# Patient Record
Sex: MALE | Race: BLACK OR AFRICAN AMERICAN | Employment: UNEMPLOYED | URBAN - METROPOLITAN AREA
[De-identification: names, ages, dates, MRNs, and addresses within clinical notes are randomized per-mention and may not be internally consistent; named-entity substitution may affect disease eponyms.]

---

## 2017-04-21 ENCOUNTER — HOSPITAL ENCOUNTER (EMERGENCY)
Age: 37
Discharge: HOME | End: 2017-04-21
Attending: EMERGENCY MEDICINE
Payer: COMMERCIAL

## 2017-04-21 VITALS
HEIGHT: 69 IN | OXYGEN SATURATION: 100 % | WEIGHT: 145.5 LBS | BODY MASS INDEX: 21.55 KG/M2 | DIASTOLIC BLOOD PRESSURE: 81 MMHG | TEMPERATURE: 98.4 F | HEART RATE: 64 BPM | SYSTOLIC BLOOD PRESSURE: 123 MMHG | RESPIRATION RATE: 14 BRPM

## 2017-04-21 DIAGNOSIS — M67.40 GANGLION CYST: ICD-10-CM

## 2017-04-21 DIAGNOSIS — D17.0 LIPOMA OF FACE: ICD-10-CM

## 2017-04-21 DIAGNOSIS — L02.91 ABSCESS: Primary | ICD-10-CM

## 2017-04-21 PROCEDURE — 99282 EMERGENCY DEPT VISIT SF MDM: CPT

## 2017-04-21 PROCEDURE — 75810000289 HC I&D ABSCESS SIMP/COMP/MULT

## 2017-04-21 PROCEDURE — 77030019895 HC PCKNG STRP IODO -A

## 2017-04-21 RX ORDER — OXYCODONE AND ACETAMINOPHEN 5; 325 MG/1; MG/1
1 TABLET ORAL
Qty: 10 TAB | Refills: 0 | Status: SHIPPED | OUTPATIENT
Start: 2017-04-21 | End: 2018-07-26 | Stop reason: ALTCHOICE

## 2017-04-21 RX ORDER — DOXYCYCLINE HYCLATE 100 MG
100 TABLET ORAL 2 TIMES DAILY
Qty: 14 TAB | Refills: 0 | Status: SHIPPED | OUTPATIENT
Start: 2017-04-21 | End: 2017-04-28

## 2017-04-21 NOTE — DISCHARGE INSTRUCTIONS
Skin Abscess: Care Instructions  Your Care Instructions    A skin abscess is a bacterial infection that forms a pocket of pus. A boil is a kind of skin abscess. The doctor may have cut an opening in the abscess so that the pus can drain out. You may have gauze in the cut so that the abscess will stay open and keep draining. You may need antibiotics. You will need to follow up with your doctor to make sure the infection has gone away. The doctor has checked you carefully, but problems can develop later. If you notice any problems or new symptoms, get medical treatment right away. Follow-up care is a key part of your treatment and safety. Be sure to make and go to all appointments, and call your doctor if you are having problems. It's also a good idea to know your test results and keep a list of the medicines you take. How can you care for yourself at home? · Apply warm and dry compresses, a heating pad set on low, or a hot water bottle 3 or 4 times a day for pain. Keep a cloth between the heat source and your skin. · If your doctor prescribed antibiotics, take them as directed. Do not stop taking them just because you feel better. You need to take the full course of antibiotics. · Take pain medicines exactly as directed. ¨ If the doctor gave you a prescription medicine for pain, take it as prescribed. ¨ If you are not taking a prescription pain medicine, ask your doctor if you can take an over-the-counter medicine. · Keep your bandage clean and dry. Change the bandage whenever it gets wet or dirty, or at least one time a day. · If the abscess was packed with gauze:  ¨ Keep follow-up appointments to have the gauze changed or removed. If the doctor instructed you to remove the gauze, gently pull out all of the gauze when your doctor tells you to. ¨ After the gauze is removed, soak the area in warm water for 15 to 20 minutes 2 times a day, until the wound closes. When should you call for help?   Call your doctor now or seek immediate medical care if:  · You have signs of worsening infection, such as:  ¨ Increased pain, swelling, warmth, or redness. ¨ Red streaks leading from the infected skin. ¨ Pus draining from the wound. ¨ A fever. Watch closely for changes in your health, and be sure to contact your doctor if:  · You do not get better as expected. Where can you learn more? Go to http://marcel-jl.info/. Enter B706 in the search box to learn more about \"Skin Abscess: Care Instructions. \"  Current as of: October 13, 2016  Content Version: 11.2  © 7085-0838 [employer]. Care instructions adapted under license by [employer] (which disclaims liability or warranty for this information). If you have questions about a medical condition or this instruction, always ask your healthcare professional. [place] [address] any warranty or liability for your use of this information. Ganglions: Care Instructions  Your Care Instructions    A ganglion is a small sac, or cyst, filled with a clear fluid that is like jelly. A ganglion may look like a bump on the hand or wrist. It also can appear on your feet, ankles, knees, or shoulders. It is not cancer. A ganglion can grow out of the protective area, or capsule, around a joint. It also can grow on a tendon sheath, which covers the ropelike tendons that connect muscle to bone. A ganglion may hurt or cause numbness if it presses on a nerve. Many ganglions do not need treatment, and they often go away on their own. But if a ganglion hurts, becomes larger, causes numbness, or limits your activity, your doctor may want to drain it with a needle and syringe or remove it with minor surgery. Follow-up care is a key part of your treatment and safety. Be sure to make and go to all appointments, and call your doctor if you are having problems.  It's also a good idea to know your test results and keep a list of the medicines you take. How can you care for yourself at home? · Wear a wrist or finger splint as directed by your doctor. It will keep your wrist or hand from moving and help reduce the fluid in the cyst. This may be all you need for the ganglion to shrink and go away. · Do not smash a ganglion with a book or other heavy object. You may break a bone or otherwise injure your wrist by trying this folk remedy, and the ganglion may return anyway. · Do not try to drain the fluid by poking the ganglion with a pin or any other sharp object. You could cause an infection. When should you call for help? Call your doctor now or seek immediate medical care if:  · You have signs of infection, such as:  ¨ Increased pain, swelling, warmth, or redness. ¨ Red streaks leading from the cyst.  ¨ Pus draining from the cyst.  ¨ A fever. Watch closely for changes in your health, and be sure to contact your doctor if:  · You have increasing pain. · Your ganglion is getting larger. · You still have pain or numbness from a ganglion. Where can you learn more? Go to http://marcel-jl.info/. Enter Z155 in the search box to learn more about \"Ganglions: Care Instructions. \"  Current as of: May 23, 2016  Content Version: 11.2  © 9305-9623 [employer]. Care instructions adapted under license by [employer] (which disclaims liability or warranty for this information). If you have questions about a medical condition or this instruction, always ask your healthcare professional. [place] [address] any warranty or liability for your use of this information. Lipoma: Care Instructions  Your Care Instructions  A lipoma is a growth of fat just below the skin. It may feel soft and rubbery. Lipomas can occur anywhere on the body. But they are most common on the torso, neck, upper thighs, upper arms, and armpits. A lipoma does not turn into cancer.   Lipomas usually are not treated, because most of them don't hurt or cause problems. But your doctor may remove a lipoma if it is painful, gets infected, or bothers you. Follow-up care is a key part of your treatment and safety. Be sure to make and go to all appointments, and call your doctor if you are having problems. It's also a good idea to know your test results and keep a list of the medicines you take. How can you care for yourself at home? · Lipomas usually need no care at home. · If your doctor removes a lipoma, follow directions for taking care of the cut (incision). When should you call for help? Call your doctor now or seek immediate medical care if:  · You have signs of infection, such as:  ¨ Increased pain, swelling, warmth, or redness. ¨ Red streaks leading from the lipoma. ¨ Pus draining from the lipoma. ¨ A fever. Watch closely for changes in your health, and be sure to contact your doctor if:  · You want to discuss having a lipoma removed. Where can you learn more? Go to http://marcel-jl.info/. Enter K330 in the search box to learn more about \"Lipoma: Care Instructions. \"  Current as of: October 13, 2016  Content Version: 11.2  © 6102-8833 [employer], [employer]. Care instructions adapted under license by [employer] (which disclaims liability or warranty for this information). If you have questions about a medical condition or this instruction, always ask your healthcare professional. [place] [address] any warranty or liability for your use of this information.

## 2017-04-21 NOTE — LETTER
[address] 
[facility] EMERGENCY DEPT 
[address] [place] [address] [address] [id number] [phone] Work/School Note Date: 4/21/2017 To Whom It May concern: 
 
Nola Simon was seen and treated today in the emergency room by the following provider(s): 
Physician Assistant: AGNIESZKA Torres. Nola Simon may return to work on 4/23/2017.  
 
Sincerely, 
 
 
 
 
Kera Gonzalez RN

## 2017-04-21 NOTE — ED NOTES
Patient arrives to ED with complaints of abscesses on right buttock, right side of face, and left wrist. Patient states that these popped up about 2 months ago.
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
## 2017-04-28 ENCOUNTER — HOSPITAL ENCOUNTER (EMERGENCY)
Age: 37
Discharge: HOME | End: 2017-04-28
Attending: EMERGENCY MEDICINE
Payer: COMMERCIAL

## 2017-04-28 VITALS
SYSTOLIC BLOOD PRESSURE: 125 MMHG | BODY MASS INDEX: 22.17 KG/M2 | DIASTOLIC BLOOD PRESSURE: 84 MMHG | WEIGHT: 149.69 LBS | OXYGEN SATURATION: 100 % | TEMPERATURE: 98.1 F | HEART RATE: 91 BPM | HEIGHT: 69 IN | RESPIRATION RATE: 18 BRPM

## 2017-04-28 DIAGNOSIS — F17.200 TOBACCO DEPENDENCE: ICD-10-CM

## 2017-04-28 DIAGNOSIS — Z51.89 ENCOUNTER FOR WOUND RE-CHECK: Primary | ICD-10-CM

## 2017-04-28 DIAGNOSIS — L02.31 ABSCESS, GLUTEAL, RIGHT: ICD-10-CM

## 2017-04-28 PROCEDURE — 75810000275 HC EMERGENCY DEPT VISIT NO LEVEL OF CARE

## 2017-04-28 RX ORDER — SULFAMETHOXAZOLE AND TRIMETHOPRIM 800; 160 MG/1; MG/1
1 TABLET ORAL 2 TIMES DAILY
Qty: 14 TAB | Refills: 0 | Status: SHIPPED | OUTPATIENT
Start: 2017-04-28 | End: 2017-05-05

## 2017-04-28 NOTE — LETTER
[address] 
\[facility]\"" EMERGENCY DEPT 
[address] [place] [address]. [address] [id number] [phone] Work/School Note Date: 4/28/2017 To Whom It May concern: 
 
William Mccann was seen and treated today in the emergency room. He may return to work on Monday, 5/1/17. Sincerely, Amita Morales
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
## 2017-04-28 NOTE — DISCHARGE INSTRUCTIONS
Learning About Benefits From Quitting Smoking  How does quitting smoking make you healthier? If you're thinking about quitting smoking, you may have a few reasons to be smoke-free. Your health may be one of them. · When you quit smoking, you lower your risks for cancer, lung disease, heart attack, stroke, blood vessel disease, and blindness from macular degeneration. · When you're smoke-free, you get sick less often, and you heal faster. You are less likely to get colds, flu, bronchitis, and pneumonia. · As a nonsmoker, you may find that your mood is better and you are less stressed. When and how will you feel healthier? Quitting has real health benefits that start from day 1 of being smoke-free. And the longer you stay smoke-free, the healthier you get and the better you feel. The first hours  · After just 20 minutes, your blood pressure and heart rate go down. That means there's less stress on your heart and blood vessels. · Within 12 hours, the level of carbon monoxide in your blood drops back to normal. That makes room for more oxygen. With more oxygen in your body, you may notice that you have more energy than when you smoked. After 2 weeks  · Your lungs start to work better. · Your risk of heart attack starts to drop. After 1 month  · When your lungs are clear, you cough less and breathe deeper, so it's easier to be active. · Your sense of taste and smell return. That means you can enjoy food more than you have since you started smoking. Over the years  · After 1 year, your risk of heart disease is half what it would be if you kept smoking. · After 5 years, your risk of stroke starts to shrink. Within a few years after that, it's about the same as if you'd never smoked. · After 10 years, your risk of dying from lung cancer is cut by about half. And your risk for many other types of cancer is lower too. How would quitting help others in your life?   When you quit smoking, you improve the health of everyone who now breathes in your smoke. · Their heart, lung, and cancer risks drop, much like yours. · They are sick less. For babies and small children, living smoke-free means they're less likely to have ear infections, pneumonia, and bronchitis. · If you're a woman who is or will be pregnant someday, quitting smoking means a healthier . · Children who are close to you are less likely to become adult smokers. Where can you learn more? Go to http://marcel-jl.info/. Enter 052 [id number] 72 11 in the search box to learn more about \"Learning About Benefits From Quitting Smoking. \"  Current as of: May 26, 2016  Content Version: 11.2  © 4023-6194 [employer]. Care instructions adapted under license by [employer] (which disclaims liability or warranty for this information). If you have questions about a medical condition or this instruction, always ask your healthcare professional. [place] [address] any warranty or liability for your use of this information. Skin Abscess: Care Instructions  Your Care Instructions    A skin abscess is a bacterial infection that forms a pocket of pus. A boil is a kind of skin abscess. The doctor may have cut an opening in the abscess so that the pus can drain out. You may have gauze in the cut so that the abscess will stay open and keep draining. You may need antibiotics. You will need to follow up with your doctor to make sure the infection has gone away. The doctor has checked you carefully, but problems can develop later. If you notice any problems or new symptoms, get medical treatment right away. Follow-up care is a key part of your treatment and safety. Be sure to make and go to all appointments, and call your doctor if you are having problems. It's also a good idea to know your test results and keep a list of the medicines you take. How can you care for yourself at home?   · Apply warm and dry compresses, a heating pad set on low, or a hot water bottle 3 or 4 times a day for pain. Keep a cloth between the heat source and your skin. · If your doctor prescribed antibiotics, take them as directed. Do not stop taking them just because you feel better. You need to take the full course of antibiotics. · Take pain medicines exactly as directed. ¨ If the doctor gave you a prescription medicine for pain, take it as prescribed. ¨ If you are not taking a prescription pain medicine, ask your doctor if you can take an over-the-counter medicine. · Keep your bandage clean and dry. Change the bandage whenever it gets wet or dirty, or at least one time a day. · If the abscess was packed with gauze:  ¨ Keep follow-up appointments to have the gauze changed or removed. If the doctor instructed you to remove the gauze, gently pull out all of the gauze when your doctor tells you to. ¨ After the gauze is removed, soak the area in warm water for 15 to 20 minutes 2 times a day, until the wound closes. When should you call for help? Call your doctor now or seek immediate medical care if:  · You have signs of worsening infection, such as:  ¨ Increased pain, swelling, warmth, or redness. ¨ Red streaks leading from the infected skin. ¨ Pus draining from the wound. ¨ A fever. Watch closely for changes in your health, and be sure to contact your doctor if:  · You do not get better as expected. Where can you learn more? Go to http://marcel-jl.info/. Enter B270 in the search box to learn more about \"Skin Abscess: Care Instructions. \"  Current as of: October 13, 2016  Content Version: 11.2  © 7944-3535 [employer]. Care instructions adapted under license by [employer] (which disclaims liability or warranty for this information).  If you have questions about a medical condition or this instruction, always ask your healthcare professional. Wilma Morelos any warranty or liability for your use of this information.

## 2017-04-28 NOTE — ED NOTES
Patient arrives for wound check for abscess that was drained on Friday on his R buttock. Patient reports that he is still having pain and drainage. Reports feeling nauseous. NO distress noted.  Will continue to monitor

## 2017-10-11 ENCOUNTER — ANESTHESIA EVENT (OUTPATIENT)
Dept: SURGERY | Age: 37
End: 2017-10-11
Payer: COMMERCIAL

## 2017-10-11 NOTE — PERIOP NOTES
[facility]  Ambulatory Surgery Unit  Pre-operative Instructions    Surgery/Procedure Date  10/12/17            Tentative Arrival Time 0800      1. On the day of your surgery/procedure, please report to the Ambulatory Surgery Unit Registration Desk and sign in at your designated time. The Ambulatory Surgery Unit is located in [facility] on the [facility] side of the [facility] across from the [address] [place] [address]. Please have all of your health insurance cards and a photo ID. 2. You must have someone with you to drive you home, as you should not drive a car for 24 hours following anesthesia. Please make arrangements for a responsible adult friend or family member to stay with you for at least the first 24 hours after your surgery. 3. Do not have anything to eat or drink (including water, gum, mints, coffee, juice) after midnight   10/11/17. This may not apply to medications prescribed by your physician. (Please note below the special instructions with medications to take the morning of surgery, if applicable.)    4. We recommend you do not drink any alcoholic beverages for 24 hours before and after your surgery. 5. Contact your surgeons office for instructions on the following medications: non-steroidal anti-inflammatory drugs (i.e. Advil, Aleve), vitamins, and supplements. (Some surgeons will want you to stop these medications prior to surgery and others may allow you to take them)   **If you are currently taking Plavix, Coumadin, Aspirin and/or other blood-thinning agents, contact your surgeon for instructions. ** Your surgeon will partner with the physician prescribing these medications to determine if it is safe to stop or if you need to continue taking. Please do not stop taking these medications without instructions from your surgeon.     6. In an effort to help prevent surgical site infection, we ask that you shower with an anti-bacterial soap (i.e. Dial or Safeguard) for 3 days prior to and on the morning of surgery, using a fresh towel after each shower. (Please begin this process with fresh bed linens.) Do not apply any lotions, powders, or deodorants after the shower on the day of your procedure. If applicable, please do not shave the operative site for 48 hours prior to surgery. 7. Wear comfortable clothes. Wear glasses instead of contacts. Do not bring any jewelry or money (other than copays or fees as instructed). Do not wear make-up, particularly mascara, the morning of your surgery. Do not wear nail polish, particularly if you are having foot /hand surgery. Wear your hair loose or down, no ponytails, buns, allison pins or clips. All body piercings must be removed. 8. You should understand that if you do not follow these instructions your surgery may be cancelled. If your physical condition changes (i.e. fever, cold or flu) please contact your surgeon as soon as possible. 9. It is important that you be on time. If a situation occurs where you may be late, or if you have any questions or problems, please call [phone].    10. Your surgery time may be subject to change. You will receive a phone call the day prior to surgery to confirm your arrival time. 11. Pediatric patients: please bring a change of clothes, diapers, bottle/sippy cup, pacifier, etc.      Special Instructions: Take all medications and inhalers, as prescribed, on the morning of surgery with a sip of water EXCEPT: none      I understand a pre-operative phone call will be made to verify my surgery time. In the event that I am not available, I give permission for a message to be left on my answering service and/or with another person?       Yes     (instructions given verbally during phone assessment- pt voiced understanding)     ___________________      ___________________      ________________  (Signature of Patient)          (Witness)                   (Date and Time)

## 2017-10-12 ENCOUNTER — ANESTHESIA (OUTPATIENT)
Dept: SURGERY | Age: 37
End: 2017-10-12
Payer: COMMERCIAL

## 2017-10-12 ENCOUNTER — HOSPITAL ENCOUNTER (OUTPATIENT)
Age: 37
Setting detail: OUTPATIENT SURGERY
Discharge: HOME | End: 2017-10-12
Attending: ORTHOPAEDIC SURGERY | Admitting: ORTHOPAEDIC SURGERY
Payer: COMMERCIAL

## 2017-10-12 VITALS
OXYGEN SATURATION: 100 % | TEMPERATURE: 97.5 F | BODY MASS INDEX: 20.65 KG/M2 | RESPIRATION RATE: 12 BRPM | WEIGHT: 139.4 LBS | SYSTOLIC BLOOD PRESSURE: 105 MMHG | HEIGHT: 69 IN | DIASTOLIC BLOOD PRESSURE: 76 MMHG | HEART RATE: 60 BPM

## 2017-10-12 PROCEDURE — 74011250636 HC RX REV CODE- 250/636

## 2017-10-12 PROCEDURE — 74011250636 HC RX REV CODE- 250/636: Performed by: ANESTHESIOLOGY

## 2017-10-12 PROCEDURE — 77030002916 HC SUT ETHLN J&J -A: Performed by: ORTHOPAEDIC SURGERY

## 2017-10-12 PROCEDURE — 77030003601 HC NDL NRV BLK BBMI -A: Performed by: ANESTHESIOLOGY

## 2017-10-12 PROCEDURE — 77030018836 HC SOL IRR NACL ICUM -A: Performed by: ORTHOPAEDIC SURGERY

## 2017-10-12 PROCEDURE — 74011000250 HC RX REV CODE- 250

## 2017-10-12 PROCEDURE — 77030000032 HC CUF TRNQT ZIMM -B: Performed by: ORTHOPAEDIC SURGERY

## 2017-10-12 PROCEDURE — 76030000000 HC AMB SURG OR TIME 0.5 TO 1: Performed by: ORTHOPAEDIC SURGERY

## 2017-10-12 PROCEDURE — 76210000046 HC AMBSU PH II REC FIRST 0.5 HR: Performed by: ORTHOPAEDIC SURGERY

## 2017-10-12 PROCEDURE — 88304 TISSUE EXAM BY PATHOLOGIST: CPT | Performed by: ORTHOPAEDIC SURGERY

## 2017-10-12 PROCEDURE — 76210000040 HC AMBSU PH I REC FIRST 0.5 HR: Performed by: ORTHOPAEDIC SURGERY

## 2017-10-12 PROCEDURE — 76060000061 HC AMB SURG ANES 0.5 TO 1 HR: Performed by: ORTHOPAEDIC SURGERY

## 2017-10-12 RX ORDER — FENTANYL CITRATE 50 UG/ML
25 INJECTION, SOLUTION INTRAMUSCULAR; INTRAVENOUS
Status: DISCONTINUED | OUTPATIENT
Start: 2017-10-12 | End: 2017-10-12 | Stop reason: HOSPADM

## 2017-10-12 RX ORDER — ONDANSETRON 2 MG/ML
INJECTION INTRAMUSCULAR; INTRAVENOUS AS NEEDED
Status: DISCONTINUED | OUTPATIENT
Start: 2017-10-12 | End: 2017-10-12 | Stop reason: HOSPADM

## 2017-10-12 RX ORDER — ROPIVACAINE HYDROCHLORIDE 5 MG/ML
INJECTION, SOLUTION EPIDURAL; INFILTRATION; PERINEURAL
Status: DISCONTINUED
Start: 2017-10-12 | End: 2017-10-12 | Stop reason: HOSPADM

## 2017-10-12 RX ORDER — DEXAMETHASONE SODIUM PHOSPHATE 4 MG/ML
INJECTION, SOLUTION INTRA-ARTICULAR; INTRALESIONAL; INTRAMUSCULAR; INTRAVENOUS; SOFT TISSUE AS NEEDED
Status: DISCONTINUED | OUTPATIENT
Start: 2017-10-12 | End: 2017-10-12 | Stop reason: HOSPADM

## 2017-10-12 RX ORDER — MORPHINE SULFATE 10 MG/ML
2 INJECTION, SOLUTION INTRAMUSCULAR; INTRAVENOUS
Status: DISCONTINUED | OUTPATIENT
Start: 2017-10-12 | End: 2017-10-12 | Stop reason: HOSPADM

## 2017-10-12 RX ORDER — SODIUM CHLORIDE 0.9 % (FLUSH) 0.9 %
5-10 SYRINGE (ML) INJECTION AS NEEDED
Status: DISCONTINUED | OUTPATIENT
Start: 2017-10-12 | End: 2017-10-12 | Stop reason: HOSPADM

## 2017-10-12 RX ORDER — MIDAZOLAM HYDROCHLORIDE 1 MG/ML
INJECTION, SOLUTION INTRAMUSCULAR; INTRAVENOUS AS NEEDED
Status: DISCONTINUED | OUTPATIENT
Start: 2017-10-12 | End: 2017-10-12 | Stop reason: HOSPADM

## 2017-10-12 RX ORDER — SODIUM CHLORIDE 0.9 % (FLUSH) 0.9 %
5-10 SYRINGE (ML) INJECTION EVERY 8 HOURS
Status: DISCONTINUED | OUTPATIENT
Start: 2017-10-12 | End: 2017-10-12 | Stop reason: HOSPADM

## 2017-10-12 RX ORDER — OXYCODONE AND ACETAMINOPHEN 5; 325 MG/1; MG/1
1 TABLET ORAL ONCE
Status: DISCONTINUED | OUTPATIENT
Start: 2017-10-12 | End: 2017-10-12 | Stop reason: HOSPADM

## 2017-10-12 RX ORDER — SODIUM CHLORIDE, SODIUM LACTATE, POTASSIUM CHLORIDE, CALCIUM CHLORIDE 600; 310; 30; 20 MG/100ML; MG/100ML; MG/100ML; MG/100ML
25 INJECTION, SOLUTION INTRAVENOUS CONTINUOUS
Status: DISCONTINUED | OUTPATIENT
Start: 2017-10-12 | End: 2017-10-12 | Stop reason: HOSPADM

## 2017-10-12 RX ORDER — PROPOFOL 10 MG/ML
VIAL (ML) INTRAVENOUS
Status: DISCONTINUED | OUTPATIENT
Start: 2017-10-12 | End: 2017-10-12 | Stop reason: HOSPADM

## 2017-10-12 RX ORDER — ROPIVACAINE HYDROCHLORIDE 5 MG/ML
INJECTION, SOLUTION EPIDURAL; INFILTRATION; PERINEURAL AS NEEDED
Status: DISCONTINUED | OUTPATIENT
Start: 2017-10-12 | End: 2017-10-12 | Stop reason: HOSPADM

## 2017-10-12 RX ORDER — LIDOCAINE HYDROCHLORIDE 10 MG/ML
0.1 INJECTION, SOLUTION EPIDURAL; INFILTRATION; INTRACAUDAL; PERINEURAL AS NEEDED
Status: DISCONTINUED | OUTPATIENT
Start: 2017-10-12 | End: 2017-10-12 | Stop reason: HOSPADM

## 2017-10-12 RX ORDER — GLYCOPYRROLATE 0.2 MG/ML
INJECTION INTRAMUSCULAR; INTRAVENOUS AS NEEDED
Status: DISCONTINUED | OUTPATIENT
Start: 2017-10-12 | End: 2017-10-12 | Stop reason: HOSPADM

## 2017-10-12 RX ORDER — LIDOCAINE HYDROCHLORIDE 20 MG/ML
INJECTION, SOLUTION EPIDURAL; INFILTRATION; INTRACAUDAL; PERINEURAL AS NEEDED
Status: DISCONTINUED | OUTPATIENT
Start: 2017-10-12 | End: 2017-10-12 | Stop reason: HOSPADM

## 2017-10-12 RX ORDER — MIDAZOLAM HYDROCHLORIDE 1 MG/ML
INJECTION, SOLUTION INTRAMUSCULAR; INTRAVENOUS
Status: DISCONTINUED
Start: 2017-10-12 | End: 2017-10-12 | Stop reason: HOSPADM

## 2017-10-12 RX ORDER — FENTANYL CITRATE 50 UG/ML
INJECTION, SOLUTION INTRAMUSCULAR; INTRAVENOUS AS NEEDED
Status: DISCONTINUED | OUTPATIENT
Start: 2017-10-12 | End: 2017-10-12 | Stop reason: HOSPADM

## 2017-10-12 RX ORDER — HYDROCODONE BITARTRATE AND ACETAMINOPHEN 5; 325 MG/1; MG/1
1 TABLET ORAL
Qty: 25 TAB | Refills: 0 | Status: SHIPPED | OUTPATIENT
Start: 2017-10-12 | End: 2018-07-26 | Stop reason: ALTCHOICE

## 2017-10-12 RX ORDER — DIPHENHYDRAMINE HYDROCHLORIDE 50 MG/ML
12.5 INJECTION, SOLUTION INTRAMUSCULAR; INTRAVENOUS AS NEEDED
Status: DISCONTINUED | OUTPATIENT
Start: 2017-10-12 | End: 2017-10-12 | Stop reason: HOSPADM

## 2017-10-12 RX ORDER — CEFAZOLIN SODIUM IN 0.9 % NACL 2 G/100 ML
2 PLASTIC BAG, INJECTION (ML) INTRAVENOUS ONCE
Status: DISCONTINUED | OUTPATIENT
Start: 2017-10-12 | End: 2017-10-12 | Stop reason: HOSPADM

## 2017-10-12 RX ORDER — HYDROMORPHONE HYDROCHLORIDE 1 MG/ML
.2-.5 INJECTION, SOLUTION INTRAMUSCULAR; INTRAVENOUS; SUBCUTANEOUS ONCE
Status: DISCONTINUED | OUTPATIENT
Start: 2017-10-12 | End: 2017-10-12 | Stop reason: HOSPADM

## 2017-10-12 RX ORDER — LIDOCAINE HYDROCHLORIDE AND EPINEPHRINE 20; 5 MG/ML; UG/ML
1.5 INJECTION, SOLUTION EPIDURAL; INFILTRATION; INTRACAUDAL; PERINEURAL ONCE
Status: DISCONTINUED | OUTPATIENT
Start: 2017-10-12 | End: 2017-10-12 | Stop reason: HOSPADM

## 2017-10-12 RX ADMIN — ONDANSETRON 4 MG: 2 INJECTION INTRAMUSCULAR; INTRAVENOUS at 10:09

## 2017-10-12 RX ADMIN — GLYCOPYRROLATE 0.4 MG: 0.2 INJECTION INTRAMUSCULAR; INTRAVENOUS at 09:42

## 2017-10-12 RX ADMIN — FENTANYL CITRATE 25 MCG: 50 INJECTION, SOLUTION INTRAMUSCULAR; INTRAVENOUS at 09:58

## 2017-10-12 RX ADMIN — Medication 100 MCG/KG/MIN: at 09:37

## 2017-10-12 RX ADMIN — DEXAMETHASONE SODIUM PHOSPHATE 4 MG: 4 INJECTION, SOLUTION INTRA-ARTICULAR; INTRALESIONAL; INTRAMUSCULAR; INTRAVENOUS; SOFT TISSUE at 10:09

## 2017-10-12 RX ADMIN — ROPIVACAINE HYDROCHLORIDE 30 ML: 5 INJECTION, SOLUTION EPIDURAL; INFILTRATION; PERINEURAL at 08:41

## 2017-10-12 RX ADMIN — LIDOCAINE HYDROCHLORIDE 80 MG: 20 INJECTION, SOLUTION EPIDURAL; INFILTRATION; INTRACAUDAL; PERINEURAL at 09:37

## 2017-10-12 RX ADMIN — MIDAZOLAM HYDROCHLORIDE 3 MG: 1 INJECTION, SOLUTION INTRAMUSCULAR; INTRAVENOUS at 08:36

## 2017-10-12 RX ADMIN — FENTANYL CITRATE 25 MCG: 50 INJECTION, SOLUTION INTRAMUSCULAR; INTRAVENOUS at 09:51

## 2017-10-12 RX ADMIN — SODIUM CHLORIDE, SODIUM LACTATE, POTASSIUM CHLORIDE, AND CALCIUM CHLORIDE 25 ML/HR: 600; 310; 30; 20 INJECTION, SOLUTION INTRAVENOUS at 08:31

## 2017-10-12 NOTE — OP NOTES
[address]   OP NOTE       Name:  Yoly Aden   MR#:  [id number]   :  1980   Account #:  [de-identified]    Surgery Date:  10/12/2017   Date of Adm:  10/12/2017       PREOPERATIVE DIAGNOS IS: Left wrist dorsal ganglion cyst.     POSTOPERATIVE DIAGNOS IS: Left wrist ganglion cyst.     PROCEDURES PERFORMED:  Excision of left wrist dorsal ganglion   cyst.     SURGEON: Rosalinda Witt. Miles Cid MD    ESTIMATED BLOOD LOSS: Minimal     SPECIMENS REMOVED:  Left dorsal wrist mass. ANESTHESIA:  Regional block plus sedation. COMPLICATIONS: None. IMPLANTS: None. DESCRIPTION OF PROCEDURE: The patient was brought into the   operating room, placed on the table in supine position and sedation   administered. Note that the operative site had been identified,   appropriate preoperative antibiotic prophylaxis administered and   regional block administered in preop holding. The left upper extremity   was prepped and draped in the usual manner. After a time-out, the   limb was elevated, exsanguinated with Esmarch bandage and the   tourniquet inflated to 250 mmHg. A transverse incision was made overlying the ganglion cyst on the   dorsum of the wrist. The cyst was carefully  from surrounding   tissues, removed and sent as specimen. The cyst did have an   extremely thick wall. The wound was irrigated and closed with 4-0   nylon suture. Xeroform, 4 x 4's, Mitesh and Coban were used as   dressing. The tourniquet was released. Total tourniquet time was approximately   30 minutes. The patient tolerated the procedure well, was taken back   to the PACU in stable condition.         MD Timothy Day / Valeriy.Canavan   D:  10/12/2017   10:29   T:  10/12/2017   11:10   Job #:  [id number]

## 2017-10-12 NOTE — ANESTHESIA PREPROCEDURE EVALUATION

## 2017-10-12 NOTE — ANESTHESIA PROCEDURE NOTES
Peripheral Block    Start time: 10/12/2017 8:37 AM  End time: 10/12/2017 8:45 AM  Performed by: Leonarda Guerin  Authorized by: Leonarda Guerin       Pre-procedure: Indications: at surgeon's request, post-op pain management and primary anesthetic    Preanesthetic Checklist: patient identified, risks and benefits discussed, site marked, timeout performed, anesthesia consent given and patient being monitored    Timeout Time: 08:37          Block Type:   Block Type:  Supraclavicular  Laterality:  Left  Monitoring:  Standard ASA monitoring, frequent vital sign checks, responsive to questions, oxygen, continuous pulse ox and heart rate  Injection Technique:  Single shot  Procedures: ultrasound guided    Prep: chlorhexidine    Location:  Supraclavicular  Needle Type:  Stimuplex  Needle Gauge:  22 G  Needle Localization:  Ultrasound guidance  Medication Injected:  0.5%  ropivacaine  Volume (mL):  30    Assessment:  Number of attempts:  1  Injection Assessment:  Incremental injection every 5 mL, local visualized surrounding nerve on ultrasound, negative aspiration for blood, ultrasound image on chart, no intravascular symptoms and no paresthesia  Patient tolerance:  Patient tolerated the procedure well with no immediate complications  Supraclavicular Nerve Block Note     left Supraclavicular nerve block:    Risks, benefits, alternatives explained at length and patient agrees to proceed. Time out performed and site for block/surgery identified. Standard monitors applied, 3 L NC O2, and sedation given as recorded by RN so as to achieve patient comfort and anxiolysis, but maintain meaningful verbal contact. Sterile prep followed by 1-2 mL local at insertion site. A 40 mm, 22G insulated stimiplex needle was inserted in the interscalene groove, approximately one centimeter from the mid-clavicle. The nerve bundle was identified under [address] guidance.     20 ml of 0.5% ropivacaine injected without resistance and with gentle aspiration in 3-5 ml increments. An extremity ring block was performed with 10 ml of same soln. On same side. Patient tolerated procedure well. No pain, paresthesia, or blood noted. VSS throughout. No complications noted.

## 2017-10-12 NOTE — PERIOP NOTES
Miguel Galo  1980  [id number]    Situation:  Verbal report given from: QUYEN Rodas RN and A. [place] CRNA  Procedure: Procedure(s):  EXCISION LEFT WRIST DORSAL GANGLION CYST (AXILLARY BLOCK)    Background:    Preoperative diagnosis: LEFT WRIST DORSAL GANGLION CYST    Postoperative diagnosis: LEFT WRIST DORSAL GANGLION CYST    :  Dr. Shravan Childress    Assistant(s): Circ-1: Star Camacho RN  Circ-Intern: Gayathri Hernadez RN  Scrub Tech-1: Negra Hawkins    Specimens:   ID Type Source Tests Collected by Time Destination   1 : LEFT DORSAL WRIST MASS Preservative Mass  Oswaldo Gonzalez MD 10/12/2017 1007 Pathology       Assessment:  Intra-procedure medications         Anesthesia gave intra-procedure sedation and medications, see anesthesia flow sheet     Intravenous fluids: LR@ KVO     Vital signs stable       Recommendation:    Permission to share finding with family or friend yes    1100 Discharged to home via/wc,accompanied to car per RN. Skin warm and dry, awake and alert. Respirations even, unlabored. Pt and family members questions and concerns addressed prior to discharge. All belongings with pt.  Sling applied to LUE prior to discharge

## 2017-10-12 NOTE — BRIEF OP NOTE
BRIEF OPERATIVE NOTE    Date of Procedure: 10/12/2017   Preoperative Diagnosis: LEFT WRIST DORSAL GANGLION CYST  Postoperative Diagnosis: LEFT WRIST DORSAL GANGLION CYST    Procedure(s):  EXCISION LEFT WRIST DORSAL GANGLION CYST (AXILLARY BLOCK)  Surgeon(s) and Role:     * Jose Hobbs MD - Primary         Assistant Staff:       Surgical Staff:  Circ-1: Mio Gonzalez RN  Circ-Intern: Rohini Constantino RN  Scrub Tech-1: Irene Hawkins  Event Time In   Incision Start 1837   Incision Close 1017     Anesthesia: Other   Estimated Blood Loss: min  Specimens:   ID Type Source Tests Collected by Time Destination   1 : LEFT DORSAL WRIST MASS Preservative Mass  Jose Hobbs MD 10/12/2017 1007 Pathology      Findings: cyst   Complications: none  Implants: * No implants in log *

## 2017-10-12 NOTE — IP AVS SNAPSHOT
[address] [facility] 
[phone] Patient: Susannah Harden MRN: [id number] DCX:4[birth year]2/[birth year]25 You are allergic to the following No active allergies Recent Documentation Height Weight BMI Smoking Status 1.753 m 63.2 kg 20.59 kg/m2 Current Every Day Smoker Emergency Contacts Name Discharge Info Relation Home Work Mobile Belkys Jim CAREGIVER [3] Parent [1] [phone] About your hospitalization You were admitted on:  October 12, 2017 You last received care in the:  \[facility] [place] [facility]\"" [facility] PACU You were discharged on:  October 12, 2017 Unit phone number:  [phone] Why you were hospitalized Your primary diagnosis was:  Not on File Providers Seen During Your Hospitalizations Provider Role Specialty Primary office phone Kim Dickerson MD Attending Provider Orthopedic Surgery [phone] Your Primary Care Physician (PCP) Primary Care Physician Office Phone Office Fax [facility] [phone] Follow-up Information Follow up With Details Comments Contact Info Yan Lee, [address] [place] [address] 
[phone] Current Discharge Medication List  
  
START taking these medications Dose & Instructions Dispensing Information Comments Morning Noon Evening Bedtime HYDROcodone-acetaminophen 5-325 mg per tablet Commonly known as:  Antonio Thompson Your last dose was: Your next dose is:    
   
   
 Dose:  1 Tab Take 1 Tab by mouth every four (4) hours as needed for Pain. Max Daily Amount: 6 Tabs. Quantity:  25 Tab Refills:  0 CONTINUE these medications which have NOT CHANGED Dose & Instructions Dispensing Information Comments Morning Noon Evening Bedtime  
 ferrous sulfate 325 mg (65 mg iron) tablet Your last dose was: Your next dose is:    
   
   
 Dose:  325 mg Take 1 Tab by mouth daily (with breakfast). Quantity:  30 Tab Refills:  3  
     
   
   
   
  
 oxyCODONE-acetaminophen 5-325 mg per tablet Commonly known as:  PERCOCET Your last dose was: Your next dose is:    
   
   
 Dose:  1 Tab Take 1 Tab by mouth every six (6) hours as needed for Pain. Max Daily Amount: 4 Tabs. Quantity:  10 Tab Refills:  0  
     
   
   
   
  
 polyethylene glycol 17 gram packet Commonly known as:  Tonna Sreekanth Your last dose was: Your next dose is:    
   
   
 Dose:  17 g Take 1 Packet by mouth daily. Quantity:  30 Packet Refills:  1 Where to Get Your Medications Information on where to get these meds will be given to you by the nurse or doctor. ! Ask your nurse or doctor about these medications HYDROcodone-acetaminophen 5-325 mg per tablet Discharge Instructions Discharge Instructions for:  
 Swetha Smith / [id number] : 1980 Admitted 10/12/2017 Discharged: 10/12/2017 Postoperative Hand Surgery Instructions Elevation: 
Elevation is one of the most important things to do following your surgery. Not only does it decrease swelling, but it also decreases pain. For hand or wrist surgery, keep the arm in your sling while up and about, with your hand above your elbow. When lying down, keep your arm propped up on a few pillows, so that your fingers are pointing towards the ceiling. Finger Motion: **It is very important that you begin moving your fingers immediately after surgery, as often as you can, in order to prevent stiffness. Wiggling your fingers is not the same as moving them - moving your fingers involves bending them until they touch the dressing  
(if possible). You should use your other hand to gently move the fingers on the operative hand if necessary. Dressings: 
Please leave the surgical dressing in place until you are seen in the office for your first post-operative appointment with Dr Lauren Adkins. The dressing helps to prevent infection and positions the extremity to protect the surgical procedure  that was performed. Bathing and showering are allowed as long as the dressing is kept dry. To keep your dressing dry while bathing, simply place a plastic bag (bread bag, newspaper bag, trash bag or subway sandwich bag) over the dressing and seal it with tape or a rubber band. Medications: You have a been given a prescription for pain medication. Please follow the instructions closely. It is safe to take up to 600mg of Ibuprofen (Advil or Motrin) along with the pain prescription as long you are not allergic to it, are not on blood thinners, and do not have gastric reflux or an ulcer. However, do not take any additional tylenol/acetaminophen if your prescription contains tylenol. If you have new or increasing numbness after any numbing medicine wears off (12-24 hours for regional blocks, 3 hours for local), call the office immediately. If you experience nausea, vomiting or itching with the pain medication, please call the office during regular office hours. Refills for pain medication prescriptions ARE NOT given on the weekend or after regular office hours. If antibiotics have been prescribed, please be sure to complete the entire prescription. Post-Operative Appointments: 
Dr. Lauren Adkins likes to see you back in the office about 10-14 days following your surgery to change the post-operative dressing and remove any necessary sutures or staples. If you have not received a follow up appointment card please contact our office at [phone]. *If you have any questions or concerns or experience any sudden changes in your condition, please call our office at [phone]* DO NOT TAKE TYLENOL/ACETAMINOPHEN WITH PERCOCET, LORTAB, [address] TAKE NARCOTIC PAIN MEDICATIONS WITH FOOD, and if given 2 pain narcotics do NOT take together! Narcotics tend to be constipating and we recommend taking a stool softener such as Colace or Miralax (follow package instructions). If you were given prescriptions, please review the written information on the prescribed medications. DO NOT DRIVE WHILE TAKING NARCOTIC PAIN MEDICATIONS. DISCHARGE SUMMARY from Nurse The following personal items collected during your admission are returned to you:  
Dental Appliance: Dental Appliances: None Vision: Visual Aid: None Hearing Aid:   
Jewelry: Jewelry: Earrings (in pt beloning bag ) Clothing: Clothing: Undergarments, With patient, Socks, Shirt, Pants (understretcher ) Other Valuables: Other Valuables: None Valuables sent to safe:   
 
 
PATIENT INSTRUCTIONS: 
 
After General Anesthesia or Intravenous Sedation, for 24 hours or while taking prescription Narcotics: · Someone should be with you for the next 24 hours. · For your own safety, a responsible adult must drive you home. · Limit your activities · Recommended activity: Rest today, up with assistance today. Do not climb stairs or shower unattended for the next 24 hours. · Start with a soft bland diet and advance as tolerated (no nausea) to regular diet. · If you have a sore throat some things that may help are: fluids, warm salt water gargle, or throat lozenges. If this does not improve after several days please follow up with your family physician. · Do not drive and operate hazardous machinery · Do not make important personal or business decisions · Do  not drink alcoholic beverages · If you have not urinated within 8 hours after discharge, please contact your surgeon on call. Report the following to your surgeon: 
· Excessive pain, swelling, redness or odor of or around the surgical area · Temperature over 100.5 · Nausea and vomiting lasting longer than 4 hours or if unable to take medications · Any signs of decreased circulation or nerve impairment to extremity: change in color, persistent  numbness, tingling, coldness or increase pain · If you received an upper extremity nerve block, please wear your sling until the block has worn off, then refer to your surgeons post-operative instructions. If you have had a shoulder block or a block near your collar bone, you may have              symptoms such as: 1. Mild shortness of breath 2. A hoarse voice 3. Blurry vision 4. Unequal pupils 5. Drooping of your face on the same side as the nerve block. These symptoms will disappear as the nerve block wears off. · If you received a lower extremity nerve block, please use your crutches, walker, or cane until the nerve block has worn off, then refer to your surgeons post-operative instructions. ?  
· You will receive a Post Operative Call from one of the Recovery Room Nurses on the day after your surgery to check on you. It is very important for us to know how you are recovering after your surgery. If you have an issue please call your surgeon, do not wait for the post operative call. · You may receive an e-mail or letter in the mail from [facility] regarding your experience with us in the Ambulatory Surgery Unit. Your feedback is valuable to us and we appreciate your participation in the survey. ·  
· If the above instructions are not adequate, please contact Alyssa Madrid RN, [facility] anesthesia Nurse Manager or our Anesthesiologist, at [phone]. If you are having problems after your surgery, call your physician at his office number. ·  
· We wish youre a speedy recovery ? What to do at Home: *  Please give a list of your current medications to your Primary Care Provider.  
 
*  Please update this list whenever your medications are discontinued, doses are 
 changed, or new medications (including over-the-counter products) are added. *  Please carry medication information at all times in case of emergency situations. These are general instructions for a healthy lifestyle: No smoking/ No tobacco products/ Avoid exposure to second hand smoke Surgeon General's Warning:  Quitting smoking now greatly reduces serious risk to your health. Obesity, smoking, and sedentary lifestyle greatly increases your risk for illness A healthy diet, regular physical exercise & weight monitoring are important for maintaining a healthy lifestyle You may be retaining fluid if you have a history of heart failure or if you experience any of the following symptoms:  Weight gain of 3 pounds or more overnight or 5 pounds in a week, increased swelling in our hands or feet or shortness of breath while lying flat in bed. Please call your doctor as soon as you notice any of these symptoms; do not wait until your next office visit. Recognize signs and symptoms of STROKE: 
 
B - Balance E-  Eyes F-  Face looks uneven A-  Arms unable to move or move even S-  Speech slurred or non-existent T-  Time-call 911 as soon as signs and symptoms begin-DO NOT go Back to bed or wait to see if you get better-TIME IS BRAIN. If you have not had your influenza or pneumococcal vaccines, please follow up with your primary care physician. The discharge information has been reviewed with the patient and parent. The patient and caregiver verbalized understanding. Discharge Instructions Attachments/References MEFS - HYDROCODONE/ACETAMINOPHEN (VICODIN, Birda Sutton, LORTAB) - (BY MOUTH) (ENGLISH) Discharge Orders None [facility]cing [facility] [place] [facility]! [facility] introduces [facility] patient portal. Now you can access parts of your medical record, email your doctor's office, and request medication refills online.    
 
1. In your internet browser, go to [email or web address] 2. Click on the First Time User? Click Here link in the Sign In box. You will see the New Member Sign Up page. 3. Enter your [facility] Access Code exactly as it appears below. You will not need to use this code after youve completed the sign-up process. If you do not sign up before the expiration date, you must request a new code. · [facility] Access Code: [email or web address] Expires: 1/8/2018 12:49 PM 
 
4. Enter the last four digits of your Social Security Number (xxxx) and Date of Birth (mm/dd/yyyy) as indicated and click Submit. You will be taken to the next sign-up page. 5. Create a [facility] ID. This will be your [facility] login ID and cannot be changed, so think of one that is secure and easy to remember. 6. Create a [facility] password. You can change your password at any time. 7. Enter your Password Reset Question and Answer. This can be used at a later time if you forget your password. 8. Enter your e-mail address. You will receive e-mail notification when new information is available in [address] 9. Click Sign Up. You can now view and download portions of your medical record. 10. Click the Download Summary menu link to download a portable copy of your medical information. If you have questions, please visit the Frequently Asked Questions section of the [facility] website. Remember, [facility] is NOT to be used for urgent needs. For medical emergencies, dial 911. Now available from your iPhone and Android! General Information Please provide this summary of care documentation to your next provider. Patient Signature:  ____________________________________________________________ Date:  ____________________________________________________________  
  
Sinhala Pih Provider Signature:  ____________________________________________________________ Date:  ____________________________________________________________ More Information Hydrocodone/Acetaminophen (Vicodin, Norco, Lortab) - (By mouth) Why this medicine is used:  
Treats pain. Contact a nurse or doctor right away if you have: · Blistering, peeling, red skin rash · Fast or slow heartbeat, shallow breathing, blue lips, fingernails, or skin · Anxiety, restlessness, muscle spasms, twitching, seeing or hearing things that are not there · Dark urine or pale stools, yellow skin or eyes · Extreme weakness, sweating, seizures, cold or clammy skin · Lightheadedness, dizziness, fainting, fever, sweating Common side effects: 
· Constipation, nausea, vomiting, loss of appetite, stomach pain · Tiredness or sleepiness © 2017 [address] Information is for End User's use only and may not be sold, redistributed or otherwise used for commercial purposes.

## 2017-10-12 NOTE — ANESTHESIA POSTPROCEDURE EVALUATION
Post-Anesthesia Evaluation and Assessment    Patient: Mary Villela MRN: [id number]  SSN: [id number]    YOB: 1980  Age: 40 y.o. Sex: male       Cardiovascular Function/Vital Signs  Visit Vitals    /76    Pulse 60    Temp 36.4 °C (97.5 °F)    Resp 12    Ht 5' 9\" (1.753 m)    Wt 63.2 kg (139 lb 6.4 oz)    SpO2 100%    BMI 20.59 kg/m2       Patient is status post regional anesthesia for Procedure(s):  EXCISION LEFT WRIST DORSAL GANGLION CYST (AXILLARY BLOCK). Nausea/Vomiting: None    Postoperative hydration reviewed and adequate. Pain:  Pain Scale 1: Numeric (0 - 10) (10/12/17 1045)  Pain Intensity 1: 0 (10/12/17 1045)   Managed    Neurological Status:   Neuro (WDL): Exceptions to WDL (10/12/17 1024)  Neuro  Neurologic State: Alert (10/12/17 1045)  LUE Motor Response: Numbness (blocked) (10/12/17 1045)  LLE Motor Response: Purposeful (10/12/17 1045)  RUE Motor Response: Purposeful (10/12/17 1045)  RLE Motor Response: Purposeful (10/12/17 1045)   At baseline    Mental Status and Level of Consciousness: Arousable    Pulmonary Status:   O2 Device: Room air (10/12/17 1032)   Adequate oxygenation and airway patent    Complications related to anesthesia: None    Post-anesthesia assessment completed.  No concerns    Signed By: Margot Guillen MD     October 12, 2017

## 2017-10-12 NOTE — PROGRESS NOTES
Dr. Obdulio Bhatt performed a SC left   nerve block with the U/S machine  PT was on cardiac monitoring, pulse oximetry and 3L NC O2.  Pt. Was given 2 mg of versed l IV for sedation. VSS. Pt. Was awake and alert post block.     Time out compete at 08:37

## 2017-10-12 NOTE — DISCHARGE INSTRUCTIONS
Discharge Instructions for:    [facility] / [id number] : 1980    Admitted 10/12/2017 Discharged: 10/12/2017       Postoperative Hand Surgery Instructions      Elevation:  Elevation is one of the most important things to do following your surgery. Not only does it decrease swelling, but it also decreases pain. For hand or wrist surgery, keep the arm in your sling while up and about, with your hand above your elbow. When lying down, keep your arm propped up on a few pillows, so that your fingers are pointing towards the ceiling. Finger Motion:  **It is very important that you begin moving your fingers immediately after surgery, as often as you can, in order to prevent stiffness. Wiggling your fingers is not the same as moving them - moving your fingers involves bending them until they touch the dressing   (if possible). You should use your other hand to gently move the fingers on the operative hand if necessary. Dressings:  Please leave the surgical dressing in place until you are seen in the office for your first post-operative appointment with Dr Roro Stokes. The dressing helps to prevent infection and positions the extremity to protect the surgical procedure  that was performed. Bathing and showering are allowed as long as the dressing is kept dry. To keep your dressing dry while bathing, simply place a plastic bag (bread bag, newspaper bag, trash bag or subway sandwich bag) over the dressing and seal it with tape or a rubber band. Medications: You have a been given a prescription for pain medication. Please follow the instructions closely. It is safe to take up to 600mg of Ibuprofen (Advil or Motrin) along with the pain prescription as long you are not allergic to it, are not on blood thinners, and do not have gastric reflux or an ulcer. However, do not take any additional tylenol/acetaminophen if your prescription contains tylenol.     If you have new or increasing numbness after any numbing medicine wears off (12-24 hours for regional blocks, 3 hours for local), call the office immediately. If you experience nausea, vomiting or itching with the pain medication, please call the office during regular office hours. Refills for pain medication prescriptions ARE NOT given on the weekend or after regular office hours. If antibiotics have been prescribed, please be sure to complete the entire prescription. Post-Operative Appointments:  Dr. Dave Quezada likes to see you back in the office about 10-14 days following your surgery to change the post-operative dressing and remove any necessary sutures or staples. If you have not received a follow up appointment card please contact our office at [phone]. *If you have any questions or concerns or experience any sudden changes in your condition, please call our office at [phone]*    DO NOT TAKE TYLENOL/ACETAMINOPHEN WITH PERCOCET, Stacey Hunters, [address] TAKE NARCOTIC PAIN MEDICATIONS WITH FOOD, and if given 2 pain narcotics do NOT take together! Narcotics tend to be constipating and we recommend taking a stool softener such as Colace or Miralax (follow package instructions). If you were given prescriptions, please review the written information on the prescribed medications. DO NOT DRIVE WHILE TAKING NARCOTIC PAIN MEDICATIONS. DISCHARGE SUMMARY from Nurse    The following personal items collected during your admission are returned to you:   Dental Appliance: Dental Appliances: None  Vision: Visual Aid: None  Hearing Aid:    Jewelry: Jewelry: Earrings (in pt beloning bag )  Clothing: Clothing: Undergarments, With patient, Socks, Shirt, Pants (understretcher )  Other Valuables: Other Valuables: None  Valuables sent to safe:        PATIENT INSTRUCTIONS:    After General Anesthesia or Intravenous Sedation, for 24 hours or while taking prescription Narcotics:  · Someone should be with you for the next 24 hours.   · For your own safety, a responsible adult must drive you home. · Limit your activities  · Recommended activity: Rest today, up with assistance today. Do not climb stairs or shower unattended for the next 24 hours. · Start with a soft bland diet and advance as tolerated (no nausea) to regular diet. · If you have a sore throat some things that may help are: fluids, warm salt water gargle, or throat lozenges. If this does not improve after several days please follow up with your family physician. · Do not drive and operate hazardous machinery  · Do not make important personal or business decisions  · Do  not drink alcoholic beverages  · If you have not urinated within 8 hours after discharge, please contact your surgeon on call. Report the following to your surgeon:  · Excessive pain, swelling, redness or odor of or around the surgical area  · Temperature over 100.5  · Nausea and vomiting lasting longer than 4 hours or if unable to take medications  · Any signs of decreased circulation or nerve impairment to extremity: change in color, persistent  numbness, tingling, coldness or increase pain    · If you received an upper extremity nerve block, please wear your sling until the block has worn off, then refer to your surgeons post-operative instructions. If you have had a shoulder block or a block near your collar bone, you may have              symptoms such as:          1. Mild shortness of breath        2. A hoarse voice        3. Blurry vision        4. Unequal pupils        5. Drooping of your face on the same side as the nerve block. These symptoms will disappear as the nerve block wears off. · If you received a lower extremity nerve block, please use your crutches, walker, or cane until the nerve block has worn off, then refer to your surgeons post-operative instructions.      · You will receive a Post Operative Call from one of the Recovery Room Nurses on the day after your surgery to check on you. It is very important for us to know how you are recovering after your surgery. If you have an issue please call your surgeon, do not wait for the post operative call. · You may receive an e-mail or letter in the mail from [employer] regarding your experience with us in the Ambulatory Surgery Unit. Your feedback is valuable to us and we appreciate your participation in the survey. ·   · If the above instructions are not adequate, please contact Alyssa Madrid RN, Pam[facility] anesthesia Nurse Manager or our Anesthesiologist, at [phone]. If you are having problems after your surgery, call your physician at his office number. ·   · We wish youre a speedy recovery ? What to do at Home:    *  Please give a list of your current medications to your Primary Care Provider. *  Please update this list whenever your medications are discontinued, doses are      changed, or new medications (including over-the-counter products) are added. *  Please carry medication information at all times in case of emergency situations. These are general instructions for a healthy lifestyle:    No smoking/ No tobacco products/ Avoid exposure to second hand smoke    Surgeon General's Warning:  Quitting smoking now greatly reduces serious risk to your health. Obesity, smoking, and sedentary lifestyle greatly increases your risk for illness    A healthy diet, regular physical exercise & weight monitoring are important for maintaining a healthy lifestyle    You may be retaining fluid if you have a history of heart failure or if you experience any of the following symptoms:  Weight gain of 3 pounds or more overnight or 5 pounds in a week, increased swelling in our hands or feet or shortness of breath while lying flat in bed. Please call your doctor as soon as you notice any of these symptoms; do not wait until your next office visit.     Recognize signs and symptoms of STROKE:    B - Balance  E-  Eyes    F-  Face looks uneven    A-  Arms unable to move or move even    S-  Speech slurred or non-existent    T-  Time-call 911 as soon as signs and symptoms begin-DO NOT go       Back to bed or wait to see if you get better-TIME IS BRAIN. If you have not had your influenza or pneumococcal vaccines, please follow up with your primary care physician. The discharge information has been reviewed with the patient and parent. The patient and caregiver verbalized understanding.

## 2017-10-19 NOTE — PERIOP NOTES
10/19/2017   1519  Pt called and wanted to know if he could come get his arm redressed because it was irritating him and got wet. Told pt to call Dr. Dodie Manzano' office and they would tell her what to do. Called Dr. Dodie Manzano office to let them know what pt said and left a message on their answering machine.

## 2018-07-26 ENCOUNTER — OFFICE VISIT (OUTPATIENT)
Dept: INTERNAL MEDICINE CLINIC | Age: 38
End: 2018-07-26

## 2018-07-26 VITALS
HEIGHT: 69 IN | BODY MASS INDEX: 20.75 KG/M2 | TEMPERATURE: 98.1 F | HEART RATE: 85 BPM | WEIGHT: 140.1 LBS | OXYGEN SATURATION: 98 % | DIASTOLIC BLOOD PRESSURE: 72 MMHG | SYSTOLIC BLOOD PRESSURE: 107 MMHG | RESPIRATION RATE: 18 BRPM
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
## 2018-07-26 DIAGNOSIS — M54.50 ACUTE LEFT-SIDED LOW BACK PAIN WITHOUT SCIATICA: Primary | ICD-10-CM

## 2018-07-26 DIAGNOSIS — D50.9 MICROCYTIC ANEMIA: ICD-10-CM

## 2018-07-26 RX ORDER — TRAMADOL HYDROCHLORIDE 50 MG/1
TABLET ORAL
COMMUNITY
Start: 2018-07-21 | End: 2018-07-26

## 2018-07-26 RX ORDER — CYCLOBENZAPRINE HCL 10 MG
10 TABLET ORAL
Qty: 60 TAB | Refills: 3 | Status: SHIPPED | OUTPATIENT
Start: 2018-07-26 | End: 2019-02-24

## 2018-07-26 RX ORDER — CYCLOBENZAPRINE HCL 10 MG
TABLET ORAL
COMMUNITY
Start: 2018-07-21 | End: 2018-07-26 | Stop reason: SDUPTHER

## 2018-07-26 RX ORDER — METHYLPREDNISOLONE 4 MG/1
TABLET ORAL
COMMUNITY
Start: 2018-07-21 | End: 2019-02-24

## 2018-07-26 NOTE — PROGRESS NOTES
1. Have you been to the ER, urgent care clinic since your last visit? Hospitalized since your last visit? Yes When: 7-21-18 Reason for visit: back pain    2. Have you seen or consulted any other health care providers outside of the [facility] since your last visit? Include any pap smears or colon screening.  Yes Where: retreat     Complaints of back pain
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
## 2018-07-27 LAB
ALBUMIN SERPL-MCNC: 4.8 G/DL (ref 3.5–5.5)
ALBUMIN/GLOB SERPL: 2.3 {RATIO} (ref 1.2–2.2)
ALP SERPL-CCNC: 98 IU/L (ref 39–117)
ALT SERPL-CCNC: 39 IU/L (ref 0–44)
APPEARANCE UR: CLEAR
AST SERPL-CCNC: 25 IU/L (ref 0–40)
BASOPHILS # BLD AUTO: 0 X10E3/UL (ref 0–0.2)
BASOPHILS NFR BLD AUTO: 0 %
BILIRUB SERPL-MCNC: 0.4 MG/DL (ref 0–1.2)
BILIRUB UR QL STRIP: NEGATIVE
BUN SERPL-MCNC: 11 MG/DL (ref 6–20)
BUN/CREAT SERPL: 14 (ref 9–20)
CALCIUM SERPL-MCNC: 9.6 MG/DL (ref 8.7–10.2)
CHLORIDE SERPL-SCNC: 101 MMOL/L (ref 96–106)
CHOLEST SERPL-MCNC: 49 MG/DL (ref 100–199)
CO2 SERPL-SCNC: 20 MMOL/L (ref 20–29)
COLOR UR: YELLOW
CREAT SERPL-MCNC: 0.79 MG/DL (ref 0.76–1.27)
EOSINOPHIL # BLD AUTO: 0 X10E3/UL (ref 0–0.4)
EOSINOPHIL NFR BLD AUTO: 0 %
ERYTHROCYTE [DISTWIDTH] IN BLOOD BY AUTOMATED COUNT: 13.1 % (ref 12.3–15.4)
EST. AVERAGE GLUCOSE BLD GHB EST-MCNC: <74 MG/DL
GLOBULIN SER CALC-MCNC: 2.1 G/DL (ref 1.5–4.5)
GLUCOSE SERPL-MCNC: 77 MG/DL (ref 65–99)
GLUCOSE UR QL: NEGATIVE
HBA1C MFR BLD: <4.2 % (ref 4.8–5.6)
HCT VFR BLD AUTO: 44.7 % (ref 37.5–51)
HDLC SERPL-MCNC: 15 MG/DL
HGB BLD-MCNC: 14.9 G/DL (ref 13–17.7)
HGB UR QL STRIP: NEGATIVE
IMM GRANULOCYTES # BLD: 0 X10E3/UL (ref 0–0.1)
IMM GRANULOCYTES NFR BLD: 0 %
KETONES UR QL STRIP: NEGATIVE
LDLC SERPL CALC-MCNC: 24 MG/DL (ref 0–99)
LEUKOCYTE ESTERASE UR QL STRIP: NEGATIVE
LYMPHOCYTES # BLD AUTO: 2.2 X10E3/UL (ref 0.7–3.1)
LYMPHOCYTES NFR BLD AUTO: 30 %
MCH RBC QN AUTO: 36.6 PG (ref 26.6–33)
MCHC RBC AUTO-ENTMCNC: 33.3 G/DL (ref 31.5–35.7)
MCV RBC AUTO: 110 FL (ref 79–97)
MICRO URNS: NORMAL
MONOCYTES # BLD AUTO: 0.6 X10E3/UL (ref 0.1–0.9)
MONOCYTES NFR BLD AUTO: 8 %
NEUTROPHILS # BLD AUTO: 4.5 X10E3/UL (ref 1.4–7)
NEUTROPHILS NFR BLD AUTO: 62 %
NITRITE UR QL STRIP: NEGATIVE
PH UR STRIP: 6 [PH] (ref 5–7.5)
PLATELET # BLD AUTO: 198 X10E3/UL (ref 150–379)
POTASSIUM SERPL-SCNC: 3.5 MMOL/L (ref 3.5–5.2)
PROT SERPL-MCNC: 6.9 G/DL (ref 6–8.5)
PROT UR QL STRIP: NORMAL
RBC # BLD AUTO: 4.07 X10E6/UL (ref 4.14–5.8)
SODIUM SERPL-SCNC: 141 MMOL/L (ref 134–144)
SP GR UR: 1.02 (ref 1–1.03)
TRIGL SERPL-MCNC: 49 MG/DL (ref 0–149)
UROBILINOGEN UR STRIP-MCNC: 0.2 MG/DL (ref 0.2–1)
VLDLC SERPL CALC-MCNC: 10 MG/DL (ref 5–40)
WBC # BLD AUTO: 7.4 X10E3/UL (ref 3.4–10.8)

## 2018-08-09 ENCOUNTER — OFFICE VISIT (OUTPATIENT)
Dept: INTERNAL MEDICINE CLINIC | Age: 38
End: 2018-08-09

## 2018-08-09 VITALS
DIASTOLIC BLOOD PRESSURE: 67 MMHG | TEMPERATURE: 97.9 F | SYSTOLIC BLOOD PRESSURE: 101 MMHG | HEIGHT: 69 IN | BODY MASS INDEX: 21.11 KG/M2 | WEIGHT: 142.5 LBS | RESPIRATION RATE: 18 BRPM | OXYGEN SATURATION: 98 % | HEART RATE: 78 BPM

## 2018-08-09 DIAGNOSIS — M54.50 ACUTE LEFT-SIDED LOW BACK PAIN WITHOUT SCIATICA: Primary | ICD-10-CM

## 2018-08-09 DIAGNOSIS — Z87.828 HISTORY OF GUNSHOT WOUND: ICD-10-CM

## 2018-08-09 DIAGNOSIS — D50.9 MICROCYTIC ANEMIA: ICD-10-CM

## 2018-08-09 NOTE — PROGRESS NOTES
1. Have you been to the ER, urgent care clinic since your last visit? Hospitalized since your last visit? No    2. Have you seen or consulted any other health care providers outside of the [address] [place] [address] since your last visit? Include any pap smears or colon screening.  No
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
## 2018-08-09 NOTE — MR AVS SNAPSHOT
[address] [place] [address]. [address] [id number] 
[phone] Patient: Kris Cox MRN: [id number] JZU:[birth year] Visit Information Date & Time Provider Department Dept. Phone Encounter #  
 8/9/2018 11:45 AM Josesito Ward MD [facility] Sports Medicine and Primary Care [phone] [id number] Upcoming Health Maintenance Date Due Influenza Age 5 to Adult 11/9/2018* Pneumococcal 19-64 Medium Risk (1 of 1 - PPSV23) 7/26/2019* DTaP/Tdap/Td series (1 - Tdap) 7/26/2019* *Topic was postponed. The date shown is not the original due date. Allergies as of 8/9/2018  Review Complete On: 8/9/2018 By: Josesito Ward MD  
 No Known Allergies Current Immunizations  Never Reviewed Name Date  
 TD Vaccine 5/3/2010  5:54 PM  
  
 Not reviewed this visit Vitals BP Pulse Temp Resp Height(growth percentile) Weight(growth percentile) 101/67 78 97.9 °F (36.6 °C) (Oral) 18 5' 9\" (1.753 m) 142 lb 8 oz (64.6 kg) SpO2 BMI Smoking Status 98% 21.04 kg/m2 Current Every Day Smoker Vitals History BMI and BSA Data Body Mass Index Body Surface Area 21.04 kg/m 2 1.77 m 2 Preferred Pharmacy [facility] [address]  [place] [phone] Your Updated Medication List  
  
   
This list is accurate as of 8/9/18 12:41 PM.  Always use your most recent med list.  
  
  
  
  
 cyclobenzaprine 10 mg tablet Commonly known as:  FLEXERIL Take 1 Tab by mouth three (3) times daily (with meals). ferrous sulfate 325 mg (65 mg iron) tablet Take 1 Tab by mouth daily (with breakfast). methylPREDNISolone 4 mg tablet Commonly known as:  Priyank Davis Introducing [facility] HEALTH SERVICES!    
 [facility] introduces [employer] patient portal. Now you can access parts of your medical record, email your doctor's office, and request medication refills online. 1. In your internet browser, go to [email or web address] 2. Click on the First Time User? Click Here link in the Sign In box. You will see the New Member Sign Up page. 3. Enter your CREAT Access Code exactly as it appears below. You will not need to use this code after youve completed the sign-up process. If you do not sign up before the expiration date, you must request a new code. · CREAT Access Code: [email or web address] Expires: 8/19/2018  5:24 AM 
 
4. Enter the last four digits of your Social Security Number (xxxx) and Date of Birth (mm/dd/yyyy) as indicated and click Submit. You will be taken to the next sign-up page. 5. Create a CREAT ID. This will be your CREAT login ID and cannot be changed, so think of one that is secure and easy to remember. 6. Create a CREAT password. You can change your password at any time. 7. Enter your Password Reset Question and Answer. This can be used at a later time if you forget your password. 8. Enter your e-mail address. You will receive e-mail notification when new information is available in [address] 9. Click Sign Up. You can now view and download portions of your medical record. 10. Click the Download Summary menu link to download a portable copy of your medical information. If you have questions, please visit the Frequently Asked Questions section of the CREAT website. Remember, CREAT is NOT to be used for urgent needs. For medical emergencies, dial 911. Now available from your iPhone and Android! Please provide this summary of care documentation to your next provider. Your primary care clinician is listed as Shelley Polo. If you have any questions after today's visit, please call [phone].
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
## 2018-08-19 NOTE — PROGRESS NOTES
The patient is now under the care of their company's doctor. He  has an appointment with Dr. Chary Rizzo of orthopaedics for followup. They said no lifting greater than 5 lb. They gave him another  steroid pack.

## 2018-08-19 NOTE — PROGRESS NOTES
The patient is responding to therapy. The back discomfort is  resolving. She is under the care of [facility] doctors. We  suggest that he come back to see us regarding the back  discomfort. Blood pressure control is at goal.    BMI is at ideal body weight. He is concerned that he is anemic. We advised him that he is not  anemic and he does not need to take iron tablets.

## 2019-02-20 ENCOUNTER — HOSPITAL ENCOUNTER (OUTPATIENT)
Dept: ULTRASOUND IMAGING | Age: 39
Discharge: HOME | End: 2019-02-20
Payer: COMMERCIAL

## 2019-02-20 DIAGNOSIS — R59.0 LOCALIZED ENLARGED LYMPH NODES: ICD-10-CM

## 2019-02-20 PROCEDURE — 76536 US EXAM OF HEAD AND NECK: CPT

## 2019-02-24 ENCOUNTER — HOSPITAL ENCOUNTER (EMERGENCY)
Age: 39
Discharge: HOME | End: 2019-02-24
Attending: EMERGENCY MEDICINE
Payer: COMMERCIAL

## 2019-02-24 VITALS
TEMPERATURE: 97.9 F | OXYGEN SATURATION: 98 % | RESPIRATION RATE: 17 BRPM | DIASTOLIC BLOOD PRESSURE: 63 MMHG | SYSTOLIC BLOOD PRESSURE: 101 MMHG | BODY MASS INDEX: 21.55 KG/M2 | HEART RATE: 85 BPM | WEIGHT: 145.5 LBS | HEIGHT: 69 IN

## 2019-02-24 DIAGNOSIS — L02.31 ABSCESS, GLUTEAL, LEFT: ICD-10-CM

## 2019-02-24 DIAGNOSIS — D23.30 CYST, DERMOID, FACE: Primary | ICD-10-CM

## 2019-02-24 PROCEDURE — 77030019895 HC PCKNG STRP IODO -A

## 2019-02-24 PROCEDURE — 87205 SMEAR GRAM STAIN: CPT

## 2019-02-24 PROCEDURE — 99282 EMERGENCY DEPT VISIT SF MDM: CPT

## 2019-02-24 PROCEDURE — 75810000289 HC I&D ABSCESS SIMP/COMP/MULT

## 2019-02-24 RX ORDER — CEPHALEXIN 500 MG/1
500 CAPSULE ORAL 4 TIMES DAILY
Qty: 28 CAP | Refills: 0 | Status: SHIPPED | OUTPATIENT
Start: 2019-02-24 | End: 2019-03-03

## 2019-02-24 RX ORDER — LIDOCAINE HYDROCHLORIDE AND EPINEPHRINE 20; 10 MG/ML; UG/ML
10 INJECTION, SOLUTION INFILTRATION; PERINEURAL
Status: DISCONTINUED | OUTPATIENT
Start: 2019-02-24 | End: 2019-02-25 | Stop reason: HOSPADM

## 2019-02-24 RX ORDER — SULFAMETHOXAZOLE AND TRIMETHOPRIM 800; 160 MG/1; MG/1
1 TABLET ORAL 2 TIMES DAILY
Qty: 14 TAB | Refills: 0 | Status: SHIPPED | OUTPATIENT
Start: 2019-02-24 | End: 2019-03-03

## 2019-02-25 ENCOUNTER — HOSPITAL ENCOUNTER (EMERGENCY)
Age: 39
Discharge: HOME | End: 2019-02-25
Attending: EMERGENCY MEDICINE
Payer: COMMERCIAL

## 2019-02-25 VITALS
WEIGHT: 138.89 LBS | HEIGHT: 69 IN | BODY MASS INDEX: 20.57 KG/M2 | DIASTOLIC BLOOD PRESSURE: 63 MMHG | RESPIRATION RATE: 16 BRPM | HEART RATE: 84 BPM | TEMPERATURE: 98.3 F | SYSTOLIC BLOOD PRESSURE: 101 MMHG | OXYGEN SATURATION: 100 %

## 2019-02-25 DIAGNOSIS — Z51.89 WOUND CHECK, ABSCESS: Primary | ICD-10-CM

## 2019-02-25 PROCEDURE — 99282 EMERGENCY DEPT VISIT SF MDM: CPT

## 2019-02-25 NOTE — ED NOTES
Pt CC of boil on left side of chin. Pt reports boil on chin has been present since September but has gotten larger over the past week. Pt reports boil is leaking fluid. Pt also reports a boil on his left buttock that has been present for over a week and is also draining fluid.

## 2019-02-25 NOTE — ED NOTES
Dr. Cherelle Goins reviewed discharge instructions with the patient. The patient verbalized understanding. All questions and concerns were addressed. The patient declined a wheelchair and is discharged ambulatory in the care of family members with instructions and prescriptions in hand. Pt is alert and oriented x 4. Respirations are clear and unlabored.
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
## 2019-02-25 NOTE — DISCHARGE INSTRUCTIONS
Patient Education   Patient Education        Epidermoid Cyst: Care Instructions  Your Care Instructions  An epidermoid (say \"vi-odq-FVZ-angela\") cyst is a lump just under the skin. These cysts can form when a hair follicle becomes blocked. They are common in acne and may occur on the face, neck, back, and genitals. However, they can form anywhere on the body. These cysts are not cancer and do not lead to cancer. They tend not to hurt, but they can sometimes become swollen and painful. They also may break open (rupture) and cause scarring. These cysts sometimes do not cause problems and may not need treatment. If you have a cyst that is swollen and hurts, your doctor may inject it with a medicine to help it heal. But it is more likely that a painful cyst will need to be removed. Your doctor will give you a shot of numbing medicine and cut into the cyst to drain it or remove it. This makes the symptoms go away. Follow-up care is a key part of your treatment and safety. Be sure to make and go to all appointments, and call your doctor if you are having problems. It's also a good idea to know your test results and keep a list of the medicines you take. How can you care for yourself at home? · Do not squeeze the cyst or poke it with a needle to open it. This can cause swelling, redness, and infection. · Always have a doctor look at any new lumps you get to make sure that they are not serious. When should you call for help? Watch closely for changes in your health, and be sure to contact your doctor if:    · You have a fever, redness, or swelling after you get a shot of medicine in the cyst.     · You see or feel a new lump on your skin. Where can you learn more? Go to http://marcel-jl.info/. Enter L909 in the search box to learn more about \"Epidermoid Cyst: Care Instructions. \"  Current as of: April 17, 2018  Content Version: 11.9  © 6189-8532 [employer].  Care instructions adapted under license by [employer] (which disclaims liability or warranty for this information). If you have questions about a medical condition or this instruction, always ask your healthcare professional. [facility] any warranty or liability for your use of this information. Skin Abscess: Care Instructions  Your Care Instructions    A skin abscess is a bacterial infection that forms a pocket of pus. A boil is a kind of skin abscess. The doctor may have cut an opening in the abscess so that the pus can drain out. You may have gauze in the cut so that the abscess will stay open and keep draining. You may need antibiotics. You will need to follow up with your doctor to make sure the infection has gone away. The doctor has checked you carefully, but problems can develop later. If you notice any problems or new symptoms, get medical treatment right away. Follow-up care is a key part of your treatment and safety. Be sure to make and go to all appointments, and call your doctor if you are having problems. It's also a good idea to know your test results and keep a list of the medicines you take. How can you care for yourself at home? · Apply warm and dry compresses, a heating pad set on low, or a hot water bottle 3 or 4 times a day for pain. Keep a cloth between the heat source and your skin. · If your doctor prescribed antibiotics, take them as directed. Do not stop taking them just because you feel better. You need to take the full course of antibiotics. · Take pain medicines exactly as directed. ? If the doctor gave you a prescription medicine for pain, take it as prescribed. ? If you are not taking a prescription pain medicine, ask your doctor if you can take an over-the-counter medicine. · Keep your bandage clean and dry. Change the bandage whenever it gets wet or dirty, or at least one time a day.   · If the abscess was packed with gauze:  ? Keep follow-up appointments to have the gauze changed or removed. If the doctor instructed you to remove the gauze, follow the instructions you were given for how to remove it. ? After the gauze is removed, soak the area in warm water for 15 to 20 minutes 2 times a day, until the wound closes. When should you call for help? Call your doctor now or seek immediate medical care if:    · You have signs of worsening infection, such as:  ? Increased pain, swelling, warmth, or redness. ? Red streaks leading from the infected skin. ? Pus draining from the wound. ? A fever.    Watch closely for changes in your health, and be sure to contact your doctor if:    · You do not get better as expected. Where can you learn more? Go to http://marcel-jl.info/. Enter X926 in the search box to learn more about \"Skin Abscess: Care Instructions. \"  Current as of: April 17, 2018  Content Version: 11.9  © 1190-3420 [employer]. Care instructions adapted under license by "[employer] (which disclaims liability or warranty for this information). If you have questions about a medical condition or this instruction, always ask your healthcare professional. [place] [address] any warranty or liability for your use of this information.

## 2019-02-27 LAB
BACTERIA SPEC CULT: NORMAL
GRAM STN SPEC: NORMAL
GRAM STN SPEC: NORMAL
SERVICE CMNT-IMP: NORMAL

## 2021-06-16 ENCOUNTER — HOSPITAL ENCOUNTER (EMERGENCY)
Age: 41
Discharge: HOME | End: 2021-06-16
Attending: EMERGENCY MEDICINE
Payer: MEDICAID

## 2021-06-16 ENCOUNTER — APPOINTMENT (OUTPATIENT)
Dept: GENERAL RADIOLOGY | Age: 41
End: 2021-06-16
Attending: PHYSICIAN ASSISTANT
Payer: MEDICAID

## 2021-06-16 VITALS
SYSTOLIC BLOOD PRESSURE: 116 MMHG | RESPIRATION RATE: 17 BRPM | TEMPERATURE: 97.9 F | BODY MASS INDEX: 19.79 KG/M2 | DIASTOLIC BLOOD PRESSURE: 70 MMHG | HEART RATE: 72 BPM | WEIGHT: 138.23 LBS | OXYGEN SATURATION: 100 % | HEIGHT: 70 IN

## 2021-06-16 DIAGNOSIS — S62.334A CLOSED DISPLACED FRACTURE OF NECK OF FOURTH METACARPAL BONE OF RIGHT HAND, INITIAL ENCOUNTER: Primary | ICD-10-CM

## 2021-06-16 DIAGNOSIS — D36.9 DERMOID CYST: ICD-10-CM

## 2021-06-16 PROCEDURE — 73130 X-RAY EXAM OF HAND: CPT

## 2021-06-16 PROCEDURE — 99283 EMERGENCY DEPT VISIT LOW MDM: CPT

## 2021-06-16 PROCEDURE — 74011250637 HC RX REV CODE- 250/637: Performed by: PHYSICIAN ASSISTANT

## 2021-06-16 RX ORDER — BIOTIN 1 MG
TABLET ORAL
COMMUNITY
Start: 2020-09-21 | End: 2021-11-20

## 2021-06-16 RX ORDER — PAROXETINE HYDROCHLORIDE 20 MG/1
TABLET, FILM COATED ORAL
COMMUNITY
Start: 2021-05-29 | End: 2021-11-20

## 2021-06-16 RX ORDER — ERGOCALCIFEROL 1.25 MG/1
CAPSULE ORAL
COMMUNITY
Start: 2021-05-04 | End: 2021-11-20

## 2021-06-16 RX ORDER — ACETAMINOPHEN 325 MG/1
650 TABLET ORAL
Status: COMPLETED | OUTPATIENT
Start: 2021-06-16 | End: 2021-06-16

## 2021-06-16 RX ORDER — FAMOTIDINE 20 MG/1
TABLET, FILM COATED ORAL
COMMUNITY
Start: 2021-06-11 | End: 2021-11-20

## 2021-06-16 RX ORDER — TRAMADOL HYDROCHLORIDE 50 MG/1
50 TABLET ORAL
Qty: 20 TABLET | Refills: 0 | Status: SHIPPED | OUTPATIENT
Start: 2021-06-16 | End: 2021-06-19

## 2021-06-16 RX ADMIN — ACETAMINOPHEN 650 MG: 325 TABLET ORAL at 16:16

## 2021-06-16 NOTE — ED NOTES
Orquidea BAKER reviewed discharge instructions with the patient. The patient verbalized understanding. All questions and concerns were addressed. The patient declined a wheelchair and is discharged ambulatory in the care of family members with instructions and prescriptions in hand. Pt is alert and oriented x 4. Respirations are clear and unlabored.
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
## 2021-06-25 ENCOUNTER — HOSPITAL ENCOUNTER (EMERGENCY)
Age: 41
Discharge: HOME | End: 2021-06-25
Attending: EMERGENCY MEDICINE
Payer: MEDICAID

## 2021-06-25 ENCOUNTER — APPOINTMENT (OUTPATIENT)
Dept: CT IMAGING | Age: 41
End: 2021-06-25
Attending: EMERGENCY MEDICINE
Payer: MEDICAID

## 2021-06-25 VITALS
OXYGEN SATURATION: 99 % | DIASTOLIC BLOOD PRESSURE: 96 MMHG | TEMPERATURE: 98.1 F | HEART RATE: 57 BPM | RESPIRATION RATE: 11 BRPM | HEIGHT: 70 IN | WEIGHT: 136.47 LBS | BODY MASS INDEX: 19.54 KG/M2 | SYSTOLIC BLOOD PRESSURE: 138 MMHG

## 2021-06-25 DIAGNOSIS — R42 LIGHTHEADEDNESS: ICD-10-CM

## 2021-06-25 DIAGNOSIS — R11.2 NON-INTRACTABLE VOMITING WITH NAUSEA, UNSPECIFIED VOMITING TYPE: ICD-10-CM

## 2021-06-25 DIAGNOSIS — R51.9 NONINTRACTABLE EPISODIC HEADACHE, UNSPECIFIED HEADACHE TYPE: Primary | ICD-10-CM

## 2021-06-25 LAB
ALBUMIN SERPL-MCNC: 3.9 G/DL (ref 3.5–5)
ALBUMIN/GLOB SERPL: 1.4 {RATIO} (ref 1.1–2.2)
ALP SERPL-CCNC: 128 U/L (ref 45–117)
ALT SERPL-CCNC: 110 U/L (ref 12–78)
ANION GAP SERPL CALC-SCNC: 6 MMOL/L (ref 5–15)
AST SERPL-CCNC: 49 U/L (ref 15–37)
ATRIAL RATE: 55 BPM
BASOPHILS # BLD: 0 K/UL (ref 0–0.1)
BASOPHILS NFR BLD: 1 % (ref 0–1)
BILIRUB SERPL-MCNC: 1.1 MG/DL (ref 0.2–1)
BUN SERPL-MCNC: 20 MG/DL (ref 6–20)
BUN/CREAT SERPL: 31 (ref 12–20)
CALCIUM SERPL-MCNC: 8.3 MG/DL (ref 8.5–10.1)
CALCULATED P AXIS, ECG09: 21 DEGREES
CALCULATED R AXIS, ECG10: 25 DEGREES
CALCULATED T AXIS, ECG11: 35 DEGREES
CHLORIDE SERPL-SCNC: 108 MMOL/L (ref 97–108)
CO2 SERPL-SCNC: 27 MMOL/L (ref 21–32)
CREAT SERPL-MCNC: 0.64 MG/DL (ref 0.7–1.3)
DIAGNOSIS, 93000: NORMAL
DIFFERENTIAL METHOD BLD: ABNORMAL
EOSINOPHIL # BLD: 0 K/UL (ref 0–0.4)
EOSINOPHIL NFR BLD: 1 % (ref 0–7)
ERYTHROCYTE [DISTWIDTH] IN BLOOD BY AUTOMATED COUNT: 14.7 % (ref 11.5–14.5)
GLOBULIN SER CALC-MCNC: 2.8 G/DL (ref 2–4)
GLUCOSE SERPL-MCNC: 90 MG/DL (ref 65–100)
HCT VFR BLD AUTO: 33.1 % (ref 36.6–50.3)
HGB BLD-MCNC: 11.5 G/DL (ref 12.1–17)
IMM GRANULOCYTES # BLD AUTO: 0 K/UL (ref 0–0.04)
IMM GRANULOCYTES NFR BLD AUTO: 0 % (ref 0–0.5)
LIPASE SERPL-CCNC: 23 U/L (ref 73–393)
LYMPHOCYTES # BLD: 1.5 K/UL (ref 0.8–3.5)
LYMPHOCYTES NFR BLD: 25 % (ref 12–49)
MCH RBC QN AUTO: 37 PG (ref 26–34)
MCHC RBC AUTO-ENTMCNC: 34.7 G/DL (ref 30–36.5)
MCV RBC AUTO: 106.4 FL (ref 80–99)
MONOCYTES # BLD: 0.6 K/UL (ref 0–1)
MONOCYTES NFR BLD: 10 % (ref 5–13)
NEUTS SEG # BLD: 3.8 K/UL (ref 1.8–8)
NEUTS SEG NFR BLD: 63 % (ref 32–75)
NRBC # BLD: 0 K/UL (ref 0–0.01)
NRBC BLD-RTO: 0 PER 100 WBC
P-R INTERVAL, ECG05: 152 MS
PLATELET # BLD AUTO: 265 K/UL (ref 150–400)
PMV BLD AUTO: 11.4 FL (ref 8.9–12.9)
POTASSIUM SERPL-SCNC: 3.9 MMOL/L (ref 3.5–5.1)
PROT SERPL-MCNC: 6.7 G/DL (ref 6.4–8.2)
Q-T INTERVAL, ECG07: 454 MS
QRS DURATION, ECG06: 82 MS
QTC CALCULATION (BEZET), ECG08: 434 MS
RBC # BLD AUTO: 3.11 M/UL (ref 4.1–5.7)
SODIUM SERPL-SCNC: 141 MMOL/L (ref 136–145)
VENTRICULAR RATE, ECG03: 55 BPM
WBC # BLD AUTO: 6 K/UL (ref 4.1–11.1)

## 2021-06-25 PROCEDURE — 83690 ASSAY OF LIPASE: CPT

## 2021-06-25 PROCEDURE — 93005 ELECTROCARDIOGRAM TRACING: CPT

## 2021-06-25 PROCEDURE — 96374 THER/PROPH/DIAG INJ IV PUSH: CPT

## 2021-06-25 PROCEDURE — 96361 HYDRATE IV INFUSION ADD-ON: CPT

## 2021-06-25 PROCEDURE — 80053 COMPREHEN METABOLIC PANEL: CPT

## 2021-06-25 PROCEDURE — 74011250636 HC RX REV CODE- 250/636: Performed by: EMERGENCY MEDICINE

## 2021-06-25 PROCEDURE — 70450 CT HEAD/BRAIN W/O DYE: CPT

## 2021-06-25 PROCEDURE — 85025 COMPLETE CBC W/AUTO DIFF WBC: CPT

## 2021-06-25 PROCEDURE — 99285 EMERGENCY DEPT VISIT HI MDM: CPT

## 2021-06-25 PROCEDURE — 96375 TX/PRO/DX INJ NEW DRUG ADDON: CPT

## 2021-06-25 PROCEDURE — 36415 COLL VENOUS BLD VENIPUNCTURE: CPT

## 2021-06-25 RX ORDER — METOCLOPRAMIDE HYDROCHLORIDE 5 MG/ML
10 INJECTION INTRAMUSCULAR; INTRAVENOUS
Status: COMPLETED | OUTPATIENT
Start: 2021-06-25 | End: 2021-06-25

## 2021-06-25 RX ORDER — DIPHENHYDRAMINE HYDROCHLORIDE 50 MG/ML
25 INJECTION, SOLUTION INTRAMUSCULAR; INTRAVENOUS
Status: COMPLETED | OUTPATIENT
Start: 2021-06-25 | End: 2021-06-25

## 2021-06-25 RX ADMIN — DIPHENHYDRAMINE HYDROCHLORIDE 25 MG: 50 INJECTION, SOLUTION INTRAMUSCULAR; INTRAVENOUS at 08:34

## 2021-06-25 RX ADMIN — SODIUM CHLORIDE 1000 ML: 9 INJECTION, SOLUTION INTRAVENOUS at 08:33

## 2021-06-25 RX ADMIN — METOCLOPRAMIDE 10 MG: 5 INJECTION, SOLUTION INTRAMUSCULAR; INTRAVENOUS at 08:35

## 2021-06-25 RX ADMIN — METHYLPREDNISOLONE SODIUM SUCCINATE 125 MG: 125 INJECTION, POWDER, FOR SOLUTION INTRAMUSCULAR; INTRAVENOUS at 08:37
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
## 2021-06-25 NOTE — ED NOTES
Assumed care of pt from triage, pt on the monitor x 3. Pt reporting right anterior HA, N/V, diarrhea and dizziness x \"few days. \" Pt reporting photosensitivity. Additionally pt has soft cast POA on R wrist.     0800 MD at bedside. Pt reporting some recent changes to his medications including cholesterol medications and change of dose of his \"crazy pills\"     0950 Pt reporting resolution of nausea and HA     1020 Pt discharged by Garrison Galindo. Pt provided with discharge instructions Rx and instructions on follow up care.  Pt out of ED via wheelchair

## 2021-06-25 NOTE — DISCHARGE INSTRUCTIONS
You were seen here in the emergency department for a headache, dizziness and vomiting. Your evaluation here including vital signs, exam, blood work and CT imaging of your head was reassuring. Your symptoms are much improved and he can go home and rest.  Please drink plenty of fluids and take Tylenol 1000 mg every 6 hours.

## 2021-11-11 ENCOUNTER — HOSPITAL ENCOUNTER (INPATIENT)
Age: 41
LOS: 8 days | Discharge: HOME | DRG: 254 | End: 2021-11-19
Attending: EMERGENCY MEDICINE | Admitting: INTERNAL MEDICINE
Payer: MEDICAID

## 2021-11-11 ENCOUNTER — APPOINTMENT (OUTPATIENT)
Dept: CT IMAGING | Age: 41
DRG: 254 | End: 2021-11-11
Attending: EMERGENCY MEDICINE
Payer: MEDICAID

## 2021-11-11 DIAGNOSIS — K59.00 CONSTIPATION, UNSPECIFIED CONSTIPATION TYPE: ICD-10-CM

## 2021-11-11 DIAGNOSIS — N39.0 ACUTE UTI: ICD-10-CM

## 2021-11-11 DIAGNOSIS — D64.9 ANEMIA, UNSPECIFIED TYPE: Primary | ICD-10-CM

## 2021-11-11 LAB
ALBUMIN SERPL-MCNC: 3.4 G/DL (ref 3.5–5)
ALBUMIN/GLOB SERPL: 0.9 {RATIO} (ref 1.1–2.2)
ALP SERPL-CCNC: 92 U/L (ref 45–117)
ALT SERPL-CCNC: 60 U/L (ref 12–78)
ANION GAP SERPL CALC-SCNC: 8 MMOL/L (ref 5–15)
APPEARANCE UR: ABNORMAL
AST SERPL-CCNC: 50 U/L (ref 15–37)
BACTERIA URNS QL MICRO: ABNORMAL /HPF
BASOPHILS # BLD: 0 K/UL (ref 0–0.1)
BASOPHILS NFR BLD: 0 % (ref 0–1)
BILIRUB SERPL-MCNC: 3 MG/DL (ref 0.2–1)
BILIRUB UR QL: NEGATIVE
BUN SERPL-MCNC: 14 MG/DL (ref 6–20)
BUN/CREAT SERPL: 13 (ref 12–20)
CALCIUM SERPL-MCNC: 8.9 MG/DL (ref 8.5–10.1)
CHLORIDE SERPL-SCNC: 95 MMOL/L (ref 97–108)
CO2 SERPL-SCNC: 29 MMOL/L (ref 21–32)
COLOR UR: ABNORMAL
CREAT SERPL-MCNC: 1.09 MG/DL (ref 0.7–1.3)
DIFFERENTIAL METHOD BLD: ABNORMAL
EOSINOPHIL # BLD: 0 K/UL (ref 0–0.4)
EOSINOPHIL NFR BLD: 0 % (ref 0–7)
EPITH CASTS URNS QL MICRO: ABNORMAL /LPF
ERYTHROCYTE [DISTWIDTH] IN BLOOD BY AUTOMATED COUNT: 19.9 % (ref 11.5–14.5)
GLOBULIN SER CALC-MCNC: 3.6 G/DL (ref 2–4)
GLUCOSE SERPL-MCNC: 120 MG/DL (ref 65–100)
GLUCOSE UR STRIP.AUTO-MCNC: NEGATIVE MG/DL
HCT VFR BLD AUTO: 16.7 % (ref 36.6–50.3)
HGB BLD-MCNC: 5.5 G/DL (ref 12.1–17)
HGB UR QL STRIP: ABNORMAL
HISTORY CHECKED?,CKHIST: NORMAL
IMM GRANULOCYTES # BLD AUTO: 0.1 K/UL (ref 0–0.04)
IMM GRANULOCYTES NFR BLD AUTO: 1 % (ref 0–0.5)
KETONES UR QL STRIP.AUTO: NEGATIVE MG/DL
LACTATE BLD-SCNC: 0.64 MMOL/L (ref 0.4–2)
LEUKOCYTE ESTERASE UR QL STRIP.AUTO: ABNORMAL
LIPASE SERPL-CCNC: 42 U/L (ref 73–393)
LYMPHOCYTES # BLD: 1.2 K/UL (ref 0.8–3.5)
LYMPHOCYTES NFR BLD: 12 % (ref 12–49)
MCH RBC QN AUTO: 32.9 PG (ref 26–34)
MCHC RBC AUTO-ENTMCNC: 32.9 G/DL (ref 30–36.5)
MCV RBC AUTO: 100 FL (ref 80–99)
MONOCYTES # BLD: 0.6 K/UL (ref 0–1)
MONOCYTES NFR BLD: 6 % (ref 5–13)
MUCOUS THREADS URNS QL MICRO: ABNORMAL /LPF
NEUTS SEG # BLD: 8.5 K/UL (ref 1.8–8)
NEUTS SEG NFR BLD: 81 % (ref 32–75)
NITRITE UR QL STRIP.AUTO: NEGATIVE
NRBC # BLD: 0.47 K/UL (ref 0–0.01)
NRBC BLD-RTO: 4.5 PER 100 WBC
PH UR STRIP: 6 [PH] (ref 5–8)
PLATELET # BLD AUTO: 318 K/UL (ref 150–400)
PLATELET COMMENTS,PCOM: ABNORMAL
PMV BLD AUTO: 10.4 FL (ref 8.9–12.9)
POTASSIUM SERPL-SCNC: 3.5 MMOL/L (ref 3.5–5.1)
PROT SERPL-MCNC: 7 G/DL (ref 6.4–8.2)
PROT UR STRIP-MCNC: ABNORMAL MG/DL
RBC # BLD AUTO: 1.67 M/UL (ref 4.1–5.7)
RBC #/AREA URNS HPF: ABNORMAL /HPF (ref 0–5)
RBC MORPH BLD: ABNORMAL
SODIUM SERPL-SCNC: 132 MMOL/L (ref 136–145)
SP GR UR REFRACTOMETRY: 1.01 (ref 1–1.03)
TRICHOMONAS UR QL MICRO: PRESENT
UA: UC IF INDICATED,UAUC: ABNORMAL
UROBILINOGEN UR QL STRIP.AUTO: 0.2 EU/DL (ref 0.2–1)
WBC # BLD AUTO: 10.4 K/UL (ref 4.1–11.1)
WBC MORPH BLD: ABNORMAL
WBC URNS QL MICRO: ABNORMAL /HPF (ref 0–4)

## 2021-11-11 PROCEDURE — 96365 THER/PROPH/DIAG IV INF INIT: CPT

## 2021-11-11 PROCEDURE — 85025 COMPLETE CBC W/AUTO DIFF WBC: CPT

## 2021-11-11 PROCEDURE — 80053 COMPREHEN METABOLIC PANEL: CPT

## 2021-11-11 PROCEDURE — 96375 TX/PRO/DX INJ NEW DRUG ADDON: CPT

## 2021-11-11 PROCEDURE — 87040 BLOOD CULTURE FOR BACTERIA: CPT

## 2021-11-11 PROCEDURE — C9113 INJ PANTOPRAZOLE SODIUM, VIA: HCPCS | Performed by: EMERGENCY MEDICINE

## 2021-11-11 PROCEDURE — 87086 URINE CULTURE/COLONY COUNT: CPT

## 2021-11-11 PROCEDURE — 86901 BLOOD TYPING SEROLOGIC RH(D): CPT

## 2021-11-11 PROCEDURE — 99285 EMERGENCY DEPT VISIT HI MDM: CPT

## 2021-11-11 PROCEDURE — 36415 COLL VENOUS BLD VENIPUNCTURE: CPT

## 2021-11-11 PROCEDURE — 83690 ASSAY OF LIPASE: CPT

## 2021-11-11 PROCEDURE — 65270000029 HC RM PRIVATE

## 2021-11-11 PROCEDURE — 86923 COMPATIBILITY TEST ELECTRIC: CPT

## 2021-11-11 PROCEDURE — 74011250636 HC RX REV CODE- 250/636: Performed by: EMERGENCY MEDICINE

## 2021-11-11 PROCEDURE — 74011000636 HC RX REV CODE- 636: Performed by: EMERGENCY MEDICINE

## 2021-11-11 PROCEDURE — 83605 ASSAY OF LACTIC ACID: CPT

## 2021-11-11 PROCEDURE — 87147 CULTURE TYPE IMMUNOLOGIC: CPT

## 2021-11-11 PROCEDURE — 74011000258 HC RX REV CODE- 258: Performed by: EMERGENCY MEDICINE

## 2021-11-11 PROCEDURE — 74178 CT ABD&PLV WO CNTR FLWD CNTR: CPT

## 2021-11-11 PROCEDURE — 96374 THER/PROPH/DIAG INJ IV PUSH: CPT

## 2021-11-11 PROCEDURE — 81001 URINALYSIS AUTO W/SCOPE: CPT

## 2021-11-11 RX ORDER — MORPHINE SULFATE 2 MG/ML
2 INJECTION, SOLUTION INTRAMUSCULAR; INTRAVENOUS
Status: COMPLETED | OUTPATIENT
Start: 2021-11-11 | End: 2021-11-11

## 2021-11-11 RX ORDER — SODIUM CHLORIDE 9 MG/ML
250 INJECTION, SOLUTION INTRAVENOUS AS NEEDED
Status: DISCONTINUED | OUTPATIENT
Start: 2021-11-11 | End: 2021-11-19 | Stop reason: HOSPADM

## 2021-11-11 RX ORDER — ACETAMINOPHEN 325 MG/1
650 TABLET ORAL
Status: DISCONTINUED | OUTPATIENT
Start: 2021-11-11 | End: 2021-11-19 | Stop reason: HOSPADM

## 2021-11-11 RX ORDER — PAROXETINE HYDROCHLORIDE 20 MG/1
20 TABLET, FILM COATED ORAL DAILY
Status: DISCONTINUED | OUTPATIENT
Start: 2021-11-12 | End: 2021-11-19 | Stop reason: HOSPADM

## 2021-11-11 RX ORDER — SODIUM CHLORIDE 9 MG/ML
75 INJECTION, SOLUTION INTRAVENOUS CONTINUOUS
Status: DISCONTINUED | OUTPATIENT
Start: 2021-11-11 | End: 2021-11-17

## 2021-11-11 RX ORDER — METRONIDAZOLE 500 MG/100ML
500 INJECTION, SOLUTION INTRAVENOUS EVERY 12 HOURS
Status: DISCONTINUED | OUTPATIENT
Start: 2021-11-11 | End: 2021-11-12

## 2021-11-11 RX ORDER — ONDANSETRON 2 MG/ML
4 INJECTION INTRAMUSCULAR; INTRAVENOUS
Status: COMPLETED | OUTPATIENT
Start: 2021-11-11 | End: 2021-11-11

## 2021-11-11 RX ORDER — MORPHINE SULFATE 2 MG/ML
4 INJECTION, SOLUTION INTRAMUSCULAR; INTRAVENOUS
Status: COMPLETED | OUTPATIENT
Start: 2021-11-11 | End: 2021-11-11

## 2021-11-11 RX ADMIN — ONDANSETRON 4 MG: 2 INJECTION INTRAMUSCULAR; INTRAVENOUS at 18:33

## 2021-11-11 RX ADMIN — IOPAMIDOL 100 ML: 755 INJECTION, SOLUTION INTRAVENOUS at 20:05

## 2021-11-11 RX ADMIN — CEFTRIAXONE 1 G: 1 INJECTION, POWDER, FOR SOLUTION INTRAMUSCULAR; INTRAVENOUS at 20:32

## 2021-11-11 RX ADMIN — MORPHINE SULFATE 2 MG: 2 INJECTION, SOLUTION INTRAMUSCULAR; INTRAVENOUS at 18:33

## 2021-11-11 RX ADMIN — MORPHINE SULFATE 4 MG: 2 INJECTION, SOLUTION INTRAMUSCULAR; INTRAVENOUS at 20:26

## 2021-11-11 RX ADMIN — PANTOPRAZOLE SODIUM 40 MG: 40 INJECTION, POWDER, FOR SOLUTION INTRAVENOUS at 18:40

## 2021-11-11 NOTE — ED NOTES
Pt is c/o abdominal pain for 2 weeks. He states that he has not had a BM for 7 days. Pt rates his pain as a 10. Pt denies vomiting. He states that he has not eaten in several days.  Pt HGB is 5.5
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
## 2021-11-12 LAB
ALBUMIN SERPL-MCNC: 3 G/DL (ref 3.5–5)
ALBUMIN SERPL-MCNC: 3.1 G/DL (ref 3.5–5)
ALBUMIN/GLOB SERPL: 0.9 {RATIO} (ref 1.1–2.2)
ALBUMIN/GLOB SERPL: 1 {RATIO} (ref 1.1–2.2)
ALP SERPL-CCNC: 86 U/L (ref 45–117)
ALP SERPL-CCNC: 90 U/L (ref 45–117)
ALT SERPL-CCNC: 54 U/L (ref 12–78)
ALT SERPL-CCNC: 56 U/L (ref 12–78)
ANION GAP SERPL CALC-SCNC: 8 MMOL/L (ref 5–15)
AST SERPL-CCNC: 43 U/L (ref 15–37)
AST SERPL-CCNC: 44 U/L (ref 15–37)
BACTERIA SPEC CULT: ABNORMAL
BACTERIA SPEC CULT: ABNORMAL
BASOPHILS # BLD: 0 K/UL (ref 0–0.1)
BASOPHILS NFR BLD: 0 % (ref 0–1)
BILIRUB DIRECT SERPL-MCNC: 0.4 MG/DL (ref 0–0.2)
BILIRUB SERPL-MCNC: 2.9 MG/DL (ref 0.2–1)
BILIRUB SERPL-MCNC: 3.3 MG/DL (ref 0.2–1)
BUN SERPL-MCNC: 14 MG/DL (ref 6–20)
BUN/CREAT SERPL: 13 (ref 12–20)
CALCIUM SERPL-MCNC: 8.1 MG/DL (ref 8.5–10.1)
CC UR VC: ABNORMAL
CHLORIDE SERPL-SCNC: 100 MMOL/L (ref 97–108)
CO2 SERPL-SCNC: 25 MMOL/L (ref 21–32)
CREAT SERPL-MCNC: 1.05 MG/DL (ref 0.7–1.3)
DAT POLY-SP REAG RBC QL: NORMAL
DIFFERENTIAL METHOD BLD: ABNORMAL
EOSINOPHIL # BLD: 0 K/UL (ref 0–0.4)
EOSINOPHIL NFR BLD: 0 % (ref 0–7)
ERYTHROCYTE [DISTWIDTH] IN BLOOD BY AUTOMATED COUNT: 19.8 % (ref 11.5–14.5)
FERRITIN SERPL-MCNC: 127 NG/ML (ref 26–388)
FOLATE SERPL-MCNC: 18.4 NG/ML (ref 5–21)
GLOBULIN SER CALC-MCNC: 3.2 G/DL (ref 2–4)
GLOBULIN SER CALC-MCNC: 3.3 G/DL (ref 2–4)
GLUCOSE SERPL-MCNC: 126 MG/DL (ref 65–100)
HAPTOGLOB SERPL-MCNC: 14 MG/DL (ref 30–200)
HCT VFR BLD AUTO: 22.4 % (ref 36.6–50.3)
HEMOCCULT STL QL: POSITIVE
HGB BLD-MCNC: 7.6 G/DL (ref 12.1–17)
IMM GRANULOCYTES # BLD AUTO: 0.1 K/UL (ref 0–0.04)
IMM GRANULOCYTES NFR BLD AUTO: 1 % (ref 0–0.5)
IRON SATN MFR SERPL: 21 % (ref 20–50)
IRON SERPL-MCNC: 58 UG/DL (ref 35–150)
LDH SERPL L TO P-CCNC: 632 U/L (ref 85–241)
LYMPHOCYTES # BLD: 1 K/UL (ref 0.8–3.5)
LYMPHOCYTES NFR BLD: 11 % (ref 12–49)
MAGNESIUM SERPL-MCNC: 2.2 MG/DL (ref 1.6–2.4)
MCH RBC QN AUTO: 32.5 PG (ref 26–34)
MCHC RBC AUTO-ENTMCNC: 33.9 G/DL (ref 30–36.5)
MCV RBC AUTO: 95.7 FL (ref 80–99)
MONOCYTES # BLD: 0.7 K/UL (ref 0–1)
MONOCYTES NFR BLD: 8 % (ref 5–13)
NEUTS SEG # BLD: 7.5 K/UL (ref 1.8–8)
NEUTS SEG NFR BLD: 80 % (ref 32–75)
NRBC # BLD: 1.05 K/UL (ref 0–0.01)
NRBC BLD-RTO: 11.3 PER 100 WBC
PATH REV BLD -IMP: NORMAL
PHOSPHATE SERPL-MCNC: 3.4 MG/DL (ref 2.6–4.7)
PLATELET # BLD AUTO: 265 K/UL (ref 150–400)
PMV BLD AUTO: 10.1 FL (ref 8.9–12.9)
POTASSIUM SERPL-SCNC: 3.5 MMOL/L (ref 3.5–5.1)
PROT SERPL-MCNC: 6.3 G/DL (ref 6.4–8.2)
PROT SERPL-MCNC: 6.3 G/DL (ref 6.4–8.2)
RBC # BLD AUTO: 2.34 M/UL (ref 4.1–5.7)
RBC MORPH BLD: ABNORMAL
SERVICE CMNT-IMP: ABNORMAL
SODIUM SERPL-SCNC: 133 MMOL/L (ref 136–145)
TIBC SERPL-MCNC: 270 UG/DL (ref 250–450)
VIT B12 SERPL-MCNC: 1310 PG/ML (ref 193–986)
WBC # BLD AUTO: 9.3 K/UL (ref 4.1–11.1)

## 2021-11-12 PROCEDURE — 36430 TRANSFUSION BLD/BLD COMPNT: CPT

## 2021-11-12 PROCEDURE — 74011250636 HC RX REV CODE- 250/636: Performed by: INTERNAL MEDICINE

## 2021-11-12 PROCEDURE — 83615 LACTATE (LD) (LDH) ENZYME: CPT

## 2021-11-12 PROCEDURE — 74011250636 HC RX REV CODE- 250/636: Performed by: EMERGENCY MEDICINE

## 2021-11-12 PROCEDURE — 74011250636 HC RX REV CODE- 250/636: Performed by: STUDENT IN AN ORGANIZED HEALTH CARE EDUCATION/TRAINING PROGRAM

## 2021-11-12 PROCEDURE — 30233N1 TRANSFUSION OF NONAUTOLOGOUS RED BLOOD CELLS INTO PERIPHERAL VEIN, PERCUTANEOUS APPROACH: ICD-10-PCS | Performed by: STUDENT IN AN ORGANIZED HEALTH CARE EDUCATION/TRAINING PROGRAM

## 2021-11-12 PROCEDURE — 80053 COMPREHEN METABOLIC PANEL: CPT

## 2021-11-12 PROCEDURE — 74011250637 HC RX REV CODE- 250/637: Performed by: INTERNAL MEDICINE

## 2021-11-12 PROCEDURE — 83010 ASSAY OF HAPTOGLOBIN QUANT: CPT

## 2021-11-12 PROCEDURE — 2709999900 HC NON-CHARGEABLE SUPPLY

## 2021-11-12 PROCEDURE — 74011000250 HC RX REV CODE- 250: Performed by: STUDENT IN AN ORGANIZED HEALTH CARE EDUCATION/TRAINING PROGRAM

## 2021-11-12 PROCEDURE — 86880 COOMBS TEST DIRECT: CPT

## 2021-11-12 PROCEDURE — 82272 OCCULT BLD FECES 1-3 TESTS: CPT

## 2021-11-12 PROCEDURE — 82607 VITAMIN B-12: CPT

## 2021-11-12 PROCEDURE — 74011250636 HC RX REV CODE- 250/636: Performed by: NURSE PRACTITIONER

## 2021-11-12 PROCEDURE — 84630 ASSAY OF ZINC: CPT

## 2021-11-12 PROCEDURE — 83655 ASSAY OF LEAD: CPT

## 2021-11-12 PROCEDURE — 82746 ASSAY OF FOLIC ACID SERUM: CPT

## 2021-11-12 PROCEDURE — P9016 RBC LEUKOCYTES REDUCED: HCPCS

## 2021-11-12 PROCEDURE — 82728 ASSAY OF FERRITIN: CPT

## 2021-11-12 PROCEDURE — 77030005513 HC CATH URETH FOL11 MDII -B

## 2021-11-12 PROCEDURE — 83735 ASSAY OF MAGNESIUM: CPT

## 2021-11-12 PROCEDURE — 80076 HEPATIC FUNCTION PANEL: CPT

## 2021-11-12 PROCEDURE — 65270000029 HC RM PRIVATE

## 2021-11-12 PROCEDURE — 74011250637 HC RX REV CODE- 250/637: Performed by: STUDENT IN AN ORGANIZED HEALTH CARE EDUCATION/TRAINING PROGRAM

## 2021-11-12 PROCEDURE — 36415 COLL VENOUS BLD VENIPUNCTURE: CPT

## 2021-11-12 PROCEDURE — 85025 COMPLETE CBC W/AUTO DIFF WBC: CPT

## 2021-11-12 PROCEDURE — 83020 HEMOGLOBIN ELECTROPHORESIS: CPT

## 2021-11-12 PROCEDURE — 82955 ASSAY OF G6PD ENZYME: CPT

## 2021-11-12 PROCEDURE — 82784 ASSAY IGA/IGD/IGG/IGM EACH: CPT

## 2021-11-12 PROCEDURE — C9113 INJ PANTOPRAZOLE SODIUM, VIA: HCPCS | Performed by: INTERNAL MEDICINE

## 2021-11-12 PROCEDURE — 74011000250 HC RX REV CODE- 250: Performed by: INTERNAL MEDICINE

## 2021-11-12 PROCEDURE — 83540 ASSAY OF IRON: CPT

## 2021-11-12 PROCEDURE — 83921 ORGANIC ACID SINGLE QUANT: CPT

## 2021-11-12 PROCEDURE — 84100 ASSAY OF PHOSPHORUS: CPT

## 2021-11-12 PROCEDURE — 83516 IMMUNOASSAY NONANTIBODY: CPT

## 2021-11-12 RX ORDER — SODIUM CHLORIDE 0.9 % (FLUSH) 0.9 %
5-40 SYRINGE (ML) INJECTION AS NEEDED
Status: DISCONTINUED | OUTPATIENT
Start: 2021-11-12 | End: 2021-11-19 | Stop reason: HOSPADM

## 2021-11-12 RX ORDER — ACETAMINOPHEN 650 MG/1
650 SUPPOSITORY RECTAL
Status: DISCONTINUED | OUTPATIENT
Start: 2021-11-12 | End: 2021-11-19 | Stop reason: HOSPADM

## 2021-11-12 RX ORDER — FENTANYL CITRATE 50 UG/ML
25 INJECTION, SOLUTION INTRAMUSCULAR; INTRAVENOUS ONCE
Status: COMPLETED | OUTPATIENT
Start: 2021-11-12 | End: 2021-11-12

## 2021-11-12 RX ORDER — DICYCLOMINE HYDROCHLORIDE 20 MG/1
20 TABLET ORAL
Status: DISCONTINUED | OUTPATIENT
Start: 2021-11-12 | End: 2021-11-16

## 2021-11-12 RX ORDER — ACETAMINOPHEN 325 MG/1
650 TABLET ORAL
Status: DISCONTINUED | OUTPATIENT
Start: 2021-11-12 | End: 2021-11-19 | Stop reason: HOSPADM

## 2021-11-12 RX ORDER — ONDANSETRON 4 MG/1
4 TABLET, ORALLY DISINTEGRATING ORAL
Status: DISCONTINUED | OUTPATIENT
Start: 2021-11-12 | End: 2021-11-19 | Stop reason: HOSPADM

## 2021-11-12 RX ORDER — AMOXICILLIN 250 MG
1 CAPSULE ORAL 2 TIMES DAILY
Status: DISCONTINUED | OUTPATIENT
Start: 2021-11-13 | End: 2021-11-18

## 2021-11-12 RX ORDER — ONDANSETRON 2 MG/ML
4 INJECTION INTRAMUSCULAR; INTRAVENOUS
Status: DISCONTINUED | OUTPATIENT
Start: 2021-11-12 | End: 2021-11-19 | Stop reason: HOSPADM

## 2021-11-12 RX ORDER — KETOROLAC TROMETHAMINE 30 MG/ML
15 INJECTION, SOLUTION INTRAMUSCULAR; INTRAVENOUS
Status: COMPLETED | OUTPATIENT
Start: 2021-11-12 | End: 2021-11-12

## 2021-11-12 RX ORDER — FACIAL-BODY WIPES
10 EACH TOPICAL DAILY PRN
Status: DISCONTINUED | OUTPATIENT
Start: 2021-11-12 | End: 2021-11-19 | Stop reason: HOSPADM

## 2021-11-12 RX ORDER — SODIUM CHLORIDE 0.9 % (FLUSH) 0.9 %
5-40 SYRINGE (ML) INJECTION EVERY 8 HOURS
Status: DISCONTINUED | OUTPATIENT
Start: 2021-11-12 | End: 2021-11-19 | Stop reason: HOSPADM

## 2021-11-12 RX ORDER — MORPHINE SULFATE 2 MG/ML
4 INJECTION, SOLUTION INTRAMUSCULAR; INTRAVENOUS
Status: COMPLETED | OUTPATIENT
Start: 2021-11-12 | End: 2021-11-12

## 2021-11-12 RX ORDER — METRONIDAZOLE 250 MG/1
500 TABLET ORAL EVERY 12 HOURS
Status: COMPLETED | OUTPATIENT
Start: 2021-11-12 | End: 2021-11-18

## 2021-11-12 RX ADMIN — SODIUM CHLORIDE 40 MG: 9 INJECTION INTRAMUSCULAR; INTRAVENOUS; SUBCUTANEOUS at 08:20

## 2021-11-12 RX ADMIN — LACTULOSE 45 ML: 20 SOLUTION ORAL at 21:27

## 2021-11-12 RX ADMIN — POLYETHYLENE GLYCOL-3350 AND ELECTROLYTES 4000 ML: 236; 6.74; 5.86; 2.97; 22.74 POWDER, FOR SOLUTION ORAL at 11:45

## 2021-11-12 RX ADMIN — MORPHINE SULFATE 4 MG: 2 INJECTION, SOLUTION INTRAMUSCULAR; INTRAVENOUS at 01:09

## 2021-11-12 RX ADMIN — KETOROLAC TROMETHAMINE 15 MG: 30 INJECTION, SOLUTION INTRAMUSCULAR at 16:04

## 2021-11-12 RX ADMIN — SODIUM CHLORIDE 40 MG: 9 INJECTION INTRAMUSCULAR; INTRAVENOUS; SUBCUTANEOUS at 21:27

## 2021-11-12 RX ADMIN — METRONIDAZOLE 500 MG: 250 TABLET ORAL at 11:25

## 2021-11-12 RX ADMIN — SODIUM CHLORIDE 10 ML: 9 INJECTION, SOLUTION INTRAMUSCULAR; INTRAVENOUS; SUBCUTANEOUS at 16:01

## 2021-11-12 RX ADMIN — METRONIDAZOLE 500 MG: 250 TABLET ORAL at 21:27

## 2021-11-12 RX ADMIN — LACTULOSE 45 ML: 20 SOLUTION ORAL at 16:05

## 2021-11-12 RX ADMIN — METRONIDAZOLE 500 MG: 500 INJECTION, SOLUTION INTRAVENOUS at 02:53

## 2021-11-12 RX ADMIN — SODIUM CHLORIDE 10 ML: 9 INJECTION, SOLUTION INTRAMUSCULAR; INTRAVENOUS; SUBCUTANEOUS at 21:28

## 2021-11-12 RX ADMIN — SODIUM CHLORIDE 75 ML/HR: 9 INJECTION, SOLUTION INTRAVENOUS at 11:17

## 2021-11-12 RX ADMIN — PAROXETINE HYDROCHLORIDE 20 MG: 20 TABLET, FILM COATED ORAL at 11:25

## 2021-11-12 RX ADMIN — LACTULOSE 45 ML: 20 SOLUTION ORAL at 11:27

## 2021-11-12 RX ADMIN — FENTANYL CITRATE 25 MCG: 50 INJECTION INTRAMUSCULAR; INTRAVENOUS at 19:41

## 2021-11-12 RX ADMIN — FENTANYL CITRATE 25 MCG: 50 INJECTION INTRAMUSCULAR; INTRAVENOUS at 08:18

## 2021-11-12 RX ADMIN — SODIUM CHLORIDE 40 MG: 9 INJECTION INTRAMUSCULAR; INTRAVENOUS; SUBCUTANEOUS at 02:52
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
## 2021-11-12 NOTE — PROGRESS NOTES
Have reviewed smear - as noted by hematopathologist, no schistocytes. Profound hypochromia and lots of target cells. LDH and bilirubin both high but do not see evidence of microangiopathic process. Negative aron screen on type and cross. Worry about hemolysis but may be non-autoimmune. Await haptoglobin. Will check hgb electorpheresis and G6PD.
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
## 2021-11-12 NOTE — PROGRESS NOTES
Herbal or dietary supplement products    [facility] does not stock herbal or dietary supplement products. The use of such is strongly discouraged due to the lack of regulated consistency of products. These products do not meet FDA or USP standards.     I removed Biotin from the patient's MAR    Thanks  CARA King
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
## 2021-11-12 NOTE — PROGRESS NOTES
Received notification from bedside RN about patient with regards to: 8/10 abdominal pain not relieved by Tylenol, requesting medication for relief  VS: /81, HR 81, RR 20, o2 sat 92%      Intervention given: Fentanyl 25 mcg IV x 1 dose ordered    2352: Requesting pain medication again as Fentanyl only helped for 10 minutes.  Notified RN of minimizing opioid use for this patient as it will worsen his constipation    - Bentyl prn, Pamela-colace BID with first dose now and to give tylenol for pain for now

## 2021-11-12 NOTE — PROGRESS NOTES
Hospitalist Progress Note    NAME: Chanda Ernandez   :  1980   MRN:  [id number]   Room Number:  2141/01  @ [facility]       Interim Hospital Summary: 39 y.o. male whom presented on 2021 with      Assessment / Plan:  Anticipated discharge date :   Anticipated disposition : Home  Barriers to discharge : Medical stability     Abdominal pain POA due to   Severe constipation POA  Unclear etiology of severe constipation - could be neurological, function. noted. CT abdomen shows markedly distended small and large bowel with large stool burden, mild right hydroureter and hydronephrosis. - GI consulted  - BID PPI  - Will continue lactulose TID  - CLD  - Golytely per GI  - Obtain [facility] records    Severe macrocytic anemia POA    - transfuse 1 unit PRBC  - Hematology consult  - Anemia panel        SAE  Cr 1.09, baseline around 0.6  - IVF     UTI  Urine +ve for trichomonas    - F/u urine cultures, F/u blood cultures  - Patient does not have genitourinary symptoms, will hold off abx for UTI  - Metronidazole PO x 7 days for trichomonas         Code Status: Full  Surrogate Decision Maker:     DVT Prophylaxis: SCD  GI Prophylaxis: not indicated     Baseline: Independent             Subjective:     Chief Complaint / Reason for Physician Visit  \"my belly  hurts\". Discussed with RN events overnight. Review of Systems:  No fevers, chills, appetite change, cough, sputum production, shortness of breath, dyspnea on exertion, nausea, vomitting, diarrhea, constipation, chest pain, leg edema, joint pain, rash, itching. Tolerating diet. Objective:     VITALS:   Last 24hrs VS reviewed since prior progress note.  Most recent are:  Patient Vitals for the past 24 hrs:   Temp Pulse Resp BP SpO2   21 0817  87  (!) 133/98    21 0758 99.6 °F (37.6 °C) 86 20 136/87 94 %   21 0715 99.7 °F (37.6 °C) 81 20 131/82 94 %   21 0656 98.6 °F (37 °C) 65 20 (!) 141/83 91 % 11/12/21 0450 98.4 °F (36.9 °C) 81 20 119/81 92 %   11/12/21 0444 98.2 °F (36.8 °C) 78 20 129/82 91 %   11/12/21 0302 98.9 °F (37.2 °C) 82 22 119/81 91 %   11/12/21 0200 99.1 °F (37.3 °C) 88 17 121/80 (!) 89 %   11/12/21 0145 99.3 °F (37.4 °C) 88 21 125/87 (!) 88 %   11/12/21 0119 99.5 °F (37.5 °C) 85 22 115/73 91 %   11/12/21 0104 99.2 °F (37.3 °C) 91 22 116/81 91 %   11/12/21 0055 99.3 °F (37.4 °C) 92 21 120/68 91 %   11/11/21 1848     92 %   11/11/21 1643 (!) 100.5 °F (38.1 °C) 98 18 126/87 100 %       Intake/Output Summary (Last 24 hours) at 11/12/2021 0826  Last data filed at 11/12/2021 0450  Gross per 24 hour   Intake 340 ml   Output    Net 340 ml        PHYSICAL EXAM:  General: WD, WN. Alert, cooperative, no acute distress    EENT:  EOMI. Anicteric sclerae. MMM  Resp:  CTA bilaterally, no wheezing or rales. No accessory muscle use  CV:  Regular  rhythm,  normal S1/S2, no murmurs rubs gallops, No edema  GI:  Soft, distended, TTP diffusely,  hypoactive Bowel sounds  Neurologic:  Alert and oriented X 3, normal speech,   Psych:   Good insight. Not anxious nor agitated  Skin:  No rashes. No jaundice    Reviewed most current lab test results and cultures  YES  Reviewed most current radiology test results   YES  Review and summation of old records today    NO  Reviewed patient's current orders and MAR    YES  PMH/SH reviewed - no change compared to H&P  ________________________________________________________________________  Care Plan discussed with:    Comments   Patient x    Family      RN x    Care Manager x    Consultant  x                     x Multidiciplinary team rounds were held today with , nursing, pharmacist and clinical coordinator. Patient's plan of care was discussed; medications were reviewed and discharge planning was addressed.      ________________________________________________________________________  Total NON critical care TIME:  35   Minutes    Total CRITICAL CARE TIME Spent: Minutes non procedure based      Comments   >50% of visit spent in counseling and coordination of care x    ________________________________________________________________________  Kobi Strong MD     Procedures: see electronic medical records for all procedures/Xrays and details which were not copied into this note but were reviewed prior to creation of Plan. LABS:  I reviewed today's most current labs and imaging studies.   Pertinent labs include:  Recent Labs     11/11/21  1653   WBC 10.4   HGB 5.5*   HCT 16.7*        Recent Labs     11/11/21  1653   *   K 3.5   CL 95*   CO2 29   *   BUN 14   CREA 1.09   CA 8.9   ALB 3.4*   TBILI 3.0*   ALT 60       Signed: Kobi Strong MD

## 2021-11-13 LAB
ABO + RH BLD: NORMAL
ALBUMIN SERPL-MCNC: 2.9 G/DL (ref 3.5–5)
ALBUMIN/GLOB SERPL: 0.9 {RATIO} (ref 1.1–2.2)
ALP SERPL-CCNC: 78 U/L (ref 45–117)
ALT SERPL-CCNC: 55 U/L (ref 12–78)
ANION GAP SERPL CALC-SCNC: 9 MMOL/L (ref 5–15)
AST SERPL-CCNC: 38 U/L (ref 15–37)
BASOPHILS # BLD: 0 K/UL (ref 0–0.1)
BASOPHILS NFR BLD: 0 % (ref 0–1)
BILIRUB DIRECT SERPL-MCNC: 0.3 MG/DL (ref 0–0.2)
BILIRUB SERPL-MCNC: 2.6 MG/DL (ref 0.2–1)
BLD PROD TYP BPU: NORMAL
BLD PROD TYP BPU: NORMAL
BLOOD GROUP ANTIBODIES SERPL: NORMAL
BPU ID: NORMAL
BPU ID: NORMAL
BUN SERPL-MCNC: 12 MG/DL (ref 6–20)
BUN/CREAT SERPL: 12 (ref 12–20)
CALCIUM SERPL-MCNC: 8.1 MG/DL (ref 8.5–10.1)
CHLORIDE SERPL-SCNC: 100 MMOL/L (ref 97–108)
CO2 SERPL-SCNC: 26 MMOL/L (ref 21–32)
COMMENT, HOLDF: NORMAL
COMMENT, HOLDF: NORMAL
CREAT SERPL-MCNC: 1 MG/DL (ref 0.7–1.3)
CROSSMATCH RESULT,%XM: NORMAL
CROSSMATCH RESULT,%XM: NORMAL
DIFFERENTIAL METHOD BLD: ABNORMAL
EOSINOPHIL # BLD: 0 K/UL (ref 0–0.4)
EOSINOPHIL NFR BLD: 0 % (ref 0–7)
ERYTHROCYTE [DISTWIDTH] IN BLOOD BY AUTOMATED COUNT: 20.6 % (ref 11.5–14.5)
GLOBULIN SER CALC-MCNC: 3.1 G/DL (ref 2–4)
GLUCOSE SERPL-MCNC: 128 MG/DL (ref 65–100)
HCT VFR BLD AUTO: 24 % (ref 36.6–50.3)
HGB BLD-MCNC: 8 G/DL (ref 12.1–17)
IMM GRANULOCYTES # BLD AUTO: 0.1 K/UL (ref 0–0.04)
IMM GRANULOCYTES NFR BLD AUTO: 1 % (ref 0–0.5)
LYMPHOCYTES # BLD: 1.2 K/UL (ref 0.8–3.5)
LYMPHOCYTES NFR BLD: 13 % (ref 12–49)
MAGNESIUM SERPL-MCNC: 2.2 MG/DL (ref 1.6–2.4)
MCH RBC QN AUTO: 32.4 PG (ref 26–34)
MCHC RBC AUTO-ENTMCNC: 33.3 G/DL (ref 30–36.5)
MCV RBC AUTO: 97.2 FL (ref 80–99)
MONOCYTES # BLD: 0.7 K/UL (ref 0–1)
MONOCYTES NFR BLD: 8 % (ref 5–13)
NEUTS SEG # BLD: 7.2 K/UL (ref 1.8–8)
NEUTS SEG NFR BLD: 78 % (ref 32–75)
NRBC # BLD: 0.96 K/UL (ref 0–0.01)
NRBC BLD-RTO: 10.4 PER 100 WBC
PHOSPHATE SERPL-MCNC: 3.1 MG/DL (ref 2.6–4.7)
PLATELET # BLD AUTO: 328 K/UL (ref 150–400)
PLATELET COMMENTS,PCOM: ABNORMAL
PMV BLD AUTO: 10.3 FL (ref 8.9–12.9)
POTASSIUM SERPL-SCNC: 3.2 MMOL/L (ref 3.5–5.1)
PROT SERPL-MCNC: 6 G/DL (ref 6.4–8.2)
RBC # BLD AUTO: 2.47 M/UL (ref 4.1–5.7)
RBC MORPH BLD: ABNORMAL
SAMPLES BEING HELD,HOLD: NORMAL
SAMPLES BEING HELD,HOLD: NORMAL
SODIUM SERPL-SCNC: 135 MMOL/L (ref 136–145)
SPECIMEN EXP DATE BLD: NORMAL
STATUS OF UNIT,%ST: NORMAL
STATUS OF UNIT,%ST: NORMAL
UNIT DIVISION, %UDIV: 0
UNIT DIVISION, %UDIV: 0
WBC # BLD AUTO: 9.2 K/UL (ref 4.1–11.1)

## 2021-11-13 PROCEDURE — 84100 ASSAY OF PHOSPHORUS: CPT

## 2021-11-13 PROCEDURE — 80048 BASIC METABOLIC PNL TOTAL CA: CPT

## 2021-11-13 PROCEDURE — 65270000029 HC RM PRIVATE

## 2021-11-13 PROCEDURE — 74011250636 HC RX REV CODE- 250/636: Performed by: INTERNAL MEDICINE

## 2021-11-13 PROCEDURE — 74011000250 HC RX REV CODE- 250: Performed by: INTERNAL MEDICINE

## 2021-11-13 PROCEDURE — 80076 HEPATIC FUNCTION PANEL: CPT

## 2021-11-13 PROCEDURE — 83735 ASSAY OF MAGNESIUM: CPT

## 2021-11-13 PROCEDURE — 74011250637 HC RX REV CODE- 250/637: Performed by: NURSE PRACTITIONER

## 2021-11-13 PROCEDURE — C9113 INJ PANTOPRAZOLE SODIUM, VIA: HCPCS | Performed by: INTERNAL MEDICINE

## 2021-11-13 PROCEDURE — 74011250637 HC RX REV CODE- 250/637: Performed by: STUDENT IN AN ORGANIZED HEALTH CARE EDUCATION/TRAINING PROGRAM

## 2021-11-13 PROCEDURE — 36415 COLL VENOUS BLD VENIPUNCTURE: CPT

## 2021-11-13 PROCEDURE — 74011000258 HC RX REV CODE- 258: Performed by: INTERNAL MEDICINE

## 2021-11-13 PROCEDURE — 85025 COMPLETE CBC W/AUTO DIFF WBC: CPT

## 2021-11-13 PROCEDURE — 74011250637 HC RX REV CODE- 250/637: Performed by: INTERNAL MEDICINE

## 2021-11-13 RX ADMIN — SODIUM CHLORIDE 75 ML/HR: 9 INJECTION, SOLUTION INTRAVENOUS at 12:15

## 2021-11-13 RX ADMIN — I-VITE, TAB 1000-60-2MG (60/BT) 1 TABLET: TAB at 10:55

## 2021-11-13 RX ADMIN — DICYCLOMINE HYDROCHLORIDE 20 MG: 20 TABLET ORAL at 00:32

## 2021-11-13 RX ADMIN — LACTULOSE 45 ML: 20 SOLUTION ORAL at 22:50

## 2021-11-13 RX ADMIN — DICYCLOMINE HYDROCHLORIDE 20 MG: 20 TABLET ORAL at 09:13

## 2021-11-13 RX ADMIN — ACETAMINOPHEN 650 MG: 325 TABLET ORAL at 10:55

## 2021-11-13 RX ADMIN — METRONIDAZOLE 500 MG: 250 TABLET ORAL at 09:13

## 2021-11-13 RX ADMIN — SODIUM CHLORIDE 40 MG: 9 INJECTION INTRAMUSCULAR; INTRAVENOUS; SUBCUTANEOUS at 20:36

## 2021-11-13 RX ADMIN — DOCUSATE SODIUM AND SENNOSIDES 1 TABLET: 8.6; 5 TABLET, FILM COATED ORAL at 00:33

## 2021-11-13 RX ADMIN — DICYCLOMINE HYDROCHLORIDE 20 MG: 20 TABLET ORAL at 20:37

## 2021-11-13 RX ADMIN — SODIUM CHLORIDE 10 ML: 9 INJECTION, SOLUTION INTRAMUSCULAR; INTRAVENOUS; SUBCUTANEOUS at 06:09

## 2021-11-13 RX ADMIN — LACTULOSE 45 ML: 20 SOLUTION ORAL at 09:14

## 2021-11-13 RX ADMIN — SODIUM CHLORIDE 40 MG: 9 INJECTION INTRAMUSCULAR; INTRAVENOUS; SUBCUTANEOUS at 09:13

## 2021-11-13 RX ADMIN — SODIUM CHLORIDE 10 ML: 9 INJECTION, SOLUTION INTRAMUSCULAR; INTRAVENOUS; SUBCUTANEOUS at 14:55

## 2021-11-13 RX ADMIN — DOCUSATE SODIUM AND SENNOSIDES 1 TABLET: 8.6; 5 TABLET, FILM COATED ORAL at 09:13

## 2021-11-13 RX ADMIN — ACETAMINOPHEN 650 MG: 325 TABLET ORAL at 20:37

## 2021-11-13 RX ADMIN — CEFTRIAXONE 1 G: 1 INJECTION, POWDER, FOR SOLUTION INTRAMUSCULAR; INTRAVENOUS at 10:56

## 2021-11-13 RX ADMIN — METRONIDAZOLE 500 MG: 250 TABLET ORAL at 20:37

## 2021-11-13 RX ADMIN — LACTULOSE 45 ML: 20 SOLUTION ORAL at 17:40

## 2021-11-13 RX ADMIN — PAROXETINE HYDROCHLORIDE 20 MG: 20 TABLET, FILM COATED ORAL at 09:13

## 2021-11-13 RX ADMIN — DOCUSATE SODIUM AND SENNOSIDES 1 TABLET: 8.6; 5 TABLET, FILM COATED ORAL at 17:39

## 2021-11-13 RX ADMIN — ACETAMINOPHEN 650 MG: 325 TABLET ORAL at 06:08

## 2021-11-13 NOTE — PROGRESS NOTES
End of Shift Note    Bedside shift change report given to [address] [place] [address],[address] (oncoming nurse) by Teodora Lux (offgoing nurse). Report included the following information SBAR, Kardex, Procedure Summary, Intake/Output, MAR and Recent Results    Shift worked:  7-7pm     Shift summary and any significant changes:     No significant changes, Complains of abdominal pain. Had BM(*4) during the shift, consumed 1800 L of Colyte, red flanks in stool .          [facility] to address[facility]     Zone phone for oncoming shift:            Teodora Lux

## 2021-11-13 NOTE — PROGRESS NOTES
Hospitalist Progress Note    NAME: Liz Ardon   :  1980   MRN:  [id number]   Room Number:  2141/01  @ [facility]       Interim Hospital Summary: 39 y.o. male whom presented on 2021 with      Assessment / Plan:  Anticipated discharge date :   Anticipated disposition : Home  Barriers to discharge : Medical stability     Abdominal pain POA due to   Severe constipation POA  Unclear etiology of severe constipation - could be neurological, function. noted. CT abdomen shows markedly distended small and large bowel with large stool burden, mild right hydroureter and hydronephrosis. - GI consulted, plan for egd/cscope on monday  - BID PPI  - CLD  - Golytely per GI, has been having mutliple small bm   - Obtain [facility] records    Severe macrocytic anemia POA    - s/p 1 unit PRBC transfusion  - Hematology consult, additional labs hgb electrophresis and g6pd pending. Haptoglobin low       SAE  Cr 1.09, baseline around 0.6  - IVF     UTI  Hydronephrosis, with urinary retention  Urine +ve for trichomonas  - F/u urine cultures growing strep, F/u blood cultures  - start IV rocephin, pt reports difficulty with urination prior to brock placement  - Metronidazole PO x 7 days for trichomonas   -repeat US on Monday  -urology followin. g      Code Status: Full  Surrogate Decision Maker:     DVT Prophylaxis: SCD  GI Prophylaxis: not indicated     Baseline: Independent             Subjective:     Chief Complaint / Reason for Physician Visit  C/o abd pain. Discussed with RN events overnight. Review of Systems:  No fevers, chills, appetite change, cough, sputum production, shortness of breath, dyspnea on exertion, nausea, vomitting, diarrhea, constipation, chest pain, leg edema, joint pain, rash, itching. Tolerating diet. Objective:     VITALS:   Last 24hrs VS reviewed since prior progress note.  Most recent are:  Patient Vitals for the past 24 hrs:   Temp Pulse Resp BP SpO2 11/13/21 0806 98 °F (36.7 °C) (!) 52 18 (!) 151/95 99 %   11/12/21 2130 98.9 °F (37.2 °C) (!) 59 17 (!) 147/86 95 %   11/12/21 1518 98.9 °F (37.2 °C) (!) 59 16 131/89 94 %   11/12/21 1118 99.1 °F (37.3 °C) 63 20 (!) 144/96 96 %   11/12/21 0907 97.4 °F (36.3 °C) 68 20 (!) 138/94 94 %       Intake/Output Summary (Last 24 hours) at 11/13/2021 0905  Last data filed at 11/12/2021 1829  Gross per 24 hour   Intake 1373.3 ml   Output 550 ml   Net 823.3 ml        PHYSICAL EXAM:  General: WD, WN. Alert, cooperative, no acute distress    EENT:  EOMI. Anicteric sclerae. MMM  Resp:  CTA bilaterally, no wheezing or rales. No accessory muscle use  CV:  Regular  rhythm,  normal S1/S2, no murmurs rubs gallops, No edema  GI:  Soft, distended, TTP diffusely,  hypoactive Bowel sounds  Neurologic:  Alert and oriented X 3, normal speech,   Psych:   Good insight. Not anxious nor agitated  Skin:  No rashes. No jaundice    Reviewed most current lab test results and cultures  YES  Reviewed most current radiology test results   YES  Review and summation of old records today    NO  Reviewed patient's current orders and MAR    YES  PMH/SH reviewed - no change compared to H&P  ________________________________________________________________________  Care Plan discussed with:    Comments   Patient x    Family      RN x    Care Manager     Consultant                        Multidiciplinary team rounds were held today with , nursing, pharmacist and clinical coordinator. Patient's plan of care was discussed; medications were reviewed and discharge planning was addressed.      ________________________________________________________________________  Total NON critical care TIME:  35   Minutes    Total CRITICAL CARE TIME Spent:   Minutes non procedure based      Comments   >50% of visit spent in counseling and coordination of care x    ________________________________________________________________________  Mirian Wu MD Procedures: see electronic medical records for all procedures/Xrays and details which were not copied into this note but were reviewed prior to creation of Plan. LABS:  I reviewed today's most current labs and imaging studies.   Pertinent labs include:  Recent Labs     11/12/21  1156 11/11/21  1653   WBC 9.3 10.4   HGB 7.6* 5.5*   HCT 22.4* 16.7*    318     Recent Labs     11/12/21  1752 11/12/21  1156 11/11/21  1653   NA  --  133* 132*   K  --  3.5 3.5   CL  --  100 95*   CO2  --  25 29   GLU  --  126* 120*   BUN  --  14 14   CREA  --  1.05 1.09   CA  --  8.1* 8.9   MG  --  2.2  --    PHOS  --  3.4  --    ALB 3.1* 3.0* 3.4*   TBILI 2.9* 3.3* 3.0*   ALT 56 54 60       Signed: Atilio King MD

## 2021-11-13 NOTE — PROGRESS NOTES
Received notification from bedside RN about patient with regards to: persistent abdominal pain not relieved by Toradol, requesting medication for relief  VS: /89, HR 59, RR 16, O2 sat 94%     Intervention given: Fentanyl 25 mcg IV x 1 dose ordered

## 2021-11-13 NOTE — PROGRESS NOTES
Pt c/o persistent abd pain all shift. NP made aware and new order for Bentyl was given per order. Pt explained it did not help. Pt had several very loose bowel movements with bright red blood scattered. Pt explained he has been straining to go. Educated pt on the importance of not straining to go and allowing the medication to do its job. Pt refused tylenol for pain explaining it is ineffective.

## 2021-11-14 LAB
BASOPHILS # BLD: 0 K/UL (ref 0–0.1)
BASOPHILS NFR BLD: 0 % (ref 0–1)
DIFFERENTIAL METHOD BLD: ABNORMAL
EOSINOPHIL # BLD: 0.1 K/UL (ref 0–0.4)
EOSINOPHIL NFR BLD: 1 % (ref 0–7)
ERYTHROCYTE [DISTWIDTH] IN BLOOD BY AUTOMATED COUNT: 20.3 % (ref 11.5–14.5)
HCT VFR BLD AUTO: 25.5 % (ref 36.6–50.3)
HGB BLD-MCNC: 8.5 G/DL (ref 12.1–17)
IMM GRANULOCYTES # BLD AUTO: 0.2 K/UL (ref 0–0.04)
IMM GRANULOCYTES NFR BLD AUTO: 2 % (ref 0–0.5)
LYMPHOCYTES # BLD: 1.6 K/UL (ref 0.8–3.5)
LYMPHOCYTES NFR BLD: 16 % (ref 12–49)
MCH RBC QN AUTO: 32.4 PG (ref 26–34)
MCHC RBC AUTO-ENTMCNC: 33.3 G/DL (ref 30–36.5)
MCV RBC AUTO: 97.3 FL (ref 80–99)
MONOCYTES # BLD: 0.9 K/UL (ref 0–1)
MONOCYTES NFR BLD: 9 % (ref 5–13)
NEUTS SEG # BLD: 7 K/UL (ref 1.8–8)
NEUTS SEG NFR BLD: 72 % (ref 32–75)
NRBC # BLD: 1.06 K/UL (ref 0–0.01)
NRBC BLD-RTO: 10.8 PER 100 WBC
PLATELET # BLD AUTO: 350 K/UL (ref 150–400)
PLATELET COMMENTS,PCOM: ABNORMAL
PMV BLD AUTO: 10 FL (ref 8.9–12.9)
RBC # BLD AUTO: 2.62 M/UL (ref 4.1–5.7)
RBC MORPH BLD: ABNORMAL
WBC # BLD AUTO: 9.8 K/UL (ref 4.1–11.1)

## 2021-11-14 PROCEDURE — 74011250637 HC RX REV CODE- 250/637: Performed by: INTERNAL MEDICINE

## 2021-11-14 PROCEDURE — 74011250636 HC RX REV CODE- 250/636: Performed by: INTERNAL MEDICINE

## 2021-11-14 PROCEDURE — 74011250637 HC RX REV CODE- 250/637: Performed by: STUDENT IN AN ORGANIZED HEALTH CARE EDUCATION/TRAINING PROGRAM

## 2021-11-14 PROCEDURE — 74011000258 HC RX REV CODE- 258: Performed by: INTERNAL MEDICINE

## 2021-11-14 PROCEDURE — 77010033711 HC HIGH FLOW OXYGEN

## 2021-11-14 PROCEDURE — 74011000250 HC RX REV CODE- 250: Performed by: INTERNAL MEDICINE

## 2021-11-14 PROCEDURE — C9113 INJ PANTOPRAZOLE SODIUM, VIA: HCPCS | Performed by: INTERNAL MEDICINE

## 2021-11-14 PROCEDURE — 65270000029 HC RM PRIVATE

## 2021-11-14 PROCEDURE — 85025 COMPLETE CBC W/AUTO DIFF WBC: CPT

## 2021-11-14 PROCEDURE — 36415 COLL VENOUS BLD VENIPUNCTURE: CPT

## 2021-11-14 PROCEDURE — 74011250637 HC RX REV CODE- 250/637: Performed by: NURSE PRACTITIONER

## 2021-11-14 RX ORDER — BISACODYL 5 MG
10 TABLET, DELAYED RELEASE (ENTERIC COATED) ORAL ONCE
Status: COMPLETED | OUTPATIENT
Start: 2021-11-14 | End: 2021-11-14

## 2021-11-14 RX ADMIN — ACETAMINOPHEN 650 MG: 325 TABLET ORAL at 10:25

## 2021-11-14 RX ADMIN — LACTULOSE 45 ML: 20 SOLUTION ORAL at 10:22

## 2021-11-14 RX ADMIN — LACTULOSE 45 ML: 20 SOLUTION ORAL at 16:30

## 2021-11-14 RX ADMIN — BISACODYL 10 MG: 5 TABLET, COATED ORAL at 16:30

## 2021-11-14 RX ADMIN — SODIUM CHLORIDE 10 ML: 9 INJECTION, SOLUTION INTRAMUSCULAR; INTRAVENOUS; SUBCUTANEOUS at 13:36

## 2021-11-14 RX ADMIN — DICYCLOMINE HYDROCHLORIDE 20 MG: 20 TABLET ORAL at 22:12

## 2021-11-14 RX ADMIN — LACTULOSE 45 ML: 20 SOLUTION ORAL at 22:06

## 2021-11-14 RX ADMIN — POLYETHYLENE GLYCOL-3350 AND ELECTROLYTES 2000 ML: 236; 6.74; 5.86; 2.97; 22.74 POWDER, FOR SOLUTION ORAL at 16:07

## 2021-11-14 RX ADMIN — PAROXETINE HYDROCHLORIDE 20 MG: 20 TABLET, FILM COATED ORAL at 10:22

## 2021-11-14 RX ADMIN — SODIUM CHLORIDE 40 MG: 9 INJECTION INTRAMUSCULAR; INTRAVENOUS; SUBCUTANEOUS at 22:06

## 2021-11-14 RX ADMIN — METRONIDAZOLE 500 MG: 250 TABLET ORAL at 10:22

## 2021-11-14 RX ADMIN — ACETAMINOPHEN 650 MG: 325 TABLET ORAL at 04:42

## 2021-11-14 RX ADMIN — SODIUM CHLORIDE 10 ML: 9 INJECTION, SOLUTION INTRAMUSCULAR; INTRAVENOUS; SUBCUTANEOUS at 22:08

## 2021-11-14 RX ADMIN — SODIUM CHLORIDE 75 ML/HR: 9 INJECTION, SOLUTION INTRAVENOUS at 16:30

## 2021-11-14 RX ADMIN — ACETAMINOPHEN 650 MG: 325 TABLET ORAL at 22:11

## 2021-11-14 RX ADMIN — SODIUM CHLORIDE 40 MG: 9 INJECTION INTRAMUSCULAR; INTRAVENOUS; SUBCUTANEOUS at 10:23

## 2021-11-14 RX ADMIN — I-VITE, TAB 1000-60-2MG (60/BT) 1 TABLET: TAB at 10:22

## 2021-11-14 RX ADMIN — CEFTRIAXONE 1 G: 1 INJECTION, POWDER, FOR SOLUTION INTRAMUSCULAR; INTRAVENOUS at 10:25

## 2021-11-14 RX ADMIN — DOCUSATE SODIUM AND SENNOSIDES 1 TABLET: 8.6; 5 TABLET, FILM COATED ORAL at 09:00

## 2021-11-14 RX ADMIN — METRONIDAZOLE 500 MG: 250 TABLET ORAL at 22:06

## 2021-11-14 NOTE — PROGRESS NOTES
Hospitalist Progress Note    NAME: Charlene Jessica   :  1980   MRN:  [id number]   Room Number:  2141/01  @ [facility]       Interim Hospital Summary: 39 y.o. male whom presented on 2021 with      Assessment / Plan:  Anticipated discharge date :   Anticipated disposition : Home  Barriers to discharge : Medical stability     Abdominal pain POA due to   Severe constipation POA  Unclear etiology of severe constipation - could be neurological, function. noted. CT abdomen shows markedly distended small and large bowel with large stool burden, mild right hydroureter and hydronephrosis. - GI consulted, plan for egd/cscope tomorrow. NPO after MN  - BID PPI  - GI following    Severe macrocytic anemia POA  - s/p 1 unit PRBC transfusion. Repeat labs today, transfuse for hgb<7  - Hematology consult, additional labs hgb electrophresis and g6pd pending. Haptoglobin low       SAE  Cr 1.09, baseline around 0.6  - IVF     UTI  Hydronephrosis, with urinary retention  Urine +ve for trichomonas  - F/u urine cultures growing strep, F/u blood cultures  - cont' IV rocephin, pt reports difficulty with urination prior to brock placement  - Metronidazole PO x 7 days for trichomonas   -repeat US on Monday  -urology following      Code Status: Full  Surrogate Decision Maker:   DVT Prophylaxis: SCD  GI Prophylaxis: not indicated     Baseline: Independent             Subjective:     Chief Complaint / Reason for Physician Visit  No new complaint. Discussed with RN events overnight. Review of Systems:  No fevers, chills, appetite change, cough, sputum production, shortness of breath, dyspnea on exertion, nausea, vomitting, diarrhea, constipation, chest pain, leg edema, joint pain, rash, itching. Tolerating diet. Objective:     VITALS:   Last 24hrs VS reviewed since prior progress note.  Most recent are:  Patient Vitals for the past 24 hrs:   Temp Pulse Resp BP SpO2   21 0802 98.5 °F (36.9 °C) (!) 56 18 (!) 148/97 100 %   11/14/21 0442 98.7 °F (37.1 °C) (!) 53 18 (!) 156/96 100 %   11/13/21 2328 98.1 °F (36.7 °C) (!) 51 17 (!) 141/87 98 %   11/13/21 2027 99.3 °F (37.4 °C) (!) 47 16 (!) 158/87 97 %   11/13/21 1601 99.5 °F (37.5 °C) (!) 50 17 (!) 148/84 100 %   11/13/21 1136 98.2 °F (36.8 °C) (!) 50 18 (!) 158/91 99 %       Intake/Output Summary (Last 24 hours) at 11/14/2021 0948  Last data filed at 11/14/2021 0657  Gross per 24 hour   Intake 2203.75 ml   Output 1201 ml   Net 1002.75 ml        PHYSICAL EXAM:  General: WD, WN. Alert, cooperative, no acute distress    EENT:  EOMI. Anicteric sclerae. MMM  Resp:  CTA bilaterally, no wheezing or rales. No accessory muscle use  CV:  Regular  rhythm,  normal S1/S2, no murmurs rubs gallops, No edema  GI:  Soft, distended, TTP diffusely,  hypoactive Bowel sounds  Neurologic:  Alert and oriented X 3, normal speech,   Psych:   Good insight. Not anxious nor agitated  Skin:  No rashes. No jaundice    Reviewed most current lab test results and cultures  YES  Reviewed most current radiology test results   YES  Review and summation of old records today    NO  Reviewed patient's current orders and MAR    YES  PMH/SH reviewed - no change compared to H&P  ________________________________________________________________________  Care Plan discussed with:    Comments   Patient x    Family      RN x    Care Manager     Consultant                        Multidiciplinary team rounds were held today with , nursing, pharmacist and clinical coordinator. Patient's plan of care was discussed; medications were reviewed and discharge planning was addressed.      ________________________________________________________________________  Total NON critical care TIME:  35   Minutes    Total CRITICAL CARE TIME Spent:   Minutes non procedure based      Comments   >50% of visit spent in counseling and coordination of care x ________________________________________________________________________  Altaf Hernandez MD     Procedures: see electronic medical records for all procedures/Xrays and details which were not copied into this note but were reviewed prior to creation of Plan. LABS:  I reviewed today's most current labs and imaging studies. Pertinent labs include:  Recent Labs     11/13/21 0526 11/12/21  1156 11/11/21  1653   WBC 9.2 9.3 10.4   HGB 8.0* 7.6* 5.5*   HCT 24.0* 22.4* 16.7*    265 318     Recent Labs     11/13/21 0526 11/12/21  1752 11/12/21 1156 11/11/21  1653 11/11/21  1653   *  --  133*  --  132*   K 3.2*  --  3.5  --  3.5     --  100  --  95*   CO2 26  --  25  --  29   *  --  126*  --  120*   BUN 12  --  14  --  14   CREA 1.00  --  1.05  --  1.09   CA 8.1*  --  8.1*  --  8.9   MG 2.2  --  2.2  --   --    PHOS 3.1  --  3.4  --   --    ALB 2.9* 3.1* 3.0*   < > 3.4*   TBILI 2.6* 2.9* 3.3*   < > 3.0*   ALT 55 56 54   < > 60    < > = values in this interval not displayed.        Signed: Altaf Hernandez MD

## 2021-11-14 NOTE — PROGRESS NOTES
Transition of Care Plan:    RUR: 9%  Disposition:Home with mother  Follow up appointments:PCP and specialist  DME needed: TBD  Transportation at Discharge: Parent  Keys or means to access home:    Parent    IM Medicare Letter:N/A due to pt having having [place] [employer]  Is patient a BCPI-A Bundle:      N/A     If yes, was Bundle Letter given?:   No  Caregiver Contact: Sammy ObrienPwtxld-arnqta-[phone]  Discharge Caregiver contacted prior to discharge?               Reason for Admission:  Abdominal pain                     RUR Score:     9%                Plan for utilizing home health:      TBD    PCP: First and Last name:  Pt stated that he needs a new PCP because [place] [facility] [place]      Name of Practice:    Are you a current patient: Yes/No:    Approximate date of last visit:    Can you participate in a virtual visit with your PCP:                     Current Advanced Directive/Advance Care Plan: Full Code      Healthcare Decision Maker:   Click here to complete [address] including selection of the Healthcare Decision Maker Relationship (ie \"Primary\")             Primary Decision Maker: Hermina Paget - Parent - [address] (ACP) Conversation      Date of Conversation: 11/14/21  Conducted with: Patient with [address]:     Primary Decision Maker: Hermina Paget - Parent - [phone]  Click here to [address] including selection of the Healthcare Decision Maker Relationship (ie \"Primary\")          Content/Action Overview:   DECLINED ACP conversation - will revisit periodically   Reviewed DNR/DNI and patient elects Full Code (Attempt Resuscitation)         Length of Voluntary ACP Conversation in minutes:  <16 minutes (Non-Billable)    Aline Favre                         Transition of Care Plan:                      Pt is a 38 yo male who was admitted to [facility] due to having abdominal pain. CM spoke to pt, explain role and confirm demographics. Pt lives with his mother in an one story home. Unsure of how many steps to enter the home due to pt would not give CM that answer. No DME reported. No hx of home health, inpatient rehab or SNF. Pt is independent in his ADLs and IADLs. Pt uses [facility] on [address]. Pt stated that he has a  in the community. At the time of d/c pt's mother will transport. CM will continue to follow and assist with d/c planning. Care Management Interventions  PCP Verified by CM: Yes (Pt stated he needs a new PCP because he has been going to [facility] [place])  Mode of Transport at Discharge: Other (see comment) (Pt's mother will transport)  Transition of Care Consult (CM Consult): Discharge Planning (Home with mother)  Lizzie Cano:  (No DME reported)  Discharge Durable Medical Equipment: No  Physical Therapy Consult: No  Occupational Therapy Consult: No  Speech Therapy Consult: No  Support Systems: Parent(s) (Pt's mother is supportive)  Confirm Follow Up Transport: Other (see comment) (Pt's mother or medical transportation)  Discharge Location  Discharge Placement: Home with family assistance (Pt's mother)    Rukhsana Myers.   Care Manager [facility]  [phone]

## 2021-11-14 NOTE — PROGRESS NOTES
End of Shift Note    Bedside shift change report given to Marcia Harrison (oncoming nurse) by Harpal Toussaint RN (offgoing nurse). Report included the following information SBAR, Kardex, Intake/Output and MAR    Shift worked:  7am-7pm     Shift summary and any significant changes:     Pt ambulated to the bedside commode multiple times during the shift and tolerated the activity well. Pt is tolerating the current diet. Pt complained of pain multiple times during the shift and Bentyl and Tylenol were given. This did not seem to alleviate the pain for a prolonged period of time. Concerns for physician to address:       Zone phone for oncoming shift:   [phone]       Activity:  Activity Level: Up with Assistance  Number times ambulated in hallways past shift: 0  Number of times OOB to chair past shift: 0    Cardiac:   Cardiac Monitoring: Yes      Cardiac Rhythm: Sinus Kimo    Access:   Current line(s): PIV     Genitourinary:   Urinary status: voiding    Respiratory:   O2 Device: None (Room air)  Chronic home O2 use?: NO  Incentive spirometer at bedside: N/A     GI:  Last Bowel Movement Date: 11/13/21  Current diet:  ADULT DIET Clear Liquid  Passing flatus: YES  Tolerating current diet: YES       Pain Management:   Patient states pain is manageable on current regimen: YES    Skin:  Russell Score: 19  Interventions: float heels and increase time out of bed    Patient Safety:  Fall Score:  Total Score: 1  Interventions: gripper socks and pt to call before getting OOB       Length of Stay:  Expected LOS: 2d 4h  Actual LOS: 2      Harpal Toussaint RN

## 2021-11-15 ENCOUNTER — HOSPITAL ENCOUNTER (OUTPATIENT)
Dept: ULTRASOUND IMAGING | Age: 41
Discharge: HOME | DRG: 254 | End: 2021-11-15
Attending: NURSE PRACTITIONER
Payer: MEDICAID

## 2021-11-15 ENCOUNTER — ANESTHESIA EVENT (OUTPATIENT)
Dept: ENDOSCOPY | Age: 41
DRG: 254 | End: 2021-11-15
Payer: MEDICAID

## 2021-11-15 ENCOUNTER — ANESTHESIA (OUTPATIENT)
Dept: ENDOSCOPY | Age: 41
DRG: 254 | End: 2021-11-15
Payer: MEDICAID

## 2021-11-15 ENCOUNTER — APPOINTMENT (OUTPATIENT)
Dept: GENERAL RADIOLOGY | Age: 41
DRG: 254 | End: 2021-11-15
Attending: PHYSICIAN ASSISTANT
Payer: MEDICAID

## 2021-11-15 LAB
ALBUMIN SERPL ELPH-MCNC: 3.3 G/DL (ref 2.9–4.4)
ALBUMIN/GLOB SERPL: 1.4 {RATIO} (ref 0.7–1.7)
ALPHA1 GLOB SERPL ELPH-MCNC: 0.4 G/DL (ref 0–0.4)
ALPHA2 GLOB SERPL ELPH-MCNC: 0.4 G/DL (ref 0.4–1)
ANION GAP SERPL CALC-SCNC: 7 MMOL/L (ref 5–15)
B-GLOBULIN SERPL ELPH-MCNC: 0.8 G/DL (ref 0.7–1.3)
BASOPHILS # BLD: 0 K/UL (ref 0–0.1)
BASOPHILS NFR BLD: 0 % (ref 0–1)
BUN SERPL-MCNC: 7 MG/DL (ref 6–20)
BUN/CREAT SERPL: 8 (ref 12–20)
CALCIUM SERPL-MCNC: 8.1 MG/DL (ref 8.5–10.1)
CHLORIDE SERPL-SCNC: 107 MMOL/L (ref 97–108)
CO2 SERPL-SCNC: 24 MMOL/L (ref 21–32)
COVID-19 RAPID TEST, COVR: NOT DETECTED
CREAT SERPL-MCNC: 0.83 MG/DL (ref 0.7–1.3)
DIFFERENTIAL METHOD BLD: ABNORMAL
EOSINOPHIL # BLD: 0.1 K/UL (ref 0–0.4)
EOSINOPHIL NFR BLD: 1 % (ref 0–7)
ERYTHROCYTE [DISTWIDTH] IN BLOOD BY AUTOMATED COUNT: 21 % (ref 11.5–14.5)
GAMMA GLOB SERPL ELPH-MCNC: 0.9 G/DL (ref 0.4–1.8)
GLIADIN PEPTIDE IGA SER-ACNC: 6 UNITS (ref 0–19)
GLIADIN PEPTIDE IGG SER-ACNC: 3 UNITS (ref 0–19)
GLOBULIN SER-MCNC: 2.5 G/DL (ref 2.2–3.9)
GLUCOSE SERPL-MCNC: 111 MG/DL (ref 65–100)
HCT VFR BLD AUTO: 26.9 % (ref 36.6–50.3)
HGB BLD-MCNC: 8.4 G/DL (ref 12.1–17)
HISPANIC, LDP2T: NO
IGA SERPL-MCNC: 176 MG/DL (ref 90–386)
IGA SERPL-MCNC: 180 MG/DL (ref 90–386)
IGG SERPL-MCNC: 909 MG/DL (ref 603–1613)
IGM SERPL-MCNC: 48 MG/DL (ref 20–172)
IMM GRANULOCYTES # BLD AUTO: 0.2 K/UL (ref 0–0.04)
IMM GRANULOCYTES NFR BLD AUTO: 2 % (ref 0–0.5)
INTERPRETATION SERPL IEP-IMP: ABNORMAL
KAPPA LC FREE SER-MCNC: 42.4 MG/L (ref 3.3–19.4)
KAPPA LC FREE/LAMBDA FREE SER: 1.38 {RATIO} (ref 0.26–1.65)
LAMBDA LC FREE SERPL-MCNC: 30.8 MG/L (ref 5.7–26.3)
LEAD BLD-MCNC: <1 UG/DL (ref 0–4)
LYMPHOCYTES # BLD: 1.3 K/UL (ref 0.8–3.5)
LYMPHOCYTES NFR BLD: 14 % (ref 12–49)
M PROTEIN SERPL ELPH-MCNC: ABNORMAL G/DL
MCH RBC QN AUTO: 31.6 PG (ref 26–34)
MCHC RBC AUTO-ENTMCNC: 31.2 G/DL (ref 30–36.5)
MCV RBC AUTO: 101.1 FL (ref 80–99)
MONOCYTES # BLD: 0.8 K/UL (ref 0–1)
MONOCYTES NFR BLD: 8 % (ref 5–13)
NEUTS SEG # BLD: 7.2 K/UL (ref 1.8–8)
NEUTS SEG NFR BLD: 75 % (ref 32–75)
NRBC # BLD: 0.81 K/UL (ref 0–0.01)
NRBC BLD-RTO: 8.5 PER 100 WBC
PLATELET # BLD AUTO: 370 K/UL (ref 150–400)
PMV BLD AUTO: 9.9 FL (ref 8.9–12.9)
POTASSIUM SERPL-SCNC: 3.2 MMOL/L (ref 3.5–5.1)
PROT SERPL-MCNC: 5.8 G/DL (ref 6–8.5)
RACE, 017371: NORMAL
RBC # BLD AUTO: 2.66 M/UL (ref 4.1–5.7)
RBC MORPH BLD: ABNORMAL
RBC MORPH BLD: ABNORMAL
SODIUM SERPL-SCNC: 138 MMOL/L (ref 136–145)
SOURCE, COVRS: NORMAL
SPECIMEN SOURCE: NORMAL
TEST PURPOSE, LDP4T: NORMAL
TTG IGA SER-ACNC: <2 U/ML (ref 0–3)
TTG IGG SER-ACNC: <2 U/ML (ref 0–5)
WBC # BLD AUTO: 9.6 K/UL (ref 4.1–11.1)

## 2021-11-15 PROCEDURE — 76060000031 HC ANESTHESIA FIRST 0.5 HR: Performed by: INTERNAL MEDICINE

## 2021-11-15 PROCEDURE — 74011250637 HC RX REV CODE- 250/637: Performed by: NURSE PRACTITIONER

## 2021-11-15 PROCEDURE — 0DB68ZZ EXCISION OF STOMACH, VIA NATURAL OR ARTIFICIAL OPENING ENDOSCOPIC: ICD-10-PCS | Performed by: INTERNAL MEDICINE

## 2021-11-15 PROCEDURE — 76770 US EXAM ABDO BACK WALL COMP: CPT

## 2021-11-15 PROCEDURE — 77030019988 HC FCPS ENDOSC DISP BSC -B: Performed by: INTERNAL MEDICINE

## 2021-11-15 PROCEDURE — 87635 SARS-COV-2 COVID-19 AMP PRB: CPT

## 2021-11-15 PROCEDURE — 76040000019: Performed by: INTERNAL MEDICINE

## 2021-11-15 PROCEDURE — 74011250636 HC RX REV CODE- 250/636: Performed by: INTERNAL MEDICINE

## 2021-11-15 PROCEDURE — 74018 RADEX ABDOMEN 1 VIEW: CPT

## 2021-11-15 PROCEDURE — 74011250637 HC RX REV CODE- 250/637: Performed by: STUDENT IN AN ORGANIZED HEALTH CARE EDUCATION/TRAINING PROGRAM

## 2021-11-15 PROCEDURE — 88305 TISSUE EXAM BY PATHOLOGIST: CPT

## 2021-11-15 PROCEDURE — 74011250637 HC RX REV CODE- 250/637: Performed by: INTERNAL MEDICINE

## 2021-11-15 PROCEDURE — 36415 COLL VENOUS BLD VENIPUNCTURE: CPT

## 2021-11-15 PROCEDURE — 74011000250 HC RX REV CODE- 250: Performed by: NURSE ANESTHETIST, CERTIFIED REGISTERED

## 2021-11-15 PROCEDURE — 0DB98ZZ EXCISION OF DUODENUM, VIA NATURAL OR ARTIFICIAL OPENING ENDOSCOPIC: ICD-10-PCS | Performed by: INTERNAL MEDICINE

## 2021-11-15 PROCEDURE — 82525 ASSAY OF COPPER: CPT

## 2021-11-15 PROCEDURE — 0DJD8ZZ INSPECTION OF LOWER INTESTINAL TRACT, VIA NATURAL OR ARTIFICIAL OPENING ENDOSCOPIC: ICD-10-PCS | Performed by: INTERNAL MEDICINE

## 2021-11-15 PROCEDURE — 85025 COMPLETE CBC W/AUTO DIFF WBC: CPT

## 2021-11-15 PROCEDURE — 74011250636 HC RX REV CODE- 250/636: Performed by: NURSE ANESTHETIST, CERTIFIED REGISTERED

## 2021-11-15 PROCEDURE — 2709999900 HC NON-CHARGEABLE SUPPLY: Performed by: INTERNAL MEDICINE

## 2021-11-15 PROCEDURE — 74011000258 HC RX REV CODE- 258: Performed by: INTERNAL MEDICINE

## 2021-11-15 PROCEDURE — 74011000250 HC RX REV CODE- 250: Performed by: INTERNAL MEDICINE

## 2021-11-15 PROCEDURE — 94760 N-INVAS EAR/PLS OXIMETRY 1: CPT

## 2021-11-15 PROCEDURE — 80048 BASIC METABOLIC PNL TOTAL CA: CPT

## 2021-11-15 PROCEDURE — C9113 INJ PANTOPRAZOLE SODIUM, VIA: HCPCS | Performed by: INTERNAL MEDICINE

## 2021-11-15 PROCEDURE — 82955 ASSAY OF G6PD ENZYME: CPT

## 2021-11-15 PROCEDURE — 65270000029 HC RM PRIVATE

## 2021-11-15 PROCEDURE — 77030021593 HC FCPS BIOP ENDOSC BSC -A: Performed by: INTERNAL MEDICINE

## 2021-11-15 PROCEDURE — 74011250637 HC RX REV CODE- 250/637: Performed by: PHYSICIAN ASSISTANT

## 2021-11-15 RX ORDER — METRONIDAZOLE 500 MG/100ML
500 INJECTION, SOLUTION INTRAVENOUS EVERY 12 HOURS
Status: DISCONTINUED | OUTPATIENT
Start: 2021-11-15 | End: 2021-11-15 | Stop reason: CLARIF

## 2021-11-15 RX ORDER — SODIUM CHLORIDE 9 MG/ML
25 INJECTION, SOLUTION INTRAVENOUS CONTINUOUS
Status: DISCONTINUED | OUTPATIENT
Start: 2021-11-15 | End: 2021-11-15 | Stop reason: HOSPADM

## 2021-11-15 RX ORDER — POTASSIUM CHLORIDE 7.45 MG/ML
10 INJECTION INTRAVENOUS ONCE
Status: COMPLETED | OUTPATIENT
Start: 2021-11-15 | End: 2021-11-16

## 2021-11-15 RX ORDER — HYDROCORTISONE ACETATE 25 MG/1
25 SUPPOSITORY RECTAL EVERY 12 HOURS
Status: COMPLETED | OUTPATIENT
Start: 2021-11-15 | End: 2021-11-17

## 2021-11-15 RX ORDER — PROPOFOL 10 MG/ML
INJECTION, EMULSION INTRAVENOUS AS NEEDED
Status: DISCONTINUED | OUTPATIENT
Start: 2021-11-15 | End: 2021-11-15 | Stop reason: HOSPADM

## 2021-11-15 RX ORDER — SODIUM CHLORIDE 9 MG/ML
25 INJECTION, SOLUTION INTRAVENOUS CONTINUOUS
Status: DISCONTINUED | OUTPATIENT
Start: 2021-11-15 | End: 2021-11-16

## 2021-11-15 RX ORDER — LIDOCAINE HYDROCHLORIDE 20 MG/ML
INJECTION, SOLUTION EPIDURAL; INFILTRATION; INTRACAUDAL; PERINEURAL AS NEEDED
Status: DISCONTINUED | OUTPATIENT
Start: 2021-11-15 | End: 2021-11-15 | Stop reason: HOSPADM

## 2021-11-15 RX ADMIN — LIDOCAINE HYDROCHLORIDE 100 MG: 20 INJECTION, SOLUTION EPIDURAL; INFILTRATION; INTRACAUDAL; PERINEURAL at 10:11

## 2021-11-15 RX ADMIN — PROPOFOL 100 MG: 10 INJECTION, EMULSION INTRAVENOUS at 10:11

## 2021-11-15 RX ADMIN — SODIUM CHLORIDE 10 ML: 9 INJECTION, SOLUTION INTRAMUSCULAR; INTRAVENOUS; SUBCUTANEOUS at 21:23

## 2021-11-15 RX ADMIN — LACTULOSE 45 ML: 20 SOLUTION ORAL at 18:13

## 2021-11-15 RX ADMIN — HYDROCORTISONE ACETATE 25 MG: 25 SUPPOSITORY RECTAL at 21:22

## 2021-11-15 RX ADMIN — POLYETHYLENE GLYCOL-3350 AND ELECTROLYTES 2000 ML: 236; 6.74; 5.86; 2.97; 22.74 POWDER, FOR SOLUTION ORAL at 05:18

## 2021-11-15 RX ADMIN — CEFTRIAXONE 1 G: 1 INJECTION, POWDER, FOR SOLUTION INTRAMUSCULAR; INTRAVENOUS at 11:47

## 2021-11-15 RX ADMIN — SODIUM CHLORIDE 40 MG: 9 INJECTION INTRAMUSCULAR; INTRAVENOUS; SUBCUTANEOUS at 21:21

## 2021-11-15 RX ADMIN — SODIUM CHLORIDE 40 MG: 9 INJECTION INTRAMUSCULAR; INTRAVENOUS; SUBCUTANEOUS at 11:39

## 2021-11-15 RX ADMIN — METRONIDAZOLE 500 MG: 250 TABLET ORAL at 11:45

## 2021-11-15 RX ADMIN — SODIUM CHLORIDE 75 ML/HR: 9 INJECTION, SOLUTION INTRAVENOUS at 05:25

## 2021-11-15 RX ADMIN — PROPOFOL 50 MG: 10 INJECTION, EMULSION INTRAVENOUS at 10:13

## 2021-11-15 RX ADMIN — METRONIDAZOLE 500 MG: 250 TABLET ORAL at 21:21

## 2021-11-15 RX ADMIN — POTASSIUM CHLORIDE 10 MEQ: 10 INJECTION, SOLUTION INTRAVENOUS at 12:43

## 2021-11-15 RX ADMIN — SODIUM CHLORIDE 10 ML: 9 INJECTION, SOLUTION INTRAMUSCULAR; INTRAVENOUS; SUBCUTANEOUS at 13:09

## 2021-11-15 RX ADMIN — ACETAMINOPHEN 650 MG: 325 TABLET ORAL at 21:22

## 2021-11-15 RX ADMIN — SODIUM CHLORIDE 10 ML: 9 INJECTION, SOLUTION INTRAMUSCULAR; INTRAVENOUS; SUBCUTANEOUS at 05:20

## 2021-11-15 RX ADMIN — HYDROCORTISONE ACETATE 25 MG: 25 SUPPOSITORY RECTAL at 12:43

## 2021-11-15 RX ADMIN — SODIUM PHOSPHATE, DIBASIC AND SODIUM PHOSPHATE, MONOBASIC 1 ENEMA: 7; 19 ENEMA RECTAL at 08:31

## 2021-11-15 RX ADMIN — DOCUSATE SODIUM AND SENNOSIDES 1 TABLET: 8.6; 5 TABLET, FILM COATED ORAL at 18:00

## 2021-11-15 RX ADMIN — SODIUM CHLORIDE 25 ML/HR: 9 INJECTION, SOLUTION INTRAVENOUS at 11:47

## 2021-11-15 NOTE — PROCEDURES
Colonoscopy Procedure Note    Gertrude Lewis  1980  [id number]    Indications:  Please see below. Pre-operative Diagnosis: GI bleeding with anemia    Post-operative Diagnosis: edematous ascending colon fold, large inflamed internal hemorrhoids      : Carlos Eduardo Steen MD    Referring Provider: Adele Ortiz MD    Sedation:  MAC anesthesia Propofol        Procedure Details:    After detailed informed consent was obtained with all risks and benefits of procedure explained and preoperative exam completed, the patient was taken to the endoscopy suite and placed in the left lateral decubitus position. Upon sequential sedation as per above, a digital rectal exam was performed  and was normal.  The Olympus videocolonoscope  was inserted in the rectum and carefully advanced to the cecum, which was identified by the ileocecal valve and appendiceal orifice, terminal ileum. The quality of preparation was fair. The colonoscope was slowly withdrawn with careful evaluation between folds. Retroflexion in the rectum was performed. Findings:   · The Olympus video high-definition colonoscope was advanced to the cecum, identified by its typical land marks, with ease. · Over 800 ml of yellow liquid removed from the colon. Views are fair as a result. · TI was normal to 5 cm. · Mid to distal ascending colon has edematous appearing folds NOS. Biopsies taken. · The mucosa of the colon is otherwise normal appearing to the cecum with no obvious mucosal pathology (polyp,mass lesion, colitis etc) noted on this colonoscopy (as allowed by prep). · Large irritated oozing internal hemorrhoids w/ a thrombosed hemorrhoid, likley the source of blood seen, are noted. Therapies:  biopsy of colon: ascending colon fold    Specimen/s: Specimens were collected as described above and sent to pathology. Complications: None were encountered during the procedure. EBL:  None.     Recommendations:     -Await pathology.  -Anusol HC suppositories. -Return  patient to floor please. Mubashir A. Leatha Epley, MD  11/15/2021  10:27 AM

## 2021-11-15 NOTE — ANESTHESIA POSTPROCEDURE EVALUATION
Procedure(s):  ESOPHAGOGASTRODUODENOSCOPY (EGD)  COLONOSCOPY  ESOPHAGOGASTRODUODENAL (EGD) BIOPSY  COLON BIOPSY. MAC    Anesthesia Post Evaluation        Patient location during evaluation: PACU  Note status: Adequate. Level of consciousness: responsive to verbal stimuli and sleepy but conscious  Pain management: satisfactory to patient  Airway patency: patent  Anesthetic complications: no  Cardiovascular status: acceptable  Respiratory status: acceptable  Hydration status: acceptable  Comments: +Post-Anesthesia Evaluation and Assessment    Patient: Eliseo Fernandez MRN: [id number]  SSN: [id number]   YOB: 1980  Age: 39 y.o. Sex: male      Cardiovascular Function/Vital Signs    BP (!) 169/94   Pulse 70   Temp 36.7 °C (98.1 °F)   Resp 19   Ht 5' 9\" (1.753 m)   Wt 68.9 kg (152 lb)   SpO2 98%   BMI 22.45 kg/m²     Patient is status post Procedure(s):  ESOPHAGOGASTRODUODENOSCOPY (EGD)  COLONOSCOPY  ESOPHAGOGASTRODUODENAL (EGD) BIOPSY  COLON BIOPSY. Nausea/Vomiting: Controlled. Postoperative hydration reviewed and adequate. Pain:  Pain Scale 1: Numeric (0 - 10) (11/15/21 1041)  Pain Intensity 1: 10 (tender on light palpation) (11/15/21 1041)   Managed. Neurological Status: At baseline. Mental Status and Level of Consciousness: Arousable. Pulmonary Status:   O2 Device: None (Room air) (11/15/21 1041)   Adequate oxygenation and airway patent. Complications related to anesthesia: None    Post-anesthesia assessment completed. No concerns. Signed By: Andrea West MD    11/15/2021  Post anesthesia nausea and vomiting:  controlled      INITIAL Post-op Vital signs:   Vitals Value Taken Time   /94 11/15/21 1106   Temp 36.7 °C (98.1 °F) 11/15/21 1041   Pulse 63 11/15/21 1107   Resp 23 11/15/21 1107   SpO2 98 % 11/15/21 1107   Vitals shown include unvalidated device data.

## 2021-11-15 NOTE — PROCEDURES
[place] Office: [phone]      Esophagogastroduodenoscopy Procedure Note      Marie Lam  1980  [id number]    Indication:  Anemia; GI bleed     : Radha Byers MD    Referring Provider:  Felipe Aceves MD    Sedation:  MAC anesthesia Propofol    Procedure Details:  After detailed informed consent was obtained for the procedure, with all risks and benefits of procedure explained the patient was taken to the endoscopy suite and placed in the left lateral decubitus position. Following sequential administration of sedation as per above, the endoscope was inserted into the mouth and advanced under direct vision to second portion of the duodenum. A careful inspection was made as the gastroscope was withdrawn, including a retroflexed view of the proximal stomach; findings and interventions are described below. Findings:     Esophagus: The esophageal mucosa in the proximal, mid and distal esophagus is normal.   The squamo-columnar junction is at 40 cm where the Z-line was noted. Stomach: There is moderate gastric food mixed with bile and gastricjuices. Over 200 ml was suctioned out. Some solid food is still seen in the body. The gastric mucosa has erythema in the antrum/body: biopsies were taken. :   The angularis is normal.    Duodenum:   The bulb and post bulbar mucosa is normal in appearance. Few pieces of food noted in the bulb. The duodenal folds are normal. Biopsies taken    Therapies:  biopsy of stomach body  biopsy of duodenal distal bulb, second portion    Specimen:  Specimens were collected as described and send to the laboratory. Complications:   None were encountered during the procedure. EBL:  None. Recommendations:     -Continue acid suppression. ,   -Await pathology. ,   -Follow symptoms.  -Consider GE scan. Carlos Eduardo Payne MD  11/15/2021  10:32 AM

## 2021-11-15 NOTE — PROGRESS NOTES
[facility] GI Progress Note  AGNIESZKA Nur   for Dr. Soheila Payne    Subjective:   Patient prepped for an EGD and colonoscopy today. He has been NPO and reports he completed prep. BM are liquid-brown. Some reports of BRB with BM overnight. Some nausea and requesting pain meds. He reports worse pain then arrival.     Objective:   Exam:  GEN: Pleasant BM, NAD  HEENT: NCAT  Heart: RRR  Lungs; CTA  Abdomen: mildly distended but soft, generalized abominal tenderness without guarding or rebound. Normal BS  Ext; no edema    Lab Results   Component Value Date/Time    WBC 9.6 11/15/2021 01:17 AM    HGB 8.4 (L) 11/15/2021 01:17 AM    HCT 26.9 (L) 11/15/2021 01:17 AM    PLATELET 764 36/38/8015 01:17 AM    .1 (H) 11/15/2021 01:17 AM     Lab Results   Component Value Date/Time    Sodium 138 11/15/2021 01:17 AM    Potassium 3.2 (L) 11/15/2021 01:17 AM    Chloride 107 11/15/2021 01:17 AM    CO2 24 11/15/2021 01:17 AM    Anion gap 7 11/15/2021 01:17 AM    Glucose 111 (H) 11/15/2021 01:17 AM    BUN 7 11/15/2021 01:17 AM    Creatinine 0.83 11/15/2021 01:17 AM    BUN/Creatinine ratio 8 (L) 11/15/2021 01:17 AM    GFR est AA >60 11/15/2021 01:17 AM    GFR est non-AA >60 11/15/2021 01:17 AM    Calcium 8.1 (L) 11/15/2021 01:17 AM    Bilirubin, total 2.6 (H) 11/13/2021 05:26 AM    Alk. phosphatase 78 11/13/2021 05:26 AM    Protein, total 6.0 (L) 11/13/2021 05:26 AM    Albumin 2.9 (L) 11/13/2021 05:26 AM    Globulin 3.1 11/13/2021 05:26 AM    A-G Ratio 0.9 (L) 11/13/2021 05:26 AM    ALT (SGPT) 55 11/13/2021 05:26 AM    AST (SGOT) 38 (H) 11/13/2021 05:26 AM       Plan: Will repeat KUB and proceed with fleet enema. If Xray non revealing proceed with procedures as planned. Patient in agreement, keep NPO. Rapid COVID ordered. AGNIESZKA Johansen  11/15/21  8:10 AM    I have examined the patient.   I have reviewed the chart and agree with the documentation recorded by the BRIANA, including the assessment, treatment plan, and disposition. Patient seen and examined by me. I personally performed all components of the history, physical, and medical decision making and agree with the assessment and plan with minor modifications as noted. Exam:  Alert and awake  HEENT AT  GI: soft w/ normal bowel sounds    Plan: We will proceed with EGD/Colonoscopy today. He is prepped for it. Alejo Ortega MD  [address] [facility])
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
## 2021-11-15 NOTE — INTERDISCIPLINARY ROUNDS
Interdisciplinary Rounds were completed on 11/15/21 for this patient. Rounds included nursing, clinical care leader, pharmacy, and case management. Plan of care discussed. See clinical pathway and/or care plan for interventions and desired outcomes.

## 2021-11-15 NOTE — PROGRESS NOTES
Hospitalist Progress Note    NAME: Liz Ardon   :  1980   MRN:  [id number]   Room Number:  2141/01  @ [facility]       Interim Hospital Summary: 39 y.o. male whom presented on 2021 with      Assessment / Plan:  Anticipated discharge date :   Anticipated disposition : Home  Barriers to discharge : Medical stability     Abdominal pain POA due to   Severe constipation POA  Unclear etiology of severe constipation - could be neurological, function. noted. CT abdomen shows markedly distended small and large bowel with large stool burden, mild right hydroureter and hydronephrosis. - GI consulted, status post EGD and colonoscopy today  Colonoscopy on October 15 mild to distal ascending colon has edematous appearing, the mucosa of the colon is normal appearing, large irritated oozing internal hemorrhoids likely source of the bleed  EGD with with gastric mucosa erythema in the antrum/body status post biopsy  - BID PPI  - GI following  KUB with no changes from before    Severe macrocytic anemia POA  - s/p 1 unit PRBC transfusion. Repeat labs today, transfuse for hgb<7  - Hematology consult, additional labs hgb electrophresis and g6pd pending. Iron panel within normal limits       SAE  Cr 1.09, baseline around 0.6  - IVF     UTI  Hydronephrosis, with urinary retention  Urine +ve for trichomonas  - F/u urine cultures growing strep, F/u blood cultures  - cont' IV rocephin, pt reports difficulty with urination prior to brock placement  - Metronidazole PO x 7 days for trichomonas   -urology consulted     Code Status: Full  Surrogate Decision Maker:   DVT Prophylaxis: SCD  GI Prophylaxis: not indicated     Baseline: Independent             Subjective:     Patient was seen and examined. No acute events overnight. Discussed with RN overnight events. All patient's questions were answered.       \"still not feeling good\"    Review of Systems:  Symptom Y/N Comments  Symptom Y/N Comments   Fever/Chills n   Chest Pain n    Poor Appetite    Edema     Cough n   Abdominal Pain y    Sputum    Joint Pain     SOB/GASTON n   Pruritis/Rash     Nausea/vomit n   Tolerating PT/OT     Diarrhea    Tolerating Diet y    Constipation    Other       Could NOT obtain due to:            Objective:     VITALS:   Last 24hrs VS reviewed since prior progress note. Most recent are:  Patient Vitals for the past 24 hrs:   Temp Pulse Resp BP SpO2   11/15/21 1106  70 19 (!) 169/94 98 %   11/15/21 1103  (!) 59 18 (!) 168/98 99 %   11/15/21 1100  69 18 (!) 147/87 96 %   11/15/21 1057  62 23 (!) 169/103 99 %   11/15/21 1054  67 23 (!) 174/103 95 %   11/15/21 1051  (!) 59 22 (!) 172/96 99 %   11/15/21 1048  (!) 58 20 (!) 162/88 99 %   11/15/21 1045  64 23 (!) 157/94 99 %   11/15/21 1041 98.1 °F (36.7 °C) 60 26 (!) 150/95 100 %   11/15/21 1032  66 14 (!) 145/99 99 %   11/15/21 1030  65 22 (!) 143/94 100 %   11/15/21 0946    (!) 188/98    11/15/21 0945  67 15  99 %   11/15/21 0753 98.1 °F (36.7 °C) 62 16 (!) 151/83 100 %   11/15/21 0241 98.9 °F (37.2 °C) 61 16 (!) 155/98 98 %   11/14/21 2245 98.7 °F (37.1 °C) 60 16 (!) 155/97 98 %   11/14/21 2012 98.6 °F (37 °C) 60 16 (!) 146/97 98 %   11/14/21 1626 98.7 °F (37.1 °C) (!) 57 18 (!) 160/94 100 %       Intake/Output Summary (Last 24 hours) at 11/15/2021 1344  Last data filed at 11/15/2021 1049  Gross per 24 hour   Intake 5191.25 ml   Output 850 ml   Net 4341.25 ml        PHYSICAL EXAM:  General: WD, WN. Alert, cooperative, no acute distress    EENT:  EOMI. Anicteric sclerae. MMM  Resp:  CTA bilaterally, no wheezing or rales. No accessory muscle use  CV:  Regular  rhythm,  normal S1/S2, no murmurs rubs gallops, No edema  GI:  Soft, distended, TTP diffusely,  hypoactive Bowel sounds  Neurologic:  Alert and oriented X 3, normal speech,   Psych:   Good insight. Not anxious nor agitated  Skin:  No rashes.   No jaundice    Reviewed most current lab test results and cultures  YES  Reviewed most current radiology test results   YES  Review and summation of old records today    NO  Reviewed patient's current orders and MAR    YES  PMH/SH reviewed - no change compared to H&P  ________________________________________________________________________  Care Plan discussed with:    Comments   Patient x    Family      RN x    Care Manager     Consultant                       x Multidiciplinary team rounds were held today with , nursing, pharmacist and clinical coordinator. Patient's plan of care was discussed; medications were reviewed and discharge planning was addressed. ________________________________________________________________________  Total NON critical care TIME:  35   Minutes    Total CRITICAL CARE TIME Spent:   Minutes non procedure based      Comments   >50% of visit spent in counseling and coordination of care x    ________________________________________________________________________  Lorena Byrnes MD     Procedures: see electronic medical records for all procedures/Xrays and details which were not copied into this note but were reviewed prior to creation of Plan. LABS:  I reviewed today's most current labs and imaging studies.   Pertinent labs include:  Recent Labs     11/15/21  0117 11/14/21  1124 11/13/21  0526   WBC 9.6 9.8 9.2   HGB 8.4* 8.5* 8.0*   HCT 26.9* 25.5* 24.0*    350 328     Recent Labs     11/15/21  0117 11/13/21  0526 11/12/21  1752    135*  --    K 3.2* 3.2*  --     100  --    CO2 24 26  --    * 128*  --    BUN 7 12  --    CREA 0.83 1.00  --    CA 8.1* 8.1*  --    MG  --  2.2  --    PHOS  --  3.1  --    ALB  --  2.9* 3.1*   TBILI  --  2.6* 2.9*   ALT  --  55 56       Signed: Lorena Byrnes MD
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
## 2021-11-15 NOTE — PROGRESS NOTES
GI note:    See EGD/colonoscopy please. Findings do not explain his severe anemia.     SMA Jordan MD

## 2021-11-15 NOTE — PROGRESS NOTES
0600: Pt having ABD pain but is NPO for procedure today. Message sent to Macario Bone NP requesting IV medication x1    0700: No response regarding pain medication from NP. Pt completed prep as ordered. 0700: End of Shift Note    Bedside shift change report given to Vladimir carey RN (oncoming nurse) by Peterson Schilder, RN (offgoing nurse).   Report included the following information SBAR, Kardex, Procedure Summary, Intake/Output, MAR and Recent Results    Shift worked:  [id number]     Shift summary and any significant changes:     See above     Concerns for physician to address:       Zone phone for oncoming shift:

## 2021-11-15 NOTE — PROGRESS NOTES
Anupama Guan  1980  [id number]    Situation:  Verbal report received from: Summer Calvin  Procedure: Procedure(s):  ESOPHAGOGASTRODUODENOSCOPY (EGD)  COLONOSCOPY  ESOPHAGOGASTRODUODENAL (EGD) BIOPSY  COLON BIOPSY    Background:    Preoperative diagnosis: anemia  Postoperative diagnosis: bile gastritis,   hemorrhoids    :  Dr. Leatha Epley  Assistant(s): Endoscopy Technician-1: Ashia Mobley  Endoscopy RN-1: Adriana Lee RN    Specimens:   ID Type Source Tests Collected by Time Destination   1 : duodenum bx Preservative Duodenum  Lary High MD 11/15/2021 1018 Pathology   2 : gastric bx  Preservative Gastric  Lary High MD 11/15/2021 1019 Pathology   3 : ascending colon bx Preservative Colon, Ascending  Lary High MD 11/15/2021 1025 Pathology     H. Pylori  no    Assessment:  Anesthesia gave intra-procedure sedation and medications, see anesthesia flow sheet yes    Intravenous fluids: NS@ KVO     Vital signs stable yes    Abdominal assessment: round and soft yes    Recommendation:  Return to floor yes

## 2021-11-15 NOTE — PROGRESS NOTES
End of Shift Note    Bedside shift change report given to [facility] (oncoming nurse) by Riana Walker (offgoing nurse). Report included the following information SBAR, Kardex, Procedure Summary, Intake/Output, MAR and Recent Results    Shift worked:  7-7pm     Shift summary and any significant changes:     No significant changes,  Loose, bloody BM during the shift.       Concerns for physician to address: Discussed on roundings     Zone phone for oncoming shift:            Riana Walker

## 2021-11-15 NOTE — PROGRESS NOTES
0  Endoscope was pre-cleaned at bedside immediately following procedure by Royce Balderas endo tech. 1031  Received report from anesthesia. Assumed pt care. VSS. 1046  TRANSFER - OUT REPORT:    Verbal report given to [place](name) on Kai Salmon  being transferred to Renal(unit) for routine progression of care       Report consisted of patients Situation, Background, Assessment and   Recommendations(SBAR). Information from the following report(s) SBAR, Procedure Summary and Recent Results was reviewed with the receiving nurse. Lines:   Peripheral IV 11/13/21 Anterior; Right Forearm (Active)   Site Assessment Clean, dry, & intact 11/15/21 0407   Phlebitis Assessment 0 11/15/21 0407   Infiltration Assessment 0 11/15/21 0407   Dressing Status Clean, dry, & intact 11/15/21 0407   Dressing Type Transparent 11/15/21 0407   Hub Color/Line Status Pink; Infusing; Flushed 11/15/21 0407   Action Taken Other (comment) 11/13/21 1600   Alcohol Cap Used No 11/13/21 1600        Opportunity for questions and clarification was provided.

## 2021-11-16 PROBLEM — K64.9 HEMORRHOIDS: Status: ACTIVE | Noted: 2021-11-16

## 2021-11-16 LAB
ANION GAP SERPL CALC-SCNC: 9 MMOL/L (ref 5–15)
BACTERIA SPEC CULT: NORMAL
BUN SERPL-MCNC: 9 MG/DL (ref 6–20)
BUN/CREAT SERPL: 12 (ref 12–20)
CALCIUM SERPL-MCNC: 7.7 MG/DL (ref 8.5–10.1)
CHLORIDE SERPL-SCNC: 107 MMOL/L (ref 97–108)
CO2 SERPL-SCNC: 23 MMOL/L (ref 21–32)
CREAT SERPL-MCNC: 0.77 MG/DL (ref 0.7–1.3)
ERYTHROCYTE [DISTWIDTH] IN BLOOD BY AUTOMATED COUNT: 20.9 % (ref 11.5–14.5)
G6PD BLD QN: 498 U/10E12 RBC (ref 127–427)
GLUCOSE SERPL-MCNC: 141 MG/DL (ref 65–100)
HCT VFR BLD AUTO: 27.9 % (ref 36.6–50.3)
HGB A MFR BLD: 97.2 % (ref 96.4–98.8)
HGB A2 MFR BLD COLUMN CHROM: 2.8 % (ref 1.8–3.2)
HGB BLD-MCNC: 9 G/DL (ref 12.1–17)
HGB F MFR BLD: 0 % (ref 0–2)
HGB FRACT BLD-IMP: NORMAL
HGB S MFR BLD: 0 %
Lab: NORMAL
MCH RBC QN AUTO: 32.3 PG (ref 26–34)
MCHC RBC AUTO-ENTMCNC: 32.3 G/DL (ref 30–36.5)
MCV RBC AUTO: 100 FL (ref 80–99)
METHYLMALONATE SERPL-SCNC: 203 NMOL/L (ref 0–378)
NRBC # BLD: 0.13 K/UL (ref 0–0.01)
NRBC BLD-RTO: 1.3 PER 100 WBC
PLATELET # BLD AUTO: 360 K/UL (ref 150–400)
PMV BLD AUTO: 9.6 FL (ref 8.9–12.9)
POTASSIUM SERPL-SCNC: 3.4 MMOL/L (ref 3.5–5.1)
RBC # BLD AUTO: 2.79 M/UL (ref 4.1–5.7)
RBC # BLD AUTO: 2.86 X10E6/UL (ref 4.14–5.8)
SERVICE CMNT-IMP: NORMAL
SODIUM SERPL-SCNC: 139 MMOL/L (ref 136–145)
WBC # BLD AUTO: 10 K/UL (ref 4.1–11.1)

## 2021-11-16 PROCEDURE — 36415 COLL VENOUS BLD VENIPUNCTURE: CPT

## 2021-11-16 PROCEDURE — 85027 COMPLETE CBC AUTOMATED: CPT

## 2021-11-16 PROCEDURE — 74011000258 HC RX REV CODE- 258: Performed by: INTERNAL MEDICINE

## 2021-11-16 PROCEDURE — 74011250637 HC RX REV CODE- 250/637: Performed by: INTERNAL MEDICINE

## 2021-11-16 PROCEDURE — 80048 BASIC METABOLIC PNL TOTAL CA: CPT

## 2021-11-16 PROCEDURE — 74011250636 HC RX REV CODE- 250/636: Performed by: INTERNAL MEDICINE

## 2021-11-16 PROCEDURE — 74011250637 HC RX REV CODE- 250/637: Performed by: PHYSICIAN ASSISTANT

## 2021-11-16 PROCEDURE — 74011250637 HC RX REV CODE- 250/637: Performed by: STUDENT IN AN ORGANIZED HEALTH CARE EDUCATION/TRAINING PROGRAM

## 2021-11-16 PROCEDURE — 74011000250 HC RX REV CODE- 250: Performed by: INTERNAL MEDICINE

## 2021-11-16 PROCEDURE — 94760 N-INVAS EAR/PLS OXIMETRY 1: CPT

## 2021-11-16 PROCEDURE — 74011250637 HC RX REV CODE- 250/637: Performed by: NURSE PRACTITIONER

## 2021-11-16 PROCEDURE — 65270000029 HC RM PRIVATE

## 2021-11-16 PROCEDURE — C9113 INJ PANTOPRAZOLE SODIUM, VIA: HCPCS | Performed by: INTERNAL MEDICINE

## 2021-11-16 PROCEDURE — 74011250636 HC RX REV CODE- 250/636: Performed by: STUDENT IN AN ORGANIZED HEALTH CARE EDUCATION/TRAINING PROGRAM

## 2021-11-16 RX ORDER — DICYCLOMINE HYDROCHLORIDE 10 MG/1
10 CAPSULE ORAL 4 TIMES DAILY
Status: DISCONTINUED | OUTPATIENT
Start: 2021-11-16 | End: 2021-11-19 | Stop reason: HOSPADM

## 2021-11-16 RX ADMIN — METRONIDAZOLE 500 MG: 250 TABLET ORAL at 21:29

## 2021-11-16 RX ADMIN — LACTULOSE 45 ML: 20 SOLUTION ORAL at 21:28

## 2021-11-16 RX ADMIN — DICYCLOMINE HYDROCHLORIDE 10 MG: 10 CAPSULE ORAL at 10:39

## 2021-11-16 RX ADMIN — LACTULOSE 45 ML: 20 SOLUTION ORAL at 16:34

## 2021-11-16 RX ADMIN — ONDANSETRON 4 MG: 2 INJECTION INTRAMUSCULAR; INTRAVENOUS at 07:59

## 2021-11-16 RX ADMIN — ACETAMINOPHEN 650 MG: 325 TABLET ORAL at 07:59

## 2021-11-16 RX ADMIN — DICYCLOMINE HYDROCHLORIDE 10 MG: 10 CAPSULE ORAL at 13:09

## 2021-11-16 RX ADMIN — PAROXETINE HYDROCHLORIDE 20 MG: 20 TABLET, FILM COATED ORAL at 08:00

## 2021-11-16 RX ADMIN — DOCUSATE SODIUM AND SENNOSIDES 1 TABLET: 8.6; 5 TABLET, FILM COATED ORAL at 08:02

## 2021-11-16 RX ADMIN — DICYCLOMINE HYDROCHLORIDE 10 MG: 10 CAPSULE ORAL at 21:29

## 2021-11-16 RX ADMIN — HYDROCORTISONE ACETATE 25 MG: 25 SUPPOSITORY RECTAL at 08:13

## 2021-11-16 RX ADMIN — DICYCLOMINE HYDROCHLORIDE 10 MG: 10 CAPSULE ORAL at 18:36

## 2021-11-16 RX ADMIN — HYDROCORTISONE ACETATE 25 MG: 25 SUPPOSITORY RECTAL at 22:31

## 2021-11-16 RX ADMIN — DICYCLOMINE HYDROCHLORIDE 20 MG: 20 TABLET ORAL at 02:32

## 2021-11-16 RX ADMIN — DOCUSATE SODIUM AND SENNOSIDES 1 TABLET: 8.6; 5 TABLET, FILM COATED ORAL at 18:36

## 2021-11-16 RX ADMIN — METRONIDAZOLE 500 MG: 250 TABLET ORAL at 08:00

## 2021-11-16 RX ADMIN — SODIUM CHLORIDE 10 ML: 9 INJECTION, SOLUTION INTRAMUSCULAR; INTRAVENOUS; SUBCUTANEOUS at 13:09

## 2021-11-16 RX ADMIN — CEFTRIAXONE 1 G: 1 INJECTION, POWDER, FOR SOLUTION INTRAMUSCULAR; INTRAVENOUS at 09:32

## 2021-11-16 RX ADMIN — SODIUM CHLORIDE 40 MG: 9 INJECTION INTRAMUSCULAR; INTRAVENOUS; SUBCUTANEOUS at 21:30

## 2021-11-16 RX ADMIN — I-VITE, TAB 1000-60-2MG (60/BT) 1 TABLET: TAB at 08:12

## 2021-11-16 RX ADMIN — LACTULOSE 45 ML: 20 SOLUTION ORAL at 08:13

## 2021-11-16 RX ADMIN — SODIUM CHLORIDE 40 MG: 9 INJECTION INTRAMUSCULAR; INTRAVENOUS; SUBCUTANEOUS at 08:00

## 2021-11-16 RX ADMIN — SODIUM CHLORIDE 10 ML: 9 INJECTION, SOLUTION INTRAMUSCULAR; INTRAVENOUS; SUBCUTANEOUS at 21:30

## 2021-11-16 RX ADMIN — SODIUM CHLORIDE 10 ML: 9 INJECTION, SOLUTION INTRAMUSCULAR; INTRAVENOUS; SUBCUTANEOUS at 05:35

## 2021-11-16 NOTE — PROGRESS NOTES
Interdisciplinary Rounds were completed on 11/16/21 for this patient. Rounds included nursing, clinical care leader, pharmacy, and case management. Plan of care discussed. See clinical pathway and/or care plan for interventions and desired outcomes.

## 2021-11-16 NOTE — PROGRESS NOTES
1300- brock catheter removed    1500- End of Shift Note    Bedside shift change report given to Nav lozada (oncoming nurse) by Melanie Calzada (offgoing nurse). Report included the following information SBAR, Kardex, Procedure Summary, MAR, Accordion and Recent Results    Shift worked:  7-3p     Shift summary and any significant changes:     New IV, schedule Bentyl started. NPO at midnight for gastric emptying study tomorrow. Brock discontinued.       Concerns for physician to address:  none     Zone phone for oncoming shift:   [phone]

## 2021-11-16 NOTE — PROGRESS NOTES
End of Shift Note    Bedside shift change report given to Ham Hawkins (oncoming nurse) by Simba Baum (offgoing nurse). Report included the following information SBAR, Kardex, Intake/Output, Accordion and Recent Results    Shift worked:  7-7p     Shift summary and any significant changes:     colonoscopy, Xrays and ab Ultrasounds completed. Concerns for physician to address: GI Emptying study?       Zone phone for oncoming shift:   [phone]

## 2021-11-16 NOTE — PROGRESS NOTES
End of Shift Note    Bedside shift change report given to Anthony (oncoming nurse) by Katelyn Thomas (offgoing nurse). Report included the following information SBAR, Kardex, Intake/Output, MAR and Recent Results    Shift worked:  [id number]     Shift summary and any significant changes:     No significant changes. Stool occurrence overnight x2 with small/moderate amount of blood. C/o pain overnight, Tylenol/Bentyl given with minimal relief noted.      Concerns for physician to address:  pain management     Zone phone for oncoming shift:            Katelyn Thomas
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
## 2021-11-17 ENCOUNTER — HOSPITAL ENCOUNTER (INPATIENT)
Dept: NUCLEAR MEDICINE | Age: 41
Discharge: HOME | DRG: 254 | End: 2021-11-17
Attending: PHYSICIAN ASSISTANT
Payer: MEDICAID

## 2021-11-17 LAB
ANION GAP SERPL CALC-SCNC: 7 MMOL/L (ref 5–15)
BUN SERPL-MCNC: 8 MG/DL (ref 6–20)
BUN/CREAT SERPL: 11 (ref 12–20)
CALCIUM SERPL-MCNC: 7.9 MG/DL (ref 8.5–10.1)
CHLORIDE SERPL-SCNC: 107 MMOL/L (ref 97–108)
CO2 SERPL-SCNC: 23 MMOL/L (ref 21–32)
CREAT SERPL-MCNC: 0.74 MG/DL (ref 0.7–1.3)
ERYTHROCYTE [DISTWIDTH] IN BLOOD BY AUTOMATED COUNT: 20.6 % (ref 11.5–14.5)
GLUCOSE SERPL-MCNC: 103 MG/DL (ref 65–100)
HCT VFR BLD AUTO: 29.6 % (ref 36.6–50.3)
HGB BLD-MCNC: 9.3 G/DL (ref 12.1–17)
MCH RBC QN AUTO: 31.5 PG (ref 26–34)
MCHC RBC AUTO-ENTMCNC: 31.4 G/DL (ref 30–36.5)
MCV RBC AUTO: 100.3 FL (ref 80–99)
NRBC # BLD: 0.06 K/UL (ref 0–0.01)
NRBC BLD-RTO: 0.6 PER 100 WBC
PLATELET # BLD AUTO: 384 K/UL (ref 150–400)
PMV BLD AUTO: 9.6 FL (ref 8.9–12.9)
POTASSIUM SERPL-SCNC: 3.3 MMOL/L (ref 3.5–5.1)
RBC # BLD AUTO: 2.95 M/UL (ref 4.1–5.7)
SODIUM SERPL-SCNC: 137 MMOL/L (ref 136–145)
WBC # BLD AUTO: 10 K/UL (ref 4.1–11.1)
ZINC SERPL-MCNC: 122 UG/DL (ref 44–115)

## 2021-11-17 PROCEDURE — 94760 N-INVAS EAR/PLS OXIMETRY 1: CPT

## 2021-11-17 PROCEDURE — 74011250637 HC RX REV CODE- 250/637: Performed by: INTERNAL MEDICINE

## 2021-11-17 PROCEDURE — 74011250636 HC RX REV CODE- 250/636: Performed by: INTERNAL MEDICINE

## 2021-11-17 PROCEDURE — 85027 COMPLETE CBC AUTOMATED: CPT

## 2021-11-17 PROCEDURE — 74011000250 HC RX REV CODE- 250: Performed by: STUDENT IN AN ORGANIZED HEALTH CARE EDUCATION/TRAINING PROGRAM

## 2021-11-17 PROCEDURE — 74011000250 HC RX REV CODE- 250: Performed by: INTERNAL MEDICINE

## 2021-11-17 PROCEDURE — 36415 COLL VENOUS BLD VENIPUNCTURE: CPT

## 2021-11-17 PROCEDURE — 78264 GASTRIC EMPTYING IMG STUDY: CPT

## 2021-11-17 PROCEDURE — 74011000258 HC RX REV CODE- 258: Performed by: INTERNAL MEDICINE

## 2021-11-17 PROCEDURE — 65270000029 HC RM PRIVATE

## 2021-11-17 PROCEDURE — C9113 INJ PANTOPRAZOLE SODIUM, VIA: HCPCS | Performed by: INTERNAL MEDICINE

## 2021-11-17 PROCEDURE — 74011250637 HC RX REV CODE- 250/637: Performed by: NURSE PRACTITIONER

## 2021-11-17 PROCEDURE — 74011250637 HC RX REV CODE- 250/637: Performed by: STUDENT IN AN ORGANIZED HEALTH CARE EDUCATION/TRAINING PROGRAM

## 2021-11-17 PROCEDURE — 74011250637 HC RX REV CODE- 250/637: Performed by: PHYSICIAN ASSISTANT

## 2021-11-17 PROCEDURE — 80048 BASIC METABOLIC PNL TOTAL CA: CPT

## 2021-11-17 RX ORDER — TECHNETIUM TC 99M SULFUR COLLOID 2 MG
0.5 KIT MISCELLANEOUS
Status: COMPLETED | OUTPATIENT
Start: 2021-11-17 | End: 2021-11-17

## 2021-11-17 RX ADMIN — TECHNETIUM TC 99M SULFUR COLLOID 0.5 MILLICURIE: KIT at 11:15

## 2021-11-17 RX ADMIN — METRONIDAZOLE 500 MG: 250 TABLET ORAL at 21:33

## 2021-11-17 RX ADMIN — DICYCLOMINE HYDROCHLORIDE 10 MG: 10 CAPSULE ORAL at 09:11

## 2021-11-17 RX ADMIN — DOCUSATE SODIUM AND SENNOSIDES 1 TABLET: 8.6; 5 TABLET, FILM COATED ORAL at 17:03

## 2021-11-17 RX ADMIN — SODIUM CHLORIDE 40 MG: 9 INJECTION INTRAMUSCULAR; INTRAVENOUS; SUBCUTANEOUS at 21:34

## 2021-11-17 RX ADMIN — CEFTRIAXONE 1 G: 1 INJECTION, POWDER, FOR SOLUTION INTRAMUSCULAR; INTRAVENOUS at 09:03

## 2021-11-17 RX ADMIN — DOCUSATE SODIUM AND SENNOSIDES 1 TABLET: 8.6; 5 TABLET, FILM COATED ORAL at 09:11

## 2021-11-17 RX ADMIN — HYDROCORTISONE ACETATE 25 MG: 25 SUPPOSITORY RECTAL at 09:29

## 2021-11-17 RX ADMIN — SODIUM CHLORIDE 5 ML: 9 INJECTION, SOLUTION INTRAMUSCULAR; INTRAVENOUS; SUBCUTANEOUS at 21:34

## 2021-11-17 RX ADMIN — SODIUM CHLORIDE 40 MG: 9 INJECTION INTRAMUSCULAR; INTRAVENOUS; SUBCUTANEOUS at 09:13

## 2021-11-17 RX ADMIN — PAROXETINE HYDROCHLORIDE 20 MG: 20 TABLET, FILM COATED ORAL at 09:11

## 2021-11-17 RX ADMIN — I-VITE, TAB 1000-60-2MG (60/BT) 1 TABLET: TAB at 09:28

## 2021-11-17 RX ADMIN — DICYCLOMINE HYDROCHLORIDE 10 MG: 10 CAPSULE ORAL at 17:03

## 2021-11-17 RX ADMIN — LACTULOSE 45 ML: 20 SOLUTION ORAL at 21:34

## 2021-11-17 RX ADMIN — METRONIDAZOLE 500 MG: 250 TABLET ORAL at 09:10

## 2021-11-17 RX ADMIN — LACTULOSE 45 ML: 20 SOLUTION ORAL at 17:03

## 2021-11-17 RX ADMIN — DICYCLOMINE HYDROCHLORIDE 10 MG: 10 CAPSULE ORAL at 14:13

## 2021-11-17 RX ADMIN — DICYCLOMINE HYDROCHLORIDE 10 MG: 10 CAPSULE ORAL at 21:33

## 2021-11-17 RX ADMIN — SODIUM CHLORIDE 10 ML: 9 INJECTION, SOLUTION INTRAMUSCULAR; INTRAVENOUS; SUBCUTANEOUS at 14:14
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
## 2021-11-17 NOTE — PROGRESS NOTES
Hospitalist Progress Note    NAME: Qasim Hunter   :  1980   MRN:  [id number]   Room Number:  2141/01  @ [facility]     Assessment / Plan:  Anticipated discharge date :   Anticipated disposition : Home  Barriers to discharge : Medical stability     Abdominal pain POA due to   Severe constipation POA  Unclear etiology of severe constipation - could be neurological, function. noted. CT abdomen shows markedly distended small and large bowel with large stool burden, mild right hydroureter and hydronephrosis. - GI consulted, status post EGD and colonoscopy today  Colonoscopy on October 15 mild to distal ascending colon has edematous appearing, the mucosa of the colon is normal appearing, large irritated oozing internal hemorrhoids likely source of the bleed  EGD with with gastric mucosa erythema in the antrum/body status post biopsy  - BID PPI  - GI following plans for gastric emptying study tomorrow  KUB with no changes from before    Severe macrocytic anemia POA  - s/p 1 unit PRBC transfusion. Repeat labs today, transfuse for hgb<7  - Hematology consult, additional labs hgb electrophresis and g6pd pending. Iron panel within normal limits       SAE  Resolved     UTI  Hydronephrosis, with urinary retention  Urine +ve for trichomonas  - F/u urine cultures growing strep, blood culture has been negative today  - cont' IV rocephin, pt reports difficulty with urination prior to brock placement  - Metronidazole PO x 7 days for trichomonas   -urology consulted     Code Status: Full  Surrogate Decision Maker:   DVT Prophylaxis: SCD  GI Prophylaxis: not indicated     Baseline: Independent             Subjective:     Patient was seen and examined. No acute events overnight. Discussed with RN overnight events. All patient's questions were answered.     \"Still having pain in my belly\"    Review of Systems:  Symptom Y/N Comments  Symptom Y/N Comments   Fever/Chills n   Chest Pain n Poor Appetite    Edema     Cough n   Abdominal Pain y    Sputum    Joint Pain     SOB/GASTON n   Pruritis/Rash     Nausea/vomit n   Tolerating PT/OT     Diarrhea    Tolerating Diet y    Constipation    Other       Could NOT obtain due to:            Objective:     VITALS:   Last 24hrs VS reviewed since prior progress note. Most recent are:  Patient Vitals for the past 24 hrs:   Temp Pulse Resp BP SpO2   11/17/21 0740 97.9 °F (36.6 °C) 71 16 (!) 151/93 100 %   11/17/21 0429 98.1 °F (36.7 °C) 74 16 (!) 165/107 98 %   11/16/21 2327 98.1 °F (36.7 °C) 75 16 (!) 144/109 98 %   11/16/21 1928 98.1 °F (36.7 °C) 72 16 (!) 146/96 98 %   11/16/21 1536 98.7 °F (37.1 °C) 74 16 (!) 153/109 99 %       Intake/Output Summary (Last 24 hours) at 11/17/2021 1201  Last data filed at 11/17/2021 0631  Gross per 24 hour   Intake 500 ml   Output 600 ml   Net -100 ml        PHYSICAL EXAM:  General: WD, WN. Alert, cooperative, no acute distress    EENT:  EOMI. Anicteric sclerae. MMM  Resp:  CTA bilaterally, no wheezing or rales. No accessory muscle use  CV:  Regular  rhythm,  normal S1/S2, no murmurs rubs gallops, No edema  GI:  Soft, distended, TTP diffusely,  hypoactive Bowel sounds  Neurologic:  Alert and oriented X 3, normal speech,   Psych:   Good insight. Not anxious nor agitated  Skin:  No rashes. No jaundice    Reviewed most current lab test results and cultures  YES  Reviewed most current radiology test results   YES  Review and summation of old records today    NO  Reviewed patient's current orders and MAR    YES  PMH/SH reviewed - no change compared to H&P  ________________________________________________________________________  Care Plan discussed with:    Comments   Patient x    Family      RN x    Care Manager     Consultant                       x Multidiciplinary team rounds were held today with , nursing, pharmacist and clinical coordinator.   Patient's plan of care was discussed; medications were reviewed and discharge planning was addressed. ________________________________________________________________________  Total NON critical care TIME:  35   Minutes    Total CRITICAL CARE TIME Spent:   Minutes non procedure based      Comments   >50% of visit spent in counseling and coordination of care x    ________________________________________________________________________  Brennen Dial MD     Procedures: see electronic medical records for all procedures/Xrays and details which were not copied into this note but were reviewed prior to creation of Plan. LABS:  I reviewed today's most current labs and imaging studies.   Pertinent labs include:  Recent Labs     11/17/21 0435 11/16/21  0936 11/15/21  0117   WBC 10.0 10.0 9.6   HGB 9.3* 9.0* 8.4*   HCT 29.6* 27.9* 26.9*    360 370     Recent Labs     11/17/21 0435 11/16/21  0936 11/15/21  0117    139 138   K 3.3* 3.4* 3.2*    107 107   CO2 23 23 24   * 141* 111*   BUN 8 9 7   CREA 0.74 0.77 0.83   CA 7.9* 7.7* 8.1*       Signed: Brennen Dial MD

## 2021-11-18 ENCOUNTER — APPOINTMENT (OUTPATIENT)
Dept: GENERAL RADIOLOGY | Age: 41
DRG: 254 | End: 2021-11-18
Attending: INTERNAL MEDICINE
Payer: MEDICAID

## 2021-11-18 LAB
ALBUMIN SERPL-MCNC: 2.8 G/DL (ref 3.5–5)
ALBUMIN/GLOB SERPL: 0.9 {RATIO} (ref 1.1–2.2)
ALP SERPL-CCNC: 64 U/L (ref 45–117)
ALT SERPL-CCNC: 49 U/L (ref 12–78)
ANION GAP SERPL CALC-SCNC: 9 MMOL/L (ref 5–15)
AST SERPL-CCNC: 34 U/L (ref 15–37)
BASOPHILS # BLD: 0 K/UL (ref 0–0.1)
BASOPHILS NFR BLD: 0 % (ref 0–1)
BILIRUB SERPL-MCNC: 1.5 MG/DL (ref 0.2–1)
BUN SERPL-MCNC: 10 MG/DL (ref 6–20)
BUN/CREAT SERPL: 15 (ref 12–20)
CALCIUM SERPL-MCNC: 8.6 MG/DL (ref 8.5–10.1)
CHLORIDE SERPL-SCNC: 104 MMOL/L (ref 97–108)
CO2 SERPL-SCNC: 24 MMOL/L (ref 21–32)
COPPER SERPL-MCNC: 32 UG/DL (ref 69–132)
CREAT SERPL-MCNC: 0.68 MG/DL (ref 0.7–1.3)
DIFFERENTIAL METHOD BLD: ABNORMAL
EOSINOPHIL # BLD: 0.1 K/UL (ref 0–0.4)
EOSINOPHIL NFR BLD: 0 % (ref 0–7)
ERYTHROCYTE [DISTWIDTH] IN BLOOD BY AUTOMATED COUNT: 19.5 % (ref 11.5–14.5)
GLOBULIN SER CALC-MCNC: 3.1 G/DL (ref 2–4)
GLUCOSE SERPL-MCNC: 103 MG/DL (ref 65–100)
HCT VFR BLD AUTO: 30.9 % (ref 36.6–50.3)
HGB BLD-MCNC: 9.9 G/DL (ref 12.1–17)
IMM GRANULOCYTES # BLD AUTO: 0.1 K/UL (ref 0–0.04)
IMM GRANULOCYTES NFR BLD AUTO: 1 % (ref 0–0.5)
LYMPHOCYTES # BLD: 1.3 K/UL (ref 0.8–3.5)
LYMPHOCYTES NFR BLD: 11 % (ref 12–49)
MCH RBC QN AUTO: 31.9 PG (ref 26–34)
MCHC RBC AUTO-ENTMCNC: 32 G/DL (ref 30–36.5)
MCV RBC AUTO: 99.7 FL (ref 80–99)
MONOCYTES # BLD: 0.7 K/UL (ref 0–1)
MONOCYTES NFR BLD: 6 % (ref 5–13)
NEUTS SEG # BLD: 9.4 K/UL (ref 1.8–8)
NEUTS SEG NFR BLD: 82 % (ref 32–75)
NRBC # BLD: 0.02 K/UL (ref 0–0.01)
NRBC BLD-RTO: 0.2 PER 100 WBC
PLATELET # BLD AUTO: 466 K/UL (ref 150–400)
PMV BLD AUTO: 9.5 FL (ref 8.9–12.9)
POTASSIUM SERPL-SCNC: 3.4 MMOL/L (ref 3.5–5.1)
PROT SERPL-MCNC: 5.9 G/DL (ref 6.4–8.2)
RBC # BLD AUTO: 3.1 M/UL (ref 4.1–5.7)
SODIUM SERPL-SCNC: 137 MMOL/L (ref 136–145)
WBC # BLD AUTO: 11.6 K/UL (ref 4.1–11.1)

## 2021-11-18 PROCEDURE — 74011250637 HC RX REV CODE- 250/637: Performed by: INTERNAL MEDICINE

## 2021-11-18 PROCEDURE — 74011250637 HC RX REV CODE- 250/637: Performed by: STUDENT IN AN ORGANIZED HEALTH CARE EDUCATION/TRAINING PROGRAM

## 2021-11-18 PROCEDURE — 74011000258 HC RX REV CODE- 258: Performed by: INTERNAL MEDICINE

## 2021-11-18 PROCEDURE — 74011250637 HC RX REV CODE- 250/637: Performed by: PHYSICIAN ASSISTANT

## 2021-11-18 PROCEDURE — 74011000250 HC RX REV CODE- 250: Performed by: INTERNAL MEDICINE

## 2021-11-18 PROCEDURE — 36415 COLL VENOUS BLD VENIPUNCTURE: CPT

## 2021-11-18 PROCEDURE — 94760 N-INVAS EAR/PLS OXIMETRY 1: CPT

## 2021-11-18 PROCEDURE — 74011250636 HC RX REV CODE- 250/636: Performed by: STUDENT IN AN ORGANIZED HEALTH CARE EDUCATION/TRAINING PROGRAM

## 2021-11-18 PROCEDURE — C9113 INJ PANTOPRAZOLE SODIUM, VIA: HCPCS | Performed by: INTERNAL MEDICINE

## 2021-11-18 PROCEDURE — 74011250637 HC RX REV CODE- 250/637: Performed by: SPECIALIST

## 2021-11-18 PROCEDURE — 74011250636 HC RX REV CODE- 250/636: Performed by: INTERNAL MEDICINE

## 2021-11-18 PROCEDURE — 80053 COMPREHEN METABOLIC PANEL: CPT

## 2021-11-18 PROCEDURE — 85025 COMPLETE CBC W/AUTO DIFF WBC: CPT

## 2021-11-18 PROCEDURE — 74022 RADEX COMPL AQT ABD SERIES: CPT

## 2021-11-18 PROCEDURE — 65270000029 HC RM PRIVATE

## 2021-11-18 RX ORDER — CALCIUM CARBONATE/VITAMIN D3 500-10/5ML
2 LIQUID (ML) ORAL DAILY
Qty: 120 CAPSULE | Refills: 0 | Status: SHIPPED
Start: 2021-11-18 | End: 2021-11-19 | Stop reason: SDUPTHER

## 2021-11-18 RX ORDER — DIPHENHYDRAMINE HCL 25 MG
25 CAPSULE ORAL ONCE
Status: COMPLETED | OUTPATIENT
Start: 2021-11-18 | End: 2021-11-18

## 2021-11-18 RX ORDER — ACETAMINOPHEN 500 MG
1000 TABLET ORAL ONCE
Status: COMPLETED | OUTPATIENT
Start: 2021-11-18 | End: 2021-11-18

## 2021-11-18 RX ORDER — EPINEPHRINE 1 MG/ML
0.3 INJECTION, SOLUTION, CONCENTRATE INTRAVENOUS
Status: DISCONTINUED | OUTPATIENT
Start: 2021-11-18 | End: 2021-11-19 | Stop reason: HOSPADM

## 2021-11-18 RX ORDER — PANTOPRAZOLE SODIUM 40 MG/1
40 TABLET, DELAYED RELEASE ORAL
Status: DISCONTINUED | OUTPATIENT
Start: 2021-11-19 | End: 2021-11-19 | Stop reason: HOSPADM

## 2021-11-18 RX ORDER — DIPHENHYDRAMINE HYDROCHLORIDE 50 MG/ML
50 INJECTION, SOLUTION INTRAMUSCULAR; INTRAVENOUS
Status: ACTIVE | OUTPATIENT
Start: 2021-11-18 | End: 2021-11-19

## 2021-11-18 RX ADMIN — CUPRIC CHLORIDE: 0.4 INJECTION, SOLUTION INTRAVENOUS at 14:28

## 2021-11-18 RX ADMIN — DICYCLOMINE HYDROCHLORIDE 10 MG: 10 CAPSULE ORAL at 12:33

## 2021-11-18 RX ADMIN — ACETAMINOPHEN 1000 MG: 500 TABLET, FILM COATED ORAL at 14:26

## 2021-11-18 RX ADMIN — DICYCLOMINE HYDROCHLORIDE 10 MG: 10 CAPSULE ORAL at 21:14

## 2021-11-18 RX ADMIN — DICYCLOMINE HYDROCHLORIDE 10 MG: 10 CAPSULE ORAL at 17:46

## 2021-11-18 RX ADMIN — SODIUM CHLORIDE 10 ML: 9 INJECTION, SOLUTION INTRAMUSCULAR; INTRAVENOUS; SUBCUTANEOUS at 21:14

## 2021-11-18 RX ADMIN — SODIUM CHLORIDE 5 ML: 9 INJECTION, SOLUTION INTRAMUSCULAR; INTRAVENOUS; SUBCUTANEOUS at 06:26

## 2021-11-18 RX ADMIN — SODIUM CHLORIDE 40 MG: 9 INJECTION INTRAMUSCULAR; INTRAVENOUS; SUBCUTANEOUS at 09:15

## 2021-11-18 RX ADMIN — DICYCLOMINE HYDROCHLORIDE 10 MG: 10 CAPSULE ORAL at 09:14

## 2021-11-18 RX ADMIN — METRONIDAZOLE 500 MG: 250 TABLET ORAL at 21:14

## 2021-11-18 RX ADMIN — PAROXETINE HYDROCHLORIDE 20 MG: 20 TABLET, FILM COATED ORAL at 09:14

## 2021-11-18 RX ADMIN — ONDANSETRON 4 MG: 2 INJECTION INTRAMUSCULAR; INTRAVENOUS at 12:34

## 2021-11-18 RX ADMIN — I-VITE, TAB 1000-60-2MG (60/BT) 1 TABLET: TAB at 09:15

## 2021-11-18 RX ADMIN — METRONIDAZOLE 500 MG: 250 TABLET ORAL at 09:15

## 2021-11-18 RX ADMIN — DIPHENHYDRAMINE HYDROCHLORIDE 25 MG: 25 CAPSULE ORAL at 14:27

## 2021-11-18 RX ADMIN — SODIUM CHLORIDE 10 ML: 9 INJECTION, SOLUTION INTRAMUSCULAR; INTRAVENOUS; SUBCUTANEOUS at 14:00

## 2021-11-18 RX ADMIN — LINACLOTIDE 290 MCG: 290 CAPSULE, GELATIN COATED ORAL at 09:15
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
## 2021-11-18 NOTE — PROGRESS NOTES
Bedside shift change report given to [employer] (oncoming nurse) by Meaghan Rivas RN (offgoing nurse). Report included the following information SBAR, Kardex, Intake/Output, MAR and Recent Results    Shift worked: 7am-7pm     Shift summary and any significant changes:    Patient vomited a couple times today. He received the copper fluids this afternoon. No other significant changes noted. Possible d/c tomorrow.       Concerns for physician to address: N/A     Zone phone for oncoming shift:  N/A
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
## 2021-11-18 NOTE — PROGRESS NOTES
Addendum:    Discussed case with Dr. Nicole Banda.   Will infuse 2 g IV copper in 100 ml of NS over 120 minutes  Will order acetaminophen 1g PO and diphenhydramine 25 mg PO x 1   Subsequent copper replacement to be done either in infusion center or via oral route if infusion center is not feasible. Copper Repletion Recommendations      Recommend 24 mg/day of intravenous copper for 5-6 days followed by 38 mg/day of oral copper until levels normalize. Will order copper from [facility] and infuse over 2 hours as soon as it arrives from [facility]. Thanks,  Yamil Diego, PHARMD    References:  EDWARD Barber., Alber, T.H., Stacey, K.RHYS. et al. Copper deficiency anemia: review article. Neeta Hematol 97, 68600195 (2018). [email or web address]://[email or web address]/[email or web address]    LAILA Pang Copper deficiency myeloneuropathy.  In: Beverley Preciado (Reyes Scudder), [place], [place]. Aurelia Push on November 18, 2021)
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
## 2021-11-18 NOTE — PROGRESS NOTES
CM notified of Pt discharging today. CM offered to arrange New PCP appointment. Pt politely declined at this time. CM provided Pt with [address] PCP listing. [facility] follow-up has been arranged and added to AVS.    Pt was notified of upcoming appt. No further discharge needs indicated at this time. Pt is cleared from CM standpoint. Transition of Care Plan:    RUR: 10% low  Disposition: home with mother  Follow up appointments:PCP and specialist   DME needed: not indicated at this time   Transportation at Discharge: Parent  Keys or means to access home:  Pt has access to home       IM Medicare Letter: N/a  Is patient a BCPI-A Bundle:  N/a         If yes, was Bundle Letter given?:     Caregiver Contact: Sharmin Frazier: [phone]  Discharge Caregiver contacted prior to discharge?  Pt stated that he would contact               Abe Lux, [facility], [address] [facility] [place]

## 2021-11-18 NOTE — PROGRESS NOTES
Bedside shift change report given to [employer] (oncoming nurse) by Sadie Monsivais RN (offgoing nurse). Report included the following information SBAR, Kardex, Intake/Output, MAR and Recent Results    Shift worked:  7am-7pm     Shift summary and any significant changes:    No significant changes noted today. Patient had his gastric emptying study completed today.       Concerns for physician to address: N/A      Zone phone for oncoming shift:
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
## 2021-11-18 NOTE — PROGRESS NOTES
Hospitalist Progress Note    NAME: Mortimer Saha   :  1980   MRN:  [id number]   Room Number:  2141/01  @ [facility]     Assessment / Plan:    Abdominal pain POA due to   Severe constipation POA  Delayed gastric emptying POA  Unclear etiology of severe constipation - could be neurological, function. noted. CT abdomen shows markedly distended small and large bowel with large stool burden, mild right hydroureter and hydronephrosis. - GI consulted, status post EGD and colonoscopy today  Colonoscopy on October 15 mild to distal ascending colon has edematous appearing, the mucosa of the colon is normal appearing, large irritated oozing internal hemorrhoids likely source of the bleed  EGD with with gastric mucosa erythema in the antrum/body status post biopsy  - BID PPI  - GI following plans for gastric emptying study tomorrow  KUB with no changes from before    Severe macrocytic anemia POA  - s/p 1 unit PRBC transfusion. Repeat labs today, transfuse for hgb<7  - Hematology consult, additional labs hgb electrophresis and g6pd pending. Iron panel within normal limits       SAE  Resolved     UTI  Hydronephrosis, with urinary retention  Urine +ve for trichomonas  - F/u urine cultures growing strep, blood culture has been negative today  - cont' IV rocephin, pt reports difficulty with urination prior to brock placement  - Metronidazole PO x 7 days for trichomonas   -urology consulted  - repeat US to see if hydronephrosis     Code Status: Full  Surrogate Decision Maker:   DVT Prophylaxis: SCD  GI Prophylaxis: not indicated     Baseline: Independent             Subjective:     Patient was seen and examined. No acute events overnight. Discussed with RN overnight events. All patient's questions were answered.     \"Still having pain in my belly\"    Review of Systems:  Symptom Y/N Comments  Symptom Y/N Comments   Fever/Chills n   Chest Pain n    Poor Appetite    Edema     Cough n Abdominal Pain y    Sputum    Joint Pain     SOB/GASTON n   Pruritis/Rash     Nausea/vomit n   Tolerating PT/OT     Diarrhea    Tolerating Diet y    Constipation    Other       Could NOT obtain due to:            Objective:     VITALS:   Last 24hrs VS reviewed since prior progress note. Most recent are:  Patient Vitals for the past 24 hrs:   Temp Pulse Resp BP SpO2   11/17/21 1958 98.7 °F (37.1 °C) 73 17 (!) 157/103 98 %   11/17/21 1512 98.9 °F (37.2 °C) 75 16 (!) 157/97 99 %   11/17/21 0740 97.9 °F (36.6 °C) 71 16 (!) 151/93 100 %   11/17/21 0429 98.1 °F (36.7 °C) 74 16 (!) 165/107 98 %   11/16/21 2327 98.1 °F (36.7 °C) 75 16 (!) 144/109 98 %       Intake/Output Summary (Last 24 hours) at 11/17/2021 2253  Last data filed at 11/17/2021 2141  Gross per 24 hour   Intake    Output 400 ml   Net -400 ml        PHYSICAL EXAM:  General: WD, WN. Alert, cooperative, no acute distress    EENT:  EOMI. Anicteric sclerae. MMM  Resp:  CTA bilaterally, no wheezing or rales. No accessory muscle use  CV:  Regular  rhythm,  normal S1/S2, no murmurs rubs gallops, No edema  GI:  Soft, distended, TTP diffusely,  hypoactive Bowel sounds  Neurologic:  Alert and oriented X 3, normal speech,   Psych:   Good insight. Not anxious nor agitated  Skin:  No rashes. No jaundice    Reviewed most current lab test results and cultures  YES  Reviewed most current radiology test results   YES  Review and summation of old records today    NO  Reviewed patient's current orders and MAR    YES  PMH/SH reviewed - no change compared to H&P  ________________________________________________________________________  Care Plan discussed with:    Comments   Patient x    Family      RN x    Care Manager     Consultant                       x Multidiciplinary team rounds were held today with , nursing, pharmacist and clinical coordinator. Patient's plan of care was discussed; medications were reviewed and discharge planning was addressed. ________________________________________________________________________  Total NON critical care TIME:  35   Minutes    Total CRITICAL CARE TIME Spent:   Minutes non procedure based      Comments   >50% of visit spent in counseling and coordination of care x    ________________________________________________________________________  Jelly Yeung MD     Procedures: see electronic medical records for all procedures/Xrays and details which were not copied into this note but were reviewed prior to creation of Plan. LABS:  I reviewed today's most current labs and imaging studies.   Pertinent labs include:  Recent Labs     11/17/21 0435 11/16/21 0936 11/15/21  0117   WBC 10.0 10.0 9.6   HGB 9.3* 9.0* 8.4*   HCT 29.6* 27.9* 26.9*    360 370     Recent Labs     11/17/21 0435 11/16/21  0936 11/15/21  0117    139 138   K 3.3* 3.4* 3.2*    107 107   CO2 23 23 24   * 141* 111*   BUN 8 9 7   CREA 0.74 0.77 0.83   CA 7.9* 7.7* 8.1*       Signed: Jelly Yeung MD

## 2021-11-19 VITALS
RESPIRATION RATE: 20 BRPM | WEIGHT: 152 LBS | SYSTOLIC BLOOD PRESSURE: 173 MMHG | TEMPERATURE: 99.1 F | OXYGEN SATURATION: 100 % | HEIGHT: 69 IN | BODY MASS INDEX: 22.51 KG/M2 | DIASTOLIC BLOOD PRESSURE: 117 MMHG | HEART RATE: 77 BPM

## 2021-11-19 LAB
ALBUMIN SERPL-MCNC: 3.4 G/DL (ref 3.5–5)
ALBUMIN/GLOB SERPL: 1.1 {RATIO} (ref 1.1–2.2)
ALP SERPL-CCNC: 74 U/L (ref 45–117)
ALT SERPL-CCNC: 61 U/L (ref 12–78)
ANION GAP SERPL CALC-SCNC: 11 MMOL/L (ref 5–15)
AST SERPL-CCNC: 44 U/L (ref 15–37)
BASOPHILS # BLD: 0 K/UL (ref 0–0.1)
BASOPHILS NFR BLD: 0 % (ref 0–1)
BILIRUB SERPL-MCNC: 1.8 MG/DL (ref 0.2–1)
BUN SERPL-MCNC: 14 MG/DL (ref 6–20)
BUN/CREAT SERPL: 18 (ref 12–20)
CALCIUM SERPL-MCNC: 8.9 MG/DL (ref 8.5–10.1)
CHLORIDE SERPL-SCNC: 102 MMOL/L (ref 97–108)
CO2 SERPL-SCNC: 21 MMOL/L (ref 21–32)
CREAT SERPL-MCNC: 0.8 MG/DL (ref 0.7–1.3)
DIFFERENTIAL METHOD BLD: ABNORMAL
EOSINOPHIL # BLD: 0 K/UL (ref 0–0.4)
EOSINOPHIL NFR BLD: 0 % (ref 0–7)
ERYTHROCYTE [DISTWIDTH] IN BLOOD BY AUTOMATED COUNT: 18.8 % (ref 11.5–14.5)
GLOBULIN SER CALC-MCNC: 3.2 G/DL (ref 2–4)
GLUCOSE SERPL-MCNC: 89 MG/DL (ref 65–100)
HCT VFR BLD AUTO: 34.3 % (ref 36.6–50.3)
HGB BLD-MCNC: 10.8 G/DL (ref 12.1–17)
IMM GRANULOCYTES # BLD AUTO: 0.1 K/UL (ref 0–0.04)
IMM GRANULOCYTES NFR BLD AUTO: 1 % (ref 0–0.5)
LACTATE SERPL-SCNC: 0.6 MMOL/L (ref 0.4–2)
LIPASE SERPL-CCNC: 86 U/L (ref 73–393)
LYMPHOCYTES # BLD: 1.4 K/UL (ref 0.8–3.5)
LYMPHOCYTES NFR BLD: 12 % (ref 12–49)
MCH RBC QN AUTO: 31.8 PG (ref 26–34)
MCHC RBC AUTO-ENTMCNC: 31.5 G/DL (ref 30–36.5)
MCV RBC AUTO: 100.9 FL (ref 80–99)
MONOCYTES # BLD: 0.6 K/UL (ref 0–1)
MONOCYTES NFR BLD: 6 % (ref 5–13)
NEUTS SEG # BLD: 8.8 K/UL (ref 1.8–8)
NEUTS SEG NFR BLD: 81 % (ref 32–75)
NRBC # BLD: 0 K/UL (ref 0–0.01)
NRBC BLD-RTO: 0 PER 100 WBC
PLATELET # BLD AUTO: 581 K/UL (ref 150–400)
PMV BLD AUTO: 9.7 FL (ref 8.9–12.9)
POTASSIUM SERPL-SCNC: 3.5 MMOL/L (ref 3.5–5.1)
PROT SERPL-MCNC: 6.6 G/DL (ref 6.4–8.2)
RBC # BLD AUTO: 3.4 M/UL (ref 4.1–5.7)
SODIUM SERPL-SCNC: 134 MMOL/L (ref 136–145)
WBC # BLD AUTO: 10.9 K/UL (ref 4.1–11.1)

## 2021-11-19 PROCEDURE — 80053 COMPREHEN METABOLIC PANEL: CPT

## 2021-11-19 PROCEDURE — 74011250636 HC RX REV CODE- 250/636: Performed by: STUDENT IN AN ORGANIZED HEALTH CARE EDUCATION/TRAINING PROGRAM

## 2021-11-19 PROCEDURE — 74011250637 HC RX REV CODE- 250/637: Performed by: INTERNAL MEDICINE

## 2021-11-19 PROCEDURE — 85025 COMPLETE CBC W/AUTO DIFF WBC: CPT

## 2021-11-19 PROCEDURE — 74011250637 HC RX REV CODE- 250/637: Performed by: STUDENT IN AN ORGANIZED HEALTH CARE EDUCATION/TRAINING PROGRAM

## 2021-11-19 PROCEDURE — 74011250637 HC RX REV CODE- 250/637: Performed by: PHYSICIAN ASSISTANT

## 2021-11-19 PROCEDURE — 94760 N-INVAS EAR/PLS OXIMETRY 1: CPT

## 2021-11-19 PROCEDURE — 74011000250 HC RX REV CODE- 250: Performed by: INTERNAL MEDICINE

## 2021-11-19 PROCEDURE — 74011000258 HC RX REV CODE- 258: Performed by: INTERNAL MEDICINE

## 2021-11-19 PROCEDURE — 83690 ASSAY OF LIPASE: CPT

## 2021-11-19 PROCEDURE — 36415 COLL VENOUS BLD VENIPUNCTURE: CPT

## 2021-11-19 PROCEDURE — 2709999900 HC NON-CHARGEABLE SUPPLY

## 2021-11-19 PROCEDURE — 74011250637 HC RX REV CODE- 250/637: Performed by: SPECIALIST

## 2021-11-19 PROCEDURE — 83605 ASSAY OF LACTIC ACID: CPT

## 2021-11-19 RX ORDER — AMLODIPINE BESYLATE 5 MG/1
5 TABLET ORAL ONCE
Status: COMPLETED | OUTPATIENT
Start: 2021-11-19 | End: 2021-11-19

## 2021-11-19 RX ORDER — ACETAMINOPHEN 500 MG
1000 TABLET ORAL ONCE
Status: COMPLETED | OUTPATIENT
Start: 2021-11-19 | End: 2021-11-19

## 2021-11-19 RX ORDER — CALCIUM CARBONATE/VITAMIN D3 500-10/5ML
2 LIQUID (ML) ORAL DAILY
Qty: 120 CAPSULE | Refills: 0 | Status: SHIPPED | OUTPATIENT
Start: 2021-11-19 | End: 2021-11-20

## 2021-11-19 RX ORDER — HYDRALAZINE HYDROCHLORIDE 20 MG/ML
20 INJECTION INTRAMUSCULAR; INTRAVENOUS
Status: DISCONTINUED | OUTPATIENT
Start: 2021-11-19 | End: 2021-11-19 | Stop reason: HOSPADM

## 2021-11-19 RX ORDER — ONDANSETRON 4 MG/1
4 TABLET, ORALLY DISINTEGRATING ORAL
Qty: 10 TABLET | Refills: 1 | Status: SHIPPED | OUTPATIENT
Start: 2021-11-19 | End: 2021-11-20

## 2021-11-19 RX ORDER — AMLODIPINE BESYLATE 5 MG/1
10 TABLET ORAL DAILY
Status: DISCONTINUED | OUTPATIENT
Start: 2021-11-20 | End: 2021-11-19 | Stop reason: HOSPADM

## 2021-11-19 RX ORDER — AMLODIPINE BESYLATE 5 MG/1
5 TABLET ORAL DAILY
Status: DISCONTINUED | OUTPATIENT
Start: 2021-11-19 | End: 2021-11-19

## 2021-11-19 RX ORDER — ACETAMINOPHEN 500 MG
1000 TABLET ORAL DAILY
Status: DISCONTINUED | OUTPATIENT
Start: 2021-11-20 | End: 2021-11-19 | Stop reason: HOSPADM

## 2021-11-19 RX ORDER — DICYCLOMINE HYDROCHLORIDE 10 MG/1
10 CAPSULE ORAL 4 TIMES DAILY
Qty: 56 CAPSULE | Refills: 0 | Status: SHIPPED | OUTPATIENT
Start: 2021-11-19 | End: 2021-11-20

## 2021-11-19 RX ORDER — DIPHENHYDRAMINE HCL 25 MG
25 CAPSULE ORAL ONCE
Status: COMPLETED | OUTPATIENT
Start: 2021-11-19 | End: 2021-11-19

## 2021-11-19 RX ORDER — AMLODIPINE BESYLATE 10 MG/1
10 TABLET ORAL DAILY
Qty: 30 TABLET | Refills: 3 | Status: SHIPPED | OUTPATIENT
Start: 2021-11-20 | End: 2021-11-20

## 2021-11-19 RX ORDER — DIPHENHYDRAMINE HCL 25 MG
25 CAPSULE ORAL DAILY
Status: DISCONTINUED | OUTPATIENT
Start: 2021-11-20 | End: 2021-11-19 | Stop reason: HOSPADM

## 2021-11-19 RX ADMIN — CUPRIC CHLORIDE: 0.4 INJECTION, SOLUTION INTRAVENOUS at 11:39

## 2021-11-19 RX ADMIN — ONDANSETRON 4 MG: 2 INJECTION INTRAMUSCULAR; INTRAVENOUS at 12:46

## 2021-11-19 RX ADMIN — DICYCLOMINE HYDROCHLORIDE 10 MG: 10 CAPSULE ORAL at 11:11

## 2021-11-19 RX ADMIN — SODIUM CHLORIDE 10 ML: 9 INJECTION, SOLUTION INTRAMUSCULAR; INTRAVENOUS; SUBCUTANEOUS at 16:44

## 2021-11-19 RX ADMIN — PAROXETINE HYDROCHLORIDE 20 MG: 20 TABLET, FILM COATED ORAL at 11:11

## 2021-11-19 RX ADMIN — LINACLOTIDE 290 MCG: 290 CAPSULE, GELATIN COATED ORAL at 11:11

## 2021-11-19 RX ADMIN — ACETAMINOPHEN 1000 MG: 500 TABLET, FILM COATED ORAL at 11:38

## 2021-11-19 RX ADMIN — AMLODIPINE BESYLATE 5 MG: 5 TABLET ORAL at 11:11

## 2021-11-19 RX ADMIN — SODIUM CHLORIDE 5 ML: 9 INJECTION, SOLUTION INTRAMUSCULAR; INTRAVENOUS; SUBCUTANEOUS at 05:12

## 2021-11-19 RX ADMIN — PANTOPRAZOLE SODIUM 40 MG: 40 TABLET, DELAYED RELEASE ORAL at 11:11

## 2021-11-19 RX ADMIN — I-VITE, TAB 1000-60-2MG (60/BT) 1 TABLET: TAB at 11:11

## 2021-11-19 RX ADMIN — AMLODIPINE BESYLATE 5 MG: 5 TABLET ORAL at 16:43

## 2021-11-19 RX ADMIN — DIPHENHYDRAMINE HYDROCHLORIDE 25 MG: 25 CAPSULE ORAL at 11:38

## 2021-11-19 RX ADMIN — DICYCLOMINE HYDROCHLORIDE 10 MG: 10 CAPSULE ORAL at 16:50

## 2021-11-19 NOTE — PROGRESS NOTES
Went over  discharge instructions with pt. Opportunity for questions and answers provided. Pt let via wheelchair to main entrance with all dishcarge papers, information where to p/u medications and all personal belongings.
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
## 2021-11-19 NOTE — PROGRESS NOTES
CM spoke with hem onc physician and was requested to send [facility] form for copper infusions to [facility]. CM sent completed form with facesheet.         [facility]

## 2021-11-19 NOTE — PROGRESS NOTES
Problem: Falls - Risk of  Goal: *Absence of Falls  Description: Document Doe Chaves Fall Risk and appropriate interventions in the flowsheet.   Outcome: Resolved/Not Met  Note: Fall Risk Interventions:  Mobility Interventions: Patient to call before getting OOB         Medication Interventions: Patient to call before getting OOB    Elimination Interventions: Call light in reach              Problem: Patient Education: Go to Patient Education Activity  Goal: Patient/Family Education  Outcome: Resolved/Not Met

## 2021-11-19 NOTE — PROGRESS NOTES
I reviewed imaging hydro did resolve on ultrasound  As his constipation improves can try catheter removal voiding trial while inpatient. If he is discharged over the week end he can see me in clinic in 2 weeks or so for post void residual check and we will decide if he needs any more imaging of his kidney.

## 2021-11-20 ENCOUNTER — APPOINTMENT (OUTPATIENT)
Dept: GENERAL RADIOLOGY | Age: 41
DRG: 247 | End: 2021-11-20
Attending: EMERGENCY MEDICINE
Payer: MEDICAID

## 2021-11-20 ENCOUNTER — HOSPITAL ENCOUNTER (INPATIENT)
Age: 41
LOS: 2 days | Discharge: HOME | DRG: 247 | End: 2021-11-24
Attending: EMERGENCY MEDICINE | Admitting: GENERAL ACUTE CARE HOSPITAL
Payer: MEDICAID

## 2021-11-20 ENCOUNTER — APPOINTMENT (OUTPATIENT)
Dept: CT IMAGING | Age: 41
DRG: 247 | End: 2021-11-20
Attending: EMERGENCY MEDICINE
Payer: MEDICAID

## 2021-11-20 DIAGNOSIS — K56.609 SBO (SMALL BOWEL OBSTRUCTION) (HCC): Primary | ICD-10-CM

## 2021-11-20 DIAGNOSIS — K31.84 GASTROPARESIS: ICD-10-CM

## 2021-11-20 LAB
ALBUMIN SERPL-MCNC: 3.7 G/DL (ref 3.5–5)
ALBUMIN/GLOB SERPL: 1 {RATIO} (ref 1.1–2.2)
ALP SERPL-CCNC: 79 U/L (ref 45–117)
ALT SERPL-CCNC: 66 U/L (ref 12–78)
ANION GAP SERPL CALC-SCNC: 12 MMOL/L (ref 5–15)
APPEARANCE UR: ABNORMAL
AST SERPL-CCNC: 36 U/L (ref 15–37)
BACTERIA URNS QL MICRO: NEGATIVE /HPF
BILIRUB SERPL-MCNC: 1.8 MG/DL (ref 0.2–1)
BILIRUB UR QL CFM: POSITIVE
BUN SERPL-MCNC: 16 MG/DL (ref 6–20)
BUN/CREAT SERPL: 17 (ref 12–20)
CALCIUM SERPL-MCNC: 9.2 MG/DL (ref 8.5–10.1)
CHLORIDE SERPL-SCNC: 98 MMOL/L (ref 97–108)
CO2 SERPL-SCNC: 23 MMOL/L (ref 21–32)
COLOR UR: ABNORMAL
CREAT SERPL-MCNC: 0.94 MG/DL (ref 0.7–1.3)
EPITH CASTS URNS QL MICRO: ABNORMAL /LPF
ERYTHROCYTE [DISTWIDTH] IN BLOOD BY AUTOMATED COUNT: 17.8 % (ref 11.5–14.5)
GLOBULIN SER CALC-MCNC: 3.7 G/DL (ref 2–4)
GLUCOSE SERPL-MCNC: 123 MG/DL (ref 65–100)
GLUCOSE UR STRIP.AUTO-MCNC: NEGATIVE MG/DL
HCT VFR BLD AUTO: 37.8 % (ref 36.6–50.3)
HGB BLD-MCNC: 12.5 G/DL (ref 12.1–17)
HGB UR QL STRIP: ABNORMAL
HYALINE CASTS URNS QL MICRO: ABNORMAL /LPF (ref 0–5)
KETONES UR QL STRIP.AUTO: ABNORMAL MG/DL
LEUKOCYTE ESTERASE UR QL STRIP.AUTO: NEGATIVE
LIPASE SERPL-CCNC: 78 U/L (ref 73–393)
MCH RBC QN AUTO: 31.8 PG (ref 26–34)
MCHC RBC AUTO-ENTMCNC: 33.1 G/DL (ref 30–36.5)
MCV RBC AUTO: 96.2 FL (ref 80–99)
MUCOUS THREADS URNS QL MICRO: ABNORMAL /LPF
NITRITE UR QL STRIP.AUTO: NEGATIVE
NRBC # BLD: 0 K/UL (ref 0–0.01)
NRBC BLD-RTO: 0 PER 100 WBC
PH UR STRIP: 6 [PH] (ref 5–8)
PLATELET # BLD AUTO: 642 K/UL (ref 150–400)
PMV BLD AUTO: 9.2 FL (ref 8.9–12.9)
POTASSIUM SERPL-SCNC: 3.4 MMOL/L (ref 3.5–5.1)
PROT SERPL-MCNC: 7.4 G/DL (ref 6.4–8.2)
PROT UR STRIP-MCNC: 100 MG/DL
RBC # BLD AUTO: 3.93 M/UL (ref 4.1–5.7)
RBC #/AREA URNS HPF: ABNORMAL /HPF (ref 0–5)
SODIUM SERPL-SCNC: 133 MMOL/L (ref 136–145)
SP GR UR REFRACTOMETRY: 1.02 (ref 1–1.03)
TROPONIN-HIGH SENSITIVITY: 29 NG/L (ref 0–76)
TROPONIN-HIGH SENSITIVITY: 30 NG/L (ref 0–76)
UA: UC IF INDICATED,UAUC: ABNORMAL
UROBILINOGEN UR QL STRIP.AUTO: 0.2 EU/DL (ref 0.2–1)
WBC # BLD AUTO: 12.9 K/UL (ref 4.1–11.1)
WBC URNS QL MICRO: ABNORMAL /HPF (ref 0–4)

## 2021-11-20 PROCEDURE — 74011000250 HC RX REV CODE- 250: Performed by: EMERGENCY MEDICINE

## 2021-11-20 PROCEDURE — 96375 TX/PRO/DX INJ NEW DRUG ADDON: CPT

## 2021-11-20 PROCEDURE — 36415 COLL VENOUS BLD VENIPUNCTURE: CPT

## 2021-11-20 PROCEDURE — 99285 EMERGENCY DEPT VISIT HI MDM: CPT

## 2021-11-20 PROCEDURE — 74011000636 HC RX REV CODE- 636: Performed by: EMERGENCY MEDICINE

## 2021-11-20 PROCEDURE — 96374 THER/PROPH/DIAG INJ IV PUSH: CPT

## 2021-11-20 PROCEDURE — 96372 THER/PROPH/DIAG INJ SC/IM: CPT

## 2021-11-20 PROCEDURE — 83690 ASSAY OF LIPASE: CPT

## 2021-11-20 PROCEDURE — 81001 URINALYSIS AUTO W/SCOPE: CPT

## 2021-11-20 PROCEDURE — 74011250636 HC RX REV CODE- 250/636: Performed by: EMERGENCY MEDICINE

## 2021-11-20 PROCEDURE — 96361 HYDRATE IV INFUSION ADD-ON: CPT

## 2021-11-20 PROCEDURE — 85027 COMPLETE CBC AUTOMATED: CPT

## 2021-11-20 PROCEDURE — 80053 COMPREHEN METABOLIC PANEL: CPT

## 2021-11-20 PROCEDURE — 84484 ASSAY OF TROPONIN QUANT: CPT

## 2021-11-20 PROCEDURE — G0378 HOSPITAL OBSERVATION PER HR: HCPCS

## 2021-11-20 PROCEDURE — 74011250636 HC RX REV CODE- 250/636: Performed by: INTERNAL MEDICINE

## 2021-11-20 PROCEDURE — 74022 RADEX COMPL AQT ABD SERIES: CPT

## 2021-11-20 PROCEDURE — 74177 CT ABD & PELVIS W/CONTRAST: CPT

## 2021-11-20 PROCEDURE — 99222 1ST HOSP IP/OBS MODERATE 55: CPT | Performed by: SURGERY

## 2021-11-20 RX ORDER — ENOXAPARIN SODIUM 100 MG/ML
40 INJECTION SUBCUTANEOUS DAILY
Status: DISCONTINUED | OUTPATIENT
Start: 2021-11-21 | End: 2021-11-20

## 2021-11-20 RX ORDER — POTASSIUM CHLORIDE AND SODIUM CHLORIDE 900; 300 MG/100ML; MG/100ML
INJECTION, SOLUTION INTRAVENOUS CONTINUOUS
Status: DISCONTINUED | OUTPATIENT
Start: 2021-11-20 | End: 2021-11-24 | Stop reason: HOSPADM

## 2021-11-20 RX ORDER — SODIUM CHLORIDE 0.9 % (FLUSH) 0.9 %
5-40 SYRINGE (ML) INJECTION AS NEEDED
Status: DISCONTINUED | OUTPATIENT
Start: 2021-11-20 | End: 2021-11-24 | Stop reason: HOSPADM

## 2021-11-20 RX ORDER — LORAZEPAM 2 MG/ML
1 INJECTION INTRAMUSCULAR
Status: COMPLETED | OUTPATIENT
Start: 2021-11-20 | End: 2021-11-20

## 2021-11-20 RX ORDER — PROMETHAZINE HYDROCHLORIDE 25 MG/1
12.5 TABLET ORAL
Status: DISCONTINUED | OUTPATIENT
Start: 2021-11-20 | End: 2021-11-24 | Stop reason: HOSPADM

## 2021-11-20 RX ORDER — HEPARIN SODIUM 5000 [USP'U]/ML
5000 INJECTION, SOLUTION INTRAVENOUS; SUBCUTANEOUS EVERY 12 HOURS
Status: DISCONTINUED | OUTPATIENT
Start: 2021-11-20 | End: 2021-11-24 | Stop reason: HOSPADM

## 2021-11-20 RX ORDER — METOCLOPRAMIDE HYDROCHLORIDE 5 MG/ML
5 INJECTION INTRAMUSCULAR; INTRAVENOUS EVERY 6 HOURS
Status: DISCONTINUED | OUTPATIENT
Start: 2021-11-21 | End: 2021-11-20

## 2021-11-20 RX ORDER — ACETAMINOPHEN 325 MG/1
650 TABLET ORAL
Status: DISCONTINUED | OUTPATIENT
Start: 2021-11-20 | End: 2021-11-24 | Stop reason: HOSPADM

## 2021-11-20 RX ORDER — SODIUM CHLORIDE 0.9 % (FLUSH) 0.9 %
5-40 SYRINGE (ML) INJECTION EVERY 8 HOURS
Status: DISCONTINUED | OUTPATIENT
Start: 2021-11-20 | End: 2021-11-24 | Stop reason: HOSPADM

## 2021-11-20 RX ORDER — ACETAMINOPHEN 650 MG/1
650 SUPPOSITORY RECTAL
Status: DISCONTINUED | OUTPATIENT
Start: 2021-11-20 | End: 2021-11-24 | Stop reason: HOSPADM

## 2021-11-20 RX ORDER — BISACODYL 5 MG
5 TABLET, DELAYED RELEASE (ENTERIC COATED) ORAL DAILY PRN
Status: DISCONTINUED | OUTPATIENT
Start: 2021-11-20 | End: 2021-11-24 | Stop reason: HOSPADM

## 2021-11-20 RX ORDER — HYDRALAZINE HYDROCHLORIDE 20 MG/ML
20 INJECTION INTRAMUSCULAR; INTRAVENOUS
Status: DISCONTINUED | OUTPATIENT
Start: 2021-11-20 | End: 2021-11-24 | Stop reason: HOSPADM

## 2021-11-20 RX ORDER — ONDANSETRON 2 MG/ML
4 INJECTION INTRAMUSCULAR; INTRAVENOUS
Status: DISCONTINUED | OUTPATIENT
Start: 2021-11-20 | End: 2021-11-24 | Stop reason: HOSPADM

## 2021-11-20 RX ORDER — POLYETHYLENE GLYCOL 3350 17 G/17G
17 POWDER, FOR SOLUTION ORAL DAILY
Status: DISCONTINUED | OUTPATIENT
Start: 2021-11-21 | End: 2021-11-21

## 2021-11-20 RX ORDER — METOPROLOL TARTRATE 5 MG/5ML
2.5 INJECTION INTRAVENOUS EVERY 6 HOURS
Status: DISCONTINUED | OUTPATIENT
Start: 2021-11-21 | End: 2021-11-21

## 2021-11-20 RX ORDER — ONDANSETRON 2 MG/ML
4 INJECTION INTRAMUSCULAR; INTRAVENOUS
Status: COMPLETED | OUTPATIENT
Start: 2021-11-20 | End: 2021-11-20

## 2021-11-20 RX ORDER — FENTANYL CITRATE 50 UG/ML
25 INJECTION, SOLUTION INTRAMUSCULAR; INTRAVENOUS
Status: COMPLETED | OUTPATIENT
Start: 2021-11-20 | End: 2021-11-20

## 2021-11-20 RX ADMIN — ONDANSETRON 4 MG: 2 INJECTION INTRAMUSCULAR; INTRAVENOUS at 16:20

## 2021-11-20 RX ADMIN — FENTANYL CITRATE 25 MCG: 0.05 INJECTION, SOLUTION INTRAMUSCULAR; INTRAVENOUS at 14:05

## 2021-11-20 RX ADMIN — LORAZEPAM 1 MG: 2 INJECTION INTRAMUSCULAR; INTRAVENOUS at 17:16

## 2021-11-20 RX ADMIN — IOPAMIDOL 100 ML: 755 INJECTION, SOLUTION INTRAVENOUS at 18:52

## 2021-11-20 RX ADMIN — SODIUM CHLORIDE 10 MG: 9 INJECTION INTRAMUSCULAR; INTRAVENOUS; SUBCUTANEOUS at 14:04

## 2021-11-20 RX ADMIN — SODIUM CHLORIDE 1000 ML: 9 INJECTION, SOLUTION INTRAVENOUS at 14:04

## 2021-11-20 RX ADMIN — HEPARIN SODIUM 5000 UNITS: 5000 INJECTION INTRAVENOUS; SUBCUTANEOUS at 21:32

## 2021-11-20 RX ADMIN — POTASSIUM CHLORIDE AND SODIUM CHLORIDE: 900; 300 INJECTION, SOLUTION INTRAVENOUS at 21:32
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
## 2021-11-20 NOTE — ED NOTES
Pt on monitor x 2. MD at bedside. Pt reports he was just discharged from IP here at [facility] for anemia, UTI and constipation. Reports being discharged yesterday and then his vomiting, constipation and abd pain returned.  Reports last BM yesterday before discharge yesterday and it was hard to pass he states

## 2021-11-21 ENCOUNTER — APPOINTMENT (OUTPATIENT)
Dept: GENERAL RADIOLOGY | Age: 41
DRG: 247 | End: 2021-11-21
Attending: INTERNAL MEDICINE
Payer: MEDICAID

## 2021-11-21 LAB
ALBUMIN SERPL-MCNC: 3.2 G/DL (ref 3.5–5)
ALBUMIN/GLOB SERPL: 0.9 {RATIO} (ref 1.1–2.2)
ALP SERPL-CCNC: 68 U/L (ref 45–117)
ALT SERPL-CCNC: 56 U/L (ref 12–78)
ANION GAP SERPL CALC-SCNC: 10 MMOL/L (ref 5–15)
AST SERPL-CCNC: 26 U/L (ref 15–37)
BASOPHILS # BLD: 0.1 K/UL (ref 0–0.1)
BASOPHILS NFR BLD: 0 % (ref 0–1)
BILIRUB SERPL-MCNC: 1.5 MG/DL (ref 0.2–1)
BUN SERPL-MCNC: 18 MG/DL (ref 6–20)
BUN/CREAT SERPL: 21 (ref 12–20)
CALCIUM SERPL-MCNC: 8.9 MG/DL (ref 8.5–10.1)
CHLORIDE SERPL-SCNC: 103 MMOL/L (ref 97–108)
CO2 SERPL-SCNC: 23 MMOL/L (ref 21–32)
CREAT SERPL-MCNC: 0.86 MG/DL (ref 0.7–1.3)
DIFFERENTIAL METHOD BLD: ABNORMAL
EOSINOPHIL # BLD: 0 K/UL (ref 0–0.4)
EOSINOPHIL NFR BLD: 0 % (ref 0–7)
ERYTHROCYTE [DISTWIDTH] IN BLOOD BY AUTOMATED COUNT: 17.9 % (ref 11.5–14.5)
GLOBULIN SER CALC-MCNC: 3.5 G/DL (ref 2–4)
GLUCOSE SERPL-MCNC: 116 MG/DL (ref 65–100)
HCT VFR BLD AUTO: 34 % (ref 36.6–50.3)
HGB BLD-MCNC: 11.2 G/DL (ref 12.1–17)
IMM GRANULOCYTES # BLD AUTO: 0.1 K/UL (ref 0–0.04)
IMM GRANULOCYTES NFR BLD AUTO: 1 % (ref 0–0.5)
LIPASE SERPL-CCNC: 64 U/L (ref 73–393)
LYMPHOCYTES # BLD: 1.2 K/UL (ref 0.8–3.5)
LYMPHOCYTES NFR BLD: 10 % (ref 12–49)
MCH RBC QN AUTO: 32 PG (ref 26–34)
MCHC RBC AUTO-ENTMCNC: 32.9 G/DL (ref 30–36.5)
MCV RBC AUTO: 97.1 FL (ref 80–99)
MONOCYTES # BLD: 0.6 K/UL (ref 0–1)
MONOCYTES NFR BLD: 5 % (ref 5–13)
NEUTS SEG # BLD: 10.5 K/UL (ref 1.8–8)
NEUTS SEG NFR BLD: 85 % (ref 32–75)
NRBC # BLD: 0 K/UL (ref 0–0.01)
NRBC BLD-RTO: 0 PER 100 WBC
PLATELET # BLD AUTO: 626 K/UL (ref 150–400)
PMV BLD AUTO: 9.1 FL (ref 8.9–12.9)
POTASSIUM SERPL-SCNC: 4 MMOL/L (ref 3.5–5.1)
PROT SERPL-MCNC: 6.7 G/DL (ref 6.4–8.2)
RBC # BLD AUTO: 3.5 M/UL (ref 4.1–5.7)
SODIUM SERPL-SCNC: 136 MMOL/L (ref 136–145)
WBC # BLD AUTO: 12.5 K/UL (ref 4.1–11.1)

## 2021-11-21 PROCEDURE — 96372 THER/PROPH/DIAG INJ SC/IM: CPT

## 2021-11-21 PROCEDURE — G0378 HOSPITAL OBSERVATION PER HR: HCPCS

## 2021-11-21 PROCEDURE — 74011000250 HC RX REV CODE- 250: Performed by: INTERNAL MEDICINE

## 2021-11-21 PROCEDURE — 80053 COMPREHEN METABOLIC PANEL: CPT

## 2021-11-21 PROCEDURE — 36415 COLL VENOUS BLD VENIPUNCTURE: CPT

## 2021-11-21 PROCEDURE — 96376 TX/PRO/DX INJ SAME DRUG ADON: CPT

## 2021-11-21 PROCEDURE — 83690 ASSAY OF LIPASE: CPT

## 2021-11-21 PROCEDURE — 94760 N-INVAS EAR/PLS OXIMETRY 1: CPT

## 2021-11-21 PROCEDURE — 96375 TX/PRO/DX INJ NEW DRUG ADDON: CPT

## 2021-11-21 PROCEDURE — 85025 COMPLETE CBC W/AUTO DIFF WBC: CPT

## 2021-11-21 PROCEDURE — 74011250636 HC RX REV CODE- 250/636: Performed by: INTERNAL MEDICINE

## 2021-11-21 PROCEDURE — 99231 SBSQ HOSP IP/OBS SF/LOW 25: CPT | Performed by: SURGERY

## 2021-11-21 PROCEDURE — 74018 RADEX ABDOMEN 1 VIEW: CPT

## 2021-11-21 RX ORDER — METOPROLOL TARTRATE 5 MG/5ML
5 INJECTION INTRAVENOUS EVERY 6 HOURS
Status: DISCONTINUED | OUTPATIENT
Start: 2021-11-21 | End: 2021-11-24

## 2021-11-21 RX ORDER — MORPHINE SULFATE 2 MG/ML
2 INJECTION, SOLUTION INTRAMUSCULAR; INTRAVENOUS
Status: DISCONTINUED | OUTPATIENT
Start: 2021-11-21 | End: 2021-11-22

## 2021-11-21 RX ORDER — KETOROLAC TROMETHAMINE 30 MG/ML
30 INJECTION, SOLUTION INTRAMUSCULAR; INTRAVENOUS
Status: DISCONTINUED | OUTPATIENT
Start: 2021-11-21 | End: 2021-11-24 | Stop reason: HOSPADM

## 2021-11-21 RX ADMIN — Medication 10 ML: at 18:00

## 2021-11-21 RX ADMIN — METOPROLOL TARTRATE 5 MG: 1 INJECTION, SOLUTION INTRAVENOUS at 06:32

## 2021-11-21 RX ADMIN — METOPROLOL TARTRATE 5 MG: 1 INJECTION, SOLUTION INTRAVENOUS at 18:00

## 2021-11-21 RX ADMIN — Medication 10 ML: at 06:35

## 2021-11-21 RX ADMIN — HEPARIN SODIUM 5000 UNITS: 5000 INJECTION INTRAVENOUS; SUBCUTANEOUS at 08:48

## 2021-11-21 RX ADMIN — POTASSIUM CHLORIDE AND SODIUM CHLORIDE: 900; 300 INJECTION, SOLUTION INTRAVENOUS at 08:47

## 2021-11-21 RX ADMIN — METOPROLOL TARTRATE 5 MG: 1 INJECTION, SOLUTION INTRAVENOUS at 11:54

## 2021-11-21 RX ADMIN — Medication 10 ML: at 20:35

## 2021-11-21 RX ADMIN — POTASSIUM CHLORIDE AND SODIUM CHLORIDE: 900; 300 INJECTION, SOLUTION INTRAVENOUS at 20:33

## 2021-11-21 RX ADMIN — HEPARIN SODIUM 5000 UNITS: 5000 INJECTION INTRAVENOUS; SUBCUTANEOUS at 20:32

## 2021-11-21 RX ADMIN — METOPROLOL TARTRATE 2.5 MG: 5 INJECTION INTRAVENOUS at 00:56

## 2021-11-21 NOTE — PROGRESS NOTES
SURGERY PROGRESS NOTE      Admit Date: 2021        Subjective:     Patient resting comfortable. No complaints this morning. Objective:     Visit Vitals  BP (!) 157/97 (BP 1 Location: Right upper arm, BP Patient Position: At rest)   Pulse 91   Temp 97.9 °F (36.6 °C)   Resp 18   Ht 5' 9\" (1.753 m)   Wt 59.4 kg (130 lb 15.3 oz)   SpO2 100%   BMI 19.34 kg/m²        Temp (24hrs), Av.4 °F (36.9 °C), Min:97.7 °F (36.5 °C), Max:99 °F (37.2 °C)      No intake/output data recorded.  190 -  0700  In: 1000 [I.V.:1000]  Out: 600 [Urine:600]    Physical Exam:    General:  alert, cooperative, no distress, appears stated age   Abdomen: soft, bowel sounds hypoactive, non-tender           Lab Results   Component Value Date/Time    WBC 12.5 (H) 2021 04:04 AM    HGB 11.2 (L) 2021 04:04 AM    HCT 34.0 (L) 2021 04:04 AM    PLATELET 091 (H)  04:04 AM    MCV 97.1 2021 04:04 AM     Lab Results   Component Value Date/Time    GFR est non-AA >60 2021 04:04 AM    GFR est AA >60 2021 04:04 AM    Creatinine 0.86 2021 04:04 AM    BUN 18 2021 04:04 AM    Sodium 136 2021 04:04 AM    Potassium 4.0 2021 04:04 AM    Chloride 103 2021 04:04 AM    CO2 23 2021 04:04 AM    Magnesium 2.2 2021 05:26 AM    Phosphorus 3.1 2021 05:26 AM       KUB and lateral - no free air    Assessment/plan: Active Problems:    Gastroparesis (2021)    Patient has done well overnight. If he had a bowel perforation he would have most likely worsened. Decubitus films show no free air. I suspect CT findings are due to his chronic massive bowel dilatation. I recommend another 24 hours on NG decompression. Will order small bowel series for the morning. Unless there is a clear obstructive point seen I doubt surgery will be of much help. He has a chronic functional bowel problem and not a mechanical problem.

## 2021-11-21 NOTE — ED NOTES
Patient ambulated to the bathroom independentley and reports that he had a small formed BM. He denies any N/V/D at this time.

## 2021-11-21 NOTE — PROGRESS NOTES
Bedside, Verbal and Written shift change report given to Yoan (oncoming nurse) by Maria Eugenia (offgoing nurse). Report included the following information SBAR, Kardex, ED Summary, Procedure Summary, Accordion and Recent Results.

## 2021-11-21 NOTE — PROGRESS NOTES
Dr. Irene Calvillo on the floor this morning. Per Dr. Loyda Barnett patient can go on Low /intermittent \" for suction settings.
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
## 2021-11-21 NOTE — PROGRESS NOTES
Transition of Care Plan:    RUR: N/A Observation status  Disposition: Home with Mother  Follow up appointments: Patient needs a new PCP appointment set up. DME needed: None  Transportation at Discharge: Mother to transport  Keys or means to access home: Yes        IM Medicare Letter: N/A Medicaid   Is patient a BCPI-A Bundle:  N/A        If yes, was Bundle Letter given?:     Caregiver Contact: Mother Lorenza Polo [phone]  Discharge Caregiver contacted prior to discharge? Unit CM to follow up before discharge    Oral and Written notification given to patient and/or caregiver informing them that they are currently an Outpatient receiving care in our facility. Outpatient services include Observation Services. Reason for Readmission:     Small Bowel Obstruction         RUR Score/Risk Level:     N/A Observation     PCP: First and Last name:  Patient does not have a PCP; will need new PCP appointment before discharge   Name of Practice:    Are you a current patient: Yes/No:    Approximate date of last visit:    Can you participate in a virtual visit with your PCP:     Is a Care Conference indicated: IDRS    Did you attend your follow up appointment (s): If not, why not:  Patient does not have a PCP declined for CM to set up new patient appointment at discharge       Top Challenges facing patient (as identified by patient/family and CM): Finances/Medication cost?   No issues reported ; Pt has Medicaid insurance   Transportation      Pt uses Medicaid transport  Support system or lack thereof? Pt's parents; patient reports he also has a   Living arrangements? Lives in one story home w/ his mother  Self-care/ADLs/Cognition?   Pt is alert and oriented; independent of his ADLS/IDLS           Current Advanced Directive/Advance Care Plan:           Shan Alexander (ACP) Conversation    Date of Conversation: 11/20/2021  Conducted with: Patient with Decision Making Capacity    Healthcare Decision Maker:   No healthcare decision makers have been documented. Click here to complete [employer] including selection of the Healthcare Decision Maker Relationship (ie \"Primary\")    Today we documented Decision Maker(s) consistent with Legal Next of Kin hierarchy. Content/Action Overview:   DECLINED ACP conversation - will revisit periodically   Reviewed DNR/DNI and patient elects Full Code (Attempt Resuscitation)    Length of Voluntary ACP Conversation in minutes:  <16 minutes (Non-Billable)    Andrés Carlin RN           Plan for utilizing home health:   None             Transition of Care Plan:    Based on readmission, the patient's previous Plan of Care   has been evaluated and/or modified. The current Transition of Care Plan is:   Home        CM met with patient at the bedside to discuss discharge planning. Patient name, , and demographics all verified in chart. Patient was recently discharged on , but returned to ED due to increased vomiting and abdominal pain. Patient lives in a one story home with his mother. Patient does not use any DME at baseline. Patient is independent of his ADLS/IDLS. Patient preferred pharmacy is [facility] on [address]. Patient has no SNF, HH, or IPR history. Patient reports that he has a . Patient utilizes Medicaid transport for transport to medical appointments. Care Management Interventions  PCP Verified by CM:  (Patient does not have a PCP)  Mode of Transport at Discharge:  Other (see comment) (Patient mother to transport )  Transition of Care Consult (CM Consult): Discharge Planning  Discharge Durable Medical Equipment: No  Physical Therapy Consult: No  Occupational Therapy Consult: No  Speech Therapy Consult: No  Support Systems: Parent(s)  Confirm Follow Up Transport: [facility] (Medicaid transport )  Discharge Location  Discharge Placement: Home    Readmission Assessment  Number of days since last admission?: 1-7 days  Previous disposition: Home with Family  Who is being interviewed?: Patient  What was the patient's/caregiver's perception as to why they think they needed to return back to the hospital?: Other (Comment) (Patient was discharged Friday 11/19 but returned the next day due to continued abdominal pain and vomitting after discharge)  Did you visit your Primary Care Physician after you left the hospital, before you returned this time?: No  Why weren't you able to visit your PCP?: Did not have an appointment (Patient does not have a PCP declined for CM to set up new patient appointment)  Did you see a specialist, such as Cardiac, Pulmonary, Orthopedic Physician, etc. after you left the hospital?: No  Who advised the patient to return to the hospital?: Self-referral  Does the patient report anything that got in the way of taking their medications?: No  In our efforts to provide the best possible care to you and others like you, can you think of anything that we could have done to help you after you left the hospital the first time, so that you might not have needed to return so soon?: Other (Comment) (Patient was discharged Friday 11/19 but returned the next day due to continued abdominal pain and vomitting after discharge)    Unit  to continue to follow for discharge needs and planning.     Farzad Peñaloza, BSN, RN, BSW   RN Care Manager
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
## 2021-11-21 NOTE — PROGRESS NOTES
Received message from patient's nurse stating:    Patient doesn't have orders in for suction settings for NGT. Discussion / orders:    Low continuous suction         Please note that this note was dictated using Dragon computer voice recognition software. Quite often unanticipated grammatical, syntax, homophones, and other interpretive errors are inadvertently transcribed by the computer software. Please disregard these errors. Please excuse any errors that have escaped final proofreading.

## 2021-11-21 NOTE — PROGRESS NOTES
End of Shift Note    Bedside shift change report given to [facility] (oncoming nurse) by Gail Maurer RN (offgoing nurse). Report included the following information SBAR    Shift worked:  [phone]     Shift summary and any significant changes: Followed POC as ordered. Patient ID'd with two identifiers. Prioritized safety at all times. Bed alarm plugged in and activated. Pt. oriented to call bell. Practiced infection prevention and hand hygiene. Managed pain as ordered. Clustered care while rounding hourly. Assessed pt., monitored vital signs, and administered medications. Encouraged patient to turn and/or shift weight. Assisted with ADL's, mobility, toileting, and hygiene. Encouraged CHG infection prevention treatment. Provided pt. education and employed therapeutic communication. Collaborated as part of the health care team. Advocated for pt. Monitored I/O's and tracked calorie consumption. Monitored lab values. Preserved IV access. Standard fall precautions. Pt. on tele. Monitored NG tube. No acute events, vital signs stable. Pt. denied cardiac discomfort, nausea, and SOB. Pt denied pain. A&OX4. Pt reports full sensation. No sensory deficits. Positive pulses. Hypoactive bowel sounds. Lungs clear bilaterally. Voids wiithout difficulty. Concerns for physician to address:       Zone phone for oncoming shift:   [phone]       Activity:  Activity Level:  Up with Assistance  Number times ambulated in hallways past shift: 0  Number of times OOB to chair past shift: 1    Cardiac:   Cardiac Monitoring: Yes      Cardiac Rhythm: Sinus Rhythm    Access:   Current line(s): PIV     Genitourinary:   Urinary status: voiding    Respiratory:   O2 Device: None  Chronic home O2 use?: NO  Incentive spirometer at bedside: YES     GI:  Last Bowel Movement Date: 11/20/21  Current diet:  DIET NPO  Passing flatus: YES  Tolerating current diet: YES       Pain Management:   Patient states pain is manageable on current regimen: YES    Skin:  Russell Score: 21  Interventions: PT/OT consult and limit briefs    Patient Safety:  Fall Score:  Total Score: 1  Interventions: gripper socks       Length of Stay:  Expected LOS: - - -  Actual LOS: 0      Kaylah Fink RN

## 2021-11-22 ENCOUNTER — APPOINTMENT (OUTPATIENT)
Dept: GENERAL RADIOLOGY | Age: 41
DRG: 247 | End: 2021-11-22
Attending: SURGERY
Payer: MEDICAID

## 2021-11-22 PROBLEM — K56.609 SBO (SMALL BOWEL OBSTRUCTION) (HCC): Status: ACTIVE | Noted: 2021-11-22

## 2021-11-22 PROCEDURE — 74011000636 HC RX REV CODE- 636: Performed by: GENERAL ACUTE CARE HOSPITAL

## 2021-11-22 PROCEDURE — 74250 X-RAY XM SM INT 1CNTRST STD: CPT

## 2021-11-22 PROCEDURE — 74011250636 HC RX REV CODE- 250/636: Performed by: INTERNAL MEDICINE

## 2021-11-22 PROCEDURE — 96375 TX/PRO/DX INJ NEW DRUG ADDON: CPT

## 2021-11-22 PROCEDURE — 74011000250 HC RX REV CODE- 250: Performed by: INTERNAL MEDICINE

## 2021-11-22 PROCEDURE — 96372 THER/PROPH/DIAG INJ SC/IM: CPT

## 2021-11-22 PROCEDURE — 96376 TX/PRO/DX INJ SAME DRUG ADON: CPT

## 2021-11-22 PROCEDURE — 94760 N-INVAS EAR/PLS OXIMETRY 1: CPT

## 2021-11-22 PROCEDURE — 74011250637 HC RX REV CODE- 250/637: Performed by: NURSE PRACTITIONER

## 2021-11-22 PROCEDURE — G0378 HOSPITAL OBSERVATION PER HR: HCPCS

## 2021-11-22 PROCEDURE — 74011250636 HC RX REV CODE- 250/636: Performed by: NURSE PRACTITIONER

## 2021-11-22 PROCEDURE — 65660000000 HC RM CCU STEPDOWN

## 2021-11-22 RX ORDER — DOXYLAMINE SUCCINATE 25 MG
TABLET ORAL 2 TIMES DAILY
Status: DISCONTINUED | OUTPATIENT
Start: 2021-11-22 | End: 2021-11-24 | Stop reason: HOSPADM

## 2021-11-22 RX ORDER — DIPHENHYDRAMINE HYDROCHLORIDE 50 MG/ML
50 INJECTION, SOLUTION INTRAMUSCULAR; INTRAVENOUS
Status: DISCONTINUED | OUTPATIENT
Start: 2021-11-22 | End: 2021-11-24 | Stop reason: HOSPADM

## 2021-11-22 RX ORDER — NYSTATIN 100000 U/G
OINTMENT TOPICAL 2 TIMES DAILY
Status: DISCONTINUED | OUTPATIENT
Start: 2021-11-22 | End: 2021-11-24 | Stop reason: HOSPADM

## 2021-11-22 RX ADMIN — DIATRIZOATE MEGLUMINE AND DIATRIZOATE SODIUM 240 ML: 600; 100 SOLUTION ORAL; RECTAL at 13:00

## 2021-11-22 RX ADMIN — HEPARIN SODIUM 5000 UNITS: 5000 INJECTION INTRAVENOUS; SUBCUTANEOUS at 08:14

## 2021-11-22 RX ADMIN — POTASSIUM CHLORIDE AND SODIUM CHLORIDE: 900; 300 INJECTION, SOLUTION INTRAVENOUS at 06:54

## 2021-11-22 RX ADMIN — HEPARIN SODIUM 5000 UNITS: 5000 INJECTION INTRAVENOUS; SUBCUTANEOUS at 21:30

## 2021-11-22 RX ADMIN — MICONAZOLE NITRATE: 20 CREAM TOPICAL at 19:03

## 2021-11-22 RX ADMIN — METOPROLOL TARTRATE 5 MG: 1 INJECTION, SOLUTION INTRAVENOUS at 18:58

## 2021-11-22 RX ADMIN — Medication 10 ML: at 06:52

## 2021-11-22 RX ADMIN — METOPROLOL TARTRATE 5 MG: 1 INJECTION, SOLUTION INTRAVENOUS at 12:36

## 2021-11-22 RX ADMIN — POTASSIUM CHLORIDE AND SODIUM CHLORIDE: 900; 300 INJECTION, SOLUTION INTRAVENOUS at 21:28

## 2021-11-22 RX ADMIN — NYSTATIN OINTMENT: 100000 OINTMENT TOPICAL at 19:04

## 2021-11-22 RX ADMIN — METOPROLOL TARTRATE 5 MG: 1 INJECTION, SOLUTION INTRAVENOUS at 00:32

## 2021-11-22 RX ADMIN — METOPROLOL TARTRATE 5 MG: 1 INJECTION, SOLUTION INTRAVENOUS at 06:52

## 2021-11-22 RX ADMIN — Medication 10 ML: at 14:10

## 2021-11-22 RX ADMIN — METOPROLOL TARTRATE 5 MG: 1 INJECTION, SOLUTION INTRAVENOUS at 23:58

## 2021-11-22 RX ADMIN — DIPHENHYDRAMINE HYDROCHLORIDE 50 MG: 50 INJECTION, SOLUTION INTRAMUSCULAR; INTRAVENOUS at 09:34
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
## 2021-11-23 PROCEDURE — 99231 SBSQ HOSP IP/OBS SF/LOW 25: CPT | Performed by: SURGERY

## 2021-11-23 PROCEDURE — 94760 N-INVAS EAR/PLS OXIMETRY 1: CPT

## 2021-11-23 PROCEDURE — 65660000000 HC RM CCU STEPDOWN

## 2021-11-23 PROCEDURE — 74011000250 HC RX REV CODE- 250: Performed by: NURSE PRACTITIONER

## 2021-11-23 PROCEDURE — 74011250636 HC RX REV CODE- 250/636: Performed by: INTERNAL MEDICINE

## 2021-11-23 PROCEDURE — 74011250636 HC RX REV CODE- 250/636: Performed by: NURSE PRACTITIONER

## 2021-11-23 PROCEDURE — 74011000250 HC RX REV CODE- 250: Performed by: INTERNAL MEDICINE

## 2021-11-23 RX ADMIN — HEPARIN SODIUM 5000 UNITS: 5000 INJECTION INTRAVENOUS; SUBCUTANEOUS at 08:45

## 2021-11-23 RX ADMIN — DIPHENHYDRAMINE HYDROCHLORIDE 50 MG: 50 INJECTION, SOLUTION INTRAMUSCULAR; INTRAVENOUS at 17:57

## 2021-11-23 RX ADMIN — SODIUM CHLORIDE 10 MG: 9 INJECTION INTRAMUSCULAR; INTRAVENOUS; SUBCUTANEOUS at 23:10

## 2021-11-23 RX ADMIN — ONDANSETRON 4 MG: 2 INJECTION INTRAMUSCULAR; INTRAVENOUS at 20:11

## 2021-11-23 RX ADMIN — Medication 10 ML: at 14:34

## 2021-11-23 RX ADMIN — HEPARIN SODIUM 5000 UNITS: 5000 INJECTION INTRAVENOUS; SUBCUTANEOUS at 20:11

## 2021-11-23 RX ADMIN — Medication 10 ML: at 23:16

## 2021-11-23 RX ADMIN — Medication 10 ML: at 20:11

## 2021-11-23 RX ADMIN — POTASSIUM CHLORIDE AND SODIUM CHLORIDE: 900; 300 INJECTION, SOLUTION INTRAVENOUS at 21:14

## 2021-11-23 RX ADMIN — METOPROLOL TARTRATE 5 MG: 1 INJECTION, SOLUTION INTRAVENOUS at 11:36

## 2021-11-23 RX ADMIN — NYSTATIN OINTMENT: 100000 OINTMENT TOPICAL at 08:45

## 2021-11-23 RX ADMIN — NYSTATIN OINTMENT: 100000 OINTMENT TOPICAL at 17:53

## 2021-11-23 RX ADMIN — DIPHENHYDRAMINE HYDROCHLORIDE 50 MG: 50 INJECTION, SOLUTION INTRAMUSCULAR; INTRAVENOUS at 04:10

## 2021-11-23 RX ADMIN — KETOROLAC TROMETHAMINE 30 MG: 30 INJECTION, SOLUTION INTRAMUSCULAR at 04:10

## 2021-11-23 RX ADMIN — KETOROLAC TROMETHAMINE 30 MG: 30 INJECTION, SOLUTION INTRAMUSCULAR at 15:46

## 2021-11-23 RX ADMIN — MICONAZOLE NITRATE: 20 CREAM TOPICAL at 17:54

## 2021-11-23 RX ADMIN — METOPROLOL TARTRATE 5 MG: 1 INJECTION, SOLUTION INTRAVENOUS at 06:03

## 2021-11-23 RX ADMIN — METOPROLOL TARTRATE 5 MG: 1 INJECTION, SOLUTION INTRAVENOUS at 17:53

## 2021-11-23 RX ADMIN — MICONAZOLE NITRATE: 20 CREAM TOPICAL at 08:45

## 2021-11-23 RX ADMIN — Medication 10 ML: at 00:05

## 2021-11-23 NOTE — PROGRESS NOTES
End of Shift Note    Bedside shift change report given to Edward (oncoming nurse) by Lizzy Bedolla (offgoing nurse). Report included the following information SBAR, Kardex, Intake/Output, MAR and Recent Results    Shift worked:  7p-7a     Shift summary and any significant changes:     No change in pt condition. Education provided regarding pt wanting Ice Chips and Water, pt still insistent on having ice chips. NGT with large OP through night ~3300mL of green tinged thick liquid. Xray completed for abdominal series. No labs ordered this am.       Concerns for physician to address:       Zone phone for oncoming shift:   [phone]       Activity:  Activity Level: Up with Assistance  Number times ambulated in hallways past shift: 0  Number of times OOB to chair past shift: 0    Cardiac:   Cardiac Monitoring: Yes      Cardiac Rhythm: Sinus Rhythm    Access:   Current line(s): PIV     Genitourinary:   Urinary status: voiding    Respiratory:   O2 Device: None (Room air)  Chronic home O2 use?: NO  Incentive spirometer at bedside: NO     GI:  Last Bowel Movement Date: 11/22/21  Current diet:  DIET NPO  Passing flatus: YES  Tolerating current diet: YES       Pain Management:   Patient states pain is manageable on current regimen: YES    Skin:  Russell Score: 20  Interventions: increase time out of bed    Patient Safety:  Fall Score:  Total Score: 1  Interventions: assistive device (walker, cane, etc), gripper socks and pt to call before getting OOB       Length of Stay:  Expected LOS: - - -  Actual LOS: [address] [facility]

## 2021-11-23 NOTE — PROGRESS NOTES
End of Shift Note    Bedside shift change report given to Alysia Connor (oncoming nurse) by Bernard Mendoza RN (offgoing nurse). Report included the following information SBAR, Kardex and ED Summary    Shift worked:  7a-7p     Shift summary and any significant changes:     Pt had small bowel study through end of shift. Pt still has significant output with NG tube of 2200. Concerns for physician to address:  none     Zone phone for oncoming shift:   [phone]       Activity:  Activity Level: Up with Assistance  Number times ambulated in hallways past shift: 0  Number of times OOB to chair past shift: 4    Cardiac:   Cardiac Monitoring: Yes      Cardiac Rhythm: Sinus Rhythm    Access:   Current line(s): PIV     Genitourinary:   Urinary status: voiding    Respiratory:   O2 Device: None (Room air)  Chronic home O2 use?: NO  Incentive spirometer at bedside: NO     GI:  Last Bowel Movement Date: 11/22/21  Current diet:  DIET NPO  Passing flatus: YES  Tolerating current diet: NO       Pain Management:   Patient states pain is manageable on current regimen: YES    Skin:  Russell Score: 21  Interventions: increase time out of bed    Patient Safety:  Fall Score:  Total Score: 1  Interventions: gripper socks       Length of Stay:  Expected LOS: - - -  Actual LOS: 0      Bernard Mendoza RN

## 2021-11-23 NOTE — PROGRESS NOTES
SURGERY PROGRESS NOTE      Admit Date: 2021      Subjective:     Patient denies nausea,  Having frequent loose BMs. Wants NG out and wants to eat. Objective:     Visit Vitals  BP (!) 143/96 (BP 1 Location: Left upper arm, BP Patient Position: At rest)   Pulse 62   Temp 97.8 °F (36.6 °C)   Resp 18   Ht 5' 9\" (1.753 m)   Wt 59.4 kg (130 lb 15.3 oz)   SpO2 100%   BMI 19.34 kg/m²        Temp (24hrs), Av.2 °F (36.8 °C), Min:97.8 °F (36.6 °C), Max:98.4 °F (36.9 °C)      No intake/output data recorded.  190 -  0700  In: 4733.3 [I.V.:4733.3]  Out: 5500     Physical Exam:    General:  alert, cooperative, no distress, appears stated age   Abdomen: soft, bowel sounds active, non-tender           Lab Results   Component Value Date/Time    WBC 12.5 (H) 2021 04:04 AM    HGB 11.2 (L) 2021 04:04 AM    HCT 34.0 (L) 2021 04:04 AM    PLATELET 140 (H)  04:04 AM    MCV 97.1 2021 04:04 AM     Lab Results   Component Value Date/Time    GFR est non-AA >60 2021 04:04 AM    GFR est AA >60 2021 04:04 AM    Creatinine 0.86 2021 04:04 AM    BUN 18 2021 04:04 AM    Sodium 136 2021 04:04 AM    Potassium 4.0 2021 04:04 AM    Chloride 103 2021 04:04 AM    CO2 23 2021 04:04 AM    Magnesium 2.2 2021 05:26 AM    Phosphorus 3.1 2021 05:26 AM     Small bowel series -  Massive dilated loops with contrast to colon at 11 hours. Essentially unchanged from previous. Assessment:     Active Problems:    Gastroparesis (2021)      SBO (small bowel obstruction) ([facility] (2021)    39year old with chronic motility disorder Not much I can offer surgically. Plan:     1. D/C NG  2. Clear liquid diet  3. Advance as tolerated  4.   Will see PRN

## 2021-11-23 NOTE — PROGRESS NOTES
Transition of Care Plan:    RUR: 14%   Disposition: Home with family support  Follow up appointments: Follow up with PCP and/or specialist   DME needed:N/A  Transportation at Discharge: Family to assist   [place] or means to access home:     Family to provide    IM Medicare Letter: N/A  Is patient a BCPI-A Bundle:     CM will provide if required       If yes, was Bundle Letter given?:     Caregiver Contact: Lamar Lkae (mother) [phone]  Discharge Caregiver contacted prior to discharge? Family to be contacted      CM: Maria Isabel Arroyo is currently working with the pt in the Neuro Unit. CM informed during IDRs that pt's NG was removed and diet has been advanced. CM informed that pt will be ready for d/c on 11/24/21 if diet is tolerated. Pt will return home with family support, with family to transport. CM will arrange for follow up appointment for pt-place on AVS.    CM will continue to follow.     OLY Galindo, [address] [place] [address]
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
## 2021-11-23 NOTE — PROGRESS NOTES
Problem: Falls - Risk of  Goal: *Absence of Falls  Description: Document Mark Willis Fall Risk and appropriate interventions in the flowsheet.   Outcome: Progressing Towards Goal  Note: Fall Risk Interventions:            Medication Interventions: Patient to call before getting OOB, Teach patient to arise slowly                   Problem: Patient Education: Go to Patient Education Activity  Goal: Patient/Family Education  Outcome: Progressing Towards Goal     Problem: Patient Education: Go to Patient Education Activity  Goal: Patient/Family Education  Outcome: Progressing Towards Goal     Problem: Small Bowel Obstruction: Day 1  Goal: Off Pathway (Use only if patient is Off Pathway)  Outcome: Progressing Towards Goal  Goal: Activity/Safety  Outcome: Progressing Towards Goal  Goal: Consults, if ordered  Outcome: Progressing Towards Goal  Goal: Diagnostic Test/Procedures  Outcome: Progressing Towards Goal  Goal: Nutrition/Diet  Outcome: Progressing Towards Goal  Goal: Medications  Outcome: Progressing Towards Goal  Goal: Respiratory  Outcome: Progressing Towards Goal  Goal: Treatments/Interventions/Procedures  Outcome: Progressing Towards Goal  Goal: Psychosocial  Outcome: Progressing Towards Goal  Goal: *Optimal pain control at patient's stated goal  Outcome: Progressing Towards Goal  Goal: *Adequate urinary output (equal to or greater than 30 milliliters/hour)  Outcome: Progressing Towards Goal  Goal: *Hemodynamically stable  Outcome: Progressing Towards Goal  Goal: *Demonstrates progressive activity  Outcome: Progressing Towards Goal  Goal: *Absence of nausea/vomiting  Outcome: Progressing Towards Goal

## 2021-11-24 VITALS
TEMPERATURE: 98.6 F | WEIGHT: 130.95 LBS | HEART RATE: 95 BPM | OXYGEN SATURATION: 100 % | HEIGHT: 69 IN | DIASTOLIC BLOOD PRESSURE: 101 MMHG | SYSTOLIC BLOOD PRESSURE: 131 MMHG | BODY MASS INDEX: 19.4 KG/M2 | RESPIRATION RATE: 18 BRPM

## 2021-11-24 PROCEDURE — 74011250637 HC RX REV CODE- 250/637: Performed by: NURSE PRACTITIONER

## 2021-11-24 PROCEDURE — 74011250636 HC RX REV CODE- 250/636: Performed by: INTERNAL MEDICINE

## 2021-11-24 PROCEDURE — 74011000250 HC RX REV CODE- 250: Performed by: INTERNAL MEDICINE

## 2021-11-24 RX ORDER — ONDANSETRON 4 MG/1
4 TABLET, ORALLY DISINTEGRATING ORAL
Qty: 30 TABLET | Refills: 0 | Status: SHIPPED | OUTPATIENT
Start: 2021-11-24 | End: 2021-12-24

## 2021-11-24 RX ORDER — AMLODIPINE BESYLATE 5 MG/1
5 TABLET ORAL
Status: COMPLETED | OUTPATIENT
Start: 2021-11-24 | End: 2021-11-24

## 2021-11-24 RX ORDER — AMLODIPINE BESYLATE 5 MG/1
5 TABLET ORAL DAILY
Qty: 30 TABLET | Refills: 0 | Status: SHIPPED | OUTPATIENT
Start: 2021-11-24 | End: 2022-01-11

## 2021-11-24 RX ADMIN — AMLODIPINE BESYLATE 5 MG: 5 TABLET ORAL at 16:01

## 2021-11-24 RX ADMIN — ONDANSETRON 4 MG: 2 INJECTION INTRAMUSCULAR; INTRAVENOUS at 11:46

## 2021-11-24 RX ADMIN — MICONAZOLE NITRATE: 20 CREAM TOPICAL at 11:39

## 2021-11-24 RX ADMIN — ONDANSETRON 4 MG: 2 INJECTION INTRAMUSCULAR; INTRAVENOUS at 02:06

## 2021-11-24 RX ADMIN — Medication 10 ML: at 06:09

## 2021-11-24 RX ADMIN — POTASSIUM CHLORIDE AND SODIUM CHLORIDE: 900; 300 INJECTION, SOLUTION INTRAVENOUS at 09:18

## 2021-11-24 RX ADMIN — HEPARIN SODIUM 5000 UNITS: 5000 INJECTION INTRAVENOUS; SUBCUTANEOUS at 09:18

## 2021-11-24 RX ADMIN — METOPROLOL TARTRATE 5 MG: 1 INJECTION, SOLUTION INTRAVENOUS at 00:24

## 2021-11-24 RX ADMIN — METOPROLOL TARTRATE 5 MG: 1 INJECTION, SOLUTION INTRAVENOUS at 06:09

## 2021-11-24 RX ADMIN — NYSTATIN OINTMENT: 100000 OINTMENT TOPICAL at 11:39

## 2021-11-24 NOTE — PROGRESS NOTES
Received notification from bedside RN about patient with regards to: minimal relief of nausea with IV Zofran, requesting alternative IV nausea medication to help  VS: /100, HR 66, RR 18, O2 sat 100% on RA     Intervention given: Compazine IV prn ordered

## 2021-11-24 NOTE — PROGRESS NOTES
.I have reviewed discharge instructions with the patient. The patient verbalized understanding. Patient IV removed. Patient personal belongings with him. Mother to pick him up.

## 2021-11-24 NOTE — PROGRESS NOTES
End of Shift Note    Bedside shift change report given to Allen Chaudhari (oncoming nurse) by Sendy Mtz RN (offgoing nurse). Report included the following information SBAR, Kardex and Cardiac Rhythm NSR    Shift worked:  7a-7p     Shift summary and any significant changes:     Gi removed NGT. Diet addressed and advanced to clear liquids with minimal nausea and no requirements of intervention. Concerns for physician to address:  Advance diet tomorrow? Zone phone for oncoming shift:   N/A       Activity:  Activity Level: Up with Assistance  Number times ambulated in hallways past shift: 0  Number of times OOB to chair past shift: 1    Cardiac:   Cardiac Monitoring: Yes      Cardiac Rhythm: Sinus Rhythm    Access:   Current line(s): PIV     Genitourinary:   Urinary status: voiding    Respiratory:   O2 Device: None (Room air)  Chronic home O2 use?: NO  Incentive spirometer at bedside: NO     GI:  Last Bowel Movement Date: 11/22/21  Current diet:  ADULT DIET Clear Liquid  Passing flatus: YES  Tolerating current diet: YES       Pain Management:   Patient states pain is manageable on current regimen: YES    Skin:  Russell Score: 20  Interventions: increase time out of bed    Patient Safety:  Fall Score:  Total Score: 1  Interventions: bed/chair alarm       Length of Stay:  Expected LOS: 3d 4h  Actual LOS: 1      Sendy Mtz, RN
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
## 2021-11-24 NOTE — PROGRESS NOTES
Problem: Falls - Risk of  Goal: *Absence of Falls  Description: Document Shelbie Reyes Fall Risk and appropriate interventions in the flowsheet.   Outcome: Resolved/Met

## 2021-11-24 NOTE — PROGRESS NOTES
End of Shift Note    Bedside shift change report given to Linda (oncoming nurse) by Danica Davison (offgoing nurse). Report included the following information SBAR, ED Summary, Intake/Output and MAR    Shift worked:  7p-7a     Shift summary and any significant changes:     Pt with complaints of Nausea, did prodice ~500mL of emesis through night. PRN Zofran x2, PRN Compazine ordered and given once. Nausea did subside by end of shift and pt able to tolerate Ice and ginger ale. No labs ordered this am.  IVF infusing. Concerns for physician to address:  Nausea and vomiting during night. Zone phone for oncoming shift:   [phone]       Activity:  Activity Level: Up with Assistance  Number times ambulated in hallways past shift: 1  Number of times OOB to chair past shift: 0    Cardiac:   Cardiac Monitoring: Yes      Cardiac Rhythm: Sinus Rhythm    Access:   Current line(s): PIV     Genitourinary:   Urinary status: voiding    Respiratory:   O2 Device: None (Room air)  Chronic home O2 use?: NO  Incentive spirometer at bedside: NO     GI:  Last Bowel Movement Date: 11/23/21  Current diet:  ADULT DIET Clear Liquid  Passing flatus: YES  Tolerating current diet: YES       Pain Management:   Patient states pain is manageable on current regimen: YES    Skin:  Russell Score: 20  Interventions: increase time out of bed    Patient Safety:  Fall Score:  Total Score: 1  Interventions: assistive device (walker, cane, etc)       Length of Stay:  Expected LOS: 3d 4h  Actual LOS: 2      Danica Davison

## 2021-11-24 NOTE — PROGRESS NOTES
Problem: Falls - Risk of  Goal: *Absence of Falls  Description: Document Pollo Alanis Fall Risk and appropriate interventions in the flowsheet.   Outcome: Progressing Towards Goal  Note: Fall Risk Interventions:            Medication Interventions: Patient to call before getting OOB, Teach patient to arise slowly                   Problem: Patient Education: Go to Patient Education Activity  Goal: Patient/Family Education  Outcome: Progressing Towards Goal     Problem: Small Bowel Obstruction: Day 2  Goal: Off Pathway (Use only if patient is Off Pathway)  Outcome: Progressing Towards Goal  Goal: Activity/Safety  Outcome: Progressing Towards Goal  Goal: Consults, if ordered  Outcome: Progressing Towards Goal  Goal: Diagnostic Test/Procedures  Outcome: Progressing Towards Goal  Goal: Nutrition/Diet  Outcome: Progressing Towards Goal  Goal: Discharge Planning  Outcome: Progressing Towards Goal  Goal: Medications  Outcome: Progressing Towards Goal  Goal: Respiratory  Outcome: Progressing Towards Goal  Goal: Treatments/Interventions/Procedures  Outcome: Progressing Towards Goal  Goal: Psychosocial  Outcome: Progressing Towards Goal  Goal: *Optimal pain control at patient's stated goal  Outcome: Progressing Towards Goal  Goal: *Adequate urinary output (equal to or greater than 30 milliliters/hour)  Outcome: Progressing Towards Goal  Goal: *Hemodynamically stable  Outcome: Progressing Towards Goal  Goal: *Demonstrates progressive activity  Outcome: Progressing Towards Goal  Goal: *Absence of nausea/vomiting  Outcome: Progressing Towards Goal  Goal: *Return of normal bowel function  Outcome: Progressing Towards Goal

## 2021-11-25 ENCOUNTER — APPOINTMENT (OUTPATIENT)
Dept: CT IMAGING | Age: 41
DRG: 710 | End: 2021-11-25
Attending: STUDENT IN AN ORGANIZED HEALTH CARE EDUCATION/TRAINING PROGRAM
Payer: MEDICAID

## 2021-11-25 ENCOUNTER — APPOINTMENT (OUTPATIENT)
Dept: GENERAL RADIOLOGY | Age: 41
DRG: 710 | End: 2021-11-25
Attending: STUDENT IN AN ORGANIZED HEALTH CARE EDUCATION/TRAINING PROGRAM
Payer: MEDICAID

## 2021-11-25 ENCOUNTER — HOSPITAL ENCOUNTER (INPATIENT)
Age: 41
LOS: 45 days | Discharge: HOME HEALTH | DRG: 710 | End: 2022-01-11
Attending: STUDENT IN AN ORGANIZED HEALTH CARE EDUCATION/TRAINING PROGRAM | Admitting: INTERNAL MEDICINE
Payer: MEDICAID

## 2021-11-25 DIAGNOSIS — E87.1 HYPONATREMIA: ICD-10-CM

## 2021-11-25 DIAGNOSIS — N17.9 AKI (ACUTE KIDNEY INJURY) (HCC): ICD-10-CM

## 2021-11-25 DIAGNOSIS — E87.20 LACTIC ACIDOSIS: ICD-10-CM

## 2021-11-25 DIAGNOSIS — R10.9 ABDOMINAL PAIN, ACUTE: ICD-10-CM

## 2021-11-25 DIAGNOSIS — R11.2 INTRACTABLE NAUSEA AND VOMITING: ICD-10-CM

## 2021-11-25 DIAGNOSIS — R11.15 INTRACTABLE CYCLICAL VOMITING WITH NAUSEA: ICD-10-CM

## 2021-11-25 DIAGNOSIS — J69.0 ASPIRATION PNEUMONIA OF RIGHT MIDDLE LOBE DUE TO VOMIT (HCC): ICD-10-CM

## 2021-11-25 DIAGNOSIS — K56.609 SBO (SMALL BOWEL OBSTRUCTION) (HCC): Primary | ICD-10-CM

## 2021-11-25 DIAGNOSIS — R65.20 SEVERE SEPSIS (HCC): ICD-10-CM

## 2021-11-25 DIAGNOSIS — A41.9 SEVERE SEPSIS (HCC): ICD-10-CM

## 2021-11-25 DIAGNOSIS — K56.609 SMALL BOWEL OBSTRUCTION (HCC): ICD-10-CM

## 2021-11-25 LAB
ALBUMIN SERPL-MCNC: 4.1 G/DL (ref 3.5–5)
ALBUMIN/GLOB SERPL: 1 {RATIO} (ref 1.1–2.2)
ALP SERPL-CCNC: 74 U/L (ref 45–117)
ALT SERPL-CCNC: 75 U/L (ref 12–78)
ANION GAP SERPL CALC-SCNC: 12 MMOL/L (ref 5–15)
AST SERPL-CCNC: 40 U/L (ref 15–37)
BASOPHILS # BLD: 0 K/UL (ref 0–0.1)
BASOPHILS NFR BLD: 0 % (ref 0–1)
BILIRUB SERPL-MCNC: 1.9 MG/DL (ref 0.2–1)
BUN SERPL-MCNC: 25 MG/DL (ref 6–20)
BUN/CREAT SERPL: 13 (ref 12–20)
CALCIUM SERPL-MCNC: 9.8 MG/DL (ref 8.5–10.1)
CHLORIDE SERPL-SCNC: 92 MMOL/L (ref 97–108)
CO2 SERPL-SCNC: 25 MMOL/L (ref 21–32)
COMMENT, HOLDF: NORMAL
COVID-19 RAPID TEST, COVR: NOT DETECTED
CREAT SERPL-MCNC: 1.87 MG/DL (ref 0.7–1.3)
DIFFERENTIAL METHOD BLD: ABNORMAL
EOSINOPHIL # BLD: 0 K/UL (ref 0–0.4)
EOSINOPHIL NFR BLD: 0 % (ref 0–7)
ERYTHROCYTE [DISTWIDTH] IN BLOOD BY AUTOMATED COUNT: 17 % (ref 11.5–14.5)
GLOBULIN SER CALC-MCNC: 4.1 G/DL (ref 2–4)
GLUCOSE SERPL-MCNC: 135 MG/DL (ref 65–100)
HCT VFR BLD AUTO: 44.4 % (ref 36.6–50.3)
HGB BLD-MCNC: 14.7 G/DL (ref 12.1–17)
IMM GRANULOCYTES # BLD AUTO: 0.1 K/UL (ref 0–0.04)
IMM GRANULOCYTES NFR BLD AUTO: 1 % (ref 0–0.5)
LACTATE BLD-SCNC: 1.36 MMOL/L (ref 0.4–2)
LACTATE BLD-SCNC: 3.21 MMOL/L (ref 0.4–2)
LYMPHOCYTES # BLD: 1.1 K/UL (ref 0.8–3.5)
LYMPHOCYTES NFR BLD: 9 % (ref 12–49)
MCH RBC QN AUTO: 31.7 PG (ref 26–34)
MCHC RBC AUTO-ENTMCNC: 33.1 G/DL (ref 30–36.5)
MCV RBC AUTO: 95.9 FL (ref 80–99)
MONOCYTES # BLD: 0.7 K/UL (ref 0–1)
MONOCYTES NFR BLD: 5 % (ref 5–13)
NEUTS SEG # BLD: 10.9 K/UL (ref 1.8–8)
NEUTS SEG NFR BLD: 86 % (ref 32–75)
NRBC # BLD: 0 K/UL (ref 0–0.01)
NRBC BLD-RTO: 0 PER 100 WBC
PLATELET # BLD AUTO: 622 K/UL (ref 150–400)
PMV BLD AUTO: 9.5 FL (ref 8.9–12.9)
POTASSIUM SERPL-SCNC: 4.7 MMOL/L (ref 3.5–5.1)
PROT SERPL-MCNC: 8.2 G/DL (ref 6.4–8.2)
RBC # BLD AUTO: 4.63 M/UL (ref 4.1–5.7)
SAMPLES BEING HELD,HOLD: NORMAL
SODIUM SERPL-SCNC: 129 MMOL/L (ref 136–145)
SOURCE, COVRS: NORMAL
WBC # BLD AUTO: 12.8 K/UL (ref 4.1–11.1)

## 2021-11-25 PROCEDURE — 99285 EMERGENCY DEPT VISIT HI MDM: CPT

## 2021-11-25 PROCEDURE — 83605 ASSAY OF LACTIC ACID: CPT

## 2021-11-25 PROCEDURE — 96376 TX/PRO/DX INJ SAME DRUG ADON: CPT

## 2021-11-25 PROCEDURE — 96374 THER/PROPH/DIAG INJ IV PUSH: CPT

## 2021-11-25 PROCEDURE — 96361 HYDRATE IV INFUSION ADD-ON: CPT

## 2021-11-25 PROCEDURE — 74177 CT ABD & PELVIS W/CONTRAST: CPT

## 2021-11-25 PROCEDURE — 36415 COLL VENOUS BLD VENIPUNCTURE: CPT

## 2021-11-25 PROCEDURE — 81001 URINALYSIS AUTO W/SCOPE: CPT

## 2021-11-25 PROCEDURE — 74011250636 HC RX REV CODE- 250/636: Performed by: STUDENT IN AN ORGANIZED HEALTH CARE EDUCATION/TRAINING PROGRAM

## 2021-11-25 PROCEDURE — 87635 SARS-COV-2 COVID-19 AMP PRB: CPT

## 2021-11-25 PROCEDURE — 96365 THER/PROPH/DIAG IV INF INIT: CPT

## 2021-11-25 PROCEDURE — 85025 COMPLETE CBC W/AUTO DIFF WBC: CPT

## 2021-11-25 PROCEDURE — 74011000258 HC RX REV CODE- 258: Performed by: STUDENT IN AN ORGANIZED HEALTH CARE EDUCATION/TRAINING PROGRAM

## 2021-11-25 PROCEDURE — 74011000636 HC RX REV CODE- 636: Performed by: STUDENT IN AN ORGANIZED HEALTH CARE EDUCATION/TRAINING PROGRAM

## 2021-11-25 PROCEDURE — 87040 BLOOD CULTURE FOR BACTERIA: CPT

## 2021-11-25 PROCEDURE — 80053 COMPREHEN METABOLIC PANEL: CPT

## 2021-11-25 PROCEDURE — 93005 ELECTROCARDIOGRAM TRACING: CPT

## 2021-11-25 PROCEDURE — G0378 HOSPITAL OBSERVATION PER HR: HCPCS

## 2021-11-25 PROCEDURE — 51798 US URINE CAPACITY MEASURE: CPT

## 2021-11-25 PROCEDURE — 96375 TX/PRO/DX INJ NEW DRUG ADDON: CPT

## 2021-11-25 PROCEDURE — 74018 RADEX ABDOMEN 1 VIEW: CPT

## 2021-11-25 PROCEDURE — 71045 X-RAY EXAM CHEST 1 VIEW: CPT

## 2021-11-25 RX ORDER — ACETAMINOPHEN 325 MG/1
650 TABLET ORAL
Status: DISCONTINUED | OUTPATIENT
Start: 2021-11-25 | End: 2022-01-11 | Stop reason: HOSPADM

## 2021-11-25 RX ORDER — ONDANSETRON 2 MG/ML
4 INJECTION INTRAMUSCULAR; INTRAVENOUS
Status: DISCONTINUED | OUTPATIENT
Start: 2021-11-25 | End: 2021-11-25 | Stop reason: SDUPTHER

## 2021-11-25 RX ORDER — SODIUM CHLORIDE 0.9 % (FLUSH) 0.9 %
5-40 SYRINGE (ML) INJECTION EVERY 8 HOURS
Status: DISCONTINUED | OUTPATIENT
Start: 2021-11-25 | End: 2022-01-11 | Stop reason: HOSPADM

## 2021-11-25 RX ORDER — POLYETHYLENE GLYCOL 3350 17 G/17G
17 POWDER, FOR SOLUTION ORAL DAILY PRN
Status: DISCONTINUED | OUTPATIENT
Start: 2021-11-25 | End: 2022-01-11 | Stop reason: HOSPADM

## 2021-11-25 RX ORDER — SODIUM CHLORIDE 0.9 % (FLUSH) 0.9 %
5-40 SYRINGE (ML) INJECTION AS NEEDED
Status: DISCONTINUED | OUTPATIENT
Start: 2021-11-25 | End: 2022-01-11 | Stop reason: HOSPADM

## 2021-11-25 RX ORDER — ACETAMINOPHEN 650 MG/1
650 SUPPOSITORY RECTAL
Status: DISCONTINUED | OUTPATIENT
Start: 2021-11-25 | End: 2022-01-11 | Stop reason: HOSPADM

## 2021-11-25 RX ORDER — MORPHINE SULFATE 2 MG/ML
4 INJECTION, SOLUTION INTRAMUSCULAR; INTRAVENOUS
Status: COMPLETED | OUTPATIENT
Start: 2021-11-25 | End: 2021-11-26

## 2021-11-25 RX ORDER — DEXTROSE, SODIUM CHLORIDE, AND POTASSIUM CHLORIDE 5; .45; .15 G/100ML; G/100ML; G/100ML
100 INJECTION INTRAVENOUS CONTINUOUS
Status: DISCONTINUED | OUTPATIENT
Start: 2021-11-25 | End: 2021-11-28

## 2021-11-25 RX ORDER — ONDANSETRON 4 MG/1
4 TABLET, ORALLY DISINTEGRATING ORAL
Status: DISCONTINUED | OUTPATIENT
Start: 2021-11-25 | End: 2022-01-11 | Stop reason: HOSPADM

## 2021-11-25 RX ORDER — ONDANSETRON 2 MG/ML
4 INJECTION INTRAMUSCULAR; INTRAVENOUS
Status: COMPLETED | OUTPATIENT
Start: 2021-11-25 | End: 2021-11-25

## 2021-11-25 RX ORDER — ONDANSETRON 2 MG/ML
4 INJECTION INTRAMUSCULAR; INTRAVENOUS
Status: DISCONTINUED | OUTPATIENT
Start: 2021-11-25 | End: 2022-01-11 | Stop reason: HOSPADM

## 2021-11-25 RX ADMIN — SODIUM CHLORIDE 1000 ML: 9 INJECTION, SOLUTION INTRAVENOUS at 15:58

## 2021-11-25 RX ADMIN — ONDANSETRON 4 MG: 2 INJECTION INTRAMUSCULAR; INTRAVENOUS at 15:25

## 2021-11-25 RX ADMIN — IOPAMIDOL 100 ML: 755 INJECTION, SOLUTION INTRAVENOUS at 14:47

## 2021-11-25 RX ADMIN — SODIUM CHLORIDE 1000 ML: 9 INJECTION, SOLUTION INTRAVENOUS at 15:25

## 2021-11-25 RX ADMIN — POTASSIUM CHLORIDE, DEXTROSE MONOHYDRATE AND SODIUM CHLORIDE 100 ML/HR: 150; 5; 450 INJECTION, SOLUTION INTRAVENOUS at 19:07

## 2021-11-25 RX ADMIN — MORPHINE SULFATE 4 MG: 2 INJECTION, SOLUTION INTRAMUSCULAR; INTRAVENOUS at 20:01

## 2021-11-25 RX ADMIN — SODIUM CHLORIDE 3.38 G: 900 INJECTION INTRAVENOUS at 15:58

## 2021-11-25 RX ADMIN — ONDANSETRON HYDROCHLORIDE 4 MG: 2 INJECTION, SOLUTION INTRAMUSCULAR; INTRAVENOUS at 20:01

## 2021-11-25 RX ADMIN — SODIUM CHLORIDE, PRESERVATIVE FREE 10 ML: 5 INJECTION INTRAVENOUS at 23:21

## 2021-11-25 RX ADMIN — PIPERACILLIN AND TAZOBACTAM 3.38 G: 3; .375 INJECTION, POWDER, LYOPHILIZED, FOR SOLUTION INTRAVENOUS at 22:42

## 2021-11-25 NOTE — ED NOTES
Patient is being transferred to [facility] General Surgery, Room # 8780. Report given to SAMUEL meredith on Yaa Urias for routine progression of care. Report consisted of the following information SBAR, ED Summary, MAR and Recent Results. Patient transferred to receiving unit by: transport team (RN or tech name). Outstanding consults needed: Yes    Next labs due: Yes    The following personal items will be sent with the patient during transfer to the floor: All valuables:    Cardiac monitoring ordered: Yes    The following CURRENT information was reported to the receiving RN:    Code status: Full Code at time of transfer    Last set of vital signs:  Vital Signs  Level of Consciousness: Alert (0) (11/25/21 1322)  Temp: 98.4 °F (36.9 °C) (11/25/21 1526)  Temp Source: Oral (11/25/21 1322)  Pulse (Heart Rate): (!) 105 (11/25/21 1730)  Cardiac Rhythm: Sinus Tachy (11/25/21 1529)  Resp Rate: 16 (11/25/21 1730)  BP: (!) 128/113 (11/25/21 1730)  MAP (Monitor): 120 (11/25/21 1730)  MAP (Calculated): 118 (11/25/21 1730)         Oxygen Therapy  O2 Sat (%): 96 % (11/25/21 1730)  Pulse via Oximetry: 106 beats per minute (11/25/21 1730)  O2 Device: None (Room air) (11/25/21 1322)      Last pain assessment:  Pain 1  Pain Scale 1: Numeric (0 - 10)  Pain Intensity 1: 9  Pain Location 1: Abdomen  Pain Orientation 1: Lower  Pain Description 1: Radiating      Wounds: No     Urinary catheter: voiding  Is there a brock order: No     LDAs:       Peripheral IV 11/25/21 Left; Posterior Hand (Active)     Nasogastric Tube 11/25/21 (Active)        Opportunity for questions and clarification was provided.     Sydna Lombard
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
## 2021-11-25 NOTE — PROGRESS NOTES
Renal Dosing/Monitoring  Medication: Zosyn  Current regimen:  3.375g every 6 hr  Recent Labs     11/25/21  1414   CREA 1.87*   BUN 25*     Estimated CrCl:  ~40-45 ml/min  Plan: Change to 3.375g every 8 hr per P&T Committee Protocol with respect to extended infusion protocol. Pharmacy will continue to monitor patient daily and will make dosage adjustments based upon changing renal function.
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
## 2021-11-25 NOTE — Clinical Note
Status[de-identified] INPATIENT [101]   Type of Bed: Telemetry [19]   Cardiac Monitoring Required?: Yes   Inpatient Hospitalization Certified Necessary for the Following Reasons: 3.  Patient receiving treatment that can only be provided in an inpatient setting (further clarification in H&P documentation)   Admitting Diagnosis: [facility]epsis ([facility] [[id number]]   Admitting Diagnosis: Aspiration pneumonia ([facility] [[id number]]   Admitting Diagnosis: Small bowel obstruction [facility] [[id number]]   Admitting Physician: Briseyda Cox [[id number]]   Attending Physician: Briseyda Cox [[id number]]   Estimated Length of Stay: 2 Midnights   Discharge Plan[de-identified] Home with Office Follow-up

## 2021-11-26 LAB
ANION GAP SERPL CALC-SCNC: 8 MMOL/L (ref 5–15)
APPEARANCE UR: CLEAR
ATRIAL RATE: 109 BPM
BACTERIA URNS QL MICRO: NEGATIVE /HPF
BILIRUB UR QL: NEGATIVE
BUN SERPL-MCNC: 24 MG/DL (ref 6–20)
BUN/CREAT SERPL: 20 (ref 12–20)
CALCIUM SERPL-MCNC: 8.4 MG/DL (ref 8.5–10.1)
CALCULATED P AXIS, ECG09: 73 DEGREES
CALCULATED R AXIS, ECG10: 64 DEGREES
CALCULATED T AXIS, ECG11: 63 DEGREES
CHLORIDE SERPL-SCNC: 100 MMOL/L (ref 97–108)
CO2 SERPL-SCNC: 26 MMOL/L (ref 21–32)
COLOR UR: ABNORMAL
CREAT SERPL-MCNC: 1.23 MG/DL (ref 0.7–1.3)
DIAGNOSIS, 93000: NORMAL
EPITH CASTS URNS QL MICRO: ABNORMAL /LPF
ERYTHROCYTE [DISTWIDTH] IN BLOOD BY AUTOMATED COUNT: 16.8 % (ref 11.5–14.5)
GLUCOSE SERPL-MCNC: 95 MG/DL (ref 65–100)
GLUCOSE UR STRIP.AUTO-MCNC: NEGATIVE MG/DL
HCT VFR BLD AUTO: 36 % (ref 36.6–50.3)
HGB BLD-MCNC: 11.5 G/DL (ref 12.1–17)
HGB UR QL STRIP: ABNORMAL
KETONES UR QL STRIP.AUTO: ABNORMAL MG/DL
LEUKOCYTE ESTERASE UR QL STRIP.AUTO: NEGATIVE
MCH RBC QN AUTO: 31.4 PG (ref 26–34)
MCHC RBC AUTO-ENTMCNC: 31.9 G/DL (ref 30–36.5)
MCV RBC AUTO: 98.4 FL (ref 80–99)
NITRITE UR QL STRIP.AUTO: NEGATIVE
NRBC # BLD: 0 K/UL (ref 0–0.01)
NRBC BLD-RTO: 0 PER 100 WBC
P-R INTERVAL, ECG05: 126 MS
PH UR STRIP: 5.5 [PH] (ref 5–8)
PLATELET # BLD AUTO: 465 K/UL (ref 150–400)
PMV BLD AUTO: 9 FL (ref 8.9–12.9)
POTASSIUM SERPL-SCNC: 4.2 MMOL/L (ref 3.5–5.1)
PREALB SERPL-MCNC: 9.7 MG/DL (ref 20–40)
PROT UR STRIP-MCNC: 100 MG/DL
Q-T INTERVAL, ECG07: 356 MS
QRS DURATION, ECG06: 64 MS
QTC CALCULATION (BEZET), ECG08: 479 MS
RBC # BLD AUTO: 3.66 M/UL (ref 4.1–5.7)
RBC #/AREA URNS HPF: ABNORMAL /HPF (ref 0–5)
SODIUM SERPL-SCNC: 134 MMOL/L (ref 136–145)
SP GR UR REFRACTOMETRY: 1.01 (ref 1–1.03)
UA: UC IF INDICATED,UAUC: ABNORMAL
UROBILINOGEN UR QL STRIP.AUTO: 0.2 EU/DL (ref 0.2–1)
VENTRICULAR RATE, ECG03: 109 BPM
WBC # BLD AUTO: 7.3 K/UL (ref 4.1–11.1)
WBC CASTS URNS QL MICRO: ABNORMAL /LPF
WBC URNS QL MICRO: ABNORMAL /HPF (ref 0–4)

## 2021-11-26 PROCEDURE — 99222 1ST HOSP IP/OBS MODERATE 55: CPT | Performed by: SURGERY

## 2021-11-26 PROCEDURE — 74011000258 HC RX REV CODE- 258: Performed by: STUDENT IN AN ORGANIZED HEALTH CARE EDUCATION/TRAINING PROGRAM

## 2021-11-26 PROCEDURE — G0378 HOSPITAL OBSERVATION PER HR: HCPCS

## 2021-11-26 PROCEDURE — 85027 COMPLETE CBC AUTOMATED: CPT

## 2021-11-26 PROCEDURE — 36415 COLL VENOUS BLD VENIPUNCTURE: CPT

## 2021-11-26 PROCEDURE — 74011250636 HC RX REV CODE- 250/636: Performed by: STUDENT IN AN ORGANIZED HEALTH CARE EDUCATION/TRAINING PROGRAM

## 2021-11-26 PROCEDURE — 80048 BASIC METABOLIC PNL TOTAL CA: CPT

## 2021-11-26 PROCEDURE — 84134 ASSAY OF PREALBUMIN: CPT

## 2021-11-26 PROCEDURE — 96376 TX/PRO/DX INJ SAME DRUG ADON: CPT

## 2021-11-26 RX ORDER — HYDRALAZINE HYDROCHLORIDE 20 MG/ML
20 INJECTION INTRAMUSCULAR; INTRAVENOUS
Status: DISCONTINUED | OUTPATIENT
Start: 2021-11-26 | End: 2021-12-06

## 2021-11-26 RX ADMIN — POTASSIUM CHLORIDE, DEXTROSE MONOHYDRATE AND SODIUM CHLORIDE 100 ML/HR: 150; 5; 450 INJECTION, SOLUTION INTRAVENOUS at 13:15

## 2021-11-26 RX ADMIN — PIPERACILLIN AND TAZOBACTAM 3.38 G: 3; .375 INJECTION, POWDER, LYOPHILIZED, FOR SOLUTION INTRAVENOUS at 22:10

## 2021-11-26 RX ADMIN — MORPHINE SULFATE 4 MG: 2 INJECTION, SOLUTION INTRAMUSCULAR; INTRAVENOUS at 22:17

## 2021-11-26 RX ADMIN — SODIUM CHLORIDE, PRESERVATIVE FREE 10 ML: 5 INJECTION INTRAVENOUS at 22:11

## 2021-11-26 RX ADMIN — PIPERACILLIN AND TAZOBACTAM 3.38 G: 3; .375 INJECTION, POWDER, LYOPHILIZED, FOR SOLUTION INTRAVENOUS at 14:16

## 2021-11-26 RX ADMIN — SODIUM CHLORIDE, PRESERVATIVE FREE 10 ML: 5 INJECTION INTRAVENOUS at 14:16

## 2021-11-26 RX ADMIN — PIPERACILLIN AND TAZOBACTAM 3.38 G: 3; .375 INJECTION, POWDER, LYOPHILIZED, FOR SOLUTION INTRAVENOUS at 06:33
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
## 2021-11-26 NOTE — PROGRESS NOTES
Pt arrived to unit 620 pm got pt comfy, suction hooked up to NG tube after confirming placement. Pt had vomited 800 mL pea green liquid. Once NG tube hooked up pt filled whole canister (100mL) in 25 minutes. Report given to [employer] for questions and answers provided.

## 2021-11-26 NOTE — PROGRESS NOTES
End of Shift Note    Bedside shift change report given to Darryl Bond RN (oncoming nurse) by Andreia Noble LPN (offgoing nurse). Report included the following information SBAR, Kardex, Intake/Output, MAR, Recent Results and Cardiac Rhythm NSR    Shift worked:  7p-730a     Shift summary and any significant changes:     Pt had total of 1700cc of pea-green output from NG tube. Treated for pain and nausea x1. No further complaints. Concerns for physician to address:       Zone phone for oncoming shift:          Activity:  Activity Level: Up with Assistance  Number times ambulated in hallways past shift: 0  Number of times OOB to chair past shift: 0    Cardiac:   Cardiac Monitoring: Yes      Cardiac Rhythm: Sinus Rhythm    Access:   Current line(s): PIV     Genitourinary:   Urinary status: voiding    Respiratory:   O2 Device: None (Room air)  Chronic home O2 use?: NO  Incentive spirometer at bedside: NO     GI:  Last Bowel Movement Date: 11/26/21  Current diet:  DIET NPO  Passing flatus: YES  Tolerating current diet: YES-NPO       Pain Management:   Patient states pain is manageable on current regimen: YES    Skin:  Russell Score: 19  Interventions: increase time out of bed    Patient Safety:  Fall Score:  Total Score: 3  Interventions: gripper socks and pt to call before getting OOB  High Fall Risk: Yes    Length of Stay:  Expected LOS: - - -  Actual LOS: 1      Andreia Noble LPN
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
## 2021-11-26 NOTE — PROGRESS NOTES
Transition of Care Plan:    RUR: 20% \"high risk\"  Disposition: Home w/ family & follow-up appts  Follow up appointments: PCP, Surgery  DME needed: None  Transportation at Discharge: Family  [place] or means to access home:  Yes  IM Medicare Letter: N/A  Is patient a BCPI-A Bundle:   No        If yes, was Bundle Letter given?:   N/A  Caregiver Contact: Mother- Devora Thrasher, [phone]  Discharge Caregiver contacted prior to discharge? CM acknowledged observation status. Oral and Written notification given to patient and/or caregiver informing them that they are currently an Outpatient receiving care in our facility. Outpatient services include Observation Services. CM reviewed pt's chart. Currently pt's 3rd admission this month; will be readmission if upgraded to inpatient. General surgery following for possible exploratory laparoscopy or exploratory laparotomy during this admission. CM met with pt at bedside to introduce role & complete initial assessment. Pt confirmed demographic information is up to date. Reports living with his mother & 2 brothers in single level/ 5 [address] house. Reports being independent with ADL/ IADL; does not drive. Though pt lives with family he reports very limited support; says he hasn not bathed in days & unable to  his medications. Pt reports he has a  with [employer] that occasionally checks in & provides transportation to medical appts. Pt [place] [facility] [place]; has received list of [facility] providers prior admissions. Pt reports his cousin is a nurse & requested for CM to call her if he needs HH at discharge ([address], [phone]); CM to follow-up on Monday if pt remains admitted through the weekend. Family to transport at discharge. Will continue to follow.     Reason for Admission:   Sepsis, Aspiration pneumonia, SBO               RUR Score:     20%    PCP: First and Last name: None     Name of Practice:   Are you a current patient: Yes/No:   Approximate date of last visit:    Can you do a virtual visit with your PCP:              Plan for utilizing home health: No PT/OT needs identified; could benefit from an aide/ SW/ SN    Resources/supports as identified by patient/family:   Pt reports very limited support from family. Lives with his mother (works 3rd shift) & his 2 brother                Top Challenges facing patient (as identified by patient/family and CM): Finances/Medication cost?    Tamera Jaron? Does not drives; receives support from family &             Support system or lack thereof? Reports having limited support                   Living arrangements? Lives with mother & 2 brothers               Self-care/ADLs/Cognition? Unknown at this time- reports not being able to bathe & care for himself appropriately? ?          Payor Source Payor: Ben Nolan / Plan: Roosevelt Barrera / Product Type: Managed Care Medicaid /     Current Advanced Directive/Advance Care Plan:  Full Code  Advance Care Planning     General Advance Care Planning (ACP) Conversation  Date of Conversation: 11/26/2021  Conducted with: Patient with Decision Making [address]:   No healthcare decision makers have been documented. Click here to complete [address] including selection of the Healthcare Decision Maker Relationship (ie \"Primary\")    Content/Action Overview:   DECLINED ACP conversation - will revisit periodically   Reviewed DNR/DNI and patient elects Full Code (Attempt Resuscitation)    Length of Voluntary ACP Conversation in minutes:  <16 minutes (Non-Billable)    [facility] Management Interventions  PCP Verified by CM: No  Palliative Care Criteria Met (RRAT>21 & CHF Dx)?: No  Mode of Transport at Discharge:  Other (see comment) (Family)  Transition of Care Consult (CM Consult): Discharge Planning  Discharge Durable Medical Equipment: No  Physical Therapy Consult: No  Occupational Therapy Consult: No  Speech Therapy Consult: No  Support Systems: Other Family Member(s), Parent(s), /  Confirm Follow Up Transport: Family  Discharge Location  Discharge Placement: Yoandy Sprague, MSW  Care Management

## 2021-11-26 NOTE — PROGRESS NOTES
End of Shift Note    Bedside shift change report given to Manjit (oncoming nurse) by Ra Joy RN (offgoing nurse). Report included the following information SBAR, Kardex, ED Summary, Intake/Output, MAR, Accordion and Recent Results    Shift worked:  7a-7p     Shift summary and any significant changes:     Pts NGT at 65cm, 850cc green out. Pt educated twice regarding NPO and NGT status. Concerns for physician to address:  none     Zone phone for oncoming shift:   [phone]       Activity:  Activity Level: Up with Assistance  Number times ambulated in hallways past shift: 0  Number of times OOB to chair past shift: 0    Cardiac:   Cardiac Monitoring: Yes      Cardiac Rhythm: Sinus Rhythm    Access:   Current line(s): PIV     Genitourinary:   Urinary status: voiding    Respiratory:   O2 Device: None (Room air)  Chronic home O2 use?: NO  Incentive spirometer at bedside: YES     GI:  Last Bowel Movement Date: 11/25/21  Current diet:  DIET NPO  Passing flatus: YES   Tolerating current diet: YES       Pain Management:   Patient states pain is manageable on current regimen: N/A    Skin:  Russell Score: 18  Interventions: increase time out of bed    Patient Safety:  Fall Score:  Total Score: 2  Interventions: gripper socks  High Fall Risk: Yes    Length of Stay:  Expected LOS: - - -  Actual LOS: 1      Ra Joy RN

## 2021-11-27 PROBLEM — R10.9 ABDOMINAL PAIN, ACUTE: Status: ACTIVE | Noted: 2021-11-27

## 2021-11-27 PROBLEM — R11.15 INTRACTABLE CYCLICAL VOMITING WITH NAUSEA: Status: ACTIVE | Noted: 2021-11-27

## 2021-11-27 LAB
ANION GAP SERPL CALC-SCNC: 9 MMOL/L (ref 5–15)
BUN SERPL-MCNC: 18 MG/DL (ref 6–20)
BUN/CREAT SERPL: 18 (ref 12–20)
CALCIUM SERPL-MCNC: 8.7 MG/DL (ref 8.5–10.1)
CHLORIDE SERPL-SCNC: 97 MMOL/L (ref 97–108)
CO2 SERPL-SCNC: 29 MMOL/L (ref 21–32)
CREAT SERPL-MCNC: 0.99 MG/DL (ref 0.7–1.3)
ERYTHROCYTE [DISTWIDTH] IN BLOOD BY AUTOMATED COUNT: 16.3 % (ref 11.5–14.5)
GLUCOSE SERPL-MCNC: 116 MG/DL (ref 65–100)
HCT VFR BLD AUTO: 33.6 % (ref 36.6–50.3)
HEMOCCULT STL QL: POSITIVE
HGB BLD-MCNC: 10.7 G/DL (ref 12.1–17)
MCH RBC QN AUTO: 31.7 PG (ref 26–34)
MCHC RBC AUTO-ENTMCNC: 31.8 G/DL (ref 30–36.5)
MCV RBC AUTO: 99.4 FL (ref 80–99)
NRBC # BLD: 0 K/UL (ref 0–0.01)
NRBC BLD-RTO: 0 PER 100 WBC
PLATELET # BLD AUTO: 333 K/UL (ref 150–400)
PMV BLD AUTO: 9.2 FL (ref 8.9–12.9)
POTASSIUM SERPL-SCNC: 3.7 MMOL/L (ref 3.5–5.1)
PROCALCITONIN SERPL-MCNC: 0.55 NG/ML
RBC # BLD AUTO: 3.38 M/UL (ref 4.1–5.7)
SODIUM SERPL-SCNC: 135 MMOL/L (ref 136–145)
WBC # BLD AUTO: 5.1 K/UL (ref 4.1–11.1)

## 2021-11-27 PROCEDURE — 74011250636 HC RX REV CODE- 250/636: Performed by: STUDENT IN AN ORGANIZED HEALTH CARE EDUCATION/TRAINING PROGRAM

## 2021-11-27 PROCEDURE — 85027 COMPLETE CBC AUTOMATED: CPT

## 2021-11-27 PROCEDURE — 36415 COLL VENOUS BLD VENIPUNCTURE: CPT

## 2021-11-27 PROCEDURE — 84145 PROCALCITONIN (PCT): CPT

## 2021-11-27 PROCEDURE — 99232 SBSQ HOSP IP/OBS MODERATE 35: CPT | Performed by: SURGERY

## 2021-11-27 PROCEDURE — 74011250636 HC RX REV CODE- 250/636: Performed by: NURSE PRACTITIONER

## 2021-11-27 PROCEDURE — 74011000258 HC RX REV CODE- 258: Performed by: STUDENT IN AN ORGANIZED HEALTH CARE EDUCATION/TRAINING PROGRAM

## 2021-11-27 PROCEDURE — 76937 US GUIDE VASCULAR ACCESS: CPT

## 2021-11-27 PROCEDURE — 80048 BASIC METABOLIC PNL TOTAL CA: CPT

## 2021-11-27 PROCEDURE — 74011000258 HC RX REV CODE- 258: Performed by: NURSE PRACTITIONER

## 2021-11-27 PROCEDURE — 74011000250 HC RX REV CODE- 250: Performed by: NURSE PRACTITIONER

## 2021-11-27 PROCEDURE — C1751 CATH, INF, PER/CENT/MIDLINE: HCPCS

## 2021-11-27 PROCEDURE — 82272 OCCULT BLD FECES 1-3 TESTS: CPT

## 2021-11-27 PROCEDURE — 96376 TX/PRO/DX INJ SAME DRUG ADON: CPT

## 2021-11-27 PROCEDURE — 65660000000 HC RM CCU STEPDOWN

## 2021-11-27 PROCEDURE — 77030018786 HC NDL GD F/USND BARD -B

## 2021-11-27 PROCEDURE — G0378 HOSPITAL OBSERVATION PER HR: HCPCS

## 2021-11-27 PROCEDURE — 36573 INSJ PICC RS&I 5 YR+: CPT | Performed by: NURSE PRACTITIONER

## 2021-11-27 RX ORDER — KETOROLAC TROMETHAMINE 30 MG/ML
30 INJECTION, SOLUTION INTRAMUSCULAR; INTRAVENOUS
Status: COMPLETED | OUTPATIENT
Start: 2021-11-27 | End: 2021-11-27

## 2021-11-27 RX ORDER — HEPARIN 100 UNIT/ML
300 SYRINGE INTRAVENOUS AS NEEDED
Status: CANCELLED | OUTPATIENT
Start: 2021-11-27

## 2021-11-27 RX ORDER — BACITRACIN 500 UNIT/G
1 PACKET (EA) TOPICAL AS NEEDED
Status: DISCONTINUED | OUTPATIENT
Start: 2021-11-27 | End: 2022-01-11 | Stop reason: HOSPADM

## 2021-11-27 RX ORDER — AMLODIPINE BESYLATE 5 MG/1
5 TABLET ORAL DAILY
Status: DISCONTINUED | OUTPATIENT
Start: 2021-11-27 | End: 2021-11-28

## 2021-11-27 RX ORDER — DICYCLOMINE HYDROCHLORIDE 20 MG/1
20 TABLET ORAL
Status: DISCONTINUED | OUTPATIENT
Start: 2021-11-27 | End: 2022-01-11 | Stop reason: HOSPADM

## 2021-11-27 RX ADMIN — PIPERACILLIN AND TAZOBACTAM 3.38 G: 3; .375 INJECTION, POWDER, LYOPHILIZED, FOR SOLUTION INTRAVENOUS at 05:29

## 2021-11-27 RX ADMIN — KETOROLAC TROMETHAMINE 30 MG: 30 INJECTION, SOLUTION INTRAMUSCULAR; INTRAVENOUS at 09:06

## 2021-11-27 RX ADMIN — MAGNESIUM SULFATE HEPTAHYDRATE: 500 INJECTION, SOLUTION INTRAMUSCULAR; INTRAVENOUS at 18:27

## 2021-11-27 RX ADMIN — SODIUM CHLORIDE, PRESERVATIVE FREE 10 ML: 5 INJECTION INTRAVENOUS at 22:58

## 2021-11-27 RX ADMIN — SODIUM CHLORIDE, PRESERVATIVE FREE 5 ML: 5 INJECTION INTRAVENOUS at 15:00

## 2021-11-27 RX ADMIN — KETOROLAC TROMETHAMINE 30 MG: 30 INJECTION, SOLUTION INTRAMUSCULAR; INTRAVENOUS at 20:29

## 2021-11-27 RX ADMIN — POTASSIUM CHLORIDE, DEXTROSE MONOHYDRATE AND SODIUM CHLORIDE 100 ML/HR: 150; 5; 450 INJECTION, SOLUTION INTRAVENOUS at 09:18

## 2021-11-27 RX ADMIN — PIPERACILLIN AND TAZOBACTAM 3.38 G: 3; .375 INJECTION, POWDER, LYOPHILIZED, FOR SOLUTION INTRAVENOUS at 15:00

## 2021-11-27 RX ADMIN — PIPERACILLIN AND TAZOBACTAM 3.38 G: 3; .375 INJECTION, POWDER, LYOPHILIZED, FOR SOLUTION INTRAVENOUS at 22:55
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
## 2021-11-27 NOTE — PROGRESS NOTES
Notified Dr. Araya Pounds of NG tube output per post clamped per order was 50ml green output. Pt denies any nausea. Received orders to remove NG tube & place pt on clear liquid diet.
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
## 2021-11-27 NOTE — PROGRESS NOTES
PICC Education: Explained reason and rationale for PICC placement along with providing education in order to make an informed consent including nature, risks, benefits, potential complications, care and maintenance of PICC line. The opportunity for questions or concerns was given. A 'Patient PICC Handbook was also provided. Patient  gave written consent for PICC procedure to be done at the bedside. Patient  verbalizes understanding at this time. Procedure:  Time out completed. Pre procedure assessment done. Maximum sterile barrier precautions observed throughout procedure. Lidocaine 1% 3.0ml sc given prior to cannulation  Cannulated Brachial vein using ultrasound guidance and modified seldinger technique. Inserted DOUBLE lumen 5 fr PICC in  RIGHT arm using, [employer] Clothes Tip Location System and 3CG   Patient has sinus rhythm. Tip Positioning System indicating tall P wave and no negative deflection before P wave which would indicate the PICC tip is properly placed in the distal SVC or the CAJ. PICC tip was confirmed by 2 PICC nurses and 3CG printout was placed on patients chart. Blood return verified and flushed with 20ml NS in each port. Sterile dressing applied with Biopatch, Stat loc, occlusive dressing per protocol. Curios caps applied to each port. Reason for access (TPN). Complications related to insertion (None). Patient tolerated procedure well with minimal blood loss. PICC procedure performed by: Antonio Scott RN, BSN, [facility] / Vascular Access Nurse  Assisted by: Casey Alamo RN, [facility] / Vascular Access Nurse    RIGHT  arm circumference: 23 cm. Catheter internal length:  40 cm  Catheter external length: 2 cm  TOTAL Length:42 cm  PICC catheter occupies 27% of vein    Brand of catheter:  Jeaninee Goldie  Lot #[id number]   REF# [id number]    EXP 08/31/2022. Primary nurse Sherry BAKER, notified PICC line may be used and to hang new infusion tubing prior to use.       Antonio Scott RN, BSN, [facility] Vascular Access Team

## 2021-11-27 NOTE — PROGRESS NOTES
Bedside and Verbal shift change report given to Diana De La Cruz RN (oncoming nurse) by Matheus Up LPN (offgoing nurse). Report included the following information SBAR, Kardex, ED Summary, Intake/Output, MAR, Recent Results, Med Rec Status and Cardiac Rhythm NSR.

## 2021-11-28 LAB
ANION GAP SERPL CALC-SCNC: 6 MMOL/L (ref 5–15)
BUN SERPL-MCNC: 17 MG/DL (ref 6–20)
BUN/CREAT SERPL: 20 (ref 12–20)
CALCIUM SERPL-MCNC: 8.6 MG/DL (ref 8.5–10.1)
CHLORIDE SERPL-SCNC: 99 MMOL/L (ref 97–108)
CO2 SERPL-SCNC: 31 MMOL/L (ref 21–32)
CREAT SERPL-MCNC: 0.86 MG/DL (ref 0.7–1.3)
ERYTHROCYTE [DISTWIDTH] IN BLOOD BY AUTOMATED COUNT: 15.9 % (ref 11.5–14.5)
GLUCOSE SERPL-MCNC: 94 MG/DL (ref 65–100)
HCT VFR BLD AUTO: 36.4 % (ref 36.6–50.3)
HGB BLD-MCNC: 11.2 G/DL (ref 12.1–17)
MCH RBC QN AUTO: 31.1 PG (ref 26–34)
MCHC RBC AUTO-ENTMCNC: 30.8 G/DL (ref 30–36.5)
MCV RBC AUTO: 101.1 FL (ref 80–99)
NRBC # BLD: 0 K/UL (ref 0–0.01)
NRBC BLD-RTO: 0 PER 100 WBC
PLATELET # BLD AUTO: 367 K/UL (ref 150–400)
PMV BLD AUTO: 9.6 FL (ref 8.9–12.9)
POTASSIUM SERPL-SCNC: 3.4 MMOL/L (ref 3.5–5.1)
PROCALCITONIN SERPL-MCNC: 0.35 NG/ML
RBC # BLD AUTO: 3.6 M/UL (ref 4.1–5.7)
SODIUM SERPL-SCNC: 136 MMOL/L (ref 136–145)
WBC # BLD AUTO: 5.9 K/UL (ref 4.1–11.1)

## 2021-11-28 PROCEDURE — 85027 COMPLETE CBC AUTOMATED: CPT

## 2021-11-28 PROCEDURE — 74011000258 HC RX REV CODE- 258: Performed by: STUDENT IN AN ORGANIZED HEALTH CARE EDUCATION/TRAINING PROGRAM

## 2021-11-28 PROCEDURE — 65660000000 HC RM CCU STEPDOWN

## 2021-11-28 PROCEDURE — 74011250636 HC RX REV CODE- 250/636: Performed by: STUDENT IN AN ORGANIZED HEALTH CARE EDUCATION/TRAINING PROGRAM

## 2021-11-28 PROCEDURE — 80048 BASIC METABOLIC PNL TOTAL CA: CPT

## 2021-11-28 PROCEDURE — 36415 COLL VENOUS BLD VENIPUNCTURE: CPT

## 2021-11-28 PROCEDURE — 84145 PROCALCITONIN (PCT): CPT

## 2021-11-28 PROCEDURE — 74011250637 HC RX REV CODE- 250/637: Performed by: STUDENT IN AN ORGANIZED HEALTH CARE EDUCATION/TRAINING PROGRAM

## 2021-11-28 PROCEDURE — 74011000250 HC RX REV CODE- 250: Performed by: NURSE PRACTITIONER

## 2021-11-28 PROCEDURE — 99232 SBSQ HOSP IP/OBS MODERATE 35: CPT | Performed by: SURGERY

## 2021-11-28 PROCEDURE — 74011250636 HC RX REV CODE- 250/636: Performed by: NURSE PRACTITIONER

## 2021-11-28 PROCEDURE — 74011000258 HC RX REV CODE- 258: Performed by: NURSE PRACTITIONER

## 2021-11-28 PROCEDURE — 74011250637 HC RX REV CODE- 250/637: Performed by: NURSE PRACTITIONER

## 2021-11-28 RX ORDER — HYDROMORPHONE HYDROCHLORIDE 1 MG/ML
0.5 INJECTION, SOLUTION INTRAMUSCULAR; INTRAVENOUS; SUBCUTANEOUS
Status: DISCONTINUED | OUTPATIENT
Start: 2021-11-28 | End: 2021-11-29

## 2021-11-28 RX ORDER — AMLODIPINE BESYLATE 5 MG/1
10 TABLET ORAL DAILY
Status: DISCONTINUED | OUTPATIENT
Start: 2021-11-29 | End: 2022-01-11 | Stop reason: HOSPADM

## 2021-11-28 RX ADMIN — HYDROMORPHONE HYDROCHLORIDE 0.5 MG: 1 INJECTION, SOLUTION INTRAMUSCULAR; INTRAVENOUS; SUBCUTANEOUS at 08:49

## 2021-11-28 RX ADMIN — MAGNESIUM SULFATE HEPTAHYDRATE: 500 INJECTION, SOLUTION INTRAMUSCULAR; INTRAVENOUS at 18:01

## 2021-11-28 RX ADMIN — DICYCLOMINE HYDROCHLORIDE 20 MG: 20 TABLET ORAL at 06:19

## 2021-11-28 RX ADMIN — HYDROMORPHONE HYDROCHLORIDE 0.5 MG: 1 INJECTION, SOLUTION INTRAMUSCULAR; INTRAVENOUS; SUBCUTANEOUS at 13:47

## 2021-11-28 RX ADMIN — PIPERACILLIN AND TAZOBACTAM 3.38 G: 3; .375 INJECTION, POWDER, LYOPHILIZED, FOR SOLUTION INTRAVENOUS at 06:19

## 2021-11-28 RX ADMIN — LINACLOTIDE 290 MCG: 290 CAPSULE, GELATIN COATED ORAL at 07:02

## 2021-11-28 RX ADMIN — AMLODIPINE BESYLATE 5 MG: 5 TABLET ORAL at 08:50

## 2021-11-28 RX ADMIN — SODIUM CHLORIDE, PRESERVATIVE FREE 10 ML: 5 INJECTION INTRAVENOUS at 22:10

## 2021-11-28 RX ADMIN — ONDANSETRON HYDROCHLORIDE 4 MG: 2 INJECTION, SOLUTION INTRAMUSCULAR; INTRAVENOUS at 18:12

## 2021-11-28 RX ADMIN — HYDROMORPHONE HYDROCHLORIDE 0.5 MG: 1 INJECTION, SOLUTION INTRAMUSCULAR; INTRAVENOUS; SUBCUTANEOUS at 18:01

## 2021-11-28 RX ADMIN — SODIUM CHLORIDE, PRESERVATIVE FREE 10 ML: 5 INJECTION INTRAVENOUS at 07:03

## 2021-11-28 RX ADMIN — SODIUM CHLORIDE, PRESERVATIVE FREE 10 ML: 5 INJECTION INTRAVENOUS at 13:47
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
## 2021-11-28 NOTE — PROGRESS NOTES
End of Shift Note    Bedside shift change report given to Maty Cuellar RN (oncoming nurse) by Vikki Godwin RN (offgoing nurse). Report included the following information SBAR, Kardex, Intake/Output, MAR and Recent Results    Shift worked:  7p-7a   Shift summary and any significant changes:    Pt complained of pain, no prn orders. Perfect Serve sent to NP, Toradol ordered x1 dose. Pt c/o pain, NP messaged, Bentyl given, did not seem effective. Had 1 loose BM last night.    Concerns for physician to address:  Pain control     Zone phone for oncoming shift:              Vikki Godwin RN

## 2021-11-28 NOTE — PROGRESS NOTES
Received notification from bedside RN about patient with regards to: requesting medication for 7/10 abdominal pain, no PRN meds ordered  VS: /92, HR 68, RR 16, O2 sat 99% on RA    Intervention given: Bentyl PO prn, Toradol IV x 1 dose ordered
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
## 2021-11-29 ENCOUNTER — APPOINTMENT (OUTPATIENT)
Dept: GENERAL RADIOLOGY | Age: 41
DRG: 710 | End: 2021-11-29
Attending: NURSE PRACTITIONER
Payer: MEDICAID

## 2021-11-29 LAB
ANION GAP SERPL CALC-SCNC: 8 MMOL/L (ref 5–15)
BUN SERPL-MCNC: 10 MG/DL (ref 6–20)
BUN/CREAT SERPL: 12 (ref 12–20)
CALCIUM SERPL-MCNC: 9.5 MG/DL (ref 8.5–10.1)
CHLORIDE SERPL-SCNC: 95 MMOL/L (ref 97–108)
CO2 SERPL-SCNC: 30 MMOL/L (ref 21–32)
CREAT SERPL-MCNC: 0.83 MG/DL (ref 0.7–1.3)
ERYTHROCYTE [DISTWIDTH] IN BLOOD BY AUTOMATED COUNT: 15.9 % (ref 11.5–14.5)
GLUCOSE SERPL-MCNC: 85 MG/DL (ref 65–100)
HCT VFR BLD AUTO: 41.6 % (ref 36.6–50.3)
HGB BLD-MCNC: 13.3 G/DL (ref 12.1–17)
MCH RBC QN AUTO: 31.6 PG (ref 26–34)
MCHC RBC AUTO-ENTMCNC: 32 G/DL (ref 30–36.5)
MCV RBC AUTO: 98.8 FL (ref 80–99)
NRBC # BLD: 0 K/UL (ref 0–0.01)
NRBC BLD-RTO: 0 PER 100 WBC
PLATELET # BLD AUTO: 392 K/UL (ref 150–400)
PMV BLD AUTO: 9.9 FL (ref 8.9–12.9)
POTASSIUM SERPL-SCNC: 3.4 MMOL/L (ref 3.5–5.1)
PROCALCITONIN SERPL-MCNC: 0.21 NG/ML
RBC # BLD AUTO: 4.21 M/UL (ref 4.1–5.7)
SODIUM SERPL-SCNC: 133 MMOL/L (ref 136–145)
WBC # BLD AUTO: 7.7 K/UL (ref 4.1–11.1)

## 2021-11-29 PROCEDURE — 80048 BASIC METABOLIC PNL TOTAL CA: CPT

## 2021-11-29 PROCEDURE — 84145 PROCALCITONIN (PCT): CPT

## 2021-11-29 PROCEDURE — 74018 RADEX ABDOMEN 1 VIEW: CPT

## 2021-11-29 PROCEDURE — 99232 SBSQ HOSP IP/OBS MODERATE 35: CPT | Performed by: SURGERY

## 2021-11-29 PROCEDURE — 74011000250 HC RX REV CODE- 250: Performed by: NURSE PRACTITIONER

## 2021-11-29 PROCEDURE — 74011250637 HC RX REV CODE- 250/637: Performed by: STUDENT IN AN ORGANIZED HEALTH CARE EDUCATION/TRAINING PROGRAM

## 2021-11-29 PROCEDURE — 36415 COLL VENOUS BLD VENIPUNCTURE: CPT

## 2021-11-29 PROCEDURE — 74011000258 HC RX REV CODE- 258: Performed by: NURSE PRACTITIONER

## 2021-11-29 PROCEDURE — 85027 COMPLETE CBC AUTOMATED: CPT

## 2021-11-29 PROCEDURE — 74011250636 HC RX REV CODE- 250/636: Performed by: STUDENT IN AN ORGANIZED HEALTH CARE EDUCATION/TRAINING PROGRAM

## 2021-11-29 PROCEDURE — 74011250636 HC RX REV CODE- 250/636: Performed by: NURSE PRACTITIONER

## 2021-11-29 PROCEDURE — 74011250637 HC RX REV CODE- 250/637: Performed by: NURSE PRACTITIONER

## 2021-11-29 PROCEDURE — 65660000000 HC RM CCU STEPDOWN

## 2021-11-29 RX ORDER — KETOROLAC TROMETHAMINE 30 MG/ML
15 INJECTION, SOLUTION INTRAMUSCULAR; INTRAVENOUS
Status: DISPENSED | OUTPATIENT
Start: 2021-11-29 | End: 2021-12-04

## 2021-11-29 RX ADMIN — SODIUM CHLORIDE, PRESERVATIVE FREE 10 ML: 5 INJECTION INTRAVENOUS at 06:48

## 2021-11-29 RX ADMIN — SODIUM CHLORIDE 5 MG: 9 INJECTION INTRAMUSCULAR; INTRAVENOUS; SUBCUTANEOUS at 04:59

## 2021-11-29 RX ADMIN — SODIUM CHLORIDE, PRESERVATIVE FREE 10 ML: 5 INJECTION INTRAVENOUS at 23:28

## 2021-11-29 RX ADMIN — ACETAMINOPHEN 650 MG: 325 TABLET ORAL at 09:22

## 2021-11-29 RX ADMIN — AMLODIPINE BESYLATE 10 MG: 5 TABLET ORAL at 09:22

## 2021-11-29 RX ADMIN — SODIUM CHLORIDE, PRESERVATIVE FREE 10 ML: 5 INJECTION INTRAVENOUS at 13:22

## 2021-11-29 RX ADMIN — LINACLOTIDE 290 MCG: 290 CAPSULE, GELATIN COATED ORAL at 06:47

## 2021-11-29 RX ADMIN — MAGNESIUM SULFATE HEPTAHYDRATE: 500 INJECTION, SOLUTION INTRAMUSCULAR; INTRAVENOUS at 20:15

## 2021-11-29 RX ADMIN — KETOROLAC TROMETHAMINE 15 MG: 30 INJECTION, SOLUTION INTRAMUSCULAR; INTRAVENOUS at 13:17

## 2021-11-29 RX ADMIN — SODIUM CHLORIDE 5 MG: 9 INJECTION INTRAMUSCULAR; INTRAVENOUS; SUBCUTANEOUS at 13:17

## 2021-11-29 RX ADMIN — HYDROMORPHONE HYDROCHLORIDE 0.5 MG: 1 INJECTION, SOLUTION INTRAMUSCULAR; INTRAVENOUS; SUBCUTANEOUS at 00:13

## 2021-11-29 RX ADMIN — ONDANSETRON HYDROCHLORIDE 4 MG: 2 INJECTION, SOLUTION INTRAMUSCULAR; INTRAVENOUS at 00:13

## 2021-11-29 RX ADMIN — HYDROMORPHONE HYDROCHLORIDE 0.5 MG: 1 INJECTION, SOLUTION INTRAMUSCULAR; INTRAVENOUS; SUBCUTANEOUS at 04:38

## 2021-11-29 NOTE — PROGRESS NOTES
Transition of Care Plan:     RUR:   17% \"mod risk\"  Disposition: Home w/ family & follow-up appts  Follow up appointments: PCP, Surgery  DME needed: None  Transportation at Discharge: Family  Henry Akins or means to access home:  Yes  IM Medicare Letter: N/A  Is patient a BCPI-A Bundle:   No                   If yes, was Bundle Letter given?:   N/A  Caregiver Contact: Mother- Ginny Hinton, [phone]  Discharge Caregiver contacted prior to discharge? CM reviewed pt's chart. Pt currently on TPN; assessing need for NG tube. General surgery following; pt clear from surgical standpoint at this time. Current barrier to discharge is TPN; unknown if pt will discharge home on TPN? Will continue to follow.     Owen Castano MSW  Care Management

## 2021-11-29 NOTE — PROGRESS NOTES
Received notification from bedside RN about patient with regards to: persistent nausea and recent vomiting.  Not yet due for PRN Zofran  VS: /102, HR 94, RR 17, O2 sat 99% on RA    Intervention given: Compazine IV prn ordered
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
## 2021-11-29 NOTE — PROGRESS NOTES
End of Shift Note    Bedside shift change report given to Quique Vazquez RN (oncoming nurse) by Kelly Toscano RN (offgoing nurse). Report included the following information SBAR, Kardex, Intake/Output, MAR and Recent Results    Shift worked:  7p-7a     Shift summary and any significant changes:     Pt c/o pain, pain meds given, effective. Pt ambulating to bathroom. After administered Dilaudid, pt became nauseous and vomited 22 mL of green. Not due for Zofran. Perfect Serve sent to NP, Compazine ordered, effective.  Pt having BM, passing gas, abd distended this am.     Concerns for physician to address:       Zone phone for oncoming shift:              Kelly Toscano RN

## 2021-11-29 NOTE — PROGRESS NOTES
Comprehensive Nutrition Assessment    Type and Reason for Visit: Reassess    Nutrition Recommendations/Plan:    · Continue TPN D20/5%AA but would increase this evening to 63 ml/hr 11/29 - 11/30. · If lytes and BG stable then increase D20/5% AA to 75 ml/hr tomorrow. · Continue to further increase TPN D20/5%AA to goal rate 83 ml/hr. · If TG <300 mg/dl then add 250 ml 20% lipids 3X/wk. · TPN D20/5%AA @ goal rate 83 ml/hr + 250 ml 20% lipids will provide daily approx. 1967 kcals/100 g protein/398 g CHO. · Liquids as tolerated. · RD ordered Ensure Clear po BID. Nutrition Assessment:     11/29: Chart reviewed; med noted for SBO. NGT removed on Sat 11/27 and diet has progressed to full liquids with a lactose restriction. Pt will be mostly left with clear liquid items as a full liquid diet is mostly comprised of cream-based items. RD visited with the pt at the bedside, pt did take a few sips of chicken broth during my visit but reports not feeling well with nausea and feeling like he has to vomit along with abdominal pain. Pt expressed interest in receiving Ensure Clear as he has tolerated previously and likes them; RD placed a couple cartons of ensure clear at the bedside. Pt reports he has lost a significant amount of weight within the last 6 months estimated to be ~30 lbs. Noted severe muscle wasting and fat loss of upper/lower extremities.     Patient Vitals for the past 168 hrs:   % Diet Eaten   11/29/21 0724 26 - 50%   11/28/21 0945 1 - 25%   11/28/21 0830 76 - 100%     Last Weight Metric  Weight Loss Metrics 11/29/2021 11/20/2021 11/11/2021 6/25/2021 6/16/2021 2/25/2019 2/24/2019   Today's Wt 129 lb 130 lb 15.3 oz 152 lb 136 lb 7.4 oz 138 lb 3.7 oz 138 lb 14.2 oz 145 lb 8.1 oz   BMI 19.05 kg/m2 19.34 kg/m2 22.45 kg/m2 19.58 kg/m2 19.83 kg/m2 20.51 kg/m2 21.49 kg/m2     Malnutrition Assessment:  Malnutrition Status:  Severe malnutrition    Context:  Chronic illness     Findings of the 6 clinical characteristics of malnutrition:   Energy Intake:  7 - 75% or less est energy requirements for 1 month or longer  Weight Loss:  7.0 - Greater than 7.5% over 3 months     Body Fat Loss:  7 - Severe body fat loss, Triceps, Orbital, Buccal region   Muscle Mass Loss:  1 - Mild muscle mass loss, Clavicles (pectoralis &deltoids), Temples (temporalis), Thigh (quadriceps), Scapula (trapezius)  Fluid Accumulation:  Unable to assess,     Strength:  Not performed     Estimated Daily Nutrient Needs:  Energy (kcal): 2188 (BMR 1489 x 1. 3AF) + 250 kcals; Weight Used for Energy Requirements: Current  Protein (g): 60-88 (1.0-1.5 g/kg bw); Weight Used for Protein Requirements: Current  Fluid (ml/day): 2364-7030 ml/day; Method Used for Fluid Requirements: 1 ml/kcal    Nutrition Related Findings:  BM: 11/28; Labs: Na+ 133; Meds: TPN D20/5% @ 42 ml/hr      Wounds:    None       Current Nutrition Therapies:  ADULT DIET Full Liquid; Lactose-Controlled  TPN ADULT - CENTRAL    Anthropometric Measures:  · Height:  5' 9\" (175.3 cm)  · Current Body Wt:  59.4 kg (130 lb 15.3 oz)   · Ideal Body Wt:  160 lbs:  81.8 %   · BMI Category:  Normal weight (BMI 18.5-24. 9)       Nutrition Diagnosis:   · Inadequate protein-energy intake related to altered GI function as evidenced by NPO or clear liquid status due to medical condition, weight loss (SBO)    Nutrition Interventions:   Food and/or Nutrient Delivery: Modify current diet, Start oral nutrition supplement, Modify parenteral nutrition  Nutrition Education and Counseling: No recommendations at this time  Coordination of Nutrition Care: Continue to monitor while inpatient    Goals:  Pt will tolerate TPN with stable lytes and BG next 2-4 days       Nutrition Monitoring and Evaluation:   Behavioral-Environmental Outcomes: None identified  Food/Nutrient Intake Outcomes: Diet advancement/tolerance  Physical Signs/Symptoms Outcomes: Biochemical data, GI status, Weight, Skin    Discharge Planning: Too soon to determine     Electronically signed by Neeta Lacey RD on 11/29/2021 at 12:49 PM
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
## 2021-11-30 ENCOUNTER — APPOINTMENT (OUTPATIENT)
Dept: GENERAL RADIOLOGY | Age: 41
DRG: 710 | End: 2021-11-30
Attending: NURSE PRACTITIONER
Payer: MEDICAID

## 2021-11-30 LAB
ALBUMIN SERPL-MCNC: 3.2 G/DL (ref 3.5–5)
ALBUMIN/GLOB SERPL: 0.8 {RATIO} (ref 1.1–2.2)
ALP SERPL-CCNC: 61 U/L (ref 45–117)
ALT SERPL-CCNC: 84 U/L (ref 12–78)
ANION GAP SERPL CALC-SCNC: 8 MMOL/L (ref 5–15)
AST SERPL-CCNC: 45 U/L (ref 15–37)
BACTERIA SPEC CULT: NORMAL
BILIRUB DIRECT SERPL-MCNC: 0.5 MG/DL (ref 0–0.2)
BILIRUB SERPL-MCNC: 1.2 MG/DL (ref 0.2–1)
BUN SERPL-MCNC: 18 MG/DL (ref 6–20)
BUN/CREAT SERPL: 18 (ref 12–20)
CALCIUM SERPL-MCNC: 9.4 MG/DL (ref 8.5–10.1)
CHLORIDE SERPL-SCNC: 93 MMOL/L (ref 97–108)
CO2 SERPL-SCNC: 31 MMOL/L (ref 21–32)
CREAT SERPL-MCNC: 1.02 MG/DL (ref 0.7–1.3)
GLOBULIN SER CALC-MCNC: 4.1 G/DL (ref 2–4)
GLUCOSE SERPL-MCNC: 96 MG/DL (ref 65–100)
MAGNESIUM SERPL-MCNC: 2 MG/DL (ref 1.6–2.4)
PHOSPHATE SERPL-MCNC: 4.2 MG/DL (ref 2.6–4.7)
POTASSIUM SERPL-SCNC: 3.7 MMOL/L (ref 3.5–5.1)
PROCALCITONIN SERPL-MCNC: 0.31 NG/ML
PROT SERPL-MCNC: 7.3 G/DL (ref 6.4–8.2)
SERVICE CMNT-IMP: NORMAL
SODIUM SERPL-SCNC: 132 MMOL/L (ref 136–145)
T4 FREE SERPL-MCNC: 1.3 NG/DL (ref 0.8–1.5)
TRIGL SERPL-MCNC: 55 MG/DL (ref ?–150)
TSH SERPL DL<=0.05 MIU/L-ACNC: 1.72 UIU/ML (ref 0.36–3.74)

## 2021-11-30 PROCEDURE — 74019 RADEX ABDOMEN 2 VIEWS: CPT

## 2021-11-30 PROCEDURE — 74011250636 HC RX REV CODE- 250/636: Performed by: STUDENT IN AN ORGANIZED HEALTH CARE EDUCATION/TRAINING PROGRAM

## 2021-11-30 PROCEDURE — 74011250636 HC RX REV CODE- 250/636: Performed by: NURSE PRACTITIONER

## 2021-11-30 PROCEDURE — 74011000258 HC RX REV CODE- 258: Performed by: INTERNAL MEDICINE

## 2021-11-30 PROCEDURE — 84439 ASSAY OF FREE THYROXINE: CPT

## 2021-11-30 PROCEDURE — 36415 COLL VENOUS BLD VENIPUNCTURE: CPT

## 2021-11-30 PROCEDURE — 80048 BASIC METABOLIC PNL TOTAL CA: CPT

## 2021-11-30 PROCEDURE — 74011250636 HC RX REV CODE- 250/636: Performed by: PHYSICIAN ASSISTANT

## 2021-11-30 PROCEDURE — 84478 ASSAY OF TRIGLYCERIDES: CPT

## 2021-11-30 PROCEDURE — 84145 PROCALCITONIN (PCT): CPT

## 2021-11-30 PROCEDURE — 74011250637 HC RX REV CODE- 250/637: Performed by: STUDENT IN AN ORGANIZED HEALTH CARE EDUCATION/TRAINING PROGRAM

## 2021-11-30 PROCEDURE — 83735 ASSAY OF MAGNESIUM: CPT

## 2021-11-30 PROCEDURE — 84443 ASSAY THYROID STIM HORMONE: CPT

## 2021-11-30 PROCEDURE — 65660000000 HC RM CCU STEPDOWN

## 2021-11-30 PROCEDURE — 74011250636 HC RX REV CODE- 250/636: Performed by: INTERNAL MEDICINE

## 2021-11-30 PROCEDURE — 99232 SBSQ HOSP IP/OBS MODERATE 35: CPT | Performed by: SURGERY

## 2021-11-30 PROCEDURE — 80076 HEPATIC FUNCTION PANEL: CPT

## 2021-11-30 PROCEDURE — 74011250637 HC RX REV CODE- 250/637: Performed by: NURSE PRACTITIONER

## 2021-11-30 PROCEDURE — 84100 ASSAY OF PHOSPHORUS: CPT

## 2021-11-30 PROCEDURE — 74011000250 HC RX REV CODE- 250: Performed by: INTERNAL MEDICINE

## 2021-11-30 RX ORDER — METOCLOPRAMIDE HYDROCHLORIDE 5 MG/ML
5 INJECTION INTRAMUSCULAR; INTRAVENOUS EVERY 6 HOURS
Status: DISCONTINUED | OUTPATIENT
Start: 2021-11-30 | End: 2021-12-01

## 2021-11-30 RX ADMIN — SODIUM CHLORIDE, PRESERVATIVE FREE 10 ML: 5 INJECTION INTRAVENOUS at 13:35

## 2021-11-30 RX ADMIN — METOCLOPRAMIDE 5 MG: 5 INJECTION, SOLUTION INTRAMUSCULAR; INTRAVENOUS at 18:01

## 2021-11-30 RX ADMIN — MAGNESIUM SULFATE HEPTAHYDRATE: 500 INJECTION, SOLUTION INTRAMUSCULAR; INTRAVENOUS at 18:01

## 2021-11-30 RX ADMIN — KETOROLAC TROMETHAMINE 15 MG: 30 INJECTION, SOLUTION INTRAMUSCULAR; INTRAVENOUS at 13:34

## 2021-11-30 RX ADMIN — KETOROLAC TROMETHAMINE 15 MG: 30 INJECTION, SOLUTION INTRAMUSCULAR; INTRAVENOUS at 03:42

## 2021-11-30 RX ADMIN — METOCLOPRAMIDE 5 MG: 5 INJECTION, SOLUTION INTRAMUSCULAR; INTRAVENOUS at 23:54

## 2021-11-30 RX ADMIN — AMLODIPINE BESYLATE 10 MG: 5 TABLET ORAL at 08:24

## 2021-11-30 RX ADMIN — SODIUM CHLORIDE, PRESERVATIVE FREE 10 ML: 5 INJECTION INTRAVENOUS at 06:12

## 2021-11-30 RX ADMIN — ACETAMINOPHEN 650 MG: 325 TABLET ORAL at 08:24

## 2021-11-30 RX ADMIN — SODIUM CHLORIDE, PRESERVATIVE FREE 10 ML: 5 INJECTION INTRAVENOUS at 22:00

## 2021-11-30 RX ADMIN — ONDANSETRON HYDROCHLORIDE 4 MG: 2 INJECTION, SOLUTION INTRAMUSCULAR; INTRAVENOUS at 08:24

## 2021-11-30 RX ADMIN — KETOROLAC TROMETHAMINE 15 MG: 30 INJECTION, SOLUTION INTRAMUSCULAR; INTRAVENOUS at 20:01

## 2021-11-30 RX ADMIN — METOCLOPRAMIDE 5 MG: 5 INJECTION, SOLUTION INTRAMUSCULAR; INTRAVENOUS at 13:34

## 2021-11-30 RX ADMIN — DICYCLOMINE HYDROCHLORIDE 20 MG: 20 TABLET ORAL at 01:04

## 2021-11-30 RX ADMIN — LINACLOTIDE 290 MCG: 290 CAPSULE, GELATIN COATED ORAL at 06:30
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
## 2021-11-30 NOTE — PROGRESS NOTES
Bedside shift change report given to ROSEANNE Lewis (oncoming nurse) by [address], RN (offgoing nurse). Report included the following information SBAR, Kardex, Procedure Summary, Intake/Output, MAR, Recent Results, Med Rec Status and Cardiac Rhythm NSR. Pt did not tolerate food today threw up after I had given him meds for nausea. So NP notified made Pt NPO and put in a NG tube. KUB for placement verification. Couple complaints of pain.
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
## 2021-12-01 ENCOUNTER — APPOINTMENT (OUTPATIENT)
Dept: GENERAL RADIOLOGY | Age: 41
DRG: 710 | End: 2021-12-01
Attending: PHYSICIAN ASSISTANT
Payer: MEDICAID

## 2021-12-01 LAB
ANION GAP SERPL CALC-SCNC: 6 MMOL/L (ref 5–15)
BUN SERPL-MCNC: 34 MG/DL (ref 6–20)
BUN/CREAT SERPL: 32 (ref 12–20)
CALCIUM SERPL-MCNC: 9 MG/DL (ref 8.5–10.1)
CHLORIDE SERPL-SCNC: 93 MMOL/L (ref 97–108)
CO2 SERPL-SCNC: 31 MMOL/L (ref 21–32)
CREAT SERPL-MCNC: 1.05 MG/DL (ref 0.7–1.3)
CRP SERPL-MCNC: 3.25 MG/DL (ref 0–0.6)
ERYTHROCYTE [DISTWIDTH] IN BLOOD BY AUTOMATED COUNT: 15.7 % (ref 11.5–14.5)
ERYTHROCYTE [SEDIMENTATION RATE] IN BLOOD: 15 MM/HR (ref 0–15)
GLUCOSE BLD STRIP.AUTO-MCNC: 134 MG/DL (ref 65–117)
GLUCOSE BLD STRIP.AUTO-MCNC: 158 MG/DL (ref 65–117)
GLUCOSE SERPL-MCNC: 107 MG/DL (ref 65–100)
HCT VFR BLD AUTO: 41.6 % (ref 36.6–50.3)
HGB BLD-MCNC: 13.4 G/DL (ref 12.1–17)
MAGNESIUM SERPL-MCNC: 2.1 MG/DL (ref 1.6–2.4)
MCH RBC QN AUTO: 31.1 PG (ref 26–34)
MCHC RBC AUTO-ENTMCNC: 32.2 G/DL (ref 30–36.5)
MCV RBC AUTO: 96.5 FL (ref 80–99)
NRBC # BLD: 0 K/UL (ref 0–0.01)
NRBC BLD-RTO: 0 PER 100 WBC
PHOSPHATE SERPL-MCNC: 4.9 MG/DL (ref 2.6–4.7)
PLATELET # BLD AUTO: 284 K/UL (ref 150–400)
PMV BLD AUTO: 9.9 FL (ref 8.9–12.9)
POTASSIUM SERPL-SCNC: 3.9 MMOL/L (ref 3.5–5.1)
PROCALCITONIN SERPL-MCNC: 0.38 NG/ML
RBC # BLD AUTO: 4.31 M/UL (ref 4.1–5.7)
SERVICE CMNT-IMP: ABNORMAL
SERVICE CMNT-IMP: ABNORMAL
SODIUM SERPL-SCNC: 130 MMOL/L (ref 136–145)
WBC # BLD AUTO: 7.9 K/UL (ref 4.1–11.1)

## 2021-12-01 PROCEDURE — 74011250636 HC RX REV CODE- 250/636: Performed by: INTERNAL MEDICINE

## 2021-12-01 PROCEDURE — 74011250636 HC RX REV CODE- 250/636: Performed by: NURSE PRACTITIONER

## 2021-12-01 PROCEDURE — 84145 PROCALCITONIN (PCT): CPT

## 2021-12-01 PROCEDURE — 74011250636 HC RX REV CODE- 250/636: Performed by: PHYSICIAN ASSISTANT

## 2021-12-01 PROCEDURE — 84100 ASSAY OF PHOSPHORUS: CPT

## 2021-12-01 PROCEDURE — 74011000258 HC RX REV CODE- 258: Performed by: INTERNAL MEDICINE

## 2021-12-01 PROCEDURE — 86140 C-REACTIVE PROTEIN: CPT

## 2021-12-01 PROCEDURE — 74018 RADEX ABDOMEN 1 VIEW: CPT

## 2021-12-01 PROCEDURE — 36415 COLL VENOUS BLD VENIPUNCTURE: CPT

## 2021-12-01 PROCEDURE — 82962 GLUCOSE BLOOD TEST: CPT

## 2021-12-01 PROCEDURE — 74011000250 HC RX REV CODE- 250: Performed by: INTERNAL MEDICINE

## 2021-12-01 PROCEDURE — 85027 COMPLETE CBC AUTOMATED: CPT

## 2021-12-01 PROCEDURE — 85652 RBC SED RATE AUTOMATED: CPT

## 2021-12-01 PROCEDURE — 65660000000 HC RM CCU STEPDOWN

## 2021-12-01 PROCEDURE — 99232 SBSQ HOSP IP/OBS MODERATE 35: CPT | Performed by: SURGERY

## 2021-12-01 PROCEDURE — 74011250637 HC RX REV CODE- 250/637: Performed by: NURSE PRACTITIONER

## 2021-12-01 PROCEDURE — 80048 BASIC METABOLIC PNL TOTAL CA: CPT

## 2021-12-01 PROCEDURE — 82164 ANGIOTENSIN I ENZYME TEST: CPT

## 2021-12-01 PROCEDURE — 74011250637 HC RX REV CODE- 250/637: Performed by: STUDENT IN AN ORGANIZED HEALTH CARE EDUCATION/TRAINING PROGRAM

## 2021-12-01 PROCEDURE — 83735 ASSAY OF MAGNESIUM: CPT

## 2021-12-01 RX ORDER — DEXTROSE 50 % IN WATER (D50W) INTRAVENOUS SYRINGE
12.5-25 AS NEEDED
Status: DISCONTINUED | OUTPATIENT
Start: 2021-12-01 | End: 2022-01-11 | Stop reason: HOSPADM

## 2021-12-01 RX ORDER — METOCLOPRAMIDE HYDROCHLORIDE 5 MG/ML
10 INJECTION INTRAMUSCULAR; INTRAVENOUS EVERY 6 HOURS
Status: DISCONTINUED | OUTPATIENT
Start: 2021-12-01 | End: 2022-01-11 | Stop reason: HOSPADM

## 2021-12-01 RX ORDER — MAGNESIUM SULFATE 100 %
4 CRYSTALS MISCELLANEOUS AS NEEDED
Status: DISCONTINUED | OUTPATIENT
Start: 2021-12-01 | End: 2022-01-11 | Stop reason: HOSPADM

## 2021-12-01 RX ORDER — INSULIN LISPRO 100 [IU]/ML
INJECTION, SOLUTION INTRAVENOUS; SUBCUTANEOUS EVERY 6 HOURS
Status: DISCONTINUED | OUTPATIENT
Start: 2021-12-01 | End: 2022-01-11 | Stop reason: HOSPADM

## 2021-12-01 RX ADMIN — SODIUM CHLORIDE, PRESERVATIVE FREE 10 ML: 5 INJECTION INTRAVENOUS at 05:30

## 2021-12-01 RX ADMIN — METOCLOPRAMIDE 10 MG: 5 INJECTION, SOLUTION INTRAMUSCULAR; INTRAVENOUS at 18:39

## 2021-12-01 RX ADMIN — ACETAMINOPHEN 650 MG: 650 SUPPOSITORY RECTAL at 23:26

## 2021-12-01 RX ADMIN — KETOROLAC TROMETHAMINE 15 MG: 30 INJECTION, SOLUTION INTRAMUSCULAR; INTRAVENOUS at 09:02

## 2021-12-01 RX ADMIN — KETOROLAC TROMETHAMINE 15 MG: 30 INJECTION, SOLUTION INTRAMUSCULAR; INTRAVENOUS at 15:25

## 2021-12-01 RX ADMIN — METOCLOPRAMIDE 10 MG: 5 INJECTION, SOLUTION INTRAMUSCULAR; INTRAVENOUS at 12:07

## 2021-12-01 RX ADMIN — METOCLOPRAMIDE 10 MG: 5 INJECTION, SOLUTION INTRAMUSCULAR; INTRAVENOUS at 23:25

## 2021-12-01 RX ADMIN — SODIUM CHLORIDE, PRESERVATIVE FREE 10 ML: 5 INJECTION INTRAVENOUS at 21:12

## 2021-12-01 RX ADMIN — SODIUM CHLORIDE, PRESERVATIVE FREE 10 ML: 5 INJECTION INTRAVENOUS at 12:08

## 2021-12-01 RX ADMIN — LINACLOTIDE 290 MCG: 290 CAPSULE, GELATIN COATED ORAL at 05:30

## 2021-12-01 RX ADMIN — KETOROLAC TROMETHAMINE 15 MG: 30 INJECTION, SOLUTION INTRAMUSCULAR; INTRAVENOUS at 03:35

## 2021-12-01 RX ADMIN — AMLODIPINE BESYLATE 10 MG: 5 TABLET ORAL at 09:02

## 2021-12-01 RX ADMIN — METOCLOPRAMIDE 5 MG: 5 INJECTION, SOLUTION INTRAMUSCULAR; INTRAVENOUS at 05:30

## 2021-12-01 RX ADMIN — CALCIUM GLUCONATE: 98 INJECTION, SOLUTION INTRAVENOUS at 19:03

## 2021-12-01 RX ADMIN — KETOROLAC TROMETHAMINE 15 MG: 30 INJECTION, SOLUTION INTRAMUSCULAR; INTRAVENOUS at 20:41

## 2021-12-01 RX ADMIN — I.V. FAT EMULSION 250 ML: 20 EMULSION INTRAVENOUS at 19:09

## 2021-12-01 NOTE — PROGRESS NOTES
Bedside shift change report given to [facility] RN (oncoming nurse) by Tam Garza RN (offgoing nurse). Report included the following information SBAR, Kardex, Intake/Output, MAR and Recent Results.

## 2021-12-01 NOTE — PROGRESS NOTES
End of Shift Note    Bedside shift change report given to Roxy Alvarado (oncoming nurse) by Yared Mahmood RN (offgoing nurse).   Report included the following information SBAR, Kardex, Intake/Output, MAR and Recent Results

## 2021-12-01 NOTE — PROGRESS NOTES
Transition of Care Plan:     RUR:   18% \"mod risk\"  Disposition: Home w/ family & follow-up appts  Follow up appointments: PCP, Surgery, GI  DME needed: None  Transportation at [place] [address] or means to access home:  Yes  IM Medicare Letter: N/A  Is patient a BCPI-A Bundle:   No                   If yes, was Bundle Letter given?:   N/A  Caregiver Contact: Mother- Joni Garcia, [phone]  Discharge Caregiver contacted prior to discharge?      CM reviewed pt's chart. Diet remains a barrier as pt continues to be on TPN. Per GI, plans for pt to discharge on Motegrity (po) & will provide with samples prior to discharge. Will continue to follow & assist with addtl needs for discharge.     OLY Ag  Care Management
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
## 2021-12-01 NOTE — PROGRESS NOTES
End of Shift Note    Bedside shift change report given to SAMUEL Griffith (oncoming nurse) by Simon Castellanos RN (offgoing nurse). Report included the following information SBAR, Kardex, Intake/Output, MAR, Recent Results and Cardiac Rhythm NSR/sinus Tach    Shift worked:  7a-7p     Shift summary and any significant changes:     Pt received all care well today. Lots of output through NG tube today. Complaints of pain. One BM. TPN @75. One complaint of nausea. Concerns for physician to address:  none     Zone phone for oncoming shift:   [phone]       Activity:  Activity Level: Up ad lavern  Number times ambulated in hallways past shift: 0  Number of times OOB to chair past shift: 1    Cardiac:   Cardiac Monitoring: Yes      Cardiac Rhythm: Sinus Rhythm    Access:   Current line(s): PIV and PICC     Genitourinary:   Urinary status: voiding    Respiratory:   O2 Device: None (Room air)  Chronic home O2 use?: NO  Incentive spirometer at bedside: YES     GI:  Last Bowel Movement Date: 11/30/21  Current diet:  DIET NPO  TPN ADULT - CENTRAL  Passing flatus: YES  Tolerating current diet: YES       Pain Management:   Patient states pain is manageable on current regimen: YES    Skin:  Russell Score: 20  Interventions: increase time out of bed and limit briefs    Patient Safety:  Fall Score:  Total Score: 2  Interventions: gripper socks, pt to call before getting OOB and stay with me (per policy)  High Fall Risk: Yes    Length of Stay:  Expected LOS: 4d 19h  Actual LOS: 78 Rox Melara RN

## 2021-12-01 NOTE — PROGRESS NOTES
Spiritual Care Assessment/Progress Note  [facility]      NAME: Sangita Carrizales      MRN: [id number]  AGE: 39 y.o.  SEX: male  Sabianist Affiliation: No Hinduism   Language: English     12/1/2021     Total Time (in minutes): 13     Spiritual Assessment begun in [facility] 3 SURG TELE through conversation with:         [x]Patient        [] Family    [] Friend(s)        Reason for Consult: Initial/Spiritual assessment, patient floor     Spiritual beliefs: (Please include comment if needed)     [] Identifies with a alessandro tradition:         [] Supported by a alessandro community:            [] Claims no spiritual orientation:           [] Seeking spiritual identity:                [x] Adheres to an individual form of spirituality:           [] Not able to assess:                           Identified resources for coping:      [x] Prayer                               [x] Music                  [] Guided Imagery     [] Family/friends                 [] Pet visits     [] Devotional reading                         [] Unknown     [] Other:                                              Interventions offered during this visit: (See comments for more details)    Patient Interventions: Affirmation of emotions/emotional suffering, Affirmation of alessandro, Catharsis/review of pertinent events in supportive environment, Coping skills reviewed/reinforced, Iconic (affirming the presence of God/Higher Power), Initial/Spiritual assessment, patient floor, Normalization of emotional/spiritual concerns, Prayer (assurance of), Sabianist beliefs/image of God discussed           Plan of Care:     [x] Support spiritual and/or cultural needs    [] Support AMD and/or advance care planning process      [] Support grieving process   [] Coordinate Rites and/or Rituals    [] Coordination with community clergy   [] No spiritual needs identified at this time   [] Detailed Plan of Care below (See Comments)  [] Make referral to Music Therapy  [] Make referral to Pet Therapy     [] Make referral to Addiction services  [] Make referral to [facility]  [] Make referral to Spiritual Care Partner  [] No future visits requested        [x] Contact Spiritual Care for further referrals     Comments:  Reviewed chart prior to visit on Surg Tele for spiritual assessment. No family/friends present. Patient was lying down in bed appearing uncomfortable. He shared that he is currently in pain and not having a good day. He spoke briefly about his lengthy illness. When asked about support in place he responded his support system is not as good as he thought it would be. He seems discouraged by this. He did indicate he has spiritual resources for coping and expressed he is a believer. Music is also a means of coping as he listens to and enjoys many genres. Music, at times, distracts him from his pain. He expressed no spiritual needs at this time, but was receptive to assurance of prayer. Provided bedside presence, supportive listening and advised him of ongoing availability of pastoral support.      Claudine Bridges, DARRELL, [address], Staff [address] [place] [address] Paging Service  [phone]

## 2021-12-01 NOTE — PROGRESS NOTES
Gastroenterology Progress Note  AGNIESZKA Banegas   for Dr. Evangelina Caldwell    12/1/2021    Admit Date: 11/25/2021    Subjective: Follow up for:  1) Dilated small and large bowel    2) Gastroparesis     3) Constipation    Patient is on NPO. Pain: Patient complains of abdominal pain yes. Improving today from yesterday. NG is clamped. Output appears to be decreasing. No N/V./     Bowel Movements: None today but +flatus    There is no bleeding    RN does not think he is getting Linzess. Does not appear on MAR but ordered. Current Facility-Administered Medications   Medication Dose Route Frequency    metoclopramide HCl (REGLAN) injection 5 mg  5 mg IntraVENous Q6H    TPN ADULT - CENTRAL   IntraVENous CONTINUOUS    prochlorperazine (COMPAZINE) with saline injection 5 mg  5 mg IntraVENous Q4H PRN    ketorolac (TORADOL) injection 15 mg  15 mg IntraVENous Q6H PRN    amLODIPine (NORVASC) tablet 10 mg  10 mg Oral DAILY    linaCLOtide (LINZESS) capsule 290 mcg  290 mcg Oral ACB    bacitracin 500 unit/gram packet 1 Packet  1 Packet Topical PRN    dicyclomine (BENTYL) tablet 20 mg  20 mg Oral Q6H PRN    hydrALAZINE (APRESOLINE) 20 mg/mL injection 20 mg  20 mg IntraVENous Q6H PRN    sodium chloride (NS) flush 5-40 mL  5-40 mL IntraVENous Q8H    sodium chloride (NS) flush 5-40 mL  5-40 mL IntraVENous PRN    acetaminophen (TYLENOL) tablet 650 mg  650 mg Oral Q6H PRN    Or    acetaminophen (TYLENOL) suppository 650 mg  650 mg Rectal Q6H PRN    polyethylene glycol (MIRALAX) packet 17 g  17 g Oral DAILY PRN    ondansetron (ZOFRAN ODT) tablet 4 mg  4 mg Oral Q8H PRN    Or    ondansetron (ZOFRAN) injection 4 mg  4 mg IntraVENous Q6H PRN        Objective:     Blood pressure (!) 124/101, pulse (!) 101, temperature 98 °F (36.7 °C), resp. rate 18, height 5' 9\" (1.753 m), weight 58.5 kg (129 lb), SpO2 100 %. No intake/output data recorded.     11/29 1901 - 12/01 0700  In: 813.8 [I.V.:813.8]  Out: 7825 [Urine:900]    EXAM:    GEN: Pleasant, thin BM, NAD  HEENT: NCAT  Heart: RRR  Lungs: CTA  Abdomen: mildly distended, soft, less tender, without guarding or rebound, hypoactive BS. Data Review    Recent Results (from the past 24 hour(s))   TSH 3RD GENERATION    Collection Time: 11/30/21 11:19 AM   Result Value Ref Range    TSH 1.72 0.36 - 3.74 uIU/mL   T4, FREE    Collection Time: 11/30/21 11:19 AM   Result Value Ref Range    T4, Free 1.3 0.8 - 1.5 NG/DL   HEPATIC FUNCTION PANEL    Collection Time: 11/30/21 11:19 AM   Result Value Ref Range    Protein, total 7.3 6.4 - 8.2 g/dL    Albumin 3.2 (L) 3.5 - 5.0 g/dL    Globulin 4.1 (H) 2.0 - 4.0 g/dL    A-G Ratio 0.8 (L) 1.1 - 2.2      Bilirubin, total 1.2 (H) 0.2 - 1.0 MG/DL    Bilirubin, direct 0.5 (H) 0.0 - 0.2 MG/DL    Alk.  phosphatase 61 45 - 117 U/L    AST (SGOT) 45 (H) 15 - 37 U/L    ALT (SGPT) 84 (H) 12 - 78 U/L   PROCALCITONIN    Collection Time: 12/01/21  3:01 AM   Result Value Ref Range    Procalcitonin 0.38 ng/mL   CBC W/O DIFF    Collection Time: 12/01/21  3:01 AM   Result Value Ref Range    WBC 7.9 4.1 - 11.1 K/uL    RBC 4.31 4.10 - 5.70 M/uL    HGB 13.4 12.1 - 17.0 g/dL    HCT 41.6 36.6 - 50.3 %    MCV 96.5 80.0 - 99.0 FL    MCH 31.1 26.0 - 34.0 PG    MCHC 32.2 30.0 - 36.5 g/dL    RDW 15.7 (H) 11.5 - 14.5 %    PLATELET 912 610 - 637 K/uL    MPV 9.9 8.9 - 12.9 FL    NRBC 0.0 0  WBC    ABSOLUTE NRBC 0.00 0.00 - 0.01 K/uL   MAGNESIUM    Collection Time: 12/01/21  3:01 AM   Result Value Ref Range    Magnesium 2.1 1.6 - 2.4 mg/dL   PHOSPHORUS    Collection Time: 12/01/21  3:01 AM   Result Value Ref Range    Phosphorus 4.9 (H) 2.6 - 4.7 MG/DL   METABOLIC PANEL, BASIC    Collection Time: 12/01/21  3:01 AM   Result Value Ref Range    Sodium 130 (L) 136 - 145 mmol/L    Potassium 3.9 3.5 - 5.1 mmol/L    Chloride 93 (L) 97 - 108 mmol/L    CO2 31 21 - 32 mmol/L    Anion gap 6 5 - 15 mmol/L    Glucose 107 (H) 65 - 100 mg/dL    BUN 34 (H) 6 - 20 MG/DL Creatinine 1.05 0.70 - 1.30 MG/DL    BUN/Creatinine ratio 32 (H) 12 - 20      GFR est AA >60 >60 ml/min/1.73m2    GFR est non-AA >60 >60 ml/min/1.73m2    Calcium 9.0 8.5 - 10.1 MG/DL     Recent Labs     12/01/21  0301 11/29/21  0452   WBC 7.9 7.7   HGB 13.4 13.3   HCT 41.6 41.6    392     Recent Labs     12/01/21  0301 11/30/21  0348 11/29/21  0452   * 132* 133*   K 3.9 3.7 3.4*   CL 93* 93* 95*   CO2 31 31 30   BUN 34* 18 10   CREA 1.05 1.02 0.83   * 96 85   CA 9.0 9.4 9.5   MG 2.1 2.0  --    PHOS 4.9* 4.2  --      Recent Labs     11/30/21  1119   ALT 84*   AP 61   TBILI 1.2*   TP 7.3   ALB 3.2*   GLOB 4.1*     No results for input(s): INR, PTP, APTT, INREXT in the last 72 hours. No results for input(s): FE, TIBC, PSAT, FERR in the last 72 hours. Lab Results   Component Value Date/Time    Folate 18.4 11/12/2021 05:52 PM      No results for input(s): PH, PCO2, PO2 in the last 72 hours. No results for input(s): CPK, CKNDX, TROIQ in the last 72 hours.     No lab exists for component: CPKMB  Lab Results   Component Value Date/Time    Cholesterol, total 49 (L) 07/26/2018 01:47 PM    HDL Cholesterol 15 (L) 07/26/2018 01:47 PM    LDL, calculated 24 07/26/2018 01:47 PM    Triglyceride 55 11/30/2021 03:48 AM     Lab Results   Component Value Date/Time    Glucose (POC) 90 01/26/2016 01:06 PM    Glucose (POC) 78 01/26/2016 12:47 PM    Glucose (POC) 75 01/26/2016 12:31 PM    Glucose (POC) 78 01/26/2016 12:29 PM     Lab Results   Component Value Date/Time    Color DARK YELLOW 11/25/2021 11:37 PM    Appearance CLEAR 11/25/2021 11:37 PM    Specific gravity 1.010 11/25/2021 11:37 PM    Specific gravity 1.019 11/20/2021 01:09 PM    pH (UA) 5.5 11/25/2021 11:37 PM    Protein 100 (A) 11/25/2021 11:37 PM    Glucose Negative 11/25/2021 11:37 PM    Ketone TRACE (A) 11/25/2021 11:37 PM    Bilirubin Negative 11/25/2021 11:37 PM    Urobilinogen 0.2 11/25/2021 11:37 PM    Nitrites Negative 11/25/2021 11:37 PM Leukocyte Esterase Negative 11/25/2021 11:37 PM    Epithelial cells FEW 11/25/2021 11:37 PM    Bacteria Negative 11/25/2021 11:37 PM    WBC 0-4 11/25/2021 11:37 PM    RBC 0-5 11/25/2021 11:37 PM      XR Results (most recent):  Results from [place] encounter on 11/25/21    XR ABD FLAT/ ERECT    Narrative  Clinical indication: Abdominal distention. Erect and supine views of the abdomen obtained, comparison November 29. NG tube  is in place as before. There is no free air. Multiple air-fluid level are seen  mainly in small bowel with some contrast seen in the distal small bowel which is  distended. Contrast is also demonstrated in a mildly distended colon. Next    Impression  Bowel distention without evidence to suggest complete obstruction. Assessment:     Active Problems:    SBO (small bowel obstruction) ([address] (11/22/2021)      Aspiration pneumonia ([address] (11/25/2021)      Small bowel obstruction ([address] (11/25/2021)      Sepsis ([address] (11/25/2021)      Abdominal pain, acute (11/27/2021)      Intractable cyclical vomiting with nausea (11/27/2021)        Plan:   Patient with some improvement after starting reglan. He denies any SE. Will increased to 10 mg QID. Renewed order for Linzess. Will work on getting O/P Motegrity. Can provide with samples on D/c to start. Thyroid labs are normal.  Albumin low at 3.5, continue TPN. Diet advancement per general surgery, can consider retrying CLD if no N/V with clamping of NG. Ambulation encouraged. Checking ACE, CRP and ESR. Consider further eval into Sarcoidosis pending results however no evidence of such on path from EGD/Colon.     AGNIESZKA Cook    12/01/21  8:54 AM  [id number] [facility], [address] [place] [address]  [address] [id number]  [address] [place] [address] [place]: [phone]
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
## 2021-12-02 LAB
ACE SERPL-CCNC: 23 U/L (ref 14–82)
ANION GAP SERPL CALC-SCNC: 8 MMOL/L (ref 5–15)
BUN SERPL-MCNC: 48 MG/DL (ref 6–20)
BUN/CREAT SERPL: 40 (ref 12–20)
CALCIUM SERPL-MCNC: 9.3 MG/DL (ref 8.5–10.1)
CHLORIDE SERPL-SCNC: 90 MMOL/L (ref 97–108)
CO2 SERPL-SCNC: 30 MMOL/L (ref 21–32)
CREAT SERPL-MCNC: 1.2 MG/DL (ref 0.7–1.3)
GLUCOSE BLD STRIP.AUTO-MCNC: 129 MG/DL (ref 65–117)
GLUCOSE BLD STRIP.AUTO-MCNC: 141 MG/DL (ref 65–117)
GLUCOSE BLD STRIP.AUTO-MCNC: 141 MG/DL (ref 65–117)
GLUCOSE BLD STRIP.AUTO-MCNC: 150 MG/DL (ref 65–117)
GLUCOSE SERPL-MCNC: 102 MG/DL (ref 65–100)
MAGNESIUM SERPL-MCNC: 2.3 MG/DL (ref 1.6–2.4)
PHOSPHATE SERPL-MCNC: 5.6 MG/DL (ref 2.6–4.7)
POTASSIUM SERPL-SCNC: 4 MMOL/L (ref 3.5–5.1)
SERVICE CMNT-IMP: ABNORMAL
SODIUM SERPL-SCNC: 128 MMOL/L (ref 136–145)

## 2021-12-02 PROCEDURE — 82787 IGG 1 2 3 OR 4 EACH: CPT

## 2021-12-02 PROCEDURE — 74011250636 HC RX REV CODE- 250/636: Performed by: NURSE PRACTITIONER

## 2021-12-02 PROCEDURE — 74011250637 HC RX REV CODE- 250/637: Performed by: NURSE PRACTITIONER

## 2021-12-02 PROCEDURE — 74011250637 HC RX REV CODE- 250/637: Performed by: PHYSICIAN ASSISTANT

## 2021-12-02 PROCEDURE — 80048 BASIC METABOLIC PNL TOTAL CA: CPT

## 2021-12-02 PROCEDURE — 82962 GLUCOSE BLOOD TEST: CPT

## 2021-12-02 PROCEDURE — 74011000258 HC RX REV CODE- 258: Performed by: INTERNAL MEDICINE

## 2021-12-02 PROCEDURE — 36415 COLL VENOUS BLD VENIPUNCTURE: CPT

## 2021-12-02 PROCEDURE — 74011000250 HC RX REV CODE- 250: Performed by: INTERNAL MEDICINE

## 2021-12-02 PROCEDURE — 65660000000 HC RM CCU STEPDOWN

## 2021-12-02 PROCEDURE — 74011250636 HC RX REV CODE- 250/636: Performed by: INTERNAL MEDICINE

## 2021-12-02 PROCEDURE — 84100 ASSAY OF PHOSPHORUS: CPT

## 2021-12-02 PROCEDURE — 74011250636 HC RX REV CODE- 250/636: Performed by: PHYSICIAN ASSISTANT

## 2021-12-02 PROCEDURE — 84155 ASSAY OF PROTEIN SERUM: CPT

## 2021-12-02 PROCEDURE — 74011250637 HC RX REV CODE- 250/637: Performed by: STUDENT IN AN ORGANIZED HEALTH CARE EDUCATION/TRAINING PROGRAM

## 2021-12-02 PROCEDURE — 83735 ASSAY OF MAGNESIUM: CPT

## 2021-12-02 RX ADMIN — KETOROLAC TROMETHAMINE 15 MG: 30 INJECTION, SOLUTION INTRAMUSCULAR; INTRAVENOUS at 04:23

## 2021-12-02 RX ADMIN — SODIUM CHLORIDE, PRESERVATIVE FREE 10 ML: 5 INJECTION INTRAVENOUS at 21:46

## 2021-12-02 RX ADMIN — SODIUM CHLORIDE, PRESERVATIVE FREE 10 ML: 5 INJECTION INTRAVENOUS at 14:00

## 2021-12-02 RX ADMIN — ACETAMINOPHEN 650 MG: 650 SUPPOSITORY RECTAL at 15:32

## 2021-12-02 RX ADMIN — CALCIUM GLUCONATE: 94 INJECTION, SOLUTION INTRAVENOUS at 18:22

## 2021-12-02 RX ADMIN — METOCLOPRAMIDE 10 MG: 5 INJECTION, SOLUTION INTRAMUSCULAR; INTRAVENOUS at 05:58

## 2021-12-02 RX ADMIN — LINACLOTIDE 290 MCG: 290 CAPSULE, GELATIN COATED ORAL at 07:30

## 2021-12-02 RX ADMIN — AMLODIPINE BESYLATE 10 MG: 5 TABLET ORAL at 08:03

## 2021-12-02 RX ADMIN — METOCLOPRAMIDE 10 MG: 5 INJECTION, SOLUTION INTRAMUSCULAR; INTRAVENOUS at 17:08

## 2021-12-02 RX ADMIN — METOCLOPRAMIDE 10 MG: 5 INJECTION, SOLUTION INTRAMUSCULAR; INTRAVENOUS at 12:25

## 2021-12-02 RX ADMIN — KETOROLAC TROMETHAMINE 15 MG: 30 INJECTION, SOLUTION INTRAMUSCULAR; INTRAVENOUS at 10:50

## 2021-12-02 RX ADMIN — SODIUM CHLORIDE, PRESERVATIVE FREE 10 ML: 5 INJECTION INTRAVENOUS at 05:59

## 2021-12-02 RX ADMIN — KETOROLAC TROMETHAMINE 15 MG: 30 INJECTION, SOLUTION INTRAMUSCULAR; INTRAVENOUS at 17:08

## 2021-12-02 NOTE — PROGRESS NOTES
Comprehensive Nutrition Assessment    Type and Reason for Visit: Reassess    Nutrition Recommendations/Plan:   Continue TPN as ordered for now  Advance diet as able per MD    Nutrition Assessment:   Chart reviewed. Pt remains TPN dependent. NGT clamping trials starting today, once removed diet can advance as tolerated per surgeon. TPN provides 30kcals/kg and 1.5gPro/kg which is appropriate. BG well controlled (129-150). K and Mag WNL, phos slightly high at 5.6 (will monitor). BM noted today. Malnutrition Assessment:  Malnutrition Status:  Severe malnutrition    Context:  Chronic illness     Findings of the 6 clinical characteristics of malnutrition:   Energy Intake:  7 - 75% or less est energy requirements for 1 month or longer  Weight Loss:  7.0 - Greater than 7.5% over 3 months     Body Fat Loss:  7 - Severe body fat loss, Triceps, Orbital, Buccal region   Muscle Mass Loss:  1 - Mild muscle mass loss, Clavicles (pectoralis &deltoids), Temples (temporalis), Thigh (quadriceps), Scapula (trapezius)  Fluid Accumulation:  Unable to assess,     Strength:  Not performed         Estimated Daily Nutrient Needs:  Energy (kcal): 2188 (BMR 1489 x 1. 3AF) + 250 kcals; Weight Used for Energy Requirements: Current  Protein (g): 60-88 (1.0-1.5 g/kg bw); Weight Used for Protein Requirements: Current  Fluid (ml/day): 7413-2570 ml/day; Method Used for Fluid Requirements: 1 ml/kcal      Nutrition Related Findings:  Meds: humalog, linzess, reglan.   BM 12/2      Wounds:    None       Current Nutrition Therapies:  Current Parenteral Nutrition Orders:  · Type and Formula: D20, 5% AA   · Lipids: 250ml, Three times weekly  · Duration: Continuous  · Rate/Volume: 75mL/h  · Current PN Order Provides: 1798kcals/90gPro/360gDextrose  · Goal PN Orders Provides: 1798kcals/90gPro/360gDextrose      Anthropometric Measures:  · Height:  5' 9\" (175.3 cm)  · Current Body Wt:  58.5 kg (128 lb 15.5 oz)   · Ideal Body Wt:  160 lbs:  81.8 % · BMI Category:  Normal weight (BMI 18.5-24. 9)       Nutrition Diagnosis:   · Inadequate protein-energy intake related to altered GI function as evidenced by NPO or clear liquid status due to medical condition, weight loss (SBO)  Previous dx resolved, TPN meets est needs. Nutrition Interventions:   Food and/or Nutrient Delivery: Continue parenteral nutrition, Start oral diet  Nutrition Education and Counseling: No recommendations at this time  Coordination of Nutrition Care: Continue to monitor while inpatient    Goals:  Pt will tolerate TPN with BG <200mg/dL and lytes WNL or advance to PO diet in 2-4 days. Nutrition Monitoring and Evaluation:   Behavioral-Environmental Outcomes: None identified  Food/Nutrient Intake Outcomes: Parenteral nutrition intake/tolerance, Diet advancement/tolerance  Physical Signs/Symptoms Outcomes: Biochemical data, GI status, Weight, Skin    Discharge Planning:     Too soon to determine     Electronically signed by Natalie Cummings RD, [address]  on 12/2/2021 at 3:53 PM    Contact: JOCELYN
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
## 2021-12-02 NOTE — PROGRESS NOTES
Surgery NP Note    Patient with no surgically corrected issues at this time. Agree with plan as set forth by gastroenterology and patient seems to be improving on this. NGT management per ordering provider/team Lurdes Patterson). Agree with ongoing TPN until taking PO. May advance diet as tolerated once NGT removed. Surgery to sign off at this time.      Kaiden Barbosa, NP

## 2021-12-02 NOTE — PROGRESS NOTES
Gastroenterology Progress Note  Alissa Candelaria MD       12/2/2021    Admit Date: 11/25/2021    Subjective: Follow up for:  1) Dilated small and large bowel    2) Gastroparesis     3) Constipation    Patient is on NPO and on NGT Output appears to be decreasing. No N/V. Bowel Movements: + x 2 and w/ flatus  There is no bleeding. Feels better. Does not appear on MAR but ordered.       Current Facility-Administered Medications   Medication Dose Route Frequency    metoclopramide HCl (REGLAN) injection 10 mg  10 mg IntraVENous Q6H    linaCLOtide (LINZESS) capsule 290 mcg  290 mcg Oral ACB    fat emulsion 20% (LIPOSYN, INTRAlipid) infusion 250 mL  250 mL IntraVENous Q MON, WED & FRI    TPN ADULT - CENTRAL   IntraVENous CONTINUOUS    insulin lispro (HUMALOG) injection   SubCUTAneous Q6H    glucose chewable tablet 16 g  4 Tablet Oral PRN    dextrose (D50W) injection syrg 12.5-25 g  12.5-25 g IntraVENous PRN    glucagon (GLUCAGEN) injection 1 mg  1 mg IntraMUSCular PRN    prochlorperazine (COMPAZINE) with saline injection 5 mg  5 mg IntraVENous Q4H PRN    ketorolac (TORADOL) injection 15 mg  15 mg IntraVENous Q6H PRN    amLODIPine (NORVASC) tablet 10 mg  10 mg Oral DAILY    bacitracin 500 unit/gram packet 1 Packet  1 Packet Topical PRN    dicyclomine (BENTYL) tablet 20 mg  20 mg Oral Q6H PRN    hydrALAZINE (APRESOLINE) 20 mg/mL injection 20 mg  20 mg IntraVENous Q6H PRN    sodium chloride (NS) flush 5-40 mL  5-40 mL IntraVENous Q8H    sodium chloride (NS) flush 5-40 mL  5-40 mL IntraVENous PRN    acetaminophen (TYLENOL) tablet 650 mg  650 mg Oral Q6H PRN    Or    acetaminophen (TYLENOL) suppository 650 mg  650 mg Rectal Q6H PRN    polyethylene glycol (MIRALAX) packet 17 g  17 g Oral DAILY PRN    ondansetron (ZOFRAN ODT) tablet 4 mg  4 mg Oral Q8H PRN    Or    ondansetron (ZOFRAN) injection 4 mg  4 mg IntraVENous Q6H PRN        Objective:     Blood pressure (!) 122/98, pulse 100, temperature 97.9 °F (36.6 °C), resp. rate 18, height 5' 9\" (1.753 m), weight 58.5 kg (129 lb), SpO2 98 %. No intake/output data recorded. 11/30 1901 - 12/02 0700  In: 813.8 [I.V.:813.8]  Out: 3100 [Urine:850]    EXAM:    GEN: Pleasant, thin BM, NAD  HEENT: NCAT  Heart: RRR  Lungs: CTA  Abdomen: mildly distended, soft, less tender, without guarding or rebound, hypoactive BS.     Data Review    Recent Results (from the past 24 hour(s))   SED RATE (ESR)    Collection Time: 12/01/21 10:26 AM   Result Value Ref Range    Sed rate, automated 15 0 - 15 mm/hr   C REACTIVE PROTEIN, QT    Collection Time: 12/01/21 10:26 AM   Result Value Ref Range    C-Reactive protein 3.25 (H) 0.00 - 0.60 mg/dL   GLUCOSE, POC    Collection Time: 12/01/21  6:35 PM   Result Value Ref Range    Glucose (POC) 158 (H) 65 - 117 mg/dL    Performed by Tyra Traylor    GLUCOSE, POC    Collection Time: 12/01/21 11:14 PM   Result Value Ref Range    Glucose (POC) 134 (H) 65 - 117 mg/dL    Performed by Mary Dong LPN    METABOLIC PANEL, BASIC    Collection Time: 12/02/21  2:29 AM   Result Value Ref Range    Sodium 128 (L) 136 - 145 mmol/L    Potassium 4.0 3.5 - 5.1 mmol/L    Chloride 90 (L) 97 - 108 mmol/L    CO2 30 21 - 32 mmol/L    Anion gap 8 5 - 15 mmol/L    Glucose 102 (H) 65 - 100 mg/dL    BUN 48 (H) 6 - 20 MG/DL    Creatinine 1.20 0.70 - 1.30 MG/DL    BUN/Creatinine ratio 40 (H) 12 - 20      GFR est AA >60 >60 ml/min/1.73m2    GFR est non-AA >60 >60 ml/min/1.73m2    Calcium 9.3 8.5 - 10.1 MG/DL   MAGNESIUM    Collection Time: 12/02/21  2:29 AM   Result Value Ref Range    Magnesium 2.3 1.6 - 2.4 mg/dL   PHOSPHORUS    Collection Time: 12/02/21  2:29 AM   Result Value Ref Range    Phosphorus 5.6 (H) 2.6 - 4.7 MG/DL   GLUCOSE, POC    Collection Time: 12/02/21  6:13 AM   Result Value Ref Range    Glucose (POC) 150 (H) 65 - 117 mg/dL    Performed by Bunnie Primrose RN      Recent Labs     12/01/21  0301   WBC 7.9   HGB 13.4   HCT 41.6      Recent Labs     12/02/21  0229 12/01/21  0301 11/30/21  0348   * 130* 132*   K 4.0 3.9 3.7   CL 90* 93* 93*   CO2 30 31 31   BUN 48* 34* 18   CREA 1.20 1.05 1.02   * 107* 96   CA 9.3 9.0 9.4   MG 2.3 2.1 2.0   PHOS 5.6* 4.9* 4.2     Recent Labs     11/30/21  1119   ALT 84*   AP 61   TBILI 1.2*   TP 7.3   ALB 3.2*   GLOB 4.1*     No results for input(s): INR, PTP, APTT, INREXT, INREXT in the last 72 hours. No results for input(s): FE, TIBC, PSAT, FERR in the last 72 hours. Lab Results   Component Value Date/Time    Folate 18.4 11/12/2021 05:52 PM      No results for input(s): PH, PCO2, PO2 in the last 72 hours. No results for input(s): CPK, CKNDX, TROIQ in the last 72 hours.     No lab exists for component: CPKMB  Lab Results   Component Value Date/Time    Cholesterol, total 49 (L) 07/26/2018 01:47 PM    HDL Cholesterol 15 (L) 07/26/2018 01:47 PM    LDL, calculated 24 07/26/2018 01:47 PM    Triglyceride 55 11/30/2021 03:48 AM     Lab Results   Component Value Date/Time    Glucose (POC) 150 (H) 12/02/2021 06:13 AM    Glucose (POC) 134 (H) 12/01/2021 11:14 PM    Glucose (POC) 158 (H) 12/01/2021 06:35 PM    Glucose (POC) 90 01/26/2016 01:06 PM    Glucose (POC) 78 01/26/2016 12:47 PM     Lab Results   Component Value Date/Time    Color DARK YELLOW 11/25/2021 11:37 PM    Appearance CLEAR 11/25/2021 11:37 PM    Specific gravity 1.010 11/25/2021 11:37 PM    Specific gravity 1.019 11/20/2021 01:09 PM    pH (UA) 5.5 11/25/2021 11:37 PM    Protein 100 (A) 11/25/2021 11:37 PM    Glucose Negative 11/25/2021 11:37 PM    Ketone TRACE (A) 11/25/2021 11:37 PM    Bilirubin Negative 11/25/2021 11:37 PM    Urobilinogen 0.2 11/25/2021 11:37 PM    Nitrites Negative 11/25/2021 11:37 PM    Leukocyte Esterase Negative 11/25/2021 11:37 PM    Epithelial cells FEW 11/25/2021 11:37 PM    Bacteria Negative 11/25/2021 11:37 PM    WBC 0-4 11/25/2021 11:37 PM    RBC 0-5 11/25/2021 11:37 PM      XR Results (most recent):  Results from [place] encounter on 11/25/21    XR ABD (KUB)    Narrative  Clinical indication: Abdominal distention. Supine AP view of the abdomen obtained. Comparison November 30. NG tube remains  in the stomach. Continuous small bowel distention with contrast. Colon appears  of normal caliber. Impression  impression: Persistent small bowel distention. Assessment:     Active Problems:    SBO (small bowel obstruction) ([address] (11/22/2021)      Aspiration pneumonia ([address] (11/25/2021)      Small bowel obstruction ([address] (11/25/2021)      Sepsis ([address] (11/25/2021)      Abdominal pain, acute (11/27/2021)      Intractable cyclical vomiting with nausea (11/27/2021)        Plan:   Patient with improvement w/IV reglan.    + BMs are a good sign. We will get a KUB to see how his SB diameter looks. Will work on getting O/P Motegrity. Continue TPN. Diet advancement per general surgery, can consider retrying CLD if no N/V with clamping of NG. Clamp NGT. Ambulation encouraged.         Koko Marie MD    12/02/21 [address] [facility], [address] [place], [place] [id number]  [facility] [place] [address] [place]: [phone]
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
## 2021-12-02 NOTE — PROGRESS NOTES
End of Shift Note    Bedside shift change report given to Yossi Watts RN (oncoming nurse) by Sukhi Vallejo (offgoing nurse). Report included the following information SBAR, Kardex, Intake/Output, MAR and Recent Results    Shift worked: [id number]     Shift summary and any significant changes:     Patient  up ad lavern and ambulated to the bathroom had large BM this shift. Medicated for pain with toradol and also given tylenol suppository which seemed to help the pain. NG is putting out small amount of green fluid. TPN and lipids are infusing. Concerns for physician to address:       Zone phone for oncoming shift:   [phone]       Activity:  Activity Level: Up ad lavern  Number times ambulated in hallways past shift: 0  Number of times OOB to chair past shift: 0    Cardiac:   Cardiac Monitoring: Yes      Cardiac Rhythm: Sinus Tachy    Access:   Current line(s): PICC     Genitourinary:   Urinary status: voiding    Respiratory:   O2 Device: None (Room air)  Chronic home O2 use?: N/A  Incentive spirometer at bedside: YES     GI:  Last Bowel Movement Date: 12/30/21  Current diet:  DIET NPO  TPN ADULT - CENTRAL  Passing flatus: YES  Tolerating current diet: YES       Pain Management:   Patient states pain is manageable on current regimen: NO    Skin:  Russell Score: 19  Interventions: increase time out of bed and nutritional support     Patient Safety:  Fall Score:  Total Score: 2  Interventions: assistive device (walker, cane, etc), gripper socks and pt to call before getting OOB  High Fall Risk: Yes    Length of Stay:  Expected LOS: 4d 19h  Actual LOS: [address] LPN

## 2021-12-03 ENCOUNTER — APPOINTMENT (OUTPATIENT)
Dept: GENERAL RADIOLOGY | Age: 41
DRG: 710 | End: 2021-12-03
Attending: INTERNAL MEDICINE
Payer: MEDICAID

## 2021-12-03 ENCOUNTER — ANESTHESIA EVENT (OUTPATIENT)
Dept: SURGERY | Age: 41
DRG: 710 | End: 2021-12-03
Payer: MEDICAID

## 2021-12-03 ENCOUNTER — ANESTHESIA (OUTPATIENT)
Dept: SURGERY | Age: 41
DRG: 710 | End: 2021-12-03
Payer: MEDICAID

## 2021-12-03 ENCOUNTER — APPOINTMENT (OUTPATIENT)
Dept: CT IMAGING | Age: 41
DRG: 710 | End: 2021-12-03
Attending: PHYSICIAN ASSISTANT
Payer: MEDICAID

## 2021-12-03 LAB
ALBUMIN SERPL ELPH-MCNC: 3.5 G/DL (ref 2.9–4.4)
ALBUMIN/GLOB SERPL: 1.1 {RATIO} (ref 0.7–1.7)
ALPHA1 GLOB SERPL ELPH-MCNC: 0.5 G/DL (ref 0–0.4)
ALPHA2 GLOB SERPL ELPH-MCNC: 0.8 G/DL (ref 0.4–1)
ANION GAP SERPL CALC-SCNC: 7 MMOL/L (ref 5–15)
B-GLOBULIN SERPL ELPH-MCNC: 0.8 G/DL (ref 0.7–1.3)
BASOPHILS # BLD: 0 K/UL (ref 0–0.1)
BASOPHILS NFR BLD: 1 % (ref 0–1)
BUN SERPL-MCNC: 53 MG/DL (ref 6–20)
BUN/CREAT SERPL: 50 (ref 12–20)
CALCIUM SERPL-MCNC: 8.9 MG/DL (ref 8.5–10.1)
CHLORIDE SERPL-SCNC: 95 MMOL/L (ref 97–108)
CO2 SERPL-SCNC: 28 MMOL/L (ref 21–32)
CREAT SERPL-MCNC: 1.07 MG/DL (ref 0.7–1.3)
DIFFERENTIAL METHOD BLD: ABNORMAL
EOSINOPHIL # BLD: 0.1 K/UL (ref 0–0.4)
EOSINOPHIL NFR BLD: 2 % (ref 0–7)
ERYTHROCYTE [DISTWIDTH] IN BLOOD BY AUTOMATED COUNT: 15.3 % (ref 11.5–14.5)
GAMMA GLOB SERPL ELPH-MCNC: 1 G/DL (ref 0.4–1.8)
GLOBULIN SER CALC-MCNC: 3.2 G/DL (ref 2.2–3.9)
GLUCOSE BLD STRIP.AUTO-MCNC: 126 MG/DL (ref 65–117)
GLUCOSE BLD STRIP.AUTO-MCNC: 131 MG/DL (ref 65–117)
GLUCOSE BLD STRIP.AUTO-MCNC: 134 MG/DL (ref 65–117)
GLUCOSE BLD STRIP.AUTO-MCNC: 161 MG/DL (ref 65–117)
GLUCOSE BLD STRIP.AUTO-MCNC: 170 MG/DL (ref 65–117)
GLUCOSE SERPL-MCNC: 96 MG/DL (ref 65–100)
HCT VFR BLD AUTO: 34.3 % (ref 36.6–50.3)
HGB BLD-MCNC: 11.3 G/DL (ref 12.1–17)
IGG SERPL-MCNC: 1104 MG/DL (ref 603–1613)
IGG1 SER-MCNC: 658 MG/DL (ref 248–810)
IGG2 SER-MCNC: 289 MG/DL (ref 130–555)
IGG3 SER-MCNC: 34 MG/DL (ref 15–102)
IGG4 SER-MCNC: 11 MG/DL (ref 2–96)
IMM GRANULOCYTES # BLD AUTO: 0 K/UL (ref 0–0.04)
IMM GRANULOCYTES NFR BLD AUTO: 0 % (ref 0–0.5)
LACTATE SERPL-SCNC: 0.9 MMOL/L (ref 0.4–2)
LYMPHOCYTES # BLD: 0.8 K/UL (ref 0.8–3.5)
LYMPHOCYTES NFR BLD: 15 % (ref 12–49)
M PROTEIN SERPL ELPH-MCNC: ABNORMAL G/DL
MCH RBC QN AUTO: 31.4 PG (ref 26–34)
MCHC RBC AUTO-ENTMCNC: 32.9 G/DL (ref 30–36.5)
MCV RBC AUTO: 95.3 FL (ref 80–99)
MONOCYTES # BLD: 1 K/UL (ref 0–1)
MONOCYTES NFR BLD: 18 % (ref 5–13)
NEUTS SEG # BLD: 3.5 K/UL (ref 1.8–8)
NEUTS SEG NFR BLD: 64 % (ref 32–75)
NRBC # BLD: 0 K/UL (ref 0–0.01)
NRBC BLD-RTO: 0 PER 100 WBC
PHOSPHATE SERPL-MCNC: 4.7 MG/DL (ref 2.6–4.7)
PLATELET # BLD AUTO: 257 K/UL (ref 150–400)
PMV BLD AUTO: 10 FL (ref 8.9–12.9)
POTASSIUM SERPL-SCNC: 4.2 MMOL/L (ref 3.5–5.1)
PROT SERPL-MCNC: 6.7 G/DL (ref 6–8.5)
RBC # BLD AUTO: 3.6 M/UL (ref 4.1–5.7)
SERVICE CMNT-IMP: ABNORMAL
SODIUM SERPL-SCNC: 130 MMOL/L (ref 136–145)
WBC # BLD AUTO: 5.5 K/UL (ref 4.1–11.1)

## 2021-12-03 PROCEDURE — 74011000258 HC RX REV CODE- 258: Performed by: INTERNAL MEDICINE

## 2021-12-03 PROCEDURE — 76010000132 HC OR TIME 2.5 TO 3 HR: Performed by: SURGERY

## 2021-12-03 PROCEDURE — 74011250636 HC RX REV CODE- 250/636: Performed by: NURSE ANESTHETIST, CERTIFIED REGISTERED

## 2021-12-03 PROCEDURE — 77030002916 HC SUT ETHLN J&J -A: Performed by: SURGERY

## 2021-12-03 PROCEDURE — 74011000636 HC RX REV CODE- 636: Performed by: INTERNAL MEDICINE

## 2021-12-03 PROCEDURE — 77030031622: Performed by: SURGERY

## 2021-12-03 PROCEDURE — 76060000036 HC ANESTHESIA 2.5 TO 3 HR: Performed by: SURGERY

## 2021-12-03 PROCEDURE — 74011250636 HC RX REV CODE- 250/636: Performed by: SURGERY

## 2021-12-03 PROCEDURE — 99232 SBSQ HOSP IP/OBS MODERATE 35: CPT | Performed by: SURGERY

## 2021-12-03 PROCEDURE — 83605 ASSAY OF LACTIC ACID: CPT

## 2021-12-03 PROCEDURE — 2709999900 HC NON-CHARGEABLE SUPPLY: Performed by: SURGERY

## 2021-12-03 PROCEDURE — 74011250637 HC RX REV CODE- 250/637: Performed by: PHYSICIAN ASSISTANT

## 2021-12-03 PROCEDURE — 74011000250 HC RX REV CODE- 250: Performed by: INTERNAL MEDICINE

## 2021-12-03 PROCEDURE — 74011250636 HC RX REV CODE- 250/636: Performed by: NURSE PRACTITIONER

## 2021-12-03 PROCEDURE — 77030026438 HC STYL ET INTUB CARD -A: Performed by: ANESTHESIOLOGY

## 2021-12-03 PROCEDURE — 77030011278 HC ELECTRD LIG IMPT COVD -F: Performed by: SURGERY

## 2021-12-03 PROCEDURE — 77030008771 HC TU NG SALEM SUMP -A: Performed by: ANESTHESIOLOGY

## 2021-12-03 PROCEDURE — 77030008462 HC STPLR SKN PROX J&J -A: Performed by: SURGERY

## 2021-12-03 PROCEDURE — 77030019908 HC STETH ESOPH SIMS -A: Performed by: ANESTHESIOLOGY

## 2021-12-03 PROCEDURE — 77030011267 HC ELECTRD BLD COVD -A: Performed by: SURGERY

## 2021-12-03 PROCEDURE — 74018 RADEX ABDOMEN 1 VIEW: CPT

## 2021-12-03 PROCEDURE — 77030040831 HC BAG URINE DRNG MDII -A: Performed by: SURGERY

## 2021-12-03 PROCEDURE — 77030019702 HC WRP THER MENM -C: Performed by: SURGERY

## 2021-12-03 PROCEDURE — 77030005268 HC CATH JEJU HALY -C: Performed by: SURGERY

## 2021-12-03 PROCEDURE — 77030008684 HC TU ET CUF COVD -B: Performed by: ANESTHESIOLOGY

## 2021-12-03 PROCEDURE — 74011000250 HC RX REV CODE- 250: Performed by: NURSE ANESTHETIST, CERTIFIED REGISTERED

## 2021-12-03 PROCEDURE — 0DH60UZ INSERTION OF FEEDING DEVICE INTO STOMACH, OPEN APPROACH: ICD-10-PCS | Performed by: SURGERY

## 2021-12-03 PROCEDURE — 77030003029 HC SUT VCRL J&J -B: Performed by: SURGERY

## 2021-12-03 PROCEDURE — 74011250636 HC RX REV CODE- 250/636: Performed by: PHYSICIAN ASSISTANT

## 2021-12-03 PROCEDURE — 65660000000 HC RM CCU STEPDOWN

## 2021-12-03 PROCEDURE — 84100 ASSAY OF PHOSPHORUS: CPT

## 2021-12-03 PROCEDURE — 77030011266 HC ELECTRD BLD INSL COVD -A: Performed by: SURGERY

## 2021-12-03 PROCEDURE — 76210000016 HC OR PH I REC 1 TO 1.5 HR: Performed by: SURGERY

## 2021-12-03 PROCEDURE — 77030013079 HC BLNKT BAIR HGGR 3M -A: Performed by: ANESTHESIOLOGY

## 2021-12-03 PROCEDURE — 82962 GLUCOSE BLOOD TEST: CPT

## 2021-12-03 PROCEDURE — C1765 ADHESION BARRIER: HCPCS | Performed by: SURGERY

## 2021-12-03 PROCEDURE — 74011250636 HC RX REV CODE- 250/636: Performed by: STUDENT IN AN ORGANIZED HEALTH CARE EDUCATION/TRAINING PROGRAM

## 2021-12-03 PROCEDURE — 44005 FREEING OF BOWEL ADHESION: CPT | Performed by: SURGERY

## 2021-12-03 PROCEDURE — 80048 BASIC METABOLIC PNL TOTAL CA: CPT

## 2021-12-03 PROCEDURE — 74011250636 HC RX REV CODE- 250/636: Performed by: ANESTHESIOLOGY

## 2021-12-03 PROCEDURE — 36415 COLL VENOUS BLD VENIPUNCTURE: CPT

## 2021-12-03 PROCEDURE — 74011250637 HC RX REV CODE- 250/637: Performed by: SURGERY

## 2021-12-03 PROCEDURE — 85025 COMPLETE CBC W/AUTO DIFF WBC: CPT

## 2021-12-03 PROCEDURE — 77030002996 HC SUT SLK J&J -A: Performed by: SURGERY

## 2021-12-03 PROCEDURE — 74011250637 HC RX REV CODE- 250/637: Performed by: NURSE PRACTITIONER

## 2021-12-03 PROCEDURE — 77030040361 HC SLV COMPR DVT MDII -B: Performed by: SURGERY

## 2021-12-03 PROCEDURE — 74011250636 HC RX REV CODE- 250/636: Performed by: INTERNAL MEDICINE

## 2021-12-03 PROCEDURE — 77030005513 HC CATH URETH FOL11 MDII -B: Performed by: SURGERY

## 2021-12-03 PROCEDURE — 74177 CT ABD & PELVIS W/CONTRAST: CPT

## 2021-12-03 PROCEDURE — 77030012407 HC DRN WND BARD -B: Performed by: SURGERY

## 2021-12-03 PROCEDURE — 77030002966 HC SUT PDS J&J -A: Performed by: SURGERY

## 2021-12-03 PROCEDURE — 0DNE0ZZ RELEASE LARGE INTESTINE, OPEN APPROACH: ICD-10-PCS | Performed by: SURGERY

## 2021-12-03 RX ORDER — SODIUM CHLORIDE 0.9 % (FLUSH) 0.9 %
5-40 SYRINGE (ML) INJECTION EVERY 8 HOURS
Status: DISCONTINUED | OUTPATIENT
Start: 2021-12-03 | End: 2021-12-03 | Stop reason: HOSPADM

## 2021-12-03 RX ORDER — FENTANYL CITRATE 50 UG/ML
INJECTION, SOLUTION INTRAMUSCULAR; INTRAVENOUS AS NEEDED
Status: DISCONTINUED | OUTPATIENT
Start: 2021-12-03 | End: 2021-12-03 | Stop reason: HOSPADM

## 2021-12-03 RX ORDER — SODIUM CHLORIDE 0.9 % (FLUSH) 0.9 %
5-40 SYRINGE (ML) INJECTION AS NEEDED
Status: DISCONTINUED | OUTPATIENT
Start: 2021-12-03 | End: 2021-12-03 | Stop reason: HOSPADM

## 2021-12-03 RX ORDER — SODIUM CHLORIDE, SODIUM LACTATE, POTASSIUM CHLORIDE, CALCIUM CHLORIDE 600; 310; 30; 20 MG/100ML; MG/100ML; MG/100ML; MG/100ML
25 INJECTION, SOLUTION INTRAVENOUS CONTINUOUS
Status: DISCONTINUED | OUTPATIENT
Start: 2021-12-03 | End: 2021-12-03 | Stop reason: HOSPADM

## 2021-12-03 RX ORDER — HYDROMORPHONE HYDROCHLORIDE 2 MG/ML
INJECTION, SOLUTION INTRAMUSCULAR; INTRAVENOUS; SUBCUTANEOUS
Status: COMPLETED
Start: 2021-12-03 | End: 2021-12-03

## 2021-12-03 RX ORDER — SUCCINYLCHOLINE CHLORIDE 20 MG/ML
INJECTION INTRAMUSCULAR; INTRAVENOUS AS NEEDED
Status: DISCONTINUED | OUTPATIENT
Start: 2021-12-03 | End: 2021-12-03 | Stop reason: HOSPADM

## 2021-12-03 RX ORDER — DIPHENHYDRAMINE HYDROCHLORIDE 50 MG/ML
12.5 INJECTION, SOLUTION INTRAMUSCULAR; INTRAVENOUS AS NEEDED
Status: DISCONTINUED | OUTPATIENT
Start: 2021-12-03 | End: 2021-12-03 | Stop reason: HOSPADM

## 2021-12-03 RX ORDER — MIDAZOLAM HYDROCHLORIDE 1 MG/ML
INJECTION, SOLUTION INTRAMUSCULAR; INTRAVENOUS AS NEEDED
Status: DISCONTINUED | OUTPATIENT
Start: 2021-12-03 | End: 2021-12-03 | Stop reason: HOSPADM

## 2021-12-03 RX ORDER — ONDANSETRON 2 MG/ML
INJECTION INTRAMUSCULAR; INTRAVENOUS AS NEEDED
Status: DISCONTINUED | OUTPATIENT
Start: 2021-12-03 | End: 2021-12-03 | Stop reason: HOSPADM

## 2021-12-03 RX ORDER — DEXAMETHASONE SODIUM PHOSPHATE 4 MG/ML
INJECTION, SOLUTION INTRA-ARTICULAR; INTRALESIONAL; INTRAMUSCULAR; INTRAVENOUS; SOFT TISSUE AS NEEDED
Status: DISCONTINUED | OUTPATIENT
Start: 2021-12-03 | End: 2021-12-03 | Stop reason: HOSPADM

## 2021-12-03 RX ORDER — HYDROMORPHONE HYDROCHLORIDE 2 MG/ML
INJECTION, SOLUTION INTRAMUSCULAR; INTRAVENOUS; SUBCUTANEOUS AS NEEDED
Status: DISCONTINUED | OUTPATIENT
Start: 2021-12-03 | End: 2021-12-03 | Stop reason: HOSPADM

## 2021-12-03 RX ORDER — FENTANYL CITRATE 50 UG/ML
25 INJECTION, SOLUTION INTRAMUSCULAR; INTRAVENOUS
Status: DISCONTINUED | OUTPATIENT
Start: 2021-12-03 | End: 2021-12-03 | Stop reason: HOSPADM

## 2021-12-03 RX ORDER — CEFOXITIN 1 G/1
INJECTION, POWDER, FOR SOLUTION INTRAVENOUS AS NEEDED
Status: DISCONTINUED | OUTPATIENT
Start: 2021-12-03 | End: 2021-12-03 | Stop reason: HOSPADM

## 2021-12-03 RX ORDER — LIDOCAINE HYDROCHLORIDE 20 MG/ML
INJECTION, SOLUTION EPIDURAL; INFILTRATION; INTRACAUDAL; PERINEURAL AS NEEDED
Status: DISCONTINUED | OUTPATIENT
Start: 2021-12-03 | End: 2021-12-03 | Stop reason: HOSPADM

## 2021-12-03 RX ORDER — HYDROMORPHONE HYDROCHLORIDE 1 MG/ML
0.2 INJECTION, SOLUTION INTRAMUSCULAR; INTRAVENOUS; SUBCUTANEOUS
Status: DISCONTINUED | OUTPATIENT
Start: 2021-12-03 | End: 2021-12-03 | Stop reason: HOSPADM

## 2021-12-03 RX ORDER — PROPOFOL 10 MG/ML
INJECTION, EMULSION INTRAVENOUS AS NEEDED
Status: DISCONTINUED | OUTPATIENT
Start: 2021-12-03 | End: 2021-12-03 | Stop reason: HOSPADM

## 2021-12-03 RX ORDER — LIDOCAINE HYDROCHLORIDE 10 MG/ML
0.1 INJECTION, SOLUTION EPIDURAL; INFILTRATION; INTRACAUDAL; PERINEURAL AS NEEDED
Status: DISCONTINUED | OUTPATIENT
Start: 2021-12-03 | End: 2021-12-03 | Stop reason: HOSPADM

## 2021-12-03 RX ORDER — ROCURONIUM BROMIDE 10 MG/ML
INJECTION, SOLUTION INTRAVENOUS AS NEEDED
Status: DISCONTINUED | OUTPATIENT
Start: 2021-12-03 | End: 2021-12-03 | Stop reason: HOSPADM

## 2021-12-03 RX ADMIN — LINACLOTIDE 290 MCG: 290 CAPSULE, GELATIN COATED ORAL at 09:05

## 2021-12-03 RX ADMIN — METOCLOPRAMIDE 10 MG: 5 INJECTION, SOLUTION INTRAMUSCULAR; INTRAVENOUS at 06:14

## 2021-12-03 RX ADMIN — SODIUM CHLORIDE, POTASSIUM CHLORIDE, SODIUM LACTATE AND CALCIUM CHLORIDE 25 ML/HR: 600; 310; 30; 20 INJECTION, SOLUTION INTRAVENOUS at 18:30

## 2021-12-03 RX ADMIN — CEFOXITIN 2 G: 1 INJECTION, POWDER, FOR SOLUTION INTRAVENOUS at 18:54

## 2021-12-03 RX ADMIN — METOCLOPRAMIDE 10 MG: 5 INJECTION, SOLUTION INTRAMUSCULAR; INTRAVENOUS at 00:01

## 2021-12-03 RX ADMIN — LIDOCAINE HYDROCHLORIDE 60 MG: 20 INJECTION, SOLUTION EPIDURAL; INFILTRATION; INTRACAUDAL; PERINEURAL at 18:48

## 2021-12-03 RX ADMIN — FENTANYL CITRATE 25 MCG: 50 INJECTION INTRAMUSCULAR; INTRAVENOUS at 22:27

## 2021-12-03 RX ADMIN — Medication 10 ML: at 22:12

## 2021-12-03 RX ADMIN — FENTANYL CITRATE 150 MCG: 50 INJECTION INTRAMUSCULAR; INTRAVENOUS at 19:25

## 2021-12-03 RX ADMIN — FENTANYL CITRATE 100 MCG: 50 INJECTION INTRAMUSCULAR; INTRAVENOUS at 18:48

## 2021-12-03 RX ADMIN — SODIUM CHLORIDE, POTASSIUM CHLORIDE, SODIUM LACTATE AND CALCIUM CHLORIDE: 600; 310; 30; 20 INJECTION, SOLUTION INTRAVENOUS at 20:08

## 2021-12-03 RX ADMIN — HYDROMORPHONE HYDROCHLORIDE 2 MG: 2 INJECTION, SOLUTION INTRAMUSCULAR; INTRAVENOUS; SUBCUTANEOUS at 21:32

## 2021-12-03 RX ADMIN — KETOROLAC TROMETHAMINE 15 MG: 30 INJECTION, SOLUTION INTRAMUSCULAR; INTRAVENOUS at 16:57

## 2021-12-03 RX ADMIN — PROPOFOL 150 MG: 10 INJECTION, EMULSION INTRAVENOUS at 18:48

## 2021-12-03 RX ADMIN — ONDANSETRON HYDROCHLORIDE 4 MG: 2 INJECTION, SOLUTION INTRAMUSCULAR; INTRAVENOUS at 19:26

## 2021-12-03 RX ADMIN — Medication 3 AMPULE: at 18:29

## 2021-12-03 RX ADMIN — METOCLOPRAMIDE 10 MG: 5 INJECTION, SOLUTION INTRAMUSCULAR; INTRAVENOUS at 11:37

## 2021-12-03 RX ADMIN — FENTANYL CITRATE 25 MCG: 50 INJECTION INTRAMUSCULAR; INTRAVENOUS at 22:23

## 2021-12-03 RX ADMIN — DEXAMETHASONE SODIUM PHOSPHATE 8 MG: 4 INJECTION, SOLUTION INTRAMUSCULAR; INTRAVENOUS at 19:26

## 2021-12-03 RX ADMIN — SUCCINYLCHOLINE CHLORIDE 100 MG: 20 INJECTION, SOLUTION INTRAMUSCULAR; INTRAVENOUS at 18:48

## 2021-12-03 RX ADMIN — AMLODIPINE BESYLATE 10 MG: 5 TABLET ORAL at 09:05

## 2021-12-03 RX ADMIN — ROCURONIUM BROMIDE 10 MG: 10 INJECTION INTRAVENOUS at 19:35

## 2021-12-03 RX ADMIN — MAGNESIUM SULFATE HEPTAHYDRATE: 500 INJECTION, SOLUTION INTRAMUSCULAR; INTRAVENOUS at 17:07

## 2021-12-03 RX ADMIN — CEFOXITIN 2 G: 1 INJECTION, POWDER, FOR SOLUTION INTRAVENOUS at 21:10

## 2021-12-03 RX ADMIN — ROCURONIUM BROMIDE 30 MG: 10 INJECTION INTRAVENOUS at 18:55

## 2021-12-03 RX ADMIN — MIDAZOLAM HYDROCHLORIDE 2 MG: 1 INJECTION, SOLUTION INTRAMUSCULAR; INTRAVENOUS at 18:38

## 2021-12-03 RX ADMIN — ROCURONIUM BROMIDE 15 MG: 10 INJECTION INTRAVENOUS at 20:40

## 2021-12-03 RX ADMIN — SUGAMMADEX 150 MG: 100 INJECTION, SOLUTION INTRAVENOUS at 21:13

## 2021-12-03 RX ADMIN — IOHEXOL 50 ML: 240 INJECTION, SOLUTION INTRATHECAL; INTRAVASCULAR; INTRAVENOUS; ORAL at 14:00

## 2021-12-03 RX ADMIN — SODIUM CHLORIDE, PRESERVATIVE FREE 10 ML: 5 INJECTION INTRAVENOUS at 22:13

## 2021-12-03 RX ADMIN — FENTANYL CITRATE 25 MCG: 50 INJECTION INTRAMUSCULAR; INTRAVENOUS at 22:05

## 2021-12-03 RX ADMIN — FENTANYL CITRATE 25 MCG: 50 INJECTION INTRAMUSCULAR; INTRAVENOUS at 22:30

## 2021-12-03 RX ADMIN — ROCURONIUM BROMIDE 10 MG: 10 INJECTION INTRAVENOUS at 19:56

## 2021-12-03 RX ADMIN — SODIUM CHLORIDE, PRESERVATIVE FREE 10 ML: 5 INJECTION INTRAVENOUS at 14:00

## 2021-12-03 RX ADMIN — ONDANSETRON HYDROCHLORIDE 4 MG: 2 INJECTION, SOLUTION INTRAMUSCULAR; INTRAVENOUS at 14:08

## 2021-12-03 RX ADMIN — SODIUM CHLORIDE, PRESERVATIVE FREE 10 ML: 5 INJECTION INTRAVENOUS at 06:16

## 2021-12-03 NOTE — PROGRESS NOTES
Patient unhooked NGT, refuses to allow suction to be connected. Stated that it \"needs\" to be taken out. Will notify provider.
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
## 2021-12-03 NOTE — PROGRESS NOTES
End of Shift Note    Bedside shift change report given to Debbie (oncoming nurse) by Yared Mahmood RN (offgoing nurse).   Report included the following information SBAR, Kardex, Intake/Output, MAR and Recent Results

## 2021-12-03 NOTE — PROGRESS NOTES
Physician Progress Note      PATIENT:               Nina Patton  CSN #:                  [id number]  :                       1980  ADMIT DATE:       2021 1:24 PM  [address] [place] DATE:  RESPONDING  PROVIDER #:        Александр Coy MD        QUERY TEXT:    Type of Anemia: Please provide further specificity, if known. Clinical indicators include: chronic anemia, hemolytic, anemia, bleeding, hemoglobin, hgb, hct  Options provided:  -- Anemia due to acute blood loss  -- Anemia due to chronic blood loss  -- Anemia due to iron deficiency  -- Anemia due to postoperative blood loss  -- Anemia due to chronic disease  -- Other - I will add my own diagnosis  -- Disagree - Not applicable / Not valid  -- Disagree - Clinically Unable to determine / Unknown        PROVIDER RESPONSE TEXT:    The patient has anemia due to chronic disease. QUERY TEXT:    Patient admitted with sepsis. If possible, please document in progress notes and discharge summary if you are evaluating and /or treating any of the following: The medical record reflects the following:  Risk Factors: SBO  Clinical Indicators:  RD-Malnutrition Status:  Severe malnutrition  Context:  Chronic illness  Findings of the 6 clinical characteristics of malnutrition:  Energy Intake:  7 - 75% or less est energy requirements for 1 month or longer  Weight Loss:  7.0 - Greater than 7.5% over 3 months  Body Fat Loss:  7 - Severe body fat loss, Triceps, Orbital, Buccal region  Muscle Mass Loss:  1 - Mild muscle mass loss, Clavicles (pectoralis &deltoids), Temples (temporalis), Thigh (quadriceps), Scapula (trapezius)  Treatment: RD cx, TPN, Ensure Clear po BID  Thanks  Lyndsay Suh RN/CDI     ASPEN Criteria:  https://aspenjournals. onlinelibrary. crawley. com/doi/full/10.1177/6285647733541526  Options provided:  -- Protein calorie malnutrition severe  -- Underweight with BMI 19  -- Other - I will add my own diagnosis  -- Disagree - Not applicable / Not valid  -- Disagree - Clinically unable to determine / Unknown  -- Refer to Clinical Documentation Reviewer    PROVIDER RESPONSE TEXT:    This patient has severe protein calorie malnutrition.     Query created by: Dixie Bansal on 11/30/2021 2:35 PM      Electronically signed by:  Ilana Coley MD 12/3/2021 8:41 AM
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
## 2021-12-03 NOTE — PROGRESS NOTES
Transition of Care Plan:     RUR:   20% \"high risk\"  Disposition: Home w/ family & follow-up appts  Follow up appointments: PCP, Surgery, GI  DME needed: None  Transportation at [place] [address] or means to access home:  Yes  IM Medicare Letter: N/A  Is patient a BCPI-A Bundle:   No                   If yes, was Bundle Letter given?:   N/A  Caregiver Contact: Mother- Devora Thrasher, [phone]  Discharge Caregiver contacted prior to discharge? CM reviewed pt's chart. Discharge plan & barriers discussed during IDR re: diet, TPN, NGT. Anticipate pt being here through the weekend. Will continue to follow.     Ava Sánchez, MSW  Care Management
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
## 2021-12-03 NOTE — PROGRESS NOTES
Gastroenterology Progress Note  Helen Holden MD       12/3/2021    Admit Date: 11/25/2021    Subjective: Follow up for:  1) Dilated small and large bowel    2) Gastroparesis     3) Constipation      He has had bowel movementsw/ flatus but c.o throat discomfort from NGT  There is no bleeding. Current Facility-Administered Medications   Medication Dose Route Frequency    TPN ADULT - CENTRAL   IntraVENous CONTINUOUS    metoclopramide HCl (REGLAN) injection 10 mg  10 mg IntraVENous Q6H    linaCLOtide (LINZESS) capsule 290 mcg  290 mcg Oral ACB    fat emulsion 20% (LIPOSYN, INTRAlipid) infusion 250 mL  250 mL IntraVENous Q MON, WED & FRI    insulin lispro (HUMALOG) injection   SubCUTAneous Q6H    glucose chewable tablet 16 g  4 Tablet Oral PRN    dextrose (D50W) injection syrg 12.5-25 g  12.5-25 g IntraVENous PRN    glucagon (GLUCAGEN) injection 1 mg  1 mg IntraMUSCular PRN    prochlorperazine (COMPAZINE) with saline injection 5 mg  5 mg IntraVENous Q4H PRN    ketorolac (TORADOL) injection 15 mg  15 mg IntraVENous Q6H PRN    amLODIPine (NORVASC) tablet 10 mg  10 mg Oral DAILY    bacitracin 500 unit/gram packet 1 Packet  1 Packet Topical PRN    dicyclomine (BENTYL) tablet 20 mg  20 mg Oral Q6H PRN    hydrALAZINE (APRESOLINE) 20 mg/mL injection 20 mg  20 mg IntraVENous Q6H PRN    sodium chloride (NS) flush 5-40 mL  5-40 mL IntraVENous Q8H    sodium chloride (NS) flush 5-40 mL  5-40 mL IntraVENous PRN    acetaminophen (TYLENOL) tablet 650 mg  650 mg Oral Q6H PRN    Or    acetaminophen (TYLENOL) suppository 650 mg  650 mg Rectal Q6H PRN    polyethylene glycol (MIRALAX) packet 17 g  17 g Oral DAILY PRN    ondansetron (ZOFRAN ODT) tablet 4 mg  4 mg Oral Q8H PRN    Or    ondansetron (ZOFRAN) injection 4 mg  4 mg IntraVENous Q6H PRN        Objective:     Blood pressure (!) 124/94, pulse 91, temperature 97.4 °F (36.3 °C), resp.  rate 16, height 5' 9\" (1.753 m), weight 58.5 kg (129 lb), SpO2 100 %. No intake/output data recorded. 12/01 1901 - 12/03 0700  In: -   Out: 700 [Urine:550]    EXAM:    GEN: Pleasant, thin BM, NAD. NGT in  HEENT: NCAT  Heart: RRR  Lungs: CTA  Abdomen: mildly distended, soft, less tender, without guarding or rebound, hypoactive BS. Data Review    Recent Results (from the past 24 hour(s))   GLUCOSE, POC    Collection Time: 12/02/21 12:07 PM   Result Value Ref Range    Glucose (POC) 129 (H) 65 - 117 mg/dL    Performed by [facility]    GLUCOSE, POC    Collection Time: 12/02/21  4:37 PM   Result Value Ref Range    Glucose (POC) 141 (H) 65 - 117 mg/dL    Performed by [facility]    GLUCOSE, POC    Collection Time: 12/02/21 11:52 PM   Result Value Ref Range    Glucose (POC) 141 (H) 65 - 117 mg/dL    Performed by Barrington Aguayo LPN    PHOSPHORUS    Collection Time: 12/03/21  2:12 AM   Result Value Ref Range    Phosphorus 4.7 2.6 - 4.7 MG/DL   CBC WITH AUTOMATED DIFF    Collection Time: 12/03/21  2:12 AM   Result Value Ref Range    WBC 5.5 4.1 - 11.1 K/uL    RBC 3.60 (L) 4.10 - 5.70 M/uL    HGB 11.3 (L) 12.1 - 17.0 g/dL    HCT 34.3 (L) 36.6 - 50.3 %    MCV 95.3 80.0 - 99.0 FL    MCH 31.4 26.0 - 34.0 PG    MCHC 32.9 30.0 - 36.5 g/dL    RDW 15.3 (H) 11.5 - 14.5 %    PLATELET 562 456 - 680 K/uL    MPV 10.0 8.9 - 12.9 FL    NRBC 0.0 0  WBC    ABSOLUTE NRBC 0.00 0.00 - 0.01 K/uL    NEUTROPHILS 64 32 - 75 %    LYMPHOCYTES 15 12 - 49 %    MONOCYTES 18 (H) 5 - 13 %    EOSINOPHILS 2 0 - 7 %    BASOPHILS 1 0 - 1 %    IMMATURE GRANULOCYTES 0 0.0 - 0.5 %    ABS. NEUTROPHILS 3.5 1.8 - 8.0 K/UL    ABS. LYMPHOCYTES 0.8 0.8 - 3.5 K/UL    ABS. MONOCYTES 1.0 0.0 - 1.0 K/UL    ABS. EOSINOPHILS 0.1 0.0 - 0.4 K/UL    ABS. BASOPHILS 0.0 0.0 - 0.1 K/UL    ABS. IMM.  GRANS. 0.0 0.00 - 0.04 K/UL    DF AUTOMATED     METABOLIC PANEL, BASIC    Collection Time: 12/03/21  2:12 AM   Result Value Ref Range    Sodium 130 (L) 136 - 145 mmol/L    Potassium 4.2 3.5 - 5.1 mmol/L Chloride 95 (L) 97 - 108 mmol/L    CO2 28 21 - 32 mmol/L    Anion gap 7 5 - 15 mmol/L    Glucose 96 65 - 100 mg/dL    BUN 53 (H) 6 - 20 MG/DL    Creatinine 1.07 0.70 - 1.30 MG/DL    BUN/Creatinine ratio 50 (H) 12 - 20      GFR est AA >60 >60 ml/min/1.73m2    GFR est non-AA >60 >60 ml/min/1.73m2    Calcium 8.9 8.5 - 10.1 MG/DL   GLUCOSE, POC    Collection Time: 12/03/21  6:32 AM   Result Value Ref Range    Glucose (POC) 131 (H) 65 - 117 mg/dL    Performed by Cayden Dewitt LPN      Recent Labs     12/03/21 0212 12/01/21  0301   WBC 5.5 7.9   HGB 11.3* 13.4   HCT 34.3* 41.6    284     Recent Labs     12/03/21 0212 12/02/21  0229 12/01/21  0301   * 128* 130*   K 4.2 4.0 3.9   CL 95* 90* 93*   CO2 28 30 31   BUN 53* 48* 34*   CREA 1.07 1.20 1.05   GLU 96 102* 107*   CA 8.9 9.3 9.0   MG  --  2.3 2.1   PHOS 4.7 5.6* 4.9*     Recent Labs     11/30/21  1119   ALT 84*   AP 61   TBILI 1.2*   TP 7.3   ALB 3.2*   GLOB 4.1*     No results for input(s): INR, PTP, APTT, INREXT, INREXT in the last 72 hours. No results for input(s): FE, TIBC, PSAT, FERR in the last 72 hours. Lab Results   Component Value Date/Time    Folate 18.4 11/12/2021 05:52 PM      No results for input(s): PH, PCO2, PO2 in the last 72 hours. No results for input(s): CPK, CKNDX, TROIQ in the last 72 hours.     No lab exists for component: CPKMB  Lab Results   Component Value Date/Time    Cholesterol, total 49 (L) 07/26/2018 01:47 PM    HDL Cholesterol 15 (L) 07/26/2018 01:47 PM    LDL, calculated 24 07/26/2018 01:47 PM    Triglyceride 55 11/30/2021 03:48 AM     Lab Results   Component Value Date/Time    Glucose (POC) 131 (H) 12/03/2021 06:32 AM    Glucose (POC) 141 (H) 12/02/2021 11:52 PM    Glucose (POC) 141 (H) 12/02/2021 04:37 PM    Glucose (POC) 129 (H) 12/02/2021 12:07 PM    Glucose (POC) 150 (H) 12/02/2021 06:13 AM     Lab Results   Component Value Date/Time    Color DARK YELLOW 11/25/2021 11:37 PM    Appearance CLEAR 11/25/2021 11:37 PM    Specific gravity 1.010 11/25/2021 11:37 PM    Specific gravity 1.019 11/20/2021 01:09 PM    pH (UA) 5.5 11/25/2021 11:37 PM    Protein 100 (A) 11/25/2021 11:37 PM    Glucose Negative 11/25/2021 11:37 PM    Ketone TRACE (A) 11/25/2021 11:37 PM    Bilirubin Negative 11/25/2021 11:37 PM    Urobilinogen 0.2 11/25/2021 11:37 PM    Nitrites Negative 11/25/2021 11:37 PM    Leukocyte Esterase Negative 11/25/2021 11:37 PM    Epithelial cells FEW 11/25/2021 11:37 PM    Bacteria Negative 11/25/2021 11:37 PM    WBC 0-4 11/25/2021 11:37 PM    RBC 0-5 11/25/2021 11:37 PM      XR Results (most recent):  Results from Hospital Encounter encounter on 11/25/21    XR ABD (KUB)    Narrative  Clinical indication: Abdominal distention. Supine AP view of the abdomen obtained. Comparison November 30. NG tube remains  in the stomach. Continuous small bowel distention with contrast. Colon appears  of normal caliber. Impression  impression: Persistent small bowel distention. Assessment:     Active Problems:    SBO (small bowel obstruction) ([address] (11/22/2021)      Aspiration pneumonia ([address] (11/25/2021)      Small bowel obstruction ([address] (11/25/2021)      Sepsis ([address] (11/25/2021)      Abdominal pain, acute (11/27/2021)      Intractable cyclical vomiting with nausea (11/27/2021)        Plan:   He is on IV Reglan and has had BMs . We will repeat  KUB today  We will work on getting OP Motegrity. Continue TPN. Diet advancement per general surgery,CLD trial after KUB is done and looks better,  Ambulation encouraged. Addendum:  Portable KUB shows no significant change of small bowel dilation. Enteric contrast remains in the stomach   and small bowel with none apparent in the colon/rectum. Will not remove NGT and repeat CT.     Bernice Roberts MD    12/03/21 [address] [facility], [address] [place] [address]  [address] [id number] [place] [address] [place]: [phone]

## 2021-12-03 NOTE — PROGRESS NOTES
Problem: Falls - Risk of  Goal: *Absence of Falls  Description: Document Tony Blight Fall Risk and appropriate interventions in the flowsheet.   Outcome: Progressing Towards Goal  Note: Fall Risk Interventions:  Mobility Interventions: Communicate number of staff needed for ambulation/transfer         Medication Interventions: Teach patient to arise slowly    Elimination Interventions: Bed/chair exit alarm, Call light in reach    History of Falls Interventions: Room close to nurse's station

## 2021-12-03 NOTE — PERIOP NOTES
TRANSFER - IN REPORT:    Verbal report received from Oracio Ponce, [address] [facility] on [address]  being received from [phone] for ordered procedure      Report consisted of patients Situation, Background, Assessment and   Recommendations(SBAR). Information from the following report(s) SBAR, Kardex, Intake/Output, MAR, Recent Results and Cardiac Rhythm SR was reviewed with the receiving nurse. Opportunity for questions and clarification was provided. Assessment completed upon patients arrival to unit and care assumed.

## 2021-12-04 LAB
ANION GAP SERPL CALC-SCNC: 3 MMOL/L (ref 5–15)
BASOPHILS # BLD: 0 K/UL (ref 0–0.1)
BASOPHILS NFR BLD: 0 % (ref 0–1)
BUN SERPL-MCNC: 39 MG/DL (ref 6–20)
BUN/CREAT SERPL: 35 (ref 12–20)
CALCIUM SERPL-MCNC: 9 MG/DL (ref 8.5–10.1)
CHLORIDE SERPL-SCNC: 98 MMOL/L (ref 97–108)
CO2 SERPL-SCNC: 29 MMOL/L (ref 21–32)
CREAT SERPL-MCNC: 1.12 MG/DL (ref 0.7–1.3)
DIFFERENTIAL METHOD BLD: ABNORMAL
EOSINOPHIL # BLD: 0 K/UL (ref 0–0.4)
EOSINOPHIL NFR BLD: 0 % (ref 0–7)
ERYTHROCYTE [DISTWIDTH] IN BLOOD BY AUTOMATED COUNT: 15.2 % (ref 11.5–14.5)
GLUCOSE BLD STRIP.AUTO-MCNC: 136 MG/DL (ref 65–117)
GLUCOSE BLD STRIP.AUTO-MCNC: 147 MG/DL (ref 65–117)
GLUCOSE BLD STRIP.AUTO-MCNC: 147 MG/DL (ref 65–117)
GLUCOSE BLD STRIP.AUTO-MCNC: 164 MG/DL (ref 65–117)
GLUCOSE BLD STRIP.AUTO-MCNC: 91 MG/DL (ref 65–117)
GLUCOSE SERPL-MCNC: 160 MG/DL (ref 65–100)
HCT VFR BLD AUTO: 32.1 % (ref 36.6–50.3)
HGB BLD-MCNC: 10.2 G/DL (ref 12.1–17)
IMM GRANULOCYTES # BLD AUTO: 0 K/UL (ref 0–0.04)
IMM GRANULOCYTES NFR BLD AUTO: 0 % (ref 0–0.5)
LYMPHOCYTES # BLD: 0.2 K/UL (ref 0.8–3.5)
LYMPHOCYTES NFR BLD: 2 % (ref 12–49)
MAGNESIUM SERPL-MCNC: 2 MG/DL (ref 1.6–2.4)
MCH RBC QN AUTO: 30.7 PG (ref 26–34)
MCHC RBC AUTO-ENTMCNC: 31.8 G/DL (ref 30–36.5)
MCV RBC AUTO: 96.7 FL (ref 80–99)
MONOCYTES # BLD: 0.9 K/UL (ref 0–1)
MONOCYTES NFR BLD: 9 % (ref 5–13)
NEUTS BAND NFR BLD MANUAL: 2 %
NEUTS SEG # BLD: 8.5 K/UL (ref 1.8–8)
NEUTS SEG NFR BLD: 87 % (ref 32–75)
NRBC # BLD: 0 K/UL (ref 0–0.01)
NRBC BLD-RTO: 0 PER 100 WBC
PHOSPHATE SERPL-MCNC: 3.4 MG/DL (ref 2.6–4.7)
PLATELET # BLD AUTO: 226 K/UL (ref 150–400)
PMV BLD AUTO: 10 FL (ref 8.9–12.9)
POTASSIUM SERPL-SCNC: 4.9 MMOL/L (ref 3.5–5.1)
PROCALCITONIN SERPL-MCNC: 0.64 NG/ML
RBC # BLD AUTO: 3.32 M/UL (ref 4.1–5.7)
RBC MORPH BLD: ABNORMAL
SERVICE CMNT-IMP: ABNORMAL
SERVICE CMNT-IMP: NORMAL
SODIUM SERPL-SCNC: 130 MMOL/L (ref 136–145)
WBC # BLD AUTO: 9.6 K/UL (ref 4.1–11.1)

## 2021-12-04 PROCEDURE — 82962 GLUCOSE BLOOD TEST: CPT

## 2021-12-04 PROCEDURE — 74011000258 HC RX REV CODE- 258: Performed by: SURGERY

## 2021-12-04 PROCEDURE — 87040 BLOOD CULTURE FOR BACTERIA: CPT

## 2021-12-04 PROCEDURE — 74011250636 HC RX REV CODE- 250/636: Performed by: SURGERY

## 2021-12-04 PROCEDURE — 83735 ASSAY OF MAGNESIUM: CPT

## 2021-12-04 PROCEDURE — 74011250636 HC RX REV CODE- 250/636: Performed by: NURSE PRACTITIONER

## 2021-12-04 PROCEDURE — 74011250637 HC RX REV CODE- 250/637: Performed by: NURSE PRACTITIONER

## 2021-12-04 PROCEDURE — 84145 PROCALCITONIN (PCT): CPT

## 2021-12-04 PROCEDURE — 74011250637 HC RX REV CODE- 250/637: Performed by: PHYSICIAN ASSISTANT

## 2021-12-04 PROCEDURE — 74011250636 HC RX REV CODE- 250/636: Performed by: PHYSICIAN ASSISTANT

## 2021-12-04 PROCEDURE — 74011000250 HC RX REV CODE- 250: Performed by: SURGERY

## 2021-12-04 PROCEDURE — 74011250636 HC RX REV CODE- 250/636: Performed by: INTERNAL MEDICINE

## 2021-12-04 PROCEDURE — 84100 ASSAY OF PHOSPHORUS: CPT

## 2021-12-04 PROCEDURE — 65660000000 HC RM CCU STEPDOWN

## 2021-12-04 PROCEDURE — 85025 COMPLETE CBC W/AUTO DIFF WBC: CPT

## 2021-12-04 PROCEDURE — 74011000258 HC RX REV CODE- 258: Performed by: INTERNAL MEDICINE

## 2021-12-04 PROCEDURE — 36415 COLL VENOUS BLD VENIPUNCTURE: CPT

## 2021-12-04 PROCEDURE — 80048 BASIC METABOLIC PNL TOTAL CA: CPT

## 2021-12-04 RX ORDER — MORPHINE SULFATE 2 MG/ML
2 INJECTION, SOLUTION INTRAMUSCULAR; INTRAVENOUS ONCE
Status: COMPLETED | OUTPATIENT
Start: 2021-12-04 | End: 2021-12-04

## 2021-12-04 RX ADMIN — SODIUM CHLORIDE, PRESERVATIVE FREE 10 ML: 5 INJECTION INTRAVENOUS at 14:37

## 2021-12-04 RX ADMIN — PIPERACILLIN AND TAZOBACTAM 3.38 G: 3; .375 INJECTION, POWDER, LYOPHILIZED, FOR SOLUTION INTRAVENOUS at 18:57

## 2021-12-04 RX ADMIN — I.V. FAT EMULSION 250 ML: 20 EMULSION INTRAVENOUS at 00:07

## 2021-12-04 RX ADMIN — METOCLOPRAMIDE 10 MG: 5 INJECTION, SOLUTION INTRAMUSCULAR; INTRAVENOUS at 00:39

## 2021-12-04 RX ADMIN — LINACLOTIDE 290 MCG: 290 CAPSULE, GELATIN COATED ORAL at 10:40

## 2021-12-04 RX ADMIN — METOCLOPRAMIDE 10 MG: 5 INJECTION, SOLUTION INTRAMUSCULAR; INTRAVENOUS at 06:05

## 2021-12-04 RX ADMIN — METOCLOPRAMIDE 10 MG: 5 INJECTION, SOLUTION INTRAMUSCULAR; INTRAVENOUS at 18:56

## 2021-12-04 RX ADMIN — SODIUM CHLORIDE, PRESERVATIVE FREE 10 ML: 5 INJECTION INTRAVENOUS at 06:00

## 2021-12-04 RX ADMIN — MAGNESIUM SULFATE HEPTAHYDRATE: 500 INJECTION, SOLUTION INTRAMUSCULAR; INTRAVENOUS at 18:58

## 2021-12-04 RX ADMIN — KETOROLAC TROMETHAMINE 15 MG: 30 INJECTION, SOLUTION INTRAMUSCULAR; INTRAVENOUS at 00:11

## 2021-12-04 RX ADMIN — DICYCLOMINE HYDROCHLORIDE 20 MG: 20 TABLET ORAL at 00:11

## 2021-12-04 RX ADMIN — MORPHINE SULFATE 2 MG: 2 INJECTION, SOLUTION INTRAMUSCULAR; INTRAVENOUS at 21:22

## 2021-12-04 RX ADMIN — SODIUM CHLORIDE, PRESERVATIVE FREE 10 ML: 5 INJECTION INTRAVENOUS at 21:22

## 2021-12-04 RX ADMIN — KETOROLAC TROMETHAMINE 15 MG: 30 INJECTION, SOLUTION INTRAMUSCULAR; INTRAVENOUS at 06:04

## 2021-12-04 RX ADMIN — METOCLOPRAMIDE 10 MG: 5 INJECTION, SOLUTION INTRAMUSCULAR; INTRAVENOUS at 10:43

## 2021-12-04 RX ADMIN — PIPERACILLIN AND TAZOBACTAM 3.38 G: 3; .375 INJECTION, POWDER, LYOPHILIZED, FOR SOLUTION INTRAVENOUS at 10:40

## 2021-12-04 RX ADMIN — AMLODIPINE BESYLATE 10 MG: 5 TABLET ORAL at 10:32

## 2021-12-04 NOTE — PROGRESS NOTES
SURGERY PROGRESS NOTE      Admit Date: 2021    POD 1 Day Post-Op    Procedure: Procedure(s):  EXPLORATORY LAPAROTOMY, ENTROLYSIS, ENTEROSTOMY TUBE PLACEMENT      Subjective:     Patient states pain is controlled. Wants NG out. Wants to eat. Objective:     Visit Vitals  /88 (BP 1 Location: Left upper arm, BP Patient Position: At rest)   Pulse 97   Temp 97.8 °F (36.6 °C)   Resp 18   Ht 5' 9\" (1.753 m)   Wt 58.5 kg (129 lb)   SpO2 99%   BMI 19.05 kg/m²        Temp (24hrs), Av.1 °F (36.7 °C), Min:97 °F (36.1 °C), Max:98.7 °F (37.1 °C)      No intake/output data recorded.  190 -  0700  In: 3840.2 [I.V.:3840.2]  Out: 9869 [Urine:1150; Drains:125]    Physical Exam:    General:  alert, cooperative, no distress, appears stated age   Abdomen: soft, bowel soundshypoactive, appropriately tender   Incision:   dressing C/D/I            Lab Results   Component Value Date/Time    WBC 9.6 2021 12:49 AM    HGB 10.2 (L) 2021 12:49 AM    HCT 32.1 (L) 2021 12:49 AM    PLATELET 619 7186 12:49 AM    MCV 96.7 2021 12:49 AM     Lab Results   Component Value Date/Time    GFR est non-AA >60 2021 12:49 AM    GFR est AA >60 2021 12:49 AM    Creatinine 1.12 2021 12:49 AM    BUN 39 (H) 2021 12:49 AM    Sodium 130 (L) 2021 12:49 AM    Potassium 4.9 2021 12:49 AM    Chloride 98 2021 12:49 AM    CO2 29 2021 12:49 AM    Magnesium 2.0 2021 12:49 AM    Phosphorus 3.4 2021 12:49 AM       Assessment:     Active Problems:    SBO (small bowel obstruction) ([facility] (2021)      Aspiration pneumonia ([address] (2021)      Small bowel obstruction ([address] (2021)      Sepsis ([address] (2021)      Abdominal pain, acute (2021)      Intractable cyclical vomiting with nausea (2021)    Patient POD#1 exploration for possible ischemia and SBO. Surgery aborted due to frozen abdomen. No visible ischemic bowel seen. Decompressive tube placed in the the biggest visible loop of small bowel. Not much more can be done for the patient. Plan is to repeat small bowel series through this tube once output comes down to assess location and degree of distal obstruction to determine if his GI track can be used. Since manjeet feels better today and is stable it is unlikely his has ischemic bowel.           Plan:       Continue present treatment

## 2021-12-04 NOTE — PROGRESS NOTES
End of Shift Note    Bedside shift change report given to Val Herrera RN (oncoming nurse) by Praveen Ann LPN (offgoing nurse). Report included the following information SBAR, Kardex, OR Summary, Procedure Summary, Intake/Output, MAR, Recent Results, Med Rec Status and Cardiac Rhythm NSR    Shift worked:  [id number]-620a     Shift summary and any significant changes:     Pt had surgery, tolerated well. Treated for pain, see MAR. Otherwise, uneventful night. Concerns for physician to address:       Zone phone for oncoming shift:          Activity:  Activity Level: Up with Assistance  Number times ambulated in hallways past shift: 0  Number of times OOB to chair past shift: 0    Cardiac:   Cardiac Monitoring: Yes      Cardiac Rhythm: Sinus Rhythm    Access:   Current line(s): PICC     Genitourinary:   Urinary status: voiding and brock    Respiratory:   O2 Device: None (Room air)  Chronic home O2 use?: NO  Incentive spirometer at bedside: YES     GI:  Last Bowel Movement Date: 12/03/21  Current diet:  DIET NPO  TPN ADULT - CENTRAL  TPN ADULT - CENTRAL  Passing flatus: YES  Tolerating current diet: YES       Pain Management:   Patient states pain is manageable on current regimen: YES    Skin:  Russell Score: 19  Interventions: increase time out of bed    Patient Safety:  Fall Score:  Total Score: 2  Interventions: assistive device (walker, cane, etc), gripper socks, pt to call before getting OOB and stay with me (per policy)  High Fall Risk: Yes    Length of Stay:  Expected LOS: 4d 19h  Actual LOS: 1 Samir Wray LPN

## 2021-12-04 NOTE — ANESTHESIA POSTPROCEDURE EVALUATION
Procedure(s):  EXPLORATORY LAPAROTOMY, ENTROLYSIS, ENTEROSTOMY TUBE PLACEMENT. general    Anesthesia Post Evaluation        Patient location during evaluation: PACU  Note status: Adequate. Level of consciousness: responsive to verbal stimuli and sleepy but conscious  Pain management: satisfactory to patient  Airway patency: patent  Anesthetic complications: no  Cardiovascular status: acceptable  Respiratory status: acceptable  Hydration status: acceptable  Comments: +Post-Anesthesia Evaluation and Assessment    Patient: Xavi Ramos MRN: [id number]  SSN: [id number]   YOB: 1980  Age: 39 y.o. Sex: male      Cardiovascular Function/Vital Signs    BP (!) 156/98 (BP 1 Location: Left upper arm, BP Patient Position: At rest;Semi fowlers)   Pulse 87   Temp 36.8 °C (98.2 °F)   Resp 10   Ht 5' 9\" (1.753 m)   Wt 58.5 kg (129 lb)   SpO2 97%   BMI 19.05 kg/m²     Patient is status post Procedure(s):  EXPLORATORY LAPAROTOMY, ENTROLYSIS, ENTEROSTOMY TUBE PLACEMENT. Nausea/Vomiting: Controlled. Postoperative hydration reviewed and adequate. Pain:  Pain Scale 1: Numeric (0 - 10) (12/03/21 2231)  Pain Intensity 1: 4 (12/03/21 2231)   Managed. Neurological Status:   Neuro (WDL): Within Defined Limits (12/03/21 2231)   At baseline. Mental Status and Level of Consciousness: Arousable. Pulmonary Status:   O2 Device: None (Room air) (12/03/21 2230)   Adequate oxygenation and airway patent. Complications related to anesthesia: None    Post-anesthesia assessment completed. No concerns. Signed By: Michelle Perrin MD    12/3/2021  Post anesthesia nausea and vomiting:  controlled      INITIAL Post-op Vital signs:   Vitals Value Taken Time   /98 12/03/21 2230   Temp 36.8 °C (98.2 °F) 12/03/21 2230   Pulse 87 12/03/21 2239   Resp 11 12/03/21 2239   SpO2 96 % 12/03/21 2240   Vitals shown include unvalidated device data.

## 2021-12-04 NOTE — PERIOP NOTES
TRANSFER - OUT REPORT:    Verbal report given to Anil Noel RN(name) on Georgina Dominique  being transferred to [facility](unit) for routine post - op       Report consisted of patients Situation, Background, Assessment and   Recommendations(SBAR). Information from the following report(s) SBAR, Kardex, OR Summary, Intake/Output, MAR, Recent Results and Cardiac Rhythm NSR was reviewed with the receiving nurse. Opportunity for questions and clarification was provided.       Patient transported with:   Monitor  Registered Nurse

## 2021-12-04 NOTE — PROGRESS NOTES
Brief GI follow up Note    Results of CT and subsequent surgery noted. Dr. Jerson Steward assistance appreciated. Post-op care per General Surgery/Dr. Cheryl Redman.  Dr. Mohsen Tang will follow back on Monday    Signed By: Elsi Mora MD     December 4, 2021

## 2021-12-04 NOTE — BRIEF OP NOTE
Brief Postoperative Note    Patient: Sangita Carrizales  YOB: 1980  MRN: [id number]    Date of Procedure: 12/3/2021     Pre-Op Diagnosis: SBO WITH PNEUMATOSIS    Post-Op Diagnosis: Frozen abdomen with encasement of bowel, small bowel obstruction      Procedure(s):  EXPLORATORY LAPAROTOMY, ENTROLYSIS, ENTEROSTOMY TUBE PLACEMENT    Surgeon(s):  Florencio Quezada MD  Circ-1: Cheryl Aceves RN  Circ-Relief: Linnette Kellogg RN  Scrub Tech-1: Ruthy Redd  Scrub Tech-Relief: Amandeep Franky  Surg Asst-1: Elisa Crouch  Surg Asst-Relief: Donnamaria Mor    Anesthesia: General     Estimated Blood Loss (mL): Minimal    Complications: None    Specimens: * No specimens in log *     Implants: * No implants in log *    Drains:   Terence-Zhao Drain 12/03/21 Left Abdomen (Active)       Feeding Tube 12/03/21 (Active)       [REMOVED] Nasogastric Tube 11/25/21 (Removed)   Site Assessment Clean, dry, & intact 11/27/21 1533   Securement Device Adhesive-based roberts 12/02/21 0720   G Port Status Continuous Suction 11/27/21 1533   External Insertion Julián (cms) 64 cms 11/27/21 0912   Action Taken Retaped 11/27/21 0425   Drainage Description Green 11/27/21 1533   Water Flush Volume (mL) 30 mL 11/26/21 0758   Drainage Chamber Level (ml) 100 ml 11/27/21 1533   Output (ml) 50 ml 11/27/21 1533       [REMOVED] Nasogastric Tube 11/29/21 (Removed)   Site Assessment Clean, dry, & intact 12/02/21 2000   Securement Device Adhesive-based roberts 12/02/21 2000   G Port Status Continuous Suction 12/02/21 2000   Action Taken Placement verified (comment);  Retaped 12/02/21 0720   Drainage Description Green 12/02/21 2000   Drainage Chamber Level (ml) 450 ml 12/02/21 0720   Output (ml) 150 ml 12/01/21 2019       Findings: Frozen abdomen with encasement of bowel, markedly dilated loop in right abdomen with pneumatosis but no evidence of ischemia        Electronically Signed by Jodi Rinaldi MD on 12/3/2021 at 9:21 PM
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
## 2021-12-04 NOTE — PROGRESS NOTES
End of Shift Note    Bedside shift change report given to RN (oncoming nurse) by Vasquez Monzon RN (offgoing nurse). Report included the following information SBAR, Kardex, Intake/Output, MAR and Recent Results    Shift worked: [id number]     Shift summary and any significant changes:    Patient up ad lavern and walking the hallways today. NGT taken out per order without incident. Toradol x 1. Pt stated he had a BM but flushed it. Pt currently at OR for exploratory lap. Family waiting in his room. Concerns for physician to address:       Zone phone for oncoming shift:   [phone]       Activity:  Activity Level: Up ad lavern  Number times ambulated in hallways past shift: 2  Number of times OOB to chair past shift: 1    Cardiac:   Cardiac Monitoring: Yes      Cardiac Rhythm: Sinus Rhythm    Access:   Current line(s): PICC     Genitourinary:   Urinary status: voiding    Respiratory:   O2 Device: None (Room air)  Chronic home O2 use?: N/A  Incentive spirometer at bedside: YES     GI:  Last Bowel Movement Date: 12/03/21  Current diet:  DIET NPO  TPN ADULT - CENTRAL  Passing flatus: YES  Tolerating current diet: YES       Pain Management:   Patient states pain is manageable on current regimen: NO    Skin:  Russell Score: 19  Interventions: increase time out of bed and nutritional support     Patient Safety:  Fall Score:  Total Score: 2  Interventions: assistive device (walker, cane, etc), gripper socks and pt to call before getting OOB  High Fall Risk: Yes    Length of Stay:  Expected LOS: 4d 19h  Actual LOS: 7      Vasquez Monzon RN

## 2021-12-04 NOTE — PERIOP NOTES
Handoff Report from Operating Room to PACU    Report received from [address] [place] [address] and [facility] Amalia Burgos. regarding Brian Solomon. Surgeon(s):  Mandie Byrd MD  And Procedure(s) (LRB):  EXPLORATORY LAPAROTOMY, ENTROLYSIS, ENTEROSTOMY TUBE PLACEMENT (N/A)  confirmed   with allergies, drains and dressings discussed. Anesthesia type, drugs, patient history, complications, estimated blood loss, vital signs, intake and output, and last pain medication and reversal medications were reviewed.

## 2021-12-05 ENCOUNTER — APPOINTMENT (OUTPATIENT)
Dept: GENERAL RADIOLOGY | Age: 41
DRG: 710 | End: 2021-12-05
Attending: SURGERY
Payer: MEDICAID

## 2021-12-05 LAB
ANION GAP SERPL CALC-SCNC: 4 MMOL/L (ref 5–15)
BASOPHILS # BLD: 0 K/UL (ref 0–0.1)
BASOPHILS NFR BLD: 0 % (ref 0–1)
BUN SERPL-MCNC: 24 MG/DL (ref 6–20)
BUN/CREAT SERPL: 31 (ref 12–20)
CALCIUM SERPL-MCNC: 9.6 MG/DL (ref 8.5–10.1)
CHLORIDE SERPL-SCNC: 94 MMOL/L (ref 97–108)
CO2 SERPL-SCNC: 31 MMOL/L (ref 21–32)
CREAT SERPL-MCNC: 0.78 MG/DL (ref 0.7–1.3)
DIFFERENTIAL METHOD BLD: ABNORMAL
EOSINOPHIL # BLD: 0 K/UL (ref 0–0.4)
EOSINOPHIL NFR BLD: 0 % (ref 0–7)
ERYTHROCYTE [DISTWIDTH] IN BLOOD BY AUTOMATED COUNT: 15.2 % (ref 11.5–14.5)
GLUCOSE BLD STRIP.AUTO-MCNC: 143 MG/DL (ref 65–117)
GLUCOSE BLD STRIP.AUTO-MCNC: 145 MG/DL (ref 65–117)
GLUCOSE BLD STRIP.AUTO-MCNC: 148 MG/DL (ref 65–117)
GLUCOSE SERPL-MCNC: 127 MG/DL (ref 65–100)
HCT VFR BLD AUTO: 35.7 % (ref 36.6–50.3)
HGB BLD-MCNC: 11.3 G/DL (ref 12.1–17)
IMM GRANULOCYTES # BLD AUTO: 0 K/UL (ref 0–0.04)
IMM GRANULOCYTES NFR BLD AUTO: 0 % (ref 0–0.5)
LYMPHOCYTES # BLD: 0.8 K/UL (ref 0.8–3.5)
LYMPHOCYTES NFR BLD: 9 % (ref 12–49)
MAGNESIUM SERPL-MCNC: 1.7 MG/DL (ref 1.6–2.4)
MCH RBC QN AUTO: 31 PG (ref 26–34)
MCHC RBC AUTO-ENTMCNC: 31.7 G/DL (ref 30–36.5)
MCV RBC AUTO: 97.8 FL (ref 80–99)
MONOCYTES # BLD: 1.1 K/UL (ref 0–1)
MONOCYTES NFR BLD: 12 % (ref 5–13)
NEUTS SEG # BLD: 7.3 K/UL (ref 1.8–8)
NEUTS SEG NFR BLD: 79 % (ref 32–75)
NRBC # BLD: 0 K/UL (ref 0–0.01)
NRBC BLD-RTO: 0 PER 100 WBC
PHOSPHATE SERPL-MCNC: 3.4 MG/DL (ref 2.6–4.7)
PLATELET # BLD AUTO: 226 K/UL (ref 150–400)
PMV BLD AUTO: 9.5 FL (ref 8.9–12.9)
POTASSIUM SERPL-SCNC: 3.9 MMOL/L (ref 3.5–5.1)
PREALB SERPL-MCNC: 12.1 MG/DL (ref 20–40)
RBC # BLD AUTO: 3.65 M/UL (ref 4.1–5.7)
SERVICE CMNT-IMP: ABNORMAL
SODIUM SERPL-SCNC: 129 MMOL/L (ref 136–145)
WBC # BLD AUTO: 9.3 K/UL (ref 4.1–11.1)

## 2021-12-05 PROCEDURE — 84100 ASSAY OF PHOSPHORUS: CPT

## 2021-12-05 PROCEDURE — 65660000000 HC RM CCU STEPDOWN

## 2021-12-05 PROCEDURE — 74011000258 HC RX REV CODE- 258: Performed by: INTERNAL MEDICINE

## 2021-12-05 PROCEDURE — 80048 BASIC METABOLIC PNL TOTAL CA: CPT

## 2021-12-05 PROCEDURE — 74011250636 HC RX REV CODE- 250/636: Performed by: SURGERY

## 2021-12-05 PROCEDURE — 74011250637 HC RX REV CODE- 250/637: Performed by: NURSE PRACTITIONER

## 2021-12-05 PROCEDURE — 74011250636 HC RX REV CODE- 250/636: Performed by: INTERNAL MEDICINE

## 2021-12-05 PROCEDURE — 74011000258 HC RX REV CODE- 258: Performed by: SURGERY

## 2021-12-05 PROCEDURE — 74011000250 HC RX REV CODE- 250: Performed by: SURGERY

## 2021-12-05 PROCEDURE — 83735 ASSAY OF MAGNESIUM: CPT

## 2021-12-05 PROCEDURE — 82962 GLUCOSE BLOOD TEST: CPT

## 2021-12-05 PROCEDURE — 74018 RADEX ABDOMEN 1 VIEW: CPT

## 2021-12-05 PROCEDURE — 84134 ASSAY OF PREALBUMIN: CPT

## 2021-12-05 PROCEDURE — 74011250636 HC RX REV CODE- 250/636: Performed by: PHYSICIAN ASSISTANT

## 2021-12-05 PROCEDURE — 36415 COLL VENOUS BLD VENIPUNCTURE: CPT

## 2021-12-05 PROCEDURE — 85025 COMPLETE CBC W/AUTO DIFF WBC: CPT

## 2021-12-05 RX ORDER — MORPHINE SULFATE 2 MG/ML
2 INJECTION, SOLUTION INTRAMUSCULAR; INTRAVENOUS ONCE
Status: ACTIVE | OUTPATIENT
Start: 2021-12-05 | End: 2021-12-05

## 2021-12-05 RX ADMIN — SODIUM CHLORIDE, PRESERVATIVE FREE 10 ML: 5 INJECTION INTRAVENOUS at 14:25

## 2021-12-05 RX ADMIN — METOCLOPRAMIDE 10 MG: 5 INJECTION, SOLUTION INTRAMUSCULAR; INTRAVENOUS at 12:47

## 2021-12-05 RX ADMIN — SODIUM CHLORIDE, PRESERVATIVE FREE 10 ML: 5 INJECTION INTRAVENOUS at 06:00

## 2021-12-05 RX ADMIN — SODIUM CHLORIDE, PRESERVATIVE FREE 20 ML: 5 INJECTION INTRAVENOUS at 18:33

## 2021-12-05 RX ADMIN — PIPERACILLIN AND TAZOBACTAM 3.38 G: 3; .375 INJECTION, POWDER, LYOPHILIZED, FOR SOLUTION INTRAVENOUS at 12:49

## 2021-12-05 RX ADMIN — METOCLOPRAMIDE 10 MG: 5 INJECTION, SOLUTION INTRAMUSCULAR; INTRAVENOUS at 06:47

## 2021-12-05 RX ADMIN — PIPERACILLIN AND TAZOBACTAM 3.38 G: 3; .375 INJECTION, POWDER, LYOPHILIZED, FOR SOLUTION INTRAVENOUS at 18:41

## 2021-12-05 RX ADMIN — PIPERACILLIN AND TAZOBACTAM 3.38 G: 3; .375 INJECTION, POWDER, LYOPHILIZED, FOR SOLUTION INTRAVENOUS at 03:29

## 2021-12-05 RX ADMIN — METOCLOPRAMIDE 10 MG: 5 INJECTION, SOLUTION INTRAMUSCULAR; INTRAVENOUS at 00:23

## 2021-12-05 RX ADMIN — AMLODIPINE BESYLATE 10 MG: 5 TABLET ORAL at 09:42

## 2021-12-05 RX ADMIN — METOCLOPRAMIDE 10 MG: 5 INJECTION, SOLUTION INTRAMUSCULAR; INTRAVENOUS at 18:40

## 2021-12-05 RX ADMIN — SODIUM CHLORIDE 5 MG: 9 INJECTION INTRAMUSCULAR; INTRAVENOUS; SUBCUTANEOUS at 12:47

## 2021-12-05 RX ADMIN — DICYCLOMINE HYDROCHLORIDE 20 MG: 20 TABLET ORAL at 04:40

## 2021-12-05 RX ADMIN — DICYCLOMINE HYDROCHLORIDE 20 MG: 20 TABLET ORAL at 12:48

## 2021-12-05 RX ADMIN — MAGNESIUM SULFATE HEPTAHYDRATE: 500 INJECTION, SOLUTION INTRAMUSCULAR; INTRAVENOUS at 18:33
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
## 2021-12-05 NOTE — PROGRESS NOTES
Bedside and Verbal shift change report given to [address] [place] [address] (oncoming nurse) by Ladarius Cutler  (offgoing nurse).  Report included the following information SBAR, Kardex, MAR, Recent Results and Cardiac Rhythm NSR-ST.

## 2021-12-05 NOTE — PROGRESS NOTES
Received message from patient's nurse stating:    Pt c/o pain in abd 8/10 with no relief from PRN bentyl. Requesting alternative pain med         Discussion / orders:    Morphine 2 mg IV x1           Please note that this note was dictated using Dragon computer voice recognition software. Quite often unanticipated grammatical, syntax, homophones, and other interpretive errors are inadvertently transcribed by the computer software. Please disregard these errors. Please excuse any errors that have escaped final proofreading.      Signed by:  Bryce Javier DNP, ACNP-BC

## 2021-12-05 NOTE — PROGRESS NOTES
0540 Perfect serve to NP requesting alternative paiun med as bentyl not adequate and pt still rates pain 8/10.       Jason Rodríguez RN

## 2021-12-05 NOTE — PROGRESS NOTES
Received message from patient's nurse stating:    Can pt have prn IV pain medication? On 12/3 EXPLORATORY LAPAROTOMY, ENTROLYSIS, ENTEROSTOMY TUBE PLACEMENT. Pt is NPO with NGT and getting TPN for nutrition. Only pain medication ordered is either po or suppository Tylenol or po bentyl. Pt rating pain 8/10 at abd incision site. HR has been ST in the 1-teens since around 1500. B/p stable with MAP of 102. Discussion / orders:    Morphine 2 mg iv x 1         Please note that this note was dictated using Dragon computer voice recognition software. Quite often unanticipated grammatical, syntax, homophones, and other interpretive errors are inadvertently transcribed by the computer software. Please disregard these errors. Please excuse any errors that have escaped final proofreading.      Signed by:  Augustus Lopez DNP, ACNP-BC

## 2021-12-05 NOTE — PROGRESS NOTES
Bedside shift change report given to Vinnie Shukla RN (oncoming nurse) by Dee Almanzar RN (offgoing nurse). Report included the following information SBAR, Kardex, Intake/Output, MAR and Recent Results.

## 2021-12-05 NOTE — PROGRESS NOTES
SURGERY PROGRESS NOTE      Admit Date: 2021    POD 2 Days Post-Op    Procedure: Procedure(s):  EXPLORATORY LAPAROTOMY, ENTROLYSIS, ENTEROSTOMY TUBE PLACEMENT      Subjective:     Patient complains of sore throat from the NG. Pain is controlled. Passing flatus. Objective:     Visit Vitals  /74 (BP 1 Location: Left upper arm, BP Patient Position: At rest)   Pulse (!) 109   Temp 98.1 °F (36.7 °C)   Resp 18   Ht 5' 9\" (1.753 m)   Wt 58.5 kg (129 lb)   SpO2 99%   BMI 19.05 kg/m²        Temp (24hrs), Av.2 °F (37.3 °C), Min:97.9 °F (36.6 °C), Max:101.2 °F (38.4 °C)      No intake/output data recorded.  1901 -  0700  In: 5032.5 [I.V.:4972.5]  Out: 8525 [Urine:2875; Drains:290]    Physical Exam:    General:  alert, cooperative, no distress, appears stated age   Abdomen: soft, bowel sounds hypoactive, appropriately tender    NG - 1600 /24 hours bilious     J-tube - 1220/24 hours    Incision:   dressing C/D/I           Lab Results   Component Value Date/Time    WBC 9.3 2021 03:39 AM    HGB 11.3 (L) 2021 03:39 AM    HCT 35.7 (L) 2021 03:39 AM    PLATELET 236  03:39 AM    MCV 97.8 2021 03:39 AM     Lab Results   Component Value Date/Time    GFR est non-AA >60 2021 03:39 AM    GFR est AA >60 2021 03:39 AM    Creatinine 0.78 2021 03:39 AM    BUN 24 (H) 2021 03:39 AM    Sodium 129 (L) 2021 03:39 AM    Potassium 3.9 2021 03:39 AM    Chloride 94 (L) 2021 03:39 AM    CO2 31 2021 03:39 AM    Magnesium 1.7 2021 03:39 AM    Phosphorus 3.4 2021 03:39 AM       KUB-  Majority of contrast washed out.   Persistent significant bowel dilation, persistent pneumatosis     Assessment:     Active Problems:    SBO (small bowel obstruction) ([address] (2021)      Aspiration pneumonia ([address] (2021)      Small bowel obstruction ([address] (2021)      Sepsis ([address] (2021)      Abdominal pain, acute (2021) Intractable cyclical vomiting with nausea (11/27/2021)    Difficult case. 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 He wants the NG out. Will check a 4 hour clamp but suspect the residual will be very high. Tomorrow will get a small bowel series through the J-tube to assess location in the bowel and if there is a complete down stream obstruction. Ultimately, I suspect he will be TPN dependent moving forward. What needs to be assessed is when the NG can come out and wether he can eat for pleasure only.        Plan:       Continue present treatment   Small bowel series in the am

## 2021-12-05 NOTE — PROGRESS NOTES
Problem: Falls - Risk of  Goal: *Absence of Falls  Description: Document Sharon Litter Fall Risk and appropriate interventions in the flowsheet. Outcome: Progressing Towards Goal  Note: Fall Risk Interventions:  Mobility Interventions: Bed/chair exit alarm, Communicate number of staff needed for ambulation/transfer, Patient to call before getting OOB         Medication Interventions: Evaluate medications/consider consulting pharmacy, Bed/chair exit alarm    Elimination Interventions: Call light in reach, Bed/chair exit alarm    History of Falls Interventions: Room close to nurse's station         Problem: Patient Education: Go to Patient Education Activity  Goal: Patient/Family Education  Outcome: Progressing Towards Goal     Problem: Pressure Injury - Risk of  Goal: *Prevention of pressure injury  Description: Document Russell Scale and appropriate interventions in the flowsheet. Outcome: Progressing Towards Goal  Note: Pressure Injury Interventions:             Activity Interventions: Increase time out of bed, Pressure redistribution bed/mattress(bed type)    Mobility Interventions: HOB 30 degrees or less, Pressure redistribution bed/mattress (bed type)    Nutrition Interventions: Document food/fluid/supplement intake    Friction and Shear Interventions: HOB 30 degrees or less, Lift sheet, Lift team/patient mobility team                Problem: Patient Education: Go to Patient Education Activity  Goal: Patient/Family Education  Outcome: Progressing Towards Goal     Problem: Pain  Goal: *Control of Pain  Outcome: Progressing Towards Goal     Problem: Patient Education: Go to Patient Education Activity  Goal: Patient/Family Education  Outcome: Progressing Towards Goal     Problem: Infection - Risk of, Central Venous Catheter-Associated Bloodstream Infection  Goal: *Absence of infection signs and symptoms  Outcome: Progressing Towards Goal     Problem: Patient Education: Go to Patient Education Activity  Goal: Patient/Family Education  Outcome: Progressing Towards Goal     Problem: General Infection Care Plan (Adult and Pediatric)  Goal: Improvement in signs and symptoms of infection  Outcome: Progressing Towards Goal  Goal: *Optimize nutritional status  Outcome: Progressing Towards Goal

## 2021-12-05 NOTE — PROGRESS NOTES
12/04/21 2004   Vitals   Temp 98.6 °F (37 °C)   Temp Source Oral   Pulse (Heart Rate) (!) 115   Heart Rate Source Monitor   Resp Rate 18   O2 Sat (%) 99 %   Level of Consciousness Responds to Voice (1)   /88   MAP (Calculated) 102   MEWS Score 3   21:05  Following message sent to NP Purveyor via [employer]:    Can pt have prn IV pain medication? On 12/3 EXPLORATORY LAPAROTOMY, ENTROLYSIS, ENTEROSTOMY TUBE PLACEMENT. Pt is NPO with NGT and getting TPN for nutrition. Only pain medication ordered is either po or suppository Tylenol or po bentyl. Pt rating pain 8/10 at abd incision site. HR has been ST in the 1-teens since around 1500. B/p stable with MAP of 102.

## 2021-12-06 ENCOUNTER — APPOINTMENT (OUTPATIENT)
Dept: GENERAL RADIOLOGY | Age: 41
DRG: 710 | End: 2021-12-06
Attending: SURGERY
Payer: MEDICAID

## 2021-12-06 LAB
ANION GAP SERPL CALC-SCNC: 5 MMOL/L (ref 5–15)
BUN SERPL-MCNC: 26 MG/DL (ref 6–20)
BUN/CREAT SERPL: 31 (ref 12–20)
CALCIUM SERPL-MCNC: 9.5 MG/DL (ref 8.5–10.1)
CHLORIDE SERPL-SCNC: 91 MMOL/L (ref 97–108)
CO2 SERPL-SCNC: 34 MMOL/L (ref 21–32)
CREAT SERPL-MCNC: 0.85 MG/DL (ref 0.7–1.3)
GLUCOSE BLD STRIP.AUTO-MCNC: 113 MG/DL (ref 65–117)
GLUCOSE BLD STRIP.AUTO-MCNC: 137 MG/DL (ref 65–117)
GLUCOSE BLD STRIP.AUTO-MCNC: 158 MG/DL (ref 65–117)
GLUCOSE BLD STRIP.AUTO-MCNC: 179 MG/DL (ref 65–117)
GLUCOSE SERPL-MCNC: 78 MG/DL (ref 65–100)
MAGNESIUM SERPL-MCNC: 1.9 MG/DL (ref 1.6–2.4)
PHOSPHATE SERPL-MCNC: 4 MG/DL (ref 2.6–4.7)
POTASSIUM SERPL-SCNC: 3.8 MMOL/L (ref 3.5–5.1)
SERVICE CMNT-IMP: ABNORMAL
SERVICE CMNT-IMP: NORMAL
SODIUM SERPL-SCNC: 130 MMOL/L (ref 136–145)

## 2021-12-06 PROCEDURE — 74011000250 HC RX REV CODE- 250: Performed by: NURSE PRACTITIONER

## 2021-12-06 PROCEDURE — 84100 ASSAY OF PHOSPHORUS: CPT

## 2021-12-06 PROCEDURE — 74011000258 HC RX REV CODE- 258: Performed by: NURSE PRACTITIONER

## 2021-12-06 PROCEDURE — 74011000250 HC RX REV CODE- 250: Performed by: SURGERY

## 2021-12-06 PROCEDURE — 74011000636 HC RX REV CODE- 636: Performed by: INTERNAL MEDICINE

## 2021-12-06 PROCEDURE — 74011250636 HC RX REV CODE- 250/636: Performed by: INTERNAL MEDICINE

## 2021-12-06 PROCEDURE — 74011250637 HC RX REV CODE- 250/637: Performed by: NURSE PRACTITIONER

## 2021-12-06 PROCEDURE — 80048 BASIC METABOLIC PNL TOTAL CA: CPT

## 2021-12-06 PROCEDURE — 83735 ASSAY OF MAGNESIUM: CPT

## 2021-12-06 PROCEDURE — 82962 GLUCOSE BLOOD TEST: CPT

## 2021-12-06 PROCEDURE — 74011250636 HC RX REV CODE- 250/636: Performed by: NURSE PRACTITIONER

## 2021-12-06 PROCEDURE — 74011250636 HC RX REV CODE- 250/636: Performed by: PHYSICIAN ASSISTANT

## 2021-12-06 PROCEDURE — 36415 COLL VENOUS BLD VENIPUNCTURE: CPT

## 2021-12-06 PROCEDURE — 74250 X-RAY XM SM INT 1CNTRST STD: CPT

## 2021-12-06 PROCEDURE — 74011250636 HC RX REV CODE- 250/636: Performed by: SPECIALIST

## 2021-12-06 PROCEDURE — 74011000250 HC RX REV CODE- 250: Performed by: INTERNAL MEDICINE

## 2021-12-06 PROCEDURE — 74011000258 HC RX REV CODE- 258: Performed by: INTERNAL MEDICINE

## 2021-12-06 PROCEDURE — 65660000000 HC RM CCU STEPDOWN

## 2021-12-06 PROCEDURE — 74011000250 HC RX REV CODE- 250: Performed by: SPECIALIST

## 2021-12-06 PROCEDURE — 74011250636 HC RX REV CODE- 250/636: Performed by: SURGERY

## 2021-12-06 PROCEDURE — 74011250637 HC RX REV CODE- 250/637: Performed by: PHYSICIAN ASSISTANT

## 2021-12-06 RX ORDER — MORPHINE SULFATE 2 MG/ML
1 INJECTION, SOLUTION INTRAMUSCULAR; INTRAVENOUS
Status: DISCONTINUED | OUTPATIENT
Start: 2021-12-06 | End: 2022-01-11 | Stop reason: HOSPADM

## 2021-12-06 RX ORDER — METOPROLOL TARTRATE 5 MG/5ML
1.25 INJECTION INTRAVENOUS
Status: DISCONTINUED | OUTPATIENT
Start: 2021-12-06 | End: 2021-12-20

## 2021-12-06 RX ADMIN — SODIUM CHLORIDE, PRESERVATIVE FREE 10 ML: 5 INJECTION INTRAVENOUS at 06:06

## 2021-12-06 RX ADMIN — METOCLOPRAMIDE 10 MG: 5 INJECTION, SOLUTION INTRAMUSCULAR; INTRAVENOUS at 01:31

## 2021-12-06 RX ADMIN — SODIUM CHLORIDE, PRESERVATIVE FREE 10 ML: 5 INJECTION INTRAVENOUS at 01:32

## 2021-12-06 RX ADMIN — DIATRIZOATE MEGLUMINE AND DIATRIZOATE SODIUM 240 ML: 600; 100 SOLUTION ORAL; RECTAL at 17:00

## 2021-12-06 RX ADMIN — METOPROLOL TARTRATE 1.25 MG: 1 INJECTION, SOLUTION INTRAVENOUS at 16:56

## 2021-12-06 RX ADMIN — SODIUM CHLORIDE, PRESERVATIVE FREE 10 ML: 5 INJECTION INTRAVENOUS at 15:39

## 2021-12-06 RX ADMIN — PIPERACILLIN AND TAZOBACTAM 3.38 G: 3; .375 INJECTION, POWDER, LYOPHILIZED, FOR SOLUTION INTRAVENOUS at 10:47

## 2021-12-06 RX ADMIN — METOCLOPRAMIDE 10 MG: 5 INJECTION, SOLUTION INTRAMUSCULAR; INTRAVENOUS at 17:56

## 2021-12-06 RX ADMIN — DICYCLOMINE HYDROCHLORIDE 20 MG: 20 TABLET ORAL at 16:46

## 2021-12-06 RX ADMIN — SODIUM CHLORIDE, PRESERVATIVE FREE 20 MG: 5 INJECTION INTRAVENOUS at 23:33

## 2021-12-06 RX ADMIN — PIPERACILLIN AND TAZOBACTAM 3.38 G: 3; .375 INJECTION, POWDER, LYOPHILIZED, FOR SOLUTION INTRAVENOUS at 03:30

## 2021-12-06 RX ADMIN — LINACLOTIDE 290 MCG: 290 CAPSULE, GELATIN COATED ORAL at 10:48

## 2021-12-06 RX ADMIN — METOCLOPRAMIDE 10 MG: 5 INJECTION, SOLUTION INTRAMUSCULAR; INTRAVENOUS at 06:06

## 2021-12-06 RX ADMIN — SODIUM CHLORIDE, PRESERVATIVE FREE 10 ML: 5 INJECTION INTRAVENOUS at 23:34

## 2021-12-06 RX ADMIN — AMLODIPINE BESYLATE 10 MG: 5 TABLET ORAL at 10:48

## 2021-12-06 RX ADMIN — DICYCLOMINE HYDROCHLORIDE 20 MG: 20 TABLET ORAL at 01:15

## 2021-12-06 RX ADMIN — I.V. FAT EMULSION 250 ML: 20 EMULSION INTRAVENOUS at 23:25

## 2021-12-06 RX ADMIN — ONDANSETRON HYDROCHLORIDE 4 MG: 2 INJECTION, SOLUTION INTRAMUSCULAR; INTRAVENOUS at 01:39

## 2021-12-06 RX ADMIN — PIPERACILLIN AND TAZOBACTAM 3.38 G: 3; .375 INJECTION, POWDER, LYOPHILIZED, FOR SOLUTION INTRAVENOUS at 18:44

## 2021-12-06 RX ADMIN — MAGNESIUM SULFATE HEPTAHYDRATE: 500 INJECTION, SOLUTION INTRAMUSCULAR; INTRAVENOUS at 18:00

## 2021-12-06 NOTE — PROGRESS NOTES
End of Shift Note    Bedside shift change report given to Max Salmon RN (oncoming nurse) by Laisha Mcgrath RN (offgoing nurse).   Report included the following information SBAR, Kardex, Intake/Output, MAR and Recent Results

## 2021-12-06 NOTE — PROGRESS NOTES
Transition of Care Plan:     RUR:   20% \"high risk\"  Disposition: TBD- pt could benefit from palliative consult to discuss goals of care  Follow up appointments: PCP, Surgery, GI  DME needed: None  Transportation at [place] [address] or means to access home:  Yes  IM Medicare Letter: N/A  Is patient a BCPI-A Bundle:   No                   If yes, was Bundle Letter given?:   N/A  Caregiver Contact: Mother- Cristóbal Johnson, [phone]  Discharge Caregiver contacted prior to discharge? CM reviewed pt's chart. Pt still has NG tube & remains on TPN. Attempted to meet with pt at bedside but not very involved in conversation; requested for CM to visit tomorrow. Per surgical MD, pt may be appropriate for hospice/ comfort care or alternatives being long-term TPN or return to OR for possible laparotomy. Discharge planned to be further discussed during IDR. Will continue to follow.     OLY Odonnell  Care Management

## 2021-12-06 NOTE — PROGRESS NOTES
MEWS score 3 due to elevated heart rate and slightly elevated BP, Will continue to monitor vital signs every 4 hours.
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
## 2021-12-06 NOTE — PROGRESS NOTES
SURGERY PROGRESS NOTE      Admit Date: 2021    POD 3 Days Post-Op    Procedure: Procedure(s):  EXPLORATORY LAPAROTOMY, ENTROLYSIS, ENTEROSTOMY TUBE PLACEMENT      Subjective:     Patient complains of pain in nose and throat from NG tube. No flatus. J-tube output dropped off going from 1200cc 36 hours ago to just 20cc in the last 24 hours. NG 3500cc/24 hours     Objective:     Visit Vitals  /87   Pulse (!) 111   Temp 98.1 °F (36.7 °C)   Resp 18   Ht 5' 9\" (1.753 m)   Wt 58.5 kg (129 lb)   SpO2 99%   BMI 19.05 kg/m²        Temp (24hrs), Av °F (36.7 °C), Min:97.6 °F (36.4 °C), Max:98.7 °F (37.1 °C)      701 -  1900  In: 946.6 [I.V.:946.6]  Out: -   1901 -  0700  In: 3294.2 [I.V.:3274.2]  Out: 5132 [Urine:2325; Drains:150]    Physical Exam:    General:  alert, cooperative, no distress, appears stated age   Abdomen: soft, bowel sounds hypoactive, appropriately tender for recent surgery    Incision:   Dressing C/D/I            Lab Results   Component Value Date/Time    WBC 9.3 2021 03:39 AM    HGB 11.3 (L) 2021 03:39 AM    HCT 35.7 (L) 2021 03:39 AM    PLATELET 993 10/21/6743 03:39 AM    MCV 97.8 2021 03:39 AM     Lab Results   Component Value Date/Time    GFR est non-AA >60 2021 03:28 AM    GFR est AA >60 2021 03:28 AM    Creatinine 0.85 2021 03:28 AM    BUN 26 (H) 2021 03:28 AM    Sodium 130 (L) 2021 03:28 AM    Potassium 3.8 2021 03:28 AM    Chloride 91 (L) 2021 03:28 AM    CO2 34 (H) 2021 03:28 AM    Magnesium 1.9 2021 03:28 AM    Phosphorus 4.0 2021 03:28 AM     Pre-albumin 12    Assessment/plan:      Active Problems:    SBO (small bowel obstruction) ([address] (2021)      Aspiration pneumonia ([address] (2021)      Small bowel obstruction ([address] (2021)      Sepsis ([address] (2021)      Abdominal pain, acute (2021)      Intractable cyclical vomiting with nausea (2021)    41 year old male with signigicanlly dilated loops of bowel and delayed transit for over 15 years. Has worsened over the last month with nausea and vomiting. He is 3 days out from a laparotomy were he was found to have a frozen abdomen and all that could be done is placed a J-tube in the severely dilated loop on the surface of a the mass of small bowel and scar. That tube had had high output but is now scant output. Patient has large volume NG output and a great deal of discomfort from the NG. The options are:    1) Hospice care at this point. No TPN, no fluids. NG for comfort. 2) percutaneous G-tube in IR and remove NG. Patient would likely be TPN dependent for life. 3) attempt second laparotomy with plans for G-tube placement and attempted lysis of adhesions. High risk of bowel injury to marked dilated, dysfunctional small bowel with risks of fistulas, short gut syndrome, sepsis and death. Also of bowel ischemia found then he would likely have definite short gut issues. Trade-off with this option is nutritional status vs worsening adhesions and persistent obstruction if delayed. I will get bowel series through the J-tube placed on Friday to assess location and down stream obstructive findings. I will then discuss options with patient and his mother.

## 2021-12-06 NOTE — PROGRESS NOTES
26 - Messaged MD. \"Just wanted to let you know that I just connected the patient back onto the NG tube because the 4 hour cony has passed and 400 cc of green drainage came out almost immediately. The patient complained of nausea once while the NG tube was clamped. Do you want them to remain connected to suction or would you like for the NG tube to remain clamped? Thank you. \"    End of Shift Note    Bedside shift change report given to Everardo Sandra RN (oncoming nurse) by Brittni Berg RN (offgoing nurse). Report included the following information SBAR, Kardex, Intake/Output and Accordion    Shift worked:  7am-7pm     Shift summary and any significant changes:     Pt did not leave the bed during the shift. Pt complained of pain during the shift and Bentyl was given. This might have decreased the pts pain. RN asked if pt wanted a different medication, but they denied request. Pt is tolerating having the NG Tube in. Pt had NG Tube clamped and residual checked after 4 hours. NG tube had to remain in pt due to output. Pt was given compazine once during the shift due to nausea and this eased the nausea. Pt had an uneventful shift.       Concerns for physician to address:       Zone phone for oncoming shift:   [phone]       Activity:  Activity Level: Bed Rest  Number times ambulated in hallways past shift: 0  Number of times OOB to chair past shift: 0    Cardiac:   Cardiac Monitoring: Yes      Cardiac Rhythm: Sinus Tachy    Access:   Current line(s): PICC     Genitourinary:   Urinary status: brock    Respiratory:   O2 Device: None (Room air)  Chronic home O2 use?: NO  Incentive spirometer at bedside: YES  Actual Volume (ml): 750 ml  GI:  Last Bowel Movement Date: 12/03/21  Current diet:  DIET NPO  TPN ADULT - CENTRAL  Passing flatus: YES  Tolerating current diet: YES       Pain Management:   Patient states pain is manageable on current regimen: NO    Skin:  Russell Score: 17  Interventions: float heels and increase time out of bed    Patient Safety:  Fall Score:  Total Score: 2  Interventions: gripper socks and pt to call before getting OOB  High Fall Risk: Yes    Length of Stay:  Expected LOS: 4d 19h  Actual LOS: [address] [facility] RN
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
## 2021-12-06 NOTE — PROGRESS NOTES
Comprehensive Nutrition Assessment    Type and Reason for Visit: Reassess    Nutrition Recommendations/Plan:   Continue TPN at goal     Nutrition Assessment:   Chart reviewed. Plan for SB series today. Pt with frozen abdomen, surgical options presented. Discussing venting G tube as an option as well, likely to be TPN independent. TPN increased, now provides 33kcals/kg and 1.7gPro/kg, GIR is 4.7mg/kg/min. Prealbumin slightly improved (9.7 to 12.1). NGT with 3650mL. Malnutrition Assessment:  Malnutrition Status:  Severe malnutrition    Context:  Chronic illness     Findings of the 6 clinical characteristics of malnutrition:   Energy Intake:  7 - 75% or less est energy requirements for 1 month or longer  Weight Loss:  7.0 - Greater than 7.5% over 3 months     Body Fat Loss:  7 - Severe body fat loss, Triceps, Orbital, Buccal region   Muscle Mass Loss:  1 - Mild muscle mass loss, Clavicles (pectoralis &deltoids), Temples (temporalis), Thigh (quadriceps), Scapula (trapezius)  Fluid Accumulation:  Unable to assess,     Strength:  Not performed         Estimated Daily Nutrient Needs:  Energy (kcal): 2188 (BMR 1489 x 1. 3AF) + 250 kcals; Weight Used for Energy Requirements: Current  Protein (g): 60-88 (1.0-1.5 g/kg bw); Weight Used for Protein Requirements: Current  Fluid (ml/day): 5050-1498 ml/day; Method Used for Fluid Requirements: 1 ml/kcal      Nutrition Related Findings:  Meds: humalog, reglan, zosyn.   BM 12/3      Wounds:    None       Current Nutrition Therapies:  Current Parenteral Nutrition Orders:  · Type and Formula: D20, 5% AA   · Lipids: 250ml, Three times weekly  · Duration: Continuous  · Rate/Volume: 83mL/h  · Current PN Order Provides: 1967kcals/100gPro/398gDextrose  · Goal PN Orders Provides: 1967kcals/100gPro/398gDextrose      Anthropometric Measures:  · Height:  5' 9\" (175.3 cm)  · Current Body Wt:  58.5 kg (128 lb 15.5 oz)   · Ideal Body Wt:  160 lbs:  81.8 %  · BMI Category:  Normal weight (BMI 18.5-24. 9)       Nutrition Diagnosis:   · Inadequate protein-energy intake related to altered GI function as evidenced by NPO or clear liquid status due to medical condition, weight loss (SBO)  Previous dx resolved, TPN meets 100% est needs. Nutrition Interventions:   Food and/or Nutrient Delivery: Continue parenteral nutrition  Nutrition Education and Counseling: No recommendations at this time  Coordination of Nutrition Care: Continue to monitor while inpatient    Goals:  Pt will tolerate TPN at goal rate with BG <200mg/dL and lytes WNL in 2-4 days. Nutrition Monitoring and Evaluation:   Behavioral-Environmental Outcomes: None identified  Food/Nutrient Intake Outcomes: Parenteral nutrition intake/tolerance, Diet advancement/tolerance  Physical Signs/Symptoms Outcomes: Biochemical data, GI status, Weight, Skin    Discharge Planning:     Too soon to determine     Electronically signed by Miguel Sarmiento RD, [address]  on 12/6/2021 at 2:53 PM    Contact: [phone]

## 2021-12-07 LAB
ALBUMIN SERPL-MCNC: 3 G/DL (ref 3.5–5)
ALBUMIN/GLOB SERPL: 0.6 {RATIO} (ref 1.1–2.2)
ALP SERPL-CCNC: 320 U/L (ref 45–117)
ALT SERPL-CCNC: 996 U/L (ref 12–78)
ANION GAP SERPL CALC-SCNC: 8 MMOL/L (ref 5–15)
ANION GAP SERPL CALC-SCNC: 8 MMOL/L (ref 5–15)
AST SERPL-CCNC: 748 U/L (ref 15–37)
BILIRUB SERPL-MCNC: 2 MG/DL (ref 0.2–1)
BUN SERPL-MCNC: 47 MG/DL (ref 6–20)
BUN SERPL-MCNC: 47 MG/DL (ref 6–20)
BUN/CREAT SERPL: 42 (ref 12–20)
BUN/CREAT SERPL: 42 (ref 12–20)
CALCIUM SERPL-MCNC: 10 MG/DL (ref 8.5–10.1)
CALCIUM SERPL-MCNC: 9.8 MG/DL (ref 8.5–10.1)
CHLORIDE SERPL-SCNC: 86 MMOL/L (ref 97–108)
CHLORIDE SERPL-SCNC: 86 MMOL/L (ref 97–108)
CO2 SERPL-SCNC: 33 MMOL/L (ref 21–32)
CO2 SERPL-SCNC: 33 MMOL/L (ref 21–32)
CREAT SERPL-MCNC: 1.11 MG/DL (ref 0.7–1.3)
CREAT SERPL-MCNC: 1.12 MG/DL (ref 0.7–1.3)
GLOBULIN SER CALC-MCNC: 5.3 G/DL (ref 2–4)
GLUCOSE BLD STRIP.AUTO-MCNC: 139 MG/DL (ref 65–117)
GLUCOSE BLD STRIP.AUTO-MCNC: 152 MG/DL (ref 65–117)
GLUCOSE BLD STRIP.AUTO-MCNC: 158 MG/DL (ref 65–117)
GLUCOSE BLD STRIP.AUTO-MCNC: 161 MG/DL (ref 65–117)
GLUCOSE SERPL-MCNC: 90 MG/DL (ref 65–100)
GLUCOSE SERPL-MCNC: 91 MG/DL (ref 65–100)
MAGNESIUM SERPL-MCNC: 2.5 MG/DL (ref 1.6–2.4)
POTASSIUM SERPL-SCNC: 4.4 MMOL/L (ref 3.5–5.1)
POTASSIUM SERPL-SCNC: 4.5 MMOL/L (ref 3.5–5.1)
PROT SERPL-MCNC: 8.3 G/DL (ref 6.4–8.2)
SERVICE CMNT-IMP: ABNORMAL
SODIUM SERPL-SCNC: 127 MMOL/L (ref 136–145)
SODIUM SERPL-SCNC: 127 MMOL/L (ref 136–145)

## 2021-12-07 PROCEDURE — 77030040718 HC DRSG HYDRGEL SKINTEGRITY MDII -A

## 2021-12-07 PROCEDURE — 77030041076 HC DRSG AG OPTICELL MDII -A

## 2021-12-07 PROCEDURE — 74011000250 HC RX REV CODE- 250: Performed by: INTERNAL MEDICINE

## 2021-12-07 PROCEDURE — 74011250636 HC RX REV CODE- 250/636: Performed by: PHYSICIAN ASSISTANT

## 2021-12-07 PROCEDURE — 65660000000 HC RM CCU STEPDOWN

## 2021-12-07 PROCEDURE — 74011250637 HC RX REV CODE- 250/637: Performed by: NURSE PRACTITIONER

## 2021-12-07 PROCEDURE — 74011250636 HC RX REV CODE- 250/636: Performed by: INTERNAL MEDICINE

## 2021-12-07 PROCEDURE — 74011250636 HC RX REV CODE- 250/636: Performed by: SPECIALIST

## 2021-12-07 PROCEDURE — 80053 COMPREHEN METABOLIC PANEL: CPT

## 2021-12-07 PROCEDURE — 2709999900 HC NON-CHARGEABLE SUPPLY

## 2021-12-07 PROCEDURE — 74011000258 HC RX REV CODE- 258: Performed by: INTERNAL MEDICINE

## 2021-12-07 PROCEDURE — 83735 ASSAY OF MAGNESIUM: CPT

## 2021-12-07 PROCEDURE — 74011000250 HC RX REV CODE- 250: Performed by: SURGERY

## 2021-12-07 PROCEDURE — 74011250637 HC RX REV CODE- 250/637: Performed by: INTERNAL MEDICINE

## 2021-12-07 PROCEDURE — 36415 COLL VENOUS BLD VENIPUNCTURE: CPT

## 2021-12-07 PROCEDURE — 74011000250 HC RX REV CODE- 250: Performed by: SPECIALIST

## 2021-12-07 PROCEDURE — 74011250636 HC RX REV CODE- 250/636: Performed by: SURGERY

## 2021-12-07 PROCEDURE — 82962 GLUCOSE BLOOD TEST: CPT

## 2021-12-07 RX ORDER — SODIUM CHLORIDE 9 MG/ML
75 INJECTION, SOLUTION INTRAVENOUS CONTINUOUS
Status: DISCONTINUED | OUTPATIENT
Start: 2021-12-07 | End: 2021-12-09

## 2021-12-07 RX ADMIN — PIPERACILLIN AND TAZOBACTAM 3.38 G: 3; .375 INJECTION, POWDER, LYOPHILIZED, FOR SOLUTION INTRAVENOUS at 11:01

## 2021-12-07 RX ADMIN — METOCLOPRAMIDE 10 MG: 5 INJECTION, SOLUTION INTRAMUSCULAR; INTRAVENOUS at 00:15

## 2021-12-07 RX ADMIN — METOCLOPRAMIDE 10 MG: 5 INJECTION, SOLUTION INTRAMUSCULAR; INTRAVENOUS at 06:52

## 2021-12-07 RX ADMIN — MAGNESIUM SULFATE HEPTAHYDRATE: 500 INJECTION, SOLUTION INTRAMUSCULAR; INTRAVENOUS at 18:43

## 2021-12-07 RX ADMIN — DICYCLOMINE HYDROCHLORIDE 20 MG: 20 TABLET ORAL at 11:01

## 2021-12-07 RX ADMIN — SODIUM CHLORIDE, PRESERVATIVE FREE 20 MG: 5 INJECTION INTRAVENOUS at 11:01

## 2021-12-07 RX ADMIN — SODIUM CHLORIDE 5 MG: 9 INJECTION INTRAMUSCULAR; INTRAVENOUS; SUBCUTANEOUS at 18:45

## 2021-12-07 RX ADMIN — SODIUM CHLORIDE 75 ML/HR: 9 INJECTION, SOLUTION INTRAVENOUS at 11:04

## 2021-12-07 RX ADMIN — SODIUM CHLORIDE, PRESERVATIVE FREE 10 ML: 5 INJECTION INTRAVENOUS at 21:38

## 2021-12-07 RX ADMIN — METOCLOPRAMIDE 10 MG: 5 INJECTION, SOLUTION INTRAMUSCULAR; INTRAVENOUS at 13:28

## 2021-12-07 RX ADMIN — SODIUM CHLORIDE, PRESERVATIVE FREE 10 ML: 5 INJECTION INTRAVENOUS at 06:58

## 2021-12-07 RX ADMIN — PHENOL 1 SPRAY: 1.5 LIQUID ORAL at 17:15

## 2021-12-07 RX ADMIN — SODIUM CHLORIDE, PRESERVATIVE FREE 10 ML: 5 INJECTION INTRAVENOUS at 13:28

## 2021-12-07 RX ADMIN — PIPERACILLIN AND TAZOBACTAM 3.38 G: 3; .375 INJECTION, POWDER, LYOPHILIZED, FOR SOLUTION INTRAVENOUS at 03:24

## 2021-12-07 RX ADMIN — METOPROLOL TARTRATE 1.25 MG: 1 INJECTION, SOLUTION INTRAVENOUS at 23:31

## 2021-12-07 RX ADMIN — SODIUM CHLORIDE, PRESERVATIVE FREE 20 MG: 5 INJECTION INTRAVENOUS at 21:37

## 2021-12-07 RX ADMIN — PIPERACILLIN AND TAZOBACTAM 3.38 G: 3; .375 INJECTION, POWDER, LYOPHILIZED, FOR SOLUTION INTRAVENOUS at 18:43

## 2021-12-07 RX ADMIN — METOCLOPRAMIDE 10 MG: 5 INJECTION, SOLUTION INTRAMUSCULAR; INTRAVENOUS at 18:43

## 2021-12-07 RX ADMIN — METOCLOPRAMIDE 10 MG: 5 INJECTION, SOLUTION INTRAMUSCULAR; INTRAVENOUS at 23:31

## 2021-12-07 RX ADMIN — AMLODIPINE BESYLATE 10 MG: 5 TABLET ORAL at 11:01

## 2021-12-07 NOTE — OP NOTES
[address]  OPERATIVE REPORT    Name:  Carole Park  MR#:  [id number]  :  1980  ACCOUNT #:  [de-identified]  DATE OF SERVICE:  2021    PREOPERATIVE DIAGNOSIS:  High-grade small bowel obstruction with pneumatosis intestinalis. POSTOPERATIVE DIAGNOSES:  Frozen abdomen with encasement of bowel and small bowel obstruction. PROCEDURE PERFORMED:  Exploratory laparotomy, extensive enterolysis, and enterostomy tube placement for decompression. SURGEON:  David Blackburn MD    SURGICAL FIRST ASSISTANT:  Melissa Valentin. ASSISTANT:  There is no assistant surgeon. ANESTHESIA:  General endotracheal anesthesia by Dr. Pia Castillo. COMPLICATIONS:  No operative complications. SPECIMENS REMOVED:  None. IMPLANTS:  None. ESTIMATED BLOOD LOSS:  Minimal.    DRAINS:  A gastrostomy/jejunostomy tube placed in an unidentified loop of distended bowel and 19-Kittitian fluted drain. FINDINGS:  A completely frozen abdomen with diffuse encasement of the small bowel with a dense fibrous rind with marked dilated loops of bowel along the entire right abdomen. One loop more distended than all the others with pneumatosis localized to this loop of bowel, but no evidence of ischemia or infarction. DESCRIPTION OF OPERATION IN DETAIL:  After appropriate consent was obtained, the patient was brought to the operating room, made comfortable in the supine position, administered general endotracheal anesthesia. The patient was prepped and draped in the standard fashion and a midline incision was made. This was extended down through the fascia and cautious entry into the abdominal cavity was obtained. There were some adhesions to the anterior abdominal wall, but these were a few and these were taken down with blunt and sharp dissection and the abdominal cavity was entered. Upon entry into the abdominal cavity, there was a finding of diffuse encasement of the bowel with a thick fibrous rind. There was clearly markedly dilated loops of bowel behind all this inflammatory rind and a slow meticulous dissection was performed in an attempt to free the bowel. After a very prolonged period of time, it became clear that this would not be successful. However, we were successful in exposing the most dilated loop of bowel, which ran from the liver to the pelvis on the right, and which clearly a loop of small bowel although with the size of an enlarged right colon. Once it was freed from the remaining bowel, it was inspected and there was a peristalsis throughout and no evidence of ischemia or boyd necrosis although this loop of bowel was the loop of bowel, which demonstrated extensive pneumatosis on the CT scan. The proximal and distal ends of the bowel could not be identified and freed up due to the extensive chronic scarring and adhesions and it was clear that a resection would not be possible. This was felt to be a catastrophic finding on exploration. At this point, the options were to bring up a loop ostomy from this loop of bowel or to decompress it with a drainage catheter. I was reluctant to bring up a loop ostomy because I was not able to clearly ascertain how proximal this loop of bowel was and I was concerned with significant fluid losses if this were very proximal loop. Therefore, a decompressive enterostomy tube was placed. This was done by placing a 3-0 silk pursestring stitch in the anterior surface of the bowel making an enterotomy within pursestring. We decompressed the bowel, and once it was decompressed, it continued to peristalsis and appeared viable. A stab wound was made in the right abdomen overlying the enterotomy and a GJ tube was passed with a tonsil clamp through the abdominal wall. It was then directed into the loop of bowel and directed inferiorly. It was unclear whether this was proximal or distal.  The pursestring was tied down and the balloon was inflated.   At this time, the bowel was tacked to the abdominal wall with four interrupted 3-0 silk sutures. At this time, the entire abdomen was irrigated with copious amounts of saline solution. No enterotomies had been encountered and there was no evidence of inadvertent leak. The bowel was returned to the native location as best as possible. The omentum was draped over the bowel, and at this time, a 19-Spanish drain was placed. This was brought out through a separate stab wound incision and sewn in place with a 2-0 nylon. Over top of the fluted drain, sheaths of Seprafilm were placed, and when this was complete, the fascia was closed over top with #1 PDS running stitch x2. The wound was cleaned and the skin incision was closed with skin staples. The wound was then cleaned and dried, and dressed with sterile dressing. The drain was connected to bulb suction and the enterostomy tube was connected to gravity drainage bag.         MD TITI [email or web address]  D:  12/06/2021 16:58  T:  12/06/2021 21:49  JOB #:  [id number]

## 2021-12-07 NOTE — PROGRESS NOTES
Brief Nutrition Note    12/7: Pharmacy consulted with RD regarding TPN regimen. Would like to try a more concentrated, lower volume TPN given recent Na+ levels, today 127. Agree with the following adjustments: D22/6%AA @ 75 ml/hr + 250 ml 20% lipids which will provide daily approx. 1992 kcals/108 g protein/396 g CHO. RD will continue to follow per protocol.     Electronically signed by Diane King RD on 12/7/2021 at 4:13 PM    Contact: ext [phone]

## 2021-12-07 NOTE — PROGRESS NOTES
200 - Messaged MD. \"Just wanted to let you know that the patient is currently sustaining a HR in the 130s. I just asked if they were in pain and they said yes. I am about to give them the Bentyl and see how they are. Are there any other interventions you would like for me to do? Thank you. \"    3789 - Messaged MD. Anuradha Racer last one was 110/89 and I am taking another right now. \"    4403 - Messaged MD. Ling Racer one I just took was 120/102. \"    End of Shift Note    Bedside shift change report given to Meli Joseph RN (oncoming nurse) by Coy Tamayo RN (offgoing nurse). Report included the following information SBAR, Kardex, Intake/Output and MAR    Shift worked:  7am-7pm     Shift summary and any significant changes:     Pt did not leave the bed during the shift. Pt complained of pain during the shift and Bentyl was given. This decreased the pts pain. Pt is tolerating having the NG Tube in and being NPO. Pt had HR that sustained in the 130s during the shift and MD was contacted. PRN order for metoprolol was ordered and given. Pt went to x-ray and tolerated the activity. Pt still has high output from NG tube. Pt still has minimal output from J Tube. Pt had an uneventful shift.       Concerns for physician to address:       Zone phone for oncoming shift:   [phone]       Activity:  Activity Level: Bed Rest  Number times ambulated in hallways past shift: 0  Number of times OOB to chair past shift: 0    Cardiac:   Cardiac Monitoring: Yes      Cardiac Rhythm: Sinus Tachy    Access:   Current line(s): PICC     Genitourinary:   Urinary status: brock    Respiratory:   O2 Device: None (Room air)  Chronic home O2 use?: NO  Incentive spirometer at bedside: YES  Actual Volume (ml): 750 ml  GI:  Last Bowel Movement Date: 12/03/21  Current diet:  DIET NPO  TPN ADULT - CENTRAL  Passing flatus: YES  Tolerating current diet: YES       Pain Management:   Patient states pain is manageable on current regimen: YES    Skin:  Russell Score: 17  Interventions: speciality bed and float heels    Patient Safety:  Fall Score:  Total Score: 2  Interventions: gripper socks and pt to call before getting OOB  High Fall Risk: Yes    Length of Stay:  Expected LOS: 4d 19h  Actual LOS: Anmlo Luevano, RN

## 2021-12-07 NOTE — PROGRESS NOTES
SURGERY PROGRESS NOTE      Admit Date: 2021    POD 4 Days Post-Op    Procedure: Procedure(s):  EXPLORATORY LAPAROTOMY, ENTROLYSIS, ENTEROSTOMY TUBE PLACEMENT      Subjective:     Patient complains of sore throat. Abdominal pain minimal.  No flatus or BM. NG 5500/24 hours     Objective:     Visit Vitals  BP (!) 126/103 (BP 1 Location: Left upper arm, BP Patient Position: At rest)   Pulse (!) 105   Temp 98.6 °F (37 °C)   Resp 18   Ht 5' 9\" (1.753 m)   Wt 58.5 kg (129 lb)   SpO2 99%   BMI 19.05 kg/m²        Temp (24hrs), Av.1 °F (36.7 °C), Min:97.5 °F (36.4 °C), Max:98.7 °F (37.1 °C)      No intake/output data recorded.    190 -  0700  In: 4440.1 [I.V.:4420.1]  Out: 9000 [Urine:1300; Drains:80]    Physical Exam:    General:  alert, cooperative, no distress, appears stated age   Abdomen: soft, bowel sounds hypoactive, mildly tender   Incision:   healing well, no drainage, no erythema, no hernia, no seroma, no swelling, no dehiscence, incision well approximated           Lab Results   Component Value Date/Time    WBC 9.3 2021 03:39 AM    HGB 11.3 (L) 2021 03:39 AM    HCT 35.7 (L) 2021 03:39 AM    PLATELET 662  03:39 AM    MCV 97.8 2021 03:39 AM     Lab Results   Component Value Date/Time    GFR est non-AA >60 2021 03:35 AM    GFR est non-AA >60 2021 03:35 AM    GFR est AA >60 2021 03:35 AM    GFR est AA >60 2021 03:35 AM    Creatinine 1.11 2021 03:35 AM    Creatinine 1.12 2021 03:35 AM    BUN 47 (H) 2021 03:35 AM    BUN 47 (H) 2021 03:35 AM    Sodium 127 (L) 2021 03:35 AM    Sodium 127 (L) 2021 03:35 AM    Potassium 4.5 2021 03:35 AM    Potassium 4.4 2021 03:35 AM    Chloride 86 (L) 2021 03:35 AM    Chloride 86 (L) 2021 03:35 AM    CO2 33 (H) 2021 03:35 AM    CO2 33 (H) 2021 03:35 AM    Magnesium 2.5 (H) 2021 03:35 AM    Phosphorus 4.0 2021 03:28 AM SB series - unfortunately contrast give via NG and no J-tube so, can't tell were J-tube is and why it has stopped putting out. Assessment:     Active Problems:    SBO (small bowel obstruction) ([address] (11/22/2021)      Aspiration pneumonia ([address] (11/25/2021)      Small bowel obstruction ([address] (11/25/2021)      Sepsis ([address] (11/25/2021)      Abdominal pain, acute (11/27/2021)      Intractable cyclical vomiting with nausea (11/27/2021)    39year old with chronic inertia and bowel dilatation who has progressed to at least  functional obstruction and like mechanical obstruction based on the operative findings of a densely frozen abdomen. Patient discussed with my partners and with GI. We all agree that repeat abdominal surgery would be much more risk than benefit at this time. That being said patient can not continue with NG tube. I recommend percutaneous g-tube for decompression. The short term plan is to get his symptoms controlled and get him out of the hospital on home TPN. Once his nutrition is improved he can follow-up at an academic center that specializes in intestinal failure for further management. I discussed the patient with IR. They would like the contrast out of his stomach before attempting a G-tube.       Plan:     1) NG to suction   2) coags in the am  3) am KUB to assess for residual contrast  4) palliative percutaneous G-tube in the next day or two  5) discharge planning on home TPN with G and J tubes to drainage
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
## 2021-12-07 NOTE — PROGRESS NOTES
End of Shift Note    Bedside shift change report given to Eunice Vargas RN (oncoming nurse) by Vikki Godwin RN (offgoing nurse).   Report included the following information SBAR, Kardex, Intake/Output, MAR and Recent Results

## 2021-12-08 ENCOUNTER — APPOINTMENT (OUTPATIENT)
Dept: GENERAL RADIOLOGY | Age: 41
DRG: 710 | End: 2021-12-08
Attending: SURGERY
Payer: MEDICAID

## 2021-12-08 LAB
ALBUMIN SERPL-MCNC: 2.5 G/DL (ref 3.5–5)
ALBUMIN/GLOB SERPL: 0.5 {RATIO} (ref 1.1–2.2)
ALP SERPL-CCNC: 323 U/L (ref 45–117)
ALT SERPL-CCNC: 709 U/L (ref 12–78)
ANION GAP SERPL CALC-SCNC: 7 MMOL/L (ref 5–15)
APTT PPP: 26.2 SEC (ref 22.1–31)
AST SERPL-CCNC: 261 U/L (ref 15–37)
BASOPHILS # BLD: 0 K/UL (ref 0–0.1)
BASOPHILS NFR BLD: 0 % (ref 0–1)
BILIRUB DIRECT SERPL-MCNC: 1.5 MG/DL (ref 0–0.2)
BILIRUB SERPL-MCNC: 3.2 MG/DL (ref 0.2–1)
BUN SERPL-MCNC: 30 MG/DL (ref 6–20)
BUN/CREAT SERPL: 36 (ref 12–20)
CALCIUM SERPL-MCNC: 9.1 MG/DL (ref 8.5–10.1)
CHLORIDE SERPL-SCNC: 93 MMOL/L (ref 97–108)
CO2 SERPL-SCNC: 29 MMOL/L (ref 21–32)
CREAT SERPL-MCNC: 0.84 MG/DL (ref 0.7–1.3)
DIFFERENTIAL METHOD BLD: ABNORMAL
EOSINOPHIL # BLD: 0 K/UL (ref 0–0.4)
EOSINOPHIL NFR BLD: 0 % (ref 0–7)
ERYTHROCYTE [DISTWIDTH] IN BLOOD BY AUTOMATED COUNT: 14.7 % (ref 11.5–14.5)
ERYTHROCYTE [DISTWIDTH] IN BLOOD BY AUTOMATED COUNT: 14.8 % (ref 11.5–14.5)
GLOBULIN SER CALC-MCNC: 4.8 G/DL (ref 2–4)
GLUCOSE BLD STRIP.AUTO-MCNC: 118 MG/DL (ref 65–117)
GLUCOSE BLD STRIP.AUTO-MCNC: 120 MG/DL (ref 65–117)
GLUCOSE BLD STRIP.AUTO-MCNC: 150 MG/DL (ref 65–117)
GLUCOSE SERPL-MCNC: 80 MG/DL (ref 65–100)
HCT VFR BLD AUTO: 34.7 % (ref 36.6–50.3)
HCT VFR BLD AUTO: 35.7 % (ref 36.6–50.3)
HGB BLD-MCNC: 11.6 G/DL (ref 12.1–17)
HGB BLD-MCNC: 11.7 G/DL (ref 12.1–17)
IMM GRANULOCYTES # BLD AUTO: 0.1 K/UL (ref 0–0.04)
IMM GRANULOCYTES NFR BLD AUTO: 1 % (ref 0–0.5)
INR PPP: 1.2 (ref 0.9–1.1)
LYMPHOCYTES # BLD: 1 K/UL (ref 0.8–3.5)
LYMPHOCYTES NFR BLD: 7 % (ref 12–49)
MAGNESIUM SERPL-MCNC: 1.8 MG/DL (ref 1.6–2.4)
MCH RBC QN AUTO: 31.3 PG (ref 26–34)
MCH RBC QN AUTO: 31.5 PG (ref 26–34)
MCHC RBC AUTO-ENTMCNC: 32.8 G/DL (ref 30–36.5)
MCHC RBC AUTO-ENTMCNC: 33.4 G/DL (ref 30–36.5)
MCV RBC AUTO: 93.5 FL (ref 80–99)
MCV RBC AUTO: 96 FL (ref 80–99)
MONOCYTES # BLD: 0.9 K/UL (ref 0–1)
MONOCYTES NFR BLD: 6 % (ref 5–13)
NEUTS SEG # BLD: 12.8 K/UL (ref 1.8–8)
NEUTS SEG NFR BLD: 86 % (ref 32–75)
NRBC # BLD: 0 K/UL (ref 0–0.01)
NRBC # BLD: 0 K/UL (ref 0–0.01)
NRBC BLD-RTO: 0 PER 100 WBC
NRBC BLD-RTO: 0 PER 100 WBC
OSMOLALITY UR: 787 MOSM/KG H2O
PHOSPHATE SERPL-MCNC: 3.6 MG/DL (ref 2.6–4.7)
PLATELET # BLD AUTO: 281 K/UL (ref 150–400)
PLATELET # BLD AUTO: 283 K/UL (ref 150–400)
PMV BLD AUTO: 9.4 FL (ref 8.9–12.9)
PMV BLD AUTO: 9.6 FL (ref 8.9–12.9)
POTASSIUM SERPL-SCNC: 4.4 MMOL/L (ref 3.5–5.1)
PROT SERPL-MCNC: 7.3 G/DL (ref 6.4–8.2)
PROTHROMBIN TIME: 12.1 SEC (ref 9–11.1)
RBC # BLD AUTO: 3.71 M/UL (ref 4.1–5.7)
RBC # BLD AUTO: 3.72 M/UL (ref 4.1–5.7)
SERVICE CMNT-IMP: ABNORMAL
SODIUM SERPL-SCNC: 129 MMOL/L (ref 136–145)
THERAPEUTIC RANGE,PTTT: NORMAL SECS (ref 58–77)
TRIGL SERPL-MCNC: 51 MG/DL (ref ?–150)
WBC # BLD AUTO: 14.6 K/UL (ref 4.1–11.1)
WBC # BLD AUTO: 14.8 K/UL (ref 4.1–11.1)

## 2021-12-08 PROCEDURE — 84100 ASSAY OF PHOSPHORUS: CPT

## 2021-12-08 PROCEDURE — 36415 COLL VENOUS BLD VENIPUNCTURE: CPT

## 2021-12-08 PROCEDURE — 83735 ASSAY OF MAGNESIUM: CPT

## 2021-12-08 PROCEDURE — 74011250636 HC RX REV CODE- 250/636: Performed by: INTERNAL MEDICINE

## 2021-12-08 PROCEDURE — 80048 BASIC METABOLIC PNL TOTAL CA: CPT

## 2021-12-08 PROCEDURE — 74011000250 HC RX REV CODE- 250: Performed by: SPECIALIST

## 2021-12-08 PROCEDURE — 85610 PROTHROMBIN TIME: CPT

## 2021-12-08 PROCEDURE — 65660000000 HC RM CCU STEPDOWN

## 2021-12-08 PROCEDURE — 74011000250 HC RX REV CODE- 250: Performed by: SURGERY

## 2021-12-08 PROCEDURE — 85025 COMPLETE CBC W/AUTO DIFF WBC: CPT

## 2021-12-08 PROCEDURE — 74011250636 HC RX REV CODE- 250/636: Performed by: PHYSICIAN ASSISTANT

## 2021-12-08 PROCEDURE — 74011000258 HC RX REV CODE- 258: Performed by: INTERNAL MEDICINE

## 2021-12-08 PROCEDURE — 80076 HEPATIC FUNCTION PANEL: CPT

## 2021-12-08 PROCEDURE — 74011250637 HC RX REV CODE- 250/637: Performed by: NURSE PRACTITIONER

## 2021-12-08 PROCEDURE — 74011250636 HC RX REV CODE- 250/636: Performed by: SPECIALIST

## 2021-12-08 PROCEDURE — 85730 THROMBOPLASTIN TIME PARTIAL: CPT

## 2021-12-08 PROCEDURE — 83935 ASSAY OF URINE OSMOLALITY: CPT

## 2021-12-08 PROCEDURE — 74011250636 HC RX REV CODE- 250/636: Performed by: NURSE PRACTITIONER

## 2021-12-08 PROCEDURE — 85027 COMPLETE CBC AUTOMATED: CPT

## 2021-12-08 PROCEDURE — 82962 GLUCOSE BLOOD TEST: CPT

## 2021-12-08 PROCEDURE — 74011000250 HC RX REV CODE- 250: Performed by: NURSE PRACTITIONER

## 2021-12-08 PROCEDURE — 74018 RADEX ABDOMEN 1 VIEW: CPT

## 2021-12-08 PROCEDURE — 84478 ASSAY OF TRIGLYCERIDES: CPT

## 2021-12-08 PROCEDURE — 74011000258 HC RX REV CODE- 258: Performed by: NURSE PRACTITIONER

## 2021-12-08 RX ADMIN — MORPHINE SULFATE 1 MG: 2 INJECTION, SOLUTION INTRAMUSCULAR; INTRAVENOUS at 20:19

## 2021-12-08 RX ADMIN — PIPERACILLIN AND TAZOBACTAM 3.38 G: 3; .375 INJECTION, POWDER, LYOPHILIZED, FOR SOLUTION INTRAVENOUS at 12:22

## 2021-12-08 RX ADMIN — CALCIUM GLUCONATE: 98 INJECTION, SOLUTION INTRAVENOUS at 18:11

## 2021-12-08 RX ADMIN — SODIUM CHLORIDE, PRESERVATIVE FREE 10 ML: 5 INJECTION INTRAVENOUS at 18:10

## 2021-12-08 RX ADMIN — METOCLOPRAMIDE 10 MG: 5 INJECTION, SOLUTION INTRAMUSCULAR; INTRAVENOUS at 06:10

## 2021-12-08 RX ADMIN — METOCLOPRAMIDE 10 MG: 5 INJECTION, SOLUTION INTRAMUSCULAR; INTRAVENOUS at 12:22

## 2021-12-08 RX ADMIN — SODIUM CHLORIDE, PRESERVATIVE FREE 20 MG: 5 INJECTION INTRAVENOUS at 20:30

## 2021-12-08 RX ADMIN — METOCLOPRAMIDE 10 MG: 5 INJECTION, SOLUTION INTRAMUSCULAR; INTRAVENOUS at 23:18

## 2021-12-08 RX ADMIN — I.V. FAT EMULSION 250 ML: 20 EMULSION INTRAVENOUS at 21:25

## 2021-12-08 RX ADMIN — AMLODIPINE BESYLATE 10 MG: 5 TABLET ORAL at 08:26

## 2021-12-08 RX ADMIN — SODIUM CHLORIDE, PRESERVATIVE FREE 20 MG: 5 INJECTION INTRAVENOUS at 08:27

## 2021-12-08 RX ADMIN — SODIUM CHLORIDE, PRESERVATIVE FREE 10 ML: 5 INJECTION INTRAVENOUS at 06:10

## 2021-12-08 RX ADMIN — PIPERACILLIN AND TAZOBACTAM 3.38 G: 3; .375 INJECTION, POWDER, LYOPHILIZED, FOR SOLUTION INTRAVENOUS at 18:13

## 2021-12-08 RX ADMIN — PIPERACILLIN AND TAZOBACTAM 3.38 G: 3; .375 INJECTION, POWDER, LYOPHILIZED, FOR SOLUTION INTRAVENOUS at 03:07

## 2021-12-08 RX ADMIN — METOCLOPRAMIDE 10 MG: 5 INJECTION, SOLUTION INTRAMUSCULAR; INTRAVENOUS at 18:10
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
## 2021-12-08 NOTE — PROGRESS NOTES
End of Shift Note    Bedside shift change report given to Alicia Muñoz (oncoming nurse) by Lila Lino RN (offgoing nurse). Report included the following information SBAR, Kardex, Procedure Summary, Intake/Output and MAR    Shift worked:  7a-7p     Shift summary and any significant changes:     Pt received all care well. Said his pain was uncomfortable but didn't want pain meds. Urine sent at shift change. Concerns for physician to address:  none     Zone phone for oncoming shift:   [phone]       Activity:  Activity Level: Up with Assistance  Number times ambulated in hallways past shift: 0  Number of times OOB to chair past shift: 0    Cardiac:   Cardiac Monitoring: Yes      Cardiac Rhythm: Sinus Tachy    Access:   Current line(s): PICC     Genitourinary:   Urinary status: brock    Respiratory:   O2 Device: None (Room air)  Chronic home O2 use?: NO  Incentive spirometer at bedside: YES  Actual Volume (ml): 750 ml  GI:  Last Bowel Movement Date: 12/03/21  Current diet:  DIET NPO  TPN ADULT - CENTRAL  TPN ADULT - CENTRAL  Passing flatus: NO  Tolerating current diet: YES       Pain Management:   Patient states pain is manageable on current regimen: YES    Skin:  Russell Score: 19  Interventions: increase time out of bed, limit briefs and internal/external urinary devices    Patient Safety:  Fall Score:  Total Score: 3  Interventions: gripper socks, pt to call before getting OOB and stay with me (per policy)  High Fall Risk: Yes    Length of Stay:  Expected LOS: 4d 19h  Actual LOS: 12      Lila Lino RN

## 2021-12-08 NOTE — PROGRESS NOTES
End of Shift Note    Bedside shift change report given to Lady Opal RN (oncoming nurse) by Marlene Torres RN (offgoing nurse). Report included the following information SBAR, Kardex, Intake/Output and MAR    Shift worked:  7am-7pm     Shift summary and any significant changes:     Pt ambulated to the chair during the shift. Pt complained of pain during the shift and Bentyl was given. This decreased the pts pain. Pt is tolerating having the NG Tube in and being NPO. Pt NG Tube output decreased and only 120 obtained during shift. Pt still has minimal output from J Tube. Pt had minimal output from ANDREE drain. Pt had an uneventful shift.      Concerns for physician to address:       Zone phone for oncoming shift:   [phone]       Activity:  Activity Level: Bed Rest  Number times ambulated in hallways past shift: 0  Number of times OOB to chair past shift: 0    Cardiac:   Cardiac Monitoring: No      Cardiac Rhythm: Sinus Tachy    Access:   Current line(s): PIV     Genitourinary:   Urinary status: brock    Respiratory:   O2 Device: None (Room air)  Chronic home O2 use?: NO  Incentive spirometer at bedside: YES  Actual Volume (ml): 750 ml  GI:  Last Bowel Movement Date: 12/03/21  Current diet:  DIET NPO  TPN ADULT - CENTRAL  Passing flatus: YES  Tolerating current diet: YES       Pain Management:   Patient states pain is manageable on current regimen: YES    Skin:  Russell Score: 18  Interventions: float heels and increase time out of bed    Patient Safety:  Fall Score:  Total Score: 2  Interventions: gripper socks and pt to call before getting OOB  High Fall Risk: Yes    Length of Stay:  Expected LOS: 4d 19h  Actual LOS: [address] RN

## 2021-12-08 NOTE — PROGRESS NOTES
End of Shift Note    Bedside shift change report given to Sailaja Ramon RN (oncoming nurse) by Hannah Talbot RN (offgoing nurse). Report included the following information SBAR, Kardex, Intake/Output, MAR and Recent Results    Shift worked:  7p-7a     Shift summary and any significant changes:     Pt c/o pain, but refusing pain meds. Been sinus tachy on tele. Gave metoprolol once this shift for . Minimal output from J tube and ANDREE drain. NGT has output of 1800 this shift. Concerns for physician to address:       Zone phone for oncoming shift:          Activity:  Activity Level: Bed Rest  Number times ambulated in hallways past shift: 0  Number of times OOB to chair past shift: 0    Cardiac:   Cardiac Monitoring: Yes      Cardiac Rhythm: Sinus Tachy    Access:   Current line(s): PICC     Genitourinary:   Urinary status: brock    Respiratory:   O2 Device: None (Room air)  Chronic home O2 use?: NO  Incentive spirometer at bedside: YES  Actual Volume (ml): 750 ml  GI:  Last Bowel Movement Date: 12/03/21  Current diet:  DIET NPO  TPN ADULT - CENTRAL  Passing flatus: YES  Tolerating current diet: YES       Pain Management:   Patient states pain is manageable on current regimen: YES    Skin:  Russell Score: 18  Interventions: float heels and increase time out of bed    Patient Safety:  Fall Score:  Total Score: 2  Interventions: gripper socks and pt to call before getting OOB  High Fall Risk: Yes    Length of Stay:  Expected LOS: 4d 19h  Actual LOS: [address], RN
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
## 2021-12-08 NOTE — PROGRESS NOTES
Nutrition Note      Chart reviewed. Per discussion with Pharmacy, only Clinimix available currently.   Recommend changing back to D20, 5% AA @ 83mL/h + 250mL 20% lipids 3 x week (provides 1967kcals/100gPro/398gDextrose)     Electronically signed by Ophelia Chacon [address], [address]  on 12/8/2021 at 11:11 AM    Contact: [phone]

## 2021-12-09 ENCOUNTER — APPOINTMENT (OUTPATIENT)
Dept: INTERVENTIONAL RADIOLOGY/VASCULAR | Age: 41
DRG: 710 | End: 2021-12-09
Attending: SURGERY
Payer: MEDICAID

## 2021-12-09 ENCOUNTER — APPOINTMENT (OUTPATIENT)
Dept: GENERAL RADIOLOGY | Age: 41
DRG: 710 | End: 2021-12-09
Attending: SURGERY
Payer: MEDICAID

## 2021-12-09 ENCOUNTER — APPOINTMENT (OUTPATIENT)
Dept: CT IMAGING | Age: 41
DRG: 710 | End: 2021-12-09
Attending: NURSE PRACTITIONER
Payer: MEDICAID

## 2021-12-09 LAB
ANION GAP SERPL CALC-SCNC: 6 MMOL/L (ref 5–15)
BACTERIA SPEC CULT: NORMAL
BUN SERPL-MCNC: 21 MG/DL (ref 6–20)
BUN/CREAT SERPL: 37 (ref 12–20)
CALCIUM SERPL-MCNC: 9 MG/DL (ref 8.5–10.1)
CHLORIDE SERPL-SCNC: 98 MMOL/L (ref 97–108)
CHOLEST SERPL-MCNC: 64 MG/DL
CO2 SERPL-SCNC: 27 MMOL/L (ref 21–32)
CORTIS AM PEAK SERPL-MCNC: 19.6 UG/DL (ref 4.3–22.45)
CREAT SERPL-MCNC: 0.57 MG/DL (ref 0.7–1.3)
GLUCOSE BLD STRIP.AUTO-MCNC: 131 MG/DL (ref 65–117)
GLUCOSE BLD STRIP.AUTO-MCNC: 134 MG/DL (ref 65–117)
GLUCOSE BLD STRIP.AUTO-MCNC: 138 MG/DL (ref 65–117)
GLUCOSE SERPL-MCNC: 108 MG/DL (ref 65–100)
HDLC SERPL-MCNC: 13 MG/DL
HDLC SERPL: 4.9 {RATIO} (ref 0–5)
INR PPP: 1.2 (ref 0.9–1.1)
LDLC SERPL CALC-MCNC: 39.8 MG/DL (ref 0–100)
MAGNESIUM SERPL-MCNC: 1.7 MG/DL (ref 1.6–2.4)
OSMOLALITY SERPL: 277 MOSM/KG H2O
PHOSPHATE SERPL-MCNC: 2.7 MG/DL (ref 2.6–4.7)
POTASSIUM SERPL-SCNC: 3.3 MMOL/L (ref 3.5–5.1)
PROTHROMBIN TIME: 12.4 SEC (ref 9–11.1)
SERVICE CMNT-IMP: ABNORMAL
SERVICE CMNT-IMP: NORMAL
SODIUM SERPL-SCNC: 131 MMOL/L (ref 136–145)
TRIGL SERPL-MCNC: 56 MG/DL (ref ?–150)
VLDLC SERPL CALC-MCNC: 11.2 MG/DL

## 2021-12-09 PROCEDURE — 65660000000 HC RM CCU STEPDOWN

## 2021-12-09 PROCEDURE — 74011000250 HC RX REV CODE- 250: Performed by: INTERNAL MEDICINE

## 2021-12-09 PROCEDURE — 74011000250 HC RX REV CODE- 250: Performed by: SPECIALIST

## 2021-12-09 PROCEDURE — 74011250637 HC RX REV CODE- 250/637: Performed by: NURSE PRACTITIONER

## 2021-12-09 PROCEDURE — 82533 TOTAL CORTISOL: CPT

## 2021-12-09 PROCEDURE — 84100 ASSAY OF PHOSPHORUS: CPT

## 2021-12-09 PROCEDURE — 74011250636 HC RX REV CODE- 250/636: Performed by: INTERNAL MEDICINE

## 2021-12-09 PROCEDURE — 49440 PLACE GASTROSTOMY TUBE PERC: CPT

## 2021-12-09 PROCEDURE — 77030010548 HC BG WND DRN ARMD -B

## 2021-12-09 PROCEDURE — 2709999900 HC NON-CHARGEABLE SUPPLY

## 2021-12-09 PROCEDURE — 36415 COLL VENOUS BLD VENIPUNCTURE: CPT

## 2021-12-09 PROCEDURE — 85610 PROTHROMBIN TIME: CPT

## 2021-12-09 PROCEDURE — 74011000250 HC RX REV CODE- 250: Performed by: STUDENT IN AN ORGANIZED HEALTH CARE EDUCATION/TRAINING PROGRAM

## 2021-12-09 PROCEDURE — 80048 BASIC METABOLIC PNL TOTAL CA: CPT

## 2021-12-09 PROCEDURE — 80061 LIPID PANEL: CPT

## 2021-12-09 PROCEDURE — 74011250637 HC RX REV CODE- 250/637: Performed by: STUDENT IN AN ORGANIZED HEALTH CARE EDUCATION/TRAINING PROGRAM

## 2021-12-09 PROCEDURE — C1769 GUIDE WIRE: HCPCS

## 2021-12-09 PROCEDURE — 74018 RADEX ABDOMEN 1 VIEW: CPT

## 2021-12-09 PROCEDURE — 74011250636 HC RX REV CODE- 250/636: Performed by: PHYSICIAN ASSISTANT

## 2021-12-09 PROCEDURE — 74011000258 HC RX REV CODE- 258: Performed by: INTERNAL MEDICINE

## 2021-12-09 PROCEDURE — C1894 INTRO/SHEATH, NON-LASER: HCPCS

## 2021-12-09 PROCEDURE — 74011000636 HC RX REV CODE- 636: Performed by: STUDENT IN AN ORGANIZED HEALTH CARE EDUCATION/TRAINING PROGRAM

## 2021-12-09 PROCEDURE — 82962 GLUCOSE BLOOD TEST: CPT

## 2021-12-09 PROCEDURE — 74011250636 HC RX REV CODE- 250/636: Performed by: SPECIALIST

## 2021-12-09 PROCEDURE — 74011250636 HC RX REV CODE- 250/636: Performed by: STUDENT IN AN ORGANIZED HEALTH CARE EDUCATION/TRAINING PROGRAM

## 2021-12-09 PROCEDURE — 83735 ASSAY OF MAGNESIUM: CPT

## 2021-12-09 PROCEDURE — 83930 ASSAY OF BLOOD OSMOLALITY: CPT

## 2021-12-09 PROCEDURE — 74176 CT ABD & PELVIS W/O CONTRAST: CPT

## 2021-12-09 PROCEDURE — 77030018617 HC TU FEED GASTMY1 COOK -B

## 2021-12-09 RX ORDER — MIDAZOLAM HYDROCHLORIDE 1 MG/ML
0-10 INJECTION, SOLUTION INTRAMUSCULAR; INTRAVENOUS
Status: DISCONTINUED | OUTPATIENT
Start: 2021-12-09 | End: 2021-12-09

## 2021-12-09 RX ORDER — POTASSIUM CHLORIDE 7.45 MG/ML
10 INJECTION INTRAVENOUS
Status: COMPLETED | OUTPATIENT
Start: 2021-12-09 | End: 2021-12-10

## 2021-12-09 RX ORDER — SODIUM CHLORIDE 9 MG/ML
25 INJECTION, SOLUTION INTRAVENOUS CONTINUOUS
Status: DISCONTINUED | OUTPATIENT
Start: 2021-12-09 | End: 2021-12-09

## 2021-12-09 RX ORDER — LIDOCAINE HYDROCHLORIDE 20 MG/ML
JELLY TOPICAL ONCE
Status: DISPENSED | OUTPATIENT
Start: 2021-12-09 | End: 2021-12-09

## 2021-12-09 RX ORDER — LIDOCAINE HYDROCHLORIDE 20 MG/ML
20 INJECTION, SOLUTION INFILTRATION; PERINEURAL ONCE
Status: COMPLETED | OUTPATIENT
Start: 2021-12-09 | End: 2021-12-09

## 2021-12-09 RX ORDER — FENTANYL CITRATE 50 UG/ML
100 INJECTION, SOLUTION INTRAMUSCULAR; INTRAVENOUS
Status: DISCONTINUED | OUTPATIENT
Start: 2021-12-09 | End: 2021-12-09

## 2021-12-09 RX ORDER — HEPARIN SODIUM 200 [USP'U]/100ML
400 INJECTION, SOLUTION INTRAVENOUS ONCE
Status: COMPLETED | OUTPATIENT
Start: 2021-12-09 | End: 2021-12-10

## 2021-12-09 RX ORDER — SODIUM CHLORIDE AND POTASSIUM CHLORIDE .9; .15 G/100ML; G/100ML
SOLUTION INTRAVENOUS CONTINUOUS
Status: DISCONTINUED | OUTPATIENT
Start: 2021-12-09 | End: 2021-12-14

## 2021-12-09 RX ADMIN — FENTANYL CITRATE 50 MCG: 0.05 INJECTION, SOLUTION INTRAMUSCULAR; INTRAVENOUS at 11:51

## 2021-12-09 RX ADMIN — MIDAZOLAM HYDROCHLORIDE 1 MG: 1 INJECTION, SOLUTION INTRAMUSCULAR; INTRAVENOUS at 11:59

## 2021-12-09 RX ADMIN — PIPERACILLIN AND TAZOBACTAM 3.38 G: 3; .375 INJECTION, POWDER, LYOPHILIZED, FOR SOLUTION INTRAVENOUS at 04:57

## 2021-12-09 RX ADMIN — POTASSIUM CHLORIDE 10 MEQ: 10 INJECTION, SOLUTION INTRAVENOUS at 12:00

## 2021-12-09 RX ADMIN — SODIUM CHLORIDE, PRESERVATIVE FREE 20 MG: 5 INJECTION INTRAVENOUS at 12:40

## 2021-12-09 RX ADMIN — MIDAZOLAM HYDROCHLORIDE 2 MG: 1 INJECTION, SOLUTION INTRAMUSCULAR; INTRAVENOUS at 11:44

## 2021-12-09 RX ADMIN — METOCLOPRAMIDE 10 MG: 5 INJECTION, SOLUTION INTRAMUSCULAR; INTRAVENOUS at 06:12

## 2021-12-09 RX ADMIN — FENTANYL CITRATE 50 MCG: 0.05 INJECTION, SOLUTION INTRAMUSCULAR; INTRAVENOUS at 11:57

## 2021-12-09 RX ADMIN — SODIUM CHLORIDE, PRESERVATIVE FREE 10 ML: 5 INJECTION INTRAVENOUS at 23:27

## 2021-12-09 RX ADMIN — MORPHINE SULFATE 1 MG: 2 INJECTION, SOLUTION INTRAMUSCULAR; INTRAVENOUS at 23:27

## 2021-12-09 RX ADMIN — LIDOCAINE HYDROCHLORIDE 200 MG: 20 INJECTION, SOLUTION INFILTRATION; PERINEURAL at 11:30

## 2021-12-09 RX ADMIN — POTASSIUM CHLORIDE 10 MEQ: 10 INJECTION, SOLUTION INTRAVENOUS at 13:00

## 2021-12-09 RX ADMIN — POTASSIUM CHLORIDE AND SODIUM CHLORIDE: 900; 150 INJECTION, SOLUTION INTRAVENOUS at 12:39

## 2021-12-09 RX ADMIN — MIDAZOLAM HYDROCHLORIDE 2 MG: 1 INJECTION, SOLUTION INTRAMUSCULAR; INTRAVENOUS at 11:56

## 2021-12-09 RX ADMIN — METOCLOPRAMIDE 10 MG: 5 INJECTION, SOLUTION INTRAMUSCULAR; INTRAVENOUS at 17:00

## 2021-12-09 RX ADMIN — IOPAMIDOL 40 ML: 755 INJECTION, SOLUTION INTRAVENOUS at 11:30

## 2021-12-09 RX ADMIN — SODIUM CHLORIDE 75 ML/HR: 9 INJECTION, SOLUTION INTRAVENOUS at 00:56

## 2021-12-09 RX ADMIN — MAGNESIUM SULFATE HEPTAHYDRATE: 500 INJECTION, SOLUTION INTRAMUSCULAR; INTRAVENOUS at 17:43

## 2021-12-09 RX ADMIN — DICYCLOMINE HYDROCHLORIDE 20 MG: 20 TABLET ORAL at 06:41

## 2021-12-09 RX ADMIN — HEPARIN SODIUM IN SODIUM CHLORIDE 800 UNITS: 200 INJECTION INTRAVENOUS at 12:14

## 2021-12-09 RX ADMIN — ACETAMINOPHEN 650 MG: 650 SUPPOSITORY RECTAL at 01:04

## 2021-12-09 RX ADMIN — METOCLOPRAMIDE 10 MG: 5 INJECTION, SOLUTION INTRAMUSCULAR; INTRAVENOUS at 12:40

## 2021-12-09 RX ADMIN — AMLODIPINE BESYLATE 10 MG: 5 TABLET ORAL at 15:31

## 2021-12-09 NOTE — PROGRESS NOTES
Brief IR Note:    Attempted to place fluoroscopic guided gastrostomy tube in IR, however unsuccessful in setting of multiple dilated loops including colon within vicinity of stomach. Despite multiple attempts at gastric air-inflation and evaluating for a safe window, no feasible path could be found. Procedure was aborted.      - patient may be candidate for CT guided gastrostomy depending on anatomy   - recommend obtaining interval CT abdomen and pelvis for further evaulation    Sinan Isaacs MD  Vascular and Interventional Radiology

## 2021-12-09 NOTE — PROGRESS NOTES
IR attempted G-tube placement . woout success. Imaging today show there is bowel between the stomach and the abdominal wall. I don't see a trajectory for percutaneous G-tube placement. I waiting to Hear from Dr Abe Batista from IR to confirm percutaneous access is not an option. If not, I will discus with patient attempted surgical placement vs hospice care. Dr Armida Merritt could not access the stomach lat week do to the degree of adhesions and high risk of bowel injury in attempting to find it.
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
## 2021-12-09 NOTE — PROGRESS NOTES
Transition of Care Plan:     RUR:   19% \"mod risk\"  Disposition: TBD- pt could benefit from palliative consult to discuss goals of care  Follow up appointments: PCP, Surgery, GI  DME needed: None  Transportation at [place] [address] or means to access home:  Yes  IM Medicare Letter: N/A  Is patient a BCPI-A Bundle:   No                   If yes, was Bundle Letter given?:   N/A  Caregiver Contact: Mother- Jm Fuller, [phone]  Discharge Caregiver contacted prior to discharge? CM reviewed pt's chart. CM met with pt & his mother at bedside to discuss plan for discharge. Pt reports wanting to return home with [facility], understanding he will need long-term TPN upon discharge home. Pt's mother present & supportive of his decision. Pt provided CM with his Good Neighbor  (Kayla Lackey, [phone]); CM added info to face sheet per pt's request. Plans for IR procedure tomorrow (12/9) for G-tube placement. CM to work on securing [facility] for discharge; will need final TPN order. Will continue to follow.     Hawa Salcedo, MSW  Care Management

## 2021-12-09 NOTE — PROGRESS NOTES
End of Shift Note    Bedside shift change report given to Rhonda Valverde RN (oncoming nurse) by Halina Gamboa RN (offgoing nurse). Report included the following information SBAR, Kardex, Procedure Summary, Intake/Output, MAR, Accordion and Recent Results    Shift worked:  7a-7p     Shift summary and any significant changes:     Pt to IR for G tube today, unsuccessful. NGT at 56cm with 2150cc green out. ANDREE drain with minimal serous, not emptied. Concerns for physician to address:  none     Zone phone for oncoming shift:   [phone]       Activity:  Activity Level: Up with Assistance  Number times ambulated in hallways past shift: 0  Number of times OOB to chair past shift: 0    Cardiac:   Cardiac Monitoring: Yes      Cardiac Rhythm: Sinus Tachy    Access:   Current line(s): PIV     Genitourinary:   Urinary status: brock    Respiratory:   O2 Device: None (Room air)  Chronic home O2 use?: NO  Incentive spirometer at bedside: YES  Actual Volume (ml): 750 ml  GI:  Last Bowel Movement Date: 12/03/21  Current diet:  DIET NPO  TPN ADULT - CENTRAL  TPN ADULT - CENTRAL  Passing flatus: Tolerating current diet: YES       Pain Management:   Patient states pain is manageable on current regimen: YES    Skin:  Russell Score: 17  Interventions: increase time out of bed    Patient Safety:  Fall Score:  Total Score: 3  Interventions: gripper socks  High Fall Risk: Yes    Length of Stay:  Expected LOS: 4d 19h  Actual LOS: [address], RN

## 2021-12-09 NOTE — PROGRESS NOTES
Surgery NP Note    Subjective:  Patient with abdominal discomfort. He is discouraged that the NGT was not removed. Objective:  Patient resting in bed in obvious discomfort, curled in fetal position. Dressing CDI. Adb soft and tender. NGT with light green colored drainage in copious amount at this time after being clamped earlier for procedure. Visit Vitals  BP (!) 140/94 (BP 1 Location: Left upper arm, BP Patient Position: Supine)   Pulse (!) 102   Temp 97.3 °F (36.3 °C)   Resp 20   Ht 5' 9\" (1.753 m)   Wt 58.5 kg (129 lb)   SpO2 99%   BMI 19.05 kg/m²       Assessment:  Patient with tough situation, needing gastric decompression in the setting of a frozen abd and long term TPN. Plan:  Dr. Alicia Seo discussed with radiology and there is no safe option for them to place a gastric tube in that setting. Dr. Alicia Seo to discuss with patient options tomorrow when no longer sedated. NGT to suction tonight. Continue TPN.      Leoncio Jnae NP

## 2021-12-09 NOTE — PROGRESS NOTES
End of Shift Note    Bedside shift change report given to Britany Jaffe RN (oncoming nurse) by Jenelle Tavarez LPN (offgoing nurse). Report included the following information SBAR, Kardex, Procedure Summary, Intake/Output, MAR, Recent Results, Med Rec Status and Cardiac Rhythm NSR-Sinus Tachy    Shift worked:  7p-560a     Shift summary and any significant changes:     Pt had c/o abd discomfort-treated, see MAR. Pt NPO for upcoming G-tube insertion. Concerns for physician to address:       Zone phone for oncoming shift:          Activity:  Activity Level: Up with Assistance  Number times ambulated in hallways past shift: 0  Number of times OOB to chair past shift: 0    Cardiac:   Cardiac Monitoring: Yes      Cardiac Rhythm: Sinus Tachy    Access:   Current line(s): PICC     Genitourinary:   Urinary status: voiding and brock    Respiratory:   O2 Device: None (Room air)  Chronic home O2 use?: NO  Incentive spirometer at bedside: YES  Actual Volume (ml): 750 ml  GI:  Last Bowel Movement Date: 12/03/21  Current diet:  DIET NPO  TPN ADULT - CENTRAL  TPN ADULT - CENTRAL  Passing flatus: NO  Tolerating current diet: NO       Pain Management:   Patient states pain is manageable on current regimen: YES    Skin:  Russell Score: 19  Interventions: float heels and increase time out of bed    Patient Safety:  Fall Score:  Total Score: 3  Interventions: assistive device (walker, cane, etc), gripper socks and pt to call before getting OOB  High Fall Risk: Yes    Length of Stay:  Expected LOS: 4d 19h  Actual LOS: [address] [facility], LPN

## 2021-12-10 ENCOUNTER — ANESTHESIA EVENT (OUTPATIENT)
Dept: SURGERY | Age: 41
DRG: 710 | End: 2021-12-10
Payer: MEDICAID

## 2021-12-10 ENCOUNTER — ANESTHESIA (OUTPATIENT)
Dept: SURGERY | Age: 41
DRG: 710 | End: 2021-12-10
Payer: MEDICAID

## 2021-12-10 LAB
ALBUMIN SERPL-MCNC: 2.4 G/DL (ref 3.5–5)
ALBUMIN/GLOB SERPL: 0.5 {RATIO} (ref 1.1–2.2)
ALP SERPL-CCNC: 274 U/L (ref 45–117)
ALT SERPL-CCNC: 332 U/L (ref 12–78)
ANION GAP SERPL CALC-SCNC: 5 MMOL/L (ref 5–15)
AST SERPL-CCNC: 93 U/L (ref 15–37)
BILIRUB SERPL-MCNC: 1.3 MG/DL (ref 0.2–1)
BUN SERPL-MCNC: 19 MG/DL (ref 6–20)
BUN/CREAT SERPL: 33 (ref 12–20)
CALCIUM SERPL-MCNC: 9.1 MG/DL (ref 8.5–10.1)
CHLORIDE SERPL-SCNC: 99 MMOL/L (ref 97–108)
CO2 SERPL-SCNC: 29 MMOL/L (ref 21–32)
CREAT SERPL-MCNC: 0.57 MG/DL (ref 0.7–1.3)
GLOBULIN SER CALC-MCNC: 4.4 G/DL (ref 2–4)
GLUCOSE BLD STRIP.AUTO-MCNC: 118 MG/DL (ref 65–117)
GLUCOSE BLD STRIP.AUTO-MCNC: 129 MG/DL (ref 65–117)
GLUCOSE BLD STRIP.AUTO-MCNC: 138 MG/DL (ref 65–117)
GLUCOSE BLD STRIP.AUTO-MCNC: 248 MG/DL (ref 65–117)
GLUCOSE SERPL-MCNC: 115 MG/DL (ref 65–100)
MAGNESIUM SERPL-MCNC: 1.6 MG/DL (ref 1.6–2.4)
PHOSPHATE SERPL-MCNC: 2.8 MG/DL (ref 2.6–4.7)
POTASSIUM SERPL-SCNC: 3.6 MMOL/L (ref 3.5–5.1)
PROT SERPL-MCNC: 6.8 G/DL (ref 6.4–8.2)
SERVICE CMNT-IMP: ABNORMAL
SODIUM SERPL-SCNC: 133 MMOL/L (ref 136–145)

## 2021-12-10 PROCEDURE — 77030008462 HC STPLR SKN PROX J&J -A: Performed by: SURGERY

## 2021-12-10 PROCEDURE — 74011000250 HC RX REV CODE- 250: Performed by: SPECIALIST

## 2021-12-10 PROCEDURE — 74011250636 HC RX REV CODE- 250/636: Performed by: SURGERY

## 2021-12-10 PROCEDURE — 74011250636 HC RX REV CODE- 250/636: Performed by: INTERNAL MEDICINE

## 2021-12-10 PROCEDURE — 2709999900 HC NON-CHARGEABLE SUPPLY: Performed by: SURGERY

## 2021-12-10 PROCEDURE — 74011000250 HC RX REV CODE- 250: Performed by: NURSE ANESTHETIST, CERTIFIED REGISTERED

## 2021-12-10 PROCEDURE — C1729 CATH, DRAINAGE: HCPCS | Performed by: SURGERY

## 2021-12-10 PROCEDURE — 77030011264 HC ELECTRD BLD EXT COVD -A: Performed by: SURGERY

## 2021-12-10 PROCEDURE — 74011250636 HC RX REV CODE- 250/636: Performed by: SPECIALIST

## 2021-12-10 PROCEDURE — 0DNE0ZZ RELEASE LARGE INTESTINE, OPEN APPROACH: ICD-10-PCS | Performed by: SURGERY

## 2021-12-10 PROCEDURE — 74011000250 HC RX REV CODE- 250: Performed by: INTERNAL MEDICINE

## 2021-12-10 PROCEDURE — 43830 GSTRST OPEN WO CONSTJ TUBE: CPT | Performed by: SURGERY

## 2021-12-10 PROCEDURE — 76210000017 HC OR PH I REC 1.5 TO 2 HR: Performed by: SURGERY

## 2021-12-10 PROCEDURE — 3E0H76Z INTRODUCTION OF NUTRITIONAL SUBSTANCE INTO LOWER GI, VIA NATURAL OR ARTIFICIAL OPENING: ICD-10-PCS | Performed by: SURGERY

## 2021-12-10 PROCEDURE — 65660000000 HC RM CCU STEPDOWN

## 2021-12-10 PROCEDURE — 77030005268 HC CATH JEJU HALY -C: Performed by: SURGERY

## 2021-12-10 PROCEDURE — 74011636637 HC RX REV CODE- 636/637: Performed by: SURGERY

## 2021-12-10 PROCEDURE — 84100 ASSAY OF PHOSPHORUS: CPT

## 2021-12-10 PROCEDURE — 74011250636 HC RX REV CODE- 250/636: Performed by: NURSE ANESTHETIST, CERTIFIED REGISTERED

## 2021-12-10 PROCEDURE — 0DHA7UZ INSERTION OF FEEDING DEVICE INTO JEJUNUM, VIA NATURAL OR ARTIFICIAL OPENING: ICD-10-PCS | Performed by: SURGERY

## 2021-12-10 PROCEDURE — 77030018673: Performed by: SURGERY

## 2021-12-10 PROCEDURE — P9045 ALBUMIN (HUMAN), 5%, 250 ML: HCPCS | Performed by: NURSE ANESTHETIST, CERTIFIED REGISTERED

## 2021-12-10 PROCEDURE — 77030038692 HC WND DEB SYS IRMX -B: Performed by: SURGERY

## 2021-12-10 PROCEDURE — C1765 ADHESION BARRIER: HCPCS | Performed by: SURGERY

## 2021-12-10 PROCEDURE — 77030040361 HC SLV COMPR DVT MDII -B: Performed by: SURGERY

## 2021-12-10 PROCEDURE — 3E0G76Z INTRODUCTION OF NUTRITIONAL SUBSTANCE INTO UPPER GI, VIA NATURAL OR ARTIFICIAL OPENING: ICD-10-PCS | Performed by: SURGERY

## 2021-12-10 PROCEDURE — 74011000250 HC RX REV CODE- 250: Performed by: SURGERY

## 2021-12-10 PROCEDURE — 80053 COMPREHEN METABOLIC PANEL: CPT

## 2021-12-10 PROCEDURE — 77030011278 HC ELECTRD LIG IMPT COVD -F: Performed by: SURGERY

## 2021-12-10 PROCEDURE — 77030026438 HC STYL ET INTUB CARD -A: Performed by: ANESTHESIOLOGY

## 2021-12-10 PROCEDURE — 77030018390 HC SPNG HEMSTAT2 J&J -B: Performed by: SURGERY

## 2021-12-10 PROCEDURE — 76060000037 HC ANESTHESIA 3 TO 3.5 HR: Performed by: SURGERY

## 2021-12-10 PROCEDURE — 82962 GLUCOSE BLOOD TEST: CPT

## 2021-12-10 PROCEDURE — 77030002916 HC SUT ETHLN J&J -A: Performed by: SURGERY

## 2021-12-10 PROCEDURE — 83735 ASSAY OF MAGNESIUM: CPT

## 2021-12-10 PROCEDURE — 74011250636 HC RX REV CODE- 250/636: Performed by: PHYSICIAN ASSISTANT

## 2021-12-10 PROCEDURE — 77030019908 HC STETH ESOPH SIMS -A: Performed by: ANESTHESIOLOGY

## 2021-12-10 PROCEDURE — 74011250637 HC RX REV CODE- 250/637: Performed by: NURSE PRACTITIONER

## 2021-12-10 PROCEDURE — 77030008684 HC TU ET CUF COVD -B: Performed by: ANESTHESIOLOGY

## 2021-12-10 PROCEDURE — 74011000258 HC RX REV CODE- 258: Performed by: SURGERY

## 2021-12-10 PROCEDURE — 76010000133 HC OR TIME 3 TO 3.5 HR: Performed by: SURGERY

## 2021-12-10 PROCEDURE — 77030035236 HC SUT PDS STRATFX BARB J&J -B: Performed by: SURGERY

## 2021-12-10 PROCEDURE — 36415 COLL VENOUS BLD VENIPUNCTURE: CPT

## 2021-12-10 PROCEDURE — 77030002996 HC SUT SLK J&J -A: Performed by: SURGERY

## 2021-12-10 PROCEDURE — 77030013079 HC BLNKT BAIR HGGR 3M -A: Performed by: ANESTHESIOLOGY

## 2021-12-10 DEVICE — MIC* JEJUNAL FEEDING TUBE
Type: IMPLANTABLE DEVICE | Site: JEJUNUM | Status: FUNCTIONAL
Brand: AVANOS

## 2021-12-10 RX ORDER — SODIUM CHLORIDE, SODIUM LACTATE, POTASSIUM CHLORIDE, CALCIUM CHLORIDE 600; 310; 30; 20 MG/100ML; MG/100ML; MG/100ML; MG/100ML
50 INJECTION, SOLUTION INTRAVENOUS CONTINUOUS
Status: DISCONTINUED | OUTPATIENT
Start: 2021-12-10 | End: 2021-12-10 | Stop reason: HOSPADM

## 2021-12-10 RX ORDER — ONDANSETRON 2 MG/ML
4 INJECTION INTRAMUSCULAR; INTRAVENOUS AS NEEDED
Status: DISCONTINUED | OUTPATIENT
Start: 2021-12-10 | End: 2021-12-10 | Stop reason: HOSPADM

## 2021-12-10 RX ORDER — SODIUM CHLORIDE 0.9 % (FLUSH) 0.9 %
5-40 SYRINGE (ML) INJECTION EVERY 8 HOURS
Status: DISCONTINUED | OUTPATIENT
Start: 2021-12-10 | End: 2021-12-10 | Stop reason: HOSPADM

## 2021-12-10 RX ORDER — ROCURONIUM BROMIDE 10 MG/ML
INJECTION, SOLUTION INTRAVENOUS AS NEEDED
Status: DISCONTINUED | OUTPATIENT
Start: 2021-12-10 | End: 2021-12-10 | Stop reason: HOSPADM

## 2021-12-10 RX ORDER — SODIUM CHLORIDE 0.9 % (FLUSH) 0.9 %
5-40 SYRINGE (ML) INJECTION AS NEEDED
Status: DISCONTINUED | OUTPATIENT
Start: 2021-12-10 | End: 2021-12-10 | Stop reason: HOSPADM

## 2021-12-10 RX ORDER — CEFAZOLIN SODIUM/WATER 2 G/20 ML
SYRINGE (ML) INTRAVENOUS AS NEEDED
Status: DISCONTINUED | OUTPATIENT
Start: 2021-12-10 | End: 2021-12-10 | Stop reason: HOSPADM

## 2021-12-10 RX ORDER — ALBUMIN HUMAN 50 G/1000ML
SOLUTION INTRAVENOUS AS NEEDED
Status: DISCONTINUED | OUTPATIENT
Start: 2021-12-10 | End: 2021-12-10 | Stop reason: HOSPADM

## 2021-12-10 RX ORDER — PROPOFOL 10 MG/ML
INJECTION, EMULSION INTRAVENOUS AS NEEDED
Status: DISCONTINUED | OUTPATIENT
Start: 2021-12-10 | End: 2021-12-10 | Stop reason: HOSPADM

## 2021-12-10 RX ORDER — SODIUM CHLORIDE, SODIUM LACTATE, POTASSIUM CHLORIDE, CALCIUM CHLORIDE 600; 310; 30; 20 MG/100ML; MG/100ML; MG/100ML; MG/100ML
INJECTION, SOLUTION INTRAVENOUS
Status: DISCONTINUED | OUTPATIENT
Start: 2021-12-10 | End: 2021-12-10 | Stop reason: HOSPADM

## 2021-12-10 RX ORDER — FENTANYL CITRATE 50 UG/ML
INJECTION, SOLUTION INTRAMUSCULAR; INTRAVENOUS AS NEEDED
Status: DISCONTINUED | OUTPATIENT
Start: 2021-12-10 | End: 2021-12-10 | Stop reason: HOSPADM

## 2021-12-10 RX ORDER — LIDOCAINE HYDROCHLORIDE 20 MG/ML
INJECTION, SOLUTION EPIDURAL; INFILTRATION; INTRACAUDAL; PERINEURAL AS NEEDED
Status: DISCONTINUED | OUTPATIENT
Start: 2021-12-10 | End: 2021-12-10 | Stop reason: HOSPADM

## 2021-12-10 RX ORDER — HYDROMORPHONE HYDROCHLORIDE 2 MG/ML
INJECTION, SOLUTION INTRAMUSCULAR; INTRAVENOUS; SUBCUTANEOUS AS NEEDED
Status: DISCONTINUED | OUTPATIENT
Start: 2021-12-10 | End: 2021-12-10 | Stop reason: HOSPADM

## 2021-12-10 RX ORDER — HYDROMORPHONE HCL/0.9% NACL/PF 0.5 MG/ML
PLASTIC BAG, INJECTION (ML) INTRAVENOUS CONTINUOUS
Status: DISCONTINUED | OUTPATIENT
Start: 2021-12-10 | End: 2022-01-04

## 2021-12-10 RX ORDER — DEXAMETHASONE SODIUM PHOSPHATE 4 MG/ML
INJECTION, SOLUTION INTRA-ARTICULAR; INTRALESIONAL; INTRAMUSCULAR; INTRAVENOUS; SOFT TISSUE AS NEEDED
Status: DISCONTINUED | OUTPATIENT
Start: 2021-12-10 | End: 2021-12-10 | Stop reason: HOSPADM

## 2021-12-10 RX ORDER — FENTANYL CITRATE 50 UG/ML
25 INJECTION, SOLUTION INTRAMUSCULAR; INTRAVENOUS
Status: DISCONTINUED | OUTPATIENT
Start: 2021-12-10 | End: 2021-12-10 | Stop reason: HOSPADM

## 2021-12-10 RX ORDER — LIDOCAINE HYDROCHLORIDE 10 MG/ML
0.1 INJECTION, SOLUTION EPIDURAL; INFILTRATION; INTRACAUDAL; PERINEURAL AS NEEDED
Status: DISCONTINUED | OUTPATIENT
Start: 2021-12-10 | End: 2021-12-10 | Stop reason: HOSPADM

## 2021-12-10 RX ORDER — MIDAZOLAM HYDROCHLORIDE 1 MG/ML
INJECTION, SOLUTION INTRAMUSCULAR; INTRAVENOUS AS NEEDED
Status: DISCONTINUED | OUTPATIENT
Start: 2021-12-10 | End: 2021-12-10 | Stop reason: HOSPADM

## 2021-12-10 RX ORDER — METOPROLOL TARTRATE 5 MG/5ML
INJECTION INTRAVENOUS AS NEEDED
Status: DISCONTINUED | OUTPATIENT
Start: 2021-12-10 | End: 2021-12-10 | Stop reason: HOSPADM

## 2021-12-10 RX ORDER — ONDANSETRON 2 MG/ML
INJECTION INTRAMUSCULAR; INTRAVENOUS AS NEEDED
Status: DISCONTINUED | OUTPATIENT
Start: 2021-12-10 | End: 2021-12-10 | Stop reason: HOSPADM

## 2021-12-10 RX ORDER — MIDAZOLAM HYDROCHLORIDE 1 MG/ML
1 INJECTION, SOLUTION INTRAMUSCULAR; INTRAVENOUS AS NEEDED
Status: DISCONTINUED | OUTPATIENT
Start: 2021-12-10 | End: 2021-12-10 | Stop reason: HOSPADM

## 2021-12-10 RX ORDER — ACETAMINOPHEN 325 MG/1
650 TABLET ORAL ONCE
Status: DISCONTINUED | OUTPATIENT
Start: 2021-12-10 | End: 2021-12-10 | Stop reason: HOSPADM

## 2021-12-10 RX ORDER — HYDROMORPHONE HYDROCHLORIDE 1 MG/ML
0.2 INJECTION, SOLUTION INTRAMUSCULAR; INTRAVENOUS; SUBCUTANEOUS
Status: DISCONTINUED | OUTPATIENT
Start: 2021-12-10 | End: 2021-12-10 | Stop reason: HOSPADM

## 2021-12-10 RX ORDER — DEXMEDETOMIDINE HYDROCHLORIDE 100 UG/ML
INJECTION, SOLUTION INTRAVENOUS AS NEEDED
Status: DISCONTINUED | OUTPATIENT
Start: 2021-12-10 | End: 2021-12-10 | Stop reason: HOSPADM

## 2021-12-10 RX ORDER — FENTANYL CITRATE 50 UG/ML
50 INJECTION, SOLUTION INTRAMUSCULAR; INTRAVENOUS AS NEEDED
Status: DISCONTINUED | OUTPATIENT
Start: 2021-12-10 | End: 2021-12-10 | Stop reason: HOSPADM

## 2021-12-10 RX ORDER — SUCCINYLCHOLINE CHLORIDE 20 MG/ML
INJECTION INTRAMUSCULAR; INTRAVENOUS AS NEEDED
Status: DISCONTINUED | OUTPATIENT
Start: 2021-12-10 | End: 2021-12-10 | Stop reason: HOSPADM

## 2021-12-10 RX ADMIN — FENTANYL CITRATE 100 MCG: 50 INJECTION INTRAMUSCULAR; INTRAVENOUS at 13:16

## 2021-12-10 RX ADMIN — METOPROLOL TARTRATE 2 MG: 5 INJECTION, SOLUTION INTRAVENOUS at 13:50

## 2021-12-10 RX ADMIN — ROCURONIUM BROMIDE 10 MG: 10 INJECTION INTRAVENOUS at 14:03

## 2021-12-10 RX ADMIN — AMLODIPINE BESYLATE 10 MG: 5 TABLET ORAL at 09:01

## 2021-12-10 RX ADMIN — SODIUM CHLORIDE, POTASSIUM CHLORIDE, SODIUM LACTATE AND CALCIUM CHLORIDE: 600; 310; 30; 20 INJECTION, SOLUTION INTRAVENOUS at 12:00

## 2021-12-10 RX ADMIN — Medication 10 ML: at 18:03

## 2021-12-10 RX ADMIN — FENTANYL CITRATE 50 MCG: 50 INJECTION INTRAMUSCULAR; INTRAVENOUS at 13:40

## 2021-12-10 RX ADMIN — SODIUM CHLORIDE, PRESERVATIVE FREE 20 MG: 5 INJECTION INTRAVENOUS at 03:03

## 2021-12-10 RX ADMIN — MORPHINE SULFATE 1 MG: 2 INJECTION, SOLUTION INTRAMUSCULAR; INTRAVENOUS at 05:50

## 2021-12-10 RX ADMIN — CALCIUM GLUCONATE: 98 INJECTION, SOLUTION INTRAVENOUS at 19:23

## 2021-12-10 RX ADMIN — SUCCINYLCHOLINE CHLORIDE 140 MG: 20 INJECTION, SOLUTION INTRAMUSCULAR; INTRAVENOUS at 12:54

## 2021-12-10 RX ADMIN — POTASSIUM CHLORIDE AND SODIUM CHLORIDE: 900; 150 INJECTION, SOLUTION INTRAVENOUS at 05:54

## 2021-12-10 RX ADMIN — ROCURONIUM BROMIDE 10 MG: 10 INJECTION INTRAVENOUS at 13:40

## 2021-12-10 RX ADMIN — ALBUMIN (HUMAN) 250 ML: 2.5 SOLUTION INTRAVENOUS at 15:32

## 2021-12-10 RX ADMIN — LIDOCAINE HYDROCHLORIDE 60 MG: 20 INJECTION, SOLUTION EPIDURAL; INFILTRATION; INTRACAUDAL; PERINEURAL at 12:54

## 2021-12-10 RX ADMIN — POTASSIUM CHLORIDE AND SODIUM CHLORIDE: 900; 150 INJECTION, SOLUTION INTRAVENOUS at 19:23

## 2021-12-10 RX ADMIN — INSULIN LISPRO 2 UNITS: 100 INJECTION, SOLUTION INTRAVENOUS; SUBCUTANEOUS at 17:44

## 2021-12-10 RX ADMIN — METOCLOPRAMIDE 10 MG: 5 INJECTION, SOLUTION INTRAMUSCULAR; INTRAVENOUS at 21:44

## 2021-12-10 RX ADMIN — Medication: at 16:46

## 2021-12-10 RX ADMIN — ROCURONIUM BROMIDE 20 MG: 10 INJECTION INTRAVENOUS at 15:02

## 2021-12-10 RX ADMIN — ALBUMIN (HUMAN) 250 ML: 2.5 SOLUTION INTRAVENOUS at 14:25

## 2021-12-10 RX ADMIN — SODIUM CHLORIDE, PRESERVATIVE FREE 20 MG: 5 INJECTION INTRAVENOUS at 19:23

## 2021-12-10 RX ADMIN — SODIUM CHLORIDE, PRESERVATIVE FREE 10 ML: 5 INJECTION INTRAVENOUS at 19:26

## 2021-12-10 RX ADMIN — METOPROLOL TARTRATE 3 MG: 5 INJECTION, SOLUTION INTRAVENOUS at 14:25

## 2021-12-10 RX ADMIN — PROPOFOL 100 MG: 10 INJECTION, EMULSION INTRAVENOUS at 12:54

## 2021-12-10 RX ADMIN — FENTANYL CITRATE 100 MCG: 50 INJECTION INTRAMUSCULAR; INTRAVENOUS at 12:52

## 2021-12-10 RX ADMIN — SODIUM CHLORIDE, PRESERVATIVE FREE 10 ML: 5 INJECTION INTRAVENOUS at 22:00

## 2021-12-10 RX ADMIN — ONDANSETRON HYDROCHLORIDE 4 MG: 2 INJECTION, SOLUTION INTRAMUSCULAR; INTRAVENOUS at 13:04

## 2021-12-10 RX ADMIN — CEFAZOLIN 2 G: 1 INJECTION, POWDER, FOR SOLUTION INTRAVENOUS at 13:04

## 2021-12-10 RX ADMIN — HYDROMORPHONE HYDROCHLORIDE 1 MG: 2 INJECTION, SOLUTION INTRAMUSCULAR; INTRAVENOUS; SUBCUTANEOUS at 15:28

## 2021-12-10 RX ADMIN — SODIUM CHLORIDE, PRESERVATIVE FREE 10 ML: 5 INJECTION INTRAVENOUS at 09:05

## 2021-12-10 RX ADMIN — SUGAMMADEX 200 MG: 100 INJECTION, SOLUTION INTRAVENOUS at 15:52

## 2021-12-10 RX ADMIN — I.V. FAT EMULSION 250 ML: 20 EMULSION INTRAVENOUS at 21:45

## 2021-12-10 RX ADMIN — HYDROMORPHONE HYDROCHLORIDE 1 MG: 2 INJECTION, SOLUTION INTRAMUSCULAR; INTRAVENOUS; SUBCUTANEOUS at 13:40

## 2021-12-10 RX ADMIN — DEXMEDETOMIDINE HYDROCHLORIDE 10 MCG: 100 INJECTION, SOLUTION, CONCENTRATE INTRAVENOUS at 12:44

## 2021-12-10 RX ADMIN — Medication 10 ML: at 18:02

## 2021-12-10 RX ADMIN — METOCLOPRAMIDE 10 MG: 5 INJECTION, SOLUTION INTRAMUSCULAR; INTRAVENOUS at 03:03

## 2021-12-10 RX ADMIN — METOPROLOL TARTRATE 1.25 MG: 1 INJECTION, SOLUTION INTRAVENOUS at 17:36

## 2021-12-10 RX ADMIN — SODIUM CHLORIDE, POTASSIUM CHLORIDE, SODIUM LACTATE AND CALCIUM CHLORIDE: 600; 310; 30; 20 INJECTION, SOLUTION INTRAVENOUS at 15:06

## 2021-12-10 RX ADMIN — MIDAZOLAM HYDROCHLORIDE 2 MG: 1 INJECTION, SOLUTION INTRAMUSCULAR; INTRAVENOUS at 12:44

## 2021-12-10 RX ADMIN — METOCLOPRAMIDE 10 MG: 5 INJECTION, SOLUTION INTRAMUSCULAR; INTRAVENOUS at 09:02

## 2021-12-10 RX ADMIN — ROCURONIUM BROMIDE 30 MG: 10 INJECTION INTRAVENOUS at 13:04

## 2021-12-10 RX ADMIN — DEXAMETHASONE SODIUM PHOSPHATE 8 MG: 4 INJECTION, SOLUTION INTRAMUSCULAR; INTRAVENOUS at 13:04
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
## 2021-12-10 NOTE — PROGRESS NOTES
SURGERY PROGRESS NOTE      Admit Date: 2021    POD 7 Days Post-Op    Procedure: Procedure(s):  EXPLORATORY LAPAROTOMY, ENTROLYSIS, ENTEROSTOMY TUBE PLACEMENT      Subjective:     Patient complains of severe throat pain and hoarseness. He states he is miserable. No flatus. J-tube put out 1200 cc of bilious fluid yesterday. NG was 4 liter.     Objective:     Visit Vitals  BP (!) 128/96   Pulse (!) 108   Temp 98.4 °F (36.9 °C)   Resp 18   Ht 5' 9\" (1.753 m)   Wt 58.5 kg (129 lb)   SpO2 100%   BMI 19.05 kg/m²        Temp (24hrs), Av.1 °F (36.7 °C), Min:97.3 °F (36.3 °C), Max:98.7 °F (37.1 °C)      12/10 0701 - 12/10 1900  In: 70   Out: 225 [Urine:150; Drains:20]  1901 - 12/10 0700  In: 2405 [I.V.:3738]  Out: 8953 [Urine:2300; Drains:50]    Physical Exam:    General:  alert, cooperative, no distress, appears stated age   Lungs CTA   Heart RRR   Abdomen: soft, bowel sounds hypoactive, tender, distended    Incision:   healing well, no drainage, no erythema, no hernia, no seroma, no swelling, no dehiscence, incision well approximated           Lab Results   Component Value Date/Time    WBC 14.8 (H) 2021 03:16 AM    WBC 14.6 (H) 2021 03:16 AM    HGB 11.6 (L) 2021 03:16 AM    HGB 11.7 (L) 2021 03:16 AM    HCT 34.7 (L) 2021 03:16 AM    HCT 35.7 (L) 2021 03:16 AM    PLATELET 915  03:16 AM    PLATELET 404  03:16 AM    MCV 93.5 2021 03:16 AM    MCV 96.0 2021 03:16 AM     Lab Results   Component Value Date/Time    GFR est non-AA >60 12/10/2021 05:58 AM    GFR est AA >60 12/10/2021 05:58 AM    Creatinine 0.57 (L) 12/10/2021 05:58 AM    BUN 19 12/10/2021 05:58 AM    Sodium 133 (L) 12/10/2021 05:58 AM    Potassium 3.6 12/10/2021 05:58 AM    Chloride 99 12/10/2021 05:58 AM    CO2 29 12/10/2021 05:58 AM    Magnesium 1.6 12/10/2021 05:58 AM    Phosphorus 2.8 12/10/2021 05:58 AM       CT (2021) stomached behind loops of distended small bowel, no safe trajectory for percutaneous G-tube. Discussed with Dr Rosie Evans from IR    Assessment:     Active Problems:    SBO (small bowel obstruction) ([address] (11/22/2021)      Aspiration pneumonia ([address] (11/25/2021)      Small bowel obstruction ([address] (11/25/2021)      Sepsis ([address] (11/25/2021)      Abdominal pain, acute (11/27/2021)      Intractable cyclical vomiting with nausea (11/27/2021)    39year old with chronic GI dysmotility that has progressed to functional/mechanical SBO. He has over 4L of NG output a day. He can no longer tolerate the NG tube in his nose. 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 Have been discussing options with patient and his mother all week. I explained that  further surgery is high risk. I explained that attempted surgical placement of a G-tube is a 'Nataly Marlton' attempt but, it is his only option other than hospice. I explained the biggest risk os injury to the chronically dilated loops of bowel. If it were to happen he would most certainly developed a fistula. I described a fistula and explained that it would likely be more a negative to his QOL then the NG tube is currently. I quoted them a 30% change of bowel injury. I also discussed risk of bleeding, infection, organ system failure, need for prolonged ventilation and or dialysis. They understand and state they can not continue as is and don't want hospice. They are willing to accept the risks. Plan:        To OR for exploration

## 2021-12-10 NOTE — ANESTHESIA POSTPROCEDURE EVALUATION
Procedure(s):  OPEN LYSIS OF ADHESIONS, GASTROSTOMY  AND JEJUNAL TUBE PLACEMENT. general    Anesthesia Post Evaluation      Multimodal analgesia: multimodal analgesia used between 6 hours prior to anesthesia start to PACU discharge  Patient location during evaluation: PACU  Patient participation: complete - patient participated  Level of consciousness: sleepy but conscious and responsive to verbal stimuli  Pain score: 1  Pain management: adequate  Airway patency: patent  Anesthetic complications: no  Cardiovascular status: acceptable  Respiratory status: acceptable  Hydration status: acceptable  Comments: +Post-Anesthesia Evaluation and Assessment    Patient: Judge Shelley MRN: [id number]  SSN: [id number]   YOB: 1980  Age: 39 y.o. Sex: male      Cardiovascular Function/Vital Signs    BP (!) 136/100   Pulse (!) 109   Temp 36.4 °C (97.6 °F)   Resp 14   Ht 5' 9\" (1.753 m)   Wt 49.6 kg (109 lb 5.6 oz)   SpO2 98%   BMI 16.15 kg/m²     Patient is status post Procedure(s):  OPEN LYSIS OF ADHESIONS, GASTROSTOMY  AND JEJUNAL TUBE PLACEMENT. Nausea/Vomiting: Controlled. Postoperative hydration reviewed and adequate. Pain:  Pain Scale 1: Numeric (0 - 10) (12/10/21 1048)  Pain Intensity 1: 0 (12/10/21 1048)   Managed. Neurological Status:   Neuro (WDL): Within Defined Limits (12/10/21 1128)   At baseline. Mental Status and Level of Consciousness: Arousable. Pulmonary Status:   O2 Device: Nasal cannula (12/10/21 1617)   Adequate oxygenation and airway patent. Complications related to anesthesia: None    Post-anesthesia assessment completed. No concerns.     Signed By: Miguel Davenport MD    12/10/2021  Post anesthesia nausea and vomiting:  controlled  Final Post Anesthesia Temperature Assessment:  Normothermia (36.0-37.5 degrees C)      INITIAL Post-op Vital signs:   Vitals Value Taken Time   /97 12/10/21 1700   Temp 36.4 °C (97.6 °F) 12/10/21 1617   Pulse 114 12/10/21 1705 Resp 16 12/10/21 1705   SpO2 98 % 12/10/21 1646   Vitals shown include unvalidated device data.

## 2021-12-10 NOTE — PROGRESS NOTES
Nutrition Note    TPN adjustments for the weekend d/t supply and compounding issues. Will re-evaluate next week. Give AA5% D15% at 83mL/hr + lipids (250mL 20%) three times per week. Will provide avg 1628 kcals (33kcal/kg), 100g protein (2g/kg), 299g dextrose.      Electronically signed by Linda Ricardo, [address] [place] [address] on 12/10/2021 at 3:26 PM    Contact: [facility]-[phone]

## 2021-12-10 NOTE — PERIOP NOTES
Handoff Report from Operating Room to PACU    Report received from Sara Jaffe  and Win Tang CRNA regarding Janette Oneal. Surgeon(s):  Lola Almeida MD  And Procedure(s) (LRB):  OPEN LYSIS OF ADHESIONS, GASTROSTOMY  AND JEJUNAL TUBE PLACEMENT (N/A)  confirmed   with allergies, drains and dressings discussed. Anesthesia type, drugs, patient history, complications, estimated blood loss, vital signs, intake and output, and last pain medication and reversal medications were reviewed.
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
## 2021-12-10 NOTE — PERIOP NOTES
1045:  Patient to OR Holding. NG tube was clamped on admit to OR Holding. NG tube placed back to low wall intermittent suction. 400 ml of lime green fluid returned immediately. Patient was assisted OOB to standing scale. Current weight 49.6kg. Mepilex sacral pad applied as a preventative measure. Blanchable redness to sacral area/bony prominences. 1050:  Dr. Jaswinder Tellotes in to see the patient & discuss anesthesia plan of care.

## 2021-12-10 NOTE — PROGRESS NOTES
Surgery took over the patient  As attending, patient has extensive surgery history  He is going to the OR today   medicine will sign off  Please reconsult if needs arise

## 2021-12-10 NOTE — BRIEF OP NOTE
Brief Postoperative Note    Patient: Jay Love  YOB: 1980  MRN: [id number]    Date of Procedure: 12/10/2021     Pre-Op Diagnosis: SMALL BOWEL OBSTRUCTION    Post-Op Diagnosis: Same as preoperative diagnosis. Procedure(s):  OPEN LYSIS OF ADHESIONS, GASTROSTOMY  AND JEJUNAL TUBE PLACEMENT    Surgeon(s):  Jose Dang MD    Surgical Assistant: Surg Asst-1: Cally Zelaya    Anesthesia: General     Estimated Blood Loss (mL): less than 50     Complications: None    Specimens: * No specimens in log *     Implants: * No implants in log *    Drains:   Terence-Zhao Drain 12/03/21 Left Abdomen (Active)   Site Assessment Clean, dry, & intact 12/10/21 0917   Dressing Status Clean, dry, & intact 12/10/21 0917   Status Patent; Charged; Draining 12/10/21 0917   Drainage Color Serous 12/10/21 0917   Output (ml) 20 ml 12/10/21 0917       PEG/Gastrostomy Tube 12/10/21 (Active)       G/J Tube (Active)       G/J Tube (Active)       [REMOVED] Nasogastric Tube 11/25/21 (Removed)   Site Assessment Clean, dry, & intact 11/27/21 1533   Securement Device Adhesive-based roberts 12/02/21 0720   G Port Status Continuous Suction 11/27/21 1533   External Insertion Julián (cms) 64 cms 11/27/21 0912   Action Taken Retaped 11/27/21 0425   Drainage Description Green 11/27/21 1533   Water Flush Volume (mL) 30 mL 11/26/21 0758   Drainage Chamber Level (ml) 100 ml 11/27/21 1533   Output (ml) 50 ml 11/27/21 1533       [REMOVED] Nasogastric Tube 11/29/21 (Removed)   Site Assessment Clean, dry, & intact 12/02/21 2000   Securement Device Adhesive-based roberts 12/02/21 2000   G Port Status Continuous Suction 12/02/21 2000   Action Taken Placement verified (comment);  Retaped 12/02/21 0720   Drainage Description Green 12/02/21 2000   Drainage Chamber Level (ml) 450 ml 12/02/21 0720   Output (ml) 150 ml 12/01/21 2019       [REMOVED] Nasogastric Tube 12/03/21 (Removed)   Site Assessment Clean, dry, & intact 12/10/21 7761   Securement Device Adhesive-based roberts 12/10/21 0914   G Port Status Continuous Suction 12/10/21 0914   External Insertion Julián (cms) 56 cms 12/10/21 0914   Action Taken Tubing changed 12/10/21 0914   Drainage Description Green 12/10/21 0914   Gastric Residual (mL) 200 ml 12/04/21 0719   Intake (ml) 20 ml 12/10/21 0914   Medication Volume 50 ml 12/10/21 0914   Drainage Chamber Level (ml) 1200 ml 12/10/21 1054   Output (ml) 800 ml 12/10/21 1131       [REMOVED] Feeding Tube 12/03/21 (Removed)   Site Assessment Clean, dry, & intact 12/10/21 0914   Securement Device Adhesive-based roberts 12/10/21 0914   Dressing Type Tape 12/10/21 0914   Tube Status Patent 12/10/21 0914   External Catheter Length (cm) 56 centimeters 12/09/21 0819   Action Taken Repositioned 12/10/21 0914   Drainage Description Green 12/10/21 4011   Output (ml) 5 ml 12/10/21 0914       Findings: 1) extensive adhesions required2 hours of lysis  2) transition in the R mid abdomen 3) replaced the RLQ jejunostomy tube, it is located at the site of a previous anastomosis and is about 30 cm from the ligament of treitz 4) relative short gut, approximally 150cc total  5) non-fixed colon.   6)  30F Malencot G-tube    Electronically Signed by Natalee Grajeda MD on 12/10/2021 at 4:08 PM

## 2021-12-10 NOTE — PERIOP NOTES
TRANSFER - OUT REPORT:    Verbal report given to Izzy Ward RN (name) on [facility]  being transferred to [facility](unit) for routine post - op       Report consisted of patients Situation, Background, Assessment and   Recommendations(SBAR). Information from the following report(s) SBAR, Kardex, OR Summary, Intake/Output, MAR, Recent Results and Cardiac Rhythm ST was reviewed with the receiving nurse. Opportunity for questions and clarification was provided.       Patient transported with:   Monitor  O2 @ 2 liters  Registered Nurse

## 2021-12-10 NOTE — PROGRESS NOTES
End of Shift Note    Bedside shift change report given to Orquidea GRAF RN (oncoming nurse) by Sindy Ordonez RN (offgoing nurse). Report included the following information SBAR, Kardex, Intake/Output and MAR    Shift worked:  7p-7a     Shift summary and any significant changes:     Patient received pain medication twice-- see MAR. Patient ran NSR throughout majority of this shift; then around 0500 started running sinus tach. Concerns for physician to address:  Pain management/ breakthrough pain medication?      Zone phone for oncoming shift:   Hedy Chambers RN

## 2021-12-10 NOTE — PROGRESS NOTES
Comprehensive Nutrition Assessment    Type and Reason for Visit: Reassess    Nutrition Recommendations/Plan:   · Should need to transition to [employer] recommended D20/5%AA @ 83 ml/hr + 250 ml 20% lipids 3 X/wk which will provide daily approx 1967 kcals/100 g protein/398 g dextrose. Nutrition Assessment:     12/10: Chart reviewed; LAPAROTOMY, ENTROLYSIS, ENTEROSTOMY TUBE PLACEMENT. Pt continues to receive TPN at previous regimen D22/6% AA @ 75 ml/hr + 250 ml 20% lipids 3X/wk which provides daily approx 1992 kcals/108 g protein/396 g CHO. Last Weight Metric  Weight Loss Metrics 11/29/2021 11/20/2021 11/11/2021 6/25/2021 6/16/2021 2/25/2019 2/24/2019   Today's Wt 129 lb 130 lb 15.3 oz 152 lb 136 lb 7.4 oz 138 lb 3.7 oz 138 lb 14.2 oz 145 lb 8.1 oz   BMI 19.05 kg/m2 19.34 kg/m2 22.45 kg/m2 19.58 kg/m2 19.83 kg/m2 20.51 kg/m2 21.49 kg/m2       Malnutrition Assessment:  Malnutrition Status:  Severe malnutrition    Context:  Chronic illness     Findings of the 6 clinical characteristics of malnutrition:   Energy Intake:  7 - 75% or less est energy requirements for 1 month or longer  Weight Loss:  7.0 - Greater than 7.5% over 3 months     Body Fat Loss:  7 - Severe body fat loss, Triceps, Orbital, Buccal region   Muscle Mass Loss:  1 - Mild muscle mass loss, Clavicles (pectoralis &deltoids), Temples (temporalis), Thigh (quadriceps), Scapula (trapezius)  Fluid Accumulation:  Unable to assess,     Strength:  Not performed     Estimated Daily Nutrient Needs:  Energy (kcal): 2188 (BMR 1489 x 1. 3AF) + 250 kcals; Weight Used for Energy Requirements: Current  Protein (g): 60-88 (1.0-1.5 g/kg bw); Weight Used for Protein Requirements: Current  Fluid (ml/day): 7015-8859 ml/day; Method Used for Fluid Requirements: 1 ml/kcal    Nutrition Related Findings:  BM: none documented; Meds: reviewed; Labs: reviewed      Wounds:    None       Current Nutrition Therapies:  DIET NPO  TPN ADULT - CENTRAL  TPN ADULT - CENTRAL    Anthropometric Measures:  · Height:  5' 9\" (175.3 cm)  · Current Body Wt:  58.5 kg (128 lb 15.5 oz)   · Ideal Body Wt:  160 lbs:  81.8 %   · BMI Category:  Normal weight (BMI 18.5-24. 9)       Nutrition Diagnosis:   · Inadequate protein-energy intake related to altered GI function as evidenced by NPO or clear liquid status due to medical condition, weight loss (SBO)    Nutrition Interventions:   Food and/or Nutrient Delivery: Continue parenteral nutrition  Nutrition Education and Counseling: No recommendations at this time  Coordination of Nutrition Care: Continue to monitor while inpatient    Goals:  Pt will tolerate TPN at goal rate with BG <200mg/dL and lytes WNL in 2-4 days. Nutrition Monitoring and Evaluation:   Behavioral-Environmental Outcomes: None identified  Food/Nutrient Intake Outcomes: Parenteral nutrition intake/tolerance, Diet advancement/tolerance  Physical Signs/Symptoms Outcomes: Biochemical data, GI status, Weight, Skin    Discharge Planning:     Too soon to determine     Electronically signed by Neeta Lacey RD on 12/10/2021 at 9:57 AM

## 2021-12-11 LAB
ALBUMIN SERPL-MCNC: 2.1 G/DL (ref 3.5–5)
ALBUMIN SERPL-MCNC: 2.1 G/DL (ref 3.5–5)
ALBUMIN/GLOB SERPL: 0.6 {RATIO} (ref 1.1–2.2)
ALBUMIN/GLOB SERPL: 0.6 {RATIO} (ref 1.1–2.2)
ALP SERPL-CCNC: 144 U/L (ref 45–117)
ALP SERPL-CCNC: 147 U/L (ref 45–117)
ALT SERPL-CCNC: 267 U/L (ref 12–78)
ALT SERPL-CCNC: 267 U/L (ref 12–78)
ANION GAP SERPL CALC-SCNC: 6 MMOL/L (ref 5–15)
AST SERPL-CCNC: 108 U/L (ref 15–37)
AST SERPL-CCNC: 109 U/L (ref 15–37)
BASOPHILS # BLD: 0 K/UL (ref 0–0.1)
BASOPHILS NFR BLD: 0 % (ref 0–1)
BILIRUB DIRECT SERPL-MCNC: 1.1 MG/DL (ref 0–0.2)
BILIRUB SERPL-MCNC: 1.8 MG/DL (ref 0.2–1)
BILIRUB SERPL-MCNC: 2 MG/DL (ref 0.2–1)
BUN SERPL-MCNC: 25 MG/DL (ref 6–20)
BUN/CREAT SERPL: 41 (ref 12–20)
CALCIUM SERPL-MCNC: 8.3 MG/DL (ref 8.5–10.1)
CHLORIDE SERPL-SCNC: 107 MMOL/L (ref 97–108)
CO2 SERPL-SCNC: 25 MMOL/L (ref 21–32)
CREAT SERPL-MCNC: 0.61 MG/DL (ref 0.7–1.3)
DIFFERENTIAL METHOD BLD: ABNORMAL
EOSINOPHIL # BLD: 0 K/UL (ref 0–0.4)
EOSINOPHIL NFR BLD: 0 % (ref 0–7)
ERYTHROCYTE [DISTWIDTH] IN BLOOD BY AUTOMATED COUNT: 15.4 % (ref 11.5–14.5)
GLOBULIN SER CALC-MCNC: 3.3 G/DL (ref 2–4)
GLOBULIN SER CALC-MCNC: 3.4 G/DL (ref 2–4)
GLUCOSE BLD STRIP.AUTO-MCNC: 112 MG/DL (ref 65–117)
GLUCOSE BLD STRIP.AUTO-MCNC: 141 MG/DL (ref 65–117)
GLUCOSE BLD STRIP.AUTO-MCNC: 149 MG/DL (ref 65–117)
GLUCOSE BLD STRIP.AUTO-MCNC: 160 MG/DL (ref 65–117)
GLUCOSE SERPL-MCNC: 128 MG/DL (ref 65–100)
HCT VFR BLD AUTO: 24.3 % (ref 36.6–50.3)
HCT VFR BLD AUTO: 26.8 % (ref 36.6–50.3)
HGB BLD-MCNC: 7.8 G/DL (ref 12.1–17)
HGB BLD-MCNC: 8.6 G/DL (ref 12.1–17)
IMM GRANULOCYTES # BLD AUTO: 0.1 K/UL (ref 0–0.04)
IMM GRANULOCYTES NFR BLD AUTO: 1 % (ref 0–0.5)
INR PPP: 1.4 (ref 0.9–1.1)
LYMPHOCYTES # BLD: 1 K/UL (ref 0.8–3.5)
LYMPHOCYTES NFR BLD: 6 % (ref 12–49)
MAGNESIUM SERPL-MCNC: 1.8 MG/DL (ref 1.6–2.4)
MCH RBC QN AUTO: 31.3 PG (ref 26–34)
MCHC RBC AUTO-ENTMCNC: 32.1 G/DL (ref 30–36.5)
MCV RBC AUTO: 97.5 FL (ref 80–99)
MONOCYTES # BLD: 0.7 K/UL (ref 0–1)
MONOCYTES NFR BLD: 4 % (ref 5–13)
NEUTS SEG # BLD: 15.5 K/UL (ref 1.8–8)
NEUTS SEG NFR BLD: 89 % (ref 32–75)
NRBC # BLD: 0 K/UL (ref 0–0.01)
NRBC BLD-RTO: 0 PER 100 WBC
PHOSPHATE SERPL-MCNC: 2.7 MG/DL (ref 2.6–4.7)
PLATELET # BLD AUTO: 283 K/UL (ref 150–400)
PMV BLD AUTO: 9.4 FL (ref 8.9–12.9)
POTASSIUM SERPL-SCNC: 4.2 MMOL/L (ref 3.5–5.1)
PROT SERPL-MCNC: 5.4 G/DL (ref 6.4–8.2)
PROT SERPL-MCNC: 5.5 G/DL (ref 6.4–8.2)
PROTHROMBIN TIME: 14.9 SEC (ref 9–11.1)
RBC # BLD AUTO: 2.75 M/UL (ref 4.1–5.7)
SERVICE CMNT-IMP: ABNORMAL
SERVICE CMNT-IMP: NORMAL
SODIUM SERPL-SCNC: 138 MMOL/L (ref 136–145)
WBC # BLD AUTO: 17.4 K/UL (ref 4.1–11.1)

## 2021-12-11 PROCEDURE — 74011000250 HC RX REV CODE- 250: Performed by: INTERNAL MEDICINE

## 2021-12-11 PROCEDURE — 74011250636 HC RX REV CODE- 250/636: Performed by: SURGERY

## 2021-12-11 PROCEDURE — 77030038269 HC DRN EXT URIN PURWCK BARD -A

## 2021-12-11 PROCEDURE — 80076 HEPATIC FUNCTION PANEL: CPT

## 2021-12-11 PROCEDURE — 36415 COLL VENOUS BLD VENIPUNCTURE: CPT

## 2021-12-11 PROCEDURE — 74011250637 HC RX REV CODE- 250/637: Performed by: SURGERY

## 2021-12-11 PROCEDURE — 74011000250 HC RX REV CODE- 250: Performed by: SURGERY

## 2021-12-11 PROCEDURE — P9047 ALBUMIN (HUMAN), 25%, 50ML: HCPCS | Performed by: INTERNAL MEDICINE

## 2021-12-11 PROCEDURE — 77030020847 HC STATLOK BARD -A

## 2021-12-11 PROCEDURE — 85018 HEMOGLOBIN: CPT

## 2021-12-11 PROCEDURE — 83735 ASSAY OF MAGNESIUM: CPT

## 2021-12-11 PROCEDURE — 65660000000 HC RM CCU STEPDOWN

## 2021-12-11 PROCEDURE — 80053 COMPREHEN METABOLIC PANEL: CPT

## 2021-12-11 PROCEDURE — 84100 ASSAY OF PHOSPHORUS: CPT

## 2021-12-11 PROCEDURE — 85025 COMPLETE CBC W/AUTO DIFF WBC: CPT

## 2021-12-11 PROCEDURE — 85610 PROTHROMBIN TIME: CPT

## 2021-12-11 PROCEDURE — 82962 GLUCOSE BLOOD TEST: CPT

## 2021-12-11 PROCEDURE — 2709999900 HC NON-CHARGEABLE SUPPLY

## 2021-12-11 PROCEDURE — 74011250636 HC RX REV CODE- 250/636: Performed by: INTERNAL MEDICINE

## 2021-12-11 RX ORDER — ALBUMIN HUMAN 250 G/1000ML
12.5 SOLUTION INTRAVENOUS 3 TIMES DAILY
Status: COMPLETED | OUTPATIENT
Start: 2021-12-11 | End: 2021-12-12

## 2021-12-11 RX ADMIN — METOCLOPRAMIDE 10 MG: 5 INJECTION, SOLUTION INTRAMUSCULAR; INTRAVENOUS at 11:01

## 2021-12-11 RX ADMIN — METOCLOPRAMIDE 10 MG: 5 INJECTION, SOLUTION INTRAMUSCULAR; INTRAVENOUS at 22:19

## 2021-12-11 RX ADMIN — METOPROLOL TARTRATE 1.25 MG: 1 INJECTION, SOLUTION INTRAVENOUS at 02:59

## 2021-12-11 RX ADMIN — CALCIUM GLUCONATE: 98 INJECTION, SOLUTION INTRAVENOUS at 17:41

## 2021-12-11 RX ADMIN — POTASSIUM CHLORIDE AND SODIUM CHLORIDE: 900; 150 INJECTION, SOLUTION INTRAVENOUS at 09:51

## 2021-12-11 RX ADMIN — METOCLOPRAMIDE 10 MG: 5 INJECTION, SOLUTION INTRAMUSCULAR; INTRAVENOUS at 02:59

## 2021-12-11 RX ADMIN — SODIUM CHLORIDE, PRESERVATIVE FREE 10 ML: 5 INJECTION INTRAVENOUS at 22:19

## 2021-12-11 RX ADMIN — SODIUM CHLORIDE, PRESERVATIVE FREE 20 MG: 5 INJECTION INTRAVENOUS at 18:00

## 2021-12-11 RX ADMIN — AMLODIPINE BESYLATE 10 MG: 5 TABLET ORAL at 11:01

## 2021-12-11 RX ADMIN — ALBUMIN (HUMAN) 12.5 G: 0.25 INJECTION, SOLUTION INTRAVENOUS at 10:59

## 2021-12-11 RX ADMIN — SODIUM CHLORIDE, PRESERVATIVE FREE 10 ML: 5 INJECTION INTRAVENOUS at 14:00

## 2021-12-11 RX ADMIN — METOPROLOL TARTRATE 1.25 MG: 1 INJECTION, SOLUTION INTRAVENOUS at 22:11

## 2021-12-11 RX ADMIN — METOCLOPRAMIDE 10 MG: 5 INJECTION, SOLUTION INTRAMUSCULAR; INTRAVENOUS at 16:33

## 2021-12-11 RX ADMIN — ALBUMIN (HUMAN) 12.5 G: 0.25 INJECTION, SOLUTION INTRAVENOUS at 16:33

## 2021-12-11 RX ADMIN — ALBUMIN (HUMAN) 12.5 G: 0.25 INJECTION, SOLUTION INTRAVENOUS at 22:21
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
## 2021-12-11 NOTE — PROGRESS NOTES
End of Shift Note    Bedside shift change report given to Lara Danielle RN (oncoming nurse) by Tatyana Quinonez LPN (offgoing nurse). Report included the following information SBAR, Kardex, ED Summary, Procedure Summary, Intake/Output, MAR and Recent Results    Shift worked:  7AM-7PM     Shift summary and any significant changes:     CONT. PLAN OF CARE, MONITOR LABS, I&O, NPO with ice chips, TPN/antibiotics therapy, Monitor urinary output and g-tube outputs, [place] drain output,     Concerns for physician to address:  cont plan of care     Zone phone for oncoming shift:   [phone]       Activity:  Activity Level: Up with Assistance  Number times ambulated in hallways past shift: 0  Number of times OOB to chair past shift: 0    Cardiac:   Cardiac Monitoring: No      Cardiac Rhythm: Sinus Tachy    Access:   Current line(s): PIV     Genitourinary:   Urinary status: Yancey catheter  Respiratory:   O2 Device: None (Room air)  Chronic home O2 use?: NO  Incentive spirometer at bedside:Actual Volume (ml): 1000 ml  GI:  Last Bowel Movement Date: 12/03/21  Current diet:  DIET NPO  TPN ADULT-CENTRAL AA 5% D15%  Passing flatus: NO  Tolerating current diet: YES       Pain Management:   Patient states pain is manageable on current regimen:yes, uses PCA    Skin:  Russell Score: 19  Interventions: float heels, increase time out of bed, PT/OT consult and nutritional support     Patient Safety:  Fall Score:  Total Score: 3  Interventions: gripper socks, pt to call before getting OOB and stay with me (per policy)  High Fall Risk: Yes    Length of Stay:  Expected LOS: 4d 19h  Actual LOS: [address] Love, LPN

## 2021-12-11 NOTE — PROGRESS NOTES
Admit Date: 2021    POD 1 Day Post-Op    Procedure:  Procedure(s):  OPEN LYSIS OF ADHESIONS, GASTROSTOMY  AND JEJUNAL TUBE PLACEMENT    Subjective:     Patient without complaint. Making urine. Asking for ice chips. Objective:     Blood pressure (!) 136/93, pulse (!) 126, temperature 99.7 °F (37.6 °C), resp. rate 18, height 5' 9\" (1.753 m), weight 109 lb 5.6 oz (49.6 kg), SpO2 100 %.     Temp (24hrs), Av.1 °F (37.3 °C), Min:97.6 °F (36.4 °C), Max:99.8 °F (37.7 °C)      Physical Exam:  GENERAL: alert, cooperative, no distress, appears stated age, LUNG: clear to auscultation bilaterally, HEART: tachycardic, ABDOMEN: flat, soft, dressed and dry, G-tube, J-tube sites clear, EXTREMITIES:  extremities normal, atraumatic, no cyanosis or edema    Labs:   Recent Results (from the past 24 hour(s))   GLUCOSE, POC    Collection Time: 12/10/21 12:21 PM   Result Value Ref Range    Glucose (POC) 118 (H) 65 - 117 mg/dL    Performed by Christa Colin    GLUCOSE, POC    Collection Time: 12/10/21  5:41 PM   Result Value Ref Range    Glucose (POC) 248 (H) 65 - 117 mg/dL    Performed by Candi Kamara*    GLUCOSE, POC    Collection Time: 21  1:12 AM   Result Value Ref Range    Glucose (POC) 149 (H) 65 - 117 mg/dL    Performed by Raguel Nissen (PCT)    MAGNESIUM    Collection Time: 21  6:23 AM   Result Value Ref Range    Magnesium 1.8 1.6 - 2.4 mg/dL   PHOSPHORUS    Collection Time: 21  6:23 AM   Result Value Ref Range    Phosphorus 2.7 2.6 - 4.7 MG/DL   METABOLIC PANEL, COMPREHENSIVE    Collection Time: 21  6:23 AM   Result Value Ref Range    Sodium 138 136 - 145 mmol/L    Potassium 4.2 3.5 - 5.1 mmol/L    Chloride 107 97 - 108 mmol/L    CO2 25 21 - 32 mmol/L    Anion gap 6 5 - 15 mmol/L    Glucose 128 (H) 65 - 100 mg/dL    BUN 25 (H) 6 - 20 MG/DL    Creatinine 0.61 (L) 0.70 - 1.30 MG/DL    BUN/Creatinine ratio 41 (H) 12 - 20      GFR est AA >60 >60 ml/min/1.73m2    GFR est non-AA >60 >60 ml/min/1.73m2    Calcium 8.3 (L) 8.5 - 10.1 MG/DL    Bilirubin, total 1.8 (H) 0.2 - 1.0 MG/DL    ALT (SGPT) 267 (H) 12 - 78 U/L    AST (SGOT) 108 (H) 15 - 37 U/L    Alk. phosphatase 147 (H) 45 - 117 U/L    Protein, total 5.5 (L) 6.4 - 8.2 g/dL    Albumin 2.1 (L) 3.5 - 5.0 g/dL    Globulin 3.4 2.0 - 4.0 g/dL    A-G Ratio 0.6 (L) 1.1 - 2.2     CBC WITH AUTOMATED DIFF    Collection Time: 12/11/21  6:23 AM   Result Value Ref Range    WBC 17.4 (H) 4.1 - 11.1 K/uL    RBC 2.75 (L) 4.10 - 5.70 M/uL    HGB 8.6 (L) 12.1 - 17.0 g/dL    HCT 26.8 (L) 36.6 - 50.3 %    MCV 97.5 80.0 - 99.0 FL    MCH 31.3 26.0 - 34.0 PG    MCHC 32.1 30.0 - 36.5 g/dL    RDW 15.4 (H) 11.5 - 14.5 %    PLATELET 365 694 - 683 K/uL    MPV 9.4 8.9 - 12.9 FL    NRBC 0.0 0  WBC    ABSOLUTE NRBC 0.00 0.00 - 0.01 K/uL    NEUTROPHILS 89 (H) 32 - 75 %    LYMPHOCYTES 6 (L) 12 - 49 %    MONOCYTES 4 (L) 5 - 13 %    EOSINOPHILS 0 0 - 7 %    BASOPHILS 0 0 - 1 %    IMMATURE GRANULOCYTES 1 (H) 0.0 - 0.5 %    ABS. NEUTROPHILS 15.5 (H) 1.8 - 8.0 K/UL    ABS. LYMPHOCYTES 1.0 0.8 - 3.5 K/UL    ABS. MONOCYTES 0.7 0.0 - 1.0 K/UL    ABS. EOSINOPHILS 0.0 0.0 - 0.4 K/UL    ABS. BASOPHILS 0.0 0.0 - 0.1 K/UL    ABS. IMM. GRANS. 0.1 (H) 0.00 - 0.04 K/UL    DF AUTOMATED     HEPATIC FUNCTION PANEL    Collection Time: 12/11/21  6:23 AM   Result Value Ref Range    Protein, total 5.4 (L) 6.4 - 8.2 g/dL    Albumin 2.1 (L) 3.5 - 5.0 g/dL    Globulin 3.3 2.0 - 4.0 g/dL    A-G Ratio 0.6 (L) 1.1 - 2.2      Bilirubin, total 2.0 (H) 0.2 - 1.0 MG/DL    Bilirubin, direct 1.1 (H) 0.0 - 0.2 MG/DL    Alk.  phosphatase 144 (H) 45 - 117 U/L    AST (SGOT) 109 (H) 15 - 37 U/L    ALT (SGPT) 267 (H) 12 - 78 U/L   GLUCOSE, POC    Collection Time: 12/11/21  6:29 AM   Result Value Ref Range    Glucose (POC) 141 (H) 65 - 117 mg/dL    Performed by Althea Slade (PCT)        Data Review images and reports reviewed    Assessment:     Active Problems:    SBO (small bowel obstruction) ([facility] [address] (11/22/2021)      Aspiration pneumonia ([facility] [address] (11/25/2021)      Small bowel obstruction ([facility] [address] (11/25/2021)      Sepsis ([facility] [address] (11/25/2021)      Abdominal pain, acute (11/27/2021)      Intractable cyclical vomiting with nausea (11/27/2021)        Plan/Recommendations/Medical Decision Making:     Continue present treatment   Hb down 3g, HR up 30 from preop. Making urine. ANDREE o/p serosanguinous  Renal following, will defer resuscitation to them. Pt appears stable. Continue tpn  May have ice chips. Continue g-tube to gravity drainage.     Ronna Eaton MD  [id number] [address] [facility]

## 2021-12-11 NOTE — PROGRESS NOTES
End of Shift Note    Bedside shift change report given to Alicia Mcclendon (oncoming nurse) by Jonathan Sahu RN (offgoing nurse). Report included the following information SBAR, Kardex, Intake/Output and MAR    Shift worked:  7p-7a     Shift summary and any significant changes:     Pt's HR was elevated this shift; metoprolol administered-- see MAR    Pt consistently rated pain at 7 on the Numeric scale; PCA use encouraged. Routine postop vitals completed. [id number]- Lab called to inquire if pt had surgery because Hgb had dropped; Hgb  8.6     Concerns for physician to address:       Zone phone for oncoming shift:          Activity:  Activity Level: Up with Assistance  Number times ambulated in hallways past shift: 0  Number of times OOB to chair past shift: 0    Cardiac:   Cardiac Monitoring: Yes      Cardiac Rhythm: Sinus Tachy    Access:   Current line(s): PICC     Genitourinary:   Urinary status: brock    Respiratory:   O2 Device: None (Room air)  Chronic home O2 use?: NO  Incentive spirometer at bedside:   Actual Volume (ml): 750 ml  GI:  Last Bowel Movement Date: 12/03/21  Current diet:  DIET NPO  TPN ADULT-CENTRAL AA 5% D15%  Passing flatus: Tolerating current diet: YES     Skin:  Russell Score: 20  Interventions: increase time out of bed    Patient Safety:  Fall Score:  Total Score: 3  Interventions: pt to call before getting OOB  High Fall Risk: Yes    Length of Stay:  Expected LOS: 4d 19h  Actual LOS: [facility] , RN

## 2021-12-11 NOTE — PROGRESS NOTES
End of Shift Note    Bedside shift change report given to Tan Neves RN (oncoming nurse) by Jaiden Rodríguez RN (offgoing nurse). Report included the following information SBAR, Kardex, OR Summary, Procedure Summary, Intake/Output, MAR and Recent Results    Shift worked:  &a-7p     Shift summary and any significant changes:     Received report on pt from Perry Del Rosario RN around  while pt was still in OR. Pt got back to the floor at 1845. In some pain but has PCA. TPN is running. G-tube to gravity. Concerns for physician to address:  none     Zone phone for oncoming shift:   [phone]       Cardiac:   Cardiac Monitoring: Yes      Cardiac Rhythm: Sinus Tachy    Access:   Current line(s): PICC     Genitourinary:   Urinary status: brock    Respiratory:   O2 Device: Nasal cannula  Chronic home O2 use?: NO  Incentive spirometer at bedside: YES  Actual Volume (ml): 750 ml  GI:  Last Bowel Movement Date: 12/03/21  Current diet:  DIET NPO  TPN ADULT-CENTRAL AA 5% D15%  Passing flatus: NO  Tolerating current diet: YES       Pain Management:   Patient states pain is manageable on current regimen: YES    Skin:  Russell Score: 20  Interventions: increase time out of bed, limit briefs and internal/external urinary devices    Patient Safety:  Fall Score:  Total Score: 3  Interventions: gripper socks, pt to call before getting OOB and stay with me (per policy)  High Fall Risk: Yes    Length of Stay:  Expected LOS: 4d 19h  Actual LOS: SAMUEL Chiu

## 2021-12-12 LAB
ALBUMIN SERPL-MCNC: 2.1 G/DL (ref 3.5–5)
ALBUMIN/GLOB SERPL: 0.7 {RATIO} (ref 1.1–2.2)
ALP SERPL-CCNC: 94 U/L (ref 45–117)
ALT SERPL-CCNC: 180 U/L (ref 12–78)
ANION GAP SERPL CALC-SCNC: 5 MMOL/L (ref 5–15)
AST SERPL-CCNC: 89 U/L (ref 15–37)
BASOPHILS # BLD: 0 K/UL (ref 0–0.1)
BASOPHILS NFR BLD: 0 % (ref 0–1)
BILIRUB SERPL-MCNC: 1.7 MG/DL (ref 0.2–1)
BUN SERPL-MCNC: 15 MG/DL (ref 6–20)
BUN/CREAT SERPL: 37 (ref 12–20)
CALCIUM SERPL-MCNC: 8.4 MG/DL (ref 8.5–10.1)
CHLORIDE SERPL-SCNC: 105 MMOL/L (ref 97–108)
CO2 SERPL-SCNC: 28 MMOL/L (ref 21–32)
CREAT SERPL-MCNC: 0.41 MG/DL (ref 0.7–1.3)
DIFFERENTIAL METHOD BLD: ABNORMAL
EOSINOPHIL # BLD: 0 K/UL (ref 0–0.4)
EOSINOPHIL NFR BLD: 0 % (ref 0–7)
ERYTHROCYTE [DISTWIDTH] IN BLOOD BY AUTOMATED COUNT: 15.3 % (ref 11.5–14.5)
GLOBULIN SER CALC-MCNC: 3 G/DL (ref 2–4)
GLUCOSE BLD STRIP.AUTO-MCNC: 101 MG/DL (ref 65–117)
GLUCOSE BLD STRIP.AUTO-MCNC: 116 MG/DL (ref 65–117)
GLUCOSE BLD STRIP.AUTO-MCNC: 121 MG/DL (ref 65–117)
GLUCOSE BLD STRIP.AUTO-MCNC: 124 MG/DL (ref 65–117)
GLUCOSE SERPL-MCNC: 102 MG/DL (ref 65–100)
HCT VFR BLD AUTO: 19.3 % (ref 36.6–50.3)
HCT VFR BLD AUTO: 24.7 % (ref 36.6–50.3)
HGB BLD-MCNC: 6.3 G/DL (ref 12.1–17)
HGB BLD-MCNC: 8.3 G/DL (ref 12.1–17)
HISTORY CHECKED?,CKHIST: NORMAL
IMM GRANULOCYTES # BLD AUTO: 0.1 K/UL (ref 0–0.04)
IMM GRANULOCYTES NFR BLD AUTO: 1 % (ref 0–0.5)
LYMPHOCYTES # BLD: 0.8 K/UL (ref 0.8–3.5)
LYMPHOCYTES NFR BLD: 6 % (ref 12–49)
MAGNESIUM SERPL-MCNC: 1.8 MG/DL (ref 1.6–2.4)
MCH RBC QN AUTO: 31 PG (ref 26–34)
MCHC RBC AUTO-ENTMCNC: 32.6 G/DL (ref 30–36.5)
MCV RBC AUTO: 95.1 FL (ref 80–99)
MONOCYTES # BLD: 0.7 K/UL (ref 0–1)
MONOCYTES NFR BLD: 5 % (ref 5–13)
NEUTS SEG # BLD: 11.8 K/UL (ref 1.8–8)
NEUTS SEG NFR BLD: 88 % (ref 32–75)
NRBC # BLD: 0 K/UL (ref 0–0.01)
NRBC BLD-RTO: 0 PER 100 WBC
PHOSPHATE SERPL-MCNC: 2.1 MG/DL (ref 2.6–4.7)
PLATELET # BLD AUTO: 251 K/UL (ref 150–400)
PMV BLD AUTO: 9.4 FL (ref 8.9–12.9)
POTASSIUM SERPL-SCNC: 3.4 MMOL/L (ref 3.5–5.1)
PROT SERPL-MCNC: 5.1 G/DL (ref 6.4–8.2)
RBC # BLD AUTO: 2.03 M/UL (ref 4.1–5.7)
SERVICE CMNT-IMP: ABNORMAL
SERVICE CMNT-IMP: ABNORMAL
SERVICE CMNT-IMP: NORMAL
SERVICE CMNT-IMP: NORMAL
SODIUM SERPL-SCNC: 138 MMOL/L (ref 136–145)
WBC # BLD AUTO: 13.5 K/UL (ref 4.1–11.1)

## 2021-12-12 PROCEDURE — 74011250636 HC RX REV CODE- 250/636: Performed by: SURGERY

## 2021-12-12 PROCEDURE — 74011250637 HC RX REV CODE- 250/637: Performed by: SURGERY

## 2021-12-12 PROCEDURE — P9047 ALBUMIN (HUMAN), 25%, 50ML: HCPCS | Performed by: INTERNAL MEDICINE

## 2021-12-12 PROCEDURE — 74011000258 HC RX REV CODE- 258: Performed by: SURGERY

## 2021-12-12 PROCEDURE — 80053 COMPREHEN METABOLIC PANEL: CPT

## 2021-12-12 PROCEDURE — 82962 GLUCOSE BLOOD TEST: CPT

## 2021-12-12 PROCEDURE — 85025 COMPLETE CBC W/AUTO DIFF WBC: CPT

## 2021-12-12 PROCEDURE — 74011000250 HC RX REV CODE- 250: Performed by: SURGERY

## 2021-12-12 PROCEDURE — 85018 HEMOGLOBIN: CPT

## 2021-12-12 PROCEDURE — 74011000250 HC RX REV CODE- 250: Performed by: INTERNAL MEDICINE

## 2021-12-12 PROCEDURE — 86900 BLOOD TYPING SEROLOGIC ABO: CPT

## 2021-12-12 PROCEDURE — 36430 TRANSFUSION BLD/BLD COMPNT: CPT

## 2021-12-12 PROCEDURE — 86923 COMPATIBILITY TEST ELECTRIC: CPT

## 2021-12-12 PROCEDURE — 84100 ASSAY OF PHOSPHORUS: CPT

## 2021-12-12 PROCEDURE — 74011250636 HC RX REV CODE- 250/636: Performed by: INTERNAL MEDICINE

## 2021-12-12 PROCEDURE — 83735 ASSAY OF MAGNESIUM: CPT

## 2021-12-12 PROCEDURE — P9016 RBC LEUKOCYTES REDUCED: HCPCS

## 2021-12-12 PROCEDURE — 65660000000 HC RM CCU STEPDOWN

## 2021-12-12 PROCEDURE — 36415 COLL VENOUS BLD VENIPUNCTURE: CPT

## 2021-12-12 RX ORDER — SODIUM CHLORIDE 9 MG/ML
250 INJECTION, SOLUTION INTRAVENOUS AS NEEDED
Status: DISCONTINUED | OUTPATIENT
Start: 2021-12-12 | End: 2021-12-27 | Stop reason: ALTCHOICE

## 2021-12-12 RX ADMIN — METOCLOPRAMIDE 10 MG: 5 INJECTION, SOLUTION INTRAMUSCULAR; INTRAVENOUS at 09:33

## 2021-12-12 RX ADMIN — SODIUM CHLORIDE, PRESERVATIVE FREE 10 ML: 5 INJECTION INTRAVENOUS at 02:45

## 2021-12-12 RX ADMIN — METOCLOPRAMIDE 10 MG: 5 INJECTION, SOLUTION INTRAMUSCULAR; INTRAVENOUS at 02:45

## 2021-12-12 RX ADMIN — SODIUM CHLORIDE, PRESERVATIVE FREE 20 MG: 5 INJECTION INTRAVENOUS at 06:14

## 2021-12-12 RX ADMIN — ACETAMINOPHEN 650 MG: 325 TABLET ORAL at 21:18

## 2021-12-12 RX ADMIN — SODIUM CHLORIDE, PRESERVATIVE FREE 10 ML: 5 INJECTION INTRAVENOUS at 21:19

## 2021-12-12 RX ADMIN — POTASSIUM PHOSPHATE, MONOBASIC POTASSIUM PHOSPHATE, DIBASIC: 224; 236 INJECTION, SOLUTION, CONCENTRATE INTRAVENOUS at 17:53

## 2021-12-12 RX ADMIN — Medication: at 07:35

## 2021-12-12 RX ADMIN — METOCLOPRAMIDE 10 MG: 5 INJECTION, SOLUTION INTRAMUSCULAR; INTRAVENOUS at 21:19

## 2021-12-12 RX ADMIN — SODIUM CHLORIDE, PRESERVATIVE FREE 20 MG: 5 INJECTION INTRAVENOUS at 17:37

## 2021-12-12 RX ADMIN — ALBUMIN (HUMAN) 12.5 G: 0.25 INJECTION, SOLUTION INTRAVENOUS at 09:33

## 2021-12-12 RX ADMIN — SODIUM CHLORIDE, PRESERVATIVE FREE 10 ML: 5 INJECTION INTRAVENOUS at 18:51

## 2021-12-12 RX ADMIN — METOCLOPRAMIDE 10 MG: 5 INJECTION, SOLUTION INTRAMUSCULAR; INTRAVENOUS at 17:37

## 2021-12-12 RX ADMIN — SODIUM CHLORIDE, PRESERVATIVE FREE 10 ML: 5 INJECTION INTRAVENOUS at 06:14

## 2021-12-12 RX ADMIN — AMLODIPINE BESYLATE 10 MG: 5 TABLET ORAL at 09:33

## 2021-12-12 RX ADMIN — POTASSIUM CHLORIDE AND SODIUM CHLORIDE: 900; 150 INJECTION, SOLUTION INTRAVENOUS at 18:50

## 2021-12-12 RX ADMIN — POTASSIUM CHLORIDE AND SODIUM CHLORIDE: 900; 150 INJECTION, SOLUTION INTRAVENOUS at 00:19

## 2021-12-12 NOTE — PROGRESS NOTES
End of Shift Note    Bedside shift change report given to [address] Lilian RN (oncoming nurse) by Audrey Jones LPN (offgoing nurse). Report included the following information SBAR, Kardex, OR Summary, Procedure Summary, Intake/Output, MAR and Recent Results    Shift worked:  7am-7pm     Shift summary and any significant changes:     Infusion unit PRBC's, monitor I&O, GTube outputs, ANDREE output. Continue on PCA     Concerns for physician to address:  Continue plan of care, H&H post blood infusion     Zone phone for oncoming shift:   [phone]       Activity:  Activity Level: Up with Assistance  Number times ambulated in hallways past shift: 0  Number of times OOB to chair past shift: 0    Cardiac:   Cardiac Monitoring: Yes      Cardiac Rhythm: Sinus Tachy    Access:   Current line(s): PIV     Genitourinary:   Urinary status: brock    Respiratory:   O2 Device: None (Room air)  Chronic home O2 use?: NO  Incentive spirometer at bedside: YES  Actual Volume (ml): 1000 ml  GI:  Last Bowel Movement Date: 12/03/21  Current diet:  DIET NPO  TPN ADULT-CENTRAL AA 5% D15%  TPN ADULT-CENTRAL AA 5% D15%  Passing flatus: NO  Tolerating current diet: NPO       Pain Management:   Patient states pain is manageable on current regimen: YES    Skin:  Russlel Score: 19  Interventions: increase time out of bed, internal/external urinary devices and nutritional support     Patient Safety:  Fall Score:  Total Score: 3  Interventions: gripper socks, pt to call before getting OOB and stay with me (per policy)  High Fall Risk: Yes    Length of Stay:  Expected LOS: 4d 19h  Actual LOS: [place], LPN
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
## 2021-12-12 NOTE — PROGRESS NOTES
Admit Date: 2021    POD 2 Day Post-Op    Procedure:  Procedure(s):  OPEN LYSIS OF ADHESIONS, GASTROSTOMY  AND JEJUNAL TUBE PLACEMENT    Subjective:     Patient without complaint. Making urine. Feels much better today. Sitting up and alert and happy. Objective:     Blood pressure 138/86, pulse (!) 119, temperature 98.9 °F (37.2 °C), resp. rate 18, height 5' 9\" (1.753 m), weight 118 lb 1.6 oz (53.6 kg), SpO2 100 %.     Temp (24hrs), Av.5 °F (37.5 °C), Min:98.9 °F (37.2 °C), Max:100.4 °F (38 °C)      Physical Exam:  GENERAL: alert, cooperative, no distress, appears stated age, LUNG: clear to auscultation bilaterally, HEART: tachycardic, ABDOMEN: flat, soft, dressed and dry, G-tube, J-tube sites clear, EXTREMITIES:  extremities normal, atraumatic, no cyanosis or edema    Labs:   Recent Results (from the past 24 hour(s))   GLUCOSE, POC    Collection Time: 21 11:57 AM   Result Value Ref Range    Glucose (POC) 160 (H) 65 - 117 mg/dL    Performed by Natan Vallecillo ([facility]HOU[facility] PCT)    PROTHROMBIN TIME + INR    Collection Time: 21  1:36 PM   Result Value Ref Range    INR 1.4 (H) 0.9 - 1.1      Prothrombin time 14.9 (H) 9.0 - 11.1 sec   HGB & HCT    Collection Time: 21  1:37 PM   Result Value Ref Range    HGB 7.8 (L) 12.1 - 17.0 g/dL    HCT 24.3 (L) 36.6 - 50.3 %   GLUCOSE, POC    Collection Time: 21  5:58 PM   Result Value Ref Range    Glucose (POC) 112 65 - 117 mg/dL    Performed by Joshua Yang    MAGNESIUM    Collection Time: 21  3:03 AM   Result Value Ref Range    Magnesium 1.8 1.6 - 2.4 mg/dL   PHOSPHORUS    Collection Time: 21  3:03 AM   Result Value Ref Range    Phosphorus 2.1 (L) 2.6 - 4.7 MG/DL   METABOLIC PANEL, COMPREHENSIVE    Collection Time: 21  3:03 AM   Result Value Ref Range    Sodium 138 136 - 145 mmol/L    Potassium 3.4 (L) 3.5 - 5.1 mmol/L    Chloride 105 97 - 108 mmol/L    CO2 28 21 - 32 mmol/L    Anion gap 5 5 - 15 mmol/L    Glucose 102 (H) 65 - 100 mg/dL BUN 15 6 - 20 MG/DL    Creatinine 0.41 (L) 0.70 - 1.30 MG/DL    BUN/Creatinine ratio 37 (H) 12 - 20      GFR est AA >60 >60 ml/min/1.73m2    GFR est non-AA >60 >60 ml/min/1.73m2    Calcium 8.4 (L) 8.5 - 10.1 MG/DL    Bilirubin, total 1.7 (H) 0.2 - 1.0 MG/DL    ALT (SGPT) 180 (H) 12 - 78 U/L    AST (SGOT) 89 (H) 15 - 37 U/L    Alk. phosphatase 94 45 - 117 U/L    Protein, total 5.1 (L) 6.4 - 8.2 g/dL    Albumin 2.1 (L) 3.5 - 5.0 g/dL    Globulin 3.0 2.0 - 4.0 g/dL    A-G Ratio 0.7 (L) 1.1 - 2.2     CBC WITH AUTOMATED DIFF    Collection Time: 12/12/21  3:03 AM   Result Value Ref Range    WBC 13.5 (H) 4.1 - 11.1 K/uL    RBC 2.03 (L) 4.10 - 5.70 M/uL    HGB 6.3 (L) 12.1 - 17.0 g/dL    HCT 19.3 (L) 36.6 - 50.3 %    MCV 95.1 80.0 - 99.0 FL    MCH 31.0 26.0 - 34.0 PG    MCHC 32.6 30.0 - 36.5 g/dL    RDW 15.3 (H) 11.5 - 14.5 %    PLATELET 188 612 - 021 K/uL    MPV 9.4 8.9 - 12.9 FL    NRBC 0.0 0  WBC    ABSOLUTE NRBC 0.00 0.00 - 0.01 K/uL    NEUTROPHILS 88 (H) 32 - 75 %    LYMPHOCYTES 6 (L) 12 - 49 %    MONOCYTES 5 5 - 13 %    EOSINOPHILS 0 0 - 7 %    BASOPHILS 0 0 - 1 %    IMMATURE GRANULOCYTES 1 (H) 0.0 - 0.5 %    ABS. NEUTROPHILS 11.8 (H) 1.8 - 8.0 K/UL    ABS. LYMPHOCYTES 0.8 0.8 - 3.5 K/UL    ABS. MONOCYTES 0.7 0.0 - 1.0 K/UL    ABS. EOSINOPHILS 0.0 0.0 - 0.4 K/UL    ABS. BASOPHILS 0.0 0.0 - 0.1 K/UL    ABS. IMM.  GRANS. 0.1 (H) 0.00 - 0.04 K/UL    DF AUTOMATED     GLUCOSE, POC    Collection Time: 12/12/21  6:17 AM   Result Value Ref Range    Glucose (POC) 121 (H) 65 - 117 mg/dL    Performed by Third Chicken (PCT)        Data Review images and reports reviewed    Assessment:     Active Problems:    SBO (small bowel obstruction) ([address] (11/22/2021)      Aspiration pneumonia ([address] (11/25/2021)      Small bowel obstruction ([address] (11/25/2021)      Sepsis ([address] (11/25/2021)      Abdominal pain, acute (11/27/2021)      Intractable cyclical vomiting with nausea (11/27/2021)        Plan/Recommendations/Medical Decision Making:     Continue present treatment   Hb continues to drift, 6.3 today. ANDREE o/p and G-tube, J-tube output without any signs of blood. HR still elevated but improved from yesterday. Making urine. Renal following, will defer resuscitation to them. Pt appears stable. Continue tpn - increase to goal rate  May have ice chips. Continue g-tube to gravity drainage. Transfuse 1 unit PRBC today  I have discussed with the patient the rationale for blood component transfusion; its benefits in treating or preventing fatigue, organ damage, or death; and its risk which includes mild transfusion reactions, rare risk of blood borne infection, or more serious but rare reactions. I have discussed the alternatives to transfusion, including the risk and consequences of not receiving transfusion. The patient had an opportunity to ask questions and had agreed to proceed with transfusion of blood components.       Stefan De Leon MD  [facility]

## 2021-12-13 LAB
ABO + RH BLD: NORMAL
BASOPHILS # BLD: 0 K/UL (ref 0–0.1)
BASOPHILS NFR BLD: 0 % (ref 0–1)
BLD PROD TYP BPU: NORMAL
BLOOD GROUP ANTIBODIES SERPL: NORMAL
BPU ID: NORMAL
CROSSMATCH RESULT,%XM: NORMAL
DIFFERENTIAL METHOD BLD: ABNORMAL
EOSINOPHIL # BLD: 0.2 K/UL (ref 0–0.4)
EOSINOPHIL NFR BLD: 1 % (ref 0–7)
ERYTHROCYTE [DISTWIDTH] IN BLOOD BY AUTOMATED COUNT: 15.3 % (ref 11.5–14.5)
GLUCOSE BLD STRIP.AUTO-MCNC: 111 MG/DL (ref 65–117)
GLUCOSE BLD STRIP.AUTO-MCNC: 120 MG/DL (ref 65–117)
GLUCOSE BLD STRIP.AUTO-MCNC: 120 MG/DL (ref 65–117)
GLUCOSE BLD STRIP.AUTO-MCNC: 127 MG/DL (ref 65–117)
HCT VFR BLD AUTO: 23.8 % (ref 36.6–50.3)
HGB BLD-MCNC: 8.1 G/DL (ref 12.1–17)
IMM GRANULOCYTES # BLD AUTO: 0.1 K/UL (ref 0–0.04)
IMM GRANULOCYTES NFR BLD AUTO: 1 % (ref 0–0.5)
LYMPHOCYTES # BLD: 0.9 K/UL (ref 0.8–3.5)
LYMPHOCYTES NFR BLD: 7 % (ref 12–49)
MAGNESIUM SERPL-MCNC: 1.7 MG/DL (ref 1.6–2.4)
MCH RBC QN AUTO: 31.5 PG (ref 26–34)
MCHC RBC AUTO-ENTMCNC: 34 G/DL (ref 30–36.5)
MCV RBC AUTO: 92.6 FL (ref 80–99)
MONOCYTES # BLD: 0.5 K/UL (ref 0–1)
MONOCYTES NFR BLD: 4 % (ref 5–13)
NEUTS SEG # BLD: 11.5 K/UL (ref 1.8–8)
NEUTS SEG NFR BLD: 87 % (ref 32–75)
NRBC # BLD: 0 K/UL (ref 0–0.01)
NRBC BLD-RTO: 0 PER 100 WBC
PHOSPHATE SERPL-MCNC: 2.8 MG/DL (ref 2.6–4.7)
PLATELET # BLD AUTO: 276 K/UL (ref 150–400)
PMV BLD AUTO: 9.1 FL (ref 8.9–12.9)
RBC # BLD AUTO: 2.57 M/UL (ref 4.1–5.7)
SERVICE CMNT-IMP: ABNORMAL
SERVICE CMNT-IMP: NORMAL
SPECIMEN EXP DATE BLD: NORMAL
STATUS OF UNIT,%ST: NORMAL
UNIT DIVISION, %UDIV: 0
WBC # BLD AUTO: 13.1 K/UL (ref 4.1–11.1)

## 2021-12-13 PROCEDURE — 74011250636 HC RX REV CODE- 250/636: Performed by: INTERNAL MEDICINE

## 2021-12-13 PROCEDURE — 84100 ASSAY OF PHOSPHORUS: CPT

## 2021-12-13 PROCEDURE — 74011250636 HC RX REV CODE- 250/636: Performed by: SURGERY

## 2021-12-13 PROCEDURE — 74011000250 HC RX REV CODE- 250: Performed by: SURGERY

## 2021-12-13 PROCEDURE — 36415 COLL VENOUS BLD VENIPUNCTURE: CPT

## 2021-12-13 PROCEDURE — 74011000250 HC RX REV CODE- 250: Performed by: INTERNAL MEDICINE

## 2021-12-13 PROCEDURE — 83735 ASSAY OF MAGNESIUM: CPT

## 2021-12-13 PROCEDURE — 74011250637 HC RX REV CODE- 250/637: Performed by: SURGERY

## 2021-12-13 PROCEDURE — 82962 GLUCOSE BLOOD TEST: CPT

## 2021-12-13 PROCEDURE — 85025 COMPLETE CBC W/AUTO DIFF WBC: CPT

## 2021-12-13 PROCEDURE — 65660000000 HC RM CCU STEPDOWN

## 2021-12-13 RX ADMIN — METOCLOPRAMIDE 10 MG: 5 INJECTION, SOLUTION INTRAMUSCULAR; INTRAVENOUS at 15:17

## 2021-12-13 RX ADMIN — Medication: at 19:35

## 2021-12-13 RX ADMIN — POTASSIUM CHLORIDE AND SODIUM CHLORIDE: 900; 150 INJECTION, SOLUTION INTRAVENOUS at 07:37

## 2021-12-13 RX ADMIN — METOCLOPRAMIDE 10 MG: 5 INJECTION, SOLUTION INTRAMUSCULAR; INTRAVENOUS at 21:59

## 2021-12-13 RX ADMIN — METOCLOPRAMIDE 10 MG: 5 INJECTION, SOLUTION INTRAMUSCULAR; INTRAVENOUS at 08:40

## 2021-12-13 RX ADMIN — SODIUM CHLORIDE, PRESERVATIVE FREE 20 MG: 5 INJECTION INTRAVENOUS at 17:15

## 2021-12-13 RX ADMIN — SODIUM CHLORIDE, PRESERVATIVE FREE 20 MG: 5 INJECTION INTRAVENOUS at 05:06

## 2021-12-13 RX ADMIN — I.V. FAT EMULSION 250 ML: 20 EMULSION INTRAVENOUS at 21:58

## 2021-12-13 RX ADMIN — SODIUM CHLORIDE, PRESERVATIVE FREE 10 ML: 5 INJECTION INTRAVENOUS at 05:02

## 2021-12-13 RX ADMIN — SODIUM CHLORIDE, PRESERVATIVE FREE 10 ML: 5 INJECTION INTRAVENOUS at 04:01

## 2021-12-13 RX ADMIN — SODIUM CHLORIDE, PRESERVATIVE FREE 10 ML: 5 INJECTION INTRAVENOUS at 21:59

## 2021-12-13 RX ADMIN — METOCLOPRAMIDE 10 MG: 5 INJECTION, SOLUTION INTRAMUSCULAR; INTRAVENOUS at 04:01

## 2021-12-13 RX ADMIN — AMLODIPINE BESYLATE 10 MG: 5 TABLET ORAL at 08:40

## 2021-12-13 RX ADMIN — POTASSIUM PHOSPHATE, MONOBASIC POTASSIUM PHOSPHATE, DIBASIC: 224; 236 INJECTION, SOLUTION, CONCENTRATE INTRAVENOUS at 18:19

## 2021-12-13 RX ADMIN — SODIUM CHLORIDE, PRESERVATIVE FREE 10 ML: 5 INJECTION INTRAVENOUS at 15:18

## 2021-12-13 NOTE — PROGRESS NOTES
End of Shift Note    Bedside shift change report given to [address] (oncoming nurse) by Mason Cabrera RN (offgoing nurse). Report included the following information SBAR, Intake/Output, MAR, Recent Results and Cardiac Rhythm Sinus Tach    Shift worked:  [id number]     Shift summary and any significant changes:    Patient rested quietly this shift; HGB @ 2100 8.3, @ 0330 8.1 after 1 unit PRBC 12/12/21; J-Tube & G-Tube patent draining to gravity, ANDREE drainage decreasing, changing to serous from serosanguinous; Dilaudid PCA controlling pain effectively; patient passing occasional flatus. Concerns for physician to address: PT/OT consult for strengthening   Zone phone for oncoming shift:  [phone]     Activity:  Activity Level: Up with Assistance  Number times ambulated in hallways past shift: 0  Number of times OOB to chair past shift: 0    Cardiac:   Cardiac Monitoring: Yes      Cardiac Rhythm: Sinus Tachy    Access:   Current line(s): PIV and PICC     Genitourinary:   Urinary status: brock    Respiratory:   O2 Device: None (Room air)  Chronic home O2 use?: NO  Incentive spirometer at bedside: YES  Actual Volume (ml): 1200 ml  GI:  Last Bowel Movement Date: 12/03/21  Current diet:  DIET NPO  TPN ADULT-CENTRAL AA 5% D15%  Passing flatus: YES  Tolerating current diet: YES       Pain Management:   Patient states pain is manageable on current regimen: YES    Skin:  Russell Score: 15  Interventions: speciality bed, float heels, increase time out of bed, PT/OT consult, internal/external urinary devices and nutritional support     Patient Safety:  Fall Score:  Total Score: 3  Interventions: assistive device (walker, cane, etc), gripper socks and pt to call before getting OOB  High Fall Risk: Yes    Length of Stay:  Expected LOS: 4d 19h  Actual LOS: 17      Mason Cabrera RN

## 2021-12-13 NOTE — PROGRESS NOTES
Problem: Falls - Risk of  Goal: *Absence of Falls  Description: Document [place] Fall Risk and appropriate interventions in the flowsheet. Outcome: Progressing Towards Goal  Note: Fall Risk Interventions:  Mobility Interventions: Communicate number of staff needed for ambulation/transfer, OT consult for ADLs, Patient to call before getting OOB, Strengthening exercises (ROM-active/passive), Utilize walker, cane, or other assistive device         Medication Interventions: Patient to call before getting OOB, Teach patient to arise slowly    Elimination Interventions: Call light in reach    History of Falls Interventions: Consult care management for discharge planning         Problem: Patient Education: Go to Patient Education Activity  Goal: Patient/Family Education  Outcome: Progressing Towards Goal     Problem: Pressure Injury - Risk of  Goal: *Prevention of pressure injury  Description: Document Russell Scale and appropriate interventions in the flowsheet.   Outcome: Progressing Towards Goal  Note: Pressure Injury Interventions:  Sensory Interventions: Assess need for specialty bed, Check visual cues for pain, Float heels, Keep linens dry and wrinkle-free, Maintain/enhance activity level    Moisture Interventions: Absorbent underpads, Apply protective barrier, creams and emollients, Contain wound drainage, Maintain skin hydration (lotion/cream), Internal/External urinary devices    Activity Interventions: Increase time out of bed, PT/OT evaluation    Mobility Interventions: Assess need for specialty bed, Float heels, HOB 30 degrees or less    Nutrition Interventions: Document food/fluid/supplement intake    Friction and Shear Interventions: Apply protective barrier, creams and emollients, HOB 30 degrees or less                Problem: Patient Education: Go to Patient Education Activity  Goal: Patient/Family Education  Outcome: Progressing Towards Goal     Problem: Pain  Goal: *Control of Pain  Outcome: Progressing Towards Goal     Problem: Infection - Risk of, Central Venous Catheter-Associated Bloodstream Infection  Goal: *Absence of infection signs and symptoms  Outcome: Progressing Towards Goal     Problem: Patient Education: Go to Patient Education Activity  Goal: Patient/Family Education  Outcome: Progressing Towards Goal     Problem: General Infection Care Plan (Adult and Pediatric)  Goal: Improvement in signs and symptoms of infection  Outcome: Progressing Towards Goal  Goal: *Optimize nutritional status  Outcome: Progressing Towards Goal

## 2021-12-13 NOTE — PROGRESS NOTES
Admit Date: 2021    POD 2 Day Post-Op    Procedure:  Procedure(s):  OPEN LYSIS OF ADHESIONS, GASTROSTOMY  AND JEJUNAL TUBE PLACEMENT    Subjective:     Patient without complaint. Making urine. Feels well today. C/o some muscle weakness. + flatus, no BM    Objective:     Blood pressure (!) 122/91, pulse (!) 109, temperature 98 °F (36.7 °C), resp. rate 19, height 5' 9\" (1.753 m), weight 115 lb 11.2 oz (52.5 kg), SpO2 98 %. Temp (24hrs), Av.6 °F (37.6 °C), Min:98 °F (36.7 °C), Max:100.6 °F (38.1 °C)      Physical Exam:  GENERAL: alert, cooperative, no distress, appears stated age, LUNG: clear to auscultation bilaterally, HEART: tachycardic, ABDOMEN: flat, soft, dressed and dry, G-tube, J-tube sites clear, EXTREMITIES:  extremities normal, atraumatic, no cyanosis or edema    Labs:   Recent Results (from the past 24 hour(s))   RBC, ALLOCATE    Collection Time: 21  9:00 AM   Result Value Ref Range    HISTORY CHECKED?  Historical check performed    TYPE & SCREEN    Collection Time: 21 11:28 AM   Result Value Ref Range    Crossmatch Expiration 12/15/2021,2359     ABO/Rh(D) A POSITIVE     Antibody screen NEG     Unit number [id number]     Blood component type  LR     Unit division 00     Status of unit TRANSFUSED     Crossmatch result Compatible    GLUCOSE, POC    Collection Time: 21 11:56 AM   Result Value Ref Range    Glucose (POC) 116 65 - 117 mg/dL    Performed by [employer] (TRV PCT)    GLUCOSE, POC    Collection Time: 21  5:53 PM   Result Value Ref Range    Glucose (POC) 101 65 - 117 mg/dL    Performed by [employer] (TRV PCT)    HGB & HCT    Collection Time: 21  8:59 PM   Result Value Ref Range    HGB 8.3 (L) 12.1 - 17.0 g/dL    HCT 24.7 (L) 36.6 - 50.3 %   GLUCOSE, POC    Collection Time: 21 10:46 PM   Result Value Ref Range    Glucose (POC) 124 (H) 65 - 117 mg/dL    Performed by Tammy Plummer (RUBENS RN)    MAGNESIUM    Collection Time: 21  3:32 AM Result Value Ref Range    Magnesium 1.7 1.6 - 2.4 mg/dL   PHOSPHORUS    Collection Time: 12/13/21  3:32 AM   Result Value Ref Range    Phosphorus 2.8 2.6 - 4.7 MG/DL   CBC WITH AUTOMATED DIFF    Collection Time: 12/13/21  3:32 AM   Result Value Ref Range    WBC 13.1 (H) 4.1 - 11.1 K/uL    RBC 2.57 (L) 4.10 - 5.70 M/uL    HGB 8.1 (L) 12.1 - 17.0 g/dL    HCT 23.8 (L) 36.6 - 50.3 %    MCV 92.6 80.0 - 99.0 FL    MCH 31.5 26.0 - 34.0 PG    MCHC 34.0 30.0 - 36.5 g/dL    RDW 15.3 (H) 11.5 - 14.5 %    PLATELET 314 547 - 684 K/uL    MPV 9.1 8.9 - 12.9 FL    NRBC 0.0 0  WBC    ABSOLUTE NRBC 0.00 0.00 - 0.01 K/uL    NEUTROPHILS 87 (H) 32 - 75 %    LYMPHOCYTES 7 (L) 12 - 49 %    MONOCYTES 4 (L) 5 - 13 %    EOSINOPHILS 1 0 - 7 %    BASOPHILS 0 0 - 1 %    IMMATURE GRANULOCYTES 1 (H) 0.0 - 0.5 %    ABS. NEUTROPHILS 11.5 (H) 1.8 - 8.0 K/UL    ABS. LYMPHOCYTES 0.9 0.8 - 3.5 K/UL    ABS. MONOCYTES 0.5 0.0 - 1.0 K/UL    ABS. EOSINOPHILS 0.2 0.0 - 0.4 K/UL    ABS. BASOPHILS 0.0 0.0 - 0.1 K/UL    ABS. IMM. GRANS. 0.1 (H) 0.00 - 0.04 K/UL    DF AUTOMATED     GLUCOSE, POC    Collection Time: 12/13/21  5:45 AM   Result Value Ref Range    Glucose (POC) 120 (H) 65 - 117 mg/dL    Performed by Shirley Sarmiento RN        Data Review images and reports reviewed    Assessment:     Active Problems:    SBO (small bowel obstruction) ([facility] (11/22/2021)      Aspiration pneumonia ([facility] [address] (11/25/2021)      Small bowel obstruction ([address] (11/25/2021)      Sepsis ([facility] [address] (11/25/2021)      Abdominal pain, acute (11/27/2021)      Intractable cyclical vomiting with nausea (11/27/2021)        Plan/Recommendations/Medical Decision Making:     Continue present treatment   S/p 1 unit prbc yesterday, had good response with Hb rise from 6.3 to 8.1  Making urine. Renal following  Pt appears stable. Continue tpn - at goal rate  May have ice chips. Continue g-tube to gravity drainage.   Work with Carol Jiang MD  [facility]

## 2021-12-13 NOTE — PROGRESS NOTES
End of Shift Note    Bedside shift change report given to [employer] (oncoming nurse) by Henry Barber RN (offgoing nurse). Report included the following information SBAR, Kardex, ED Summary, Procedure Summary, Intake/Output, MAR and Recent Results    Shift worked:  7a-7p     Shift summary and any significant changes:    Patient rested in bed throughout the shift. Yancey was taken out and pt is voiding. Dressing changed on abd. Patient has been pressing his PCA button as needed. Concerns for physician to address:  none     Zone phone for oncoming shift:   [phone]       Activity:  Activity Level: Up with Assistance  Number times ambulated in hallways past shift: 0  Number of times OOB to chair past shift: 0    Cardiac:   Cardiac Monitoring: Yes      Cardiac Rhythm: Sinus Tachy    Access:   Current line(s): PIV     Genitourinary:   Urinary status: voiding    Respiratory:   O2 Device: None (Room air)  Chronic home O2 use?: NO  Incentive spirometer at bedside: YES  Actual Volume (ml): 1200 ml  GI:  Last Bowel Movement Date: 12/03/21  Current diet:  DIET NPO  TPN ADULT-CENTRAL AA 5% D15%  TPN ADULT-CENTRAL AA 5% D15%  Passing flatus: YES  Tolerating current diet: YES       Pain Management:   Patient states pain is manageable on current regimen: YES    Skin:  Russell Score: 17  Interventions: increase time out of bed    Patient Safety:  Fall Score:  Total Score: 2  Interventions: bed/chair alarm, assistive device (walker, cane, etc), gripper socks and pt to call before getting OOB  High Fall Risk: Yes    Length of Stay:  Expected LOS: 4d 19h  Actual LOS: [address] [facility] Kevin, SAMUEL

## 2021-12-13 NOTE — PROGRESS NOTES
SPEECH THERAPY SCREENING:  SERVICES ARE NOT INDICATED AT THIS TIME    [facility] triggered for speech therapy based on results obtained during the nursing admission assessment. The patients chart was reviewed and the patient is not appropriate for a skilled therapy evaluation at this time. Please consult speech therapy if any therapy needs arise. Thank you. Patient NPO per surgery and has TPN ordered.     Nury Jimenes, SLP
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
## 2021-12-14 LAB
ALBUMIN SERPL-MCNC: 2 G/DL (ref 3.5–5)
ALBUMIN/GLOB SERPL: 0.6 {RATIO} (ref 1.1–2.2)
ALP SERPL-CCNC: 161 U/L (ref 45–117)
ALT SERPL-CCNC: 522 U/L (ref 12–78)
ANION GAP SERPL CALC-SCNC: 4 MMOL/L (ref 5–15)
AST SERPL-CCNC: 343 U/L (ref 15–37)
BILIRUB SERPL-MCNC: 2.4 MG/DL (ref 0.2–1)
BUN SERPL-MCNC: 11 MG/DL (ref 6–20)
BUN/CREAT SERPL: 30 (ref 12–20)
CALCIUM SERPL-MCNC: 8.3 MG/DL (ref 8.5–10.1)
CHLORIDE SERPL-SCNC: 103 MMOL/L (ref 97–108)
CO2 SERPL-SCNC: 30 MMOL/L (ref 21–32)
CREAT SERPL-MCNC: 0.37 MG/DL (ref 0.7–1.3)
GLOBULIN SER CALC-MCNC: 3.4 G/DL (ref 2–4)
GLUCOSE BLD STRIP.AUTO-MCNC: 102 MG/DL (ref 65–117)
GLUCOSE BLD STRIP.AUTO-MCNC: 109 MG/DL (ref 65–117)
GLUCOSE BLD STRIP.AUTO-MCNC: 118 MG/DL (ref 65–117)
GLUCOSE BLD STRIP.AUTO-MCNC: 131 MG/DL (ref 65–117)
GLUCOSE SERPL-MCNC: 105 MG/DL (ref 65–100)
MAGNESIUM SERPL-MCNC: 1.8 MG/DL (ref 1.6–2.4)
PHOSPHATE SERPL-MCNC: 3.2 MG/DL (ref 2.6–4.7)
POTASSIUM SERPL-SCNC: 3.3 MMOL/L (ref 3.5–5.1)
PROT SERPL-MCNC: 5.4 G/DL (ref 6.4–8.2)
SERVICE CMNT-IMP: ABNORMAL
SERVICE CMNT-IMP: ABNORMAL
SERVICE CMNT-IMP: NORMAL
SERVICE CMNT-IMP: NORMAL
SODIUM SERPL-SCNC: 137 MMOL/L (ref 136–145)

## 2021-12-14 PROCEDURE — 74011250636 HC RX REV CODE- 250/636: Performed by: INTERNAL MEDICINE

## 2021-12-14 PROCEDURE — 84100 ASSAY OF PHOSPHORUS: CPT

## 2021-12-14 PROCEDURE — 74011250637 HC RX REV CODE- 250/637: Performed by: SURGERY

## 2021-12-14 PROCEDURE — 74011250636 HC RX REV CODE- 250/636: Performed by: SURGERY

## 2021-12-14 PROCEDURE — 82962 GLUCOSE BLOOD TEST: CPT

## 2021-12-14 PROCEDURE — 80053 COMPREHEN METABOLIC PANEL: CPT

## 2021-12-14 PROCEDURE — 83735 ASSAY OF MAGNESIUM: CPT

## 2021-12-14 PROCEDURE — 65660000000 HC RM CCU STEPDOWN

## 2021-12-14 PROCEDURE — 74011000250 HC RX REV CODE- 250: Performed by: INTERNAL MEDICINE

## 2021-12-14 PROCEDURE — 74011000258 HC RX REV CODE- 258: Performed by: SURGERY

## 2021-12-14 PROCEDURE — 74011000250 HC RX REV CODE- 250: Performed by: SURGERY

## 2021-12-14 RX ORDER — POTASSIUM CHLORIDE 7.45 MG/ML
10 INJECTION INTRAVENOUS
Status: COMPLETED | OUTPATIENT
Start: 2021-12-14 | End: 2021-12-14

## 2021-12-14 RX ORDER — POTASSIUM CHLORIDE AND SODIUM CHLORIDE 450; 150 MG/100ML; MG/100ML
INJECTION, SOLUTION INTRAVENOUS CONTINUOUS
Status: DISCONTINUED | OUTPATIENT
Start: 2021-12-14 | End: 2021-12-15

## 2021-12-14 RX ADMIN — Medication: at 00:35

## 2021-12-14 RX ADMIN — POTASSIUM CHLORIDE 10 MEQ: 7.46 INJECTION, SOLUTION INTRAVENOUS at 13:25

## 2021-12-14 RX ADMIN — METOCLOPRAMIDE 10 MG: 5 INJECTION, SOLUTION INTRAMUSCULAR; INTRAVENOUS at 09:50

## 2021-12-14 RX ADMIN — POTASSIUM CHLORIDE AND SODIUM CHLORIDE: 900; 150 INJECTION, SOLUTION INTRAVENOUS at 05:14

## 2021-12-14 RX ADMIN — Medication: at 19:59

## 2021-12-14 RX ADMIN — METOCLOPRAMIDE 10 MG: 5 INJECTION, SOLUTION INTRAMUSCULAR; INTRAVENOUS at 03:01

## 2021-12-14 RX ADMIN — POTASSIUM PHOSPHATE, MONOBASIC POTASSIUM PHOSPHATE, DIBASIC: 224; 236 INJECTION, SOLUTION, CONCENTRATE INTRAVENOUS at 17:55

## 2021-12-14 RX ADMIN — SODIUM CHLORIDE, PRESERVATIVE FREE 10 ML: 5 INJECTION INTRAVENOUS at 22:11

## 2021-12-14 RX ADMIN — SODIUM CHLORIDE, PRESERVATIVE FREE 20 MG: 5 INJECTION INTRAVENOUS at 17:55

## 2021-12-14 RX ADMIN — POTASSIUM CHLORIDE AND SODIUM CHLORIDE: 450; 150 INJECTION, SOLUTION INTRAVENOUS at 12:22

## 2021-12-14 RX ADMIN — METOCLOPRAMIDE 10 MG: 5 INJECTION, SOLUTION INTRAMUSCULAR; INTRAVENOUS at 22:11

## 2021-12-14 RX ADMIN — METOCLOPRAMIDE 10 MG: 5 INJECTION, SOLUTION INTRAMUSCULAR; INTRAVENOUS at 17:55

## 2021-12-14 RX ADMIN — SODIUM CHLORIDE, PRESERVATIVE FREE 20 MG: 5 INJECTION INTRAVENOUS at 05:15

## 2021-12-14 RX ADMIN — SODIUM CHLORIDE, PRESERVATIVE FREE 10 ML: 5 INJECTION INTRAVENOUS at 05:15

## 2021-12-14 RX ADMIN — AMLODIPINE BESYLATE 10 MG: 5 TABLET ORAL at 09:50

## 2021-12-14 RX ADMIN — SODIUM CHLORIDE, PRESERVATIVE FREE 10 ML: 5 INJECTION INTRAVENOUS at 14:00

## 2021-12-14 RX ADMIN — POTASSIUM CHLORIDE 10 MEQ: 7.46 INJECTION, SOLUTION INTRAVENOUS at 12:21

## 2021-12-14 RX ADMIN — POTASSIUM CHLORIDE 10 MEQ: 7.46 INJECTION, SOLUTION INTRAVENOUS at 11:20

## 2021-12-14 RX ADMIN — ACETAMINOPHEN 650 MG: 325 TABLET ORAL at 03:23
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
## 2021-12-14 NOTE — PROGRESS NOTES
Transition of Care Plan:     RUR:   20% \"mod risk\"  Disposition:Home w/HH & [place] [address] for TPN  Follow up appointments: PCP, Surgery, GI  DME needed: None  Transportation at [place] [address] or means to access home:  Yes  IM Medicare Letter: N/A  Is patient a BCPI-A Bundle:   No                   If yes, was Bundle Letter given?:   N/A  Caregiver Contact: Mother- Deanna Vallecillo, [phone]  Discharge Caregiver contacted prior to discharge? CM sent referral to [place] [address] for TPN. CM will continue to follow.     Flynn Ngo  Ext [phone]

## 2021-12-14 NOTE — PROGRESS NOTES
End of Shift Note    Bedside shift change report given to Phoebe Abad RN (oncoming nurse) by Ghada Wilson LPN (offgoing nurse). Report included the following information SBAR, Kardex, Procedure Summary, Intake/Output, MAR, Recent Results, Med Rec Status and Cardiac Rhythm Sinus Tachy    Shift worked:  7p-580     Shift summary and any significant changes:     Pt rested in bed throughout the night. Used urinal for voiding. Pt used PCA pump prn. Pt had low grade fever-gave Tylenol-. Pt's dressing on L and R abd changed. Pt has leakage from L wound-will inform oncoming nurse to relay to Dr. Adina Argueta. Concerns for physician to address:  Wound on L abd is leaking. Pt moved last night and believed that the drain moved out some. Zone phone for oncoming shift:          Activity:  Activity Level: Up with Assistance  Number times ambulated in hallways past shift: 0  Number of times OOB to chair past shift: 0    Cardiac:   Cardiac Monitoring: Yes      Cardiac Rhythm: Sinus Tachy    Access:   Current line(s): PIV     Genitourinary:   Urinary status: voiding    Respiratory:   O2 Device: None (Room air)  Chronic home O2 use?: NO  Incentive spirometer at bedside: YES  Actual Volume (ml): 1200 ml  GI:  Last Bowel Movement Date: 12/03/21  Current diet:  DIET NPO  TPN ADULT-CENTRAL AA 5% D15%  Passing flatus: YES  Tolerating current diet: YES       Pain Management:   Patient states pain is manageable on current regimen: YES    Skin:  Russell Score: 17  Interventions: increase time out of bed and foam dressing    Patient Safety:  Fall Score:  Total Score: 2  Interventions: gripper socks and pt to call before getting OOB  High Fall Risk: Yes    Length of Stay:  Expected LOS: 4d 19h  Actual LOS: [place]io [place] LPN

## 2021-12-14 NOTE — PROGRESS NOTES
Admit Date: 2021    POD 3 Day Post-Op    Procedure:  Procedure(s):  OPEN LYSIS OF ADHESIONS, GASTROSTOMY  AND JEJUNAL TUBE PLACEMENT    Subjective:     Patient without complaint. Making some urine. Feels well today.  g-tube o/p steady at about 400 cc daily. Objective:     Blood pressure 122/89, pulse (!) 110, temperature 99.2 °F (37.3 °C), resp. rate 18, height 5' 9\" (1.753 m), weight 115 lb 11.2 oz (52.5 kg), SpO2 100 %.     Temp (24hrs), Av.7 °F (37.6 °C), Min:99 °F (37.2 °C), Max:100.8 °F (38.2 °C)      Physical Exam:  GENERAL: alert, cooperative, no distress, appears stated age, LUNG: clear to auscultation bilaterally, HEART: tachycardic, ABDOMEN: flat, soft, dressed and dry, G-tube, J-tube sites clear, EXTREMITIES:  extremities normal, atraumatic, no cyanosis or edema    Labs:   Recent Results (from the past 24 hour(s))   GLUCOSE, POC    Collection Time: 21 11:42 AM   Result Value Ref Range    Glucose (POC) 120 (H) 65 - 117 mg/dL    Performed by [employer] PCT    GLUCOSE, POC    Collection Time: 21  5:41 PM   Result Value Ref Range    Glucose (POC) 111 65 - 117 mg/dL    Performed by [employer] PCT    GLUCOSE, POC    Collection Time: 21 10:43 PM   Result Value Ref Range    Glucose (POC) 127 (H) 65 - 117 mg/dL    Performed by [email or web address] PCT    MAGNESIUM    Collection Time: 21  3:02 AM   Result Value Ref Range    Magnesium 1.8 1.6 - 2.4 mg/dL   PHOSPHORUS    Collection Time: 21  3:02 AM   Result Value Ref Range    Phosphorus 3.2 2.6 - 4.7 MG/DL   METABOLIC PANEL, COMPREHENSIVE    Collection Time: 21  3:02 AM   Result Value Ref Range    Sodium 137 136 - 145 mmol/L    Potassium 3.3 (L) 3.5 - 5.1 mmol/L    Chloride 103 97 - 108 mmol/L    CO2 30 21 - 32 mmol/L    Anion gap 4 (L) 5 - 15 mmol/L    Glucose 105 (H) 65 - 100 mg/dL    BUN 11 6 - 20 MG/DL    Creatinine 0.37 (L) 0.70 - 1.30 MG/DL    BUN/Creatinine ratio 30 (H) 12 - 20      GFR est AA >60 >60 ml/min/1.73m2    GFR est non-AA >60 >60 ml/min/1.73m2    Calcium 8.3 (L) 8.5 - 10.1 MG/DL    Bilirubin, total 2.4 (H) 0.2 - 1.0 MG/DL    ALT (SGPT) 522 (H) 12 - 78 U/L    AST (SGOT) 343 (H) 15 - 37 U/L    Alk. phosphatase 161 (H) 45 - 117 U/L    Protein, total 5.4 (L) 6.4 - 8.2 g/dL    Albumin 2.0 (L) 3.5 - 5.0 g/dL    Globulin 3.4 2.0 - 4.0 g/dL    A-G Ratio 0.6 (L) 1.1 - 2.2     GLUCOSE, POC    Collection Time: 12/14/21  5:48 AM   Result Value Ref Range    Glucose (POC) 118 (H) 65 - 117 mg/dL    Performed by [employer] PCT        Data Review images and reports reviewed    Assessment:     Active Problems:    SBO (small bowel obstruction) ([address] (11/22/2021)      Aspiration pneumonia ([address] (11/25/2021)      Small bowel obstruction ([address] (11/25/2021)      Sepsis ([address] (11/25/2021)      Abdominal pain, acute (11/27/2021)      Intractable cyclical vomiting with nausea (11/27/2021)        Plan/Recommendations/Medical Decision Making:     Continue present treatment   Making urine. Renal following  Pt appears stable.   Continue tpn - at goal rate  Trial of Gtube clamping and clear liquids  Work with Clare Bradford MD  [id number] [facility]

## 2021-12-14 NOTE — PROGRESS NOTES
Problem: Falls - Risk of  Goal: *Absence of Falls  Description: Document Leonard Tristan Fall Risk and appropriate interventions in the flowsheet. Outcome: Progressing Towards Goal  Note: Fall Risk Interventions:  Mobility Interventions: Communicate number of staff needed for ambulation/transfer, Patient to call before getting OOB, PT Consult for mobility concerns         Medication Interventions: Patient to call before getting OOB, Teach patient to arise slowly    Elimination Interventions: Call light in reach, Patient to call for help with toileting needs, Stay With Me (per policy), Urinal in reach    History of Falls Interventions: Consult care management for discharge planning, Room close to nurse's station         Problem: Patient Education: Go to Patient Education Activity  Goal: Patient/Family Education  Outcome: Progressing Towards Goal     Problem: Pressure Injury - Risk of  Goal: *Prevention of pressure injury  Description: Document Russell Scale and appropriate interventions in the flowsheet.   Outcome: Progressing Towards Goal  Note: Pressure Injury Interventions:  Sensory Interventions: Assess changes in LOC, Keep linens dry and wrinkle-free, Maintain/enhance activity level, Minimize linen layers    Moisture Interventions: Absorbent underpads, Contain wound drainage    Activity Interventions: Increase time out of bed    Mobility Interventions: HOB 30 degrees or less    Nutrition Interventions: Document food/fluid/supplement intake    Friction and Shear Interventions: Apply protective barrier, creams and emollients, HOB 30 degrees or less, Foam dressings/transparent film/skin sealants                Problem: Patient Education: Go to Patient Education Activity  Goal: Patient/Family Education  Outcome: Progressing Towards Goal     Problem: Pain  Goal: *Control of Pain  Outcome: Progressing Towards Goal     Problem: Patient Education: Go to Patient Education Activity  Goal: Patient/Family Education  Outcome: Progressing Towards Goal     Problem: Infection - Risk of, Central Venous Catheter-Associated Bloodstream Infection  Goal: *Absence of infection signs and symptoms  Outcome: Progressing Towards Goal     Problem: Patient Education: Go to Patient Education Activity  Goal: Patient/Family Education  Outcome: Progressing Towards Goal     Problem: General Infection Care Plan (Adult and Pediatric)  Goal: Improvement in signs and symptoms of infection  Outcome: Progressing Towards Goal  Goal: *Optimize nutritional status  Outcome: Progressing Towards Goal

## 2021-12-15 LAB
ALBUMIN SERPL-MCNC: 2.1 G/DL (ref 3.5–5)
ALBUMIN/GLOB SERPL: 0.6 {RATIO} (ref 1.1–2.2)
ALP SERPL-CCNC: 162 U/L (ref 45–117)
ALT SERPL-CCNC: 378 U/L (ref 12–78)
ANION GAP SERPL CALC-SCNC: 5 MMOL/L (ref 5–15)
AST SERPL-CCNC: 99 U/L (ref 15–37)
BILIRUB SERPL-MCNC: 2 MG/DL (ref 0.2–1)
BUN SERPL-MCNC: 10 MG/DL (ref 6–20)
BUN/CREAT SERPL: 26 (ref 12–20)
CALCIUM SERPL-MCNC: 8.4 MG/DL (ref 8.5–10.1)
CHLORIDE SERPL-SCNC: 100 MMOL/L (ref 97–108)
CO2 SERPL-SCNC: 29 MMOL/L (ref 21–32)
CREAT SERPL-MCNC: 0.38 MG/DL (ref 0.7–1.3)
GLOBULIN SER CALC-MCNC: 3.7 G/DL (ref 2–4)
GLUCOSE BLD STRIP.AUTO-MCNC: 116 MG/DL (ref 65–117)
GLUCOSE BLD STRIP.AUTO-MCNC: 119 MG/DL (ref 65–117)
GLUCOSE BLD STRIP.AUTO-MCNC: 121 MG/DL (ref 65–117)
GLUCOSE BLD STRIP.AUTO-MCNC: 131 MG/DL (ref 65–117)
GLUCOSE SERPL-MCNC: 107 MG/DL (ref 65–100)
MAGNESIUM SERPL-MCNC: 1.9 MG/DL (ref 1.6–2.4)
PHOSPHATE SERPL-MCNC: 3.3 MG/DL (ref 2.6–4.7)
POTASSIUM SERPL-SCNC: 3.9 MMOL/L (ref 3.5–5.1)
PROT SERPL-MCNC: 5.8 G/DL (ref 6.4–8.2)
SERVICE CMNT-IMP: ABNORMAL
SERVICE CMNT-IMP: NORMAL
SODIUM SERPL-SCNC: 134 MMOL/L (ref 136–145)

## 2021-12-15 PROCEDURE — 97530 THERAPEUTIC ACTIVITIES: CPT | Performed by: OCCUPATIONAL THERAPIST

## 2021-12-15 PROCEDURE — 84100 ASSAY OF PHOSPHORUS: CPT

## 2021-12-15 PROCEDURE — 97535 SELF CARE MNGMENT TRAINING: CPT | Performed by: OCCUPATIONAL THERAPIST

## 2021-12-15 PROCEDURE — 74011000250 HC RX REV CODE- 250: Performed by: INTERNAL MEDICINE

## 2021-12-15 PROCEDURE — 36415 COLL VENOUS BLD VENIPUNCTURE: CPT

## 2021-12-15 PROCEDURE — 82962 GLUCOSE BLOOD TEST: CPT

## 2021-12-15 PROCEDURE — 65660000000 HC RM CCU STEPDOWN

## 2021-12-15 PROCEDURE — 97530 THERAPEUTIC ACTIVITIES: CPT

## 2021-12-15 PROCEDURE — 74011250636 HC RX REV CODE- 250/636: Performed by: SURGERY

## 2021-12-15 PROCEDURE — 74011250637 HC RX REV CODE- 250/637: Performed by: SURGERY

## 2021-12-15 PROCEDURE — 74011000250 HC RX REV CODE- 250: Performed by: SURGERY

## 2021-12-15 PROCEDURE — 74011250636 HC RX REV CODE- 250/636: Performed by: INTERNAL MEDICINE

## 2021-12-15 PROCEDURE — 97161 PT EVAL LOW COMPLEX 20 MIN: CPT

## 2021-12-15 PROCEDURE — 97166 OT EVAL MOD COMPLEX 45 MIN: CPT | Performed by: OCCUPATIONAL THERAPIST

## 2021-12-15 PROCEDURE — 83735 ASSAY OF MAGNESIUM: CPT

## 2021-12-15 PROCEDURE — 97116 GAIT TRAINING THERAPY: CPT

## 2021-12-15 PROCEDURE — 80053 COMPREHEN METABOLIC PANEL: CPT

## 2021-12-15 RX ADMIN — METOCLOPRAMIDE 10 MG: 5 INJECTION, SOLUTION INTRAMUSCULAR; INTRAVENOUS at 23:26

## 2021-12-15 RX ADMIN — POTASSIUM CHLORIDE AND SODIUM CHLORIDE: 450; 150 INJECTION, SOLUTION INTRAVENOUS at 04:40

## 2021-12-15 RX ADMIN — SODIUM CHLORIDE, PRESERVATIVE FREE 10 ML: 5 INJECTION INTRAVENOUS at 16:24

## 2021-12-15 RX ADMIN — SODIUM CHLORIDE, PRESERVATIVE FREE 10 ML: 5 INJECTION INTRAVENOUS at 23:27

## 2021-12-15 RX ADMIN — METOCLOPRAMIDE 10 MG: 5 INJECTION, SOLUTION INTRAMUSCULAR; INTRAVENOUS at 11:00

## 2021-12-15 RX ADMIN — POTASSIUM PHOSPHATE, MONOBASIC POTASSIUM PHOSPHATE, DIBASIC: 224; 236 INJECTION, SOLUTION, CONCENTRATE INTRAVENOUS at 18:13

## 2021-12-15 RX ADMIN — METOCLOPRAMIDE 10 MG: 5 INJECTION, SOLUTION INTRAMUSCULAR; INTRAVENOUS at 16:20

## 2021-12-15 RX ADMIN — METOCLOPRAMIDE 10 MG: 5 INJECTION, SOLUTION INTRAMUSCULAR; INTRAVENOUS at 04:57

## 2021-12-15 RX ADMIN — SODIUM CHLORIDE, PRESERVATIVE FREE 10 ML: 5 INJECTION INTRAVENOUS at 07:45

## 2021-12-15 RX ADMIN — SODIUM CHLORIDE, PRESERVATIVE FREE 20 MG: 5 INJECTION INTRAVENOUS at 18:06

## 2021-12-15 RX ADMIN — Medication: at 07:39

## 2021-12-15 RX ADMIN — I.V. FAT EMULSION 250 ML: 20 EMULSION INTRAVENOUS at 20:25

## 2021-12-15 RX ADMIN — SODIUM CHLORIDE, PRESERVATIVE FREE 20 MG: 5 INJECTION INTRAVENOUS at 07:45

## 2021-12-15 RX ADMIN — Medication: at 20:19

## 2021-12-15 RX ADMIN — AMLODIPINE BESYLATE 10 MG: 5 TABLET ORAL at 11:00
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
## 2021-12-15 NOTE — PROGRESS NOTES
End of Shift Note    Bedside shift change report given to Alicia Bell (oncoming nurse) by Nathaniel Osborne RN (offgoing nurse). Report included the following information SBAR, Kardex, Intake/Output, MAR and Recent Results    Shift worked:  7p-7a       Shift summary and any significant changes:     Shift mainly uneventful. Pt noted some drainage around G tube; dressing changed. Pt noted pain; this RN encouraged pt to utilize PCA. Pt desiring to be transferred to [facility] when discharged from [facility]. Pt seeking eligibility for bowel transplant; pt would like physician to initiate eligibility/ transfer process. Concerns for physician to address:  Transfer to [facility] for transplant? ?     Zone phone for oncoming shift:          Activity:  Activity Level: Up with Assistance  Number times ambulated in hallways past shift: 0  Number of times OOB to chair past shift: 0    Cardiac:   Cardiac Monitoring: Yes      Cardiac Rhythm: Sinus Tachy    Access:   Current line(s): PICC     Genitourinary:   Urinary status: voiding    Respiratory:   O2 Device: None (Room air)  Chronic home O2 use?: NO  Incentive spirometer at bedside: YES  Actual Volume (ml): 1200 ml  GI:  Last Bowel Movement Date: 12/03/21  Current diet:  ADULT DIET Clear Liquid  TPN ADULT-CENTRAL AA 5% D15%  TPN ADULT-CENTRAL AA 5% D15%  Passing flatus: Tolerating current diet: YES       Skin:  Urssell Score: 19  Interventions: increase time out of bed    Patient Safety:  Fall Score:  Total Score: 2  Interventions: assistive device (walker, cane, etc) and pt to call before getting OOB  High Fall Risk: Yes    Length of Stay:  Expected LOS: 4d 19h  Actual LOS: 19      Nathaniel Osborne RN

## 2021-12-15 NOTE — PROGRESS NOTES
Admit Date: 2021    POD 5 Day Post-Op    Procedure:  Procedure(s):  OPEN LYSIS OF ADHESIONS, GASTROSTOMY  AND JEJUNAL TUBE PLACEMENT    Subjective:     Patient without complaint. Making copious urine. Feels well today. Sari clear liquids without complaint with Gtube clamped. No bowel function yet    Objective:     Blood pressure 121/86, pulse (!) 113, temperature 98.7 °F (37.1 °C), resp. rate 18, height 5' 9\" (1.753 m), weight 127 lb 6.8 oz (57.8 kg), SpO2 100 %.     Temp (24hrs), Av.1 °F (37.3 °C), Min:98.2 °F (36.8 °C), Max:99.8 °F (37.7 °C)      Physical Exam:  GENERAL: alert, cooperative, no distress, appears stated age, LUNG: clear to auscultation bilaterally, HEART: tachycardic, ABDOMEN: flat, soft, dressed and dry, G-tube, J-tube sites clear, EXTREMITIES:  extremities normal, atraumatic, no cyanosis or edema    Labs:   Recent Results (from the past 24 hour(s))   GLUCOSE, POC    Collection Time: 21 11:47 AM   Result Value Ref Range    Glucose (POC) 102 65 - 117 mg/dL    Performed by Lisa Martin (TR PCT)    GLUCOSE, POC    Collection Time: 21  5:48 PM   Result Value Ref Range    Glucose (POC) 109 65 - 117 mg/dL    Performed by Lisa Martin (TR PCT)    GLUCOSE, POC    Collection Time: 21 11:47 PM   Result Value Ref Range    Glucose (POC) 131 (H) 65 - 117 mg/dL    Performed by Kadeem Benitez PCT    MAGNESIUM    Collection Time: 12/15/21  4:52 AM   Result Value Ref Range    Magnesium 1.9 1.6 - 2.4 mg/dL   PHOSPHORUS    Collection Time: 12/15/21  4:52 AM   Result Value Ref Range    Phosphorus 3.3 2.6 - 4.7 MG/DL   METABOLIC PANEL, COMPREHENSIVE    Collection Time: 12/15/21  4:52 AM   Result Value Ref Range    Sodium 134 (L) 136 - 145 mmol/L    Potassium 3.9 3.5 - 5.1 mmol/L    Chloride 100 97 - 108 mmol/L    CO2 29 21 - 32 mmol/L    Anion gap 5 5 - 15 mmol/L    Glucose 107 (H) 65 - 100 mg/dL    BUN 10 6 - 20 MG/DL    Creatinine 0.38 (L) 0.70 - 1.30 MG/DL    BUN/Creatinine ratio 26 (H) 12 - 20      GFR est AA >60 >60 ml/min/1.73m2    GFR est non-AA >60 >60 ml/min/1.73m2    Calcium 8.4 (L) 8.5 - 10.1 MG/DL    Bilirubin, total 2.0 (H) 0.2 - 1.0 MG/DL    ALT (SGPT) 378 (H) 12 - 78 U/L    AST (SGOT) 99 (H) 15 - 37 U/L    Alk. phosphatase 162 (H) 45 - 117 U/L    Protein, total 5.8 (L) 6.4 - 8.2 g/dL    Albumin 2.1 (L) 3.5 - 5.0 g/dL    Globulin 3.7 2.0 - 4.0 g/dL    A-G Ratio 0.6 (L) 1.1 - 2.2     GLUCOSE, POC    Collection Time: 12/15/21  6:04 AM   Result Value Ref Range    Glucose (POC) 119 (H) 65 - 117 mg/dL    Performed by Gala Greene PCT        Data Review images and reports reviewed    Assessment:     Active Problems:    SBO (small bowel obstruction) ([address] (11/22/2021)      Aspiration pneumonia ([address] (11/25/2021)      Small bowel obstruction ([address] (11/25/2021)      Sepsis ([address] (11/25/2021)      Abdominal pain, acute (11/27/2021)      Intractable cyclical vomiting with nausea (11/27/2021)        Plan/Recommendations/Medical Decision Making:     Continue present treatment   Making urine. Renal following  Pt appears stable.   Continue tpn - at goal rate  Clears as tolerated, do not advance, awaiting bowel function  Work with Anh Bueno MD  [facility]
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
## 2021-12-15 NOTE — PROGRESS NOTES
End of Shift Note    Bedside shift change report given to Reji Connors (oncoming nurse) by Henry Barber RN (offgoing nurse). Report included the following information SBAR, Kardex, ED Summary, Procedure Summary, Intake/Output, MAR and Recent Results    Shift worked:  7a-7p     Shift summary and any significant changes:     clamped patients G tube today per order. No nausea or vomiting. Patient used his PCA pump as needed. Concerns for physician to address:  none     Zone phone for oncoming shift:   [phone]       Activity:  Activity Level: Up with Assistance  Number times ambulated in hallways past shift: 0  Number of times OOB to chair past shift: 0    Cardiac:   Cardiac Monitoring: Yes      Cardiac Rhythm: SV Tachy    Access:   Current line(s): PICC     Genitourinary:   Urinary status: voiding    Respiratory:   O2 Device: None (Room air)  Chronic home O2 use?: NO  Incentive spirometer at bedside: YES  Actual Volume (ml): 1200 ml  GI:  Last Bowel Movement Date: 12/03/21  Current diet:  ADULT DIET Clear Liquid  TPN ADULT-CENTRAL AA 5% D15%  Passing flatus: YES  Tolerating current diet: YES       Pain Management:   Patient states pain is manageable on current regimen: YES    Skin:  Russell Score: 17  Interventions: increase time out of bed and nutritional support     Patient Safety:  Fall Score:  Total Score: 2  Interventions: assistive device (walker, cane, etc), gripper socks, pt to call before getting OOB and stay with me (per policy)  High Fall Risk: Yes    Length of Stay:  Expected LOS: 4d 19h  Actual LOS: 1501 Ashanti Gomez RN

## 2021-12-15 NOTE — PROGRESS NOTES
Comprehensive Nutrition Assessment    Type and Reason for Visit: Reassess    Nutrition Recommendations/Plan:   Continue Clear liquids per surgeon  RD to add Ensure Clear BID  Continue TPN as ordered (provides 28kcals/kg and 1.7gPro/kg)     Nutrition Assessment:   Chart reviewed, pt lying in bed awake and alert. Pt is POD# 5 open MADAN, gastrostomy + J tube placement. He reports he is tolerating clear liquids. G tube clamped. He is asking for ensure clear and wondering why they won't send it up. I explained it is a medical supplement and has to be ordered, which I am happy to do for him. He is also asking for popsicles, will order these as well. TPN meets est needs. Labs reviewed, WNL. Pt requesting to transfer to [facility] to be considered for bowel transplant, case management exploring options. Patient Vitals for the past 168 hrs:   % Diet Eaten   12/11/21 1741 0%   12/11/21 1209 0%   12/09/21 0819 0%       Malnutrition Assessment:  Malnutrition Status:  Severe malnutrition    Context:  Chronic illness     Findings of the 6 clinical characteristics of malnutrition:   Energy Intake:  7 - 75% or less est energy requirements for 1 month or longer  Weight Loss:  7.0 - Greater than 7.5% over 3 months     Body Fat Loss:  7 - Severe body fat loss, Triceps,Orbital,Buccal region   Muscle Mass Loss:  1 - Mild muscle mass loss, Clavicles (pectoralis &deltoids),Temples (temporalis),Thigh (quadriceps),Scapula (trapezius)  Fluid Accumulation:  Unable to assess,     Strength:  Not performed         Estimated Daily Nutrient Needs:  Energy (kcal): 2188 (BMR 1489 x 1. 3AF) + 250 kcals; Weight Used for Energy Requirements: Current  Protein (g): 60-88 (1.0-1.5 g/kg bw); Weight Used for Protein Requirements: Current  Fluid (ml/day): 2987-4180 ml/day; Method Used for Fluid Requirements: 1 ml/kcal      Nutrition Related Findings:  Meds: humalog, pepcid, reglan.   BM 12/3      Wounds:    Surgical incision       Current Nutrition Therapies:  ADULT DIET Clear Liquid  TPN ADULT-CENTRAL AA 5% D15%  TPN ADULT-CENTRAL AA 5% D15%  ADULT ORAL NUTRITION SUPPLEMENT Breakfast, Lunch; Clear Liquid  DIET ONE TIME MESSAGE  Current Parenteral Nutrition Orders:  · Type and Formula: D15, 5% AA   · Lipids: 250ml,Three times weekly  · Duration: Continuous  · Rate/Volume: 83mL/h  · Current PN Order Provides: 1628kcals/100gPro/299gDextrose  · Goal PN Orders Provides: 1628kcals/100gPro/299gDextrose      Anthropometric Measures:  · Height:  5' 9\" (175.3 cm)  · Current Body Wt:  57.8 kg (127 lb 6.8 oz)   · Ideal Body Wt:  160 lbs:  81.8 %    · BMI Category:  Underweight (BMI less than 18.5)       Nutrition Diagnosis:   · Inadequate protein-energy intake related to altered GI function as evidenced by NPO or clear liquid status due to medical condition,weight loss (SBO)  Previous dx resolved, TPN + diet meets est needs. Nutrition Interventions:   Food and/or Nutrient Delivery: Continue parenteral nutrition,Continue current diet,Start oral nutrition supplement  Nutrition Education and Counseling: No recommendations at this time  Coordination of Nutrition Care: Continue to monitor while inpatient    Goals:  Pt will tolerate TPN at goal rate with BG <200mg/dL and lytes WNL in 2-4 days. Nutrition Monitoring and Evaluation:   Behavioral-Environmental Outcomes: None identified  Food/Nutrient Intake Outcomes: Food and nutrient intake,Supplement intake,Parenteral nutrition intake/tolerance  Physical Signs/Symptoms Outcomes: Biochemical data,GI status,Weight,Skin    Discharge Planning:     Too soon to determine     Electronically signed by Jose Sutton RD, [address]  on 12/15/2021 at 3:14 PM    Contact: [phone]
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
## 2021-12-16 LAB
ALBUMIN SERPL-MCNC: 1.9 G/DL (ref 3.5–5)
ALBUMIN/GLOB SERPL: 0.5 {RATIO} (ref 1.1–2.2)
ALP SERPL-CCNC: 158 U/L (ref 45–117)
ALT SERPL-CCNC: 251 U/L (ref 12–78)
ANION GAP SERPL CALC-SCNC: 6 MMOL/L (ref 5–15)
AST SERPL-CCNC: 61 U/L (ref 15–37)
BILIRUB SERPL-MCNC: 1 MG/DL (ref 0.2–1)
BUN SERPL-MCNC: 9 MG/DL (ref 6–20)
BUN/CREAT SERPL: 23 (ref 12–20)
CALCIUM SERPL-MCNC: 8.2 MG/DL (ref 8.5–10.1)
CHLORIDE SERPL-SCNC: 101 MMOL/L (ref 97–108)
CO2 SERPL-SCNC: 28 MMOL/L (ref 21–32)
CREAT SERPL-MCNC: 0.39 MG/DL (ref 0.7–1.3)
GLOBULIN SER CALC-MCNC: 3.7 G/DL (ref 2–4)
GLUCOSE BLD STRIP.AUTO-MCNC: 110 MG/DL (ref 65–117)
GLUCOSE BLD STRIP.AUTO-MCNC: 118 MG/DL (ref 65–117)
GLUCOSE BLD STRIP.AUTO-MCNC: 124 MG/DL (ref 65–117)
GLUCOSE SERPL-MCNC: 95 MG/DL (ref 65–100)
MAGNESIUM SERPL-MCNC: 3 MG/DL (ref 1.6–2.4)
PHOSPHATE SERPL-MCNC: 13.4 MG/DL (ref 2.6–4.7)
PHOSPHATE SERPL-MCNC: >13.5 MG/DL (ref 2.6–4.7)
POTASSIUM SERPL-SCNC: 4 MMOL/L (ref 3.5–5.1)
PROT SERPL-MCNC: 5.6 G/DL (ref 6.4–8.2)
SERVICE CMNT-IMP: ABNORMAL
SERVICE CMNT-IMP: ABNORMAL
SERVICE CMNT-IMP: NORMAL
SODIUM SERPL-SCNC: 135 MMOL/L (ref 136–145)

## 2021-12-16 PROCEDURE — 74011250636 HC RX REV CODE- 250/636: Performed by: INTERNAL MEDICINE

## 2021-12-16 PROCEDURE — 74011000250 HC RX REV CODE- 250: Performed by: SURGERY

## 2021-12-16 PROCEDURE — 36415 COLL VENOUS BLD VENIPUNCTURE: CPT

## 2021-12-16 PROCEDURE — 74011000250 HC RX REV CODE- 250: Performed by: INTERNAL MEDICINE

## 2021-12-16 PROCEDURE — 99024 POSTOP FOLLOW-UP VISIT: CPT | Performed by: SURGERY

## 2021-12-16 PROCEDURE — 80053 COMPREHEN METABOLIC PANEL: CPT

## 2021-12-16 PROCEDURE — 97116 GAIT TRAINING THERAPY: CPT

## 2021-12-16 PROCEDURE — 65660000000 HC RM CCU STEPDOWN

## 2021-12-16 PROCEDURE — 97530 THERAPEUTIC ACTIVITIES: CPT

## 2021-12-16 PROCEDURE — 74011250637 HC RX REV CODE- 250/637: Performed by: SURGERY

## 2021-12-16 PROCEDURE — 74011250636 HC RX REV CODE- 250/636: Performed by: SURGERY

## 2021-12-16 PROCEDURE — 82962 GLUCOSE BLOOD TEST: CPT

## 2021-12-16 PROCEDURE — 83735 ASSAY OF MAGNESIUM: CPT

## 2021-12-16 PROCEDURE — 84100 ASSAY OF PHOSPHORUS: CPT

## 2021-12-16 RX ORDER — OXYCODONE HYDROCHLORIDE 5 MG/1
5-10 TABLET ORAL
Status: DISCONTINUED | OUTPATIENT
Start: 2021-12-16 | End: 2022-01-11 | Stop reason: HOSPADM

## 2021-12-16 RX ADMIN — Medication: at 04:28

## 2021-12-16 RX ADMIN — Medication: at 07:55

## 2021-12-16 RX ADMIN — AMLODIPINE BESYLATE 10 MG: 5 TABLET ORAL at 11:41

## 2021-12-16 RX ADMIN — Medication: at 20:15

## 2021-12-16 RX ADMIN — SODIUM CHLORIDE, PRESERVATIVE FREE 10 ML: 5 INJECTION INTRAVENOUS at 23:01

## 2021-12-16 RX ADMIN — METOCLOPRAMIDE 10 MG: 5 INJECTION, SOLUTION INTRAMUSCULAR; INTRAVENOUS at 11:41

## 2021-12-16 RX ADMIN — SODIUM CHLORIDE, PRESERVATIVE FREE 20 MG: 5 INJECTION INTRAVENOUS at 18:00

## 2021-12-16 RX ADMIN — MAGNESIUM SULFATE HEPTAHYDRATE: 500 INJECTION, SOLUTION INTRAMUSCULAR; INTRAVENOUS at 18:49

## 2021-12-16 RX ADMIN — METOCLOPRAMIDE 10 MG: 5 INJECTION, SOLUTION INTRAMUSCULAR; INTRAVENOUS at 15:57

## 2021-12-16 RX ADMIN — SODIUM CHLORIDE, PRESERVATIVE FREE 10 ML: 5 INJECTION INTRAVENOUS at 16:00

## 2021-12-16 RX ADMIN — METOCLOPRAMIDE 10 MG: 5 INJECTION, SOLUTION INTRAMUSCULAR; INTRAVENOUS at 23:01

## 2021-12-16 NOTE — PROGRESS NOTES
Admit Date: 2021      POD 6 Days Post-Op  12/10/2021      Procedure:  Procedure(s):  OPEN LYSIS OF ADHESIONS, GASTROSTOMY  AND JEJUNAL TUBE PLACEMENT        HOSPITAL DAY:     ANTIBIOTICS:      HPI:  Patient's main complaint is gastric and bilious drainage around his G-tube site. He is tolerating p.o. liquids but really no lower GI function. Jejunostomy and is to gravity drain    Review of Systems  See above, resolving abdominal pain    Temp:  [97.6 °F (36.4 °C)-100.8 °F (38.2 °C)]   Pulse (Heart Rate):  [110-121]   BP: (109-125)/(60-89)   Resp Rate:  [16-20]   O2 Sat (%):  [96 %-100 %]   Weight:  [57.8 kg (127 lb 6.8 oz)]      Blood pressure 115/73, pulse (!) 112, temperature 99.3 °F (37.4 °C), resp. rate 18, height 5' 9\" (1.753 m), weight 57.8 kg (127 lb 6.8 oz), SpO2 99 %.     Temp (24hrs), Av.4 °F (37.4 °C), Min:97.6 °F (36.4 °C), Max:100 °F (37.8 °C)      Recent Results (from the past 48 hour(s))   GLUCOSE, POC    Collection Time: 21 11:47 AM   Result Value Ref Range    Glucose (POC) 102 65 - 117 mg/dL    Performed by Nicole Din (TRV PCT)    GLUCOSE, POC    Collection Time: 21  5:48 PM   Result Value Ref Range    Glucose (POC) 109 65 - 117 mg/dL    Performed by Nicole Din (TRV PCT)    GLUCOSE, POC    Collection Time: 21 11:47 PM   Result Value Ref Range    Glucose (POC) 131 (H) 65 - 117 mg/dL    Performed by Ulises Hernandez PCT    MAGNESIUM    Collection Time: 12/15/21  4:52 AM   Result Value Ref Range    Magnesium 1.9 1.6 - 2.4 mg/dL   PHOSPHORUS    Collection Time: 12/15/21  4:52 AM   Result Value Ref Range    Phosphorus 3.3 2.6 - 4.7 MG/DL   METABOLIC PANEL, COMPREHENSIVE    Collection Time: 12/15/21  4:52 AM   Result Value Ref Range    Sodium 134 (L) 136 - 145 mmol/L    Potassium 3.9 3.5 - 5.1 mmol/L    Chloride 100 97 - 108 mmol/L    CO2 29 21 - 32 mmol/L    Anion gap 5 5 - 15 mmol/L    Glucose 107 (H) 65 - 100 mg/dL    BUN 10 6 - 20 MG/DL    Creatinine 0.38 (L) 0.70 - 1.30 MG/DL    BUN/Creatinine ratio 26 (H) 12 - 20      GFR est AA >60 >60 ml/min/1.73m2    GFR est non-AA >60 >60 ml/min/1.73m2    Calcium 8.4 (L) 8.5 - 10.1 MG/DL    Bilirubin, total 2.0 (H) 0.2 - 1.0 MG/DL    ALT (SGPT) 378 (H) 12 - 78 U/L    AST (SGOT) 99 (H) 15 - 37 U/L    Alk. phosphatase 162 (H) 45 - 117 U/L    Protein, total 5.8 (L) 6.4 - 8.2 g/dL    Albumin 2.1 (L) 3.5 - 5.0 g/dL    Globulin 3.7 2.0 - 4.0 g/dL    A-G Ratio 0.6 (L) 1.1 - 2.2     GLUCOSE, POC    Collection Time: 12/15/21  6:04 AM   Result Value Ref Range    Glucose (POC) 119 (H) 65 - 117 mg/dL    Performed by Matthew Parkinson [facility]    GLUCOSE, POC    Collection Time: 12/15/21 11:52 AM   Result Value Ref Range    Glucose (POC) 116 65 - 117 mg/dL    Performed by Spenser Julinaa PCT    GLUCOSE, POC    Collection Time: 12/15/21  5:39 PM   Result Value Ref Range    Glucose (POC) 121 (H) 65 - 117 mg/dL    Performed by Spenser Juliana PCT    GLUCOSE, POC    Collection Time: 12/15/21 11:55 PM   Result Value Ref Range    Glucose (POC) 131 (H) 65 - 117 mg/dL    Performed by Yuridia Dias RN    MAGNESIUM    Collection Time: 12/16/21  4:23 AM   Result Value Ref Range    Magnesium 3.0 (H) 1.6 - 2.4 mg/dL   PHOSPHORUS    Collection Time: 12/16/21  4:23 AM   Result Value Ref Range    Phosphorus 13.4 (H) 2.6 - 4.7 MG/DL   METABOLIC PANEL, COMPREHENSIVE    Collection Time: 12/16/21  6:54 AM   Result Value Ref Range    Sodium 135 (L) 136 - 145 mmol/L    Potassium 4.0 3.5 - 5.1 mmol/L    Chloride 101 97 - 108 mmol/L    CO2 28 21 - 32 mmol/L    Anion gap 6 5 - 15 mmol/L    Glucose 95 65 - 100 mg/dL    BUN 9 6 - 20 MG/DL    Creatinine 0.39 (L) 0.70 - 1.30 MG/DL    BUN/Creatinine ratio 23 (H) 12 - 20      GFR est AA >60 >60 ml/min/1.73m2    GFR est non-AA >60 >60 ml/min/1.73m2    Calcium 8.2 (L) 8.5 - 10.1 MG/DL    Bilirubin, total 1.0 0.2 - 1.0 MG/DL    ALT (SGPT) 251 (H) 12 - 78 U/L    AST (SGOT) 61 (H) 15 - 37 U/L    Alk.  phosphatase 158 (H) 45 - 117 U/L    Protein, total 5.6 (L) 6.4 - 8.2 g/dL    Albumin 1.9 (L) 3.5 - 5.0 g/dL    Globulin 3.7 2.0 - 4.0 g/dL    A-G Ratio 0.5 (L) 1.1 - 2.2           XR Results (maximum last 3): Results from [place] encounter on 11/25/21    XR ABD (KUB)    Impression  Persistent small bowel distention, compatible with ileus or partial  obstruction      CT Results (maximum last 3): Results from [place] encounter on 11/25/21    CT ABD PELV WO CONT    Impression  Diffuse bowel distention is diminished since the prior study. Diminished  pneumatosis. Trace pneumoperitoneum is an expected postoperative finding. MRI Results (maximum last 3): No results found for this or any previous visit. Nuclear Medicine Results (maximum last 3): Results from [place] encounter on 11/11/21    NM GASTRIC EMPTY STDY    Impression  Abnormal Nuclear Gastric Emptying Study. US Results (maximum last 3): Results from [place] encounter on 11/11/21    US RETROPERITONEUM COMP    Impression  Resolved right hydronephrosis.  Essentially normal renal sonogram.      Physical Exam  Patient alert awake oriented no acute distress midline abdomen incision clean retention sutures in place no complications jejunostomy tube in place, a large bore gastrostomy tube in place with some succus bilious material Raza draining around the G-tube, abdomen is otherwise nontender other than mild tenderness around the incision, heart has regular rate and rhythm    Active Problems:    SBO (small bowel obstruction) ([address] (11/22/2021)      Aspiration pneumonia ([address] (11/25/2021)      Small bowel obstruction ([address] (11/25/2021)      Sepsis ([address] (11/25/2021)      Abdominal pain, acute (11/27/2021)      Intractable cyclical vomiting with nausea (11/27/2021)          ASSESSMENT/PLAN  Patient likely with chronic small bowel obstruction and disc motile bowel, very difficult situation since his abdomen is really nonoperative for release of mechanical obstruction and he already has close to short gut syndrome, currently jejunostomy tube drained and gastrostomy tube will be put back to gravity drain since leaking around the G-tube likely from chronic obstruction. Continue TPN, no good surgical option at this point, patient may need tertiary care referral to see if a candidate for anything such as small bowel transplant or other but currently plans are for either discharge home or to skilled facility or [facility] type facility on chronic TPN and GI drainage. Patient will need either primary care or rehab facility to manage his TPN this is not something the acute care surgery team can do.     Decrease PCA and see if we can manage on oral medication for pain medicine or possibly a fentanyl patch if needed      FACE TO FACE time including review of any indicated imaging, discussion with patient, and other providers, exam and discussion with patient: 23          minutes    END:
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
## 2021-12-16 NOTE — PROGRESS NOTES
End of Shift Note    Bedside shift change report given to Dallin Mehta RN (oncoming nurse) by Rudolph Grimes LPN (offgoing nurse). Report included the following information SBAR, Kardex, OR Summary, Procedure Summary, Intake/Output, MAR and Recent Results    Shift worked:  7am-7pm     Shift summary and any significant changes:     GI status, monitor I&O, gtube output, Monitor for s/s of nausea . Monitor labs . Pain control with PCA. Concerns for physician to address:  Cont. Plan of care     Zone phone for oncoming shift:          Activity:  Activity Level: Chair  Number times ambulated in hallways past shift: 0  Number of times OOB to chair past shift: 1    Cardiac:   Cardiac Monitoring: Yes      Cardiac Rhythm: Sinus Tachy    Access:   Current line(s): PICC     Genitourinary:   Urinary status: voiding    Respiratory:   O2 Device: None (Room air)  Chronic home O2 use?: NO  Incentive spirometer at bedside: YES  Actual Volume (ml): 1200 ml  GI:  Last Bowel Movement Date: 12/03/21  Current diet:  ADULT DIET Clear Liquid  TPN ADULT-CENTRAL AA 5% D15%  ADULT ORAL NUTRITION SUPPLEMENT Breakfast, Lunch; Clear Liquid  DIET ONE TIME MESSAGE  Passing flatus: YES  Tolerating current diet: YES       Pain Management:   Patient states pain is manageable on current regimen: YES    Skin:  Russell Score: 19  Interventions: float heels, increase time out of bed, PT/OT consult and nutritional support     Patient Safety:  Fall Score:  Total Score: 2  Interventions: assistive device (walker, cane, etc), gripper socks, pt to call before getting OOB and stay with me (per policy)  High Fall Risk: Yes    Length of Stay:  Expected LOS: 4d 19h  Actual LOS: [address], LPN

## 2021-12-16 NOTE — PROGRESS NOTES
Transition of Care Plan:     RUR:   20% \"mod risk\"  Disposition:Home w/[address] for TPN  Follow up appointments: on AVS  DME needed: None  Transportation at [place] [address] or means to access home:  Yes  IM Medicare Letter: N/A  Is patient a BCPI-A Bundle:   No                   If yes, was Bundle Letter given?:   N/A  Caregiver Contact: Mother- Cherri Mansfield, [phone]  Discharge Caregiver contacted prior to discharge? [id number] has accepted pt. CM updated Marzena Russell [phone] on expected d/c date of 12/22. CM will continue to follow.     Zhou Liner  Ext [phone]

## 2021-12-16 NOTE — PROGRESS NOTES
Problem: Mobility Impaired (Adult and Pediatric)  Goal: *Acute Goals and Plan of Care (Insert Text)  Description: FUNCTIONAL STATUS PRIOR TO ADMISSION: Pt reports due to generalized weakness he wasn't walking much, but when he walked he didn't use an ad. HOME SUPPORT PRIOR TO ADMISSION: The patient lived with his mother and siblings, but didn't receive assistance, as everyone works. Physical Therapy Goals  Initiated 12/15/2021  1. Patient will move from supine to sit and sit to supine , scoot up and down, and roll side to side in bed with independence within 7 day(s). 2.  Patient will transfer from bed to chair and chair to bed with modified independence using the least restrictive device within 7 day(s). 3.  Patient will perform sit to stand with modified independence within 7 day(s). 4.  Patient will ambulate with modified independence [id number] feet with the least restrictive device within 7 day(s). 5.  Patient will ascend/descend 5 stairs with 1 handrail(s) with modified independence within 7 day(s). Outcome: Progressing Towards Goal    PHYSICAL THERAPY TREATMENT  Patient: Ernesto Bone (51 y.o. male)  Date: 12/16/2021  Diagnosis: Sepsis ([address] [A41.9]  Aspiration pneumonia ([address] [J69.0]  Small bowel obstruction ([address] [K56.609]  Intractable cyclical vomiting with nausea [R11.15]  Abdominal pain, acute [R10.9]  SBO (small bowel obstruction) ([address] [K56.609] <principal problem not specified>  Procedure(s) (LRB):  OPEN LYSIS OF ADHESIONS, GASTROSTOMY  AND JEJUNAL TUBE PLACEMENT (N/A) 6 Days Post-Op  Precautions: Fall  Chart, physical therapy assessment, plan of care and goals were reviewed. ASSESSMENT  Patient continues with skilled PT services and is progressing towards goals. Pt received supine in bed and willing to work with therapy. Pt is limited by pain and decreased strength, endurance and activity tolerance.  Pt able to tolerate systematic preporation self guided prior to getting out of bed. Pt able to don pants and socks prior to bed mobility, he continued with bed mobility to R side EOB with no assistance needed. Pt continued with STS to support of IV pole. Pt continued with gait training in hallway up to 120' with UE support with IV pole. Upon return to room pt returned back to bed with no assistance with bed mobility. Pt completed session with Rn in room, call bell within reach and all needs met at the time. Noted that pt did press the PCA pump x7 during session. Rn notified of session. Current Level of Function Impacting Discharge (mobility/balance): SBA/CGA for all mobility, amb up to 120' with IV support    Other factors to consider for discharge: fall risk         PLAN :  Patient continues to benefit from skilled intervention to address the above impairments. Continue treatment per established plan of care. to address goals. Recommendation for discharge: (in order for the patient to meet his/her long term goals)  Physical therapy at least 2 days/week in the home AND ensure assist and/or supervision for safety with mobility    This discharge recommendation:  Has not yet been discussed the attending provider and/or case management    IF patient discharges home will need the following DME: to be determined (TBD)       SUBJECTIVE:   Patient stated my name is Q.    OBJECTIVE DATA SUMMARY:   Critical Behavior:  Neurologic State: Alert  Orientation Level: Oriented X4  Cognition: Follows commands  Safety/Judgement: Awareness of environment,Fall prevention  Functional Mobility Training:  Bed Mobility:  Rolling: Supervision; Additional time  Supine to Sit: Supervision; Additional time  Sit to Supine: Supervision; Additional time  Scooting: Supervision; Additional time     Transfers:  Sit to Stand: Stand-by assistance  Stand to Sit: Stand-by assistance     Balance:  Sitting: Intact  Standing: Impaired  Standing - Static: Constant support;Good  Standing - Dynamic : Constant support;Good;Fair    Ambulation/Gait Training:  Distance (ft): 120 Feet (ft)  Assistive Device: Gait belt (IV pole support)  Ambulation - Level of Assistance: Stand-by assistance;Contact guard assistance  Gait Abnormalities: Decreased step clearance  Base of Support: Narrowed  Speed/Kay: Pace decreased (<100 feet/min); Slow  Step Length: Left shortened;Right shortened    Pain Rating:  Pain indicated by slow movements, and facial expressions but no # given, reported burning in abdomen    Activity Tolerance:   Fair    After treatment patient left in no apparent distress:   Supine in bed and Call bell within reach    COMMUNICATION/COLLABORATION:   The patients plan of care was discussed with: Registered nurse.      Leroy Perez PTA   Time Calculation: 39 mins

## 2021-12-17 ENCOUNTER — APPOINTMENT (OUTPATIENT)
Dept: GENERAL RADIOLOGY | Age: 41
DRG: 710 | End: 2021-12-17
Attending: SURGERY
Payer: MEDICAID

## 2021-12-17 ENCOUNTER — APPOINTMENT (OUTPATIENT)
Dept: CT IMAGING | Age: 41
DRG: 710 | End: 2021-12-17
Attending: SURGERY
Payer: MEDICAID

## 2021-12-17 LAB
ALBUMIN SERPL-MCNC: 2 G/DL (ref 3.5–5)
ALBUMIN/GLOB SERPL: 0.5 {RATIO} (ref 1.1–2.2)
ALP SERPL-CCNC: 151 U/L (ref 45–117)
ALT SERPL-CCNC: 211 U/L (ref 12–78)
ANION GAP SERPL CALC-SCNC: 6 MMOL/L (ref 5–15)
AST SERPL-CCNC: 59 U/L (ref 15–37)
BASOPHILS # BLD: 0 K/UL (ref 0–0.1)
BASOPHILS NFR BLD: 0 % (ref 0–1)
BILIRUB SERPL-MCNC: 1.3 MG/DL (ref 0.2–1)
BUN SERPL-MCNC: 11 MG/DL (ref 6–20)
BUN/CREAT SERPL: 28 (ref 12–20)
CALCIUM SERPL-MCNC: 8.2 MG/DL (ref 8.5–10.1)
CHLORIDE SERPL-SCNC: 99 MMOL/L (ref 97–108)
CO2 SERPL-SCNC: 29 MMOL/L (ref 21–32)
CREAT SERPL-MCNC: 0.4 MG/DL (ref 0.7–1.3)
DIFFERENTIAL METHOD BLD: ABNORMAL
EOSINOPHIL # BLD: 0.1 K/UL (ref 0–0.4)
EOSINOPHIL NFR BLD: 0 % (ref 0–7)
ERYTHROCYTE [DISTWIDTH] IN BLOOD BY AUTOMATED COUNT: 15 % (ref 11.5–14.5)
GLOBULIN SER CALC-MCNC: 3.9 G/DL (ref 2–4)
GLUCOSE BLD STRIP.AUTO-MCNC: 102 MG/DL (ref 65–117)
GLUCOSE BLD STRIP.AUTO-MCNC: 103 MG/DL (ref 65–117)
GLUCOSE BLD STRIP.AUTO-MCNC: 127 MG/DL (ref 65–117)
GLUCOSE BLD STRIP.AUTO-MCNC: 91 MG/DL (ref 65–117)
GLUCOSE SERPL-MCNC: 105 MG/DL (ref 65–100)
HCT VFR BLD AUTO: 24.2 % (ref 36.6–50.3)
HGB BLD-MCNC: 8.1 G/DL (ref 12.1–17)
IMM GRANULOCYTES # BLD AUTO: 0.1 K/UL (ref 0–0.04)
IMM GRANULOCYTES NFR BLD AUTO: 1 % (ref 0–0.5)
LYMPHOCYTES # BLD: 0.9 K/UL (ref 0.8–3.5)
LYMPHOCYTES NFR BLD: 6 % (ref 12–49)
MAGNESIUM SERPL-MCNC: 2 MG/DL (ref 1.6–2.4)
MCH RBC QN AUTO: 30.7 PG (ref 26–34)
MCHC RBC AUTO-ENTMCNC: 33.5 G/DL (ref 30–36.5)
MCV RBC AUTO: 91.7 FL (ref 80–99)
MONOCYTES # BLD: 0.6 K/UL (ref 0–1)
MONOCYTES NFR BLD: 4 % (ref 5–13)
NEUTS SEG # BLD: 13.3 K/UL (ref 1.8–8)
NEUTS SEG NFR BLD: 89 % (ref 32–75)
NRBC # BLD: 0 K/UL (ref 0–0.01)
NRBC BLD-RTO: 0 PER 100 WBC
PHOSPHATE SERPL-MCNC: 3 MG/DL (ref 2.6–4.7)
PLATELET # BLD AUTO: 499 K/UL (ref 150–400)
PMV BLD AUTO: 9.1 FL (ref 8.9–12.9)
POTASSIUM SERPL-SCNC: 3.6 MMOL/L (ref 3.5–5.1)
PROT SERPL-MCNC: 5.9 G/DL (ref 6.4–8.2)
RBC # BLD AUTO: 2.64 M/UL (ref 4.1–5.7)
SERVICE CMNT-IMP: ABNORMAL
SERVICE CMNT-IMP: NORMAL
SODIUM SERPL-SCNC: 134 MMOL/L (ref 136–145)
WBC # BLD AUTO: 15 K/UL (ref 4.1–11.1)

## 2021-12-17 PROCEDURE — 74011250637 HC RX REV CODE- 250/637: Performed by: SURGERY

## 2021-12-17 PROCEDURE — 74011000250 HC RX REV CODE- 250: Performed by: SURGERY

## 2021-12-17 PROCEDURE — 74011000258 HC RX REV CODE- 258: Performed by: SURGERY

## 2021-12-17 PROCEDURE — 82962 GLUCOSE BLOOD TEST: CPT

## 2021-12-17 PROCEDURE — 74011250636 HC RX REV CODE- 250/636: Performed by: SURGERY

## 2021-12-17 PROCEDURE — 87040 BLOOD CULTURE FOR BACTERIA: CPT

## 2021-12-17 PROCEDURE — 74177 CT ABD & PELVIS W/CONTRAST: CPT

## 2021-12-17 PROCEDURE — 84100 ASSAY OF PHOSPHORUS: CPT

## 2021-12-17 PROCEDURE — 83735 ASSAY OF MAGNESIUM: CPT

## 2021-12-17 PROCEDURE — 80053 COMPREHEN METABOLIC PANEL: CPT

## 2021-12-17 PROCEDURE — 36415 COLL VENOUS BLD VENIPUNCTURE: CPT

## 2021-12-17 PROCEDURE — 85025 COMPLETE CBC W/AUTO DIFF WBC: CPT

## 2021-12-17 PROCEDURE — 99024 POSTOP FOLLOW-UP VISIT: CPT | Performed by: SURGERY

## 2021-12-17 PROCEDURE — 71046 X-RAY EXAM CHEST 2 VIEWS: CPT

## 2021-12-17 PROCEDURE — 65660000000 HC RM CCU STEPDOWN

## 2021-12-17 PROCEDURE — 2709999900 HC NON-CHARGEABLE SUPPLY

## 2021-12-17 PROCEDURE — 74011000636 HC RX REV CODE- 636: Performed by: SURGERY

## 2021-12-17 PROCEDURE — 51798 US URINE CAPACITY MEASURE: CPT

## 2021-12-17 RX ORDER — DEXTROSE, SODIUM CHLORIDE, AND POTASSIUM CHLORIDE 5; .45; .15 G/100ML; G/100ML; G/100ML
40 INJECTION INTRAVENOUS CONTINUOUS
Status: DISCONTINUED | OUTPATIENT
Start: 2021-12-17 | End: 2022-01-03

## 2021-12-17 RX ADMIN — SODIUM CHLORIDE, PRESERVATIVE FREE 10 ML: 5 INJECTION INTRAVENOUS at 22:48

## 2021-12-17 RX ADMIN — PIPERACILLIN AND TAZOBACTAM 3.38 G: 3; .375 INJECTION, POWDER, LYOPHILIZED, FOR SOLUTION INTRAVENOUS at 14:49

## 2021-12-17 RX ADMIN — METOCLOPRAMIDE 10 MG: 5 INJECTION, SOLUTION INTRAMUSCULAR; INTRAVENOUS at 21:30

## 2021-12-17 RX ADMIN — IOPAMIDOL 100 ML: 755 INJECTION, SOLUTION INTRAVENOUS at 16:38

## 2021-12-17 RX ADMIN — Medication: at 17:42

## 2021-12-17 RX ADMIN — AMLODIPINE BESYLATE 10 MG: 5 TABLET ORAL at 08:47

## 2021-12-17 RX ADMIN — METOCLOPRAMIDE 10 MG: 5 INJECTION, SOLUTION INTRAMUSCULAR; INTRAVENOUS at 14:31

## 2021-12-17 RX ADMIN — ACETAMINOPHEN 650 MG: 650 SUPPOSITORY RECTAL at 13:22

## 2021-12-17 RX ADMIN — SODIUM CHLORIDE, PRESERVATIVE FREE 10 ML: 5 INJECTION INTRAVENOUS at 06:13

## 2021-12-17 RX ADMIN — METOCLOPRAMIDE 10 MG: 5 INJECTION, SOLUTION INTRAMUSCULAR; INTRAVENOUS at 04:02

## 2021-12-17 RX ADMIN — SODIUM CHLORIDE, PRESERVATIVE FREE 5 ML: 5 INJECTION INTRAVENOUS at 17:52

## 2021-12-17 RX ADMIN — SODIUM CHLORIDE, PRESERVATIVE FREE 20 MG: 5 INJECTION INTRAVENOUS at 17:52

## 2021-12-17 RX ADMIN — DEXTROSE MONOHYDRATE, SODIUM CHLORIDE, AND POTASSIUM CHLORIDE 125 ML/HR: 50; 4.5; 1.49 INJECTION, SOLUTION INTRAVENOUS at 14:31

## 2021-12-17 RX ADMIN — SODIUM CHLORIDE, PRESERVATIVE FREE 10 ML: 5 INJECTION INTRAVENOUS at 22:44

## 2021-12-17 RX ADMIN — SODIUM CHLORIDE, PRESERVATIVE FREE 10 ML: 5 INJECTION INTRAVENOUS at 06:12

## 2021-12-17 RX ADMIN — METOCLOPRAMIDE 10 MG: 5 INJECTION, SOLUTION INTRAMUSCULAR; INTRAVENOUS at 08:47

## 2021-12-17 RX ADMIN — MORPHINE SULFATE 1 MG: 2 INJECTION, SOLUTION INTRAMUSCULAR; INTRAVENOUS at 14:46

## 2021-12-17 RX ADMIN — MORPHINE SULFATE 1 MG: 2 INJECTION, SOLUTION INTRAMUSCULAR; INTRAVENOUS at 22:47

## 2021-12-17 RX ADMIN — SODIUM CHLORIDE, PRESERVATIVE FREE 20 MG: 5 INJECTION INTRAVENOUS at 06:11

## 2021-12-17 RX ADMIN — PIPERACILLIN AND TAZOBACTAM 3.38 G: 3; .375 INJECTION, POWDER, LYOPHILIZED, FOR SOLUTION INTRAVENOUS at 22:44

## 2021-12-17 NOTE — PROGRESS NOTES
Transition of Care Plan:     RUR:   20% \"mod risk\"  Disposition:Home w/[address] for TPN  Follow up appointments: on AVS  DME needed: None  Transportation at [place] [address] or means to access home:  Yes  IM Medicare Letter: N/A  Is patient a BCPI-A Bundle:   No                   If yes, was Bundle Letter given?:   N/A  Caregiver Contact: Mother- Ca Meeks, [phone]  Discharge Caregiver contacted prior to discharge? Patient informed BEKA, he wants to be transferred to [facility] for a transplant.   CM informed MD Sy Memory  Ext [phone]

## 2021-12-17 NOTE — PROGRESS NOTES
Chart reviewed. Patient currently in bed and reports increased drainage and abdominal pain. Discussed with  - [facility] no longer appropriate for discharge - he will need LTC or transfer to another hospital facility. PT will follow up Monday.     Beatrice Spangler, PT

## 2021-12-17 NOTE — PROGRESS NOTES
12/17/21 1447   Vitals   Temp (!) 102.5 °F (39.2 °C)   Temp Source Oral   Pulse (Heart Rate) (!) 119   Heart Rate Source Monitor   Resp Rate 18   O2 Sat (%) 97 %   Level of Consciousness Alert (0)   /65   MAP (Calculated) 78   MEWS Score 5     MD aware. PRN rectal Tylenol already given.

## 2021-12-17 NOTE — PROGRESS NOTES
12/17/21 1106   Vitals   Temp (!) 101.1 °F (38.4 °C)   Temp Source Oral   Pulse (Heart Rate) (!) 122   Heart Rate Source Monitor   Resp Rate 18   O2 Sat (%) 94 %   Level of Consciousness Alert (0)   /69   MAP (Calculated) 84   MEWS Score 5     Called to german Mason MD; notified his RN Filipe Alba.
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
## 2021-12-17 NOTE — PROGRESS NOTES
Admit Date: 2021      POD 7 Days Post-Op  12/10/2021      Procedure:  Procedure(s):  OPEN LYSIS OF ADHESIONS, GASTROSTOMY  AND JEJUNAL TUBE PLACEMENT        HOSPITAL DAY:     ANTIBIOTICS:      HPI:  Patient with only minimal abdominal pain but still complaining of left sided gastrostomy tube gastric succus drainage around the tube. I reviewed Dr. 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 Review of Systems  See above    Temp:  [97.6 °F (36.4 °C)-100.1 °F (37.8 °C)]   Pulse (Heart Rate):  [110-121]   BP: (109-121)/(71-86)   Resp Rate:  [16-20]   O2 Sat (%):  [99 %-100 %]   Weight:  [54.4 kg (120 lb)-57.8 kg (127 lb 6.8 oz)]      Blood pressure 119/78, pulse (!) 110, temperature 99.4 °F (37.4 °C), resp. rate 19, height 5' 9\" (1.753 m), weight 54.4 kg (120 lb), SpO2 99 %.     Temp (24hrs), Av.7 °F (37.6 °C), Min:99.4 °F (37.4 °C), Max:100.1 °F (37.8 °C)      Recent Results (from the past 48 hour(s))   GLUCOSE, POC    Collection Time: 12/15/21 11:52 AM   Result Value Ref Range    Glucose (POC) 116 65 - 117 mg/dL    Performed by Marleni Tuttle PCT    GLUCOSE, POC    Collection Time: 12/15/21  5:39 PM   Result Value Ref Range    Glucose (POC) 121 (H) 65 - 117 mg/dL    Performed by Marleni Tuttle PCT    GLUCOSE, POC    Collection Time: 12/15/21 11:55 PM   Result Value Ref Range    Glucose (POC) 131 (H) 65 - 117 mg/dL    Performed by Lele Crockett RN    MAGNESIUM    Collection Time: 21  4:23 AM   Result Value Ref Range    Magnesium 3.0 (H) 1.6 - 2.4 mg/dL   PHOSPHORUS    Collection Time: 21  4:23 AM   Result Value Ref Range    Phosphorus 13.4 (H) 2.6 - 4.7 MG/DL   METABOLIC PANEL, COMPREHENSIVE    Collection Time: 21  6:54 AM   Result Value Ref Range    Sodium 135 (L) 136 - 145 mmol/L    Potassium 4.0 3.5 - 5.1 mmol/L    Chloride 101 97 - 108 mmol/L    CO2 28 21 - 32 mmol/L    Anion gap 6 5 - 15 mmol/L    Glucose 95 65 - 100 mg/dL    BUN 9 6 - 20 MG/DL Creatinine 0.39 (L) 0.70 - 1.30 MG/DL    BUN/Creatinine ratio 23 (H) 12 - 20      GFR est AA >60 >60 ml/min/1.73m2    GFR est non-AA >60 >60 ml/min/1.73m2    Calcium 8.2 (L) 8.5 - 10.1 MG/DL    Bilirubin, total 1.0 0.2 - 1.0 MG/DL    ALT (SGPT) 251 (H) 12 - 78 U/L    AST (SGOT) 61 (H) 15 - 37 U/L    Alk. phosphatase 158 (H) 45 - 117 U/L    Protein, total 5.6 (L) 6.4 - 8.2 g/dL    Albumin 1.9 (L) 3.5 - 5.0 g/dL    Globulin 3.7 2.0 - 4.0 g/dL    A-G Ratio 0.5 (L) 1.1 - 2.2     GLUCOSE, POC    Collection Time: 12/16/21 11:55 AM   Result Value Ref Range    Glucose (POC) 124 (H) 65 - 117 mg/dL    Performed by Caitlin Ros PCT    PHOSPHORUS    Collection Time: 12/16/21  5:40 PM   Result Value Ref Range    Phosphorus >13.5 (H) 2.6 - 4.7 MG/DL   GLUCOSE, POC    Collection Time: 12/16/21  6:01 PM   Result Value Ref Range    Glucose (POC) 110 65 - 117 mg/dL    Performed by Caitlin Ros PCT    GLUCOSE, POC    Collection Time: 12/16/21 10:52 PM   Result Value Ref Range    Glucose (POC) 118 (H) 65 - 117 mg/dL    Performed by Kerry Rico RN    MAGNESIUM    Collection Time: 12/17/21  3:12 AM   Result Value Ref Range    Magnesium 2.0 1.6 - 2.4 mg/dL   PHOSPHORUS    Collection Time: 12/17/21  3:12 AM   Result Value Ref Range    Phosphorus 3.0 2.6 - 4.7 MG/DL   METABOLIC PANEL, COMPREHENSIVE    Collection Time: 12/17/21  3:12 AM   Result Value Ref Range    Sodium 134 (L) 136 - 145 mmol/L    Potassium 3.6 3.5 - 5.1 mmol/L    Chloride 99 97 - 108 mmol/L    CO2 29 21 - 32 mmol/L    Anion gap 6 5 - 15 mmol/L    Glucose 105 (H) 65 - 100 mg/dL    BUN 11 6 - 20 MG/DL    Creatinine 0.40 (L) 0.70 - 1.30 MG/DL    BUN/Creatinine ratio 28 (H) 12 - 20      GFR est AA >60 >60 ml/min/1.73m2    GFR est non-AA >60 >60 ml/min/1.73m2    Calcium 8.2 (L) 8.5 - 10.1 MG/DL    Bilirubin, total 1.3 (H) 0.2 - 1.0 MG/DL    ALT (SGPT) 211 (H) 12 - 78 U/L    AST (SGOT) 59 (H) 15 - 37 U/L    Alk.  phosphatase 151 (H) 45 - 117 U/L    Protein, total 5.9 (L) 6.4 - 8.2 g/dL    Albumin 2.0 (L) 3.5 - 5.0 g/dL    Globulin 3.9 2.0 - 4.0 g/dL    A-G Ratio 0.5 (L) 1.1 - 2.2     GLUCOSE, POC    Collection Time: 12/17/21  5:40 AM   Result Value Ref Range    Glucose (POC) 127 (H) 65 - 117 mg/dL    Performed by Yuridia Dias RN          XR Results (maximum last 3): Results from [place] encounter on 11/25/21    XR ABD (KUB)    Impression  Persistent small bowel distention, compatible with ileus or partial  obstruction      CT Results (maximum last 3): Results from [place] encounter on 11/25/21    CT ABD PELV WO CONT    Impression  Diffuse bowel distention is diminished since the prior study. Diminished  pneumatosis. Trace pneumoperitoneum is an expected postoperative finding. MRI Results (maximum last 3): No results found for this or any previous visit. Nuclear Medicine Results (maximum last 3): Results from [place] encounter on 11/11/21    NM GASTRIC EMPTY STDY    Impression  Abnormal Nuclear Gastric Emptying Study. US Results (maximum last 3): Results from [place] encounter on 11/11/21    US RETROPERITONEUM COMP    Impression  Resolved right hydronephrosis. Essentially normal renal sonogram.      Physical Exam  Patient alert awake, on TPN, abdomen soft retention sutures in place, jejunostomy tube on right with small amount of output, gastrostomy tube large left abdomen with some bilious drainage around it but large volume in the gastric bag. Abdomen minimally tender.     Active Problems:    SBO (small bowel obstruction) ([address] (11/22/2021)      Aspiration pneumonia ([address] (11/25/2021)      Small bowel obstruction ([address] (11/25/2021)      Sepsis ([address] (11/25/2021)      Abdominal pain, acute (11/27/2021)      Intractable cyclical vomiting with nausea (11/27/2021)          ASSESSMENT/PLAN  Given the complete enterolysis a week ago it is possible his small bowel obstructive symptoms will improve, apparently from patient's recollection Dr. Cecil Mariscal mention possible small bowel transplant but perhaps patient's enterolysis will help him at least get some time but in the short-term may need transfer to skilled rehab or long-term care facility for TPN and nourishment awaiting return of bowel function. Will order Gastrografin jejunostomy tube dye study for Monday to see if this helps from a diagnostic standpoint to see if we can feed patient but clamping the G-tube was leading to an excess of gastric contents around the G-tube itself.     We will see if there is a larger [address] MARIE  gastrostomy tube that we can use with a balloon leakage  to help bolster up against the abdominal wall and prevent but as long as there is a large volume of output suggest some distal obstruction mechanical or functional    I reviewed the above with the patient and his mother at great length      FACE TO FACE time including review of any indicated imaging, discussion with patient, and other providers, exam and discussion with patient: 30       minutes    END:

## 2021-12-17 NOTE — PROGRESS NOTES
Nurse called with Temp 103 and P 120 ,  Diff dx,  abd abscess post op,   picc line blood infection, pneumonia, Less likely UTI,     Plan Peripheral IV, and DC current Picc, and blood cx,  DC TPN for now,  Delay new PICC couple days, to restart TPN,  CBC, empiric zosyn,, leave current picc until other IV access obtained, trying to hold off on new picc if can obtain peripheral INT.

## 2021-12-17 NOTE — PROGRESS NOTES
Occupational Therapy note:    Chart reviewed and discussed with PT. Patient currently in bed with increased drainage and abdominal pain. Will defer and continue to follow.      Jaspreet Gonzalez, OTR/L

## 2021-12-17 NOTE — PROGRESS NOTES
Comprehensive Nutrition Assessment    Type and Reason for Visit: Reassess    Nutrition Recommendations/Plan:   Resume TPN once new line placed  Continue diet and supplements as tolerated     Nutrition Assessment:   Chart reviewed. Pt spiked a fever today, presumed line infection. Plans to discontinue PICC, TPN on hold. G tube with 850mL OP yesterday. D5IVF's started. PO intake is fair. Hope to get a new line in a couple of days and resume TPN. Labs reviewed, ever SMITH. Patient Vitals for the past 168 hrs:   % Diet Eaten   12/17/21 0730 1 - 25%   12/16/21 1849 51 - 75%   12/16/21 1200 51 - 75%   12/11/21 1741 0%   12/11/21 1209 0%       Malnutrition Assessment:  Malnutrition Status:  Severe malnutrition    Context:  Chronic illness     Findings of the 6 clinical characteristics of malnutrition:   Energy Intake:  7 - 75% or less est energy requirements for 1 month or longer  Weight Loss:  7.0 - Greater than 7.5% over 3 months     Body Fat Loss:  7 - Severe body fat loss, Triceps,Orbital,Buccal region   Muscle Mass Loss:  1 - Mild muscle mass loss, Clavicles (pectoralis &deltoids),Temples (temporalis),Thigh (quadriceps),Scapula (trapezius)  Fluid Accumulation:  Unable to assess,     Strength:  Not performed         Estimated Daily Nutrient Needs:  Energy (kcal): 2188 (BMR 1489 x 1. 3AF) + 250 kcals; Weight Used for Energy Requirements: Current  Protein (g): 60-88 (1.0-1.5 g/kg bw); Weight Used for Protein Requirements: Current  Fluid (ml/day): 4651-8098 ml/day; Method Used for Fluid Requirements: 1 ml/kcal      Nutrition Related Findings:  Meds: pepcid, humalog, reglan, zosyn, KCl, D5, [email or web address].   BM 12/3      Wounds:    Surgical incision       Current Nutrition Therapies:  ADULT DIET Clear Liquid  ADULT ORAL NUTRITION SUPPLEMENT Breakfast, Lunch; Clear Liquid  DIET ONE TIME MESSAGE  TPN ADULT-CENTRAL AA 5% D15%    Anthropometric Measures:  · Height:  5' 9\" (175.3 cm)  · Current Body Wt:  54.4 kg (119 lb 14.9 oz)   · Ideal Body Wt:  160 lbs:  81.8 %   · BMI Category:  Underweight (BMI less than 18.5)       Nutrition Diagnosis:   · Inadequate protein-energy intake related to altered GI function as evidenced by NPO or clear liquid status due to medical condition,weight loss (SBO)  Previous dx resumed, TPN on hold     Nutrition Interventions:   Food and/or Nutrient Delivery: Start parenteral nutrition,Continue current diet,Continue oral nutrition supplement  Nutrition Education and Counseling: No recommendations at this time  Coordination of Nutrition Care: Continue to monitor while inpatient    Goals:  Pt will resume TPN in 2-4 days. Nutrition Monitoring and Evaluation:   Behavioral-Environmental Outcomes: None identified  Food/Nutrient Intake Outcomes: Food and nutrient intake,Supplement intake,Parenteral nutrition intake/tolerance  Physical Signs/Symptoms Outcomes: Biochemical data,GI status,Weight,Skin    Discharge Planning:     Too soon to determine     Electronically signed by Tequila Harris RD, [address]  on 12/17/2021 at 3:48 PM    Contact: [id number]

## 2021-12-17 NOTE — PROGRESS NOTES
CT with RLQ abd abscess and pneumoperitoneum, abd benign, pneumo ? From leaking G tube of air. But if some other source,  No benefit from laparotomy,   And Have ordered perc drain abscess.

## 2021-12-17 NOTE — PROGRESS NOTES
0700: Bedside shift change report given to Lucila Lemus (oncoming nurse) by Toma Crews RN (offgoing nurse). Report included the following information SBAR and Kardex. 1249: Pt temp 103. Called to german Mason MD; notified his RN Dru Eugene. 1304: Pt condition d/w Marlon STEPHENS. Verbal order received for CBC; D5 1/2NS 20KCl /L @ 125 ml/h; 1 set paired Blood cultures; CT Ab/Pelvis with IV Contrast (NO ORAL) to Access for Ab Abcess; PA/Lateral CXR; Zosyn 3.375 IV Q8H; Insert PIV and if unsuccessful to contact PICC team for PICC insertion and remove current PICC ONLY AFTER new access has been obtained; contact Pharmacy to hold off making TPN for this evening if it has not already been made. Orders read-back with clarification. 1420: CBC and Blood cultures sent. PIV obtained #20 L posterior wrist.     1500: PICC removed. Notified that we do not have a [address] MARIE. End of Shift Note    Bedside shift change report given to [address] [place] [address] (oncoming nurse) by Suzette Potter (offgoing nurse). Report included the following information SBAR and Kardex    Shift worked:  day     Shift summary and any significant changes:    Temp 103 today; tachycardia 120s    Blood cultures sent     CT shows RLQ Ab abcess    CXR shows small right pleural eff    Pain treated with PCA and x1 morphine    PICC removed; ?infection;  Holding TPN for now    PIV inserted    Zosyn started    D5 1/2NS 20KCl /L @ 125 started     Concerns for physician to address:       [facility]          Activity:  Activity Level:  Up with Assistance  Number times ambulated in hallways past shift: 0  Number of times OOB to chair past shift: 0    Cardiac:   Cardiac Monitoring: Yes      Cardiac Rhythm: Sinus Tachy    Access:   Current line(s): PIV     Genitourinary:   Urinary status: voiding    Respiratory:   O2 Device: None (Room air)  Chronic home O2 use?: NO  Incentive spirometer at bedside: YES  Actual Volume (ml): 1800 ml  GI:  Last Bowel Movement Date: 12/03/21  Current diet:  ADULT ORAL NUTRITION SUPPLEMENT Breakfast, Lunch; Clear Liquid  DIET ONE TIME MESSAGE  DIET NPO Sips of Water with Meds  Passing flatus: YES  Tolerating current diet: YES       Pain Management:   Patient states pain is manageable on current regimen: YES    Skin:  Russell Score: 17  Interventions: speciality bed, increase time out of bed, PT/OT consult, internal/external urinary devices and nutritional support     Patient Safety:  Fall Score:  Total Score: 3  Interventions: bed/chair alarm, assistive device (walker, cane, etc), gripper socks, pt to call before getting OOB and stay with me (per policy)  High Fall Risk: Yes    Length of Stay:  Expected LOS: 9d 14h  Actual LOS: [address]

## 2021-12-17 NOTE — PROGRESS NOTES
End of Shift Note    Bedside shift change report given to Arelis Arellano King Lilian RN (oncoming nurse) by Jory Mulligan LPN (offgoing nurse). Report included the following information SBAR, Kardex, OR Summary, Procedure Summary, Intake/Output, MAR and Recent Results    Shift worked:  7am-7pm     Shift summary and any significant changes:     Cont. Plan of care, monitor I&O, labs, increase activity     Concerns for physician to address:  Discharge planning and current plan of care     Zone phone for oncoming shift:   [phone]       Activity:  Activity Level: Up with Assistance  Number times ambulated in hallways past shift: 1  Number of times OOB to chair past shift: 1    Cardiac:   Cardiac Monitoring: Yes      Cardiac Rhythm: Sinus Rhythm,Sinus Tachy    Access:   Current line(s): PICC     Genitourinary:   Urinary status: voiding    Respiratory:   O2 Device: None (Room air)  Chronic home O2 use?: NO  Incentive spirometer at bedside: YES  Actual Volume (ml): 1650 ml  GI:  Last Bowel Movement Date: 12/03/21  Current diet:  ADULT DIET Clear Liquid  ADULT ORAL NUTRITION SUPPLEMENT Breakfast, Lunch; Clear Liquid  DIET ONE TIME MESSAGE  TPN ADULT-CENTRAL AA 5% D15%  Passing flatus: YES  Tolerating current diet: YES       Pain Management:   Patient states pain is manageable on current regimen: YES    Skin:  Russell Score: 19  Interventions: increase time out of bed, PT/OT consult and nutritional support     Patient Safety:  Fall Score:  Total Score: 1  Interventions: gripper socks, pt to call before getting OOB and stay with me (per policy)  High Fall Risk: Yes    Length of Stay:  Expected LOS: 9d 14h  Actual LOS: Shaq Sweet LPN

## 2021-12-17 NOTE — PROGRESS NOTES
12/17/21 1106   Vitals   Temp (!) 101.1 °F (38.4 °C)   Temp Source Oral   Pulse (Heart Rate) (!) 122   Heart Rate Source Monitor   Resp Rate 18   O2 Sat (%) 94 %   Level of Consciousness Alert (0)   /69   MAP (Calculated) 84   MEWS Score 5

## 2021-12-18 ENCOUNTER — APPOINTMENT (OUTPATIENT)
Dept: ULTRASOUND IMAGING | Age: 41
DRG: 710 | End: 2021-12-18
Attending: SURGERY
Payer: MEDICAID

## 2021-12-18 LAB
ALBUMIN SERPL-MCNC: 2.1 G/DL (ref 3.5–5)
ALBUMIN/GLOB SERPL: 0.5 {RATIO} (ref 1.1–2.2)
ALP SERPL-CCNC: 152 U/L (ref 45–117)
ALT SERPL-CCNC: 220 U/L (ref 12–78)
ANION GAP SERPL CALC-SCNC: 5 MMOL/L (ref 5–15)
AST SERPL-CCNC: 97 U/L (ref 15–37)
BILIRUB SERPL-MCNC: 1.4 MG/DL (ref 0.2–1)
BUN SERPL-MCNC: 11 MG/DL (ref 6–20)
BUN/CREAT SERPL: 22 (ref 12–20)
CALCIUM SERPL-MCNC: 8.7 MG/DL (ref 8.5–10.1)
CHLORIDE SERPL-SCNC: 99 MMOL/L (ref 97–108)
CO2 SERPL-SCNC: 29 MMOL/L (ref 21–32)
CREAT SERPL-MCNC: 0.49 MG/DL (ref 0.7–1.3)
GLOBULIN SER CALC-MCNC: 4.3 G/DL (ref 2–4)
GLUCOSE BLD STRIP.AUTO-MCNC: 108 MG/DL (ref 65–117)
GLUCOSE BLD STRIP.AUTO-MCNC: 91 MG/DL (ref 65–117)
GLUCOSE BLD STRIP.AUTO-MCNC: 96 MG/DL (ref 65–117)
GLUCOSE BLD STRIP.AUTO-MCNC: 99 MG/DL (ref 65–117)
GLUCOSE SERPL-MCNC: 99 MG/DL (ref 65–100)
MAGNESIUM SERPL-MCNC: 2.1 MG/DL (ref 1.6–2.4)
PHOSPHATE SERPL-MCNC: 3.2 MG/DL (ref 2.6–4.7)
POTASSIUM SERPL-SCNC: 3.8 MMOL/L (ref 3.5–5.1)
PROT SERPL-MCNC: 6.4 G/DL (ref 6.4–8.2)
SERVICE CMNT-IMP: NORMAL
SODIUM SERPL-SCNC: 133 MMOL/L (ref 136–145)

## 2021-12-18 PROCEDURE — 74011250636 HC RX REV CODE- 250/636: Performed by: SURGERY

## 2021-12-18 PROCEDURE — 80053 COMPREHEN METABOLIC PANEL: CPT

## 2021-12-18 PROCEDURE — 51798 US URINE CAPACITY MEASURE: CPT

## 2021-12-18 PROCEDURE — 36415 COLL VENOUS BLD VENIPUNCTURE: CPT

## 2021-12-18 PROCEDURE — 82962 GLUCOSE BLOOD TEST: CPT

## 2021-12-18 PROCEDURE — 76942 ECHO GUIDE FOR BIOPSY: CPT

## 2021-12-18 PROCEDURE — 74011250637 HC RX REV CODE- 250/637: Performed by: SURGERY

## 2021-12-18 PROCEDURE — 74011000250 HC RX REV CODE- 250: Performed by: SURGERY

## 2021-12-18 PROCEDURE — 65660000000 HC RM CCU STEPDOWN

## 2021-12-18 PROCEDURE — 83735 ASSAY OF MAGNESIUM: CPT

## 2021-12-18 PROCEDURE — 74011000258 HC RX REV CODE- 258: Performed by: SURGERY

## 2021-12-18 PROCEDURE — 84100 ASSAY OF PHOSPHORUS: CPT

## 2021-12-18 RX ORDER — GLYCERIN ADULT
1 SUPPOSITORY, RECTAL RECTAL
Status: COMPLETED | OUTPATIENT
Start: 2021-12-18 | End: 2021-12-18

## 2021-12-18 RX ADMIN — PIPERACILLIN AND TAZOBACTAM 3.38 G: 3; .375 INJECTION, POWDER, LYOPHILIZED, FOR SOLUTION INTRAVENOUS at 22:26

## 2021-12-18 RX ADMIN — MORPHINE SULFATE 1 MG: 2 INJECTION, SOLUTION INTRAMUSCULAR; INTRAVENOUS at 09:28

## 2021-12-18 RX ADMIN — PIPERACILLIN AND TAZOBACTAM 3.38 G: 3; .375 INJECTION, POWDER, LYOPHILIZED, FOR SOLUTION INTRAVENOUS at 06:16

## 2021-12-18 RX ADMIN — NALOXEGOL OXALATE 12.5 MG: 12.5 TABLET, FILM COATED ORAL at 14:26

## 2021-12-18 RX ADMIN — SODIUM CHLORIDE, PRESERVATIVE FREE 20 MG: 5 INJECTION INTRAVENOUS at 19:18

## 2021-12-18 RX ADMIN — SODIUM CHLORIDE, PRESERVATIVE FREE 10 ML: 5 INJECTION INTRAVENOUS at 22:26

## 2021-12-18 RX ADMIN — SODIUM CHLORIDE, PRESERVATIVE FREE 10 ML: 5 INJECTION INTRAVENOUS at 06:16

## 2021-12-18 RX ADMIN — PIPERACILLIN AND TAZOBACTAM 3.38 G: 3; .375 INJECTION, POWDER, LYOPHILIZED, FOR SOLUTION INTRAVENOUS at 14:26

## 2021-12-18 RX ADMIN — SODIUM CHLORIDE, PRESERVATIVE FREE 20 MG: 5 INJECTION INTRAVENOUS at 06:16

## 2021-12-18 RX ADMIN — METOCLOPRAMIDE 10 MG: 5 INJECTION, SOLUTION INTRAMUSCULAR; INTRAVENOUS at 09:28

## 2021-12-18 RX ADMIN — METOCLOPRAMIDE 10 MG: 5 INJECTION, SOLUTION INTRAMUSCULAR; INTRAVENOUS at 03:00

## 2021-12-18 RX ADMIN — Medication: at 08:15

## 2021-12-18 RX ADMIN — METOCLOPRAMIDE 10 MG: 5 INJECTION, SOLUTION INTRAMUSCULAR; INTRAVENOUS at 14:26

## 2021-12-18 RX ADMIN — GLYCERIN 1 SUPPOSITORY: 2 SUPPOSITORY RECTAL at 14:26

## 2021-12-18 RX ADMIN — SODIUM CHLORIDE, PRESERVATIVE FREE 10 ML: 5 INJECTION INTRAVENOUS at 14:28

## 2021-12-18 RX ADMIN — METOCLOPRAMIDE 10 MG: 5 INJECTION, SOLUTION INTRAMUSCULAR; INTRAVENOUS at 22:26

## 2021-12-18 NOTE — PROGRESS NOTES
SURGERY PROGRESS NOTE      Admit Date: 2021    POD 8 Days Post-Op    Procedure: Procedure(s):  OPEN LYSIS OF ADHESIONS, GASTROSTOMY  AND JEJUNAL TUBE PLACEMENT      Subjective:     Patient complains of pain after drain placement. Denies nausea. No flatus or BM. Had 12 beat run of asymptomatic vtach after drain placement. No fevers since yesterday afternoon. Objective:     Visit Vitals  /71 (BP 1 Location: Left upper arm, BP Patient Position: At rest)   Pulse (!) 108   Temp 99.6 °F (37.6 °C)   Resp 18   Ht 5' 9\" (1.753 m)   Wt 54.4 kg (120 lb)   SpO2 98%   BMI 17.72 kg/m²        Temp (24hrs), Av °F (37.8 °C), Min:98.7 °F (37.1 °C), Max:102.5 °F (39.2 °C)      701 - 1900  In: -   Out: 255 [Urine:300; Drains:40]  1901 -  07  In: 2298.3 [P.O.:240;  I.V.:2058.3]  Out: 9382 [Urine:950; Drains:140]    Physical Exam:    General:  alert, cooperative, no distress, appears stated age   Abdomen: Soft, mildly tender,  G-tube 2000cc bilious fluid   J-tube 1450 bilious     ANDREE - 140 cc serosanguinous   percutaneous drain -  50cc serosanguinous fluid    Incision:   healing well, no drainage, no erythema, no hernia, no seroma, no swelling, no dehiscence, incision well approximated           Lab Results   Component Value Date/Time    WBC 15.0 (H) 2021 01:34 PM    HGB 8.1 (L) 2021 01:34 PM    HCT 24.2 (L) 2021 01:34 PM    PLATELET 985 (H)  01:34 PM    MCV 91.7 2021 01:34 PM     Lab Results   Component Value Date/Time    GFR est non-AA >60 2021 03:17 AM    GFR est AA >60 2021 03:17 AM    Creatinine 0.49 (L) 2021 03:17 AM    BUN 11 2021 03:17 AM    Sodium 133 (L) 2021 03:17 AM    Potassium 3.8 2021 03:17 AM    Chloride 99 2021 03:17 AM    CO2 29 2021 03:17 AM    Magnesium 2.1 2021 03:17 AM    Phosphorus 3.2 2021 03:17 AM     CT -  In addition to the RLQ fluid collection, there is contrast throughout the colon and rectum. Tubes are in adequate position and there is no clear bowel transition    Assessment/plan: Active Problems:    SBO (small bowel obstruction) ([address] (11/22/2021)      Aspiration pneumonia ([address] (11/25/2021)      Small bowel obstruction ([address] (11/25/2021)      Sepsis ([address] (11/25/2021)      Abdominal pain, acute (11/27/2021)      Intractable cyclical vomiting with nausea (11/27/2021)    RLQ fluid collection - appears to be loculated ascites. I don't think it is the fever source but, it may be contributing to failure of the return of bowel function. Hopefully with drain in place he will have some bowel recovery    SBO/ileus -  Appears to be more of an ileus/dysmotility issue on yesterdays CT. Will start with a suppository in attempt to empty rectum. Will add Movantik today. Once he starts to pass the rectal contrast I will start trophic feeds in the J-tube. Will allow clears today with both enteric tube to gravity. Fever -  On zosyn, PICC line out. Blood cultures negative to date. Does not appear to be intrabdominal source of fever. Give vtach, if blood cultures are positive will need echo. For now, continue zosyn. Malnutrition -  TPN on hold due to fever. Will check interval pre-albumin to see 2 week change. Will readdress TPN vs PO after a 2 day line holiday.

## 2021-12-18 NOTE — PROGRESS NOTES
End of Shift Note    Bedside shift change report given to [address] (oncoming nurse) by Geeta Martinez RN (offgoing nurse). Report included the following information SBAR, Intake/Output, MAR, Recent Results and Cardiac Rhythm Sinus Tach    Shift worked:  [id number]     Shift summary and any significant changes:    Patient continues to have copious drainage from LUQ abd tube; pain not well controlled with current PCA settings- PRN Morphine IV effective temporarily   Concerns for physician to address: Pain control   Zone phone for oncoming shift:  [phone]     Activity:  Activity Level: Up with Assistance  Number times ambulated in hallways past shift: 0  Number of times OOB to chair past shift: 0    Cardiac:   Cardiac Monitoring: Yes      Cardiac Rhythm: Sinus Tachy    Access:   Current line(s): PIV     Genitourinary:   Urinary status: voiding    Respiratory:   O2 Device: None (Room air)  Chronic home O2 use?: NO  Incentive spirometer at bedside: YES  Actual Volume (ml): 1800 ml  GI:  Last Bowel Movement Date: 12/03/21  Current diet:  ADULT ORAL NUTRITION SUPPLEMENT Breakfast, Lunch; Clear Liquid  DIET ONE TIME MESSAGE  DIET NPO Sips of Water with Meds  Passing flatus: NO  Tolerating current diet: NO       Pain Management:   Patient states pain is manageable on current regimen: NO    Skin:  Russell Score: 16  Interventions: turn team, speciality bed, float heels, increase time out of bed and PT/OT consult    Patient Safety:  Fall Score:  Total Score: 3  Interventions: gripper socks, pt to call before getting OOB and stay with me (per policy)  High Fall Risk: Yes    Length of Stay:  Expected LOS: 9d 14h  Actual LOS: 22      Geeta Martinez RN

## 2021-12-19 LAB
ERYTHROCYTE [DISTWIDTH] IN BLOOD BY AUTOMATED COUNT: 15.6 % (ref 11.5–14.5)
GLUCOSE BLD STRIP.AUTO-MCNC: 106 MG/DL (ref 65–117)
GLUCOSE BLD STRIP.AUTO-MCNC: 115 MG/DL (ref 65–117)
GLUCOSE BLD STRIP.AUTO-MCNC: 123 MG/DL (ref 65–117)
GLUCOSE BLD STRIP.AUTO-MCNC: 98 MG/DL (ref 65–117)
HCT VFR BLD AUTO: 25 % (ref 36.6–50.3)
HGB BLD-MCNC: 8.1 G/DL (ref 12.1–17)
MCH RBC QN AUTO: 30.7 PG (ref 26–34)
MCHC RBC AUTO-ENTMCNC: 32.4 G/DL (ref 30–36.5)
MCV RBC AUTO: 94.7 FL (ref 80–99)
NRBC # BLD: 0 K/UL (ref 0–0.01)
NRBC BLD-RTO: 0 PER 100 WBC
PLATELET # BLD AUTO: 623 K/UL (ref 150–400)
PMV BLD AUTO: 9.3 FL (ref 8.9–12.9)
RBC # BLD AUTO: 2.64 M/UL (ref 4.1–5.7)
SERVICE CMNT-IMP: ABNORMAL
SERVICE CMNT-IMP: NORMAL
WBC # BLD AUTO: 7.8 K/UL (ref 4.1–11.1)

## 2021-12-19 PROCEDURE — 36415 COLL VENOUS BLD VENIPUNCTURE: CPT

## 2021-12-19 PROCEDURE — 74011250636 HC RX REV CODE- 250/636: Performed by: SURGERY

## 2021-12-19 PROCEDURE — 74011000258 HC RX REV CODE- 258: Performed by: SURGERY

## 2021-12-19 PROCEDURE — 2709999900 HC NON-CHARGEABLE SUPPLY

## 2021-12-19 PROCEDURE — 74011000250 HC RX REV CODE- 250: Performed by: SURGERY

## 2021-12-19 PROCEDURE — 82962 GLUCOSE BLOOD TEST: CPT

## 2021-12-19 PROCEDURE — 65660000000 HC RM CCU STEPDOWN

## 2021-12-19 PROCEDURE — 74011250637 HC RX REV CODE- 250/637: Performed by: SURGERY

## 2021-12-19 PROCEDURE — 85027 COMPLETE CBC AUTOMATED: CPT

## 2021-12-19 RX ADMIN — METOCLOPRAMIDE 10 MG: 5 INJECTION, SOLUTION INTRAMUSCULAR; INTRAVENOUS at 22:33

## 2021-12-19 RX ADMIN — PIPERACILLIN AND TAZOBACTAM 3.38 G: 3; .375 INJECTION, POWDER, LYOPHILIZED, FOR SOLUTION INTRAVENOUS at 07:43

## 2021-12-19 RX ADMIN — DEXTROSE MONOHYDRATE, SODIUM CHLORIDE, AND POTASSIUM CHLORIDE 125 ML/HR: 50; 4.5; 1.49 INJECTION, SOLUTION INTRAVENOUS at 08:33

## 2021-12-19 RX ADMIN — PIPERACILLIN AND TAZOBACTAM 3.38 G: 3; .375 INJECTION, POWDER, LYOPHILIZED, FOR SOLUTION INTRAVENOUS at 22:32

## 2021-12-19 RX ADMIN — SODIUM CHLORIDE, PRESERVATIVE FREE 10 ML: 5 INJECTION INTRAVENOUS at 22:33

## 2021-12-19 RX ADMIN — METOCLOPRAMIDE 10 MG: 5 INJECTION, SOLUTION INTRAMUSCULAR; INTRAVENOUS at 09:17

## 2021-12-19 RX ADMIN — DEXTROSE MONOHYDRATE, SODIUM CHLORIDE, AND POTASSIUM CHLORIDE 125 ML/HR: 50; 4.5; 1.49 INJECTION, SOLUTION INTRAVENOUS at 15:41

## 2021-12-19 RX ADMIN — ACETAMINOPHEN 650 MG: 325 TABLET ORAL at 15:34

## 2021-12-19 RX ADMIN — PIPERACILLIN AND TAZOBACTAM 3.38 G: 3; .375 INJECTION, POWDER, LYOPHILIZED, FOR SOLUTION INTRAVENOUS at 14:08

## 2021-12-19 RX ADMIN — METOCLOPRAMIDE 10 MG: 5 INJECTION, SOLUTION INTRAMUSCULAR; INTRAVENOUS at 14:08

## 2021-12-19 RX ADMIN — NALOXEGOL OXALATE 12.5 MG: 12.5 TABLET, FILM COATED ORAL at 09:17

## 2021-12-19 RX ADMIN — SODIUM CHLORIDE, PRESERVATIVE FREE 20 MG: 5 INJECTION INTRAVENOUS at 16:50

## 2021-12-19 RX ADMIN — SODIUM CHLORIDE, PRESERVATIVE FREE 10 ML: 5 INJECTION INTRAVENOUS at 07:44

## 2021-12-19 RX ADMIN — ONDANSETRON HYDROCHLORIDE 4 MG: 2 INJECTION, SOLUTION INTRAMUSCULAR; INTRAVENOUS at 15:40

## 2021-12-19 RX ADMIN — METOCLOPRAMIDE 10 MG: 5 INJECTION, SOLUTION INTRAMUSCULAR; INTRAVENOUS at 04:00

## 2021-12-19 RX ADMIN — Medication: at 09:10

## 2021-12-19 RX ADMIN — SODIUM CHLORIDE, PRESERVATIVE FREE 10 ML: 5 INJECTION INTRAVENOUS at 14:08

## 2021-12-19 RX ADMIN — SODIUM CHLORIDE, PRESERVATIVE FREE 20 MG: 5 INJECTION INTRAVENOUS at 07:43

## 2021-12-19 RX ADMIN — DEXTROSE MONOHYDRATE, SODIUM CHLORIDE, AND POTASSIUM CHLORIDE 125 ML/HR: 50; 4.5; 1.49 INJECTION, SOLUTION INTRAVENOUS at 00:04

## 2021-12-19 NOTE — PROGRESS NOTES
End of Shift Note    Bedside shift change report given to Osmar Marin (oncoming nurse) by Min Ramsey RN (offgoing nurse). Report included the following information SBAR, Kardex, Intake/Output and MAR    Shift worked:  7am-7pm     Shift summary and any significant changes:     Pt did not leave the bed during the shift. Pt complained of pain during the shift. RN adjusted the PCA pump per MD orders. Pt did not eat much of the liquid diet. Pt had drain placed and RN emptied out 350 out of new drain placed. Concerns for physician to address:       Zone phone for oncoming shift:   [phone]       Activity:  Activity Level: Up with Assistance  Number times ambulated in hallways past shift: 0  Number of times OOB to chair past shift: 0    Cardiac:   Cardiac Monitoring: Yes      Cardiac Rhythm: Sinus Rhythm    Access:   Current line(s): PIV     Genitourinary:   Urinary status: voiding    Respiratory:   O2 Device: None (Room air)  Chronic home O2 use?: NO  Incentive spirometer at bedside: YES  Actual Volume (ml): 1800 ml  GI:  Last Bowel Movement Date: 12/03/21  Current diet:  ADULT ORAL NUTRITION SUPPLEMENT Breakfast, Lunch; Clear Liquid  DIET ONE TIME MESSAGE  ADULT DIET Clear Liquid  Passing flatus: YES  Tolerating current diet: YES       Pain Management:   Patient states pain is manageable on current regimen: YES    Skin:  Russell Score: 17  Interventions: float heels and increase time out of bed    Patient Safety:  Fall Score:  Total Score: 3  Interventions: gripper socks and pt to call before getting OOB  High Fall Risk: Yes    Length of Stay:  Expected LOS: 9d 14h  Actual LOS: [address] [place] [address], RN

## 2021-12-19 NOTE — PROGRESS NOTES
End of Shift Note    Bedside shift change report given to Jaiden RN (oncoming nurse) by Chase Navarro RN (offgoing nurse). Report included the following information SBAR, ED Summary, Intake/Output and MAR    Shift worked:  7p-7a     Shift summary and any significant changes:    Pt's G tube has redness around the exit and there is green liquid draining around the tubing onto the patient's skin. The pt's ANDREE exit site seems to have looser stitches; the drain was also filling up with air some this shift and the charge was not holding. The ANDREE tubing seems to have more give; sutures are not against the pt's skin. Pt did not require insulin coverage this shift. Pt states if he is not moving there is no pain, but when he moves his abdomen hurts. Concerns for physician to address:  Check on the incision sites for the G tube and ANDREE drain? Zone phone for oncoming shift:          Activity:  Activity Level: Up with Assistance  Number times ambulated in hallways past shift: 0  Number of times OOB to chair past shift: 0    Cardiac:   Cardiac Monitoring: Yes      Cardiac Rhythm: Sinus Rhythm    Access:   Current line(s): PIV     Genitourinary:   Urinary status: voiding    Respiratory:   O2 Device: None (Room air)  Chronic home O2 use?: NO  Incentive spirometer at bedside: YES  Actual Volume (ml): 1800 ml  GI:  Last Bowel Movement Date: 12/03/21  Current diet:  ADULT ORAL NUTRITION SUPPLEMENT Breakfast, Lunch; Clear Liquid  DIET ONE TIME MESSAGE  ADULT DIET Clear Liquid  Passing flatus: Tolerating current diet: YES       Pain Management:   Patient states pain is manageable on current regimen:     Skin:  Russell Score: 17  Interventions: increase time out of bed    Patient Safety:  Fall Score:  Total Score: 3  Interventions: assistive device (walker, cane, etc) and pt to call before getting OOB  High Fall Risk: Yes    Length of Stay:  Expected LOS: 9d 14h  Actual LOS: Cruz Wellington RN

## 2021-12-19 NOTE — PROGRESS NOTES
SURGERY PROGRESS NOTE      Admit Date: 2021    POD 9 Days Post-Op    Procedure: Procedure(s):  OPEN LYSIS OF ADHESIONS, GASTROSTOMY  AND JEJUNAL TUBE PLACEMENT      Subjective:     Patient has no complaints today. Tolerated liquids with minimal drainage around G-tube. Started passing flatus but no BM with suppository. Objective:     Visit Vitals  /71   Pulse 98   Temp 98.9 °F (37.2 °C)   Resp 18   Ht 5' 9\" (1.753 m)   Wt 52 kg (114 lb 10.2 oz)   SpO2 100%   BMI 16.93 kg/m²        Temp (24hrs), Av °F (37.2 °C), Min:98.1 °F (36.7 °C), Max:99.8 °F (37.7 °C)      701 - 1900  In: -   Out: 407 [FSAMP:778]  1901 - 700  In: 1000 [I.V.:1000]  Out: 9569 [Urine:1125; Drains:100]    Physical Exam:    General:  alert, cooperative, no distress, appears stated age   Abdomen: soft, bowel sounds active, non-tender  G-tube -  No output documented. About 400cc bilious fluid in the bag. J-tube -  nio documented output. Approximately 100cc bilious fluid in the bag  ANDREE 40cc/24hr - serous  Percutaneous drain output not documented. -   100cc serosanguinous fluid      Incision:   healing well, no drainage, no erythema, no hernia, no seroma, no swelling, no dehiscence, incision well approximated           Lab Results   Component Value Date/Time    WBC 7.8 2021 05:44 AM    HGB 8.1 (L) 2021 05:44 AM    HCT 25.0 (L) 2021 05:44 AM    PLATELET 016 (H)  05:44 AM    MCV 94.7 2021 05:44 AM     Lab Results   Component Value Date/Time    GFR est non-AA >60 2021 03:17 AM    GFR est AA >60 2021 03:17 AM    Creatinine 0.49 (L) 2021 03:17 AM    BUN 11 2021 03:17 AM    Sodium 133 (L) 2021 03:17 AM    Potassium 3.8 2021 03:17 AM    Chloride 99 2021 03:17 AM    CO2 29 2021 03:17 AM    Magnesium 2.1 2021 03:17 AM    Phosphorus 3.2 2021 03:17 AM       Assessment/plan:      Active Problems:    SBO (small bowel obstruction) ([address] (11/22/2021)      Aspiration pneumonia ([address] (11/25/2021)      Small bowel obstruction ([address] (11/25/2021)      Sepsis ([address] (11/25/2021)      Abdominal pain, acute (11/27/2021)      Intractable cyclical vomiting with nausea (11/27/2021)    Over all improving. Today is the first day he HR is less that 100 since I've known him. He is passing flatus and tolerating liquids. SBO/ileus -  Passing flatus. Appears output from drains is down. Will give soap suds enema to try and get the barium out of his rectum. On 12/09 it was more a ground glass mix of stool, air and barium. On 12/17 it appears to be inspissated barium. Malnutrition -  Awaiting prealbumin results. Tolerating liquids. Will try tube feeds once he has some result with the enema. Fever -  Afebrile for almost 48 hours. Blood cultures negative.   Continue antibiotics

## 2021-12-20 LAB
ANION GAP SERPL CALC-SCNC: 8 MMOL/L (ref 5–15)
BUN SERPL-MCNC: 3 MG/DL (ref 6–20)
BUN/CREAT SERPL: 6 (ref 12–20)
CALCIUM SERPL-MCNC: 8.6 MG/DL (ref 8.5–10.1)
CHLORIDE SERPL-SCNC: 102 MMOL/L (ref 97–108)
CO2 SERPL-SCNC: 25 MMOL/L (ref 21–32)
CREAT SERPL-MCNC: 0.5 MG/DL (ref 0.7–1.3)
ERYTHROCYTE [DISTWIDTH] IN BLOOD BY AUTOMATED COUNT: 15.8 % (ref 11.5–14.5)
GLUCOSE BLD STRIP.AUTO-MCNC: 116 MG/DL (ref 65–117)
GLUCOSE BLD STRIP.AUTO-MCNC: 118 MG/DL (ref 65–117)
GLUCOSE BLD STRIP.AUTO-MCNC: 92 MG/DL (ref 65–117)
GLUCOSE BLD STRIP.AUTO-MCNC: 97 MG/DL (ref 65–117)
GLUCOSE SERPL-MCNC: 102 MG/DL (ref 65–100)
HCT VFR BLD AUTO: 28.7 % (ref 36.6–50.3)
HGB BLD-MCNC: 9.1 G/DL (ref 12.1–17)
MAGNESIUM SERPL-MCNC: 2 MG/DL (ref 1.6–2.4)
MCH RBC QN AUTO: 30.4 PG (ref 26–34)
MCHC RBC AUTO-ENTMCNC: 31.7 G/DL (ref 30–36.5)
MCV RBC AUTO: 96 FL (ref 80–99)
NRBC # BLD: 0 K/UL (ref 0–0.01)
NRBC BLD-RTO: 0 PER 100 WBC
PHOSPHATE SERPL-MCNC: 3.1 MG/DL (ref 2.6–4.7)
PLATELET # BLD AUTO: 637 K/UL (ref 150–400)
PMV BLD AUTO: 9.1 FL (ref 8.9–12.9)
POTASSIUM SERPL-SCNC: 4 MMOL/L (ref 3.5–5.1)
PREALB SERPL-MCNC: 3.4 MG/DL (ref 20–40)
RBC # BLD AUTO: 2.99 M/UL (ref 4.1–5.7)
SERVICE CMNT-IMP: ABNORMAL
SERVICE CMNT-IMP: NORMAL
SODIUM SERPL-SCNC: 135 MMOL/L (ref 136–145)
WBC # BLD AUTO: 8.8 K/UL (ref 4.1–11.1)

## 2021-12-20 PROCEDURE — 74011000250 HC RX REV CODE- 250: Performed by: SURGERY

## 2021-12-20 PROCEDURE — 74011250637 HC RX REV CODE- 250/637: Performed by: SURGERY

## 2021-12-20 PROCEDURE — 74011250636 HC RX REV CODE- 250/636: Performed by: SURGERY

## 2021-12-20 PROCEDURE — 74011000250 HC RX REV CODE- 250: Performed by: NURSE PRACTITIONER

## 2021-12-20 PROCEDURE — 65660000000 HC RM CCU STEPDOWN

## 2021-12-20 PROCEDURE — 74011000258 HC RX REV CODE- 258: Performed by: SURGERY

## 2021-12-20 PROCEDURE — 83735 ASSAY OF MAGNESIUM: CPT

## 2021-12-20 PROCEDURE — 84100 ASSAY OF PHOSPHORUS: CPT

## 2021-12-20 PROCEDURE — 84134 ASSAY OF PREALBUMIN: CPT

## 2021-12-20 PROCEDURE — 82962 GLUCOSE BLOOD TEST: CPT

## 2021-12-20 PROCEDURE — 36415 COLL VENOUS BLD VENIPUNCTURE: CPT

## 2021-12-20 PROCEDURE — 85027 COMPLETE CBC AUTOMATED: CPT

## 2021-12-20 PROCEDURE — 80048 BASIC METABOLIC PNL TOTAL CA: CPT

## 2021-12-20 RX ORDER — METOPROLOL TARTRATE 5 MG/5ML
1.25 INJECTION INTRAVENOUS EVERY 6 HOURS
Status: DISCONTINUED | OUTPATIENT
Start: 2021-12-20 | End: 2022-01-11 | Stop reason: HOSPADM

## 2021-12-20 RX ADMIN — DEXTROSE MONOHYDRATE, SODIUM CHLORIDE, AND POTASSIUM CHLORIDE 125 ML/HR: 50; 4.5; 1.49 INJECTION, SOLUTION INTRAVENOUS at 09:46

## 2021-12-20 RX ADMIN — NALOXEGOL OXALATE 12.5 MG: 12.5 TABLET, FILM COATED ORAL at 09:36

## 2021-12-20 RX ADMIN — SODIUM CHLORIDE, PRESERVATIVE FREE 10 ML: 5 INJECTION INTRAVENOUS at 22:08

## 2021-12-20 RX ADMIN — PIPERACILLIN AND TAZOBACTAM 3.38 G: 3; .375 INJECTION, POWDER, LYOPHILIZED, FOR SOLUTION INTRAVENOUS at 14:17

## 2021-12-20 RX ADMIN — METOCLOPRAMIDE 10 MG: 5 INJECTION, SOLUTION INTRAMUSCULAR; INTRAVENOUS at 14:19

## 2021-12-20 RX ADMIN — DEXTROSE MONOHYDRATE, SODIUM CHLORIDE, AND POTASSIUM CHLORIDE 125 ML/HR: 50; 4.5; 1.49 INJECTION, SOLUTION INTRAVENOUS at 00:58

## 2021-12-20 RX ADMIN — SODIUM CHLORIDE, PRESERVATIVE FREE 10 ML: 5 INJECTION INTRAVENOUS at 14:19

## 2021-12-20 RX ADMIN — DEXTROSE MONOHYDRATE, SODIUM CHLORIDE, AND POTASSIUM CHLORIDE 125 ML/HR: 50; 4.5; 1.49 INJECTION, SOLUTION INTRAVENOUS at 22:07

## 2021-12-20 RX ADMIN — METOCLOPRAMIDE 10 MG: 5 INJECTION, SOLUTION INTRAMUSCULAR; INTRAVENOUS at 04:12

## 2021-12-20 RX ADMIN — PIPERACILLIN AND TAZOBACTAM 3.38 G: 3; .375 INJECTION, POWDER, LYOPHILIZED, FOR SOLUTION INTRAVENOUS at 06:24

## 2021-12-20 RX ADMIN — SODIUM CHLORIDE, PRESERVATIVE FREE 20 MG: 5 INJECTION INTRAVENOUS at 18:56

## 2021-12-20 RX ADMIN — METOPROLOL TARTRATE 1.25 MG: 1 INJECTION, SOLUTION INTRAVENOUS at 12:33

## 2021-12-20 RX ADMIN — ACETAMINOPHEN 650 MG: 325 TABLET ORAL at 12:32

## 2021-12-20 RX ADMIN — PIPERACILLIN AND TAZOBACTAM 3.38 G: 3; .375 INJECTION, POWDER, LYOPHILIZED, FOR SOLUTION INTRAVENOUS at 22:08

## 2021-12-20 RX ADMIN — METOCLOPRAMIDE 10 MG: 5 INJECTION, SOLUTION INTRAMUSCULAR; INTRAVENOUS at 09:37

## 2021-12-20 RX ADMIN — AMLODIPINE BESYLATE 10 MG: 5 TABLET ORAL at 09:35

## 2021-12-20 RX ADMIN — SODIUM CHLORIDE, PRESERVATIVE FREE 20 MG: 5 INJECTION INTRAVENOUS at 06:24

## 2021-12-20 RX ADMIN — Medication: at 21:39

## 2021-12-20 RX ADMIN — METOCLOPRAMIDE 10 MG: 5 INJECTION, SOLUTION INTRAMUSCULAR; INTRAVENOUS at 22:08

## 2021-12-20 NOTE — PROGRESS NOTES
End of Shift Note    Bedside shift change report given to Orquidea GRAF RN (oncoming nurse) by Sindy Ordonez RN (offgoing nurse). Report included the following information SBAR, Kardex, Intake/Output, MAR and Accordion    Shift worked:  7p-7a     Shift summary and any significant changes:     Patient had several bowel movements this shift. Patient noted abdominal pain fluctuated from a 6 to a 9 on the numeric scale depending on which side of his abdomen was flaring up; this RN reminded/ encouraged use of PCA. Telemetry called multiple times throughout this shift noting patient had episodes of tachycardia in 160's. During several of these episodes the pt was resting in bed on the phone with friends and family. Concerns for physician to address:  Tachycardia     Zone phone for oncoming shift:   [phone]       Activity:  Activity Level: Up with Assistance  Number times ambulated in hallways past shift: 0  Number of times OOB to chair past shift: 0    Cardiac:   Cardiac Monitoring: Yes      Cardiac Rhythm: Sinus Tachy    Access:   Current line(s): PIV     Genitourinary:   Urinary status: voiding    Respiratory:   O2 Device: None (Room air)  Chronic home O2 use?: NO  Incentive spirometer at bedside: YES  Actual Volume (ml): 1500 ml  GI:  Last Bowel Movement Date: 12/03/21  Current diet:  ADULT ORAL NUTRITION SUPPLEMENT Breakfast, Lunch; Clear Liquid  DIET ONE TIME MESSAGE  ADULT DIET Clear Liquid  Passing flatus: YES  Tolerating current diet: YES       Pain Management:   Patient states pain is manageable on current regimen: YES    Skin:  Russell Score: 17  Interventions: increase time out of bed    Patient Safety:  Fall Score:  Total Score: 3  Interventions: assistive device (walker, cane, etc) and pt to call before getting OOB  High Fall Risk: Yes    Length of Stay:  Expected LOS: 9d 14h  Actual LOS: 24      Sindy Ordonez RN

## 2021-12-20 NOTE — PROGRESS NOTES
End of Shift Note    Bedside shift change report given to Amado Ayers RN (oncoming nurse) by Aminata Lubin RN (offgoing nurse). Report included the following information SBAR, Kardex, Intake/Output, MAR and Recent Results    Shift worked:  7A-3P     Shift summary and any significant changes:    Patient lost IV acces x2, placed new IVS in both arms. PCA continuing to infuse. Patient to start tube feeding through Jtube this evening. Awaiting tube feeding to come up to floor. Concerns for physician to address: None      Zone phone for oncoming shift:  [phone]       Activity:  Activity Level: Up with Assistance  Number times ambulated in hallways past shift: 0  Number of times OOB to chair past shift: 1    Cardiac:   Cardiac Monitoring: Yes      Cardiac Rhythm: Sinus Tachy    Access:   Current line(s): PIV     Genitourinary:   Urinary status: voiding    Respiratory:   O2 Device: None (Room air)  Chronic home O2 use?: NO  Incentive spirometer at bedside: YES  Actual Volume (ml): 1000 ml  GI:  Last Bowel Movement Date: 12/19/21  Current diet:  ADULT ORAL NUTRITION SUPPLEMENT Breakfast, Lunch; Clear Liquid  DIET ONE TIME MESSAGE  ADULT DIET Clear Liquid  ADULT TUBE FEEDING Jejunostomy; Peptide Based; Delivery Method: Continuous; Continuous Initial Rate (mL/hr): 10; Continuous Advance Tube Feeding: No; Water Flush Volume (mL): 60; Water Flush Frequency: Q 4 hours  Passing flatus: YES  Tolerating current diet: YES       Pain Management:   Patient states pain is manageable on current regimen: YES    Skin:  Russell Score: 17  Interventions: increase time out of bed    Patient Safety:  Fall Score:  Total Score: 3  Interventions: pt to call before getting OOB  High Fall Risk: Yes    Length of Stay:  Expected LOS: 9d 14h  Actual LOS: 24      Aminata Lubin RN

## 2021-12-20 NOTE — PROGRESS NOTES
Comprehensive Nutrition Assessment    Type and Reason for Visit: Reassess    Nutrition Recommendations/Plan:   · After visiting with pt and confirming pt does not have a true milk allergy but a suspected lactose intolerance. Will start a peptide-based, elemental formula in efforts to maximize absorption and tolerance. · RD adjusted tube feeding orders to start Vital 1.5 katherine, peptide-based, elemental formula @ 10 ml/hr x 24 hr via J-tube. No plans to increase rate at this time. · If pt tolerates Vital 1.5 katherine J-tube feedings, consider increase by 10 ml/hr q12h to max goal rate 60 ml/hr x 24 hr.  · RD went conservative on fluid flushes of 60 ml q4h at this time; provider to adjust accordingly. Currently receiving IVF. · Vital 1.5 katherine @ 60 ml/hr x 24 hr + 60 ml free water q4h will provide daily approx. 2160 kcals/97 g protein/269 g CHO/8.6 g dietary fiber/1460 ml free water. Nutrition Assessment:     12/20: Chart reviewed; med noted for recurrent SBO, s/p open lysis of adhesions, gastrostomy and jejunal tube placement. Pt previously placed on TPN but due to presumed line infection, access was removed. Pt has been tolerating small amounts of clear liquids and sips of ensure clear. Dr. Sana Garcia would like to begin trickle feedings via J-tube. RD visited with pt and confirmed pt likely to have a lactose intolerance but does not have a true milk allergy. Explained to pt that all of our tube feeding products are suitable for lactose intolerance. Will start pt on a peptide-based, elemental formula to maximize absorption and tolerance.      Last Weight Metric  Weight Loss Metrics 12/19/2021 11/20/2021 11/11/2021 6/25/2021 6/16/2021 2/25/2019 2/24/2019   Today's Wt 116 lb 2.9 oz 130 lb 15.3 oz 152 lb 136 lb 7.4 oz 138 lb 3.7 oz 138 lb 14.2 oz 145 lb 8.1 oz   BMI 17.16 kg/m2 19.34 kg/m2 22.45 kg/m2 19.58 kg/m2 19.83 kg/m2 20.51 kg/m2 21.49 kg/m2     Malnutrition Assessment:  Malnutrition Status:  Severe malnutrition Context:  Chronic illness     Findings of the 6 clinical characteristics of malnutrition:   Energy Intake:  7 - 75% or less est energy requirements for 1 month or longer  Weight Loss:  7.0 - Greater than 7.5% over 3 months     Body Fat Loss:  7 - Severe body fat loss, Triceps,Orbital,Buccal region   Muscle Mass Loss:  1 - Mild muscle mass loss, Clavicles (pectoralis &deltoids),Temples (temporalis),Thigh (quadriceps),Scapula (trapezius)  Fluid Accumulation:  Unable to assess,     Strength:  Not performed     Estimated Daily Nutrient Needs:  Energy (kcal): 2188 (BMR 1489 x 1. 3AF) + 250 kcals; Weight Used for Energy Requirements: Current  Protein (g): 60-88 (1.0-1.5 g/kg bw); Weight Used for Protein Requirements: Current  Fluid (ml/day): 1282-6763 ml/day; Method Used for Fluid Requirements: 1 ml/kcal    Nutrition Related Findings:  Labs: Na+ 135; Meds: D5 w/NaCl @ 125 ml/hr, Reglan, Zosyn, Pepcid      Wounds:    Surgical incision       Current Nutrition Therapies:  ADULT ORAL NUTRITION SUPPLEMENT Breakfast, Lunch; Clear Liquid  DIET ONE TIME MESSAGE  ADULT DIET Clear Liquid  ADULT TUBE FEEDING Jejunostomy; Peptide Based; Delivery Method: Continuous; Continuous Initial Rate (mL/hr): 10; Continuous Advance Tube Feeding: No; Water Flush Volume (mL): 60; Water Flush Frequency: Q 4 hours    Anthropometric Measures:  · Height:  5' 9\" (175.3 cm)  · Current Body Wt:  54.4 kg (119 lb 14.9 oz)   · Ideal Body Wt:  160 lbs:  81.8 %   · BMI Category:  Underweight (BMI less than 18.5)       Nutrition Diagnosis:   · Inadequate protein-energy intake related to altered GI function as evidenced by NPO or clear liquid status due to medical condition,weight loss (SBO)    Nutrition Interventions:   Food and/or Nutrient Delivery: Start parenteral nutrition,Continue current diet,Continue oral nutrition supplement  Nutrition Education and Counseling: No recommendations at this time  Coordination of Nutrition Care: Continue to monitor while inpatient    Goals:  Pt will be able to tolerate initiation of trickle TFs via Jtube with stable lytes next 1-3 days       Nutrition Monitoring and Evaluation:   Behavioral-Environmental Outcomes: None identified  Food/Nutrient Intake Outcomes: Food and nutrient intake,Supplement intake,Parenteral nutrition intake/tolerance  Physical Signs/Symptoms Outcomes: Biochemical data,GI status,Weight,Skin    Discharge Planning:     Too soon to determine     Electronically signed by Pravin Morales RD on 12/20/2021 at 2:59 PM

## 2021-12-20 NOTE — PROGRESS NOTES
SURGERY PROGRESS NOTE      Admit Date: 2021    POD 10 Days Post-Op    Procedure: Procedure(s):  OPEN LYSIS OF ADHESIONS, GASTROSTOMY  AND JEJUNAL TUBE PLACEMENT      Subjective:     Patient feels well today. Passing flatus. Tolerating clears. Had a BM. Output for G and J tube down significantly. Objective:     Visit Vitals  /74   Pulse (!) 104   Temp 98.9 °F (37.2 °C)   Resp 18   Ht 5' 9\" (1.753 m)   Wt 52.7 kg (116 lb 2.9 oz)   SpO2 98%   BMI 17.16 kg/m²        Temp (24hrs), Av.4 °F (37.4 °C), Min:98.8 °F (37.1 °C), Max:100.4 °F (38 °C)      701 - 1900  In: 440 [P.O.:440]  Out: 998 [Urine:400; Drains:45]  1901 -  0700  In: 4885 [P.O.:400; I.V.:2250]  Out: 0007 [Urine:3825; Drains:202]    Physical Exam:    General:  alert, cooperative, no distress, appears stated age   Abdomen: soft, bowel sounds active, non-tender   Incision:   healing well, no drainage, no erythema, no hernia, no seroma, no swelling, no dehiscence, incision well approximated           Lab Results   Component Value Date/Time    WBC 8.8 2021 02:19 AM    HGB 9.1 (L) 2021 02:19 AM    HCT 28.7 (L) 2021 02:19 AM    PLATELET 695 (H)  02:19 AM    MCV 96.0 2021 02:19 AM     Lab Results   Component Value Date/Time    GFR est non-AA >60 2021 02:19 AM    GFR est AA >60 2021 02:19 AM    Creatinine 0.50 (L) 2021 02:19 AM    BUN 3 (L) 2021 02:19 AM    Sodium 135 (L) 2021 02:19 AM    Potassium 4.0 2021 02:19 AM    Chloride 102 2021 02:19 AM    CO2 25 2021 02:19 AM    Magnesium 2.0 2021 02:19 AM    Phosphorus 3.1 2021 02:19 AM       Assessment/plan:      Active Problems:    SBO (small bowel obstruction) ([facility] (2021)      Aspiration pneumonia ([address] (2021)      Small bowel obstruction ([address] (2021)      Sepsis ([address] (2021)      Abdominal pain, acute (2021)      Intractable cyclical vomiting with nausea (11/27/2021)    ileus/SBO - resolving. Will start tube feeds at a trophic rate to start preparing his intestine. Continue clear liquids ad lavern    Malnutrition -  Prealbumin dropped from 12 to 3 despite 2 weeks of TPN. Will she how he does with tube feeds for 48 hours. If it goes to slow will need to restart TPN    Fever -  Blood cx negative. Afebrile for 3 days now.   Will continue zosyn for a total of 10 days
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
## 2021-12-20 NOTE — PROGRESS NOTES
Physical Therapy Note    Patient is laying supine in bed and declines mobility at this time.     Per rounds discussion patient will likely need rehab placement post acute care    Bill Bowen DPT

## 2021-12-20 NOTE — PROGRESS NOTES
Transition of Care Plan:     RUR:   21%   Disposition:Home w/[place] HH-PT,OT&SN & [facility] for TPN  Follow up appointments: on AVS  DME needed: None  Transportation at [place] [address] or means to access home:  Yes  IM Medicare Letter: N/A  Is patient a BCPI-A Bundle:   No                   If yes, was Bundle Letter given?:   N/A  Caregiver Contact: Mother- Cristóbal Johnson, [phone]  Discharge Caregiver contacted prior to discharge? Per pt's request, CM phoned pt's mental health worker, Rogelio Dickey, to discuss d/c plan. Neeta Byrd confirmed pt lives w/his mother & brother, mother works nights and pt does not have a good relationship w/his brother. Assistance at home is very limited.       Chryl [place]  Ext [phone]

## 2021-12-20 NOTE — PROGRESS NOTES
Occupational Therapy note:    Chart reviewed and discussed with nursing. Per RN, they are currently attempting to place IV and clean patient up. Requesting therapy check on patient afterwards. Will defer and continue to follow.     Stacey Francis OTR/L

## 2021-12-21 ENCOUNTER — APPOINTMENT (OUTPATIENT)
Dept: GENERAL RADIOLOGY | Age: 41
DRG: 710 | End: 2021-12-21
Attending: SURGERY
Payer: MEDICAID

## 2021-12-21 LAB
APPEARANCE UR: CLEAR
BACTERIA URNS QL MICRO: NEGATIVE /HPF
BILIRUB UR QL: NEGATIVE
COLOR UR: NORMAL
EPITH CASTS URNS QL MICRO: NORMAL /LPF
GLUCOSE BLD STRIP.AUTO-MCNC: 106 MG/DL (ref 65–117)
GLUCOSE BLD STRIP.AUTO-MCNC: 110 MG/DL (ref 65–117)
GLUCOSE BLD STRIP.AUTO-MCNC: 112 MG/DL (ref 65–117)
GLUCOSE BLD STRIP.AUTO-MCNC: 92 MG/DL (ref 65–117)
GLUCOSE BLD STRIP.AUTO-MCNC: 97 MG/DL (ref 65–117)
GLUCOSE UR STRIP.AUTO-MCNC: NEGATIVE MG/DL
HGB UR QL STRIP: NEGATIVE
KETONES UR QL STRIP.AUTO: NEGATIVE MG/DL
LEUKOCYTE ESTERASE UR QL STRIP.AUTO: NEGATIVE
MAGNESIUM SERPL-MCNC: 1.8 MG/DL (ref 1.6–2.4)
NITRITE UR QL STRIP.AUTO: NEGATIVE
PH UR STRIP: 6 [PH] (ref 5–8)
PHOSPHATE SERPL-MCNC: 3.2 MG/DL (ref 2.6–4.7)
PROT UR STRIP-MCNC: NEGATIVE MG/DL
RBC #/AREA URNS HPF: NORMAL /HPF (ref 0–5)
SERVICE CMNT-IMP: NORMAL
SP GR UR REFRACTOMETRY: 1.01 (ref 1–1.03)
UA: UC IF INDICATED,UAUC: NORMAL
UROBILINOGEN UR QL STRIP.AUTO: 0.2 EU/DL (ref 0.2–1)
WBC URNS QL MICRO: NORMAL /HPF (ref 0–4)

## 2021-12-21 PROCEDURE — 36573 INSJ PICC RS&I 5 YR+: CPT | Performed by: NURSE PRACTITIONER

## 2021-12-21 PROCEDURE — 76937 US GUIDE VASCULAR ACCESS: CPT

## 2021-12-21 PROCEDURE — 71045 X-RAY EXAM CHEST 1 VIEW: CPT

## 2021-12-21 PROCEDURE — 77030018786 HC NDL GD F/USND BARD -B

## 2021-12-21 PROCEDURE — 74011000258 HC RX REV CODE- 258: Performed by: SURGERY

## 2021-12-21 PROCEDURE — 97116 GAIT TRAINING THERAPY: CPT

## 2021-12-21 PROCEDURE — 65660000000 HC RM CCU STEPDOWN

## 2021-12-21 PROCEDURE — 83735 ASSAY OF MAGNESIUM: CPT

## 2021-12-21 PROCEDURE — 74011250636 HC RX REV CODE- 250/636: Performed by: SURGERY

## 2021-12-21 PROCEDURE — 2709999900 HC NON-CHARGEABLE SUPPLY

## 2021-12-21 PROCEDURE — 36415 COLL VENOUS BLD VENIPUNCTURE: CPT

## 2021-12-21 PROCEDURE — 84100 ASSAY OF PHOSPHORUS: CPT

## 2021-12-21 PROCEDURE — 81001 URINALYSIS AUTO W/SCOPE: CPT

## 2021-12-21 PROCEDURE — 82962 GLUCOSE BLOOD TEST: CPT

## 2021-12-21 PROCEDURE — 87040 BLOOD CULTURE FOR BACTERIA: CPT

## 2021-12-21 PROCEDURE — 74011250637 HC RX REV CODE- 250/637: Performed by: SURGERY

## 2021-12-21 PROCEDURE — 97530 THERAPEUTIC ACTIVITIES: CPT

## 2021-12-21 PROCEDURE — C1751 CATH, INF, PER/CENT/MIDLINE: HCPCS

## 2021-12-21 PROCEDURE — 74011000250 HC RX REV CODE- 250: Performed by: SURGERY

## 2021-12-21 PROCEDURE — 0202U NFCT DS 22 TRGT SARS-COV-2: CPT

## 2021-12-21 PROCEDURE — 74011000250 HC RX REV CODE- 250: Performed by: NURSE PRACTITIONER

## 2021-12-21 RX ADMIN — SODIUM CHLORIDE, PRESERVATIVE FREE 10 ML: 5 INJECTION INTRAVENOUS at 22:32

## 2021-12-21 RX ADMIN — METOCLOPRAMIDE 10 MG: 5 INJECTION, SOLUTION INTRAMUSCULAR; INTRAVENOUS at 08:55

## 2021-12-21 RX ADMIN — SODIUM CHLORIDE, PRESERVATIVE FREE 20 MG: 5 INJECTION INTRAVENOUS at 05:13

## 2021-12-21 RX ADMIN — AMLODIPINE BESYLATE 10 MG: 5 TABLET ORAL at 08:55

## 2021-12-21 RX ADMIN — METOCLOPRAMIDE 10 MG: 5 INJECTION, SOLUTION INTRAMUSCULAR; INTRAVENOUS at 15:00

## 2021-12-21 RX ADMIN — ASCORBIC ACID, VITAMIN A PALMITATE, CHOLECALCIFEROL, THIAMINE HYDROCHLORIDE, RIBOFLAVIN-5 PHOSPHATE SODIUM, PYRIDOXINE HYDROCHLORIDE, NIACINAMIDE, DEXPANTHENOL, ALPHA-TOCOPHEROL ACETATE, VITAMIN K1, FOLIC ACID, BIOTIN, CYANOCOBALAMIN: 200; 3300; 200; 6; 3.6; 6; 40; 15; 10; 150; 600; 60; 5 INJECTION, SOLUTION INTRAVENOUS at 18:17

## 2021-12-21 RX ADMIN — METOPROLOL TARTRATE 1.25 MG: 1 INJECTION, SOLUTION INTRAVENOUS at 05:16

## 2021-12-21 RX ADMIN — SODIUM CHLORIDE, PRESERVATIVE FREE 10 ML: 5 INJECTION INTRAVENOUS at 13:25

## 2021-12-21 RX ADMIN — METOCLOPRAMIDE 10 MG: 5 INJECTION, SOLUTION INTRAMUSCULAR; INTRAVENOUS at 22:31

## 2021-12-21 RX ADMIN — NALOXEGOL OXALATE 12.5 MG: 12.5 TABLET, FILM COATED ORAL at 08:54

## 2021-12-21 RX ADMIN — METOPROLOL TARTRATE 1.25 MG: 1 INJECTION, SOLUTION INTRAVENOUS at 18:12

## 2021-12-21 RX ADMIN — SODIUM CHLORIDE, PRESERVATIVE FREE 20 MG: 5 INJECTION INTRAVENOUS at 18:11

## 2021-12-21 RX ADMIN — MORPHINE SULFATE 1 MG: 2 INJECTION, SOLUTION INTRAMUSCULAR; INTRAVENOUS at 13:19

## 2021-12-21 RX ADMIN — PIPERACILLIN AND TAZOBACTAM 3.38 G: 3; .375 INJECTION, POWDER, LYOPHILIZED, FOR SOLUTION INTRAVENOUS at 05:13

## 2021-12-21 RX ADMIN — METOPROLOL TARTRATE 1.25 MG: 1 INJECTION, SOLUTION INTRAVENOUS at 13:19

## 2021-12-21 RX ADMIN — PIPERACILLIN AND TAZOBACTAM 3.38 G: 3; .375 INJECTION, POWDER, LYOPHILIZED, FOR SOLUTION INTRAVENOUS at 13:23

## 2021-12-21 RX ADMIN — ACETAMINOPHEN 650 MG: 325 TABLET ORAL at 20:46

## 2021-12-21 RX ADMIN — PIPERACILLIN AND TAZOBACTAM 3.38 G: 3; .375 INJECTION, POWDER, LYOPHILIZED, FOR SOLUTION INTRAVENOUS at 22:31

## 2021-12-21 RX ADMIN — METOCLOPRAMIDE 10 MG: 5 INJECTION, SOLUTION INTRAMUSCULAR; INTRAVENOUS at 02:55

## 2021-12-21 RX ADMIN — SODIUM CHLORIDE, PRESERVATIVE FREE 10 ML: 5 INJECTION INTRAVENOUS at 05:13

## 2021-12-21 NOTE — PROGRESS NOTES
Patient called concerned about g tube site draining around tube. Area is red as well. Killian Day NP notified and came to assess patient. Patient did not want nurse to put a dressing on the area, kept a towel on it instead.

## 2021-12-21 NOTE — PROGRESS NOTES
End of Shift Note    Bedside shift change report given to [address] (oncoming nurse) by Roxie Freedman (offgoing nurse). Report included the following information SBAR, Kardex, Intake/Output, MAR and Recent Results    Shift worked: 2327-4216     Shift summary and any significant changes:     Patient still having significant discomfort. Tube feeding was started this shift but is causing bloating patient requested nurse turn it off. PCA pump is relieving pain to an extent. Patient stayed in bed and rested. Concerns for physician to address:  Pain     Zone phone for oncoming shift:   [phone]       Activity:  Activity Level: Up with Assistance  Number times ambulated in hallways past shift: 0  Number of times OOB to chair past shift: 0    Cardiac:   Cardiac Monitoring: Yes      Cardiac Rhythm: Sinus Tachy    Access:   Current line(s): PIV     Genitourinary:   Urinary status: voiding    Respiratory:   O2 Device: None (Room air)  Chronic home O2 use?: N/A  Incentive spirometer at bedside: YES  Actual Volume (ml): 1000 ml  GI:  Last Bowel Movement Date: 12/19/21  Current diet:  ADULT ORAL NUTRITION SUPPLEMENT Breakfast, Lunch; Clear Liquid  DIET ONE TIME MESSAGE  ADULT DIET Clear Liquid  ADULT TUBE FEEDING Jejunostomy; Peptide Based; Delivery Method: Continuous; Continuous Initial Rate (mL/hr): 10; Continuous Advance Tube Feeding: No; Water Flush Volume (mL): 60; Water Flush Frequency: Q 4 hours  Passing flatus: YES  Tolerating current diet: YES       Pain Management:   Patient states pain is manageable on current regimen: NO    Skin:  Russell Score: 18  Interventions: increase time out of bed and nutritional support     Patient Safety:  Fall Score:  Total Score: 3  Interventions: bed/chair alarm, gripper socks and pt to call before getting OOB  High Fall Risk: Yes    Length of Stay:  Expected LOS: 9d 14h  Actual LOS: [facility]

## 2021-12-21 NOTE — PROGRESS NOTES
End of Shift Note    Bedside shift change report given to ROSEANNE Lewis (oncoming nurse) by Blanca Kern RN (offgoing nurse). Report included the following information SBAR, Kardex, Intake/Output and MAR    Shift worked:  3pm-7pm     Shift summary and any significant changes:     Pt did not leave the bed during the shift. PCA continuing to infuse. Pt complained of pain during the shift; however, PCA was encouraged and this seemed to alleviate the pain. Pt is tolerating the current diet of clear liquids. RN started Tube Feedings through J-Tube during the shift. So far the pt seemed to be tolerating the feeding      Concerns for physician to address:       Zone phone for oncoming shift:          Activity:  Activity Level: Up with Assistance  Number times ambulated in hallways past shift: 0  Number of times OOB to chair past shift: 0    Cardiac:   Cardiac Monitoring: Yes      Cardiac Rhythm: Sinus Tachy    Access:   Current line(s): PIV     Genitourinary:   Urinary status: voiding    Respiratory:   O2 Device: None (Room air)  Chronic home O2 use?: NO  Incentive spirometer at bedside: YES  Actual Volume (ml): 1000 ml  GI:  Last Bowel Movement Date: 12/19/21  Current diet:  ADULT ORAL NUTRITION SUPPLEMENT Breakfast, Lunch; Clear Liquid  DIET ONE TIME MESSAGE  ADULT DIET Clear Liquid  ADULT TUBE FEEDING Jejunostomy; Peptide Based; Delivery Method: Continuous; Continuous Initial Rate (mL/hr): 10; Continuous Advance Tube Feeding: No; Water Flush Volume (mL): 60; Water Flush Frequency: Q 4 hours  Passing flatus: NO  Tolerating current diet: YES       Pain Management:   Patient states pain is manageable on current regimen: YES    Skin:  Russell Score: 18  Interventions: float heels and increase time out of bed    Patient Safety:  Fall Score:  Total Score: 3  Interventions: gripper socks and pt to call before getting OOB  High Fall Risk: Yes    Length of Stay:  Expected LOS: 9d 14h  Actual LOS: Luis RN

## 2021-12-21 NOTE — PROGRESS NOTES
PICC Education: Explained reason and rationale for PICC placement along with providing education in order to make an informed consent including nature, risks, benefits, potential complications, care and maintenance of PICC line. The opportunity for questions or concerns was given. Patient  gave written consent for PICC procedure to be done at the bedside. Patient verbalizes understanding at this time. Procedure:  Time out completed. Pre procedure assessment done. Maximum sterile barrier precautions observed throughout procedure. Lidocaine 1% 3.0ml sc given prior to cannulation  Cannulated Brachial vein using ultrasound guidance and modified seldinger technique. Inserted DOUBLE lumen 5 fr PICC in  RIGHT arm using, TheCityGameTip Location System and 3CG   Patient has sinus rhythm. Tip Positioning System indicating tall P wave and no negative deflection before P wave which would indicate the PICC tip is properly placed in the distal SVC or the CAJ. PICC tip was confirmed by 2 PICC nurses and 3CG printout was placed on patients chart. Blood return verified and flushed with 20ml NS in each port. Sterile dressing applied with Biopatch, Stat loc, occlusive dressing per protocol. Curios caps applied to each port. Reason for access (TPN). Complications related to insertion (None). Patient tolerated procedure well with minimal blood loss. PICC procedure performed by: Thomas Patterson RN, BSN, [facility] / Vascular Access Nurse  Assisted by: Remberto Flores RN, [facility] / Vascular Access Nurse    RIGHT  arm circumference: 24 cm. Catheter internal length:  36 cm  Catheter external length: 2 cm  TOTAL Length:38 cm  PICC catheter occupies 20% of vein    Brand of catheter:  Arrow  Lot #[id number]   REF# [id number]    EXP 10/31/2022. Primary nurse Sury Andrade, notified PICC line may be used and to hang new infusion tubing prior to use.       Thomas Patterson RN, BSN, [facility]   Vascular Access Team
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
## 2021-12-21 NOTE — PROGRESS NOTES
Occupational Therapy    Attempted to see patient for OT treatment, but he presently has IV access team at bed side for line placement. Will defer OT and follow back tomorrow.      Juan Pack, OTR/L

## 2021-12-22 LAB
B PERT DNA SPEC QL NAA+PROBE: NOT DETECTED
BORDETELLA PARAPERTUSSIS PCR, BORPAR: NOT DETECTED
C PNEUM DNA SPEC QL NAA+PROBE: NOT DETECTED
FLUAV H1 2009 PAND RNA SPEC QL NAA+PROBE: NOT DETECTED
FLUAV H1 RNA SPEC QL NAA+PROBE: NOT DETECTED
FLUAV H3 RNA SPEC QL NAA+PROBE: NOT DETECTED
FLUAV SUBTYP SPEC NAA+PROBE: NOT DETECTED
FLUBV RNA SPEC QL NAA+PROBE: NOT DETECTED
GLUCOSE BLD STRIP.AUTO-MCNC: 113 MG/DL (ref 65–117)
GLUCOSE BLD STRIP.AUTO-MCNC: 114 MG/DL (ref 65–117)
GLUCOSE BLD STRIP.AUTO-MCNC: 127 MG/DL (ref 65–117)
HADV DNA SPEC QL NAA+PROBE: NOT DETECTED
HCOV 229E RNA SPEC QL NAA+PROBE: NOT DETECTED
HCOV HKU1 RNA SPEC QL NAA+PROBE: NOT DETECTED
HCOV NL63 RNA SPEC QL NAA+PROBE: NOT DETECTED
HCOV OC43 RNA SPEC QL NAA+PROBE: NOT DETECTED
HMPV RNA SPEC QL NAA+PROBE: NOT DETECTED
HPIV1 RNA SPEC QL NAA+PROBE: NOT DETECTED
HPIV2 RNA SPEC QL NAA+PROBE: NOT DETECTED
HPIV3 RNA SPEC QL NAA+PROBE: NOT DETECTED
HPIV4 RNA SPEC QL NAA+PROBE: NOT DETECTED
M PNEUMO DNA SPEC QL NAA+PROBE: NOT DETECTED
MAGNESIUM SERPL-MCNC: 1.8 MG/DL (ref 1.6–2.4)
PHOSPHATE SERPL-MCNC: 3.1 MG/DL (ref 2.6–4.7)
RSV RNA SPEC QL NAA+PROBE: NOT DETECTED
RV+EV RNA SPEC QL NAA+PROBE: NOT DETECTED
SARS-COV-2 PCR, COVPCR: NOT DETECTED
SERVICE CMNT-IMP: ABNORMAL
SERVICE CMNT-IMP: NORMAL
SERVICE CMNT-IMP: NORMAL

## 2021-12-22 PROCEDURE — 74011250636 HC RX REV CODE- 250/636: Performed by: SURGERY

## 2021-12-22 PROCEDURE — 74011000258 HC RX REV CODE- 258: Performed by: NURSE PRACTITIONER

## 2021-12-22 PROCEDURE — 74011000258 HC RX REV CODE- 258: Performed by: SURGERY

## 2021-12-22 PROCEDURE — 65660000000 HC RM CCU STEPDOWN

## 2021-12-22 PROCEDURE — 74011000250 HC RX REV CODE- 250: Performed by: SURGERY

## 2021-12-22 PROCEDURE — 74011250637 HC RX REV CODE- 250/637: Performed by: SURGERY

## 2021-12-22 PROCEDURE — 82962 GLUCOSE BLOOD TEST: CPT

## 2021-12-22 PROCEDURE — 83735 ASSAY OF MAGNESIUM: CPT

## 2021-12-22 PROCEDURE — 74011000250 HC RX REV CODE- 250: Performed by: NURSE PRACTITIONER

## 2021-12-22 PROCEDURE — 84100 ASSAY OF PHOSPHORUS: CPT

## 2021-12-22 PROCEDURE — 36415 COLL VENOUS BLD VENIPUNCTURE: CPT

## 2021-12-22 RX ADMIN — SODIUM CHLORIDE, PRESERVATIVE FREE 10 ML: 5 INJECTION INTRAVENOUS at 14:00

## 2021-12-22 RX ADMIN — ASCORBIC ACID, VITAMIN A PALMITATE, CHOLECALCIFEROL, THIAMINE HYDROCHLORIDE, RIBOFLAVIN-5 PHOSPHATE SODIUM, PYRIDOXINE HYDROCHLORIDE, NIACINAMIDE, DEXPANTHENOL, ALPHA-TOCOPHEROL ACETATE, VITAMIN K1, FOLIC ACID, BIOTIN, CYANOCOBALAMIN: 200; 3300; 200; 6; 3.6; 6; 40; 15; 10; 150; 600; 60; 5 INJECTION, SOLUTION INTRAVENOUS at 17:23

## 2021-12-22 RX ADMIN — METOCLOPRAMIDE 10 MG: 5 INJECTION, SOLUTION INTRAMUSCULAR; INTRAVENOUS at 20:15

## 2021-12-22 RX ADMIN — AMLODIPINE BESYLATE 10 MG: 5 TABLET ORAL at 09:00

## 2021-12-22 RX ADMIN — METOCLOPRAMIDE 10 MG: 5 INJECTION, SOLUTION INTRAMUSCULAR; INTRAVENOUS at 09:00

## 2021-12-22 RX ADMIN — PIPERACILLIN AND TAZOBACTAM 3.38 G: 3; .375 INJECTION, POWDER, LYOPHILIZED, FOR SOLUTION INTRAVENOUS at 05:06

## 2021-12-22 RX ADMIN — SODIUM CHLORIDE, PRESERVATIVE FREE 20 MG: 5 INJECTION INTRAVENOUS at 05:07

## 2021-12-22 RX ADMIN — METOPROLOL TARTRATE 1.25 MG: 1 INJECTION, SOLUTION INTRAVENOUS at 17:32

## 2021-12-22 RX ADMIN — SODIUM CHLORIDE, PRESERVATIVE FREE 10 ML: 5 INJECTION INTRAVENOUS at 05:07

## 2021-12-22 RX ADMIN — METOCLOPRAMIDE 10 MG: 5 INJECTION, SOLUTION INTRAMUSCULAR; INTRAVENOUS at 03:00

## 2021-12-22 RX ADMIN — DEXTROSE MONOHYDRATE, SODIUM CHLORIDE, AND POTASSIUM CHLORIDE 125 ML/HR: 50; 4.5; 1.49 INJECTION, SOLUTION INTRAVENOUS at 05:15

## 2021-12-22 RX ADMIN — NALOXEGOL OXALATE 12.5 MG: 12.5 TABLET, FILM COATED ORAL at 09:00

## 2021-12-22 RX ADMIN — SODIUM CHLORIDE, PRESERVATIVE FREE 20 MG: 5 INJECTION INTRAVENOUS at 17:32

## 2021-12-22 RX ADMIN — METOPROLOL TARTRATE 1.25 MG: 1 INJECTION, SOLUTION INTRAVENOUS at 05:06

## 2021-12-22 RX ADMIN — METOCLOPRAMIDE 10 MG: 5 INJECTION, SOLUTION INTRAMUSCULAR; INTRAVENOUS at 14:01

## 2021-12-22 RX ADMIN — Medication: at 05:48

## 2021-12-22 RX ADMIN — METOPROLOL TARTRATE 1.25 MG: 1 INJECTION, SOLUTION INTRAVENOUS at 12:00

## 2021-12-22 RX ADMIN — PIPERACILLIN AND TAZOBACTAM 3.38 G: 3; .375 INJECTION, POWDER, LYOPHILIZED, FOR SOLUTION INTRAVENOUS at 22:46

## 2021-12-22 RX ADMIN — PIPERACILLIN AND TAZOBACTAM 3.38 G: 3; .375 INJECTION, POWDER, LYOPHILIZED, FOR SOLUTION INTRAVENOUS at 14:00

## 2021-12-22 RX ADMIN — SODIUM CHLORIDE, PRESERVATIVE FREE 10 ML: 5 INJECTION INTRAVENOUS at 22:48

## 2021-12-22 NOTE — PROGRESS NOTES
Comprehensive Nutrition Assessment    Type and Reason for Visit: Reassess    Nutrition Recommendations/Plan:   TPN recommendations:    IF pt remains on only trophic TF rate, recommend advancing TPN but changing the formula as below based on [employer]ix availability:   Option A) Change to D20, 5% AA @ 83mL/h (provides 1753kcals/100gPro/398gDextrose, 5.2mg/kg/min Dextrose Infusion Rate)    Option B) Change to D15, 5% AA @ 83mL/h + 500mL 20% lipids 3 x week (provides 1842kcals/100gPro/298gDextrose, 3.9mg/kg/min Dextrose Infusion Rate)     Continue TF per surgeon, if they desire to advance rate, recommend end Goal Rate of Vital 1.5 @ 60mL/h (provides 2160kcals/97gPro/269gCHO)     Nutrition Assessment:   Chart reviewed, case discussed with Surgical NP Cornelia Palencia. Surgeon inquired about glutamine supplementation. Unfortunately glutamine is not available from the pharmacy, recommended dose would be 30g per day for SBS. We do have oral Bashir on formulary (provide 7g Glutamine per packet, recommended dose for this product is BID). Pt is taking clears but with G tube to gravity so oral intake is not an option. He also has had significant leakage with anything TF above a trophic rate, so plan is to hold off on Bashir. Noted prealbumin 3.4, in pt's inflammatory state though this may not be reliable as a nutrition indicator. Phos WNL the past several days. Pt however was on TPN at 'goal rate' from 12/2 through 12/15. We did see hypophosphatemia on 12/12. I believe pt is likely out of the 'refeeding window' given several days on TPN at goal.  Current TPN + TF provides 22kcals/kg and 1.8gPro/kg. Current TPN formula okay to low rate, however to adequately meet kcal needs the rate required would massively overfeed protein. Provided alternative TPN recommendations above, due to ever changing TPN shortages provided a couple of different options.     Patient Vitals for the past 168 hrs:   % Diet Eaten   12/17/21 0730 1 - 25% 12/16/21 1849 51 - 75%   12/16/21 1200 51 - 75%       Malnutrition Assessment:  Malnutrition Status:  Severe malnutrition    Context:  Chronic illness     Findings of the 6 clinical characteristics of malnutrition:   Energy Intake:  7 - 75% or less est energy requirements for 1 month or longer  Weight Loss:  7.0 - Greater than 7.5% over 3 months     Body Fat Loss:  7 - Severe body fat loss, Triceps,Orbital,Buccal region   Muscle Mass Loss:  1 - Mild muscle mass loss, Clavicles (pectoralis &deltoids),Temples (temporalis),Thigh (quadriceps),Scapula (trapezius)  Fluid Accumulation:  Unable to assess,     Strength:  Not performed         Estimated Daily Nutrient Needs:  Energy (kcal): 2188 (BMR 1489 x 1. 3AF) + 250 kcals; Weight Used for Energy Requirements: Current  Protein (g): 79-105g (1.5-2gPro/kg); Weight Used for Protein Requirements: Current  Fluid (ml/day): 9921-2419 ml/day; Method Used for Fluid Requirements: 1 ml/kcal      Nutrition Related Findings:  Meds: pepcid, humalog, reglan, movantik, zosyn, KCl, D5, [email or web address], dilaudid PCA.   BM 12/22      Wounds:    Surgical incision       Current Nutrition Therapies:  ADULT ORAL NUTRITION SUPPLEMENT Breakfast, Lunch; Clear Liquid  ADULT DIET Clear Liquid  ADULT TUBE FEEDING Jejunostomy; Peptide Based; Delivery Method: Continuous; Continuous Initial Rate (mL/hr): 10; Continuous Advance Tube Feeding: No; Water Flush Volume (mL): 60; Water Flush Frequency: Q 4 hours  TPN ADULT-CENTRAL AA 8% D14% with ELECTROLYTES @ 42mL/h (provides 802kcals/81gPro/141gDextrose)     Anthropometric Measures:  · Height:  5' 9\" (175.3 cm)  · Current Body Wt:  52.7 kg (116 lb 2.9 oz)   · Admission Body Wt:       · Usual Body Wt:        · Ideal Body Wt:  160 lbs:  81.8 %   · Adjusted Body Weight:   ; Weight Adjustment for:     · Adjusted BMI:       · BMI Category:  Underweight (BMI less than 18.5)       Nutrition Diagnosis:   · Inadequate protein-energy intake related to altered GI function as evidenced by NPO or clear liquid status due to medical condition,weight loss (SBO)  Previous dx resolving. Nutrition Interventions:   Food and/or Nutrient Delivery: Continue tube feeding,Modify parenteral nutrition  Nutrition Education and Counseling: No recommendations at this time  Coordination of Nutrition Care: Continue to monitor while inpatient    Goals:  Pt will tolerate nutrition support with no signs/sx of intolerance in 2-4 days. Nutrition Monitoring and Evaluation:   Behavioral-Environmental Outcomes: None identified  Food/Nutrient Intake Outcomes: Food and nutrient intake,Enteral nutrition intake/tolerance,Parenteral nutrition intake/tolerance  Physical Signs/Symptoms Outcomes: Biochemical data,GI status,Weight,Skin    Discharge Planning:     Too soon to determine     Electronically signed by Jaylene Mar RD, [address]  on 12/22/2021 at 1:15 PM    Contact: [phone]

## 2021-12-22 NOTE — PROGRESS NOTES
SURGERY PROGRESS NOTE      Admit Date: 2021    POD 11 Days Post-Op    Procedure: Procedure(s):  OPEN LYSIS OF ADHESIONS, GASTROSTOMY  AND JEJUNAL TUBE PLACEMENT      Subjective:     Patient seen earlier today but note entered later. Patient  has no complaints. Passing flatus. Some issues with tube feeds overnight but, tolerating them well today      Objective:     Visit Vitals  /67   Pulse (!) 122   Temp (!) 102.3 °F (39.1 °C)   Resp 18   Ht 5' 9\" (1.753 m)   Wt 52.7 kg (116 lb 2.9 oz)   SpO2 97%   BMI 17.16 kg/m²        Temp (24hrs), Av.2 °F (37.9 °C), Min:99.2 °F (37.3 °C), Max:102.3 °F (39.1 °C)      No intake/output data recorded.    0701 -  1900  In: Aimee Reina [P.O.:590; I.V.:1300]  Out: 7397 [Urine:1150; Drains:105]    Physical Exam:    General:  cooperative, no distress, appears stated age   Abdomen: soft, bowel sounds active, non-tender   Incision:   healing well, no drainage, no erythema, no hernia, no seroma, no swelling, no dehiscence, incision well approximated           Lab Results   Component Value Date/Time    WBC 8.8 2021 02:19 AM    HGB 9.1 (L) 2021 02:19 AM    HCT 28.7 (L) 2021 02:19 AM    PLATELET 369 (H)  02:19 AM    MCV 96.0 2021 02:19 AM     Lab Results   Component Value Date/Time    GFR est non-AA >60 2021 02:19 AM    GFR est AA >60 2021 02:19 AM    Creatinine 0.50 (L) 2021 02:19 AM    BUN 3 (L) 2021 02:19 AM    Sodium 135 (L) 2021 02:19 AM    Potassium 4.0 2021 02:19 AM    Chloride 102 2021 02:19 AM    CO2 25 2021 02:19 AM    Magnesium 1.8 2021 03:07 AM    Phosphorus 3.2 2021 03:07 AM       Assessment:     Active Problems:    SBO (small bowel obstruction) ([address] (2021)      Aspiration pneumonia ([address] (2021)      Small bowel obstruction ([address] (2021)      Sepsis ([address] (2021)      Abdominal pain, acute (2021)      Intractable cyclical vomiting with nausea (11/27/2021)    Recovering    Has spiked a fever this evening.   Source unclear     Plan:       1) D/C ANDREE  2) blood and urine cultures   3) TPN  4) keep tube feeds at 10cc/hr for another day or 2

## 2021-12-22 NOTE — PROGRESS NOTES
End of Shift Note    Bedside shift change report given to ROSEANNE Lewis (oncoming nurse) by Aquiles Jolley RN (offgoing nurse). Report included the following information SBAR, Kardex, Intake/Output and MAR    Shift worked:  7a-7p     Shift summary and any significant changes:    Pt con't on PCA pump. ANDREE drain d/c'd by NP. NP assessed patient's g tube site b/c pt concerned about it. Minimal drainage from accordion drain. IV in right FA infiltrated, removed. PICC line placed in right brachial for TPN. Up in hallway with PT. Concerns for physician to address:       Zone phone for oncoming shift:          Activity:  Activity Level: Up with Assistance  Number times ambulated in hallways past shift: 1  Number of times OOB to chair past shift: 0    Cardiac:   Cardiac Monitoring: Yes      Cardiac Rhythm: Sinus Tachy    Access:   Current line(s): PIV     Genitourinary:   Urinary status: voiding    Respiratory:   O2 Device: None (Room air)  Chronic home O2 use?: NO  Incentive spirometer at bedside: YES  Actual Volume (ml): 1000 ml  GI:  Last Bowel Movement Date: 12/19/21  Current diet:  ADULT ORAL NUTRITION SUPPLEMENT Breakfast, Lunch; Clear Liquid  DIET ONE TIME MESSAGE  ADULT DIET Clear Liquid  ADULT TUBE FEEDING Jejunostomy; Peptide Based; Delivery Method: Continuous; Continuous Initial Rate (mL/hr): 10; Continuous Advance Tube Feeding: No; Water Flush Volume (mL): 60; Water Flush Frequency: Q 4 hours  TPN ADULT-CENTRAL AA 8% D14% with ELECTROLYTES  Passing flatus: NO  Tolerating current diet: YES       Pain Management:   Patient states pain is manageable on current regimen: YES    Skin:  Russell Score: 17  Interventions: float heels and increase time out of bed    Patient Safety:  Fall Score:  Total Score: 3  Interventions: gripper socks and pt to call before getting OOB  High Fall Risk: Yes    Length of Stay:  Expected LOS: 9d 14h  Actual LOS: 25      Aquiles Jolley, SAMUEL

## 2021-12-22 NOTE — PROGRESS NOTES
End of Shift Note    Bedside shift change report given to, LPN (oncoming nurse) by Yolanda Camacho RN (offgoing nurse). Report included the following information SBAR, Kardex, Intake/Output and MAR    Shift worked:  7a-7p     Shift summary and any significant changes:    Pt con't on PCA pump. Feeding via j tube continues. G tube draining to gravity. Minimal, scant drainage from accordion drain. IV on left arm infiltrated, removed. MD and NP in to assess patient. Providers aware of patient's temp and lower BP, bp increased today to normal limits, temp lowered to 99.5. Concerns for physician to address:       Zone phone for oncoming shift:          Activity:  Activity Level: Up with Assistance  Number times ambulated in hallways past shift:   Number of times OOB to chair past shift: 0    Cardiac:   Cardiac Monitoring: Yes      Cardiac Rhythm: Sinus Tachy    Access:   Current line(s): PIV     Genitourinary:   Urinary status: voiding    Respiratory:   O2 Device: None (Room air)  Chronic home O2 use?: NO  Incentive spirometer at bedside: YES  Actual Volume (ml): 1000 ml  GI:  Last Bowel Movement Date: 12/19/21  Current diet:  ADULT ORAL NUTRITION SUPPLEMENT Breakfast, Lunch; Clear Liquid  ADULT DIET Clear Liquid  ADULT TUBE FEEDING Jejunostomy; Peptide Based; Delivery Method: Continuous; Continuous Initial Rate (mL/hr): 10; Continuous Advance Tube Feeding: No; Water Flush Volume (mL): 60; Water Flush Frequency: Q 4 hours  TPN ADULT-CENTRAL AA 8% D14% with ELECTROLYTES  TPN ADULT-CENTRAL AA 8% D14% with ELECTROLYTES  Passing flatus: NO  Tolerating current diet: YES       Pain Management:   Patient states pain is manageable on current regimen: YES    Skin:  Russell Score: 17  Interventions: float heels and increase time out of bed    Patient Safety:  Fall Score:  Total Score: 3  Interventions: gripper socks and pt to call before getting OOB  High Fall Risk: Yes    Length of Stay:  Expected LOS: 9d 14h  Actual LOS: [id number] [place] [address],

## 2021-12-22 NOTE — PROGRESS NOTES
End of Shift Note    Bedside shift change report given to Ez Majano RN(oncoming nurse) by Tanya Gilliland (offgoing nurse). Report included the following information SBAR, Kardex, Intake/Output, MAR and Recent Results    Shift worked:  8392-2535     Shift summary and any significant changes:    Patient had fever of 102.3 Blood Cultures\Urine culture and Resp\Covid PCR sent. X-Ray of chest was completed. Patient's temp is now  99.8 and HR is 98. Concerns for physician to address:       Zone phone for oncoming shift:   [phone]       Activity:  Activity Level: Up with Assistance  Number times ambulated in hallways past shift: 0  Number of times OOB to chair past shift: 0    Cardiac:   Cardiac Monitoring: Yes      Cardiac Rhythm: Sinus Tachy    Access:   Current line(s): PICC     Genitourinary:   Urinary status: voiding    Respiratory:   O2 Device: None (Room air)  Chronic home O2 use?: N/A  Incentive spirometer at bedside: YES  Actual Volume (ml): 1000 ml  GI:  Last Bowel Movement Date: 12/19/21  Current diet:  ADULT ORAL NUTRITION SUPPLEMENT Breakfast, Lunch; Clear Liquid  DIET ONE TIME MESSAGE  ADULT DIET Clear Liquid  ADULT TUBE FEEDING Jejunostomy; Peptide Based; Delivery Method: Continuous; Continuous Initial Rate (mL/hr): 10; Continuous Advance Tube Feeding: No; Water Flush Volume (mL): 60; Water Flush Frequency: Q 4 hours  TPN ADULT-CENTRAL AA 8% D14% with ELECTROLYTES  Passing flatus: YES  Tolerating current diet: YES       Pain Management:   Patient states pain is manageable on current regimen: YES    Skin:  Russell Score: 17  Interventions: increase time out of bed and nutritional support     Patient Safety:  Fall Score:  Total Score: 3  Interventions: gripper socks and pt to call before getting OOB  High Fall Risk: Yes    Length of Stay:  Expected LOS: 9d 14h  Actual LOS: [address] [facility] 69 LPN

## 2021-12-22 NOTE — PROGRESS NOTES
SURGERY PROGRESS NOTE      Admit Date: 2021    POD 12 Days Post-Op    Procedure: Procedure(s):  OPEN LYSIS OF ADHESIONS, GASTROSTOMY  AND JEJUNAL TUBE PLACEMENT      Subjective:     Patient denies pain. Passing flatus. He states with no control. Denies nausea.   Tolerating clear liquids     G-tube output minimal       Objective:     Visit Vitals  BP 96/60   Pulse (!) 105   Temp 100.4 °F (38 °C)   Resp 17   Ht 5' 9\" (1.753 m)   Wt 52.7 kg (116 lb 2.9 oz)   SpO2 99%   BMI 17.16 kg/m²        Temp (24hrs), Av.8 °F (38.2 °C), Min:99.5 °F (37.5 °C), Max:102.3 °F (39.1 °C)      701 - 1900  In: -   Out: 450 [Urine:450]  1901 - 700  In: 450 [P.O.:450]  Out: 9080 [Urine:1550; Drains:80]    Physical Exam:    General:  alert, cooperative, no distress, appears stated age   Abdomen: soft, bowel sounds active, non-tender   Incision:   healing well, no drainage, no erythema, no hernia, no seroma, no swelling, no dehiscence, incision well approximated           Lab Results   Component Value Date/Time    WBC 8.8 2021 02:19 AM    HGB 9.1 (L) 2021 02:19 AM    HCT 28.7 (L) 2021 02:19 AM    PLATELET 376 (H)  02:19 AM    MCV 96.0 2021 02:19 AM     Lab Results   Component Value Date/Time    GFR est non-AA >60 2021 02:19 AM    GFR est AA >60 2021 02:19 AM    Creatinine 0.50 (L) 2021 02:19 AM    BUN 3 (L) 2021 02:19 AM    Sodium 135 (L) 2021 02:19 AM    Potassium 4.0 2021 02:19 AM    Chloride 102 2021 02:19 AM    CO2 25 2021 02:19 AM    Magnesium 1.8 2021 04:01 AM    Phosphorus 3.1 2021 04:01 AM       Assessment:     Active Problems:    SBO (small bowel obstruction) ([address] (2021)      Aspiration pneumonia ([address] (2021)      Small bowel obstruction ([address] (2021)      Sepsis ([address] (2021)      Abdominal pain, acute (2021)      Intractable cyclical vomiting with nausea (11/27/2021)    stable. No further fevers. Plan:       1.  follow up cultures   2. Continue trophic feeds  3. Continue clear liquids  4. TPN   5.   Continue antibiotics

## 2021-12-22 NOTE — PROGRESS NOTES
Problem: Falls - Risk of  Goal: *Absence of Falls  Description: Document Rizwan Torress Fall Risk and appropriate interventions in the flowsheet.   Outcome: Progressing Towards Goal  Note: Fall Risk Interventions:  Mobility Interventions: Communicate number of staff needed for ambulation/transfer         Medication Interventions: Patient to call before getting OOB    Elimination Interventions: Call light in reach    History of Falls Interventions: Consult care management for discharge planning,Utilize gait belt for transfer/ambulation

## 2021-12-23 LAB
ALBUMIN SERPL-MCNC: 1.6 G/DL (ref 3.5–5)
ALBUMIN/GLOB SERPL: 0.4 {RATIO} (ref 1.1–2.2)
ALP SERPL-CCNC: 97 U/L (ref 45–117)
ALT SERPL-CCNC: 82 U/L (ref 12–78)
ANION GAP SERPL CALC-SCNC: 9 MMOL/L (ref 5–15)
AST SERPL-CCNC: 41 U/L (ref 15–37)
BACTERIA SPEC CULT: NORMAL
BILIRUB SERPL-MCNC: 0.6 MG/DL (ref 0.2–1)
BUN SERPL-MCNC: 12 MG/DL (ref 6–20)
BUN/CREAT SERPL: 15 (ref 12–20)
CALCIUM SERPL-MCNC: 7.9 MG/DL (ref 8.5–10.1)
CHLORIDE SERPL-SCNC: 96 MMOL/L (ref 97–108)
CK MB CFR SERPL CALC: ABNORMAL % (ref 0–2.5)
CK MB SERPL-MCNC: <1 NG/ML (ref 5–25)
CK SERPL-CCNC: 23 U/L (ref 39–308)
CO2 SERPL-SCNC: 25 MMOL/L (ref 21–32)
CREAT SERPL-MCNC: 0.8 MG/DL (ref 0.7–1.3)
ERYTHROCYTE [DISTWIDTH] IN BLOOD BY AUTOMATED COUNT: 17.2 % (ref 11.5–14.5)
GLOBULIN SER CALC-MCNC: 4.3 G/DL (ref 2–4)
GLUCOSE BLD STRIP.AUTO-MCNC: 115 MG/DL (ref 65–117)
GLUCOSE BLD STRIP.AUTO-MCNC: 142 MG/DL (ref 65–117)
GLUCOSE BLD STRIP.AUTO-MCNC: 172 MG/DL (ref 65–117)
GLUCOSE BLD STRIP.AUTO-MCNC: 173 MG/DL (ref 65–117)
GLUCOSE BLD STRIP.AUTO-MCNC: 94 MG/DL (ref 65–117)
GLUCOSE SERPL-MCNC: 435 MG/DL (ref 65–100)
HCT VFR BLD AUTO: 26.2 % (ref 36.6–50.3)
HGB BLD-MCNC: 8.2 G/DL (ref 12.1–17)
MAGNESIUM SERPL-MCNC: 1.8 MG/DL (ref 1.6–2.4)
MCH RBC QN AUTO: 29.3 PG (ref 26–34)
MCHC RBC AUTO-ENTMCNC: 31.3 G/DL (ref 30–36.5)
MCV RBC AUTO: 93.6 FL (ref 80–99)
NRBC # BLD: 0 K/UL (ref 0–0.01)
NRBC BLD-RTO: 0 PER 100 WBC
PHOSPHATE SERPL-MCNC: 2.9 MG/DL (ref 2.6–4.7)
PLATELET # BLD AUTO: 717 K/UL (ref 150–400)
PMV BLD AUTO: 9.2 FL (ref 8.9–12.9)
POTASSIUM SERPL-SCNC: 4.3 MMOL/L (ref 3.5–5.1)
PROT SERPL-MCNC: 5.9 G/DL (ref 6.4–8.2)
RBC # BLD AUTO: 2.8 M/UL (ref 4.1–5.7)
SERVICE CMNT-IMP: ABNORMAL
SERVICE CMNT-IMP: NORMAL
SODIUM SERPL-SCNC: 130 MMOL/L (ref 136–145)
TROPONIN-HIGH SENSITIVITY: 13 NG/L (ref 0–76)
WBC # BLD AUTO: 4.9 K/UL (ref 4.1–11.1)

## 2021-12-23 PROCEDURE — 74011000250 HC RX REV CODE- 250: Performed by: NURSE PRACTITIONER

## 2021-12-23 PROCEDURE — 74011250636 HC RX REV CODE- 250/636: Performed by: SURGERY

## 2021-12-23 PROCEDURE — 74011000250 HC RX REV CODE- 250: Performed by: SURGERY

## 2021-12-23 PROCEDURE — 74011250637 HC RX REV CODE- 250/637: Performed by: SURGERY

## 2021-12-23 PROCEDURE — 65660000000 HC RM CCU STEPDOWN

## 2021-12-23 PROCEDURE — 74011000258 HC RX REV CODE- 258: Performed by: SURGERY

## 2021-12-23 PROCEDURE — 85027 COMPLETE CBC AUTOMATED: CPT

## 2021-12-23 PROCEDURE — 82962 GLUCOSE BLOOD TEST: CPT

## 2021-12-23 PROCEDURE — 80053 COMPREHEN METABOLIC PANEL: CPT

## 2021-12-23 PROCEDURE — 84484 ASSAY OF TROPONIN QUANT: CPT

## 2021-12-23 PROCEDURE — 99024 POSTOP FOLLOW-UP VISIT: CPT | Performed by: SURGERY

## 2021-12-23 PROCEDURE — 36415 COLL VENOUS BLD VENIPUNCTURE: CPT

## 2021-12-23 PROCEDURE — 84100 ASSAY OF PHOSPHORUS: CPT

## 2021-12-23 PROCEDURE — 83735 ASSAY OF MAGNESIUM: CPT

## 2021-12-23 PROCEDURE — 82550 ASSAY OF CK (CPK): CPT

## 2021-12-23 RX ORDER — HYDROCODONE BITARTRATE AND ACETAMINOPHEN 7.5; 325 MG/15ML; MG/15ML
7.5 SOLUTION ORAL
Status: DISCONTINUED | OUTPATIENT
Start: 2021-12-23 | End: 2022-01-11 | Stop reason: HOSPADM

## 2021-12-23 RX ADMIN — PIPERACILLIN AND TAZOBACTAM 3.38 G: 3; .375 INJECTION, POWDER, LYOPHILIZED, FOR SOLUTION INTRAVENOUS at 23:23

## 2021-12-23 RX ADMIN — SODIUM CHLORIDE, PRESERVATIVE FREE 20 MG: 5 INJECTION INTRAVENOUS at 06:11

## 2021-12-23 RX ADMIN — PIPERACILLIN AND TAZOBACTAM 3.38 G: 3; .375 INJECTION, POWDER, LYOPHILIZED, FOR SOLUTION INTRAVENOUS at 14:16

## 2021-12-23 RX ADMIN — SODIUM CHLORIDE 200 MG: 9 INJECTION, SOLUTION INTRAVENOUS at 10:04

## 2021-12-23 RX ADMIN — NALOXEGOL OXALATE 12.5 MG: 12.5 TABLET, FILM COATED ORAL at 08:15

## 2021-12-23 RX ADMIN — METOPROLOL TARTRATE 1.25 MG: 1 INJECTION, SOLUTION INTRAVENOUS at 18:21

## 2021-12-23 RX ADMIN — ASCORBIC ACID, VITAMIN A PALMITATE, CHOLECALCIFEROL, THIAMINE HYDROCHLORIDE, RIBOFLAVIN-5 PHOSPHATE SODIUM, PYRIDOXINE HYDROCHLORIDE, NIACINAMIDE, DEXPANTHENOL, ALPHA-TOCOPHEROL ACETATE, VITAMIN K1, FOLIC ACID, BIOTIN, CYANOCOBALAMIN: 200; 3300; 200; 6; 3.6; 6; 40; 15; 10; 150; 600; 60; 5 INJECTION, SOLUTION INTRAVENOUS at 18:18

## 2021-12-23 RX ADMIN — SODIUM CHLORIDE, PRESERVATIVE FREE 20 MG: 5 INJECTION INTRAVENOUS at 18:21

## 2021-12-23 RX ADMIN — ACETAMINOPHEN 650 MG: 325 TABLET ORAL at 05:11

## 2021-12-23 RX ADMIN — PIPERACILLIN AND TAZOBACTAM 3.38 G: 3; .375 INJECTION, POWDER, LYOPHILIZED, FOR SOLUTION INTRAVENOUS at 06:15

## 2021-12-23 RX ADMIN — DEXTROSE MONOHYDRATE, SODIUM CHLORIDE, AND POTASSIUM CHLORIDE 125 ML/HR: 50; 4.5; 1.49 INJECTION, SOLUTION INTRAVENOUS at 14:16

## 2021-12-23 RX ADMIN — METOCLOPRAMIDE 10 MG: 5 INJECTION, SOLUTION INTRAMUSCULAR; INTRAVENOUS at 16:43

## 2021-12-23 RX ADMIN — METOCLOPRAMIDE 10 MG: 5 INJECTION, SOLUTION INTRAMUSCULAR; INTRAVENOUS at 03:08

## 2021-12-23 RX ADMIN — SODIUM CHLORIDE, PRESERVATIVE FREE 10 ML: 5 INJECTION INTRAVENOUS at 06:12

## 2021-12-23 RX ADMIN — METOCLOPRAMIDE 10 MG: 5 INJECTION, SOLUTION INTRAMUSCULAR; INTRAVENOUS at 23:23

## 2021-12-23 RX ADMIN — ACETAMINOPHEN 650 MG: 325 TABLET ORAL at 18:21

## 2021-12-23 RX ADMIN — SODIUM CHLORIDE, PRESERVATIVE FREE 10 ML: 5 INJECTION INTRAVENOUS at 14:00

## 2021-12-23 RX ADMIN — SODIUM CHLORIDE, PRESERVATIVE FREE 10 ML: 5 INJECTION INTRAVENOUS at 00:20

## 2021-12-23 RX ADMIN — METOCLOPRAMIDE 10 MG: 5 INJECTION, SOLUTION INTRAMUSCULAR; INTRAVENOUS at 08:15

## 2021-12-23 RX ADMIN — METOPROLOL TARTRATE 1.25 MG: 1 INJECTION, SOLUTION INTRAVENOUS at 00:19

## 2021-12-23 RX ADMIN — MORPHINE SULFATE 1 MG: 2 INJECTION, SOLUTION INTRAMUSCULAR; INTRAVENOUS at 08:05

## 2021-12-23 RX ADMIN — AMLODIPINE BESYLATE 10 MG: 5 TABLET ORAL at 08:15

## 2021-12-23 RX ADMIN — METOPROLOL TARTRATE 1.25 MG: 1 INJECTION, SOLUTION INTRAVENOUS at 14:16

## 2021-12-23 RX ADMIN — METOPROLOL TARTRATE 1.25 MG: 1 INJECTION, SOLUTION INTRAVENOUS at 06:11

## 2021-12-23 RX ADMIN — SODIUM CHLORIDE, PRESERVATIVE FREE 10 ML: 5 INJECTION INTRAVENOUS at 23:24

## 2021-12-23 NOTE — PROGRESS NOTES
Transition of Care Plan:     RUR:   17% \"mod risk\"  Disposition:[facility] vs Home w/[facility] HH-PT,OT&SN & Freddy Vital for TPN  Follow up appointments: on AVS  DME needed: None  Transportation at [place] [address] or means to access home:  Yes  IM Medicare Letter: N/A  Is patient a BCPI-A Bundle:   No                   If yes, was Bundle Letter given?:   N/A  Caregiver Contact: Mother- Ajay Rojas, [phone]  Discharge Caregiver contacted prior to discharge? Referral was sent to [facility] and accepted. Patient is expected to be ready for d/c next week. [facility] intends to start insurance Auth after Christmas. CM will continue to follow.     Napoleon Viera  Ext [phone]

## 2021-12-23 NOTE — PROGRESS NOTES
Rapid response was called around 0840. Patients MEWs score was a 7 due to HR being 122, temp 103.2 oral, and RR 22. Patient also said he had some chest pain. EKG was done along with labs (troponin, CBC, CMP). Dr. Rupert Garrett came by afterwards and assessed the patient as well.

## 2021-12-23 NOTE — PROGRESS NOTES
PT visit attempted pt with increased HR and a fever at this time. Will defer and continue to follow.

## 2021-12-23 NOTE — PROGRESS NOTES
End of Shift Note    Bedside shift change report given to [facility] (oncoming nurse) by Jelly Prabhakar RN (offgoing nurse). Report included the following information SBAR, Kardex, ED Summary, Procedure Summary, Intake/Output, MAR and Recent Results    Shift worked:  7a-7p     Shift summary and any significant changes:     Rapid was called on the patient this morning due to a MEWs score of a 7 and chest pain. Troponin was normal. Chest pain went away after rapid. Patient has been tachy on the monitor all day (110s-120s). Patient has had a fever all shift but it has gone down since this morning. Patient appears lethargic and has not had energy to anything. Tube feeding was increased to 20 mL per hour. Concerns for physician to address:  temp and HR     Zone phone for oncoming shift:   [phone]       Activity:  Activity Level:  Up with Assistance  Number times ambulated in hallways past shift: 0  Number of times OOB to chair past shift: 0    Cardiac:   Cardiac Monitoring: Yes      Cardiac Rhythm: Sinus Tachy    Access:   Current line(s): PICC     Genitourinary:   Urinary status: voiding    Respiratory:   O2 Device: None (Room air)  Chronic home O2 use?: NO  Incentive spirometer at bedside: YES  Actual Volume (ml): 1000 ml  GI:  Last Bowel Movement Date: 12/22/21  Current diet:  ADULT ORAL NUTRITION SUPPLEMENT Breakfast, Lunch; Clear Liquid  ADULT DIET Clear Liquid  TPN ADULT-CENTRAL AA 8% D14% with ELECTROLYTES  ADULT TUBE FEEDING Jejunostomy; Peptide Based; Delivery Method: Continuous; Continuous Initial Rate (mL/hr): 20; Continuous Advance Tube Feeding: No; Water Flush Volume (mL): 60; Water Flush Frequency: Q 4 hours  TPN ADULT - CENTRAL AA 5% D15% W/ CA + ELECTROLYTES  Passing flatus: YES  Tolerating current diet: YES       Pain Management:   Patient states pain is manageable on current regimen: YES    Skin:  Russell Score: 17  Interventions: increase time out of bed and PT/OT consult    Patient Safety:  Fall Score: Total Score: 3  Interventions: gripper socks, pt to call before getting OOB and stay with me (per policy)  High Fall Risk: Yes    Length of Stay:  Expected LOS: 9d 14h  Actual LOS: [address], RN

## 2021-12-23 NOTE — PROGRESS NOTES
Rapid response chest pain called, arrived after EKG was completed. EKG noted, no MD has arrived at present. Lab orders entered per protocol. Patient is febrile, blood cultures in 2 days still without growth. WBC not high on 12/20, CBC, Troponin and CPK orders entered per protocol and drawn by RN. Review of EKG still needed, encouraged RN on unit to perfect serve due to my coverage is for CCU.

## 2021-12-23 NOTE — PROGRESS NOTES
Admit Date: 2021      POD 13 Days Post-Op  12/10/2021      Procedure:  Procedure(s):  OPEN LYSIS OF ADHESIONS, GASTROSTOMY  AND JEJUNAL TUBE PLACEMENT        HOSPITAL DAY:     ANTIBIOTICS: Zosyn, and Eraxis    HPI:  Patient feeling somewhat weak but tolerating jejunostomy tube feedings smaller amount out per gastrostomy tube, white blood cell count normal, but still running fevers despite negative work-up previously  Chest x-ray unremarkable CT scan drain placed right lower abdomen but showed only serous fluid and cultures blood have been negative. PICC line has been removed and replaced. Patient on TPN  Review of Systems  Fatigue but minimal abdominal pain and no nausea or vomiting    Temp:  [98.9 °F (37.2 °C)-103.2 °F (39.6 °C)]   Pulse (Heart Rate):  []   BP: ()/(60-76)   Resp Rate:  [17-26]   O2 Sat (%):  [96 %-100 %]   Weight:  [50.8 kg (112 lb)]      Blood pressure 99/63, pulse (!) 126, temperature (!) 101.2 °F (38.4 °C), resp. rate 22, height 5' 9\" (1.753 m), weight 50.8 kg (112 lb), SpO2 99 %.     Temp (24hrs), Av.4 °F (38.6 °C), Min:99.4 °F (37.4 °C), Max:103.2 °F (39.6 °C)      Recent Results (from the past 48 hour(s))   GLUCOSE, POC    Collection Time: 21  1:00 PM   Result Value Ref Range    Glucose (POC) 92 65 - 117 mg/dL    Performed by Blanco Chow PCT    URINALYSIS W/ REFLEX CULTURE    Collection Time: 21  9:55 PM    Specimen: Urine   Result Value Ref Range    Color YELLOW/STRAW      Appearance CLEAR CLEAR      Specific gravity 1.012 1.003 - 1.030      pH (UA) 6.0 5.0 - 8.0      Protein Negative NEG mg/dL    Glucose Negative NEG mg/dL    Ketone Negative NEG mg/dL    Bilirubin Negative NEG      Blood Negative NEG      Urobilinogen 0.2 0.2 - 1.0 EU/dL    Nitrites Negative NEG      Leukocyte Esterase Negative NEG      WBC 0-4 0 - 4 /hpf    RBC 0-5 0 - 5 /hpf    Epithelial cells FEW FEW /lpf    Bacteria Negative NEG /hpf    UA:UC IF INDICATED CULTURE NOT INDICATED BY UA RESULT CNI     CULTURE, BLOOD    Collection Time: 12/21/21 10:55 PM    Specimen: Blood   Result Value Ref Range    Special Requests: NO SPECIAL REQUESTS      Culture result: NO GROWTH 2 DAYS     RESPIRATORY VIRUS PANEL W/COVID-19, PCR    Collection Time: 12/21/21 10:55 PM    Specimen: Nasopharyngeal   Result Value Ref Range    Adenovirus Not detected NOTD      Coronavirus 229E Not detected NOTD      Coronavirus HKU1 Not detected NOTD      Coronavirus CVNL63 Not detected NOTD      Coronavirus OC43 Not detected NOTD      SARS-CoV-2, PCR Not detected NOTD      Metapneumovirus Not detected NOTD      Rhinovirus and Enterovirus Not detected NOTD      Influenza A Not detected NOTD      Influenza A, subtype H1 Not detected NOTD      Influenza A, subtype H3 Not detected NOTD      INFLUENZA A H1N1 PCR Not detected NOTD      Influenza B Not detected NOTD      Parainfluenza 1 Not detected NOTD      Parainfluenza 2 Not detected NOTD      Parainfluenza 3 Not detected NOTD      Parainfluenza virus 4 Not detected NOTD      RSV by PCR Not detected NOTD      B. parapertussis, PCR Not detected NOTD      Bordetella pertussis - PCR Not detected NOTD      Chlamydophila pneumoniae DNA, QL, PCR Not detected NOTD      Mycoplasma pneumoniae DNA, QL, PCR Not detected NOTD     GLUCOSE, POC    Collection Time: 12/21/21 11:26 PM   Result Value Ref Range    Glucose (POC) 110 65 - 117 mg/dL    Performed by Mariella Merlin (PCT)    MAGNESIUM    Collection Time: 12/22/21  4:01 AM   Result Value Ref Range    Magnesium 1.8 1.6 - 2.4 mg/dL   PHOSPHORUS    Collection Time: 12/22/21  4:01 AM   Result Value Ref Range    Phosphorus 3.1 2.6 - 4.7 MG/DL   GLUCOSE, POC    Collection Time: 12/22/21  6:18 AM   Result Value Ref Range    Glucose (POC) 127 (H) 65 - 117 mg/dL    Performed by Mariella Merlin (PCT)    GLUCOSE, POC    Collection Time: 12/22/21 11:46 AM   Result Value Ref Range    Glucose (POC) 114 65 - 117 mg/dL    Performed by Thresea Runner Sawyer PCT    GLUCOSE, POC    Collection Time: 12/22/21  5:41 PM   Result Value Ref Range    Glucose (POC) 113 65 - 117 mg/dL    Performed by Rebecca Quinonez PCT    GLUCOSE, POC    Collection Time: 12/23/21 12:15 AM   Result Value Ref Range    Glucose (POC) 94 65 - 117 mg/dL    Performed by Jazmyn Nguyen PCT    MAGNESIUM    Collection Time: 12/23/21  3:05 AM   Result Value Ref Range    Magnesium 1.8 1.6 - 2.4 mg/dL   PHOSPHORUS    Collection Time: 12/23/21  3:05 AM   Result Value Ref Range    Phosphorus 2.9 2.6 - 4.7 MG/DL   GLUCOSE, POC    Collection Time: 12/23/21  6:04 AM   Result Value Ref Range    Glucose (POC) 115 65 - 117 mg/dL    Performed by Jazmyn Nguyen [facility]    TROPONIN-HIGH SENSITIVITY    Collection Time: 12/23/21  8:45 AM   Result Value Ref Range    Troponin-High Sensitivity 13 0 - 76 ng/L   CK W/ CKMB & INDEX    Collection Time: 12/23/21  8:45 AM   Result Value Ref Range    CK - MB <1.0 <3.6 NG/ML    CK-MB Index Cannot be calculated 0.0 - 2.5      CK 23 (L) 39 - 308 U/L   CBC W/O DIFF    Collection Time: 12/23/21  8:47 AM   Result Value Ref Range    WBC 4.9 4.1 - 11.1 K/uL    RBC 2.80 (L) 4.10 - 5.70 M/uL    HGB 8.2 (L) 12.1 - 17.0 g/dL    HCT 26.2 (L) 36.6 - 50.3 %    MCV 93.6 80.0 - 99.0 FL    MCH 29.3 26.0 - 34.0 PG    MCHC 31.3 30.0 - 36.5 g/dL    RDW 17.2 (H) 11.5 - 14.5 %    PLATELET 061 (H) 017 - 400 K/uL    MPV 9.2 8.9 - 12.9 FL    NRBC 0.0 0  WBC    ABSOLUTE NRBC 0.00 0.00 - 8.39 K/uL   METABOLIC PANEL, COMPREHENSIVE    Collection Time: 12/23/21 10:05 AM   Result Value Ref Range    Sodium 130 (L) 136 - 145 mmol/L    Potassium 4.3 3.5 - 5.1 mmol/L    Chloride 96 (L) 97 - 108 mmol/L    CO2 25 21 - 32 mmol/L    Anion gap 9 5 - 15 mmol/L    Glucose 435 (H) 65 - 100 mg/dL    BUN 12 6 - 20 MG/DL    Creatinine 0.80 0.70 - 1.30 MG/DL    BUN/Creatinine ratio 15 12 - 20      GFR est AA >60 >60 ml/min/1.73m2    GFR est non-AA >60 >60 ml/min/1.73m2    Calcium 7.9 (L) 8.5 - 10.1 MG/DL Bilirubin, total 0.6 0.2 - 1.0 MG/DL    ALT (SGPT) 82 (H) 12 - 78 U/L    AST (SGOT) 41 (H) 15 - 37 U/L    Alk. phosphatase 97 45 - 117 U/L    Protein, total 5.9 (L) 6.4 - 8.2 g/dL    Albumin 1.6 (L) 3.5 - 5.0 g/dL    Globulin 4.3 (H) 2.0 - 4.0 g/dL    A-G Ratio 0.4 (L) 1.1 - 2.2     GLUCOSE, POC    Collection Time: 12/23/21 12:33 PM   Result Value Ref Range    Glucose (POC) 142 (H) 65 - 117 mg/dL    Performed by Telma Nunez PCT          XR Results (maximum last 3): Results from [place] encounter on 11/25/21    XR CHEST PORT    Impression  No significant interval change. CT Results (maximum last 3): Results from [place] encounter on 11/25/21    CT ABD PELV W CONT    Impression  Right lower quadrant abscess. Minimal increase in pneumoperitoneum. This result was called by me at 1706 hours to [place], [facility]      MRI Results (maximum last 3): No results found for this or any previous visit. Nuclear Medicine Results (maximum last 3): Results from [place] encounter on 11/11/21    NM GASTRIC EMPTY STDY    Impression  Abnormal Nuclear Gastric Emptying Study. US Results (maximum last 3): Results from [place] encounter on 11/25/21    US GUIDE NDL ASP  DRAIN    Impression  Ultrasound-guided 10 Romanian right lower quadrant abscess drain placement      Physical Exam  Patient alert awake appears tired, abdomen soft retention sutures in place midline wound okay, jejunostomy tube in place right lower abdominal drain serous brown nonbilious fluid minimal small amount of gastrostomy tube drainage around the tube and minimal gastrostomy drainage in the bag.     Active Problems:    SBO (small bowel obstruction) ([address] (11/22/2021)      Aspiration pneumonia ([address] (11/25/2021)      Small bowel obstruction ([address] (11/25/2021)      Sepsis ([address] (11/25/2021)      Abdominal pain, acute (11/27/2021)      Intractable cyclical vomiting with nausea (11/27/2021)          ASSESSMENT/PLAN  Ileus with chronic intestinal dysmotility. Will wean basal off of the PCA pump, renew TPN, advance tube feedings slowly, jejunostomy tube hydrocodone as needed, have added Eraxis for antifungal for the fever, if not improving will consider infectious disease consult.       FACE TO FACE time including review of any indicated imaging, discussion with patient, and other providers, exam and discussion with patient:   25    minutes    END:

## 2021-12-23 NOTE — PROGRESS NOTES
Occupational Therapy Treatment Attempt    Chart reviewed. Attempted Occupational Therapy Treatment, however, patient unable to be seen due to:  []  Nausea/vomiting  []  Eating  []  Pain  []  Patient too lethargic  []  Off Unit for testing/procedure  []  Dialysis treatment in progress  [x]  Telemetry Results: noted pt w/ CP w/ EKG this a.m., elevated fever and awaiting MD to read EKG. []  Other:     Will f/u later as patient's schedule allows & pt medically stable.    Thank you for this referral.  [place], OTR/L, CSRS, CDCS, CFPS, CGPS

## 2021-12-23 NOTE — PROGRESS NOTES
Problem: Falls - Risk of  Goal: *Absence of Falls  Description: Document Elaine Ground Fall Risk and appropriate interventions in the flowsheet. Outcome: Progressing Towards Goal  Note: Fall Risk Interventions:  Mobility Interventions: Communicate number of staff needed for ambulation/transfer,Patient to call before getting OOB         Medication Interventions: Patient to call before getting OOB,Teach patient to arise slowly    Elimination Interventions: Call light in reach,Patient to call for help with toileting needs,Stay With Me (per policy),Urinal in reach    History of Falls Interventions: Consult care management for discharge planning,Room close to nurse's station         Problem: Patient Education: Go to Patient Education Activity  Goal: Patient/Family Education  Outcome: Progressing Towards Goal     Problem: Pressure Injury - Risk of  Goal: *Prevention of pressure injury  Description: Document Russell Scale and appropriate interventions in the flowsheet.   Outcome: Progressing Towards Goal  Note: Pressure Injury Interventions:  Sensory Interventions: Assess changes in LOC,Keep linens dry and wrinkle-free,Maintain/enhance activity level,Minimize linen layers    Moisture Interventions: Absorbent underpads,Maintain skin hydration (lotion/cream),Minimize layers    Activity Interventions: Increase time out of bed    Mobility Interventions: HOB 30 degrees or less    Nutrition Interventions: Document food/fluid/supplement intake    Friction and Shear Interventions: HOB 30 degrees or less,Minimize layers                Problem: Patient Education: Go to Patient Education Activity  Goal: Patient/Family Education  Outcome: Progressing Towards Goal     Problem: Pain  Goal: *Control of Pain  Outcome: Progressing Towards Goal     Problem: Patient Education: Go to Patient Education Activity  Goal: Patient/Family Education  Outcome: Progressing Towards Goal     Problem: Infection - Risk of, Central Venous Catheter-Associated Bloodstream Infection  Goal: *Absence of infection signs and symptoms  Outcome: Progressing Towards Goal     Problem: Patient Education: Go to Patient Education Activity  Goal: Patient/Family Education  Outcome: Progressing Towards Goal     Problem: General Infection Care Plan (Adult and Pediatric)  Goal: Improvement in signs and symptoms of infection  Outcome: Progressing Towards Goal  Goal: *Optimize nutritional status  Outcome: Progressing Towards Goal     Problem: Patient Education: Go to Patient Education Activity  Goal: Patient/Family Education  Outcome: Progressing Towards Goal     Problem: Patient Education: Go to Patient Education Activity  Goal: Patient/Family Education  Outcome: Progressing Towards Goal

## 2021-12-23 NOTE — PROGRESS NOTES
6713: called on-call service for Dr. Agnes Olivo regarding pt and pt's fever. Pt's fever went to 102.7 roughly 1 hour after taking Tylenol for a fever of 100.3. 0631: callback received from Dr. Agnes Olivo, no orders given. End of Shift Note    Bedside shift change report given to Jeff Moser RN (oncoming nurse) by Kya Goyal LPN (offgoing nurse). Report included the following information SBAR, Kardex, Intake/Output, MAR, Recent Results, Med Rec Status and Cardiac Rhythm Sinus Tachycardia    Shift worked:  7t-200a     Shift summary and any significant changes:     Pt had uneventful night. Pt has PCA pump prn pain. G-tube draining. Accordion draining fine. Pt ran temps in the 99's. This morning, temp reached 100.3, 650mg tylenol given, rechecked temp 1hr later and temp went to 102.7-Dr. Agnes Olivo called and informed. Concerns for physician to address:      Zone phone for oncoming shift:   [phone]       Activity:  Activity Level:  Up with Assistance  Number times ambulated in hallways past shift: 0  Number of times OOB to chair past shift: 0    Cardiac:   Cardiac Monitoring: Yes      Cardiac Rhythm: Sinus Tachy    Access:   Current line(s): PICC     Genitourinary:   Urinary status: voiding    Respiratory:   O2 Device: None (Room air)  Chronic home O2 use?: NO  Incentive spirometer at bedside: YES  Actual Volume (ml): 1000 ml  GI:  Last Bowel Movement Date: 12/22/21  Current diet:  ADULT ORAL NUTRITION SUPPLEMENT Breakfast, Lunch; Clear Liquid  ADULT DIET Clear Liquid  ADULT TUBE FEEDING Jejunostomy; Peptide Based; Delivery Method: Continuous; Continuous Initial Rate (mL/hr): 10; Continuous Advance Tube Feeding: No; Water Flush Volume (mL): 60; Water Flush Frequency: Q 4 hours  TPN ADULT-CENTRAL AA 8% D14% with ELECTROLYTES  Passing flatus: YES  Tolerating current diet: YES       Pain Management:   Patient states pain is manageable on current regimen: YES    Skin:  Russell Score: 17  Interventions: increase time out of bed    Patient Safety:  Fall Score:  Total Score: 3  Interventions: gripper socks, pt to call before getting OOB and stay with me (per policy)  High Fall Risk: Yes    Length of Stay:  Expected LOS: 9d 14h  Actual LOS: ROSEANNE Anderson

## 2021-12-24 ENCOUNTER — APPOINTMENT (OUTPATIENT)
Dept: CT IMAGING | Age: 41
DRG: 710 | End: 2021-12-24
Attending: SURGERY
Payer: MEDICAID

## 2021-12-24 LAB
ANION GAP SERPL CALC-SCNC: 8 MMOL/L (ref 5–15)
BUN SERPL-MCNC: 15 MG/DL (ref 6–20)
BUN/CREAT SERPL: 20 (ref 12–20)
CALCIUM SERPL-MCNC: 8.4 MG/DL (ref 8.5–10.1)
CHLORIDE SERPL-SCNC: 96 MMOL/L (ref 97–108)
CO2 SERPL-SCNC: 27 MMOL/L (ref 21–32)
CREAT SERPL-MCNC: 0.75 MG/DL (ref 0.7–1.3)
ERYTHROCYTE [DISTWIDTH] IN BLOOD BY AUTOMATED COUNT: 17 % (ref 11.5–14.5)
GLUCOSE BLD STRIP.AUTO-MCNC: 174 MG/DL (ref 65–117)
GLUCOSE BLD STRIP.AUTO-MCNC: 176 MG/DL (ref 65–117)
GLUCOSE BLD STRIP.AUTO-MCNC: 179 MG/DL (ref 65–117)
GLUCOSE BLD STRIP.AUTO-MCNC: 192 MG/DL (ref 65–117)
GLUCOSE SERPL-MCNC: 150 MG/DL (ref 65–100)
HCT VFR BLD AUTO: 29.4 % (ref 36.6–50.3)
HGB BLD-MCNC: 9.8 G/DL (ref 12.1–17)
MAGNESIUM SERPL-MCNC: 2 MG/DL (ref 1.6–2.4)
MCH RBC QN AUTO: 29.2 PG (ref 26–34)
MCHC RBC AUTO-ENTMCNC: 33.3 G/DL (ref 30–36.5)
MCV RBC AUTO: 87.5 FL (ref 80–99)
NRBC # BLD: 0 K/UL (ref 0–0.01)
NRBC BLD-RTO: 0 PER 100 WBC
PHOSPHATE SERPL-MCNC: 2.6 MG/DL (ref 2.6–4.7)
PLATELET # BLD AUTO: 759 K/UL (ref 150–400)
PMV BLD AUTO: 8.8 FL (ref 8.9–12.9)
POTASSIUM SERPL-SCNC: 3.4 MMOL/L (ref 3.5–5.1)
RBC # BLD AUTO: 3.36 M/UL (ref 4.1–5.7)
SERVICE CMNT-IMP: ABNORMAL
SODIUM SERPL-SCNC: 131 MMOL/L (ref 136–145)
WBC # BLD AUTO: 16.2 K/UL (ref 4.1–11.1)

## 2021-12-24 PROCEDURE — 74011250637 HC RX REV CODE- 250/637: Performed by: SURGERY

## 2021-12-24 PROCEDURE — 74011000250 HC RX REV CODE- 250: Performed by: SURGERY

## 2021-12-24 PROCEDURE — 99024 POSTOP FOLLOW-UP VISIT: CPT | Performed by: SURGERY

## 2021-12-24 PROCEDURE — 74011250636 HC RX REV CODE- 250/636: Performed by: SURGERY

## 2021-12-24 PROCEDURE — 82962 GLUCOSE BLOOD TEST: CPT

## 2021-12-24 PROCEDURE — 99223 1ST HOSP IP/OBS HIGH 75: CPT | Performed by: STUDENT IN AN ORGANIZED HEALTH CARE EDUCATION/TRAINING PROGRAM

## 2021-12-24 PROCEDURE — 83735 ASSAY OF MAGNESIUM: CPT

## 2021-12-24 PROCEDURE — 99358 PROLONG SERVICE W/O CONTACT: CPT | Performed by: STUDENT IN AN ORGANIZED HEALTH CARE EDUCATION/TRAINING PROGRAM

## 2021-12-24 PROCEDURE — 74011000258 HC RX REV CODE- 258: Performed by: SURGERY

## 2021-12-24 PROCEDURE — 74177 CT ABD & PELVIS W/CONTRAST: CPT

## 2021-12-24 PROCEDURE — 65660000000 HC RM CCU STEPDOWN

## 2021-12-24 PROCEDURE — 36415 COLL VENOUS BLD VENIPUNCTURE: CPT

## 2021-12-24 PROCEDURE — 74011000636 HC RX REV CODE- 636: Performed by: SURGERY

## 2021-12-24 PROCEDURE — 84100 ASSAY OF PHOSPHORUS: CPT

## 2021-12-24 PROCEDURE — 80048 BASIC METABOLIC PNL TOTAL CA: CPT

## 2021-12-24 PROCEDURE — 85027 COMPLETE CBC AUTOMATED: CPT

## 2021-12-24 PROCEDURE — 74011250636 HC RX REV CODE- 250/636: Performed by: STUDENT IN AN ORGANIZED HEALTH CARE EDUCATION/TRAINING PROGRAM

## 2021-12-24 PROCEDURE — 74011000250 HC RX REV CODE- 250: Performed by: NURSE PRACTITIONER

## 2021-12-24 PROCEDURE — 74011000258 HC RX REV CODE- 258: Performed by: STUDENT IN AN ORGANIZED HEALTH CARE EDUCATION/TRAINING PROGRAM

## 2021-12-24 RX ORDER — BARIUM SULFATE 20 MG/ML
900 SUSPENSION ORAL
Status: COMPLETED | OUTPATIENT
Start: 2021-12-24 | End: 2021-12-24

## 2021-12-24 RX ADMIN — METOPROLOL TARTRATE 1.25 MG: 1 INJECTION, SOLUTION INTRAVENOUS at 09:47

## 2021-12-24 RX ADMIN — ACETAMINOPHEN 650 MG: 325 TABLET ORAL at 02:57

## 2021-12-24 RX ADMIN — SODIUM CHLORIDE, PRESERVATIVE FREE 10 ML: 5 INJECTION INTRAVENOUS at 06:00

## 2021-12-24 RX ADMIN — ASCORBIC ACID, VITAMIN A PALMITATE, CHOLECALCIFEROL, THIAMINE HYDROCHLORIDE, RIBOFLAVIN-5 PHOSPHATE SODIUM, PYRIDOXINE HYDROCHLORIDE, NIACINAMIDE, DEXPANTHENOL, ALPHA-TOCOPHEROL ACETATE, VITAMIN K1, FOLIC ACID, BIOTIN, CYANOCOBALAMIN: 200; 3300; 200; 6; 3.6; 6; 40; 15; 10; 150; 600; 60; 5 INJECTION, SOLUTION INTRAVENOUS at 17:46

## 2021-12-24 RX ADMIN — PIPERACILLIN AND TAZOBACTAM 3.38 G: 3; .375 INJECTION, POWDER, LYOPHILIZED, FOR SOLUTION INTRAVENOUS at 21:48

## 2021-12-24 RX ADMIN — PIPERACILLIN AND TAZOBACTAM 3.38 G: 3; .375 INJECTION, POWDER, LYOPHILIZED, FOR SOLUTION INTRAVENOUS at 14:34

## 2021-12-24 RX ADMIN — METOCLOPRAMIDE 10 MG: 5 INJECTION, SOLUTION INTRAMUSCULAR; INTRAVENOUS at 15:43

## 2021-12-24 RX ADMIN — METOPROLOL TARTRATE 1.25 MG: 1 INJECTION, SOLUTION INTRAVENOUS at 15:43

## 2021-12-24 RX ADMIN — SODIUM CHLORIDE 100 MG: 9 INJECTION, SOLUTION INTRAVENOUS at 09:47

## 2021-12-24 RX ADMIN — BARIUM SULFATE 900 ML: 20 SUSPENSION ORAL at 09:46

## 2021-12-24 RX ADMIN — NALOXEGOL OXALATE 12.5 MG: 12.5 TABLET, FILM COATED ORAL at 09:47

## 2021-12-24 RX ADMIN — SODIUM CHLORIDE, PRESERVATIVE FREE 5 ML: 5 INJECTION INTRAVENOUS at 15:43

## 2021-12-24 RX ADMIN — VANCOMYCIN HYDROCHLORIDE 1000 MG: 1 INJECTION, POWDER, LYOPHILIZED, FOR SOLUTION INTRAVENOUS at 12:03

## 2021-12-24 RX ADMIN — METOPROLOL TARTRATE 1.25 MG: 1 INJECTION, SOLUTION INTRAVENOUS at 20:32

## 2021-12-24 RX ADMIN — VANCOMYCIN HYDROCHLORIDE 500 MG: 500 INJECTION, POWDER, LYOPHILIZED, FOR SOLUTION INTRAVENOUS at 20:34

## 2021-12-24 RX ADMIN — METOCLOPRAMIDE 10 MG: 5 INJECTION, SOLUTION INTRAMUSCULAR; INTRAVENOUS at 09:47

## 2021-12-24 RX ADMIN — IOPAMIDOL 100 ML: 755 INJECTION, SOLUTION INTRAVENOUS at 11:56

## 2021-12-24 RX ADMIN — METOCLOPRAMIDE 10 MG: 5 INJECTION, SOLUTION INTRAMUSCULAR; INTRAVENOUS at 20:33

## 2021-12-24 RX ADMIN — METOPROLOL TARTRATE 1.25 MG: 1 INJECTION, SOLUTION INTRAVENOUS at 02:59

## 2021-12-24 RX ADMIN — SODIUM CHLORIDE, PRESERVATIVE FREE 20 MG: 5 INJECTION INTRAVENOUS at 17:46

## 2021-12-24 RX ADMIN — Medication: at 14:29

## 2021-12-24 RX ADMIN — SODIUM CHLORIDE, PRESERVATIVE FREE 20 MG: 5 INJECTION INTRAVENOUS at 06:47

## 2021-12-24 RX ADMIN — AMLODIPINE BESYLATE 10 MG: 5 TABLET ORAL at 09:47

## 2021-12-24 RX ADMIN — METOCLOPRAMIDE 10 MG: 5 INJECTION, SOLUTION INTRAMUSCULAR; INTRAVENOUS at 04:12

## 2021-12-24 RX ADMIN — SODIUM CHLORIDE, PRESERVATIVE FREE 10 ML: 5 INJECTION INTRAVENOUS at 21:48

## 2021-12-24 RX ADMIN — DEXTROSE MONOHYDRATE, SODIUM CHLORIDE, AND POTASSIUM CHLORIDE 125 ML/HR: 50; 4.5; 1.49 INJECTION, SOLUTION INTRAVENOUS at 20:42

## 2021-12-24 RX ADMIN — PIPERACILLIN AND TAZOBACTAM 3.38 G: 3; .375 INJECTION, POWDER, LYOPHILIZED, FOR SOLUTION INTRAVENOUS at 06:47

## 2021-12-24 NOTE — PROGRESS NOTES
Attempting to find MARIE tube; no one in OR to respond to request. [address] and made her aware of situation. Supervisor to call to find tube. 36 - Dr. Olmos Staff made aware of new ID consult. 80 - Dr. Vero Ridley made aware of supervisor finding a 28Fr MARIE tube; MD came to bedside to place. Tube is wrong length. Spoke to nursing supervisor and she stated that this is all she could find. Dr. Vero Ridley spoke to Nursing supervisor about tube needed and stated that this may have to wait till tomorrow to give time to find the right tube. 36 - Dr. Olmos Staff with ID at bedside assessing pt. Noted that pt has slight drainage from Jtube; tube feeding remains on hold. ID physician made Dr. Vero Ridley aware of draining from under Jtube. MD stated that Dr. Vero Ridley reflected that this may be from the overflow of drainage from Gtube and anything that goes in will come out.
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
## 2021-12-24 NOTE — PROGRESS NOTES
Comprehensive Nutrition Assessment    Type and Reason for Visit: Reassess    Nutrition Recommendations/Plan:   · Continue TPN D15/5%AA for now at 63 ml/hr which provides daily approx. 1065 kcals/75 g protein/225 g CHO. · Additionally, receiving Vital 1.5 katherine increased to 30 ml/hr x 24 hr today which provides daily approx 1080 kcals/49 g protein/134 g CHO/910 ml free water which includes flushes. · Current TPN D15/5%AA @ 63 ml/hr + Vital 1.5 katherine @ 30 ml/hr provides daily approx. 1785 kcals/107 g protein/315 g CHO. · Continue to work towards a goal TF of Vital 1.5 katherine @ 60 ml/hr x 24 hr and wean TPN as appropriate. Will provide daily approx 2160kcals/97gPro/269gCHO. Provider to adjust free water flushes as TPN weaned and IVF discontinued. Nutrition Assessment:      12/24: Chart reviewed; med noted for sepsis, hx of SBO, POD#14 OPEN LYSIS OF ADHESIONS, GASTROSTOMY  AND JEJUNAL TUBE PLACEMENT. Pt has been receiving a combination of TPN and J-tube feedings. Surgeon decreased TPN D15/5%AA from 83 ml/hr to 63 ml/hr and increased Vital 1.5 by another 10 ml/hr to 30 ml/hr. RD visited with pt at bedside, overall pt not feeling well but seems to be tolerating J-tube feedings. Pt pulled out G-tube portion which I suspect will be replaced for venting. Lytes stable.     Last Weight Metric  Weight Loss Metrics 12/24/2021 11/20/2021 11/11/2021 6/25/2021 6/16/2021 2/25/2019 2/24/2019   Today's Wt 100 lb 12 oz 130 lb 15.3 oz 152 lb 136 lb 7.4 oz 138 lb 3.7 oz 138 lb 14.2 oz 145 lb 8.1 oz   BMI 14.88 kg/m2 19.34 kg/m2 22.45 kg/m2 19.58 kg/m2 19.83 kg/m2 20.51 kg/m2 21.49 kg/m2     Malnutrition Assessment:  Malnutrition Status:  Severe malnutrition    Context:  Chronic illness     Findings of the 6 clinical characteristics of malnutrition:   Energy Intake:  7 - 75% or less est energy requirements for 1 month or longer  Weight Loss:  7.0 - Greater than 7.5% over 3 months     Body Fat Loss:  7 - Severe body fat loss, Triceps,Orbital,Buccal region   Muscle Mass Loss:  1 - Mild muscle mass loss, Clavicles (pectoralis &deltoids),Temples (temporalis),Thigh (quadriceps),Scapula (trapezius)  Fluid Accumulation:  Unable to assess,     Strength:  Not performed     Estimated Daily Nutrient Needs:  Energy (kcal): 2188 (BMR 1489 x 1. 3AF) + 250 kcals; Weight Used for Energy Requirements: Current  Protein (g): 79-105g (1.5-2gPro/kg); Weight Used for Protein Requirements: Current  Fluid (ml/day): 3307-1727 ml/day; Method Used for Fluid Requirements: 1 ml/kcal    Nutrition Related Findings:  BM: 12/22; Labs: k+ 3.4; Meds: Humalog, Reglan, Lopressor, Zosyn      Wounds:    Surgical incision       Current Nutrition Therapies:  ADULT ORAL NUTRITION SUPPLEMENT Breakfast, Lunch; Clear Liquid  ADULT DIET Clear Liquid  TPN ADULT - CENTRAL AA 5% D15% W/ CA + ELECTROLYTES  ADULT TUBE FEEDING Jejunostomy; Peptide Based; Delivery Method: Continuous; Continuous Initial Rate (mL/hr): 30; Continuous Advance Tube Feeding: No; Water Flush Volume (mL): 60; Water Flush Frequency: Q 4 hours  TPN ADULT - CENTRAL AA 5% D15% W/ CA + ELECTROLYTES    Anthropometric Measures:  · Height:  5' 9\" (175.3 cm)  · Current Body Wt:  45.7 kg (100 lb 12 oz)    · Ideal Body Wt:  160 lbs:  63 %   · BMI Category:  Underweight (BMI less than 18.5)       Nutrition Diagnosis:   · Inadequate protein-energy intake related to altered GI function as evidenced by NPO or clear liquid status due to medical condition,weight loss (SBO)    Nutrition Interventions:   Food and/or Nutrient Delivery: Continue tube feeding,Modify parenteral nutrition  Nutrition Education and Counseling: No recommendations at this time  Coordination of Nutrition Care: Continue to monitor while inpatient    Goals:  Pt will tolerate nutrition support with no signs/sx of intolerance in 2-4 days.        Nutrition Monitoring and Evaluation:   Behavioral-Environmental Outcomes: None identified  Food/Nutrient Intake Outcomes: Food and nutrient intake,Enteral nutrition intake/tolerance,Parenteral nutrition intake/tolerance  Physical Signs/Symptoms Outcomes: Biochemical data,GI status,Weight,Skin    Discharge Planning:     Too soon to determine     Electronically signed by Ruddy Alvarado RD on 12/24/2021 at 10:09 AM

## 2021-12-24 NOTE — PROGRESS NOTES
End of Shift Note    Bedside shift change report given to Bronson Cervantes RN (oncoming nurse) by Dinah Velasquez RN (offgoing nurse). Report included the following information SBAR, Kardex, Intake/Output, MAR and Recent Results    Shift worked:  7p-7a     Shift summary and any significant changes:    12/23/2021 2010- pt's G tube came out; drainage was noted; interventions to minimize drainage was implemented until provider could be reached,  2012-Dr. Myriam orozco served and provided phone number called. 2018-- Dr. Nataliya Berman office was called; on call provider paged  2030- Dr. Simone Bennett returned phone call; instructed to place another G tube in; if no G-Tubes or 24 KEN tubes available, utilize a brock catheter tube to insert into the opening to keep G tube site patent. [place] Inserted new tubing-- see notes. 12/24/2021  0400-- Pt's G tube had 3400 ml of green, clear output with some green sediment since placement. 5-- Dr. Nataliya Berman office was paged to inform of output amount. Diogenes Curran; told physician being paged. 1-- Dr. Nataliya Berman office paged; told on call physician (Dr. Simone Bennett) would be paged. Fallon Bennett returned phone call; stated that while output was a lot, it is ok and to be expected. Dr. Simone Bennett stated he would assess placement when rounding. Pt received Tylenol for fever this shift; see MAR. Pt stated pain was manageable. Concerns for physician to address:  Elevated temperature; elevated WBC     Zone phone for oncoming shift:   [phone]       Activity:  Activity Level:  Up with Assistance  Number times ambulated in hallways past shift: 0  Number of times OOB to chair past shift: 0    Cardiac:   Cardiac Monitoring: Yes      Cardiac Rhythm: Sinus Tachy    Access:   Current line(s): PICC     Genitourinary:   Urinary status: voiding    Respiratory:   O2 Device: None (Room air)  Chronic home O2 use?: NO  Incentive spirometer at bedside: YES  Actual Volume (ml): 1000 ml  GI:  Last Bowel Movement Date: 12/22/21  Current diet:  ADULT ORAL NUTRITION SUPPLEMENT Breakfast, Lunch; Clear Liquid  ADULT DIET Clear Liquid  TPN ADULT - CENTRAL AA 5% D15% W/ CA + ELECTROLYTES  ADULT TUBE FEEDING Jejunostomy; Peptide Based; Delivery Method: Continuous; Continuous Initial Rate (mL/hr): 30; Continuous Advance Tube Feeding: No; Water Flush Volume (mL): 60; Water Flush Frequency: Q 4 hours  TPN ADULT - CENTRAL AA 5% D15% W/ CA + ELECTROLYTES  Passing flatus: Tolerating current diet: YES       Pain Management:   Patient states pain is manageable on current regimen: YES    Skin:  Russell Score: 18  Interventions: increase time out of bed    Patient Safety:  Fall Score:  Total Score: 3  Interventions: assistive device (walker, cane, etc) and pt to call before getting OOB  High Fall Risk: Yes    Length of Stay:  Expected LOS: 9d 14h  Actual LOS: 28      Debra Montero RN
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
## 2021-12-24 NOTE — PROGRESS NOTES
Pharmacy Antimicrobial Kinetic Dosing    Indication for Antimicrobials: Sepsis     Current Regimen of Each Antimicrobial:  Vancomycin 1000 mg IV LD, then 500 mg IV q8h (Start Date ; Day 1)  Zosyn 3.375 g IV q8h (Start Date ; Day 8)    Previous Antimicrobial Therapy:  Zosyn (-, -)    Goal Level: AUC: 400-600 mg/hr/Liter/day    Date Dose & Interval Measured (mcg/mL) Predicted AUC/MARIE                       Date & time of next level:     Dosing calculator used: [employer] calculator    Significant Positive Cultures:    blood: NG    Conditions for Dosing Consideration: None    Labs:  Recent Labs     21  0610 21  1005   CREA 0.75 0.80   BUN 15 12     Recent Labs     21  0610 21  0847   WBC 16.2* 4.9     Temp (24hrs), Av.9 °F (38.3 °C), Min:98.6 °F (37 °C), Max:102.9 °F (39.4 °C)        Creatinine Clearance (mL/min):   CrCl (Ideal Body Weight): 129.6   If actual weight < IBW: CrCl (Actual Body Weight) 83.8    Impression/Plan:   Give vancomycin 1000 mg loading dose, followed by 500 mg q8h for projected AUC of 435 and trough of 12.6  Continue Zosyn at current dose  Vancomycin trough  @ 1100  Antimicrobial stop date TBD     Pharmacy will follow daily and adjust medications as appropriate for renal function and/or serum levels.     Thank you,  Blanch Buerger, PHARMD    Vancomycin Dosing Document    Documents located on pharmacy Teams site: Clinical Practice -> Antimicrobial Stewardship -> Antibiotics_Vancomycin     Aminoglycoside Dosing Document    Documents located on pharmacy Teams site: Clinical Practice -> Antimicrobial Stewardship -> Antibiotics_Aminoglycosides

## 2021-12-24 NOTE — PROGRESS NOTES
Patient's G tube came out. Copious amounts of drainage can out. Hiwot Pena RN nightshift called Dr. Bettie Lara and he told us we could stick something back in it to plug the hole if we felt comfortable. After verbal instructions,I took a brock catheter set and cut off the catheter portion and stuck the tubing into the hole a few inches. Drainage has stopped and is now draining into the tubing. Dr. Bettie Lara will reassess the patient in the morning.

## 2021-12-24 NOTE — PROGRESS NOTES
Transition of Care Plan:     RUR:   19% \"mod risk\"  Disposition:[facility] vs Home w/[facility] HH-PT,OT&SN & [facility] for TPN  Follow up appointments: on AVS  DME needed: None  Transportation at [place] [address] or means to access home:  Yes  IM Medicare Letter: N/A  Is patient a BCPI-A Bundle:   No                   If yes, was Bundle Letter given?:   N/A  Caregiver Contact: Mother- Cheryl Woods, [phone]  Discharge Caregiver contacted prior to discharge? Patient not medically ready for d/c. CM will continue to follow.     [facility]  Ext [phone]

## 2021-12-24 NOTE — PROGRESS NOTES
Admit Date: 2021      POD 14 Days Post-Op  12/10/2021      Procedure:  Procedure(s):  OPEN LYSIS OF ADHESIONS, GASTROSTOMY  AND JEJUNAL TUBE PLACEMENT        HOSPITAL DAY:     ANTIBIOTICS: Zosyn and Eraxis    HPI:  Patient's gastrostomy tube fell out last night, I have replaced it this morning with a 24 [place] Yancey catheter since we could not find adequate size MARIE gastrostomy tube. ,  Patient is having gastric secretions drainage around the feeding tube since the current gastrostomy tube is smaller than the [facility] previous feeding tube placed surgically by Dr. Bartlett Matter was a 27 [place] mushroom malenkott. Patient's white blood cell count 16,000 this morning, up, patient denies any significant use of the PCA pump, hopefully wean this off soon. Patient is tolerating the increase of tube feedings to 20 mL an hour. Recent chest x-ray and urinalysis were unremarkable but patient did have a known fluid collection in the abdomen a week ago that was percutaneously drained. Review of Systems  See above no significant abdominal pain    Temp:  [97.8 °F (36.6 °C)-103.2 °F (39.6 °C)]   Pulse (Heart Rate):  []   BP: ()/(57-73)   Resp Rate:  [17-26]   O2 Sat (%):  [96 %-100 %]   Weight:  [45.7 kg (100 lb 12 oz)-50.8 kg (112 lb)]      Blood pressure 97/63, pulse (!) 131, temperature 97.8 °F (36.6 °C), resp. rate 23, height 5' 9\" (1.753 m), weight 45.7 kg (100 lb 12 oz), SpO2 98 %.     Temp (24hrs), Av.4 °F (38 °C), Min:97.8 °F (36.6 °C), Max:102.9 °F (39.4 °C)      Recent Results (from the past 48 hour(s))   GLUCOSE, POC    Collection Time: 21 11:46 AM   Result Value Ref Range    Glucose (POC) 114 65 - 117 mg/dL    Performed by Ailyn CASTANEDA    GLUCOSE, POC    Collection Time: 21  5:41 PM   Result Value Ref Range    Glucose (POC) 113 65 - 117 mg/dL    Performed by Ailyn Santos PCT    GLUCOSE, POC    Collection Time: 21 12:15 AM   Result Value Ref Range    Glucose (POC) 94 65 - 117 mg/dL    Performed by Elner Cynthia PCT    MAGNESIUM    Collection Time: 12/23/21  3:05 AM   Result Value Ref Range    Magnesium 1.8 1.6 - 2.4 mg/dL   PHOSPHORUS    Collection Time: 12/23/21  3:05 AM   Result Value Ref Range    Phosphorus 2.9 2.6 - 4.7 MG/DL   GLUCOSE, POC    Collection Time: 12/23/21  6:04 AM   Result Value Ref Range    Glucose (POC) 115 65 - 117 mg/dL    Performed by [facility]    TROPONIN-HIGH SENSITIVITY    Collection Time: 12/23/21  8:45 AM   Result Value Ref Range    Troponin-High Sensitivity 13 0 - 76 ng/L   CK W/ CKMB & INDEX    Collection Time: 12/23/21  8:45 AM   Result Value Ref Range    CK - MB <1.0 <3.6 NG/ML    CK-MB Index Cannot be calculated 0.0 - 2.5      CK 23 (L) 39 - 308 U/L   CBC W/O DIFF    Collection Time: 12/23/21  8:47 AM   Result Value Ref Range    WBC 4.9 4.1 - 11.1 K/uL    RBC 2.80 (L) 4.10 - 5.70 M/uL    HGB 8.2 (L) 12.1 - 17.0 g/dL    HCT 26.2 (L) 36.6 - 50.3 %    MCV 93.6 80.0 - 99.0 FL    MCH 29.3 26.0 - 34.0 PG    MCHC 31.3 30.0 - 36.5 g/dL    RDW 17.2 (H) 11.5 - 14.5 %    PLATELET 853 (H) 949 - 400 K/uL    MPV 9.2 8.9 - 12.9 FL    NRBC 0.0 0  WBC    ABSOLUTE NRBC 0.00 0.00 - 7.88 K/uL   METABOLIC PANEL, COMPREHENSIVE    Collection Time: 12/23/21 10:05 AM   Result Value Ref Range    Sodium 130 (L) 136 - 145 mmol/L    Potassium 4.3 3.5 - 5.1 mmol/L    Chloride 96 (L) 97 - 108 mmol/L    CO2 25 21 - 32 mmol/L    Anion gap 9 5 - 15 mmol/L    Glucose 435 (H) 65 - 100 mg/dL    BUN 12 6 - 20 MG/DL    Creatinine 0.80 0.70 - 1.30 MG/DL    BUN/Creatinine ratio 15 12 - 20      GFR est AA >60 >60 ml/min/1.73m2    GFR est non-AA >60 >60 ml/min/1.73m2    Calcium 7.9 (L) 8.5 - 10.1 MG/DL    Bilirubin, total 0.6 0.2 - 1.0 MG/DL    ALT (SGPT) 82 (H) 12 - 78 U/L    AST (SGOT) 41 (H) 15 - 37 U/L    Alk.  phosphatase 97 45 - 117 U/L    Protein, total 5.9 (L) 6.4 - 8.2 g/dL    Albumin 1.6 (L) 3.5 - 5.0 g/dL    Globulin 4.3 (H) 2.0 - 4.0 g/dL    A-G Ratio 0.4 (L) 1.1 - 2.2     GLUCOSE, POC    Collection Time: 12/23/21 12:33 PM   Result Value Ref Range    Glucose (POC) 142 (H) 65 - 117 mg/dL    Performed by Santino Gelineau PCT    GLUCOSE, POC    Collection Time: 12/23/21  6:44 PM   Result Value Ref Range    Glucose (POC) 172 (H) 65 - 117 mg/dL    Performed by Santino Gelineau PCT    GLUCOSE, POC    Collection Time: 12/23/21 11:23 PM   Result Value Ref Range    Glucose (POC) 173 (H) 65 - 117 mg/dL    Performed by Vivi Richardson PCT    GLUCOSE, POC    Collection Time: 12/24/21  5:36 AM   Result Value Ref Range    Glucose (POC) 192 (H) 65 - 117 mg/dL    Performed by Vivi Richardson PCT    MAGNESIUM    Collection Time: 12/24/21  6:10 AM   Result Value Ref Range    Magnesium 2.0 1.6 - 2.4 mg/dL   PHOSPHORUS    Collection Time: 12/24/21  6:10 AM   Result Value Ref Range    Phosphorus 2.6 2.6 - 4.7 MG/DL   METABOLIC PANEL, BASIC    Collection Time: 12/24/21  6:10 AM   Result Value Ref Range    Sodium 131 (L) 136 - 145 mmol/L    Potassium 3.4 (L) 3.5 - 5.1 mmol/L    Chloride 96 (L) 97 - 108 mmol/L    CO2 27 21 - 32 mmol/L    Anion gap 8 5 - 15 mmol/L    Glucose 150 (H) 65 - 100 mg/dL    BUN 15 6 - 20 MG/DL    Creatinine 0.75 0.70 - 1.30 MG/DL    BUN/Creatinine ratio 20 12 - 20      GFR est AA >60 >60 ml/min/1.73m2    GFR est non-AA >60 >60 ml/min/1.73m2    Calcium 8.4 (L) 8.5 - 10.1 MG/DL   CBC W/O DIFF    Collection Time: 12/24/21  6:10 AM   Result Value Ref Range    WBC 16.2 (H) 4.1 - 11.1 K/uL    RBC 3.36 (L) 4.10 - 5.70 M/uL    HGB 9.8 (L) 12.1 - 17.0 g/dL    HCT 29.4 (L) 36.6 - 50.3 %    MCV 87.5 80.0 - 99.0 FL    MCH 29.2 26.0 - 34.0 PG    MCHC 33.3 30.0 - 36.5 g/dL    RDW 17.0 (H) 11.5 - 14.5 %    PLATELET 629 (H) 178 - 400 K/uL    MPV 8.8 (L) 8.9 - 12.9 FL    NRBC 0.0 0  WBC    ABSOLUTE NRBC 0.00 0.00 - 0.01 K/uL         XR Results (maximum last 3):   Results from [place] encounter on 11/25/21    XR CHEST PORT    Impression  No significant interval change. CT Results (maximum last 3): Results from [place] encounter on 11/25/21    CT ABD PELV W CONT    Impression  Right lower quadrant abscess. Minimal increase in pneumoperitoneum. This result was called by me at 1706 hours to Chikis Khanna[address]      MRI Results (maximum last 3): No results found for this or any previous visit. Nuclear Medicine Results (maximum last 3): Results from [place] encounter on 11/11/21    NM GASTRIC EMPTY STDY    Impression  Abnormal Nuclear Gastric Emptying Study. US Results (maximum last 3): Results from [place] encounter on 11/25/21    US GUIDE NDL ASP  DRAIN    Impression  Ultrasound-guided 10 [place] right lower quadrant abscess drain placement      Physical Exam  Patient alert awake cooperative but frustrated. Heart has a regular sinus tachycardia rate of 110 lungs breath sounds bilaterally abdomen retention bolsters in place, drain right lower abdomen minimal serous brown fluid, jejunostomy tube feedings at 20 mL an hour tolerating well, gastrostomy tube in place draining gastric contents somewhat around the tube and somewhat in the tube large volume overnight.     Active Problems:    SBO (small bowel obstruction) ([address] (11/22/2021)      Aspiration pneumonia ([address] (11/25/2021)      Small bowel obstruction ([address] (11/25/2021)      Sepsis ([address] (11/25/2021)      Abdominal pain, acute (11/27/2021)      Intractable cyclical vomiting with nausea (11/27/2021)          ASSESSMENT/PLAN  Complicated abdominal wall issues and intra-abdominal issues, will increase tube feedings to 30 mL an hour, continue TPN but cut back some, work on weaning off PCA since he now has jejunostomy hydrocodone.,  Checking CT scan abdomen and pelvis to assess for any resolution or progression of abscess as source of fever, continue antibiotics,    Infectious disease consultation given complicated abdominal wall and intra-abdominal issues and leukocytosis. Reviewed with patient and mother, replace gastrostomy tube with MARIE tube when satisfactory 1 can be found    Flush gastrostomy tube periodically with tap water.     Yancey catheter was placed easily into the gastric opening and balloon inflated to 10 mL and retracted appropriately with no complications    FACE TO FACE time including review of any indicated imaging, discussion with patient, and other providers, exam and discussion with patient:   35      minutes    END:

## 2021-12-25 LAB
ANION GAP SERPL CALC-SCNC: 7 MMOL/L (ref 5–15)
BUN SERPL-MCNC: 19 MG/DL (ref 6–20)
BUN/CREAT SERPL: 31 (ref 12–20)
CALCIUM SERPL-MCNC: 8.3 MG/DL (ref 8.5–10.1)
CHLORIDE SERPL-SCNC: 96 MMOL/L (ref 97–108)
CO2 SERPL-SCNC: 27 MMOL/L (ref 21–32)
CREAT SERPL-MCNC: 0.61 MG/DL (ref 0.7–1.3)
DATE LAST DOSE: ABNORMAL
ERYTHROCYTE [DISTWIDTH] IN BLOOD BY AUTOMATED COUNT: 17.5 % (ref 11.5–14.5)
GLUCOSE BLD STRIP.AUTO-MCNC: 136 MG/DL (ref 65–117)
GLUCOSE BLD STRIP.AUTO-MCNC: 142 MG/DL (ref 65–117)
GLUCOSE BLD STRIP.AUTO-MCNC: 144 MG/DL (ref 65–117)
GLUCOSE BLD STRIP.AUTO-MCNC: 153 MG/DL (ref 65–117)
GLUCOSE SERPL-MCNC: 152 MG/DL (ref 65–100)
HCT VFR BLD AUTO: 30.9 % (ref 36.6–50.3)
HGB BLD-MCNC: 10 G/DL (ref 12.1–17)
MAGNESIUM SERPL-MCNC: 2.2 MG/DL (ref 1.6–2.4)
MCH RBC QN AUTO: 28.8 PG (ref 26–34)
MCHC RBC AUTO-ENTMCNC: 32.4 G/DL (ref 30–36.5)
MCV RBC AUTO: 89 FL (ref 80–99)
NRBC # BLD: 0.04 K/UL (ref 0–0.01)
NRBC BLD-RTO: 0.3 PER 100 WBC
PHOSPHATE SERPL-MCNC: 2.3 MG/DL (ref 2.6–4.7)
PLATELET # BLD AUTO: 646 K/UL (ref 150–400)
PMV BLD AUTO: 9.9 FL (ref 8.9–12.9)
POTASSIUM SERPL-SCNC: 3.8 MMOL/L (ref 3.5–5.1)
RBC # BLD AUTO: 3.47 M/UL (ref 4.1–5.7)
REPORTED DOSE,DOSE: ABNORMAL UNITS
REPORTED DOSE/TIME,TMG: ABNORMAL
SERVICE CMNT-IMP: ABNORMAL
SODIUM SERPL-SCNC: 130 MMOL/L (ref 136–145)
VANCOMYCIN TROUGH SERPL-MCNC: 3.6 UG/ML (ref 5–10)
WBC # BLD AUTO: 12.6 K/UL (ref 4.1–11.1)

## 2021-12-25 PROCEDURE — 83735 ASSAY OF MAGNESIUM: CPT

## 2021-12-25 PROCEDURE — 85027 COMPLETE CBC AUTOMATED: CPT

## 2021-12-25 PROCEDURE — 74011250636 HC RX REV CODE- 250/636: Performed by: SURGERY

## 2021-12-25 PROCEDURE — 36415 COLL VENOUS BLD VENIPUNCTURE: CPT

## 2021-12-25 PROCEDURE — 74011000250 HC RX REV CODE- 250: Performed by: NURSE PRACTITIONER

## 2021-12-25 PROCEDURE — 74011000258 HC RX REV CODE- 258: Performed by: SURGERY

## 2021-12-25 PROCEDURE — 87103 BLOOD FUNGUS CULTURE: CPT

## 2021-12-25 PROCEDURE — 87186 SC STD MICRODIL/AGAR DIL: CPT

## 2021-12-25 PROCEDURE — 99232 SBSQ HOSP IP/OBS MODERATE 35: CPT | Performed by: STUDENT IN AN ORGANIZED HEALTH CARE EDUCATION/TRAINING PROGRAM

## 2021-12-25 PROCEDURE — 87077 CULTURE AEROBIC IDENTIFY: CPT

## 2021-12-25 PROCEDURE — 80202 ASSAY OF VANCOMYCIN: CPT

## 2021-12-25 PROCEDURE — 74011000250 HC RX REV CODE- 250: Performed by: SURGERY

## 2021-12-25 PROCEDURE — 82962 GLUCOSE BLOOD TEST: CPT

## 2021-12-25 PROCEDURE — 80048 BASIC METABOLIC PNL TOTAL CA: CPT

## 2021-12-25 PROCEDURE — 65660000000 HC RM CCU STEPDOWN

## 2021-12-25 PROCEDURE — 87075 CULTR BACTERIA EXCEPT BLOOD: CPT

## 2021-12-25 PROCEDURE — 84100 ASSAY OF PHOSPHORUS: CPT

## 2021-12-25 PROCEDURE — 74011250636 HC RX REV CODE- 250/636: Performed by: STUDENT IN AN ORGANIZED HEALTH CARE EDUCATION/TRAINING PROGRAM

## 2021-12-25 PROCEDURE — 74011250637 HC RX REV CODE- 250/637: Performed by: SURGERY

## 2021-12-25 PROCEDURE — 74011000258 HC RX REV CODE- 258: Performed by: STUDENT IN AN ORGANIZED HEALTH CARE EDUCATION/TRAINING PROGRAM

## 2021-12-25 RX ADMIN — DEXTROSE MONOHYDRATE, SODIUM CHLORIDE, AND POTASSIUM CHLORIDE 125 ML/HR: 50; 4.5; 1.49 INJECTION, SOLUTION INTRAVENOUS at 06:52

## 2021-12-25 RX ADMIN — SODIUM CHLORIDE, PRESERVATIVE FREE 20 MG: 5 INJECTION INTRAVENOUS at 05:47

## 2021-12-25 RX ADMIN — PIPERACILLIN AND TAZOBACTAM 3.38 G: 3; .375 INJECTION, POWDER, LYOPHILIZED, FOR SOLUTION INTRAVENOUS at 14:25

## 2021-12-25 RX ADMIN — METOCLOPRAMIDE 10 MG: 5 INJECTION, SOLUTION INTRAMUSCULAR; INTRAVENOUS at 22:15

## 2021-12-25 RX ADMIN — METOCLOPRAMIDE 10 MG: 5 INJECTION, SOLUTION INTRAMUSCULAR; INTRAVENOUS at 14:25

## 2021-12-25 RX ADMIN — PIPERACILLIN AND TAZOBACTAM 3.38 G: 3; .375 INJECTION, POWDER, LYOPHILIZED, FOR SOLUTION INTRAVENOUS at 05:46

## 2021-12-25 RX ADMIN — SODIUM CHLORIDE, PRESERVATIVE FREE 10 ML: 5 INJECTION INTRAVENOUS at 13:01

## 2021-12-25 RX ADMIN — METOPROLOL TARTRATE 1.25 MG: 1 INJECTION, SOLUTION INTRAVENOUS at 03:28

## 2021-12-25 RX ADMIN — METOPROLOL TARTRATE 1.25 MG: 1 INJECTION, SOLUTION INTRAVENOUS at 09:52

## 2021-12-25 RX ADMIN — METOPROLOL TARTRATE 1.25 MG: 1 INJECTION, SOLUTION INTRAVENOUS at 22:15

## 2021-12-25 RX ADMIN — METOCLOPRAMIDE 10 MG: 5 INJECTION, SOLUTION INTRAMUSCULAR; INTRAVENOUS at 03:26

## 2021-12-25 RX ADMIN — ASCORBIC ACID, VITAMIN A PALMITATE, CHOLECALCIFEROL, THIAMINE HYDROCHLORIDE, RIBOFLAVIN-5 PHOSPHATE SODIUM, PYRIDOXINE HYDROCHLORIDE, NIACINAMIDE, DEXPANTHENOL, ALPHA-TOCOPHEROL ACETATE, VITAMIN K1, FOLIC ACID, BIOTIN, CYANOCOBALAMIN: 200; 3300; 200; 6; 3.6; 6; 40; 15; 10; 150; 600; 60; 5 INJECTION, SOLUTION INTRAVENOUS at 17:39

## 2021-12-25 RX ADMIN — SODIUM CHLORIDE, PRESERVATIVE FREE 10 ML: 5 INJECTION INTRAVENOUS at 05:47

## 2021-12-25 RX ADMIN — VANCOMYCIN HYDROCHLORIDE 500 MG: 500 INJECTION, POWDER, LYOPHILIZED, FOR SOLUTION INTRAVENOUS at 13:00

## 2021-12-25 RX ADMIN — PIPERACILLIN AND TAZOBACTAM 3.38 G: 3; .375 INJECTION, POWDER, LYOPHILIZED, FOR SOLUTION INTRAVENOUS at 22:16

## 2021-12-25 RX ADMIN — SODIUM CHLORIDE 100 MG: 9 INJECTION, SOLUTION INTRAVENOUS at 09:53

## 2021-12-25 RX ADMIN — METOCLOPRAMIDE 10 MG: 5 INJECTION, SOLUTION INTRAMUSCULAR; INTRAVENOUS at 09:52

## 2021-12-25 RX ADMIN — VANCOMYCIN HYDROCHLORIDE 1000 MG: 1 INJECTION, POWDER, LYOPHILIZED, FOR SOLUTION INTRAVENOUS at 16:56

## 2021-12-25 RX ADMIN — SODIUM CHLORIDE, PRESERVATIVE FREE 10 ML: 5 INJECTION INTRAVENOUS at 22:16

## 2021-12-25 RX ADMIN — METOPROLOL TARTRATE 1.25 MG: 1 INJECTION, SOLUTION INTRAVENOUS at 14:25

## 2021-12-25 RX ADMIN — SODIUM CHLORIDE, PRESERVATIVE FREE 20 MG: 5 INJECTION INTRAVENOUS at 16:56

## 2021-12-25 RX ADMIN — VANCOMYCIN HYDROCHLORIDE 500 MG: 500 INJECTION, POWDER, LYOPHILIZED, FOR SOLUTION INTRAVENOUS at 04:11

## 2021-12-25 RX ADMIN — ACETAMINOPHEN 650 MG: 325 TABLET ORAL at 16:56

## 2021-12-25 NOTE — PROGRESS NOTES
Infectious Disease Progress Note         Interval:  NAEO    Subjective:   Patient seen this afternoon. Was reporting to feel much better today. Still having abdominal pain. Made fully NPO due to G-tube leakage. Tube feeds ongoing at a slow rate. Objective:    Vitals:   Reviewed in chart.     Physical Exam:  Gen: No apparent distress  HEENT:  Normocephalic, atraumatic, no scleral icterus, no thrush  CV: HDS   Lungs: Clear to auscultation bilaterally, no wheezing  Abdomen: soft, tender, G-tube, J-tube  Genitourinary:  no brock catheter   Skin: no rash   Psych: good affect, good eye contact, non tearful  Neuro: alert, oriented to time,  place, and situation, moves all extremities to commands, verbal  Musculoskeletal:  No joint edema, erythema or tenderness noted   Lines: PIVs        Labs:  Recent Results (from the past 24 hour(s))   GLUCOSE, POC    Collection Time: 12/24/21 11:43 PM   Result Value Ref Range    Glucose (POC) 176 (H) 65 - 117 mg/dL    Performed by Gi Schumacher (PCT)    CULTURE, BLOOD FOR FUNGUS    Collection Time: 12/25/21  4:00 AM    Specimen: Blood   Result Value Ref Range    Special Requests: HOLD 21 DAYS FOR FUNGUS      Culture result: NO GROWTH AFTER 4 HOURS     MAGNESIUM    Collection Time: 12/25/21  4:00 AM   Result Value Ref Range    Magnesium 2.2 1.6 - 2.4 mg/dL   PHOSPHORUS    Collection Time: 12/25/21  4:00 AM   Result Value Ref Range    Phosphorus 2.3 (L) 2.6 - 4.7 MG/DL   METABOLIC PANEL, BASIC    Collection Time: 12/25/21  4:00 AM   Result Value Ref Range    Sodium 130 (L) 136 - 145 mmol/L    Potassium 3.8 3.5 - 5.1 mmol/L    Chloride 96 (L) 97 - 108 mmol/L    CO2 27 21 - 32 mmol/L    Anion gap 7 5 - 15 mmol/L    Glucose 152 (H) 65 - 100 mg/dL    BUN 19 6 - 20 MG/DL    Creatinine 0.61 (L) 0.70 - 1.30 MG/DL    BUN/Creatinine ratio 31 (H) 12 - 20      GFR est AA >60 >60 ml/min/1.73m2    GFR est non-AA >60 >60 ml/min/1.73m2    Calcium 8.3 (L) 8.5 - 10.1 MG/DL CBC W/O DIFF    Collection Time: 12/25/21  4:00 AM   Result Value Ref Range    WBC 12.6 (H) 4.1 - 11.1 K/uL    RBC 3.47 (L) 4.10 - 5.70 M/uL    HGB 10.0 (L) 12.1 - 17.0 g/dL    HCT 30.9 (L) 36.6 - 50.3 %    MCV 89.0 80.0 - 99.0 FL    MCH 28.8 26.0 - 34.0 PG    MCHC 32.4 30.0 - 36.5 g/dL    RDW 17.5 (H) 11.5 - 14.5 %    PLATELET 460 (H) 575 - 400 K/uL    MPV 9.9 8.9 - 12.9 FL    NRBC 0.3 (H) 0  WBC    ABSOLUTE NRBC 0.04 (H) 0.00 - 0.01 K/uL   GLUCOSE, POC    Collection Time: 12/25/21  6:00 AM   Result Value Ref Range    Glucose (POC) 144 (H) 65 - 117 mg/dL    Performed by Adama Villarreal (PCT)    GLUCOSE, POC    Collection Time: 12/25/21 11:54 AM   Result Value Ref Range    Glucose (POC) 136 (H) 65 - 117 mg/dL    Performed by Susan Lombardo (TRV PCT)    [facility]    Collection Time: 12/25/21 12:45 PM   Result Value Ref Range    Vancomycin,trough 3.6 (L) 5.0 - 10.0 ug/mL    Reported dose date NOT PROVIDED      Reported dose time: NOT PROVIDED      Reported dose: NOT PROVIDED UNITS                 Assessment:  Clinically appearing slightly better today      Recommendations:  - Continue zosyn, vancomycin, anidulafungin  - If high fevers are ongoing and patient starts becoming clinically more sick, low threshold to advance Zosyn to meropenem. -CT 12/24/2021 mentioning a right paracolic gutter collection. We will recommend that this be aspirated/drained for cultures and source control reasons. General surgery to please review the CT abdomen pelvis from 12/24/2021 and review with IR if any drainage of the right paracolic gutter collection is needed, as this is an abscess until proven otherwise and likely one of the sources of his ongoing fevers. - Follow up gastric drainage cultures from 12/24/21. Will follow. Thank you for the opportunity to participate in the care of this patient. Please contact with questions or concerns.       Carly Patel MD  Infectious Diseases

## 2021-12-25 NOTE — PROGRESS NOTES
Admit Date: 2021    POD 15 Days Post-Op    Procedure:  Procedure(s):  OPEN LYSIS OF ADHESIONS, GASTROSTOMY  AND JEJUNAL TUBE PLACEMENT    Subjective:     Patient has no new complaints. + BMs    Objective:     Blood pressure 98/65, pulse (!) 113, temperature 100 °F (37.8 °C), resp. rate 20, height 5' 9\" (1.753 m), weight 100 lb 12 oz (45.7 kg), SpO2 100 %.     Temp (24hrs), Av.8 °F (37.1 °C), Min:97.8 °F (36.6 °C), Max:100 °F (37.8 °C)      Physical Exam:  GENERAL: alert, cooperative, severe muscle wasting, LUNG: clear to auscultation bilaterally, HEART: regular rate and rhythm, ABDOMEN: soft, wound intact with retention sutures, jejunostomy tube site intact, g-tube site with some scant drainage around brock catheter, EXTREMITIES:  extremities normal, atraumatic, no cyanosis or edema    Labs:   Recent Results (from the past 24 hour(s))   GLUCOSE, POC    Collection Time: 21 12:04 PM   Result Value Ref Range    Glucose (POC) 179 (H) 65 - 117 mg/dL    Performed by [employer] PCT    GLUCOSE, POC    Collection Time: 21  5:35 PM   Result Value Ref Range    Glucose (POC) 174 (H) 65 - 117 mg/dL    Performed by [employer] PCT    GLUCOSE, POC    Collection Time: 21 11:43 PM   Result Value Ref Range    Glucose (POC) 176 (H) 65 - 117 mg/dL    Performed by Rodriguez Dickerson (PCT)    CULTURE, BLOOD FOR FUNGUS    Collection Time: 21  4:00 AM    Specimen: Blood   Result Value Ref Range    Special Requests: HOLD 21 DAYS FOR FUNGUS      Culture result: NO GROWTH AFTER 4 HOURS     MAGNESIUM    Collection Time: 21  4:00 AM   Result Value Ref Range    Magnesium 2.2 1.6 - 2.4 mg/dL   PHOSPHORUS    Collection Time: 21  4:00 AM   Result Value Ref Range    Phosphorus 2.3 (L) 2.6 - 4.7 MG/DL   METABOLIC PANEL, BASIC    Collection Time: 21  4:00 AM   Result Value Ref Range    Sodium 130 (L) 136 - 145 mmol/L    Potassium 3.8 3.5 - 5.1 mmol/L    Chloride 96 (L) 97 - 108 mmol/L    CO2 27 21 - 32 mmol/L    Anion gap 7 5 - 15 mmol/L    Glucose 152 (H) 65 - 100 mg/dL    BUN 19 6 - 20 MG/DL    Creatinine 0.61 (L) 0.70 - 1.30 MG/DL    BUN/Creatinine ratio 31 (H) 12 - 20      GFR est AA >60 >60 ml/min/1.73m2    GFR est non-AA >60 >60 ml/min/1.73m2    Calcium 8.3 (L) 8.5 - 10.1 MG/DL   CBC W/O DIFF    Collection Time: 12/25/21  4:00 AM   Result Value Ref Range    WBC 12.6 (H) 4.1 - 11.1 K/uL    RBC 3.47 (L) 4.10 - 5.70 M/uL    HGB 10.0 (L) 12.1 - 17.0 g/dL    HCT 30.9 (L) 36.6 - 50.3 %    MCV 89.0 80.0 - 99.0 FL    MCH 28.8 26.0 - 34.0 PG    MCHC 32.4 30.0 - 36.5 g/dL    RDW 17.5 (H) 11.5 - 14.5 %    PLATELET 285 (H) 115 - 400 K/uL    MPV 9.9 8.9 - 12.9 FL    NRBC 0.3 (H) 0  WBC    ABSOLUTE NRBC 0.04 (H) 0.00 - 0.01 K/uL   GLUCOSE, POC    Collection Time: 12/25/21  6:00 AM   Result Value Ref Range    Glucose (POC) 144 (H) 65 - 117 mg/dL    Performed by William Lopez (PCT)        Data Review images and reports reviewed    Assessment:     Active Problems:    SBO (small bowel obstruction) ([facility] (11/22/2021)      Aspiration pneumonia ([facility] (11/25/2021)      Small bowel obstruction ([address] (11/25/2021)      Sepsis ([address] (11/25/2021)      Abdominal pain, acute (11/27/2021)      Intractable cyclical vomiting with nausea (11/27/2021)        Plan/Recommendations/Medical Decision Making:     Continue present treatment   New g-tube draining well with gravity.  + BMs  Restart tube feeds at 20, do not advance  Continue tpn  Npo until g-tube site closes down around brock catheter    Benjamin May MD  ED [facility]

## 2021-12-25 NOTE — PROGRESS NOTES
End of Shift Note    Bedside shift change report given to SAMUEL Valladares (oncoming nurse) by Ayo Silva RN (offgoing nurse). Report included the following information SBAR, Kardex and MAR    Shift worked:  7PM-7AM     Shift summary and any significant changes:     patient afebrile during the night. g tube continues to drain green output. Output 1600ml this shift. Continues on PCA.       Concerns for physician to address:       Zone phone for oncoming shift:

## 2021-12-25 NOTE — PROGRESS NOTES
Pharmacy Antimicrobial Kinetic Dosing    Indication for Antimicrobials: Sepsis     Current Regimen of Each Antimicrobial:  Vancomycin 1000 mg IV LD, then 500 mg IV q8h (Start Date ; Day 2  Zosyn 3.375 g IV q8h (Start Date ; Day 9    Previous Antimicrobial Therapy:  Zosyn (-, -)    Goal Level: AUC: 400-600 mg/hr/Liter/day    Date Dose & Interval Measured (mcg/mL) Predicted AUC/MARIE    @1245 500 mg q 8 H 3.6                  Date & time of next level:  0800    Dosing calculator used: [employer]    Significant Positive Cultures:    blood: NG - Final  : Blood cx: pending  : Anaerobic cx: pending  : Blood cx for fungus: pending     Conditions for Dosing Consideration: None    Labs:  Recent Labs     21  0400 21  0610 21  1005   CREA 0.61* 0.75 0.80   BUN 19 15 12     Recent Labs     21  0400 21  0610 21  0847   WBC 12.6* 16.2* 4.9     Temp (24hrs), Av.2 °F (37.3 °C), Min:98.3 °F (36.8 °C), Max:100.2 °F (37.9 °C)        Creatinine Clearance (mL/min):   CrCl (Ideal Body Weight): 138.9   If actual weight < IBW: CrCl (Actual Body Weight) 89.8    Impression/Plan:   Subtherapeutic vancomycin AUC. Will adjust the vancomycin to 1g q 8 hours starting today at 1600 for an estimated AUC of 563  Will recheck trough on Monday at 0800  Continue Zosyn at current dose  WBC trending down  Febrile  Scr stable   Antimicrobial stop date TBD     Pharmacy will follow daily and adjust medications as appropriate for renal function and/or serum levels.     Thank you,  Lorena Shaffer, PHARMD    Vancomycin Dosing Document    Documents located on pharmacy Teams site: Clinical Practice -> Antimicrobial Stewardship -> Antibiotics_Vancomycin     Aminoglycoside Dosing Document    Documents located on pharmacy Teams site: Clinical Practice -> Antimicrobial Stewardship -> Antibiotics_Aminoglycosides

## 2021-12-26 LAB
ANION GAP SERPL CALC-SCNC: 8 MMOL/L (ref 5–15)
BUN SERPL-MCNC: 16 MG/DL (ref 6–20)
BUN/CREAT SERPL: 27 (ref 12–20)
CALCIUM SERPL-MCNC: 8.4 MG/DL (ref 8.5–10.1)
CHLORIDE SERPL-SCNC: 95 MMOL/L (ref 97–108)
CO2 SERPL-SCNC: 29 MMOL/L (ref 21–32)
CREAT SERPL-MCNC: 0.6 MG/DL (ref 0.7–1.3)
GLUCOSE BLD STRIP.AUTO-MCNC: 133 MG/DL (ref 65–117)
GLUCOSE SERPL-MCNC: 132 MG/DL (ref 65–100)
MAGNESIUM SERPL-MCNC: 2.3 MG/DL (ref 1.6–2.4)
PHOSPHATE SERPL-MCNC: 2.8 MG/DL (ref 2.6–4.7)
POTASSIUM SERPL-SCNC: 3.4 MMOL/L (ref 3.5–5.1)
SERVICE CMNT-IMP: ABNORMAL
SODIUM SERPL-SCNC: 132 MMOL/L (ref 136–145)

## 2021-12-26 PROCEDURE — 82962 GLUCOSE BLOOD TEST: CPT

## 2021-12-26 PROCEDURE — 74011250636 HC RX REV CODE- 250/636: Performed by: SURGERY

## 2021-12-26 PROCEDURE — 74011000258 HC RX REV CODE- 258: Performed by: SURGERY

## 2021-12-26 PROCEDURE — 74011000250 HC RX REV CODE- 250: Performed by: NURSE PRACTITIONER

## 2021-12-26 PROCEDURE — 65660000000 HC RM CCU STEPDOWN

## 2021-12-26 PROCEDURE — 74011000250 HC RX REV CODE- 250: Performed by: SURGERY

## 2021-12-26 PROCEDURE — 36415 COLL VENOUS BLD VENIPUNCTURE: CPT

## 2021-12-26 PROCEDURE — 84100 ASSAY OF PHOSPHORUS: CPT

## 2021-12-26 PROCEDURE — 80048 BASIC METABOLIC PNL TOTAL CA: CPT

## 2021-12-26 PROCEDURE — 83735 ASSAY OF MAGNESIUM: CPT

## 2021-12-26 RX ADMIN — PIPERACILLIN AND TAZOBACTAM 3.38 G: 3; .375 INJECTION, POWDER, LYOPHILIZED, FOR SOLUTION INTRAVENOUS at 15:26

## 2021-12-26 RX ADMIN — VANCOMYCIN HYDROCHLORIDE 1000 MG: 1 INJECTION, POWDER, LYOPHILIZED, FOR SOLUTION INTRAVENOUS at 20:26

## 2021-12-26 RX ADMIN — ASCORBIC ACID, VITAMIN A PALMITATE, CHOLECALCIFEROL, THIAMINE HYDROCHLORIDE, RIBOFLAVIN-5 PHOSPHATE SODIUM, PYRIDOXINE HYDROCHLORIDE, NIACINAMIDE, DEXPANTHENOL, ALPHA-TOCOPHEROL ACETATE, VITAMIN K1, FOLIC ACID, BIOTIN, CYANOCOBALAMIN: 200; 3300; 200; 6; 3.6; 6; 40; 15; 10; 150; 600; 60; 5 INJECTION, SOLUTION INTRAVENOUS at 17:31

## 2021-12-26 RX ADMIN — SODIUM CHLORIDE, PRESERVATIVE FREE 20 MG: 5 INJECTION INTRAVENOUS at 06:06

## 2021-12-26 RX ADMIN — DEXTROSE MONOHYDRATE, SODIUM CHLORIDE, AND POTASSIUM CHLORIDE 125 ML/HR: 50; 4.5; 1.49 INJECTION, SOLUTION INTRAVENOUS at 04:03

## 2021-12-26 RX ADMIN — METOCLOPRAMIDE 10 MG: 5 INJECTION, SOLUTION INTRAMUSCULAR; INTRAVENOUS at 15:26

## 2021-12-26 RX ADMIN — VANCOMYCIN HYDROCHLORIDE 1000 MG: 1 INJECTION, POWDER, LYOPHILIZED, FOR SOLUTION INTRAVENOUS at 12:09

## 2021-12-26 RX ADMIN — SODIUM CHLORIDE 100 MG: 9 INJECTION, SOLUTION INTRAVENOUS at 08:56

## 2021-12-26 RX ADMIN — SODIUM CHLORIDE, PRESERVATIVE FREE 5 ML: 5 INJECTION INTRAVENOUS at 15:26

## 2021-12-26 RX ADMIN — METOPROLOL TARTRATE 1.25 MG: 1 INJECTION, SOLUTION INTRAVENOUS at 08:55

## 2021-12-26 RX ADMIN — VANCOMYCIN HYDROCHLORIDE 1000 MG: 1 INJECTION, POWDER, LYOPHILIZED, FOR SOLUTION INTRAVENOUS at 04:05

## 2021-12-26 RX ADMIN — METOCLOPRAMIDE 10 MG: 5 INJECTION, SOLUTION INTRAMUSCULAR; INTRAVENOUS at 04:10

## 2021-12-26 RX ADMIN — SODIUM CHLORIDE, PRESERVATIVE FREE 20 MG: 5 INJECTION INTRAVENOUS at 17:31

## 2021-12-26 RX ADMIN — SODIUM CHLORIDE, PRESERVATIVE FREE 10 ML: 5 INJECTION INTRAVENOUS at 06:32

## 2021-12-26 RX ADMIN — METOCLOPRAMIDE 10 MG: 5 INJECTION, SOLUTION INTRAMUSCULAR; INTRAVENOUS at 20:27

## 2021-12-26 RX ADMIN — PIPERACILLIN AND TAZOBACTAM 3.38 G: 3; .375 INJECTION, POWDER, LYOPHILIZED, FOR SOLUTION INTRAVENOUS at 06:07

## 2021-12-26 RX ADMIN — METOPROLOL TARTRATE 1.25 MG: 1 INJECTION, SOLUTION INTRAVENOUS at 20:27

## 2021-12-26 RX ADMIN — PIPERACILLIN AND TAZOBACTAM 3.38 G: 3; .375 INJECTION, POWDER, LYOPHILIZED, FOR SOLUTION INTRAVENOUS at 22:23

## 2021-12-26 RX ADMIN — METOCLOPRAMIDE 10 MG: 5 INJECTION, SOLUTION INTRAMUSCULAR; INTRAVENOUS at 08:55

## 2021-12-26 RX ADMIN — METOPROLOL TARTRATE 1.25 MG: 1 INJECTION, SOLUTION INTRAVENOUS at 04:09

## 2021-12-26 RX ADMIN — SODIUM CHLORIDE, PRESERVATIVE FREE 10 ML: 5 INJECTION INTRAVENOUS at 22:50

## 2021-12-26 RX ADMIN — SODIUM CHLORIDE, PRESERVATIVE FREE 10 ML: 5 INJECTION INTRAVENOUS at 20:27

## 2021-12-26 RX ADMIN — METOPROLOL TARTRATE 1.25 MG: 1 INJECTION, SOLUTION INTRAVENOUS at 15:26

## 2021-12-26 NOTE — PROGRESS NOTES
Upon assessment, noted a small hole to midline incision with stool bubbling out. Stool is slightly darker than tube feed. Stopped tube feeding and notified Dr. Shara Monsivais. 1000 - Pt very upbeat this morning despite his challenges, verbalizing his excitement to see his son today. Pt was very sad yesterday and wife emotional about how their 6yr old son was in counseling to deal with his father's illness. Pt pleading to see his son as he is unable to leave his room d/t medical equipment that must remained attached. Inquired about possible short one time visitation with Nursing supervisor for pt's emotional status. Pt and wife verbalized this was a one time visitation for the 6yr old to see his father. [address] - Wife visited with 6yr son for 20 min then left. Pt's son seemed very grateful to see his father. 1613 - Noted G tube draining the same green colored drainage but now has tan stringy sedimentation. Tube feeds has been off since this [facility] Dr. Shara Monsivais aware.

## 2021-12-26 NOTE — PROGRESS NOTES
Admit Date: 2021    POD 16 Days Post-Op    Procedure:  Procedure(s):  OPEN LYSIS OF ADHESIONS, GASTROSTOMY  AND JEJUNAL TUBE PLACEMENT    Subjective:     Patient found to have purulent drainage from midline wound this morning. C/o some pain. Objective:     Blood pressure 100/68, pulse (!) 106, temperature 98.8 °F (37.1 °C), resp. rate 18, height 5' 9\" (1.753 m), weight 99 lb 6.4 oz (45.1 kg), SpO2 100 %. Temp (24hrs), Av.6 °F (37.6 °C), Min:98.2 °F (36.8 °C), Max:101.5 °F (38.6 °C)      Physical Exam:  GENERAL: alert, cooperative, severe muscle wasting, LUNG: clear to auscultation bilaterally, HEART: regular rate and rhythm, ABDOMEN: soft, wound with retention sutures intact, mid aspect of wound with purulent drainage. 2 staples removed and small pocket entered. Fascia intact.   No obvious enterocutaneous fistula, suspect simply wound infection, jejunostomy tube site intact, g-tube site with some scant drainage around brock catheter, EXTREMITIES:  extremities normal, atraumatic, no cyanosis or edema    Labs:   Recent Results (from the past 24 hour(s))   GLUCOSE, POC    Collection Time: 21 11:54 AM   Result Value Ref Range    Glucose (POC) 136 (H) 65 - 117 mg/dL    Performed by Chloe Riddle (TRV PCT)    [facility]    Collection Time: 21 12:45 PM   Result Value Ref Range    Vancomycin,trough 3.6 (L) 5.0 - 10.0 ug/mL    Reported dose date NOT PROVIDED      Reported dose time: NOT PROVIDED      Reported dose: NOT PROVIDED UNITS   GLUCOSE, POC    Collection Time: 21  5:51 PM   Result Value Ref Range    Glucose (POC) 153 (H) 65 - 117 mg/dL    Performed by Chloe Riddle (TRV PCT)    GLUCOSE, POC    Collection Time: 21 10:54 PM   Result Value Ref Range    Glucose (POC) 142 (H) 65 - 117 mg/dL    Performed by Jayleen Jurado    Collection Time: 21  4:00 AM   Result Value Ref Range    Magnesium 2.3 1.6 - 2.4 mg/dL   PHOSPHORUS    Collection Time: 21 4:00 AM   Result Value Ref Range    Phosphorus 2.8 2.6 - 4.7 MG/DL   METABOLIC PANEL, BASIC    Collection Time: 12/26/21  4:00 AM   Result Value Ref Range    Sodium 132 (L) 136 - 145 mmol/L    Potassium 3.4 (L) 3.5 - 5.1 mmol/L    Chloride 95 (L) 97 - 108 mmol/L    CO2 29 21 - 32 mmol/L    Anion gap 8 5 - 15 mmol/L    Glucose 132 (H) 65 - 100 mg/dL    BUN 16 6 - 20 MG/DL    Creatinine 0.60 (L) 0.70 - 1.30 MG/DL    BUN/Creatinine ratio 27 (H) 12 - 20      GFR est AA >60 >60 ml/min/1.73m2    GFR est non-AA >60 >60 ml/min/1.73m2    Calcium 8.4 (L) 8.5 - 10.1 MG/DL   GLUCOSE, POC    Collection Time: 12/26/21  5:55 AM   Result Value Ref Range    Glucose (POC) 133 (H) 65 - 117 mg/dL    Performed by Rhiannon Olivo        Data Review images and reports reviewed    Assessment:     Active Problems:    SBO (small bowel obstruction) ([address] (11/22/2021)      Aspiration pneumonia ([address] (11/25/2021)      Small bowel obstruction ([address] (11/25/2021)      Sepsis ([address] (11/25/2021)      Abdominal pain, acute (11/27/2021)      Intractable cyclical vomiting with nausea (11/27/2021)        Plan/Recommendations/Medical Decision Making:     Continue present treatment   New g-tube draining well with gravity.  + BMs  Hold tube feeds until question of enterocutaneous fistula resolved.   Ileostomy appliance placed over midline wound  Wound care consult in am  Continue tpn  Npo until g-tube site closes down around brock catheter    Rafi Quiñonez MD  [facility]

## 2021-12-27 LAB
ANION GAP SERPL CALC-SCNC: 5 MMOL/L (ref 5–15)
BACTERIA SPEC CULT: ABNORMAL
BACTERIA SPEC CULT: NORMAL
BUN SERPL-MCNC: 14 MG/DL (ref 6–20)
BUN/CREAT SERPL: 27 (ref 12–20)
CALCIUM SERPL-MCNC: 8.6 MG/DL (ref 8.5–10.1)
CHLORIDE SERPL-SCNC: 95 MMOL/L (ref 97–108)
CO2 SERPL-SCNC: 32 MMOL/L (ref 21–32)
CREAT SERPL-MCNC: 0.51 MG/DL (ref 0.7–1.3)
DATE LAST DOSE: NORMAL
GLUCOSE BLD STRIP.AUTO-MCNC: 118 MG/DL (ref 65–117)
GLUCOSE BLD STRIP.AUTO-MCNC: 120 MG/DL (ref 65–117)
GLUCOSE BLD STRIP.AUTO-MCNC: 121 MG/DL (ref 65–117)
GLUCOSE BLD STRIP.AUTO-MCNC: 129 MG/DL (ref 65–117)
GLUCOSE SERPL-MCNC: 99 MG/DL (ref 65–100)
MAGNESIUM SERPL-MCNC: 2.2 MG/DL (ref 1.6–2.4)
PHOSPHATE SERPL-MCNC: 3.1 MG/DL (ref 2.6–4.7)
POTASSIUM SERPL-SCNC: 3.5 MMOL/L (ref 3.5–5.1)
REPORTED DOSE,DOSE: NORMAL UNITS
REPORTED DOSE/TIME,TMG: 430
SERVICE CMNT-IMP: ABNORMAL
SERVICE CMNT-IMP: NORMAL
SODIUM SERPL-SCNC: 132 MMOL/L (ref 136–145)
VANCOMYCIN TROUGH SERPL-MCNC: 9 UG/ML (ref 5–10)

## 2021-12-27 PROCEDURE — 74011000258 HC RX REV CODE- 258: Performed by: STUDENT IN AN ORGANIZED HEALTH CARE EDUCATION/TRAINING PROGRAM

## 2021-12-27 PROCEDURE — 74011000258 HC RX REV CODE- 258: Performed by: SURGERY

## 2021-12-27 PROCEDURE — 74011250636 HC RX REV CODE- 250/636: Performed by: SURGERY

## 2021-12-27 PROCEDURE — 80202 ASSAY OF VANCOMYCIN: CPT

## 2021-12-27 PROCEDURE — 83735 ASSAY OF MAGNESIUM: CPT

## 2021-12-27 PROCEDURE — 74011000250 HC RX REV CODE- 250: Performed by: SURGERY

## 2021-12-27 PROCEDURE — 80048 BASIC METABOLIC PNL TOTAL CA: CPT

## 2021-12-27 PROCEDURE — 74011250636 HC RX REV CODE- 250/636: Performed by: STUDENT IN AN ORGANIZED HEALTH CARE EDUCATION/TRAINING PROGRAM

## 2021-12-27 PROCEDURE — 36415 COLL VENOUS BLD VENIPUNCTURE: CPT

## 2021-12-27 PROCEDURE — 82962 GLUCOSE BLOOD TEST: CPT

## 2021-12-27 PROCEDURE — 74011250637 HC RX REV CODE- 250/637: Performed by: SURGERY

## 2021-12-27 PROCEDURE — 2709999900 HC NON-CHARGEABLE SUPPLY

## 2021-12-27 PROCEDURE — 74011000250 HC RX REV CODE- 250: Performed by: NURSE PRACTITIONER

## 2021-12-27 PROCEDURE — 65660000000 HC RM CCU STEPDOWN

## 2021-12-27 PROCEDURE — 99232 SBSQ HOSP IP/OBS MODERATE 35: CPT | Performed by: STUDENT IN AN ORGANIZED HEALTH CARE EDUCATION/TRAINING PROGRAM

## 2021-12-27 PROCEDURE — 84100 ASSAY OF PHOSPHORUS: CPT

## 2021-12-27 RX ADMIN — VANCOMYCIN HYDROCHLORIDE 1000 MG: 1 INJECTION, POWDER, LYOPHILIZED, FOR SOLUTION INTRAVENOUS at 20:39

## 2021-12-27 RX ADMIN — NALOXEGOL OXALATE 12.5 MG: 12.5 TABLET, FILM COATED ORAL at 09:38

## 2021-12-27 RX ADMIN — MAGNESIUM SULFATE HEPTAHYDRATE: 500 INJECTION, SOLUTION INTRAMUSCULAR; INTRAVENOUS at 20:19

## 2021-12-27 RX ADMIN — VANCOMYCIN HYDROCHLORIDE 1000 MG: 1 INJECTION, POWDER, LYOPHILIZED, FOR SOLUTION INTRAVENOUS at 13:37

## 2021-12-27 RX ADMIN — SODIUM CHLORIDE 100 MG: 9 INJECTION, SOLUTION INTRAVENOUS at 13:02

## 2021-12-27 RX ADMIN — SODIUM CHLORIDE, PRESERVATIVE FREE 10 ML: 5 INJECTION INTRAVENOUS at 05:37

## 2021-12-27 RX ADMIN — METOPROLOL TARTRATE 1.25 MG: 1 INJECTION, SOLUTION INTRAVENOUS at 02:41

## 2021-12-27 RX ADMIN — Medication: at 07:58

## 2021-12-27 RX ADMIN — MEROPENEM 500 MG: 500 INJECTION INTRAVENOUS at 21:58

## 2021-12-27 RX ADMIN — SODIUM CHLORIDE, PRESERVATIVE FREE 10 ML: 5 INJECTION INTRAVENOUS at 06:15

## 2021-12-27 RX ADMIN — METOCLOPRAMIDE 10 MG: 5 INJECTION, SOLUTION INTRAMUSCULAR; INTRAVENOUS at 20:47

## 2021-12-27 RX ADMIN — MEROPENEM 500 MG: 500 INJECTION INTRAVENOUS at 15:31

## 2021-12-27 RX ADMIN — SODIUM CHLORIDE, PRESERVATIVE FREE 10 ML: 5 INJECTION INTRAVENOUS at 22:02

## 2021-12-27 RX ADMIN — VANCOMYCIN HYDROCHLORIDE 1000 MG: 1 INJECTION, POWDER, LYOPHILIZED, FOR SOLUTION INTRAVENOUS at 04:28

## 2021-12-27 RX ADMIN — SODIUM CHLORIDE, PRESERVATIVE FREE 40 ML: 5 INJECTION INTRAVENOUS at 02:42

## 2021-12-27 RX ADMIN — SODIUM CHLORIDE, PRESERVATIVE FREE 10 ML: 5 INJECTION INTRAVENOUS at 15:26

## 2021-12-27 RX ADMIN — SODIUM CHLORIDE, PRESERVATIVE FREE 20 MG: 5 INJECTION INTRAVENOUS at 06:15

## 2021-12-27 RX ADMIN — METOCLOPRAMIDE 10 MG: 5 INJECTION, SOLUTION INTRAMUSCULAR; INTRAVENOUS at 15:31

## 2021-12-27 RX ADMIN — Medication: at 20:25

## 2021-12-27 RX ADMIN — SODIUM CHLORIDE, PRESERVATIVE FREE 20 MG: 5 INJECTION INTRAVENOUS at 18:09

## 2021-12-27 RX ADMIN — METOCLOPRAMIDE 10 MG: 5 INJECTION, SOLUTION INTRAMUSCULAR; INTRAVENOUS at 02:41

## 2021-12-27 RX ADMIN — METOCLOPRAMIDE 10 MG: 5 INJECTION, SOLUTION INTRAMUSCULAR; INTRAVENOUS at 09:38

## 2021-12-27 RX ADMIN — PIPERACILLIN AND TAZOBACTAM 3.38 G: 3; .375 INJECTION, POWDER, LYOPHILIZED, FOR SOLUTION INTRAVENOUS at 06:00

## 2021-12-27 NOTE — PROGRESS NOTES
PT visit attempted pt refused stating he can't move right now. Will defer at this time and continue to follow.

## 2021-12-27 NOTE — PROGRESS NOTES
Occupational Therapy note:    Chart reviewed and cleared for therapy by RN. Patient semisupine in bed when attempting to initiate OT tx session. Patient politely but adamantly refused OT this date despite encouragement. Patient educated on importance of OOB activities and purpose of OT while in the hospital. Patient continue to refuse stating, \"I can't move right now, I just can't\". Patient requested OT attempt tx session tomorrow.     BENTON Phillip/L

## 2021-12-27 NOTE — PROGRESS NOTES
End of Shift Note    Bedside shift change report given to [employer] (oncoming nurse) by Belkys Guzman RN (offgoing nurse). Report included the following information SBAR, Intake/Output, MAR, Recent Results and Cardiac Rhythm Sinus Tach    Shift worked:  4414-2098     Shift summary and any significant changes:     Patient resting quietlly in bed; G-tube drain to gravity with copious green mucoid drainage, erosion noted to exit area on abd; accordian drain to RLQ abd with minimal serosang/tan output     Concerns for physician to address: Continued drainage from G-tube site, darkening urine`   Zone phone for oncoming shift:  [phone]     Activity:  Activity Level: Up with Assistance  Number times ambulated in hallways past shift: 0  Number of times OOB to chair past shift: 0    Cardiac:   Cardiac Monitoring: Yes      Cardiac Rhythm: Sinus Tachy    Access:   Current line(s): PICC     Genitourinary:   Urinary status: voiding    Respiratory:   O2 Device: None (Room air)  Chronic home O2 use?: NO  Incentive spirometer at bedside: YES  Actual Volume (ml): 1000 ml  GI:  Last Bowel Movement Date: 12/26/21  Current diet:  ADULT ORAL NUTRITION SUPPLEMENT Breakfast, Lunch; Clear Liquid  ADULT TUBE FEEDING Jejunostomy; Peptide Based; Delivery Method: Continuous; Continuous Initial Rate (mL/hr): 20; Continuous Advance Tube Feeding: No; Water Flush Volume (mL): 30; Water Flush Frequency: Q 4 hours  DIET NPO  TPN ADULT - CENTRAL AA 5% D15% W/ CA + ELECTROLYTES  Passing flatus: NO  Tolerating current diet: NO       Pain Management:   Patient states pain is manageable on current regimen: YES    Skin:  Russell Score: 18  Interventions: turn team, float heels and increase time out of bed    Patient Safety:  Fall Score:  Total Score: 3  Interventions: gripper socks and pt to call before getting OOB  High Fall Risk: Yes    Length of Stay:  Expected LOS: 9d 14h  Actual LOS: 31      Belkys Guzman RN
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
## 2021-12-27 NOTE — PROGRESS NOTES
Infectious Disease Progress Note         Interval:  NAEO    Subjective:   Patient seen this afternoon. Reports feeling much better. Wound care was at bedside. Patient is n.p.o. and tube feeds have been held for now. Objective:    Vitals:   Reviewed in chart.     Physical Exam:  Gen: No apparent distress  HEENT:  Normocephalic, atraumatic, no scleral icterus, no thrush  CV: HDS   Lungs: Clear to auscultation bilaterally, no wheezing  Abdomen: soft, tender, G-tube, J-tube  Genitourinary:  no brock catheter   Skin: no rash   Psych: good affect, good eye contact, non tearful  Neuro: alert, oriented to time,  place, and situation, moves all extremities to commands, verbal  Musculoskeletal:  No joint edema, erythema or tenderness noted   Lines: PIVs        Labs:  Recent Results (from the past 24 hour(s))   GLUCOSE, POC    Collection Time: 12/27/21 12:15 AM   Result Value Ref Range    Glucose (POC) 129 (H) 65 - 117 mg/dL    Performed by Jean-Claude Hocroberta (PCT)    MAGNESIUM    Collection Time: 12/27/21  1:18 AM   Result Value Ref Range    Magnesium 2.2 1.6 - 2.4 mg/dL   PHOSPHORUS    Collection Time: 12/27/21  1:18 AM   Result Value Ref Range    Phosphorus 3.1 2.6 - 4.7 MG/DL   METABOLIC PANEL, BASIC    Collection Time: 12/27/21  1:18 AM   Result Value Ref Range    Sodium 132 (L) 136 - 145 mmol/L    Potassium 3.5 3.5 - 5.1 mmol/L    Chloride 95 (L) 97 - 108 mmol/L    CO2 32 21 - 32 mmol/L    Anion gap 5 5 - 15 mmol/L    Glucose 99 65 - 100 mg/dL    BUN 14 6 - 20 MG/DL    Creatinine 0.51 (L) 0.70 - 1.30 MG/DL    BUN/Creatinine ratio 27 (H) 12 - 20      GFR est AA >60 >60 ml/min/1.73m2    GFR est non-AA >60 >60 ml/min/1.73m2    Calcium 8.6 8.5 - 10.1 MG/DL   GLUCOSE, POC    Collection Time: 12/27/21  6:18 AM   Result Value Ref Range    Glucose (POC) 121 (H) 65 - 117 mg/dL    Performed by Jean-Claude Hocroberta (PCT)    GLUCOSE, POC    Collection Time: 12/27/21 11:48 AM   Result Value Ref Range    Glucose (POC) 118 (H) 65 - 117 mg/dL    Performed by [employer] PCT    VANCOMYCIN, TROUGH    Collection Time: 12/27/21  1:01 PM   Result Value Ref Range    Vancomycin,trough 9.0 5.0 - 10.0 ug/mL    Reported dose date 20211227      Reported dose time: 0430      Reported dose: 1000MG/250 ML UNITS                 Assessment:  Clinically looking nontoxic and stable. Enterobacter cloacae from the gastric tube drainage, along with Candida albicans. Patient is n.p.o. 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 Recommendations:  -Stop Zosyn and start on meropenem. Discussed with pharmacy. - Continue vancomycin, anidulafungin  -CT 12/24/2021 mentioning a right paracolic gutter collection. We will recommend that this be aspirated/drained for cultures and source control reasons. General surgery to please review the CT abdomen pelvis from 12/24/2021 and review with IR if any drainage of the right paracolic gutter collection is needed, as this is an abscess until proven otherwise and likely one of the sources of his ongoing fevers. -Duration of antibiotics pending clinical course. Will follow. Thank you for the opportunity to participate in the care of this patient. Please contact with questions or concerns.       Sandra Vega MD  Infectious Diseases

## 2021-12-27 NOTE — PROGRESS NOTES
Comprehensive Nutrition Assessment    Type and Reason for Visit: Reassess    Nutrition Recommendations/Plan:   Recommend increasing TPN to Goal Rate of 83mL/h + 500mL 20% lipids 3 x week (provides 1842kcals/100gPro/298gDextrose)     Nutrition Assessment:   Chart reviewed. Pt now with ECF and is NPO. TF discontinued over the weekend. G tube with 1650mL OP yesterday. Pt only on TPN at 63mL/h (unsure why). BG well controlled (118-121). K 3.5, Phos and Mag WNL. Current regimen is inadequate to meet est needs and pt continues to lose wt. TPN recommendations as above to increase to Goal Rate. Malnutrition Assessment:  Malnutrition Status:  Severe malnutrition    Context:  Chronic illness     Findings of the 6 clinical characteristics of malnutrition:   Energy Intake:  7 - 75% or less est energy requirements for 1 month or longer  Weight Loss:  7.0 - Greater than 7.5% over 3 months     Body Fat Loss:  7 - Severe body fat loss, Triceps,Orbital,Buccal region   Muscle Mass Loss:  1 - Mild muscle mass loss, Clavicles (pectoralis &deltoids),Temples (temporalis),Thigh (quadriceps),Scapula (trapezius)  Fluid Accumulation:  Unable to assess,     Strength:  Not performed         Estimated Daily Nutrient Needs:  Energy (kcal): 2188 (BMR 1489 x 1. 3AF) + 250 kcals; Weight Used for Energy Requirements: Current  Protein (g): 79-105g (1.5-2gPro/kg); Weight Used for Protein Requirements: Current  Fluid (ml/day): 0053-5275 ml/day; Method Used for Fluid Requirements: 1 ml/kcal      Nutrition Related Findings:  Meds: eraxis, pepcid, humalog, merrem, reglan, KCl, movantik, vancomycin, D5, [email or web address].   BM 12/26      Wounds:    Surgical incision       Current Nutrition Therapies:  Current Parenteral Nutrition Orders:  · Type and Formula: D15, 5% AA   · Lipids: None  · Duration: Continuous  · Rate/Volume: 63mL/h  · Current PN Order Provides: 1074kcals/76gPro/227gDextrose  · Goal PN Orders Provides: 1842kcals/100gPro/298gDextrose      Anthropometric Measures:  · Height:  5' 9\" (175.3 cm)  · Current Body Wt:  43.4 kg (95 lb 10.9 oz)   · Ideal Body Wt:  160 lbs:  63 %   · BMI Category:  Underweight (BMI less than 18.5)       Nutrition Diagnosis:   · Inadequate protein-energy intake related to altered GI function as evidenced by NPO or clear liquid status due to medical condition,weight loss (SBO)  Previous dx resumed. Nutrition Interventions:   Food and/or Nutrient Delivery: Modify parenteral nutrition  Nutrition Education and Counseling: No recommendations at this time  Coordination of Nutrition Care: Continue to monitor while inpatient    Goals:  Pt will tolerate TPN at goal rate with BG <200mg/dL and lytes WNL in 2-4 days. Nutrition Monitoring and Evaluation:   Behavioral-Environmental Outcomes: None identified  Food/Nutrient Intake Outcomes: Parenteral nutrition intake/tolerance  Physical Signs/Symptoms Outcomes: Biochemical data,GI status,Weight,Skin    Discharge Planning:     Too soon to determine     Electronically signed by Helene Dodge RD, [address]  on 12/27/2021 at 3:58 PM    Contact: [phone]

## 2021-12-27 NOTE — PROGRESS NOTES
Transition of Care Plan:    RUR:  20% high risk  Disposition:  [facility] vs Home with [facility]) and TPN (Marzena Russell [phone])  Follow up appointments: To be scheduled if patient returning home with PCP and surgeon  DME needed:  TPN  Transportation at Discharge: [facility] vs family if safe to go by car  [place] or means to access home: mother has access to the home      IM Medicare Letter:  Pt is a Medicaid patient, no IMM letter required. Is patient a BCPI-A Bundle:  Not identified as bundle patient         If yes, was Bundle Letter given?:    Is patient a  and connected with the [place]? Not identified as a   If yes, was [place] transfer form completed and [facility] notified? Caregiver Contact: Mother, Joni Garcia, ([phone])  Discharge Caregiver contacted prior to discharge? Mother to be informed of discharge plans as they develop    Chart reviewed. CM had discussion with [facility] liaison about admission there. Some concerns about whether [facility] can manage patient with a fistula. CM will need to discuss possible LTC placement at SNF to manage medical issues, however, due to patient's LTC need of TPN, it will be limited placement options at this time. Manuel Wan, [address] - ED [facility]  [employer] ACP Facilitator  Zone Phone: [phone]

## 2021-12-27 NOTE — PROGRESS NOTES
Admit Date: 2021    POD 17 Days Post-Op    Procedure:  Procedure(s):  OPEN LYSIS OF ADHESIONS, GASTROSTOMY  AND JEJUNAL TUBE PLACEMENT    Subjective:     Patient without complaint    Objective:     Blood pressure 92/60, pulse 93, temperature 99.1 °F (37.3 °C), resp. rate 18, height 5' 9\" (1.753 m), weight 95 lb 11.2 oz (43.4 kg), SpO2 98 %.     Temp (24hrs), Av.4 °F (37.4 °C), Min:98.8 °F (37.1 °C), Max:99.8 °F (37.7 °C)      Physical Exam:  GENERAL: alert, cooperative, severe muscle wasting, LUNG: clear to auscultation bilaterally, HEART: regular rate and rhythm, ABDOMEN: soft, wound with retention sutures intact, mid aspect of wound dressed with ostomy appliance without significant drainage, jejunostomy tube site intact, g-tube site with some scant drainage around brock catheter, EXTREMITIES:  extremities normal, atraumatic, no cyanosis or edema    Labs:   Recent Results (from the past 24 hour(s))   GLUCOSE, POC    Collection Time: 21 12:15 AM   Result Value Ref Range    Glucose (POC) 129 (H) 65 - 117 mg/dL    Performed by William Lopez (PCT)    MAGNESIUM    Collection Time: 21  1:18 AM   Result Value Ref Range    Magnesium 2.2 1.6 - 2.4 mg/dL   PHOSPHORUS    Collection Time: 21  1:18 AM   Result Value Ref Range    Phosphorus 3.1 2.6 - 4.7 MG/DL   METABOLIC PANEL, BASIC    Collection Time: 21  1:18 AM   Result Value Ref Range    Sodium 132 (L) 136 - 145 mmol/L    Potassium 3.5 3.5 - 5.1 mmol/L    Chloride 95 (L) 97 - 108 mmol/L    CO2 32 21 - 32 mmol/L    Anion gap 5 5 - 15 mmol/L    Glucose 99 65 - 100 mg/dL    BUN 14 6 - 20 MG/DL    Creatinine 0.51 (L) 0.70 - 1.30 MG/DL    BUN/Creatinine ratio 27 (H) 12 - 20      GFR est AA >60 >60 ml/min/1.73m2    GFR est non-AA >60 >60 ml/min/1.73m2    Calcium 8.6 8.5 - 10.1 MG/DL   GLUCOSE, POC    Collection Time: 21  6:18 AM   Result Value Ref Range    Glucose (POC) 121 (H) 65 - 117 mg/dL    Performed by William Lopez (PCT)        Data Review images and reports reviewed    Assessment:     Active Problems:    SBO (small bowel obstruction) ([address] (11/22/2021)      Aspiration pneumonia ([address] (11/25/2021)      Small bowel obstruction ([address] (11/25/2021)      Sepsis ([address] (11/25/2021)      Abdominal pain, acute (11/27/2021)      Intractable cyclical vomiting with nausea (11/27/2021)        Plan/Recommendations/Medical Decision Making:     Continue present treatment   New g-tube draining well with gravity.  + BMs  Hold tube feeds until question of enterocutaneous fistula resolved.   Ileostomy appliance placed over midline wound  Wound care consult pending  Continue tpn  Npo until g-tube site closes down around brock catheter    Al Figueroa MD  [facility]

## 2021-12-27 NOTE — PROGRESS NOTES
Pharmacy Antimicrobial Kinetic Dosing    Indication for Antimicrobials: Sepsis     Current Regimen of Each Antimicrobial:  Vancomycin 1000 mg IV LD, then 500 mg IV q8h (Start Date ; Day 4  Anidulafungin 200 mg X1, then 100 mg (; day 5  Zosyn 3.375 g IV q8h (Start Date ; Day 11    Previous Antimicrobial Therapy:  Zosyn (-, -)    Goal Level: AUC: 400-600 mg/hr/Liter/day    Date Dose & Interval Measured (mcg/mL) Predicted AUC/MARIE    @1245 500 mg q 8 H 3.6     1301 1g q8h 9 502           Date & time of next level:     Dosing calculator used: Excel-based calculator    Significant Positive Cultures:    blood: NG - Final  : Blood cx: NG - final  : Anaerobic cx: enterobacter cloacae, light candida albicans (final)  : Blood cx for fungus: pending     Conditions for Dosing Consideration: None    Labs:  Recent Labs     21  0118 21  0400 21  0400   CREA 0.51* 0.60* 0.61*   BUN 14 16 19     Recent Labs     21  0400   WBC 12.6*     Temp (24hrs), Av.4 °F (37.4 °C), Min:98.9 °F (37.2 °C), Max:99.8 °F (37.7 °C)      Creatinine Clearance (mL/min):   CrCl (Ideal Body Weight): 138.9   If actual weight < IBW: CrCl (Actual Body Weight) 85.3    Impression/Plan:   Predicted AUC with Excel with within goal.  Continue vancomycin 1g IV q8h. Discussed cultures with ID. Will d/c Zosyn and order meropenem 500mg IV q6h. BMP daily  Continue anidulafungin as ordered  Antimicrobial stop date TBD     Pharmacy will follow daily and adjust medications as appropriate for renal function and/or serum levels.     Thank you,  Arturo Silva, PHARMD    Vancomycin Dosing Document    Documents located on pharmacy Teams site: Clinical Practice -> Antimicrobial Stewardship -> Antibiotics_Vancomycin     Aminoglycoside Dosing Document    Documents located on pharmacy Teams site: Clinical Practice -> Antimicrobial Stewardship -> Antibiotics_Aminoglycosides

## 2021-12-28 LAB
ANION GAP SERPL CALC-SCNC: 7 MMOL/L (ref 5–15)
BUN SERPL-MCNC: 13 MG/DL (ref 6–20)
BUN/CREAT SERPL: 30 (ref 12–20)
CALCIUM SERPL-MCNC: 8.4 MG/DL (ref 8.5–10.1)
CHLORIDE SERPL-SCNC: 95 MMOL/L (ref 97–108)
CO2 SERPL-SCNC: 31 MMOL/L (ref 21–32)
CREAT SERPL-MCNC: 0.43 MG/DL (ref 0.7–1.3)
ERYTHROCYTE [DISTWIDTH] IN BLOOD BY AUTOMATED COUNT: 18.1 % (ref 11.5–14.5)
GLUCOSE BLD STRIP.AUTO-MCNC: 113 MG/DL (ref 65–117)
GLUCOSE BLD STRIP.AUTO-MCNC: 116 MG/DL (ref 65–117)
GLUCOSE BLD STRIP.AUTO-MCNC: 120 MG/DL (ref 65–117)
GLUCOSE BLD STRIP.AUTO-MCNC: 125 MG/DL (ref 65–117)
GLUCOSE BLD STRIP.AUTO-MCNC: 144 MG/DL (ref 65–117)
GLUCOSE SERPL-MCNC: 80 MG/DL (ref 65–100)
HCT VFR BLD AUTO: 25 % (ref 36.6–50.3)
HGB BLD-MCNC: 8.1 G/DL (ref 12.1–17)
MAGNESIUM SERPL-MCNC: 2.1 MG/DL (ref 1.6–2.4)
MCH RBC QN AUTO: 28 PG (ref 26–34)
MCHC RBC AUTO-ENTMCNC: 32.4 G/DL (ref 30–36.5)
MCV RBC AUTO: 86.5 FL (ref 80–99)
NRBC # BLD: 0 K/UL (ref 0–0.01)
NRBC BLD-RTO: 0 PER 100 WBC
PHOSPHATE SERPL-MCNC: 3.3 MG/DL (ref 2.6–4.7)
PLATELET # BLD AUTO: 577 K/UL (ref 150–400)
PMV BLD AUTO: 9 FL (ref 8.9–12.9)
POTASSIUM SERPL-SCNC: 3.9 MMOL/L (ref 3.5–5.1)
RBC # BLD AUTO: 2.89 M/UL (ref 4.1–5.7)
SERVICE CMNT-IMP: ABNORMAL
SERVICE CMNT-IMP: NORMAL
SERVICE CMNT-IMP: NORMAL
SODIUM SERPL-SCNC: 133 MMOL/L (ref 136–145)
WBC # BLD AUTO: 8.9 K/UL (ref 4.1–11.1)

## 2021-12-28 PROCEDURE — 74011250637 HC RX REV CODE- 250/637: Performed by: SURGERY

## 2021-12-28 PROCEDURE — 74011250636 HC RX REV CODE- 250/636: Performed by: SURGERY

## 2021-12-28 PROCEDURE — 74011000250 HC RX REV CODE- 250: Performed by: SURGERY

## 2021-12-28 PROCEDURE — 36415 COLL VENOUS BLD VENIPUNCTURE: CPT

## 2021-12-28 PROCEDURE — 85027 COMPLETE CBC AUTOMATED: CPT

## 2021-12-28 PROCEDURE — 74011000250 HC RX REV CODE- 250: Performed by: NURSE PRACTITIONER

## 2021-12-28 PROCEDURE — 84100 ASSAY OF PHOSPHORUS: CPT

## 2021-12-28 PROCEDURE — 82962 GLUCOSE BLOOD TEST: CPT

## 2021-12-28 PROCEDURE — 99232 SBSQ HOSP IP/OBS MODERATE 35: CPT | Performed by: STUDENT IN AN ORGANIZED HEALTH CARE EDUCATION/TRAINING PROGRAM

## 2021-12-28 PROCEDURE — 74011000258 HC RX REV CODE- 258: Performed by: SURGERY

## 2021-12-28 PROCEDURE — 65660000000 HC RM CCU STEPDOWN

## 2021-12-28 PROCEDURE — 74011250636 HC RX REV CODE- 250/636: Performed by: STUDENT IN AN ORGANIZED HEALTH CARE EDUCATION/TRAINING PROGRAM

## 2021-12-28 PROCEDURE — 83735 ASSAY OF MAGNESIUM: CPT

## 2021-12-28 PROCEDURE — 80048 BASIC METABOLIC PNL TOTAL CA: CPT

## 2021-12-28 PROCEDURE — 74011000258 HC RX REV CODE- 258: Performed by: STUDENT IN AN ORGANIZED HEALTH CARE EDUCATION/TRAINING PROGRAM

## 2021-12-28 RX ADMIN — SODIUM CHLORIDE, PRESERVATIVE FREE 10 ML: 5 INJECTION INTRAVENOUS at 05:56

## 2021-12-28 RX ADMIN — MEROPENEM 500 MG: 500 INJECTION INTRAVENOUS at 04:24

## 2021-12-28 RX ADMIN — SODIUM CHLORIDE, PRESERVATIVE FREE 10 ML: 5 INJECTION INTRAVENOUS at 22:00

## 2021-12-28 RX ADMIN — CALCIUM GLUCONATE: 98 INJECTION, SOLUTION INTRAVENOUS at 18:20

## 2021-12-28 RX ADMIN — METOCLOPRAMIDE 10 MG: 5 INJECTION, SOLUTION INTRAMUSCULAR; INTRAVENOUS at 09:44

## 2021-12-28 RX ADMIN — MEROPENEM 500 MG: 500 INJECTION INTRAVENOUS at 09:44

## 2021-12-28 RX ADMIN — METOCLOPRAMIDE 10 MG: 5 INJECTION, SOLUTION INTRAMUSCULAR; INTRAVENOUS at 22:11

## 2021-12-28 RX ADMIN — METOCLOPRAMIDE 10 MG: 5 INJECTION, SOLUTION INTRAMUSCULAR; INTRAVENOUS at 15:41

## 2021-12-28 RX ADMIN — DEXTROSE MONOHYDRATE, SODIUM CHLORIDE, AND POTASSIUM CHLORIDE 60 ML/HR: 50; 4.5; 1.49 INJECTION, SOLUTION INTRAVENOUS at 09:57

## 2021-12-28 RX ADMIN — MEROPENEM 500 MG: 500 INJECTION INTRAVENOUS at 22:12

## 2021-12-28 RX ADMIN — SODIUM CHLORIDE 100 MG: 9 INJECTION, SOLUTION INTRAVENOUS at 13:49

## 2021-12-28 RX ADMIN — VANCOMYCIN HYDROCHLORIDE 1000 MG: 1 INJECTION, POWDER, LYOPHILIZED, FOR SOLUTION INTRAVENOUS at 04:23

## 2021-12-28 RX ADMIN — SODIUM CHLORIDE, PRESERVATIVE FREE 20 MG: 5 INJECTION INTRAVENOUS at 05:56

## 2021-12-28 RX ADMIN — SODIUM CHLORIDE, PRESERVATIVE FREE 10 ML: 5 INJECTION INTRAVENOUS at 13:49

## 2021-12-28 RX ADMIN — METOCLOPRAMIDE 10 MG: 5 INJECTION, SOLUTION INTRAMUSCULAR; INTRAVENOUS at 03:37

## 2021-12-28 RX ADMIN — METOPROLOL TARTRATE 1.25 MG: 1 INJECTION, SOLUTION INTRAVENOUS at 09:44

## 2021-12-28 RX ADMIN — VANCOMYCIN HYDROCHLORIDE 1000 MG: 1 INJECTION, POWDER, LYOPHILIZED, FOR SOLUTION INTRAVENOUS at 12:15

## 2021-12-28 RX ADMIN — MEROPENEM 500 MG: 500 INJECTION INTRAVENOUS at 15:41

## 2021-12-28 RX ADMIN — SODIUM CHLORIDE, PRESERVATIVE FREE 20 MG: 5 INJECTION INTRAVENOUS at 18:00

## 2021-12-28 RX ADMIN — VANCOMYCIN HYDROCHLORIDE 1000 MG: 1 INJECTION, POWDER, LYOPHILIZED, FOR SOLUTION INTRAVENOUS at 20:00

## 2021-12-28 NOTE — PROGRESS NOTES
Infectious Disease Progress Note         Interval:  NAEO    Subjective:   Patient seen this afternoon. Reports having a bad headache. Denied abdominal pain. Objective:    Vitals:   Reviewed in chart.     Physical Exam:  Gen: No apparent distress  HEENT:  Normocephalic, atraumatic, no scleral icterus, no thrush  CV: HDS   Lungs: Clear to auscultation bilaterally, no wheezing  Abdomen: soft, tender, G-tube, J-tube  Genitourinary:  no brock catheter   Skin: no rash   Psych: good affect, good eye contact, non tearful  Neuro: alert, oriented to time,  place, and situation, moves all extremities to commands, verbal  Musculoskeletal:  No joint edema, erythema or tenderness noted   Lines: PIVs        Labs:  Recent Results (from the past 24 hour(s))   GLUCOSE, POC    Collection Time: 12/27/21  5:44 PM   Result Value Ref Range    Glucose (POC) 120 (H) 65 - 117 mg/dL    Performed by Justyna Barth PCT    GLUCOSE, POC    Collection Time: 12/28/21 12:10 AM   Result Value Ref Range    Glucose (POC) 125 (H) 65 - 117 mg/dL    Performed by Andry Escamilla (PCT)    MAGNESIUM    Collection Time: 12/28/21  4:22 AM   Result Value Ref Range    Magnesium 2.1 1.6 - 2.4 mg/dL   PHOSPHORUS    Collection Time: 12/28/21  4:22 AM   Result Value Ref Range    Phosphorus 3.3 2.6 - 4.7 MG/DL   METABOLIC PANEL, BASIC    Collection Time: 12/28/21  4:22 AM   Result Value Ref Range    Sodium 133 (L) 136 - 145 mmol/L    Potassium 3.9 3.5 - 5.1 mmol/L    Chloride 95 (L) 97 - 108 mmol/L    CO2 31 21 - 32 mmol/L    Anion gap 7 5 - 15 mmol/L    Glucose 80 65 - 100 mg/dL    BUN 13 6 - 20 MG/DL    Creatinine 0.43 (L) 0.70 - 1.30 MG/DL    BUN/Creatinine ratio 30 (H) 12 - 20      GFR est AA >60 >60 ml/min/1.73m2    GFR est non-AA >60 >60 ml/min/1.73m2    Calcium 8.4 (L) 8.5 - 10.1 MG/DL   CBC W/O DIFF    Collection Time: 12/28/21  5:18 AM   Result Value Ref Range    WBC 8.9 4.1 - 11.1 K/uL    RBC 2.89 (L) 4.10 - 5.70 M/uL    HGB 8.1 (L) 12.1 - 17.0 g/dL    HCT 25.0 (L) 36.6 - 50.3 %    MCV 86.5 80.0 - 99.0 FL    MCH 28.0 26.0 - 34.0 PG    MCHC 32.4 30.0 - 36.5 g/dL    RDW 18.1 (H) 11.5 - 14.5 %    PLATELET 676 (H) 038 - 400 K/uL    MPV 9.0 8.9 - 12.9 FL    NRBC 0.0 0  WBC    ABSOLUTE NRBC 0.00 0.00 - 0.01 K/uL   GLUCOSE, POC    Collection Time: 12/28/21  6:26 AM   Result Value Ref Range    Glucose (POC) 116 65 - 117 mg/dL    Performed by Ирина Trinidad (PCT)    GLUCOSE, POC    Collection Time: 12/28/21 11:44 AM   Result Value Ref Range    Glucose (POC) 113 65 - 117 mg/dL    Performed by Jairo Velasquez PCT                  Assessment:  Clinically looking nontoxic and stable. Enterobacter cloacae from the gastric tube drainage, along with Candida albicans. Patient is n.p.o. 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 Recommendations:  -Continue meropenem  - Continue vancomycin, anidulafungin  -Duration of antibiotics pending clinical course. Will follow. Thank you for the opportunity to participate in the care of this patient. Please contact with questions or concerns.       Katiuska Evans MD  Infectious Diseases

## 2021-12-28 NOTE — PROGRESS NOTES
Admit Date: 2021    POD 18 Days Post-Op    Procedure:  Procedure(s):  OPEN LYSIS OF ADHESIONS, GASTROSTOMY  AND JEJUNAL TUBE PLACEMENT    Subjective:     Patient without complaint    Objective:     Blood pressure 92/64, pulse 96, temperature 97.3 °F (36.3 °C), resp. rate 17, height 5' 9\" (1.753 m), weight 95 lb 11.2 oz (43.4 kg), SpO2 93 %.     Temp (24hrs), Av.7 °F (37.1 °C), Min:97.3 °F (36.3 °C), Max:99.6 °F (37.6 °C)      Physical Exam:  GENERAL: alert, cooperative, severe muscle wasting, LUNG: clear to auscultation bilaterally, HEART: regular rate and rhythm, ABDOMEN: soft, wound with retention sutures intact, mid aspect of wound dressed with ostomy appliance without significant drainage, jejunostomy tube site intact, g-tube site with some scant drainage around brock catheter, EXTREMITIES:  extremities normal, atraumatic, no cyanosis or edema    Labs:   Recent Results (from the past 24 hour(s))   GLUCOSE, POC    Collection Time: 21 11:48 AM   Result Value Ref Range    Glucose (POC) 118 (H) 65 - 117 mg/dL    Performed by Cyndee Osborne PCT    [place], TROUGH    Collection Time: 21  1:01 PM   Result Value Ref Range    Vancomycin,trough 9.0 5.0 - 10.0 ug/mL    Reported dose date 2021      Reported dose time: 430      Reported dose: 1000MG/250 ML UNITS   GLUCOSE, POC    Collection Time: 21  5:44 PM   Result Value Ref Range    Glucose (POC) 120 (H) 65 - 117 mg/dL    Performed by Sandip Kidd [facility]    GLUCOSE, POC    Collection Time: 21 12:10 AM   Result Value Ref Range    Glucose (POC) 125 (H) 65 - 117 mg/dL    Performed by Nick Blackwood (PCT)    MAGNESIUM    Collection Time: 21  4:22 AM   Result Value Ref Range    Magnesium 2.1 1.6 - 2.4 mg/dL   PHOSPHORUS    Collection Time: 21  4:22 AM   Result Value Ref Range    Phosphorus 3.3 2.6 - 4.7 MG/DL   METABOLIC PANEL, BASIC    Collection Time: 21  4:22 AM   Result Value Ref Range    Sodium 133 (L) 136 - 145 mmol/L    Potassium 3.9 3.5 - 5.1 mmol/L    Chloride 95 (L) 97 - 108 mmol/L    CO2 31 21 - 32 mmol/L    Anion gap 7 5 - 15 mmol/L    Glucose 80 65 - 100 mg/dL    BUN 13 6 - 20 MG/DL    Creatinine 0.43 (L) 0.70 - 1.30 MG/DL    BUN/Creatinine ratio 30 (H) 12 - 20      GFR est AA >60 >60 ml/min/1.73m2    GFR est non-AA >60 >60 ml/min/1.73m2    Calcium 8.4 (L) 8.5 - 10.1 MG/DL   CBC W/O DIFF    Collection Time: 12/28/21  5:18 AM   Result Value Ref Range    WBC 8.9 4.1 - 11.1 K/uL    RBC 2.89 (L) 4.10 - 5.70 M/uL    HGB 8.1 (L) 12.1 - 17.0 g/dL    HCT 25.0 (L) 36.6 - 50.3 %    MCV 86.5 80.0 - 99.0 FL    MCH 28.0 26.0 - 34.0 PG    MCHC 32.4 30.0 - 36.5 g/dL    RDW 18.1 (H) 11.5 - 14.5 %    PLATELET 270 (H) 925 - 400 K/uL    MPV 9.0 8.9 - 12.9 FL    NRBC 0.0 0  WBC    ABSOLUTE NRBC 0.00 0.00 - 0.01 K/uL   GLUCOSE, POC    Collection Time: 12/28/21  6:26 AM   Result Value Ref Range    Glucose (POC) 116 65 - 117 mg/dL    Performed by Shankar Boyer (PCT)        Data Review images and reports reviewed    Assessment:     Active Problems:    SBO (small bowel obstruction) ([address] (11/22/2021)      Aspiration pneumonia ([address] (11/25/2021)      Small bowel obstruction ([address] (11/25/2021)      Sepsis ([address] (11/25/2021)      Abdominal pain, acute (11/27/2021)      Intractable cyclical vomiting with nausea (11/27/2021)        Plan/Recommendations/Medical Decision Making:     Continue present treatment   New g-tube draining well with gravity.  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

## 2021-12-28 NOTE — PROGRESS NOTES
End of Shift Note    Bedside shift change report given to Antony Cardona RN (oncoming nurse) by Angeli Rodriguez RN (offgoing nurse).   Report included the following information SBAR, Kardex, Intake/Output, MAR and Recent Results      Angeli Rodriguez RN

## 2021-12-29 LAB
ANION GAP SERPL CALC-SCNC: 6 MMOL/L (ref 5–15)
BUN SERPL-MCNC: 12 MG/DL (ref 6–20)
BUN/CREAT SERPL: 27 (ref 12–20)
CALCIUM SERPL-MCNC: 8.4 MG/DL (ref 8.5–10.1)
CHLORIDE SERPL-SCNC: 98 MMOL/L (ref 97–108)
CO2 SERPL-SCNC: 27 MMOL/L (ref 21–32)
CREAT SERPL-MCNC: 0.45 MG/DL (ref 0.7–1.3)
GLUCOSE BLD STRIP.AUTO-MCNC: 115 MG/DL (ref 65–117)
GLUCOSE BLD STRIP.AUTO-MCNC: 132 MG/DL (ref 65–117)
GLUCOSE BLD STRIP.AUTO-MCNC: 138 MG/DL (ref 65–117)
GLUCOSE BLD STRIP.AUTO-MCNC: 160 MG/DL (ref 65–117)
GLUCOSE SERPL-MCNC: 95 MG/DL (ref 65–100)
MAGNESIUM SERPL-MCNC: 2.2 MG/DL (ref 1.6–2.4)
PHOSPHATE SERPL-MCNC: 3.1 MG/DL (ref 2.6–4.7)
POTASSIUM SERPL-SCNC: 3.9 MMOL/L (ref 3.5–5.1)
SERVICE CMNT-IMP: ABNORMAL
SERVICE CMNT-IMP: NORMAL
SODIUM SERPL-SCNC: 131 MMOL/L (ref 136–145)
VANCOMYCIN SERPL-MCNC: 10.5 UG/ML

## 2021-12-29 PROCEDURE — 74011250636 HC RX REV CODE- 250/636: Performed by: SURGERY

## 2021-12-29 PROCEDURE — 74011000258 HC RX REV CODE- 258: Performed by: SURGERY

## 2021-12-29 PROCEDURE — 84100 ASSAY OF PHOSPHORUS: CPT

## 2021-12-29 PROCEDURE — 80202 ASSAY OF VANCOMYCIN: CPT

## 2021-12-29 PROCEDURE — 82962 GLUCOSE BLOOD TEST: CPT

## 2021-12-29 PROCEDURE — 74011000258 HC RX REV CODE- 258: Performed by: STUDENT IN AN ORGANIZED HEALTH CARE EDUCATION/TRAINING PROGRAM

## 2021-12-29 PROCEDURE — 74011000250 HC RX REV CODE- 250: Performed by: SURGERY

## 2021-12-29 PROCEDURE — 74011000250 HC RX REV CODE- 250: Performed by: NURSE PRACTITIONER

## 2021-12-29 PROCEDURE — 74011250636 HC RX REV CODE- 250/636: Performed by: STUDENT IN AN ORGANIZED HEALTH CARE EDUCATION/TRAINING PROGRAM

## 2021-12-29 PROCEDURE — 80048 BASIC METABOLIC PNL TOTAL CA: CPT

## 2021-12-29 PROCEDURE — 36415 COLL VENOUS BLD VENIPUNCTURE: CPT

## 2021-12-29 PROCEDURE — 83735 ASSAY OF MAGNESIUM: CPT

## 2021-12-29 PROCEDURE — 65660000000 HC RM CCU STEPDOWN

## 2021-12-29 RX ADMIN — I.V. FAT EMULSION 500 ML: 20 EMULSION INTRAVENOUS at 22:39

## 2021-12-29 RX ADMIN — Medication: at 14:31

## 2021-12-29 RX ADMIN — VANCOMYCIN HYDROCHLORIDE 1000 MG: 1 INJECTION, POWDER, LYOPHILIZED, FOR SOLUTION INTRAVENOUS at 04:30

## 2021-12-29 RX ADMIN — MEROPENEM 500 MG: 500 INJECTION INTRAVENOUS at 04:30

## 2021-12-29 RX ADMIN — METOCLOPRAMIDE 10 MG: 5 INJECTION, SOLUTION INTRAMUSCULAR; INTRAVENOUS at 14:30

## 2021-12-29 RX ADMIN — SODIUM CHLORIDE, PRESERVATIVE FREE 20 MG: 5 INJECTION INTRAVENOUS at 05:21

## 2021-12-29 RX ADMIN — MEROPENEM 500 MG: 500 INJECTION INTRAVENOUS at 22:50

## 2021-12-29 RX ADMIN — SODIUM CHLORIDE, PRESERVATIVE FREE 10 ML: 5 INJECTION INTRAVENOUS at 13:20

## 2021-12-29 RX ADMIN — METOCLOPRAMIDE 10 MG: 5 INJECTION, SOLUTION INTRAMUSCULAR; INTRAVENOUS at 10:11

## 2021-12-29 RX ADMIN — SODIUM CHLORIDE 100 MG: 9 INJECTION, SOLUTION INTRAVENOUS at 14:30

## 2021-12-29 RX ADMIN — METOCLOPRAMIDE 10 MG: 5 INJECTION, SOLUTION INTRAMUSCULAR; INTRAVENOUS at 03:30

## 2021-12-29 RX ADMIN — SODIUM CHLORIDE, PRESERVATIVE FREE 20 MG: 5 INJECTION INTRAVENOUS at 18:02

## 2021-12-29 RX ADMIN — MEROPENEM 500 MG: 500 INJECTION INTRAVENOUS at 18:02

## 2021-12-29 RX ADMIN — MEROPENEM 500 MG: 500 INJECTION INTRAVENOUS at 10:11

## 2021-12-29 RX ADMIN — METOPROLOL TARTRATE 1.25 MG: 1 INJECTION, SOLUTION INTRAVENOUS at 10:11

## 2021-12-29 RX ADMIN — SODIUM CHLORIDE, PRESERVATIVE FREE 10 ML: 5 INJECTION INTRAVENOUS at 05:29

## 2021-12-29 RX ADMIN — VANCOMYCIN HYDROCHLORIDE 1000 MG: 1 INJECTION, POWDER, LYOPHILIZED, FOR SOLUTION INTRAVENOUS at 13:19

## 2021-12-29 RX ADMIN — DEXTROSE MONOHYDRATE, SODIUM CHLORIDE, AND POTASSIUM CHLORIDE 60 ML/HR: 50; 4.5; 1.49 INJECTION, SOLUTION INTRAVENOUS at 13:21

## 2021-12-29 RX ADMIN — SODIUM CHLORIDE, PRESERVATIVE FREE 10 ML: 5 INJECTION INTRAVENOUS at 10:13

## 2021-12-29 RX ADMIN — MAGNESIUM SULFATE HEPTAHYDRATE: 500 INJECTION, SOLUTION INTRAMUSCULAR; INTRAVENOUS at 18:02

## 2021-12-29 RX ADMIN — VANCOMYCIN HYDROCHLORIDE 1000 MG: 1 INJECTION, POWDER, LYOPHILIZED, FOR SOLUTION INTRAVENOUS at 19:54

## 2021-12-29 RX ADMIN — METOCLOPRAMIDE 10 MG: 5 INJECTION, SOLUTION INTRAMUSCULAR; INTRAVENOUS at 22:57

## 2021-12-29 NOTE — PROGRESS NOTES
Shift report given to Nereida Hartley. Patient received antibiotics as ordered. J-tube is draining tan cloudy fluid was emptied at least 6 times. Patient has PCA which seems to be managing his pain. BP were soft  metroprolol was held.

## 2021-12-29 NOTE — PROGRESS NOTES
Admit Date: 2021    POD 19 Days Post-Op    Procedure:  Procedure(s):  OPEN LYSIS OF ADHESIONS, GASTROSTOMY  AND JEJUNAL TUBE PLACEMENT    Subjective:     Patient c/o abdominal pain. Appears comfortable. Objective:     Blood pressure 100/61, pulse 99, temperature 99.9 °F (37.7 °C), resp. rate 18, height 5' 9\" (1.753 m), weight 95 lb 11.2 oz (43.4 kg), SpO2 96 %.     Temp (24hrs), Av.4 °F (37.4 °C), Min:98 °F (36.7 °C), Max:99.9 °F (37.7 °C)      Physical Exam:  GENERAL: alert, cooperative, severe muscle wasting, LUNG: clear to auscultation bilaterally, HEART: regular rate and rhythm, ABDOMEN: soft, wound with retention sutures intact, mid aspect of wound dressed with ostomy appliance without significant drainage, jejunostomy tube site intact, g-tube site with some scant drainage around brock catheter, EXTREMITIES:  extremities normal, atraumatic, no cyanosis or edema    Labs:   Recent Results (from the past 24 hour(s))   GLUCOSE, POC    Collection Time: 21 11:44 AM   Result Value Ref Range    Glucose (POC) 113 65 - 117 mg/dL    Performed by Biju Chavez PCT    GLUCOSE, POC    Collection Time: 21  6:12 PM   Result Value Ref Range    Glucose (POC) 144 (H) 65 - 117 mg/dL    Performed by Keenan Kerns (RN)    GLUCOSE, POC    Collection Time: 21 11:58 PM   Result Value Ref Range    Glucose (POC) 120 (H) 65 - 117 mg/dL    Performed by Gabriella Cordova PCT    MAGNESIUM    Collection Time: 21  3:25 AM   Result Value Ref Range    Magnesium 2.2 1.6 - 2.4 mg/dL   PHOSPHORUS    Collection Time: 21  3:25 AM   Result Value Ref Range    Phosphorus 3.1 2.6 - 4.7 MG/DL   METABOLIC PANEL, BASIC    Collection Time: 21  3:25 AM   Result Value Ref Range    Sodium 131 (L) 136 - 145 mmol/L    Potassium 3.9 3.5 - 5.1 mmol/L    Chloride 98 97 - 108 mmol/L    CO2 27 21 - 32 mmol/L    Anion gap 6 5 - 15 mmol/L    Glucose 95 65 - 100 mg/dL    BUN 12 6 - 20 MG/DL    Creatinine 0.45 (L) 0.70 - 1.30 MG/DL    BUN/Creatinine ratio 27 (H) 12 - 20      GFR est AA >60 >60 ml/min/1.73m2    GFR est non-AA >60 >60 ml/min/1.73m2    Calcium 8.4 (L) 8.5 - 10.1 MG/DL   VANCOMYCIN, RANDOM    Collection Time: 12/29/21  3:25 AM   Result Value Ref Range    Vancomycin, random 10.5 UG/ML   GLUCOSE, POC    Collection Time: 12/29/21  6:08 AM   Result Value Ref Range    Glucose (POC) 115 65 - 117 mg/dL    Performed by Gala Greene PCT        Data Review images and reports reviewed    Assessment:     Active Problems:    SBO (small bowel obstruction) ([address] (11/22/2021)      Aspiration pneumonia ([address] (11/25/2021)      Small bowel obstruction ([address] (11/25/2021)      Sepsis ([address] (11/25/2021)      Abdominal pain, acute (11/27/2021)      Intractable cyclical vomiting with nausea (11/27/2021)        Plan/Recommendations/Medical Decision Making:     Continue present treatment   New g-tube draining well with gravity.   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

## 2021-12-29 NOTE — PROGRESS NOTES
Occupational Therapy note:    Chart reviewed and cleared for therapy by RN. Patient received semisupine in bed and adamantly refused participating in therapy this session. Patient educated on importance of OOB activities and mobility but continued to refuse. Will attempt to see patient tomorrow but may be discharged from therapy if he continues to decline participating.      Davide Nuñez, OTR/L

## 2021-12-29 NOTE — PROGRESS NOTES
Pharmacy Antimicrobial Kinetic Dosing    Indication for Antimicrobials: Sepsis     Current Regimen of Each Antimicrobial:  Vancomycin 1000 mg IV LD, then 500 mg IV q8h (Start Date ; Day 6  Anidulafungin 200 mg X1, then 100 mg (; day 7  Meropenem 500mg IV q6h (start ; day 3)    Previous Antimicrobial Therapy:  Zosyn (-, -)  Zosyn 3.375 g IV q8h (Start Date ; Day 11    Goal Level: AUC: 400-600 mg/hr/Liter/day    Date Dose & Interval Measured (mcg/mL) Predicted AUC/MARIE    @1245 500 mg q 8 H 3.6     1301 1g q8h 9 502    0325 1g q8h 10.5 474     Date & time of next level:      Dosing calculator used: Excel-based calculator    Significant Positive Cultures:    blood: NG - Final  : Blood cx: NG - final  : Anaerobic cx: enterobacter cloacae, light candida albicans (final)  : Blood cx for fungus: pending     Conditions for Dosing Consideration: Malnutrition    Labs:  Recent Labs     21  0325 21  0422 21  0118   CREA 0.45* 0.43* 0.51*   BUN 12 13 14     Recent Labs     21  0518   WBC 8.9     Temp (24hrs), Av.4 °F (37.4 °C), Min:98 °F (36.7 °C), Max:99.9 °F (37.7 °C)      Creatinine Clearance (mL/min):   CrCl (Ideal Body Weight): 138.9   If actual weight < IBW: CrCl (Actual Body Weight) 85.3    Impression/Plan:   ID on board  Predicted AUC with Excel within goal.  Continue vancomycin 1g IV q8h. (could adjust to vancomycin 750mg IV q6h to get a higher trough but AUC would still be ~500). Continue meropenem as ordered  Continue anidulafungin as ordered  BMP daily  Antimicrobial stop date TBD     Pharmacy will follow daily and adjust medications as appropriate for renal function and/or serum levels.     Thank you,  Alvaro Mccormack, NÉSTORD    Vancomycin Dosing Document    Documents located on pharmacy Teams site: Clinical Practice -> Antimicrobial Stewardship -> Antibiotics_Vancomycin     Aminoglycoside Dosing Document    Documents located on pharmacy Teams site: Clinical Practice -> Antimicrobial Stewardship -> Antibiotics_Aminoglycosides

## 2021-12-29 NOTE — PROGRESS NOTES
End of Shift Note    Bedside shift change report given to Debbie (oncoming nurse) by Semaj Funez (offgoing nurse). Report included the following information SBAR, Kardex, Procedure Summary, Intake/Output, MAR and Recent Results    Shift worked:  7a-7p     Shift summary and any significant changes:     Pain controlled with PCA. Voiding using urinal. Drain output recorded. TPN infusing. Concerns for physician to address:       Zone phone for oncoming shift:   [phone]       Activity:  Activity Level: Bed Rest  Number times ambulated in hallways past shift: 0  Number of times OOB to chair past shift: 0    Cardiac:   Cardiac Monitoring: Yes      Cardiac Rhythm: Sinus Rhythm    Access:   Current line(s): PICC     Genitourinary:   Urinary status: voiding    Respiratory:   O2 Device: None (Room air)  Chronic home O2 use?: NO  Incentive spirometer at bedside: YES  Actual Volume (ml): 1000 ml  GI:  Last Bowel Movement Date: 12/26/21  Current diet:  ADULT ORAL NUTRITION SUPPLEMENT Breakfast, Lunch; Clear Liquid  DIET NPO  TPN ADULT - CENTRAL  Passing flatus: YES  Tolerating current diet: YES       Pain Management:   Patient states pain is manageable on current regimen: YES    Skin:  Russell Score: 16  Interventions: increase time out of bed and PT/OT consult    Patient Safety:  Fall Score:  Total Score: 3  Interventions: gripper socks and pt to call before getting OOB  High Fall Risk: Yes    Length of Stay:  Expected LOS: 9d 14h  Actual LOS: [address] [facility]

## 2021-12-30 LAB
ANION GAP SERPL CALC-SCNC: 5 MMOL/L (ref 5–15)
BUN SERPL-MCNC: 13 MG/DL (ref 6–20)
BUN/CREAT SERPL: 35 (ref 12–20)
CALCIUM SERPL-MCNC: 8 MG/DL (ref 8.5–10.1)
CHLORIDE SERPL-SCNC: 102 MMOL/L (ref 97–108)
CO2 SERPL-SCNC: 27 MMOL/L (ref 21–32)
CREAT SERPL-MCNC: 0.37 MG/DL (ref 0.7–1.3)
GLUCOSE BLD STRIP.AUTO-MCNC: 112 MG/DL (ref 65–117)
GLUCOSE BLD STRIP.AUTO-MCNC: 115 MG/DL (ref 65–117)
GLUCOSE BLD STRIP.AUTO-MCNC: 117 MG/DL (ref 65–117)
GLUCOSE BLD STRIP.AUTO-MCNC: 119 MG/DL (ref 65–117)
GLUCOSE SERPL-MCNC: 110 MG/DL (ref 65–100)
MAGNESIUM SERPL-MCNC: 1.9 MG/DL (ref 1.6–2.4)
PHOSPHATE SERPL-MCNC: 2.9 MG/DL (ref 2.6–4.7)
POTASSIUM SERPL-SCNC: 3.4 MMOL/L (ref 3.5–5.1)
SERVICE CMNT-IMP: ABNORMAL
SERVICE CMNT-IMP: NORMAL
SODIUM SERPL-SCNC: 134 MMOL/L (ref 136–145)

## 2021-12-30 PROCEDURE — 82962 GLUCOSE BLOOD TEST: CPT

## 2021-12-30 PROCEDURE — 84100 ASSAY OF PHOSPHORUS: CPT

## 2021-12-30 PROCEDURE — 74011250636 HC RX REV CODE- 250/636: Performed by: SURGERY

## 2021-12-30 PROCEDURE — 74011000258 HC RX REV CODE- 258: Performed by: SURGERY

## 2021-12-30 PROCEDURE — 80048 BASIC METABOLIC PNL TOTAL CA: CPT

## 2021-12-30 PROCEDURE — 74011000250 HC RX REV CODE- 250: Performed by: SURGERY

## 2021-12-30 PROCEDURE — 74011250636 HC RX REV CODE- 250/636: Performed by: STUDENT IN AN ORGANIZED HEALTH CARE EDUCATION/TRAINING PROGRAM

## 2021-12-30 PROCEDURE — 97116 GAIT TRAINING THERAPY: CPT

## 2021-12-30 PROCEDURE — 65660000000 HC RM CCU STEPDOWN

## 2021-12-30 PROCEDURE — 97530 THERAPEUTIC ACTIVITIES: CPT

## 2021-12-30 PROCEDURE — 74011000258 HC RX REV CODE- 258: Performed by: STUDENT IN AN ORGANIZED HEALTH CARE EDUCATION/TRAINING PROGRAM

## 2021-12-30 PROCEDURE — 83735 ASSAY OF MAGNESIUM: CPT

## 2021-12-30 PROCEDURE — 36415 COLL VENOUS BLD VENIPUNCTURE: CPT

## 2021-12-30 PROCEDURE — 99232 SBSQ HOSP IP/OBS MODERATE 35: CPT | Performed by: STUDENT IN AN ORGANIZED HEALTH CARE EDUCATION/TRAINING PROGRAM

## 2021-12-30 RX ORDER — POTASSIUM CHLORIDE 7.45 MG/ML
10 INJECTION INTRAVENOUS
Status: COMPLETED | OUTPATIENT
Start: 2021-12-30 | End: 2021-12-30

## 2021-12-30 RX ADMIN — POTASSIUM CHLORIDE 10 MEQ: 7.46 INJECTION, SOLUTION INTRAVENOUS at 10:34

## 2021-12-30 RX ADMIN — SODIUM CHLORIDE, PRESERVATIVE FREE 10 ML: 5 INJECTION INTRAVENOUS at 05:55

## 2021-12-30 RX ADMIN — POTASSIUM CHLORIDE 10 MEQ: 7.46 INJECTION, SOLUTION INTRAVENOUS at 12:51

## 2021-12-30 RX ADMIN — MEROPENEM 500 MG: 500 INJECTION INTRAVENOUS at 22:07

## 2021-12-30 RX ADMIN — METOCLOPRAMIDE 10 MG: 5 INJECTION, SOLUTION INTRAMUSCULAR; INTRAVENOUS at 03:01

## 2021-12-30 RX ADMIN — SODIUM CHLORIDE, PRESERVATIVE FREE 20 MG: 5 INJECTION INTRAVENOUS at 17:02

## 2021-12-30 RX ADMIN — SODIUM CHLORIDE, PRESERVATIVE FREE 10 ML: 5 INJECTION INTRAVENOUS at 22:08

## 2021-12-30 RX ADMIN — METOCLOPRAMIDE 10 MG: 5 INJECTION, SOLUTION INTRAMUSCULAR; INTRAVENOUS at 08:25

## 2021-12-30 RX ADMIN — SODIUM CHLORIDE, PRESERVATIVE FREE 20 MG: 5 INJECTION INTRAVENOUS at 05:55

## 2021-12-30 RX ADMIN — METOCLOPRAMIDE 10 MG: 5 INJECTION, SOLUTION INTRAMUSCULAR; INTRAVENOUS at 21:00

## 2021-12-30 RX ADMIN — MEROPENEM 500 MG: 500 INJECTION INTRAVENOUS at 16:14

## 2021-12-30 RX ADMIN — MAGNESIUM SULFATE HEPTAHYDRATE: 500 INJECTION, SOLUTION INTRAMUSCULAR; INTRAVENOUS at 17:01

## 2021-12-30 RX ADMIN — POTASSIUM CHLORIDE 10 MEQ: 7.46 INJECTION, SOLUTION INTRAVENOUS at 14:21

## 2021-12-30 RX ADMIN — SODIUM CHLORIDE 100 MG: 9 INJECTION, SOLUTION INTRAVENOUS at 14:20

## 2021-12-30 RX ADMIN — SODIUM CHLORIDE, PRESERVATIVE FREE 10 ML: 5 INJECTION INTRAVENOUS at 08:25

## 2021-12-30 RX ADMIN — METOCLOPRAMIDE 10 MG: 5 INJECTION, SOLUTION INTRAMUSCULAR; INTRAVENOUS at 16:14

## 2021-12-30 RX ADMIN — MEROPENEM 500 MG: 500 INJECTION INTRAVENOUS at 05:12

## 2021-12-30 RX ADMIN — MEROPENEM 500 MG: 500 INJECTION INTRAVENOUS at 10:34

## 2021-12-30 RX ADMIN — VANCOMYCIN HYDROCHLORIDE 1000 MG: 1 INJECTION, POWDER, LYOPHILIZED, FOR SOLUTION INTRAVENOUS at 04:09

## 2021-12-30 NOTE — PROGRESS NOTES
Admit Date: 2021    POD 20 Days Post-Op    Procedure:  Procedure(s):  OPEN LYSIS OF ADHESIONS, GASTROSTOMY  AND JEJUNAL TUBE PLACEMENT    Subjective:     Patient feeling better. Some scant incontinent stool. Refusing to get OOB to use the bathroom    Objective:     Blood pressure 95/68, pulse 89, temperature 98 °F (36.7 °C), resp. rate 17, height 5' 9\" (1.753 m), weight 95 lb 11.2 oz (43.4 kg), SpO2 97 %.     Temp (24hrs), Av.3 °F (36.8 °C), Min:97.7 °F (36.5 °C), Max:99.2 °F (37.3 °C)      Physical Exam:  GENERAL: alert, cooperative, severe muscle wasting, LUNG: clear to auscultation bilaterally, HEART: regular rate and rhythm, ABDOMEN: soft, wound with retention sutures intact, mid aspect of wound dressed with ostomy appliance without significant drainage, jejunostomy tube site intact, g-tube site with some scant drainage around brock catheter, EXTREMITIES:  extremities normal, atraumatic, no cyanosis or edema    Labs:   Recent Results (from the past 24 hour(s))   GLUCOSE, POC    Collection Time: 21 11:45 AM   Result Value Ref Range    Glucose (POC) 160 (H) 65 - 117 mg/dL    Performed by Oxana Dillard PCT    GLUCOSE, POC    Collection Time: 21  5:43 PM   Result Value Ref Range    Glucose (POC) 132 (H) 65 - 117 mg/dL    Performed by Oxana Dillard PCT    GLUCOSE, POC    Collection Time: 21 11:34 PM   Result Value Ref Range    Glucose (POC) 138 (H) 65 - 117 mg/dL    Performed by Solo Jc (PCT)    MAGNESIUM    Collection Time: 21  2:50 AM   Result Value Ref Range    Magnesium 1.9 1.6 - 2.4 mg/dL   PHOSPHORUS    Collection Time: 21  2:50 AM   Result Value Ref Range    Phosphorus 2.9 2.6 - 4.7 MG/DL   METABOLIC PANEL, BASIC    Collection Time: 21  2:50 AM   Result Value Ref Range    Sodium 134 (L) 136 - 145 mmol/L    Potassium 3.4 (L) 3.5 - 5.1 mmol/L    Chloride 102 97 - 108 mmol/L    CO2 27 21 - 32 mmol/L    Anion gap 5 5 - 15 mmol/L    Glucose 110 (H) 65 - 100 mg/dL    BUN 13 6 - 20 MG/DL    Creatinine 0.37 (L) 0.70 - 1.30 MG/DL    BUN/Creatinine ratio 35 (H) 12 - 20      GFR est AA >60 >60 ml/min/1.73m2    GFR est non-AA >60 >60 ml/min/1.73m2    Calcium 8.0 (L) 8.5 - 10.1 MG/DL   GLUCOSE, POC    Collection Time: 12/30/21  6:44 AM   Result Value Ref Range    Glucose (POC) 117 65 - 117 mg/dL    Performed by Lois Bustillo (PCT)        Data Review images and reports reviewed    Assessment:     Active Problems:    SBO (small bowel obstruction) ([address] (11/22/2021)      Aspiration pneumonia ([address] (11/25/2021)      Small bowel obstruction ([address] (11/25/2021)      Sepsis ([address] (11/25/2021)      Abdominal pain, acute (11/27/2021)      Intractable cyclical vomiting with nausea (11/27/2021)        Plan/Recommendations/Medical Decision Making:     Continue present treatment   New g-tube draining well with gravity.   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

## 2021-12-30 NOTE — PROGRESS NOTES
Occupational Therapy:    Chart reviewed, cleared by RN and attempted visit. On arrival Pt supine and declining participation in OT even simple self-care tasks at EOB stating, \"I have an appointment coming up. . I need some rest.\" RN present during visit stating Pt referring to wound care. Despite encouragement and education, Pt continued to decline and became slightly agitated closing his eyes. Will continue to follow.      [facility], OTR/L

## 2021-12-30 NOTE — PROGRESS NOTES
End of Shift Note    Bedside shift change report given to Debbie (oncoming nurse) by Juanita Nugent (offgoing nurse). Report included the following information SBAR, Kardex, Procedure Summary, Intake/Output, MAR and Recent Results    Shift worked:  7a-7p     Shift summary and any significant changes:     Pain controlled with PCA. Voiding using urinal. Refused to work with PT. Drain output recorded. TPN infusing. Concerns for physician to address:       Zone phone for oncoming shift:   [phone]       Activity:  Activity Level: Bed Rest  Number times ambulated in hallways past shift: 0  Number of times OOB to chair past shift: 0    Cardiac:   Cardiac Monitoring: Yes      Cardiac Rhythm: Sinus Rhythm    Access:   Current line(s): PICC     Genitourinary:   Urinary status: voiding    Respiratory:   O2 Device: None (Room air)  Chronic home O2 use?: NO  Incentive spirometer at bedside: YES  Actual Volume (ml): 1000 ml  GI:  Last Bowel Movement Date: 12/26/21  Current diet:  ADULT ORAL NUTRITION SUPPLEMENT Breakfast, Lunch; Clear Liquid  DIET NPO  TPN ADULT - CENTRAL  Passing flatus: YES  Tolerating current diet: YES       Pain Management:   Patient states pain is manageable on current regimen: YES    Skin:  Russell Score: 16  Interventions: increase time out of bed and PT/OT consult    Patient Safety:  Fall Score: Total Score: 3  Interventions: gripper socks and pt to call before getting OOB  High Fall Risk: Yes    Length of Stay:  Expected LOS: 9d 14h  Actual LOS: [facility]      Primary care provided by Merritt BAKER and supervised by me. Agree with all documentation and assessments.

## 2021-12-30 NOTE — PROGRESS NOTES
End of Shift Note    Bedside shift change report given to Marcelle Muse RN (oncoming nurse) by Jorge Luis Boles LPN (offgoing nurse). Report included the following information SBAR, Kardex, Procedure Summary, Intake/Output, MAR, Recent Results and Cardiac Rhythm NSR    Shift worked:  7p-730a     Shift summary and any significant changes:     Pt had soft BP readings last night; too low to give Metoprolol as ordered-made oncoming nurse aware. Will leave not for physician. Concerns for physician to address:  Pt's BP 90's/60's; held metoprolol. Zone phone for oncoming shift:          Activity:  Activity Level: Bed Rest  Number times ambulated in hallways past shift: 0  Number of times OOB to chair past shift: 0    Cardiac:   Cardiac Monitoring: Yes      Cardiac Rhythm: Sinus Rhythm    Access:   Current line(s): PICC     Genitourinary:   Urinary status: voiding    Respiratory:   O2 Device: None (Room air)  Chronic home O2 use?: NO  Incentive spirometer at bedside: YES  Actual Volume (ml): 1000 ml  GI:  Last Bowel Movement Date: 12/26/21  Current diet:  ADULT ORAL NUTRITION SUPPLEMENT Breakfast, Lunch; Clear Liquid  DIET NPO  TPN ADULT - CENTRAL  Passing flatus: YES  Tolerating current diet: NO       Pain Management:   Patient states pain is manageable on current regimen: YES    Skin:  Russell Score: 16  Interventions: float heels, increase time out of bed and PT/OT consult    Patient Safety:  Fall Score:  Total Score: 3  Interventions: assistive device (walker, cane, etc), gripper socks, pt to call before getting OOB and stay with me (per policy)  High Fall Risk: Yes    Length of Stay:  Expected LOS: 9d 14h  Actual LOS: [facility] , LPN

## 2021-12-30 NOTE — PROGRESS NOTES
Comprehensive Nutrition Assessment    Type and Reason for Visit: Reassess    Nutrition Recommendations/Plan:   Continue TPN as ordered    Nutrition Assessment:   Chart reviewed remotely. Pt on TPN at goal rate, provides 42kcals/kg and 2.3gPro/kg which is adequate to meet est needs and stimulate wt gain. G tube draining with significant OP (3.2L yesterday) pt is on IVF's to keep up with losses. BG well controlled (117-160). K 3.4, being repleted. Phos and Mag WNL. ECF with minimal OP. Will monitor plan of care per surgeon. Patient Vitals for the past 168 hrs:   % Diet Eaten   12/23/21 2044 0%        Malnutrition Assessment:  Malnutrition Status:  Severe malnutrition    Context:  Chronic illness     Findings of the 6 clinical characteristics of malnutrition:   Energy Intake:  7 - 75% or less est energy requirements for 1 month or longer  Weight Loss:  7.0 - Greater than 7.5% over 3 months     Body Fat Loss:  7 - Severe body fat loss, Triceps,Orbital,Buccal region   Muscle Mass Loss:  1 - Mild muscle mass loss, Clavicles (pectoralis &deltoids),Temples (temporalis),Thigh (quadriceps),Scapula (trapezius)  Fluid Accumulation:  Unable to assess,     Strength:  Not performed         Estimated Daily Nutrient Needs:  Energy (kcal): 2188 (BMR 1489 x 1. 3AF) + 250 kcals; Weight Used for Energy Requirements: Current  Protein (g): 79-105g (1.5-2gPro/kg); Weight Used for Protein Requirements: Current  Fluid (ml/day): 8245-4057 ml/day; Method Used for Fluid Requirements: 1 ml/kcal      Nutrition Related Findings:  Meds: eraxis, humalog, merrem, reglan, movantik, vancomycin, KCl, D5, [email or web address], Dilaudid PCA.    12-23      Wounds:    Surgical incision       Current Nutrition Therapies:  ADULT ORAL NUTRITION SUPPLEMENT Breakfast, Lunch; Clear Liquid  DIET NPO  TPN ADULT - CENTRAL  TPN ADULT - CENTRAL  Current Parenteral Nutrition Orders:  · Type and Formula: D15, 5% AA   · Lipids: 500ml,Three times weekly  · Duration: Continuous  · Rate/Volume: 83mL/h  · Current PN Order Provides: 1842kcals/100gPro/298gDextrose  · Goal PN Orders Provides: 1842kcals/100gPro/298gDextrose      Anthropometric Measures:  · Height:  5' 9\" (175.3 cm)  · Current Body Wt:  43.4 kg (95 lb 10.9 oz)   · Ideal Body Wt:  160 lbs:  63 %   · BMI Category:  Underweight (BMI less than 18.5)       Nutrition Diagnosis:   · Inadequate protein-energy intake related to altered GI function as evidenced by NPO or clear liquid status due to medical condition,weight loss (SBO)  Previous dx resolved. Nutrition Interventions:   Food and/or Nutrient Delivery: Continue parenteral nutrition  Nutrition Education and Counseling: No recommendations at this time  Coordination of Nutrition Care: Continue to monitor while inpatient    Goals:  Pt will tolerate TPN at goal rate with BG <200mg/dL and lytes WNL in 2-4 days. Nutrition Monitoring and Evaluation:   Behavioral-Environmental Outcomes: None identified  Food/Nutrient Intake Outcomes: Parenteral nutrition intake/tolerance  Physical Signs/Symptoms Outcomes: Biochemical data,GI status,Weight,Skin    Discharge Planning:     Too soon to determine     Electronically signed by Toney Worthington RD, [address]  on 12/30/2021 at 10:15 AM    Contact: [phone]

## 2021-12-30 NOTE — WOUND CARE
Wound Care follow up today for Wound Pouch change to the Abdomen:  Chart reviewed and patient assessed. Patient is very pleasant and tolerating the wound manager well. Asks appropriate questions - wanting to know how long this takes to heal. Pt. Is NPO except for TPN and patient is worried about losing too much more weight. Assessment: the wound pouch was removed and the wound cleansed with NS and then wound cleanser. There is gas and drainage at the entrance of the wound but none of it has reached the suction device. The drainage seems to be leaking more from the retention sutures that anywhere else. The drainage is malodorous. The staples are intact but wet at the fistula site. Treatment:  The entire abdomen was cleansed with wound cleanser and the fistula site with several irrigations of NS. The skin was dried and protected with [employer] skin protectant. The new wound pouch was applied and the suction tubing placed at the fistula site just outside the pouch cut out. Plan: will look at the wound with Dr. Agueda Hannah tomorrow and discuss removing a few staples tomorrow or Monday to open this up more and possibly pack the wound.

## 2021-12-30 NOTE — PROGRESS NOTES
Infectious Disease Progress Note         Interval:  NAEO    Subjective:   Patient seen. Reports feeling quite well. No acute issues. Objective:    Vitals:   Reviewed in chart. Physical Exam:  Gen: No apparent distress  HEENT:  Normocephalic, atraumatic, no scleral icterus, no thrush  CV: HDS   Lungs: Clear to auscultation bilaterally, no wheezing  Abdomen: soft, tender, G-tube, J-tube, ostomy bag on enterocutaneous fistula.    Genitourinary:  no brock catheter   Skin: no rash   Psych: good affect, good eye contact, non tearful  Neuro: alert, oriented to time,  place, and situation, moves all extremities to commands, verbal  Musculoskeletal:  No joint edema, erythema or tenderness noted   Lines: PIVs        Labs:  Recent Results (from the past 24 hour(s))   GLUCOSE, POC    Collection Time: 12/29/21 11:45 AM   Result Value Ref Range    Glucose (POC) 160 (H) 65 - 117 mg/dL    Performed by Tayler Calle PCT    GLUCOSE, POC    Collection Time: 12/29/21  5:43 PM   Result Value Ref Range    Glucose (POC) 132 (H) 65 - 117 mg/dL    Performed by Tayler Calle PCT    GLUCOSE, POC    Collection Time: 12/29/21 11:34 PM   Result Value Ref Range    Glucose (POC) 138 (H) 65 - 117 mg/dL    Performed by Lois Bustillo (PCT)    MAGNESIUM    Collection Time: 12/30/21  2:50 AM   Result Value Ref Range    Magnesium 1.9 1.6 - 2.4 mg/dL   PHOSPHORUS    Collection Time: 12/30/21  2:50 AM   Result Value Ref Range    Phosphorus 2.9 2.6 - 4.7 MG/DL   METABOLIC PANEL, BASIC    Collection Time: 12/30/21  2:50 AM   Result Value Ref Range    Sodium 134 (L) 136 - 145 mmol/L    Potassium 3.4 (L) 3.5 - 5.1 mmol/L    Chloride 102 97 - 108 mmol/L    CO2 27 21 - 32 mmol/L    Anion gap 5 5 - 15 mmol/L    Glucose 110 (H) 65 - 100 mg/dL    BUN 13 6 - 20 MG/DL    Creatinine 0.37 (L) 0.70 - 1.30 MG/DL    BUN/Creatinine ratio 35 (H) 12 - 20      GFR est AA >60 >60 ml/min/1.73m2    GFR est non-AA >60 >60 ml/min/1.73m2    Calcium 8.0 (L) 8.5 - 10.1 MG/DL   GLUCOSE, POC    Collection Time: 12/30/21  6:44 AM   Result Value Ref Range    Glucose (POC) 117 65 - 117 mg/dL    Performed by Aj Hamilton (PCT)                  Assessment:  Clinically looking nontoxic and stable. Enterobacter cloacae from the gastric tube drainage, along with Candida albicans. Patient is n.p.o. 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 Clinically improving and leukocytosis has resolved. Recommendations:  -Continue meropenem - plan for 7 days, 12/27/21 to 1/2/22.  - Stop vancomycin today. - Stop anidulafungin after the dose today (end 12/30/21). We will sign off now, please recall as needed. Thank you for the opportunity to participate in the care of this patient. Please contact with questions or concerns.       Art Jean Baptiste MD  Infectious Diseases

## 2021-12-30 NOTE — PROGRESS NOTES
End of Shift Note    Bedside shift change report given to [id number] Robbie Negrete (oncoming nurse) by Ninoska Jerez (offgoing nurse). Report included the following information SBAR, Kardex, Procedure Summary, Intake/Output, MAR and Recent Results    Shift worked:  7a-3p     Shift summary and any significant changes:     Pain controlled with PCA. Voiding using urinal. Up to chair with PT. Drain output recorded. TPN infusing. Wound manager changed by wound care nurse. Concerns for physician to address:       Zone phone for oncoming shift:   [phone]       Activity:  Activity Level: Up with Assistance  Number times ambulated in hallways past shift: 0  Number of times OOB to chair past shift: 0    Cardiac:   Cardiac Monitoring: Yes      Cardiac Rhythm: Sinus Rhythm    Access:   Current line(s): PICC     Genitourinary:   Urinary status: voiding    Respiratory:   O2 Device: None (Room air)  Chronic home O2 use?: NO  Incentive spirometer at bedside: YES  Actual Volume (ml): 1000 ml  GI:  Last Bowel Movement Date: 12/29/21  Current diet:  ADULT ORAL NUTRITION SUPPLEMENT Breakfast, Lunch; Clear Liquid  DIET NPO  TPN ADULT - CENTRAL  TPN ADULT - CENTRAL  Passing flatus: YES  Tolerating current diet: YES       Pain Management:   Patient states pain is manageable on current regimen: YES    Skin:  Russell Score: 17  Interventions: increase time out of bed and PT/OT consult    Patient Safety:  Fall Score: Total Score: 3  Interventions: gripper socks and pt to call before getting OOB  High Fall Risk: Yes    Length of Stay:  Expected LOS: 9d 14h  Actual LOS: [facility]      Primary care provided by Merritt BAKER and supervised by me. Agree with all documentation and assessments.
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
## 2021-12-31 LAB
ERYTHROCYTE [DISTWIDTH] IN BLOOD BY AUTOMATED COUNT: 18.6 % (ref 11.5–14.5)
GLUCOSE BLD STRIP.AUTO-MCNC: 100 MG/DL (ref 65–117)
GLUCOSE BLD STRIP.AUTO-MCNC: 118 MG/DL (ref 65–117)
GLUCOSE BLD STRIP.AUTO-MCNC: 129 MG/DL (ref 65–117)
GLUCOSE BLD STRIP.AUTO-MCNC: 143 MG/DL (ref 65–117)
HCT VFR BLD AUTO: 27.7 % (ref 36.6–50.3)
HGB BLD-MCNC: 8.8 G/DL (ref 12.1–17)
MCH RBC QN AUTO: 28.3 PG (ref 26–34)
MCHC RBC AUTO-ENTMCNC: 31.8 G/DL (ref 30–36.5)
MCV RBC AUTO: 89.1 FL (ref 80–99)
NRBC # BLD: 0 K/UL (ref 0–0.01)
NRBC BLD-RTO: 0 PER 100 WBC
PLATELET # BLD AUTO: 631 K/UL (ref 150–400)
PMV BLD AUTO: 9.1 FL (ref 8.9–12.9)
RBC # BLD AUTO: 3.11 M/UL (ref 4.1–5.7)
SERVICE CMNT-IMP: ABNORMAL
SERVICE CMNT-IMP: NORMAL
WBC # BLD AUTO: 7.3 K/UL (ref 4.1–11.1)

## 2021-12-31 PROCEDURE — 74011000258 HC RX REV CODE- 258: Performed by: SURGERY

## 2021-12-31 PROCEDURE — 74011000250 HC RX REV CODE- 250: Performed by: SURGERY

## 2021-12-31 PROCEDURE — 74011250636 HC RX REV CODE- 250/636: Performed by: SURGERY

## 2021-12-31 PROCEDURE — 74011250636 HC RX REV CODE- 250/636: Performed by: STUDENT IN AN ORGANIZED HEALTH CARE EDUCATION/TRAINING PROGRAM

## 2021-12-31 PROCEDURE — 65660000000 HC RM CCU STEPDOWN

## 2021-12-31 PROCEDURE — 82962 GLUCOSE BLOOD TEST: CPT

## 2021-12-31 PROCEDURE — 74011000258 HC RX REV CODE- 258: Performed by: STUDENT IN AN ORGANIZED HEALTH CARE EDUCATION/TRAINING PROGRAM

## 2021-12-31 PROCEDURE — 85027 COMPLETE CBC AUTOMATED: CPT

## 2021-12-31 RX ADMIN — SODIUM CHLORIDE, PRESERVATIVE FREE 10 ML: 5 INJECTION INTRAVENOUS at 06:24

## 2021-12-31 RX ADMIN — MEROPENEM 500 MG: 500 INJECTION INTRAVENOUS at 03:44

## 2021-12-31 RX ADMIN — MEROPENEM 500 MG: 500 INJECTION INTRAVENOUS at 09:43

## 2021-12-31 RX ADMIN — SODIUM CHLORIDE, PRESERVATIVE FREE 20 MG: 5 INJECTION INTRAVENOUS at 17:27

## 2021-12-31 RX ADMIN — MAGNESIUM SULFATE HEPTAHYDRATE: 500 INJECTION, SOLUTION INTRAMUSCULAR; INTRAVENOUS at 18:31

## 2021-12-31 RX ADMIN — METOCLOPRAMIDE 10 MG: 5 INJECTION, SOLUTION INTRAMUSCULAR; INTRAVENOUS at 14:48

## 2021-12-31 RX ADMIN — DEXTROSE MONOHYDRATE, SODIUM CHLORIDE, AND POTASSIUM CHLORIDE 40 ML/HR: 50; 4.5; 1.49 INJECTION, SOLUTION INTRAVENOUS at 00:41

## 2021-12-31 RX ADMIN — SODIUM CHLORIDE, PRESERVATIVE FREE 20 MG: 5 INJECTION INTRAVENOUS at 06:24

## 2021-12-31 RX ADMIN — METOCLOPRAMIDE 10 MG: 5 INJECTION, SOLUTION INTRAMUSCULAR; INTRAVENOUS at 23:02

## 2021-12-31 RX ADMIN — METOCLOPRAMIDE 10 MG: 5 INJECTION, SOLUTION INTRAMUSCULAR; INTRAVENOUS at 09:43

## 2021-12-31 RX ADMIN — I.V. FAT EMULSION 500 ML: 20 EMULSION INTRAVENOUS at 22:00

## 2021-12-31 RX ADMIN — METOCLOPRAMIDE 10 MG: 5 INJECTION, SOLUTION INTRAMUSCULAR; INTRAVENOUS at 03:44

## 2021-12-31 RX ADMIN — MEROPENEM 500 MG: 500 INJECTION INTRAVENOUS at 16:30

## 2021-12-31 RX ADMIN — SODIUM CHLORIDE, PRESERVATIVE FREE 10 ML: 5 INJECTION INTRAVENOUS at 22:00

## 2021-12-31 RX ADMIN — MEROPENEM 500 MG: 500 INJECTION INTRAVENOUS at 22:00

## 2021-12-31 RX ADMIN — SODIUM CHLORIDE, PRESERVATIVE FREE 5 ML: 5 INJECTION INTRAVENOUS at 13:44

## 2021-12-31 RX ADMIN — SODIUM CHLORIDE 100 MG: 9 INJECTION, SOLUTION INTRAVENOUS at 13:44

## 2021-12-31 NOTE — PROGRESS NOTES
Transition of Care Plan:    RUR:  20%   High Risk for Readmission  LOS:   35 Days  Disposition:   Home with Home Health anticipated [facility]) and TPN (Marzena Russell 1237) anticipated  Follow up appointments:  PCP and Specialty  DME needed:  TPN anticipated  Transportation at Discharge:  Family car  [address] or means to access home:    Mother has access to home.  Medicare Letter:   N/A  Is patient a BCPI-A Bundle:    N/A         If yes, was Bundle Letter given?:    Is patient a  and connected with the [place]? N/A  If yes, was [employer] transfer form completed and [facility] notified? Caregiver Contact: Mother, Nicole Valenzuela ([phone])  Discharge Caregiver contacted prior to discharge? Mother to be notified of discharge plans. Updates obtained from clinical team for this patient with complex care needs. Wound continues to be treated - currently with packing and no suctioning. Some question as to whether patient has a fistula or not which is difficult to determine. If continues with current progression with wound healing, patient will likely return home if he has assistance with wound care. CM will continue to follow with anticipated final determination of needs early next week.     [address] Bonnie Hernandez, [address], [place], [facility]-  Complex CM/  [phone]

## 2021-12-31 NOTE — PROGRESS NOTES
Pt's chart reviewed. Attempted to see patient for OT weekly re-evaluation. He declined stating he had wound care this morning and did not want to participate. Will follow up Monday 1/3/22 following the weekend.     Mahin Moser MS OTR/L

## 2021-12-31 NOTE — PROGRESS NOTES
Admit Date: 2021    POD 21 Days Post-Op    Procedure:  Procedure(s):  OPEN LYSIS OF ADHESIONS, GASTROSTOMY  AND JEJUNAL TUBE PLACEMENT    Subjective:     Patient feeling better. Some scant incontinent stool. Refusing to get OOB to use the bathroom    Objective:     Blood pressure 95/66, pulse 95, temperature 98.7 °F (37.1 °C), resp. rate 17, height 5' 9\" (1.753 m), weight 95 lb 11.2 oz (43.4 kg), SpO2 100 %.     Temp (24hrs), Av.2 °F (36.8 °C), Min:97.5 °F (36.4 °C), Max:99.1 °F (37.3 °C)      Physical Exam:  GENERAL: alert, cooperative, severe muscle wasting, LUNG: clear to auscultation bilaterally, HEART: regular rate and rhythm, ABDOMEN: soft, wound with retention sutures intact, mid aspect of wound opened further, still no convincing evidence for fistula, jejunostomy tube site intact, g-tube site with some scant drainage around brock catheter, EXTREMITIES:  extremities normal, atraumatic, no cyanosis or edema    Labs:   Recent Results (from the past 24 hour(s))   GLUCOSE, POC    Collection Time: 21 11:45 AM   Result Value Ref Range    Glucose (POC) 112 65 - 117 mg/dL    Performed by Rita Martin PCT    GLUCOSE, POC    Collection Time: 21  5:47 PM   Result Value Ref Range    Glucose (POC) 119 (H) 65 - 117 mg/dL    Performed by Reino Rubinstein (TRV PCT)    GLUCOSE, POC    Collection Time: 21 11:56 PM   Result Value Ref Range    Glucose (POC) 115 65 - 117 mg/dL    Performed by Timo Marie (PCT)    CBC W/O DIFF    Collection Time: 21  3:51 AM   Result Value Ref Range    WBC 7.3 4.1 - 11.1 K/uL    RBC 3.11 (L) 4.10 - 5.70 M/uL    HGB 8.8 (L) 12.1 - 17.0 g/dL    HCT 27.7 (L) 36.6 - 50.3 %    MCV 89.1 80.0 - 99.0 FL    MCH 28.3 26.0 - 34.0 PG    MCHC 31.8 30.0 - 36.5 g/dL    RDW 18.6 (H) 11.5 - 14.5 %    PLATELET 350 (H) 221 - 400 K/uL    MPV 9.1 8.9 - 12.9 FL    NRBC 0.0 0  WBC    ABSOLUTE NRBC 0.00 0.00 - 0.01 K/uL   GLUCOSE, POC    Collection Time: 21  5:55 AM Result Value Ref Range    Glucose (POC) 100 65 - 117 mg/dL    Performed by Lyndon Fontana (PCT)        Data Review images and reports reviewed    Assessment:     Active Problems:    SBO (small bowel obstruction) ([address] (11/22/2021)      Aspiration pneumonia ([address] (11/25/2021)      Small bowel obstruction ([address] (11/25/2021)      Sepsis ([address] (11/25/2021)      Abdominal pain, acute (11/27/2021)      Intractable cyclical vomiting with nausea (11/27/2021)        Plan/Recommendations/Medical Decision Making:     Continue present treatment   New g-tube draining well with gravity.   No recent BMs  Hold tube feeds   Looks like this is not an enterocutaneous fistula but simple wound infection, scant output past 72 hours  Daily wound packing midline wound  Appreciate wound care consult   Continue tpn  Npo until g-tube site closes down around brock catheter    Tori De Leon MD  [facility]

## 2021-12-31 NOTE — WOUND CARE
Wound care follow up for the mid abdominal surgical wound with possible wound infection vs. Fistula. For the past few days the patient has had a fistula pouch on the wound to isolate the drainage and collect it but there has been no drainage that collects in the pouch at all, even with suction. Assessment: See highlighted area below:  Overall and close up of the abdominal wound: Dr. Bambi Harris removed 5 stapels today and widened the wound for better drainage and for packing the wound. Since the wound has had very little drainage out of the central area, we are going to pack it daily and cleanse the skin around it and protect it from skin breakdown. During the next few days we will see what type of drainage is noted and hopefully be able to tell whether this is a fistula or just an open surgical wound that can heal with secondary intention. Wound Abdomen Right (Active)   Wound Etiology Surgical 12/31/21 0806   Dressing Status Clean;Dry; Intact 12/31/21 0806   Cleansed Not cleansed 12/23/21 1454   Dressing/Treatment ABD pad 12/30/21 0822   Wound Assessment Pink/red;Dry 12/30/21 0822   Drainage Amount Scant 12/30/21 0822   Drainage Description Serous 12/30/21 0822   Wound Odor None 12/30/21 2015   Edges Attached edges 12/29/21 1529   Wound Abdomen Left (Active)   Wound Etiology Surgical 12/31/21 0806   Dressing Status Clean;Dry; Intact 12/31/21 0806   Cleansed Cleansed with saline 12/27/21 0723   Dressing/Treatment Gauze dressing/dressing sponge 12/29/21 1950   Wound Assessment Dry 12/30/21 0822   Drainage Amount None 12/30/21 0822   Drainage Description Kev Number 12/23/21 0800   Wound Odor None 12/30/21 2015   Edges Attached edges 12/30/21 0822   Wound Abdomen Mid Mid abdominal wound with staples (Active)   Wound Image    12/31/21 1056   Wound Etiology Non-Healing Surgical 12/31/21 1056   Dressing Status New dressing applied 12/31/21 1056   Cleansed Cleansed with saline 12/31/21 1056   Dressing/Treatment Packing;ABD pad 12/31/21 1056   Wound Assessment Bleeding;Pink/red 12/31/21 1056   Drainage Amount Small 12/31/21 1056   Drainage Description Thin;Purulent 12/31/21 1056   Wound Odor Mild 12/31/21 1056   Pamela-Wound/Incision Assessment Denuded; Other (Comment) 12/31/21 1056   Edges Other (Comment) 12/31/21 1056   Wound Thickness Description Full thickness 12/31/21 1056     Treatment: the wound was irrigated with NS and wiped with gauze. Packed with NS wet kerlix gauze and covered with an ABD. Discussed the wound care with the RNLucy today. Plan: Continue the ordered wound care over the weekend and will re-evaluate on Monday or Tuesday.    Saida Mar RN, BSN, [employer]

## 2021-12-31 NOTE — ROUTINE PROCESS
Bedside shift change report given to ROSEANNE Lewis (oncoming nurse) by Alana MACIAS RN (offgoing nurse). Report included the following information SBAR, Kardex and MAR.

## 2022-01-01 LAB
ANION GAP SERPL CALC-SCNC: 5 MMOL/L (ref 5–15)
BUN SERPL-MCNC: 18 MG/DL (ref 6–20)
BUN/CREAT SERPL: 34 (ref 12–20)
CALCIUM SERPL-MCNC: 9 MG/DL (ref 8.5–10.1)
CHLORIDE SERPL-SCNC: 100 MMOL/L (ref 97–108)
CO2 SERPL-SCNC: 29 MMOL/L (ref 21–32)
CREAT SERPL-MCNC: 0.53 MG/DL (ref 0.7–1.3)
GLUCOSE BLD STRIP.AUTO-MCNC: 129 MG/DL (ref 65–117)
GLUCOSE BLD STRIP.AUTO-MCNC: 131 MG/DL (ref 65–117)
GLUCOSE BLD STRIP.AUTO-MCNC: 131 MG/DL (ref 65–117)
GLUCOSE SERPL-MCNC: 88 MG/DL (ref 65–100)
MAGNESIUM SERPL-MCNC: 2.4 MG/DL (ref 1.6–2.4)
PHOSPHATE SERPL-MCNC: 3.8 MG/DL (ref 2.6–4.7)
POTASSIUM SERPL-SCNC: 4.2 MMOL/L (ref 3.5–5.1)
SERVICE CMNT-IMP: ABNORMAL
SODIUM SERPL-SCNC: 134 MMOL/L (ref 136–145)

## 2022-01-01 PROCEDURE — 74011000258 HC RX REV CODE- 258: Performed by: STUDENT IN AN ORGANIZED HEALTH CARE EDUCATION/TRAINING PROGRAM

## 2022-01-01 PROCEDURE — 74011000250 HC RX REV CODE- 250: Performed by: SURGERY

## 2022-01-01 PROCEDURE — 36415 COLL VENOUS BLD VENIPUNCTURE: CPT

## 2022-01-01 PROCEDURE — 74011250637 HC RX REV CODE- 250/637: Performed by: SURGERY

## 2022-01-01 PROCEDURE — 84100 ASSAY OF PHOSPHORUS: CPT

## 2022-01-01 PROCEDURE — 74011250636 HC RX REV CODE- 250/636: Performed by: SURGERY

## 2022-01-01 PROCEDURE — 80048 BASIC METABOLIC PNL TOTAL CA: CPT

## 2022-01-01 PROCEDURE — 74011000250 HC RX REV CODE- 250: Performed by: NURSE PRACTITIONER

## 2022-01-01 PROCEDURE — 83735 ASSAY OF MAGNESIUM: CPT

## 2022-01-01 PROCEDURE — 74011250636 HC RX REV CODE- 250/636: Performed by: STUDENT IN AN ORGANIZED HEALTH CARE EDUCATION/TRAINING PROGRAM

## 2022-01-01 PROCEDURE — 82962 GLUCOSE BLOOD TEST: CPT

## 2022-01-01 PROCEDURE — 65660000000 HC RM CCU STEPDOWN

## 2022-01-01 RX ADMIN — METOCLOPRAMIDE 10 MG: 5 INJECTION, SOLUTION INTRAMUSCULAR; INTRAVENOUS at 21:44

## 2022-01-01 RX ADMIN — SODIUM CHLORIDE, PRESERVATIVE FREE 10 ML: 5 INJECTION INTRAVENOUS at 05:45

## 2022-01-01 RX ADMIN — MEROPENEM 500 MG: 500 INJECTION INTRAVENOUS at 04:53

## 2022-01-01 RX ADMIN — MEROPENEM 500 MG: 500 INJECTION INTRAVENOUS at 09:35

## 2022-01-01 RX ADMIN — SODIUM CHLORIDE, PRESERVATIVE FREE 20 MG: 5 INJECTION INTRAVENOUS at 17:11

## 2022-01-01 RX ADMIN — METOCLOPRAMIDE 10 MG: 5 INJECTION, SOLUTION INTRAMUSCULAR; INTRAVENOUS at 17:11

## 2022-01-01 RX ADMIN — MEROPENEM 500 MG: 500 INJECTION INTRAVENOUS at 22:09

## 2022-01-01 RX ADMIN — NALOXEGOL OXALATE 12.5 MG: 12.5 TABLET, FILM COATED ORAL at 09:34

## 2022-01-01 RX ADMIN — SODIUM CHLORIDE, PRESERVATIVE FREE 20 MG: 5 INJECTION INTRAVENOUS at 06:36

## 2022-01-01 RX ADMIN — SODIUM CHLORIDE, PRESERVATIVE FREE 5 ML: 5 INJECTION INTRAVENOUS at 13:27

## 2022-01-01 RX ADMIN — METOPROLOL TARTRATE 1.25 MG: 1 INJECTION, SOLUTION INTRAVENOUS at 09:35

## 2022-01-01 RX ADMIN — METOCLOPRAMIDE 10 MG: 5 INJECTION, SOLUTION INTRAMUSCULAR; INTRAVENOUS at 09:35

## 2022-01-01 RX ADMIN — MEROPENEM 500 MG: 500 INJECTION INTRAVENOUS at 17:11

## 2022-01-01 RX ADMIN — METOPROLOL TARTRATE 1.25 MG: 1 INJECTION, SOLUTION INTRAVENOUS at 17:11

## 2022-01-01 RX ADMIN — DEXTROSE MONOHYDRATE, SODIUM CHLORIDE, AND POTASSIUM CHLORIDE 40 ML/HR: 50; 4.5; 1.49 INJECTION, SOLUTION INTRAVENOUS at 17:20

## 2022-01-01 RX ADMIN — AMLODIPINE BESYLATE 10 MG: 5 TABLET ORAL at 09:35

## 2022-01-01 RX ADMIN — ASCORBIC ACID, VITAMIN A PALMITATE, CHOLECALCIFEROL, THIAMINE HYDROCHLORIDE, RIBOFLAVIN-5 PHOSPHATE SODIUM, PYRIDOXINE HYDROCHLORIDE, NIACINAMIDE, DEXPANTHENOL, ALPHA-TOCOPHEROL ACETATE, VITAMIN K1, FOLIC ACID, BIOTIN, CYANOCOBALAMIN: 200; 3300; 200; 6; 3.6; 6; 40; 15; 10; 150; 600; 60; 5 INJECTION, SOLUTION INTRAVENOUS at 17:11

## 2022-01-01 RX ADMIN — METOCLOPRAMIDE 10 MG: 5 INJECTION, SOLUTION INTRAMUSCULAR; INTRAVENOUS at 04:54

## 2022-01-01 NOTE — PROGRESS NOTES
End of Shift Note    Bedside shift change report given to [id number] Jason (oncoming nurse) by Micheline Vickers (offgoing nurse). Report included the following information SBAR, Kardex, Intake/Output, MAR and Recent Results    Shift worked:  [id number]     Shift summary and any significant changes:    Patien was awake the majority of the shift appears anxious and emotional/ j-tube is draining large amounts of green fluid. TPN  Lipids are running. PCA managing pain. patient NPO no ice chips. Concerns for physician to address:      Zone phone for oncoming shift:   [phone]       Activity:  Activity Level: Up with Assistance  Number times ambulated in hallways past shift: 0  Number of times OOB to chair past shift: 0    Cardiac:   Cardiac Monitoring: Yes      Cardiac Rhythm: Sinus Rhythm    Access:   Current line(s): PICC     Genitourinary:   Urinary status: voiding    Respiratory:   O2 Device: None (Room air)  Chronic home O2 use?: N/A  Incentive spirometer at bedside: YES  Actual Volume (ml): 1000 ml  GI:  Last Bowel Movement Date: 01/31/22  Current diet:  ADULT ORAL NUTRITION SUPPLEMENT Breakfast, Lunch; Clear Liquid  DIET NPO  TPN ADULT - CENTRAL  Passing flatus: YES  Tolerating current diet: YES       Pain Management:   Patient states pain is manageable on current regimen: YES    Skin:  Russell Score: 16  Interventions: float heels, increase time out of bed and nutritional support     Patient Safety:  Fall Score:  Total Score: 2  Interventions: assistive device (walker, cane, etc), gripper socks and pt to call before getting OOB  High Fall Risk: Yes    Length of Stay:  Expected LOS: 9d 14h  Actual LOS: [facility]

## 2022-01-01 NOTE — PROGRESS NOTES
SURGERY PROGRESS NOTE      Admit Date: 2021    POD 22 Days Post-Op    Procedure: Procedure(s):  OPEN LYSIS OF ADHESIONS, GASTROSTOMY  AND JEJUNAL TUBE PLACEMENT      Subjective:     Patient has not complaints. Feels well. Passing flatus and having loose BMs      Objective:     Visit Vitals  BP 90/67   Pulse 95   Temp 98.6 °F (37 °C)   Resp 18   Ht 5' 9\" (1.753 m)   Wt 43.4 kg (95 lb 11.2 oz)   SpO2 100%   BMI 14.13 kg/m²        Temp (24hrs), Av.7 °F (37.1 °C), Min:98.2 °F (36.8 °C), Max:99.6 °F (37.6 °C)      No intake/output data recorded.  190 -  0700  In: -   Out: 3032 [Urine:250; Drains:5]    Physical Exam:    General:  alert, cooperative, no distress, appears stated age   Abdomen: soft, bowel sounds active, non-tender   Incision:   No enteric drainage. No evidence of fistula            Lab Results   Component Value Date/Time    WBC 7.3 2021 03:51 AM    HGB 8.8 (L) 2021 03:51 AM    HCT 27.7 (L) 2021 03:51 AM    PLATELET 317 (H)  03:51 AM    MCV 89.1 2021 03:51 AM     Lab Results   Component Value Date/Time    GFR est non-AA >60 2022 03:07 AM    GFR est AA >60 2022 03:07 AM    Creatinine 0.53 (L) 2022 03:07 AM    BUN 18 2022 03:07 AM    Sodium 134 (L) 2022 03:07 AM    Potassium 4.2 2022 03:07 AM    Chloride 100 2022 03:07 AM    CO2 29 2022 03:07 AM    Magnesium 2.4 2022 03:07 AM    Phosphorus 3.8 2022 03:07 AM       Assessment/plan: Active Problems:    SBO (small bowel obstruction) ([facility] (2021)      Aspiration pneumonia ([address] (2021)      Small bowel obstruction ([address] (2021)      Sepsis ([address] (2021)      Abdominal pain, acute (2021)      Intractable cyclical vomiting with nausea (2021)    Wound infection -  Initially felt to be a EC fistula but, since wound opened there has been on output to support a fistula.     Malnutrition -  Continue TPN, restart tube feeds, clear liquids     Gastroparesis - continue G-tube to gravity    Fevers -  On multiple antibiotics. No fever for days. Was the wound infection the source. ID following.

## 2022-01-02 LAB
ANION GAP SERPL CALC-SCNC: 7 MMOL/L (ref 5–15)
BUN SERPL-MCNC: 17 MG/DL (ref 6–20)
BUN/CREAT SERPL: 37 (ref 12–20)
CALCIUM SERPL-MCNC: 9.1 MG/DL (ref 8.5–10.1)
CHLORIDE SERPL-SCNC: 96 MMOL/L (ref 97–108)
CO2 SERPL-SCNC: 28 MMOL/L (ref 21–32)
CREAT SERPL-MCNC: 0.46 MG/DL (ref 0.7–1.3)
GLUCOSE BLD STRIP.AUTO-MCNC: 114 MG/DL (ref 65–117)
GLUCOSE BLD STRIP.AUTO-MCNC: 123 MG/DL (ref 65–117)
GLUCOSE BLD STRIP.AUTO-MCNC: 130 MG/DL (ref 65–117)
GLUCOSE BLD STRIP.AUTO-MCNC: 134 MG/DL (ref 65–117)
GLUCOSE BLD STRIP.AUTO-MCNC: 140 MG/DL (ref 65–117)
GLUCOSE SERPL-MCNC: 178 MG/DL (ref 65–100)
MAGNESIUM SERPL-MCNC: 2.4 MG/DL (ref 1.6–2.4)
PHOSPHATE SERPL-MCNC: 4.1 MG/DL (ref 2.6–4.7)
POTASSIUM SERPL-SCNC: 4.7 MMOL/L (ref 3.5–5.1)
PREALB SERPL-MCNC: 10.1 MG/DL (ref 20–40)
SERVICE CMNT-IMP: ABNORMAL
SERVICE CMNT-IMP: NORMAL
SODIUM SERPL-SCNC: 131 MMOL/L (ref 136–145)

## 2022-01-02 PROCEDURE — 83735 ASSAY OF MAGNESIUM: CPT

## 2022-01-02 PROCEDURE — 84100 ASSAY OF PHOSPHORUS: CPT

## 2022-01-02 PROCEDURE — 74011250636 HC RX REV CODE- 250/636: Performed by: STUDENT IN AN ORGANIZED HEALTH CARE EDUCATION/TRAINING PROGRAM

## 2022-01-02 PROCEDURE — 74011000250 HC RX REV CODE- 250: Performed by: SURGERY

## 2022-01-02 PROCEDURE — 74011000250 HC RX REV CODE- 250: Performed by: NURSE PRACTITIONER

## 2022-01-02 PROCEDURE — 36415 COLL VENOUS BLD VENIPUNCTURE: CPT

## 2022-01-02 PROCEDURE — 82962 GLUCOSE BLOOD TEST: CPT

## 2022-01-02 PROCEDURE — 84134 ASSAY OF PREALBUMIN: CPT

## 2022-01-02 PROCEDURE — 74011000258 HC RX REV CODE- 258: Performed by: STUDENT IN AN ORGANIZED HEALTH CARE EDUCATION/TRAINING PROGRAM

## 2022-01-02 PROCEDURE — 80048 BASIC METABOLIC PNL TOTAL CA: CPT

## 2022-01-02 PROCEDURE — 65660000000 HC RM CCU STEPDOWN

## 2022-01-02 PROCEDURE — 74011250636 HC RX REV CODE- 250/636: Performed by: SURGERY

## 2022-01-02 PROCEDURE — 2709999900 HC NON-CHARGEABLE SUPPLY

## 2022-01-02 PROCEDURE — 74011250637 HC RX REV CODE- 250/637: Performed by: SURGERY

## 2022-01-02 RX ORDER — ERYTHROMYCIN 250 MG/1
250 TABLET, DELAYED RELEASE ORAL 4 TIMES DAILY
Status: DISCONTINUED | OUTPATIENT
Start: 2022-01-02 | End: 2022-01-11 | Stop reason: HOSPADM

## 2022-01-02 RX ADMIN — MEROPENEM 500 MG: 500 INJECTION INTRAVENOUS at 17:00

## 2022-01-02 RX ADMIN — METOCLOPRAMIDE 10 MG: 5 INJECTION, SOLUTION INTRAMUSCULAR; INTRAVENOUS at 08:49

## 2022-01-02 RX ADMIN — ERYTHROMYCIN 250 MG: 250 TABLET, DELAYED RELEASE ORAL at 11:32

## 2022-01-02 RX ADMIN — SODIUM CHLORIDE, PRESERVATIVE FREE 5 ML: 5 INJECTION INTRAVENOUS at 17:01

## 2022-01-02 RX ADMIN — ERYTHROMYCIN 250 MG: 250 TABLET, DELAYED RELEASE ORAL at 22:00

## 2022-01-02 RX ADMIN — SODIUM CHLORIDE, PRESERVATIVE FREE 20 MG: 5 INJECTION INTRAVENOUS at 06:28

## 2022-01-02 RX ADMIN — METOCLOPRAMIDE 10 MG: 5 INJECTION, SOLUTION INTRAMUSCULAR; INTRAVENOUS at 16:59

## 2022-01-02 RX ADMIN — ASCORBIC ACID, VITAMIN A PALMITATE, CHOLECALCIFEROL, THIAMINE HYDROCHLORIDE, RIBOFLAVIN-5 PHOSPHATE SODIUM, PYRIDOXINE HYDROCHLORIDE, NIACINAMIDE, DEXPANTHENOL, ALPHA-TOCOPHEROL ACETATE, VITAMIN K1, FOLIC ACID, BIOTIN, CYANOCOBALAMIN: 200; 3300; 200; 6; 3.6; 6; 40; 15; 10; 150; 600; 60; 5 INJECTION, SOLUTION INTRAVENOUS at 17:45

## 2022-01-02 RX ADMIN — METOCLOPRAMIDE 10 MG: 5 INJECTION, SOLUTION INTRAMUSCULAR; INTRAVENOUS at 20:28

## 2022-01-02 RX ADMIN — METOPROLOL TARTRATE 1.25 MG: 1 INJECTION, SOLUTION INTRAVENOUS at 16:59

## 2022-01-02 RX ADMIN — METOCLOPRAMIDE 10 MG: 5 INJECTION, SOLUTION INTRAMUSCULAR; INTRAVENOUS at 04:03

## 2022-01-02 RX ADMIN — ERYTHROMYCIN 250 MG: 250 TABLET, DELAYED RELEASE ORAL at 20:28

## 2022-01-02 RX ADMIN — MEROPENEM 500 MG: 500 INJECTION INTRAVENOUS at 04:03

## 2022-01-02 RX ADMIN — MEROPENEM 500 MG: 500 INJECTION INTRAVENOUS at 08:55

## 2022-01-02 RX ADMIN — METOPROLOL TARTRATE 1.25 MG: 1 INJECTION, SOLUTION INTRAVENOUS at 21:31

## 2022-01-02 RX ADMIN — METOPROLOL TARTRATE 1.25 MG: 1 INJECTION, SOLUTION INTRAVENOUS at 04:30

## 2022-01-02 RX ADMIN — SODIUM CHLORIDE, PRESERVATIVE FREE 20 MG: 5 INJECTION INTRAVENOUS at 16:59

## 2022-01-02 RX ADMIN — METOPROLOL TARTRATE 1.25 MG: 1 INJECTION, SOLUTION INTRAVENOUS at 08:49

## 2022-01-02 NOTE — PROGRESS NOTES
SURGERY PROGRESS NOTE      Admit Date: 2021    POD 23 Days Post-Op    Procedure: Procedure(s):  OPEN LYSIS OF ADHESIONS, GASTROSTOMY  AND JEJUNAL TUBE PLACEMENT      Subjective:     Patient states he feels 'ok' today. Tolerating liquids. No complaints about tubes and drains. Patient refused tube feeding yesterday. Passing flatus, having BMs      Objective:     Visit Vitals  /78   Pulse 95   Temp 98 °F (36.7 °C)   Resp 18   Ht 5' 9\" (1.753 m)   Wt 43.4 kg (95 lb 11.2 oz)   SpO2 98%   BMI 14.13 kg/m²        Temp (24hrs), Av.2 °F (36.8 °C), Min:97.5 °F (36.4 °C), Max:98.7 °F (37.1 °C)      701 - 1900  In: -   Out: 0383   1901 -  07  In: 6494.7 [I.V.:6494.7]  Out: 4134 [Urine:650; Drains:5]    Physical Exam:    General:  alert, cooperative, no distress, appears stated age   Abdomen: soft, bowel sounds active, non-tender   Incision:   healing well, no drainage, no erythema, no hernia, no seroma, no swelling, no dehiscence, incision well approximated.   NO signs fistula            Lab Results   Component Value Date/Time    WBC 7.3 2021 03:51 AM    HGB 8.8 (L) 2021 03:51 AM    HCT 27.7 (L) 2021 03:51 AM    PLATELET 176 (H)  03:51 AM    MCV 89.1 2021 03:51 AM     Lab Results   Component Value Date/Time    GFR est non-AA >60 2022 09:10 AM    GFR est AA >60 2022 09:10 AM    Creatinine 0.46 (L) 2022 09:10 AM    BUN 17 2022 09:10 AM    Sodium 131 (L) 2022 09:10 AM    Potassium 4.7 2022 09:10 AM    Chloride 96 (L) 2022 09:10 AM    CO2 28 2022 09:10 AM    Magnesium 2.4 2022 09:10 AM    Phosphorus 4.1 2022 09:10 AM         Assessment:     Active Problems:    SBO (small bowel obstruction) ([address] (2021)      Aspiration pneumonia ([address] (2021)      Small bowel obstruction ([address] (2021)      Sepsis ([address] (2021)      Abdominal pain, acute (2021)      Intractable cyclical vomiting with nausea (11/27/2021)    stable    Plan:       1. Start tube feeding. Explained that trophic tube feeds will help the bowel reestablish villa and begin to absorb nutrients. I also explained at 10cc/hr he should have little to no symptoms     2. Continue TPN     3.   Continue g-tube to gravity

## 2022-01-02 NOTE — PROGRESS NOTES
0425: Attempted to get labs from pt's PICC, no blood return on either lumen. Pt declines for LPN RR to draw via venipuncture. End of Shift Note    Bedside shift change report given to SAMUEL Shell (oncoming nurse) by Aimee Espino LPN (offgoing nurse). Report included the following information SBAR, Kardex, Intake/Output, MAR, Recent Results, Med Rec Status and Cardiac Rhythm NSR    Shift worked:  7-580a     Shift summary and any significant changes:     Pt had uneventful evening. Dressing at midline changed. Pt's J-tube draining w/o issue. PICC line did not return blood and pt declined having labs drawn via venipuncture. Concerns for physician to address:       Zone phone for oncoming shift:          Activity:  Activity Level: Up with Assistance  Number times ambulated in hallways past shift: 0  Number of times OOB to chair past shift: 0    Cardiac:   Cardiac Monitoring: Yes      Cardiac Rhythm: Sinus Rhythm    Access:   Current line(s): PICC     Genitourinary:   Urinary status: voiding    Respiratory:   O2 Device: None (Room air)  Chronic home O2 use?: NO  Incentive spirometer at bedside: YES  Actual Volume (ml): 1000 ml  GI:  Last Bowel Movement Date: 12/31/21  Current diet:  ADULT ORAL NUTRITION SUPPLEMENT Breakfast, Lunch; Clear Liquid  ADULT DIET Clear Liquid  ADULT TUBE FEEDING Jejunostomy; Other Tube Feeding (Specify); vital 1.5; Delivery Method: Continuous; Continuous Initial Rate (mL/hr): 10; Continuous Advance Tube Feeding: No; Water Flush Volume (mL): 30; Water Flush Frequency: Q 6 hours  TPN ADULT - CENTRAL AA 5% D15% W/ CA + ELECTROLYTES  Passing flatus: YES  Tolerating current diet: YES       Pain Management:   Patient states pain is manageable on current regimen: YES    Skin:  Russell Score: 17  Interventions: float heels, increase time out of bed and PT/OT consult    Patient Safety:  Fall Score:  Total Score: 2  Interventions: assistive device (walker, cane, etc), gripper socks, pt to call before getting OOB and stay with me (per policy)  High Fall Risk: Yes    Length of Stay:  Expected LOS: 9d 14h  Actual LOS: [address], LPN

## 2022-01-03 LAB
ANION GAP SERPL CALC-SCNC: 5 MMOL/L (ref 5–15)
BUN SERPL-MCNC: 22 MG/DL (ref 6–20)
BUN/CREAT SERPL: 34 (ref 12–20)
CALCIUM SERPL-MCNC: 9.5 MG/DL (ref 8.5–10.1)
CHLORIDE SERPL-SCNC: 91 MMOL/L (ref 97–108)
CO2 SERPL-SCNC: 32 MMOL/L (ref 21–32)
CREAT SERPL-MCNC: 0.64 MG/DL (ref 0.7–1.3)
GLUCOSE BLD STRIP.AUTO-MCNC: 106 MG/DL (ref 65–117)
GLUCOSE BLD STRIP.AUTO-MCNC: 148 MG/DL (ref 65–117)
GLUCOSE BLD STRIP.AUTO-MCNC: 89 MG/DL (ref 65–117)
GLUCOSE SERPL-MCNC: 110 MG/DL (ref 65–100)
MAGNESIUM SERPL-MCNC: 2.4 MG/DL (ref 1.6–2.4)
PHOSPHATE SERPL-MCNC: 5.5 MG/DL (ref 2.6–4.7)
POTASSIUM SERPL-SCNC: 4.9 MMOL/L (ref 3.5–5.1)
SERVICE CMNT-IMP: ABNORMAL
SERVICE CMNT-IMP: NORMAL
SERVICE CMNT-IMP: NORMAL
SODIUM SERPL-SCNC: 128 MMOL/L (ref 136–145)

## 2022-01-03 PROCEDURE — 83735 ASSAY OF MAGNESIUM: CPT

## 2022-01-03 PROCEDURE — 82962 GLUCOSE BLOOD TEST: CPT

## 2022-01-03 PROCEDURE — 65660000000 HC RM CCU STEPDOWN

## 2022-01-03 PROCEDURE — 74011250636 HC RX REV CODE- 250/636: Performed by: SURGERY

## 2022-01-03 PROCEDURE — 36415 COLL VENOUS BLD VENIPUNCTURE: CPT

## 2022-01-03 PROCEDURE — 74011000250 HC RX REV CODE- 250: Performed by: SURGERY

## 2022-01-03 PROCEDURE — 84100 ASSAY OF PHOSPHORUS: CPT

## 2022-01-03 PROCEDURE — 74011250637 HC RX REV CODE- 250/637: Performed by: SURGERY

## 2022-01-03 PROCEDURE — 74011000258 HC RX REV CODE- 258: Performed by: STUDENT IN AN ORGANIZED HEALTH CARE EDUCATION/TRAINING PROGRAM

## 2022-01-03 PROCEDURE — 74011000250 HC RX REV CODE- 250: Performed by: NURSE PRACTITIONER

## 2022-01-03 PROCEDURE — 80048 BASIC METABOLIC PNL TOTAL CA: CPT

## 2022-01-03 PROCEDURE — 74011250636 HC RX REV CODE- 250/636: Performed by: NURSE PRACTITIONER

## 2022-01-03 PROCEDURE — 74011250636 HC RX REV CODE- 250/636: Performed by: STUDENT IN AN ORGANIZED HEALTH CARE EDUCATION/TRAINING PROGRAM

## 2022-01-03 RX ORDER — DEXTROSE MONOHYDRATE AND SODIUM CHLORIDE 5; .45 G/100ML; G/100ML
40 INJECTION, SOLUTION INTRAVENOUS CONTINUOUS
Status: DISCONTINUED | OUTPATIENT
Start: 2022-01-03 | End: 2022-01-04

## 2022-01-03 RX ORDER — ENOXAPARIN SODIUM 100 MG/ML
30 INJECTION SUBCUTANEOUS EVERY 24 HOURS
Status: DISCONTINUED | OUTPATIENT
Start: 2022-01-03 | End: 2022-01-11 | Stop reason: HOSPADM

## 2022-01-03 RX ADMIN — METOPROLOL TARTRATE 1.25 MG: 1 INJECTION, SOLUTION INTRAVENOUS at 15:08

## 2022-01-03 RX ADMIN — ASCORBIC ACID, VITAMIN A PALMITATE, CHOLECALCIFEROL, THIAMINE HYDROCHLORIDE, RIBOFLAVIN-5 PHOSPHATE SODIUM, PYRIDOXINE HYDROCHLORIDE, NIACINAMIDE, DEXPANTHENOL, ALPHA-TOCOPHEROL ACETATE, VITAMIN K1, FOLIC ACID, BIOTIN, CYANOCOBALAMIN: 200; 3300; 200; 6; 3.6; 6; 40; 15; 10; 150; 600; 60; 5 INJECTION, SOLUTION INTRAVENOUS at 17:40

## 2022-01-03 RX ADMIN — ENOXAPARIN SODIUM 30 MG: 100 INJECTION SUBCUTANEOUS at 15:08

## 2022-01-03 RX ADMIN — DEXTROSE MONOHYDRATE AND SODIUM CHLORIDE 40 ML/HR: 5; .45 INJECTION, SOLUTION INTRAVENOUS at 15:51

## 2022-01-03 RX ADMIN — SODIUM CHLORIDE, PRESERVATIVE FREE 20 MG: 5 INJECTION INTRAVENOUS at 06:00

## 2022-01-03 RX ADMIN — METOCLOPRAMIDE 10 MG: 5 INJECTION, SOLUTION INTRAMUSCULAR; INTRAVENOUS at 23:08

## 2022-01-03 RX ADMIN — METOPROLOL TARTRATE 1.25 MG: 1 INJECTION, SOLUTION INTRAVENOUS at 23:08

## 2022-01-03 RX ADMIN — METOCLOPRAMIDE 10 MG: 5 INJECTION, SOLUTION INTRAMUSCULAR; INTRAVENOUS at 09:29

## 2022-01-03 RX ADMIN — MEROPENEM 500 MG: 500 INJECTION INTRAVENOUS at 00:05

## 2022-01-03 RX ADMIN — SODIUM CHLORIDE, PRESERVATIVE FREE 20 MG: 5 INJECTION INTRAVENOUS at 17:40

## 2022-01-03 RX ADMIN — METOPROLOL TARTRATE 1.25 MG: 1 INJECTION, SOLUTION INTRAVENOUS at 09:29

## 2022-01-03 RX ADMIN — METOCLOPRAMIDE 10 MG: 5 INJECTION, SOLUTION INTRAMUSCULAR; INTRAVENOUS at 15:08

## 2022-01-03 RX ADMIN — WATER 2 MG: 1 INJECTION INTRAMUSCULAR; INTRAVENOUS; SUBCUTANEOUS at 09:37

## 2022-01-03 RX ADMIN — DEXTROSE MONOHYDRATE AND SODIUM CHLORIDE 40 ML/HR: 5; .45 INJECTION, SOLUTION INTRAVENOUS at 14:00

## 2022-01-03 RX ADMIN — SODIUM CHLORIDE, PRESERVATIVE FREE 10 ML: 5 INJECTION INTRAVENOUS at 06:00

## 2022-01-03 RX ADMIN — WATER 2 MG: 1 INJECTION INTRAMUSCULAR; INTRAVENOUS; SUBCUTANEOUS at 12:41

## 2022-01-03 RX ADMIN — METOPROLOL TARTRATE 1.25 MG: 1 INJECTION, SOLUTION INTRAVENOUS at 04:40

## 2022-01-03 RX ADMIN — DEXTROSE MONOHYDRATE, SODIUM CHLORIDE, AND POTASSIUM CHLORIDE 40 ML/HR: 50; 4.5; 1.49 INJECTION, SOLUTION INTRAVENOUS at 00:07

## 2022-01-03 RX ADMIN — METOCLOPRAMIDE 10 MG: 5 INJECTION, SOLUTION INTRAMUSCULAR; INTRAVENOUS at 03:10

## 2022-01-03 NOTE — PROGRESS NOTES
1140 - PICC line does not give any blood return in either lumen post cathflo. Notified Mercedes Kwok NP. Will administer a 2nd dose. Notified Zahira Pharmacist of need for 2nd dose.

## 2022-01-03 NOTE — PROGRESS NOTES
Comprehensive Nutrition Assessment    Type and Reason for Visit: Reassess    Nutrition Recommendations/Plan:   Continue TPN as ordered  Continue TF per surgeon    Eventual TF goal rate recommendation:   Vital 1.5 @ 60mL/h (provides 2160kcals/97gPro/269gCHO)     Nutrition Assessment:   Chart reviewed remotely. Pt started on trophic TF rate today, refusing advancement. He was supposed to start yesterday but refused initiation. TPN remains at goal rate which meets 100% kcal and protein needs. BG and lytes WNL. Significant OP from G tube but he is allowed to have clear liquids PO.  ECF was disproved. Patient Vitals for the past 168 hrs:   % Diet Eaten   12/30/21 0834 0%         Malnutrition Assessment:  Malnutrition Status:  Severe malnutrition    Context:  Chronic illness     Findings of the 6 clinical characteristics of malnutrition:   Energy Intake:  7 - 75% or less est energy requirements for 1 month or longer  Weight Loss:  7.0 - Greater than 7.5% over 3 months     Body Fat Loss:  7 - Severe body fat loss, Triceps,Orbital,Buccal region   Muscle Mass Loss:  1 - Mild muscle mass loss, Clavicles (pectoralis &deltoids),Temples (temporalis),Thigh (quadriceps),Scapula (trapezius)  Fluid Accumulation:  Unable to assess,     Strength:  Not performed         Estimated Daily Nutrient Needs:  Energy (kcal): 2188 (BMR 1489 x 1. 3AF) + 250 kcals; Weight Used for Energy Requirements: Current  Protein (g): 79-105g (1.5-2gPro/kg); Weight Used for Protein Requirements: Current  Fluid (ml/day): 9720-5540 ml/day; Method Used for Fluid Requirements: 1 ml/kcal      Nutrition Related Findings:  Meds: reglan, movantik, humalog, merrem, [email or web address] 1/3      Wounds:    Surgical incision       Current Nutrition Therapies:  ADULT ORAL NUTRITION SUPPLEMENT Breakfast, Lunch; Clear Liquid  ADULT DIET Clear Liquid  ADULT TUBE FEEDING Jejunostomy;  Other Tube Feeding (Specify); vital 1.5; Delivery Method: Continuous; Continuous Initial Rate (mL/hr): 10; Continuous Advance Tube Feeding: No; Water Flush Volume (mL): 30; Water Flush Frequency: Q 6 hours  TPN ADULT - CENTRAL AA 5% D15% W/ CA + ELECTROLYTES  TPN ADULT - CENTRAL AA 5% D15% W/ CA + ELECTROLYTES  Current Tube Feeding (TF) Orders:   · Feeding Route: Jejunostomy  · Formula: Peptide based high protein  · Schedule:Continuous    · Regimen: 10mL/h  · Additives/Modulars:    · Water Flushes: 30mL q 6h  · Current TF & Flush Orders Provides:    · Goal TF & Flush Orders Provides: 2160kcals/97gPro/269gCHO    Current Parenteral Nutrition Orders:  · Type and Formula: D15, 5% AA   · Lipids: 500ml,Three times weekly  · Duration: Continuous  · Rate/Volume: 83mL/h  · Current PN Order Provides: 1842kcals/100gPro/298gDextrose  · Goal PN Orders Provides: 1842kcals/100gPro/298gDextrose      Anthropometric Measures:  · Height:  5' 9\" (175.3 cm)  · Current Body Wt:  43.4 kg (95 lb 10.9 oz)   · Admission Body Wt:       · Usual Body Wt:        · Ideal Body Wt:  160 lbs:  63 %   · Adjusted Body Weight:   ; Weight Adjustment for:     · Adjusted BMI:       · BMI Category:  Underweight (BMI less than 18.5)       Nutrition Diagnosis:   · Inadequate protein-energy intake related to altered GI function as evidenced by NPO or clear liquid status due to medical condition,weight loss (SBO)  Previous dx resolved    Nutrition Interventions:   Food and/or Nutrient Delivery: Continue tube feeding,Continue parenteral nutrition  Nutrition Education and Counseling: No recommendations at this time  Coordination of Nutrition Care: Continue to monitor while inpatient    Goals:  Pt will tolerate TPN and TF with no signs/sx of intolerance in 2-4 days.        Nutrition Monitoring and Evaluation:   Behavioral-Environmental Outcomes: None identified  Food/Nutrient Intake Outcomes: Parenteral nutrition intake/tolerance,Enteral nutrition intake/tolerance  Physical Signs/Symptoms Outcomes: Biochemical data,GI status,Weight,Skin    Discharge Planning:     Too soon to determine     Electronically signed by Jia Kern RD, [address]  on 1/3/2022 at 3:02 PM    Contact: ext [phone]

## 2022-01-03 NOTE — PROGRESS NOTES
SURGERY PROGRESS NOTE      Admit Date: 2021    POD 24 Days Post-Op    Procedure: Procedure(s):  OPEN LYSIS OF ADHESIONS, GASTROSTOMY  AND JEJUNAL TUBE PLACEMENT      Subjective:     Patient complains of abdominal pain. He denies nausea. He states it hurts when he does not drink. He is refusing all meds PO. He does not want his tube feeds advanced today. Objective:     Visit Vitals  /82   Pulse (!) 110   Temp 98.1 °F (36.7 °C)   Resp 18   Ht 5' 9\" (1.753 m)   Wt 43.4 kg (95 lb 11.2 oz)   SpO2 99%   BMI 14.13 kg/m²        Temp (24hrs), Av.4 °F (36.9 °C), Min:98.1 °F (36.7 °C), Max:98.8 °F (37.1 °C)      701 -  1900  In: 90   Out: 2400 [Urine:450]  1901 -  0700  In: 70152.2 [P.O.:2118;  I.V.:8782.2]  Out: 7350 [Urine:400]    Physical Exam:    General:  alert, cooperative, no distress, appears stated age   Abdomen: soft, bowel sounds active, non-tender   Incision:   healing well, no drainage, no erythema, no hernia, no seroma, no swelling, no dehiscence, incision well approximated           Lab Results   Component Value Date/Time    WBC 7.3 2021 03:51 AM    HGB 8.8 (L) 2021 03:51 AM    HCT 27.7 (L) 2021 03:51 AM    PLATELET 061 (H)  03:51 AM    MCV 89.1 2021 03:51 AM     Lab Results   Component Value Date/Time    GFR est non-AA >60 2022 09:10 AM    GFR est AA >60 2022 09:10 AM    Creatinine 0.46 (L) 2022 09:10 AM    BUN 17 2022 09:10 AM    Sodium 131 (L) 2022 09:10 AM    Potassium 4.7 2022 09:10 AM    Chloride 96 (L) 2022 09:10 AM    CO2 28 2022 09:10 AM    Magnesium 2.4 2022 09:10 AM    Phosphorus 4.1 2022 09:10 AM       Assessment:     Active Problems:    SBO (small bowel obstruction) ([address] (2021)      Aspiration pneumonia ([address] (2021)      Small bowel obstruction ([address] (2021)      Sepsis ([address] (2021)      Abdominal pain, acute (2021) Intractable cyclical vomiting with nausea (11/27/2021)    Stable. Unclear why he is having pain today. Will hold off on advancing tube feeds. Would be able to get him to SNF if he was at goal on tube feeds.       Plan:       Continue present treatment

## 2022-01-03 NOTE — WOUND CARE
Wound care follow up for healing progress. On Friday the wound pouch was removed and Dr. Jeri Bonds took a few staples out from around the wound opening. Assessment: The wound was irrigated / rinsed and then packed with kerlix gauze down into the wound. Today the wound was rechecked and found to be pink without any undermining at all. The wound drainage is serosanguinous. No odor to the wound at all and the retention sutures are drying up nicely. The wound and surrounding skin was cleansed and dried and then the wound was packed with 1 inch packing strip (about 1 inch of it). Covered with a foam dressing. Treatment today: Plan:  Continue the same wound care and skin cleansing daily. ]  Wound care nurse to follow up on Thursday for wound re-check again. Patient is now drinking fluids without problems.    Ade Elam RN, BSN, [employer]

## 2022-01-03 NOTE — PROGRESS NOTES
End of Shift Note     Bedside shift change report given to Lucy Ross RN (oncoming nurse) by Kate Day LPN (offgoing nurse). Report included the following information SBAR, Kardex, Intake/Output, MAR, Recent Results, Med Rec Status and Cardiac Rhythm NSR     Shift worked:  7q-320a      Shift summary and any significant changes:      Pt had uneventful evening. Dressing at midline changed. Pt's J-tube draining w/o issue. PICC line did not return blood and pt declined having labs drawn via venipuncture.      Concerns for physician to address:        Zone phone for oncoming shift:            Activity:  Activity Level: Up with Assistance  Number times ambulated in hallways past shift: 0  Number of times OOB to chair past shift: 0     Cardiac:   Cardiac Monitoring: Yes      Cardiac Rhythm: Sinus Tachy     Access:   Current line(s): PICC      Genitourinary:   Urinary status: voiding     Respiratory:   O2 Device: None (Room air)  Chronic home O2 use?: NO  Incentive spirometer at bedside: YES  Actual Volume (ml): 1000 ml  GI:  Last Bowel Movement Date: 12/31/21  Current diet:  ADULT ORAL NUTRITION SUPPLEMENT Breakfast, Lunch; Clear Liquid  ADULT DIET Clear Liquid  ADULT TUBE FEEDING Jejunostomy; Other Tube Feeding (Specify); vital 1.5; Delivery Method: Continuous; Continuous Initial Rate (mL/hr): 10; Continuous Advance Tube Feeding: No; Water Flush Volume (mL): 30; Water Flush Frequency: Q 6 hours  TPN ADULT - CENTRAL AA 5% D15% W/ CA + ELECTROLYTES  Passing flatus: YES  Tolerating current diet: YES     Pain Management:   Patient states pain is manageable on current regimen: YES     Skin:  Russell Score: 17  Interventions: float heels, increase time out of bed and PT/OT consult    Patient Safety:  Fall Score: Total Score: 2  Interventions: assistive device (walker, cane, etc), gripper socks, pt to call before getting OOB and stay with me (per policy)  High Fall Risk:  Yes     Length of Stay:  Expected LOS: 9d 14h  Actual LOS: 40        Andreia Noble LPN

## 2022-01-04 LAB
GLUCOSE BLD STRIP.AUTO-MCNC: 119 MG/DL (ref 65–117)
GLUCOSE BLD STRIP.AUTO-MCNC: 122 MG/DL (ref 65–117)
GLUCOSE BLD STRIP.AUTO-MCNC: 127 MG/DL (ref 65–117)
GLUCOSE BLD STRIP.AUTO-MCNC: 129 MG/DL (ref 65–117)
SERVICE CMNT-IMP: ABNORMAL

## 2022-01-04 PROCEDURE — 74011250636 HC RX REV CODE- 250/636: Performed by: SURGERY

## 2022-01-04 PROCEDURE — 97535 SELF CARE MNGMENT TRAINING: CPT

## 2022-01-04 PROCEDURE — 74011000258 HC RX REV CODE- 258: Performed by: NURSE PRACTITIONER

## 2022-01-04 PROCEDURE — 74011000250 HC RX REV CODE- 250: Performed by: SURGERY

## 2022-01-04 PROCEDURE — 74011250636 HC RX REV CODE- 250/636: Performed by: NURSE PRACTITIONER

## 2022-01-04 PROCEDURE — 74011250637 HC RX REV CODE- 250/637: Performed by: SURGERY

## 2022-01-04 PROCEDURE — 82962 GLUCOSE BLOOD TEST: CPT

## 2022-01-04 PROCEDURE — 74011000250 HC RX REV CODE- 250: Performed by: NURSE PRACTITIONER

## 2022-01-04 PROCEDURE — 65660000000 HC RM CCU STEPDOWN

## 2022-01-04 RX ORDER — HYDROMORPHONE HYDROCHLORIDE 1 MG/ML
1 INJECTION, SOLUTION INTRAMUSCULAR; INTRAVENOUS; SUBCUTANEOUS
Status: DISCONTINUED | OUTPATIENT
Start: 2022-01-04 | End: 2022-01-11 | Stop reason: HOSPADM

## 2022-01-04 RX ORDER — SODIUM CHLORIDE 9 MG/ML
50 INJECTION, SOLUTION INTRAVENOUS CONTINUOUS
Status: DISCONTINUED | OUTPATIENT
Start: 2022-01-04 | End: 2022-01-06

## 2022-01-04 RX ADMIN — METOCLOPRAMIDE 10 MG: 5 INJECTION, SOLUTION INTRAMUSCULAR; INTRAVENOUS at 21:55

## 2022-01-04 RX ADMIN — SODIUM CHLORIDE, PRESERVATIVE FREE 10 ML: 5 INJECTION INTRAVENOUS at 13:47

## 2022-01-04 RX ADMIN — I.V. FAT EMULSION 500 ML: 20 EMULSION INTRAVENOUS at 00:06

## 2022-01-04 RX ADMIN — METOPROLOL TARTRATE 1.25 MG: 1 INJECTION, SOLUTION INTRAVENOUS at 10:03

## 2022-01-04 RX ADMIN — SODIUM CHLORIDE, PRESERVATIVE FREE 20 MG: 5 INJECTION INTRAVENOUS at 06:56

## 2022-01-04 RX ADMIN — ERYTHROMYCIN 250 MG: 250 TABLET, DELAYED RELEASE ORAL at 22:00

## 2022-01-04 RX ADMIN — METOPROLOL TARTRATE 1.25 MG: 1 INJECTION, SOLUTION INTRAVENOUS at 21:55

## 2022-01-04 RX ADMIN — SODIUM CHLORIDE 50 ML/HR: 9 INJECTION, SOLUTION INTRAVENOUS at 13:48

## 2022-01-04 RX ADMIN — METOCLOPRAMIDE 10 MG: 5 INJECTION, SOLUTION INTRAMUSCULAR; INTRAVENOUS at 10:03

## 2022-01-04 RX ADMIN — METOPROLOL TARTRATE 1.25 MG: 1 INJECTION, SOLUTION INTRAVENOUS at 17:19

## 2022-01-04 RX ADMIN — METOPROLOL TARTRATE 1.25 MG: 1 INJECTION, SOLUTION INTRAVENOUS at 04:26

## 2022-01-04 RX ADMIN — METOCLOPRAMIDE 10 MG: 5 INJECTION, SOLUTION INTRAMUSCULAR; INTRAVENOUS at 04:25

## 2022-01-04 RX ADMIN — SODIUM CHLORIDE, PRESERVATIVE FREE 10 ML: 5 INJECTION INTRAVENOUS at 06:57

## 2022-01-04 RX ADMIN — METOCLOPRAMIDE 10 MG: 5 INJECTION, SOLUTION INTRAMUSCULAR; INTRAVENOUS at 17:19

## 2022-01-04 RX ADMIN — SODIUM CHLORIDE, PRESERVATIVE FREE 10 ML: 5 INJECTION INTRAVENOUS at 21:55

## 2022-01-04 RX ADMIN — SODIUM CHLORIDE, PRESERVATIVE FREE 20 MG: 5 INJECTION INTRAVENOUS at 17:19

## 2022-01-04 RX ADMIN — MAGNESIUM SULFATE HEPTAHYDRATE: 500 INJECTION, SOLUTION INTRAMUSCULAR; INTRAVENOUS at 20:05

## 2022-01-04 NOTE — PROGRESS NOTES
End of Shift Note    Bedside shift change report given to Eunice Vargas RN (oncoming nurse) by Danielle Velez RN (offgoing nurse). Report included the following information SBAR, Kardex, MAR, Recent Results and Cardiac Rhythm NSR/ST    Shift worked:  7a-11a     Shift summary and any significant changes:     Ate roughly half of breakfast tray. Refused PO meds citing Gwendolyn Parikh goes down, comes out\" and \"I can't have anything by my mouth\" even though discussed PO diet with him. Pain manageable. Concerns for physician to address:  Above     Zone phone for oncoming shift:          Activity:  Activity Level: Up with Assistance  Number times ambulated in hallways past shift: 0  Number of times OOB to chair past shift: 0    Cardiac:   Cardiac Monitoring: Yes      Cardiac Rhythm: Sinus Tachy    Access:   Current line(s): PICC     Genitourinary:   Urinary status: voiding    Respiratory:   O2 Device: None (Room air)  Chronic home O2 use?: NO  Incentive spirometer at bedside: YES  Actual Volume (ml): 1000 ml  GI:  Last Bowel Movement Date: 01/03/22  Current diet:  ADULT ORAL NUTRITION SUPPLEMENT Breakfast, Lunch; Clear Liquid  TPN ADULT - CENTRAL AA 5% D15% W/ CA + ELECTROLYTES  ADULT TUBE FEEDING Jejunostomy; Other Tube Feeding (Specify); vital 1.5; Delivery Method: Continuous; Continuous Initial Rate (mL/hr): 20; Continuous Advance Tube Feeding: No; Water Flush Volume (mL): 30; Water Flush Frequency: Q 6 hours  ADULT DIET Full Liquid  TPN ADULT - CENTRAL  Passing flatus: YES  Tolerating current diet: YES       Pain Management:   Patient states pain is manageable on current regimen: YES    Skin:  Russell Score: 16  Interventions: float heels and PT/OT consult    Patient Safety:  Fall Score:  Total Score: 3  Interventions: assistive device (walker, cane, etc), gripper socks and pt to call before getting OOB  High Fall Risk: Yes    Length of Stay:  Expected LOS: 9d 14h  Actual LOS: [facility], RN

## 2022-01-04 NOTE — PROGRESS NOTES
Physical Therapy    Chart reviewed. Attempted to see pt for PT session, however he refused. Will defer and continue to follow.

## 2022-01-04 NOTE — PROGRESS NOTES
SURGERY PROGRESS NOTE      Admit Date: 2021    POD 25 Days Post-Op    Procedure: Procedure(s):  OPEN LYSIS OF ADHESIONS, GASTROSTOMY  AND JEJUNAL TUBE PLACEMENT      Subjective:     Patient complains of pelvic and perianal pain with BMs. Denies nausea. Passing flatus. Still refusing PO meds. Will to try advancing tube feeds today. Objective:     Visit Vitals  /83   Pulse (!) 111   Temp 98.2 °F (36.8 °C)   Resp 19   Ht 5' 9\" (1.753 m)   Wt 43.4 kg (95 lb 11.2 oz)   SpO2 100%   BMI 14.13 kg/m²        Temp (24hrs), Av.1 °F (36.7 °C), Min:97.5 °F (36.4 °C), Max:98.4 °F (36.9 °C)      701 - 1900  In: -   Out: 700   1901 -  0700  In: 4525.5 [P.O.:2118;  I.V.:2287.5]  Out: 4600 [Urine:450]    Physical Exam:    General:  alert, cooperative, no distress, appears stated age   Abdomen: soft, bowel sounds active, non-tender   Incision:   healing well, no drainage, no erythema, no hernia, no seroma, no swelling, no dehiscence, incision well approximated           Lab Results   Component Value Date/Time    WBC 7.3 2021 03:51 AM    HGB 8.8 (L) 2021 03:51 AM    HCT 27.7 (L) 2021 03:51 AM    PLATELET 927 (H)  03:51 AM    MCV 89.1 2021 03:51 AM     Lab Results   Component Value Date/Time    GFR est non-AA >60 2022 03:17 PM    GFR est AA >60 2022 03:17 PM    Creatinine 0.64 (L) 2022 03:17 PM    BUN 22 (H) 2022 03:17 PM    Sodium 128 (L) 2022 03:17 PM    Potassium 4.9 2022 03:17 PM    Chloride 91 (L) 2022 03:17 PM    CO2 32 2022 03:17 PM    Magnesium 2.4 2022 03:17 PM    Phosphorus 5.5 (H) 2022 03:17 PM       Assessment:     Active Problems:    SBO (small bowel obstruction) ([address] (2021)      Aspiration pneumonia ([address] (2021)      Small bowel obstruction ([address] (2021)      Sepsis ([address] (2021)      Abdominal pain, acute (2021)      Intractable cyclical vomiting with nausea (11/27/2021)    stable    Plan:     1. Increase TF to 20cc/hr   2. Continue G-tube to gravity for now. If I can get him to goal tube feeds then will trial clamping NG tube.     3.  Dispo - to lower level of care once at goal tube feeds

## 2022-01-04 NOTE — PROGRESS NOTES
Problem: Self Care Deficits Care Plan (Adult)  Goal: *Acute Goals and Plan of Care (Insert Text)  Description: FUNCTIONAL STATUS PRIOR TO ADMISSION: Pt reports being I with ADLs, IADls, amb without AD, multiple hospitalizations recently     HOME SUPPORT: Pt lives with mom and siblings, reports minimal support from them at home. Occupational Therapy Goals  Initiated 12/15/2021 has not participated with OT since eval on 12-15; skills have declined per his report: declines OT activity today; declines Out of bed activity, sitting etc. Will D/C OT at this time; review of OT goals and needs/benefits of participation. Continues to decline therapy. 1.  Patient will perform sponge bathing with modified independence within 7 day(s). Not met  2. Patient will perform upper body dressing with modified independence within 7 day(s). Not met  3. Patient will perform lower body dressing with modified independence within 7 day(s). Not met  4. Patient will perform bathroom transfers with modified independence within 7 day(s). Not met  5. Patient will perform standing ADLs for 8 minutes without LOB with mod I  within 7 day(s). not met  6. Patient will participate in upper extremity therapeutic exercise/activities with modified independence for 10 minutes within 7 day(s). Not met  7. Patient will utilize energy conservation techniques during functional activities with verbal cues within 7 day(s).  Not met    Outcome: Not Progressing Towards Goal   OCCUPATIONAL THERAPY TREATMENT/WEEKLY RE-EVALUATION and DISCHARGE  Patient: Magalis Villafana (63 y.o. male)  Date: 1/4/2022  Diagnosis: Sepsis ([facility] [A41.9]  Aspiration pneumonia ([facility] [J69.0]  Small bowel obstruction ([facility] [K56.609]  Intractable cyclical vomiting with nausea [R11.15]  Abdominal pain, acute [R10.9]  SBO (small bowel obstruction) ([facility] [W91.673] <principal problem not specified>  Procedure(s) (LRB):  OPEN LYSIS OF ADHESIONS, GASTROSTOMY  AND JEJUNAL TUBE PLACEMENT (N/A) 25 Days Post-Op  Precautions: Fall,Skin  Chart, occupational therapy assessment, plan of care, and goals were reviewed. ASSESSMENT  Based on the objective data described below, Initiated 12/15/2021 has not participated with OT since eval on 12-15; skills have declined per his report: declines OT activity today; declines Out of bed activity, sitting etc. Will D/C OT at this time; review of OT goals and needs/benefits of participation. Continues to decline therapy. Current Level of Function Impacting Discharge (ADLs): D overall self care and functional mobility- not tolerant of therapy, limited by pain    Other factors to consider for discharge: patient indicates he is aware his functional status has declined and is expected to continue to decline if he stays in the bed; he reports he feels he cannot participate due to pain         PLAN :  Goals have been updated based on progression since last assessment. Patient does not continue to benefit from skilled intervention to address the above impairments. Discontinue OT at this time for these reasons; please re-consult if patient improves and is able to demonstrate participation. Recommend with staff: encourage up to chair as tolerated, possibly medicate well before effort to move into chair, trial bed in chair position; have surgeon give him the \"need to increase activity or surgery cannot get better,\" speech. \"    Recommendation for discharge: TBA by medical team; would need DME and 24/7 assist in the home if discharged to home, w/c, hospital bed    This discharge recommendation:  A follow-up discussion with the attending provider and/or case management is planned    Equipment recommendations for successful discharge (if) home: TBA       SUBJECTIVE:   Patient stated I know, I cant remember the last time I got in the chair; A long time.     OBJECTIVE DATA SUMMARY:   Cognitive/Behavioral Status:  Neurologic State: Alert  Orientation Level: Oriented X4  Cognition: Follows commands; Appropriate decision making; Appropriate for age attention/concentration; Appropriate safety awareness (able to verbalize understanding of discussion)  Perception: Appears intact  Perseveration: No perseveration noted  Safety/Judgement: Decreased insight into deficits; Decreased awareness of need for safety; Awareness of environment    Functional Mobility and Transfers for ADLs:  Bed Mobility:  Rolling: Supervision; Total assistance (reports he is aware his strtength has declined)  Supine to Sit:  (was min A on eval; no further out of bed participation 2* P!)    Transfers:     Functional Transfers  Toilet Transfer :  (patient reports not up to [facility] due to pain in abdomin)  Bed to Chair:  (he cannot recall last time he sat in chair; \"many days. over)    Balance:  Sitting: Impaired (reports more than a week since he got out of bed)  Sitting - Static:  (last therapy participation was December 15. 2021)  Standing:  (he cannot recall last time he sttod up; limited by pain)    ADL Intervention:  Feeding  Feeding Assistance: Supervision (poor PO tolerance due to pain)    Grooming  Grooming Assistance: Minimum assistance;Maximum assistance (limited by abdominal pain with movement)    Upper Body Bathing  Bathing Assistance: Total assistance(dependent)    Lower Body Bathing  Bathing Assistance: Total assistance(dependent)    Upper Body Dressing Assistance  Dressing Assistance: Total assistance(dependent) (intolerant of movement/ADL participation; declines)    Lower Body Dressing Assistance  Dressing Assistance: Total assistance(dependent) (declines out of bed activity)    Toileting  Toileting Assistance: Total assistance(dependent)    Cognitive Retraining  Attention to Task: Single task  Maintains Attention For (Time): Greater than 10 minutes  Following Commands: Follows one step commands/directions; Awareness of environment (not choosing to follow commands for ADLs due to pain)  Safety/Judgement: Decreased insight into deficits; Decreased awareness of need for safety; Awareness of environment    Pain:  Primary limiting factor for therapy participation.  Will d/c OT at this time due to inability to participate    Activity Tolerance:   Poor, requires frequent rest breaks, and reviewed orthostatic BP as expect this will be a complication     After treatment patient left in no apparent distress:   Supine in bed, Heels elevated for pressure relief, Call bell within reach, and Side rails x 3    COMMUNICATION/COLLABORATION:   The patients plan of care was discussed with: Physical Therapist and Registered Nurse    [place] Cindy OTR/L  Time Calculation: 14 mins

## 2022-01-05 LAB
ANION GAP SERPL CALC-SCNC: 6 MMOL/L (ref 5–15)
BUN SERPL-MCNC: 22 MG/DL (ref 6–20)
BUN/CREAT SERPL: 47 (ref 12–20)
CALCIUM SERPL-MCNC: 8.5 MG/DL (ref 8.5–10.1)
CHLORIDE SERPL-SCNC: 94 MMOL/L (ref 97–108)
CO2 SERPL-SCNC: 30 MMOL/L (ref 21–32)
CREAT SERPL-MCNC: 0.47 MG/DL (ref 0.7–1.3)
ERYTHROCYTE [DISTWIDTH] IN BLOOD BY AUTOMATED COUNT: 18.8 % (ref 11.5–14.5)
GLUCOSE BLD STRIP.AUTO-MCNC: 122 MG/DL (ref 65–117)
GLUCOSE BLD STRIP.AUTO-MCNC: 133 MG/DL (ref 65–117)
GLUCOSE BLD STRIP.AUTO-MCNC: 142 MG/DL (ref 65–117)
GLUCOSE BLD STRIP.AUTO-MCNC: 151 MG/DL (ref 65–117)
GLUCOSE BLD STRIP.AUTO-MCNC: 167 MG/DL (ref 65–117)
GLUCOSE SERPL-MCNC: 103 MG/DL (ref 65–100)
HCT VFR BLD AUTO: 30.3 % (ref 36.6–50.3)
HGB BLD-MCNC: 10.1 G/DL (ref 12.1–17)
MAGNESIUM SERPL-MCNC: 2.1 MG/DL (ref 1.6–2.4)
MCH RBC QN AUTO: 28.5 PG (ref 26–34)
MCHC RBC AUTO-ENTMCNC: 33.3 G/DL (ref 30–36.5)
MCV RBC AUTO: 85.6 FL (ref 80–99)
NRBC # BLD: 0 K/UL (ref 0–0.01)
NRBC BLD-RTO: 0 PER 100 WBC
PHOSPHATE SERPL-MCNC: 4.3 MG/DL (ref 2.6–4.7)
PLATELET # BLD AUTO: 515 K/UL (ref 150–400)
PMV BLD AUTO: 9 FL (ref 8.9–12.9)
POTASSIUM SERPL-SCNC: 3.7 MMOL/L (ref 3.5–5.1)
RBC # BLD AUTO: 3.54 M/UL (ref 4.1–5.7)
SERVICE CMNT-IMP: ABNORMAL
SODIUM SERPL-SCNC: 130 MMOL/L (ref 136–145)
TRIGL SERPL-MCNC: 30 MG/DL (ref ?–150)
WBC # BLD AUTO: 8.5 K/UL (ref 4.1–11.1)

## 2022-01-05 PROCEDURE — 74011000250 HC RX REV CODE- 250: Performed by: SURGERY

## 2022-01-05 PROCEDURE — 83735 ASSAY OF MAGNESIUM: CPT

## 2022-01-05 PROCEDURE — 36415 COLL VENOUS BLD VENIPUNCTURE: CPT

## 2022-01-05 PROCEDURE — 65660000000 HC RM CCU STEPDOWN

## 2022-01-05 PROCEDURE — 74011000258 HC RX REV CODE- 258: Performed by: NURSE PRACTITIONER

## 2022-01-05 PROCEDURE — 74011000250 HC RX REV CODE- 250: Performed by: NURSE PRACTITIONER

## 2022-01-05 PROCEDURE — 80048 BASIC METABOLIC PNL TOTAL CA: CPT

## 2022-01-05 PROCEDURE — 74011250636 HC RX REV CODE- 250/636: Performed by: SURGERY

## 2022-01-05 PROCEDURE — 74011250636 HC RX REV CODE- 250/636: Performed by: NURSE PRACTITIONER

## 2022-01-05 PROCEDURE — 74011250637 HC RX REV CODE- 250/637: Performed by: SURGERY

## 2022-01-05 PROCEDURE — 85027 COMPLETE CBC AUTOMATED: CPT

## 2022-01-05 PROCEDURE — 82962 GLUCOSE BLOOD TEST: CPT

## 2022-01-05 PROCEDURE — 97530 THERAPEUTIC ACTIVITIES: CPT

## 2022-01-05 PROCEDURE — 84100 ASSAY OF PHOSPHORUS: CPT

## 2022-01-05 PROCEDURE — 84478 ASSAY OF TRIGLYCERIDES: CPT

## 2022-01-05 RX ADMIN — SODIUM CHLORIDE, PRESERVATIVE FREE 10 ML: 5 INJECTION INTRAVENOUS at 03:27

## 2022-01-05 RX ADMIN — SODIUM CHLORIDE 50 ML/HR: 9 INJECTION, SOLUTION INTRAVENOUS at 13:33

## 2022-01-05 RX ADMIN — SODIUM CHLORIDE 50 ML/HR: 9 INJECTION, SOLUTION INTRAVENOUS at 10:52

## 2022-01-05 RX ADMIN — METOPROLOL TARTRATE 1.25 MG: 1 INJECTION, SOLUTION INTRAVENOUS at 13:30

## 2022-01-05 RX ADMIN — METOCLOPRAMIDE 10 MG: 5 INJECTION, SOLUTION INTRAMUSCULAR; INTRAVENOUS at 08:49

## 2022-01-05 RX ADMIN — METOPROLOL TARTRATE 1.25 MG: 1 INJECTION, SOLUTION INTRAVENOUS at 02:30

## 2022-01-05 RX ADMIN — SODIUM CHLORIDE, PRESERVATIVE FREE 10 ML: 5 INJECTION INTRAVENOUS at 18:56

## 2022-01-05 RX ADMIN — METOCLOPRAMIDE 10 MG: 5 INJECTION, SOLUTION INTRAMUSCULAR; INTRAVENOUS at 13:30

## 2022-01-05 RX ADMIN — ENOXAPARIN SODIUM 30 MG: 100 INJECTION SUBCUTANEOUS at 14:00

## 2022-01-05 RX ADMIN — ERYTHROMYCIN 250 MG: 250 TABLET, DELAYED RELEASE ORAL at 13:27

## 2022-01-05 RX ADMIN — METOCLOPRAMIDE 10 MG: 5 INJECTION, SOLUTION INTRAMUSCULAR; INTRAVENOUS at 02:30

## 2022-01-05 RX ADMIN — I.V. FAT EMULSION 500 ML: 20 EMULSION INTRAVENOUS at 21:08

## 2022-01-05 RX ADMIN — SODIUM CHLORIDE, PRESERVATIVE FREE 20 MG: 5 INJECTION INTRAVENOUS at 05:04

## 2022-01-05 RX ADMIN — SODIUM CHLORIDE, PRESERVATIVE FREE 10 ML: 5 INJECTION INTRAVENOUS at 05:05

## 2022-01-05 RX ADMIN — SODIUM CHLORIDE, PRESERVATIVE FREE 10 ML: 5 INJECTION INTRAVENOUS at 05:35

## 2022-01-05 RX ADMIN — SODIUM CHLORIDE, PRESERVATIVE FREE 20 MG: 5 INJECTION INTRAVENOUS at 18:53

## 2022-01-05 RX ADMIN — SODIUM CHLORIDE, PRESERVATIVE FREE 10 ML: 5 INJECTION INTRAVENOUS at 14:19

## 2022-01-05 RX ADMIN — METOPROLOL TARTRATE 1.25 MG: 1 INJECTION, SOLUTION INTRAVENOUS at 21:05

## 2022-01-05 RX ADMIN — NALOXEGOL OXALATE 12.5 MG: 12.5 TABLET, FILM COATED ORAL at 09:44

## 2022-01-05 RX ADMIN — CALCIUM GLUCONATE: 98 INJECTION, SOLUTION INTRAVENOUS at 19:06

## 2022-01-05 RX ADMIN — ERYTHROMYCIN 250 MG: 250 TABLET, DELAYED RELEASE ORAL at 21:28

## 2022-01-05 RX ADMIN — ERYTHROMYCIN 250 MG: 250 TABLET, DELAYED RELEASE ORAL at 18:53

## 2022-01-05 RX ADMIN — SODIUM CHLORIDE, PRESERVATIVE FREE 10 ML: 5 INJECTION INTRAVENOUS at 22:00

## 2022-01-05 RX ADMIN — ERYTHROMYCIN 250 MG: 250 TABLET, DELAYED RELEASE ORAL at 09:50

## 2022-01-05 RX ADMIN — METOCLOPRAMIDE 10 MG: 5 INJECTION, SOLUTION INTRAMUSCULAR; INTRAVENOUS at 21:06

## 2022-01-05 NOTE — PROGRESS NOTES
Problem: Mobility Impaired (Adult and Pediatric)  Goal: *Acute Goals and Plan of Care (Insert Text)  Description: FUNCTIONAL STATUS PRIOR TO ADMISSION: Pt reports due to generalized weakness he wasn't walking much, but when he walked he didn't use an ad. HOME SUPPORT PRIOR TO ADMISSION: The patient lived with his mother and siblings, but didn't receive assistance, as everyone works. Physical Therapy Goals  Continued 12/30/2021   1. Patient will move from supine to sit and sit to supine , scoot up and down, and roll side to side in bed with supervision/set-up within 7 day(s). 2.  Patient will transfer from bed to chair and chair to bed with standby assistance/set-up using the least restrictive device within 7 day(s). 3.  Patient will perform sit to stand with modified independence within 7 day(s). 4.  Patient will ambulate with standby assistance/set-up for 250 feet with the least restrictive device within 7 day(s). 5.  Patient will ascend/descend 5 stairs with 1 handrail(s) with minimal assistance/contact guard assist within 7 day(s). Physical Therapy Goals  Revised 12/21/2021   1. Patient will move from supine to sit and sit to supine , scoot up and down, and roll side to side in bed with supervision/set-up within 7 day(s). 2.  Patient will transfer from bed to chair and chair to bed with standby assistance/set-up using the least restrictive device within 7 day(s). 3.  Patient will perform sit to stand with modified independence within 7 day(s). 4.  Patient will ambulate with standby assistance/set-up for 250 feet with the least restrictive device within 7 day(s). 5.  Patient will ascend/descend 5 stairs with 1 handrail(s) with minimal assistance/contact guard assist within 7 day(s). Physical Therapy Goals  Initiated 12/15/2021  1. Patient will move from supine to sit and sit to supine , scoot up and down, and roll side to side in bed with independence within 7 day(s).     2.  Patient will transfer from bed to chair and chair to bed with modified independence using the least restrictive device within 7 day(s). 3.  Patient will perform sit to stand with modified independence within 7 day(s). 4.  Patient will ambulate with modified independence [id number] feet with the least restrictive device within 7 day(s). 5.  Patient will ascend/descend 5 stairs with 1 handrail(s) with modified independence within 7 day(s). Outcome: Progressing Towards Goal     PHYSICAL THERAPY TREATMENT  Patient: Balwinder Espinoza (17 y.o. male)  Date: 1/5/2022  Diagnosis: Sepsis ([facility] [A41.9]  Aspiration pneumonia ([address] [J69.0]  Small bowel obstruction ([address] [K56.609]  Intractable cyclical vomiting with nausea [R11.15]  Abdominal pain, acute [R10.9]  SBO (small bowel obstruction) ([address] [J05.155] <principal problem not specified>  Procedure(s) (LRB):  OPEN LYSIS OF ADHESIONS, GASTROSTOMY  AND JEJUNAL TUBE PLACEMENT (N/A) 26 Days Post-Op  Precautions: Fall,Skin  Chart, physical therapy assessment, plan of care and goals were reviewed. ASSESSMENT  Nurse clears pt for mobility. Pt is agreeable to out of bed efforts and sitting up in bedside chair this date. Patient continues with skilled PT services and is progressing towards goals. He is overall with significant whole body weakness and generalized debilitated and malnourished state. He is very easily fatigued with minimal exertional efforts at this time and activity tolerance has profoundly been impacted since last week. Will continue to follow. Current Level of Function Impacting Discharge (mobility/balance): bed mob. SBA ; transfers min. Assist ; gait bed to bedside chair 5ft with RW use min. assist    Other factors to consider for discharge: significant decline in mobility         PLAN :  Patient continues to benefit from skilled intervention to address the above impairments. Continue treatment per established plan of care. to address goals.     Recommendation for discharge: (in order for the patient to meet his/her long term goals)  Therapy up to 5 days/week in SNF setting    This discharge recommendation:  Has been made in collaboration with the attending provider and/or case management    IF patient discharges home will need the following DME: to be determined (TBD)       SUBJECTIVE:   Patient stated No pain, just so weak.     OBJECTIVE DATA SUMMARY:   Critical Behavior:  Neurologic State: Alert,Appropriate for age  Orientation Level: Appropriate for age,Oriented X4  Cognition: Appropriate decision making,Appropriate for age attention/concentration,Appropriate safety awareness,Follows commands  Safety/Judgement: Decreased insight into deficits,Decreased awareness of need for safety,Awareness of environment  Functional Mobility Training:  Bed Mobility:     Supine to Sit: Stand-by assistance     Scooting: Stand-by assistance        Transfers:  Sit to Stand: Minimum assistance  Stand to Sit: Contact guard assistance        Bed to Chair: Minimum assistance                    Balance:  Sitting: Intact  Standing: Impaired  Standing - Static: Constant support;Good (RW)  Standing - Dynamic : Constant support; Fair (RW)    Ambulation/Gait Training:  Distance (ft): 5 Feet (ft) (bed to bedside chair)     Ambulation - Level of Assistance: Minimal assistance        Gait Abnormalities: Decreased step clearance        Base of Support: Narrowed     Speed/Kay: Pace decreased (<100 feet/min); Slow  Step Length: Left shortened;Right shortened        Interventions: Safety awareness training; Tactile cues; Verbal cues       Pain Rating:  No c/o pain    Activity Tolerance:   Poor    After treatment patient left in no apparent distress:   Sitting in chair, Call bell within reach, and nurse notified     COMMUNICATION/COLLABORATION:   The patients plan of care was discussed with: Registered nurse and Rehabilitation technician.      Robbin Bansal PT, DPT   Time Calculation: 20 mins

## 2022-01-05 NOTE — PROGRESS NOTES
SURGERY PROGRESS NOTE      Admit Date: 2021    POD 26 Days Post-Op    Procedure: Procedure(s):  OPEN LYSIS OF ADHESIONS, GASTROSTOMY  AND JEJUNAL TUBE PLACEMENT      Subjective:     Patient states he feels better. Denies pain. No nause but did not like the full liquid diet because 'it all came out the tube'    He states he is have diarrhea and lots of gas       Objective:     Visit Vitals  /75   Pulse (!) 111   Temp 97.7 °F (36.5 °C)   Resp 19   Ht 5' 9\" (1.753 m)   Wt 46.5 kg (102 lb 8 oz)   SpO2 100%   BMI 15.14 kg/m²        Temp (24hrs), Av.6 °F (36.4 °C), Min:97.1 °F (36.2 °C), Max:98.3 °F (36.8 °C)      701 - 1900  In: 9248 [P.O.:300;  I.V.:895]  Out: 300 [Urine:300]  1901 -  0700  In: 1086 [P.O.:360; I.V.:696]  Out: 3100 [Urine:200]    Physical Exam:    General:  alert, cooperative, no distress, appears stated age   Abdomen: soft, bowel sounds active, non-tender   Incision:   healing well, no drainage, no erythema, no hernia, no seroma, no swelling, no dehiscence, incision well approximated           Lab Results   Component Value Date/Time    WBC 8.5 2022 01:47 AM    HGB 10.1 (L) 2022 01:47 AM    HCT 30.3 (L) 2022 01:47 AM    PLATELET 466 (H)  01:47 AM    MCV 85.6 2022 01:47 AM     Lab Results   Component Value Date/Time    GFR est non-AA >60 2022 01:47 AM    GFR est AA >60 2022 01:47 AM    Creatinine 0.47 (L) 2022 01:47 AM    BUN 22 (H) 2022 01:47 AM    Sodium 130 (L) 2022 01:47 AM    Potassium 3.7 2022 01:47 AM    Chloride 94 (L) 2022 01:47 AM    CO2 30 2022 01:47 AM    Magnesium 2.1 2022 01:47 AM    Phosphorus 4.3 2022 01:47 AM       Assessment:     Active Problems:    SBO (small bowel obstruction) ([address] (2021)      Aspiration pneumonia ([address] (2021)      Small bowel obstruction ([address] (2021)      Sepsis ([address] (2021)      Abdominal pain, acute (11/27/2021)      Intractable cyclical vomiting with nausea (11/27/2021)    Slowly improving     Plan:       1) renew TPN  2) advance tube feeds to 30cc/hr   3) keep G-tube to gravirty until tolerating goal tube feeds   4) PRN PO

## 2022-01-05 NOTE — PROGRESS NOTES
End of Shift Note    Bedside shift change report given to [employer] (oncoming nurse) by Loki Clements RN (offgoing nurse). Report included the following information SBAR, Intake/Output, MAR, Accordion, Recent Results, Med Rec Status and Cardiac Rhythm Sinus Tach    Shift worked:  [id number]     Shift summary and any significant changes:    Patient rested quietly this shift; no c/o pain; TPN infusiing without difficulty; TF infusing @ 20 cc.hr without difficulty; patient refusing to get OOB or assist with turning, but is insistent RN find out when he is going home; abd incision granulating, dressing intact. Concerns for physician to address: Patient concerned when he gets to go home   Zone phone for oncoming shift:  [phone]     Activity:  Activity Level: Up with Assistance  Number times ambulated in hallways past shift: 0  Number of times OOB to chair past shift: 0    Cardiac:   Cardiac Monitoring: Yes      Cardiac Rhythm: Sinus Tachy    Access:   Current line(s): PICC     Genitourinary:   Urinary status: voiding    Respiratory:   O2 Device: None (Room air)  Chronic home O2 use?: NO  Incentive spirometer at bedside: YES  Actual Volume (ml): 1500 ml  GI:  Last Bowel Movement Date: 01/04/22  Current diet:  ADULT ORAL NUTRITION SUPPLEMENT Breakfast, Lunch; Clear Liquid  ADULT TUBE FEEDING Jejunostomy; Other Tube Feeding (Specify); vital 1.5; Delivery Method: Continuous; Continuous Initial Rate (mL/hr): 20; Continuous Advance Tube Feeding: No; Water Flush Volume (mL): 30; Water Flush Frequency: Q 6 hours  ADULT DIET Full Liquid  TPN ADULT - CENTRAL  Passing flatus: YES  Tolerating current diet: YES       Pain Management:   Patient states pain is manageable on current regimen: YES    Skin:  Russell Score: 16  Interventions: turn team, speciality bed, float heels, increase time out of bed, PT/OT consult and nutritional support     Patient Safety:  Fall Score:  Total Score: 3  Interventions: gripper socks and pt to call before getting OOB  High Fall Risk: Yes    Length of Stay:  Expected LOS: 9d 14h  Actual LOS: 40      Rebecca Marquez RN

## 2022-01-05 NOTE — PROGRESS NOTES
End of Shift Note    Bedside shift change report given to [address] Lilian RN (oncoming nurse) by Lizzeth Chou RN (offgoing nurse). Report included the following information SBAR, Kardex, Intake/Output and MAR    Shift worked:  7am-7pm     Shift summary and any significant changes:     Pt did not leave the bed during the shift. Pt had 1 BM during the shift that was loose, brown and watery. Pt is tolerating the current diet of full liquids. Pt complained of pain during the shift and used PCA before order was discontinued. RN educated pt that PCA was discontinued and order changed. Pt did not complain of nausea. Pt had an uneventful shift. Concerns for physician to address:       Zone phone for oncoming shift:   [phone]       Activity:  Activity Level: Up with Assistance  Number times ambulated in hallways past shift: 0  Number of times OOB to chair past shift: 0    Cardiac:   Cardiac Monitoring: No      Cardiac Rhythm: Sinus Tachy    Access:   Current line(s): PICC     Genitourinary:   Urinary status: voiding    Respiratory:   O2 Device: None (Room air)  Chronic home O2 use?: NO  Incentive spirometer at bedside: YES  Actual Volume (ml): 1000 ml  GI:  Last Bowel Movement Date: 01/04/22  Current diet:  ADULT ORAL NUTRITION SUPPLEMENT Breakfast, Lunch; Clear Liquid  ADULT TUBE FEEDING Jejunostomy; Other Tube Feeding (Specify); vital 1.5; Delivery Method: Continuous; Continuous Initial Rate (mL/hr): 20; Continuous Advance Tube Feeding: No; Water Flush Volume (mL): 30; Water Flush Frequency: Q 6 hours  ADULT DIET Full Liquid  TPN ADULT - CENTRAL  Passing flatus: YES  Tolerating current diet: YES       Pain Management:   Patient states pain is manageable on current regimen: YES    Skin:  Russell Score: 16  Interventions: float heels and increase time out of bed    Patient Safety:  Fall Score:  Total Score: 3  Interventions: gripper socks and pt to call before getting OOB  High Fall Risk: Yes    Length of Stay:  Expected LOS: 9d 14h  Actual LOS: [address], RN

## 2022-01-06 LAB
ANION GAP SERPL CALC-SCNC: 9 MMOL/L (ref 5–15)
BUN SERPL-MCNC: 23 MG/DL (ref 6–20)
BUN/CREAT SERPL: 40 (ref 12–20)
CALCIUM SERPL-MCNC: 9 MG/DL (ref 8.5–10.1)
CHLORIDE SERPL-SCNC: 90 MMOL/L (ref 97–108)
CO2 SERPL-SCNC: 28 MMOL/L (ref 21–32)
CREAT SERPL-MCNC: 0.58 MG/DL (ref 0.7–1.3)
GLUCOSE BLD STRIP.AUTO-MCNC: 120 MG/DL (ref 65–117)
GLUCOSE BLD STRIP.AUTO-MCNC: 132 MG/DL (ref 65–117)
GLUCOSE BLD STRIP.AUTO-MCNC: 146 MG/DL (ref 65–117)
GLUCOSE SERPL-MCNC: 109 MG/DL (ref 65–100)
MAGNESIUM SERPL-MCNC: 2.1 MG/DL (ref 1.6–2.4)
PHOSPHATE SERPL-MCNC: 4.8 MG/DL (ref 2.6–4.7)
POTASSIUM SERPL-SCNC: 3.6 MMOL/L (ref 3.5–5.1)
SERVICE CMNT-IMP: ABNORMAL
SODIUM SERPL-SCNC: 127 MMOL/L (ref 136–145)

## 2022-01-06 PROCEDURE — 74011000250 HC RX REV CODE- 250: Performed by: SURGERY

## 2022-01-06 PROCEDURE — 80048 BASIC METABOLIC PNL TOTAL CA: CPT

## 2022-01-06 PROCEDURE — 36415 COLL VENOUS BLD VENIPUNCTURE: CPT

## 2022-01-06 PROCEDURE — 82962 GLUCOSE BLOOD TEST: CPT

## 2022-01-06 PROCEDURE — 77030040361 HC SLV COMPR DVT MDII -B

## 2022-01-06 PROCEDURE — 65660000000 HC RM CCU STEPDOWN

## 2022-01-06 PROCEDURE — 74011250636 HC RX REV CODE- 250/636: Performed by: SURGERY

## 2022-01-06 PROCEDURE — 74011000258 HC RX REV CODE- 258: Performed by: NURSE PRACTITIONER

## 2022-01-06 PROCEDURE — 74011250637 HC RX REV CODE- 250/637: Performed by: SURGERY

## 2022-01-06 PROCEDURE — 74011250636 HC RX REV CODE- 250/636: Performed by: NURSE PRACTITIONER

## 2022-01-06 PROCEDURE — 83735 ASSAY OF MAGNESIUM: CPT

## 2022-01-06 PROCEDURE — 74011000250 HC RX REV CODE- 250: Performed by: NURSE PRACTITIONER

## 2022-01-06 PROCEDURE — 84100 ASSAY OF PHOSPHORUS: CPT

## 2022-01-06 RX ORDER — SODIUM CHLORIDE AND POTASSIUM CHLORIDE .9; .15 G/100ML; G/100ML
SOLUTION INTRAVENOUS CONTINUOUS
Status: DISCONTINUED | OUTPATIENT
Start: 2022-01-06 | End: 2022-01-11 | Stop reason: HOSPADM

## 2022-01-06 RX ORDER — LIDOCAINE 40 MG/G
CREAM TOPICAL AS NEEDED
Status: DISCONTINUED | OUTPATIENT
Start: 2022-01-06 | End: 2022-01-11 | Stop reason: HOSPADM

## 2022-01-06 RX ADMIN — ENOXAPARIN SODIUM 30 MG: 100 INJECTION SUBCUTANEOUS at 13:33

## 2022-01-06 RX ADMIN — ERYTHROMYCIN 250 MG: 250 TABLET, DELAYED RELEASE ORAL at 10:36

## 2022-01-06 RX ADMIN — SODIUM CHLORIDE, PRESERVATIVE FREE 10 ML: 5 INJECTION INTRAVENOUS at 22:00

## 2022-01-06 RX ADMIN — POTASSIUM CHLORIDE AND SODIUM CHLORIDE: 900; 150 INJECTION, SOLUTION INTRAVENOUS at 12:03

## 2022-01-06 RX ADMIN — ERYTHROMYCIN 250 MG: 250 TABLET, DELAYED RELEASE ORAL at 19:02

## 2022-01-06 RX ADMIN — SODIUM CHLORIDE, PRESERVATIVE FREE 20 MG: 5 INJECTION INTRAVENOUS at 05:17

## 2022-01-06 RX ADMIN — SODIUM CHLORIDE, PRESERVATIVE FREE 10 ML: 5 INJECTION INTRAVENOUS at 13:33

## 2022-01-06 RX ADMIN — SODIUM CHLORIDE, PRESERVATIVE FREE 10 ML: 5 INJECTION INTRAVENOUS at 06:00

## 2022-01-06 RX ADMIN — SODIUM CHLORIDE, PRESERVATIVE FREE 20 MG: 5 INJECTION INTRAVENOUS at 19:05

## 2022-01-06 RX ADMIN — METOPROLOL TARTRATE 1.25 MG: 1 INJECTION, SOLUTION INTRAVENOUS at 23:24

## 2022-01-06 RX ADMIN — METOCLOPRAMIDE 10 MG: 5 INJECTION, SOLUTION INTRAMUSCULAR; INTRAVENOUS at 23:26

## 2022-01-06 RX ADMIN — METOCLOPRAMIDE 10 MG: 5 INJECTION, SOLUTION INTRAMUSCULAR; INTRAVENOUS at 10:18

## 2022-01-06 RX ADMIN — SODIUM CHLORIDE 50 ML/HR: 9 INJECTION, SOLUTION INTRAVENOUS at 10:38

## 2022-01-06 RX ADMIN — ERYTHROMYCIN 250 MG: 250 TABLET, DELAYED RELEASE ORAL at 13:32

## 2022-01-06 RX ADMIN — METOPROLOL TARTRATE 1.25 MG: 1 INJECTION, SOLUTION INTRAVENOUS at 10:18

## 2022-01-06 RX ADMIN — CALCIUM GLUCONATE: 98 INJECTION, SOLUTION INTRAVENOUS at 18:55

## 2022-01-06 RX ADMIN — METOCLOPRAMIDE 10 MG: 5 INJECTION, SOLUTION INTRAMUSCULAR; INTRAVENOUS at 15:32

## 2022-01-06 RX ADMIN — METOPROLOL TARTRATE 1.25 MG: 1 INJECTION, SOLUTION INTRAVENOUS at 05:15

## 2022-01-06 RX ADMIN — ERYTHROMYCIN 250 MG: 250 TABLET, DELAYED RELEASE ORAL at 23:26

## 2022-01-06 RX ADMIN — AMLODIPINE BESYLATE 10 MG: 5 TABLET ORAL at 10:18

## 2022-01-06 RX ADMIN — METOCLOPRAMIDE 10 MG: 5 INJECTION, SOLUTION INTRAMUSCULAR; INTRAVENOUS at 05:17

## 2022-01-06 NOTE — PROGRESS NOTES
Transition of Care Plan:    RUR:  21%   High Risk for Readmission  LOS:  41 Days  Disposition:  Home with Tube Feedings ([address] [facility])  Follow up appointments:  PCP and Specialists  DME needed:  Tube Feedings through [address] [place] [address] Street at Discharge:   Family vs. Medicaid Transport  Henry Baton or means to access home:   Mother has access to home     IM Medicare Letter:   N/A  Is patient a BCPI-A Bundle:   N/A        If yes, was Bundle Letter given?:    Is patient a [place] and connected with the [place]? N/A  If yes, was [place] transfer form completed and VA notified? Caregiver Contact: Mother, Ginny Hinton, [phone]  Discharge Caregiver contacted prior to discharge? Mother to be informed     Met with patient today for discussion around disposition plans. Patient will return home with Mother at discharge. Verified Address:  [address]. Patient phone is [phone]. Plans are for patient to be discharged on Monday 01/10 to home. Joanna Michelle has been updated and provided with new Tube Feed orders (Not TPN). [address] ([phone]) has been provided with referral via phone call and through [employer]. Encouraged patient to increase activity/OOB time to help with increasing strength and endurance. Patient requested I contact his mother about DC plans as well. Called and left  for Ms. Obrien at [phone]. Care Management Interventions  PCP Verified by CM: No  Palliative Care Criteria Met (RRAT>21 & CHF Dx)?: No  Mode of Transport at Discharge:  Other (see comment) (Family or Medicaid transportation home)  Transition of Care Consult (CM Consult): Home Health ([address]; Joanna Michelle for Tube Feedings)  [facility] - INPATIENT: No  Reason Outside [place]: Managed care specific requirement  Discharge Durable Medical Equipment: No  Physical Therapy Consult: Yes  Occupational Therapy Consult: Yes  Speech Therapy Consult: No  Support Systems: Parent(s)  Confirm Follow Up Transport: Family  The Plan for Transition of Care is Related to the Following Treatment Goals : Home with Family Support; Home Health, Tube Feedings.   The Patient and/or Patient Representative was Provided with a Choice of Provider and Agrees with the Discharge Plan?: Yes  Name of the Patient Representative Who was Provided with a Choice of Provider and Agrees with the Discharge Plan: Patient  Freedom of Choice List was Provided with Basic Dialogue that Supports the Patient's Individualized Plan of Care/Goals, Treatment Preferences and Shares the Quality Data Associated with the Providers?: Yes  Discharge Location  Discharge Placement: Home with home health     [address] Angelica Cardenas-Lens, [place], EMMA-  Complex CM/  [phone]

## 2022-01-06 NOTE — PROGRESS NOTES
SURGERY PROGRESS NOTE      Admit Date: 2021    POD 27 Days Post-Op    Procedure: Procedure(s):  OPEN LYSIS OF ADHESIONS, GASTROSTOMY  AND JEJUNAL TUBE PLACEMENT      Subjective:     Patient feels 'ok'   Has had several BMs overnight. He complains of rectal pain with BM and fear of having a BM due to the pain. He is passing flatus. toerating PO. G- tube output 700/24 hours     Tolerating tube feeds at goal.  Refused increase yesterday because he feels tube feeds are causing diarrhea. Objective:     Visit Vitals  /82   Pulse (!) 103   Temp 98.5 °F (36.9 °C)   Resp 18   Ht 5' 9\" (1.753 m)   Wt 46.5 kg (102 lb 8 oz)   SpO2 100%   BMI 15.14 kg/m²        Temp (24hrs), Av.8 °F (36.6 °C), Min:97.7 °F (36.5 °C), Max:98.5 °F (36.9 °C)      701 -  1900  In: -   Out: 609   1901 -  07  In: 9732 [P.O.:300; I.V.:895]  Out: 3075 [Urine:650]    Physical Exam:    General:  alert, cooperative, no distress, appears stated age   Abdomen: soft, bowel sounds active, non-tender   Incision:   healing well, no drainage, no erythema, no hernia, no seroma, no swelling, no dehiscence, incision well approximated           Lab Results   Component Value Date/Time    WBC 8.5 2022 01:47 AM    HGB 10.1 (L) 2022 01:47 AM    HCT 30.3 (L) 2022 01:47 AM    PLATELET 319 (H)  01:47 AM    MCV 85.6 2022 01:47 AM     Lab Results   Component Value Date/Time    GFR est non-AA >60 2022 05:33 AM    GFR est AA >60 2022 05:33 AM    Creatinine 0.58 (L) 2022 05:33 AM    BUN 23 (H) 2022 05:33 AM    Sodium 127 (L) 2022 05:33 AM    Potassium 3.6 2022 05:33 AM    Chloride 90 (L) 2022 05:33 AM    CO2 28 2022 05:33 AM    Magnesium 2.1 2022 05:33 AM    Phosphorus 4.8 (H) 2022 05:33 AM       Assessment/plan:      Active Problems:    SBO (small bowel obstruction) ([facility] (2021)      Aspiration pneumonia ([facility] (2021) Small bowel obstruction (HCC) (11/25/2021)      Sepsis ([address] (11/25/2021)      Abdominal pain, acute (11/27/2021)      Intractable cyclical vomiting with nausea (11/27/2021)    stable. Explained to patient the tube feeds will cause diarrhea until villa is regrown. He is likley on absorbing a small amount of the tube feed and the rest is dumping. I assured carter it will get better with time and encouraged him to advance tube feeds. Pamela-anal pain - give his overall dysmotility will as colorectal surgery to see him to see if there is anything that can be done to to improve his BM pain and fears      Malnutrition -  Renew TPN. Advance TF as tolerated. PO ad lavern.   G-tube to gravity

## 2022-01-06 NOTE — PROGRESS NOTES
made a follow-up visit for Mr. Obrien in 3213. Pt was alert, his mother was present during the visit. According to him he is feeling better, the mother attends [employer]. Alana is important for them in coping with their current difficulty, asked  to pray for his healing.  prayed as they requested, they expressed gratitude for 's ministry. Advised of ongoing  availability.     [id number] [facility] MEE Slaughter.Div,Th.M, Harleen 791 Provider   Paging Service [phone])

## 2022-01-07 LAB
ANION GAP SERPL CALC-SCNC: 6 MMOL/L (ref 5–15)
BUN SERPL-MCNC: 19 MG/DL (ref 6–20)
BUN/CREAT SERPL: 40 (ref 12–20)
CALCIUM SERPL-MCNC: 8.8 MG/DL (ref 8.5–10.1)
CHLORIDE SERPL-SCNC: 98 MMOL/L (ref 97–108)
CO2 SERPL-SCNC: 26 MMOL/L (ref 21–32)
CREAT SERPL-MCNC: 0.47 MG/DL (ref 0.7–1.3)
GLUCOSE BLD STRIP.AUTO-MCNC: 107 MG/DL (ref 65–117)
GLUCOSE BLD STRIP.AUTO-MCNC: 109 MG/DL (ref 65–117)
GLUCOSE BLD STRIP.AUTO-MCNC: 112 MG/DL (ref 65–117)
GLUCOSE BLD STRIP.AUTO-MCNC: 116 MG/DL (ref 65–117)
GLUCOSE SERPL-MCNC: 95 MG/DL (ref 65–100)
MAGNESIUM SERPL-MCNC: 2.1 MG/DL (ref 1.6–2.4)
PHOSPHATE SERPL-MCNC: 3.8 MG/DL (ref 2.6–4.7)
POTASSIUM SERPL-SCNC: 3.7 MMOL/L (ref 3.5–5.1)
SERVICE CMNT-IMP: NORMAL
SODIUM SERPL-SCNC: 130 MMOL/L (ref 136–145)

## 2022-01-07 PROCEDURE — 74011000250 HC RX REV CODE- 250: Performed by: SURGERY

## 2022-01-07 PROCEDURE — 74011250636 HC RX REV CODE- 250/636: Performed by: NURSE PRACTITIONER

## 2022-01-07 PROCEDURE — 83735 ASSAY OF MAGNESIUM: CPT

## 2022-01-07 PROCEDURE — 80048 BASIC METABOLIC PNL TOTAL CA: CPT

## 2022-01-07 PROCEDURE — 74011250636 HC RX REV CODE- 250/636: Performed by: SURGERY

## 2022-01-07 PROCEDURE — 65660000000 HC RM CCU STEPDOWN

## 2022-01-07 PROCEDURE — 74011250637 HC RX REV CODE- 250/637: Performed by: NURSE PRACTITIONER

## 2022-01-07 PROCEDURE — 97530 THERAPEUTIC ACTIVITIES: CPT

## 2022-01-07 PROCEDURE — 36415 COLL VENOUS BLD VENIPUNCTURE: CPT

## 2022-01-07 PROCEDURE — 74011250637 HC RX REV CODE- 250/637: Performed by: SURGERY

## 2022-01-07 PROCEDURE — 82962 GLUCOSE BLOOD TEST: CPT

## 2022-01-07 PROCEDURE — 74011000258 HC RX REV CODE- 258: Performed by: SURGERY

## 2022-01-07 PROCEDURE — 84100 ASSAY OF PHOSPHORUS: CPT

## 2022-01-07 PROCEDURE — 74011000250 HC RX REV CODE- 250: Performed by: NURSE PRACTITIONER

## 2022-01-07 RX ADMIN — METOCLOPRAMIDE 10 MG: 5 INJECTION, SOLUTION INTRAMUSCULAR; INTRAVENOUS at 04:52

## 2022-01-07 RX ADMIN — ERYTHROMYCIN 250 MG: 250 TABLET, DELAYED RELEASE ORAL at 14:17

## 2022-01-07 RX ADMIN — I.V. FAT EMULSION 500 ML: 20 EMULSION INTRAVENOUS at 20:37

## 2022-01-07 RX ADMIN — POTASSIUM CHLORIDE AND SODIUM CHLORIDE: 900; 150 INJECTION, SOLUTION INTRAVENOUS at 06:32

## 2022-01-07 RX ADMIN — METOCLOPRAMIDE 10 MG: 5 INJECTION, SOLUTION INTRAMUSCULAR; INTRAVENOUS at 08:36

## 2022-01-07 RX ADMIN — SODIUM CHLORIDE, PRESERVATIVE FREE 20 ML: 5 INJECTION INTRAVENOUS at 14:18

## 2022-01-07 RX ADMIN — SODIUM CHLORIDE, PRESERVATIVE FREE 10 ML: 5 INJECTION INTRAVENOUS at 20:38

## 2022-01-07 RX ADMIN — ENOXAPARIN SODIUM 30 MG: 100 INJECTION SUBCUTANEOUS at 14:17

## 2022-01-07 RX ADMIN — ERYTHROMYCIN 250 MG: 250 TABLET, DELAYED RELEASE ORAL at 20:37

## 2022-01-07 RX ADMIN — METOCLOPRAMIDE 10 MG: 5 INJECTION, SOLUTION INTRAMUSCULAR; INTRAVENOUS at 20:37

## 2022-01-07 RX ADMIN — METOPROLOL TARTRATE 1.25 MG: 1 INJECTION, SOLUTION INTRAVENOUS at 04:52

## 2022-01-07 RX ADMIN — Medication 1 PACKET: at 14:17

## 2022-01-07 RX ADMIN — METOCLOPRAMIDE 10 MG: 5 INJECTION, SOLUTION INTRAMUSCULAR; INTRAVENOUS at 14:17

## 2022-01-07 RX ADMIN — ERYTHROMYCIN 250 MG: 250 TABLET, DELAYED RELEASE ORAL at 08:37

## 2022-01-07 RX ADMIN — METOPROLOL TARTRATE 1.25 MG: 1 INJECTION, SOLUTION INTRAVENOUS at 20:37

## 2022-01-07 RX ADMIN — METOPROLOL TARTRATE 1.25 MG: 1 INJECTION, SOLUTION INTRAVENOUS at 14:17

## 2022-01-07 RX ADMIN — CALCIUM GLUCONATE: 98 INJECTION, SOLUTION INTRAVENOUS at 17:11

## 2022-01-07 RX ADMIN — SODIUM CHLORIDE, PRESERVATIVE FREE 20 MG: 5 INJECTION INTRAVENOUS at 17:10

## 2022-01-07 RX ADMIN — METOPROLOL TARTRATE 1.25 MG: 1 INJECTION, SOLUTION INTRAVENOUS at 08:36

## 2022-01-07 RX ADMIN — SODIUM CHLORIDE, PRESERVATIVE FREE 20 MG: 5 INJECTION INTRAVENOUS at 05:11

## 2022-01-07 RX ADMIN — SODIUM CHLORIDE, PRESERVATIVE FREE 20 ML: 5 INJECTION INTRAVENOUS at 08:37
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
## 2022-01-07 NOTE — PROGRESS NOTES
Transition of Care Plan:     RUR:  20%   High Risk for Readmission  LOS:  41 Days  Disposition:  Home with Tube Feedings ([address] [facility])  Follow up appointments:  PCP and Specialists  DME needed:  Tube Feedings through [facility] at Discharge:   Family vs. Medicaid Transport  [address] or means to access home:   Mother has access to home      Medicare Letter:   N/A  Is patient a BCPI-A Bundle:   N/A                   If yes, was Bundle Letter given?:    Is patient a [place] and connected with the [facility]? N/A  If yes, was [facility] transfer form completed and VA notified? Caregiver Contact: Mother, Cheryl Woods, [phone]  Discharge Caregiver contacted prior to discharge? Mother to be informed     [facility] did not have the ordered tube feeding for patient and requested to have CM contact registered dietician about alternative tube feedings that they can provide. New orders submitted to [facility] and they report that they can provide Burks & Cinthia Peptide 1.5 with $0 copay for patient. They will have the tube feeds in stock on Monday, January 11, 2022 prior to patient discharging home. [facility] informed that [facility] will provide nursing care. Care Management Interventions  PCP Verified by CM: No  Palliative Care Criteria Met (RRAT>21 & CHF Dx)?: No  Mode of Transport at Discharge:  Other (see comment) (Family or Medicaid transportation home)  Transition of Care Consult (CM Consult): Home Health ([facility] [address]; [facility] for Tube Feedings)  [facility] - INPATIENT: No  Reason Outside [place]: Managed care specific requirement  Discharge Durable Medical Equipment: No  Physical Therapy Consult: Yes  Occupational Therapy Consult: Yes  Speech Therapy Consult: No  Support Systems: Parent(s)  Confirm Follow Up Transport: Family  The Plan for Transition of Care is Related to the Following Treatment Goals : Home with Family Support; [address], Tube Feedings. The Patient and/or Patient Representative was Provided with a Choice of Provider and Agrees with the Discharge Plan?: Yes  Name of the Patient Representative Who was Provided with a Choice of Provider and Agrees with the Discharge Plan: Patient  Freedom of Choice List was Provided with Basic Dialogue that Supports the Patient's Individualized Plan of Care/Goals, Treatment Preferences and Shares the Quality Data Associated with the Providers?: Yes  Discharge Location  Discharge Placement: Home with home health     Marvel Vasquez.  Lavon Choudhary, [address] [place] [address] - ED [facility]  [employer] ACP Facilitator  Zone Phone: [phone]

## 2022-01-07 NOTE — PROGRESS NOTES
Brief Nutrition Note    1/7: Chart reviewed; RD received a call from CM stating that [facility] unable to accept pt on Vital 1.5 but willing to accept on either Peptamen 1.5 or Burks & Cinthia. Peptamen 1.5 is the Nestle equivalent of Vital 1.5. Marsh & Cinthia Peptide 1.5 is the plant-based equivalent of Vital 1.5 so would also be suitable but must be the Peptide 1.5 version. Pt prefers a plant based option but seems to have a suspected lactose intolerance vs dairy allergy. All formulas are suitable for lactose intolerance. Pt currently consuming other liquids orally that contain dairy. Option 1:  Peptamen 1.5 is the Nestle brand equivalent of Vital 1.5 so would be suitable as an alternate formula. Peptamen 1.5 katherine at 60 ml/hr x 24 hr would provide daily approx. 2160 kcals/98 g protein/270 g CHO/1104 ml free water. Recommend flush with at least 60 ml free water q4h to keep tube patent. Surgical team to make final recs regarding free water flushes. Option 2:  If Marsh & Cinthia plant-based preferred, recommend Burks & Cinthia Peptide 1.5 @ goal rate 60 ml/hr x 24 hr would provide daily approx. 2215 kcals/106 g protein/199 g CHO.      Electronically signed by David Li RD on 1/7/2022 at 2:00 PM    Contact: Ext [phone]

## 2022-01-07 NOTE — PROGRESS NOTES
Comprehensive Nutrition Assessment    Type and Reason for Visit: Reassess    Nutrition Recommendations/Plan:   · Continue Vital 1.5 katherine tube feedings at 40 ml/hr x 24 hr via J-tube. Continue efforts to increase to goal rate of 60 ml/hr x 24 hr to provide daily approx. 2160kcals/97gPro/269gCHO. Free water flushes per surgical team.  · Wean TPN as appropriate (currently TPN D15/5%AA @ 83 ml/hr x 24 hr + 500 ml 20% lipids 3X/wk which provides daily approx. 1842kcals/100gPro/298gDextrose. Could consider going ahead and dc lipids since on TFs. · Gained approval to add 1-pkt Banatrol daily via J-tube to trial (must be mixed with 120 ml water to prevent clogging). 1-pkt Banatrol only has 2 g dietary fiber but the added benefit of prebiotics. Pharmacy to add to the [facility] for optimal compliance and mixing instructions. Nutrition Assessment:      1/7: Chart reviewed; med noted for POD#28 OPEN LYSIS OF ADHESIONS, GASTROSTOMY  AND JEJUNAL TUBE PLACEMENT. RD attempted to visit pt at bedside but just had a bowel movement and in need of assistance; notified RN. Pt's tube feedings of Vital 1.5 katherine were increased this morning to 40 ml/hr x 24 hr (plan to continue to increase to goal rate 60 ml/hr) in efforts to wean TPN and discharge on Monday 1/10. Spoke with Sharon Nichols NP to gain approval to trial Banatrol 1pkt daily and reinforced with RN the importance of adequate mixing/dilution instructions to prevent clogging tube; agreeable to try 1pkt daily. Pt reports 2-3 BMs daily which surgical team discussed this is to be expected given short gut.      Last Weight Metric  Weight Loss Metrics 1/5/2022 11/20/2021 11/11/2021 6/25/2021 6/16/2021 2/25/2019 2/24/2019   Today's Wt 102 lb 8 oz 130 lb 15.3 oz 152 lb 136 lb 7.4 oz 138 lb 3.7 oz 138 lb 14.2 oz 145 lb 8.1 oz   BMI 15.14 kg/m2 19.34 kg/m2 22.45 kg/m2 19.58 kg/m2 19.83 kg/m2 20.51 kg/m2 21.49 kg/m2     Malnutrition Assessment:  Malnutrition Status:  Severe malnutrition    Context: Chronic illness     Findings of the 6 clinical characteristics of malnutrition:   Energy Intake:  7 - 75% or less est energy requirements for 1 month or longer  Weight Loss:  7.0 - Greater than 7.5% over 3 months     Body Fat Loss:  7 - Severe body fat loss, Triceps,Orbital,Buccal region   Muscle Mass Loss:  1 - Mild muscle mass loss, Clavicles (pectoralis &deltoids),Temples (temporalis),Thigh (quadriceps),Scapula (trapezius)  Fluid Accumulation:  Unable to assess,     Strength:  Not performed     Estimated Daily Nutrient Needs:  Energy (kcal): 2188 (BMR 1489 x 1. 3AF) + 250 kcals; Weight Used for Energy Requirements: Current  Protein (g): 79-105g (1.5-2gPro/kg); Weight Used for Protein Requirements: Current  Fluid (ml/day): 8810-9349 ml/day; Method Used for Fluid Requirements: 1 ml/kcal    Nutrition Related Findings:  BM: 1/6; Meds: TPN D15/5%AA @ 83 ml/hr x 24 hr; Labs: reviewed      Wounds:    Surgical incision       Current Nutrition Therapies:  ADULT ORAL NUTRITION SUPPLEMENT Breakfast, Lunch; Clear Liquid  ADULT DIET Full Liquid  TPN ADULT - CENTRAL  TPN ADULT - CENTRAL  ADULT TUBE FEEDING Jejunostomy; Other Tube Feeding (Specify); vital 1.5; Delivery Method: Continuous; Continuous Initial Rate (mL/hr): 40; Continuous Advance Tube Feeding: No; Water Flush Volume (mL): 30;  Water Flush Frequency: Q 6 hours    Anthropometric Measures:  · Height:  5' 9\" (175.3 cm)  · Current Body Wt:  43.4 kg (95 lb 10.9 oz)   · Ideal Body Wt:  160 lbs:  63 %   · BMI Category:  Underweight (BMI less than 18.5)       Nutrition Diagnosis:   · Inadequate protein-energy intake related to altered GI function as evidenced by NPO or clear liquid status due to medical condition,weight loss (SBO)    Nutrition Interventions:   Food and/or Nutrient Delivery: Continue tube feeding,Continue parenteral nutrition  Nutrition Education and Counseling: No recommendations at this time  Coordination of Nutrition Care: Continue to monitor while inpatient    Goals:  Pt will tolerate TPN and TF with no signs/sx of intolerance in 2-4 days. Nutrition Monitoring and Evaluation:   Behavioral-Environmental Outcomes: None identified  Food/Nutrient Intake Outcomes: Parenteral nutrition intake/tolerance,Enteral nutrition intake/tolerance  Physical Signs/Symptoms Outcomes: Biochemical data,GI status,Weight,Skin    Discharge Planning:     Too soon to determine     Electronically signed by Jono Harper RD on 1/7/2022 at 11:15 AM

## 2022-01-07 NOTE — PROGRESS NOTES
SURGERY PROGRESS NOTE      Admit Date: 2021    POD 28 Days Post-Op    Procedure: Procedure(s):  OPEN LYSIS OF ADHESIONS, GASTROSTOMY  AND JEJUNAL TUBE PLACEMENT      Subjective:     Doing well. G-Tube was not emptying well this morning due to kinking. Mild nausea until issue resolved, then >2L effluent out. Denies abd pain. States having loose stools 2-3 times daily. Objective:     Visit Vitals  /72   Pulse 79   Temp 98.1 °F (36.7 °C)   Resp 18   Ht 5' 9\" (1.753 m)   Wt 46.5 kg (102 lb 8 oz)   SpO2 96%   BMI 15.14 kg/m²        Temp (24hrs), Av.9 °F (36.6 °C), Min:97.5 °F (36.4 °C), Max:98.1 °F (36.7 °C)      701 -  1900  In: 30   Out: 2500    190 -  0700  In: 30   Out: 5100 [Urine:825]    Physical Exam:    General:  alert, cooperative, no distress, appears stated age   Abdomen: soft, bowel sounds active, non-tender   Incision:   healing well, no drainage, no erythema, no hernia, no seroma, no swelling, small open area- mid incision healing by secondary intention, incision otherwise well approximated           Lab Results   Component Value Date/Time    WBC 8.5 2022 01:47 AM    HGB 10.1 (L) 2022 01:47 AM    HCT 30.3 (L) 2022 01:47 AM    PLATELET 251 (H)  01:47 AM    MCV 85.6 2022 01:47 AM     Lab Results   Component Value Date/Time    GFR est non-AA >60 2022 04:59 AM    GFR est AA >60 2022 04:59 AM    Creatinine 0.47 (L) 2022 04:59 AM    BUN 19 2022 04:59 AM    Sodium 130 (L) 2022 04:59 AM    Potassium 3.7 2022 04:59 AM    Chloride 98 2022 04:59 AM    CO2 26 2022 04:59 AM    Magnesium 2.1 2022 04:59 AM    Phosphorus 3.8 2022 04:59 AM       Assessment/plan:      Active Problems:    SBO (small bowel obstruction) ([facility] (2021)      Aspiration pneumonia ([address] (2021)      Small bowel obstruction ([facility] (2021)      Sepsis ([address] (2021)      Abdominal pain, acute (11/27/2021)      Intractable cyclical vomiting with nausea (11/27/2021)    Progressing and tolerating TF. Colorectal saw patient and no correctable causes of rectal pain which patient denied having in the first place during rectal exam.      - Continue TPN- wean once he reaches goal TF rate. - Advanced TF to 40/hr today. Add one fiber packet daily. Goal rate 60/hr  - D/C planning for next week with home tube feeding.   - Encourage mobility and independence with care.      Nahed Valdez NP
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
## 2022-01-08 LAB
ANION GAP SERPL CALC-SCNC: 7 MMOL/L (ref 5–15)
BUN SERPL-MCNC: 18 MG/DL (ref 6–20)
BUN/CREAT SERPL: 39 (ref 12–20)
CALCIUM SERPL-MCNC: 9 MG/DL (ref 8.5–10.1)
CHLORIDE SERPL-SCNC: 98 MMOL/L (ref 97–108)
CO2 SERPL-SCNC: 25 MMOL/L (ref 21–32)
CREAT SERPL-MCNC: 0.46 MG/DL (ref 0.7–1.3)
ERYTHROCYTE [DISTWIDTH] IN BLOOD BY AUTOMATED COUNT: 19.5 % (ref 11.5–14.5)
GLUCOSE BLD STRIP.AUTO-MCNC: 114 MG/DL (ref 65–117)
GLUCOSE BLD STRIP.AUTO-MCNC: 127 MG/DL (ref 65–117)
GLUCOSE BLD STRIP.AUTO-MCNC: 128 MG/DL (ref 65–117)
GLUCOSE BLD STRIP.AUTO-MCNC: 98 MG/DL (ref 65–117)
GLUCOSE SERPL-MCNC: 82 MG/DL (ref 65–100)
HCT VFR BLD AUTO: 30 % (ref 36.6–50.3)
HGB BLD-MCNC: 9.5 G/DL (ref 12.1–17)
MAGNESIUM SERPL-MCNC: 2.1 MG/DL (ref 1.6–2.4)
MCH RBC QN AUTO: 28.6 PG (ref 26–34)
MCHC RBC AUTO-ENTMCNC: 31.7 G/DL (ref 30–36.5)
MCV RBC AUTO: 90.4 FL (ref 80–99)
NRBC # BLD: 0 K/UL (ref 0–0.01)
NRBC BLD-RTO: 0 PER 100 WBC
PHOSPHATE SERPL-MCNC: 4 MG/DL (ref 2.6–4.7)
PLATELET # BLD AUTO: 418 K/UL (ref 150–400)
PMV BLD AUTO: 8.9 FL (ref 8.9–12.9)
POTASSIUM SERPL-SCNC: 3.9 MMOL/L (ref 3.5–5.1)
RBC # BLD AUTO: 3.32 M/UL (ref 4.1–5.7)
SERVICE CMNT-IMP: ABNORMAL
SERVICE CMNT-IMP: ABNORMAL
SERVICE CMNT-IMP: NORMAL
SERVICE CMNT-IMP: NORMAL
SODIUM SERPL-SCNC: 130 MMOL/L (ref 136–145)
WBC # BLD AUTO: 10.6 K/UL (ref 4.1–11.1)

## 2022-01-08 PROCEDURE — 74011000250 HC RX REV CODE- 250: Performed by: SURGERY

## 2022-01-08 PROCEDURE — 74011250636 HC RX REV CODE- 250/636: Performed by: SURGERY

## 2022-01-08 PROCEDURE — 74011250637 HC RX REV CODE- 250/637: Performed by: NURSE PRACTITIONER

## 2022-01-08 PROCEDURE — 65660000000 HC RM CCU STEPDOWN

## 2022-01-08 PROCEDURE — 74011250636 HC RX REV CODE- 250/636: Performed by: NURSE PRACTITIONER

## 2022-01-08 PROCEDURE — 83735 ASSAY OF MAGNESIUM: CPT

## 2022-01-08 PROCEDURE — 84100 ASSAY OF PHOSPHORUS: CPT

## 2022-01-08 PROCEDURE — 80048 BASIC METABOLIC PNL TOTAL CA: CPT

## 2022-01-08 PROCEDURE — 85027 COMPLETE CBC AUTOMATED: CPT

## 2022-01-08 PROCEDURE — 74011000258 HC RX REV CODE- 258: Performed by: SURGERY

## 2022-01-08 PROCEDURE — 74011250637 HC RX REV CODE- 250/637: Performed by: SURGERY

## 2022-01-08 PROCEDURE — 82962 GLUCOSE BLOOD TEST: CPT

## 2022-01-08 PROCEDURE — 36415 COLL VENOUS BLD VENIPUNCTURE: CPT

## 2022-01-08 PROCEDURE — 74011000250 HC RX REV CODE- 250: Performed by: NURSE PRACTITIONER

## 2022-01-08 RX ADMIN — Medication 1 PACKET: at 08:50

## 2022-01-08 RX ADMIN — SODIUM CHLORIDE, PRESERVATIVE FREE 20 MG: 5 INJECTION INTRAVENOUS at 05:52

## 2022-01-08 RX ADMIN — SODIUM CHLORIDE, PRESERVATIVE FREE 10 ML: 5 INJECTION INTRAVENOUS at 13:17

## 2022-01-08 RX ADMIN — ERYTHROMYCIN 250 MG: 250 TABLET, DELAYED RELEASE ORAL at 21:18

## 2022-01-08 RX ADMIN — METOPROLOL TARTRATE 1.25 MG: 1 INJECTION, SOLUTION INTRAVENOUS at 16:21

## 2022-01-08 RX ADMIN — SODIUM CHLORIDE, PRESERVATIVE FREE 20 MG: 5 INJECTION INTRAVENOUS at 21:17

## 2022-01-08 RX ADMIN — ERYTHROMYCIN 250 MG: 250 TABLET, DELAYED RELEASE ORAL at 16:21

## 2022-01-08 RX ADMIN — METOCLOPRAMIDE 10 MG: 5 INJECTION, SOLUTION INTRAMUSCULAR; INTRAVENOUS at 21:17

## 2022-01-08 RX ADMIN — METOPROLOL TARTRATE 1.25 MG: 1 INJECTION, SOLUTION INTRAVENOUS at 21:17

## 2022-01-08 RX ADMIN — SODIUM CHLORIDE, PRESERVATIVE FREE 10 ML: 5 INJECTION INTRAVENOUS at 05:52

## 2022-01-08 RX ADMIN — METOPROLOL TARTRATE 1.25 MG: 1 INJECTION, SOLUTION INTRAVENOUS at 03:22

## 2022-01-08 RX ADMIN — METOPROLOL TARTRATE 1.25 MG: 1 INJECTION, SOLUTION INTRAVENOUS at 08:42

## 2022-01-08 RX ADMIN — METOCLOPRAMIDE 10 MG: 5 INJECTION, SOLUTION INTRAMUSCULAR; INTRAVENOUS at 03:22

## 2022-01-08 RX ADMIN — POTASSIUM CHLORIDE AND SODIUM CHLORIDE: 900; 150 INJECTION, SOLUTION INTRAVENOUS at 05:53

## 2022-01-08 RX ADMIN — SODIUM CHLORIDE, PRESERVATIVE FREE 10 ML: 5 INJECTION INTRAVENOUS at 21:18

## 2022-01-08 RX ADMIN — METOCLOPRAMIDE 10 MG: 5 INJECTION, SOLUTION INTRAMUSCULAR; INTRAVENOUS at 16:21

## 2022-01-08 RX ADMIN — ERYTHROMYCIN 250 MG: 250 TABLET, DELAYED RELEASE ORAL at 08:42

## 2022-01-08 RX ADMIN — NALOXEGOL OXALATE 12.5 MG: 12.5 TABLET, FILM COATED ORAL at 08:42

## 2022-01-08 RX ADMIN — CALCIUM GLUCONATE: 94 INJECTION, SOLUTION INTRAVENOUS at 18:15

## 2022-01-08 RX ADMIN — METOCLOPRAMIDE 10 MG: 5 INJECTION, SOLUTION INTRAMUSCULAR; INTRAVENOUS at 08:42

## 2022-01-08 NOTE — PROGRESS NOTES
Problem: Falls - Risk of  Goal: *Absence of Falls  Description: Document Bethany Wynn Fall Risk and appropriate interventions in the flowsheet. Outcome: Progressing Towards Goal  Note: Fall Risk Interventions:  Mobility Interventions: Patient to call before getting OOB         Medication Interventions: Patient to call before getting OOB,Teach patient to arise slowly    Elimination Interventions: Call light in reach    History of Falls Interventions: Bed/chair exit alarm,Door open when patient unattended         Problem: Patient Education: Go to Patient Education Activity  Goal: Patient/Family Education  Outcome: Progressing Towards Goal     Problem: Pressure Injury - Risk of  Goal: *Prevention of pressure injury  Description: Document Russell Scale and appropriate interventions in the flowsheet.   Outcome: Progressing Towards Goal  Note: Pressure Injury Interventions:  Sensory Interventions: Assess changes in LOC    Moisture Interventions: Absorbent underpads    Activity Interventions: Increase time out of bed    Mobility Interventions: HOB 30 degrees or less    Nutrition Interventions: Document food/fluid/supplement intake    Friction and Shear Interventions: HOB 30 degrees or less                Problem: Patient Education: Go to Patient Education Activity  Goal: Patient/Family Education  Outcome: Progressing Towards Goal     Problem: Pain  Goal: *Control of Pain  Outcome: Progressing Towards Goal     Problem: Patient Education: Go to Patient Education Activity  Goal: Patient/Family Education  Outcome: Progressing Towards Goal     Problem: Infection - Risk of, Central Venous Catheter-Associated Bloodstream Infection  Goal: *Absence of infection signs and symptoms  Outcome: Progressing Towards Goal     Problem: Patient Education: Go to Patient Education Activity  Goal: Patient/Family Education  Outcome: Progressing Towards Goal     Problem: General Infection Care Plan (Adult and Pediatric)  Goal: Improvement in signs and symptoms of infection  Outcome: Progressing Towards Goal  Goal: *Optimize nutritional status  Outcome: Progressing Towards Goal     Problem: Patient Education: Go to Patient Education Activity  Goal: Patient/Family Education  Outcome: Progressing Towards Goal     Problem: Patient Education: Go to Patient Education Activity  Goal: Patient/Family Education  Outcome: Progressing Towards Goal

## 2022-01-08 NOTE — PROGRESS NOTES
End of Shift Note    Bedside shift change report given to SAMUEL Shell (oncoming nurse) by Anh Cisneros RN (offgoing nurse). Report included the following information SBAR, Kardex, Intake/Output, MAR and Recent Results    Shift worked:  [id number]     Shift summary and any significant changes:     Patient had no complaints of pain throughout the night. Patient had 3 BMs throughout the shift. G tube to gravity, output documented in flowsheets. G tube with large output, documented in flowsheets     Concerns for physician to address:       Zone phone for oncoming shift:   [phone]       Activity:  Activity Level: Up with Assistance  Number times ambulated in hallways past shift: 0  Number of times OOB to chair past shift: 0    Cardiac:   Cardiac Monitoring: Yes      Cardiac Rhythm: Sinus Rhythm    Access:   Current line(s): PICC     Genitourinary:   Urinary status: voiding    Respiratory:   O2 Device: None (Room air)  Chronic home O2 use?: NO  Incentive spirometer at bedside: YES  Actual Volume (ml): 1500 ml  GI:  Last Bowel Movement Date: 01/07/22  Current diet:  ADULT ORAL NUTRITION SUPPLEMENT Breakfast, Lunch; Clear Liquid  ADULT DIET Full Liquid  TPN ADULT - CENTRAL  ADULT TUBE FEEDING Jejunostomy; Other Tube Feeding (Specify); vital 1.5; Delivery Method: Continuous; Continuous Initial Rate (mL/hr): 40; Continuous Advance Tube Feeding: No; Water Flush Volume (mL): 30; Water Flush Frequency: Q 6 hours  Passing flatus: YES  Tolerating current diet: YES       Pain Management:   Patient states pain is manageable on current regimen: YES    Skin:  Russell Score: 16  Interventions: float heels, increase time out of bed and foam dressing    Patient Safety:  Fall Score:  Total Score: 3  Interventions: assistive device (walker, cane, etc), gripper socks, pt to call before getting OOB and stay with me (per policy)  High Fall Risk: Yes    Length of Stay:  Expected LOS: 9d 14h  Actual LOS: [address], RN

## 2022-01-08 NOTE — PROGRESS NOTES
Admit Date: 2021    POD 29 Days Post-Op    Procedure:  Procedure(s):  OPEN LYSIS OF ADHESIONS, GASTROSTOMY  AND JEJUNAL TUBE PLACEMENT    Subjective:     Patient has no new complaints. Sari tube feeds. G-tube draining to gravity. Loose stools. Objective:     Blood pressure 104/74, pulse 93, temperature 97.5 °F (36.4 °C), resp. rate 18, height 5' 9\" (1.753 m), weight 102 lb 1.6 oz (46.3 kg), SpO2 96 %.     Temp (24hrs), Av.8 °F (36.6 °C), Min:97.2 °F (36.2 °C), Max:98.9 °F (37.2 °C)      Physical Exam:  GENERAL: alert, cooperative, no distress, appears stated age, LUNG: clear to auscultation bilaterally, HEART: regular rate and rhythm, ABDOMEN: soft, flat, wound c/d/i with packing in mid wound, EXTREMITIES:  extremities normal, atraumatic, no cyanosis or edema    Labs:   Recent Results (from the past 24 hour(s))   GLUCOSE, POC    Collection Time: 22 11:53 AM   Result Value Ref Range    Glucose (POC) 107 65 - 117 mg/dL    Performed by Kennedy Chapman PCT    GLUCOSE, POC    Collection Time: 22  5:42 PM   Result Value Ref Range    Glucose (POC) 112 65 - 117 mg/dL    Performed by Kennedy Chapman PCT    GLUCOSE, POC    Collection Time: 22 12:13 AM   Result Value Ref Range    Glucose (POC) 114 65 - 117 mg/dL    Performed by Flower Davies (PCT)    MAGNESIUM    Collection Time: 22  4:10 AM   Result Value Ref Range    Magnesium 2.1 1.6 - 2.4 mg/dL   PHOSPHORUS    Collection Time: 22  4:10 AM   Result Value Ref Range    Phosphorus 4.0 2.6 - 4.7 MG/DL   METABOLIC PANEL, BASIC    Collection Time: 22  4:10 AM   Result Value Ref Range    Sodium 130 (L) 136 - 145 mmol/L    Potassium 3.9 3.5 - 5.1 mmol/L    Chloride 98 97 - 108 mmol/L    CO2 25 21 - 32 mmol/L    Anion gap 7 5 - 15 mmol/L    Glucose 82 65 - 100 mg/dL    BUN 18 6 - 20 MG/DL    Creatinine 0.46 (L) 0.70 - 1.30 MG/DL    BUN/Creatinine ratio 39 (H) 12 - 20      GFR est AA >60 >60 ml/min/1.73m2    GFR est non-AA >60 >60 ml/min/1.73m2    Calcium 9.0 8.5 - 10.1 MG/DL   CBC W/O DIFF    Collection Time: 01/08/22  4:10 AM   Result Value Ref Range    WBC 10.6 4.1 - 11.1 K/uL    RBC 3.32 (L) 4.10 - 5.70 M/uL    HGB 9.5 (L) 12.1 - 17.0 g/dL    HCT 30.0 (L) 36.6 - 50.3 %    MCV 90.4 80.0 - 99.0 FL    MCH 28.6 26.0 - 34.0 PG    MCHC 31.7 30.0 - 36.5 g/dL    RDW 19.5 (H) 11.5 - 14.5 %    PLATELET 228 (H) 241 - 400 K/uL    MPV 8.9 8.9 - 12.9 FL    NRBC 0.0 0  WBC    ABSOLUTE NRBC 0.00 0.00 - 0.01 K/uL   GLUCOSE, POC    Collection Time: 01/08/22  6:20 AM   Result Value Ref Range    Glucose (POC) 98 65 - 117 mg/dL    Performed by Kirk Catherine (PCT)        Data Review images and reports reviewed    Assessment:     Active Problems:    SBO (small bowel obstruction) ([address] (11/22/2021)      Aspiration pneumonia ([address] (11/25/2021)      Small bowel obstruction ([address] (11/25/2021)      Sepsis ([address] (11/25/2021)      Abdominal pain, acute (11/27/2021)      Intractable cyclical vomiting with nausea (11/27/2021)        Plan/Recommendations/Medical Decision Making:     - Continue TPN- wean once he reaches goal TF rate. - Advanced TF to 50/hr today. Add one fiber packet daily. Goal rate 60/hr  - D/C planning for next week with home tube feeding.   - Encourage mobility and independence with care.      Kalie Tubbs MD  [facility]

## 2022-01-09 LAB
ANION GAP SERPL CALC-SCNC: 6 MMOL/L (ref 5–15)
BUN SERPL-MCNC: 21 MG/DL (ref 6–20)
BUN/CREAT SERPL: 46 (ref 12–20)
CALCIUM SERPL-MCNC: 9.3 MG/DL (ref 8.5–10.1)
CHLORIDE SERPL-SCNC: 98 MMOL/L (ref 97–108)
CO2 SERPL-SCNC: 26 MMOL/L (ref 21–32)
CREAT SERPL-MCNC: 0.46 MG/DL (ref 0.7–1.3)
GLUCOSE BLD STRIP.AUTO-MCNC: 122 MG/DL (ref 65–117)
GLUCOSE BLD STRIP.AUTO-MCNC: 124 MG/DL (ref 65–117)
GLUCOSE BLD STRIP.AUTO-MCNC: 137 MG/DL (ref 65–117)
GLUCOSE BLD STRIP.AUTO-MCNC: 94 MG/DL (ref 65–117)
GLUCOSE SERPL-MCNC: 91 MG/DL (ref 65–100)
MAGNESIUM SERPL-MCNC: 1.9 MG/DL (ref 1.6–2.4)
PHOSPHATE SERPL-MCNC: 4.2 MG/DL (ref 2.6–4.7)
POTASSIUM SERPL-SCNC: 3.8 MMOL/L (ref 3.5–5.1)
SERVICE CMNT-IMP: ABNORMAL
SERVICE CMNT-IMP: NORMAL
SODIUM SERPL-SCNC: 130 MMOL/L (ref 136–145)

## 2022-01-09 PROCEDURE — 84100 ASSAY OF PHOSPHORUS: CPT

## 2022-01-09 PROCEDURE — 74011250637 HC RX REV CODE- 250/637: Performed by: SURGERY

## 2022-01-09 PROCEDURE — 74011000250 HC RX REV CODE- 250: Performed by: SURGERY

## 2022-01-09 PROCEDURE — 74011250636 HC RX REV CODE- 250/636: Performed by: SURGERY

## 2022-01-09 PROCEDURE — 65660000000 HC RM CCU STEPDOWN

## 2022-01-09 PROCEDURE — 82962 GLUCOSE BLOOD TEST: CPT

## 2022-01-09 PROCEDURE — 74011000258 HC RX REV CODE- 258: Performed by: SURGERY

## 2022-01-09 PROCEDURE — 74011000250 HC RX REV CODE- 250: Performed by: NURSE PRACTITIONER

## 2022-01-09 PROCEDURE — 80048 BASIC METABOLIC PNL TOTAL CA: CPT

## 2022-01-09 PROCEDURE — 36415 COLL VENOUS BLD VENIPUNCTURE: CPT

## 2022-01-09 PROCEDURE — 74011250637 HC RX REV CODE- 250/637: Performed by: NURSE PRACTITIONER

## 2022-01-09 PROCEDURE — 74011250636 HC RX REV CODE- 250/636: Performed by: NURSE PRACTITIONER

## 2022-01-09 PROCEDURE — 83735 ASSAY OF MAGNESIUM: CPT

## 2022-01-09 RX ADMIN — METOPROLOL TARTRATE 1.25 MG: 1 INJECTION, SOLUTION INTRAVENOUS at 21:34

## 2022-01-09 RX ADMIN — NALOXEGOL OXALATE 12.5 MG: 12.5 TABLET, FILM COATED ORAL at 09:22

## 2022-01-09 RX ADMIN — SODIUM CHLORIDE, PRESERVATIVE FREE 20 MG: 5 INJECTION INTRAVENOUS at 17:50

## 2022-01-09 RX ADMIN — SODIUM CHLORIDE, PRESERVATIVE FREE 10 ML: 5 INJECTION INTRAVENOUS at 21:35

## 2022-01-09 RX ADMIN — METOCLOPRAMIDE 10 MG: 5 INJECTION, SOLUTION INTRAMUSCULAR; INTRAVENOUS at 21:34

## 2022-01-09 RX ADMIN — ERYTHROMYCIN 250 MG: 250 TABLET, DELAYED RELEASE ORAL at 17:50

## 2022-01-09 RX ADMIN — METOCLOPRAMIDE 10 MG: 5 INJECTION, SOLUTION INTRAMUSCULAR; INTRAVENOUS at 03:03

## 2022-01-09 RX ADMIN — METOPROLOL TARTRATE 1.25 MG: 1 INJECTION, SOLUTION INTRAVENOUS at 09:22

## 2022-01-09 RX ADMIN — METOPROLOL TARTRATE 1.25 MG: 1 INJECTION, SOLUTION INTRAVENOUS at 14:35

## 2022-01-09 RX ADMIN — MAGNESIUM SULFATE HEPTAHYDRATE: 500 INJECTION, SOLUTION INTRAMUSCULAR; INTRAVENOUS at 18:18

## 2022-01-09 RX ADMIN — METOPROLOL TARTRATE 1.25 MG: 1 INJECTION, SOLUTION INTRAVENOUS at 03:03

## 2022-01-09 RX ADMIN — HYDROMORPHONE HYDROCHLORIDE 1 MG: 1 INJECTION, SOLUTION INTRAMUSCULAR; INTRAVENOUS; SUBCUTANEOUS at 23:41

## 2022-01-09 RX ADMIN — SODIUM CHLORIDE, PRESERVATIVE FREE 20 MG: 5 INJECTION INTRAVENOUS at 06:12

## 2022-01-09 RX ADMIN — ERYTHROMYCIN 250 MG: 250 TABLET, DELAYED RELEASE ORAL at 09:22

## 2022-01-09 RX ADMIN — HYDROMORPHONE HYDROCHLORIDE 1 MG: 1 INJECTION, SOLUTION INTRAMUSCULAR; INTRAVENOUS; SUBCUTANEOUS at 21:00

## 2022-01-09 RX ADMIN — POTASSIUM CHLORIDE AND SODIUM CHLORIDE: 900; 150 INJECTION, SOLUTION INTRAVENOUS at 03:02

## 2022-01-09 RX ADMIN — SODIUM CHLORIDE, PRESERVATIVE FREE 10 ML: 5 INJECTION INTRAVENOUS at 06:12

## 2022-01-09 RX ADMIN — Medication 1 PACKET: at 09:22

## 2022-01-09 RX ADMIN — METOCLOPRAMIDE 10 MG: 5 INJECTION, SOLUTION INTRAMUSCULAR; INTRAVENOUS at 14:35

## 2022-01-09 RX ADMIN — ERYTHROMYCIN 250 MG: 250 TABLET, DELAYED RELEASE ORAL at 14:35

## 2022-01-09 RX ADMIN — SODIUM CHLORIDE, PRESERVATIVE FREE 10 ML: 5 INJECTION INTRAVENOUS at 14:35

## 2022-01-09 RX ADMIN — ENOXAPARIN SODIUM 30 MG: 100 INJECTION SUBCUTANEOUS at 14:35

## 2022-01-09 RX ADMIN — ERYTHROMYCIN 250 MG: 250 TABLET, DELAYED RELEASE ORAL at 21:00

## 2022-01-09 RX ADMIN — METOCLOPRAMIDE 10 MG: 5 INJECTION, SOLUTION INTRAMUSCULAR; INTRAVENOUS at 09:22

## 2022-01-09 NOTE — PROGRESS NOTES
Problem: Falls - Risk of  Goal: *Absence of Falls  Description: Document [place] Fail Fall Risk and appropriate interventions in the flowsheet. Outcome: Progressing Towards Goal  Note: Fall Risk Interventions:  Mobility Interventions: Bed/chair exit alarm,Patient to call before getting OOB         Medication Interventions: Bed/chair exit alarm,Patient to call before getting OOB,Teach patient to arise slowly    Elimination Interventions: Bed/chair exit alarm,Call light in reach,Patient to call for help with toileting needs    History of Falls Interventions: Bed/chair exit alarm         Problem: Patient Education: Go to Patient Education Activity  Goal: Patient/Family Education  Outcome: Progressing Towards Goal     Problem: Pressure Injury - Risk of  Goal: *Prevention of pressure injury  Description: Document Russell Scale and appropriate interventions in the flowsheet.   Outcome: Progressing Towards Goal  Note: Pressure Injury Interventions:  Sensory Interventions: Assess changes in LOC    Moisture Interventions: Absorbent underpads,Limit adult briefs    Activity Interventions: Increase time out of bed    Mobility Interventions: Float heels,HOB 30 degrees or less    Nutrition Interventions: Document food/fluid/supplement intake    Friction and Shear Interventions: HOB 30 degrees or less                Problem: Patient Education: Go to Patient Education Activity  Goal: Patient/Family Education  Outcome: Progressing Towards Goal     Problem: Pain  Goal: *Control of Pain  Outcome: Progressing Towards Goal     Problem: Patient Education: Go to Patient Education Activity  Goal: Patient/Family Education  Outcome: Progressing Towards Goal     Problem: Infection - Risk of, Central Venous Catheter-Associated Bloodstream Infection  Goal: *Absence of infection signs and symptoms  Outcome: Progressing Towards Goal     Problem: Patient Education: Go to Patient Education Activity  Goal: Patient/Family Education  Outcome: Progressing Towards Goal     Problem: General Infection Care Plan (Adult and Pediatric)  Goal: Improvement in signs and symptoms of infection  Outcome: Progressing Towards Goal  Goal: *Optimize nutritional status  Outcome: Progressing Towards Goal     Problem: Patient Education: Go to Patient Education Activity  Goal: Patient/Family Education  Outcome: Progressing Towards Goal     Problem: Patient Education: Go to Patient Education Activity  Goal: Patient/Family Education  Outcome: Progressing Towards Goal

## 2022-01-09 NOTE — PROGRESS NOTES
Admit Date: 2021    POD 30 Days Post-Op    Procedure:  Procedure(s):  OPEN LYSIS OF ADHESIONS, GASTROSTOMY  AND JEJUNAL TUBE PLACEMENT    Subjective:     Patient has no new complaints. Sari tube feeds. G-tube draining to gravity. Loose stools. Objective:     Blood pressure 102/83, pulse 96, temperature 98.2 °F (36.8 °C), resp. rate 15, height 5' 9\" (1.753 m), weight 101 lb 1.6 oz (45.9 kg), SpO2 100 %.     Temp (24hrs), Av.8 °F (36.6 °C), Min:97.2 °F (36.2 °C), Max:98.2 °F (36.8 °C)      Physical Exam:  GENERAL: alert, cooperative, no distress, appears stated age, LUNG: clear to auscultation bilaterally, HEART: regular rate and rhythm, ABDOMEN: soft, flat, wound c/d/i with packing in mid wound, EXTREMITIES:  extremities normal, atraumatic, no cyanosis or edema    Labs:   Recent Results (from the past 24 hour(s))   GLUCOSE, POC    Collection Time: 22  5:49 PM   Result Value Ref Range    Glucose (POC) 127 (H) 65 - 117 mg/dL    Performed by Linnette Castellanos  PCT    GLUCOSE, POC    Collection Time: 22 10:49 PM   Result Value Ref Range    Glucose (POC) 128 (H) 65 - 117 mg/dL    Performed by Gordo Castellanos    MAGNESIUM    Collection Time: 22  2:55 AM   Result Value Ref Range    Magnesium 1.9 1.6 - 2.4 mg/dL   PHOSPHORUS    Collection Time: 22  2:55 AM   Result Value Ref Range    Phosphorus 4.2 2.6 - 4.7 MG/DL   METABOLIC PANEL, BASIC    Collection Time: 22  2:55 AM   Result Value Ref Range    Sodium 130 (L) 136 - 145 mmol/L    Potassium 3.8 3.5 - 5.1 mmol/L    Chloride 98 97 - 108 mmol/L    CO2 26 21 - 32 mmol/L    Anion gap 6 5 - 15 mmol/L    Glucose 91 65 - 100 mg/dL    BUN 21 (H) 6 - 20 MG/DL    Creatinine 0.46 (L) 0.70 - 1.30 MG/DL    BUN/Creatinine ratio 46 (H) 12 - 20      GFR est AA >60 >60 ml/min/1.73m2    GFR est non-AA >60 >60 ml/min/1.73m2    Calcium 9.3 8.5 - 10.1 MG/DL   GLUCOSE, POC    Collection Time: 22  6:27 AM   Result Value Ref Range    Glucose (POC) 94 65 - 117 mg/dL    Performed by Jim Ponce        Data Review images and reports reviewed    Assessment:     Active Problems:    SBO (small bowel obstruction) ([address] (11/22/2021)      Aspiration pneumonia ([address] (11/25/2021)      Small bowel obstruction ([address] (11/25/2021)      Sepsis ([address] (11/25/2021)      Abdominal pain, acute (11/27/2021)      Intractable cyclical vomiting with nausea (11/27/2021)        Plan/Recommendations/Medical Decision Making:     - Continue TPN- wean once he tolerates goal TF rate. - Advanced TF to 60/hr today. Add one fiber packet daily. Goal rate 60/hr  - D/C planning for this week with home tube feeding.   - Encourage mobility and independence with [place]  [id number] [facility]

## 2022-01-09 NOTE — PROGRESS NOTES
End of Shift Note    Bedside shift change report given to Chelsi Mcmahon RN (oncoming nurse) by Yanira Cuenca RN (offgoing nurse). Report included the following information SBAR, Kardex, Intake/Output, MAR and Recent Results    Shift worked:  [id number]     Shift summary and any significant changes:     Patient had no complaints throughout the night, tolerated all care. Rested well. Tube feed going at 50mL/hr, tolerating well. Gtube to gravity, output documented in flowsheets. Concerns for physician to address:  Patient is inquiring about booster shot     Zone phone for oncoming shift:   [phone]       Activity:  Activity Level: Up with Assistance  Number times ambulated in hallways past shift: 0  Number of times OOB to chair past shift: 0    Cardiac:   Cardiac Monitoring: Yes      Cardiac Rhythm: Sinus Rhythm    Access:   Current line(s): PICC     Genitourinary:   Urinary status: voiding    Respiratory:   O2 Device: None (Room air)  Chronic home O2 use?: NO  Incentive spirometer at bedside: YES  Actual Volume (ml): 1500 ml  GI:  Last Bowel Movement Date: 01/08/22  Current diet:  ADULT ORAL NUTRITION SUPPLEMENT Breakfast, Lunch; Clear Liquid  ADULT DIET Full Liquid  ADULT TUBE FEEDING Jejunostomy; Other Tube Feeding (Specify); vital 1.5; Delivery Method: Continuous; Continuous Initial Rate (mL/hr): 40; Continuous Advance Tube Feeding: No; Water Flush Volume (mL): 30; Water Flush Frequency: Q 6 hours  TPN ADULT - CENTRAL  Passing flatus: YES  Tolerating current diet: YES       Pain Management:   Patient states pain is manageable on current regimen: YES    Skin:  Russell Score: 17  Interventions: float heels and increase time out of bed    Patient Safety:  Fall Score:  Total Score: 3  Interventions: assistive device (walker, cane, etc), gripper socks, pt to call before getting OOB and stay with me (per policy)  High Fall Risk: Yes    Length of Stay:  Expected LOS: 9d 14h  Actual LOS: [address], RN

## 2022-01-10 LAB
ANION GAP SERPL CALC-SCNC: 5 MMOL/L (ref 5–15)
BUN SERPL-MCNC: 16 MG/DL (ref 6–20)
BUN/CREAT SERPL: 42 (ref 12–20)
CALCIUM SERPL-MCNC: 8.2 MG/DL (ref 8.5–10.1)
CHLORIDE SERPL-SCNC: 104 MMOL/L (ref 97–108)
CO2 SERPL-SCNC: 25 MMOL/L (ref 21–32)
CREAT SERPL-MCNC: 0.38 MG/DL (ref 0.7–1.3)
GLUCOSE BLD STRIP.AUTO-MCNC: 103 MG/DL (ref 65–117)
GLUCOSE BLD STRIP.AUTO-MCNC: 106 MG/DL (ref 65–117)
GLUCOSE BLD STRIP.AUTO-MCNC: 107 MG/DL (ref 65–117)
GLUCOSE SERPL-MCNC: 105 MG/DL (ref 65–100)
MAGNESIUM SERPL-MCNC: 1.8 MG/DL (ref 1.6–2.4)
PHOSPHATE SERPL-MCNC: 3.4 MG/DL (ref 2.6–4.7)
POTASSIUM SERPL-SCNC: 3.3 MMOL/L (ref 3.5–5.1)
SERVICE CMNT-IMP: NORMAL
SODIUM SERPL-SCNC: 134 MMOL/L (ref 136–145)

## 2022-01-10 PROCEDURE — 97530 THERAPEUTIC ACTIVITIES: CPT

## 2022-01-10 PROCEDURE — 74011250636 HC RX REV CODE- 250/636: Performed by: SURGERY

## 2022-01-10 PROCEDURE — 97116 GAIT TRAINING THERAPY: CPT

## 2022-01-10 PROCEDURE — 74011000250 HC RX REV CODE- 250: Performed by: SURGERY

## 2022-01-10 PROCEDURE — 74011250637 HC RX REV CODE- 250/637: Performed by: NURSE PRACTITIONER

## 2022-01-10 PROCEDURE — 82962 GLUCOSE BLOOD TEST: CPT

## 2022-01-10 PROCEDURE — 36415 COLL VENOUS BLD VENIPUNCTURE: CPT

## 2022-01-10 PROCEDURE — 74011250636 HC RX REV CODE- 250/636: Performed by: NURSE PRACTITIONER

## 2022-01-10 PROCEDURE — 84100 ASSAY OF PHOSPHORUS: CPT

## 2022-01-10 PROCEDURE — 74011250637 HC RX REV CODE- 250/637: Performed by: SURGERY

## 2022-01-10 PROCEDURE — 65660000000 HC RM CCU STEPDOWN

## 2022-01-10 PROCEDURE — 83735 ASSAY OF MAGNESIUM: CPT

## 2022-01-10 PROCEDURE — 80048 BASIC METABOLIC PNL TOTAL CA: CPT

## 2022-01-10 PROCEDURE — 74011000250 HC RX REV CODE- 250: Performed by: NURSE PRACTITIONER

## 2022-01-10 RX ADMIN — METOCLOPRAMIDE 10 MG: 5 INJECTION, SOLUTION INTRAMUSCULAR; INTRAVENOUS at 14:20

## 2022-01-10 RX ADMIN — METOCLOPRAMIDE 10 MG: 5 INJECTION, SOLUTION INTRAMUSCULAR; INTRAVENOUS at 21:34

## 2022-01-10 RX ADMIN — Medication 1 PACKET: at 11:13

## 2022-01-10 RX ADMIN — ERYTHROMYCIN 250 MG: 250 TABLET, DELAYED RELEASE ORAL at 21:35

## 2022-01-10 RX ADMIN — SODIUM CHLORIDE, PRESERVATIVE FREE 20 MG: 5 INJECTION INTRAVENOUS at 17:34

## 2022-01-10 RX ADMIN — METOCLOPRAMIDE 10 MG: 5 INJECTION, SOLUTION INTRAMUSCULAR; INTRAVENOUS at 03:30

## 2022-01-10 RX ADMIN — METOCLOPRAMIDE 10 MG: 5 INJECTION, SOLUTION INTRAMUSCULAR; INTRAVENOUS at 09:46

## 2022-01-10 RX ADMIN — ERYTHROMYCIN 250 MG: 250 TABLET, DELAYED RELEASE ORAL at 09:45

## 2022-01-10 RX ADMIN — SODIUM CHLORIDE, PRESERVATIVE FREE 10 ML: 5 INJECTION INTRAVENOUS at 14:21

## 2022-01-10 RX ADMIN — NALOXEGOL OXALATE 12.5 MG: 12.5 TABLET, FILM COATED ORAL at 09:45

## 2022-01-10 RX ADMIN — METOPROLOL TARTRATE 1.25 MG: 1 INJECTION, SOLUTION INTRAVENOUS at 03:30

## 2022-01-10 RX ADMIN — SODIUM CHLORIDE, PRESERVATIVE FREE 10 ML: 5 INJECTION INTRAVENOUS at 21:35

## 2022-01-10 RX ADMIN — SODIUM CHLORIDE, PRESERVATIVE FREE 20 MG: 5 INJECTION INTRAVENOUS at 06:12

## 2022-01-10 RX ADMIN — ERYTHROMYCIN 250 MG: 250 TABLET, DELAYED RELEASE ORAL at 17:34

## 2022-01-10 RX ADMIN — HYDROMORPHONE HYDROCHLORIDE 1 MG: 1 INJECTION, SOLUTION INTRAMUSCULAR; INTRAVENOUS; SUBCUTANEOUS at 03:31

## 2022-01-10 RX ADMIN — ENOXAPARIN SODIUM 30 MG: 100 INJECTION SUBCUTANEOUS at 14:20

## 2022-01-10 RX ADMIN — SODIUM CHLORIDE, PRESERVATIVE FREE 10 ML: 5 INJECTION INTRAVENOUS at 06:03

## 2022-01-10 RX ADMIN — POTASSIUM CHLORIDE AND SODIUM CHLORIDE: 900; 150 INJECTION, SOLUTION INTRAVENOUS at 01:00

## 2022-01-10 RX ADMIN — ERYTHROMYCIN 250 MG: 250 TABLET, DELAYED RELEASE ORAL at 12:43

## 2022-01-10 NOTE — PROGRESS NOTES
End of Shift Note    Bedside shift change report given to Wili Hair RN (oncoming nurse) by Aidan Sheehan RN (offgoing nurse). Report included the following information SBAR, Kardex, Intake/Output, MAR, Recent Results and Cardiac Rhythm NSR    Shift worked:  [id number]     Shift summary and any significant changes:     Pt refusing to get out of bed today. Pt states that he walked to the chair today with assistance. This nurse nor any other staff member assisted pt to chair. Pt had 3 BMs today in the bed; called nurse for assistance. Pt refusing to get out of bed to bathroom or BSC. Pt placed in an incontinence brief as pt would not call for nurse until after he had BM. Pt stated that he knew when he had to go, but went anyway. Family visited today; they encouraged pt to get out of bed. Pt agrees and then refuses. Concerns for physician to address:  motivation to get out of bed     Zone phone for oncoming shift:          Activity:  Activity Level: Up with Assistance  Number times ambulated in hallways past shift: 0  Number of times OOB to chair past shift: 0    Cardiac:   Cardiac Monitoring: Yes      Cardiac Rhythm: Sinus Rhythm    Access:   Current line(s): PICC     Genitourinary:   Urinary status: voiding    Respiratory:   O2 Device: None (Room air)  Chronic home O2 use?: NO  Incentive spirometer at bedside: YES  Actual Volume (ml): 1500 ml  GI:  Last Bowel Movement Date: 01/08/22  Current diet:  ADULT ORAL NUTRITION SUPPLEMENT Breakfast, Lunch; Clear Liquid  ADULT DIET Full Liquid  TPN ADULT - CENTRAL  ADULT TUBE FEEDING Jejunostomy; Other Tube Feeding (Specify); vital 1.5; Delivery Method: Continuous; Continuous Initial Rate (mL/hr): 60; Continuous Advance Tube Feeding: No; Water Flush Volume (mL): 30;  Water Flush Frequency: Q 6 hours  Passing flatus: YES  Tolerating current diet: YES       Pain Management:   Patient states pain is manageable on current regimen: YES    Skin:  Russell Score: 17  Interventions: float heels, increase time out of bed, PT/OT consult, internal/external urinary devices and nutritional support     Patient Safety:  Fall Score:  Total Score: 3  Interventions: bed/chair alarm, assistive device (walker, cane, etc), gripper socks, pt to call before getting OOB and stay with me (per policy)  High Fall Risk: Yes    Length of Stay:  Expected LOS: 9d 14h  Actual LOS: SAMUEL Cao

## 2022-01-10 NOTE — PROGRESS NOTES
End of Shift Note    Bedside shift change report given to Brendan Rubi (oncoming nurse) by Jessica Hernandez (offgoing nurse). Report included the following information SBAR, Kardex, Procedure Summary, Intake/Output, MAR and Recent Results    Shift worked:  7a-7p     Shift summary and any significant changes:     No complaints of pain. Pt tolerating tube feed at goal rate. Pt up to chair and walked halls with PT. Educated home care for G tube. TPN weaned and discontinued. Pt had 2 small bowel movements. Claudene Bolster for discharge. Tolerating tube feeds at goal rate. Concerns for physician to address:    Zone phone for oncoming shift:          Activity:  Activity Level: Up with Assistance  Number times ambulated in hallways past shift: 1  Number of times OOB to chair past shift: 1    Cardiac:   Cardiac Monitoring: Yes      Cardiac Rhythm: Sinus Rhythm    Access:   Current line(s): PICC     Genitourinary:   Urinary status: voiding    Respiratory:   O2 Device: None (Room air)  Chronic home O2 use?: NO  Incentive spirometer at bedside: YES  Actual Volume (ml): 1500 ml  GI:  Last Bowel Movement Date: 01/10/22  Current diet:  ADULT ORAL NUTRITION SUPPLEMENT Breakfast, Lunch; Clear Liquid  ADULT DIET Full Liquid  TPN ADULT - CENTRAL  ADULT TUBE FEEDING Jejunostomy; Other Tube Feeding (Specify); vital 1.5; Delivery Method: Continuous; Continuous Initial Rate (mL/hr): 60; Continuous Advance Tube Feeding: No; Water Flush Volume (mL): 30; Water Flush Frequency: Q 6 hours  Passing flatus: YES  Tolerating current diet: YES       Pain Management:   Patient states pain is manageable on current regimen: YES    Skin:  Russell Score: 16  Interventions: increase time out of bed    Patient Safety:  Fall Score:  Total Score: 3  Interventions: gripper socks and pt to call before getting OOB  High Fall Risk: Yes    Length of Stay:  Expected LOS: 9d 14h  Actual LOS: [address]

## 2022-01-10 NOTE — PROGRESS NOTES
Surgery NP Progress Note    Due to gastric emptying disfunction, short gut syndrome, and failure to tolerate PO intake, patient will require tube feeding long-term and likely for life. This will certainly exceed three months time. The tube feeding is providing 100% of the patients nutrition. He has no other nutritional source. He may have liquids by mouth for comfort however these will not be absorbed as they are drained by g ravity via G-tube.     Aman Jenkins  NP for Surgery

## 2022-01-10 NOTE — PROGRESS NOTES
Transition of Care Plan:     RUR:  20%   High Risk for Readmission  Disposition:  Home with Tube Feedings ([address] ([address])  Follow up appointments:  PCP and Specialists  DME needed:  Tube Feedings through [address], r/w-[place]  Transportation at [address] [facility] vs. Medicaid Transport  Palomo Ulrich or means to access home:   Mother has access to home     IM Medicare Letter:   N/A  Is patient a BCPI-A Bundle:   N/A                   If yes, was Bundle Letter given?:    Is patient a [facility] and connected with the [facility]?  N/A  If yes, was [employer] transfer form completed and VA notified? Caregiver Contact:  Mother, Joni Garcia, [phone]  Discharge Caregiver contacted prior to discharge?   Mother to be informed     CM delivered r/w to pt. CM will continue to follow.     Jovana Chavez  Ext [phone]

## 2022-01-10 NOTE — WOUND CARE
Wound care for the Abdomen will continue until completely closed. We are almost there and the retention sutures are drying up well. The stomach tube is still in place and patient is at goal with the Tube feeding lower than the stomach. Patient to go home tomorrow with Tube feeding. Wound care to sign off now.    Ashu Watson RN, BSN, [facility]

## 2022-01-10 NOTE — PROGRESS NOTES
Admit Date: 2021    POD 31 Days Post-Op    Procedure:  Procedure(s):  OPEN LYSIS OF ADHESIONS, GASTROSTOMY  AND JEJUNAL TUBE PLACEMENT    Subjective:     Patient tolerating tube feeds, no nausea/vomiting with G-tube venting. Has not been getting out of bed. Continues with loose stools. Objective:     Blood pressure 109/77, pulse 86, temperature 97.7 °F (36.5 °C), resp. rate 18, height 5' 9\" (1.753 m), weight 45.9 kg (101 lb 1.6 oz), SpO2 98 %.     Temp (24hrs), Av.4 °F (36.9 °C), Min:97.7 °F (36.5 °C), Max:98.8 °F (37.1 °C)      Physical Exam:  GENERAL: alert, cooperative, no distress, appears stated age, LUNG: clear to auscultation bilaterally, HEART: regular rate and rhythm, ABDOMEN: soft, flat, wound c/d/i with packing in mid wound, EXTREMITIES:  extremities normal, atraumatic, no cyanosis or edema    Labs:   Recent Results (from the past 24 hour(s))   GLUCOSE, POC    Collection Time: 22 11:46 AM   Result Value Ref Range    Glucose (POC) 122 (H) 65 - 117 mg/dL    Performed by Glenn Conine PCT    GLUCOSE, POC    Collection Time: 22  6:18 PM   Result Value Ref Range    Glucose (POC) 124 (H) 65 - 117 mg/dL    Performed by [place] Conine PCT    GLUCOSE, POC    Collection Time: 22 11:26 PM   Result Value Ref Range    Glucose (POC) 137 (H) 65 - 117 mg/dL    Performed by Marry Eugene (PCT)    MAGNESIUM    Collection Time: 01/10/22  3:37 AM   Result Value Ref Range    Magnesium 1.8 1.6 - 2.4 mg/dL   PHOSPHORUS    Collection Time: 01/10/22  3:37 AM   Result Value Ref Range    Phosphorus 3.4 2.6 - 4.7 MG/DL   METABOLIC PANEL, BASIC    Collection Time: 01/10/22  3:37 AM   Result Value Ref Range    Sodium 134 (L) 136 - 145 mmol/L    Potassium 3.3 (L) 3.5 - 5.1 mmol/L    Chloride 104 97 - 108 mmol/L    CO2 25 21 - 32 mmol/L    Anion gap 5 5 - 15 mmol/L    Glucose 105 (H) 65 - 100 mg/dL    BUN 16 6 - 20 MG/DL    Creatinine 0.38 (L) 0.70 - 1.30 MG/DL    BUN/Creatinine ratio 42 (H) 12 - 20 GFR est AA >60 >60 ml/min/1.73m2    GFR est non-AA >60 >60 ml/min/1.73m2    Calcium 8.2 (L) 8.5 - 10.1 MG/DL   GLUCOSE, POC    Collection Time: 01/10/22  6:04 AM   Result Value Ref Range    Glucose (POC) 107 65 - 117 mg/dL    Performed by Celsa Rowland (PCT)        Data Review images and reports reviewed    Assessment:     Active Problems:    SBO (small bowel obstruction) ([address] (11/22/2021)      Aspiration pneumonia ([address] (11/25/2021)      Small bowel obstruction ([address] (11/25/2021)      Sepsis ([address] (11/25/2021)      Abdominal pain, acute (11/27/2021)      Intractable cyclical vomiting with nausea (11/27/2021)    Clinically improving. At goal tube feeding. Plan/Recommendations/Medical Decision Making:     - Wean TPN  -  Continue TF at 60/hr  - D/C planning for tomorrow with home tube feeding.   - Encourage mobility and independence with care.      Maria R Parker, GERDA  [facility]

## 2022-01-10 NOTE — PROGRESS NOTES
Problem: Mobility Impaired (Adult and Pediatric)  Goal: *Acute Goals and Plan of Care (Insert Text)  Description: FUNCTIONAL STATUS PRIOR TO ADMISSION: Pt reports due to generalized weakness he wasn't walking much, but when he walked he didn't use an ad. HOME SUPPORT PRIOR TO ADMISSION: The patient lived with his mother and siblings, but didn't receive assistance, as everyone works. Goals continued 1/7/22    Physical Therapy Goals  Continued 12/30/2021   1. Patient will move from supine to sit and sit to supine , scoot up and down, and roll side to side in bed with supervision/set-up within 7 day(s). 2.  Patient will transfer from bed to chair and chair to bed with standby assistance/set-up using the least restrictive device within 7 day(s). 3.  Patient will perform sit to stand with modified independence within 7 day(s). 4.  Patient will ambulate with standby assistance/set-up for 250 feet with the least restrictive device within 7 day(s). 5.  Patient will ascend/descend 5 stairs with 1 handrail(s) with minimal assistance/contact guard assist within 7 day(s). Physical Therapy Goals  Revised 12/21/2021   1. Patient will move from supine to sit and sit to supine , scoot up and down, and roll side to side in bed with supervision/set-up within 7 day(s). 2.  Patient will transfer from bed to chair and chair to bed with standby assistance/set-up using the least restrictive device within 7 day(s). 3.  Patient will perform sit to stand with modified independence within 7 day(s). 4.  Patient will ambulate with standby assistance/set-up for 250 feet with the least restrictive device within 7 day(s). 5.  Patient will ascend/descend 5 stairs with 1 handrail(s) with minimal assistance/contact guard assist within 7 day(s). Physical Therapy Goals  Initiated 12/15/2021  1.   Patient will move from supine to sit and sit to supine , scoot up and down, and roll side to side in bed with independence within 7 day(s). 2.  Patient will transfer from bed to chair and chair to bed with modified independence using the least restrictive device within 7 day(s). 3.  Patient will perform sit to stand with modified independence within 7 day(s). 4.  Patient will ambulate with modified independence [id number] feet with the least restrictive device within 7 day(s). 5.  Patient will ascend/descend 5 stairs with 1 handrail(s) with modified independence within 7 day(s). Outcome: Progressing Towards Goal   PHYSICAL THERAPY TREATMENT  Patient: Manuela Kauffman (39 y.o. male)  Date: 1/10/2022  Diagnosis: Sepsis ([facility] [A41.9]  Aspiration pneumonia ([facility] [J69.0]  Small bowel obstruction ([facility] [K56.609]  Intractable cyclical vomiting with nausea [R11.15]  Abdominal pain, acute [R10.9]  SBO (small bowel obstruction) ([facility] [K56.609] <principal problem not specified>  Procedure(s) (LRB):  OPEN LYSIS OF ADHESIONS, GASTROSTOMY  AND JEJUNAL TUBE PLACEMENT (N/A) 31 Days Post-Op  Precautions: Fall,Skin  Chart, physical therapy assessment, plan of care and goals were reviewed. ASSESSMENT  Patient continues with skilled PT services and is progressing towards goals. He demonstrates the ability to ambulate increased distance with the use of a rolling walker. Patient demonstrates increased CHAO and intermittent buckling during gait. He denies symptoms of orthostatic hypotension and BP is stable with HR elevated. Patient requires VC to reach back before sitting down but despite not using his hands he demonstrates good eccentric control and does not plop in his seat. He would benefit from RW use at D/C. Spoke with patient about home set up for potential discharge and he states he believes he cant get in out of a flat bed and denies the need for stair training.      Current Level of Function Impacting Discharge (mobility/balance): Min A with RW     Other factors to consider for discharge: medical stability, decreased strength/endurance         PLAN :  Patient continues to benefit from skilled intervention to address the above impairments. Continue treatment per established plan of care. to address goals. Recommendation for discharge: (in order for the patient to meet his/her long term goals)  Physical therapy at least 2 days/week in the home AND ensure assist and/or supervision for safety with all functional mobility     This discharge recommendation:  Has been made in collaboration with the attending provider and/or case management    IF patient discharges home will need the following DME: rolling walker       SUBJECTIVE:   Patient stated i've been doing exercises this whole time.     OBJECTIVE DATA SUMMARY:   Critical Behavior:  Neurologic State: Alert  Orientation Level: Oriented X4  Cognition: Follows commands  Safety/Judgement: Decreased insight into deficits,Decreased awareness of need for safety,Awareness of environment  Functional Mobility Training:  Bed Mobility:     Supine to Sit: Stand-by assistance;Bed Modified     Scooting: Stand-by assistance        Transfers:  Sit to Stand: Contact guard assistance  Stand to Sit: Contact guard assistance        Bed to Chair: Contact guard assistance                    Balance:  Sitting: Intact; Without support  Standing: Impaired; With support  Standing - Static: Constant support; Fair  Standing - Dynamic : Constant support; Fair  Ambulation/Gait Training:  Distance (ft): 100 Feet (ft)  Assistive Device: Gait belt;Walker, rolling  Ambulation - Level of Assistance: Minimal assistance        Gait Abnormalities: Decreased step clearance        Base of Support: Widened     Speed/Kay: Slow;Shuffled  Step Length: Right shortened;Left shortened                    Stairs:               Therapeutic Exercises:     Pain Rating:      Activity Tolerance:   Good    After treatment patient left in no apparent distress:   Supine in bed, Call bell within reach, and Side rails x 3    COMMUNICATION/COLLABORATION:   The patients plan of care was discussed with: Registered nurse and Rehabilitation technician.      Reid Stovall, PT   Time Calculation: 33 mins

## 2022-01-11 VITALS
SYSTOLIC BLOOD PRESSURE: 111 MMHG | WEIGHT: 101.1 LBS | HEIGHT: 69 IN | BODY MASS INDEX: 14.97 KG/M2 | OXYGEN SATURATION: 98 % | TEMPERATURE: 98 F | HEART RATE: 95 BPM | DIASTOLIC BLOOD PRESSURE: 84 MMHG | RESPIRATION RATE: 18 BRPM

## 2022-01-11 LAB
ANION GAP SERPL CALC-SCNC: 5 MMOL/L (ref 5–15)
BUN SERPL-MCNC: 14 MG/DL (ref 6–20)
BUN/CREAT SERPL: 37 (ref 12–20)
CALCIUM SERPL-MCNC: 9 MG/DL (ref 8.5–10.1)
CHLORIDE SERPL-SCNC: 100 MMOL/L (ref 97–108)
CO2 SERPL-SCNC: 28 MMOL/L (ref 21–32)
CREAT SERPL-MCNC: 0.38 MG/DL (ref 0.7–1.3)
GLUCOSE BLD STRIP.AUTO-MCNC: 83 MG/DL (ref 65–117)
GLUCOSE BLD STRIP.AUTO-MCNC: 88 MG/DL (ref 65–117)
GLUCOSE BLD STRIP.AUTO-MCNC: 98 MG/DL (ref 65–117)
GLUCOSE SERPL-MCNC: 101 MG/DL (ref 65–100)
MAGNESIUM SERPL-MCNC: 1.9 MG/DL (ref 1.6–2.4)
PHOSPHATE SERPL-MCNC: 4.1 MG/DL (ref 2.6–4.7)
POTASSIUM SERPL-SCNC: 3.8 MMOL/L (ref 3.5–5.1)
SERVICE CMNT-IMP: NORMAL
SODIUM SERPL-SCNC: 133 MMOL/L (ref 136–145)

## 2022-01-11 PROCEDURE — 74011250637 HC RX REV CODE- 250/637: Performed by: NURSE PRACTITIONER

## 2022-01-11 PROCEDURE — 74011250636 HC RX REV CODE- 250/636: Performed by: NURSE PRACTITIONER

## 2022-01-11 PROCEDURE — 83735 ASSAY OF MAGNESIUM: CPT

## 2022-01-11 PROCEDURE — 74011000250 HC RX REV CODE- 250: Performed by: NURSE PRACTITIONER

## 2022-01-11 PROCEDURE — 74011000250 HC RX REV CODE- 250: Performed by: SURGERY

## 2022-01-11 PROCEDURE — 36415 COLL VENOUS BLD VENIPUNCTURE: CPT

## 2022-01-11 PROCEDURE — 74011250636 HC RX REV CODE- 250/636: Performed by: SURGERY

## 2022-01-11 PROCEDURE — 74011250637 HC RX REV CODE- 250/637: Performed by: SURGERY

## 2022-01-11 PROCEDURE — 80048 BASIC METABOLIC PNL TOTAL CA: CPT

## 2022-01-11 PROCEDURE — 82962 GLUCOSE BLOOD TEST: CPT

## 2022-01-11 PROCEDURE — 84100 ASSAY OF PHOSPHORUS: CPT

## 2022-01-11 RX ORDER — ERYTHROMYCIN 250 MG/1
250 TABLET, DELAYED RELEASE ORAL 4 TIMES DAILY
Qty: 120 TABLET | Refills: 0 | Status: SHIPPED | OUTPATIENT
Start: 2022-01-11 | End: 2022-02-03

## 2022-01-11 RX ORDER — HYDROCODONE BITARTRATE AND ACETAMINOPHEN 7.5; 325 MG/15ML; MG/15ML
15 SOLUTION ORAL
Qty: 400 ML | Refills: 0 | Status: SHIPPED | OUTPATIENT
Start: 2022-01-11 | End: 2022-02-03

## 2022-01-11 RX ADMIN — Medication 1 PACKET: at 10:03

## 2022-01-11 RX ADMIN — METOCLOPRAMIDE 10 MG: 5 INJECTION, SOLUTION INTRAMUSCULAR; INTRAVENOUS at 10:03

## 2022-01-11 RX ADMIN — METOCLOPRAMIDE 10 MG: 5 INJECTION, SOLUTION INTRAMUSCULAR; INTRAVENOUS at 03:19

## 2022-01-11 RX ADMIN — SODIUM CHLORIDE, PRESERVATIVE FREE 20 MG: 5 INJECTION INTRAVENOUS at 06:05

## 2022-01-11 RX ADMIN — ERYTHROMYCIN 250 MG: 250 TABLET, DELAYED RELEASE ORAL at 10:03

## 2022-01-11 RX ADMIN — NALOXEGOL OXALATE 12.5 MG: 12.5 TABLET, FILM COATED ORAL at 10:02

## 2022-01-11 RX ADMIN — ERYTHROMYCIN 250 MG: 250 TABLET, DELAYED RELEASE ORAL at 12:48

## 2022-01-11 RX ADMIN — METOPROLOL TARTRATE 1.25 MG: 1 INJECTION, SOLUTION INTRAVENOUS at 05:23

## 2022-01-11 RX ADMIN — SODIUM CHLORIDE, PRESERVATIVE FREE 10 ML: 5 INJECTION INTRAVENOUS at 06:04

## 2022-01-11 RX ADMIN — POTASSIUM CHLORIDE AND SODIUM CHLORIDE: 900; 150 INJECTION, SOLUTION INTRAVENOUS at 00:30

## 2022-01-11 NOTE — PROGRESS NOTES
PICC line removed per policy prior to discharge. Pt discharged per MD order. Pt has all personal belongings, prescriptions have been sent to pharmacy, and discharge instructions. Discharge instructions gone over with pt. Pt does not have any further questions regarding discharge. Pt and family received tube feeding education at bedside with [employer] prior to discharge. They verbalized understanding with teaching and will hook pt home to home set as soon as they get home. Pt and family educated on how to take care of drainage bag for G tube and dressing changes. All questions were answered and they were comfortable with at home care. Aware of follow up appointments that needed to be scheduled. Pt sent with all supplies in room including personal walker.

## 2022-01-11 NOTE — PROGRESS NOTES
Problem: Falls - Risk of  Goal: *Absence of Falls  Description: Document Cherylle Cockayne Fall Risk and appropriate interventions in the flowsheet. Outcome: Progressing Towards Goal  Note: Fall Risk Interventions:  Mobility Interventions: Communicate number of staff needed for ambulation/transfer         Medication Interventions: Teach patient to arise slowly,Patient to call before getting OOB    Elimination Interventions: Bed/chair exit alarm,Call light in reach,Patient to call for help with toileting needs,Toilet paper/wipes in reach    History of Falls Interventions: Bed/chair exit alarm,Door open when patient unattended         Problem: Patient Education: Go to Patient Education Activity  Goal: Patient/Family Education  Outcome: Progressing Towards Goal     Problem: Pressure Injury - Risk of  Goal: *Prevention of pressure injury  Description: Document Russell Scale and appropriate interventions in the flowsheet.   Outcome: Progressing Towards Goal  Note: Pressure Injury Interventions:  Sensory Interventions: Minimize linen layers    Moisture Interventions: Absorbent underpads    Activity Interventions: Increase time out of bed    Mobility Interventions: HOB 30 degrees or less    Nutrition Interventions: Document food/fluid/supplement intake    Friction and Shear Interventions: Minimize layers                Problem: Patient Education: Go to Patient Education Activity  Goal: Patient/Family Education  Outcome: Progressing Towards Goal     Problem: Pain  Goal: *Control of Pain  Outcome: Progressing Towards Goal     Problem: Patient Education: Go to Patient Education Activity  Goal: Patient/Family Education  Outcome: Progressing Towards Goal     Problem: Infection - Risk of, Central Venous Catheter-Associated Bloodstream Infection  Goal: *Absence of infection signs and symptoms  Outcome: Progressing Towards Goal     Problem: Patient Education: Go to Patient Education Activity  Goal: Patient/Family Education  Outcome: Progressing Towards Goal     Problem: General Infection Care Plan (Adult and Pediatric)  Goal: Improvement in signs and symptoms of infection  Outcome: Progressing Towards Goal  Goal: *Optimize nutritional status  Outcome: Progressing Towards Goal     Problem: Patient Education: Go to Patient Education Activity  Goal: Patient/Family Education  Outcome: Progressing Towards Goal     Problem: Patient Education: Go to Patient Education Activity  Goal: Patient/Family Education  Outcome: Progressing Towards Goal

## 2022-01-11 NOTE — PROGRESS NOTES
[address] [place] [address] follow-up PCP transitional care appointment has been scheduled with GERDA Amaro on 1/13/2022 at 4:00pm. Was asked to find a new patient appointment for patient. Pending patient discharge.   Osmar Carey, Care Management Assistant

## 2022-01-11 NOTE — PROGRESS NOTES
Pt ready for discharge from a CM standpoint once IV infusion company meets with patient and mother about Tube Feed teaching (this is currently scheduled to occur around 11:00am). Transition of Care Plan:     RUR:  29%   High Risk for Readmission  Disposition:  Home with Tube Feedings ([address] ([address] [facility])  Follow up appointments:  PCP and Specialists  DME needed:  Tube Feedings through [facility] [facility], r/w-[employer]  Transportation at [facility] or means to access home:   Mother has access to home     IM Medicare Letter:   N/A - Pt has Medicaid insurance  Is patient a BCPI-A Bundle:   N/A                   If yes, was Bundle Letter given?:    Is patient a  and connected with the [facility]?  N/A  If yes, was [employer] transfer form completed and VA notified? Caregiver Contact:  Mother, Malik Brasher, [phone]  Discharge Caregiver contacted prior to discharge?   Mother coming to hospital around 11am to complete teaching with IV infusion [employer]. Mother will transport patient home after teaching. CM submitted requested documentation to [facility] regarding patient's chronic need for tube feedings and they have arranged to come to the hospital to do teaching with patient and mother. Pt's mother to transport patient home at discharge. IT  notified of patient's discharge home today. Pt has rolling walker in his room that was delivered by [employer] on 1/10/22. Ensure that patient takes rolling walker with him at discharge. No other needs identified for to patient's discharge today from a CM standpoint. Care Management Interventions  PCP Verified by CM: No  Palliative Care Criteria Met (RRAT>21 & CHF Dx)?: No  Mode of Transport at Discharge:  Other (see comment) (Family or Medicaid transportation home)  Transition of Care Consult (CM Consult): Home Health ([address]; [address] [place] [id number] for [address])  [address]: No  Reason Outside [facility] Agency Chosen: Managed care specific requirement  Discharge Durable Medical Equipment: No  Physical Therapy Consult: Yes  Occupational Therapy Consult: Yes  Speech Therapy Consult: No  Support Systems: Parent(s)  Confirm Follow Up Transport: Family  The Plan for Transition of Care is Related to the Following Treatment Goals : Home with Family Support; [address], Tube Feedings. The Patient and/or Patient Representative was Provided with a Choice of Provider and Agrees with the Discharge Plan?: Yes  Name of the Patient Representative Who was Provided with a Choice of Provider and Agrees with the Discharge Plan: Patient  Freedom of Choice List was Provided with Basic Dialogue that Supports the Patient's Individualized Plan of Care/Goals, Treatment Preferences and Shares the Quality Data Associated with the Providers?: Yes  Discharge Location  Discharge Placement: Home with home health    Braulio Teixeira.  Marybeth Brandt, [address] - ED [facility]  [employer] ACP Facilitator  Zone Phone: [phone]
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
## 2022-01-11 NOTE — DISCHARGE INSTRUCTIONS
Discharge Instructions:     Dr. Pita London    Call for appointment for follow up in 2 weeks [phone]    Activity:    Walk regularly. No lifting more than 5 to 10 pounds for 6 weeks. Light aerobic activity is okay when you feel up to it. You may resume driving in five days unless still requiring narcotics for pain. Wound/Tube Care:  Midline abdominal wound, pack daily with plain gauze packing and dry secondary dressing. If sites are draining, may place a split gauze around the tube. Change daily. Otherwise may leave open to air. G- Tube to gravity.

## 2022-01-15 LAB
BACTERIA SPEC CULT: NORMAL
SERVICE CMNT-IMP: NORMAL

## 2022-01-18 ENCOUNTER — ANESTHESIA (OUTPATIENT)
Dept: SURGERY | Age: 42
DRG: 710 | End: 2022-01-18
Payer: MEDICAID

## 2022-01-18 ENCOUNTER — APPOINTMENT (OUTPATIENT)
Dept: GENERAL RADIOLOGY | Age: 42
DRG: 710 | End: 2022-01-18
Attending: EMERGENCY MEDICINE
Payer: MEDICAID

## 2022-01-18 ENCOUNTER — APPOINTMENT (OUTPATIENT)
Dept: CT IMAGING | Age: 42
DRG: 710 | End: 2022-01-18
Attending: EMERGENCY MEDICINE
Payer: MEDICAID

## 2022-01-18 ENCOUNTER — APPOINTMENT (OUTPATIENT)
Dept: GENERAL RADIOLOGY | Age: 42
DRG: 710 | End: 2022-01-18
Attending: ANESTHESIOLOGY
Payer: MEDICAID

## 2022-01-18 ENCOUNTER — HOSPITAL ENCOUNTER (INPATIENT)
Age: 42
LOS: 16 days | Discharge: HOME HEALTH | DRG: 710 | End: 2022-02-03
Attending: EMERGENCY MEDICINE | Admitting: SURGERY
Payer: MEDICAID

## 2022-01-18 ENCOUNTER — ANESTHESIA EVENT (OUTPATIENT)
Dept: SURGERY | Age: 42
DRG: 710 | End: 2022-01-18
Payer: MEDICAID

## 2022-01-18 DIAGNOSIS — K63.89 PNEUMATOSIS INTESTINALIS: ICD-10-CM

## 2022-01-18 DIAGNOSIS — A41.9 SEVERE SEPSIS (HCC): ICD-10-CM

## 2022-01-18 DIAGNOSIS — R65.21 SEPTIC SHOCK (HCC): Primary | ICD-10-CM

## 2022-01-18 DIAGNOSIS — N17.9 AKI (ACUTE KIDNEY INJURY) (HCC): ICD-10-CM

## 2022-01-18 DIAGNOSIS — E86.1 HYPOVOLEMIA: ICD-10-CM

## 2022-01-18 DIAGNOSIS — A41.9 SEPTIC SHOCK (HCC): Primary | ICD-10-CM

## 2022-01-18 DIAGNOSIS — R65.20 SEVERE SEPSIS (HCC): ICD-10-CM

## 2022-01-18 LAB
ABO + RH BLD: NORMAL
ALBUMIN SERPL-MCNC: 3.5 G/DL (ref 3.5–5)
ALBUMIN/GLOB SERPL: 0.6 {RATIO} (ref 1.1–2.2)
ALP SERPL-CCNC: 252 U/L (ref 45–117)
ALT SERPL-CCNC: 73 U/L (ref 12–78)
AMMONIA PLAS-SCNC: <10 UMOL/L
ANION GAP SERPL CALC-SCNC: 11 MMOL/L (ref 5–15)
ANION GAP SERPL CALC-SCNC: 20 MMOL/L (ref 5–15)
ARTERIAL PATENCY WRIST A: ABNORMAL
AST SERPL-CCNC: 45 U/L (ref 15–37)
ATRIAL RATE: 121 BPM
BASE EXCESS BLD CALC-SCNC: 11.2 MMOL/L
BASE EXCESS BLD CALC-SCNC: 4.4 MMOL/L
BASOPHILS # BLD: 0 K/UL (ref 0–0.1)
BASOPHILS NFR BLD: 0 % (ref 0–1)
BDY SITE: ABNORMAL
BILIRUB SERPL-MCNC: 0.8 MG/DL (ref 0.2–1)
BLOOD GROUP ANTIBODIES SERPL: NORMAL
BUN SERPL-MCNC: 86 MG/DL (ref 6–20)
BUN SERPL-MCNC: 94 MG/DL (ref 6–20)
BUN/CREAT SERPL: 34 (ref 12–20)
BUN/CREAT SERPL: 45 (ref 12–20)
CA-I BLD-MCNC: 0.88 MMOL/L (ref 1.12–1.32)
CALCIUM SERPL-MCNC: 10.5 MG/DL (ref 8.5–10.1)
CALCIUM SERPL-MCNC: 7.8 MG/DL (ref 8.5–10.1)
CALCULATED P AXIS, ECG09: 78 DEGREES
CALCULATED R AXIS, ECG10: 66 DEGREES
CALCULATED T AXIS, ECG11: 86 DEGREES
CHLORIDE BLD-SCNC: <65 MMOL/L (ref 100–108)
CHLORIDE SERPL-SCNC: 52 MMOL/L (ref 97–108)
CHLORIDE SERPL-SCNC: 79 MMOL/L (ref 97–108)
CO2 BLD-SCNC: 41 MMOL/L (ref 19–24)
CO2 SERPL-SCNC: 33 MMOL/L (ref 21–32)
CO2 SERPL-SCNC: 41 MMOL/L (ref 21–32)
COVID-19 RAPID TEST, COVR: NOT DETECTED
CREAT SERPL-MCNC: 1.9 MG/DL (ref 0.7–1.3)
CREAT SERPL-MCNC: 2.73 MG/DL (ref 0.7–1.3)
CREAT UR-MCNC: 3.3 MG/DL (ref 0.6–1.3)
DIAGNOSIS, 93000: NORMAL
DIFFERENTIAL METHOD BLD: ABNORMAL
EOSINOPHIL # BLD: 0 K/UL (ref 0–0.4)
EOSINOPHIL NFR BLD: 0 % (ref 0–7)
ERYTHROCYTE [DISTWIDTH] IN BLOOD BY AUTOMATED COUNT: 19.5 % (ref 11.5–14.5)
ETHANOL SERPL-MCNC: <10 MG/DL
GAS FLOW.O2 O2 DELIVERY SYS: ABNORMAL L/MIN
GAS FLOW.O2 SETTING OXYMISER: 12 BPM
GLOBULIN SER CALC-MCNC: 6.1 G/DL (ref 2–4)
GLUCOSE BLD STRIP.AUTO-MCNC: 286 MG/DL (ref 74–106)
GLUCOSE SERPL-MCNC: 109 MG/DL (ref 65–100)
GLUCOSE SERPL-MCNC: 270 MG/DL (ref 65–100)
HCO3 BLD-SCNC: 28.4 MMOL/L (ref 22–26)
HCO3 BLDA-SCNC: 41 MMOL/L
HCT VFR BLD AUTO: 40.5 % (ref 36.6–50.3)
HGB BLD-MCNC: 13.2 G/DL (ref 12.1–17)
IMM GRANULOCYTES # BLD AUTO: 0.2 K/UL (ref 0–0.04)
IMM GRANULOCYTES NFR BLD AUTO: 1 % (ref 0–0.5)
LACTATE BLD-SCNC: >18 MMOL/L (ref 0.4–2)
LACTATE SERPL-SCNC: 19 MMOL/L (ref 0.4–2)
LACTATE SERPL-SCNC: 7.1 MMOL/L (ref 0.4–2)
LYMPHOCYTES # BLD: 0.9 K/UL (ref 0.8–3.5)
LYMPHOCYTES NFR BLD: 6 % (ref 12–49)
MAGNESIUM SERPL-MCNC: 4 MG/DL (ref 1.6–2.4)
MCH RBC QN AUTO: 28.9 PG (ref 26–34)
MCHC RBC AUTO-ENTMCNC: 32.6 G/DL (ref 30–36.5)
MCV RBC AUTO: 88.8 FL (ref 80–99)
MONOCYTES # BLD: 0.6 K/UL (ref 0–1)
MONOCYTES NFR BLD: 4 % (ref 5–13)
NEUTS SEG # BLD: 15.1 K/UL (ref 1.8–8)
NEUTS SEG NFR BLD: 89 % (ref 32–75)
NRBC # BLD: 0 K/UL (ref 0–0.01)
NRBC BLD-RTO: 0 PER 100 WBC
O2/TOTAL GAS SETTING VFR VENT: 60 %
P-R INTERVAL, ECG05: 114 MS
PCO2 BLD: 39.2 MMHG (ref 35–45)
PCO2 BLDV: 76.6 MMHG (ref 41–51)
PEEP RESPIRATORY: 5 CMH2O
PH BLD: 7.47 [PH] (ref 7.35–7.45)
PH BLDV: 7.34 [PH] (ref 7.32–7.42)
PLATELET # BLD AUTO: 503 K/UL (ref 150–400)
PMV BLD AUTO: 9.4 FL (ref 8.9–12.9)
PO2 BLD: 289 MMHG (ref 80–100)
PO2 BLDV: 13 MMHG (ref 25–40)
POTASSIUM BLD-SCNC: 4.7 MMOL/L (ref 3.5–5.5)
POTASSIUM SERPL-SCNC: 4.6 MMOL/L (ref 3.5–5.1)
POTASSIUM SERPL-SCNC: 4.7 MMOL/L (ref 3.5–5.1)
PRESSURE SUPPORT SETTING VENT: 15 CMH2O
PROCALCITONIN SERPL-MCNC: 3.22 NG/ML
PROT SERPL-MCNC: 9.6 G/DL (ref 6.4–8.2)
Q-T INTERVAL, ECG07: 320 MS
QRS DURATION, ECG06: 68 MS
QTC CALCULATION (BEZET), ECG08: 454 MS
RBC # BLD AUTO: 4.56 M/UL (ref 4.1–5.7)
SAO2 % BLD: 99.9 % (ref 92–97)
SERVICE CMNT-IMP: ABNORMAL
SODIUM BLD-SCNC: 113 MMOL/L (ref 136–145)
SODIUM SERPL-SCNC: 113 MMOL/L (ref 136–145)
SODIUM SERPL-SCNC: 123 MMOL/L (ref 136–145)
SOURCE, COVRS: NORMAL
SPECIMEN EXP DATE BLD: NORMAL
SPECIMEN SITE: ABNORMAL
SPECIMEN TYPE: ABNORMAL
TROPONIN-HIGH SENSITIVITY: 68 NG/L (ref 0–76)
VENTILATION MODE VENT: ABNORMAL
VENTRICULAR RATE, ECG03: 121 BPM
VT SETTING VENT: 400 ML
WBC # BLD AUTO: 16.9 K/UL (ref 4.1–11.1)

## 2022-01-18 PROCEDURE — 77030040361 HC SLV COMPR DVT MDII -B: Performed by: SURGERY

## 2022-01-18 PROCEDURE — 77030020061 HC IV BLD WRMR ADMIN SET 3M -B: Performed by: ANESTHESIOLOGY

## 2022-01-18 PROCEDURE — 5A1935Z RESPIRATORY VENTILATION, LESS THAN 24 CONSECUTIVE HOURS: ICD-10-PCS | Performed by: SURGERY

## 2022-01-18 PROCEDURE — 83036 HEMOGLOBIN GLYCOSYLATED A1C: CPT

## 2022-01-18 PROCEDURE — 2709999900 HC NON-CHARGEABLE SUPPLY: Performed by: SURGERY

## 2022-01-18 PROCEDURE — 71045 X-RAY EXAM CHEST 1 VIEW: CPT

## 2022-01-18 PROCEDURE — 74176 CT ABD & PELVIS W/O CONTRAST: CPT

## 2022-01-18 PROCEDURE — 77030011264 HC ELECTRD BLD EXT COVD -A: Performed by: SURGERY

## 2022-01-18 PROCEDURE — 76010000153 HC OR TIME 1.5 TO 2 HR: Performed by: SURGERY

## 2022-01-18 PROCEDURE — 77030005401 HC CATH RAD ARRO -A: Performed by: ANESTHESIOLOGY

## 2022-01-18 PROCEDURE — 87040 BLOOD CULTURE FOR BACTERIA: CPT

## 2022-01-18 PROCEDURE — 65620000000 HC RM CCU GENERAL

## 2022-01-18 PROCEDURE — 0DJW0ZZ INSPECTION OF PERITONEUM, OPEN APPROACH: ICD-10-PCS | Performed by: INTERNAL MEDICINE

## 2022-01-18 PROCEDURE — 93005 ELECTROCARDIOGRAM TRACING: CPT

## 2022-01-18 PROCEDURE — 82140 ASSAY OF AMMONIA: CPT

## 2022-01-18 PROCEDURE — 74011000250 HC RX REV CODE- 250: Performed by: NURSE ANESTHETIST, CERTIFIED REGISTERED

## 2022-01-18 PROCEDURE — 74011250636 HC RX REV CODE- 250/636: Performed by: SURGERY

## 2022-01-18 PROCEDURE — 77030013079 HC BLNKT BAIR HGGR 3M -A: Performed by: ANESTHESIOLOGY

## 2022-01-18 PROCEDURE — 77030013797 HC KT TRNSDUC PRSSR EDWD -A: Performed by: ANESTHESIOLOGY

## 2022-01-18 PROCEDURE — 84132 ASSAY OF SERUM POTASSIUM: CPT

## 2022-01-18 PROCEDURE — 77030005513 HC CATH URETH FOL11 MDII -B: Performed by: SURGERY

## 2022-01-18 PROCEDURE — 03HB33Z INSERTION OF INFUSION DEVICE INTO RIGHT RADIAL ARTERY, PERCUTANEOUS APPROACH: ICD-10-PCS | Performed by: ANESTHESIOLOGY

## 2022-01-18 PROCEDURE — 77030026438 HC STYL ET INTUB CARD -A: Performed by: ANESTHESIOLOGY

## 2022-01-18 PROCEDURE — 87635 SARS-COV-2 COVID-19 AMP PRB: CPT

## 2022-01-18 PROCEDURE — 84145 PROCALCITONIN (PCT): CPT

## 2022-01-18 PROCEDURE — 0BH17EZ INSERTION OF ENDOTRACHEAL AIRWAY INTO TRACHEA, VIA NATURAL OR ARTIFICIAL OPENING: ICD-10-PCS | Performed by: SURGERY

## 2022-01-18 PROCEDURE — 82803 BLOOD GASES ANY COMBINATION: CPT

## 2022-01-18 PROCEDURE — 77030008684 HC TU ET CUF COVD -B: Performed by: ANESTHESIOLOGY

## 2022-01-18 PROCEDURE — 74011250636 HC RX REV CODE- 250/636: Performed by: NURSE ANESTHETIST, CERTIFIED REGISTERED

## 2022-01-18 PROCEDURE — 74011000258 HC RX REV CODE- 258: Performed by: EMERGENCY MEDICINE

## 2022-01-18 PROCEDURE — 83735 ASSAY OF MAGNESIUM: CPT

## 2022-01-18 PROCEDURE — 74011000250 HC RX REV CODE- 250: Performed by: EMERGENCY MEDICINE

## 2022-01-18 PROCEDURE — 77030002996 HC SUT SLK J&J -A: Performed by: SURGERY

## 2022-01-18 PROCEDURE — 96375 TX/PRO/DX INJ NEW DRUG ADDON: CPT

## 2022-01-18 PROCEDURE — 99285 EMERGENCY DEPT VISIT HI MDM: CPT

## 2022-01-18 PROCEDURE — 84484 ASSAY OF TROPONIN QUANT: CPT

## 2022-01-18 PROCEDURE — 74011250636 HC RX REV CODE- 250/636: Performed by: EMERGENCY MEDICINE

## 2022-01-18 PROCEDURE — 77030008462 HC STPLR SKN PROX J&J -A: Performed by: SURGERY

## 2022-01-18 PROCEDURE — 94002 VENT MGMT INPAT INIT DAY: CPT

## 2022-01-18 PROCEDURE — 2709999900 HC NON-CHARGEABLE SUPPLY

## 2022-01-18 PROCEDURE — 77030019908 HC STETH ESOPH SIMS -A: Performed by: ANESTHESIOLOGY

## 2022-01-18 PROCEDURE — 82947 ASSAY GLUCOSE BLOOD QUANT: CPT

## 2022-01-18 PROCEDURE — 99222 1ST HOSP IP/OBS MODERATE 55: CPT | Performed by: SURGERY

## 2022-01-18 PROCEDURE — 80053 COMPREHEN METABOLIC PANEL: CPT

## 2022-01-18 PROCEDURE — 74011250636 HC RX REV CODE- 250/636: Performed by: ANESTHESIOLOGY

## 2022-01-18 PROCEDURE — 76210000001 HC OR PH I REC 2.5 TO 3 HR: Performed by: SURGERY

## 2022-01-18 PROCEDURE — 77030011278 HC ELECTRD LIG IMPT COVD -F: Performed by: SURGERY

## 2022-01-18 PROCEDURE — 77030040831 HC BAG URINE DRNG MDII -A: Performed by: SURGERY

## 2022-01-18 PROCEDURE — C9113 INJ PANTOPRAZOLE SODIUM, VIA: HCPCS | Performed by: EMERGENCY MEDICINE

## 2022-01-18 PROCEDURE — 96374 THER/PROPH/DIAG INJ IV PUSH: CPT

## 2022-01-18 PROCEDURE — 76060000034 HC ANESTHESIA 1.5 TO 2 HR: Performed by: SURGERY

## 2022-01-18 PROCEDURE — 0BP1XDZ REMOVAL OF INTRALUMINAL DEVICE FROM TRACHEA, EXTERNAL APPROACH: ICD-10-PCS | Performed by: SURGERY

## 2022-01-18 PROCEDURE — 77030035236 HC SUT PDS STRATFX BARB J&J -B: Performed by: SURGERY

## 2022-01-18 PROCEDURE — 74011000258 HC RX REV CODE- 258: Performed by: SURGERY

## 2022-01-18 PROCEDURE — 36415 COLL VENOUS BLD VENIPUNCTURE: CPT

## 2022-01-18 PROCEDURE — 85025 COMPLETE CBC W/AUTO DIFF WBC: CPT

## 2022-01-18 PROCEDURE — 83605 ASSAY OF LACTIC ACID: CPT

## 2022-01-18 PROCEDURE — 86900 BLOOD TYPING SEROLOGIC ABO: CPT

## 2022-01-18 PROCEDURE — 82077 ASSAY SPEC XCP UR&BREATH IA: CPT

## 2022-01-18 RX ORDER — ONDANSETRON 2 MG/ML
4 INJECTION INTRAMUSCULAR; INTRAVENOUS AS NEEDED
Status: DISCONTINUED | OUTPATIENT
Start: 2022-01-18 | End: 2022-01-18 | Stop reason: HOSPADM

## 2022-01-18 RX ORDER — FENTANYL CITRATE 50 UG/ML
INJECTION, SOLUTION INTRAMUSCULAR; INTRAVENOUS AS NEEDED
Status: DISCONTINUED | OUTPATIENT
Start: 2022-01-18 | End: 2022-01-18 | Stop reason: HOSPADM

## 2022-01-18 RX ORDER — MORPHINE SULFATE 2 MG/ML
2 INJECTION, SOLUTION INTRAMUSCULAR; INTRAVENOUS
Status: DISCONTINUED | OUTPATIENT
Start: 2022-01-18 | End: 2022-01-18 | Stop reason: HOSPADM

## 2022-01-18 RX ORDER — ONDANSETRON 2 MG/ML
INJECTION INTRAMUSCULAR; INTRAVENOUS AS NEEDED
Status: DISCONTINUED | OUTPATIENT
Start: 2022-01-18 | End: 2022-01-18 | Stop reason: HOSPADM

## 2022-01-18 RX ORDER — DIPHENHYDRAMINE HYDROCHLORIDE 50 MG/ML
12.5 INJECTION, SOLUTION INTRAMUSCULAR; INTRAVENOUS AS NEEDED
Status: DISCONTINUED | OUTPATIENT
Start: 2022-01-18 | End: 2022-01-18 | Stop reason: HOSPADM

## 2022-01-18 RX ORDER — SODIUM CHLORIDE 0.9 % (FLUSH) 0.9 %
5-10 SYRINGE (ML) INJECTION AS NEEDED
Status: DISCONTINUED | OUTPATIENT
Start: 2022-01-18 | End: 2022-02-02

## 2022-01-18 RX ORDER — SODIUM CHLORIDE 9 MG/ML
125 INJECTION, SOLUTION INTRAVENOUS CONTINUOUS
Status: DISCONTINUED | OUTPATIENT
Start: 2022-01-18 | End: 2022-01-19

## 2022-01-18 RX ORDER — SODIUM CHLORIDE, SODIUM LACTATE, POTASSIUM CHLORIDE, CALCIUM CHLORIDE 600; 310; 30; 20 MG/100ML; MG/100ML; MG/100ML; MG/100ML
25 INJECTION, SOLUTION INTRAVENOUS CONTINUOUS
Status: DISCONTINUED | OUTPATIENT
Start: 2022-01-18 | End: 2022-01-18 | Stop reason: HOSPADM

## 2022-01-18 RX ORDER — SODIUM CHLORIDE 0.9 % (FLUSH) 0.9 %
5-40 SYRINGE (ML) INJECTION AS NEEDED
Status: DISCONTINUED | OUTPATIENT
Start: 2022-01-18 | End: 2022-01-18 | Stop reason: HOSPADM

## 2022-01-18 RX ORDER — MAGNESIUM SULFATE 100 %
4 CRYSTALS MISCELLANEOUS AS NEEDED
Status: DISCONTINUED | OUTPATIENT
Start: 2022-01-18 | End: 2022-01-19

## 2022-01-18 RX ORDER — DEXTROSE 50 % IN WATER (D50W) INTRAVENOUS SYRINGE
25-50 AS NEEDED
Status: DISCONTINUED | OUTPATIENT
Start: 2022-01-18 | End: 2022-01-23 | Stop reason: SDUPTHER

## 2022-01-18 RX ORDER — PROPOFOL 10 MG/ML
0-50 VIAL (ML) INTRAVENOUS
Status: DISCONTINUED | OUTPATIENT
Start: 2022-01-18 | End: 2022-01-20

## 2022-01-18 RX ORDER — SODIUM CHLORIDE, SODIUM LACTATE, POTASSIUM CHLORIDE, CALCIUM CHLORIDE 600; 310; 30; 20 MG/100ML; MG/100ML; MG/100ML; MG/100ML
1000 INJECTION, SOLUTION INTRAVENOUS ONCE
Status: DISPENSED | OUTPATIENT
Start: 2022-01-18 | End: 2022-01-19

## 2022-01-18 RX ORDER — HYDROMORPHONE HYDROCHLORIDE 1 MG/ML
.2-.5 INJECTION, SOLUTION INTRAMUSCULAR; INTRAVENOUS; SUBCUTANEOUS
Status: DISCONTINUED | OUTPATIENT
Start: 2022-01-18 | End: 2022-01-18 | Stop reason: HOSPADM

## 2022-01-18 RX ORDER — LIDOCAINE HYDROCHLORIDE 10 MG/ML
0.1 INJECTION, SOLUTION EPIDURAL; INFILTRATION; INTRACAUDAL; PERINEURAL AS NEEDED
Status: DISCONTINUED | OUTPATIENT
Start: 2022-01-18 | End: 2022-01-18 | Stop reason: HOSPADM

## 2022-01-18 RX ORDER — PROPOFOL 10 MG/ML
INJECTION, EMULSION INTRAVENOUS
Status: DISCONTINUED | OUTPATIENT
Start: 2022-01-18 | End: 2022-01-18 | Stop reason: HOSPADM

## 2022-01-18 RX ORDER — PHENYLEPHRINE HCL IN 0.9% NACL 0.4MG/10ML
SYRINGE (ML) INTRAVENOUS
Status: DISCONTINUED | OUTPATIENT
Start: 2022-01-18 | End: 2022-01-18 | Stop reason: HOSPADM

## 2022-01-18 RX ORDER — SODIUM CHLORIDE 0.9 % (FLUSH) 0.9 %
5-40 SYRINGE (ML) INJECTION EVERY 8 HOURS
Status: DISCONTINUED | OUTPATIENT
Start: 2022-01-18 | End: 2022-01-18 | Stop reason: HOSPADM

## 2022-01-18 RX ORDER — ROCURONIUM BROMIDE 10 MG/ML
INJECTION, SOLUTION INTRAVENOUS AS NEEDED
Status: DISCONTINUED | OUTPATIENT
Start: 2022-01-18 | End: 2022-01-18 | Stop reason: HOSPADM

## 2022-01-18 RX ORDER — PROPOFOL 10 MG/ML
0-50 VIAL (ML) INTRAVENOUS
Status: DISCONTINUED | OUTPATIENT
Start: 2022-01-18 | End: 2022-01-18

## 2022-01-18 RX ORDER — INSULIN LISPRO 100 [IU]/ML
INJECTION, SOLUTION INTRAVENOUS; SUBCUTANEOUS EVERY 6 HOURS
Status: DISCONTINUED | OUTPATIENT
Start: 2022-01-19 | End: 2022-02-03 | Stop reason: HOSPADM

## 2022-01-18 RX ORDER — MIDAZOLAM HYDROCHLORIDE 1 MG/ML
INJECTION, SOLUTION INTRAMUSCULAR; INTRAVENOUS AS NEEDED
Status: DISCONTINUED | OUTPATIENT
Start: 2022-01-18 | End: 2022-01-18 | Stop reason: HOSPADM

## 2022-01-18 RX ORDER — SODIUM CHLORIDE 9 MG/ML
INJECTION, SOLUTION INTRAVENOUS
Status: DISCONTINUED | OUTPATIENT
Start: 2022-01-18 | End: 2022-01-18 | Stop reason: HOSPADM

## 2022-01-18 RX ORDER — PHENYLEPHRINE HYDROCHLORIDE 10 MG/ML
INJECTION INTRAVENOUS
Status: DISPENSED
Start: 2022-01-18 | End: 2022-01-19

## 2022-01-18 RX ORDER — NOREPINEPHRINE BITARTRATE/D5W 8 MG/250ML
0-30 PLASTIC BAG, INJECTION (ML) INTRAVENOUS
Status: DISCONTINUED | OUTPATIENT
Start: 2022-01-18 | End: 2022-01-20

## 2022-01-18 RX ORDER — SODIUM CHLORIDE, SODIUM LACTATE, POTASSIUM CHLORIDE, CALCIUM CHLORIDE 600; 310; 30; 20 MG/100ML; MG/100ML; MG/100ML; MG/100ML
25 INJECTION, SOLUTION INTRAVENOUS CONTINUOUS
Status: DISCONTINUED | OUTPATIENT
Start: 2022-01-18 | End: 2022-01-18

## 2022-01-18 RX ORDER — FENTANYL CITRATE 50 UG/ML
25 INJECTION, SOLUTION INTRAMUSCULAR; INTRAVENOUS
Status: DISCONTINUED | OUTPATIENT
Start: 2022-01-18 | End: 2022-01-18 | Stop reason: HOSPADM

## 2022-01-18 RX ORDER — ETOMIDATE 2 MG/ML
INJECTION INTRAVENOUS AS NEEDED
Status: DISCONTINUED | OUTPATIENT
Start: 2022-01-18 | End: 2022-01-18 | Stop reason: HOSPADM

## 2022-01-18 RX ORDER — SUCCINYLCHOLINE CHLORIDE 20 MG/ML
INJECTION INTRAMUSCULAR; INTRAVENOUS AS NEEDED
Status: DISCONTINUED | OUTPATIENT
Start: 2022-01-18 | End: 2022-01-18 | Stop reason: HOSPADM

## 2022-01-18 RX ADMIN — HYDROMORPHONE HYDROCHLORIDE 0.5 MG: 1 INJECTION, SOLUTION INTRAMUSCULAR; INTRAVENOUS; SUBCUTANEOUS at 21:00

## 2022-01-18 RX ADMIN — NOREPINEPHRINE BITARTRATE 4 MCG/MIN: 1 INJECTION, SOLUTION, CONCENTRATE INTRAVENOUS at 16:15

## 2022-01-18 RX ADMIN — ONDANSETRON HYDROCHLORIDE 4 MG: 2 INJECTION, SOLUTION INTRAMUSCULAR; INTRAVENOUS at 18:35

## 2022-01-18 RX ADMIN — SODIUM CHLORIDE, POTASSIUM CHLORIDE, SODIUM LACTATE AND CALCIUM CHLORIDE 1000 ML: 600; 310; 30; 20 INJECTION, SOLUTION INTRAVENOUS at 15:07

## 2022-01-18 RX ADMIN — ETOMIDATE 20 MG: 2 INJECTION, SOLUTION INTRAVENOUS at 17:28

## 2022-01-18 RX ADMIN — SODIUM CHLORIDE, POTASSIUM CHLORIDE, SODIUM LACTATE AND CALCIUM CHLORIDE 1000 ML: 600; 310; 30; 20 INJECTION, SOLUTION INTRAVENOUS at 15:09

## 2022-01-18 RX ADMIN — PIPERACILLIN AND TAZOBACTAM 3.38 G: 3; .375 INJECTION, POWDER, LYOPHILIZED, FOR SOLUTION INTRAVENOUS at 16:25

## 2022-01-18 RX ADMIN — PROPOFOL 30 MCG/KG/MIN: 10 INJECTION, EMULSION INTRAVENOUS at 18:36

## 2022-01-18 RX ADMIN — FENTANYL CITRATE 50 MCG: 50 INJECTION, SOLUTION INTRAMUSCULAR; INTRAVENOUS at 18:20

## 2022-01-18 RX ADMIN — SODIUM CHLORIDE 40 MG: 9 INJECTION, SOLUTION INTRAMUSCULAR; INTRAVENOUS; SUBCUTANEOUS at 16:25

## 2022-01-18 RX ADMIN — SODIUM CHLORIDE: 0.9 INJECTION, SOLUTION INTRAVENOUS at 17:13

## 2022-01-18 RX ADMIN — NOREPINEPHRINE BITARTRATE 8 MCG/MIN: 1 INJECTION, SOLUTION, CONCENTRATE INTRAVENOUS at 16:25

## 2022-01-18 RX ADMIN — SODIUM CHLORIDE 1000 ML: 9 INJECTION, SOLUTION INTRAVENOUS at 16:32

## 2022-01-18 RX ADMIN — Medication 30 MCG/KG/MIN: at 19:00

## 2022-01-18 RX ADMIN — PROPOFOL 30 MCG/KG/MIN: 10 INJECTION, EMULSION INTRAVENOUS at 20:52

## 2022-01-18 RX ADMIN — SODIUM CHLORIDE 125 ML/HR: 9 INJECTION, SOLUTION INTRAVENOUS at 20:00

## 2022-01-18 RX ADMIN — MIDAZOLAM HYDROCHLORIDE 2 MG: 1 INJECTION, SOLUTION INTRAMUSCULAR; INTRAVENOUS at 17:32

## 2022-01-18 RX ADMIN — SUCCINYLCHOLINE CHLORIDE 120 MG: 20 INJECTION, SOLUTION INTRAMUSCULAR; INTRAVENOUS at 17:28

## 2022-01-18 RX ADMIN — ROCURONIUM BROMIDE 50 MG: 10 INJECTION INTRAVENOUS at 17:32

## 2022-01-18 RX ADMIN — HYDROMORPHONE HYDROCHLORIDE 0.5 MG: 1 INJECTION, SOLUTION INTRAMUSCULAR; INTRAVENOUS; SUBCUTANEOUS at 20:15

## 2022-01-18 RX ADMIN — Medication 40 MCG/MIN: at 17:28

## 2022-01-18 RX ADMIN — HYDROMORPHONE HYDROCHLORIDE 0.5 MG: 1 INJECTION, SOLUTION INTRAMUSCULAR; INTRAVENOUS; SUBCUTANEOUS at 20:00

## 2022-01-18 RX ADMIN — FENTANYL CITRATE 50 MCG: 50 INJECTION, SOLUTION INTRAMUSCULAR; INTRAVENOUS at 18:28

## 2022-01-18 RX ADMIN — FENTANYL CITRATE 50 MCG: 50 INJECTION, SOLUTION INTRAMUSCULAR; INTRAVENOUS at 17:44

## 2022-01-18 RX ADMIN — FENTANYL CITRATE 50 MCG: 50 INJECTION, SOLUTION INTRAMUSCULAR; INTRAVENOUS at 17:38

## 2022-01-18 RX ADMIN — HYDROMORPHONE HYDROCHLORIDE 0.5 MG: 1 INJECTION, SOLUTION INTRAMUSCULAR; INTRAVENOUS; SUBCUTANEOUS at 21:15

## 2022-01-18 RX ADMIN — PIPERACILLIN AND TAZOBACTAM 3.38 G: 3; .375 INJECTION, POWDER, LYOPHILIZED, FOR SOLUTION INTRAVENOUS at 21:18
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
## 2022-01-18 NOTE — ANESTHESIA PROCEDURE NOTES
Arterial Line Placement    Performed by: Isis Neil MD  Authorized by: Isis Neil MD     Pre-Procedure  Indications:  Arterial pressure monitoring  Preanesthetic Checklist: patient identified, risks and benefits discussed, anesthesia consent, site marked, patient being monitored, timeout performed and patient being monitored      Procedure:   Prep:  Betadine  Location:  Radial artery  Catheter size:  20 G  Number of attempts:  2  Cont Cardiac Output Sensor: No      Assessment:   Post-procedure:  Line secured and sterile dressing applied  Patient Tolerance:  Patient tolerated the procedure well with no immediate complications
negative - No discharge, No redness

## 2022-01-19 LAB
AMPHET UR QL SCN: NEGATIVE
ANION GAP SERPL CALC-SCNC: 9 MMOL/L (ref 5–15)
ANION GAP SERPL CALC-SCNC: 9 MMOL/L (ref 5–15)
APPEARANCE UR: ABNORMAL
BACTERIA URNS QL MICRO: ABNORMAL /HPF
BARBITURATES UR QL SCN: NEGATIVE
BASE EXCESS BLD CALC-SCNC: 15.4 MMOL/L
BASOPHILS # BLD: 0 K/UL (ref 0–0.1)
BASOPHILS NFR BLD: 0 % (ref 0–1)
BENZODIAZ UR QL: POSITIVE
BILIRUB UR QL: NEGATIVE
BUN SERPL-MCNC: 61 MG/DL (ref 6–20)
BUN SERPL-MCNC: 77 MG/DL (ref 6–20)
BUN/CREAT SERPL: 36 (ref 12–20)
BUN/CREAT SERPL: 42 (ref 12–20)
CA-I BLD-MCNC: 0.87 MMOL/L (ref 1.12–1.32)
CALCIUM SERPL-MCNC: 8.2 MG/DL (ref 8.5–10.1)
CALCIUM SERPL-MCNC: 8.4 MG/DL (ref 8.5–10.1)
CANNABINOIDS UR QL SCN: NEGATIVE
CAOX CRY URNS QL MICRO: ABNORMAL
CHLORIDE BLD-SCNC: <65 MMOL/L (ref 100–108)
CHLORIDE SERPL-SCNC: 80 MMOL/L (ref 97–108)
CHLORIDE SERPL-SCNC: 85 MMOL/L (ref 97–108)
CO2 BLD-SCNC: 46 MMOL/L (ref 19–24)
CO2 SERPL-SCNC: 34 MMOL/L (ref 21–32)
CO2 SERPL-SCNC: 36 MMOL/L (ref 21–32)
COCAINE UR QL SCN: NEGATIVE
COLOR UR: ABNORMAL
CREAT SERPL-MCNC: 1.46 MG/DL (ref 0.7–1.3)
CREAT SERPL-MCNC: 2.12 MG/DL (ref 0.7–1.3)
CREAT UR-MCNC: 3.9 MG/DL (ref 0.6–1.3)
DIFFERENTIAL METHOD BLD: ABNORMAL
DRUG SCRN COMMENT,DRGCM: ABNORMAL
EOSINOPHIL # BLD: 0 K/UL (ref 0–0.4)
EOSINOPHIL NFR BLD: 0 % (ref 0–7)
EPITH CASTS URNS QL MICRO: ABNORMAL /LPF
ERYTHROCYTE [DISTWIDTH] IN BLOOD BY AUTOMATED COUNT: 19.3 % (ref 11.5–14.5)
EST. AVERAGE GLUCOSE BLD GHB EST-MCNC: 97 MG/DL
GLUCOSE BLD STRIP.AUTO-MCNC: 117 MG/DL (ref 65–117)
GLUCOSE BLD STRIP.AUTO-MCNC: 124 MG/DL (ref 65–117)
GLUCOSE BLD STRIP.AUTO-MCNC: 129 MG/DL (ref 65–117)
GLUCOSE BLD STRIP.AUTO-MCNC: 139 MG/DL (ref 65–117)
GLUCOSE BLD STRIP.AUTO-MCNC: 161 MG/DL (ref 65–117)
GLUCOSE BLD STRIP.AUTO-MCNC: 284 MG/DL (ref 74–106)
GLUCOSE SERPL-MCNC: 125 MG/DL (ref 65–100)
GLUCOSE SERPL-MCNC: 167 MG/DL (ref 65–100)
GLUCOSE UR STRIP.AUTO-MCNC: NEGATIVE MG/DL
HBA1C MFR BLD: 5 % (ref 4–5.6)
HCO3 BLDA-SCNC: 47 MMOL/L
HCT VFR BLD AUTO: 32.1 % (ref 36.6–50.3)
HGB BLD-MCNC: 10.7 G/DL (ref 12.1–17)
HGB UR QL STRIP: ABNORMAL
IMM GRANULOCYTES # BLD AUTO: 0.1 K/UL (ref 0–0.04)
IMM GRANULOCYTES NFR BLD AUTO: 1 % (ref 0–0.5)
KETONES UR QL STRIP.AUTO: NEGATIVE MG/DL
LACTATE SERPL-SCNC: 2.4 MMOL/L (ref 0.4–2)
LACTATE SERPL-SCNC: 4.6 MMOL/L (ref 0.4–2)
LEUKOCYTE ESTERASE UR QL STRIP.AUTO: NEGATIVE
LYMPHOCYTES # BLD: 0.8 K/UL (ref 0.8–3.5)
LYMPHOCYTES NFR BLD: 6 % (ref 12–49)
MAGNESIUM SERPL-MCNC: 2.8 MG/DL (ref 1.6–2.4)
MCH RBC QN AUTO: 29.3 PG (ref 26–34)
MCHC RBC AUTO-ENTMCNC: 33.3 G/DL (ref 30–36.5)
MCV RBC AUTO: 87.9 FL (ref 80–99)
METHADONE UR QL: NEGATIVE
MONOCYTES # BLD: 0.6 K/UL (ref 0–1)
MONOCYTES NFR BLD: 4 % (ref 5–13)
NEUTS SEG # BLD: 12.4 K/UL (ref 1.8–8)
NEUTS SEG NFR BLD: 89 % (ref 32–75)
NITRITE UR QL STRIP.AUTO: NEGATIVE
NRBC # BLD: 0 K/UL (ref 0–0.01)
NRBC BLD-RTO: 0 PER 100 WBC
OPIATES UR QL: POSITIVE
PCO2 BLDV: 79.8 MMHG (ref 41–51)
PCP UR QL: NEGATIVE
PH BLDV: 7.38 [PH] (ref 7.32–7.42)
PH UR STRIP: 6 [PH] (ref 5–8)
PHOSPHATE SERPL-MCNC: 7.1 MG/DL (ref 2.6–4.7)
PLATELET # BLD AUTO: 374 K/UL (ref 150–400)
PMV BLD AUTO: 9.2 FL (ref 8.9–12.9)
PO2 BLDV: <13 MMHG (ref 25–40)
POTASSIUM BLD-SCNC: 4.8 MMOL/L (ref 3.5–5.5)
POTASSIUM SERPL-SCNC: 4.2 MMOL/L (ref 3.5–5.1)
POTASSIUM SERPL-SCNC: 5.7 MMOL/L (ref 3.5–5.1)
PROT UR STRIP-MCNC: 30 MG/DL
RBC # BLD AUTO: 3.65 M/UL (ref 4.1–5.7)
RBC #/AREA URNS HPF: ABNORMAL /HPF (ref 0–5)
SERVICE CMNT-IMP: ABNORMAL
SERVICE CMNT-IMP: NORMAL
SODIUM BLD-SCNC: 112 MMOL/L (ref 136–145)
SODIUM SERPL-SCNC: 125 MMOL/L (ref 136–145)
SODIUM SERPL-SCNC: 128 MMOL/L (ref 136–145)
SP GR UR REFRACTOMETRY: 1.02 (ref 1–1.03)
SPECIMEN SITE: ABNORMAL
UA: UC IF INDICATED,UAUC: ABNORMAL
UROBILINOGEN UR QL STRIP.AUTO: 0.2 EU/DL (ref 0.2–1)
WBC # BLD AUTO: 13.9 K/UL (ref 4.1–11.1)
WBC URNS QL MICRO: ABNORMAL /HPF (ref 0–4)

## 2022-01-19 PROCEDURE — 83605 ASSAY OF LACTIC ACID: CPT

## 2022-01-19 PROCEDURE — 74011000250 HC RX REV CODE- 250: Performed by: INTERNAL MEDICINE

## 2022-01-19 PROCEDURE — 83735 ASSAY OF MAGNESIUM: CPT

## 2022-01-19 PROCEDURE — 74011000258 HC RX REV CODE- 258: Performed by: SURGERY

## 2022-01-19 PROCEDURE — 94003 VENT MGMT INPAT SUBQ DAY: CPT

## 2022-01-19 PROCEDURE — 84100 ASSAY OF PHOSPHORUS: CPT

## 2022-01-19 PROCEDURE — 82962 GLUCOSE BLOOD TEST: CPT

## 2022-01-19 PROCEDURE — 74011250636 HC RX REV CODE- 250/636: Performed by: INTERNAL MEDICINE

## 2022-01-19 PROCEDURE — 74011250636 HC RX REV CODE- 250/636: Performed by: SURGERY

## 2022-01-19 PROCEDURE — 36415 COLL VENOUS BLD VENIPUNCTURE: CPT

## 2022-01-19 PROCEDURE — 74011250636 HC RX REV CODE- 250/636: Performed by: NURSE PRACTITIONER

## 2022-01-19 PROCEDURE — 80048 BASIC METABOLIC PNL TOTAL CA: CPT

## 2022-01-19 PROCEDURE — 49000 EXPLORATION OF ABDOMEN: CPT | Performed by: SURGERY

## 2022-01-19 PROCEDURE — 74011000250 HC RX REV CODE- 250: Performed by: NURSE PRACTITIONER

## 2022-01-19 PROCEDURE — 65620000000 HC RM CCU GENERAL

## 2022-01-19 PROCEDURE — 80307 DRUG TEST PRSMV CHEM ANLYZR: CPT

## 2022-01-19 PROCEDURE — 74011636637 HC RX REV CODE- 636/637: Performed by: NURSE PRACTITIONER

## 2022-01-19 PROCEDURE — 74011000250 HC RX REV CODE- 250: Performed by: EMERGENCY MEDICINE

## 2022-01-19 PROCEDURE — 81001 URINALYSIS AUTO W/SCOPE: CPT

## 2022-01-19 PROCEDURE — 74011000258 HC RX REV CODE- 258: Performed by: INTERNAL MEDICINE

## 2022-01-19 PROCEDURE — 74011000258 HC RX REV CODE- 258: Performed by: EMERGENCY MEDICINE

## 2022-01-19 PROCEDURE — 74011250637 HC RX REV CODE- 250/637: Performed by: NURSE PRACTITIONER

## 2022-01-19 PROCEDURE — 74011250636 HC RX REV CODE- 250/636: Performed by: ANESTHESIOLOGY

## 2022-01-19 PROCEDURE — 85025 COMPLETE CBC W/AUTO DIFF WBC: CPT

## 2022-01-19 PROCEDURE — P9047 ALBUMIN (HUMAN), 25%, 50ML: HCPCS | Performed by: INTERNAL MEDICINE

## 2022-01-19 PROCEDURE — 2709999900 HC NON-CHARGEABLE SUPPLY

## 2022-01-19 RX ORDER — BALSAM PERU/CASTOR OIL
OINTMENT (GRAM) TOPICAL 2 TIMES DAILY
Status: DISCONTINUED | OUTPATIENT
Start: 2022-01-19 | End: 2022-02-03 | Stop reason: HOSPADM

## 2022-01-19 RX ORDER — CALCIUM GLUCONATE 20 MG/ML
1 INJECTION, SOLUTION INTRAVENOUS ONCE
Status: COMPLETED | OUTPATIENT
Start: 2022-01-19 | End: 2022-01-19

## 2022-01-19 RX ORDER — ERGOCALCIFEROL 1.25 MG/1
CAPSULE ORAL
Status: ON HOLD | COMMUNITY
Start: 2022-01-05 | End: 2022-04-25

## 2022-01-19 RX ORDER — DEXTROSE MONOHYDRATE 50 MG/ML
100 INJECTION, SOLUTION INTRAVENOUS CONTINUOUS
Status: DISCONTINUED | OUTPATIENT
Start: 2022-01-19 | End: 2022-01-19

## 2022-01-19 RX ORDER — ACETAMINOPHEN 650 MG/1
650 SUPPOSITORY RECTAL
Status: DISCONTINUED | OUTPATIENT
Start: 2022-01-19 | End: 2022-02-03

## 2022-01-19 RX ORDER — HYDROMORPHONE HYDROCHLORIDE 1 MG/ML
0.5 INJECTION, SOLUTION INTRAMUSCULAR; INTRAVENOUS; SUBCUTANEOUS
Status: DISCONTINUED | OUTPATIENT
Start: 2022-01-19 | End: 2022-02-03 | Stop reason: HOSPADM

## 2022-01-19 RX ORDER — ALBUMIN HUMAN 250 G/1000ML
25 SOLUTION INTRAVENOUS EVERY 6 HOURS
Status: COMPLETED | OUTPATIENT
Start: 2022-01-19 | End: 2022-01-21

## 2022-01-19 RX ORDER — DEXTROSE 50 % IN WATER (D50W) INTRAVENOUS SYRINGE
25 ONCE
Status: COMPLETED | OUTPATIENT
Start: 2022-01-19 | End: 2022-01-19

## 2022-01-19 RX ORDER — CHLORHEXIDINE GLUCONATE 0.12 MG/ML
15 RINSE ORAL EVERY 12 HOURS
Status: DISCONTINUED | OUTPATIENT
Start: 2022-01-19 | End: 2022-01-19

## 2022-01-19 RX ORDER — DEXTROSE 50 % IN WATER (D50W) INTRAVENOUS SYRINGE
12.5-25 AS NEEDED
Status: DISCONTINUED | OUTPATIENT
Start: 2022-01-19 | End: 2022-02-02 | Stop reason: SDUPTHER

## 2022-01-19 RX ADMIN — Medication: at 20:07

## 2022-01-19 RX ADMIN — CHLORHEXIDINE GLUCONATE 15 ML: 1.2 RINSE ORAL at 08:27

## 2022-01-19 RX ADMIN — Medication 1 AMPULE: at 08:26

## 2022-01-19 RX ADMIN — CALCIUM GLUCONATE: 98 INJECTION, SOLUTION INTRAVENOUS at 17:38

## 2022-01-19 RX ADMIN — Medication 2 UNITS: at 18:10

## 2022-01-19 RX ADMIN — SODIUM CHLORIDE, POTASSIUM CHLORIDE, SODIUM LACTATE AND CALCIUM CHLORIDE 500 ML: 600; 310; 30; 20 INJECTION, SOLUTION INTRAVENOUS at 03:10

## 2022-01-19 RX ADMIN — CALCIUM GLUCONATE 1000 MG: 20 INJECTION, SOLUTION INTRAVENOUS at 08:25

## 2022-01-19 RX ADMIN — PROPOFOL 50 MCG/KG/MIN: 10 INJECTION, EMULSION INTRAVENOUS at 01:23

## 2022-01-19 RX ADMIN — ALBUMIN (HUMAN) 25 G: 0.25 INJECTION, SOLUTION INTRAVENOUS at 23:33

## 2022-01-19 RX ADMIN — ALBUMIN (HUMAN) 25 G: 0.25 INJECTION, SOLUTION INTRAVENOUS at 12:06

## 2022-01-19 RX ADMIN — MEROPENEM 500 MG: 500 INJECTION INTRAVENOUS at 17:33

## 2022-01-19 RX ADMIN — Medication: at 08:39

## 2022-01-19 RX ADMIN — Medication 10 UNITS: at 08:25

## 2022-01-19 RX ADMIN — NOREPINEPHRINE BITARTRATE 15 MCG/MIN: 1 INJECTION, SOLUTION, CONCENTRATE INTRAVENOUS at 08:40

## 2022-01-19 RX ADMIN — ACETAMINOPHEN 650 MG: 650 SUPPOSITORY RECTAL at 05:31

## 2022-01-19 RX ADMIN — DEXTROSE MONOHYDRATE 100 ML/HR: 50 INJECTION, SOLUTION INTRAVENOUS at 12:05

## 2022-01-19 RX ADMIN — MEROPENEM 500 MG: 500 INJECTION, POWDER, FOR SOLUTION INTRAVENOUS at 10:10

## 2022-01-19 RX ADMIN — DEXTROSE MONOHYDRATE 25 G: 25 INJECTION, SOLUTION INTRAVENOUS at 08:25

## 2022-01-19 RX ADMIN — SODIUM CHLORIDE 125 ML/HR: 9 INJECTION, SOLUTION INTRAVENOUS at 05:01

## 2022-01-19 RX ADMIN — NOREPINEPHRINE BITARTRATE 13 MCG/MIN: 1 INJECTION, SOLUTION, CONCENTRATE INTRAVENOUS at 03:33

## 2022-01-19 RX ADMIN — NOREPINEPHRINE BITARTRATE 5 MCG/MIN: 1 INJECTION, SOLUTION, CONCENTRATE INTRAVENOUS at 18:12

## 2022-01-19 RX ADMIN — Medication 1 AMPULE: at 20:06

## 2022-01-19 RX ADMIN — ALBUMIN (HUMAN) 25 G: 0.25 INJECTION, SOLUTION INTRAVENOUS at 17:32

## 2022-01-19 RX ADMIN — PROPOFOL 20 MCG/KG/MIN: 10 INJECTION, EMULSION INTRAVENOUS at 09:18

## 2022-01-19 RX ADMIN — SODIUM CHLORIDE, POTASSIUM CHLORIDE, SODIUM LACTATE AND CALCIUM CHLORIDE 500 ML: 600; 310; 30; 20 INJECTION, SOLUTION INTRAVENOUS at 09:11

## 2022-01-19 RX ADMIN — FAMOTIDINE 20 MG: 10 INJECTION, SOLUTION INTRAVENOUS at 01:32

## 2022-01-19 RX ADMIN — CHLORHEXIDINE GLUCONATE 15 ML: 1.2 RINSE ORAL at 20:14

## 2022-01-19 RX ADMIN — PIPERACILLIN AND TAZOBACTAM 3.38 G: 3; .375 INJECTION, POWDER, LYOPHILIZED, FOR SOLUTION INTRAVENOUS at 05:31

## 2022-01-19 RX ADMIN — HYDROMORPHONE HYDROCHLORIDE 0.5 MG: 1 INJECTION, SOLUTION INTRAMUSCULAR; INTRAVENOUS; SUBCUTANEOUS at 20:03

## 2022-01-19 RX ADMIN — FAMOTIDINE 20 MG: 10 INJECTION, SOLUTION INTRAVENOUS at 08:26

## 2022-01-19 NOTE — PROGRESS NOTES
2330 Bedside shift change report received from Mason Malloy [facility] (offgoing nurse). Report included the following information SBAR, Kardex, OR Summary, Procedure Summary, MAR and Recent Results. Personal belongings noted to include Infinity by M[employer] feeding pump. 1275 Estelle Trujillo, RN with me at bedside to do dual skin assessment. Blanchable red areas noted on sacrum and right heel. Mepelex applied to sacrum. 0000 Shift assessment completed, see flowsheets. Moni Askew NP at bedside to discuss plan of care. Orders received. Will continue to monitor. 4650 Yolo [place], NP about unequal pupils. Per Nathan Guzman NP as long as they are reactive and aren't getting worse, this is a known problem and do not need to be concerned. Will continue to monitor for bilateral movement and reactivity of pupils. 12 Spoke to Nathan Guzman NP about patient's low urine output. Orders received, will continue to monitor. 8074 Spoke to Nathan Guzman NP about patient's rising temperature. Orders received, will continue to monitor. 0400 Reassessment completed, see flowsheets. 0630 Emptied G tube bag (950 output). Counted everything in the bag, but unsure if previously charted outputs (550) were removed or if they were still included in this count. 0700 Bedside shift change report given to Zaire Kwok (oncoming nurse) by Kameron Echevarria RN (offgoing nurse). Report included the following information SBAR, Kardex, ED Summary, Procedure Summary, Intake/Output, MAR and Recent Results.

## 2022-01-19 NOTE — PROGRESS NOTES
Spoke with Elisa [facility] ICU and conformed that patient was not up for discharge today. PICC will be placed tomorrow 1/20. jacquelyn martinez

## 2022-01-19 NOTE — PROGRESS NOTES
NAME: Ernesto Bone        :  1980        MRN:  [id number]                     Assessment   :                                               Plan:  Severe hyponatremia  SAE  Hyperkalemia  Dehydration  Contraction alkalosis  Lactic acidosis  Mild hypercalcemia  Hypotension     Extensive bowel distention with air-fluid levels, pneumatosis  intestinalis, and portal venous air. Creatinine better, 2.7 to 2.1; oliguric    Na 113 to 125 (too rapid a correction; change to D5W for a time)    Bicarb 36, K 5.7     S/p 2 L LR bolus, 1 L NS bolus; on levo     Emergent ex lap                 Subjective:     Chief Complaint:  Alert in icu. Not talkative. Chart reviewed. 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 Review of Systems:    Symptom Y/N Comments  Symptom Y/N Comments   Fever/Chills    Chest Pain     Poor Appetite    Edema     Cough    Abdominal Pain     Sputum    Joint Pain     SOB/GASTON    Pruritis/Rash     Nausea/vomit    Tolerating PT/OT     Diarrhea    Tolerating Diet     Constipation    Other       Could not obtain due to:      Objective:     VITALS:   Last 24hrs VS reviewed since prior progress note.  Most recent are:  Visit Vitals  BP (!) 86/67   Pulse (!) 121   Temp (!) 101.5 °F (38.6 °C)   Resp 15   Wt 45.9 kg (101 lb 3.1 oz)   SpO2 100%   BMI 14.94 kg/m²       Intake/Output Summary (Last 24 hours) at 2022 1052  Last data filed at 2022 1000  Gross per 24 hour   Intake 4294.21 ml   Output 2875 ml   Net 1419.21 ml      Telemetry Reviewed:     PHYSICAL EXAM:  General: NAD      Lab Data Reviewed: (see below)    Medications Reviewed: (see below)    PMH/SH reviewed - no change compared to H&P  ________________________________________________________________________  Care Plan discussed with:  Patient     Family      RN     Care Manager                    Consultant:          Comments   >50% of visit spent in counseling and coordination of care       ________________________________________________________________________  Guy Lopez MD     Procedures: see electronic medical records for all procedures/Xrays and details which  were not copied into this note but were reviewed prior to creation of Plan. LABS:  Recent Labs     01/19/22 0403 01/18/22  1505   WBC 13.9* 16.9*   HGB 10.7* 13.2   HCT 32.1* 40.5    503*     Recent Labs     01/19/22 0403 01/18/22 2043 01/18/22  1505   * 123* 113*   K 5.7* 4.7 4.6   CL 80* 79* 52*   CO2 36* 33* 41*   BUN 77* 86* 94*   CREA 2.12* 1.90* 2.73*   * 109* 270*   CA 8.2* 7.8* 10.5*   MG 2.8*  --  4.0*   PHOS 7.1*  --   --      Recent Labs     01/18/22  1505   *   TP 9.6*   ALB 3.5   GLOB 6.1*     No results for input(s): INR, PTP, APTT, INREXT in the last 72 hours. No results for input(s): FE, TIBC, PSAT, FERR in the last 72 hours. Lab Results   Component Value Date/Time    Folate 18.4 11/12/2021 05:52 PM      No results for input(s): PH, PCO2, PO2 in the last 72 hours. No results for input(s): CPK, CKMB in the last 72 hours.     No lab exists for component: TROPONINI  No components found for: [place]  Lab Results   Component Value Date/Time    Color YELLOW/STRAW 01/19/2022 03:04 AM    Appearance CLOUDY (A) 01/19/2022 03:04 AM    Specific gravity 1.021 01/19/2022 03:04 AM    Specific gravity 1.010 11/25/2021 11:37 PM    pH (UA) 6.0 01/19/2022 03:04 AM    Protein 30 (A) 01/19/2022 03:04 AM    Glucose Negative 01/19/2022 03:04 AM    Ketone Negative 01/19/2022 03:04 AM    Bilirubin Negative 01/19/2022 03:04 AM    Urobilinogen 0.2 01/19/2022 03:04 AM    Nitrites Negative 01/19/2022 03:04 AM    Leukocyte Esterase Negative 01/19/2022 03:04 AM    Epithelial cells FEW 01/19/2022 03:04 AM    Bacteria 1+ (A) 01/19/2022 03:04 AM    WBC 0-4 01/19/2022 03:04 AM    RBC 5-10 01/19/2022 03:04 AM       MEDICATIONS:  Current Facility-Administered Medications   Medication Dose Route Frequency    HYDROmorphone (DILAUDID) injection 0.5 mg  0.5 mg IntraVENous Q4H PRN    acetaminophen (TYLENOL) suppository 650 mg  650 mg Rectal Q6H PRN    dextrose (D50W) injection syrg 12.5-25 g  12.5-25 g IntraVENous PRN    balsam peru-castor oiL (VENELEX) ointment   Topical BID    alcohol 62% (NOZIN) nasal  1 Ampule  1 Ampule Topical Q12H    chlorhexidine (ORAL CARE KIT) 0.12 % mouthwash 15 mL  15 mL Oral Q12H    albumin human 25% (BUMINATE) solution 25 g  25 g IntraVENous Q6H    [START ON 1/20/2022] famotidine (PF) (PEPCID) 20 mg in 0.9% sodium chloride 10 mL injection  20 mg IntraVENous DAILY    meropenem (MERREM) 500 mg in 0.9% sodium chloride (MBP/ADV) 50 mL MBP  0.5 g IntraVENous Q6H    sodium chloride (NS) flush 5-10 mL  5-10 mL IntraVENous PRN    NOREPINephrine (LEVOPHED) 8 mg in 5% dextrose 250mL (32 mcg/mL) infusion  0-30 mcg/min IntraVENous TITRATE    propofol (DIPRIVAN) 10 mg/mL infusion  0-50 mcg/kg/min IntraVENous TITRATE    0.9% sodium chloride infusion  125 mL/hr IntraVENous CONTINUOUS    dextrose (D50W) injection syrg 12.5-25 g  25-50 mL IntraVENous PRN    insulin lispro (HUMALOG) injection   SubCUTAneous Q6H

## 2022-01-19 NOTE — PROGRESS NOTES
2148  Patient arrived from PACU with nurse X 2 and RT.    2230  Assessment as per flow. VSS. Art line is not correlating with cuff and has an unusual wave form and narrow pulse pressure. NP notified of temp. Patient pan cultured earlier.     75[address]affe  Report to Bridget Donis
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
## 2022-01-19 NOTE — PERIOP NOTES
TRANSFER - OUT REPORT:    Verbal report given to Anahi 26  being transferred to [address] [place] [address] 17 25 for routine post - op       Report consisted of patients Situation, Background, Assessment and   Recommendations(SBAR). Information from the following report(s) SBAR was reviewed with the receiving nurse. Opportunity for questions and clarification was provided.       Patient transported with:   Monitor  O2 @ 60 liters  Registered Nurse

## 2022-01-19 NOTE — PROGRESS NOTES
Assumed care of patient 0700. Resting comfortably in bed. Sedation weaned per physician order. Patient on SBT to trial weaning vent support. Patient in bilateral soft wrist restraints. 11:00 Patient pulled out ETT, sitting in bed talking asking for drink. Patient in no respiratory distress, ICU MD at bedside, RRT at bedside. Patient placed on 2L NC and assessed. Patients mother at bedside and updated on conditions. Patient reamsins stable throughout shift. Restraints were discontinued. Labs improving, patient condition appears to be improving, TPN started, Patient will have PICC placed tomorrow. G tube draining 400 cc for shift. Patient tolerating ice chips resting comfortably in bed. Arterial line removed per physician order, patient tolerated well, pressure held for 30 minutes and no bleeding at site. Patient remains on 2 LNC and o2 saturations stay above 95%. Patient tolerating weaning levophed. Receiving scheduled albumin in order to help fluid resuscitate patient. See MAR and flowsheet for more data and information patient remaining stable at this time. Will continue to monitor.

## 2022-01-19 NOTE — PERIOP NOTES
TRANSFER - IN REPORT:    Verbal report received from Franco MENA RN on Magalis Villafana  being received from OR for routine progression of care      Report consisted of patients Situation, Background, Assessment and   Recommendations(SBAR). Information from the following report(s) OR Summary, Procedure Summary, Intake/Output and MAR was reviewed with the receiving nurse. Opportunity for questions and clarification was provided. Assessment completed upon patients arrival to unit and care assumed.

## 2022-01-19 NOTE — PROGRESS NOTES
SURGERY PROGRESS NOTE      Admit Date: 2022    POD 1 Day Post-Op    Procedure: Procedure(s):  LAPAROTOMY EXPLORATORY      Subjective:     No events over night       Objective:     Visit Vitals  BP (!) 86/67   Pulse (!) 121   Temp (!) 101.5 °F (38.6 °C)   Resp 15   Wt 45.9 kg (101 lb 3.1 oz)   SpO2 100%   BMI 14.94 kg/m²        Temp (24hrs), Av.2 °F (37.3 °C), Min:96.8 °F (36 °C), Max:101.5 °F (38.6 °C)      701 - 1900  In: 8092 [I.V.:1313]  Out: 475 [Urine:475]  1901 -  0700  In: 2981.2 [I.V.:2981.2]  Out: 2400 [Urine:900]    Physical Exam:    General:  Open eyes to verbal, moves all extremities spontaneously,  Does not follow commands   Abdomen: Distended, tympanic, tender   Incision:   dressing C/D/I           Lab Results   Component Value Date/Time    WBC 13.9 (H) 2022 04:03 AM    HGB 10.7 (L) 2022 04:03 AM    HCT 32.1 (L) 2022 04:03 AM    PLATELET 296  04:03 AM    MCV 87.9 2022 04:03 AM     Lab Results   Component Value Date/Time    GFR est non-AA 35 (L) 2022 04:03 AM    GFR est AA 42 (L) 2022 04:03 AM    Creatinine 2.12 (H) 2022 04:03 AM    Creatinine, POC 3.3 (H) 2022 03:01 PM    BUN 77 (H) 2022 04:03 AM    Sodium 125 (L) 2022 04:03 AM    Sodium,  (LL) 2022 03:01 PM    Potassium 5.7 (H) 2022 04:03 AM    Potassium, POC 4.7 2022 03:01 PM    Chloride 80 (L) 2022 04:03 AM    Chloride, POC <65 (L) 2022 03:01 PM    CO2 36 (H) 2022 04:03 AM    Magnesium 2.8 (H) 2022 04:03 AM    Phosphorus 7.1 (H) 2022 04:03 AM     Lactate - 18.9 -> 4.6    Assessment:     Active Problems:    Severe sepsis ([facility] [address] (2022)    Critically ill from chronic bowel dysmotility/pseudoobstruction that leads to sever bowel dilation with episodic air/bacteria translocation see as pneumatosis on imaging. No gross ischemic bowel seen at surgery.   He is improve\d from admission but, I frankly don't know how to prevent a recurrence and each episode is worse. I suspect he will not survive this process much longer.       Plan:       Continue present treatment

## 2022-01-19 NOTE — ANESTHESIA POSTPROCEDURE EVALUATION
Procedure(s):  LAPAROTOMY EXPLORATORY. general    Anesthesia Post Evaluation        Patient location during evaluation: PACU  Airway patency: patent  Anesthetic complications: no  Cardiovascular status: acceptable  Respiratory status: intubated  Comments: Pt left intubated due to pressor use and fragility of patient. INITIAL Post-op Vital signs:   Vitals Value Taken Time   /75 01/18/22 2115   Temp 37 °C (98.6 °F) 01/18/22 1945   Pulse 122 01/18/22 2116   Resp 12 01/18/22 2116   SpO2 100 % 01/18/22 2116   Vitals shown include unvalidated device data.
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
## 2022-01-19 NOTE — PERIOP NOTES
ADMITTED TO PACU BREATHING TUBE CENTERED AT 23 AT TEETH #8 TUBE PLACED ON MECHANICAL VENTILATION SETTINGD 60% 5 PEEP 12  400 TV PS 15 BY RT AT BEDSIDE ON PROPOFOL 30 MCG/KG/MIN LEVOPHED 10MCG/MIN VIA RIGHT GROIN PROPOFOL AND LEVOPHED INFUSING VIA MEDIAL PORT NS VIA DISTAL PERT
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
## 2022-01-19 NOTE — BRIEF OP NOTE
Brief Postoperative Note    Patient: Leonidas Motley  YOB: 1980  MRN: [id number]    Date of Procedure: 1/18/2022     Pre-Op Diagnosis:sepsis, pneumatosis, portal venus air    Post-Op Diagnosis: sepsis, pneumatosis, portal venus air    Procedure(s):  LAPAROTOMY EXPLORATORY    Surgeon(s):  Janett Gipson MD    Surgical Assistant: Surg Asst-1: Bola Romo    Anesthesia: General     Estimated Blood Loss (mL): less than 50     Complications: tow enterotomies attempting to explore the frozen abdomen     Specimens: * No specimens in log *     Implants: * No implants in log *    Drains: * No LDAs found *    Findings: 1) completely frozen abdomen 2) could get space between the abdominal wall and the viscera but could not separate any loops of bowel  Loops on the surface are pink in color. There  Is clear sub serosal air bubbles as well as air in the mesentery  3) two enterotomies attempting to free loops to examine underlying bowel. Mucosa pink at the enterotomy sites. 4) further exploration aborted due to severe adhesions.       Electronically Signed by Paulino Anderson MD on 1/18/2022 at 7:01 PM

## 2022-01-19 NOTE — PROGRESS NOTES
Transition of Care Plan:    RUR: 24% - High (READMIT)  Disposition: Home with [facility] ([address] ([address])  Follow up appointments: PCP and specialist as indicated   DME needed: TBD   Transportation at Discharge: Family to transport if medically appropriate  Keys or means to access home: Mother has       IM Medicare Letter: N/A - Medicaid   Is patient a BCPI-A Bundle: No   If yes, was Bundle Letter given?:    Is patient a  and connected with the [place]? No  If yes, was [employer] transfer form completed and [facility] notified? Caregiver Contact: Mother - Budd Cowden - [phone]  Discharge Caregiver contacted prior to discharge? To be contacted prior to discharge           Reason for Readmission:   Severe sepsis            RUR Score/Risk Level: 24% - High (READMIT)    PCP: First and Last name:  Pt has no PCP - per mother, pt sees a  number of MD's at [facility] and followed up with GERDA Rivera      Name of Practice:    Are you a current patient: Yes/No:    Approximate date of last visit:    Can you participate in a virtual visit with your PCP:     Is a Care Conference indicated: No      Did you attend your follow up appointment (s): If not, why not: Yes         Resources/supports as identified by patient/family: Pt's mother is primary support        Top Challenges facing patient (as identified by patient/family and CM): Finances/Medication cost? No concerns reported; pt has Medicaid Rx coverage      Transportation   No concerns reported, pt's mother is able to transport; pt also has access to Medicaid transport  Support system or lack thereof? Pt's mother is primary support. Living arrangements? Pt lives with mother. Self-care/ADLs/Cognition? Pt is not currently A&O. Pt's mother indicates that prior to previous hospital stay, pt was mostly independent, but states that since discharge pt has been unable to complete ADLs independently.  Pt's mother states she believes he should have been discharged to a SNF. CM explained the confines of pt's insurance as it pertains to SNF placement. CM encouraged pt's mother to reach out to pt's Medicaid  regarding obtaining a personal care aide to assist in the home. Unit CM, please follow. Current Advanced Directive/Advance Care Plan:  Prior (no ACP docs)           Plan for utilizing home health:  Current plan is [facility]             Transition of Care Plan:    Based on readmission, the patient's previous Plan of Care   has been evaluated and/or modified. The current Transition of Care Plan is: Home with [facility] ([address] [facility])       Care Management Interventions  PCP Verified by CM: No  Palliative Care Criteria Met (RRAT>21 & CHF Dx)?: No  Mode of Transport at Discharge:  Other (see comment) (Family to transport if medically appropriate)  Transition of Care Consult (CM Consult): Discharge Planning  Discharge Durable Medical Equipment: No  Physical Therapy Consult: No  Occupational Therapy Consult: No  Speech Therapy Consult: No  Support Systems: Parent(s)  Confirm Follow Up Transport: Family  The Patient and/or Patient Representative was Provided with a Choice of Provider and Agrees with the Discharge Plan?: Yes  Freedom of Choice List was Provided with Basic Dialogue that Supports the Patient's Individualized Plan of Care/Goals, Treatment Preferences and Shares the Quality Data Associated with the Providers?: No  [place] Resource Information Provided?: No  Discharge Location  Patient Expects to be Discharged to[de-identified] Home with home health    Readmission Assessment  Number of days since last admission?: 1-7 days  Previous disposition: Home with Home Health  Who is being interviewed?: Caregiver  What was the patient's/caregiver's perception as to why they think they needed to return back to the hospital?: Other (Comment) (Pt directed during f/u appointment to return due to vomitting blood and blood in J tube)  Did you visit your Primary Care Physician after you left the hospital, before you returned this time?: Yes (Pt does not have a PCP, but has a follow up appoinment with Josey Bush RN)  Did you see a specialist, such as Cardiac, Pulmonary, Orthopedic Physician, etc. after you left the hospital?: No  Who advised the patient to return to the hospital?: Physician  Does the patient report anything that got in the way of taking their medications?: No  In our efforts to provide the best possible care to you and others like you, can you think of anything that we could have done to help you after you left the hospital the first time, so that you might not have needed to return so soon?: Other (Comment) (Pt's mother believes he was discharged prematurely and feels as if he should have been discharged to a care facility.  CM explained the confines of pt's insurance and suggessted she speak with his Medicaid  regarding a personal care aide.)    Jarod Rondon, [address], [address]

## 2022-01-19 NOTE — PROGRESS NOTES
Comprehensive Nutrition Assessment    Type and Reason for Visit: Initial (new TPN)    Nutrition Recommendations/Plan:   TPN recommendations:    Day 1) Would start D20, 5% AA @ 42mL/h (K and phos free for now)     Day 2) Continue TPN at 42mL/h     Day 3) If BG <200mg/dL and lytes WNL, advance to Goal Rate of 63mL/h + 500mL 20% lipids 3 x week as long as not resumed on propofol (provides 1759kcals/76gPro/302gDextrose)     Nutrition Assessment:   Pt admitted with severe sepsis. PMH: frozen abdomen, multiple GI surgeries, PEG/J, gastroparesis, prolonged admission-recently discharged. Chart reviewed, case discussed during CCU rounds. Pt is POD# 1 ex lap. 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 He was on TF at time of discharge via J tube. Levo at 15. Per IDR discussion plan is to start TPN, recommend the above which will meet 100% kcal and provide 1.6gPro/kg. BUN 77, creat 2.12, will await renal function to improve before providing more protein.  K 5.7 and phos 7.1, Nephrology is following. High OP from G tube. Likely dehydrated per IDR discussion. Malnutrition Assessment:  Malnutrition Status:  Severe malnutrition    Context:  Chronic illness     Findings of the 6 clinical characteristics of malnutrition:   Energy Intake:  Unable to assess  Weight Loss:  Unable to assess     Body Fat Loss:  7 - Severe body fat loss, Orbital,Triceps,Fat overlying ribs   Muscle Mass Loss:  7 - Severe muscle mass loss, Scapula (trapezius),Thigh (quadriceps),Clavicles (pectoralis &deltoids)  Fluid Accumulation:  Unable to assess,     Strength:  Not performed         Estimated Daily Nutrient Needs:  Energy (kcal): PSU 1750 (MSJ 1355); Weight Used for Energy Requirements: Current  Protein (g): 65-73g (1.4-1.6gPro/kg);  Weight Used for Protein Requirements: Current  Fluid (ml/day): per MD; Method Used for Fluid Requirements: 1 ml/kcal      Nutrition Related Findings:  Meds: albumin, pepcid, humalog, merrem, Clotilde@[employer]; Drips: levo. BM PTA      Wounds:    Surgical incision       Current Nutrition Therapies:  No diet orders on file    Anthropometric Measures:  · Height:  5' 9\" (175.3 cm)  · Current Body Wt:  45.9 kg (101 lb 3.1 oz)   · Ideal Body Wt:  160 lbs:  63.2 %   · BMI Category:  Underweight (BMI less than 18.5)       Nutrition Diagnosis:   · Altered GI function related to altered GI function,altered GI structure as evidenced by NPO or clear liquid status due to medical condition    · Severe malnutrition related to altered GI function,altered GI structure as evidenced by BMI,severe loss of subcutaneous fat,severe muscle loss      Nutrition Interventions:   Food and/or Nutrient Delivery: Start parenteral nutrition  Nutrition Education and Counseling: No recommendations at this time  Coordination of Nutrition Care: Continue to monitor while inpatient,Interdisciplinary rounds    Goals:  Pt will tolerate TPN initiation with BG <200mg/dL and lytes WNL in 2-4 days. Nutrition Monitoring and Evaluation:   Behavioral-Environmental Outcomes: None identified  Food/Nutrient Intake Outcomes: Parenteral nutrition intake/tolerance,IVF intake  Physical Signs/Symptoms Outcomes: Biochemical data,Nutrition focused physical findings,Skin,Weight,GI status,Hemodynamic status,Fluid status or edema    Discharge Planning:     Too soon to determine     Electronically signed by Francisco Javier Ruff RD, [address]  on 1/19/2022 at 1:08 PM    Contact: [address]

## 2022-01-19 NOTE — PERIOP NOTES
intensivist rosangela at bedside to see pt aware pupils umequal but still reactive md examined pt and ordered labs and abx ssi

## 2022-01-20 ENCOUNTER — HOSPITAL ENCOUNTER (OUTPATIENT)
Dept: INTERVENTIONAL RADIOLOGY/VASCULAR | Age: 42
Discharge: HOME | DRG: 710 | End: 2022-01-20
Attending: INTERNAL MEDICINE
Payer: MEDICAID

## 2022-01-20 LAB
ANION GAP SERPL CALC-SCNC: 8 MMOL/L (ref 5–15)
BASOPHILS # BLD: 0 K/UL (ref 0–0.1)
BASOPHILS NFR BLD: 0 % (ref 0–1)
BNP SERPL-MCNC: 617 PG/ML
BUN SERPL-MCNC: 36 MG/DL (ref 6–20)
BUN/CREAT SERPL: 49 (ref 12–20)
CALCIUM SERPL-MCNC: 8.5 MG/DL (ref 8.5–10.1)
CHLORIDE SERPL-SCNC: 90 MMOL/L (ref 97–108)
CO2 SERPL-SCNC: 34 MMOL/L (ref 21–32)
COMMENT, HOLDF: NORMAL
CREAT SERPL-MCNC: 0.74 MG/DL (ref 0.7–1.3)
DIFFERENTIAL METHOD BLD: ABNORMAL
EOSINOPHIL # BLD: 0 K/UL (ref 0–0.4)
EOSINOPHIL NFR BLD: 0 % (ref 0–7)
ERYTHROCYTE [DISTWIDTH] IN BLOOD BY AUTOMATED COUNT: 18.7 % (ref 11.5–14.5)
GLUCOSE BLD STRIP.AUTO-MCNC: 107 MG/DL (ref 65–117)
GLUCOSE BLD STRIP.AUTO-MCNC: 134 MG/DL (ref 65–117)
GLUCOSE SERPL-MCNC: 112 MG/DL (ref 65–100)
HCT VFR BLD AUTO: 22.1 % (ref 36.6–50.3)
HGB BLD-MCNC: 7.4 G/DL (ref 12.1–17)
IMM GRANULOCYTES # BLD AUTO: 0 K/UL (ref 0–0.04)
IMM GRANULOCYTES NFR BLD AUTO: 0 % (ref 0–0.5)
LYMPHOCYTES # BLD: 0.5 K/UL (ref 0.8–3.5)
LYMPHOCYTES NFR BLD: 6 % (ref 12–49)
MAGNESIUM SERPL-MCNC: 2.1 MG/DL (ref 1.6–2.4)
MCH RBC QN AUTO: 29.7 PG (ref 26–34)
MCHC RBC AUTO-ENTMCNC: 33.5 G/DL (ref 30–36.5)
MCV RBC AUTO: 88.8 FL (ref 80–99)
MONOCYTES # BLD: 0.2 K/UL (ref 0–1)
MONOCYTES NFR BLD: 3 % (ref 5–13)
NEUTS BAND NFR BLD MANUAL: 2 %
NEUTS SEG # BLD: 7.2 K/UL (ref 1.8–8)
NEUTS SEG NFR BLD: 89 % (ref 32–75)
NRBC # BLD: 0 K/UL (ref 0–0.01)
NRBC BLD-RTO: 0 PER 100 WBC
PHOSPHATE SERPL-MCNC: 1.8 MG/DL (ref 2.6–4.7)
PHOSPHATE SERPL-MCNC: 2.2 MG/DL (ref 2.6–4.7)
PLATELET # BLD AUTO: 209 K/UL (ref 150–400)
PMV BLD AUTO: 9.6 FL (ref 8.9–12.9)
POTASSIUM SERPL-SCNC: 2.8 MMOL/L (ref 3.5–5.1)
POTASSIUM SERPL-SCNC: 3.3 MMOL/L (ref 3.5–5.1)
RBC # BLD AUTO: 2.49 M/UL (ref 4.1–5.7)
RBC MORPH BLD: ABNORMAL
SAMPLES BEING HELD,HOLD: NORMAL
SERVICE CMNT-IMP: ABNORMAL
SERVICE CMNT-IMP: NORMAL
SODIUM SERPL-SCNC: 132 MMOL/L (ref 136–145)
WBC # BLD AUTO: 7.9 K/UL (ref 4.1–11.1)

## 2022-01-20 PROCEDURE — 77030031139 HC SUT VCRL2 J&J -A

## 2022-01-20 PROCEDURE — 74011000258 HC RX REV CODE- 258: Performed by: INTERNAL MEDICINE

## 2022-01-20 PROCEDURE — 36558 INSERT TUNNELED CV CATH: CPT

## 2022-01-20 PROCEDURE — 83735 ASSAY OF MAGNESIUM: CPT

## 2022-01-20 PROCEDURE — 65270000029 HC RM PRIVATE

## 2022-01-20 PROCEDURE — 83880 ASSAY OF NATRIURETIC PEPTIDE: CPT

## 2022-01-20 PROCEDURE — 80048 BASIC METABOLIC PNL TOTAL CA: CPT

## 2022-01-20 PROCEDURE — 82962 GLUCOSE BLOOD TEST: CPT

## 2022-01-20 PROCEDURE — 74011250636 HC RX REV CODE- 250/636: Performed by: RADIOLOGY

## 2022-01-20 PROCEDURE — C1751 CATH, INF, PER/CENT/MIDLINE: HCPCS

## 2022-01-20 PROCEDURE — 74011000636 HC RX REV CODE- 636: Performed by: RADIOLOGY

## 2022-01-20 PROCEDURE — 74011250636 HC RX REV CODE- 250/636: Performed by: INTERNAL MEDICINE

## 2022-01-20 PROCEDURE — 2709999900 HC NON-CHARGEABLE SUPPLY

## 2022-01-20 PROCEDURE — 99024 POSTOP FOLLOW-UP VISIT: CPT | Performed by: SURGERY

## 2022-01-20 PROCEDURE — 77010033678 HC OXYGEN DAILY

## 2022-01-20 PROCEDURE — P9047 ALBUMIN (HUMAN), 25%, 50ML: HCPCS | Performed by: INTERNAL MEDICINE

## 2022-01-20 PROCEDURE — 74011250637 HC RX REV CODE- 250/637: Performed by: NURSE PRACTITIONER

## 2022-01-20 PROCEDURE — 84132 ASSAY OF SERUM POTASSIUM: CPT

## 2022-01-20 PROCEDURE — 02HV33Z INSERTION OF INFUSION DEVICE INTO SUPERIOR VENA CAVA, PERCUTANEOUS APPROACH: ICD-10-PCS | Performed by: RADIOLOGY

## 2022-01-20 PROCEDURE — 85025 COMPLETE CBC W/AUTO DIFF WBC: CPT

## 2022-01-20 PROCEDURE — 74011250636 HC RX REV CODE- 250/636: Performed by: NURSE PRACTITIONER

## 2022-01-20 PROCEDURE — 84100 ASSAY OF PHOSPHORUS: CPT

## 2022-01-20 PROCEDURE — 36415 COLL VENOUS BLD VENIPUNCTURE: CPT

## 2022-01-20 PROCEDURE — 74011000250 HC RX REV CODE- 250: Performed by: INTERNAL MEDICINE

## 2022-01-20 PROCEDURE — 74011000250 HC RX REV CODE- 250: Performed by: RADIOLOGY

## 2022-01-20 RX ORDER — LIDOCAINE HYDROCHLORIDE 20 MG/ML
20 INJECTION, SOLUTION INFILTRATION; PERINEURAL ONCE
Status: COMPLETED | OUTPATIENT
Start: 2022-01-20 | End: 2022-01-20

## 2022-01-20 RX ORDER — POTASSIUM CHLORIDE 29.8 MG/ML
20 INJECTION INTRAVENOUS
Status: COMPLETED | OUTPATIENT
Start: 2022-01-20 | End: 2022-01-21

## 2022-01-20 RX ORDER — HEPARIN 100 UNIT/ML
500 SYRINGE INTRAVENOUS ONCE
Status: COMPLETED | OUTPATIENT
Start: 2022-01-20 | End: 2022-01-20

## 2022-01-20 RX ORDER — HEPARIN 100 UNIT/ML
300 SYRINGE INTRAVENOUS AS NEEDED
Status: DISCONTINUED | OUTPATIENT
Start: 2022-01-20 | End: 2022-02-03 | Stop reason: HOSPADM

## 2022-01-20 RX ORDER — MIDAZOLAM HYDROCHLORIDE 1 MG/ML
0-10 INJECTION, SOLUTION INTRAMUSCULAR; INTRAVENOUS
Status: DISCONTINUED | OUTPATIENT
Start: 2022-01-20 | End: 2022-01-21 | Stop reason: ALTCHOICE

## 2022-01-20 RX ORDER — FENTANYL CITRATE 50 UG/ML
25-100 INJECTION, SOLUTION INTRAMUSCULAR; INTRAVENOUS
Status: DISCONTINUED | OUTPATIENT
Start: 2022-01-20 | End: 2022-01-21 | Stop reason: ALTCHOICE

## 2022-01-20 RX ORDER — HEPARIN SODIUM 200 [USP'U]/100ML
400 INJECTION, SOLUTION INTRAVENOUS ONCE
Status: COMPLETED | OUTPATIENT
Start: 2022-01-20 | End: 2022-01-20

## 2022-01-20 RX ADMIN — MEROPENEM 500 MG: 500 INJECTION INTRAVENOUS at 02:28

## 2022-01-20 RX ADMIN — POTASSIUM CHLORIDE 20 MEQ: 29.8 INJECTION, SOLUTION INTRAVENOUS at 11:30

## 2022-01-20 RX ADMIN — Medication: at 20:36

## 2022-01-20 RX ADMIN — POTASSIUM CHLORIDE 20 MEQ: 29.8 INJECTION, SOLUTION INTRAVENOUS at 18:34

## 2022-01-20 RX ADMIN — IOPAMIDOL 10 ML: 755 INJECTION, SOLUTION INTRAVENOUS at 15:55

## 2022-01-20 RX ADMIN — LIDOCAINE HYDROCHLORIDE 200 MG: 20 INJECTION, SOLUTION INFILTRATION; PERINEURAL at 15:50

## 2022-01-20 RX ADMIN — Medication: at 08:47

## 2022-01-20 RX ADMIN — CALCIUM GLUCONATE: 98 INJECTION, SOLUTION INTRAVENOUS at 18:39

## 2022-01-20 RX ADMIN — MEROPENEM 500 MG: 500 INJECTION INTRAVENOUS at 10:55

## 2022-01-20 RX ADMIN — POTASSIUM CHLORIDE 20 MEQ: 29.8 INJECTION, SOLUTION INTRAVENOUS at 21:47

## 2022-01-20 RX ADMIN — SODIUM CHLORIDE, PRESERVATIVE FREE 500 UNITS: 5 INJECTION INTRAVENOUS at 15:55

## 2022-01-20 RX ADMIN — Medication 300 UNITS: at 05:05

## 2022-01-20 RX ADMIN — Medication 300 UNITS: at 05:24

## 2022-01-20 RX ADMIN — Medication 1 AMPULE: at 20:37

## 2022-01-20 RX ADMIN — POTASSIUM CHLORIDE 20 MEQ: 29.8 INJECTION, SOLUTION INTRAVENOUS at 12:47

## 2022-01-20 RX ADMIN — ALBUMIN (HUMAN) 25 G: 0.25 INJECTION, SOLUTION INTRAVENOUS at 07:56

## 2022-01-20 RX ADMIN — HEPARIN SODIUM IN SODIUM CHLORIDE 800 UNITS: 200 INJECTION INTRAVENOUS at 16:00

## 2022-01-20 RX ADMIN — POTASSIUM CHLORIDE 20 MEQ: 29.8 INJECTION, SOLUTION INTRAVENOUS at 10:55

## 2022-01-20 RX ADMIN — Medication 1 AMPULE: at 09:00

## 2022-01-20 RX ADMIN — MEROPENEM 500 MG: 500 INJECTION INTRAVENOUS at 17:11

## 2022-01-20 RX ADMIN — MIDAZOLAM HYDROCHLORIDE 1 MG: 1 INJECTION, SOLUTION INTRAMUSCULAR; INTRAVENOUS at 15:45

## 2022-01-20 RX ADMIN — FENTANYL CITRATE 25 MCG: 50 INJECTION, SOLUTION INTRAMUSCULAR; INTRAVENOUS at 15:45

## 2022-01-20 RX ADMIN — FAMOTIDINE 20 MG: 10 INJECTION, SOLUTION INTRAVENOUS at 08:48

## 2022-01-20 NOTE — PROGRESS NOTES
Admit Date: 2022      POD 2 Days Post-Op  2022      Procedure:  Procedure(s):  LAPAROTOMY EXPLORATORY        HOSPITAL DAY:     ANTIBIOTICS:      HPI:  Patient self extubated,, gastrostomy tube to gravity drain, jejunostomy tube clamped. Metabolically patient seems to be improving with medical support    Review of Systems  Patient very tired appearing    Temp:  [96.5 °F (35.8 °C)-101.5 °F (38.6 °C)]   Pulse (Heart Rate):  [102-136]   BP: ()/(44-93)   Resp Rate:  [11-32]   O2 Sat (%):  [73 %-100 %]   Weight:  [44.2 kg (97 lb 7.1 oz)-45.9 kg (101 lb 3.1 oz)]      Blood pressure (!) 96/58, pulse (!) 102, temperature 98.1 °F (36.7 °C), resp. rate 17, height 5' 9\" (1.753 m), weight 44.2 kg (97 lb 7.1 oz), SpO2 100 %.     Temp (24hrs), Av.9 °F (36.6 °C), Min:96.5 °F (35.8 °C), Max:99.1 °F (37.3 °C)      Recent Results (from the past 48 hour(s))   EKG, 12 LEAD, INITIAL    Collection Time: 22  2:30 PM   Result Value Ref Range    Ventricular Rate 121 BPM    Atrial Rate 121 BPM    P-R Interval 114 ms    QRS Duration 68 ms    Q-T Interval 320 ms    QTC Calculation (Bezet) 454 ms    Calculated P Axis 78 degrees    Calculated R Axis 66 degrees    Calculated T Axis 86 degrees    Diagnosis       Sinus tachycardia  Confirmed by Zahraa Nguyen ([id number]) on 2022 9:52:35 PM     CULTURE, BLOOD    Collection Time: 22  2:45 PM    Specimen: Blood   Result Value Ref Range    Special Requests: NO SPECIAL REQUESTS      Culture result: NO GROWTH 2 DAYS     BLOOD GAS,CHEM8,LACTIC ACID POC    Collection Time: 22  2:49 PM   Result Value Ref Range    Calcium, ionized (POC) 0.87 (L) 1.12 - 1.32 mmol/L    BICARBONATE 47 mmol/L    Base excess (POC) 15.4 mmol/L    Sample source VENOUS BLOOD      CO2, POC 46 (HH) 19 - 24 MMOL/L    Sodium,  (LL) 136 - 145 MMOL/L    Potassium, POC 4.8 3.5 - 5.5 MMOL/L    Chloride, POC <65 (L) 100 - 108 MMOL/L    Glucose,  (H) 74 - 106 MG/DL    Creatinine, POC 3.9 (H) 0.6 - 1.3 MG/DL    Critical value read back YESIKA     pH, venous (POC) 7.38 7.32 - 7.42      pCO2, venous (POC) 79.8 (H) 41 - 51 MMHG    pO2, venous (POC) <13 (L) 25 - 40 mmHg   BLOOD GAS,CHEM8,LACTIC ACID POC    Collection Time: 01/18/22  3:01 PM   Result Value Ref Range    Calcium, ionized (POC) 0.88 (L) 1.12 - 1.32 mmol/L    BICARBONATE 41 mmol/L    Base excess (POC) 11.2 mmol/L    Sample source VENOUS BLOOD      CO2, POC 41 (HH) 19 - 24 MMOL/L    Sodium,  (LL) 136 - 145 MMOL/L    Potassium, POC 4.7 3.5 - 5.5 MMOL/L    Chloride, POC <65 (L) 100 - 108 MMOL/L    Glucose,  (H) 74 - 106 MG/DL    Creatinine, POC 3.3 (H) 0.6 - 1.3 MG/DL    Lactic Acid (POC) >18.00 (HH) 0.40 - 2.00 mmol/L    Critical value read back YESIKA     pH, venous (POC) 7.34 7.32 - 7.42      pCO2, venous (POC) 76.6 (H) 41 - 51 MMHG    pO2, venous (POC) 13 (L) 25 - 40 mmHg   CULTURE, BLOOD    Collection Time: 01/18/22  3:05 PM    Specimen: Blood   Result Value Ref Range    Special Requests: NO SPECIAL REQUESTS      Culture result: NO GROWTH 2 DAYS     METABOLIC PANEL, COMPREHENSIVE    Collection Time: 01/18/22  3:05 PM   Result Value Ref Range    Sodium 113 (LL) 136 - 145 mmol/L    Potassium 4.6 3.5 - 5.1 mmol/L    Chloride 52 (L) 97 - 108 mmol/L    CO2 41 (HH) 21 - 32 mmol/L    Anion gap 20 (H) 5 - 15 mmol/L    Glucose 270 (H) 65 - 100 mg/dL    BUN 94 (H) 6 - 20 MG/DL    Creatinine 2.73 (H) 0.70 - 1.30 MG/DL    BUN/Creatinine ratio 34 (H) 12 - 20      GFR est AA 31 (L) >60 ml/min/1.73m2    GFR est non-AA 26 (L) >60 ml/min/1.73m2    Calcium 10.5 (H) 8.5 - 10.1 MG/DL    Bilirubin, total 0.8 0.2 - 1.0 MG/DL    ALT (SGPT) 73 12 - 78 U/L    AST (SGOT) 45 (H) 15 - 37 U/L    Alk.  phosphatase 252 (H) 45 - 117 U/L    Protein, total 9.6 (H) 6.4 - 8.2 g/dL    Albumin 3.5 3.5 - 5.0 g/dL    Globulin 6.1 (H) 2.0 - 4.0 g/dL    A-G Ratio 0.6 (L) 1.1 - 2.2     CBC WITH AUTOMATED DIFF    Collection Time: 01/18/22  3:05 PM   Result Value Ref Range    WBC 16.9 (H) 4.1 - 11.1 K/uL    RBC 4.56 4.10 - 5.70 M/uL    HGB 13.2 12.1 - 17.0 g/dL    HCT 40.5 36.6 - 50.3 %    MCV 88.8 80.0 - 99.0 FL    MCH 28.9 26.0 - 34.0 PG    MCHC 32.6 30.0 - 36.5 g/dL    RDW 19.5 (H) 11.5 - 14.5 %    PLATELET 281 (H) 064 - 400 K/uL    MPV 9.4 8.9 - 12.9 FL    NRBC 0.0 0  WBC    ABSOLUTE NRBC 0.00 0.00 - 0.01 K/uL    NEUTROPHILS 89 (H) 32 - 75 %    LYMPHOCYTES 6 (L) 12 - 49 %    MONOCYTES 4 (L) 5 - 13 %    EOSINOPHILS 0 0 - 7 %    BASOPHILS 0 0 - 1 %    IMMATURE GRANULOCYTES 1 (H) 0.0 - 0.5 %    ABS. NEUTROPHILS 15.1 (H) 1.8 - 8.0 K/UL    ABS. LYMPHOCYTES 0.9 0.8 - 3.5 K/UL    ABS. MONOCYTES 0.6 0.0 - 1.0 K/UL    ABS. EOSINOPHILS 0.0 0.0 - 0.4 K/UL    ABS. BASOPHILS 0.0 0.0 - 0.1 K/UL    ABS. IMM.  GRANS. 0.2 (H) 0.00 - 0.04 K/UL    DF AUTOMATED     TROPONIN-HIGH SENSITIVITY    Collection Time: 01/18/22  3:05 PM   Result Value Ref Range    Troponin-High Sensitivity 68 0 - 76 ng/L   MAGNESIUM    Collection Time: 01/18/22  3:05 PM   Result Value Ref Range    Magnesium 4.0 (H) 1.6 - 2.4 mg/dL   PROCALCITONIN    Collection Time: 01/18/22  3:05 PM   Result Value Ref Range    Procalcitonin 3.22 ng/mL   TYPE & SCREEN    Collection Time: 01/18/22  3:05 PM   Result Value Ref Range    Crossmatch Expiration 01/21/2022,2356     ABO/Rh(D) A POSITIVE     Antibody screen NEG    AMMONIA    Collection Time: 01/18/22  3:05 PM   Result Value Ref Range    Ammonia <10 <32 UMOL/L   ETHYL ALCOHOL    Collection Time: 01/18/22  3:05 PM   Result Value Ref Range    ALCOHOL(ETHYL),SERUM <10 <10 MG/DL   COVID-19 RAPID TEST    Collection Time: 01/18/22  4:19 PM   Result Value Ref Range    Specimen source Nasopharyngeal      COVID-19 rapid test Not detected NOTD     LACTIC ACID    Collection Time: 01/18/22  4:19 PM   Result Value Ref Range    Lactic acid 19.0 (HH) 0.4 - 2.0 MMOL/L   POC G3 - PUL    Collection Time: 01/18/22  7:48 PM   Result Value Ref Range    FIO2 (POC) 60 %    pH (POC) 7.47 (H) 7.35 - 7.45      pCO2 (POC) 39.2 35.0 - 45.0 MMHG    pO2 (POC) 289 (H) 80 - 100 MMHG    HCO3 (POC) 28.4 (H) 22 - 26 MMOL/L    sO2 (POC) 99.9 (H) 92 - 97 %    Base excess (POC) 4.4 mmol/L    Site DRAWN FROM ARTERIAL LINE      Device: ADULT VENT      Mode SIMV in volume control/Pressure support      Tidal volume 400 ml    Set Rate 12 bpm    PEEP/CPAP (POC) 5 cmH2O    Pressure support 15 cmH2O    Allens test (POC) NOT APPLICABLE      Specimen type (POC) ARTERIAL     LACTIC ACID    Collection Time: 01/18/22  8:43 PM   Result Value Ref Range    Lactic acid 7.1 (HH) 0.4 - 2.0 MMOL/L   METABOLIC PANEL, BASIC    Collection Time: 01/18/22  8:43 PM   Result Value Ref Range    Sodium 123 (L) 136 - 145 mmol/L    Potassium 4.7 3.5 - 5.1 mmol/L    Chloride 79 (L) 97 - 108 mmol/L    CO2 33 (H) 21 - 32 mmol/L    Anion gap 11 5 - 15 mmol/L    Glucose 109 (H) 65 - 100 mg/dL    BUN 86 (H) 6 - 20 MG/DL    Creatinine 1.90 (H) 0.70 - 1.30 MG/DL    BUN/Creatinine ratio 45 (H) 12 - 20      GFR est AA 48 (L) >60 ml/min/1.73m2    GFR est non-AA 39 (L) >60 ml/min/1.73m2    Calcium 7.8 (L) 8.5 - 10.1 MG/DL   HEMOGLOBIN A1C WITH EAG    Collection Time: 01/18/22  8:43 PM   Result Value Ref Range    Hemoglobin A1c 5.0 4.0 - 5.6 %    Est. average glucose 97 mg/dL   GLUCOSE, POC    Collection Time: 01/19/22  1:36 AM   Result Value Ref Range    Glucose (POC) 117 65 - 117 mg/dL    Performed by Pawel Cadena RN    DRUG SCREEN, URINE    Collection Time: 01/19/22  3:04 AM   Result Value Ref Range    AMPHETAMINES Negative NEG      BARBITURATES Negative NEG      BENZODIAZEPINES Positive (A) NEG      COCAINE Negative NEG      METHADONE Negative NEG      OPIATES Positive (A) NEG      PCP(PHENCYCLIDINE) Negative NEG      THC (TH-CANNABINOL) Negative NEG      Drug screen comment (NOTE)    URINALYSIS W/ REFLEX CULTURE    Collection Time: 01/19/22  3:04 AM    Specimen: Urine   Result Value Ref Range    Color YELLOW/STRAW      Appearance CLOUDY (A) CLEAR Specific gravity 1.021 1.003 - 1.030      pH (UA) 6.0 5.0 - 8.0      Protein 30 (A) NEG mg/dL    Glucose Negative NEG mg/dL    Ketone Negative NEG mg/dL    Bilirubin Negative NEG      Blood MODERATE (A) NEG      Urobilinogen 0.2 0.2 - 1.0 EU/dL    Nitrites Negative NEG      Leukocyte Esterase Negative NEG      WBC 0-4 0 - 4 /hpf    RBC 5-10 0 - 5 /hpf    Epithelial cells FEW FEW /lpf    Bacteria 1+ (A) NEG /hpf    UA:UC IF INDICATED CULTURE NOT INDICATED BY UA RESULT CNI      CA Oxalate crystals FEW (A) NEG     METABOLIC PANEL, BASIC    Collection Time: 01/19/22  4:03 AM   Result Value Ref Range    Sodium 125 (L) 136 - 145 mmol/L    Potassium 5.7 (H) 3.5 - 5.1 mmol/L    Chloride 80 (L) 97 - 108 mmol/L    CO2 36 (H) 21 - 32 mmol/L    Anion gap 9 5 - 15 mmol/L    Glucose 125 (H) 65 - 100 mg/dL    BUN 77 (H) 6 - 20 MG/DL    Creatinine 2.12 (H) 0.70 - 1.30 MG/DL    BUN/Creatinine ratio 36 (H) 12 - 20      GFR est AA 42 (L) >60 ml/min/1.73m2    GFR est non-AA 35 (L) >60 ml/min/1.73m2    Calcium 8.2 (L) 8.5 - 10.1 MG/DL   MAGNESIUM    Collection Time: 01/19/22  4:03 AM   Result Value Ref Range    Magnesium 2.8 (H) 1.6 - 2.4 mg/dL   PHOSPHORUS    Collection Time: 01/19/22  4:03 AM   Result Value Ref Range    Phosphorus 7.1 (H) 2.6 - 4.7 MG/DL   CBC WITH AUTOMATED DIFF    Collection Time: 01/19/22  4:03 AM   Result Value Ref Range    WBC 13.9 (H) 4.1 - 11.1 K/uL    RBC 3.65 (L) 4.10 - 5.70 M/uL    HGB 10.7 (L) 12.1 - 17.0 g/dL    HCT 32.1 (L) 36.6 - 50.3 %    MCV 87.9 80.0 - 99.0 FL    MCH 29.3 26.0 - 34.0 PG    MCHC 33.3 30.0 - 36.5 g/dL    RDW 19.3 (H) 11.5 - 14.5 %    PLATELET 457 257 - 593 K/uL    MPV 9.2 8.9 - 12.9 FL    NRBC 0.0 0  WBC    ABSOLUTE NRBC 0.00 0.00 - 0.01 K/uL    NEUTROPHILS 89 (H) 32 - 75 %    LYMPHOCYTES 6 (L) 12 - 49 %    MONOCYTES 4 (L) 5 - 13 %    EOSINOPHILS 0 0 - 7 %    BASOPHILS 0 0 - 1 %    IMMATURE GRANULOCYTES 1 (H) 0.0 - 0.5 %    ABS. NEUTROPHILS 12.4 (H) 1.8 - 8.0 K/UL    ABS. LYMPHOCYTES 0.8 0.8 - 3.5 K/UL    ABS. MONOCYTES 0.6 0.0 - 1.0 K/UL    ABS. EOSINOPHILS 0.0 0.0 - 0.4 K/UL    ABS. BASOPHILS 0.0 0.0 - 0.1 K/UL    ABS. IMM.  GRANS. 0.1 (H) 0.00 - 0.04 K/UL    DF AUTOMATED     LACTIC ACID    Collection Time: 01/19/22  4:03 AM   Result Value Ref Range    Lactic acid 4.6 (HH) 0.4 - 2.0 MMOL/L   GLUCOSE, POC    Collection Time: 01/19/22  6:41 AM   Result Value Ref Range    Glucose (POC) 129 (H) 65 - 117 mg/dL    Performed by Will Nina RN    GLUCOSE, POC    Collection Time: 01/19/22 10:17 AM   Result Value Ref Range    Glucose (POC) 139 (H) 65 - 117 mg/dL    Performed by Lester Hayden (RUBENS BAKER)    LACTIC ACID    Collection Time: 01/19/22  2:21 PM   Result Value Ref Range    Lactic acid 2.4 (HH) 0.4 - 2.0 MMOL/L   METABOLIC PANEL, BASIC    Collection Time: 01/19/22  2:21 PM   Result Value Ref Range    Sodium 128 (L) 136 - 145 mmol/L    Potassium 4.2 3.5 - 5.1 mmol/L    Chloride 85 (L) 97 - 108 mmol/L    CO2 34 (H) 21 - 32 mmol/L    Anion gap 9 5 - 15 mmol/L    Glucose 167 (H) 65 - 100 mg/dL    BUN 61 (H) 6 - 20 MG/DL    Creatinine 1.46 (H) 0.70 - 1.30 MG/DL    BUN/Creatinine ratio 42 (H) 12 - 20      GFR est AA >60 >60 ml/min/1.73m2    GFR est non-AA 53 (L) >60 ml/min/1.73m2    Calcium 8.4 (L) 8.5 - 10.1 MG/DL   GLUCOSE, POC    Collection Time: 01/19/22  5:55 PM   Result Value Ref Range    Glucose (POC) 161 (H) 65 - 117 mg/dL    Performed by Lester Hayden (RUBENS BAKER)    GLUCOSE, POC    Collection Time: 01/19/22 11:17 PM   Result Value Ref Range    Glucose (POC) 124 (H) 65 - 117 mg/dL    Performed by Will Nina RN    GLUCOSE, POC    Collection Time: 01/20/22  6:34 AM   Result Value Ref Range    Glucose (POC) 134 (H) 65 - 117 mg/dL    Performed by Rik MARIE RN    CBC WITH AUTOMATED DIFF    Collection Time: 01/20/22  7:41 AM   Result Value Ref Range    WBC 7.9 4.1 - 11.1 K/uL    RBC 2.49 (L) 4.10 - 5.70 M/uL    HGB 7.4 (L) 12.1 - 17.0 g/dL    HCT 22.1 (L) 36.6 - 50.3 %    MCV 88.8 80.0 - 99.0 FL    MCH 29.7 26.0 - 34.0 PG    MCHC 33.5 30.0 - 36.5 g/dL    RDW 18.7 (H) 11.5 - 14.5 %    PLATELET 348 065 - 934 K/uL    MPV 9.6 8.9 - 12.9 FL    NRBC 0.0 0  WBC    ABSOLUTE NRBC 0.00 0.00 - 0.01 K/uL    NEUTROPHILS 89 (H) 32 - 75 %    BAND NEUTROPHILS 2 %    LYMPHOCYTES 6 (L) 12 - 49 %    MONOCYTES 3 (L) 5 - 13 %    EOSINOPHILS 0 0 - 7 %    BASOPHILS 0 0 - 1 %    IMMATURE GRANULOCYTES 0 0.0 - 0.5 %    ABS. NEUTROPHILS 7.2 1.8 - 8.0 K/UL    ABS. LYMPHOCYTES 0.5 (L) 0.8 - 3.5 K/UL    ABS. MONOCYTES 0.2 0.0 - 1.0 K/UL    ABS. EOSINOPHILS 0.0 0.0 - 0.4 K/UL    ABS. BASOPHILS 0.0 0.0 - 0.1 K/UL    ABS. IMM. GRANS. 0.0 0.00 - 0.04 K/UL    DF MANUAL      RBC COMMENTS ANISOCYTOSIS  1+       METABOLIC PANEL, BASIC    Collection Time: 01/20/22  7:41 AM   Result Value Ref Range    Sodium 132 (L) 136 - 145 mmol/L    Potassium 2.8 (L) 3.5 - 5.1 mmol/L    Chloride 90 (L) 97 - 108 mmol/L    CO2 34 (H) 21 - 32 mmol/L    Anion gap 8 5 - 15 mmol/L    Glucose 112 (H) 65 - 100 mg/dL    BUN 36 (H) 6 - 20 MG/DL    Creatinine 0.74 0.70 - 1.30 MG/DL    BUN/Creatinine ratio 49 (H) 12 - 20      GFR est AA >60 >60 ml/min/1.73m2    GFR est non-AA >60 >60 ml/min/1.73m2    Calcium 8.5 8.5 - 10.1 MG/DL   NT-PRO BNP    Collection Time: 01/20/22  7:41 AM   Result Value Ref Range    NT pro- (H) <125 PG/ML   SAMPLES BEING HELD    Collection Time: 01/20/22  7:41 AM   Result Value Ref Range    SAMPLES BEING HELD  PST     COMMENT        Add-on orders for these samples will be processed based on acceptable specimen integrity and analyte stability, which may vary by analyte. PHOSPHORUS    Collection Time: 01/20/22  7:41 AM   Result Value Ref Range    Phosphorus 2.2 (L) 2.6 - 4.7 MG/DL   MAGNESIUM    Collection Time: 01/20/22  7:41 AM   Result Value Ref Range    Magnesium 2.1 1.6 - 2.4 mg/dL         XR Results (maximum last 3):   [facility]    XR CHEST PORT    Impression  No acute findings. CT Results (maximum last 3): Results from [facility] encounter on 01/18/22    CT ABD PELV WO CONT    Impression  Extensive bowel distention with air-fluid levels, pneumatosis  intestinalis, and portal venous air. The findings were called to Dr. Ghulam Almeida on 1/18/2022 at 3:50 PM by myself. [id number]      MRI Results (maximum last 3): No results found for this or any previous visit. Nuclear Medicine Results (maximum last 3): Results from [facility] encounter on 11/11/21    NM GASTRIC EMPTY STDY    Impression  Abnormal Nuclear Gastric Emptying Study. US Results (maximum last 3): Results from [facility] encounter on 11/25/21    US GUIDE NDL ASP  DRAIN    Impression  Ultrasound-guided 10 German right lower quadrant abscess drain placement      Physical Exam  Patient thin, cachectic, tired appearing, very frail appearing, abdomen soft, midline incision clean, jejunostomy tube adjusted appropriately with bolster, appears to be intact with balloon inflated, mild tenderness, gastrostomy tube intact with Yancey catheter, minimal fluid around the G-tube. Patient alert  Active Problems:    Severe sepsis ([address] (1/18/2022)          ASSESSMENT/PLAN    Seems to be improving with medical therapy, and stopping J-tube feedings. Continue antibiotics, TPN, G-tube to gravity drain. Unfortunately, there is really nothing further we can do surgically, this will be a long-term problem, for nutrition, not sure we can use his JG tube much longer for jejunostomy feedings if this is how his bowels responded does not sound like they will tolerate this or at least not for full feedings.     I did discuss with patient's mother and with patient, I do not know if he is a candidate for small bowel transplant or not or even where to send him for this certainly this is not an option acutely, I honestly recommended that we need to start thinking about palliative care and comfort care for him long-term TPN is not a great option either but certainly can be considered, I am not sure that the patient nor his mother appreciate the gravity of the situation    FACE TO FACE time including review of any indicated imaging, discussion with patient, and other providers, exam and discussion with patient:   25          minutes    END:

## 2022-01-20 NOTE — PROGRESS NOTES
Pharmacy Automatic Renal Dosing Adjustment     Labs:  Recent Labs     22  0741 22  1421 22  0403 22  2043 22  1505   CREA 0.74 1.46* 2.12*   < > 2.73*   BUN 36* 61* 77*   < > 94*   WBC 7.9  --  13.9*  --  16.9*    < > = values in this interval not displayed. Temp (24hrs), Av.9 °F (36.6 °C), Min:96.5 °F (35.8 °C), Max:99.1 °F (37.3 °C)      Creatinine Clearance (mL/min) or Dialysis: 131.4 mL/min (IBW)    Impression/Plan:   Scr improved  meropenem has been adjusted from q8h to q6h based on the renal dosing protocol. Pharmacy will follow daily and adjust medications as appropriate for renal function.     Thank you,  CARA Mccauley

## 2022-01-20 NOTE — PROGRESS NOTES
1900 Shift change report received from [place] (offgoing nurse). Report included the following information SBAR, Kardex, ED Summary, OR Summary, Intake/Output, MAR and Recent Results. 2000 Shift assessment completed, see flowsheets. 0000 Reassessment completed, see flowsheets. 0400 Reassessment completed, see flowsheets. 6691 Attempting to draw labs but struggling with sluggish to no blood return from all lumens on the central line despite flushing. Attempted to stick patient to retrieve labs. Patient refused, stating he is a very hard stick and only to use the central line. [address] to OFE Combs NP about orders for heparin protocol to regain access to central line. 0520 Central line dressing changed. 0530 Heparin dwelling lines checked for blood return. No progress at this time. Will continue to monitor. 0543 Heparin dwelling lines checked for blood return. Slight progress in white line but still not able to draw back. Heparin remains in line. Will continue to monitor. 0555 Heparin dwelling lines checked for blood return. Slight progress in blue and white lines but still not able to draw back. Heparin remains in both lines. Will continue to monitor. 0630 Heparin dwelling lines checked for blood return. No progress since last check - lines pulling pink tinged under pressure but no fluid pulling back into the syringe. Heparin remains in both lines. Will pass on to next shift to continue to monitor. Labs and morning meds delayed due to lack of access. 0700 Shift change report given to SAMUEL Rojas (oncoming nurse) by Maricruz Dewey RN (offgoing nurse). Report included the following information SBAR, Kardex, ED Summary, Procedure Summary, Intake/Output, MAR and Recent Results. Also noted was the dwelling heparin in blue and white lumens of central line. 8542 Morning labs drawn and sent off STAT.

## 2022-01-20 NOTE — OP NOTES
[address]  OPERATIVE REPORT    Name:  Asia Mesa  MR#:  [id number]  :  1980  ACCOUNT #:  [de-identified]  DATE OF SERVICE:  2022    PREOPERATIVE DIAGNOSES:  1. Severe sepsis. 2.  Pneumatosis intestinalis. 3.  Portal venous air. 4.  Chronic intestinal dysmotility. 5.  Severe protein-calorie malnutrition. POSTOPERATIVE DIAGNOSES:  1. Severe sepsis. 2.  Pneumatosis intestinalis. 3.  Portal venous air. 4.  Chronic intestinal dysmotility. 5.  Severe protein-calorie malnutrition. PROCEDURE PERFORMED:  Exploratory laparotomy. SURGEON:  Lefty Byrd MD    ASSISTANT:  Sandra Nicole. ANESTHESIA:  General endotracheal.    COMPLICATIONS:  Two enterotomies while attempting to explore the patient's frozen abdomen. SPECIMENS REMOVED:  None. IMPLANTS:  None. ESTIMATED BLOOD LOSS:  50 mL. DRAINS:  None. FINDINGS:  1. Completely frozen abdomen. 2.  Could create a space between the abdominal wall and the mass of viscera, but could not separate any loops of bowel from one another. 3.  Loops on the surface of the mass of bowel appeared pink in color. There is clear subserosal air bubbles and palpable crepitus throughout the bowel. There was also air bubbles within the mesentery and crepitus within the mesentery of the bowel. Two enterotomies occurred while attempting to free loops of bowel to examine the bowel loops underlying the surface loops. Through these full thickness enterotomies, the mucosa could be seen and it was pink and appeared viable. Further exploration was aborted due to the severe adhesions and risk of further enterotomies    INDICATIONS FOR PROCEDURE:  The patient is 51-year-old male with a longstanding history of gastrointestinal dysmotility as well as obstructive symptoms. The patient developed pneumatosis intestinalis 6 weeks ago and was explored.   He was found to have dense adhesions and no signs of ischemia and a jejunostomy tube was placed for decompression. The patient continued to do poorly over the next several days that he was re-explored and had an extensive lysis of adhesions and the gastrostomy tube placed. The patient appeared to do better with intolerance of jejunal tube feeds as well as p.o. and he was eventually discharged home 1 week ago. The patient presented to the emergency department this evening with complaints of lethargy, abdominal pain, and blood in his gastrostomy tube drainage. The patient was found to be hypotensive requiring a pressor support. He was acidotic with lactic acid of 18.9. He had severe electrolyte abnormalities including a sodium of 113. The patient underwent abdomen and pelvis CT that demonstrated pneumatosis intestinalis much worse than on previous CT scan as well as new portal venous air. The patient's critical condition was discussed with his mother as well as the patient's underlying history including frailty and poor performance, and the patient's mother asked that everything be done to save the patient's life so the patient is taken to the operating room for exploration to remove likely ischemic bowel. DESCRIPTION OF PROCEDURE:  The patient was identified in the preoperative holding area, informed consent was obtained from the patient's mother. He was then taken to the operating room, placed in supine position, underwent general endotracheal anesthesia without complication. A Yancey catheter was then inserted under sterile technique. His abdomen was then prepped and draped in usual sterile fashion. A time-out was performed to confirm the patient by name and that he was presenting for exploratory laparotomy and resection of bowel. Once this was confirmed, an incision was made with a 10-blade scalpel. The scalpel was used to cut through the skin and the subcutaneous tissue.   The fascia was identified by the previously placed suture because a Stratafix symmetric suture was used to close his abdomen. Heavy scissors were used to cut the suture where it crossed the midline at each location. Once this was completed, a scalpel was then used to divide the fascia in a sharp fashion. Entry into the abdomen was obtained at the superior aspect of the incision. Once an opening was made, a surgeon's finger was inserted. There were adhesions to the underlying fascia. These were bluntly swept away and the remainder of the fascia opened with electrocautery and a surgeon's finger. As the space developed, the edges of the fascia were held with Kocher forceps and Metzenbaum scissors were used to lyse adhesions. Approximately 30 minutes of adhesiolysis was necessary to clear all of the adhesions to the anterior abdominal wall allowing access to the abdominal cavity. All the small bowel was one densely adherent mass. The loops of small bowel on the surface were pink. They were dilated and fluid-filled. There was clear bubbles of air within the wall of the bowel, and on palpation, the bowel had crepitus. There was also air and crepitus in the visible mesentery on the surface of the bowel. Attempts were made to separate loops of bowel to look an underlying loops of bowel due to the high risk of ischemia given clinical picture. In attempting to separate loops of bowel, two enterotomies resulted inadvertently. Through the enterotomies, the mucosa could be easily visualized. The mucosa was pink and viable with no signs of any visible ischemia. Given the patient's hypotension, pressor support, and lack of progress, surgical exploration as well as no visible bowel ischemia, it was decided to abort the surgery at this point. The enterotomies were closed with interrupted 2-0 silk sutures. The fascia was closed with a Stratafix symmetric. The subcutaneous tissue was irrigated with normal saline, the skin was closed with staples.   The patient was then taken from the OR to the intensive care unit in critical condition. Instrument and sponge counts were correct x2.         MD DANICA [id number]  D:  01/19/2022 13:37  T:  01/19/2022 19:54  JOB #:  [id number]

## 2022-01-20 NOTE — PROGRESS NOTES
Pt tolerated Tunneled cath placement well  Start time 1545  End time 1600    Fentanyl 25 mcg  Versed 1 mg    Report called to Alissa BAKER for 1618,all questions answered.
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
## 2022-01-20 NOTE — INTERDISCIPLINARY ROUNDS
Interdisciplinary team rounds were held 1/20/2022 with the following team members:Care Management, Nursing, Pharmacy, Physical Therapy and Physician. Plan of care discussed. See clinical pathway and/or care plan for interventions and desired outcomes. Goals of the Day:  See orders and MD notes.

## 2022-01-20 NOTE — PROGRESS NOTES
NAME: Henrry Martin        :  1980        MRN:  [id number]                     Assessment   :                                               Plan:  Severe hyponatremia  SAE, resolved  Hyperkalemia --> now low  Dehydration  Contraction alkalosis  Lactic acidosis  Mild hypercalcemia  Hypotension     Extensive bowel distention with air-fluid levels, pneumatosis  intestinalis, and portal venous air. Creatinine now 0.7    Na 113 to 125 to 132    I will sign off, but please call with questions/changes                Subjective:     Chief Complaint:  Alert in icu. Not talkative. Chart reviewed. I discussed his mother. Review of Systems:    Symptom Y/N Comments  Symptom Y/N Comments   Fever/Chills    Chest Pain     Poor Appetite    Edema     Cough    Abdominal Pain     Sputum    Joint Pain     SOB/GASTON    Pruritis/Rash     Nausea/vomit    Tolerating PT/OT     Diarrhea    Tolerating Diet     Constipation    Other       Could not obtain due to:      Objective:     VITALS:   Last 24hrs VS reviewed since prior progress note.  Most recent are:  Visit Vitals  BP (!) 96/58   Pulse (!) 102   Temp 98.1 °F (36.7 °C)   Resp 17   Ht 5' 9\" (1.753 m)   Wt 44.2 kg (97 lb 7.1 oz)   SpO2 100%   BMI 14.39 kg/m²       Intake/Output Summary (Last 24 hours) at 2022 1135  Last data filed at 2022 0700  Gross per 24 hour   Intake 1504.17 ml   Output 2985 ml   Net -1480.83 ml      Telemetry Reviewed:     PHYSICAL EXAM:  General: NAD      Lab Data Reviewed: (see below)    Medications Reviewed: (see below)    PMH/SH reviewed - no change compared to H&P  ________________________________________________________________________  Care Plan discussed with:  Patient     Family      RN     Care Manager                    Consultant:          Comments   >50% of visit spent in counseling and coordination of care ________________________________________________________________________  Lilian Hall MD     Procedures: see electronic medical records for all procedures/Xrays and details which  were not copied into this note but were reviewed prior to creation of Plan. LABS:  Recent Labs     01/20/22  0741 01/19/22  0403   WBC 7.9 13.9*   HGB 7.4* 10.7*   HCT 22.1* 32.1*    374     Recent Labs     01/20/22  0741 01/19/22  1421 01/19/22  0403 01/18/22 2043 01/18/22  1505   * 128* 125*   < > 113*   K 2.8* 4.2 5.7*   < > 4.6   CL 90* 85* 80*   < > 52*   CO2 34* 34* 36*   < > 41*   BUN 36* 61* 77*   < > 94*   CREA 0.74 1.46* 2.12*   < > 2.73*   * 167* 125*   < > 270*   CA 8.5 8.4* 8.2*   < > 10.5*   MG 2.1  --  2.8*  --  4.0*   PHOS 2.2*  --  7.1*  --   --     < > = values in this interval not displayed. Recent Labs     01/18/22  1505   *   TP 9.6*   ALB 3.5   GLOB 6.1*     No results for input(s): INR, PTP, APTT, INREXT, INREXT in the last 72 hours. No results for input(s): FE, TIBC, PSAT, FERR in the last 72 hours. Lab Results   Component Value Date/Time    Folate 18.4 11/12/2021 05:52 PM      No results for input(s): PH, PCO2, PO2 in the last 72 hours. No results for input(s): CPK, CKMB in the last 72 hours.     No lab exists for component: TROPONINI  No components found for: [place]  Lab Results   Component Value Date/Time    Color YELLOW/STRAW 01/19/2022 03:04 AM    Appearance CLOUDY (A) 01/19/2022 03:04 AM    Specific gravity 1.021 01/19/2022 03:04 AM    Specific gravity 1.010 11/25/2021 11:37 PM    pH (UA) 6.0 01/19/2022 03:04 AM    Protein 30 (A) 01/19/2022 03:04 AM    Glucose Negative 01/19/2022 03:04 AM    Ketone Negative 01/19/2022 03:04 AM    Bilirubin Negative 01/19/2022 03:04 AM    Urobilinogen 0.2 01/19/2022 03:04 AM    Nitrites Negative 01/19/2022 03:04 AM    Leukocyte Esterase Negative 01/19/2022 03:04 AM    Epithelial cells FEW 01/19/2022 03:04 AM    Bacteria 1+ (A) 01/19/2022 03:04 AM    WBC 0-4 01/19/2022 03:04 AM    RBC 5-10 01/19/2022 03:04 AM       MEDICATIONS:  Current Facility-Administered Medications   Medication Dose Route Frequency    heparin (porcine) pf 300 Units  300 Units InterCATHeter PRN    potassium chloride 20 mEq in 50 ml IVPB  20 mEq IntraVENous Q1H    HYDROmorphone (DILAUDID) injection 0.5 mg  0.5 mg IntraVENous Q4H PRN    acetaminophen (TYLENOL) suppository 650 mg  650 mg Rectal Q6H PRN    dextrose (D50W) injection syrg 12.5-25 g  12.5-25 g IntraVENous PRN    balsam peru-castor oiL (VENELEX) ointment   Topical BID    alcohol 62% (NOZIN) nasal  1 Ampule  1 Ampule Topical Q12H    famotidine (PF) (PEPCID) 20 mg in 0.9% sodium chloride 10 mL injection  20 mg IntraVENous DAILY    meropenem (MERREM) 500 mg in 0.9% sodium chloride (MBP/ADV) 50 mL MBP  0.5 g IntraVENous Q8H    TPN ADULT - CENTRAL   IntraVENous CONTINUOUS    sodium chloride (NS) flush 5-10 mL  5-10 mL IntraVENous PRN    dextrose (D50W) injection syrg 12.5-25 g  25-50 mL IntraVENous PRN    insulin lispro (HUMALOG) injection   SubCUTAneous Q6H

## 2022-01-21 LAB
ANION GAP SERPL CALC-SCNC: 1 MMOL/L (ref 5–15)
BASOPHILS # BLD: 0 K/UL (ref 0–0.1)
BASOPHILS NFR BLD: 0 % (ref 0–1)
BUN SERPL-MCNC: 23 MG/DL (ref 6–20)
BUN/CREAT SERPL: 45 (ref 12–20)
CALCIUM SERPL-MCNC: 8.6 MG/DL (ref 8.5–10.1)
CHLORIDE SERPL-SCNC: 96 MMOL/L (ref 97–108)
CO2 SERPL-SCNC: 36 MMOL/L (ref 21–32)
CREAT SERPL-MCNC: 0.51 MG/DL (ref 0.7–1.3)
DIFFERENTIAL METHOD BLD: ABNORMAL
EOSINOPHIL # BLD: 0 K/UL (ref 0–0.4)
EOSINOPHIL NFR BLD: 0 % (ref 0–7)
ERYTHROCYTE [DISTWIDTH] IN BLOOD BY AUTOMATED COUNT: 19.1 % (ref 11.5–14.5)
GLUCOSE BLD STRIP.AUTO-MCNC: 106 MG/DL (ref 65–117)
GLUCOSE BLD STRIP.AUTO-MCNC: 109 MG/DL (ref 65–117)
GLUCOSE BLD STRIP.AUTO-MCNC: 113 MG/DL (ref 65–117)
GLUCOSE BLD STRIP.AUTO-MCNC: 121 MG/DL (ref 65–117)
GLUCOSE BLD STRIP.AUTO-MCNC: 141 MG/DL (ref 65–117)
GLUCOSE BLD STRIP.AUTO-MCNC: NORMAL MG/DL (ref 65–117)
GLUCOSE BLD STRIP.AUTO-MCNC: NORMAL MG/DL (ref 65–117)
GLUCOSE SERPL-MCNC: 109 MG/DL (ref 65–100)
HCT VFR BLD AUTO: 22.9 % (ref 36.6–50.3)
HGB BLD-MCNC: 7.5 G/DL (ref 12.1–17)
IMM GRANULOCYTES # BLD AUTO: 0 K/UL (ref 0–0.04)
IMM GRANULOCYTES NFR BLD AUTO: 0 % (ref 0–0.5)
LYMPHOCYTES # BLD: 0.4 K/UL (ref 0.8–3.5)
LYMPHOCYTES NFR BLD: 7 % (ref 12–49)
MAGNESIUM SERPL-MCNC: 1.8 MG/DL (ref 1.6–2.4)
MCH RBC QN AUTO: 30.1 PG (ref 26–34)
MCHC RBC AUTO-ENTMCNC: 32.8 G/DL (ref 30–36.5)
MCV RBC AUTO: 92 FL (ref 80–99)
MONOCYTES # BLD: 0.2 K/UL (ref 0–1)
MONOCYTES NFR BLD: 4 % (ref 5–13)
MYELOCYTES NFR BLD MANUAL: 1 %
NEUTS BAND NFR BLD MANUAL: 12 %
NEUTS SEG # BLD: 5.5 K/UL (ref 1.8–8)
NEUTS SEG NFR BLD: 76 % (ref 32–75)
NRBC # BLD: 0 K/UL (ref 0–0.01)
NRBC BLD-RTO: 0 PER 100 WBC
PHOSPHATE SERPL-MCNC: 1.9 MG/DL (ref 2.6–4.7)
PLATELET # BLD AUTO: 212 K/UL (ref 150–400)
PMV BLD AUTO: 9.2 FL (ref 8.9–12.9)
POTASSIUM SERPL-SCNC: 3.7 MMOL/L (ref 3.5–5.1)
PROCALCITONIN SERPL-MCNC: 4.89 NG/ML
RBC # BLD AUTO: 2.49 M/UL (ref 4.1–5.7)
RBC MORPH BLD: ABNORMAL
SERVICE CMNT-IMP: ABNORMAL
SERVICE CMNT-IMP: ABNORMAL
SERVICE CMNT-IMP: NORMAL
SODIUM SERPL-SCNC: 133 MMOL/L (ref 136–145)
WBC # BLD AUTO: 6.2 K/UL (ref 4.1–11.1)
WBC MORPH BLD: ABNORMAL

## 2022-01-21 PROCEDURE — 65270000029 HC RM PRIVATE

## 2022-01-21 PROCEDURE — 80048 BASIC METABOLIC PNL TOTAL CA: CPT

## 2022-01-21 PROCEDURE — 74011250637 HC RX REV CODE- 250/637: Performed by: NURSE PRACTITIONER

## 2022-01-21 PROCEDURE — 97530 THERAPEUTIC ACTIVITIES: CPT

## 2022-01-21 PROCEDURE — P9047 ALBUMIN (HUMAN), 25%, 50ML: HCPCS | Performed by: INTERNAL MEDICINE

## 2022-01-21 PROCEDURE — 43762 RPLC GTUBE NO REVJ TRC: CPT | Performed by: SURGERY

## 2022-01-21 PROCEDURE — 97165 OT EVAL LOW COMPLEX 30 MIN: CPT

## 2022-01-21 PROCEDURE — 84145 PROCALCITONIN (PCT): CPT

## 2022-01-21 PROCEDURE — 74011000250 HC RX REV CODE- 250: Performed by: INTERNAL MEDICINE

## 2022-01-21 PROCEDURE — 97535 SELF CARE MNGMENT TRAINING: CPT

## 2022-01-21 PROCEDURE — 97161 PT EVAL LOW COMPLEX 20 MIN: CPT

## 2022-01-21 PROCEDURE — 74011000258 HC RX REV CODE- 258: Performed by: INTERNAL MEDICINE

## 2022-01-21 PROCEDURE — 83735 ASSAY OF MAGNESIUM: CPT

## 2022-01-21 PROCEDURE — 84100 ASSAY OF PHOSPHORUS: CPT

## 2022-01-21 PROCEDURE — 85025 COMPLETE CBC W/AUTO DIFF WBC: CPT

## 2022-01-21 PROCEDURE — 74011250636 HC RX REV CODE- 250/636: Performed by: INTERNAL MEDICINE

## 2022-01-21 PROCEDURE — 36415 COLL VENOUS BLD VENIPUNCTURE: CPT

## 2022-01-21 PROCEDURE — 82962 GLUCOSE BLOOD TEST: CPT

## 2022-01-21 RX ORDER — POTASSIUM CHLORIDE AND SODIUM CHLORIDE 450; 150 MG/100ML; MG/100ML
INJECTION, SOLUTION INTRAVENOUS CONTINUOUS
Status: DISCONTINUED | OUTPATIENT
Start: 2022-01-21 | End: 2022-01-25

## 2022-01-21 RX ORDER — ALBUMIN HUMAN 250 G/1000ML
25 SOLUTION INTRAVENOUS ONCE
Status: COMPLETED | OUTPATIENT
Start: 2022-01-21 | End: 2022-01-21

## 2022-01-21 RX ORDER — ENOXAPARIN SODIUM 100 MG/ML
30 INJECTION SUBCUTANEOUS EVERY 24 HOURS
Status: DISCONTINUED | OUTPATIENT
Start: 2022-01-21 | End: 2022-02-03 | Stop reason: HOSPADM

## 2022-01-21 RX ADMIN — MEROPENEM 500 MG: 500 INJECTION INTRAVENOUS at 17:24

## 2022-01-21 RX ADMIN — MEROPENEM 500 MG: 500 INJECTION INTRAVENOUS at 23:54

## 2022-01-21 RX ADMIN — Medication 1 AMPULE: at 21:30

## 2022-01-21 RX ADMIN — Medication 1 AMPULE: at 08:46

## 2022-01-21 RX ADMIN — ALBUMIN (HUMAN) 25 G: 0.25 INJECTION, SOLUTION INTRAVENOUS at 11:27

## 2022-01-21 RX ADMIN — FAMOTIDINE: 10 INJECTION, SOLUTION INTRAVENOUS at 21:27

## 2022-01-21 RX ADMIN — FAMOTIDINE 20 MG: 10 INJECTION, SOLUTION INTRAVENOUS at 08:46

## 2022-01-21 RX ADMIN — ENOXAPARIN SODIUM 30 MG: 100 INJECTION SUBCUTANEOUS at 11:29

## 2022-01-21 RX ADMIN — Medication: at 21:00

## 2022-01-21 RX ADMIN — Medication: at 08:46

## 2022-01-21 RX ADMIN — MEROPENEM 500 MG: 500 INJECTION INTRAVENOUS at 00:29

## 2022-01-21 RX ADMIN — POTASSIUM CHLORIDE AND SODIUM CHLORIDE: 450; 150 INJECTION, SOLUTION INTRAVENOUS at 11:34

## 2022-01-21 RX ADMIN — I.V. FAT EMULSION 500 ML: 20 EMULSION INTRAVENOUS at 21:20

## 2022-01-21 RX ADMIN — MEROPENEM 500 MG: 500 INJECTION INTRAVENOUS at 11:28

## 2022-01-21 RX ADMIN — MEROPENEM 500 MG: 500 INJECTION INTRAVENOUS at 04:31

## 2022-01-21 NOTE — PROGRESS NOTES
Comprehensive Nutrition Assessment    Type and Reason for Visit: Reassess    Nutrition Recommendations/Plan:   TPN recommendations:   Continue TPN at 42mL/h as lytes dropped (phos 1.9)    Tomorrow if BG <200mg/dL and lytes WNL, advance to Goal Rate of 63mL/h, continue lipids (provides 1759kcals/76gPro/302gDextrose)     Monitor and replete lytes prn    Nutrition Assessment:   Chart reviewed, case discussed during CCU rounds. Pt extubated, needs re-estimated. TPN at 42mL/h, phos dropped down to 1.9.  K dropped from 5.7 Wednesday to 2.8, up to 3.7 today. Plans to keep TPN at 42mL/h for another 24h. Lipids added which will not increase refeeding risk as they are not dextrose containing calories. TPN at goal will provide 36kcals/kg, 1.6gPro/kg and GIR 4.4mg/kg/min which is appropriate. BG well controlled. Malnutrition Assessment:  Malnutrition Status:  Severe malnutrition    Context:  Chronic illness     Findings of the 6 clinical characteristics of malnutrition:   Energy Intake:  Unable to assess  Weight Loss:  Unable to assess     Body Fat Loss:  7 - Severe body fat loss, Orbital,Triceps,Fat overlying ribs   Muscle Mass Loss:  7 - Severe muscle mass loss, Scapula (trapezius),Thigh (quadriceps),Clavicles (pectoralis &deltoids)  Fluid Accumulation:  Unable to assess,     Strength:  Not performed         Estimated Daily Nutrient Needs:  Energy (kcal): MSJ 1789 (1376 x 1.3); Weight Used for Energy Requirements: Current  Protein (g): 65-73g (1.4-1.6gPro/kg); Weight Used for Protein Requirements: Current  Fluid (ml/day): per MD; Method Used for Fluid Requirements: 1 ml/kcal      Nutrition Related Findings:  Meds: [email or web address], [email or web address], [email or web address].   BM PTA      Wounds:    Surgical incision       Current Nutrition Therapies:  Current Parenteral Nutrition Orders:  · Type and Formula: D20, 5% AA   · Lipids: 500ml,Three times weekly  · Duration: Continuous  · Rate/Volume: 42ml/h  · Current PN Order Provides: 1315kcals/50gPro/202gDextrose  · Goal PN Orders Provides: 1759kcals/76gPro/302gDextrose      Anthropometric Measures:  · Height:  5' 9\" (175.3 cm)  · Current Body Wt:  48.1 kg (106 lb 0.7 oz)   · Ideal Body Wt:  160 lbs:  63.2 %   · BMI Category:  Underweight (BMI less than 18.5)       Nutrition Diagnosis:   · Altered GI function related to altered GI function,altered GI structure as evidenced by NPO or clear liquid status due to medical condition  Previous dx continues. · Severe malnutrition related to altered GI function,altered GI structure as evidenced by BMI,severe loss of subcutaneous fat,severe muscle loss  Previous dx continues. Nutrition Interventions:   Food and/or Nutrient Delivery: Continue parenteral nutrition  Nutrition Education and Counseling: No recommendations at this time  Coordination of Nutrition Care: Continue to monitor while inpatient,Interdisciplinary rounds    Goals:  Pt will tolerate TPN initiation with BG <200mg/dL and lytes WNL in 2-4 days. Nutrition Monitoring and Evaluation:   Behavioral-Environmental Outcomes: None identified  Food/Nutrient Intake Outcomes: Parenteral nutrition intake/tolerance,IVF intake  Physical Signs/Symptoms Outcomes: Biochemical data,Nutrition focused physical findings,Skin,Weight,GI status,Hemodynamic status,Fluid status or edema    Discharge Planning:     Too soon to determine     Electronically signed by Serg Montanez RD, [address]  on 1/21/2022 at 12:32 PM    Contact: ext [phone]
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
## 2022-01-21 NOTE — PROGRESS NOTES
Admit Date: 2022      POD 3 Days Post-Op  2022      Procedure:  Procedure(s):  LAPAROTOMY EXPLORATORY        HOSPITAL DAY:     ANTIBIOTICS:  meropenem    HPI:  Patient feeling better less abdominal pain White blood cell count normal, electrolytes noted bicarbonate, have asked pharmacy to decrease acetate or adjust as appropriate. Review of Systems   Constitutional: Positive for fatigue. Gastrointestinal:        Min abd pain    Neurological: Negative. Psychiatric/Behavioral: Negative. Temp:  [96.5 °F (35.8 °C)-101.5 °F (38.6 °C)]   Pulse (Heart Rate):  []   BP: ()/(44-93)   Resp Rate:  [11-32]   O2 Sat (%):  [73 %-100 %]   Weight:  [44.2 kg (97 lb 7.1 oz)-48.1 kg (106 lb 0.7 oz)]      Blood pressure 99/73, pulse (!) 105, temperature 98.6 °F (37 °C), resp. rate 21, height 5' 9\" (1.753 m), weight 48.1 kg (106 lb 0.7 oz), SpO2 100 %.     Temp (24hrs), Av.5 °F (36.9 °C), Min:97.8 °F (36.6 °C), Max:99.8 °F (37.7 °C)      Recent Results (from the past 48 hour(s))   GLUCOSE, POC    Collection Time: 22 10:17 AM   Result Value Ref Range    Glucose (POC) 139 (H) 65 - 117 mg/dL    Performed by Bean Knapp (RUBENS RN)    LACTIC ACID    Collection Time: 22  2:21 PM   Result Value Ref Range    Lactic acid 2.4 (HH) 0.4 - 2.0 MMOL/L   METABOLIC PANEL, BASIC    Collection Time: 22  2:21 PM   Result Value Ref Range    Sodium 128 (L) 136 - 145 mmol/L    Potassium 4.2 3.5 - 5.1 mmol/L    Chloride 85 (L) 97 - 108 mmol/L    CO2 34 (H) 21 - 32 mmol/L    Anion gap 9 5 - 15 mmol/L    Glucose 167 (H) 65 - 100 mg/dL    BUN 61 (H) 6 - 20 MG/DL    Creatinine 1.46 (H) 0.70 - 1.30 MG/DL    BUN/Creatinine ratio 42 (H) 12 - 20      GFR est AA >60 >60 ml/min/1.73m2    GFR est non-AA 53 (L) >60 ml/min/1.73m2    Calcium 8.4 (L) 8.5 - 10.1 MG/DL   GLUCOSE, POC    Collection Time: 22  5:55 PM   Result Value Ref Range    Glucose (POC) 161 (H) 65 - 117 mg/dL    Performed by Bean Knapp (RUBENS RN)    GLUCOSE, POC    Collection Time: 01/19/22 11:17 PM   Result Value Ref Range    Glucose (POC) 124 (H) 65 - 117 mg/dL    Performed by José Miguel Maza RN    GLUCOSE, POC    Collection Time: 01/20/22  6:34 AM   Result Value Ref Range    Glucose (POC) 134 (H) 65 - 117 mg/dL    Performed by José Miguel Maza RN    CBC WITH AUTOMATED DIFF    Collection Time: 01/20/22  7:41 AM   Result Value Ref Range    WBC 7.9 4.1 - 11.1 K/uL    RBC 2.49 (L) 4.10 - 5.70 M/uL    HGB 7.4 (L) 12.1 - 17.0 g/dL    HCT 22.1 (L) 36.6 - 50.3 %    MCV 88.8 80.0 - 99.0 FL    MCH 29.7 26.0 - 34.0 PG    MCHC 33.5 30.0 - 36.5 g/dL    RDW 18.7 (H) 11.5 - 14.5 %    PLATELET 632 586 - 489 K/uL    MPV 9.6 8.9 - 12.9 FL    NRBC 0.0 0  WBC    ABSOLUTE NRBC 0.00 0.00 - 0.01 K/uL    NEUTROPHILS 89 (H) 32 - 75 %    BAND NEUTROPHILS 2 %    LYMPHOCYTES 6 (L) 12 - 49 %    MONOCYTES 3 (L) 5 - 13 %    EOSINOPHILS 0 0 - 7 %    BASOPHILS 0 0 - 1 %    IMMATURE GRANULOCYTES 0 0.0 - 0.5 %    ABS. NEUTROPHILS 7.2 1.8 - 8.0 K/UL    ABS. LYMPHOCYTES 0.5 (L) 0.8 - 3.5 K/UL    ABS. MONOCYTES 0.2 0.0 - 1.0 K/UL    ABS. EOSINOPHILS 0.0 0.0 - 0.4 K/UL    ABS. BASOPHILS 0.0 0.0 - 0.1 K/UL    ABS. IMM.  GRANS. 0.0 0.00 - 0.04 K/UL    DF MANUAL      RBC COMMENTS ANISOCYTOSIS  1+       METABOLIC PANEL, BASIC    Collection Time: 01/20/22  7:41 AM   Result Value Ref Range    Sodium 132 (L) 136 - 145 mmol/L    Potassium 2.8 (L) 3.5 - 5.1 mmol/L    Chloride 90 (L) 97 - 108 mmol/L    CO2 34 (H) 21 - 32 mmol/L    Anion gap 8 5 - 15 mmol/L    Glucose 112 (H) 65 - 100 mg/dL    BUN 36 (H) 6 - 20 MG/DL    Creatinine 0.74 0.70 - 1.30 MG/DL    BUN/Creatinine ratio 49 (H) 12 - 20      GFR est AA >60 >60 ml/min/1.73m2    GFR est non-AA >60 >60 ml/min/1.73m2    Calcium 8.5 8.5 - 10.1 MG/DL   NT-PRO BNP    Collection Time: 01/20/22  7:41 AM   Result Value Ref Range    NT pro- (H) <125 PG/ML   SAMPLES BEING HELD    Collection Time: 01/20/22  7:41 AM   Result Value Ref Range SAMPLES BEING HELD  PST     COMMENT        Add-on orders for these samples will be processed based on acceptable specimen integrity and analyte stability, which may vary by analyte.    PHOSPHORUS    Collection Time: 01/20/22  7:41 AM   Result Value Ref Range    Phosphorus 2.2 (L) 2.6 - 4.7 MG/DL   MAGNESIUM    Collection Time: 01/20/22  7:41 AM   Result Value Ref Range    Magnesium 2.1 1.6 - 2.4 mg/dL   PHOSPHORUS    Collection Time: 01/20/22  5:14 PM   Result Value Ref Range    Phosphorus 1.8 (L) 2.6 - 4.7 MG/DL   POTASSIUM    Collection Time: 01/20/22  5:14 PM   Result Value Ref Range    Potassium 3.3 (L) 3.5 - 5.1 mmol/L   GLUCOSE, POC    Collection Time: 01/20/22  6:37 PM   Result Value Ref Range    Glucose (POC) 107 65 - 117 mg/dL    Performed by Violet Fitzgerald (RUBENS RN)    GLUCOSE, POC    Collection Time: 01/21/22 12:23 AM   Result Value Ref Range    Glucose (POC) 106 65 - 117 mg/dL    Performed by Anthony Echavarria RN    METABOLIC PANEL, BASIC    Collection Time: 01/21/22 12:26 AM   Result Value Ref Range    Sodium 133 (L) 136 - 145 mmol/L    Potassium 3.7 3.5 - 5.1 mmol/L    Chloride 96 (L) 97 - 108 mmol/L    CO2 36 (H) 21 - 32 mmol/L    Anion gap 1 (L) 5 - 15 mmol/L    Glucose 109 (H) 65 - 100 mg/dL    BUN 23 (H) 6 - 20 MG/DL    Creatinine 0.51 (L) 0.70 - 1.30 MG/DL    BUN/Creatinine ratio 45 (H) 12 - 20      GFR est AA >60 >60 ml/min/1.73m2    GFR est non-AA >60 >60 ml/min/1.73m2    Calcium 8.6 8.5 - 10.1 MG/DL   PROCALCITONIN    Collection Time: 01/21/22 12:26 AM   Result Value Ref Range    Procalcitonin 4.89 ng/mL   MAGNESIUM    Collection Time: 01/21/22 12:26 AM   Result Value Ref Range    Magnesium 1.8 1.6 - 2.4 mg/dL   CBC WITH AUTOMATED DIFF    Collection Time: 01/21/22 12:26 AM   Result Value Ref Range    WBC 6.2 4.1 - 11.1 K/uL    RBC 2.49 (L) 4.10 - 5.70 M/uL    HGB 7.5 (L) 12.1 - 17.0 g/dL    HCT 22.9 (L) 36.6 - 50.3 %    MCV 92.0 80.0 - 99.0 FL    MCH 30.1 26.0 - 34.0 PG    MCHC 32.8 30.0 - 36.5 g/dL    RDW 19.1 (H) 11.5 - 14.5 %    PLATELET 923 703 - 013 K/uL    MPV 9.2 8.9 - 12.9 FL    NRBC 0.0 0  WBC    ABSOLUTE NRBC 0.00 0.00 - 0.01 K/uL    NEUTROPHILS 76 (H) 32 - 75 %    BAND NEUTROPHILS 12 %    LYMPHOCYTES 7 (L) 12 - 49 %    MONOCYTES 4 (L) 5 - 13 %    EOSINOPHILS 0 0 - 7 %    BASOPHILS 0 0 - 1 %    MYELOCYTES 1 %    IMMATURE GRANULOCYTES 0 0.0 - 0.5 %    ABS. NEUTROPHILS 5.5 1.8 - 8.0 K/UL    ABS. LYMPHOCYTES 0.4 (L) 0.8 - 3.5 K/UL    ABS. MONOCYTES 0.2 0.0 - 1.0 K/UL    ABS. EOSINOPHILS 0.0 0.0 - 0.4 K/UL    ABS. BASOPHILS 0.0 0.0 - 0.1 K/UL    ABS. IMM. GRANS. 0.0 0.00 - 0.04 K/UL    DF MANUAL      RBC COMMENTS ANISOCYTOSIS  1+        WBC COMMENTS LEFT SHIFT     PHOSPHORUS    Collection Time: 01/21/22 12:26 AM   Result Value Ref Range    Phosphorus 1.9 (L) 2.6 - 4.7 MG/DL   GLUCOSE, POC    Collection Time: 01/21/22  6:05 AM   Result Value Ref Range    Glucose (POC) 413 (H) 65 - 117 mg/dL    Performed by Milly Saeed RN    GLUCOSE, POC    Collection Time: 01/21/22  6:07 AM   Result Value Ref Range    Glucose (POC) 113 65 - 117 mg/dL    Performed by Milly Saeed RN    GLUCOSE, POC    Collection Time: 01/21/22  6:12 AM   Result Value Ref Range    Glucose (POC) 109 65 - 117 mg/dL    Performed by Milly Saeed RN          XR Results (maximum last 3): Results from [place] encounter on 01/18/22    XR CHEST PORT    Impression  No acute findings. CT Results (maximum last 3): Results from [place] encounter on 01/18/22    CT ABD PELV WO CONT    Impression  Extensive bowel distention with air-fluid levels, pneumatosis  intestinalis, and portal venous air. The findings were called to Dr. Nettie Dominique on 1/18/2022 at 3:50 PM by myself. [id number]      MRI Results (maximum last 3): No results found for this or any previous visit. Nuclear Medicine Results (maximum last 3):   Results from [place] encounter on 11/11/21    NM GASTRIC EMPTY STDY    Impression  Abnormal Nuclear Gastric Emptying Study. US Results (maximum last 3): Results from [place] encounter on 11/25/21    US GUIDE NDL ASP  DRAIN    Impression  Ultrasound-guided 10 Turkmen right lower quadrant abscess drain placement      Physical Exam  Patient's abdomen midline skin staples intact no signs of infection, jejunostomy tube appropriately in place with no leakage, gastrostomy tube has been previously replaced with a Yancey catheter, I replaced this with the gastrostomy tube to keep the balloon up against the abdominal wall and help prevent extravasation of gastric contents. See below. Minimally tender, patient alert awake oriented    Active Problems:    Severe sepsis ([address] (1/18/2022)          ASSESSMENT/PLAN  Patient's intestinal issues, suggest that he will not be able to tolerate long-term jejunostomy tube feedings, will likely be TPN dependent, new right tunneled chest venous catheter in place. Difficult dilemma, I do not know if he is a candidate for intestinal transplant or not, certainly doubt this at this point, not sure how to get even transplant evaluation at this point, have not encountered this situation before, likely will be looking at long-term placement to someplace like [facility] to see if he can recover enough to consider outpatient evaluation for intestinal transplant at either [facility] or somewhere on the [facility] that does these. Can certainly consider long-term TPN but this is likely not a good long-term solution. Continue gastrostomy tube to drainage. Seems to be recovering from his intestinal ischemia and pneumatosis, continue meropenem for now.     Procedure, colon gastrostomy tube replaced by deflating the Yancey catheter in the current Yancey catheter gastrostomy tube, 24 Turkmen gastrostomy tube placed percutaneously into the gastrostomy tube this was a cook gastrostomy tube, this was pushed to 6 cm without resistance into the gastrostomy tube opening balloon inflated with 6 mL of tap water as designed and pulled up against the abdominal wall and the bolster pushed against the skin lightly over gauze dressing, and hooked to gastrostomy drainage no complications good seal no leak. Patient tolerated well no complications.   Patient had given verbal consent for tube change, nursing present NP Kacey Norris present      [facility] time including review of any indicated imaging, discussion with patient, and other providers, exam and discussion with patient:   *26        minutes    END:

## 2022-01-21 NOTE — PROGRESS NOTES
1925: Bedside shift change report given to Gwen Lees RN (oncoming nurse) by Katelyn Astorga RN (offgoing nurse). Report included the following information SBAR, Kardex, Intake/Output, MAR, Recent Results, Med Rec Status and Cardiac Rhythm NSR.     0725: End of Shift Note    Bedside shift change report given to Chaya Hammans., RN (oncoming nurse) by Aylin Macedo RN (offgoing nurse). Report included the following information SBAR, Kardex, Intake/Output, MAR, Recent Results, Med Rec Status and Cardiac Rhythm Sinus Tach    Shift worked:  7p-7a     Shift summary and any significant changes:     No significant changes     Concerns for physician to address:  None     Zone phone for oncoming shift:   xxxx       Activity:  Activity Level: Bed Rest  Number times ambulated in hallways past shift: 0  Number of times OOB to chair past shift: 0    Cardiac:   Cardiac Monitoring: Yes      Cardiac Rhythm: Sinus Tachy    Access:   Current line(s): PIV and central line     Genitourinary:   Urinary status: brock    Respiratory:   O2 Device: None (Room air)  Chronic home O2 use?: NO  Incentive spirometer at bedside: NO     GI:  Last Bowel Movement Date:  (PTA)  Current diet:  DIET NPO Ice Chips  TPN ADULT - CENTRAL  Passing flatus: NO  Tolerating current diet: YES       Pain Management:   Patient states pain is manageable on current regimen: YES    Skin:  Russell Score: 15  Interventions: speciality bed    Patient Safety:  Fall Score:  Total Score: 2  Interventions: bed/chair alarm  High Fall Risk: Yes    Length of Stay:  Expected LOS: 9d 14h  Actual LOS: [address]

## 2022-01-21 NOTE — PROGRESS NOTES
Assumed care of patient 0700, resting comfortably in bed, on TPN, KVO and receiving antibotics thru his central femoral line. Patient supposed to go to IR to have PICC placed later today, PICC placed at 1500 and patient back to ICU 1600. Labs replaced per physician orders and protocols. Surgery rounded with patient and assessed concerning J tube and G tube. Patient denies any pain, is able to help reposition himself a little but needs a lot of encouragement. Patient mother at bedside later in day and updated on care, patient was supposed to have court date concerning child custody and case management on board with helping guide proper paperwork on patients behalf. Mother states that recently when patient was discharged he was unable to walk, move or assist in any care, she was carrying him from the car, to bed and everywhere in home, as well at helping assist him to the toilet. While he was being toileted multiple episodes of seizures for mother, who states that she did call 911 and the second time after the seizure the patient refused to come to the hospital because he had just been admitted. Patient is very flat, needs a lot of physical therapy and help in order to be able to help transition care. Patient labs rechecked after replacements and continuing TPN for patient upon leaving ICU. Patient stable thru shift, will continue to monitor and reasesss. See MAR and flowsheet for more data and information.

## 2022-01-21 NOTE — PROGRESS NOTES
Transition of Care Plan:     RUR: 24% - High (READMIT)  Disposition: Home with [facility] [employer] ([address] ([address] [facility])  Follow up appointments: PCP and specialist as indicated   DME needed: TBD   Transportation at Discharge: Family to transport if medically appropriate  Keys or means to access home: Mother has       IM Medicare Letter: N/A - Medicaid   Is patient a BCPI-A Bundle: No              If yes, was Bundle Letter given?:    Is patient a  and connected with the [address] [facility]? No  If yes, was [employer] transfer form completed and [facility] notified? Caregiver Contact: Mother - Arley Caraballo - [phone]  Discharge Caregiver contacted prior to discharge? To be contacted prior to discharge              Patient transferred to room 3232. Handoff given to QUYEN Lopez CM to follow . Information given to CM that was received from mother once the md writes a note in connect care that patient is in the hospital and unable to attend court next week. Yamileth Fowler  RN BSN CRM        [phone]

## 2022-01-21 NOTE — PROGRESS NOTES
0729-Bedside shift change report received from [facility] (offgoing nurse) to Megha Marsh RN (oncoming nurse). Report included the following information SBAR, Kardex, ED Summary, OR Summary, Procedure Summary, Intake/Output, MAR, Recent Results and Cardiac Rhythm -ST.    0800-Shift assessment complete-see flowsheet for detailed findings. Pt. is awake, alert and oriented x4-able to make needs known. No c/o voiced. Denies pain or discomfort at this time. Respirations even and unlabored on RA. Skin is warm and dry. 26 staples present to midline abdominal incision. 0845-Elizabeth Ask and NP at the bedside-changed out pt's G-tube. 0945-Interdisciplinary team rounds were held 1/21/2022 with the following team members:Care Management, Nursing, Nutrition, Pharmacy, Physical Therapy, Physician and Respiratory Therapy. Plan of care discussed. See clinical pathway and/or care plan for interventions and desired outcomes. Goals of the Day:    1200-Reassessment complete-no changes noted. 1505-TRANSFER - OUT REPORT:    [facility] on [facility]  being transferred to [facility] for routine progression of care       Report consisted of patients Situation, Background, Assessment and   Recommendations(SBAR). Information from the following report(s) SBAR, Kardex, ED Summary, OR Summary, Procedure Summary, Intake/Output, MAR, Recent Results and Cardiac Rhythm -NSR/ST was reviewed with the receiving nurse.     Lines:   PICC Double Lumen 01/20/22 Right (Active)   Central Line Being Utilized Yes 01/21/22 1200   Criteria for Appropriate Use Total parenteral nutrition 01/21/22 1200   Site Assessment Clean, dry, & intact 01/21/22 1200   Phlebitis Assessment 0 01/21/22 1200   Infiltration Assessment 0 01/21/22 1200   Date of Last Dressing Change 01/20/22 01/21/22 1200   Dressing Status Clean, dry, & intact 01/21/22 1200   Action Taken Open ports on tubing capped 01/21/22 1200   Dressing Type Disk with Chlorhexadine gluconate (CHG); Transparent 01/21/22 1200   Hub Color/Line Status Pink; Infusing 01/21/22 1200   Positive Blood Return (Site #1) Yes 01/21/22 1200   Hub Color/Line Status Red; Infusing 01/21/22 1200   Positive Blood Return (Site #2) Yes 01/21/22 1200   Alcohol Cap Used Yes 01/21/22 1200        Opportunity for questions and clarification was provided.
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
## 2022-01-22 LAB
ANION GAP SERPL CALC-SCNC: 7 MMOL/L (ref 5–15)
BUN SERPL-MCNC: 16 MG/DL (ref 6–20)
BUN/CREAT SERPL: 33 (ref 12–20)
CALCIUM SERPL-MCNC: 8.7 MG/DL (ref 8.5–10.1)
CHLORIDE SERPL-SCNC: 96 MMOL/L (ref 97–108)
CO2 SERPL-SCNC: 32 MMOL/L (ref 21–32)
CREAT SERPL-MCNC: 0.48 MG/DL (ref 0.7–1.3)
GLUCOSE BLD STRIP.AUTO-MCNC: 113 MG/DL (ref 65–117)
GLUCOSE BLD STRIP.AUTO-MCNC: 127 MG/DL (ref 65–117)
GLUCOSE BLD STRIP.AUTO-MCNC: 133 MG/DL (ref 65–117)
GLUCOSE BLD STRIP.AUTO-MCNC: 99 MG/DL (ref 65–117)
GLUCOSE SERPL-MCNC: 113 MG/DL (ref 65–100)
MAGNESIUM SERPL-MCNC: 1.8 MG/DL (ref 1.6–2.4)
PHOSPHATE SERPL-MCNC: 2.2 MG/DL (ref 2.6–4.7)
POTASSIUM SERPL-SCNC: 2.9 MMOL/L (ref 3.5–5.1)
SERVICE CMNT-IMP: ABNORMAL
SERVICE CMNT-IMP: ABNORMAL
SERVICE CMNT-IMP: NORMAL
SERVICE CMNT-IMP: NORMAL
SODIUM SERPL-SCNC: 135 MMOL/L (ref 136–145)

## 2022-01-22 PROCEDURE — 74011000250 HC RX REV CODE- 250: Performed by: SURGERY

## 2022-01-22 PROCEDURE — 74011000258 HC RX REV CODE- 258: Performed by: SURGERY

## 2022-01-22 PROCEDURE — 82962 GLUCOSE BLOOD TEST: CPT

## 2022-01-22 PROCEDURE — 84100 ASSAY OF PHOSPHORUS: CPT

## 2022-01-22 PROCEDURE — 83735 ASSAY OF MAGNESIUM: CPT

## 2022-01-22 PROCEDURE — 36415 COLL VENOUS BLD VENIPUNCTURE: CPT

## 2022-01-22 PROCEDURE — 74011000258 HC RX REV CODE- 258: Performed by: INTERNAL MEDICINE

## 2022-01-22 PROCEDURE — 74011250637 HC RX REV CODE- 250/637: Performed by: NURSE PRACTITIONER

## 2022-01-22 PROCEDURE — 80048 BASIC METABOLIC PNL TOTAL CA: CPT

## 2022-01-22 PROCEDURE — 74011250636 HC RX REV CODE- 250/636: Performed by: SURGERY

## 2022-01-22 PROCEDURE — 74011250636 HC RX REV CODE- 250/636: Performed by: INTERNAL MEDICINE

## 2022-01-22 PROCEDURE — 74011000250 HC RX REV CODE- 250: Performed by: EMERGENCY MEDICINE

## 2022-01-22 PROCEDURE — 65270000029 HC RM PRIVATE

## 2022-01-22 RX ADMIN — MEROPENEM 500 MG: 500 INJECTION INTRAVENOUS at 10:39

## 2022-01-22 RX ADMIN — Medication: at 12:26

## 2022-01-22 RX ADMIN — ENOXAPARIN SODIUM 30 MG: 100 INJECTION SUBCUTANEOUS at 10:39

## 2022-01-22 RX ADMIN — FAMOTIDINE: 10 INJECTION, SOLUTION INTRAVENOUS at 18:55

## 2022-01-22 RX ADMIN — MEROPENEM 500 MG: 500 INJECTION INTRAVENOUS at 04:42

## 2022-01-22 RX ADMIN — ACETAMINOPHEN 650 MG: 650 SUPPOSITORY RECTAL at 00:49

## 2022-01-22 RX ADMIN — Medication 1 AMPULE: at 23:55

## 2022-01-22 RX ADMIN — SODIUM CHLORIDE, PRESERVATIVE FREE 10 ML: 5 INJECTION INTRAVENOUS at 06:05

## 2022-01-22 RX ADMIN — Medication: at 21:00

## 2022-01-22 RX ADMIN — MEROPENEM 500 MG: 500 INJECTION INTRAVENOUS at 18:55

## 2022-01-22 RX ADMIN — Medication 1 AMPULE: at 10:39

## 2022-01-22 RX ADMIN — MEROPENEM 500 MG: 500 INJECTION INTRAVENOUS at 23:55

## 2022-01-22 NOTE — PROGRESS NOTES
Admit Date: 2022    POD 4 Days Post-Op    Procedure:  Procedure(s):  LAPAROTOMY EXPLORATORY    Subjective:     Patient has no new complaints. Objective:     Blood pressure 100/68, pulse (!) 101, temperature (!) 100.9 °F (38.3 °C), resp. rate 17, height 5' 9\" (1.753 m), weight 106 lb 0.7 oz (48.1 kg), SpO2 98 %.     Temp (24hrs), Av.8 °F (37.7 °C), Min:98.6 °F (37 °C), Max:100.9 °F (38.3 °C)      Physical Exam:  GENERAL: alert, cooperative, no distress, appears stated age, LUNG: clear to auscultation bilaterally, HEART: regular rate and rhythm, ABDOMEN: soft, flat, wound c/d/i, EXTREMITIES:  extremities normal, atraumatic, no cyanosis or edema    Labs:   Recent Results (from the past 24 hour(s))   GLUCOSE, POC    Collection Time: 22 11:46 AM   Result Value Ref Range    Glucose (POC) PLEASE DISREGARD RESULTS 65 - 117 mg/dL    Performed by Chandni Gaffney RN    GLUCOSE, POC    Collection Time: 22 11:48 AM   Result Value Ref Range    Glucose (POC) 121 (H) 65 - 117 mg/dL    Performed by Chandni Gaffney RN    GLUCOSE, POC    Collection Time: 22 11:49 PM   Result Value Ref Range    Glucose (POC) 141 (H) 65 - 117 mg/dL    Performed by Britany Esquivel PCT    METABOLIC PANEL, BASIC    Collection Time: 22  2:52 AM   Result Value Ref Range    Sodium 135 (L) 136 - 145 mmol/L    Potassium 2.9 (L) 3.5 - 5.1 mmol/L    Chloride 96 (L) 97 - 108 mmol/L    CO2 32 21 - 32 mmol/L    Anion gap 7 5 - 15 mmol/L    Glucose 113 (H) 65 - 100 mg/dL    BUN 16 6 - 20 MG/DL    Creatinine 0.48 (L) 0.70 - 1.30 MG/DL    BUN/Creatinine ratio 33 (H) 12 - 20      GFR est AA >60 >60 ml/min/1.73m2    GFR est non-AA >60 >60 ml/min/1.73m2    Calcium 8.7 8.5 - 10.1 MG/DL   PHOSPHORUS    Collection Time: 22  2:52 AM   Result Value Ref Range    Phosphorus 2.2 (L) 2.6 - 4.7 MG/DL   MAGNESIUM    Collection Time: 22  2:52 AM   Result Value Ref Range    Magnesium 1.8 1.6 - 2.4 mg/dL   GLUCOSE, POC    Collection Time: 01/22/22  6:02 AM   Result Value Ref Range    Glucose (POC) 127 (H) 65 - 117 mg/dL    Performed by Nuvia Salinas PCT        Data Review images and reports reviewed    Assessment:     Active Problems:    Severe sepsis ([address] (1/18/2022)        Plan/Recommendations/Medical Decision Making:     Continue present treatment   Pt with frozen abdomen presenting with recurrent pneumatosis, encountered 2 enterotomies during attempted re-exploration and bowel not able to be freed safely, exploration aborted. No obvious ischemic bowel identified. Pt continues to do well following exploration, as he did after his initial first exploration by me. Suspect the pneumatosis is related to not tolerating the tube feeds. Continue tpn without tube feeds  g-tube to gravity. Proper tube replaced yesterday by Dr. Anna Benavidez. Will get case management involved to screen for admission to [facility].     Carlo Cantor MD  [facility]

## 2022-01-23 LAB
ANION GAP SERPL CALC-SCNC: 5 MMOL/L (ref 5–15)
BUN SERPL-MCNC: 17 MG/DL (ref 6–20)
BUN/CREAT SERPL: 38 (ref 12–20)
CALCIUM SERPL-MCNC: 8.7 MG/DL (ref 8.5–10.1)
CHLORIDE SERPL-SCNC: 96 MMOL/L (ref 97–108)
CO2 SERPL-SCNC: 32 MMOL/L (ref 21–32)
CREAT SERPL-MCNC: 0.45 MG/DL (ref 0.7–1.3)
GLUCOSE BLD STRIP.AUTO-MCNC: 119 MG/DL (ref 65–117)
GLUCOSE BLD STRIP.AUTO-MCNC: 120 MG/DL (ref 65–117)
GLUCOSE BLD STRIP.AUTO-MCNC: 145 MG/DL (ref 65–117)
GLUCOSE SERPL-MCNC: 114 MG/DL (ref 65–100)
MAGNESIUM SERPL-MCNC: 1.8 MG/DL (ref 1.6–2.4)
PHOSPHATE SERPL-MCNC: 2.4 MG/DL (ref 2.6–4.7)
POTASSIUM SERPL-SCNC: 3.2 MMOL/L (ref 3.5–5.1)
SERVICE CMNT-IMP: ABNORMAL
SODIUM SERPL-SCNC: 133 MMOL/L (ref 136–145)

## 2022-01-23 PROCEDURE — 74011000258 HC RX REV CODE- 258: Performed by: SURGERY

## 2022-01-23 PROCEDURE — 74011250636 HC RX REV CODE- 250/636: Performed by: SURGERY

## 2022-01-23 PROCEDURE — 83735 ASSAY OF MAGNESIUM: CPT

## 2022-01-23 PROCEDURE — 65270000029 HC RM PRIVATE

## 2022-01-23 PROCEDURE — 82962 GLUCOSE BLOOD TEST: CPT

## 2022-01-23 PROCEDURE — 80048 BASIC METABOLIC PNL TOTAL CA: CPT

## 2022-01-23 PROCEDURE — 74011250636 HC RX REV CODE- 250/636: Performed by: INTERNAL MEDICINE

## 2022-01-23 PROCEDURE — 74011000250 HC RX REV CODE- 250: Performed by: SURGERY

## 2022-01-23 PROCEDURE — 74011000258 HC RX REV CODE- 258: Performed by: INTERNAL MEDICINE

## 2022-01-23 PROCEDURE — 74011250637 HC RX REV CODE- 250/637: Performed by: NURSE PRACTITIONER

## 2022-01-23 PROCEDURE — 84100 ASSAY OF PHOSPHORUS: CPT

## 2022-01-23 PROCEDURE — 74011636637 HC RX REV CODE- 636/637: Performed by: NURSE PRACTITIONER

## 2022-01-23 PROCEDURE — 36415 COLL VENOUS BLD VENIPUNCTURE: CPT

## 2022-01-23 RX ADMIN — MEROPENEM 500 MG: 500 INJECTION INTRAVENOUS at 23:06

## 2022-01-23 RX ADMIN — MEROPENEM 500 MG: 500 INJECTION INTRAVENOUS at 05:07

## 2022-01-23 RX ADMIN — POTASSIUM CHLORIDE AND SODIUM CHLORIDE: 450; 150 INJECTION, SOLUTION INTRAVENOUS at 23:06

## 2022-01-23 RX ADMIN — Medication: at 23:07

## 2022-01-23 RX ADMIN — FAMOTIDINE: 10 INJECTION, SOLUTION INTRAVENOUS at 19:08

## 2022-01-23 RX ADMIN — Medication 1 AMPULE: at 11:32

## 2022-01-23 RX ADMIN — Medication 1 AMPULE: at 23:06

## 2022-01-23 RX ADMIN — Medication: at 11:32

## 2022-01-23 RX ADMIN — MEROPENEM 500 MG: 500 INJECTION INTRAVENOUS at 11:32

## 2022-01-23 RX ADMIN — Medication 2 UNITS: at 19:05

## 2022-01-23 RX ADMIN — MEROPENEM 500 MG: 500 INJECTION INTRAVENOUS at 19:05

## 2022-01-23 RX ADMIN — ENOXAPARIN SODIUM 30 MG: 100 INJECTION SUBCUTANEOUS at 11:32

## 2022-01-23 RX ADMIN — ACETAMINOPHEN 650 MG: 650 SUPPOSITORY RECTAL at 19:05

## 2022-01-23 NOTE — PROGRESS NOTES
Admit Date: 2022    POD 5 Days Post-Op    Procedure:  Procedure(s):  LAPAROTOMY EXPLORATORY    Subjective:     Patient has no new complaints. Objective:     Blood pressure 98/73, pulse (!) 110, temperature 99.9 °F (37.7 °C), resp. rate 18, height 5' 9\" (1.753 m), weight 106 lb 0.7 oz (48.1 kg), SpO2 100 %.     Temp (24hrs), Av °F (37.2 °C), Min:98.1 °F (36.7 °C), Max:99.9 °F (37.7 °C)      Physical Exam:  GENERAL: alert, cooperative, no distress, appears stated age, LUNG: clear to auscultation bilaterally, HEART: regular rate and rhythm, ABDOMEN: soft, flat, wound c/d/i, scant drainage on dressings, G-tube site looks good, EXTREMITIES:  extremities normal, atraumatic, no cyanosis or edema    Labs:   Recent Results (from the past 24 hour(s))   GLUCOSE, POC    Collection Time: 22 12:08 PM   Result Value Ref Range    Glucose (POC) 99 65 - 117 mg/dL    Performed by Yepez Marry PCT    GLUCOSE, POC    Collection Time: 22  5:50 PM   Result Value Ref Range    Glucose (POC) 133 (H) 65 - 117 mg/dL    Performed by Yepez Marry PCT    GLUCOSE, POC    Collection Time: 22 11:34 PM   Result Value Ref Range    Glucose (POC) 113 65 - 117 mg/dL    Performed by Carrienn Shelter PCT    METABOLIC PANEL, BASIC    Collection Time: 22 12:24 AM   Result Value Ref Range    Sodium 133 (L) 136 - 145 mmol/L    Potassium 3.2 (L) 3.5 - 5.1 mmol/L    Chloride 96 (L) 97 - 108 mmol/L    CO2 32 21 - 32 mmol/L    Anion gap 5 5 - 15 mmol/L    Glucose 114 (H) 65 - 100 mg/dL    BUN 17 6 - 20 MG/DL    Creatinine 0.45 (L) 0.70 - 1.30 MG/DL    BUN/Creatinine ratio 38 (H) 12 - 20      GFR est AA >60 >60 ml/min/1.73m2    GFR est non-AA >60 >60 ml/min/1.73m2    Calcium 8.7 8.5 - 10.1 MG/DL   PHOSPHORUS    Collection Time: 22 12:24 AM   Result Value Ref Range    Phosphorus 2.4 (L) 2.6 - 4.7 MG/DL   MAGNESIUM    Collection Time: 22 12:24 AM   Result Value Ref Range    Magnesium 1.8 1.6 - 2.4 mg/dL   GLUCOSE, POC Collection Time: 01/23/22  6:11 AM   Result Value Ref Range    Glucose (POC) 120 (H) 65 - 117 mg/dL    Performed by Francisco Reeves PCT        Data Review images and reports reviewed    Assessment:     Active Problems:    Severe sepsis ([address] (1/18/2022)        Plan/Recommendations/Medical Decision Making:     Continue present treatment   Pt with frozen abdomen presenting with recurrent pneumatosis, encountered 2 enterotomies during attempted re-exploration and bowel not able to be freed safely, exploration aborted. No obvious ischemic bowel identified. Pt continues to do well following exploration, as he did after his initial first exploration by me. Suspect the pneumatosis is related to not tolerating the tube feeds. Continue tpn without tube feeds  g-tube to gravity. Proper G-tube replaced 1/21 by Dr. Bryson Mckeon. Will get case management involved to screen for admission to [facility].     Stu Gregory MD  [facility]

## 2022-01-23 NOTE — PROGRESS NOTES
End of Shift Note    Bedside shift change report given to Patricia Luke LPN (oncoming nurse) by Bob Laguna RN (offgoing nurse). Report included the following information SBAR, Kardex, Intake/Output and MAR    Shift worked:  7am-7pm     Shift summary and any significant changes:     Pt did not leave the bed during the shift. Pt complained of pain once during the shift; however, pt denied pain medication. Pt did not complain of nausea during the shift. Pt is tolerating the current diet of TPN and NPO with ice chips. Pt had an uneventful shift. Concerns for physician to address:       Zone phone for oncoming shift:          Activity:  Activity Level: Bed Rest  Number times ambulated in hallways past shift: 0  Number of times OOB to chair past shift: 0    Cardiac:   Cardiac Monitoring: No      Cardiac Rhythm: Sinus Tachy    Access:   Current line(s): PICC     Genitourinary:   Urinary status: brock    Respiratory:   O2 Device: None (Room air)  Chronic home O2 use?: NO  Incentive spirometer at bedside: YES     GI:  Last Bowel Movement Date:  (PTA)  Current diet:  DIET NPO Ice Chips  TPN ADULT - CENTRAL  Passing flatus: YES  Tolerating current diet: YES       Pain Management:   Patient states pain is manageable on current regimen: YES    Skin:  Russell Score: 15  Interventions: float heels and increase time out of bed    Patient Safety:  Fall Score:  Total Score: 2  Interventions: gripper socks and pt to call before getting OOB  High Fall Risk: Yes    Length of Stay:  Expected LOS: 9d 14h  Actual LOS: Deepa Brandon, SAMUEL

## 2022-01-24 LAB
ANION GAP SERPL CALC-SCNC: 6 MMOL/L (ref 5–15)
BACTERIA SPEC CULT: NORMAL
BACTERIA SPEC CULT: NORMAL
BUN SERPL-MCNC: 23 MG/DL (ref 6–20)
BUN/CREAT SERPL: 53 (ref 12–20)
CALCIUM SERPL-MCNC: 8.6 MG/DL (ref 8.5–10.1)
CHLORIDE SERPL-SCNC: 94 MMOL/L (ref 97–108)
CO2 SERPL-SCNC: 31 MMOL/L (ref 21–32)
CREAT SERPL-MCNC: 0.43 MG/DL (ref 0.7–1.3)
ERYTHROCYTE [DISTWIDTH] IN BLOOD BY AUTOMATED COUNT: 18.2 % (ref 11.5–14.5)
GLUCOSE BLD STRIP.AUTO-MCNC: 114 MG/DL (ref 65–117)
GLUCOSE BLD STRIP.AUTO-MCNC: 125 MG/DL (ref 65–117)
GLUCOSE BLD STRIP.AUTO-MCNC: 133 MG/DL (ref 65–117)
GLUCOSE BLD STRIP.AUTO-MCNC: 219 MG/DL (ref 65–117)
GLUCOSE SERPL-MCNC: 90 MG/DL (ref 65–100)
HCT VFR BLD AUTO: 26.6 % (ref 36.6–50.3)
HGB BLD-MCNC: 9 G/DL (ref 12.1–17)
MAGNESIUM SERPL-MCNC: 1.9 MG/DL (ref 1.6–2.4)
MCH RBC QN AUTO: 29.2 PG (ref 26–34)
MCHC RBC AUTO-ENTMCNC: 33.8 G/DL (ref 30–36.5)
MCV RBC AUTO: 86.4 FL (ref 80–99)
NRBC # BLD: 0 K/UL (ref 0–0.01)
NRBC BLD-RTO: 0 PER 100 WBC
PHOSPHATE SERPL-MCNC: 2.6 MG/DL (ref 2.6–4.7)
PLATELET # BLD AUTO: 366 K/UL (ref 150–400)
PMV BLD AUTO: 9.1 FL (ref 8.9–12.9)
POTASSIUM SERPL-SCNC: 3.6 MMOL/L (ref 3.5–5.1)
RBC # BLD AUTO: 3.08 M/UL (ref 4.1–5.7)
SERVICE CMNT-IMP: ABNORMAL
SERVICE CMNT-IMP: NORMAL
SODIUM SERPL-SCNC: 131 MMOL/L (ref 136–145)
WBC # BLD AUTO: 7.5 K/UL (ref 4.1–11.1)

## 2022-01-24 PROCEDURE — 82962 GLUCOSE BLOOD TEST: CPT

## 2022-01-24 PROCEDURE — 74011250636 HC RX REV CODE- 250/636: Performed by: INTERNAL MEDICINE

## 2022-01-24 PROCEDURE — 74011000250 HC RX REV CODE- 250: Performed by: INTERNAL MEDICINE

## 2022-01-24 PROCEDURE — 84100 ASSAY OF PHOSPHORUS: CPT

## 2022-01-24 PROCEDURE — 74011636637 HC RX REV CODE- 636/637: Performed by: NURSE PRACTITIONER

## 2022-01-24 PROCEDURE — 74011250637 HC RX REV CODE- 250/637: Performed by: NURSE PRACTITIONER

## 2022-01-24 PROCEDURE — 80048 BASIC METABOLIC PNL TOTAL CA: CPT

## 2022-01-24 PROCEDURE — 74011000258 HC RX REV CODE- 258: Performed by: NURSE PRACTITIONER

## 2022-01-24 PROCEDURE — 36415 COLL VENOUS BLD VENIPUNCTURE: CPT

## 2022-01-24 PROCEDURE — 65270000029 HC RM PRIVATE

## 2022-01-24 PROCEDURE — 74011250636 HC RX REV CODE- 250/636: Performed by: NURSE PRACTITIONER

## 2022-01-24 PROCEDURE — 85027 COMPLETE CBC AUTOMATED: CPT

## 2022-01-24 PROCEDURE — 74011000250 HC RX REV CODE- 250: Performed by: NURSE PRACTITIONER

## 2022-01-24 PROCEDURE — 74011000258 HC RX REV CODE- 258: Performed by: INTERNAL MEDICINE

## 2022-01-24 PROCEDURE — 83735 ASSAY OF MAGNESIUM: CPT

## 2022-01-24 RX ORDER — OCTREOTIDE ACETATE 100 UG/ML
100 INJECTION, SOLUTION INTRAVENOUS; SUBCUTANEOUS 4 TIMES DAILY
Status: DISCONTINUED | OUTPATIENT
Start: 2022-01-24 | End: 2022-01-27

## 2022-01-24 RX ADMIN — FAMOTIDINE: 10 INJECTION, SOLUTION INTRAVENOUS at 18:37

## 2022-01-24 RX ADMIN — MEROPENEM 500 MG: 500 INJECTION INTRAVENOUS at 10:42

## 2022-01-24 RX ADMIN — POTASSIUM CHLORIDE AND SODIUM CHLORIDE: 450; 150 INJECTION, SOLUTION INTRAVENOUS at 18:37

## 2022-01-24 RX ADMIN — MEROPENEM 500 MG: 500 INJECTION INTRAVENOUS at 18:37

## 2022-01-24 RX ADMIN — OCTREOTIDE ACETATE 100 MCG: 100 INJECTION, SOLUTION INTRAVENOUS; SUBCUTANEOUS at 18:39

## 2022-01-24 RX ADMIN — MEROPENEM 500 MG: 500 INJECTION INTRAVENOUS at 22:42

## 2022-01-24 RX ADMIN — ACETAMINOPHEN 650 MG: 650 SUPPOSITORY RECTAL at 22:49

## 2022-01-24 RX ADMIN — MEROPENEM 500 MG: 500 INJECTION INTRAVENOUS at 04:03

## 2022-01-24 RX ADMIN — OCTREOTIDE ACETATE 100 MCG: 100 INJECTION, SOLUTION INTRAVENOUS; SUBCUTANEOUS at 12:23

## 2022-01-24 RX ADMIN — Medication 3 UNITS: at 18:39

## 2022-01-24 RX ADMIN — Medication: at 08:00

## 2022-01-24 RX ADMIN — ENOXAPARIN SODIUM 30 MG: 100 INJECTION SUBCUTANEOUS at 10:43

## 2022-01-24 RX ADMIN — Medication 1 AMPULE: at 10:44

## 2022-01-24 RX ADMIN — I.V. FAT EMULSION 500 ML: 20 EMULSION INTRAVENOUS at 18:36

## 2022-01-24 RX ADMIN — OCTREOTIDE ACETATE 100 MCG: 100 INJECTION, SOLUTION INTRAVENOUS; SUBCUTANEOUS at 22:42

## 2022-01-24 RX ADMIN — Medication: at 22:49

## 2022-01-24 NOTE — WOUND CARE
Wound care attempted to see patient but he was asleep. 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 Plan: Will see patient tomorrow and monitor the output from the pouch.    Karen Anderson RN, BSN, [employer]

## 2022-01-24 NOTE — PROGRESS NOTES
Admit Date: 2022    POD 6 Days Post-Op    Procedure:  Procedure(s):  LAPAROTOMY EXPLORATORY    Subjective:     Patient c/o midline wound drainage    Objective:     Blood pressure 100/65, pulse (!) 106, temperature 97.9 °F (36.6 °C), resp. rate 17, height 5' 9\" (1.753 m), weight 106 lb 0.7 oz (48.1 kg), SpO2 100 %.     Temp (24hrs), Av.1 °F (36.7 °C), Min:97.9 °F (36.6 °C), Max:98.4 °F (36.9 °C)      Physical Exam:  GENERAL: alert, cooperative, no distress, appears stated age, LUNG: clear to auscultation bilaterally, HEART: regular rate and rhythm, ABDOMEN: soft, flat, wound with obvious enteric contents spewing from midline wound under pressure, G-tube site looks good, EXTREMITIES:  extremities normal, atraumatic, no cyanosis or edema    Labs:   Recent Results (from the past 24 hour(s))   GLUCOSE, POC    Collection Time: 22  2:52 PM   Result Value Ref Range    Glucose (POC) 119 (H) 65 - 117 mg/dL    Performed by Malia Roper RN    GLUCOSE, POC    Collection Time: 22  6:58 PM   Result Value Ref Range    Glucose (POC) 145 (H) 65 - 117 mg/dL    Performed by Vera Williamson PCT    GLUCOSE, POC    Collection Time: 22 12:17 AM   Result Value Ref Range    Glucose (POC) 133 (H) 65 - 117 mg/dL    Performed by Van Wiggins (PCT)    METABOLIC PANEL, BASIC    Collection Time: 22  3:54 AM   Result Value Ref Range    Sodium 131 (L) 136 - 145 mmol/L    Potassium 3.6 3.5 - 5.1 mmol/L    Chloride 94 (L) 97 - 108 mmol/L    CO2 31 21 - 32 mmol/L    Anion gap 6 5 - 15 mmol/L    Glucose 90 65 - 100 mg/dL    BUN 23 (H) 6 - 20 MG/DL    Creatinine 0.43 (L) 0.70 - 1.30 MG/DL    BUN/Creatinine ratio 53 (H) 12 - 20      GFR est AA >60 >60 ml/min/1.73m2    GFR est non-AA >60 >60 ml/min/1.73m2    Calcium 8.6 8.5 - 10.1 MG/DL   PHOSPHORUS    Collection Time: 22  3:54 AM   Result Value Ref Range    Phosphorus 2.6 2.6 - 4.7 MG/DL   MAGNESIUM    Collection Time: 22  3:54 AM   Result Value Ref Range Magnesium 1.9 1.6 - 2.4 mg/dL   CBC W/O DIFF    Collection Time: 01/24/22  3:54 AM   Result Value Ref Range    WBC 7.5 4.1 - 11.1 K/uL    RBC 3.08 (L) 4.10 - 5.70 M/uL    HGB 9.0 (L) 12.1 - 17.0 g/dL    HCT 26.6 (L) 36.6 - 50.3 %    MCV 86.4 80.0 - 99.0 FL    MCH 29.2 26.0 - 34.0 PG    MCHC 33.8 30.0 - 36.5 g/dL    RDW 18.2 (H) 11.5 - 14.5 %    PLATELET 186 971 - 427 K/uL    MPV 9.1 8.9 - 12.9 FL    NRBC 0.0 0  WBC    ABSOLUTE NRBC 0.00 0.00 - 0.01 K/uL   GLUCOSE, POC    Collection Time: 01/24/22  6:33 AM   Result Value Ref Range    Glucose (POC) 125 (H) 65 - 117 mg/dL    Performed by Tyson Salvador (PCT)        Data Review images and reports reviewed    Assessment:     Active Problems:    Severe sepsis ([facility] [address] (1/18/2022)        Plan/Recommendations/Medical Decision Making:     Continue present treatment   Pt with frozen abdomen presenting with recurrent pneumatosis, encountered 2 enterotomies during attempted re-exploration and bowel not able to be freed safely, exploration aborted. No obvious ischemic bowel identified. Remove mid wound staples and place ostomy appliance for enterocutaneous fistula. Begin octreotide  Continue tpn without tube feeds  g-tube to gravity. Proper G-tube replaced 1/21 by Dr. Taty Thayer. Will get case management involved to screen for admission to [facility].   Very poor prognosis  Will be tpn dependent for life    Mary Byrd MD  [id number] [facility]

## 2022-01-24 NOTE — PROGRESS NOTES
PT visit attempted pt declined stating he was having stomach pain and just couldn't do it right now. Will defer at this time and continue to follow.

## 2022-01-24 NOTE — PROGRESS NOTES
Transition of Care Plan:     RUR: 24% - High (READMIT)  Disposition: Home with [facility] [employer] [employer] ([address] ([address])  Follow up appointments: PCP and specialist as indicated   DME needed: TBD   Transportation at Discharge: Family to transport if medically appropriate  Keys or means to access home: Mother has       IM Medicare Letter: N/A - Medicaid   Is patient a BCPI-A Bundle: No              If yes, was Bundle Letter given?:    Is patient a  and connected with the [place]? No  If yes, was [employer] transfer form completed and [facility] notified? Caregiver Contact: Mother Ashvin Quiles - [phone]  Discharge Caregiver contacted prior to discharge? To be contacted prior to discharge       2:17p  [facility]. Per consult, CM sent a referral to [facility]. CM will continue to follow.     Vinod Kelly  Ext [phone]

## 2022-01-24 NOTE — PROGRESS NOTES
Problem: Ventilator Management  Goal: *Adequate oxygenation and ventilation  Outcome: Progressing Towards Goal  Goal: *Patient maintains clear airway/free of aspiration  Outcome: Progressing Towards Goal  Goal: *Absence of infection signs and symptoms  Outcome: Progressing Towards Goal  Goal: *Normal spontaneous ventilation  Outcome: Progressing Towards Goal     Problem: Patient Education: Go to Patient Education Activity  Goal: Patient/Family Education  Outcome: Progressing Towards Goal     Problem: Pressure Injury - Risk of  Goal: *Prevention of pressure injury  Description: Document Russell Scale and appropriate interventions in the flowsheet. Outcome: Progressing Towards Goal  Note: Pressure Injury Interventions:  Sensory Interventions: Assess changes in LOC    Moisture Interventions: Absorbent underpads    Activity Interventions: Pressure redistribution bed/mattress(bed type)    Mobility Interventions: Float heels    Nutrition Interventions: Document food/fluid/supplement intake    Friction and Shear Interventions: HOB 30 degrees or less,Foam dressings/transparent film/skin sealants,Minimize layers,Sit at 90-degree angle                Problem: Patient Education: Go to Patient Education Activity  Goal: Patient/Family Education  Outcome: Progressing Towards Goal     Problem: Falls - Risk of  Goal: *Absence of Falls  Description: Document Dora Fall Risk and appropriate interventions in the flowsheet.   Outcome: Progressing Towards Goal  Note: Fall Risk Interventions:       Mentation Interventions: Adequate sleep, hydration, pain control    Medication Interventions: Teach patient to arise slowly    Elimination Interventions: Call light in reach              Problem: Patient Education: Go to Patient Education Activity  Goal: Patient/Family Education  Outcome: Progressing Towards Goal     Problem: Non-Violent Restraints  Goal: Removal from restraints as soon as assessed to be safe  Outcome: Progressing Towards Goal  Goal: No harm/injury to patient while restraints in use  Outcome: Progressing Towards Goal  Goal: Patient's dignity will be maintained  Outcome: Progressing Towards Goal  Goal: Patient Interventions  Outcome: Progressing Towards Goal     Problem: Patient Education: Go to Patient Education Activity  Goal: Patient/Family Education  Outcome: Progressing Towards Goal     Problem: Patient Education: Go to Patient Education Activity  Goal: Patient/Family Education  Outcome: Progressing Towards Goal

## 2022-01-24 NOTE — PROGRESS NOTES
Attempted to see pt for OT services. Pt is declining any activity today despite encouragement. Will defer but continue to follow.

## 2022-01-24 NOTE — PROGRESS NOTES
End of Shift Note    Bedside shift change report given to Madhav Melendez RN (oncoming nurse) by Brittni Breg RN (offgoing nurse). Report included the following information SBAR, Kardex, Intake/Output and MAR    Shift worked:  7am-7pm     Shift summary and any significant changes:     Pt did not leave the bed during the shift. Pt complained of pain once during the shift; however, pt denied pain medication. Pt did not complain of nausea during the shift. Pt is tolerating the current diet of TPN and NPO with ice chips. RN changed the abdominal dressing twice during the shift due to drainage. Pt had an uneventful shift. Concerns for physician to address:       Zone phone for oncoming shift:   [phone]       Activity:  Activity Level: Up with Assistance  Number times ambulated in hallways past shift: 0  Number of times OOB to chair past shift: 0    Cardiac:   Cardiac Monitoring: No      Cardiac Rhythm: Sinus Tachy    Access:   Current line(s): PICC     Genitourinary:   Urinary status: brock    Respiratory:   O2 Device: None (Room air)  Chronic home O2 use?: NO  Incentive spirometer at bedside: YES     GI:  Last Bowel Movement Date:  (PTA)  Current diet:  DIET NPO Ice Chips  TPN ADULT - CENTRAL  Passing flatus: YES  Tolerating current diet: YES       Pain Management:   Patient states pain is manageable on current regimen: YES    Skin:  Russell Score: 14  Interventions: float heels and increase time out of bed    Patient Safety:  Fall Score:  Total Score: 2  Interventions: gripper socks and pt to call before getting OOB  High Fall Risk: Yes    Length of Stay:  Expected LOS: 9d 14h  Actual LOS: [address] [facility] Mario RN

## 2022-01-24 NOTE — PROGRESS NOTES
End of Shift Note    Bedside shift change report given to Orquidea (oncoming nurse) by Kevin Escamilla RN (offgoing nurse). Report included the following information SBAR, Kardex, ED Summary, Procedure Summary, Intake/Output, MAR and Recent Results    Shift worked:  7a-7p     Shift summary and any significant changes:     Patient rested in bed throughout the shift. Yancey was removed today. Patients fistula has been draining brown throughout the shift and put out about 1400. Concerns for physician to address:  none     Zone phone for oncoming shift:   [phone]       Activity:  Activity Level: Up with Assistance  Number times ambulated in hallways past shift: 0  Number of times OOB to chair past shift: 0    Cardiac:   Cardiac Monitoring: Yes      Cardiac Rhythm: Sinus Tachy    Access:   Current line(s): PICC     Genitourinary:   Urinary status: voiding    Respiratory:   O2 Device: None (Room air)  Chronic home O2 use?: NO  Incentive spirometer at bedside: YES     GI:  Last Bowel Movement Date:  (PTA)  Current diet:  DIET NPO Ice Chips  TPN ADULT - CENTRAL  TPN ADULT - CENTRAL  Passing flatus: YES  Tolerating current diet: YES       Pain Management:   Patient states pain is manageable on current regimen: YES    Skin:  Russell Score: 18  Interventions: turn team, float heels and increase time out of bed    Patient Safety:  Fall Score:  Total Score: 2  Interventions: bed/chair alarm, assistive device (walker, cane, etc), gripper socks, pt to call before getting OOB and stay with me (per policy)  High Fall Risk: Yes    Length of Stay:  Expected LOS: 9d 14h  Actual LOS: [address] Sherrie Trujillo, RN

## 2022-01-25 LAB
ANION GAP SERPL CALC-SCNC: 3 MMOL/L (ref 5–15)
BUN SERPL-MCNC: 17 MG/DL (ref 6–20)
BUN/CREAT SERPL: 41 (ref 12–20)
CALCIUM SERPL-MCNC: 8.2 MG/DL (ref 8.5–10.1)
CHLORIDE SERPL-SCNC: 98 MMOL/L (ref 97–108)
CO2 SERPL-SCNC: 27 MMOL/L (ref 21–32)
CREAT SERPL-MCNC: 0.41 MG/DL (ref 0.7–1.3)
GLUCOSE BLD STRIP.AUTO-MCNC: 122 MG/DL (ref 65–117)
GLUCOSE BLD STRIP.AUTO-MCNC: 152 MG/DL (ref 65–117)
GLUCOSE BLD STRIP.AUTO-MCNC: 158 MG/DL (ref 65–117)
GLUCOSE BLD STRIP.AUTO-MCNC: 211 MG/DL (ref 65–117)
GLUCOSE SERPL-MCNC: 135 MG/DL (ref 65–100)
MAGNESIUM SERPL-MCNC: 1.7 MG/DL (ref 1.6–2.4)
PHOSPHATE SERPL-MCNC: 2.5 MG/DL (ref 2.6–4.7)
POTASSIUM SERPL-SCNC: 4 MMOL/L (ref 3.5–5.1)
SERVICE CMNT-IMP: ABNORMAL
SODIUM SERPL-SCNC: 128 MMOL/L (ref 136–145)

## 2022-01-25 PROCEDURE — 74011250636 HC RX REV CODE- 250/636: Performed by: SURGERY

## 2022-01-25 PROCEDURE — 84100 ASSAY OF PHOSPHORUS: CPT

## 2022-01-25 PROCEDURE — 65270000029 HC RM PRIVATE

## 2022-01-25 PROCEDURE — 82962 GLUCOSE BLOOD TEST: CPT

## 2022-01-25 PROCEDURE — 74011000250 HC RX REV CODE- 250: Performed by: SURGERY

## 2022-01-25 PROCEDURE — 74011250636 HC RX REV CODE- 250/636: Performed by: INTERNAL MEDICINE

## 2022-01-25 PROCEDURE — 2709999900 HC NON-CHARGEABLE SUPPLY

## 2022-01-25 PROCEDURE — 36415 COLL VENOUS BLD VENIPUNCTURE: CPT

## 2022-01-25 PROCEDURE — 74011250637 HC RX REV CODE- 250/637: Performed by: SURGERY

## 2022-01-25 PROCEDURE — 74011000250 HC RX REV CODE- 250: Performed by: NURSE PRACTITIONER

## 2022-01-25 PROCEDURE — 74011000250 HC RX REV CODE- 250: Performed by: EMERGENCY MEDICINE

## 2022-01-25 PROCEDURE — 74011636637 HC RX REV CODE- 636/637: Performed by: NURSE PRACTITIONER

## 2022-01-25 PROCEDURE — 74011250636 HC RX REV CODE- 250/636: Performed by: NURSE PRACTITIONER

## 2022-01-25 PROCEDURE — 80048 BASIC METABOLIC PNL TOTAL CA: CPT

## 2022-01-25 PROCEDURE — 97110 THERAPEUTIC EXERCISES: CPT

## 2022-01-25 PROCEDURE — 77030020847 HC STATLOK BARD -A

## 2022-01-25 PROCEDURE — 74011250637 HC RX REV CODE- 250/637: Performed by: NURSE PRACTITIONER

## 2022-01-25 PROCEDURE — 74011000258 HC RX REV CODE- 258: Performed by: SURGERY

## 2022-01-25 PROCEDURE — 83735 ASSAY OF MAGNESIUM: CPT

## 2022-01-25 PROCEDURE — 77030019934 HC DRSG VAC ASST KCON -B

## 2022-01-25 PROCEDURE — 77030040162

## 2022-01-25 PROCEDURE — 74011000258 HC RX REV CODE- 258: Performed by: INTERNAL MEDICINE

## 2022-01-25 RX ORDER — SODIUM CHLORIDE 9 MG/ML
50 INJECTION, SOLUTION INTRAVENOUS CONTINUOUS
Status: DISCONTINUED | OUTPATIENT
Start: 2022-01-25 | End: 2022-02-03 | Stop reason: HOSPADM

## 2022-01-25 RX ADMIN — POTASSIUM CHLORIDE: 2 INJECTION, SOLUTION, CONCENTRATE INTRAVENOUS at 18:28

## 2022-01-25 RX ADMIN — Medication: at 09:31

## 2022-01-25 RX ADMIN — OCTREOTIDE ACETATE 100 MCG: 100 INJECTION, SOLUTION INTRAVENOUS; SUBCUTANEOUS at 09:36

## 2022-01-25 RX ADMIN — Medication 1 AMPULE: at 09:51

## 2022-01-25 RX ADMIN — MEROPENEM 500 MG: 500 INJECTION INTRAVENOUS at 11:15

## 2022-01-25 RX ADMIN — Medication 2 UNITS: at 18:23

## 2022-01-25 RX ADMIN — SODIUM CHLORIDE, PRESERVATIVE FREE 10 ML: 5 INJECTION INTRAVENOUS at 09:40

## 2022-01-25 RX ADMIN — Medication: at 18:00

## 2022-01-25 RX ADMIN — ENOXAPARIN SODIUM 30 MG: 100 INJECTION SUBCUTANEOUS at 09:32

## 2022-01-25 RX ADMIN — SODIUM CHLORIDE, PRESERVATIVE FREE 5 ML: 5 INJECTION INTRAVENOUS at 09:45

## 2022-01-25 RX ADMIN — SODIUM CHLORIDE 50 ML/HR: 9 INJECTION, SOLUTION INTRAVENOUS at 11:15

## 2022-01-25 RX ADMIN — OCTREOTIDE ACETATE 100 MCG: 100 INJECTION, SOLUTION INTRAVENOUS; SUBCUTANEOUS at 14:37

## 2022-01-25 RX ADMIN — MEROPENEM 500 MG: 500 INJECTION INTRAVENOUS at 05:57

## 2022-01-25 RX ADMIN — MEROPENEM 500 MG: 500 INJECTION INTRAVENOUS at 18:23

## 2022-01-25 RX ADMIN — SODIUM PHOSPHATE, MONOBASIC, MONOHYDRATE AND SODIUM PHOSPHATE, DIBASIC, ANHYDROUS: 276; 142 INJECTION, SOLUTION INTRAVENOUS at 14:37

## 2022-01-25 RX ADMIN — HYDROMORPHONE HYDROCHLORIDE 0.5 MG: 1 INJECTION, SOLUTION INTRAMUSCULAR; INTRAVENOUS; SUBCUTANEOUS at 10:03

## 2022-01-25 RX ADMIN — Medication 2 UNITS: at 14:37

## 2022-01-25 RX ADMIN — Medication: at 14:37

## 2022-01-25 RX ADMIN — Medication 3 UNITS: at 00:27

## 2022-01-25 RX ADMIN — OCTREOTIDE ACETATE 100 MCG: 100 INJECTION, SOLUTION INTRAVENOUS; SUBCUTANEOUS at 18:25

## 2022-01-25 NOTE — WOUND CARE
Wound care follow up for the fistula pouch activity. The pouch is intact and patient is NPO and c/o Hunger. 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 The skin around the stomach tubing is denuded / red / hurts. The skin needs to be protected by using Desitin cream on the site. This was ordered for now. Plan: Fistula pouch change on Thursday. Discharge plan pending for [facility].    Guido Rivera RN, BSN, [employer]

## 2022-01-25 NOTE — PROGRESS NOTES
8:48 PM  Patient given cup of ice chips at this time    12:18 AM  On call paged regarding fever  12:21 AM Spoke to dr. Bruna Foster notified of fever and lower

## 2022-01-25 NOTE — PROGRESS NOTES
Comprehensive Nutrition Assessment    Type and Reason for Visit: Reassess    Nutrition Recommendations/Plan:   TPN recommendations:   Consider decreasing dextrose to 15% at current rate to prevent hyperglycemia and steatosis (GIR is currently 5.2mg/kg/min, new GIR with D15 would be 3.9mg/kg/min) to provide 1706kcals/90gPro/270gDextrose    Nutrition Assessment:   Chart reviewed. Pt with ECF, significant OP. Also high OP from G tube. He is now TPN dependent for life. TPN at goal but over-provides kcals/dextrose which increases risk of steatosis and hyperglycemia (GIR 5.2mg/kg/min) would decrease to 15% dextrose which would still provide 35kcals/kg and 1.9gPro/kg with GIR of 3.9mg/kg/min. -211. K 4, phos 2.5 and Na 128. Malnutrition Assessment:  Malnutrition Status:  Severe malnutrition    Context:  Chronic illness     Findings of the 6 clinical characteristics of malnutrition:   Energy Intake:  Unable to assess  Weight Loss:  Unable to assess     Body Fat Loss:  7 - Severe body fat loss, Orbital,Triceps,Fat overlying ribs   Muscle Mass Loss:  7 - Severe muscle mass loss, Scapula (trapezius),Thigh (quadriceps),Clavicles (pectoralis &deltoids)  Fluid Accumulation:  Unable to assess,     Strength:  Not performed         Estimated Daily Nutrient Needs:  Energy (kcal): MSJ 1789 (1376 x 1.3); Weight Used for Energy Requirements: Current  Protein (g): 65-73g (1.4-1.6gPro/kg); Weight Used for Protein Requirements: Current  Fluid (ml/day): per MD; Method Used for Fluid Requirements: 1 ml/kcal      Nutrition Related Findings:  Meds: humalog, octreotide, merrem, NaPhos, [email or web address].  ECF: 1450mL      Wounds:    Surgical incision       Current Nutrition Therapies:  Current Parenteral Nutrition Orders:  · Type and Formula: D20, 5% AA   · Lipids: 500ml,Three times weekly  · Duration: Continuous  · Rate/Volume: 75mL/h  · Current PN Order Provides: 2012kcals/90gPro/360gDextrose  · Goal PN Orders Provides: 1706kcals/90gPro/270gDextrose      Anthropometric Measures:  · Height:  5' 9\" (175.3 cm)  · Current Body Wt:  48.1 kg (106 lb 0.7 oz)   · Ideal Body Wt:  160 lbs:  63.2 %   · BMI Category:  Underweight (BMI less than 18.5)       Nutrition Diagnosis:   · Altered GI function related to altered GI function,altered GI structure as evidenced by NPO or clear liquid status due to medical condition  Previous dx continues. · Severe malnutrition related to altered GI function,altered GI structure as evidenced by BMI,severe loss of subcutaneous fat,severe muscle loss  Previous dx continues. Nutrition Interventions:   Food and/or Nutrient Delivery: Modify parenteral nutrition  Nutrition Education and Counseling: No recommendations at this time  Coordination of Nutrition Care: Continue to monitor while inpatient,Interdisciplinary rounds    Goals:  Pt will tolerate TPN at goal rate with BG <200mg/dL and lytes WNL in 2-4 days. Nutrition Monitoring and Evaluation:   Behavioral-Environmental Outcomes: None identified  Food/Nutrient Intake Outcomes: Parenteral nutrition intake/tolerance,IVF intake  Physical Signs/Symptoms Outcomes: Biochemical data,Nutrition focused physical findings,Skin,Weight,GI status,Hemodynamic status,Fluid status or edema    Discharge Planning:     Too soon to determine     Electronically signed by Jaylene Mar RD, [facility] on 1/25/2022 at 3:24 PM    Contact: ext [phone]

## 2022-01-25 NOTE — PROGRESS NOTES
Transition of Care Plan:     RUR: 23% - High (READMIT)  Disposition: Home with [facility] Feedings ([address] [facility]) & [facility] Home Health ([facility]) vs LTC  Follow up appointments: PCP and specialist as indicated   DME needed: TBD   Transportation at [facility] to transport if medically appropriate  Keys or means to access home: Mother has       IM Medicare Letter: N/A - Aidan Alarcon  Is patient a BCPI-A Bundle: No              If yes, was Bundle Letter given?:    Is patient a  and connected with the [address] [place] [address] Road  If yes, was [place] transfer form completed and [facility] notified? Caregiver Contact: Mother - Cherri Mansfield - [phone]  Discharge Caregiver contacted prior to discharge?      Per pt's request, CM phoned pt's mother to request a copy of insurance card. Patients mother will bring insurance card to hospital tomorrow. CM will continue to follow.     Zhou Liner  Ext [phone]

## 2022-01-25 NOTE — PROGRESS NOTES
Spiritual Care Assessment/Progress Note  [facility]      NAME: Yue Manuel      MRN: [id number]  AGE: 39 y.o. SEX: male  Yazdanism Affiliation: No Temple   Language: English     1/25/2022     Total Time (in minutes): 9     Spiritual Assessment begun in [facility] 3 SURG TELE through conversation with:         []Patient        [] Family    [] Friend(s)        Reason for Consult: Initial/Spiritual assessment, patient floor     Spiritual beliefs: (Please include comment if needed)     [] Identifies with a alessandro tradition:         [] Supported by a alessandro community:            [] Claims no spiritual orientation:           [] Seeking spiritual identity:                [] Adheres to an individual form of spirituality:           [x] Not able to assess:                           Identified resources for coping:      [] Prayer                               [] Music                  [] Guided Imagery     [] Family/friends                 [] Pet visits     [] Devotional reading                         [x] Unknown     [] Other:                                               Interventions offered during this visit: (See comments for more details)    Patient Interventions: Initial visit           Plan of Care:     [] Support spiritual and/or cultural needs    [] Support AMD and/or advance care planning process      [] Support grieving process   [] Coordinate Rites and/or Rituals    [] Coordination with community clergy   [] No spiritual needs identified at this time   [] Detailed Plan of Care below (See Comments)  [] Make referral to Music Therapy  [] Make referral to Pet Therapy     [] Make referral to Addiction services  [] Make referral to [facility]  [] Make referral to Spiritual Care Partner  [] No future visits requested        [x] Contact Spiritual Care for further referrals     Comments:     Initial Spiritual Care Assessment attempt for Mr. Obrien in 3232. Performed chart review before the visit. Upon arrival, patient was appeared sleeping.  respected patient's privacy. Contact  if emotional/spiritual needs arise.      [id number] [facility] MEE Slaughter.Emily,RobertoM, Harleen [address] Provider   Paging Service [phone])

## 2022-01-25 NOTE — PROGRESS NOTES
Bedside and Verbal shift change report given to Garo Elizabeth (oncoming nurse) by Justin Ellison (offgoing nurse). Report included the following information SBAR and Kardex.

## 2022-01-25 NOTE — PROGRESS NOTES
Problem: Self Care Deficits Care Plan (Adult)  Goal: *Acute Goals and Plan of Care (Insert Text)  Description: FUNCTIONAL STATUS PRIOR TO ADMISSION: Pt and chart report that he is total for ADLs in bed, and is mother carries him to and from chair      HOME SUPPORT: The patient lived with family and is never alone . Occupational Therapy Goals  Initiated 1/21/2022  1. Patient will perform grooming with supervision/set-up within 7 day(s). 2.  Patient will perform bathing with moderate assistance  within 7 day(s). 3.  Patient will perform upper body dressing with minimal assistance/contact guard assist within 7 day(s). 4.  Patient will perform toilet transfers with minimal assistance/contact guard assist within 7 day(s). 5.  Patient will perform all aspects of toileting with minimal assistance/contact guard assist within 7 day(s). 6.  Patient will participate in upper extremity therapeutic exercise/activities with supervision/set-up for 5 minutes within 7 day(s). 7.  Patient will utilize energy conservation techniques during functional activities with verbal cues within 7 day(s). Outcome: Not Progressing Towards Goal     OCCUPATIONAL THERAPY TREATMENT  Patient: Deon Medrano (73 y.o. male)  Date: 1/25/2022  Diagnosis: [facility] ([facility] [A41.9, R65.20] <principal problem not specified>  Procedure(s) (LRB):  LAPAROTOMY EXPLORATORY (N/A) 7 Days Post-Op  Precautions:    Chart, occupational therapy assessment, plan of care, and goals were reviewed. ASSESSMENT  Patient continues with skilled OT services and is not progressing towards goals. Pt noted with high c/o abdominal pain, with pt declining dynamic mobility challenges; however, agreeable to bed level UE strengthening. Pt able to complete 1 set of 5 reps of triceps extension, as well as, 1 set of 5 reps of biceps flexion; however, reporting increased abdominal pain and declined further engagement.   Pt reported poor functional grasp and was provided with yellow and red foam block with education regarding usage; good understanding demonstrated. Pt continues to benefit from skilled OT to address functional deficits during acute hospitalization, with reporting therapist believing pt would benefit from transition to [facility] at discharge. Current Level of Function Impacting Discharge (ADLs): Total A    Other factors to consider for discharge: Total A         PLAN :  Patient continues to benefit from skilled intervention to address the above impairments. Continue treatment per established plan of care to address goals. Recommend with staff: Frequent positional changes, Bed level ADL engagement    Recommend next OT session: POC progression    Recommendation for discharge: (in order for the patient to meet his/her long term goals)  To be determined: LTAC    This discharge recommendation:  Has not yet been discussed the attending provider and/or case management    IF patient discharges home will need the following DME: TBD       SUBJECTIVE:   Patient stated my stomach hurts so bad.     OBJECTIVE DATA SUMMARY:   Cognitive/Behavioral Status:  Neurologic State: Alert  Orientation Level: Oriented X4  Cognition: Follows commands  Perception: Appears intact  Perseveration: No perseveration noted  Safety/Judgement: Awareness of environment    Cognitive Retraining  Safety/Judgement: Awareness of environment    Therapeutic Exercises:   Completed 1 set of 5 reps of biceps flexion and triceps extension, against yellow theraband resistance, with Supervision and Max verbal and Min visual cues for technique, pt with increased c/o abdominal pain; therefore, additional sets/reps deferred.     Pain:  10/10 c/o abdominal pain; RN aware and following    Activity Tolerance:   Poor and requires frequent rest breaks    After treatment patient left in no apparent distress:   Supine in bed, Call bell within reach, and Side rails x 3    COMMUNICATION/COLLABORATION:   The patients plan of care was discussed with: Registered nurse and nursing students .      Pennie Hughes OT  Time Calculation: 16 mins

## 2022-01-25 NOTE — PROGRESS NOTES
End of Shift Note    Bedside shift change report given to Luz (oncoming nurse) by Elodia Calvillo RN (offgoing nurse). Report included the following information SBAR, Kardex, ED Summary, Procedure Summary, Intake/Output, MAR and Recent Results    Shift worked:  7a-7p     Shift summary and any significant changes:     Patient has been resting throughout most of the shift. Replaced the dry dressing around his G-tube and applied the desitin cream. No complaints from the patient. Concerns for physician to address:  none     Zone phone for oncoming shift:   [phone]       Activity:  Activity Level: Bed Rest  Number times ambulated in hallways past shift: 0  Number of times OOB to chair past shift: 0    Cardiac:   Cardiac Monitoring: No      Cardiac Rhythm: Sinus Rhythm    Access:   Current line(s): PICC     Genitourinary:   Urinary status: voiding    Respiratory:   O2 Device: None (Room air)  Chronic home O2 use?: NO  Incentive spirometer at bedside: YES     GI:  Last Bowel Movement Date:  (PTA)  Current diet:  DIET NPO Ice Chips  TPN ADULT - CENTRAL  TPN ADULT - CENTRAL  Passing flatus: YES  Tolerating current diet: YES       Pain Management:   Patient states pain is manageable on current regimen: YES    Skin:  Russell Score: 14  Interventions: turn team, increase time out of bed, PT/OT consult, limit briefs and nutritional support     Patient Safety:  Fall Score:  Total Score: 2  Interventions: bed/chair alarm, assistive device (walker, cane, etc), gripper socks, pt to call before getting OOB and stay with me (per policy)  High Fall Risk: Yes    Length of Stay:  Expected LOS: 9d 14h  Actual LOS: [address], RN

## 2022-01-25 NOTE — PROGRESS NOTES
PT visit attempted pt sleeping and not awaking to voice. Will defer at this time and continue to follow.

## 2022-01-25 NOTE — PROGRESS NOTES
Admit Date: 2022    POD 7 Days Post-Op    Procedure:  Procedure(s):  LAPAROTOMY EXPLORATORY    Subjective:     Patient c/o pain    Objective:     Blood pressure 90/61, pulse 90, temperature 98.1 °F (36.7 °C), resp. rate 18, height 5' 9\" (1.753 m), weight 106 lb 0.7 oz (48.1 kg), SpO2 100 %.     Temp (24hrs), Av.8 °F (37.1 °C), Min:97.6 °F (36.4 °C), Max:100.6 °F (38.1 °C)      Physical Exam:  GENERAL: alert, cooperative, no distress, appears stated age, LUNG: clear to auscultation bilaterally, HEART: regular rate and rhythm, ABDOMEN: soft, flat, wound intact with fistula appliance draining well, output thinner, G-tube site looks good, EXTREMITIES:  extremities normal, atraumatic, no cyanosis or edema    Labs:   Recent Results (from the past 24 hour(s))   GLUCOSE, POC    Collection Time: 22 11:47 AM   Result Value Ref Range    Glucose (POC) 114 65 - 117 mg/dL    Performed by Jude Goss PCT    GLUCOSE, POC    Collection Time: 22  5:34 PM   Result Value Ref Range    Glucose (POC) 219 (H) 65 - 117 mg/dL    Performed by Jude Goss PCT    GLUCOSE, POC    Collection Time: 22 12:13 AM   Result Value Ref Range    Glucose (POC) 211 (H) 65 - 117 mg/dL    Performed by Rico Snyder PCT    METABOLIC PANEL, BASIC    Collection Time: 22  5:56 AM   Result Value Ref Range    Sodium 128 (L) 136 - 145 mmol/L    Potassium 4.0 3.5 - 5.1 mmol/L    Chloride 98 97 - 108 mmol/L    CO2 27 21 - 32 mmol/L    Anion gap 3 (L) 5 - 15 mmol/L    Glucose 135 (H) 65 - 100 mg/dL    BUN 17 6 - 20 MG/DL    Creatinine 0.41 (L) 0.70 - 1.30 MG/DL    BUN/Creatinine ratio 41 (H) 12 - 20      GFR est AA >60 >60 ml/min/1.73m2    GFR est non-AA >60 >60 ml/min/1.73m2    Calcium 8.2 (L) 8.5 - 10.1 MG/DL   PHOSPHORUS    Collection Time: 22  5:56 AM   Result Value Ref Range    Phosphorus 2.5 (L) 2.6 - 4.7 MG/DL   MAGNESIUM    Collection Time: 22  5:56 AM   Result Value Ref Range    Magnesium 1.7 1.6 - 2.4 mg/dL GLUCOSE, POC    Collection Time: 01/25/22  6:22 AM   Result Value Ref Range    Glucose (POC) 122 (H) 65 - 117 mg/dL    Performed by Merl Narrow PCT        Data Review images and reports reviewed    Assessment:     Active Problems:    Severe sepsis ([address] (1/18/2022)        Plan/Recommendations/Medical Decision Making:     Continue present treatment   Pt with frozen abdomen presenting with recurrent pneumatosis, encountered 2 enterotomies during attempted re-exploration and bowel not able to be freed safely, exploration aborted. No obvious ischemic bowel identified. Now with enterocutaneous fistula. Continue octreotide  Continue tpn without tube feeds  g-tube to gravity. Proper G-tube replaced 1/21 by Dr. Bryson Mckeon. Will get case management involved to screen for SNF placement.   Very poor prognosis  Will be tpn dependent for life    Stu Gregory MD  [facility]

## 2022-01-25 NOTE — PROGRESS NOTES
Problem: Ventilator Management  Goal: *Adequate oxygenation and ventilation  Outcome: Progressing Towards Goal  Goal: *Patient maintains clear airway/free of aspiration  Outcome: Progressing Towards Goal  Goal: *Absence of infection signs and symptoms  Outcome: Progressing Towards Goal  Goal: *Normal spontaneous ventilation  Outcome: Progressing Towards Goal     Problem: Pressure Injury - Risk of  Goal: *Prevention of pressure injury  Description: Document Russell Scale and appropriate interventions in the flowsheet. Outcome: Progressing Towards Goal  Note: Pressure Injury Interventions:  Sensory Interventions: Assess changes in LOC,Keep linens dry and wrinkle-free,Float heels,Check visual cues for pain,Maintain/enhance activity level,Minimize linen layers,Monitor skin under medical devices,Turn and reposition approx. every two hours (pillows and wedges if needed)    Moisture Interventions: Apply protective barrier, creams and emollients,Contain wound drainage,Maintain skin hydration (lotion/cream),Minimize layers    Activity Interventions: Increase time out of bed,Pressure redistribution bed/mattress(bed type)    Mobility Interventions: HOB 30 degrees or less,Float heels,Pressure redistribution bed/mattress (bed type),Turn and reposition approx. every two hours(pillow and wedges)    Nutrition Interventions: Discuss nutritional consult with provider    Friction and Shear Interventions: HOB 30 degrees or less,Minimize layers                Problem: Patient Education: Go to Patient Education Activity  Goal: Patient/Family Education  Outcome: Progressing Towards Goal     Problem: Falls - Risk of  Goal: *Absence of Falls  Description: Document Mounika Pretty Fall Risk and appropriate interventions in the flowsheet.   Outcome: Progressing Towards Goal  Note: Fall Risk Interventions:       Mentation Interventions: Adequate sleep, hydration, pain control,Increase mobility    Medication Interventions: Bed/chair exit alarm,Patient to call before getting OOB,Teach patient to arise slowly    Elimination Interventions: Bed/chair exit alarm,Call light in reach,Patient to call for help with toileting needs              Problem: Patient Education: Go to Patient Education Activity  Goal: Patient/Family Education  Outcome: Progressing Towards Goal     Problem: Non-Violent Restraints  Goal: Removal from restraints as soon as assessed to be safe  Outcome: Progressing Towards Goal  Goal: No harm/injury to patient while restraints in use  Outcome: Progressing Towards Goal  Goal: Patient's dignity will be maintained  Outcome: Progressing Towards Goal  Goal: Patient Interventions  Outcome: Progressing Towards Goal     Problem: Patient Education: Go to Patient Education Activity  Goal: Patient/Family Education  Outcome: Progressing Towards Goal     Problem: Patient Education: Go to Patient Education Activity  Goal: Patient/Family Education  Outcome: Progressing Towards Goal

## 2022-01-26 LAB
ANION GAP SERPL CALC-SCNC: 4 MMOL/L (ref 5–15)
BUN SERPL-MCNC: 16 MG/DL (ref 6–20)
BUN/CREAT SERPL: 55 (ref 12–20)
CALCIUM SERPL-MCNC: 7.8 MG/DL (ref 8.5–10.1)
CHLORIDE SERPL-SCNC: 105 MMOL/L (ref 97–108)
CO2 SERPL-SCNC: 26 MMOL/L (ref 21–32)
CREAT SERPL-MCNC: 0.29 MG/DL (ref 0.7–1.3)
GLUCOSE BLD STRIP.AUTO-MCNC: 109 MG/DL (ref 65–117)
GLUCOSE BLD STRIP.AUTO-MCNC: 133 MG/DL (ref 65–117)
GLUCOSE BLD STRIP.AUTO-MCNC: 149 MG/DL (ref 65–117)
GLUCOSE BLD STRIP.AUTO-MCNC: 149 MG/DL (ref 65–117)
GLUCOSE SERPL-MCNC: 102 MG/DL (ref 65–100)
MAGNESIUM SERPL-MCNC: 1.7 MG/DL (ref 1.6–2.4)
PHOSPHATE SERPL-MCNC: 2.9 MG/DL (ref 2.6–4.7)
POTASSIUM SERPL-SCNC: 4.6 MMOL/L (ref 3.5–5.1)
SERVICE CMNT-IMP: ABNORMAL
SERVICE CMNT-IMP: NORMAL
SODIUM SERPL-SCNC: 135 MMOL/L (ref 136–145)
TRIGL SERPL-MCNC: 78 MG/DL (ref ?–150)

## 2022-01-26 PROCEDURE — 74011000250 HC RX REV CODE- 250: Performed by: INTERNAL MEDICINE

## 2022-01-26 PROCEDURE — 84100 ASSAY OF PHOSPHORUS: CPT

## 2022-01-26 PROCEDURE — 74011000258 HC RX REV CODE- 258: Performed by: SURGERY

## 2022-01-26 PROCEDURE — 83735 ASSAY OF MAGNESIUM: CPT

## 2022-01-26 PROCEDURE — 74011250636 HC RX REV CODE- 250/636: Performed by: NURSE PRACTITIONER

## 2022-01-26 PROCEDURE — 84478 ASSAY OF TRIGLYCERIDES: CPT

## 2022-01-26 PROCEDURE — 74011250636 HC RX REV CODE- 250/636: Performed by: SURGERY

## 2022-01-26 PROCEDURE — 74011636637 HC RX REV CODE- 636/637: Performed by: NURSE PRACTITIONER

## 2022-01-26 PROCEDURE — 82962 GLUCOSE BLOOD TEST: CPT

## 2022-01-26 PROCEDURE — 80048 BASIC METABOLIC PNL TOTAL CA: CPT

## 2022-01-26 PROCEDURE — 74011250637 HC RX REV CODE- 250/637: Performed by: NURSE PRACTITIONER

## 2022-01-26 PROCEDURE — 74011000250 HC RX REV CODE- 250: Performed by: SURGERY

## 2022-01-26 PROCEDURE — 74011250636 HC RX REV CODE- 250/636: Performed by: INTERNAL MEDICINE

## 2022-01-26 PROCEDURE — 74011000258 HC RX REV CODE- 258: Performed by: INTERNAL MEDICINE

## 2022-01-26 PROCEDURE — 65270000029 HC RM PRIVATE

## 2022-01-26 PROCEDURE — 36415 COLL VENOUS BLD VENIPUNCTURE: CPT

## 2022-01-26 RX ADMIN — HYDROMORPHONE HYDROCHLORIDE 0.5 MG: 1 INJECTION, SOLUTION INTRAMUSCULAR; INTRAVENOUS; SUBCUTANEOUS at 01:15

## 2022-01-26 RX ADMIN — Medication 1 AMPULE: at 00:47

## 2022-01-26 RX ADMIN — POTASSIUM CHLORIDE: 2 INJECTION, SOLUTION, CONCENTRATE INTRAVENOUS at 18:14

## 2022-01-26 RX ADMIN — OCTREOTIDE ACETATE 100 MCG: 100 INJECTION, SOLUTION INTRAVENOUS; SUBCUTANEOUS at 00:48

## 2022-01-26 RX ADMIN — OCTREOTIDE ACETATE 100 MCG: 100 INJECTION, SOLUTION INTRAVENOUS; SUBCUTANEOUS at 13:00

## 2022-01-26 RX ADMIN — MEROPENEM 500 MG: 500 INJECTION INTRAVENOUS at 18:20

## 2022-01-26 RX ADMIN — Medication 2 UNITS: at 00:48

## 2022-01-26 RX ADMIN — MEROPENEM 500 MG: 500 INJECTION INTRAVENOUS at 00:48

## 2022-01-26 RX ADMIN — Medication: at 08:00

## 2022-01-26 RX ADMIN — MEROPENEM 500 MG: 500 INJECTION INTRAVENOUS at 04:23

## 2022-01-26 RX ADMIN — Medication 1 AMPULE: at 09:00

## 2022-01-26 RX ADMIN — Medication: at 00:50

## 2022-01-26 RX ADMIN — Medication: at 21:48

## 2022-01-26 RX ADMIN — MEROPENEM 500 MG: 500 INJECTION INTRAVENOUS at 11:57

## 2022-01-26 RX ADMIN — Medication: at 09:00

## 2022-01-26 RX ADMIN — I.V. FAT EMULSION 500 ML: 20 EMULSION INTRAVENOUS at 21:47

## 2022-01-26 RX ADMIN — OCTREOTIDE ACETATE 100 MCG: 100 INJECTION, SOLUTION INTRAVENOUS; SUBCUTANEOUS at 18:00

## 2022-01-26 RX ADMIN — Medication 1 AMPULE: at 21:47

## 2022-01-26 RX ADMIN — Medication: at 18:00

## 2022-01-26 RX ADMIN — ENOXAPARIN SODIUM 30 MG: 100 INJECTION SUBCUTANEOUS at 11:57

## 2022-01-26 RX ADMIN — SODIUM CHLORIDE 50 ML/HR: 9 INJECTION, SOLUTION INTRAVENOUS at 12:00

## 2022-01-26 RX ADMIN — OCTREOTIDE ACETATE 100 MCG: 100 INJECTION, SOLUTION INTRAVENOUS; SUBCUTANEOUS at 08:29

## 2022-01-26 NOTE — PROGRESS NOTES
Admit Date: 2022    POD 8 Days Post-Op    Procedure:  Procedure(s):  LAPAROTOMY EXPLORATORY    Subjective:     Patient c/o pain    Objective:     Blood pressure (!) 145/76, pulse 89, temperature 98.6 °F (37 °C), resp. rate 18, height 5' 9\" (1.753 m), weight 106 lb 0.7 oz (48.1 kg), SpO2 97 %.     Temp (24hrs), Av.8 °F (37.1 °C), Min:98.6 °F (37 °C), Max:99.5 °F (37.5 °C)      Physical Exam:  GENERAL: alert, cooperative, no distress, appears stated age, LUNG: clear to auscultation bilaterally, HEART: regular rate and rhythm, ABDOMEN: soft, flat, wound intact with fistula appliance draining well, output thinner, G-tube site looks good, EXTREMITIES:  extremities normal, atraumatic, no cyanosis or edema    Labs:   Recent Results (from the past 24 hour(s))   GLUCOSE, POC    Collection Time: 22 11:30 AM   Result Value Ref Range    Glucose (POC) 158 (H) 65 - 117 mg/dL    Performed by Dequan Kemp    GLUCOSE, POC    Collection Time: 22  6:20 PM   Result Value Ref Range    Glucose (POC) 152 (H) 65 - 117 mg/dL    Performed by Erin Saint PCT    GLUCOSE, POC    Collection Time: 22 12:01 AM   Result Value Ref Range    Glucose (POC) 149 (H) 65 - 117 mg/dL    Performed by Karina [facility]    METABOLIC PANEL, BASIC    Collection Time: 22  4:10 AM   Result Value Ref Range    Sodium 135 (L) 136 - 145 mmol/L    Potassium 4.6 3.5 - 5.1 mmol/L    Chloride 105 97 - 108 mmol/L    CO2 26 21 - 32 mmol/L    Anion gap 4 (L) 5 - 15 mmol/L    Glucose 102 (H) 65 - 100 mg/dL    BUN 16 6 - 20 MG/DL    Creatinine 0.29 (L) 0.70 - 1.30 MG/DL    BUN/Creatinine ratio 55 (H) 12 - 20      GFR est AA >60 >60 ml/min/1.73m2    GFR est non-AA >60 >60 ml/min/1.73m2    Calcium 7.8 (L) 8.5 - 10.1 MG/DL   PHOSPHORUS    Collection Time: 22  4:10 AM   Result Value Ref Range    Phosphorus 2.9 2.6 - 4.7 MG/DL   MAGNESIUM    Collection Time: 22  4:10 AM   Result Value Ref Range    Magnesium 1.7 1.6 - 2.4 mg/dL GLUCOSE, POC    Collection Time: 01/26/22  6:17 AM   Result Value Ref Range    Glucose (POC) 133 (H) 65 - 117 mg/dL    Performed by Katelyn Walker PCT        Data Review images and reports reviewed    Assessment:     Active Problems:    Severe sepsis ([address] (1/18/2022)        Plan/Recommendations/Medical Decision Making:     Continue present treatment   Pt with frozen abdomen presenting with recurrent pneumatosis, encountered 2 enterotomies during attempted re-exploration and bowel not able to be freed safely, exploration aborted. No obvious ischemic bowel identified. Now with enterocutaneous fistula. Continue octreotide  Continue tpn without tube feeds  g-tube to gravity. Proper G-tube replaced 1/21 by Dr. Irlanda Garcia. Will get case management involved to screen for SNF placement.   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

## 2022-01-26 NOTE — WOUND CARE
Wound care follow up for the Fistula Care:  Chart reviewed. New pouches brought to the room today (Smaller than the first fistula pouches). Assessment and Treatment today:  The old pouch was removed. The skin was cleansed and prepped while a PCT held suction onto the fistula to control the flow of the drainage while the skin care was being done. The skin around the wound was prepped with [employer] skin protectant and when this dried the paste also acts as a protectant. The 5 cm long hole was cut in the new smaller fistula bag at an angle and an extra was also cut out for the next time it needs to be changed. The new fistula pouch was applied and held in place for about 5 minutes then hooked up to the bedside drainage. Pt. Eating ice constantly and chewing gum to taste \"something\". He is not allowed to eat any food. The succous in his stomach and fistula is thin and green. The skin is denuded around the immediate edges of the wound. WOUND POA CONDITIONS    Wound Abdomen Right Feeding Tube (not usable currently):  (Active)   Dressing Status Clean;Dry; Intact 01/26/22 0318   Cleansed Cleansed with saline 01/26/22 1108   Dressing/Treatment Barrier film 01/26/22 1108   Drainage Amount None 01/26/22 1108   Wound Odor None 01/26/22 1108   Pamela-Wound/Incision Assessment Denuded 01/26/22 1108   Wound Abdomen Left Stomach Tube site: Keep to bedside drainage: (Active)   Dressing Status New dressing applied 01/26/22 1108   Cleansed Cleansed with saline 01/26/22 1108   Dressing/Treatment Gauze dressing/dressing sponge 01/26/22 1108   Dressing Change Due 01/26/22 01/26/22 1108   Drainage Amount Large 01/26/22 0318   Drainage Description Willy Mix 01/26/22 0107   Wound Odor Moderate 01/26/22 0318   Pamela-Wound/Incision Assessment Denuded 01/26/22 1108       Wound Abdomen Mid Mid abdominal wound with staples (Active)   Wound Image   01/26/22 1108   Wound Etiology Non-Healing Surgical 01/26/22 1108   Dressing Status New dressing applied 01/26/22 1108   Cleansed Cleansed with saline 01/26/22 1108   Dressing/Treatment Fistula pouch 01/26/22 1108   Dressing Change Due 01/28/22 01/26/22 1108   Wound Length (cm) 5 cm 01/26/22 1108   Wound Width (cm) 0.8 cm 01/26/22 1108   Wound Surface Area (cm^2) 4 cm^2 01/26/22 1108   Wound Assessment Denuded;Pink/red 01/26/22 1108   Drainage Amount Large 01/26/22 1108   Drainage Description Green; Thin 01/26/22 1108   Wound Odor Mild 01/26/22 1108   Pamela-Wound/Incision Assessment Denuded 01/26/22 1108   Edges Defined edges 01/26/22 1108   Wound Thickness Description Full thickness 01/26/22 1108           Plan: Patient now has a smaller fistula bag that takes up less space on his abdomen. It is placed at an angle and hooked up to bedside drainage. Another type of bag was also placed in the room for bedside drainage as a resource if needed (x 2) - can be hooked up to the fistula using a tube connector. Will check patient again on Thursday and probably change it again on Friday.    Avril Fitzgerald RN, BSN, [employer]

## 2022-01-26 NOTE — PROGRESS NOTES
End of Shift Note    Bedside shift change report given to Ez Hahn RN (oncoming nurse) by Terry Metzger LPN (offgoing nurse). Report included the following information SBAR, Kardex, ED Summary, Procedure Summary, Intake/Output, MAR and Recent Results    Shift worked:  night     Shift summary and any significant changes:     Patient has been resting throughout most of the shift. Replaced the dry dressing around his G-tube and applied the desitin cream. No complaints from the patient. Concerns for physician to address:  none     Zone phone for oncoming shift:   [phone]       Activity:  Activity Level: Bed Rest  Number times ambulated in hallways past shift: 0  Number of times OOB to chair past shift: 0    Cardiac:   Cardiac Monitoring: No      Cardiac Rhythm: Sinus Rhythm    Access:   Current line(s): PICC     Genitourinary:   Urinary status: voiding    Respiratory:   O2 Device: None (Room air)  Chronic home O2 use?: NO  Incentive spirometer at bedside: YES     GI:  Last Bowel Movement Date:  (PTA)  Current diet:  DIET NPO Ice Chips  TPN ADULT - CENTRAL  Passing flatus: YES  Tolerating current diet: YES       Pain Management:   Patient states pain is manageable on current regimen: YES    Skin:  Russell Score: 14  Interventions: turn team, increase time out of bed, PT/OT consult, limit briefs and nutritional support     Patient Safety:  Fall Score:  Total Score: 2  Interventions: bed/chair alarm, assistive device (walker, cane, etc), gripper socks, pt to call before getting OOB and stay with me (per policy)  High Fall Risk: Yes    Length of Stay:  Expected LOS: 9d 14h  Actual LOS: [address] [facility] [address]

## 2022-01-26 NOTE — PROGRESS NOTES
Problem: Falls - Risk of  Goal: *Absence of Falls  Description: Document Jessa Nap Fall Risk and appropriate interventions in the flowsheet.   Outcome: Progressing Towards Goal  Note: Fall Risk Interventions:  Mobility Interventions: Bed/chair exit alarm    Mentation Interventions: Adequate sleep, hydration, pain control    Medication Interventions: Bed/chair exit alarm    Elimination Interventions: Bed/chair exit alarm

## 2022-01-27 LAB
ANION GAP SERPL CALC-SCNC: 4 MMOL/L (ref 5–15)
BUN SERPL-MCNC: 15 MG/DL (ref 6–20)
BUN/CREAT SERPL: 52 (ref 12–20)
CALCIUM SERPL-MCNC: 8.1 MG/DL (ref 8.5–10.1)
CHLORIDE SERPL-SCNC: 105 MMOL/L (ref 97–108)
CO2 SERPL-SCNC: 27 MMOL/L (ref 21–32)
CREAT SERPL-MCNC: 0.29 MG/DL (ref 0.7–1.3)
GLUCOSE BLD STRIP.AUTO-MCNC: 117 MG/DL (ref 65–117)
GLUCOSE BLD STRIP.AUTO-MCNC: 153 MG/DL (ref 65–117)
GLUCOSE BLD STRIP.AUTO-MCNC: 161 MG/DL (ref 65–117)
GLUCOSE SERPL-MCNC: 94 MG/DL (ref 65–100)
MAGNESIUM SERPL-MCNC: 1.8 MG/DL (ref 1.6–2.4)
PHOSPHATE SERPL-MCNC: 2.5 MG/DL (ref 2.6–4.7)
POTASSIUM SERPL-SCNC: 4.1 MMOL/L (ref 3.5–5.1)
SERVICE CMNT-IMP: ABNORMAL
SERVICE CMNT-IMP: ABNORMAL
SERVICE CMNT-IMP: NORMAL
SODIUM SERPL-SCNC: 136 MMOL/L (ref 136–145)

## 2022-01-27 PROCEDURE — 83735 ASSAY OF MAGNESIUM: CPT

## 2022-01-27 PROCEDURE — 74011000250 HC RX REV CODE- 250: Performed by: NURSE PRACTITIONER

## 2022-01-27 PROCEDURE — 74011250637 HC RX REV CODE- 250/637: Performed by: NURSE PRACTITIONER

## 2022-01-27 PROCEDURE — 82962 GLUCOSE BLOOD TEST: CPT

## 2022-01-27 PROCEDURE — 36415 COLL VENOUS BLD VENIPUNCTURE: CPT

## 2022-01-27 PROCEDURE — 74011250636 HC RX REV CODE- 250/636: Performed by: INTERNAL MEDICINE

## 2022-01-27 PROCEDURE — 2709999900 HC NON-CHARGEABLE SUPPLY

## 2022-01-27 PROCEDURE — 65270000029 HC RM PRIVATE

## 2022-01-27 PROCEDURE — 74011636637 HC RX REV CODE- 636/637: Performed by: NURSE PRACTITIONER

## 2022-01-27 PROCEDURE — 74011000258 HC RX REV CODE- 258: Performed by: INTERNAL MEDICINE

## 2022-01-27 PROCEDURE — 84100 ASSAY OF PHOSPHORUS: CPT

## 2022-01-27 PROCEDURE — 74011000258 HC RX REV CODE- 258: Performed by: NURSE PRACTITIONER

## 2022-01-27 PROCEDURE — 97110 THERAPEUTIC EXERCISES: CPT

## 2022-01-27 PROCEDURE — 80048 BASIC METABOLIC PNL TOTAL CA: CPT

## 2022-01-27 PROCEDURE — 74011250636 HC RX REV CODE- 250/636: Performed by: NURSE PRACTITIONER

## 2022-01-27 RX ORDER — OCTREOTIDE ACETATE 100 UG/ML
300 INJECTION, SOLUTION INTRAVENOUS; SUBCUTANEOUS 4 TIMES DAILY
Status: DISCONTINUED | OUTPATIENT
Start: 2022-01-27 | End: 2022-02-03 | Stop reason: HOSPADM

## 2022-01-27 RX ADMIN — OCTREOTIDE ACETATE 300 MCG: 100 INJECTION, SOLUTION INTRAVENOUS; SUBCUTANEOUS at 22:45

## 2022-01-27 RX ADMIN — SODIUM PHOSPHATE, MONOBASIC, MONOHYDRATE AND SODIUM PHOSPHATE, DIBASIC, ANHYDROUS: 276; 142 INJECTION, SOLUTION INTRAVENOUS at 13:27

## 2022-01-27 RX ADMIN — MEROPENEM 500 MG: 500 INJECTION INTRAVENOUS at 10:40

## 2022-01-27 RX ADMIN — OCTREOTIDE ACETATE 300 MCG: 100 INJECTION, SOLUTION INTRAVENOUS; SUBCUTANEOUS at 18:00

## 2022-01-27 RX ADMIN — Medication 2 UNITS: at 18:00

## 2022-01-27 RX ADMIN — MEROPENEM 500 MG: 500 INJECTION INTRAVENOUS at 04:57

## 2022-01-27 RX ADMIN — MEROPENEM 500 MG: 500 INJECTION INTRAVENOUS at 00:26

## 2022-01-27 RX ADMIN — Medication 1 AMPULE: at 20:55

## 2022-01-27 RX ADMIN — Medication 1 AMPULE: at 08:54

## 2022-01-27 RX ADMIN — Medication: at 09:00

## 2022-01-27 RX ADMIN — OCTREOTIDE ACETATE 100 MCG: 100 INJECTION, SOLUTION INTRAVENOUS; SUBCUTANEOUS at 00:26

## 2022-01-27 RX ADMIN — ENOXAPARIN SODIUM 30 MG: 100 INJECTION SUBCUTANEOUS at 10:40

## 2022-01-27 RX ADMIN — Medication: at 08:00

## 2022-01-27 RX ADMIN — Medication 2 UNITS: at 00:31

## 2022-01-27 RX ADMIN — Medication: at 18:00

## 2022-01-27 RX ADMIN — OCTREOTIDE ACETATE 100 MCG: 100 INJECTION, SOLUTION INTRAVENOUS; SUBCUTANEOUS at 08:52

## 2022-01-27 RX ADMIN — Medication: at 20:56

## 2022-01-27 RX ADMIN — POTASSIUM CHLORIDE: 2 INJECTION, SOLUTION, CONCENTRATE INTRAVENOUS at 17:44

## 2022-01-27 RX ADMIN — OCTREOTIDE ACETATE 300 MCG: 100 INJECTION, SOLUTION INTRAVENOUS; SUBCUTANEOUS at 13:25

## 2022-01-27 NOTE — WOUND CARE
Wound care did check in with patient. No complaints so far. Extra Fistula pouch already cut to fit in the room should he need it prior to tomorrow. Pt. Munching on ice. Plan: For new fistula pouch change tomorrow.    Tien Galan RN, BSN, [facility]

## 2022-01-27 NOTE — PROGRESS NOTES
Problem: Pressure Injury - Risk of  Goal: *Prevention of pressure injury  Description: Document Russell Scale and appropriate interventions in the flowsheet. Outcome: Progressing Towards Goal  Note: Pressure Injury Interventions:  Sensory Interventions: Assess changes in LOC,Assess need for specialty bed,Check visual cues for pain,Float heels,Pressure redistribution bed/mattress (bed type),Turn and reposition approx.  every two hours (pillows and wedges if needed)    Moisture Interventions: Absorbent underpads,Apply protective barrier, creams and emollients,Assess need for specialty bed,Internal/External urinary devices,Limit adult briefs    Activity Interventions: Assess need for specialty bed,Increase time out of bed    Mobility Interventions: Assess need for specialty bed,Float heels,HOB 30 degrees or less,Pressure redistribution bed/mattress (bed type),PT/OT evaluation    Nutrition Interventions: Document food/fluid/supplement intake    Friction and Shear Interventions: Apply protective barrier, creams and emollients,HOB 30 degrees or less,Lift sheet

## 2022-01-27 NOTE — PROGRESS NOTES
Transition of Care Plan:    RUR:  21%  High Risk for Readmission. Is a Readmission this admission  LOS:  9 Days. Disposition:  Hoem with Resumption of Care:  Tube Feedings ([address]); [address] vs. LTC vs. Possible transplant center  Follow up appointments:  PCP and Specialist  DME needed:  On going assessment of DME needs  Transportation at Discharge:  Family transport  Yary Grajeda or means to access home:   Family/Mother     IM Medicare Letter:  N/A  Is patient a BCPI-A Bundle:   N/A          If yes, was Bundle Letter given?:    Is patient a  and connected with the [address] ? No             If yes, was [employer] transfer form completed and [facility] notified? Caregiver Contact: Mother/Samantha Tristan [phone]  Discharge Caregiver contacted prior to discharge? Will be contacted. Care Conference needed?:   No; working on disposition options. Provided Surgical NP with possible facilities that do intestine transplants including [address] which are the closest to [place]. Reviewed patient with [facility] again yesterday, and they have denied acceptance of patient due to high complexity of disposition plan with fistula, lifelong TPN and overall poor condition of patient. Patient does not have SNF benefits for Rehab and is currently not able/willing to participate with a high level of PT/OT which would be needed for IPR. Options for LTC are also likely very limited. Lifelong TPN is prohibitive to LTC facility acceptance due to extensive cost.      Reviewed with NP and with Unit CM to provide updates on resources and findings.     [place], [place], EMMA-QUAN  Complex CM/ [facility]  [phone]

## 2022-01-27 NOTE — PROGRESS NOTES
Problem: Mobility Impaired (Adult and Pediatric)  Goal: *Acute Goals and Plan of Care (Insert Text)  Description: FUNCTIONAL STATUS PRIOR TO ADMISSION: Per chart and pt mother carries him around the home, dependent for self care and bathing. HOME SUPPORT PRIOR TO ADMISSION: Pt lives with mother and brothers. Owns     Physical Therapy Goals  Initiated 1/21/2022  1. Patient will move from supine to sit and sit to supine , scoot up and down, and roll side to side in bed with supervision/set-up within 7 day(s). 2.  Patient will transfer from bed to chair and chair to bed with minimal assistance/contact guard assist using the least restrictive device within 7 day(s). 3.  Patient will perform sit to stand with minimal assistance/contact guard assist within 7 day(s). 4.  Patient will ambulate with minimal assistance/contact guard assist for 50 feet with the least restrictive device within 7 day(s).     1/27/2022 1354 by VINCENT Wiseman  Outcome: Not Progressing Towards Goal   PHYSICAL THERAPY TREATMENT  Patient: Lucia Castellanos (75 y.o. male)  Date: 1/27/2022  Diagnosis: Severe sepsis ([facility] [A41.9, R65.20] <principal problem not specified>  Procedure(s) (LRB):  LAPAROTOMY EXPLORATORY (N/A) 9 Days Post-Op  Precautions:    Chart, physical therapy assessment, plan of care and goals were reviewed. ASSESSMENT  Patient continues with skilled PT services and is not progressing towards goals. Patient presents with increased pain and decreased strength and endurance. Pt initially declined activity but after max encouragement, pt agreed to therapy. Pt performed supine LE exercises and stretches. Educated pt on importance of performing exercises consistently throughout the day to maintain muscle mass. Pt verbalized understanding. Pt refused any further activity. Pt left in bed with all needs met.      Current Level of Function Impacting Discharge (mobility/balance): supine LE exercises and stretches    Other factors to consider for discharge: decreased strength, decreased endurance, supportive family          PLAN :  Patient continues to benefit from skilled intervention to address the above impairments. Continue treatment per established plan of care. to address goals. Recommendation for discharge: (in order for the patient to meet his/her long term goals)  To be determined: LTC     This discharge recommendation:  Has been made in collaboration with the attending provider and/or case management    IF patient discharges home will need the following DME: to be determined (TBD)       SUBJECTIVE:   Patient stated  I don't want to get up and be left in that chair like before.     OBJECTIVE DATA SUMMARY:   Critical Behavior:  Neurologic State: Alert,Appropriate for age  Orientation Level: Oriented X4  Cognition: Follows commands  Safety/Judgement: Awareness of environment      Therapeutic Exercises:   Bilateral heel slides 10x each, hip abd 10x each, active LE stretching 10x each       Pain Rating:  Pt reported increased pain during session. Activity Tolerance:   Fair    After treatment patient left in no apparent distress:   Supine in bed and Call bell within reach    COMMUNICATION/COLLABORATION:   The patients plan of care was discussed with: Registered nurse.      Leonardo Rossi, SPT   Time Calculation: 23 mins

## 2022-01-27 NOTE — PROGRESS NOTES
Admit Date: 2022    POD 9 Days Post-Op    Procedure:  Procedure(s):  LAPAROTOMY EXPLORATORY    Subjective:     Patient  Has no complaints other than leaking from his fistula pouch. Objective:     Blood pressure 95/68, pulse 81, temperature 97.8 °F (36.6 °C), resp. rate 18, height 5' 9\" (1.753 m), weight 48.1 kg (106 lb 0.7 oz), SpO2 100 %.     Temp (24hrs), Av.5 °F (36.9 °C), Min:97.8 °F (36.6 °C), Max:99 °F (37.2 °C)      Physical Exam:  GENERAL: alert, cooperative, no distress, appears stated age, LUNG: clear to auscultation bilaterally, HEART: regular rate and rhythm, ABDOMEN: soft, flat, wound intact with fistula appliance draining well, output watery and bilious, G-tube site looks good, EXTREMITIES:  extremities normal, atraumatic, no cyanosis or edema    Labs:   Recent Results (from the past 24 hour(s))   GLUCOSE, POC    Collection Time: 22 12:47 PM   Result Value Ref Range    Glucose (POC) 109 65 - 117 mg/dL    Performed by Hochy eto PCT    GLUCOSE, POC    Collection Time: 22  5:34 PM   Result Value Ref Range    Glucose (POC) 149 (H) 65 - 117 mg/dL    Performed by Hochy eto PCT    GLUCOSE, POC    Collection Time: 22 12:04 AM   Result Value Ref Range    Glucose (POC) 153 (H) 65 - 117 mg/dL    Performed by Rodriguez Dickerson (PCT)    METABOLIC PANEL, BASIC    Collection Time: 22  2:53 AM   Result Value Ref Range    Sodium 136 136 - 145 mmol/L    Potassium 4.1 3.5 - 5.1 mmol/L    Chloride 105 97 - 108 mmol/L    CO2 27 21 - 32 mmol/L    Anion gap 4 (L) 5 - 15 mmol/L    Glucose 94 65 - 100 mg/dL    BUN 15 6 - 20 MG/DL    Creatinine 0.29 (L) 0.70 - 1.30 MG/DL    BUN/Creatinine ratio 52 (H) 12 - 20      GFR est AA >60 >60 ml/min/1.73m2    GFR est non-AA >60 >60 ml/min/1.73m2    Calcium 8.1 (L) 8.5 - 10.1 MG/DL   PHOSPHORUS    Collection Time: 22  2:53 AM   Result Value Ref Range    Phosphorus 2.5 (L) 2.6 - 4.7 MG/DL   MAGNESIUM    Collection Time: 22  2:53 AM   Result Value Ref Range    Magnesium 1.8 1.6 - 2.4 mg/dL   GLUCOSE, POC    Collection Time: 01/27/22  6:01 AM   Result Value Ref Range    Glucose (POC) 117 65 - 117 mg/dL    Performed by Doe Davidson (PCT)        Data Review images and reports reviewed    Assessment:     Active Problems:    Severe sepsis ([address] (1/18/2022)    Patient is stable presently. Pt with frozen abdomen presenting with recurrent pneumatosis, encountered 2 enterotomies during attempted re-exploration and bowel not able to be freed safely, exploration aborted. No obvious ischemic bowel identified. Now with enterocutaneous fistula. 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 Continue tpn without tube feeds  G-Tube to gravity, last replaced 1/21  Complex case management- patient hopeful for bowel transplant. Will discuss with nearby transplant centers to determine candidacy. If not a candidate for transplant, would likely benefit from palliative care. Ga Gibson NP  [facility]    Addendum:    Long discussion with the patient and his mother. Emphasized his poor nutritional status, his inability to eat or drink, ever again in the absence of a bowel transplant, the feasibility of a bowel transplant, his physical deconditioning and overall poor prognosis. His mother states she has a clinical background and would like for him to go home. I sited the TPN, the G-Tube and the new fistula as challenges to discharge. She understands and feels she could manage these things with the assist of home health initially. Her concern is primarily his mobility. Discussed with her the limitations of TPN, that it will meet his basic nutritional needs but he will not regain the weight he has lost and he may even continue to lose weight, especially in the setting of poor mobility and muscle wasting. Plan to have the surgeon reach out to a transplant center to determine if patient may meet criteria. Otherwise, patient will need to discharge on lifelong TPN. Will likely need SNF or LTAC in the meantime to get stronger but his mother would like to get him home ultimately.      Guillermo Buitrago NP   1/27/2022  10:49 AM

## 2022-01-27 NOTE — PROGRESS NOTES
End of Shift Note    Bedside shift change report given to Racheal Ulrich RN (oncoming nurse) by Lauren Manjarrez RN (offgoing nurse). Report included the following information SBAR, Kardex, ED Summary, Procedure Summary, Intake/Output, MAR and Recent Results    Shift worked:  7a-7p     Shift summary and any significant changes:    Wound care completed by wound care nurse. G tube and fistula continue to drain via gravity. No issues to note. No c/o pain. Concerns for physician to address:  none     Zone phone for oncoming shift:   [phone]       Activity:  Activity Level: Bed Rest  Number times ambulated in hallways past shift: 0  Number of times OOB to chair past shift: 0    Cardiac:   Cardiac Monitoring: No      Cardiac Rhythm: Sinus Rhythm    Access:   Current line(s): PICC     Genitourinary:   Urinary status: voiding    Respiratory:   O2 Device: None (Room air)  Chronic home O2 use?: NO  Incentive spirometer at bedside: YES     GI:  Last Bowel Movement Date:  (PTA)  Current diet:  DIET NPO Ice Chips  TPN ADULT - CENTRAL  Passing flatus: YES  Tolerating current diet: YES       Pain Management:   Patient states pain is manageable on current regimen: YES    Skin:  Russell Score: 14  Interventions: turn team, increase time out of bed, PT/OT consult, limit briefs and nutritional support     Patient Safety:  Fall Score:  Total Score: 2  Interventions: bed/chair alarm, assistive device (walker, cane, etc), gripper socks, pt to call before getting OOB and stay with me (per policy)  High Fall Risk: Yes    Length of Stay:  Expected LOS: 9d 14h  Actual LOS: 8      Lauren Manjarrez RN

## 2022-01-27 NOTE — PROGRESS NOTES
Spiritual Care Assessment/Progress Note  [facility]      NAME: Yaa Urias      MRN: [id number]  AGE: 39 y.o. SEX: male  Zoroastrian Affiliation: No Amish   Language: English     1/27/2022     Total Time (in minutes): 6     Spiritual Assessment begun in [facility] 3 SURG TELE through conversation with:         [x]Patient        [] Family    [] Friend(s)        Reason for Consult: Other (comment) (No Pass Zone)     Spiritual beliefs: (Please include comment if needed)     [] Identifies with a alessandro tradition:         [] Supported by a alessandro community:            [] Claims no spiritual orientation:           [] Seeking spiritual identity:                [] Adheres to an individual form of spirituality:           [x] Not able to assess:   Did not indicate                        Identified resources for coping:      [] Prayer                               [] Music                  [] Guided Imagery     [] Family/friends                 [] Pet visits     [] Devotional reading                         [x] Unknown     [] Other:                                            Interventions offered during this visit: (See comments for more details)    Patient Interventions: Other (comment) (blanket for patient.)           Plan of Care:     [x] Support spiritual and/or cultural needs    [] Support AMD and/or advance care planning process      [] Support grieving process   [] Coordinate Rites and/or Rituals    [] Coordination with community clergy   [] No spiritual needs identified at this time   [] Detailed Plan of Care below (See Comments)  [] Make referral to Music Therapy  [] Make referral to Pet Therapy     [] Make referral to Addiction services  [] Make referral to [facility]  [] Make referral to Spiritual Care Partner  [] No future visits requested        [x] Contact Spiritual Care for further referrals     Comments:    While rounding Surg Tele unit,  noticed patient's ruiz light on and call bell ringing. Seeing no one in hallway, knocked on patient's door, introduced self and inquired of patient need for assistance. He requested a blanket which  provided. No other needs or concerns were expressed at this time.      DARRELL Goldsmith, [address] [facility], Staff [address] [place] [address] Avenue    [address] Paging Service  [phone])

## 2022-01-28 LAB
ANION GAP SERPL CALC-SCNC: 4 MMOL/L (ref 5–15)
BUN SERPL-MCNC: 16 MG/DL (ref 6–20)
BUN/CREAT SERPL: 64 (ref 12–20)
CALCIUM SERPL-MCNC: 8 MG/DL (ref 8.5–10.1)
CHLORIDE SERPL-SCNC: 108 MMOL/L (ref 97–108)
CO2 SERPL-SCNC: 27 MMOL/L (ref 21–32)
CREAT SERPL-MCNC: 0.25 MG/DL (ref 0.7–1.3)
ERYTHROCYTE [DISTWIDTH] IN BLOOD BY AUTOMATED COUNT: 18.5 % (ref 11.5–14.5)
GLUCOSE BLD STRIP.AUTO-MCNC: 119 MG/DL (ref 65–117)
GLUCOSE BLD STRIP.AUTO-MCNC: 145 MG/DL (ref 65–117)
GLUCOSE BLD STRIP.AUTO-MCNC: 94 MG/DL (ref 65–117)
GLUCOSE SERPL-MCNC: 111 MG/DL (ref 65–100)
HCT VFR BLD AUTO: 23.9 % (ref 36.6–50.3)
HGB BLD-MCNC: 7.9 G/DL (ref 12.1–17)
MAGNESIUM SERPL-MCNC: 1.8 MG/DL (ref 1.6–2.4)
MCH RBC QN AUTO: 28.4 PG (ref 26–34)
MCHC RBC AUTO-ENTMCNC: 33.1 G/DL (ref 30–36.5)
MCV RBC AUTO: 86 FL (ref 80–99)
NRBC # BLD: 0 K/UL (ref 0–0.01)
NRBC BLD-RTO: 0 PER 100 WBC
PHOSPHATE SERPL-MCNC: 2.8 MG/DL (ref 2.6–4.7)
PLATELET # BLD AUTO: 480 K/UL (ref 150–400)
PMV BLD AUTO: 9.6 FL (ref 8.9–12.9)
POTASSIUM SERPL-SCNC: 4 MMOL/L (ref 3.5–5.1)
RBC # BLD AUTO: 2.78 M/UL (ref 4.1–5.7)
SERVICE CMNT-IMP: ABNORMAL
SERVICE CMNT-IMP: ABNORMAL
SERVICE CMNT-IMP: NORMAL
SODIUM SERPL-SCNC: 139 MMOL/L (ref 136–145)
WBC # BLD AUTO: 4.4 K/UL (ref 4.1–11.1)

## 2022-01-28 PROCEDURE — 74011636637 HC RX REV CODE- 636/637: Performed by: NURSE PRACTITIONER

## 2022-01-28 PROCEDURE — 74011000250 HC RX REV CODE- 250: Performed by: NURSE PRACTITIONER

## 2022-01-28 PROCEDURE — 74011000258 HC RX REV CODE- 258: Performed by: NURSE PRACTITIONER

## 2022-01-28 PROCEDURE — 74011250637 HC RX REV CODE- 250/637: Performed by: NURSE PRACTITIONER

## 2022-01-28 PROCEDURE — 74011250636 HC RX REV CODE- 250/636: Performed by: INTERNAL MEDICINE

## 2022-01-28 PROCEDURE — 99221 1ST HOSP IP/OBS SF/LOW 40: CPT | Performed by: NURSE PRACTITIONER

## 2022-01-28 PROCEDURE — 74011250636 HC RX REV CODE- 250/636: Performed by: NURSE PRACTITIONER

## 2022-01-28 PROCEDURE — 65270000029 HC RM PRIVATE

## 2022-01-28 PROCEDURE — 83735 ASSAY OF MAGNESIUM: CPT

## 2022-01-28 PROCEDURE — 74011000250 HC RX REV CODE- 250: Performed by: INTERNAL MEDICINE

## 2022-01-28 PROCEDURE — 85027 COMPLETE CBC AUTOMATED: CPT

## 2022-01-28 PROCEDURE — 82962 GLUCOSE BLOOD TEST: CPT

## 2022-01-28 PROCEDURE — 84100 ASSAY OF PHOSPHORUS: CPT

## 2022-01-28 PROCEDURE — 80048 BASIC METABOLIC PNL TOTAL CA: CPT

## 2022-01-28 PROCEDURE — 36415 COLL VENOUS BLD VENIPUNCTURE: CPT

## 2022-01-28 RX ADMIN — Medication 1 AMPULE: at 08:41

## 2022-01-28 RX ADMIN — Medication: at 18:00

## 2022-01-28 RX ADMIN — Medication 2 UNITS: at 18:30

## 2022-01-28 RX ADMIN — OCTREOTIDE ACETATE 300 MCG: 100 INJECTION, SOLUTION INTRAVENOUS; SUBCUTANEOUS at 09:06

## 2022-01-28 RX ADMIN — I.V. FAT EMULSION 500 ML: 20 EMULSION INTRAVENOUS at 18:09

## 2022-01-28 RX ADMIN — Medication: at 21:06

## 2022-01-28 RX ADMIN — HYDROMORPHONE HYDROCHLORIDE 0.5 MG: 1 INJECTION, SOLUTION INTRAMUSCULAR; INTRAVENOUS; SUBCUTANEOUS at 14:29

## 2022-01-28 RX ADMIN — Medication: at 09:04

## 2022-01-28 RX ADMIN — OCTREOTIDE ACETATE 300 MCG: 100 INJECTION, SOLUTION INTRAVENOUS; SUBCUTANEOUS at 18:30

## 2022-01-28 RX ADMIN — OCTREOTIDE ACETATE 300 MCG: 100 INJECTION, SOLUTION INTRAVENOUS; SUBCUTANEOUS at 21:06

## 2022-01-28 RX ADMIN — OCTREOTIDE ACETATE 300 MCG: 100 INJECTION, SOLUTION INTRAVENOUS; SUBCUTANEOUS at 14:32

## 2022-01-28 RX ADMIN — Medication 1 AMPULE: at 21:05

## 2022-01-28 RX ADMIN — Medication: at 09:03

## 2022-01-28 RX ADMIN — POTASSIUM CHLORIDE: 2 INJECTION, SOLUTION, CONCENTRATE INTRAVENOUS at 18:07

## 2022-01-28 RX ADMIN — ENOXAPARIN SODIUM 30 MG: 100 INJECTION SUBCUTANEOUS at 09:06
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
## 2022-01-28 NOTE — PROGRESS NOTES
End of Shift Note    Bedside shift change report given to Arelis Arellano King Lilian RN (oncoming nurse) by Marygrace Fothergill, RN (offgoing nurse). Report included the following information SBAR, Kardex, ED Summary, Procedure Summary, Intake/Output, MAR and Recent Results    Shift worked:  7a-7p     Shift summary and any significant changes:    G tube and fistula continue to drain without issues. Dressing changed around g tube, minor drainage around it. Mother in to visit with patient today, spoke with NP. No issues to note. Concerns for physician to address:  none     Zone phone for oncoming shift:   [phone]       Activity:  Activity Level: Bed Rest  Number times ambulated in hallways past shift: 0  Number of times OOB to chair past shift: 0    Cardiac:   Cardiac Monitoring: No      Cardiac Rhythm: Sinus Rhythm    Access:   Current line(s): PICC     Genitourinary:   Urinary status: voiding    Respiratory:   O2 Device: None (Room air)  Chronic home O2 use?: NO  Incentive spirometer at bedside: YES     GI:  Last Bowel Movement Date:  (PTA)  Current diet:  DIET NPO Ice Chips  TPN ADULT - CENTRAL  Passing flatus: YES  Tolerating current diet: YES       Pain Management:   Patient states pain is manageable on current regimen: YES    Skin:  Russell Score: 14  Interventions: turn team, increase time out of bed, PT/OT consult, limit briefs and nutritional support     Patient Safety:  Fall Score:  Total Score: 2  Interventions: bed/chair alarm, assistive device (walker, cane, etc), gripper socks, pt to call before getting OOB and stay with me (per policy)  High Fall Risk: Yes    Length of Stay:  Expected LOS: 9d 14h  Actual LOS: 9      Marygrace Fothergill, RN

## 2022-01-28 NOTE — WOUND CARE
Wound Care follow up for the Fistula Bag change today:  Chart reviewed and patient assessed. The smaller fistula bag has worked well but today we are trying the High output ostomy bag for the weekend. Seth Levin, NP at the bedside for reassessment today. Suction held by her while the fistula is being worked on. The skin was prepped with the [place] skin protectant and the staples just adjacent to the wound opening were covered by the Mepitel One Silicone strips (1.5 cm each). The bag template was cut to 5 cm long and then applied to the wound edges with ostomy paste to protect the skin. The bag was held in place by the patient for 5 minutes and then hooked up to the bedside drainage bag. The suction tubing was rinsed with wound cleanser and the Hermenia Whiteside was place in a clean glove for use with next pouch change. Plan: Palliative Care to see patient today. Pt. Is thinking about his options going forward with his care.    Ade Elam RN, BSN, [address] [facility]

## 2022-01-28 NOTE — PROGRESS NOTES
Occupational Therapy note:    Chart reviewed. Patient received semisupine in bed with family in room. Patient politely but adamantly refused participating in therapy secondary to 7/10 pain and discomfort. Patient educated on importance of therapy but continue to declined. RN notified and following for increased pain. Will continue to follow.     Ambrosio Wick OTR/L

## 2022-01-28 NOTE — PROGRESS NOTES
Transition of Care Plan:    RUR:    23%  High Risk for Readmission (Is currently a readmission after only a few days at home). LOS:  10 Days  Disposition:  Home with Resumption of Care:  TF (Mar[address] Russell 5052); [facility] HH services vs. LTC vs. other  Follow up appointments:  PCP, Specialist  DME needed:  On-going assessment of DME needs. Transportation at Discharge:   Family transport  [address] or means to access home:  Patient/mother have access to home.  Medicare Letter:   N/A  Is patient a BCPI-A Bundle:    N/A       If yes, was Bundle Letter given?:  No  Is patient a [place] and connected with the [place]? N/A            If yes, was [employer] transfer form completed and VA notified? Caregiver Contact: Mother/ Joni Michael [phone]  Discharge Caregiver contacted prior to discharge? Will be contacted  Care Conference needed?:     No     Continue to follow for disposition planning that is appropriate for this unfortunate gentleman. He was discharged home with Mother, [facility] services and feedings after extended admission - returned to the hospital within a few days of DC. Options for DC are limited at present and patient is TPN dependent for life. Patient only has Medicaid which will likely not cover stay in a [facility] facility. LTACH/VIBRA/IPR are not an option for patient currently. Mother is primary support/caregiver at home up to this time. Palliative Medicine is to see patient today to further discuss goals of care and additional options. Will check to see if LTSS has been completed for Personal Care aides in the home through [place]. These caregivers have been limited during recent [place], but may be a possibility for patient for additional assistance at home.      [address] [place], EMMA-QUAN  Complex CM/  [phone]

## 2022-01-28 NOTE — PROGRESS NOTES
End of Shift Note    Bedside shift change report given to Candance Champagne, RN (oncoming nurse) by Edwina Walker RN (offgoing nurse). Report included the following information SBAR, Kardex, Intake/Output, MAR and Recent Results    Shift worked:  7A-3P     Shift summary and any significant changes:    Monitored output throughout shift. Medicated for pain. TPN and NS running, otherwise uneventful shift. Concerns for physician to address: none     Zone phone for oncoming shift:  [phone]       Activity:  Activity Level: Up with Assistance  Number times ambulated in hallways past shift: 0  Number of times OOB to chair past shift: 0    Cardiac:   Cardiac Monitoring: No      Cardiac Rhythm: Sinus Rhythm    Access:   Current line(s): Telly     Genitourinary:   Urinary status: voiding    Respiratory:   O2 Device: None (Room air)  Chronic home O2 use?: NO  Incentive spirometer at bedside: YES     GI:  Last Bowel Movement Date:  (PTA)  Current diet:  DIET NPO Ice Chips  TPN ADULT - CENTRAL  TPN ADULT - CENTRAL  Passing flatus: NO  Tolerating current diet: YES       Pain Management:   Patient states pain is manageable on current regimen: YES    Skin:  Russell Score: 14  Interventions: increase time out of bed    Patient Safety:  Fall Score:  Total Score: 2  Interventions: pt to call before getting OOB  High Fall Risk: Yes    Length of Stay:  Expected LOS: 9d 14h  Actual LOS: [address], RN

## 2022-01-28 NOTE — PROGRESS NOTES
Follow up visit with palliative pt in 3232. Pt's RN was leaving room and informed that pt was in pain. Upon entering room found pt laying awake appearing weak. Pt accompanied by mother.  introduced self and role and explored how pt was coping and feeling. Pt expressed feeling pleased with care he was receiving. Empathized with pt's pain. Provided word of encouragement and advised of availability of chaplains for support. Pt indicated openness to follow up visit. Concluded visit to allow pt rest in lieu of pain as pt did not appear appropriate for dialogue at this time. Contact Spiritual Care for any further referrals.   Verenice Hernandez M.Div, [facility]   Paging Service [phone])

## 2022-01-28 NOTE — PROGRESS NOTES
Admit Date: 2022    POD 10 Days Post-Op    Procedure:  Procedure(s):  LAPAROTOMY EXPLORATORY    Subjective:     No changes. No nausea/vomiting. Pain with dressing changes. Objective:     Blood pressure 101/68, pulse 79, temperature 97.7 °F (36.5 °C), resp. rate 18, height 5' 9\" (1.753 m), weight 48.1 kg (106 lb 0.7 oz), SpO2 100 %.     Temp (24hrs), Av.4 °F (36.9 °C), Min:97.3 °F (36.3 °C), Max:99.3 °F (37.4 °C)      Physical Exam:  GENERAL: alert, cooperative, no distress, appears stated age, LUNG: clear to auscultation bilaterally, HEART: regular rate and rhythm, ABDOMEN: soft, flat, wound intact with fistula appliance draining well, output watery and bilious, G-tube site looks good, EXTREMITIES:  extremities normal, atraumatic, no cyanosis or edema    Labs:   Recent Results (from the past 24 hour(s))   GLUCOSE, POC    Collection Time: 22  5:39 PM   Result Value Ref Range    Glucose (POC) 161 (H) 65 - 117 mg/dL    Performed by Grabiel Aldrich PCT    METABOLIC PANEL, BASIC    Collection Time: 22  2:52 AM   Result Value Ref Range    Sodium 139 136 - 145 mmol/L    Potassium 4.0 3.5 - 5.1 mmol/L    Chloride 108 97 - 108 mmol/L    CO2 27 21 - 32 mmol/L    Anion gap 4 (L) 5 - 15 mmol/L    Glucose 111 (H) 65 - 100 mg/dL    BUN 16 6 - 20 MG/DL    Creatinine 0.25 (L) 0.70 - 1.30 MG/DL    BUN/Creatinine ratio 64 (H) 12 - 20      GFR est AA >60 >60 ml/min/1.73m2    GFR est non-AA >60 >60 ml/min/1.73m2    Calcium 8.0 (L) 8.5 - 10.1 MG/DL   PHOSPHORUS    Collection Time: 22  2:52 AM   Result Value Ref Range    Phosphorus 2.8 2.6 - 4.7 MG/DL   MAGNESIUM    Collection Time: 22  2:52 AM   Result Value Ref Range    Magnesium 1.8 1.6 - 2.4 mg/dL   CBC W/O DIFF    Collection Time: 22  2:52 AM   Result Value Ref Range    WBC 4.4 4.1 - 11.1 K/uL    RBC 2.78 (L) 4.10 - 5.70 M/uL    HGB 7.9 (L) 12.1 - 17.0 g/dL    HCT 23.9 (L) 36.6 - 50.3 %    MCV 86.0 80.0 - 99.0 FL    MCH 28.4 26.0 - 34.0 PG MCHC 33.1 30.0 - 36.5 g/dL    RDW 18.5 (H) 11.5 - 14.5 %    PLATELET 562 (H) 610 - 400 K/uL    MPV 9.6 8.9 - 12.9 FL    NRBC 0.0 0  WBC    ABSOLUTE NRBC 0.00 0.00 - 0.01 K/uL   GLUCOSE, POC    Collection Time: 01/28/22  6:12 AM   Result Value Ref Range    Glucose (POC) 94 65 - 117 mg/dL    Performed by Jose Jett (PCT)    GLUCOSE, POC    Collection Time: 01/28/22 11:52 AM   Result Value Ref Range    Glucose (POC) 119 (H) 65 - 117 mg/dL    Performed by Juarez Miller PCT        Data Review images and reports reviewed    Assessment:     Active Problems:    Severe sepsis ([address] (1/18/2022)    Patient is stable presently. Pt with frozen abdomen presenting with recurrent pneumatosis, encountered 2 enterotomies during attempted re-exploration and bowel not able to be freed safely, exploration aborted. No obvious ischemic bowel identified. Now with enterocutaneous fistula. 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 Continue tpn without tube feeds  G-Tube to gravity, last replaced 1/21  Complex case management- patient hopeful for bowel transplant. Will discuss with nearby transplant centers to determine candidacy. If not a candidate for transplant, would likely benefit from palliative care.      Korey Robledo, GERDA  [facility]

## 2022-01-28 NOTE — PROGRESS NOTES
Comprehensive Nutrition Assessment    Type and Reason for Visit: Reassess    Nutrition Recommendations/Plan:   Continue TPN as ordered    Nutrition Assessment:   Chart reviewed. Pt to speak with Palliative today re: [address] 64. He is life long TPN pt unless he can get a bowel transplant which surgery is looking into. TPN at recommended goal rate which meets 100% kcal and protein needs. PEG with 1200mL OP. BG  and lytes WNL. Malnutrition Assessment:  Malnutrition Status:  Severe malnutrition    Context:  Chronic illness     Findings of the 6 clinical characteristics of malnutrition:   Energy Intake:  Unable to assess  Weight Loss:  Unable to assess     Body Fat Loss:  7 - Severe body fat loss, Orbital,Triceps,Fat overlying ribs   Muscle Mass Loss:  7 - Severe muscle mass loss, Scapula (trapezius),Thigh (quadriceps),Clavicles (pectoralis &deltoids)  Fluid Accumulation:  Unable to assess,     Strength:  Not performed         Estimated Daily Nutrient Needs:  Energy (kcal): MSJ 1789 (1376 x 1.3); Weight Used for Energy Requirements: Current  Protein (g): 65-73g (1.4-1.6gPro/kg);  Weight Used for Protein Requirements: Current  Fluid (ml/day): per MD; Method Used for Fluid Requirements: 1 ml/kcal      Nutrition Related Findings:  Meds: [email or web address], [email or web address], [email or web address].  no BM      Wounds:    Surgical incision       Current Nutrition Therapies:  Current Parenteral Nutrition Orders:  · Type and Formula: D15, 5% AA   · Lipids: 500ml,Three times weekly  · Duration: Continuous  · Rate/Volume: 75mL/h  · Current PN Order Provides: 1706kcals/90gPro/360gDextrose  · Goal PN Orders Provides: 1706kcals/90gPro/270gDextrose      Anthropometric Measures:  · Height:  5' 9\" (175.3 cm)  · Current Body Wt:  48.1 kg (106 lb 0.7 oz)   · Ideal Body Wt:  160 lbs:  63.2 %   · BMI Category:  Underweight (BMI less than 18.5)       Nutrition Diagnosis:   · Altered GI function related to altered GI function,altered GI structure as evidenced by NPO or clear liquid status due to medical condition  Previous dx continues. · Severe malnutrition related to altered GI function,altered GI structure as evidenced by BMI,severe loss of subcutaneous fat,severe muscle loss  Previous dx continues. Nutrition Interventions:   Food and/or Nutrient Delivery: Continue parenteral nutrition  Nutrition Education and Counseling: No recommendations at this time  Coordination of Nutrition Care: Continue to monitor while inpatient,Interdisciplinary rounds    Goals:  Pt will tolerate TPN at goal rate with BG <200mg/dL and lytes WNL in 2-4 days.        Nutrition Monitoring and Evaluation:   Behavioral-Environmental Outcomes: None identified  Food/Nutrient Intake Outcomes: Parenteral nutrition intake/tolerance,IVF intake  Physical Signs/Symptoms Outcomes: Biochemical data,Nutrition focused physical findings,Skin,Weight,GI status,Hemodynamic status,Fluid status or edema    Discharge Planning:    Parenteral nutrition     Electronically signed by Sybil Valero RD, [facility] on 1/28/2022 at 2:58 PM    Contact: ext [phone]

## 2022-01-28 NOTE — PROGRESS NOTES
Physical Therapy:  Chart reviewed. Patient politely but adamantly refused participating with OT secondary to 7/10 pain and discomfort. Patient educated on importance of therapy but continue to declined. Will defer per patient request and continue to follow.     April Nura Peña PT, DPT

## 2022-01-29 LAB
ANION GAP SERPL CALC-SCNC: 5 MMOL/L (ref 5–15)
BUN SERPL-MCNC: 18 MG/DL (ref 6–20)
BUN/CREAT SERPL: 51 (ref 12–20)
CALCIUM SERPL-MCNC: 8.2 MG/DL (ref 8.5–10.1)
CHLORIDE SERPL-SCNC: 104 MMOL/L (ref 97–108)
CO2 SERPL-SCNC: 27 MMOL/L (ref 21–32)
CREAT SERPL-MCNC: 0.35 MG/DL (ref 0.7–1.3)
GLUCOSE BLD STRIP.AUTO-MCNC: 117 MG/DL (ref 65–117)
GLUCOSE BLD STRIP.AUTO-MCNC: 123 MG/DL (ref 65–117)
GLUCOSE BLD STRIP.AUTO-MCNC: 128 MG/DL (ref 65–117)
GLUCOSE BLD STRIP.AUTO-MCNC: 135 MG/DL (ref 65–117)
GLUCOSE BLD STRIP.AUTO-MCNC: 137 MG/DL (ref 65–117)
GLUCOSE SERPL-MCNC: 86 MG/DL (ref 65–100)
MAGNESIUM SERPL-MCNC: 2 MG/DL (ref 1.6–2.4)
PHOSPHATE SERPL-MCNC: 2.8 MG/DL (ref 2.6–4.7)
POTASSIUM SERPL-SCNC: 4.2 MMOL/L (ref 3.5–5.1)
SERVICE CMNT-IMP: ABNORMAL
SERVICE CMNT-IMP: NORMAL
SODIUM SERPL-SCNC: 136 MMOL/L (ref 136–145)

## 2022-01-29 PROCEDURE — 74011000258 HC RX REV CODE- 258: Performed by: SURGERY

## 2022-01-29 PROCEDURE — 36415 COLL VENOUS BLD VENIPUNCTURE: CPT

## 2022-01-29 PROCEDURE — 74011000250 HC RX REV CODE- 250: Performed by: SURGERY

## 2022-01-29 PROCEDURE — 74011250637 HC RX REV CODE- 250/637: Performed by: NURSE PRACTITIONER

## 2022-01-29 PROCEDURE — 80048 BASIC METABOLIC PNL TOTAL CA: CPT

## 2022-01-29 PROCEDURE — 74011636637 HC RX REV CODE- 636/637: Performed by: NURSE PRACTITIONER

## 2022-01-29 PROCEDURE — 65270000029 HC RM PRIVATE

## 2022-01-29 PROCEDURE — 82962 GLUCOSE BLOOD TEST: CPT

## 2022-01-29 PROCEDURE — 74011250636 HC RX REV CODE- 250/636: Performed by: SURGERY

## 2022-01-29 PROCEDURE — 84100 ASSAY OF PHOSPHORUS: CPT

## 2022-01-29 PROCEDURE — 83735 ASSAY OF MAGNESIUM: CPT

## 2022-01-29 PROCEDURE — 74011250636 HC RX REV CODE- 250/636: Performed by: NURSE PRACTITIONER

## 2022-01-29 PROCEDURE — 74011250636 HC RX REV CODE- 250/636: Performed by: INTERNAL MEDICINE

## 2022-01-29 RX ORDER — POLYETHYLENE GLYCOL 3350 17 G/17G
17 POWDER, FOR SOLUTION ORAL DAILY PRN
Status: DISCONTINUED | OUTPATIENT
Start: 2022-01-29 | End: 2022-02-03 | Stop reason: HOSPADM

## 2022-01-29 RX ORDER — ONDANSETRON 2 MG/ML
4 INJECTION INTRAMUSCULAR; INTRAVENOUS
Status: DISCONTINUED | OUTPATIENT
Start: 2022-01-29 | End: 2022-02-03 | Stop reason: HOSPADM

## 2022-01-29 RX ORDER — ENOXAPARIN SODIUM 100 MG/ML
30 INJECTION SUBCUTANEOUS DAILY
Status: DISCONTINUED | OUTPATIENT
Start: 2022-01-30 | End: 2022-01-30

## 2022-01-29 RX ORDER — NOREPINEPHRINE BITARTRATE/D5W 8 MG/250ML
0-16 PLASTIC BAG, INJECTION (ML) INTRAVENOUS
Status: DISCONTINUED | OUTPATIENT
Start: 2022-01-29 | End: 2022-01-29 | Stop reason: CLARIF

## 2022-01-29 RX ORDER — DEXTROSE 50 % IN WATER (D50W) INTRAVENOUS SYRINGE
25-50 AS NEEDED
Status: DISCONTINUED | OUTPATIENT
Start: 2022-01-29 | End: 2022-02-03 | Stop reason: HOSPADM

## 2022-01-29 RX ORDER — ONDANSETRON 4 MG/1
4 TABLET, ORALLY DISINTEGRATING ORAL
Status: DISCONTINUED | OUTPATIENT
Start: 2022-01-29 | End: 2022-02-03 | Stop reason: HOSPADM

## 2022-01-29 RX ORDER — ACETAMINOPHEN 325 MG/1
650 TABLET ORAL
Status: DISCONTINUED | OUTPATIENT
Start: 2022-01-29 | End: 2022-02-03 | Stop reason: HOSPADM

## 2022-01-29 RX ORDER — ACETAMINOPHEN 650 MG/1
650 SUPPOSITORY RECTAL
Status: DISCONTINUED | OUTPATIENT
Start: 2022-01-29 | End: 2022-02-03 | Stop reason: HOSPADM

## 2022-01-29 RX ORDER — SODIUM CHLORIDE 0.9 % (FLUSH) 0.9 %
5-40 SYRINGE (ML) INJECTION AS NEEDED
Status: DISCONTINUED | OUTPATIENT
Start: 2022-01-29 | End: 2022-02-03 | Stop reason: HOSPADM

## 2022-01-29 RX ORDER — SODIUM CHLORIDE 0.9 % (FLUSH) 0.9 %
5-40 SYRINGE (ML) INJECTION EVERY 8 HOURS
Status: DISCONTINUED | OUTPATIENT
Start: 2022-01-29 | End: 2022-02-03 | Stop reason: HOSPADM

## 2022-01-29 RX ORDER — MAGNESIUM SULFATE 100 %
4 CRYSTALS MISCELLANEOUS AS NEEDED
Status: DISCONTINUED | OUTPATIENT
Start: 2022-01-29 | End: 2022-02-03 | Stop reason: HOSPADM

## 2022-01-29 RX ADMIN — OCTREOTIDE ACETATE 300 MCG: 100 INJECTION, SOLUTION INTRAVENOUS; SUBCUTANEOUS at 21:34

## 2022-01-29 RX ADMIN — OCTREOTIDE ACETATE 300 MCG: 100 INJECTION, SOLUTION INTRAVENOUS; SUBCUTANEOUS at 17:31

## 2022-01-29 RX ADMIN — Medication 1 AMPULE: at 21:34

## 2022-01-29 RX ADMIN — OCTREOTIDE ACETATE 300 MCG: 100 INJECTION, SOLUTION INTRAVENOUS; SUBCUTANEOUS at 12:20

## 2022-01-29 RX ADMIN — OCTREOTIDE ACETATE 300 MCG: 100 INJECTION, SOLUTION INTRAVENOUS; SUBCUTANEOUS at 09:01

## 2022-01-29 RX ADMIN — Medication: at 08:00

## 2022-01-29 RX ADMIN — HYDROMORPHONE HYDROCHLORIDE 0.5 MG: 1 INJECTION, SOLUTION INTRAMUSCULAR; INTRAVENOUS; SUBCUTANEOUS at 12:20

## 2022-01-29 RX ADMIN — HYDROMORPHONE HYDROCHLORIDE 0.5 MG: 1 INJECTION, SOLUTION INTRAMUSCULAR; INTRAVENOUS; SUBCUTANEOUS at 20:38

## 2022-01-29 RX ADMIN — FAMOTIDINE: 10 INJECTION, SOLUTION INTRAVENOUS at 17:27

## 2022-01-29 RX ADMIN — Medication 1 AMPULE: at 09:02

## 2022-01-29 RX ADMIN — HYDROMORPHONE HYDROCHLORIDE 0.5 MG: 1 INJECTION, SOLUTION INTRAMUSCULAR; INTRAVENOUS; SUBCUTANEOUS at 04:25

## 2022-01-29 RX ADMIN — SODIUM CHLORIDE, PRESERVATIVE FREE 10 ML: 5 INJECTION INTRAVENOUS at 21:34

## 2022-01-29 RX ADMIN — ENOXAPARIN SODIUM 30 MG: 100 INJECTION SUBCUTANEOUS at 09:02

## 2022-01-29 RX ADMIN — Medication: at 21:35

## 2022-01-29 RX ADMIN — SODIUM CHLORIDE 50 ML/HR: 9 INJECTION, SOLUTION INTRAVENOUS at 09:03

## 2022-01-29 RX ADMIN — PIPERACILLIN AND TAZOBACTAM 3.38 G: 3; .375 INJECTION, POWDER, LYOPHILIZED, FOR SOLUTION INTRAVENOUS at 20:21

## 2022-01-29 RX ADMIN — Medication: at 18:00

## 2022-01-29 RX ADMIN — Medication: at 09:00

## 2022-01-29 NOTE — PROGRESS NOTES
SURGERY PROGRESS NOTE      Admit Date: 2022    POD 11 Days Post-Op    Procedure: Procedure(s):  LAPAROTOMY EXPLORATORY      Subjective:     Patient complains of drainage from fistula      Objective:     Visit Vitals  BP 96/67   Pulse 89   Temp 99 °F (37.2 °C)   Resp 14   Ht 5' 9\" (1.753 m)   Wt 48.1 kg (106 lb 0.7 oz)   SpO2 100%   BMI 15.66 kg/m²        Temp (24hrs), Av.5 °F (36.9 °C), Min:97.9 °F (36.6 °C), Max:99 °F (37.2 °C)      701 -  190  In: -   Out: 8479 [Urine:200; Drains:1250]  1901 -  0700  In: 600 [P.O.:600]  Out: 8425 [Urine:1325; Drains:2650]    Physical Exam:    General:  alert, cooperative, no distress, appears stated age   Abdomen: soft, bowel sounds active, non-tender   Incision:   fistula output 2L in last 24 hours           Lab Results   Component Value Date/Time    WBC 4.4 2022 02:52 AM    HGB 7.9 (L) 2022 02:52 AM    HCT 23.9 (L) 2022 02:52 AM    PLATELET 293 (H)  02:52 AM    MCV 86.0 2022 02:52 AM     Lab Results   Component Value Date/Time    GFR est non-AA >60 2022 03:10 AM    GFR est AA >60 2022 03:10 AM    Creatinine 0.35 (L) 2022 03:10 AM    Creatinine, POC 3.3 (H) 2022 03:01 PM    BUN 18 2022 03:10 AM    Sodium 136 2022 03:10 AM    Sodium,  (LL) 2022 03:01 PM    Potassium 4.2 2022 03:10 AM    Potassium, POC 4.7 2022 03:01 PM    Chloride 104 2022 03:10 AM    Chloride, POC <65 (L) 2022 03:01 PM    CO2 27 2022 03:10 AM    Magnesium 2.0 2022 03:10 AM    Phosphorus 2.8 2022 03:10 AM       Assessment:     Active Problems:    Severe sepsis ([address] (2022)      Goals of care, counseling/discussion ()      Severe protein-calorie malnutrition (HCC) ()      Physical debility ()      Lethargy ()      Palliative care by specialist ()    Patient stable unfortunately no further surgical options.  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 Continue current treatment   2.   Placement

## 2022-01-29 NOTE — PROGRESS NOTES
End of Shift Note    Bedside shift change report given to Humble Patel (oncoming nurse) by Briseyda Aguiar RN (offgoing nurse). Report included the following information SBAR, Kardex, ED Summary, Procedure Summary, Intake/Output, MAR and Recent Results    Shift worked:  7a-7p     Shift summary and any significant changes:    No significant changes. Gave pt dilaudid once for pain. Concerns for physician to address:  none     Zone phone for oncoming shift:   [phone]       Activity:  Activity Level: Bed Rest  Number times ambulated in hallways past shift: 0  Number of times OOB to chair past shift: 0    Cardiac:   Cardiac Monitoring: No      Cardiac Rhythm: Sinus Rhythm    Access:   Current line(s): PICC     Genitourinary:   Urinary status: voiding    Respiratory:   O2 Device: None (Room air)  Chronic home O2 use?: NO  Incentive spirometer at bedside: YES     GI:  Last Bowel Movement Date:  (PTA)  Current diet:  DIET NPO Ice Chips  TPN ADULT - CENTRAL  TPN ADULT - CENTRAL  Passing flatus: YES  Tolerating current diet: YES       Pain Management:   Patient states pain is manageable on current regimen: YES    Skin:  Russell Score: 13  Interventions: turn team, increase time out of bed and PT/OT consult    Patient Safety:  Fall Score:  Total Score: 2  Interventions: bed/chair alarm, assistive device (walker, cane, etc), gripper socks, pt to call before getting OOB and stay with me (per policy)  High Fall Risk: Yes    Length of Stay:  Expected LOS: 9d 14h  Actual LOS: [address], RN

## 2022-01-29 NOTE — PROGRESS NOTES
Problem: Ventilator Management  Goal: *Adequate oxygenation and ventilation  Outcome: Progressing Towards Goal  Goal: *Patient maintains clear airway/free of aspiration  Outcome: Progressing Towards Goal  Goal: *Absence of infection signs and symptoms  Outcome: Progressing Towards Goal  Goal: *Normal spontaneous ventilation  Outcome: Progressing Towards Goal     Problem: Patient Education: Go to Patient Education Activity  Goal: Patient/Family Education  Outcome: Progressing Towards Goal     Problem: Pressure Injury - Risk of  Goal: *Prevention of pressure injury  Description: Document Russell Scale and appropriate interventions in the flowsheet. Outcome: Progressing Towards Goal  Note: Pressure Injury Interventions:  Sensory Interventions: Assess changes in LOC,Keep linens dry and wrinkle-free    Moisture Interventions: Absorbent underpads    Activity Interventions: Assess need for specialty bed,Increase time out of bed,PT/OT evaluation    Mobility Interventions: Assess need for specialty bed,HOB 30 degrees or less    Nutrition Interventions: Document food/fluid/supplement intake    Friction and Shear Interventions: HOB 30 degrees or less,Minimize layers                Problem: Patient Education: Go to Patient Education Activity  Goal: Patient/Family Education  Outcome: Progressing Towards Goal     Problem: Falls - Risk of  Goal: *Absence of Falls  Description: Document Dora Fall Risk and appropriate interventions in the flowsheet.   Outcome: Progressing Towards Goal  Note: Fall Risk Interventions:  Mobility Interventions: Communicate number of staff needed for ambulation/transfer    Mentation Interventions: Evaluate medications/consider consulting pharmacy    Medication Interventions: Patient to call before getting OOB,Teach patient to arise slowly    Elimination Interventions: Call light in reach,Patient to call for help with toileting needs              Problem: Patient Education: Go to Patient Education Activity  Goal: Patient/Family Education  Outcome: Progressing Towards Goal     Problem: Non-Violent Restraints  Goal: Removal from restraints as soon as assessed to be safe  Outcome: Progressing Towards Goal  Goal: No harm/injury to patient while restraints in use  Outcome: Progressing Towards Goal  Goal: Patient's dignity will be maintained  Outcome: Progressing Towards Goal  Goal: Patient Interventions  Outcome: Progressing Towards Goal     Problem: Patient Education: Go to Patient Education Activity  Goal: Patient/Family Education  Outcome: Progressing Towards Goal     Problem: Patient Education: Go to Patient Education Activity  Goal: Patient/Family Education  Outcome: Progressing Towards Goal

## 2022-01-29 NOTE — PROGRESS NOTES
End of Shift Note    Bedside shift change report given to Emery Austin RN (oncoming nurse) by Leta Brody RN (offgoing nurse). Report included the following information SBAR, Kardex, Intake/Output, MAR and Recent Results    Shift worked:  7p-7a     Shift summary and any significant changes:     Medicated for pain with Dilaudid once. Dressing changed x2. Monitored output, see flow sheets. Using urinal. TPN running. Concerns for physician to address:       Zone phone for oncoming shift:   [phone]       Activity:  Activity Level: Up with Assistance  Number times ambulated in hallways past shift: 0  Number of times OOB to chair past shift: 0    Cardiac:   Cardiac Monitoring: No      Cardiac Rhythm: Sinus Rhythm    Access:   Current line(s): Telly     Genitourinary:   Urinary status: voiding    Respiratory:   O2 Device: None (Room air)  Chronic home O2 use?: NO  Incentive spirometer at bedside: YES     GI:  Last Bowel Movement Date:  (PTA)  Current diet:  DIET NPO Ice Chips  TPN ADULT - CENTRAL  Passing flatus: NO  Tolerating current diet: YES       Pain Management:   Patient states pain is manageable on current regimen: YES    Skin:  Russell Score: 14  Interventions: float heels, increase time out of bed, PT/OT consult and nutritional support     Patient Safety:  Fall Score:  Total Score: 2  Interventions: gripper socks, pt to call before getting OOB and stay with me (per policy)  High Fall Risk: Yes    Length of Stay:  Expected LOS: 9d 14h  Actual LOS: Mio Echavarria RN

## 2022-01-30 LAB
ANION GAP SERPL CALC-SCNC: 4 MMOL/L (ref 5–15)
ANION GAP SERPL CALC-SCNC: 4 MMOL/L (ref 5–15)
BUN SERPL-MCNC: 15 MG/DL (ref 6–20)
BUN SERPL-MCNC: 17 MG/DL (ref 6–20)
BUN/CREAT SERPL: 50 (ref 12–20)
BUN/CREAT SERPL: 55 (ref 12–20)
CALCIUM SERPL-MCNC: 8 MG/DL (ref 8.5–10.1)
CALCIUM SERPL-MCNC: 8.3 MG/DL (ref 8.5–10.1)
CHLORIDE SERPL-SCNC: 106 MMOL/L (ref 97–108)
CHLORIDE SERPL-SCNC: 107 MMOL/L (ref 97–108)
CO2 SERPL-SCNC: 27 MMOL/L (ref 21–32)
CO2 SERPL-SCNC: 27 MMOL/L (ref 21–32)
COMMENT, HOLDF: NORMAL
CREAT SERPL-MCNC: 0.3 MG/DL (ref 0.7–1.3)
CREAT SERPL-MCNC: 0.31 MG/DL (ref 0.7–1.3)
ERYTHROCYTE [DISTWIDTH] IN BLOOD BY AUTOMATED COUNT: 18.6 % (ref 11.5–14.5)
EST. AVERAGE GLUCOSE BLD GHB EST-MCNC: 103 MG/DL
GLUCOSE BLD STRIP.AUTO-MCNC: 116 MG/DL (ref 65–117)
GLUCOSE BLD STRIP.AUTO-MCNC: 119 MG/DL (ref 65–117)
GLUCOSE BLD STRIP.AUTO-MCNC: 86 MG/DL (ref 65–117)
GLUCOSE SERPL-MCNC: 113 MG/DL (ref 65–100)
GLUCOSE SERPL-MCNC: 116 MG/DL (ref 65–100)
HBA1C MFR BLD: 5.2 % (ref 4–5.6)
HCT VFR BLD AUTO: 23.1 % (ref 36.6–50.3)
HGB BLD-MCNC: 7.5 G/DL (ref 12.1–17)
LACTATE SERPL-SCNC: 0.9 MMOL/L (ref 0.4–2)
MAGNESIUM SERPL-MCNC: 1.7 MG/DL (ref 1.6–2.4)
MCH RBC QN AUTO: 28.8 PG (ref 26–34)
MCHC RBC AUTO-ENTMCNC: 32.5 G/DL (ref 30–36.5)
MCV RBC AUTO: 88.8 FL (ref 80–99)
NRBC # BLD: 0 K/UL (ref 0–0.01)
NRBC BLD-RTO: 0 PER 100 WBC
PHOSPHATE SERPL-MCNC: 3.3 MG/DL (ref 2.6–4.7)
PLATELET # BLD AUTO: 472 K/UL (ref 150–400)
PMV BLD AUTO: 9 FL (ref 8.9–12.9)
POTASSIUM SERPL-SCNC: 4 MMOL/L (ref 3.5–5.1)
POTASSIUM SERPL-SCNC: 4.1 MMOL/L (ref 3.5–5.1)
RBC # BLD AUTO: 2.6 M/UL (ref 4.1–5.7)
SAMPLES BEING HELD,HOLD: NORMAL
SERVICE CMNT-IMP: ABNORMAL
SERVICE CMNT-IMP: NORMAL
SERVICE CMNT-IMP: NORMAL
SODIUM SERPL-SCNC: 137 MMOL/L (ref 136–145)
SODIUM SERPL-SCNC: 138 MMOL/L (ref 136–145)
WBC # BLD AUTO: 3.5 K/UL (ref 4.1–11.1)

## 2022-01-30 PROCEDURE — 83036 HEMOGLOBIN GLYCOSYLATED A1C: CPT

## 2022-01-30 PROCEDURE — 74011250636 HC RX REV CODE- 250/636: Performed by: NURSE PRACTITIONER

## 2022-01-30 PROCEDURE — 80048 BASIC METABOLIC PNL TOTAL CA: CPT

## 2022-01-30 PROCEDURE — 74011636637 HC RX REV CODE- 636/637: Performed by: NURSE PRACTITIONER

## 2022-01-30 PROCEDURE — 65270000029 HC RM PRIVATE

## 2022-01-30 PROCEDURE — 36415 COLL VENOUS BLD VENIPUNCTURE: CPT

## 2022-01-30 PROCEDURE — 82962 GLUCOSE BLOOD TEST: CPT

## 2022-01-30 PROCEDURE — 74011250636 HC RX REV CODE- 250/636: Performed by: INTERNAL MEDICINE

## 2022-01-30 PROCEDURE — 74011000258 HC RX REV CODE- 258: Performed by: SURGERY

## 2022-01-30 PROCEDURE — 74011250637 HC RX REV CODE- 250/637: Performed by: NURSE PRACTITIONER

## 2022-01-30 PROCEDURE — 84100 ASSAY OF PHOSPHORUS: CPT

## 2022-01-30 PROCEDURE — 83735 ASSAY OF MAGNESIUM: CPT

## 2022-01-30 PROCEDURE — 85027 COMPLETE CBC AUTOMATED: CPT

## 2022-01-30 PROCEDURE — 83605 ASSAY OF LACTIC ACID: CPT

## 2022-01-30 PROCEDURE — 74011000250 HC RX REV CODE- 250: Performed by: SURGERY

## 2022-01-30 PROCEDURE — 74011250636 HC RX REV CODE- 250/636: Performed by: SURGERY

## 2022-01-30 RX ADMIN — SODIUM CHLORIDE, PRESERVATIVE FREE 10 ML: 5 INJECTION INTRAVENOUS at 05:42

## 2022-01-30 RX ADMIN — Medication 1 AMPULE: at 09:05

## 2022-01-30 RX ADMIN — OCTREOTIDE ACETATE 300 MCG: 100 INJECTION, SOLUTION INTRAVENOUS; SUBCUTANEOUS at 13:46

## 2022-01-30 RX ADMIN — FAMOTIDINE: 10 INJECTION, SOLUTION INTRAVENOUS at 17:38

## 2022-01-30 RX ADMIN — ENOXAPARIN SODIUM 30 MG: 100 INJECTION SUBCUTANEOUS at 09:05

## 2022-01-30 RX ADMIN — OCTREOTIDE ACETATE 300 MCG: 100 INJECTION, SOLUTION INTRAVENOUS; SUBCUTANEOUS at 09:05

## 2022-01-30 RX ADMIN — SODIUM CHLORIDE, PRESERVATIVE FREE 10 ML: 5 INJECTION INTRAVENOUS at 13:47

## 2022-01-30 RX ADMIN — Medication: at 21:00

## 2022-01-30 RX ADMIN — OCTREOTIDE ACETATE 300 MCG: 100 INJECTION, SOLUTION INTRAVENOUS; SUBCUTANEOUS at 17:39

## 2022-01-30 RX ADMIN — PIPERACILLIN AND TAZOBACTAM 3.38 G: 3; .375 INJECTION, POWDER, LYOPHILIZED, FOR SOLUTION INTRAVENOUS at 04:14

## 2022-01-30 RX ADMIN — SODIUM CHLORIDE 50 ML/HR: 9 INJECTION, SOLUTION INTRAVENOUS at 04:18

## 2022-01-30 RX ADMIN — HYDROMORPHONE HYDROCHLORIDE 0.5 MG: 1 INJECTION, SOLUTION INTRAMUSCULAR; INTRAVENOUS; SUBCUTANEOUS at 17:39

## 2022-01-30 RX ADMIN — Medication: at 09:00

## 2022-01-30 RX ADMIN — SODIUM CHLORIDE, PRESERVATIVE FREE 10 ML: 5 INJECTION INTRAVENOUS at 22:00

## 2022-01-30 NOTE — PROGRESS NOTES
End of Shift Note    Bedside shift change report given to Hiwot Pena (oncoming nurse) by Candice Irene RN (offgoing nurse). Report included the following information SBAR, Kardex, ED Summary, Procedure Summary, Intake/Output, MAR and Recent Results    Shift worked:  7a-7p     Shift summary and any significant changes:     Patient has been in bed throughout the shift. Fistula is putting out a lot. Patient keeps requesting ice chips. Desitin was applied around his drain site. Dilaudid was given for pain per request.      Concerns for physician to address:  none     Zone phone for oncoming shift:   [phone]       Activity:  Activity Level: Bed Rest  Number times ambulated in hallways past shift: 0  Number of times OOB to chair past shift: 0    Cardiac:   Cardiac Monitoring: No      Cardiac Rhythm: Sinus Rhythm    Access:   Current line(s): PICC     Genitourinary:   Urinary status: voiding    Respiratory:   O2 Device: None (Room air)  Chronic home O2 use?: NO  Incentive spirometer at bedside: YES     GI:  Last Bowel Movement Date:  (PTA)  Current diet:  DIET NPO  TPN ADULT - CENTRAL  Passing flatus: YES  Tolerating current diet: YES       Pain Management:   Patient states pain is manageable on current regimen: YES    Skin:  Russell Score: 13  Interventions: turn team, increase time out of bed, foam dressing, PT/OT consult and internal/external urinary devices    Patient Safety:  Fall Score:  Total Score: 3  Interventions: bed/chair alarm, assistive device (walker, cane, etc), gripper socks, pt to call before getting OOB and stay with me (per policy)  High Fall Risk: Yes    Length of Stay:  Expected LOS: 9d 14h  Actual LOS: 1535 Kirby Potts RN

## 2022-01-30 NOTE — PROGRESS NOTES
Problem: Pressure Injury - Risk of  Goal: *Prevention of pressure injury  Description: Document Russell Scale and appropriate interventions in the flowsheet. Outcome: Progressing Towards Goal  Note: Pressure Injury Interventions:  Sensory Interventions: Assess changes in LOC,Assess need for specialty bed,Keep linens dry and wrinkle-free,Maintain/enhance activity level,Minimize linen layers    Moisture Interventions: Absorbent underpads,Check for incontinence Q2 hours and as needed    Activity Interventions: Assess need for specialty bed    Mobility Interventions: HOB 30 degrees or less,PT/OT evaluation    Nutrition Interventions: Document food/fluid/supplement intake,Offer support with meals,snacks and hydration    Friction and Shear Interventions: Lift sheet,Lift team/patient mobility team,Minimize layers                Problem: Patient Education: Go to Patient Education Activity  Goal: Patient/Family Education  Outcome: Progressing Towards Goal     Problem: Falls - Risk of  Goal: *Absence of Falls  Description: Document Dora Fall Risk and appropriate interventions in the flowsheet.   Outcome: Progressing Towards Goal  Note: Fall Risk Interventions:  Mobility Interventions: Communicate number of staff needed for ambulation/transfer,Patient to call before getting OOB    Mentation Interventions: Door open when patient unattended,More frequent rounding,Reorient patient,Room close to nurse's station    Medication Interventions: Patient to call before getting OOB,Teach patient to arise slowly    Elimination Interventions: Call light in reach,Toileting schedule/hourly rounds              Problem: Patient Education: Go to Patient Education Activity  Goal: Patient/Family Education  Outcome: Progressing Towards Goal     Problem: Patient Education: Go to Patient Education Activity  Goal: Patient/Family Education  Outcome: Progressing Towards Goal     Problem: Patient Education: Go to Patient Education Activity  Goal: Patient/Family Education  Outcome: Progressing Towards Goal

## 2022-01-30 NOTE — PROGRESS NOTES
SURGERY PROGRESS NOTE      Admit Date: 2022    POD 12 Days Post-Op    Procedure: Procedure(s):  LAPAROTOMY EXPLORATORY      Subjective:     Patient has o new complaints.   No events overnight       Objective:     Visit Vitals  BP (!) 90/57 (BP 1 Location: Left upper arm, BP Patient Position: At rest)   Pulse 79   Temp 98.1 °F (36.7 °C)   Resp 16   Ht 5' 9\" (1.753 m)   Wt 48.1 kg (106 lb 0.7 oz)   SpO2 99%   BMI 15.66 kg/m²        Temp (24hrs), Av.6 °F (37 °C), Min:98.1 °F (36.7 °C), Max:99 °F (37.2 °C)      701 -  1900  In: -   Out: 1700 [Urine:100; Drains:1600]  1901 -  0700  In: 6264.3 [I.V.:6264.3]  Out: 7180 [HRCFX:4596; Drains:5500]    Physical Exam:    General:  alert, cooperative, no distress, appears stated age   Abdomen: soft, bowel sounds active, non-tender   Incision:   fistula with high output            Lab Results   Component Value Date/Time    WBC 3.5 (L) 2022 04:12 AM    HGB 7.5 (L) 2022 04:12 AM    HCT 23.1 (L) 2022 04:12 AM    PLATELET 854 (H)  04:12 AM    MCV 88.8 2022 04:12 AM     Lab Results   Component Value Date/Time    GFR est non-AA >60 2022 04:12 AM    GFR est non-AA >60 2022 04:12 AM    GFR est AA >60 2022 04:12 AM    GFR est AA >60 2022 04:12 AM    Creatinine 0.31 (L) 2022 04:12 AM    Creatinine 0.30 (L) 2022 04:12 AM    Creatinine, POC 3.3 (H) 2022 03:01 PM    BUN 17 2022 04:12 AM    BUN 15 2022 04:12 AM    Sodium 137 2022 04:12 AM    Sodium 138 2022 04:12 AM    Sodium,  (LL) 2022 03:01 PM    Potassium 4.0 2022 04:12 AM    Potassium 4.1 2022 04:12 AM    Potassium, POC 4.7 2022 03:01 PM    Chloride 106 2022 04:12 AM    Chloride 107 2022 04:12 AM    Chloride, POC <65 (L) 2022 03:01 PM    CO2 27 2022 04:12 AM    CO2 27 2022 04:12 AM    Magnesium 1.7 2022 04:12 AM    Phosphorus 3.3 2022 04:12 AM       Assessment:     Active Problems:    Severe sepsis ([facility] [address] (1/18/2022)      Goals of care, counseling/discussion ()      Severe protein-calorie malnutrition (HCC) ()      Physical debility ()      Lethargy ()      Palliative care by specialist ()    stable.       Plan:     Continue current treatment

## 2022-01-30 NOTE — PROGRESS NOTES
End of Shift Note    Bedside shift change report given to Orquidea (oncoming nurse) by Moris Livingston (offgoing nurse). Report included the following information SBAR, Kardex, Intake/Output, MAR and Recent Results    Shift worked:  7p-7a     Shift summary and any significant changes:     No change in pt condition. Pt NPO except ice chips and a few hard candies. PICC working well. TPN and IVF infusing. Labs drawn as ordered. PRN Dilaudid x1 for pain. Dressing changed to PEG/JTube d/t drainage. Desitin applied to irritated areas around tube site. Concerns for physician to address:       Zone phone for oncoming shift:   [phone]       Activity:  Activity Level: Bed Rest  Number times ambulated in hallways past shift: 0  Number of times OOB to chair past shift: 0    Cardiac:   Cardiac Monitoring: No      Cardiac Rhythm: Sinus Rhythm    Access:   Current line(s): PICC     Genitourinary:   Urinary status: voiding    Respiratory:   O2 Device: None (Room air)  Chronic home O2 use?: NO  Incentive spirometer at bedside: NO     GI:  Last Bowel Movement Date:  (PTA)  Current diet:  DIET NPO  TPN ADULT - CENTRAL  Passing flatus: YES  Tolerating current diet: YES       Pain Management:   Patient states pain is manageable on current regimen: YES    Skin:  Russell Score: 13  Interventions: increase time out of bed and PT/OT consult    Patient Safety:  Fall Score:  Total Score: 3  Interventions: gripper socks, pt to call before getting OOB and stay with me (per policy)  High Fall Risk: Yes    Length of Stay:  Expected LOS: 9d 14h  Actual LOS: [facility]

## 2022-01-30 NOTE — PROGRESS NOTES
Problem: Pressure Injury - Risk of  Goal: *Prevention of pressure injury  Description: Document Russell Scale and appropriate interventions in the flowsheet. Outcome: Progressing Towards Goal  Note: Pressure Injury Interventions:  Sensory Interventions: Assess changes in LOC,Assess need for specialty bed,Keep linens dry and wrinkle-free,Maintain/enhance activity level,Minimize linen layers    Moisture Interventions: Absorbent underpads,Check for incontinence Q2 hours and as needed    Activity Interventions: Assess need for specialty bed    Mobility Interventions: HOB 30 degrees or less,PT/OT evaluation    Nutrition Interventions: Document food/fluid/supplement intake,Offer support with meals,snacks and hydration    Friction and Shear Interventions: Lift sheet,Lift team/patient mobility team,Minimize layers                Problem: Patient Education: Go to Patient Education Activity  Goal: Patient/Family Education  Outcome: Progressing Towards Goal     Problem: Falls - Risk of  Goal: *Absence of Falls  Description: Document Dora Fall Risk and appropriate interventions in the flowsheet.   Outcome: Progressing Towards Goal  Note: Fall Risk Interventions:  Mobility Interventions: Bed/chair exit alarm,Communicate number of staff needed for ambulation/transfer,Patient to call before getting OOB    Mentation Interventions: Adequate sleep, hydration, pain control    Medication Interventions: Patient to call before getting OOB,Teach patient to arise slowly    Elimination Interventions: Call light in reach,Toileting schedule/hourly rounds              Problem: Patient Education: Go to Patient Education Activity  Goal: Patient/Family Education  Outcome: Progressing Towards Goal     Problem: Ventilator Management  Goal: *Adequate oxygenation and ventilation  Outcome: Resolved/Met  Goal: *Patient maintains clear airway/free of aspiration  Outcome: Resolved/Met  Goal: *Absence of infection signs and symptoms  Outcome: Resolved/Met  Goal: *Normal spontaneous ventilation  Outcome: Resolved/Met     Problem: Patient Education: Go to Patient Education Activity  Goal: Patient/Family Education  Outcome: Resolved/Met     Problem: Non-Violent Restraints  Goal: Removal from restraints as soon as assessed to be safe  Outcome: Resolved/Met  Goal: No harm/injury to patient while restraints in use  Outcome: Resolved/Met  Goal: Patient's dignity will be maintained  Outcome: Resolved/Met  Goal: Patient Interventions  Outcome: Resolved/Met

## 2022-01-31 LAB
ANION GAP SERPL CALC-SCNC: 2 MMOL/L (ref 5–15)
BUN SERPL-MCNC: 18 MG/DL (ref 6–20)
BUN/CREAT SERPL: 69 (ref 12–20)
CALCIUM SERPL-MCNC: 8.1 MG/DL (ref 8.5–10.1)
CHLORIDE SERPL-SCNC: 110 MMOL/L (ref 97–108)
CO2 SERPL-SCNC: 29 MMOL/L (ref 21–32)
CREAT SERPL-MCNC: 0.26 MG/DL (ref 0.7–1.3)
GLUCOSE BLD STRIP.AUTO-MCNC: 103 MG/DL (ref 65–117)
GLUCOSE BLD STRIP.AUTO-MCNC: 121 MG/DL (ref 65–117)
GLUCOSE BLD STRIP.AUTO-MCNC: 128 MG/DL (ref 65–117)
GLUCOSE BLD STRIP.AUTO-MCNC: 132 MG/DL (ref 65–117)
GLUCOSE SERPL-MCNC: 125 MG/DL (ref 65–100)
MAGNESIUM SERPL-MCNC: 1.8 MG/DL (ref 1.6–2.4)
PHOSPHATE SERPL-MCNC: 3.1 MG/DL (ref 2.6–4.7)
POTASSIUM SERPL-SCNC: 4.1 MMOL/L (ref 3.5–5.1)
SERVICE CMNT-IMP: ABNORMAL
SERVICE CMNT-IMP: NORMAL
SODIUM SERPL-SCNC: 141 MMOL/L (ref 136–145)

## 2022-01-31 PROCEDURE — 74011000250 HC RX REV CODE- 250: Performed by: INTERNAL MEDICINE

## 2022-01-31 PROCEDURE — 84100 ASSAY OF PHOSPHORUS: CPT

## 2022-01-31 PROCEDURE — 65270000029 HC RM PRIVATE

## 2022-01-31 PROCEDURE — 74011250636 HC RX REV CODE- 250/636: Performed by: NURSE PRACTITIONER

## 2022-01-31 PROCEDURE — 74011000258 HC RX REV CODE- 258: Performed by: NURSE PRACTITIONER

## 2022-01-31 PROCEDURE — 74011250637 HC RX REV CODE- 250/637: Performed by: NURSE PRACTITIONER

## 2022-01-31 PROCEDURE — 82962 GLUCOSE BLOOD TEST: CPT

## 2022-01-31 PROCEDURE — 83735 ASSAY OF MAGNESIUM: CPT

## 2022-01-31 PROCEDURE — 74011000250 HC RX REV CODE- 250: Performed by: NURSE PRACTITIONER

## 2022-01-31 PROCEDURE — 74011000250 HC RX REV CODE- 250: Performed by: SURGERY

## 2022-01-31 PROCEDURE — 74011250636 HC RX REV CODE- 250/636: Performed by: INTERNAL MEDICINE

## 2022-01-31 PROCEDURE — 80048 BASIC METABOLIC PNL TOTAL CA: CPT

## 2022-01-31 PROCEDURE — 97535 SELF CARE MNGMENT TRAINING: CPT | Performed by: OCCUPATIONAL THERAPIST

## 2022-01-31 PROCEDURE — 74011636637 HC RX REV CODE- 636/637: Performed by: NURSE PRACTITIONER

## 2022-01-31 RX ADMIN — HYDROMORPHONE HYDROCHLORIDE 0.5 MG: 1 INJECTION, SOLUTION INTRAMUSCULAR; INTRAVENOUS; SUBCUTANEOUS at 23:50

## 2022-01-31 RX ADMIN — SODIUM CHLORIDE 50 ML/HR: 9 INJECTION, SOLUTION INTRAVENOUS at 00:19

## 2022-01-31 RX ADMIN — OCTREOTIDE ACETATE 300 MCG: 100 INJECTION, SOLUTION INTRAVENOUS; SUBCUTANEOUS at 14:52

## 2022-01-31 RX ADMIN — Medication: at 08:00

## 2022-01-31 RX ADMIN — OCTREOTIDE ACETATE 300 MCG: 100 INJECTION, SOLUTION INTRAVENOUS; SUBCUTANEOUS at 00:24

## 2022-01-31 RX ADMIN — Medication 1 AMPULE: at 10:36

## 2022-01-31 RX ADMIN — SODIUM CHLORIDE, PRESERVATIVE FREE 10 ML: 5 INJECTION INTRAVENOUS at 23:36

## 2022-01-31 RX ADMIN — HYDROMORPHONE HYDROCHLORIDE 0.5 MG: 1 INJECTION, SOLUTION INTRAMUSCULAR; INTRAVENOUS; SUBCUTANEOUS at 13:59

## 2022-01-31 RX ADMIN — ENOXAPARIN SODIUM 30 MG: 100 INJECTION SUBCUTANEOUS at 10:36

## 2022-01-31 RX ADMIN — OCTREOTIDE ACETATE 300 MCG: 100 INJECTION, SOLUTION INTRAVENOUS; SUBCUTANEOUS at 10:46

## 2022-01-31 RX ADMIN — HYDROMORPHONE HYDROCHLORIDE 0.5 MG: 1 INJECTION, SOLUTION INTRAMUSCULAR; INTRAVENOUS; SUBCUTANEOUS at 08:24

## 2022-01-31 RX ADMIN — Medication: at 21:00

## 2022-01-31 RX ADMIN — Medication: at 17:42

## 2022-01-31 RX ADMIN — SODIUM CHLORIDE, PRESERVATIVE FREE 10 ML: 5 INJECTION INTRAVENOUS at 08:24

## 2022-01-31 RX ADMIN — SODIUM CHLORIDE, PRESERVATIVE FREE 10 ML: 5 INJECTION INTRAVENOUS at 13:59

## 2022-01-31 RX ADMIN — POTASSIUM CHLORIDE: 2 INJECTION, SOLUTION, CONCENTRATE INTRAVENOUS at 19:30

## 2022-01-31 RX ADMIN — Medication 1 AMPULE: at 21:06

## 2022-01-31 RX ADMIN — POTASSIUM CHLORIDE: 2 INJECTION, SOLUTION, CONCENTRATE INTRAVENOUS at 17:38

## 2022-01-31 RX ADMIN — I.V. FAT EMULSION 500 ML: 20 EMULSION INTRAVENOUS at 17:42

## 2022-01-31 RX ADMIN — OCTREOTIDE ACETATE 300 MCG: 100 INJECTION, SOLUTION INTRAVENOUS; SUBCUTANEOUS at 19:10

## 2022-01-31 RX ADMIN — OCTREOTIDE ACETATE 300 MCG: 100 INJECTION, SOLUTION INTRAVENOUS; SUBCUTANEOUS at 22:15

## 2022-01-31 RX ADMIN — SODIUM CHLORIDE 50 ML/HR: 9 INJECTION, SOLUTION INTRAVENOUS at 21:04

## 2022-01-31 NOTE — PROGRESS NOTES
Physical Therapy Note    Pt received supine in bed. He declines PT at this time. He is agreeable to re attempting around 1:30-2.     1352 Patient declines mobility at this time. He reports participating with OT today. Patient is encouraged to use all opportunities available to him to ambulate with assistance, even short distance, even if it means returning to bed at completion. Patient continues to report completing supine therex.  Yellow theraband and sponges are found in room

## 2022-01-31 NOTE — PROGRESS NOTES
End of Shift Note    Bedside shift change report given to Rudy Casarez (oncoming nurse) by Rock Rubio RN (offgoing nurse). Report included the following information SBAR, Kardex, Intake/Output and MAR    Shift worked:  7p-7a     Shift summary and any significant changes:     Pt had no complaints this shift. Uneventful shift. Concerns for physician to address:  none     Zone phone for oncoming shift:          Activity:  Activity Level: Up with Assistance  Number times ambulated in hallways past shift: 0  Number of times OOB to chair past shift: 0    Cardiac:   Cardiac Monitoring: No      Cardiac Rhythm: Sinus Rhythm    Access:   Current line(s): PICC     Genitourinary:   Urinary status: voiding    Respiratory:   O2 Device: None (Room air)  Chronic home O2 use?: NO  GI:  Last Bowel Movement Date:  (PTA)  Current diet:  DIET NPO  TPN ADULT - CENTRAL  TPN ADULT - CENTRAL    Tolerating current diet: YES       Pain Management:   Patient states pain is manageable on current regimen: YES    Skin:  Russell Score: 16  Interventions: turn team and increase time out of bed    Patient Safety:  Fall Score:  Total Score: 3  Interventions: assistive device (walker, cane, etc), gripper socks and pt to call before getting OOB  High Fall Risk: Yes    Length of Stay:  Expected LOS: 9d 14h  Actual LOS: 13      Rock Rubio RN
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
## 2022-01-31 NOTE — PROGRESS NOTES
Problem: Self Care Deficits Care Plan (Adult)  Goal: *Acute Goals and Plan of Care (Insert Text)  Description: FUNCTIONAL STATUS PRIOR TO ADMISSION: Pt and chart report that he is total for ADLs in bed, and is mother carries him to and from chair      HOME SUPPORT: The patient lived with family and is never alone . Occupational Therapy Goals  Initiated 1/21/2022, Goals 1-7 reviewed and revised PRN and goal 8 was newly established as per 1/31 weekly reevaluation. 1.  Patient will perform grooming with supervision/set-up within 7 day(s). Goal met seated EOB. 2.  Patient will perform bathing with moderate assistance  within 7 day(s). Not met, continue goal.  3.  Patient will perform upper body dressing with minimal assistance/contact guard assist within 7 day(s). Goal surpassed. 4.  Patient will perform toilet transfers with minimal assistance/contact guard assist within 7 day(s). No met, continue goal.   5.  Patient will perform all aspects of toileting with minimal assistance/contact guard assist within 7 day(s). No met, continue goal  6. Patient will participate in upper extremity therapeutic exercise/activities with supervision/set-up for 5 minutes within 7 day(s). Continue goal  7. Patient will utilize energy conservation techniques during functional activities with verbal cues within 7 day(s). Continue goal.  8   Patient will perform LB dressing with supervision/setup within 7 days. Established 1/31 1/31/2022 1338 by BENTON Zambrano/L  Outcome: Progressing Towards Goal  OCCUPATIONAL THERAPY TREATMENT/WEEKLY RE-ASSESSMENT  Patient: Shan Ervin (86 y.o. male)  Date: 1/31/2022  Diagnosis: Severe sepsis ([facility] [A41.9, R65.20] <principal problem not specified>  Procedure(s) (LRB):  LAPAROTOMY EXPLORATORY (N/A) 13 Days Post-Op  Precautions:    Chart, occupational therapy assessment, plan of care, and goals were reviewed.     ASSESSMENT  Patient continues with skilled OT services and is very slowly progressing towards goals. The patient is still requiring coaxing to participate, occasionally irritable and resistant to some suggested functional activities, including declining to get out of the bed to the chair today. Overall he was supervision for bed mobility, with was min A  sit to stand and he side stepped to [facility] with mod A. In regard to ADLs he was supervision for seated grooming and dressing. At this time he remains limited by poor motivation, GW, decreased activity tolerance, decreased safety awareness and decreased standing balance which is impairing his functional independence. He will continue to benefit from acute OT, and although he could benefit from inpatient rehab he is not motivated and willing to participate enough to do so. The patient is presently most appropriate for HHOT and PT with 24 supervision/assistance of family after discharge. PLAN :  Goals have been updated based on progression since last assessment. Patient continues to benefit from skilled intervention to address the above impairments. Continue to follow patient 3 times a week to address goals. Recommendation for discharge: (in order for the patient to meet his/her long term goals)  Occupational therapy at least 2 days/week in the home AND ensure assist and/or supervision for safety with ADLs and functional mobility    IF patient discharges home will need the following DME: wheelchair       SUBJECTIVE:   Patient stated I'm not going to endy.     OBJECTIVE DATA SUMMARY:   Cognitive/Behavioral Status:  Neurologic State: Alert;Irritable  Orientation Level: Oriented X4  Cognition: Follows commands        Safety/Judgement: Insight into deficits; Decreased awareness of need for safety    Functional Mobility and Transfers for ADLs:  Bed Mobility:  Rolling: Supervision  Supine to Sit: Supervision  Sit to Supine: Supervision  Scooting: Supervision    Transfers:  Sit to Stand: Minimum assistance      Side stepped to [facility] with mod A of 1. Balance:  Sitting: Intact  Standing: Impaired  Standing - Static: Fair;Constant support  Standing - Dynamic : Poor;Constant support    ADL Intervention:  Grooming  Position Performed: Seated edge of bed  Washing Face: Supervision;Set-up  Washing Hands: Supervision;Set-up  Cues: Verbal cues provided    Upper Body [address] Gown: Supervision;Set-up    Lower Body Dressing Assistance  Socks: Supervision;Set-up; Compensatory technique training  Leg Crossed Method Used: Yes  Position Performed: Seated edge of bed  Cues: Verbal cues provided    Cognitive Retraining  Safety/Judgement: Insight into deficits; Decreased awareness of need for safety    Pain:  Abodominal surgical site pain    Activity Tolerance:   Fair    After treatment patient left in no apparent distress:   Supine in bed and Call bell within reach    BENTON Sood/L  Time Calculation: 21 mins

## 2022-01-31 NOTE — PROGRESS NOTES
SURGERY PROGRESS NOTE      Admit Date: 2022    POD 13 Days Post-Op    Procedure: Procedure(s):  LAPAROTOMY EXPLORATORY      Subjective:     Patient doing ok today. Had some abdominal pain over the weekend. Frustrated by constant drainage from G-Tube. Objective:     Visit Vitals  /65   Pulse 78   Temp 97.5 °F (36.4 °C)   Resp 18   Ht 5' 9\" (1.753 m)   Wt 39.1 kg (86 lb 3.2 oz)   SpO2 100%   BMI 12.73 kg/m²        Temp (24hrs), Av °F (36.7 °C), Min:97.5 °F (36.4 °C), Max:98.6 °F (37 °C)      701 - 1900  In: -   Out: 4000 [Urine:200]  1901 - 700  In: 6764.3 [P.O.:500; I.V.:6264.3]  Out: 3834 [Urine:1105; Drains:3800]    Physical Exam:    General:  alert, cooperative, no distress, appears stated age   Abdomen: soft, bowel sounds active, non-tender   Incision:   fistula with high output - thin watery output with flatus.             Lab Results   Component Value Date/Time    WBC 3.5 (L) 2022 04:12 AM    HGB 7.5 (L) 2022 04:12 AM    HCT 23.1 (L) 2022 04:12 AM    PLATELET 910 (H)  04:12 AM    MCV 88.8 2022 04:12 AM     Lab Results   Component Value Date/Time    GFR est non-AA >60 2022 08:30 AM    GFR est AA >60 2022 08:30 AM    Creatinine 0.26 (L) 2022 08:30 AM    Creatinine, POC 3.3 (H) 2022 03:01 PM    BUN 18 2022 08:30 AM    Sodium 141 2022 08:30 AM    Sodium,  (LL) 2022 03:01 PM    Potassium 4.1 2022 08:30 AM    Potassium, POC 4.7 2022 03:01 PM    Chloride 110 (H) 2022 08:30 AM    Chloride, POC <65 (L) 2022 03:01 PM    CO2 29 2022 08:30 AM    Magnesium 1.8 2022 08:30 AM    Phosphorus 3.1 2022 08:30 AM       Assessment:     Active Problems:    Severe sepsis ([facility] [address] (2022)      Goals of care, counseling/discussion ()      Severe protein-calorie malnutrition (H[facility]) ()      Physical debility ()      Lethargy ()      Palliative care by specialist ()    stable. Plan:     Continue current treatment   - Octreotide  - TPN- change to cyclic in preparation for discharge to home. - Mobilize with PT and nursing. Patient reluctant to mobilize. Currently two min assist with PT. Will need family support for mobility at home. - Discuss G-Tube with Nola Valderrama from wound care, ?pouching options?    - Patient already has a rupinder catheter.   - Case management updated    Betsy Jeffrey NP

## 2022-02-01 LAB
ANION GAP SERPL CALC-SCNC: 5 MMOL/L (ref 5–15)
BUN SERPL-MCNC: 18 MG/DL (ref 6–20)
BUN/CREAT SERPL: 62 (ref 12–20)
CALCIUM SERPL-MCNC: 7.8 MG/DL (ref 8.5–10.1)
CHLORIDE SERPL-SCNC: 110 MMOL/L (ref 97–108)
CO2 SERPL-SCNC: 25 MMOL/L (ref 21–32)
CREAT SERPL-MCNC: 0.29 MG/DL (ref 0.7–1.3)
GLUCOSE BLD STRIP.AUTO-MCNC: 119 MG/DL (ref 65–117)
GLUCOSE BLD STRIP.AUTO-MCNC: 121 MG/DL (ref 65–117)
GLUCOSE BLD STRIP.AUTO-MCNC: 98 MG/DL (ref 65–117)
GLUCOSE SERPL-MCNC: 87 MG/DL (ref 65–100)
MAGNESIUM SERPL-MCNC: 1.8 MG/DL (ref 1.6–2.4)
PHOSPHATE SERPL-MCNC: 3.4 MG/DL (ref 2.6–4.7)
POTASSIUM SERPL-SCNC: 4.2 MMOL/L (ref 3.5–5.1)
SERVICE CMNT-IMP: ABNORMAL
SERVICE CMNT-IMP: ABNORMAL
SERVICE CMNT-IMP: NORMAL
SODIUM SERPL-SCNC: 140 MMOL/L (ref 136–145)

## 2022-02-01 PROCEDURE — 80048 BASIC METABOLIC PNL TOTAL CA: CPT

## 2022-02-01 PROCEDURE — 82962 GLUCOSE BLOOD TEST: CPT

## 2022-02-01 PROCEDURE — 74011250636 HC RX REV CODE- 250/636: Performed by: INTERNAL MEDICINE

## 2022-02-01 PROCEDURE — 74011250636 HC RX REV CODE- 250/636: Performed by: NURSE PRACTITIONER

## 2022-02-01 PROCEDURE — 77030020847 HC STATLOK BARD -A

## 2022-02-01 PROCEDURE — 36415 COLL VENOUS BLD VENIPUNCTURE: CPT

## 2022-02-01 PROCEDURE — 2709999900 HC NON-CHARGEABLE SUPPLY

## 2022-02-01 PROCEDURE — 65270000029 HC RM PRIVATE

## 2022-02-01 PROCEDURE — 74011000258 HC RX REV CODE- 258: Performed by: NURSE PRACTITIONER

## 2022-02-01 PROCEDURE — 74011000250 HC RX REV CODE- 250: Performed by: SURGERY

## 2022-02-01 PROCEDURE — 97530 THERAPEUTIC ACTIVITIES: CPT

## 2022-02-01 PROCEDURE — 74011000250 HC RX REV CODE- 250: Performed by: NURSE PRACTITIONER

## 2022-02-01 PROCEDURE — 84100 ASSAY OF PHOSPHORUS: CPT

## 2022-02-01 PROCEDURE — 77030040162

## 2022-02-01 PROCEDURE — 97530 THERAPEUTIC ACTIVITIES: CPT | Performed by: OCCUPATIONAL THERAPIST

## 2022-02-01 PROCEDURE — 74011636637 HC RX REV CODE- 636/637: Performed by: NURSE PRACTITIONER

## 2022-02-01 PROCEDURE — 83735 ASSAY OF MAGNESIUM: CPT

## 2022-02-01 PROCEDURE — 74011250637 HC RX REV CODE- 250/637: Performed by: NURSE PRACTITIONER

## 2022-02-01 PROCEDURE — 77030013076 HC PCH OST BAG COLO -A

## 2022-02-01 RX ORDER — GUAIFENESIN/DEXTROMETHORPHAN 100-10MG/5
10 SYRUP ORAL
Status: DISCONTINUED | OUTPATIENT
Start: 2022-02-01 | End: 2022-02-03 | Stop reason: HOSPADM

## 2022-02-01 RX ADMIN — SODIUM CHLORIDE 50 ML/HR: 9 INJECTION, SOLUTION INTRAVENOUS at 15:55

## 2022-02-01 RX ADMIN — HYDROMORPHONE HYDROCHLORIDE 0.5 MG: 1 INJECTION, SOLUTION INTRAMUSCULAR; INTRAVENOUS; SUBCUTANEOUS at 20:16

## 2022-02-01 RX ADMIN — HYDROMORPHONE HYDROCHLORIDE 0.5 MG: 1 INJECTION, SOLUTION INTRAMUSCULAR; INTRAVENOUS; SUBCUTANEOUS at 04:40

## 2022-02-01 RX ADMIN — POTASSIUM PHOSPHATE, MONOBASIC POTASSIUM PHOSPHATE, DIBASIC: 224; 236 INJECTION, SOLUTION, CONCENTRATE INTRAVENOUS at 17:43

## 2022-02-01 RX ADMIN — SODIUM CHLORIDE, PRESERVATIVE FREE 10 ML: 5 INJECTION INTRAVENOUS at 11:33

## 2022-02-01 RX ADMIN — POTASSIUM CHLORIDE: 2 INJECTION, SOLUTION, CONCENTRATE INTRAVENOUS at 08:39

## 2022-02-01 RX ADMIN — OCTREOTIDE ACETATE 300 MCG: 100 INJECTION, SOLUTION INTRAVENOUS; SUBCUTANEOUS at 21:07

## 2022-02-01 RX ADMIN — ENOXAPARIN SODIUM 30 MG: 100 INJECTION SUBCUTANEOUS at 15:07

## 2022-02-01 RX ADMIN — Medication 1 AMPULE: at 20:19

## 2022-02-01 RX ADMIN — HYDROMORPHONE HYDROCHLORIDE 0.5 MG: 1 INJECTION, SOLUTION INTRAMUSCULAR; INTRAVENOUS; SUBCUTANEOUS at 11:33

## 2022-02-01 RX ADMIN — Medication: at 20:01

## 2022-02-01 RX ADMIN — SODIUM CHLORIDE, PRESERVATIVE FREE 10 ML: 5 INJECTION INTRAVENOUS at 15:06

## 2022-02-01 RX ADMIN — POTASSIUM PHOSPHATE, MONOBASIC POTASSIUM PHOSPHATE, DIBASIC: 224; 236 INJECTION, SOLUTION, CONCENTRATE INTRAVENOUS at 20:02

## 2022-02-01 RX ADMIN — HYDROMORPHONE HYDROCHLORIDE 0.5 MG: 1 INJECTION, SOLUTION INTRAMUSCULAR; INTRAVENOUS; SUBCUTANEOUS at 15:07

## 2022-02-01 RX ADMIN — Medication 1 AMPULE: at 15:07

## 2022-02-01 NOTE — PROGRESS NOTES
Comprehensive Nutrition Assessment    Type and Reason for Visit: Reassess    Nutrition Recommendations/Plan:   Continue cyclic TPN as ordered    Nutrition Assessment:   Chart reviewed. Pt remains TPN dependent, has transitioned to cyclic regimen. PEG/J continues with significantly high OP (6500mL). BG well controlled (103-121) and lytes WNL. Daily fistula OP. TPN provides 34kcals/kg and 1.8gPro/kg. Malnutrition Assessment:  Malnutrition Status:  Severe malnutrition    Context:  Chronic illness     Findings of the 6 clinical characteristics of malnutrition:   Energy Intake:  Unable to assess  Weight Loss:  Unable to assess     Body Fat Loss:  7 - Severe body fat loss, Orbital,Triceps,Fat overlying ribs   Muscle Mass Loss:  7 - Severe muscle mass loss, Scapula (trapezius),Thigh (quadriceps),Clavicles (pectoralis &deltoids)  Fluid Accumulation:  Unable to assess,     Strength:  Not performed         Estimated Daily Nutrient Needs:  Energy (kcal): MSJ 1789 (1376 x 1.3); Weight Used for Energy Requirements: Current  Protein (g): 65-73g (1.4-1.6gPro/kg); Weight Used for Protein Requirements: Current  Fluid (ml/day): per MD; Method Used for Fluid Requirements: 1 ml/kcal      Nutrition Related Findings:  Meds: humalog, octreotide.   ECF OP      Wounds:    Surgical incision       Current Nutrition Therapies:  Current Parenteral Nutrition Orders:  · Type and Formula: D15, 5% AA   · Lipids: 500ml,Three times weekly  · Duration: Continuous  · Rate/Volume: 75mL/h  · Current PN Order Provides: 1706kcals/90gPro/360gDextrose  · Goal PN Orders Provides: 1706kcals/90gPro/270gDextrose      Anthropometric Measures:  · Height:  5' 9\" (175.3 cm)  · Current Body Wt:  50.3 kg (110 lb 14.3 oz)   · Ideal Body Wt:  160 lbs:  63.2 %   · BMI Category:  Underweight (BMI less than 18.5)       Nutrition Diagnosis:   · Altered GI function related to altered GI function,altered GI structure as evidenced by NPO or clear liquid status due to medical condition  Previous dx continues. · Severe malnutrition related to altered GI function,altered GI structure as evidenced by BMI,severe loss of subcutaneous fat,severe muscle loss  Previous dx continues. Nutrition Interventions:   Food and/or Nutrient Delivery: Continue parenteral nutrition  Nutrition Education and Counseling: No recommendations at this time  Coordination of Nutrition Care: Continue to monitor while inpatient,Interdisciplinary rounds    Goals:  Pt will tolerate TPN at goal rate with BG <200mg/dL and lytes WNL in 3-5 days.        Nutrition Monitoring and Evaluation:   Behavioral-Environmental Outcomes: None identified  Food/Nutrient Intake Outcomes: Parenteral nutrition intake/tolerance,IVF intake  Physical Signs/Symptoms Outcomes: Biochemical data,Nutrition focused physical findings,Skin,Weight,GI status,Hemodynamic status,Fluid status or edema    Discharge Planning:    Parenteral nutrition     Electronically signed by Juan Segundo RD, [facility] on 2/1/2022 at 3:58 PM    Contact: ext [phone]

## 2022-02-01 NOTE — PROGRESS NOTES
Transition of Care Plan:     RUR: 23% - High (READMIT)  Disposition: Home with [facility] ([address]) [address] HH-PT, OT & SN  Follow up appointments: PCP and specialist as indicated   DME needed: none  Transportation at [address] [place] [address] Road or means to access home: Mother has       IM Medicare Letter: N/A - G[place]mez Saravia  Is patient a BCPI-A Bundle: No              If yes, was Bundle Letter given?:    Is patient a [place] and connected with the [address]  If yes, was [employer] transfer form completed and [facility] notified? Caregiver Contact: Mother Ashvin Aponteing - [phone]  Discharge Caregiver contacted prior to [address] [place] informed CM that pt's TPN is expected to be delivered to pt's home between 2-3p tomorrow. CM will continue to follow.     Mandy Gtz  Ext [phone]  normal
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
## 2022-02-01 NOTE — PROGRESS NOTES
End of Shift Note    Bedside shift change report given to Serg Nuñez RN (oncoming nurse) by Brandyn Rodriguez LPN (offgoing nurse). Report included the following information SBAR, Kardex, ED Summary, OR Summary, Procedure Summary, Intake/Output, MAR and Recent Results    Shift worked:  7am-7pm     Shift summary and any significant changes:     Continued plan of care, monitor G-Tube output and J-tube output, TPN infusion, now cyclic, wound care     Concerns for physician to address:  Plan of care     Zone phone for oncoming shift:   [phone]       Activity:  Activity Level: Up with Assistance  Number times ambulated in hallways past shift: 0  Number of times OOB to chair past shift: 1    Cardiac:   Cardiac Monitoring: Yes      Cardiac Rhythm: Sinus Rhythm    Access:   Current line(s): PIV     Genitourinary:   Urinary status: voiding    Respiratory:   O2 Device: None (Room air)  Chronic home O2 use?: N/A  Incentive spirometer at bedside: N/A     GI:  Last Bowel Movement Date:  (PTA)  Current diet:  DIET NPO  TPN ADULT - CENTRAL  Passing flatus: YES  Tolerating current diet: YES       Pain Management:   Patient states pain is manageable on current regimen: YES    Skin:  Russell Score: 16  Interventions: speciality bed, float heels, increase time out of bed and nutritional support     Patient Safety:  Fall Score:  Total Score: 3  Interventions: gripper socks, pt to call before getting OOB and stay with me (per policy)  High Fall Risk: Yes    Length of Stay:  Expected LOS: 9d 14h  Actual LOS: Isabel Dawson LPN

## 2022-02-01 NOTE — WOUND CARE
Wound Care follow up for Fistula care and bag change:  Chart reviewed. Palliative Care Team seeing patient for decision making and discharge plan along with the surgery team.  Met with Dub Hamman, NP surgical team at the bedside. Staples were removed from the mid line incision and then cleansed with the Povidine-iodine swab. The old fistula bag lasted from last Friday until today. The old one was removed and the old paste and marathon were peeled off. The skin was re-treated with the Marathon and the ostomy paste. The new bag was applied using only the ostomy paste. The two edges were still treated with the Mepitel one silicone (this is the last time). The stomach tube was cleansed around it. The skin is being managed with zinc cream (Desitin) but it is still a little raw but not severe. Today this was treated with Marathon skin protectant. A soft cloth was cut to fit like a drain site gauze. Both bags are hooked to bedside drainage / gravity. Plan: Patient has 3 more bags to take home if he goes home this week. Right now the bags are lasting 3.5 days. Pt. Reminds me of each step so he can help his caregivers with the fistula care. Continuing TPN and lipids for basic nutritional needs. Pt.'s Aunt, who was a nurse was also at the bedside asking questions as needed.    Danis Sanchez, RN, BSN, [employer]

## 2022-02-01 NOTE — PROGRESS NOTES
SURGERY PROGRESS NOTE      Admit Date: 2022    POD 14 Days Post-Op    Procedure: Procedure(s):  LAPAROTOMY EXPLORATORY      Subjective:     Patient has no new complaints      Objective:     Visit Vitals  BP 95/71 (BP 1 Location: Left upper arm, BP Patient Position: Sitting) Comment (BP Patient Position): EOB   Pulse (!) 108   Temp 97.9 °F (36.6 °C)   Resp 18   Ht 5' 9\" (1.753 m)   Wt 50.3 kg (110 lb 12.8 oz)   SpO2 100%   BMI 16.36 kg/m²        Temp (24hrs), Av.5 °F (36.9 °C), Min:97.9 °F (36.6 °C), Max:98.9 °F (37.2 °C)      701 -  1900  In: -   Out: 2175 [Urine:350]  1901 -  0700  In: 500 [P.O.:500]  Out: 8300 [Urine:425]    Physical Exam:    General:  alert, cooperative, no distress, appears stated age   Abdomen: soft, bowel sounds active, non-tender   Incision:   fistula draining           Lab Results   Component Value Date/Time    WBC 3.5 (L) 2022 04:12 AM    HGB 7.5 (L) 2022 04:12 AM    HCT 23.1 (L) 2022 04:12 AM    PLATELET 025 (H)  04:12 AM    MCV 88.8 2022 04:12 AM     Lab Results   Component Value Date/Time    GFR est non-AA >60 2022 01:12 AM    GFR est AA >60 2022 01:12 AM    Creatinine 0.29 (L) 2022 01:12 AM    Creatinine, POC 3.3 (H) 2022 03:01 PM    BUN 18 2022 01:12 AM    Sodium 140 2022 01:12 AM    Sodium,  (LL) 2022 03:01 PM    Potassium 4.2 2022 01:12 AM    Potassium, POC 4.7 2022 03:01 PM    Chloride 110 (H) 2022 01:12 AM    Chloride, POC <65 (L) 2022 03:01 PM    CO2 25 2022 01:12 AM    Magnesium 1.8 2022 01:12 AM    Phosphorus 3.4 2022 01:12 AM       Assessment:     Active Problems:    Severe sepsis ([facility] [address] (2022)      Goals of care, counseling/discussion ()      Severe protein-calorie malnutrition (HCC) ()      Physical debility ()      Lethargy ()      Palliative care by specialist ()    Difficult case.   Patient is TPN dependent currently and will likely be for life. He has severe protein calorie malnutrition and is severely debilitated. He will need to improve his nutritional status and physical conditioning before any definitive treatments can be considered. Attempting to get patient home with Home health soon.     Plan:       Continue present treatment

## 2022-02-01 NOTE — PROGRESS NOTES
Problem: Self Care Deficits Care Plan (Adult)  Goal: *Acute Goals and Plan of Care (Insert Text)  Description: FUNCTIONAL STATUS PRIOR TO ADMISSION: Pt and chart report that he is total for ADLs in bed, and is mother carries him to and from chair      HOME SUPPORT: The patient lived with family and is never alone . Occupational Therapy Goals  Initiated 1/21/2022, Goals 1-7 reviewed and revised PRN and goal 8 was newly established as per 1/31 weekly reevaluation. 1.  Patient will perform grooming with supervision/set-up within 7 day(s). Goal met seated EOB. 2.  Patient will perform bathing with moderate assistance  within 7 day(s). Not met, continue goal.  3.  Patient will perform upper body dressing with minimal assistance/contact guard assist within 7 day(s). Goal surpassed. 4.  Patient will perform toilet transfers with minimal assistance/contact guard assist within 7 day(s). No met, continue goal.   5.  Patient will perform all aspects of toileting with minimal assistance/contact guard assist within 7 day(s). No met, continue goal  6. Patient will participate in upper extremity therapeutic exercise/activities with supervision/set-up for 5 minutes within 7 day(s). Continue goal  7. Patient will utilize energy conservation techniques during functional activities with verbal cues within 7 day(s). Continue goal.  8   Patient will perform LB dressing with supervision/setup within 7 days. Established 1/31    Outcome: slowly Progressing Towards Goal     OCCUPATIONAL THERAPY TREATMENT  Patient: Yue Manuel (5 y.o. male)  Date: 2/1/2022  Diagnosis: Severe [facility] ([facility] [A41.9, R65.20] <principal problem not specified>  Procedure(s) (LRB):  LAPAROTOMY EXPLORATORY (N/A) 14 Days Post-Op  Precautions:  standard  Chart, occupational therapy assessment, plan of care, and goals were reviewed. ASSESSMENT  Patient continues with skilled OT services and is slowly progressing towards goals.   His affect is flat and appears depressed. Pt reports that he has been participating in bed level bathing/self care. Pt was able to sit EOB, (S) and reported feeling a little dizzy in standing, demonstrated poor tolerance for activity. BP in sitting EOB was on the low side, but comparable to previous readings. Pt not able to tolerate standing to get a BP reading and he wished to return to bed. He was able to return to supported sitting in bed with supervision. Pt has hand exercisers and theraband within reach, but did not initiate. Encouraged participation in unsupported sitting activities to help build strength. Current Level of Function Impacting Discharge (ADLs): needs assist for adls; poor tolerance for standing activity this date    Other factors to consider for discharge: wound care performed this date         PLAN :  Patient continues to benefit from skilled intervention to address the above impairments. Continue treatment per established plan of care to address goals. Recommend with staff: encourage participation in self care seated EOB        Recommendation for discharge: (in order for the patient to meet his/her long term goals)  To be determined: per chart, pt wants home . [facility]    This discharge recommendation:  Has not yet been discussed the attending provider and/or case management    IF patient discharges home will need the following DME: likely BSC, grab bar at toilet       SUBJECTIVE:   Patient stated I have to sit down.     OBJECTIVE DATA SUMMARY:   Cognitive/Behavioral Status:  Neurologic State: Alert  Orientation Level: Oriented to person;Oriented to place;Oriented to situation  Cognition: Appropriate decision making; Follows commands  Perception: Appears intact  Perseveration: No perseveration noted  Safety/Judgement: Insight into deficits    Functional Mobility and Transfers for ADLs:  Bed Mobility:  Rolling: Supervision  Supine to Sit: Supervision  Sit to Supine: Supervision  Scooting: Supervision    Transfers:  Sit to Stand: Minimum assistance          Balance:  Sitting: Intact  Standing: Impaired  Standing - Static: Fair;Constant support  Standing - Dynamic : Poor;Constant support    ADL Intervention:       Grooming  Grooming Assistance:  (pt reports that he has been participating in self care at bed level)              Upper Body Dressing Assistance  Dressing Assistance:  (pt reports that he has been participating, but does not demonstrate this session)  Hospital Gown: Independent (to doff gown, assist to don due to lines)    Lower Body Dressing Assistance  Socks: Set-up (additional time and some discomfort.)  Leg Crossed Method Used: Yes  Position Performed: Seated edge of bed  Adaptive Equipment Used:  (educ pt on the avail of AE should he desire)         Cognitive Retraining  Safety/Judgement: Insight into deficits    Therapeutic Exercises:   Suggested Yellow theraband in room and B hand exercises    Pain:  Reports pain and requested medication post tx session. Activity Tolerance:   Poor and reports dizzy in sitting--BP on the low side. Pt unable to tolerate standing long enough to get a BP reading.       After treatment patient left in no apparent distress:   Supine in bed, HOB raised, Call bell within reach, and Side rails x 3    COMMUNICATION/COLLABORATION:   The patients plan of care was discussed with: Physical therapist.     BENTON Fishman/L  Time Calculation: 21 mins

## 2022-02-02 ENCOUNTER — APPOINTMENT (OUTPATIENT)
Dept: CT IMAGING | Age: 42
DRG: 710 | End: 2022-02-02
Attending: NURSE PRACTITIONER
Payer: MEDICAID

## 2022-02-02 LAB
ANION GAP SERPL CALC-SCNC: 3 MMOL/L (ref 5–15)
BUN SERPL-MCNC: 21 MG/DL (ref 6–20)
BUN/CREAT SERPL: 75 (ref 12–20)
CALCIUM SERPL-MCNC: 8.2 MG/DL (ref 8.5–10.1)
CHLORIDE SERPL-SCNC: 109 MMOL/L (ref 97–108)
CO2 SERPL-SCNC: 27 MMOL/L (ref 21–32)
CREAT SERPL-MCNC: 0.28 MG/DL (ref 0.7–1.3)
GLUCOSE BLD STRIP.AUTO-MCNC: 116 MG/DL (ref 65–117)
GLUCOSE BLD STRIP.AUTO-MCNC: 144 MG/DL (ref 65–117)
GLUCOSE BLD STRIP.AUTO-MCNC: 84 MG/DL (ref 65–117)
GLUCOSE SERPL-MCNC: 155 MG/DL (ref 65–100)
MAGNESIUM SERPL-MCNC: 1.6 MG/DL (ref 1.6–2.4)
PHOSPHATE SERPL-MCNC: 4.5 MG/DL (ref 2.6–4.7)
POTASSIUM SERPL-SCNC: 4.3 MMOL/L (ref 3.5–5.1)
SERVICE CMNT-IMP: ABNORMAL
SERVICE CMNT-IMP: NORMAL
SERVICE CMNT-IMP: NORMAL
SODIUM SERPL-SCNC: 139 MMOL/L (ref 136–145)

## 2022-02-02 PROCEDURE — 36415 COLL VENOUS BLD VENIPUNCTURE: CPT

## 2022-02-02 PROCEDURE — 65270000029 HC RM PRIVATE

## 2022-02-02 PROCEDURE — 74011250637 HC RX REV CODE- 250/637: Performed by: NURSE PRACTITIONER

## 2022-02-02 PROCEDURE — 74011250636 HC RX REV CODE- 250/636: Performed by: INTERNAL MEDICINE

## 2022-02-02 PROCEDURE — 74011636637 HC RX REV CODE- 636/637: Performed by: NURSE PRACTITIONER

## 2022-02-02 PROCEDURE — 74011250636 HC RX REV CODE- 250/636: Performed by: NURSE PRACTITIONER

## 2022-02-02 PROCEDURE — 84100 ASSAY OF PHOSPHORUS: CPT

## 2022-02-02 PROCEDURE — 74011000250 HC RX REV CODE- 250: Performed by: SURGERY

## 2022-02-02 PROCEDURE — 2709999900 HC NON-CHARGEABLE SUPPLY

## 2022-02-02 PROCEDURE — 83735 ASSAY OF MAGNESIUM: CPT

## 2022-02-02 PROCEDURE — 74011000258 HC RX REV CODE- 258: Performed by: NURSE PRACTITIONER

## 2022-02-02 PROCEDURE — 80048 BASIC METABOLIC PNL TOTAL CA: CPT

## 2022-02-02 PROCEDURE — 74011000250 HC RX REV CODE- 250: Performed by: INTERNAL MEDICINE

## 2022-02-02 PROCEDURE — 82962 GLUCOSE BLOOD TEST: CPT

## 2022-02-02 PROCEDURE — 74011000250 HC RX REV CODE- 250: Performed by: NURSE PRACTITIONER

## 2022-02-02 RX ORDER — OXYCODONE HCL 5 MG/5 ML
10 SOLUTION, ORAL ORAL
Status: DISCONTINUED | OUTPATIENT
Start: 2022-02-02 | End: 2022-02-02

## 2022-02-02 RX ORDER — FENTANYL 12.5 UG/1
1 PATCH TRANSDERMAL
Status: DISCONTINUED | OUTPATIENT
Start: 2022-02-02 | End: 2022-02-03 | Stop reason: HOSPADM

## 2022-02-02 RX ADMIN — SODIUM CHLORIDE, PRESERVATIVE FREE 10 ML: 5 INJECTION INTRAVENOUS at 22:03

## 2022-02-02 RX ADMIN — Medication 1 AMPULE: at 21:00

## 2022-02-02 RX ADMIN — HYDROMORPHONE HYDROCHLORIDE 0.5 MG: 1 INJECTION, SOLUTION INTRAMUSCULAR; INTRAVENOUS; SUBCUTANEOUS at 13:01

## 2022-02-02 RX ADMIN — HYDROMORPHONE HYDROCHLORIDE 0.5 MG: 1 INJECTION, SOLUTION INTRAMUSCULAR; INTRAVENOUS; SUBCUTANEOUS at 05:05

## 2022-02-02 RX ADMIN — HYDROMORPHONE HYDROCHLORIDE 0.5 MG: 1 INJECTION, SOLUTION INTRAMUSCULAR; INTRAVENOUS; SUBCUTANEOUS at 22:02

## 2022-02-02 RX ADMIN — SODIUM CHLORIDE 50 ML/HR: 9 INJECTION, SOLUTION INTRAVENOUS at 13:02

## 2022-02-02 RX ADMIN — OCTREOTIDE ACETATE 300 MCG: 100 INJECTION, SOLUTION INTRAVENOUS; SUBCUTANEOUS at 13:00

## 2022-02-02 RX ADMIN — I.V. FAT EMULSION 500 ML: 20 EMULSION INTRAVENOUS at 17:53

## 2022-02-02 RX ADMIN — HYDROMORPHONE HYDROCHLORIDE 0.5 MG: 1 INJECTION, SOLUTION INTRAMUSCULAR; INTRAVENOUS; SUBCUTANEOUS at 00:27

## 2022-02-02 RX ADMIN — HYDROMORPHONE HYDROCHLORIDE 0.5 MG: 1 INJECTION, SOLUTION INTRAMUSCULAR; INTRAVENOUS; SUBCUTANEOUS at 09:37

## 2022-02-02 RX ADMIN — Medication: at 21:01

## 2022-02-02 RX ADMIN — OCTREOTIDE ACETATE 300 MCG: 100 INJECTION, SOLUTION INTRAVENOUS; SUBCUTANEOUS at 17:45

## 2022-02-02 RX ADMIN — SODIUM CHLORIDE, PRESERVATIVE FREE 10 ML: 5 INJECTION INTRAVENOUS at 09:37

## 2022-02-02 RX ADMIN — Medication 1 AMPULE: at 09:42

## 2022-02-02 RX ADMIN — SODIUM CHLORIDE, PRESERVATIVE FREE 10 ML: 5 INJECTION INTRAVENOUS at 05:05

## 2022-02-02 RX ADMIN — ENOXAPARIN SODIUM 30 MG: 100 INJECTION SUBCUTANEOUS at 09:40

## 2022-02-02 RX ADMIN — Medication: at 09:40

## 2022-02-02 RX ADMIN — Medication: at 17:52

## 2022-02-02 RX ADMIN — HYDROMORPHONE HYDROCHLORIDE 0.5 MG: 1 INJECTION, SOLUTION INTRAMUSCULAR; INTRAVENOUS; SUBCUTANEOUS at 17:46

## 2022-02-02 RX ADMIN — SODIUM CHLORIDE, PRESERVATIVE FREE 10 ML: 5 INJECTION INTRAVENOUS at 13:01

## 2022-02-02 RX ADMIN — FAMOTIDINE: 10 INJECTION, SOLUTION INTRAVENOUS at 18:04

## 2022-02-02 RX ADMIN — OCTREOTIDE ACETATE 300 MCG: 100 INJECTION, SOLUTION INTRAVENOUS; SUBCUTANEOUS at 22:03

## 2022-02-02 NOTE — PROGRESS NOTES
Transition of Care Plan:     RUR: 22% - High (READMIT)  Disposition: Home with [facility] ([address]) [address] HH-PT, OT & SN  Follow up appointments: PCP and specialist as indicated   DME needed: none  Transportation at [email or web address] tomorrow 2/3/22  [place] or means to access home: Mother has       IM Medicare Letter: N/A - Kimberley Marrero  Is patient a BCPI-A Bundle: No              If yes, was Bundle Letter given?:    Is patient a  and connected with the [address] [place] [address] Road  If yes, was [place] transfer form completed and [facility] notified? Caregiver Contact: Mother - Farzad Santos - [phone]  Discharge Caregiver contacted prior to discharge? yes    3:36pm  Patient informed CM that he is ready to d/c home tomorrow. Transport is scheduled for 9am Thursday 2/3. CM notified pt & family. Patient to have CAT scan. CM will continue to follow.     Manisha Morocho  Ext [phone]

## 2022-02-02 NOTE — PROGRESS NOTES
SURGERY PROGRESS NOTE      Admit Date: 2022    POD 15 Days Post-Op    Procedure: Procedure(s):  LAPAROTOMY EXPLORATORY      Subjective:     Patient still using IV pain med. States he is having abdominal pain intermittently. No nausea/vomiting. Objective:     Visit Vitals  BP (!) 86/63 (BP 1 Location: Left upper arm, BP Patient Position: At rest)   Pulse 86   Temp 98.7 °F (37.1 °C)   Resp 14   Ht 5' 9\" (1.753 m)   Wt 45.4 kg (100 lb)   SpO2 99%   BMI 14.77 kg/m²        Temp (24hrs), Av.5 °F (36.9 °C), Min:97.9 °F (36.6 °C), Max:98.7 °F (37.1 °C)      701 -  1900  In: 430.3 [I.V.:430.3]  Out: 3445 [Urine:400; Drains:125]  1901 -  0700  In: 3645.9 [P.O.:1000;  I.V.:2645.9]  Out: 8925 [Urine:1200; Drains:250]    Physical Exam:    General:  alert, cooperative, no distress, appears stated age   Abdomen: soft, bowel sounds active, non-tender   Incision:   fistula draining           Lab Results   Component Value Date/Time    WBC 3.5 (L) 2022 04:12 AM    HGB 7.5 (L) 2022 04:12 AM    HCT 23.1 (L) 2022 04:12 AM    PLATELET 797 (H) 15/77/84 04:12 AM    MCV 88.8 2022 04:12 AM     Lab Results   Component Value Date/Time    GFR est non-AA >60 2022 01:57 AM    GFR est AA >60 2022 01:57 AM    Creatinine 0.28 (L) 2022 01:57 AM    Creatinine, POC 3.3 (H) 2022 03:01 PM    BUN 21 (H) 2022 01:57 AM    Sodium 139 2022 01:57 AM    Sodium,  (LL) 2022 03:01 PM    Potassium 4.3 2022 01:57 AM    Potassium, POC 4.7 2022 03:01 PM    Chloride 109 (H) 2022 01:57 AM    Chloride, POC <65 (L) 2022 03:01 PM    CO2 27 2022 01:57 AM    Magnesium 1.6 2022 01:57 AM    Phosphorus 4.5 2022 01:57 AM       Assessment:     Active Problems:    Severe sepsis ([address] (2022)      Goals of care, counseling/discussion ()      Severe protein-calorie malnutrition (HCC) ()      Physical debility ()      Lethargy ()      Palliative care by specialist ()    Difficult case. Patient is TPN dependent currently and will likely be for life. He has severe protein calorie malnutrition and is severely debilitated. He will need to improve his nutritional status and physical conditioning before any definitive treatments can be considered. Attempting to get patient home with Home health soon. Plan:       Continue present treatment   Staples removed yesterday. Continue to pouch fistula. Opted to keep G-tube in place as output is minimal.   TPN for home planning  Will use Fentanyl patch for pain control as patient cannot use his gut. Plan for home tomorrow AM.  Was planning for CT scan today and further work-up pain but patient refused stating the pain is his baseline.      Medhta Copeland NP

## 2022-02-03 VITALS
HEIGHT: 69 IN | RESPIRATION RATE: 18 BRPM | OXYGEN SATURATION: 92 % | TEMPERATURE: 98.7 F | SYSTOLIC BLOOD PRESSURE: 130 MMHG | WEIGHT: 100 LBS | BODY MASS INDEX: 14.81 KG/M2 | DIASTOLIC BLOOD PRESSURE: 71 MMHG | HEART RATE: 83 BPM

## 2022-02-03 LAB
ANION GAP SERPL CALC-SCNC: 3 MMOL/L (ref 5–15)
BUN SERPL-MCNC: 19 MG/DL (ref 6–20)
BUN/CREAT SERPL: 66 (ref 12–20)
CALCIUM SERPL-MCNC: 7.9 MG/DL (ref 8.5–10.1)
CHLORIDE SERPL-SCNC: 108 MMOL/L (ref 97–108)
CO2 SERPL-SCNC: 27 MMOL/L (ref 21–32)
CREAT SERPL-MCNC: 0.29 MG/DL (ref 0.7–1.3)
ERYTHROCYTE [DISTWIDTH] IN BLOOD BY AUTOMATED COUNT: 18.9 % (ref 11.5–14.5)
GLUCOSE BLD STRIP.AUTO-MCNC: 121 MG/DL (ref 65–117)
GLUCOSE BLD STRIP.AUTO-MCNC: 126 MG/DL (ref 65–117)
GLUCOSE BLD STRIP.AUTO-MCNC: 97 MG/DL (ref 65–117)
GLUCOSE SERPL-MCNC: 62 MG/DL (ref 65–100)
HCT VFR BLD AUTO: 23 % (ref 36.6–50.3)
HGB BLD-MCNC: 7.3 G/DL (ref 12.1–17)
MAGNESIUM SERPL-MCNC: 1.5 MG/DL (ref 1.6–2.4)
MCH RBC QN AUTO: 28.6 PG (ref 26–34)
MCHC RBC AUTO-ENTMCNC: 31.7 G/DL (ref 30–36.5)
MCV RBC AUTO: 90.2 FL (ref 80–99)
NRBC # BLD: 0 K/UL (ref 0–0.01)
NRBC BLD-RTO: 0 PER 100 WBC
PHOSPHATE SERPL-MCNC: 4 MG/DL (ref 2.6–4.7)
PLATELET # BLD AUTO: 447 K/UL (ref 150–400)
PMV BLD AUTO: 8.9 FL (ref 8.9–12.9)
POTASSIUM SERPL-SCNC: 3.9 MMOL/L (ref 3.5–5.1)
RBC # BLD AUTO: 2.55 M/UL (ref 4.1–5.7)
SERVICE CMNT-IMP: ABNORMAL
SERVICE CMNT-IMP: ABNORMAL
SERVICE CMNT-IMP: NORMAL
SODIUM SERPL-SCNC: 138 MMOL/L (ref 136–145)
TRIGL SERPL-MCNC: 62 MG/DL (ref ?–150)
WBC # BLD AUTO: 4.3 K/UL (ref 4.1–11.1)

## 2022-02-03 PROCEDURE — 85027 COMPLETE CBC AUTOMATED: CPT

## 2022-02-03 PROCEDURE — 36415 COLL VENOUS BLD VENIPUNCTURE: CPT

## 2022-02-03 PROCEDURE — 74011250637 HC RX REV CODE- 250/637: Performed by: NURSE PRACTITIONER

## 2022-02-03 PROCEDURE — 74011250636 HC RX REV CODE- 250/636: Performed by: INTERNAL MEDICINE

## 2022-02-03 PROCEDURE — 84478 ASSAY OF TRIGLYCERIDES: CPT

## 2022-02-03 PROCEDURE — 84100 ASSAY OF PHOSPHORUS: CPT

## 2022-02-03 PROCEDURE — 74011250636 HC RX REV CODE- 250/636: Performed by: NURSE PRACTITIONER

## 2022-02-03 PROCEDURE — 80048 BASIC METABOLIC PNL TOTAL CA: CPT

## 2022-02-03 PROCEDURE — 82962 GLUCOSE BLOOD TEST: CPT

## 2022-02-03 PROCEDURE — 74011636637 HC RX REV CODE- 636/637: Performed by: NURSE PRACTITIONER

## 2022-02-03 PROCEDURE — 83735 ASSAY OF MAGNESIUM: CPT

## 2022-02-03 PROCEDURE — 74011250637 HC RX REV CODE- 250/637: Performed by: SURGERY

## 2022-02-03 PROCEDURE — 74011000250 HC RX REV CODE- 250: Performed by: SURGERY

## 2022-02-03 RX ORDER — MAGNESIUM SULFATE 1 G/100ML
1 INJECTION INTRAVENOUS ONCE
Status: COMPLETED | OUTPATIENT
Start: 2022-02-03 | End: 2022-02-03

## 2022-02-03 RX ORDER — FENTANYL 12.5 UG/1
1 PATCH TRANSDERMAL
Qty: 5 PATCH | Refills: 0 | Status: SHIPPED | OUTPATIENT
Start: 2022-02-05 | End: 2022-02-17 | Stop reason: SDUPTHER

## 2022-02-03 RX ORDER — OCTREOTIDE ACETATE 200 UG/ML
200 INJECTION INTRAVENOUS 4 TIMES DAILY
Qty: 120 ML | Refills: 0 | Status: SHIPPED | OUTPATIENT
Start: 2022-02-03 | End: 2022-03-05

## 2022-02-03 RX ORDER — DOXYLAMINE SUCCINATE 25 MG
TABLET ORAL 2 TIMES DAILY
Status: DISCONTINUED | OUTPATIENT
Start: 2022-02-03 | End: 2022-02-03 | Stop reason: HOSPADM

## 2022-02-03 RX ADMIN — MAGNESIUM SULFATE HEPTAHYDRATE 1 G: 1 INJECTION, SOLUTION INTRAVENOUS at 08:56

## 2022-02-03 RX ADMIN — HYDROMORPHONE HYDROCHLORIDE 0.5 MG: 1 INJECTION, SOLUTION INTRAMUSCULAR; INTRAVENOUS; SUBCUTANEOUS at 02:24

## 2022-02-03 RX ADMIN — Medication 1 AMPULE: at 08:56

## 2022-02-03 RX ADMIN — Medication: at 09:00

## 2022-02-03 RX ADMIN — HYDROMORPHONE HYDROCHLORIDE 0.5 MG: 1 INJECTION, SOLUTION INTRAMUSCULAR; INTRAVENOUS; SUBCUTANEOUS at 11:02

## 2022-02-03 RX ADMIN — ENOXAPARIN SODIUM 30 MG: 100 INJECTION SUBCUTANEOUS at 09:02

## 2022-02-03 RX ADMIN — OCTREOTIDE ACETATE 300 MCG: 100 INJECTION, SOLUTION INTRAVENOUS; SUBCUTANEOUS at 08:56

## 2022-02-03 RX ADMIN — Medication: at 08:57

## 2022-02-03 RX ADMIN — SODIUM CHLORIDE, PRESERVATIVE FREE 10 ML: 5 INJECTION INTRAVENOUS at 07:09

## 2022-02-03 RX ADMIN — MICONAZOLE NITRATE: 20 CREAM TOPICAL at 08:56

## 2022-02-03 RX ADMIN — HYDROMORPHONE HYDROCHLORIDE 0.5 MG: 1 INJECTION, SOLUTION INTRAMUSCULAR; INTRAVENOUS; SUBCUTANEOUS at 07:09
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
## 2022-02-03 NOTE — PALLIATIVE CARE DISCHARGE
The Palliative Medicine team was consulted as part of your / your loved one's care in the hospital. Our team is a supportive service; we strive to relieve suffering and improve quality of life. You identified the following goal(s) as your main focus for healthcare: Patient/Health Care Proxy Stated Goals: Prolong life    You met with the Palliative Care team of Hanna Navarrete NP and Sherice Jason LCSW. We discussed your difficult medical situation and your goal of trying to maintain your quality of life and wanting to eat by mouth again. Your hope is to return home and to be strong enough to have a transplant, if that is what is needed. We wish you well as you leave the hospital and that you continue to have some quality of life. We know that you have good support of family. We reviewed advance care planning information, which includes the following:  Advance Care Planning 1/24/2022   Patient's Healthcare Decision Maker is: -   Primary Decision Maker Name -   Primary Decision Maker Phone Number -   Primary Decision Maker Relationship to Patient -   Confirm Advance Directive None   Patient Would Like to Complete Advance Directive -       We reviewed / discussed your code status as: Full Code     Full Code means perform CPR in the event of cardiac arrest     [facility] means do NOT perform CPR in the event of cardiac arrest     Partial Code means you have specific preferences, please discuss with your health care team     No Order means this issue was not addressed / resolved during your stay    Because of the importance of this information, we are providing you with a printed copy to share with other healthcare providers after this hospitalization is complete. If any of the above information is incomplete or incorrect, please contact the Palliative Medicine team at [phone].

## 2022-02-03 NOTE — PROGRESS NOTES
Patient is ready for d/c from CM standpoint    Transition of Care Plan:     RUR: 24% - High (READMIT)  Disposition: Home with [facility] Feed[facility] ([facility] [address]) [address] HH-PT, OT & SN  Follow up appointments: on AVS   DME needed: none  Transportation at [email or web address] today 2/3/22  [place] or means to access home: Mother has       IM Medicare Letter: N/A - Prashant Orn  Is patient a BCPI-A Bundle: No              If yes, was Bundle Letter given?:    Is patient a  and connected with the [address] [place] [address] Road  If yes, was [employer] transfer form completed and [facility] notified? Caregiver Contact: Mother Ashvin Khan - [phone]  Discharge Caregiver contacted prior to discharge? yes    Patient is d/c home today with [facility] & tube feeds. No other needs or concerns identified. [address] [place] [address],[address] are aware of d/c.     Care Management Interventions  PCP Verified by CM: No  Palliative Care Criteria Met (RRAT>21 & CHF Dx)?: No  Mode of Transport at Discharge: BLS (Medicaid)  Transition of Care Consult (CM Consult): [address]: No  Reason Outside [place]: Patient requires Medicaid personal care services  Discharge Durable Medical Equipment: No  Physical Therapy Consult: No  Occupational Therapy Consult: No  Speech Therapy Consult: No  Support Systems: Parent(s)  Confirm Follow Up Transport: Family  The Patient and/or Patient Representative was Provided with a Choice of Provider and Agrees with the Discharge Plan?: Yes  Freedom of Choice List was Provided with Basic Dialogue that Supports the Patient's Individualized Plan of Care/Goals, Treatment Preferences and Shares the Quality Data Associated with the Providers?: No   Resource Information Provided?: No  Discharge Location  Patient Expects to be Discharged to[de-identified] Home with home health [facility] [address] [place] [address] for tube feeds)      [place]   Ext [phone]

## 2022-02-03 NOTE — PROGRESS NOTES
End of Shift Note    Bedside shift change report given to SAMUEL Bang (oncoming nurse) by Joy Hernandez RN (offgoing nurse). Report included the following information SBAR, Kardex, Intake/Output, MAR and Recent Results    Shift worked:  7p-7a     Shift summary and any significant changes:     Medicated with Dilaudid x3 for pain. See flow sheets for drain outputs. Abd dressing changed x1. Pt requesting Telly dressing be chaned before discharge.      Concerns for physician to address:       Zone phone for oncoming shift:              Joy Hernandez RN

## 2022-02-03 NOTE — DISCHARGE INSTRUCTIONS
Bowel Blockage (Intestinal Obstruction): Care Instructions  Your Care Instructions  A bowel blockage, also called an intestinal obstruction, can prevent gas, fluids, or solids from moving through the intestines normally. It can cause constipation and, rarely, diarrhea. You may have pain, nausea, vomiting, and cramping. Most of the time, complete blockages require a stay in the hospital and possibly surgery. But if your bowel is only partly blocked, your doctor may tell you to wait until it clears on its own and you are able to pass gas and stool. If so, there are things you can do at home to help make you feel better. If you have had surgery for a bowel blockage, there are things you can do at home to make sure you heal well. You can also make some changes to keep your bowel from becoming blocked again. Follow-up care is a key part of your treatment and safety. Be sure to make and go to all appointments, and call your doctor if you are having problems. It's also a good idea to know your test results and keep a list of the medicines you take. How can you care for yourself at home? If your doctor has told you to wait at home for a blockage to clear on its own:  · Follow your doctor's instructions. These may include eating a liquid diet to avoid complete blockage. · Be safe with medicines. Take your medicines exactly as prescribed. Call your doctor if you think you are having a problem with your medicine. · Put a heating pad set on low on your belly to relieve mild cramps and pain. To prevent another blockage  · Try to eat smaller amounts of food more often. For example, have 5 or 6 small meals throughout the day instead of 2 or 3 large meals. · Chew your food very well. Try to chew each bite about 20 times or until it is liquid. · Avoid high-fiber foods and raw fruits and vegetables with skins, husks, strings, or seeds.  These can form a ball of undigested material that can cause a blockage if a part of your bowel is scarred or narrowed. · Check with your doctor before you eat whole-grain products or use a fiber supplement such as Citrucel or Metamucil. · To help you have regular bowel movements, eat at regular times, do not strain during a bowel movement, and drink at least 8 to 10 glasses of water each day. If you have kidney, heart, or liver disease and have to limit fluids, talk with your doctor or before you increase the amount of fluids you drink. · Drink high-calorie liquid formulas if your doctor says to. Severe symptoms may make it hard for your body to take in vitamins and minerals. · Get regular exercise. It helps you digest your food better. Get at least 30 minutes of physical activity on most days of the week. Walking is a good choice. When should you call for help? Call your doctor now or seek immediate medical care if:  ? · You have a fever. ? · You are vomiting. ? · You have new or worse belly pain. ? · You cannot pass stools or gas. ? Watch closely for changes in your health, and be sure to contact your doctor if you have any problems. Where can you learn more? Go to http://www.gray.com/. Enter F609 in the search box to learn more about \"Bowel Blockage (Intestinal Obstruction): Care Instructions. \"  Current as of: May 12, 2017  Content Version: 11.4  © 1694-5355 [employer]. Care instructions adapted under license by [employer] (which disclaims liability or warranty for this information).  If you have questions about a medical condition or this instruction, always ask your healthcare professional. [place] [address] any warranty or liability for your use of this information.

## 2022-02-03 NOTE — WOUND CARE
Wound Care Last fistula bag change on the day of discharge:  Patient is going home today with his mother and home care will be coming to do the TPN and the wound / Fistula Care at least two times weekly. Each pouch could last 3.5 days. Pt. Needs to be completely NPO (no ice) 30  Minutes prior to wound pouch change. Home care will need to order the high output ostomy bags for the patient. He was discharged with 4 extra pouches only + 2 Eackin pouches that he used to use. Patient does know some of the care he was getting in the hospital and can assist any home care nurse with this care as well. Treatment today: the old pouch was removed and the skin prepped with [place] skin prep around the G-Tube that leaks a little. Gauze was also applied there that patient will change each time it gets wet. The New wound pouch was applied using ostomy paste around the edges (no ostomy rings - they do not work for this). Part of the wound pouch was cut out to fit under the old (and currently present) feeding tube (Not in use). Plan: Change the pouch again on Monday Morning or possibly Sunday evening. NPO except Ice and he can chew gum and spit it out for some flavor.    Ashu Watson, RN, BSN, [employer]

## 2022-02-03 NOTE — PROGRESS NOTES
Follow up visit with palliative pt in 3232. Pt sitting up in bed awake and alert. Mother sat nearby speaking with health care worker who was training mother on how to utilize TPN at home. Pt shared that he was being discharged. Stated he had been well taken care of at the hospital and had some apprehensions about going home and adjusting to TPN. Despite this pt maintaining a positive attitude and appeared to be in good spirits throughout visit. Spoke about the adelia his four children and rest of his family give him although he does not get to see his children often. Spoke of positive changes he's made in his life. Acknowledged feelings and commended pt for positive attitude. Extended word of encouragement and blessing. Pt responded well to visit.    Roxy Burger M.Div, [facility]   Paging Service [phone])
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
## 2022-02-03 NOTE — PROGRESS NOTES
End of Shift Note    Bedside shift change report given to Uriel Leonardo (oncoming nurse) by Fredy Sullivan RN (offgoing nurse). Report included the following information SBAR    Shift worked:  7a-7p     Shift summary and any significant changes:     Pain management continues as pt prepares for discharge. Started Fentanyl patch today. Pt worried about pain when he goes home.       Concerns for physician to address:  none     Zone phone for oncoming shift:   [address] Nereida Krishna, RN

## 2022-02-03 NOTE — PROGRESS NOTES
Discharge orders in place. Pt set up with [address] [place] [address] Pablo Lopez for TPN and wound care. Wound/Ostomy nurse changed patient's wound dressings. This nurse changed pt's central line dressing. Pt tolerated these things without issue. Verified with NP that pt's pain medication on discharge is Fentanyl patch only. Discharge instructions reviewed with pt and pt's mother. Patient discharged via stretcher by Ambulance. All belongings and supplies in room sent with patient.

## 2022-02-04 ENCOUNTER — TELEPHONE (OUTPATIENT)
Dept: SURGERY | Age: 42
End: 2022-02-04

## 2022-02-04 NOTE — TELEPHONE ENCOUNTER
I received a call from the patients mother. [employer] has the Fentanyl patch but they are awaiting a prior authorization. She also had concerns about the Octreotide Acetate. I told her per the NP said, \"The medication will be provided by [facility] which is the infusion company for his TPN. They will have it in next week. It is okay for him to go without it in the meantime. \" I told her the NP is recommending if she can use [facility] and pay for it in cash and she will work on the prior authorization. The mom informed me the pharmacy will use a [employer] discount which she can afford. She thanked me for all my help.

## 2022-02-07 ENCOUNTER — TELEPHONE (OUTPATIENT)
Dept: SURGERY | Age: 42
End: 2022-02-07

## 2022-02-07 NOTE — TELEPHONE ENCOUNTER
I received a call from [facility] a [address] with [facility]. He informed me that lab called him with critical lab values but he had not received a copy. I told him we did not receive anything either. I directed him to the patients PCP. I did speak with our provider and inform him of my actions and he was in agreement.

## 2022-02-08 ENCOUNTER — TELEPHONE (OUTPATIENT)
Dept: SURGERY | Age: 42
End: 2022-02-08

## 2022-02-08 ENCOUNTER — APPOINTMENT (OUTPATIENT)
Dept: GENERAL RADIOLOGY | Age: 42
End: 2022-02-08
Attending: EMERGENCY MEDICINE
Payer: MEDICAID

## 2022-02-08 ENCOUNTER — HOSPITAL ENCOUNTER (EMERGENCY)
Age: 42
Discharge: HOME | End: 2022-02-09
Attending: EMERGENCY MEDICINE
Payer: MEDICAID

## 2022-02-08 DIAGNOSIS — D64.9 ANEMIA, UNSPECIFIED TYPE: Primary | ICD-10-CM

## 2022-02-08 DIAGNOSIS — Z71.1 FEARED CONDITION NOT DEMONSTRATED: ICD-10-CM

## 2022-02-08 LAB
ALBUMIN SERPL-MCNC: 2.4 G/DL (ref 3.5–5)
ALBUMIN/GLOB SERPL: 0.5 {RATIO} (ref 1.1–2.2)
ALP SERPL-CCNC: 194 U/L (ref 45–117)
ALT SERPL-CCNC: 47 U/L (ref 12–78)
ANION GAP SERPL CALC-SCNC: 6 MMOL/L (ref 5–15)
AST SERPL-CCNC: 30 U/L (ref 15–37)
BASOPHILS # BLD: 0 K/UL (ref 0–0.1)
BASOPHILS NFR BLD: 1 % (ref 0–1)
BILIRUB SERPL-MCNC: 0.3 MG/DL (ref 0.2–1)
BUN SERPL-MCNC: 20 MG/DL (ref 6–20)
BUN/CREAT SERPL: 53 (ref 12–20)
CALCIUM SERPL-MCNC: 8.9 MG/DL (ref 8.5–10.1)
CHLORIDE SERPL-SCNC: 103 MMOL/L (ref 97–108)
CO2 SERPL-SCNC: 24 MMOL/L (ref 21–32)
CREAT SERPL-MCNC: 0.38 MG/DL (ref 0.7–1.3)
DIFFERENTIAL METHOD BLD: ABNORMAL
EOSINOPHIL # BLD: 0.1 K/UL (ref 0–0.4)
EOSINOPHIL NFR BLD: 2 % (ref 0–7)
ERYTHROCYTE [DISTWIDTH] IN BLOOD BY AUTOMATED COUNT: 18.8 % (ref 11.5–14.5)
GLOBULIN SER CALC-MCNC: 4.7 G/DL (ref 2–4)
GLUCOSE SERPL-MCNC: 89 MG/DL (ref 65–100)
HCT VFR BLD AUTO: 27.5 % (ref 36.6–50.3)
HGB BLD-MCNC: 8.7 G/DL (ref 12.1–17)
IMM GRANULOCYTES # BLD AUTO: 0 K/UL (ref 0–0.04)
IMM GRANULOCYTES NFR BLD AUTO: 1 % (ref 0–0.5)
LYMPHOCYTES # BLD: 1.4 K/UL (ref 0.8–3.5)
LYMPHOCYTES NFR BLD: 22 % (ref 12–49)
MCH RBC QN AUTO: 28.2 PG (ref 26–34)
MCHC RBC AUTO-ENTMCNC: 31.6 G/DL (ref 30–36.5)
MCV RBC AUTO: 89 FL (ref 80–99)
MONOCYTES # BLD: 0.5 K/UL (ref 0–1)
MONOCYTES NFR BLD: 8 % (ref 5–13)
NEUTS SEG # BLD: 4.4 K/UL (ref 1.8–8)
NEUTS SEG NFR BLD: 66 % (ref 32–75)
NRBC # BLD: 0 K/UL (ref 0–0.01)
NRBC BLD-RTO: 0 PER 100 WBC
PLATELET # BLD AUTO: 458 K/UL (ref 150–400)
PMV BLD AUTO: 9.4 FL (ref 8.9–12.9)
POTASSIUM SERPL-SCNC: 4.2 MMOL/L (ref 3.5–5.1)
PROT SERPL-MCNC: 7.1 G/DL (ref 6.4–8.2)
RBC # BLD AUTO: 3.09 M/UL (ref 4.1–5.7)
SODIUM SERPL-SCNC: 133 MMOL/L (ref 136–145)
TROPONIN-HIGH SENSITIVITY: 19 NG/L (ref 0–76)
WBC # BLD AUTO: 6.4 K/UL (ref 4.1–11.1)

## 2022-02-08 PROCEDURE — 84484 ASSAY OF TROPONIN QUANT: CPT

## 2022-02-08 PROCEDURE — 85025 COMPLETE CBC W/AUTO DIFF WBC: CPT

## 2022-02-08 PROCEDURE — 93005 ELECTROCARDIOGRAM TRACING: CPT

## 2022-02-08 PROCEDURE — 71045 X-RAY EXAM CHEST 1 VIEW: CPT

## 2022-02-08 PROCEDURE — 36415 COLL VENOUS BLD VENIPUNCTURE: CPT

## 2022-02-08 PROCEDURE — 80053 COMPREHEN METABOLIC PANEL: CPT

## 2022-02-08 PROCEDURE — 99285 EMERGENCY DEPT VISIT HI MDM: CPT

## 2022-02-08 NOTE — TELEPHONE ENCOUNTER
I received a Parenteral Nutrition Order Form for the patient. I spoke with our NP and she will sign but this needs to be handled in the future by the patients PCP. I called [facility] but could not reach [facility]. I spoke with the  and informed her the order form will be signed today by the NP but she is requesting that all orders in the future be directed to the patients PCP.  She did inform me that she will make nurse Luiz Jimenez aware of the request.

## 2022-02-08 NOTE — PROGRESS NOTES
Patients mother contacted CM on 2/8/22, requesting assistance with setting up stretcher transport for a f/u PCP appointment on Thursday Feb 10th at 3pm.  CM scheduled [facility]. Patient will be picked up at 2:15pm on Feb 10th & transported to scheduled PCP appointment. Trip #[id number]. CM provided pt's mother w/time of pick-up, trip number & the number to call when pt is ready to return home.     Arnold Flies  Ext [phone]

## 2022-02-09 VITALS
RESPIRATION RATE: 15 BRPM | TEMPERATURE: 98 F | WEIGHT: 100 LBS | OXYGEN SATURATION: 100 % | DIASTOLIC BLOOD PRESSURE: 68 MMHG | SYSTOLIC BLOOD PRESSURE: 94 MMHG | BODY MASS INDEX: 14.77 KG/M2 | HEART RATE: 87 BPM

## 2022-02-09 LAB
ATRIAL RATE: 116 BPM
CALCULATED P AXIS, ECG09: 82 DEGREES
CALCULATED R AXIS, ECG10: 73 DEGREES
CALCULATED T AXIS, ECG11: 63 DEGREES
DIAGNOSIS, 93000: NORMAL
P-R INTERVAL, ECG05: 114 MS
Q-T INTERVAL, ECG07: 312 MS
QRS DURATION, ECG06: 68 MS
QTC CALCULATION (BEZET), ECG08: 433 MS
VENTRICULAR RATE, ECG03: 116 BPM

## 2022-02-09 NOTE — ED NOTES
[facility] transport arrived, report given, PCS form given; pt has dc instructions, states understanding, transferred to stretcher;
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
## 2022-02-17 ENCOUNTER — OFFICE VISIT (OUTPATIENT)
Dept: SURGERY | Age: 42
End: 2022-02-17
Payer: MEDICAID

## 2022-02-17 VITALS
BODY MASS INDEX: 14.77 KG/M2 | TEMPERATURE: 97.6 F | DIASTOLIC BLOOD PRESSURE: 82 MMHG | OXYGEN SATURATION: 96 % | HEIGHT: 69 IN | RESPIRATION RATE: 16 BRPM | SYSTOLIC BLOOD PRESSURE: 110 MMHG | HEART RATE: 97 BPM

## 2022-02-17 DIAGNOSIS — R65.21 SEPTIC SHOCK (HCC): ICD-10-CM

## 2022-02-17 DIAGNOSIS — Z09 POSTOPERATIVE EXAMINATION: Primary | ICD-10-CM

## 2022-02-17 DIAGNOSIS — A41.9 SEPTIC SHOCK (HCC): ICD-10-CM

## 2022-02-17 PROCEDURE — 99024 POSTOP FOLLOW-UP VISIT: CPT | Performed by: SURGERY

## 2022-02-17 RX ORDER — FENTANYL 12.5 UG/1
1 PATCH TRANSDERMAL
Qty: 5 PATCH | Refills: 0 | Status: SHIPPED | OUTPATIENT
Start: 2022-02-17 | End: 2022-03-04

## 2022-02-17 RX ORDER — FENTANYL 12.5 UG/1
1 PATCH TRANSDERMAL
Qty: 5 PATCH | Refills: 0 | Status: SHIPPED | OUTPATIENT
Start: 2022-02-17 | End: 2022-02-17 | Stop reason: SDUPTHER

## 2022-02-17 NOTE — PROGRESS NOTES
Postop Progress Note    Subjective    Ailin Cannon presents to the office for postop follow up. He is doing well at home. His labs are better per his mother. He is getting out of bed but not walking. Objective    Visit Vitals  /82 (BP 1 Location: Left upper arm, BP Patient Position: Lying, BP Cuff Size: Adult)   Pulse 97   Temp 97.6 °F (36.4 °C)   Resp 16   Ht 5' 9\" (1.753 m)   SpO2 96%   BMI 14.77 kg/m²     General: alert, cooperative and no distress  Skin: G-tube site without leaking, Fistula pouches are lasting about 3-4 days. Assessment  Patient is stable post-op. He understands he has no further surgical options that we can provide. Plan  1. Continue any current medications  2. Wound care discussed  3. Pt is to increase activities as tolerated- continue home PT/OT. 4. Patient may have liquids for pleasure. He is to keep an eye on G-tube output to ensure it does not clog. 5. Discussed management of TPN with PCP. She is not able to manage this long term. She is working with her  to find someone who can.    6. Follow up: as needed    Electronically signed by Mara Kellogg NP on 2/17/2022 at 1:52 PM

## 2022-02-18 ENCOUNTER — TELEPHONE (OUTPATIENT)
Dept: SURGERY | Age: 42
End: 2022-02-18

## 2022-02-18 NOTE — TELEPHONE ENCOUNTER
I received a call from Eli Coley with Dahlia Koehler's office. The patient is currently on long term TPN. [facility] will handle the patients long term TPN usage. Please have the NP fax an order to  [phone]. I told her I will route this to her. She acknowledged understanding and thanked me for the call.

## 2022-02-22 ENCOUNTER — TELEPHONE (OUTPATIENT)
Dept: SURGERY | Age: 42
End: 2022-02-22

## 2022-02-22 NOTE — TELEPHONE ENCOUNTER
Radha with [facility] called regarding patient, she is wanting to know if we received certificate for medical necessity.     Please give a call back  [phone]

## 2022-02-23 NOTE — TELEPHONE ENCOUNTER
Spoke with [facility] and they with fax current TPN to [facility]. Number provided.    Johnny Dang NP

## 2022-03-09 ENCOUNTER — TELEPHONE (OUTPATIENT)
Dept: SURGERY | Age: 42
End: 2022-03-09

## 2022-03-09 NOTE — TELEPHONE ENCOUNTER
Valdez with [id number] [facility] called regarding patient, insurance is requesting last 3 months of medical records and last time patient was seen     Please give a call back  [phone]

## 2022-03-18 PROBLEM — K31.84 GASTROPARESIS: Status: ACTIVE | Noted: 2021-11-20

## 2022-03-18 PROBLEM — J69.0 ASPIRATION PNEUMONIA (HCC): Status: ACTIVE | Noted: 2021-11-25

## 2022-03-18 PROBLEM — R10.9 ABDOMINAL PAIN, ACUTE: Status: ACTIVE | Noted: 2021-11-27

## 2022-03-18 PROBLEM — K56.609 SBO (SMALL BOWEL OBSTRUCTION) (HCC): Status: ACTIVE | Noted: 2021-11-22

## 2022-03-18 PROBLEM — R11.15 INTRACTABLE CYCLICAL VOMITING WITH NAUSEA: Status: ACTIVE | Noted: 2021-11-27

## 2022-03-19 PROBLEM — A41.9 SEVERE SEPSIS (HCC): Status: ACTIVE | Noted: 2022-01-18

## 2022-03-19 PROBLEM — K64.9 HEMORRHOIDS: Status: ACTIVE | Noted: 2021-11-16

## 2022-03-19 PROBLEM — R65.20 SEVERE SEPSIS (HCC): Status: ACTIVE | Noted: 2022-01-18

## 2022-03-19 PROBLEM — A41.9 SEPSIS (HCC): Status: ACTIVE | Noted: 2021-11-25

## 2022-03-20 PROBLEM — K56.609 SMALL BOWEL OBSTRUCTION (HCC): Status: ACTIVE | Noted: 2021-11-25

## 2022-03-20 PROBLEM — D64.9 ANEMIA: Status: ACTIVE | Noted: 2021-11-11

## 2022-04-10 ENCOUNTER — APPOINTMENT (OUTPATIENT)
Dept: GENERAL RADIOLOGY | Age: 42
End: 2022-04-10
Attending: STUDENT IN AN ORGANIZED HEALTH CARE EDUCATION/TRAINING PROGRAM
Payer: MEDICAID

## 2022-04-10 ENCOUNTER — HOSPITAL ENCOUNTER (EMERGENCY)
Age: 42
Discharge: HOME | End: 2022-04-10
Attending: STUDENT IN AN ORGANIZED HEALTH CARE EDUCATION/TRAINING PROGRAM
Payer: MEDICAID

## 2022-04-10 VITALS
RESPIRATION RATE: 18 BRPM | HEART RATE: 96 BPM | OXYGEN SATURATION: 100 % | SYSTOLIC BLOOD PRESSURE: 94 MMHG | HEIGHT: 70 IN | DIASTOLIC BLOOD PRESSURE: 67 MMHG | TEMPERATURE: 97.8 F | BODY MASS INDEX: 17.93 KG/M2 | WEIGHT: 125.22 LBS

## 2022-04-10 DIAGNOSIS — K94.23 MALFUNCTION OF GASTROSTOMY TUBE (HCC): Primary | ICD-10-CM

## 2022-04-10 PROCEDURE — 74011000636 HC RX REV CODE- 636: Performed by: STUDENT IN AN ORGANIZED HEALTH CARE EDUCATION/TRAINING PROGRAM

## 2022-04-10 PROCEDURE — 75810000109 HC GASTRO TUBE CHANGE

## 2022-04-10 PROCEDURE — 74018 RADEX ABDOMEN 1 VIEW: CPT

## 2022-04-10 PROCEDURE — 99283 EMERGENCY DEPT VISIT LOW MDM: CPT

## 2022-04-10 RX ADMIN — DIATRIZOATE MEGLUMINE AND DIATRIZOATE SODIUM 30 ML: 600; 100 SOLUTION ORAL; RECTAL at 10:19

## 2022-04-10 NOTE — ED TRIAGE NOTES
States Dr. Sudheer Farrell put the tube in; pt reports it comes out a lot and the pt has put it back in [place] lot\"

## 2022-04-10 NOTE — ED NOTES
Pt discharged by MD. Pt provided with discharge instructions Rx and instructions on follow up care. Pt out of ED ambulatory.
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
## 2022-04-12 ENCOUNTER — TELEPHONE (OUTPATIENT)
Dept: SURGERY | Age: 42
End: 2022-04-12

## 2022-04-23 ENCOUNTER — APPOINTMENT (OUTPATIENT)
Dept: GENERAL RADIOLOGY | Age: 42
End: 2022-04-23
Attending: EMERGENCY MEDICINE
Payer: MEDICAID

## 2022-04-23 ENCOUNTER — HOSPITAL ENCOUNTER (OUTPATIENT)
Age: 42
Setting detail: OBSERVATION
Discharge: HOME | End: 2022-04-25
Attending: EMERGENCY MEDICINE | Admitting: INTERNAL MEDICINE
Payer: MEDICAID

## 2022-04-23 DIAGNOSIS — E87.6 HYPOKALEMIA: Primary | ICD-10-CM

## 2022-04-23 PROBLEM — Z78.9 DIFFICULT INTRAVENOUS ACCESS: Status: ACTIVE | Noted: 2022-04-23

## 2022-04-23 LAB
ALBUMIN SERPL-MCNC: 2.2 G/DL (ref 3.5–5)
ALBUMIN/GLOB SERPL: 0.6 {RATIO} (ref 1.1–2.2)
ALP SERPL-CCNC: 256 U/L (ref 45–117)
ALT SERPL-CCNC: 87 U/L (ref 12–78)
ANION GAP SERPL CALC-SCNC: 4 MMOL/L (ref 5–15)
AST SERPL-CCNC: 88 U/L (ref 15–37)
BASOPHILS # BLD: 0 K/UL (ref 0–0.1)
BASOPHILS NFR BLD: 0 % (ref 0–1)
BILIRUB SERPL-MCNC: 1 MG/DL (ref 0.2–1)
BUN SERPL-MCNC: 11 MG/DL (ref 6–20)
BUN/CREAT SERPL: 20 (ref 12–20)
CALCIUM SERPL-MCNC: 8.3 MG/DL (ref 8.5–10.1)
CHLORIDE SERPL-SCNC: 106 MMOL/L (ref 97–108)
CO2 SERPL-SCNC: 28 MMOL/L (ref 21–32)
CREAT SERPL-MCNC: 0.56 MG/DL (ref 0.7–1.3)
DIFFERENTIAL METHOD BLD: ABNORMAL
EOSINOPHIL # BLD: 0 K/UL (ref 0–0.4)
EOSINOPHIL NFR BLD: 1 % (ref 0–7)
ERYTHROCYTE [DISTWIDTH] IN BLOOD BY AUTOMATED COUNT: 16.5 % (ref 11.5–14.5)
GLOBULIN SER CALC-MCNC: 3.7 G/DL (ref 2–4)
GLUCOSE SERPL-MCNC: 75 MG/DL (ref 65–100)
HCT VFR BLD AUTO: 30.4 % (ref 36.6–50.3)
HGB BLD-MCNC: 10.1 G/DL (ref 12.1–17)
IMM GRANULOCYTES # BLD AUTO: 0 K/UL (ref 0–0.04)
IMM GRANULOCYTES NFR BLD AUTO: 0 % (ref 0–0.5)
LACTATE BLD-SCNC: 0.92 MMOL/L (ref 0.4–2)
LYMPHOCYTES # BLD: 1.3 K/UL (ref 0.8–3.5)
LYMPHOCYTES NFR BLD: 36 % (ref 12–49)
MCH RBC QN AUTO: 29.4 PG (ref 26–34)
MCHC RBC AUTO-ENTMCNC: 33.2 G/DL (ref 30–36.5)
MCV RBC AUTO: 88.4 FL (ref 80–99)
MONOCYTES # BLD: 0.1 K/UL (ref 0–1)
MONOCYTES NFR BLD: 4 % (ref 5–13)
NEUTS BAND NFR BLD MANUAL: 2 %
NEUTS SEG # BLD: 2.2 K/UL (ref 1.8–8)
NEUTS SEG NFR BLD: 57 % (ref 32–75)
NRBC # BLD: 0 K/UL (ref 0–0.01)
NRBC BLD-RTO: 0 PER 100 WBC
PLATELET # BLD AUTO: 161 K/UL (ref 150–400)
PLATELET COMMENTS,PCOM: ABNORMAL
PMV BLD AUTO: 11.8 FL (ref 8.9–12.9)
POTASSIUM SERPL-SCNC: 2.9 MMOL/L (ref 3.5–5.1)
PROCALCITONIN SERPL-MCNC: 0.68 NG/ML
PROT SERPL-MCNC: 5.9 G/DL (ref 6.4–8.2)
RBC # BLD AUTO: 3.44 M/UL (ref 4.1–5.7)
RBC MORPH BLD: ABNORMAL
SODIUM SERPL-SCNC: 138 MMOL/L (ref 136–145)
WBC # BLD AUTO: 3.6 K/UL (ref 4.1–11.1)
WBC MORPH BLD: ABNORMAL

## 2022-04-23 PROCEDURE — 83605 ASSAY OF LACTIC ACID: CPT

## 2022-04-23 PROCEDURE — G0378 HOSPITAL OBSERVATION PER HR: HCPCS

## 2022-04-23 PROCEDURE — 85025 COMPLETE CBC W/AUTO DIFF WBC: CPT

## 2022-04-23 PROCEDURE — 36573 INSJ PICC RS&I 5 YR+: CPT | Performed by: EMERGENCY MEDICINE

## 2022-04-23 PROCEDURE — 80053 COMPREHEN METABOLIC PANEL: CPT

## 2022-04-23 PROCEDURE — 96375 TX/PRO/DX INJ NEW DRUG ADDON: CPT

## 2022-04-23 PROCEDURE — 75810000457 HC INSERT PICC CATHETER LVL 3 5183

## 2022-04-23 PROCEDURE — 96374 THER/PROPH/DIAG INJ IV PUSH: CPT

## 2022-04-23 PROCEDURE — 74011000250 HC RX REV CODE- 250: Performed by: EMERGENCY MEDICINE

## 2022-04-23 PROCEDURE — 99285 EMERGENCY DEPT VISIT HI MDM: CPT

## 2022-04-23 PROCEDURE — C1751 CATH, INF, PER/CENT/MIDLINE: HCPCS

## 2022-04-23 PROCEDURE — 77030018786 HC NDL GD F/USND BARD -B

## 2022-04-23 PROCEDURE — 71045 X-RAY EXAM CHEST 1 VIEW: CPT

## 2022-04-23 PROCEDURE — 76937 US GUIDE VASCULAR ACCESS: CPT

## 2022-04-23 PROCEDURE — 74011250636 HC RX REV CODE- 250/636: Performed by: INTERNAL MEDICINE

## 2022-04-23 PROCEDURE — 96361 HYDRATE IV INFUSION ADD-ON: CPT

## 2022-04-23 PROCEDURE — 96360 HYDRATION IV INFUSION INIT: CPT

## 2022-04-23 PROCEDURE — 74011250636 HC RX REV CODE- 250/636: Performed by: EMERGENCY MEDICINE

## 2022-04-23 PROCEDURE — 84145 PROCALCITONIN (PCT): CPT

## 2022-04-23 PROCEDURE — 36415 COLL VENOUS BLD VENIPUNCTURE: CPT

## 2022-04-23 RX ORDER — POTASSIUM CHLORIDE 29.8 MG/ML
20 INJECTION INTRAVENOUS ONCE
Status: COMPLETED | OUTPATIENT
Start: 2022-04-23 | End: 2022-04-24

## 2022-04-23 RX ORDER — POTASSIUM CHLORIDE 7.45 MG/ML
10 INJECTION INTRAVENOUS
Status: ACTIVE | OUTPATIENT
Start: 2022-04-23 | End: 2022-04-24

## 2022-04-23 RX ORDER — ONDANSETRON 2 MG/ML
4 INJECTION INTRAMUSCULAR; INTRAVENOUS
Status: DISCONTINUED | OUTPATIENT
Start: 2022-04-23 | End: 2022-04-25 | Stop reason: HOSPADM

## 2022-04-23 RX ORDER — HEPARIN 100 UNIT/ML
300 SYRINGE INTRAVENOUS AS NEEDED
Status: DISCONTINUED | OUTPATIENT
Start: 2022-04-23 | End: 2022-04-25 | Stop reason: HOSPADM

## 2022-04-23 RX ORDER — SODIUM CHLORIDE 9 MG/ML
75 INJECTION, SOLUTION INTRAVENOUS CONTINUOUS
Status: DISCONTINUED | OUTPATIENT
Start: 2022-04-23 | End: 2022-04-24

## 2022-04-23 RX ORDER — ENOXAPARIN SODIUM 100 MG/ML
40 INJECTION SUBCUTANEOUS DAILY
Status: DISCONTINUED | OUTPATIENT
Start: 2022-04-24 | End: 2022-04-25 | Stop reason: HOSPADM

## 2022-04-23 RX ORDER — ACETAMINOPHEN 325 MG/1
650 TABLET ORAL
Status: DISCONTINUED | OUTPATIENT
Start: 2022-04-23 | End: 2022-04-25 | Stop reason: HOSPADM

## 2022-04-23 RX ADMIN — SODIUM CHLORIDE 75 ML/HR: 9 INJECTION, SOLUTION INTRAVENOUS at 23:36

## 2022-04-23 RX ADMIN — SODIUM CHLORIDE 1000 ML: 9 INJECTION, SOLUTION INTRAVENOUS at 18:34

## 2022-04-23 RX ADMIN — SODIUM CHLORIDE: 234 INJECTION, SOLUTION INTRAVENOUS at 18:37

## 2022-04-23 RX ADMIN — POTASSIUM CHLORIDE 20 MEQ: 29.8 INJECTION INTRAVENOUS at 23:30

## 2022-04-23 RX ADMIN — SODIUM CHLORIDE 1000 ML: 9 INJECTION, SOLUTION INTRAVENOUS at 16:48
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
## 2022-04-23 NOTE — ED NOTES
Report received from [address]. They advised of the patient's chief complaint, current status, orders completed (to include IV access/medications/radiology testing), outstanding orders that still need to be completed, and the treatment plan. Questions asked and answered prior to assumption of care.

## 2022-04-23 NOTE — ED NOTES
Called unit regarding patient's ostomy supplies; unit to call back if they have the supplies needed.
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
## 2022-04-24 LAB
ANION GAP SERPL CALC-SCNC: 2 MMOL/L (ref 5–15)
ANION GAP SERPL CALC-SCNC: 5 MMOL/L (ref 5–15)
BASOPHILS # BLD: 0 K/UL (ref 0–0.1)
BASOPHILS NFR BLD: 0 % (ref 0–1)
BUN SERPL-MCNC: 7 MG/DL (ref 6–20)
BUN SERPL-MCNC: 8 MG/DL (ref 6–20)
BUN/CREAT SERPL: 16 (ref 12–20)
BUN/CREAT SERPL: 19 (ref 12–20)
CALCIUM SERPL-MCNC: 7.9 MG/DL (ref 8.5–10.1)
CALCIUM SERPL-MCNC: 7.9 MG/DL (ref 8.5–10.1)
CHLORIDE SERPL-SCNC: 111 MMOL/L (ref 97–108)
CHLORIDE SERPL-SCNC: 112 MMOL/L (ref 97–108)
CO2 SERPL-SCNC: 26 MMOL/L (ref 21–32)
CO2 SERPL-SCNC: 28 MMOL/L (ref 21–32)
CREAT SERPL-MCNC: 0.42 MG/DL (ref 0.7–1.3)
CREAT SERPL-MCNC: 0.43 MG/DL (ref 0.7–1.3)
DIFFERENTIAL METHOD BLD: ABNORMAL
EOSINOPHIL # BLD: 0 K/UL (ref 0–0.4)
EOSINOPHIL NFR BLD: 0 % (ref 0–7)
ERYTHROCYTE [DISTWIDTH] IN BLOOD BY AUTOMATED COUNT: 17 % (ref 11.5–14.5)
FERRITIN SERPL-MCNC: 163 NG/ML (ref 26–388)
FOLATE SERPL-MCNC: 29.5 NG/ML (ref 5–21)
GLUCOSE SERPL-MCNC: 82 MG/DL (ref 65–100)
GLUCOSE SERPL-MCNC: 83 MG/DL (ref 65–100)
HCT VFR BLD AUTO: 24.8 % (ref 36.6–50.3)
HGB BLD-MCNC: 8 G/DL (ref 12.1–17)
IMM GRANULOCYTES # BLD AUTO: 0 K/UL (ref 0–0.04)
IMM GRANULOCYTES NFR BLD AUTO: 0 % (ref 0–0.5)
IRON SATN MFR SERPL: 21 % (ref 20–50)
IRON SERPL-MCNC: 52 UG/DL (ref 35–150)
LYMPHOCYTES # BLD: 1.1 K/UL (ref 0.8–3.5)
LYMPHOCYTES NFR BLD: 36 % (ref 12–49)
MAGNESIUM SERPL-MCNC: 1.8 MG/DL (ref 1.6–2.4)
MAGNESIUM SERPL-MCNC: 1.9 MG/DL (ref 1.6–2.4)
MCH RBC QN AUTO: 29 PG (ref 26–34)
MCHC RBC AUTO-ENTMCNC: 32.3 G/DL (ref 30–36.5)
MCV RBC AUTO: 89.9 FL (ref 80–99)
MONOCYTES # BLD: 0.2 K/UL (ref 0–1)
MONOCYTES NFR BLD: 8 % (ref 5–13)
NEUTS BAND NFR BLD MANUAL: 11 %
NEUTS SEG # BLD: 1.7 K/UL (ref 1.8–8)
NEUTS SEG NFR BLD: 45 % (ref 32–75)
NRBC # BLD: 0 K/UL (ref 0–0.01)
NRBC BLD-RTO: 0 PER 100 WBC
PHOSPHATE SERPL-MCNC: 3.2 MG/DL (ref 2.6–4.7)
PHOSPHATE SERPL-MCNC: 4.1 MG/DL (ref 2.6–4.7)
PLATELET # BLD AUTO: 147 K/UL (ref 150–400)
PMV BLD AUTO: 12.2 FL (ref 8.9–12.9)
POTASSIUM SERPL-SCNC: 3 MMOL/L (ref 3.5–5.1)
POTASSIUM SERPL-SCNC: 3.7 MMOL/L (ref 3.5–5.1)
RBC # BLD AUTO: 2.76 M/UL (ref 4.1–5.7)
RBC MORPH BLD: ABNORMAL
SODIUM SERPL-SCNC: 142 MMOL/L (ref 136–145)
SODIUM SERPL-SCNC: 142 MMOL/L (ref 136–145)
TIBC SERPL-MCNC: 244 UG/DL (ref 250–450)
VIT B12 SERPL-MCNC: 1640 PG/ML (ref 193–986)
WBC # BLD AUTO: 3 K/UL (ref 4.1–11.1)
WBC MORPH BLD: ABNORMAL

## 2022-04-24 PROCEDURE — 80048 BASIC METABOLIC PNL TOTAL CA: CPT

## 2022-04-24 PROCEDURE — 83735 ASSAY OF MAGNESIUM: CPT

## 2022-04-24 PROCEDURE — 2709999900 HC NON-CHARGEABLE SUPPLY

## 2022-04-24 PROCEDURE — 96375 TX/PRO/DX INJ NEW DRUG ADDON: CPT

## 2022-04-24 PROCEDURE — 96376 TX/PRO/DX INJ SAME DRUG ADON: CPT

## 2022-04-24 PROCEDURE — 74011250636 HC RX REV CODE- 250/636

## 2022-04-24 PROCEDURE — G0378 HOSPITAL OBSERVATION PER HR: HCPCS

## 2022-04-24 PROCEDURE — 83605 ASSAY OF LACTIC ACID: CPT

## 2022-04-24 PROCEDURE — 87070 CULTURE OTHR SPECIMN AEROBIC: CPT

## 2022-04-24 PROCEDURE — 84100 ASSAY OF PHOSPHORUS: CPT

## 2022-04-24 PROCEDURE — 83540 ASSAY OF IRON: CPT

## 2022-04-24 PROCEDURE — 74011000250 HC RX REV CODE- 250: Performed by: NURSE PRACTITIONER

## 2022-04-24 PROCEDURE — 36415 COLL VENOUS BLD VENIPUNCTURE: CPT

## 2022-04-24 PROCEDURE — 82607 VITAMIN B-12: CPT

## 2022-04-24 PROCEDURE — 85025 COMPLETE CBC W/AUTO DIFF WBC: CPT

## 2022-04-24 PROCEDURE — 82728 ASSAY OF FERRITIN: CPT

## 2022-04-24 PROCEDURE — 87040 BLOOD CULTURE FOR BACTERIA: CPT

## 2022-04-24 PROCEDURE — 87186 SC STD MICRODIL/AGAR DIL: CPT

## 2022-04-24 PROCEDURE — 74011000250 HC RX REV CODE- 250

## 2022-04-24 PROCEDURE — 82746 ASSAY OF FOLIC ACID SERUM: CPT

## 2022-04-24 PROCEDURE — 74011250636 HC RX REV CODE- 250/636: Performed by: INTERNAL MEDICINE

## 2022-04-24 PROCEDURE — 74011000258 HC RX REV CODE- 258: Performed by: NURSE PRACTITIONER

## 2022-04-24 PROCEDURE — 81001 URINALYSIS AUTO W/SCOPE: CPT

## 2022-04-24 PROCEDURE — 74011250636 HC RX REV CODE- 250/636: Performed by: NURSE PRACTITIONER

## 2022-04-24 PROCEDURE — 87077 CULTURE AEROBIC IDENTIFY: CPT

## 2022-04-24 RX ORDER — POTASSIUM CHLORIDE 7.45 MG/ML
10 INJECTION INTRAVENOUS
Status: COMPLETED | OUTPATIENT
Start: 2022-04-24 | End: 2022-04-24

## 2022-04-24 RX ADMIN — POTASSIUM CHLORIDE 10 MEQ: 7.46 INJECTION, SOLUTION INTRAVENOUS at 10:11

## 2022-04-24 RX ADMIN — POTASSIUM CHLORIDE 10 MEQ: 7.46 INJECTION, SOLUTION INTRAVENOUS at 12:10

## 2022-04-24 RX ADMIN — POTASSIUM CHLORIDE 10 MEQ: 7.46 INJECTION, SOLUTION INTRAVENOUS at 14:12

## 2022-04-24 RX ADMIN — POTASSIUM CHLORIDE 10 MEQ: 7.46 INJECTION, SOLUTION INTRAVENOUS at 08:17

## 2022-04-24 RX ADMIN — SODIUM ACETATE: 164 INJECTION, SOLUTION, CONCENTRATE INTRAVENOUS at 18:07

## 2022-04-24 RX ADMIN — COSYNTROPIN 0.25 MG: 0.25 INJECTION, POWDER, LYOPHILIZED, FOR SOLUTION INTRAVENOUS at 23:15

## 2022-04-24 RX ADMIN — SODIUM CHLORIDE, POTASSIUM CHLORIDE, SODIUM LACTATE AND CALCIUM CHLORIDE 1000 ML: 600; 310; 30; 20 INJECTION, SOLUTION INTRAVENOUS at 23:15

## 2022-04-24 RX ADMIN — SODIUM CHLORIDE, POTASSIUM CHLORIDE, SODIUM LACTATE AND CALCIUM CHLORIDE 1000 ML: 600; 310; 30; 20 INJECTION, SOLUTION INTRAVENOUS at 20:25

## 2022-04-24 NOTE — ROUTINE PROCESS
Bedside shift change report given to No Aguilar (oncoming nurse) by Rosalina MACIAS RN (offgoing nurse). Report included the following information SBAR, Kardex and MAR.

## 2022-04-24 NOTE — PROGRESS NOTES
End of Shift Note    Bedside shift change report given to Kali Altamirano  (oncoming nurse) by Terence Whitney RN (offgoing nurse). Report included the following information SBAR, Kardex, ED Summary, Procedure Summary, Intake/Output and MAR    Shift worked:  [id number]     Shift summary and any significant changes:     Pt arrived and was oriented to room. Pt was a bit unhappy and not very cooperative at first with treatment. Pt finally allowed IV fluids. Took Pt ostomy supplies and Pt requested he just be left alone to rest till the am. Pt has home TPN and was placed in fridge till he can start in am or see if he needs to use hospital.        Concerns for physician to address:  Pt would like to see wound care nurse for proper supplies and a dressing consult. Pt would also like to see if he can go home now that line has been placed to start back on TPN at home and rebuild strength there.       Zone phone for oncoming shift:            Terence Whitney RN

## 2022-04-24 NOTE — PROGRESS NOTES
Problem: Pressure Injury - Risk of  Goal: *Prevention of pressure injury  Description: Document Russell Scale and appropriate interventions in the flowsheet.   Outcome: Progressing Towards Goal  Note: Pressure Injury Interventions:  Sensory Interventions: Assess changes in LOC    Moisture Interventions: Absorbent underpads    Activity Interventions: Increase time out of bed,Pressure redistribution bed/mattress(bed type)    Mobility Interventions: HOB 30 degrees or less,Pressure redistribution bed/mattress (bed type)    Nutrition Interventions: Document food/fluid/supplement intake    Friction and Shear Interventions: Apply protective barrier, creams and emollients                Problem: Patient Education: Go to Patient Education Activity  Goal: Patient/Family Education  Outcome: Progressing Towards Goal

## 2022-04-24 NOTE — PROGRESS NOTES
PICC (Peripherally Inserted Central Catheter) line insertion  procedure note :     Procedure explained to patient along with risks and benefits  and patient agreed to proceed. Informed  written consent obtained from  patient. Patient teaching completed. Timeout completed. Pre-procedure assessment done. Maximum sterile barrier precautions observed throughout procedure. Lidocaine 1%  3    ml sq given prior to cannulation. Cannulated brachial  vein using ultrasound guidance and modified seldinger technique. Inserted 5  Austrian double  lumen PICC to left upper arm using ecoInsight Tip Location System and  [employer] 1898. Pt has    sinus   rhythm. PICC tip location was confirmed by 3 CG   tip positioning system, indicating tall P wave and no negative deflection before P wave which would indicate that the PICC tip is properly placed in the distal SVC or at the [place]. PICC tip location was  confirmed by 2 PICC nurses and printout placed on patient's chart. Blood return verified and flushed with 20 ml normal saline in each port. Sterile dressing applied with biopatch, statLock and occlusive dressing as per protocol. Curos caps applied to each port. Patient tolerated procedure well with minimal blood loss ( less than 5 ml.)  PICC procedure performed by  :  Kun Sanders RN PICC nurse. Vascular Access Team.   Assisted by :   Magnolia Vences RN [address] Nurse, Vascular Access Team  Reason for access : reliable access for TPN. Complications related to insertion  : none  X-Ray : not applicable  Notified ED Nurse   RN  that  PICC line can be used. Trimmed Length :  43   cm   External Length :   0 cm . PICC line site arm circumference:    23    cm   PICC catheter occupies   15   % of vein  Type of PICC: Bard Solo Power PICC    Ref # :     [id number]      Lot # :  [id number]  Expiration Date :  2023/03/31    Kun Sanders RN.  PICC Nurse, Vascular Access Team.

## 2022-04-24 NOTE — PROGRESS NOTES
1922- Notified by nursing the patient's blood pressure is 80/47. Reportedly patient is asymptomatic and usually runs in the 56O systolic at baseline. Pending manual blood pressure check. 2009- manual pressure per nursing is 82/50. Ordered an LR bolus. Requested nursing update to repeat pressure after the bolus. 2211- received notification from nursing that blood pressure is 86/52. This appears to be about where he has been running since he has been here, but will go ahead and order infectious work-up and repeat bolus. Received chest x-ray yesterday, can hold off on repeating this for now. Did order labs, including a lactic acid and blood cultures and a UA and another bolus. Also ordered a cortisol and a stim test.  Can consider midodrine, but will defer to day team for this. 0200-notified by nursing the patient's manual blood pressure is 80/46 after 2 L. I went to go see him. He is asymptomatic and seems rather annoyed that I was there to evaluate him. He states that his blood pressure is always low and that during his last admission it was always low. Did review colorectal surgery's notes from last admission, and there were documentations of blood pressures in the 80s. He was evaluated by palliative at that time and was noted to have a poor prognosis, likely will be TPN dependent for life. I do not think he needs to transfer to PCU for pressors and do not feel like he would benefit from any more fluids, especially as he is on TPN at 249 mL an hour. Will trial a one-time, low-dose of midodrine. Nursing instructed to notify for symptomatic hypotension, or blood pressures less than 80 systolic.      Of note, patient's blood cultures were obtained from PICC line and nursing reports that patient refused a stick for second set of cultures. -------------------------------------------------------------------------------------------------------------------------------------------------  Please note that this dictation was completed with [employer] the [employer] voice recognition software. Quite often unanticipated grammatical, syntax, homophones, and other interpretive errors are inadvertently transcribed by the computer software. Please disregard these errors. Please excuse any errors that have escaped final proofreading. Thank you.

## 2022-04-24 NOTE — PROGRESS NOTES
Transition of Care Plan:    RUR: N/A, patient is in obs  Disposition: Home with [facility] services resumed and home TPN with [facility]  Follow up appointments: To be scheduled prior to d/c  DME needed: The patient has a dolomite walker and [facility]   Transportation at Discharge: The patient's mother will transport home or he will receive transportation through his Medicaid  [place] or means to access home: Yes       Medicare Letter: N/A  Is patient a BCPI-A Bundle: N/A          If yes, was Bundle Letter given?:    Is patient a  and connected with the [place]? N/A               If yes, was [employer] transfer form completed and [facility] notified? Caregiver Contact: Jonah Pettit, Mother, Phone: [phone]  Discharge Caregiver contacted prior to discharge? The patient's mother is to be contacted prior to d/c    Care Conference needed?: No                  Reason for Admission: Dislodged TPN access                   RUR Score: N/A, patient is in observation status                    Plan for utilizing home health: The patient is currently receiving HHPT & SN services with [facility]. His home TPN is through [address] [place] [address]         PCP: First and Last name:  May Patterson NP, the patient reports that his primary care doctor is at [facility]   Name of Practice: [facility]   Are you a current patient: Yes/No:    Approximate date of last visit:    Can you participate in a virtual visit with your PCP:                     Current Advanced Directive/Advance Care Plan: Full Code    Healthcare Decision Maker: Jonah Pettit, Mother, Phone: [phone]      Primary Decision Maker (Active): Bill Yuliana - Mother - [phone]                  Transition of Care Plan:     CM met with the patient to discuss dispo plan. The patient lives in a 1 level home with his mother, Jonah Pettit. He stated that his brother also lives at the home. The patient reported that he is able to attend to most of his ADLs independently.  He reports that he does not get in the shower as he does not want to get his G-tube and J-tube wet so he does sponge baths. He has a bedside commode he also uses. He uses a dolomite walker when ambulating. He reported that a wheelchair was ordered for him but he has not yet received it. He was receiving HHPT and SN services with [facility]. He does not drive and his mother assists with transportation needs or he uses his Medicaid for transportation. He uses the [facility] on [address] for his medications. He receives TPN at home and this is through [facility]. CM will sent a SOHAM order to [facility] and [facility] upon d/c. Care Management Interventions  PCP Verified by CM: Yes  Mode of Transport at Discharge:  Other (see comment) (The patient's mother will assist with d/c transportation )  Transition of Care Consult (CM Consult): Discharge Planning  MyChart Signup: No  Discharge Durable Medical Equipment: No  Physical Therapy Consult: No  Occupational Therapy Consult: No  Speech Therapy Consult: No  Support Systems: Parent(s),Other Family Member(s)  Confirm Follow Up Transport: Family  The Patient and/or Patient Representative was Provided with a Choice of Provider and Agrees with the Discharge Plan?: Yes  Name of the Patient Representative Who was Provided with a Choice of Provider and Agrees with the Discharge Plan: [facility] Provided?: No  Discharge Location  Patient Expects to be Discharged to[de-identified] Home with home health    [place], [place]

## 2022-04-24 NOTE — PROGRESS NOTES
Medicare Outpatient Observation Notice (MOON)/ [place] Outpatient Observation Notice (Lavinia Benitez) provided to patient/representative with verbal explanation of the notice. Time allotted for questions regarding the notice. Patient /representative provided a completed copy of the MOON/VOON notice. Copy placed on bedside chart.     Marzena Diez [address], R Fallon Ch[address]
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
## 2022-04-25 ENCOUNTER — APPOINTMENT (OUTPATIENT)
Dept: INTERVENTIONAL RADIOLOGY/VASCULAR | Age: 42
End: 2022-04-25
Attending: NURSE PRACTITIONER
Payer: MEDICAID

## 2022-04-25 VITALS
WEIGHT: 120.59 LBS | TEMPERATURE: 97.9 F | HEIGHT: 70 IN | DIASTOLIC BLOOD PRESSURE: 66 MMHG | BODY MASS INDEX: 17.26 KG/M2 | RESPIRATION RATE: 18 BRPM | OXYGEN SATURATION: 98 % | HEART RATE: 61 BPM | SYSTOLIC BLOOD PRESSURE: 98 MMHG

## 2022-04-25 LAB
ALBUMIN SERPL-MCNC: 1.9 G/DL (ref 3.5–5)
ALBUMIN/GLOB SERPL: 0.6 {RATIO} (ref 1.1–2.2)
ALP SERPL-CCNC: 273 U/L (ref 45–117)
ALT SERPL-CCNC: 76 U/L (ref 12–78)
ANION GAP SERPL CALC-SCNC: 3 MMOL/L (ref 5–15)
APPEARANCE UR: CLEAR
AST SERPL-CCNC: 67 U/L (ref 15–37)
BACTERIA URNS QL MICRO: ABNORMAL /HPF
BASOPHILS # BLD: 0 K/UL (ref 0–0.1)
BASOPHILS # BLD: 0 K/UL (ref 0–0.1)
BASOPHILS NFR BLD: 0 % (ref 0–1)
BASOPHILS NFR BLD: 0 % (ref 0–1)
BILIRUB SERPL-MCNC: 0.6 MG/DL (ref 0.2–1)
BILIRUB UR QL: NEGATIVE
BUN SERPL-MCNC: 9 MG/DL (ref 6–20)
BUN/CREAT SERPL: 21 (ref 12–20)
CALCIUM SERPL-MCNC: 8 MG/DL (ref 8.5–10.1)
CHLORIDE SERPL-SCNC: 111 MMOL/L (ref 97–108)
CO2 SERPL-SCNC: 28 MMOL/L (ref 21–32)
COLOR UR: ABNORMAL
CORTIS 1H P CHAL SERPL-MCNC: 18.8 UG/DL
CORTIS 30M P CHAL SERPL-MCNC: 10.9 UG/DL
CORTIS AM PEAK SERPL-MCNC: 10.9 UG/DL (ref 4.3–22.45)
CORTIS BS SERPL-MCNC: 5.4 UG/DL
CREAT SERPL-MCNC: 0.42 MG/DL (ref 0.7–1.3)
DIFFERENTIAL METHOD BLD: ABNORMAL
DIFFERENTIAL METHOD BLD: ABNORMAL
EOSINOPHIL # BLD: 0 K/UL (ref 0–0.4)
EOSINOPHIL # BLD: 0 K/UL (ref 0–0.4)
EOSINOPHIL NFR BLD: 1 % (ref 0–7)
EOSINOPHIL NFR BLD: 1 % (ref 0–7)
EPITH CASTS URNS QL MICRO: ABNORMAL /LPF
ERYTHROCYTE [DISTWIDTH] IN BLOOD BY AUTOMATED COUNT: 16.9 % (ref 11.5–14.5)
ERYTHROCYTE [DISTWIDTH] IN BLOOD BY AUTOMATED COUNT: 16.9 % (ref 11.5–14.5)
GLOBULIN SER CALC-MCNC: 3.2 G/DL (ref 2–4)
GLUCOSE SERPL-MCNC: 86 MG/DL (ref 65–100)
GLUCOSE UR STRIP.AUTO-MCNC: NEGATIVE MG/DL
HCT VFR BLD AUTO: 24.8 % (ref 36.6–50.3)
HCT VFR BLD AUTO: 25.5 % (ref 36.6–50.3)
HGB BLD-MCNC: 8 G/DL (ref 12.1–17)
HGB BLD-MCNC: 8.2 G/DL (ref 12.1–17)
HGB UR QL STRIP: NEGATIVE
IMM GRANULOCYTES # BLD AUTO: 0 K/UL (ref 0–0.04)
IMM GRANULOCYTES # BLD AUTO: 0 K/UL (ref 0–0.04)
IMM GRANULOCYTES NFR BLD AUTO: 0 % (ref 0–0.5)
IMM GRANULOCYTES NFR BLD AUTO: 1 % (ref 0–0.5)
KETONES UR QL STRIP.AUTO: NEGATIVE MG/DL
LACTATE SERPL-SCNC: 0.9 MMOL/L (ref 0.4–2)
LEUKOCYTE ESTERASE UR QL STRIP.AUTO: NEGATIVE
LYMPHOCYTES # BLD: 1.1 K/UL (ref 0.8–3.5)
LYMPHOCYTES # BLD: 1.6 K/UL (ref 0.8–3.5)
LYMPHOCYTES NFR BLD: 31 % (ref 12–49)
LYMPHOCYTES NFR BLD: 49 % (ref 12–49)
MAGNESIUM SERPL-MCNC: 1.9 MG/DL (ref 1.6–2.4)
MCH RBC QN AUTO: 29.2 PG (ref 26–34)
MCH RBC QN AUTO: 29.3 PG (ref 26–34)
MCHC RBC AUTO-ENTMCNC: 32.2 G/DL (ref 30–36.5)
MCHC RBC AUTO-ENTMCNC: 32.3 G/DL (ref 30–36.5)
MCV RBC AUTO: 90.5 FL (ref 80–99)
MCV RBC AUTO: 91.1 FL (ref 80–99)
MONOCYTES # BLD: 0.1 K/UL (ref 0–1)
MONOCYTES # BLD: 0.4 K/UL (ref 0–1)
MONOCYTES NFR BLD: 10 % (ref 5–13)
MONOCYTES NFR BLD: 2 % (ref 5–13)
NEUTS SEG # BLD: 1.5 K/UL (ref 1.8–8)
NEUTS SEG # BLD: 2 K/UL (ref 1.8–8)
NEUTS SEG NFR BLD: 48 % (ref 32–75)
NEUTS SEG NFR BLD: 57 % (ref 32–75)
NITRITE UR QL STRIP.AUTO: NEGATIVE
NRBC # BLD: 0 K/UL (ref 0–0.01)
NRBC # BLD: 0 K/UL (ref 0–0.01)
NRBC BLD-RTO: 0 PER 100 WBC
NRBC BLD-RTO: 0 PER 100 WBC
PH UR STRIP: 7.5 [PH] (ref 5–8)
PHOSPHATE SERPL-MCNC: 2.2 MG/DL (ref 2.6–4.7)
PLATELET # BLD AUTO: 177 K/UL (ref 150–400)
PLATELET # BLD AUTO: 183 K/UL (ref 150–400)
PMV BLD AUTO: 11.9 FL (ref 8.9–12.9)
PMV BLD AUTO: 12.1 FL (ref 8.9–12.9)
POTASSIUM SERPL-SCNC: 4 MMOL/L (ref 3.5–5.1)
PROT SERPL-MCNC: 5.1 G/DL (ref 6.4–8.2)
PROT UR STRIP-MCNC: NEGATIVE MG/DL
RBC # BLD AUTO: 2.74 M/UL (ref 4.1–5.7)
RBC # BLD AUTO: 2.8 M/UL (ref 4.1–5.7)
RBC #/AREA URNS HPF: ABNORMAL /HPF (ref 0–5)
RBC MORPH BLD: ABNORMAL
SODIUM SERPL-SCNC: 142 MMOL/L (ref 136–145)
SP GR UR REFRACTOMETRY: <1.005 (ref 1–1.03)
UA: UC IF INDICATED,UAUC: ABNORMAL
UROBILINOGEN UR QL STRIP.AUTO: 0.2 EU/DL (ref 0.2–1)
WBC # BLD AUTO: 3.2 K/UL (ref 4.1–11.1)
WBC # BLD AUTO: 3.5 K/UL (ref 4.1–11.1)
WBC MORPH BLD: ABNORMAL
WBC URNS QL MICRO: ABNORMAL /HPF (ref 0–4)

## 2022-04-25 PROCEDURE — G0378 HOSPITAL OBSERVATION PER HR: HCPCS

## 2022-04-25 PROCEDURE — 74011000258 HC RX REV CODE- 258: Performed by: NURSE PRACTITIONER

## 2022-04-25 PROCEDURE — 2709999900 HC NON-CHARGEABLE SUPPLY

## 2022-04-25 PROCEDURE — 77030038269 HC DRN EXT URIN PURWCK BARD -A

## 2022-04-25 PROCEDURE — 80053 COMPREHEN METABOLIC PANEL: CPT

## 2022-04-25 PROCEDURE — 82533 TOTAL CORTISOL: CPT

## 2022-04-25 PROCEDURE — 85025 COMPLETE CBC W/AUTO DIFF WBC: CPT

## 2022-04-25 PROCEDURE — 83735 ASSAY OF MAGNESIUM: CPT

## 2022-04-25 PROCEDURE — A6154 WOUND POUCH EACH: HCPCS

## 2022-04-25 PROCEDURE — 36415 COLL VENOUS BLD VENIPUNCTURE: CPT

## 2022-04-25 PROCEDURE — 74011250636 HC RX REV CODE- 250/636: Performed by: NURSE PRACTITIONER

## 2022-04-25 PROCEDURE — 84100 ASSAY OF PHOSPHORUS: CPT

## 2022-04-25 PROCEDURE — 77030012918 HC BG WND FIST BMS -A

## 2022-04-25 PROCEDURE — 96375 TX/PRO/DX INJ NEW DRUG ADDON: CPT

## 2022-04-25 PROCEDURE — 74011000250 HC RX REV CODE- 250: Performed by: NURSE PRACTITIONER

## 2022-04-25 PROCEDURE — 77030040162

## 2022-04-25 PROCEDURE — 77030018795 HC PASTE OST COLO -B

## 2022-04-25 PROCEDURE — 74011250637 HC RX REV CODE- 250/637

## 2022-04-25 RX ORDER — MIDODRINE HYDROCHLORIDE 2.5 MG/1
2.5 TABLET ORAL ONCE
Status: COMPLETED | OUTPATIENT
Start: 2022-04-25 | End: 2022-04-25

## 2022-04-25 RX ORDER — BUPRENORPHINE 5 UG/H
1 PATCH TRANSDERMAL
COMMUNITY
End: 2022-07-06

## 2022-04-25 RX ADMIN — SODIUM ACETATE: 164 INJECTION, SOLUTION, CONCENTRATE INTRAVENOUS at 04:22

## 2022-04-25 RX ADMIN — MIDODRINE HYDROCHLORIDE 2.5 MG: 2.5 TABLET ORAL at 02:33

## 2022-04-25 RX ADMIN — SODIUM PHOSPHATE, MONOBASIC, MONOHYDRATE AND SODIUM PHOSPHATE, DIBASIC, ANHYDROUS: 276; 142 INJECTION, SOLUTION INTRAVENOUS at 08:32

## 2022-04-25 NOTE — PROGRESS NOTES
Attempted to schedule hospital follow up PCP appointment. PCP staff stated patient is no longer with NP Onur Fry. Pending patient discharge.  Alfonso Lopez, Care Management Assistant

## 2022-04-25 NOTE — PROGRESS NOTES
Delivered VOON to patient. See scanned documents. CM had patient sign obs letter over the weekend, however, did not indicate in document list. [address] Jaffe gave patient a second one.  Jey Sullivan

## 2022-04-25 NOTE — DISCHARGE INSTRUCTIONS
HOSPITALIST DISCHARGE INSTRUCTIONS    NAME: Thomas Nicole   :  1980   MRN:  [id number]     Date/Time:  2022 3:16 PM    ADMIT DATE: 2022   DISCHARGE DATE: 2022     Attending Physician: Vania Huang NP    DISCHARGE DIAGNOSIS:  Right subclavian tunneled cath displacement  Malnutrition  Hypokalemia (low blood potassium which resolved prior to hospital discharge)  Hypophosphatemia (low blood phosphorus which was treated prior to hospital discharge)  Chronic anemia (chronically low blood count)  Mild number cytopenia (low blood platelets)  Abdominal wound      Medications: Per above medication reconciliation. Pain Management: per above medications    Recommended diet: Nothing by mouth    Recommended activity: Activity as tolerated    Wound care: Pouch over open wound on your abdomen and jejunostomy tube to drainage bag as you did prior to hospitalization. Cap gastric tube. Indwelling devices:  PICC line placed 22, insertion site care per home health    Supplemental Oxygen: None    Required Lab work: Weekly labs with TPN management per [facility] gastroenterology    Glucose management:  As you did prior to hospitalization    Code status: Full     Take all discharge paperwork with you to all of your follow up doctor appointments. Take your medications exactly as outlined in your discharge paperwork. Do not take any other medications unless specifically prescribed after your hospital stay. If your symptoms return/worsen seek medical attention immediately. Outside physician follow up: Follow-up Information     Follow up With Specialties Details Why Contact Kenzie Disla MD Gastroenterology In 3 days Call to schedule hospital follow-up appointment [facility] [phone]      [address] IF YOU DO NOT HEAR FROM THEM WITHIN 24-48HRS, PLEASE CONTACT THEM DIRECTLY . Continue weekly labs with results sent to [facility] [phone]    Juan San MD General Surgery In 3 days To schedule surgical follow-up appointment to be seen as soon as possible [address]  [phone]            Information obtained by :  I understand that if any problems occur once I am at home I am to contact my physician. I understand and acknowledge receipt of the instructions indicated above.                                                                                                                                            Physician's or R.N.'s Signature                                                                  Date/Time                                                                                                                                              Patient or Representative

## 2022-04-25 NOTE — PROGRESS NOTES
Transition of Care Plan:    RUR: N/A, patient is in obs  Disposition: Home with [facility] services resumed with [facility] and home TPN with [facility] Lieber[facility]  Follow up appointments: To be scheduled prior to d/c  DME needed: The patient has a dolomite walker and C   Transportation at Discharge: The patient's mother will transport home or he will receive transportation through his Medicaid  [place] or means to access home: Yes       Medicare Letter: N/A  Is patient a BCPI-A Bundle: N/A          If yes, was Bundle Letter given?:    Is patient a [place] and connected with the [place]? N/A               If yes, was [employer] transfer form completed and [facility] notified? Caregiver Contact: Ester Nazario, Mother, Phone: [phone]  Discharge Caregiver contacted prior to discharge? The patient's mother is to be contacted prior to d/c    Care Conference needed?: No     CM: Rikki Aburto is currently working with pt in the [place] [facility]. Pt will return home with family support, Saurabh Rubi provided by [facility] and TPN, provided by Gary Buitrago.  Pt' s family will assist with transport at the time of d/c.      CM completed the need of the pt at this time.     OLY Causey, [address] [place] [address]

## 2022-04-25 NOTE — PROGRESS NOTES
Pharmacy Medication Reconciliation     The patient was interviewed regarding current PTA medication list, use and drug allergies. Allergy Update: Milk containing products    Recommendations/Findings: The following amendments were made to the patient's active medication list on file at [facility]:   1) Additions:   Buprenorphine patches     2) Deletions:   Ergocalciferol    3) Changes:   None      Pertinent Findings: Patient has not started taking burpenorphine patches yet. He was on fentanyl patches previously but [facility] has filled the burprenorphine patches and they are ready to be picked up    Clarified PTA med list with patient, Teofilo. PTA medication list was corrected to the following:     Prior to Admission Medications   Prescriptions Last Dose Informant Taking? buprenorphine (BUTRANS) 5 mcg/hour patch   Yes   Si Patch by TransDERmal route every seven (7) days.       Facility-Administered Medications: None        Thank you,  CARA Layton

## 2022-04-25 NOTE — WOUND CARE
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 Pt. Is well known to this [id number] [facility] nurse from past care provided to him. Patient is trying to build up his strength at home until he can get an intestinal transplant. He has been NPO except chewing gum and lolly pops for months now. He get continuous TPN due to his GI system is un-usable. Pt. Is malnourished but he is gaining some weight over the past few months. Assessment: the skin around the fistula is denuded, red. The effluent coming from the fistula is currently a clear yellow. See photo below:  WOUND POA CONDITIONS        Treatment today: the skin was crusted:  Stoma powder applied to the red skin and rubbed into the skin and then Dabbed with the skin prep / meera prep pad to seal it in. The fistula pouch was applied with ostomy paste to protect the skin and fill in the defect distal to the stoma hole. The template for the stoma was left at the bedside and measures 2 x 1.8 and is slightly inverted. A convex pouch can also be used if needed. Plan: Wound care nurse to check on patient tomorrow if he is still here. Patient wishes to go home where he has everything he needs to take care of the fistula.    Vern Butterfield RN, BSN, [employer]
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
## 2022-04-25 NOTE — PROGRESS NOTES
1930 Pt with MEWS score of 3. BP 80/47. Asymptomatic. NP Remigio Nieto made aware. Orders received. See note below. Charge RN Delmer Brown also made aware. End of Shift Note    Bedside shift change report given to  (oncoming nurse) by Stella Benton RN (offgoing nurse). Report included the following information SBAR    Shift worked:  7pm-7am     Shift summary and any significant changes:     Pt's blood pressure at 1930 was 80/47. Manual BP 82/50. Pt asymptomatic. NP Remigio Nieto notified. Pt given 1000 L LR bolus per order. After bolus, blood pressure 86/52. NP Remigio Nieto made aware of this BP. Orders received for LR 1 L bolus again; labs, urinalysis and culture, and paired blood cultures. Pt refuses to be stuck for blood cultures. Will only allow me to draw blood and blood cultures from  from PICC line. NP Remigio Nieto notified that I will only be able to get one set of paired blood cultures. Cortisol level also drawn pre cortisol, 30 min after dose given and 60 minutes after dose given per pharmacy order. U/A and culture obtained and sent to lab. Pt's BP after second bolus was 80/46. NP Remigio Nieto made aware and came to patient's bedside to assess patient. Orders received for one time dose of midodrine. BP increased to 83/52 after midodrine given. Pt assisted to BSC- BP increased to 95/60 when sitting up. Pt's central line from home pulled out by patient during night. Central line had not been working for a while. Was due for new line placement before discharge. Concerns for physician to address: Pt has 3+ bacteria in urine.    Zone phone for oncoming shift:            Stella Benton RN

## 2022-04-25 NOTE — PROGRESS NOTES
Comprehensive Nutrition Assessment    Type and Reason for Visit: Initial,Consult (TPN recs)    Nutrition Recommendations/Plan:   · If pt is not discharged, recommend to resume TPN per the following which is close to home regimen:  · D12.8/4%AA x 12 hr (total volume 2500 ml)  · Start D12.8/4%AA @ 75 ml/hr x 1 hr  · Increase D12.8/4%AA @ 175 ml/hr x 1 hr  · Increase D12.8/4%AA @ goal 250 ml/hr x 8 hr  · Decrease D12.8/4%AA @ 175 ml/hr x 1 hr  · Decrease D12.8/4%AA @ 75 ml/hr x 1 to off  · Add 200 ml 20% lipids daily  · TPN D12.8/4%AA x 12 hr + 200 ml 20% lipids will provide daily appro. 2500 ml fluid/1888 kcals/100 g protein/320 g CHO. Malnutrition Assessment:  Malnutrition Status:  Severe malnutrition (04/25/22 1230)    Context:  Chronic illness     Findings of the 6 clinical characteristics of malnutrition:   Energy Intake:  75% or less est energy requirements for 1 month or longer (pt is now on chronic TPN)  Weight Loss:  Unable to assess (pt previously experienced weight loss but maintaining and gaining since last admission with TPN)     Body Fat Loss:  Severe body fat loss, Triceps,Orbital   Muscle Mass Loss:  Severe muscle mass loss, Scapula (trapezius),Thigh (quadriceps),Clavicles (pectoralis &deltoids)  Fluid Accumulation:  Unable to assess,     Strength:  Not performed   RD completed a nutrition focused physical exam noting continued muscle wasting and fat loss. However, pt is less cachectic looking and face is filling out from prior visit. Less wasting noted in upper extremities. Lower extremities still severely malnourished. Pt reports feeling better and he has been trying to utilize some of his weights at home to regain strength in effort to have a bowel transplant. Nutrition Assessment:     4/25: Chart reviewed; pt admitted related to dislodged TPN access. PICC line has been replaced and TPN infusing. RD received a consult for TPN recs.  RD visited with [facility] at [facility], has current hospital orders to receive TPN of W47.8%/7.0%FH x 12 hr cyclic from 6PM to 6AM. Pt anxious to go home. States he normally receives TPN from 4PM to 4AM at home and lipids infuse daily. Current hospital orders indicate the following (from 6PM to 6AM):  Start D10.7%/3.4AA @ 62 ml/hr x 1 hr  Increase D10.7/3.4%AA @ 125 ml/hr x 1 hr  Increase D10.7/3.4%AA @ 249 ml/hr x 8 hr  Decrease D10.7/3.4%AA @ 125 ml/hr x 1 hr  Decrease D10.7/3.4%AA @ 65 ml/hr x 1 hr to off  Total volume = 2988 ml/1495 kcals (27.3 kcal/kg bw)/102 g protein/320 g CHO    RD spoke with Marzena Russell [phone] over the phone whom manages home TPN. Home TPN regimen is as follows: Total; volume = 2500 ml fluid/320 g Dextrose/100 g Amino Acids/40 g Lipids (daily) which provides daily approx. 1888 kcals/100 g protein/320 g CHO. Home TPN meets est needs as pt has been gaining some weight and tolerating. Would continue. Last Weight Metric  Weight Loss Metrics 4/23/2022 4/10/2022 2/17/2022 2/8/2022 2/2/2022 1/9/2022 11/20/2021   Today's Wt 120 lb 9.5 oz 125 lb 3.5 oz - 100 lb 100 lb 101 lb 1.6 oz 130 lb 15.3 oz   BMI 17.3 kg/m2 17.97 kg/m2 14.77 kg/m2 14.77 kg/m2 14.77 kg/m2 14.93 kg/m2 19.34 kg/m2       Nutrition Related Findings:    BM: 4/25; Labs: reviewed, H/H 8.2/25.5; Meds: reviewed Wound Type: Surgical incision    Current Nutrition Intake & Therapies:  DIET NPO    Anthropometric Measures:  Height: 5' 10\" (177.8 cm)  Ideal Body Weight (IBW): 166 lbs (75 kg)  Current Body Wt:  54.7 kg (120 lb 9.5 oz), 72.6 % IBW. Current BMI (kg/m2): 17.3  BMI Category: Underweight (BMI less than 18.5)    Estimated Daily Nutrient Needs:  Energy (kcal/day): 1890 (BMR 1454 x 1. 3AF)  Weight Used for Protein Requirements: Current  Protein (g/day): 55-83 (1.0-1.2 g/kg bw)  Method Used for Fluid Requirements: 1 ml/kcal  Fluid (ml/day): 1900 ml/day    Nutrition Diagnosis:   · Altered GI function related to altered GI structure as evidenced by NPO or clear liquid status due to medical condition,nutrition support-parenteral nutrition    Nutrition Interventions:   Food and/or Nutrient Delivery: Continue NPO,Continue parenteral nutrition  Nutrition Education/Counseling: No recommendations at this time  Coordination of Nutrition Care: Continue to monitor while inpatient  Plan of Care discussed with: Pt will continue to tolerate TPN as ordered next 2-4 days    Goals:  Goals:  (Pt will tolerate TPN at goal with stable lytes next 2-4 days)    Nutrition Monitoring and Evaluation:   Behavioral-Environmental Outcomes: None identified  Food/Nutrient Intake Outcomes: Parenteral nutrition intake/tolerance  Physical Signs/Symptoms Outcomes: Biochemical data,Skin,Weight,GI status,Fluid status or edema    Discharge Planning:    Parenteral nutrition    Trisha Husain RD  Contact:

## 2022-04-25 NOTE — PROGRESS NOTES
Bedside and Verbal shift change report given to [address] [place] [address] (oncoming nurse) by Parul Zimmerman (offgoing nurse). Report included the following information SBAR, Kardex, Intake/Output, MAR and Recent Results.

## 2022-04-25 NOTE — PROGRESS NOTES
Problem: Pressure Injury - Risk of  Goal: *Prevention of pressure injury  Description: Document Russell Scale and appropriate interventions in the flowsheet. Outcome: Resolved/Met     Problem: Patient Education: Go to Patient Education Activity  Goal: Patient/Family Education  Outcome: Resolved/Met     Problem: Activity Intolerance  Goal: *Oxygen saturation during activity within specified parameters  Outcome: Resolved/Met  Goal: *Able to remain out of bed as prescribed  Outcome: Resolved/Met     Problem: Patient Education: Go to Patient Education Activity  Goal: Patient/Family Education  Outcome: Resolved/Met     Problem: Falls - Risk of  Goal: *Absence of Falls  Description: Document Dora Fall Risk and appropriate interventions in the flowsheet.   Outcome: Resolved/Met     Problem: Patient Education: Go to Patient Education Activity  Goal: Patient/Family Education  Outcome: Resolved/Met

## 2022-04-25 NOTE — PROGRESS NOTES
Patient was discharged at 1630. Discharge instructions were explained. Patient verbalized understanding. Patient was transported from the unit in a wheelchair with personal belongings by a staff member.  Family member transported the patient from the hospital.

## 2022-04-26 ENCOUNTER — HOSPITAL ENCOUNTER (INPATIENT)
Age: 42
LOS: 7 days | Discharge: HOME HEALTH | DRG: 721 | End: 2022-05-05
Attending: EMERGENCY MEDICINE | Admitting: HOSPITALIST
Payer: MEDICAID

## 2022-04-26 ENCOUNTER — TELEPHONE (OUTPATIENT)
Dept: SURGERY | Age: 42
End: 2022-04-26

## 2022-04-26 DIAGNOSIS — A49.8 KLEBSIELLA INFECTION: ICD-10-CM

## 2022-04-26 DIAGNOSIS — A49.8 BACTERIAL INFECTION DUE TO MORGANELLA MORGANII: ICD-10-CM

## 2022-04-26 DIAGNOSIS — R78.81 BACTEREMIA DUE TO METHICILLIN RESISTANT STAPHYLOCOCCUS EPIDERMIDIS: ICD-10-CM

## 2022-04-26 DIAGNOSIS — A49.8 ESCHERICHIA COLI INFECTION: ICD-10-CM

## 2022-04-26 DIAGNOSIS — T85.79XD LINE SEPSIS, SUBSEQUENT ENCOUNTER (HCC): ICD-10-CM

## 2022-04-26 DIAGNOSIS — K92.2 ACUTE GI BLEEDING: ICD-10-CM

## 2022-04-26 DIAGNOSIS — J69.0 ASPIRATION PNEUMONIA OF BOTH LOWER LOBES DUE TO GASTRIC SECRETIONS (HCC): ICD-10-CM

## 2022-04-26 DIAGNOSIS — R78.81 BACTEREMIA: Primary | ICD-10-CM

## 2022-04-26 DIAGNOSIS — R10.9 ABDOMINAL PAIN, ACUTE: ICD-10-CM

## 2022-04-26 DIAGNOSIS — Z87.828 HISTORY OF GUNSHOT WOUND: ICD-10-CM

## 2022-04-26 DIAGNOSIS — L03.311 ABDOMINAL WALL CELLULITIS: ICD-10-CM

## 2022-04-26 DIAGNOSIS — E44.0 MODERATE PROTEIN-CALORIE MALNUTRITION (HCC): ICD-10-CM

## 2022-04-26 DIAGNOSIS — A41.9 LINE SEPSIS, SUBSEQUENT ENCOUNTER (HCC): ICD-10-CM

## 2022-04-26 DIAGNOSIS — Z16.29 BACTEREMIA DUE TO METHICILLIN RESISTANT STAPHYLOCOCCUS EPIDERMIDIS: ICD-10-CM

## 2022-04-26 DIAGNOSIS — B95.7 BACTEREMIA DUE TO METHICILLIN RESISTANT STAPHYLOCOCCUS EPIDERMIDIS: ICD-10-CM

## 2022-04-26 LAB
ALBUMIN SERPL-MCNC: 2.6 G/DL (ref 3.5–5)
ALBUMIN/GLOB SERPL: 0.6 {RATIO} (ref 1.1–2.2)
ALP SERPL-CCNC: 319 U/L (ref 45–117)
ALT SERPL-CCNC: 83 U/L (ref 12–78)
ANION GAP SERPL CALC-SCNC: 5 MMOL/L (ref 5–15)
APPEARANCE UR: CLEAR
AST SERPL-CCNC: 55 U/L (ref 15–37)
BACTERIA URNS QL MICRO: NEGATIVE /HPF
BASOPHILS # BLD: 0 K/UL (ref 0–0.1)
BASOPHILS NFR BLD: 0 % (ref 0–1)
BILIRUB SERPL-MCNC: 0.6 MG/DL (ref 0.2–1)
BILIRUB UR QL: NEGATIVE
BUN SERPL-MCNC: 15 MG/DL (ref 6–20)
BUN/CREAT SERPL: 24 (ref 12–20)
CALCIUM SERPL-MCNC: 8.7 MG/DL (ref 8.5–10.1)
CHLORIDE SERPL-SCNC: 109 MMOL/L (ref 97–108)
CO2 SERPL-SCNC: 26 MMOL/L (ref 21–32)
COLOR UR: ABNORMAL
CREAT SERPL-MCNC: 0.62 MG/DL (ref 0.7–1.3)
DIFFERENTIAL METHOD BLD: ABNORMAL
EOSINOPHIL # BLD: 0.1 K/UL (ref 0–0.4)
EOSINOPHIL NFR BLD: 1 % (ref 0–7)
EPITH CASTS URNS QL MICRO: ABNORMAL /LPF
ERYTHROCYTE [DISTWIDTH] IN BLOOD BY AUTOMATED COUNT: 17.4 % (ref 11.5–14.5)
GLOBULIN SER CALC-MCNC: 4.2 G/DL (ref 2–4)
GLUCOSE SERPL-MCNC: 80 MG/DL (ref 65–100)
GLUCOSE UR STRIP.AUTO-MCNC: NEGATIVE MG/DL
HCT VFR BLD AUTO: 30.7 % (ref 36.6–50.3)
HGB BLD-MCNC: 10 G/DL (ref 12.1–17)
HGB UR QL STRIP: NEGATIVE
HYALINE CASTS URNS QL MICRO: ABNORMAL /LPF (ref 0–2)
IMM GRANULOCYTES # BLD AUTO: 0 K/UL (ref 0–0.04)
IMM GRANULOCYTES NFR BLD AUTO: 0 % (ref 0–0.5)
KETONES UR QL STRIP.AUTO: NEGATIVE MG/DL
LACTATE SERPL-SCNC: 0.9 MMOL/L (ref 0.4–2)
LEUKOCYTE ESTERASE UR QL STRIP.AUTO: ABNORMAL
LYMPHOCYTES # BLD: 2.2 K/UL (ref 0.8–3.5)
LYMPHOCYTES NFR BLD: 39 % (ref 12–49)
MCH RBC QN AUTO: 29.9 PG (ref 26–34)
MCHC RBC AUTO-ENTMCNC: 32.6 G/DL (ref 30–36.5)
MCV RBC AUTO: 91.6 FL (ref 80–99)
MONOCYTES # BLD: 0.4 K/UL (ref 0–1)
MONOCYTES NFR BLD: 7 % (ref 5–13)
NEUTS SEG # BLD: 2.9 K/UL (ref 1.8–8)
NEUTS SEG NFR BLD: 53 % (ref 32–75)
NITRITE UR QL STRIP.AUTO: NEGATIVE
NRBC # BLD: 0 K/UL (ref 0–0.01)
NRBC BLD-RTO: 0 PER 100 WBC
PH UR STRIP: 6 [PH] (ref 5–8)
PLATELET # BLD AUTO: 267 K/UL (ref 150–400)
PMV BLD AUTO: 11.9 FL (ref 8.9–12.9)
POTASSIUM SERPL-SCNC: 3.6 MMOL/L (ref 3.5–5.1)
PROCALCITONIN SERPL-MCNC: 0.27 NG/ML
PROT SERPL-MCNC: 6.8 G/DL (ref 6.4–8.2)
PROT UR STRIP-MCNC: ABNORMAL MG/DL
RBC # BLD AUTO: 3.35 M/UL (ref 4.1–5.7)
RBC #/AREA URNS HPF: ABNORMAL /HPF (ref 0–5)
SODIUM SERPL-SCNC: 140 MMOL/L (ref 136–145)
SP GR UR REFRACTOMETRY: 1.02
UA: UC IF INDICATED,UAUC: ABNORMAL
UROBILINOGEN UR QL STRIP.AUTO: 1 EU/DL (ref 0.2–1)
WBC # BLD AUTO: 5.6 K/UL (ref 4.1–11.1)
WBC URNS QL MICRO: ABNORMAL /HPF (ref 0–4)

## 2022-04-26 PROCEDURE — 80053 COMPREHEN METABOLIC PANEL: CPT

## 2022-04-26 PROCEDURE — 36415 COLL VENOUS BLD VENIPUNCTURE: CPT

## 2022-04-26 PROCEDURE — 85025 COMPLETE CBC W/AUTO DIFF WBC: CPT

## 2022-04-26 PROCEDURE — 74011250636 HC RX REV CODE- 250/636: Performed by: EMERGENCY MEDICINE

## 2022-04-26 PROCEDURE — 96361 HYDRATE IV INFUSION ADD-ON: CPT

## 2022-04-26 PROCEDURE — 83605 ASSAY OF LACTIC ACID: CPT

## 2022-04-26 PROCEDURE — G0378 HOSPITAL OBSERVATION PER HR: HCPCS

## 2022-04-26 PROCEDURE — 99285 EMERGENCY DEPT VISIT HI MDM: CPT

## 2022-04-26 PROCEDURE — 74011000250 HC RX REV CODE- 250: Performed by: INTERNAL MEDICINE

## 2022-04-26 PROCEDURE — 96375 TX/PRO/DX INJ NEW DRUG ADDON: CPT

## 2022-04-26 PROCEDURE — 3E0336Z INTRODUCTION OF NUTRITIONAL SUBSTANCE INTO PERIPHERAL VEIN, PERCUTANEOUS APPROACH: ICD-10-PCS | Performed by: INTERNAL MEDICINE

## 2022-04-26 PROCEDURE — 74011000258 HC RX REV CODE- 258: Performed by: INTERNAL MEDICINE

## 2022-04-26 PROCEDURE — 81001 URINALYSIS AUTO W/SCOPE: CPT

## 2022-04-26 PROCEDURE — 96374 THER/PROPH/DIAG INJ IV PUSH: CPT

## 2022-04-26 PROCEDURE — 84145 PROCALCITONIN (PCT): CPT

## 2022-04-26 PROCEDURE — 87040 BLOOD CULTURE FOR BACTERIA: CPT

## 2022-04-26 RX ORDER — SODIUM CHLORIDE 0.9 % (FLUSH) 0.9 %
5-40 SYRINGE (ML) INJECTION EVERY 8 HOURS
Status: DISCONTINUED | OUTPATIENT
Start: 2022-04-26 | End: 2022-05-02 | Stop reason: ALTCHOICE

## 2022-04-26 RX ORDER — SODIUM CHLORIDE 0.9 % (FLUSH) 0.9 %
5-40 SYRINGE (ML) INJECTION AS NEEDED
Status: DISCONTINUED | OUTPATIENT
Start: 2022-04-26 | End: 2022-05-05 | Stop reason: HOSPADM

## 2022-04-26 RX ORDER — VANCOMYCIN HYDROCHLORIDE
1250
Status: COMPLETED | OUTPATIENT
Start: 2022-04-26 | End: 2022-04-26

## 2022-04-26 RX ORDER — ONDANSETRON 2 MG/ML
4 INJECTION INTRAMUSCULAR; INTRAVENOUS
Status: COMPLETED | OUTPATIENT
Start: 2022-04-26 | End: 2022-04-26

## 2022-04-26 RX ORDER — ACETAMINOPHEN 650 MG/1
650 SUPPOSITORY RECTAL
Status: DISCONTINUED | OUTPATIENT
Start: 2022-04-26 | End: 2022-05-05 | Stop reason: HOSPADM

## 2022-04-26 RX ORDER — DEXTROSE MONOHYDRATE AND SODIUM CHLORIDE 5; .9 G/100ML; G/100ML
75 INJECTION, SOLUTION INTRAVENOUS CONTINUOUS
Status: DISCONTINUED | OUTPATIENT
Start: 2022-04-26 | End: 2022-04-27

## 2022-04-26 RX ORDER — MORPHINE SULFATE 10 MG/ML
6 INJECTION, SOLUTION INTRAMUSCULAR; INTRAVENOUS
Status: COMPLETED | OUTPATIENT
Start: 2022-04-26 | End: 2022-04-26

## 2022-04-26 RX ORDER — ENOXAPARIN SODIUM 100 MG/ML
40 INJECTION SUBCUTANEOUS DAILY
Status: DISCONTINUED | OUTPATIENT
Start: 2022-04-27 | End: 2022-05-05 | Stop reason: HOSPADM

## 2022-04-26 RX ORDER — ONDANSETRON 4 MG/1
4 TABLET, ORALLY DISINTEGRATING ORAL
Status: DISCONTINUED | OUTPATIENT
Start: 2022-04-26 | End: 2022-05-05 | Stop reason: HOSPADM

## 2022-04-26 RX ORDER — ACETAMINOPHEN 325 MG/1
650 TABLET ORAL
Status: DISCONTINUED | OUTPATIENT
Start: 2022-04-26 | End: 2022-05-05 | Stop reason: HOSPADM

## 2022-04-26 RX ORDER — ONDANSETRON 2 MG/ML
4 INJECTION INTRAMUSCULAR; INTRAVENOUS
Status: DISCONTINUED | OUTPATIENT
Start: 2022-04-26 | End: 2022-05-05 | Stop reason: HOSPADM

## 2022-04-26 RX ORDER — POLYETHYLENE GLYCOL 3350 17 G/17G
17 POWDER, FOR SOLUTION ORAL DAILY PRN
Status: DISCONTINUED | OUTPATIENT
Start: 2022-04-26 | End: 2022-05-05 | Stop reason: HOSPADM

## 2022-04-26 RX ADMIN — SODIUM CHLORIDE 1000 ML: 9 INJECTION, SOLUTION INTRAVENOUS at 14:01

## 2022-04-26 RX ADMIN — DEXTROSE AND SODIUM CHLORIDE 75 ML/HR: 5; 900 INJECTION, SOLUTION INTRAVENOUS at 17:37

## 2022-04-26 RX ADMIN — VANCOMYCIN HYDROCHLORIDE 1250 MG: 10 INJECTION, POWDER, LYOPHILIZED, FOR SOLUTION INTRAVENOUS at 16:23

## 2022-04-26 RX ADMIN — SODIUM CHLORIDE, PRESERVATIVE FREE 10 ML: 5 INJECTION INTRAVENOUS at 18:57

## 2022-04-26 RX ADMIN — ONDANSETRON 4 MG: 2 INJECTION INTRAMUSCULAR; INTRAVENOUS at 14:04

## 2022-04-26 RX ADMIN — MORPHINE SULFATE 6 MG: 10 INJECTION, SOLUTION INTRAMUSCULAR; INTRAVENOUS at 14:05

## 2022-04-26 NOTE — TELEPHONE ENCOUNTER
Teddi Dandy with home care delivered called following up on fax regarding medical necessity, Teddi Dandy faxed it back on 4/21      Please call with update  [phone]

## 2022-04-26 NOTE — ED NOTES
TRANSFER - IN REPORT:    Verbal report received from Valentino Hauser (name) on [facility]. Report consisted of patients Situation, Background, Assessment and   Recommendations(SBAR). Information from the following report(s) SBAR and ED Summary was reviewed with the receiving nurse. Opportunity for questions and clarification was provided. 1805 Patient is being transferred to hospitals Medical Oncology, Room # 6374. Report given to Ekaterina Leblanc RN on [facility] for routine progression of care. Report consisted of the following information SBAR, Kardex, ED Summary, MAR and Recent Results. Patient transferred to receiving unit by: transport (RN or tech name). Outstanding consults needed: No     Next labs due: 0400    The following personal items will be sent with the patient during transfer to the floor:     All valuables:    Cardiac monitoring ordered: No     The following CURRENT information was reported to the receiving RN:    Code status: Full Code at time of transfer    Last set of vital signs:  Vital Signs  Level of Consciousness: Alert (0) (04/26/22 1737)  Temp: 97.8 °F (36.6 °C) (04/26/22 1737)  Temp Source: Oral (04/26/22 1737)  Pulse (Heart Rate): 82 (04/26/22 1737)  Heart Rate Source: Monitor (04/26/22 1737)  Resp Rate: 14 (04/26/22 1737)  BP: (!) 87/63 (04/26/22 1737)  MAP (Monitor): 72 (04/26/22 1453)  MAP (Calculated): 71 (04/26/22 1737)  BP 1 Location: Right upper arm (04/26/22 1320)  BP 1 Method: Automatic (04/26/22 1320)  BP Patient Position: At rest (04/26/22 1320)  MEWS Score: 1 (04/26/22 1737)         Oxygen Therapy  O2 Sat (%): 100 % (04/26/22 1737)  Pulse via Oximetry: 90 beats per minute (04/26/22 1453)  O2 Device: None (Room air) (04/26/22 1737)      Last pain assessment:  Pain 1  Pain Scale 1: Numeric (0 - 10)  Pain Intensity 1: 0  Patient Stated Pain Goal: 0      Wounds: Yes    Urinary catheter: voiding  Is there a brock order: No     LDAs:  PICC Double Lumen 04/23/22 Left;Brachial (Active)       Peripheral IV 04/26/22 Right Forearm (Active)   Site Assessment Clean, dry, & intact 04/26/22 1346   Phlebitis Assessment 0 04/26/22 1346   Infiltration Assessment 0 04/26/22 1346   Dressing Status Clean, dry, & intact 04/26/22 1346         Opportunity for questions and clarification was provided.     Abel Poe RN
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
## 2022-04-26 NOTE — ED TRIAGE NOTES
Patient was called and told to return to the hospital due to abnormal blood culture results and need for hospitalization for IV antibiotics.

## 2022-04-26 NOTE — PROGRESS NOTES
Called the Patient and requested that he return the the ED today with new blood culture results and notified the discharging provider.

## 2022-04-26 NOTE — PROGRESS NOTES
Pharmacy Medication Reconciliation     The patient was interviewed regarding current PTA medication list, use and drug allergies;  patient present in room and obtained permission from patient to discuss drug regimen with visitor(s) present. The patient was questioned regarding use of any other inhalers, topical products, over the counter medications, herbal medications, vitamin products or ophthalmic/nasal/otic medication use. Allergy Update: Milk containing products    Recommendations/Findings: The following amendments were made to the patient's active medication list on file at [facility]:   1) Additions: none  2) Deletions: none  3) Changes: none  Pertinent Findings: none    Clarified PTA med list with rx query, patient interview. PTA medication list was corrected to the following:     Prior to Admission Medications   Prescriptions Last Dose Informant Taking? buprenorphine (BUTRANS) 5 mcg/hour patch   Yes   Si Patch by TransDERmal route every seven (7) days.       Facility-Administered Medications: None        Thank you,  Ariel Olivares, [address]

## 2022-04-26 NOTE — PROGRESS NOTES
Lab called unit to notify provider that 2/2 bottles of the blood cultures drawn on 04/24/22 were positive for GPC in clusters. Called and spoke with patient and notified him of this lab finding and requested that he return to the emergency department so that he can be admitted and have appropriate antibiotic treatment.   Patient verbalized understanding and states that he is getting dressed currently to come back to the hospital.

## 2022-04-26 NOTE — PROGRESS NOTES
End of Shift Note    Bedside shift change report given to Desiree (oncoming nurse) by Lyla Madison (offgoing nurse). Report included the following information SBAR, Kardex and MAR    Shift worked:  1831pm to 7pm     Shift summary and any significant changes:     Patient admitted to the floor at 1831pm. Vitals assessed and patient patient resting quietly. No complaints of pain. Hourly rounding completed. MD ward aware of low blood pressure and no interventions provided.           Lyla Madison

## 2022-04-27 ENCOUNTER — APPOINTMENT (OUTPATIENT)
Dept: GENERAL RADIOLOGY | Age: 42
DRG: 721 | End: 2022-04-27
Attending: INTERNAL MEDICINE
Payer: MEDICAID

## 2022-04-27 LAB
ANION GAP SERPL CALC-SCNC: 4 MMOL/L (ref 5–15)
BACTERIA SPEC CULT: ABNORMAL
BUN SERPL-MCNC: 11 MG/DL (ref 6–20)
BUN/CREAT SERPL: 23 (ref 12–20)
CALCIUM SERPL-MCNC: 8 MG/DL (ref 8.5–10.1)
CHLORIDE SERPL-SCNC: 111 MMOL/L (ref 97–108)
CO2 SERPL-SCNC: 27 MMOL/L (ref 21–32)
CREAT SERPL-MCNC: 0.48 MG/DL (ref 0.7–1.3)
GLUCOSE SERPL-MCNC: 79 MG/DL (ref 65–100)
GRAM STN SPEC: ABNORMAL
POTASSIUM SERPL-SCNC: 3.6 MMOL/L (ref 3.5–5.1)
SERVICE CMNT-IMP: ABNORMAL
SODIUM SERPL-SCNC: 142 MMOL/L (ref 136–145)

## 2022-04-27 PROCEDURE — 74011250636 HC RX REV CODE- 250/636: Performed by: INTERNAL MEDICINE

## 2022-04-27 PROCEDURE — 96376 TX/PRO/DX INJ SAME DRUG ADON: CPT

## 2022-04-27 PROCEDURE — 74011000250 HC RX REV CODE- 250: Performed by: INTERNAL MEDICINE

## 2022-04-27 PROCEDURE — 80048 BASIC METABOLIC PNL TOTAL CA: CPT

## 2022-04-27 PROCEDURE — 74011000258 HC RX REV CODE- 258: Performed by: INTERNAL MEDICINE

## 2022-04-27 PROCEDURE — 96375 TX/PRO/DX INJ NEW DRUG ADDON: CPT

## 2022-04-27 PROCEDURE — 71045 X-RAY EXAM CHEST 1 VIEW: CPT

## 2022-04-27 PROCEDURE — 36415 COLL VENOUS BLD VENIPUNCTURE: CPT

## 2022-04-27 PROCEDURE — G0378 HOSPITAL OBSERVATION PER HR: HCPCS

## 2022-04-27 RX ORDER — CEFDINIR 250 MG/5ML
300 POWDER, FOR SUSPENSION ORAL EVERY 12 HOURS
Status: DISCONTINUED | OUTPATIENT
Start: 2022-04-27 | End: 2022-04-27

## 2022-04-27 RX ORDER — CEPHALEXIN 125 MG/5ML
500 POWDER, FOR SUSPENSION ORAL 3 TIMES DAILY
Status: DISCONTINUED | OUTPATIENT
Start: 2022-04-27 | End: 2022-04-27

## 2022-04-27 RX ORDER — VANCOMYCIN HYDROCHLORIDE
1250 ONCE
Status: COMPLETED | OUTPATIENT
Start: 2022-04-27 | End: 2022-04-27

## 2022-04-27 RX ADMIN — SODIUM CHLORIDE 1000 ML: 9 INJECTION, SOLUTION INTRAVENOUS at 13:35

## 2022-04-27 RX ADMIN — CEFEPIME HYDROCHLORIDE 1 G: 1 INJECTION, POWDER, FOR SOLUTION INTRAMUSCULAR; INTRAVENOUS at 23:24

## 2022-04-27 RX ADMIN — VANCOMYCIN HYDROCHLORIDE 750 MG: 750 INJECTION, POWDER, LYOPHILIZED, FOR SOLUTION INTRAVENOUS at 22:47

## 2022-04-27 RX ADMIN — SODIUM CHLORIDE, PRESERVATIVE FREE 10 ML: 5 INJECTION INTRAVENOUS at 02:35

## 2022-04-27 RX ADMIN — SODIUM CHLORIDE, PRESERVATIVE FREE 10 ML: 5 INJECTION INTRAVENOUS at 06:21

## 2022-04-27 RX ADMIN — SODIUM CHLORIDE, PRESERVATIVE FREE 10 ML: 5 INJECTION INTRAVENOUS at 13:38

## 2022-04-27 RX ADMIN — POTASSIUM CHLORIDE: 2 INJECTION, SOLUTION, CONCENTRATE INTRAVENOUS at 17:34

## 2022-04-27 RX ADMIN — SODIUM CHLORIDE, PRESERVATIVE FREE 10 ML: 5 INJECTION INTRAVENOUS at 23:21

## 2022-04-27 RX ADMIN — VANCOMYCIN HYDROCHLORIDE 1250 MG: 10 INJECTION, POWDER, LYOPHILIZED, FOR SOLUTION INTRAVENOUS at 13:39

## 2022-04-27 NOTE — PROGRESS NOTES
Hospitalist Progress Note    NAME: Nickie Kayser   :  1980   MRN:  [id number]       Assessment / Plan:  Positive blood cultures  Recent cultures for 24 showed GPC on 2 bottles  Patient received vancomycin  We will continue Vanco for another day follow-up with repeated cultures and also sensitivities of the previous cultures. And make changes accordingly. Patient has a PICC will probably discharge on the PICC and also TPN. Protein calorie malnutrition continue TPN  Chronic anemia    Mild thrombocytopenia stable   / Body mass index is 17.22 kg/m². Estimated discharge date: 2022  Barriers: Pending repeated cultures. Code status: Full code  Prophylaxis: Lovenox subcu  Recommended Disposition: Home     Subjective:     Chief Complaint / Reason for Physician Visit   Discussed with RN events overnight. Patient seen and examined at bedside comfortable no new complaints waiting for repeat cultures. Review of Systems:  Symptom Y/N Comments  Symptom Y/N Comments   Fever/Chills    Chest Pain     Poor Appetite    Edema     Cough    Abdominal Pain     Sputum    Joint Pain     SOB/GASTON    Pruritis/Rash     Nausea/vomit    Tolerating PT/OT     Diarrhea    Tolerating Diet     Constipation    Other       Could NOT obtain due to:      Objective:     VITALS:   Last 24hrs VS reviewed since prior progress note.  Most recent are:  Patient Vitals for the past 24 hrs:   Temp Pulse Resp BP SpO2   22 0745 97.9 °F (36.6 °C) 64 18 (!) 87/55 100 %   22 2324 97.7 °F (36.5 °C) 69 16 (!) 84/53 100 %   22 1848 98 °F (36.7 °C) 70 16 (!) 85/58 100 %   22 1837 98.1 °F (36.7 °C) 73 18 (!) 80/50 100 %   22 1737 97.8 °F (36.6 °C) 82 14 (!) 87/63 100 %   22 1453 -- 90 16 91/64 100 %   22 1423 -- 86 14 92/65 100 %   22 1353 -- 90 18 92/63 100 %   22 1337 -- 96 16 (!) 86/66 100 %   22 1320 98 °F (36.7 °C) (!) 105 16 (!) 83/56 100 %       Intake/Output Summary (Last 24 hours) at 4/27/2022 1103  Last data filed at 4/27/2022 0930  Gross per 24 hour   Intake 1000 ml   Output 500 ml   Net 500 ml        I had a face to face encounter and independently examined this patient on 4/27/2022, as outlined below:  PHYSICAL EXAM:  General: WD, WN. Alert, cooperative, no acute distress    EENT:  EOMI. Anicteric sclerae. MMM  Resp:  CTA bilaterally, no wheezing or rales. No accessory muscle use  CV:  Regular  rhythm,  No edema  GI:  Soft, Non distended, Non tender. +Bowel sounds  Neurologic:  Alert and oriented X 3, normal speech,   Psych:   Good insight. Not anxious nor agitated  Skin:  No rashes. No jaundice    Reviewed most current lab test results and cultures  YES  Reviewed most current radiology test results   YES  Review and summation of old records today    NO  Reviewed patient's current orders and MAR    YES  PMH/ reviewed - no change compared to H&P  ________________________________________________________________________  Care Plan discussed with:    Comments   Patient     Family      RN     Care Manager     Consultant                        Multidiciplinary team rounds were held today with , nursing, pharmacist and clinical coordinator. Patient's plan of care was discussed; medications were reviewed and discharge planning was addressed. ________________________________________________________________________  Total NON critical care TIME:    Total CRITICAL CARE TIME Spent:   Minutes non procedure based      Comments   >50% of visit spent in counseling and coordination of care     ________________________________________________________________________  Matthew Pedroza MD     Procedures: see electronic medical records for all procedures/Xrays and details which were not copied into this note but were reviewed prior to creation of Plan. LABS:  I reviewed today's most current labs and imaging studies.   Pertinent labs include:  Recent Labs     04/26/22  6894 04/25/22  0519 04/24/22 2241   WBC 5.6 3.5* 3.2*   HGB 10.0* 8.2* 8.0*   HCT 30.7* 25.5* 24.8*    177 183     Recent Labs     04/27/22  0207 04/26/22  1354 04/25/22  0519 04/24/22  2241 04/24/22 2241    140 142   < > 142   K 3.6 3.6 4.0   < > 3.7   * 109* 111*   < > 112*   CO2 27 26 28   < > 28   GLU 79 80 86   < > 83   BUN 11 15 9   < > 7   CREA 0.48* 0.62* 0.42*   < > 0.43*   CA 8.0* 8.7 8.0*   < > 7.9*   MG  --   --  1.9  --  1.9   PHOS  --   --  2.2*  --  3.2   ALB  --  2.6* 1.9*  --   --    TBILI  --  0.6 0.6  --   --    ALT  --  83* 76  --   --     < > = values in this interval not displayed.        Signed: Abimbola Tolliver MD
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
## 2022-04-27 NOTE — PROGRESS NOTES
Pharmacy Antimicrobial Kinetic Dosing    Indication for Antimicrobials: bacteremia     Current Regimen of Each Antimicrobial:  Vancomycin, pharmacy to dose (Start Date ; Day # 2)    Previous Antimicrobial Therapy:    Goal Level: AUC: 400-600 mg/hr/Liter/day    Date Dose & Interval Measured (mcg/mL) Predicted AUC/MARIE                       Date & time of next level:     Dosing calculator used: [employer] calculator    Significant Positive Cultures:    abdomen - ecoli, klebsiella, morganella, lactobacillus (final)   bcx -  staph coag neg (pending)   pbcx -    Conditions for Dosing Consideration: Malnutrition    Labs:  Recent Labs     22  0207 22  1531 22  1354 22  0519   CREA 0.48*  --  0.62* 0.42*   BUN 11  --  15 9   PCT  --  0.27  --   --      Recent Labs     22  1354 22  0519 22  2241   WBC 5.6 3.5* 3.2*     Temp (24hrs), Av.9 °F (36.6 °C), Min:97.7 °F (36.5 °C), Max:98.1 °F (36.7 °C)        Creatinine Clearance (mL/min):   CrCl (Ideal Body Weight): 141.9   If actual weight < IBW: CrCl (Actual Body Weight) 105.8    Impression/Plan:   Patient received vancomycin 1250mg IV x 1  at 1623. Will give another vancomycin 1250mg IV x 1 and follow with vancomycin 750mg (13.8mg/kg) IV q8h to give an estimated . BMP daily  Antimicrobial stop date TBD     Pharmacy will follow daily and adjust medications as appropriate for renal function and/or serum levels.     Thank you,  Lucio Salmon, PHARMD    Vancomycin Dosing Document    Documents located on pharmacy Teams site: Clinical Practice -> Antimicrobial Stewardship -> Antibiotics_Vancomycin     Aminoglycoside Dosing Document    Documents located on pharmacy Teams site: Clinical Practice -> Antimicrobial Stewardship -> Antibiotics_Aminoglycosides

## 2022-04-27 NOTE — PROGRESS NOTES
Transition of Care Plan:    RUR: N/A - Observation status; pt to be provided VOON on today   Disposition: Home with [address] and TPN ([address])  Follow up appointments: PCP, Specialists as indicated  DME needed: None anticipated. Pt has a rollator and bedside commode  Transportation at Discharge: Pt's mother vs Medicaid transport   [address] or means to access home:  Pt has access      IM Medicare Letter: N/A - Medicaid Coverage only  Is patient a BCPI-A Bundle:    N/A   If yes, was Bundle Letter given?:    Is patient a  and connected with the [place]? N/A                If yes, was [employer] transfer form completed and [facility] notified? Caregiver Contact: Pt's mother, Uriel Plummer) [phone]  Discharge Caregiver contacted prior to discharge? To be contacted. Care Conference needed?:   Not indicated at present                 Reason for Readmission:  Positive blood culture, protein calorie malnutrition, chronic anemia, mild thrombocytopenia             RUR Score/Risk Level:  N/A Observation status        PCP: First and Last name:     Name of Practice: [address] - uncertain of provider   Are you a current patient: Yes/No:    Approximate date of last visit:    Can you participate in a virtual visit with your PCP:     Is a Care Conference indicated: Not indicated at present       Did you attend your follow up appointment (s): If not, why not:   Pt instructed to return to hospital based on lab results        Resources/supports as identified by patient/family: Mother, other family         Top Challenges facing patient (as identified by patient/family and CM): Finances/Medication cost?  Denies concerns, pt is insured with [place] Medicaid and uses  [employer]	[place] transportation vs mother       Support system or lack thereof? Adequate support per pt    Living arrangements? Resides with mother in 1 level home       Self-care/ADLs/Cognition? Pt is alert and oriented, able to manage ADLs          Current Advanced Directive/Advance Care Plan:  Advance Care Planning   Healthcare Decision Maker:       Primary Decision Maker (Active): Manuela Breen Mother - [phone]    Today we documented Decision Maker(s) consistent with Legal Next of Kin hierarchy. Plan for utilizing home health: Pt is open with [facility], will need resumption orders                Transition of Care Plan:    Based on readmission, the patient's previous Plan of Care   has been evaluated and/or modified. The current Transition of Care Plan is:  Home with resumption of [facility] and TPN through [facility] reviewed chart. CM completed assessment with pt at bedside. CM introduced self, role of CM, verified demographics, and discussed transition of care planning. Pt reports that all information remains accurate on his chart as he was just here. Pt was informed to return to hospital due to abnormal blood culture results. Anticipating that pt's previous d/c plans will remain in place with resumption of [facility] and TPN through [address] reports being eager to return home. Mother vs Medicaid transportation at d/c. Care management will continue to follow for transition of care planning needs. Care Management Interventions  PCP Verified by CM: No (Pt reports he sees a provider at [facility] but cannot recall information)  Palliative Care Criteria Met (RRAT>21 & CHF Dx)?: No  Mode of Transport at Discharge:  Other (see comment) (Mother vs Medicaid transport)  Transition of Care Consult (CM Consult): Discharge Planning  Discharge Durable Medical Equipment: No (Pt has a rollator and bedside commode)  Physical Therapy Consult: No  Occupational Therapy Consult: No  Speech Therapy Consult: No  Support Systems: Parent(s)  Confirm Follow Up Transport: Family (Mother vs Medicaid transportation)  Discharge Location  Patient Expects to be Discharged to[de-identified] Home with home health (Anticipating return home with resumption of [facility] and TPN through [place] [address])    Readmission Assessment  Number of days since last admission?: 1-7 days  Previous disposition: Home with Home Health ([facility] and TPN through [place] [address])  Who is being interviewed?: Patient  What was the patient's/caregiver's perception as to why they think they needed to return back to the hospital?: Other (Comment) (Pt instructed to return to hospital due to lab results)  Did you visit your Primary Care Physician after you left the hospital, before you returned this time?: No  Why weren't you able to visit your PCP?: Other (Comment) (Instructed to return to hospital, did not see PCP prior to returning)  Does the patient report anything that got in the way of taking their medications?: No  In our efforts to provide the best possible care to you and others like you, can you think of anything that we could have done to help you after you left the hospital the first time, so that you might not have needed to return so soon?: Other (Comment) (None voiced)        Rox Levy [address]

## 2022-04-27 NOTE — PROGRESS NOTES
Comprehensive Nutrition Assessment    Type and Reason for Visit: Initial (new TPN)    Nutrition Recommendations/Plan:   1. Current TPN regimen D10/7.3%AA provides daily approx. 1495 kcals/102 g protein/320 g CHO which does not meet est needs. 2. Recommend adding back daily lipids per home regimen. Pt received 200 ml 20% lipids daily. Malnutrition Assessment:  Malnutrition Status:  Severe malnutrition (04/27/22 1618)    Context:  Chronic illness     Findings of the 6 clinical characteristics of malnutrition:   Energy Intake:  75% or less est energy requirements for 1 month or longer  Weight Loss:  Unable to assess     Body Fat Loss:  Severe body fat loss, Triceps,Orbital   Muscle Mass Loss:  Severe muscle mass loss, Clavicles (pectoralis &deltoids),Scapula (trapezius)  Fluid Accumulation:  Unable to assess,     Strength:  Not performed   RD completed a nutrition focused physical exam noting continued muscle wasting and fat loss. However, pt is less cachectic looking and face is filling out from prior visit. Less wasting noted in upper extremities. Lower extremities still severely malnourished. Pt reports feeling better and he has been trying to utilize some of his weights at home to regain strength in effort to have a bowel transplant. Nutrition Assessment:     4/27: Chart reviewed; med noted for positive blood culture so had to return to the hospital after being discharged on 4/25. Pt's TPN orders from prior admission have been resumed. However, would recommend adding back daily lipids per home regimen and to meet est needs. 4/25 (prior admission): Chart reviewed; pt admitted related to dislodged TPN access. PICC line has been replaced and TPN infusing. RD received a consult for TPN recs. RD visited with pt at [facility], has current hospital orders to receive TPN of U75.2%/0.4%DG x 12 hr cyclic from 6PM to 6AM. Pt anxious to go home.  States he normally receives TPN from 4PM to 4AM at home and lipids infuse daily. Current hospital orders indicate the following (from 6PM to 6AM):  Start D10.7%/3.4AA @ 62 ml/hr x 1 hr  Increase D10.7/3.4%AA @ 125 ml/hr x 1 hr  Increase D10.7/3.4%AA @ 249 ml/hr x 8 hr  Decrease D10.7/3.4%AA @ 125 ml/hr x 1 hr  Decrease D10.7/3.4%AA @ 65 ml/hr x 1 hr to off  Total volume = 2988 ml/1495 kcals (27.3 kcal/kg bw)/102 g protein/320 g CHO     RD spoke with Abdulkadir Villalobos over the phone whom manages home TPN. Home TPN regimen is as follows: Total; volume = 2500 ml fluid/320 g Dextrose/100 g Amino Acids/40 g Lipids (daily) which provides daily approx. 1888 kcals/100 g protein/320 g CHO. Home TPN meets est needs as pt has been gaining some weight and tolerating. Would continue. Nutrition Related Findings:    BM: ostomy; Meds: reviewed; Labs: Phos 2.2 Wound Type: Surgical incision    Current Nutrition Intake & Therapies:        TPN ADULT - CENTRAL  ADULT DIET Full Liquid    Anthropometric Measures:  Height: 5' 10\" (177.8 cm)  Ideal Body Weight (IBW): 166 lbs (75 kg)     Current Body Wt:  54.4 kg (119 lb 14.9 oz), 72.2 % IBW. Current BMI (kg/m2): 17.2                          BMI Category: Underweight (BMI less than 18.5)    Estimated Daily Nutrient Needs:  Energy Requirements Based On: Formula  Weight Used for Energy Requirements: Current  Energy (kcal/day): 1890 (BMR 1451 x 1. 3AF)  Weight Used for Protein Requirements: Current  Protein (g/day): 55-83 (1.0-1.2 g/kg bw)  Method Used for Fluid Requirements: 1 ml/kcal  Fluid (ml/day): 1900 ml/day    Nutrition Diagnosis:   · Altered GI function related to altered GI function as evidenced by nutrition support-parenteral nutrition      Nutrition Interventions:   Food and/or Nutrient Delivery: Continue current diet,Continue parenteral nutrition  Nutrition Education/Counseling: No recommendations at this time  Coordination of Nutrition Care: Continue to monitor while inpatient       Goals:     Goals:  Tolerate nutrition support at goal rate,by next RD assessment       Nutrition Monitoring and Evaluation:   Behavioral-Environmental Outcomes: None identified  Food/Nutrient Intake Outcomes: Parenteral nutrition intake/tolerance,Food and nutrient intake  Physical Signs/Symptoms Outcomes: Biochemical data,Skin,Weight,GI status    Discharge Planning:    Parenteral nutrition    Duane Ji RD  Contact:

## 2022-04-27 NOTE — PROGRESS NOTES
End of Shift Note    Bedside shift change report given to Rubina Barajas (oncoming nurse) by Giovani Barbour (offgoing nurse). Report included the following information SBAR, Kardex and MAR    Shift worked:  7am to 7pm     Shift summary and any significant changes:     Patient tolerated care fairly throughout shift. Hourly rounding completed. No Medications given throughout shift. Wound Care RN Kev Jefferson redressed patients ostomy and Is and Os are documented. Patient on a full liquid diet per MD Padilla. Patient turned Q2 hours. MD Padilla notified of low bp and 1,000 bolus given.           Giovani Barbour

## 2022-04-28 LAB
ANION GAP SERPL CALC-SCNC: 2 MMOL/L (ref 5–15)
BACTERIA SPEC CULT: ABNORMAL
BACTERIA SPEC CULT: ABNORMAL
BUN SERPL-MCNC: 12 MG/DL (ref 6–20)
BUN/CREAT SERPL: 25 (ref 12–20)
CALCIUM SERPL-MCNC: 7.8 MG/DL (ref 8.5–10.1)
CHLORIDE SERPL-SCNC: 116 MMOL/L (ref 97–108)
CO2 SERPL-SCNC: 26 MMOL/L (ref 21–32)
CREAT SERPL-MCNC: 0.48 MG/DL (ref 0.7–1.3)
GLUCOSE SERPL-MCNC: 93 MG/DL (ref 65–100)
POTASSIUM SERPL-SCNC: 4.2 MMOL/L (ref 3.5–5.1)
SERVICE CMNT-IMP: ABNORMAL
SODIUM SERPL-SCNC: 144 MMOL/L (ref 136–145)

## 2022-04-28 PROCEDURE — 74011250636 HC RX REV CODE- 250/636: Performed by: INTERNAL MEDICINE

## 2022-04-28 PROCEDURE — G0378 HOSPITAL OBSERVATION PER HR: HCPCS

## 2022-04-28 PROCEDURE — 65270000029 HC RM PRIVATE

## 2022-04-28 PROCEDURE — 74011250636 HC RX REV CODE- 250/636: Performed by: HOSPITALIST

## 2022-04-28 PROCEDURE — 74011000250 HC RX REV CODE- 250: Performed by: INTERNAL MEDICINE

## 2022-04-28 PROCEDURE — 36415 COLL VENOUS BLD VENIPUNCTURE: CPT

## 2022-04-28 PROCEDURE — 74011000250 HC RX REV CODE- 250: Performed by: HOSPITALIST

## 2022-04-28 PROCEDURE — 77030040831 HC BAG URINE DRNG MDII -A

## 2022-04-28 PROCEDURE — 80048 BASIC METABOLIC PNL TOTAL CA: CPT

## 2022-04-28 PROCEDURE — 74011000258 HC RX REV CODE- 258: Performed by: HOSPITALIST

## 2022-04-28 PROCEDURE — 96376 TX/PRO/DX INJ SAME DRUG ADON: CPT

## 2022-04-28 RX ADMIN — CEFEPIME HYDROCHLORIDE 1 G: 1 INJECTION, POWDER, FOR SOLUTION INTRAMUSCULAR; INTRAVENOUS at 11:43

## 2022-04-28 RX ADMIN — VANCOMYCIN HYDROCHLORIDE 750 MG: 750 INJECTION, POWDER, LYOPHILIZED, FOR SOLUTION INTRAVENOUS at 14:10

## 2022-04-28 RX ADMIN — SODIUM CHLORIDE, PRESERVATIVE FREE 10 ML: 5 INJECTION INTRAVENOUS at 14:21

## 2022-04-28 RX ADMIN — VANCOMYCIN HYDROCHLORIDE 750 MG: 750 INJECTION, POWDER, LYOPHILIZED, FOR SOLUTION INTRAVENOUS at 06:32

## 2022-04-28 RX ADMIN — SODIUM CHLORIDE, PRESERVATIVE FREE 10 ML: 5 INJECTION INTRAVENOUS at 22:21

## 2022-04-28 RX ADMIN — SODIUM CHLORIDE, PRESERVATIVE FREE 10 ML: 5 INJECTION INTRAVENOUS at 06:33

## 2022-04-28 RX ADMIN — CEFEPIME HYDROCHLORIDE 1 G: 1 INJECTION, POWDER, FOR SOLUTION INTRAMUSCULAR; INTRAVENOUS at 22:32

## 2022-04-28 RX ADMIN — SODIUM ACETATE: 164 INJECTION, SOLUTION, CONCENTRATE INTRAVENOUS at 19:11

## 2022-04-28 RX ADMIN — VANCOMYCIN HYDROCHLORIDE 750 MG: 750 INJECTION, POWDER, LYOPHILIZED, FOR SOLUTION INTRAVENOUS at 22:21

## 2022-04-28 NOTE — PROGRESS NOTES
Hospitalist Progress Note    NAME: Krzysztof Green   :  1980   MRN:  [id number]     Subjective:   Daily Progress Note: 2022 8:34 AM      Chief complaint: Admitted for bacteremia  Patient seen and examined chart was reviewed. Discussed with nursing staff at bedside  Patient claims he has low BP all the time and is asymptomatic. Patient claims he has pain and need medications. Patient is on chronic TPN at home.   Patient has staff coagulase-negative bacteremia with repeat cultures are pending  Patient has a PICC line in left upper extremity       Current Facility-Administered Medications   Medication Dose Route Frequency    vancomycin (VANCOCIN) 750 mg in 0.9% sodium chloride 250 mL (VIAL-MATE)  750 mg IntraVENous Q8H    cefepime (MAXIPIME) 1 g in 0.9% sodium chloride 10 mL IV syringe  1 g IntraVENous Q12H    sodium chloride (NS) flush 5-40 mL  5-40 mL IntraVENous Q8H    sodium chloride (NS) flush 5-40 mL  5-40 mL IntraVENous PRN    acetaminophen (TYLENOL) tablet 650 mg  650 mg Oral Q6H PRN    Or    acetaminophen (TYLENOL) suppository 650 mg  650 mg Rectal Q6H PRN    polyethylene glycol (MIRALAX) packet 17 g  17 g Oral DAILY PRN    ondansetron (ZOFRAN ODT) tablet 4 mg  4 mg Oral Q8H PRN    Or    ondansetron (ZOFRAN) injection 4 mg  4 mg IntraVENous Q6H PRN    enoxaparin (LOVENOX) injection 40 mg  40 mg SubCUTAneous DAILY          Objective:     Visit Vitals  BP (!) 83/54 (BP 1 Location: Right arm, BP Patient Position: Sitting)   Pulse 80   Temp 97.9 °F (36.6 °C)   Resp 18   Ht 5' 10\" (1.778 m)   Wt 54.4 kg (120 lb)   SpO2 100%   BMI 17.22 kg/m²      O2 Device: None (Room air)    Temp (24hrs), Av.9 °F (36.6 °C), Min:97.6 °F (36.4 °C), Max:98.2 °F (36.8 °C)        PHYSICAL EXAM:  General thin built and nourished  Neck supple  CVS RRR  Respiratory symmetric expansion  Abdomen previous surgical scars with GI NG tube with ostomy bag  Extremities moves all  Neuro AAOx3  Psych irritated easily        Data Review    Recent Results (from the past 24 hour(s))   METABOLIC PANEL, BASIC    Collection Time: 04/28/22  4:43 AM   Result Value Ref Range    Sodium 144 136 - 145 mmol/L    Potassium 4.2 3.5 - 5.1 mmol/L    Chloride 116 (H) 97 - 108 mmol/L    CO2 26 21 - 32 mmol/L    Anion gap 2 (L) 5 - 15 mmol/L    Glucose 93 65 - 100 mg/dL    BUN 12 6 - 20 MG/DL    Creatinine 0.48 (L) 0.70 - 1.30 MG/DL    BUN/Creatinine ratio 25 (H) 12 - 20      GFR est AA >60 >60 ml/min/1.73m2    GFR est non-AA >60 >60 ml/min/1.73m2    Calcium 7.8 (L) 8.5 - 10.1 MG/DL     No results found for this visit on 04/26/22.   All Micro Results     Procedure Component Value Units Date/Time    CULTURE, BLOOD, PAIRED [[id number]] Collected: 04/26/22 1354    Order Status: Completed Specimen: Blood Updated: 04/28/22 0752     Special Requests: NO SPECIAL REQUESTS        Culture result: NO GROWTH 2 DAYS                  Assessment/Plan:    .  Bacteremia  Blood culture from 4/24 showed staph coag negative sensitivities pending  Continue IV vancomycin  Repeat blood cultures 4/26  Will ask for ID evaluation     Protein calorie malnutition   - Continue the TPN  As before PTA          Chronic anemia/Mild thrombocytopenia   - stable  - No sign of active hemorrhage.   - OP follow up.       Chronic hypertension  -Asymptomatic  -continue to monitor  -Consider midodrine as needed     ADFC  Code Status: Full   DVT Prophylaxis: Lovenox    Dispo: pending ID eval    Signed By: Zuhair Coello MD     April 28, 2022

## 2022-04-28 NOTE — WOUND CARE
Wound Care follow up for a new fistula pouch. Due to the patient's skin condition under the pouch, it has had to be changed daily to heal the repeat skin treatment daily. Assessment: the skin is much less red, weepy than when he first came into the hospital. He only has slightly red just around the ostomy hole. Patient is allowed a clear liquid diet but has no nutritional benefit. He is still TPN dependant. The effluent is coming out much faster now that he is drinking. Treatment: the skin was crusted today using the stoma powder and the skin prep to dab. See the Wound care orders / instructions on how to do the Crusting and apply the pouch. The new pouch was applied and then held in place by the patient. An ostomy bag can also he used to contain the drainage but it will need to be emptied by the staff very often to keep up with it. May hook it up to a bedside urine bag but need to make sure it is continuing to drain and not get clogged. Charlie Zarate RN, BSN, CWON  .

## 2022-04-28 NOTE — PROGRESS NOTES
Problem: Pressure Injury - Risk of  Goal: *Prevention of pressure injury  Description: Document Russell Scale and appropriate interventions in the flowsheet. Outcome: Progressing Towards Goal  Note: Pressure Injury Interventions:       Moisture Interventions: Apply protective barrier, creams and emollients    Activity Interventions: Increase time out of bed    Mobility Interventions: HOB 30 degrees or less    Nutrition Interventions: Document food/fluid/supplement intake                     Problem: Patient Education: Go to Patient Education Activity  Goal: Patient/Family Education  Outcome: Progressing Towards Goal     Problem: Falls - Risk of  Goal: *Absence of Falls  Description: Document Dora Fall Risk and appropriate interventions in the flowsheet.   Outcome: Progressing Towards Goal  Note: Fall Risk Interventions:            Medication Interventions: Teach patient to arise slowly                   Problem: Patient Education: Go to Patient Education Activity  Goal: Patient/Family Education  Outcome: Progressing Towards Goal

## 2022-04-28 NOTE — PROGRESS NOTES
Transition of Care Plan:     RUR: N/A - Observation status;    JORGE: 4/29? Disposition: Home with [address] and [facility]  - CM sent updates to [facility] [place] [address] and [facility]. We are waiting for ID recRadha Vences Settler and will keep them involved with process. Follow up appointments: PCP, Specialists as indicated  DME needed: None anticipated. Pt has a rollator and bedside commode  Transportation at Discharge: Pt's mother vs Medicaid transport   Serge[place]  or means to access home:  Pt has access      IM Medicare Letter: N/A - Medicaid Coverage only  Is patient a BCPI-A Bundle:    N/A              If yes, was Bundle Letter given?:    Is patient a [place] and connected with the [place]? N/A                If yes, was [employer] transfer form completed and [facility] notified? Caregiver Contact: Pt's mother, Rebel Pulliam [phone]  Discharge Caregiver contacted prior to discharge? To be contacted. Care Conference needed?:   Not indicated at present                  CM will continue to monitor discharge plan.      Tasneem Ferreira, [address]  [phone]

## 2022-04-28 NOTE — PROGRESS NOTES
.End of Shift Note    Bedside shift change report given to Bebe (oncoming nurse) by Iantha Seip, RN (offgoing nurse). Report included the following information SBAR, Kardex, Intake/Output, MAR and Recent Results    Shift worked:  7p-7a     Shift summary and any significant changes:     Patient rested through out night. No complaints of pain. Patient assisted in all care. TPN titrated through niht in accordance of order. Concerns for physician to address:  none     Zone phone for oncoming shift:   [phone]       Activity:  Activity Level: Up with Assistance  Number times ambulated in hallways past shift: 0  Number of times OOB to chair past shift: 0    Cardiac:   Cardiac Monitoring: No           Access:   Current line(s): PICC     Genitourinary:   Urinary status: voiding    Respiratory:   O2 Device: None (Room air)  Chronic home O2 use?: NO  Incentive spirometer at bedside: NO       GI:     Current diet:  ADULT DIET Full Liquid  Passing flatus: NO  Tolerating current diet: YES       Pain Management:   Patient states pain is manageable on current regimen: YES    Skin:  Russell Score: 19  Interventions: increase time out of bed    Patient Safety:  Fall Score:  Total Score: 1  Interventions: gripper socks       Length of Stay:  Expected LOS: - - -  Actual LOS: 0      Iantha Seip, RN

## 2022-04-29 ENCOUNTER — APPOINTMENT (OUTPATIENT)
Dept: NON INVASIVE DIAGNOSTICS | Age: 42
DRG: 721 | End: 2022-04-29
Attending: INTERNAL MEDICINE
Payer: MEDICAID

## 2022-04-29 LAB
ANION GAP SERPL CALC-SCNC: 3 MMOL/L (ref 5–15)
BUN SERPL-MCNC: 11 MG/DL (ref 6–20)
BUN/CREAT SERPL: 23 (ref 12–20)
CALCIUM SERPL-MCNC: 8 MG/DL (ref 8.5–10.1)
CHLORIDE SERPL-SCNC: 115 MMOL/L (ref 97–108)
CO2 SERPL-SCNC: 26 MMOL/L (ref 21–32)
CREAT SERPL-MCNC: 0.48 MG/DL (ref 0.7–1.3)
DATE LAST DOSE: ABNORMAL
ECHO AO ROOT DIAM: 2.7 CM
ECHO AO ROOT INDEX: 1.61 CM/M2
ECHO AV AREA PEAK VELOCITY: 2.5 CM2
ECHO AV AREA PEAK VELOCITY: 2.6 CM2
ECHO AV AREA VTI: 2.8 CM2
ECHO AV AREA/BSA VTI: 1.7 CM2/M2
ECHO AV CUSP MM: 2.1 CM
ECHO AV MEAN GRADIENT: 3 MMHG
ECHO AV MEAN VELOCITY: 0.7 M/S
ECHO AV PEAK GRADIENT: 5 MMHG
ECHO AV PEAK GRADIENT: 5 MMHG
ECHO AV PEAK VELOCITY: 1.1 M/S
ECHO AV PEAK VELOCITY: 1.1 M/S
ECHO AV VTI: 18.9 CM
ECHO LA DIAMETER INDEX: 1.73 CM/M2
ECHO LA DIAMETER: 2.9 CM
ECHO LA TO AORTIC ROOT RATIO: 1.07
ECHO LA VOL 2C: 39 ML (ref 18–58)
ECHO LA VOL 4C: 46 ML (ref 18–58)
ECHO LA VOLUME AREA LENGTH: 64 ML
ECHO LA VOLUME INDEX A2C: 23 ML/M2 (ref 16–34)
ECHO LA VOLUME INDEX A4C: 27 ML/M2 (ref 16–34)
ECHO LA VOLUME INDEX AREA LENGTH: 38 ML/M2 (ref 16–34)
ECHO LV E' LATERAL VELOCITY: 16 CM/S
ECHO LV E' SEPTAL VELOCITY: 13 CM/S
ECHO LV FRACTIONAL SHORTENING: 30 % (ref 28–44)
ECHO LV INTERNAL DIMENSION DIASTOLE INDEX: 2.56 CM/M2
ECHO LV INTERNAL DIMENSION DIASTOLIC: 4.3 CM (ref 4.2–5.9)
ECHO LV INTERNAL DIMENSION SYSTOLIC INDEX: 1.79 CM/M2
ECHO LV INTERNAL DIMENSION SYSTOLIC: 3 CM
ECHO LV IVSD: 1.2 CM (ref 0.6–1)
ECHO LV MASS 2D: 184.7 G (ref 88–224)
ECHO LV MASS INDEX 2D: 109.9 G/M2 (ref 49–115)
ECHO LV POSTERIOR WALL DIASTOLIC: 1.2 CM (ref 0.6–1)
ECHO LV RELATIVE WALL THICKNESS RATIO: 0.56
ECHO LVOT AREA: 3.5 CM2
ECHO LVOT AV VTI INDEX: 0.84
ECHO LVOT DIAM: 2.1 CM
ECHO LVOT MEAN GRADIENT: 2 MMHG
ECHO LVOT PEAK GRADIENT: 3 MMHG
ECHO LVOT PEAK GRADIENT: 3 MMHG
ECHO LVOT PEAK VELOCITY: 0.9 M/S
ECHO LVOT PEAK VELOCITY: 0.9 M/S
ECHO LVOT STROKE VOLUME INDEX: 32.8 ML/M2
ECHO LVOT SV: 55 ML
ECHO LVOT VTI: 15.9 CM
ECHO MV A VELOCITY: 0.6 M/S
ECHO MV E DECELERATION TIME (DT): 180.9 MS
ECHO MV E VELOCITY: 0.97 M/S
ECHO MV E/A RATIO: 1.62
ECHO MV E/E' LATERAL: 6.06
ECHO MV E/E' RATIO (AVERAGED): 6.76
ECHO MV E/E' SEPTAL: 7.46
ECHO PV MAX VELOCITY: 1 M/S
ECHO PV PEAK GRADIENT: 4 MMHG
ECHO RV FREE WALL PEAK S': 13 CM/S
ECHO RV INTERNAL DIMENSION: 3.2 CM
ECHO RV TAPSE: 2.2 CM (ref 1.7–?)
ECHO RVOT PEAK GRADIENT: 2 MMHG
ECHO RVOT PEAK VELOCITY: 0.6 M/S
GLUCOSE SERPL-MCNC: 67 MG/DL (ref 65–100)
POTASSIUM SERPL-SCNC: 3.9 MMOL/L (ref 3.5–5.1)
REPORTED DOSE,DOSE: ABNORMAL UNITS
REPORTED DOSE/TIME,TMG: ABNORMAL
SODIUM SERPL-SCNC: 144 MMOL/L (ref 136–145)
VANCOMYCIN TROUGH SERPL-MCNC: 14.8 UG/ML (ref 5–10)

## 2022-04-29 PROCEDURE — 2709999900 HC NON-CHARGEABLE SUPPLY

## 2022-04-29 PROCEDURE — 80202 ASSAY OF VANCOMYCIN: CPT

## 2022-04-29 PROCEDURE — 93306 TTE W/DOPPLER COMPLETE: CPT

## 2022-04-29 PROCEDURE — 74011250636 HC RX REV CODE- 250/636: Performed by: INTERNAL MEDICINE

## 2022-04-29 PROCEDURE — 74011250636 HC RX REV CODE- 250/636: Performed by: HOSPITALIST

## 2022-04-29 PROCEDURE — 74011000250 HC RX REV CODE- 250: Performed by: HOSPITALIST

## 2022-04-29 PROCEDURE — A6154 WOUND POUCH EACH: HCPCS

## 2022-04-29 PROCEDURE — 36415 COLL VENOUS BLD VENIPUNCTURE: CPT

## 2022-04-29 PROCEDURE — 74011250637 HC RX REV CODE- 250/637: Performed by: INTERNAL MEDICINE

## 2022-04-29 PROCEDURE — 74011000258 HC RX REV CODE- 258: Performed by: HOSPITALIST

## 2022-04-29 PROCEDURE — 80048 BASIC METABOLIC PNL TOTAL CA: CPT

## 2022-04-29 PROCEDURE — 65270000029 HC RM PRIVATE

## 2022-04-29 PROCEDURE — 99223 1ST HOSP IP/OBS HIGH 75: CPT | Performed by: INTERNAL MEDICINE

## 2022-04-29 PROCEDURE — 74011000250 HC RX REV CODE- 250: Performed by: INTERNAL MEDICINE

## 2022-04-29 RX ADMIN — VANCOMYCIN HYDROCHLORIDE 750 MG: 750 INJECTION, POWDER, LYOPHILIZED, FOR SOLUTION INTRAVENOUS at 05:45

## 2022-04-29 RX ADMIN — SODIUM CHLORIDE, PRESERVATIVE FREE 10 ML: 5 INJECTION INTRAVENOUS at 22:00

## 2022-04-29 RX ADMIN — SODIUM CHLORIDE, PRESERVATIVE FREE 10 ML: 5 INJECTION INTRAVENOUS at 14:31

## 2022-04-29 RX ADMIN — I.V. FAT EMULSION 500 ML: 20 EMULSION INTRAVENOUS at 18:35

## 2022-04-29 RX ADMIN — VANCOMYCIN HYDROCHLORIDE 750 MG: 750 INJECTION, POWDER, LYOPHILIZED, FOR SOLUTION INTRAVENOUS at 22:00

## 2022-04-29 RX ADMIN — SODIUM CHLORIDE, PRESERVATIVE FREE 10 ML: 5 INJECTION INTRAVENOUS at 05:45

## 2022-04-29 RX ADMIN — SODIUM ACETATE: 164 INJECTION, SOLUTION, CONCENTRATE INTRAVENOUS at 18:35

## 2022-04-29 RX ADMIN — VANCOMYCIN HYDROCHLORIDE 750 MG: 750 INJECTION, POWDER, LYOPHILIZED, FOR SOLUTION INTRAVENOUS at 14:28

## 2022-04-29 NOTE — PROGRESS NOTES
Bedside and Verbal shift change report given to Nestor David (oncoming nurse) by Maame Hoffmann (offgoing nurse). Report included the following information SBAR, Kardex, Intake/Output, MAR and Recent Results.

## 2022-04-29 NOTE — WOUND CARE
Wound Care follow up for Fistula Bag change today:  Chart reviewed and patient assessed. Apparently pt. Is staying a few days longer to establish the proper antibiotics. He is not in pain. Patient managing the wound drainage / fistula with a suction catheter for the last hour. His bag from yesterday was compromised at the left side seal and leaked. The redness shown in the photo below is due to some tomato soup he was drinking earlier today. Overall abdomen and tubes:         Assessment: the skin around the wound is much less denuded today. The drainage is less frequent now that the stomach tube in on gravity drainage in the bedside bag. Close up of the fistula:         Treatment: the skin was cleansed and prepped with the skin prep pad and then the fistula bag was cut to fit the small opening. The template was left in the room next to the other fistula bags. The fistula bag was held in place for about 4 minutes and then the patient took over holding it. Pt. Was encouraged to hold onto the bag if he sits up or twists in the bed. Plan: If patient is still her on Monday, I will recheck the skin again.    Adela Jauregui RN, BSN, [employer]

## 2022-04-29 NOTE — PROGRESS NOTES
Pharmacy Antimicrobial Kinetic Dosing    Indication for Antimicrobials: bacteremia     Current Regimen of Each Antimicrobial:  Vancomycin 750 mg IV q8h (Start Date ; Day 4)  Cefepime 2 gm IV q12h (Start date ; day 3)    Previous Antimicrobial Therapy:    Goal Level: AUC: 400-600 mg/hr/Liter/day    Date Dose & Interval Measured (mcg/mL) Predicted AUC/Trough    750 mg iv q8h 14.8 453 / 13.7                 Date & time of next level: --    Dosing calculator used: [employer] calculator    Significant Positive Cultures:    abdomen - ecoli, klebsiella, morganella, lactobacillus (final)   bcx -  staph coag neg (pending)   pbcx -    Conditions for Dosing Consideration: Malnutrition    Labs:  Recent Labs     22  0549 22  0443 22  0207 22  1531 22  1354   CREA 0.48* 0.48* 0.48*  --    < >   BUN 11 12 11  --    < >   PCT  --   --   --  0.27  --     < > = values in this interval not displayed. Recent Labs     22  1354   WBC 5.6     Temp (24hrs), Av.2 °F (36.8 °C), Min:97.7 °F (36.5 °C), Max:98.5 °F (36.9 °C)        Creatinine Clearance (mL/min):   CrCl (Ideal Body Weight): 141.9   If actual weight < IBW: CrCl (Actual Body Weight) 105.8    Impression/Plan:   Vancomycin levels therapeutic. Continue Vancomycin 750 mg IV q8h. Change cefepime to 2 gm IV q12h / protocol  BMP daily  Antimicrobial stop date TBD     Pharmacy will follow daily and adjust medications as appropriate for renal function and/or serum levels.     Thank you,  Luis Sumner, PHARMD

## 2022-04-29 NOTE — PROGRESS NOTES
Transition of Care Plan:     RUR: 22% high risk  Disposition: Home with [address] and TPN ([facility])  Follow up appointments: PCP, Specialists as indicated  DME needed: None anticipated. Pt has a rollator and bedside commode  Transportation at Discharge: Pt's mother vs Medicaid transport   [place] or means to access home:  Pt has access      IM Medicare Letter: N/A - Medicaid Coverage only  Is patient a BCPI-A Bundle:    N/A              If yes, was Bundle Letter given?:    Is patient a  and connected with the [facility]? N/A                If yes, was [employer] transfer form completed and VA notified? Caregiver Contact: Pt's mother, Jenelle Vergara) [phone]  Discharge Caregiver contacted prior to discharge? To be contacted. Care Conference needed?:   Not indicated at present       CM reviewed pt's chart. Pt is not medically stable for d/c at this time. CM received update from [facility] that they can not formulate TPN over the weekend. CM will continue to follow for discharge planning while patient is admitted on current unit. Please contact this CM with questions or issues related to discharge.      OLY Reid  Care Manager [address] [facility]

## 2022-04-29 NOTE — ADT AUTH CERT NOTES
Sepsis and Other Febrile Illness, without Focal Infection - Care Day 2 (4/27/2022) by Aurelia Morton RN       Review Status Review Entered   Completed 4/27/2022 14:44      Criteria Review      Care Day: 2 Care Date: 4/27/2022 Level of Care:    Guideline Day 2    Clinical Status    ( ) * Hemodynamic stability    (X) * Hypoxemia absent    4/27/2022 14:44:46 EDT by Jennifer Amato on ra    (X) * Tachypnea absent    4/27/2022 14:44:46 EDT by Diamond Strong      resp 18    Routes    ( ) Parenteral or oral medications    4/27/2022 14:43:12 EDT by Jennifer Amato see additional clinical for medciations    (X) Diet as tolerated    4/27/2022 14:44:46 EDT by Diamond Strong      full liquid diet    Medications    (X) Possible antimicrobial treatment    4/27/2022 14:43:12 EDT by Diamond Strong      vancomycin (VANCOCIN) 1250 mg in  ml infusion  Dose: 1,250 mg  Freq: ONCE Route: IV    * Milestone   Additional Notes   4/27/22      INTERNAL MEDICINE:   Assessment / Plan:   Positive blood cultures   Recent cultures for 24 showed GPC on 2 bottles   Patient received vancomycin   We will continue Vanco for another day follow-up with repeated cultures and also sensitivities of the previous cultures.  And make changes accordingly.  Patient has a PICC will probably discharge on the PICC and also TPN.    Protein calorie malnutrition continue TPN   Chronic anemia       Mild thrombocytopenia stable    / Body mass index is 17.22 kg/m².       Estimated discharge date: 4/28/2022   Barriers: Pending repeated cultures.       Code status: Full code   Prophylaxis: Lovenox subcu   Recommended Disposition: Home            BP (!) 87/55 (BP 1 Location: Right arm, BP Patient Position: At rest)   Pulse 64   Temp 97.9 °F (36.6 °C)   Resp 18   Ht 5' 10\" (1.778 m)   Wt 54.4 kg (120 lb)   SpO2 100%   BMI 17.22 kg/m²       ROOM AIR            Current Facility-Administered Medications:    ·  vancomycin (VANCOCIN) 1250 mg in  ml infusion, 1,250 mg, IntraVENous, ONCE, Flaquita Padilla MD, Last Rate: 125 mL/hr at 04/27/22 1339, 1,250 mg at 04/27/22 1339   ·  vancomycin (VANCOCIN) 750 mg in 0.9% sodium chloride 250 mL (VIAL-MATE), 750 mg, IntraVENous, Q8H, Flaquita Padilla MD   ·  cefdinir (OMNICEF) 250 mg/5 mL oral suspension 300 mg, 300 mg, Oral, Q12H, Giovanny Padilla MD   ·  TPN ADULT - CENTRAL, , IntraVENous, TITRATE, Joe Tristan MD   ·  sodium chloride (NS) flush 5-40 mL, 5-40 mL, IntraVENous, Q8H, Joe Tristan MD, 10 mL at 04/27/22 1338   ·  sodium chloride (NS) flush 5-40 mL, 5-40 mL, IntraVENous, PRN, Joe Tristan MD   ·  acetaminophen (TYLENOL) tablet 650 mg, 650 mg, Oral, Q6H PRN **OR** acetaminophen (TYLENOL) suppository 650 mg, 650 mg, Rectal, Q6H PRN, Joe Tristan MD   ·  polyethylene glycol (MIRALAX) packet 17 g, 17 g, Oral, DAILY PRN, Joe Tristan MD   ·  ondansetron (ZOFRAN ODT) tablet 4 mg, 4 mg, Oral, Q8H PRN **OR** ondansetron (ZOFRAN) injection 4 mg, 4 mg, IntraVENous, Q6H PRN, Joe Tristan MD   ·  enoxaparin (LOVENOX) injection 40 mg, 40 mg, SubCUTAneous, DAILY, Joe Tristan MD               4/27/2022 02:07   Chloride: 111 (H)   Anion gap: 4 (L)   Creatinine: 0.48 (L)   BUN/Creatinine ratio: 23 (H)   Calcium: 8.0 (L)           Sepsis and Other Febrile Illness, without Focal Infection - Care Day 1 (4/26/2022) by Luann Serrano RN       Review Status Review Entered   Completed 4/27/2022 10:06      Criteria Review      Care Day: 1 Care Date: 4/26/2022 Level of Care:    Guideline Day 1    Level Of Care    (X) ICU or floor    4/27/2022 10:06:24 EDT by Oris Raudel      medical    Clinical Status    (X) * Clinical Indications met    4/27/2022 10:02:37 EDT by Orifiorella Starks      bacteremia, positive blood cultures    Activity    (X) Activity as tolerated    4/27/2022 10:04:53 EDT by Orifiorella Starks      activity as tikerated with assist    Routes    (X) IV fluids    4/27/2022 10:04:53 EDT by Orifiorella Laraing      dextrose 5% and 0.9% NaCl infusion  Rate: 75 mL/hr Dose: 75 mL/hr  Freq: CONTINUOUS Route: IV    (X) Diet as tolerated    4/27/2022 10:04:53 EDT by Travis Marshall      full liquid diet    Interventions    (X) WBC, cultures, chemistries, urinalysis, CXR, other imaging as indicated    4/27/2022 10:05:39 EDT by Kiet Lewis CBC, chemistry completed    * Milestone   Additional Notes   4/26/22      ED HPI: Sandoval Long, 43 y.o. male with PMHx as noted below presents the emergency department with positive blood cultures. Dane Martinez is just been discharged from the hospital yesterday, was called to return with positive blood cultures are growing a gram-positive cocci in 2 bottles.  Otherwise patient knows that he is at his baseline which is generally feeling unwell with chronic abdominal pain.  The pain is a constant, aching pain that is worse with movement.  Patient otherwise notes that he is TPN dependent. Javier Hand is been no new fevers or worsening pain.          ED PHYSICAL EXAM:   GENERAL: alert and oriented, no acute distress   EYES: PEERL, No injection, discharge or icterus. ENT: Mucous membranes pink and moist.   NECK: Supple   LUNGS: Airway patent. Non-labored respirations. Breath sounds clear with good air entry bilaterally. HEART: Regular rate and rhythm.  No peripheral edema   ABDOMEN: Non-distended and non-tender, without guarding or rebound.  Multiple postsurgical scars noted   SKIN:  warm, dry   EXTREMITIES: Without swelling, tenderness or deformity, symmetric with normal ROM   NEUROLOGICAL: Alert, oriented         ED MEDS:   vancomycin (VANCOCIN) 1250 mg in  ml infusion   Dose: 1,250 mg   Freq: NOW Route: IV      sodium chloride 0.9 % bolus infusion 1,000 mL   Dose: 1,000 mL   Freq: ONCE Route: IV      ondansetron (ZOFRAN) injection 4 mg   Dose: 4 mg   Freq: NOW Route: IV      morphine 10 mg/mL injection 6 mg   Dose: 6 mg   Freq: NOW Route: IV         VS: 98 °F (36.7 °C) (!) 105 16 (!) 83/56 100 % RA ED NOTES:   On presentation, the patient is well appearing, in no acute distress with vital signs notable for relative hypotension however this appears to be his baseline based on chart review based on my history and exam the differential diagnosis for this patient includes bacteremia, sepsis, intra-abdominal infection, line infection.  Basic labs were reassuring, no leukocytosis.  Lactate is negative and patient generally feeling well.  Patient did have 2 blood cultures growing gram-positive cocci which came from a PICC line.  While is possible it may be a contaminant, will start on vancomycin and recent cultures.  Patient's abdomen is benign.  Will be admitted for further management. 4/26/2022 13:54   RBC: 3.35 (L)   HGB: 10.0 (L)   HCT: 30.7 (L)   RDW: 17.4 (H)         4/26/2022 13:54   Chloride: 109 (H)   Creatinine: 0.62 (L)   BUN/Creatinine ratio: 24 (H)   Albumin: 2.6 (L)   Globulin: 4.2 (H)   A-G Ratio: 0.6 (L)   ALT: 83 (H)   AST: 55 (H)   Alk.  phosphatase: 319 (H)         UA:   Protein: TRACE (A)   Leukocyte Esterase: TRACE (A)         INTERNAL MEDICINE:   Assessment / Plan:   Positive Blood culture   Blood culture from 4/24 showed GPC in two bottles   Given Vancomycin in the ED   Hold off on additional abx therapy until S&S result   Repeat blood cultures 4/26   If deemed not to be a contaminate, will need ECHO       Protein calorie malnutition       - Continue the TPN which you were prescribed prior to hospitalization until your next labs are drawn.    -Patient states he is able to have full liquids   -Repeat labs in AM, monitor electrolytes            Chronic anemia   Mild thrombocytopenia    - stable   - No sign of active hemorrhage.    - OP follow up.                 Code Status: Full       DVT Prophylaxis: Lovenox   GI Prophylaxis: not indicated
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
## 2022-04-29 NOTE — PROGRESS NOTES
Pt remained free of falls throughout shift. No concerns with ambulation at this time. Needs help PRN to manage lines/drains.

## 2022-04-29 NOTE — PROGRESS NOTES
Hospitalist Progress Note    NAME: Tiara James   :  1980   MRN:  [id number]     Subjective:   Daily Progress Note: 2022 8:34 AM      Chief complaint: Admitted for bacteremia  Patient seen and examined chart was reviewed. Patient without any acute complaints at this time. Waiting for ID evaluation.     Current Facility-Administered Medications   Medication Dose Route Frequency    TPN ADULT - CENTRAL   IntraVENous TITRATE    vancomycin (VANCOCIN) 750 mg in 0.9% sodium chloride 250 mL (VIAL-MATE)  750 mg IntraVENous Q8H    sodium chloride (NS) flush 5-40 mL  5-40 mL IntraVENous Q8H    sodium chloride (NS) flush 5-40 mL  5-40 mL IntraVENous PRN    acetaminophen (TYLENOL) tablet 650 mg  650 mg Oral Q6H PRN    Or    acetaminophen (TYLENOL) suppository 650 mg  650 mg Rectal Q6H PRN    polyethylene glycol (MIRALAX) packet 17 g  17 g Oral DAILY PRN    ondansetron (ZOFRAN ODT) tablet 4 mg  4 mg Oral Q8H PRN    Or    ondansetron (ZOFRAN) injection 4 mg  4 mg IntraVENous Q6H PRN    enoxaparin (LOVENOX) injection 40 mg  40 mg SubCUTAneous DAILY          Objective:     Visit Vitals  BP (!) 85/55 (BP 1 Location: Right upper arm, BP Patient Position: At rest)   Pulse 85   Temp 98 °F (36.7 °C)   Resp 16   Ht 5' 10\" (1.778 m)   Wt 54.4 kg (120 lb)   SpO2 100%   BMI 17.22 kg/m²      O2 Device: None (Room air)    Temp (24hrs), Av.2 °F (36.8 °C), Min:97.7 °F (36.5 °C), Max:98.5 °F (36.9 °C)        PHYSICAL EXAM:  General thin built and nourished  Neck supple  CVS RRR  Respiratory symmetric expansion  Abdomen previous surgical scars with GI NG tube with ostomy bag  Extremities moves all  Neuro AAOx3  Psych irritated easily        Data Review    Recent Results (from the past 24 hour(s))   METABOLIC PANEL, BASIC    Collection Time: 22  5:49 AM   Result Value Ref Range    Sodium 144 136 - 145 mmol/L    Potassium 3.9 3.5 - 5.1 mmol/L    Chloride 115 (H) 97 - 108 mmol/L    CO2 26 21 - 32 mmol/L Anion gap 3 (L) 5 - 15 mmol/L    Glucose 67 65 - 100 mg/dL    BUN 11 6 - 20 MG/DL    Creatinine 0.48 (L) 0.70 - 1.30 MG/DL    BUN/Creatinine ratio 23 (H) 12 - 20      GFR est AA >60 >60 ml/min/1.73m2    GFR est non-AA >60 >60 ml/min/1.73m2    Calcium 8.0 (L) 8.5 - 10.1 MG/DL   VANCOMYCIN, TROUGH    Collection Time: 04/29/22  5:49 AM   Result Value Ref Range    Vancomycin,trough 14.8 (H) 5.0 - 10.0 ug/mL    Reported dose date NOT PROVIDED      Reported dose time: NOT PROVIDED      Reported dose: NOT PROVIDED UNITS     No results found for this visit on 04/26/22. All Micro Results     Procedure Component Value Units Date/Time    CULTURE, BLOOD, PAIRED [[id number]] Collected: 04/26/22 1354    Order Status: Completed Specimen: Blood Updated: 04/29/22 0805     Special Requests: NO SPECIAL REQUESTS        Culture result: NO GROWTH 3 DAYS                  Assessment/Plan:    .  Bacteremia  Blood culture from 4/24 showed staph coag negative sensitivities  noted  Continue IV vancomycin    Repeat blood cultures 4/26 NGTD  ID on case Echo ordered    Open Wounds Abd areas -  Suspected polymicrobial infection  Wound culture 4/24 + for -E. Coli, Klebsiella pneumoniae, Morganella,   Lactobacillus.   Continue IV cefepime per ID     Protein calorie malnutition   - Continue the TPN  As before PTA          Chronic anemia/Mild thrombocytopenia   - stable  - No sign of active hemorrhage.   - OP follow up.       Chronic hypertension  -Asymptomatic  -continue to monitor  -Consider midodrine as needed     ADFC  Code Status: Full   DVT Prophylaxis: Lovenox    Dispo: pending ID eval    Signed By: Jimbo Monreal MD     April 29, 2022

## 2022-04-29 NOTE — PROGRESS NOTES
End of Shift Note    Bedside shift change report given to Milady Knox (oncoming nurse) by Yasmin Degroot RN (offgoing nurse). Report included the following information SBAR    Shift worked:  7a - 3p     Shift summary and any significant changes:     Ostomy bag was changed by the wound care nurse. BP has been low, but pt asymptomatic. Concerns for physician to address:  None     Zone phone for oncoming shift:          Activity:  Activity Level: Up with Assistance  Number times ambulated in hallways past shift: 0  Number of times OOB to chair past shift: 0    Cardiac:   Cardiac Monitoring: No           Access:   Current line(s): PICC     Genitourinary:   Urinary status: voiding    Respiratory:   O2 Device: None (Room air)  Chronic home O2 use?: NO  Incentive spirometer at bedside: NO       GI:     Current diet:  ADULT DIET Full Liquid  DIET ONE TIME MESSAGE  TPN ADULT - CENTRAL  DIET ONE TIME MESSAGE  Passing flatus: YES  Tolerating current diet: YES       Pain Management:   Patient states pain is manageable on current regimen: YES    Skin:  Russell Score: 19  Interventions: increase time out of bed    Patient Safety:  Fall Score:  Total Score: 0  Interventions: gripper socks and pt to call before getting OOB       Length of Stay:  Expected LOS: 1d 19h  Actual LOS: [facility] Jacob Gtz RN

## 2022-04-30 LAB
ALBUMIN SERPL-MCNC: 1.9 G/DL (ref 3.5–5)
ANION GAP SERPL CALC-SCNC: 2 MMOL/L (ref 5–15)
BASOPHILS # BLD: 0 K/UL (ref 0–0.1)
BASOPHILS # BLD: 0 K/UL (ref 0–0.1)
BASOPHILS NFR BLD: 1 % (ref 0–1)
BASOPHILS NFR BLD: 1 % (ref 0–1)
BUN SERPL-MCNC: 10 MG/DL (ref 6–20)
BUN/CREAT SERPL: 21 (ref 12–20)
CALCIUM SERPL-MCNC: 7.9 MG/DL (ref 8.5–10.1)
CHLORIDE SERPL-SCNC: 114 MMOL/L (ref 97–108)
CO2 SERPL-SCNC: 27 MMOL/L (ref 21–32)
CREAT SERPL-MCNC: 0.48 MG/DL (ref 0.7–1.3)
DIFFERENTIAL METHOD BLD: ABNORMAL
DIFFERENTIAL METHOD BLD: ABNORMAL
EOSINOPHIL # BLD: 0.1 K/UL (ref 0–0.4)
EOSINOPHIL # BLD: 0.1 K/UL (ref 0–0.4)
EOSINOPHIL NFR BLD: 2 % (ref 0–7)
EOSINOPHIL NFR BLD: 2 % (ref 0–7)
ERYTHROCYTE [DISTWIDTH] IN BLOOD BY AUTOMATED COUNT: 18.4 % (ref 11.5–14.5)
ERYTHROCYTE [DISTWIDTH] IN BLOOD BY AUTOMATED COUNT: 18.4 % (ref 11.5–14.5)
GLUCOSE SERPL-MCNC: 56 MG/DL (ref 65–100)
HCT VFR BLD AUTO: 22.7 % (ref 36.6–50.3)
HCT VFR BLD AUTO: 25.3 % (ref 36.6–50.3)
HGB BLD-MCNC: 7.4 G/DL (ref 12.1–17)
HGB BLD-MCNC: 7.9 G/DL (ref 12.1–17)
IMM GRANULOCYTES # BLD AUTO: 0 K/UL (ref 0–0.04)
IMM GRANULOCYTES # BLD AUTO: 0 K/UL (ref 0–0.04)
IMM GRANULOCYTES NFR BLD AUTO: 1 % (ref 0–0.5)
IMM GRANULOCYTES NFR BLD AUTO: 1 % (ref 0–0.5)
INR PPP: 1.1 (ref 0.9–1.1)
INR PPP: 1.2 (ref 0.9–1.1)
LYMPHOCYTES # BLD: 1.7 K/UL (ref 0.8–3.5)
LYMPHOCYTES # BLD: 1.9 K/UL (ref 0.8–3.5)
LYMPHOCYTES NFR BLD: 32 % (ref 12–49)
LYMPHOCYTES NFR BLD: 35 % (ref 12–49)
MAGNESIUM SERPL-MCNC: 1.9 MG/DL (ref 1.6–2.4)
MCH RBC QN AUTO: 30.2 PG (ref 26–34)
MCH RBC QN AUTO: 30.5 PG (ref 26–34)
MCHC RBC AUTO-ENTMCNC: 31.2 G/DL (ref 30–36.5)
MCHC RBC AUTO-ENTMCNC: 32.6 G/DL (ref 30–36.5)
MCV RBC AUTO: 93.4 FL (ref 80–99)
MCV RBC AUTO: 96.6 FL (ref 80–99)
MONOCYTES # BLD: 0.6 K/UL (ref 0–1)
MONOCYTES # BLD: 0.7 K/UL (ref 0–1)
MONOCYTES NFR BLD: 12 % (ref 5–13)
MONOCYTES NFR BLD: 13 % (ref 5–13)
NEUTS SEG # BLD: 2.6 K/UL (ref 1.8–8)
NEUTS SEG # BLD: 2.7 K/UL (ref 1.8–8)
NEUTS SEG NFR BLD: 49 % (ref 32–75)
NEUTS SEG NFR BLD: 51 % (ref 32–75)
NRBC # BLD: 0 K/UL (ref 0–0.01)
NRBC # BLD: 0 K/UL (ref 0–0.01)
NRBC BLD-RTO: 0 PER 100 WBC
NRBC BLD-RTO: 0 PER 100 WBC
PHOSPHATE SERPL-MCNC: 2.5 MG/DL (ref 2.6–4.7)
PLATELET # BLD AUTO: 285 K/UL (ref 150–400)
PLATELET # BLD AUTO: 310 K/UL (ref 150–400)
PMV BLD AUTO: 11 FL (ref 8.9–12.9)
PMV BLD AUTO: 11.2 FL (ref 8.9–12.9)
POTASSIUM SERPL-SCNC: 3.8 MMOL/L (ref 3.5–5.1)
PROTHROMBIN TIME: 11.8 SEC (ref 9–11.1)
PROTHROMBIN TIME: 12.1 SEC (ref 9–11.1)
RBC # BLD AUTO: 2.43 M/UL (ref 4.1–5.7)
RBC # BLD AUTO: 2.62 M/UL (ref 4.1–5.7)
SODIUM SERPL-SCNC: 143 MMOL/L (ref 136–145)
WBC # BLD AUTO: 5.3 K/UL (ref 4.1–11.1)
WBC # BLD AUTO: 5.3 K/UL (ref 4.1–11.1)

## 2022-04-30 PROCEDURE — 80069 RENAL FUNCTION PANEL: CPT

## 2022-04-30 PROCEDURE — 85610 PROTHROMBIN TIME: CPT

## 2022-04-30 PROCEDURE — 74011000258 HC RX REV CODE- 258: Performed by: INTERNAL MEDICINE

## 2022-04-30 PROCEDURE — 2709999900 HC NON-CHARGEABLE SUPPLY

## 2022-04-30 PROCEDURE — 74011000250 HC RX REV CODE- 250: Performed by: INTERNAL MEDICINE

## 2022-04-30 PROCEDURE — 74011250636 HC RX REV CODE- 250/636: Performed by: HOSPITALIST

## 2022-04-30 PROCEDURE — 83735 ASSAY OF MAGNESIUM: CPT

## 2022-04-30 PROCEDURE — 85025 COMPLETE CBC W/AUTO DIFF WBC: CPT

## 2022-04-30 PROCEDURE — 74011250636 HC RX REV CODE- 250/636: Performed by: INTERNAL MEDICINE

## 2022-04-30 PROCEDURE — 74011000258 HC RX REV CODE- 258: Performed by: HOSPITALIST

## 2022-04-30 PROCEDURE — 65270000029 HC RM PRIVATE

## 2022-04-30 PROCEDURE — 36415 COLL VENOUS BLD VENIPUNCTURE: CPT

## 2022-04-30 RX ORDER — SODIUM,POTASSIUM PHOSPHATES 280-250MG
1 POWDER IN PACKET (EA) ORAL ONCE
Status: DISPENSED | OUTPATIENT
Start: 2022-04-30 | End: 2022-04-30

## 2022-04-30 RX ADMIN — VANCOMYCIN HYDROCHLORIDE 750 MG: 750 INJECTION, POWDER, LYOPHILIZED, FOR SOLUTION INTRAVENOUS at 06:15

## 2022-04-30 RX ADMIN — CEFEPIME HYDROCHLORIDE 1 G: 1 INJECTION, POWDER, FOR SOLUTION INTRAMUSCULAR; INTRAVENOUS at 09:12

## 2022-04-30 RX ADMIN — VANCOMYCIN HYDROCHLORIDE 750 MG: 750 INJECTION, POWDER, LYOPHILIZED, FOR SOLUTION INTRAVENOUS at 21:50

## 2022-04-30 RX ADMIN — POTASSIUM CHLORIDE: 2 INJECTION, SOLUTION, CONCENTRATE INTRAVENOUS at 17:36

## 2022-04-30 RX ADMIN — SODIUM CHLORIDE, PRESERVATIVE FREE 10 ML: 5 INJECTION INTRAVENOUS at 06:16

## 2022-04-30 RX ADMIN — SODIUM CHLORIDE, PRESERVATIVE FREE 10 ML: 5 INJECTION INTRAVENOUS at 13:19

## 2022-04-30 RX ADMIN — VANCOMYCIN HYDROCHLORIDE 750 MG: 750 INJECTION, POWDER, LYOPHILIZED, FOR SOLUTION INTRAVENOUS at 13:19

## 2022-04-30 RX ADMIN — SODIUM CHLORIDE, PRESERVATIVE FREE 10 ML: 5 INJECTION INTRAVENOUS at 22:06

## 2022-04-30 RX ADMIN — CEFEPIME HYDROCHLORIDE 1 G: 1 INJECTION, POWDER, FOR SOLUTION INTRAMUSCULAR; INTRAVENOUS at 17:33

## 2022-04-30 NOTE — PROGRESS NOTES
Pharmacy Antimicrobial Kinetic Dosing    Indication for Antimicrobials: bacteremia     Current Regimen of Each Antimicrobial:  Vancomycin 750 mg IV q8h (Start Date ; Day 5)  Cefepime 2 gm IV q12h (Start date ; day 4)    Previous Antimicrobial Therapy:    Goal Level: AUC: 400-600 mg/hr/Liter/day    Date Dose & Interval Measured (mcg/mL) Predicted AUC/Trough    750 mg iv q8h 14.8 453 / 13.7                 Date & time of next level: --    Dosing calculator used: [employer] calculator    Significant Positive Cultures:    abdomen - ecoli, klebsiella, morganella, lactobacillus (final)   bcx -  staph coag neg (pending)   pbcx -    Conditions for Dosing Consideration: Malnutrition    Labs:  Recent Labs     22  0509 22  0549 22  0443   CREA 0.48* 0.48* 0.48*   BUN 10 11 12     Recent Labs     22  0509 22  0111   WBC 5.3 5.3     Temp (24hrs), Av.4 °F (36.9 °C), Min:98.2 °F (36.8 °C), Max:98.7 °F (37.1 °C)    Creatinine Clearance (mL/min):   CrCl (Ideal Body Weight): 141.9   If actual weight < IBW: CrCl (Actual Body Weight) 105.8    Impression/Plan:   Vancomycin levels therapeutic. Continue Vancomycin 750 mg IV q8h. Change cefepime to 2 gm IV q12h / protocol  BMP daily  Antimicrobial stop date TBD     Pharmacy will follow daily and adjust medications as appropriate for renal function and/or serum levels.     Thank you,  CARA Avendano

## 2022-04-30 NOTE — PROGRESS NOTES
Bedside and Verbal shift change report given to [facility]  (oncoming nurse) by Rachid Glass RN (offgoing nurse). Report included the following information SBAR, OR Summary, Procedure Summary, Intake/Output, MAR and Recent Results.

## 2022-04-30 NOTE — PROGRESS NOTES
Pt remained free of falls throughout shift. Ambulating with assistance. Pt repositions self throughout the night to prevent pressure areas. No new concerns at this time.

## 2022-05-01 LAB
ANION GAP SERPL CALC-SCNC: 1 MMOL/L (ref 5–15)
BACTERIA SPEC CULT: NORMAL
BUN SERPL-MCNC: 12 MG/DL (ref 6–20)
BUN/CREAT SERPL: 28 (ref 12–20)
CALCIUM SERPL-MCNC: 7.8 MG/DL (ref 8.5–10.1)
CHLORIDE SERPL-SCNC: 112 MMOL/L (ref 97–108)
CO2 SERPL-SCNC: 30 MMOL/L (ref 21–32)
CREAT SERPL-MCNC: 0.43 MG/DL (ref 0.7–1.3)
ERYTHROCYTE [DISTWIDTH] IN BLOOD BY AUTOMATED COUNT: 18.1 % (ref 11.5–14.5)
GLUCOSE SERPL-MCNC: 90 MG/DL (ref 65–100)
HCT VFR BLD AUTO: 22.7 % (ref 36.6–50.3)
HGB BLD-MCNC: 7.2 G/DL (ref 12.1–17)
MAGNESIUM SERPL-MCNC: 1.9 MG/DL (ref 1.6–2.4)
MCH RBC QN AUTO: 30.1 PG (ref 26–34)
MCHC RBC AUTO-ENTMCNC: 31.7 G/DL (ref 30–36.5)
MCV RBC AUTO: 95 FL (ref 80–99)
NRBC # BLD: 0 K/UL (ref 0–0.01)
NRBC BLD-RTO: 0 PER 100 WBC
PHOSPHATE SERPL-MCNC: 2.8 MG/DL (ref 2.6–4.7)
PLATELET # BLD AUTO: 264 K/UL (ref 150–400)
PMV BLD AUTO: 10.7 FL (ref 8.9–12.9)
POTASSIUM SERPL-SCNC: 3.9 MMOL/L (ref 3.5–5.1)
RBC # BLD AUTO: 2.39 M/UL (ref 4.1–5.7)
SERVICE CMNT-IMP: NORMAL
SODIUM SERPL-SCNC: 143 MMOL/L (ref 136–145)
WBC # BLD AUTO: 5.1 K/UL (ref 4.1–11.1)

## 2022-05-01 PROCEDURE — 74011000258 HC RX REV CODE- 258: Performed by: INTERNAL MEDICINE

## 2022-05-01 PROCEDURE — 84100 ASSAY OF PHOSPHORUS: CPT

## 2022-05-01 PROCEDURE — 83735 ASSAY OF MAGNESIUM: CPT

## 2022-05-01 PROCEDURE — 74011000250 HC RX REV CODE- 250: Performed by: INTERNAL MEDICINE

## 2022-05-01 PROCEDURE — 74011250636 HC RX REV CODE- 250/636: Performed by: HOSPITALIST

## 2022-05-01 PROCEDURE — 85027 COMPLETE CBC AUTOMATED: CPT

## 2022-05-01 PROCEDURE — 36415 COLL VENOUS BLD VENIPUNCTURE: CPT

## 2022-05-01 PROCEDURE — 74011250636 HC RX REV CODE- 250/636: Performed by: INTERNAL MEDICINE

## 2022-05-01 PROCEDURE — 65270000029 HC RM PRIVATE

## 2022-05-01 PROCEDURE — 80048 BASIC METABOLIC PNL TOTAL CA: CPT

## 2022-05-01 PROCEDURE — 74011000258 HC RX REV CODE- 258: Performed by: HOSPITALIST

## 2022-05-01 RX ADMIN — VANCOMYCIN HYDROCHLORIDE 750 MG: 750 INJECTION, POWDER, LYOPHILIZED, FOR SOLUTION INTRAVENOUS at 16:07

## 2022-05-01 RX ADMIN — CEFEPIME HYDROCHLORIDE 1 G: 1 INJECTION, POWDER, FOR SOLUTION INTRAMUSCULAR; INTRAVENOUS at 01:54

## 2022-05-01 RX ADMIN — SODIUM CHLORIDE, PRESERVATIVE FREE 10 ML: 5 INJECTION INTRAVENOUS at 06:55

## 2022-05-01 RX ADMIN — CEFEPIME HYDROCHLORIDE 1 G: 1 INJECTION, POWDER, FOR SOLUTION INTRAMUSCULAR; INTRAVENOUS at 08:52

## 2022-05-01 RX ADMIN — VANCOMYCIN HYDROCHLORIDE 750 MG: 750 INJECTION, POWDER, LYOPHILIZED, FOR SOLUTION INTRAVENOUS at 06:18

## 2022-05-01 RX ADMIN — SODIUM ACETATE: 164 INJECTION, SOLUTION, CONCENTRATE INTRAVENOUS at 17:28

## 2022-05-01 RX ADMIN — CEFEPIME HYDROCHLORIDE 1 G: 1 INJECTION, POWDER, FOR SOLUTION INTRAMUSCULAR; INTRAVENOUS at 16:07

## 2022-05-01 RX ADMIN — VANCOMYCIN HYDROCHLORIDE 750 MG: 750 INJECTION, POWDER, LYOPHILIZED, FOR SOLUTION INTRAVENOUS at 23:33

## 2022-05-01 RX ADMIN — SODIUM CHLORIDE, PRESERVATIVE FREE 10 ML: 5 INJECTION INTRAVENOUS at 22:36

## 2022-05-01 NOTE — PROGRESS NOTES
Hospitalist Progress Note    NAME: David Bansal   :  1980   MRN:  [id number]     Subjective:   Daily Progress Note: 2022 8:34 AM      Chief complaint: Admitted for bacteremia  Reports mild abd pain. No nausea or abd pain.      Current Facility-Administered Medications   Medication Dose Route Frequency    cefepime (MAXIPIME) 1 g in 0.9% sodium chloride (MBP/ADV) 50 mL MBP  1 g IntraVENous Q8H    potassium, sodium phosphates (NEUTRA-PHOS) packet 1 Packet  1 Packet Oral ONCE    TPN ADULT - CENTRAL   IntraVENous TITRATE    fat emulsion 20% (LIPOSYN, INTRAlipid) infusion 500 mL  500 mL IntraVENous Q MON, WED & FRI    vancomycin (VANCOCIN) 750 mg in 0.9% sodium chloride 250 mL (VIAL-MATE)  750 mg IntraVENous Q8H    sodium chloride (NS) flush 5-40 mL  5-40 mL IntraVENous Q8H    sodium chloride (NS) flush 5-40 mL  5-40 mL IntraVENous PRN    acetaminophen (TYLENOL) tablet 650 mg  650 mg Oral Q6H PRN    Or    acetaminophen (TYLENOL) suppository 650 mg  650 mg Rectal Q6H PRN    polyethylene glycol (MIRALAX) packet 17 g  17 g Oral DAILY PRN    ondansetron (ZOFRAN ODT) tablet 4 mg  4 mg Oral Q8H PRN    Or    ondansetron (ZOFRAN) injection 4 mg  4 mg IntraVENous Q6H PRN    enoxaparin (LOVENOX) injection 40 mg  40 mg SubCUTAneous DAILY          Objective:     Visit Vitals  /68 (BP 1 Location: Right upper arm, BP Patient Position: At rest;Other (Comment)) Comment (BP Patient Position): high fowlers   Pulse 93   Temp 98.1 °F (36.7 °C)   Resp 18   Ht 5' 10\" (1.778 m)   Wt 54.4 kg (119 lb 14.9 oz)   SpO2 100%   BMI 17.21 kg/m²      O2 Device: None (Room air)    Temp (24hrs), Av.2 °F (36.8 °C), Min:98.1 °F (36.7 °C), Max:98.2 °F (36.8 °C)        PHYSICAL EXAM:  General thin built and nourished  Neck supple  CVS RRR  Respiratory symmetric expansion  Abdomen previous surgical scars with GI NG tube with ostomy bag  Extremities moves all  Neuro AAOx3  Psych irritated easily        Data Review    Recent Results (from the past 24 hour(s))   CBC WITH AUTOMATED DIFF    Collection Time: 04/30/22  1:11 AM   Result Value Ref Range    WBC 5.3 4.1 - 11.1 K/uL    RBC 2.43 (L) 4.10 - 5.70 M/uL    HGB 7.4 (L) 12.1 - 17.0 g/dL    HCT 22.7 (L) 36.6 - 50.3 %    MCV 93.4 80.0 - 99.0 FL    MCH 30.5 26.0 - 34.0 PG    MCHC 32.6 30.0 - 36.5 g/dL    RDW 18.4 (H) 11.5 - 14.5 %    PLATELET 402 552 - 856 K/uL    MPV 11.2 8.9 - 12.9 FL    NRBC 0.0 0  WBC    ABSOLUTE NRBC 0.00 0.00 - 0.01 K/uL    NEUTROPHILS 49 32 - 75 %    LYMPHOCYTES 35 12 - 49 %    MONOCYTES 12 5 - 13 %    EOSINOPHILS 2 0 - 7 %    BASOPHILS 1 0 - 1 %    IMMATURE GRANULOCYTES 1 (H) 0.0 - 0.5 %    ABS. NEUTROPHILS 2.6 1.8 - 8.0 K/UL    ABS. LYMPHOCYTES 1.9 0.8 - 3.5 K/UL    ABS. MONOCYTES 0.6 0.0 - 1.0 K/UL    ABS. EOSINOPHILS 0.1 0.0 - 0.4 K/UL    ABS. BASOPHILS 0.0 0.0 - 0.1 K/UL    ABS. IMM. GRANS. 0.0 0.00 - 0.04 K/UL    DF AUTOMATED     PROTHROMBIN TIME + INR    Collection Time: 04/30/22  1:11 AM   Result Value Ref Range    INR 1.2 (H) 0.9 - 1.1      Prothrombin time 12.1 (H) 9.0 - 11.1 sec   PROTHROMBIN TIME + INR    Collection Time: 04/30/22  5:09 AM   Result Value Ref Range    INR 1.1 0.9 - 1.1      Prothrombin time 11.8 (H) 9.0 - 11.1 sec   CBC WITH AUTOMATED DIFF    Collection Time: 04/30/22  5:09 AM   Result Value Ref Range    WBC 5.3 4.1 - 11.1 K/uL    RBC 2.62 (L) 4.10 - 5.70 M/uL    HGB 7.9 (L) 12.1 - 17.0 g/dL    HCT 25.3 (L) 36.6 - 50.3 %    MCV 96.6 80.0 - 99.0 FL    MCH 30.2 26.0 - 34.0 PG    MCHC 31.2 30.0 - 36.5 g/dL    RDW 18.4 (H) 11.5 - 14.5 %    PLATELET 128 544 - 031 K/uL    MPV 11.0 8.9 - 12.9 FL    NRBC 0.0 0  WBC    ABSOLUTE NRBC 0.00 0.00 - 0.01 K/uL    NEUTROPHILS 51 32 - 75 %    LYMPHOCYTES 32 12 - 49 %    MONOCYTES 13 5 - 13 %    EOSINOPHILS 2 0 - 7 %    BASOPHILS 1 0 - 1 %    IMMATURE GRANULOCYTES 1 (H) 0.0 - 0.5 %    ABS. NEUTROPHILS 2.7 1.8 - 8.0 K/UL    ABS. LYMPHOCYTES 1.7 0.8 - 3.5 K/UL    ABS.  MONOCYTES 0.7 0.0 - 1.0 K/UL    ABS. EOSINOPHILS 0.1 0.0 - 0.4 K/UL    ABS. BASOPHILS 0.0 0.0 - 0.1 K/UL    ABS. IMM. GRANS. 0.0 0.00 - 0.04 K/UL    DF AUTOMATED     MAGNESIUM    Collection Time: 04/30/22  5:09 AM   Result Value Ref Range    Magnesium 1.9 1.6 - 2.4 mg/dL   RENAL FUNCTION PANEL    Collection Time: 04/30/22  5:09 AM   Result Value Ref Range    Sodium 143 136 - 145 mmol/L    Potassium 3.8 3.5 - 5.1 mmol/L    Chloride 114 (H) 97 - 108 mmol/L    CO2 27 21 - 32 mmol/L    Anion gap 2 (L) 5 - 15 mmol/L    Glucose 56 (L) 65 - 100 mg/dL    BUN 10 6 - 20 MG/DL    Creatinine 0.48 (L) 0.70 - 1.30 MG/DL    BUN/Creatinine ratio 21 (H) 12 - 20      GFR est AA >60 >60 ml/min/1.73m2    GFR est non-AA >60 >60 ml/min/1.73m2    Calcium 7.9 (L) 8.5 - 10.1 MG/DL    Phosphorus 2.5 (L) 2.6 - 4.7 MG/DL    Albumin 1.9 (L) 3.5 - 5.0 g/dL     No results found for this visit on 04/26/22. All Micro Results     Procedure Component Value Units Date/Time    CULTURE, BLOOD, PAIRED [[id number]] Collected: 04/26/22 1354    Order Status: Completed Specimen: Blood Updated: 04/30/22 1033     Special Requests: NO SPECIAL REQUESTS        Culture result: NO GROWTH 4 DAYS                  Assessment/Plan:    .  Bacteremia  Blood culture from 4/24 showed staph coag negative sensitivities  noted  Continue IV vancomycin    Repeat blood cultures 4/26 NGTD  ID following. PICC line may need to removed  Follow repeat blood cx, so far negative     Open Wounds Abd areas -  Suspected polymicrobial infection  Wound culture 4/24 + for -E. Coli, Klebsiella pneumoniae, Morganella,   Lactobacillus.   Continue IV cefepime per ID     Protein calorie malnutition   - Continue the TPN  As before PTA     Hypophophatemia  -replaced and will recheck in am          Chronic anemia/Mild thrombocytopenia   - stable  - No sign of active hemorrhage.   - OP follow up.       Chronic hypertension  -Asymptomatic  -continue to monitor  -Consider midodrine as needed     ADFC  Code Status: Full   DVT Prophylaxis: Lovenox    Dispo: pending ID eval    Signed By: Nghia Sharma MD     April 30, 2022

## 2022-05-01 NOTE — PROGRESS NOTES
Pharmacy Antimicrobial Kinetic Dosing    Indication for Antimicrobials: bacteremia     Current Regimen of Each Antimicrobial:  Vancomycin 750 mg IV q8h (Start Date ; Day 6)  Cefepime 2 gm IV q12h (Start date ; day 5)    Previous Antimicrobial Therapy:    Goal Level: AUC: 400-600 mg/hr/Liter/day    Date Dose & Interval Measured (mcg/mL) Predicted AUC/Trough    750 mg iv q8h 14.8 453 / 13.7                 Date & time of next level: --    Dosing calculator used: [employer] calculator    Significant Positive Cultures:    abdomen - ecoli, klebsiella, morganella, lactobacillus (final)   bcx -  staph coag neg (pending)   pbcx -    Conditions for Dosing Consideration: Malnutrition    Labs:  Recent Labs     22  0100 22  0509 22  0549   CREA 0.43* 0.48* 0.48*   BUN 12 10 11     Recent Labs     22  0100 22  0509 22  0111   WBC 5.1 5.3 5.3     Temp (24hrs), Av.2 °F (36.8 °C), Min:98.1 °F (36.7 °C), Max:98.4 °F (36.9 °C)    Creatinine Clearance (mL/min):   CrCl (Ideal Body Weight): 141.9   If actual weight < IBW: CrCl (Actual Body Weight) 105.8    Impression/Plan:   Vancomycin levels therapeutic. Continue Vancomycin 750 mg IV q8h. Change cefepime to 2 gm IV q12h / protocol  BMP daily  Antimicrobial stop date TBD     Pharmacy will follow daily and adjust medications as appropriate for renal function and/or serum levels.     Thank you,  CARA Luz

## 2022-05-02 LAB
ANION GAP SERPL CALC-SCNC: 2 MMOL/L (ref 5–15)
BUN SERPL-MCNC: 12 MG/DL (ref 6–20)
BUN/CREAT SERPL: 29 (ref 12–20)
CALCIUM SERPL-MCNC: 8.3 MG/DL (ref 8.5–10.1)
CHLORIDE SERPL-SCNC: 108 MMOL/L (ref 97–108)
CO2 SERPL-SCNC: 30 MMOL/L (ref 21–32)
CREAT SERPL-MCNC: 0.42 MG/DL (ref 0.7–1.3)
ERYTHROCYTE [DISTWIDTH] IN BLOOD BY AUTOMATED COUNT: 18.1 % (ref 11.5–14.5)
GLUCOSE BLD STRIP.AUTO-MCNC: 112 MG/DL (ref 65–117)
GLUCOSE SERPL-MCNC: 97 MG/DL (ref 65–100)
HCT VFR BLD AUTO: 23.5 % (ref 36.6–50.3)
HGB BLD-MCNC: 7.3 G/DL (ref 12.1–17)
MCH RBC QN AUTO: 29.3 PG (ref 26–34)
MCHC RBC AUTO-ENTMCNC: 31.1 G/DL (ref 30–36.5)
MCV RBC AUTO: 94.4 FL (ref 80–99)
NRBC # BLD: 0 K/UL (ref 0–0.01)
NRBC BLD-RTO: 0 PER 100 WBC
PLATELET # BLD AUTO: 296 K/UL (ref 150–400)
PMV BLD AUTO: 10.7 FL (ref 8.9–12.9)
POTASSIUM SERPL-SCNC: 4.1 MMOL/L (ref 3.5–5.1)
RBC # BLD AUTO: 2.49 M/UL (ref 4.1–5.7)
SERVICE CMNT-IMP: NORMAL
SODIUM SERPL-SCNC: 140 MMOL/L (ref 136–145)
WBC # BLD AUTO: 5.1 K/UL (ref 4.1–11.1)

## 2022-05-02 PROCEDURE — 74011250636 HC RX REV CODE- 250/636: Performed by: HOSPITALIST

## 2022-05-02 PROCEDURE — 99233 SBSQ HOSP IP/OBS HIGH 50: CPT | Performed by: INTERNAL MEDICINE

## 2022-05-02 PROCEDURE — 85027 COMPLETE CBC AUTOMATED: CPT

## 2022-05-02 PROCEDURE — 80048 BASIC METABOLIC PNL TOTAL CA: CPT

## 2022-05-02 PROCEDURE — 74011000250 HC RX REV CODE- 250: Performed by: INTERNAL MEDICINE

## 2022-05-02 PROCEDURE — 74011000258 HC RX REV CODE- 258: Performed by: HOSPITALIST

## 2022-05-02 PROCEDURE — 65270000029 HC RM PRIVATE

## 2022-05-02 PROCEDURE — 74011000250 HC RX REV CODE- 250: Performed by: HOSPITALIST

## 2022-05-02 PROCEDURE — 36415 COLL VENOUS BLD VENIPUNCTURE: CPT

## 2022-05-02 PROCEDURE — 74011250636 HC RX REV CODE- 250/636: Performed by: INTERNAL MEDICINE

## 2022-05-02 PROCEDURE — 74011000258 HC RX REV CODE- 258: Performed by: INTERNAL MEDICINE

## 2022-05-02 PROCEDURE — 82962 GLUCOSE BLOOD TEST: CPT

## 2022-05-02 RX ADMIN — SODIUM CHLORIDE, PRESERVATIVE FREE 10 ML: 5 INJECTION INTRAVENOUS at 06:07

## 2022-05-02 RX ADMIN — VANCOMYCIN HYDROCHLORIDE 750 MG: 750 INJECTION, POWDER, LYOPHILIZED, FOR SOLUTION INTRAVENOUS at 05:31

## 2022-05-02 RX ADMIN — VANCOMYCIN HYDROCHLORIDE 750 MG: 750 INJECTION, POWDER, LYOPHILIZED, FOR SOLUTION INTRAVENOUS at 22:10

## 2022-05-02 RX ADMIN — CEFEPIME HYDROCHLORIDE 1 G: 1 INJECTION, POWDER, FOR SOLUTION INTRAMUSCULAR; INTRAVENOUS at 11:09

## 2022-05-02 RX ADMIN — CEFEPIME HYDROCHLORIDE 1 G: 1 INJECTION, POWDER, FOR SOLUTION INTRAMUSCULAR; INTRAVENOUS at 01:48

## 2022-05-02 RX ADMIN — Medication: at 22:38

## 2022-05-02 RX ADMIN — I.V. FAT EMULSION 500 ML: 20 EMULSION INTRAVENOUS at 22:10

## 2022-05-02 RX ADMIN — SODIUM ACETATE: 164 INJECTION, SOLUTION, CONCENTRATE INTRAVENOUS at 18:45

## 2022-05-02 RX ADMIN — SODIUM CHLORIDE, PRESERVATIVE FREE 10 ML: 5 INJECTION INTRAVENOUS at 14:17

## 2022-05-02 RX ADMIN — VANCOMYCIN HYDROCHLORIDE 750 MG: 750 INJECTION, POWDER, LYOPHILIZED, FOR SOLUTION INTRAVENOUS at 14:16

## 2022-05-02 RX ADMIN — CEFEPIME HYDROCHLORIDE 1 G: 1 INJECTION, POWDER, FOR SOLUTION INTRAMUSCULAR; INTRAVENOUS at 17:38
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
## 2022-05-02 NOTE — WOUND CARE
Wound care follow up for the fistula pouching. Pt. Was given two pouches to cover him for the weekend. The Friday pouch remained on him until today and the patient did his own fistula care / pouching. Assessment: the drainage from the fistula is consistently red due to all of the red things he is eating - candy, tomato soup and red colored drinks. Patient stated that the skin looked the same today as it did on Friday so it is improving. Plan: Will follow up tomorrow for assistance as needed.    Gary Clifton RN, BSN, [employer]
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
## 2022-05-02 NOTE — PROGRESS NOTES
Pharmacy Antimicrobial Kinetic Dosing    Indication for Antimicrobials: bacteremia     Current Regimen of Each Antimicrobial:  Vancomycin 750 mg IV q8h (Start Date ; Day 7)  Cefepime 1 gm IV q8h (Start date ; day 6)    Previous Antimicrobial Therapy:    Goal Level: AUC: 400-600 mg/hr/Liter/day    Date Dose & Interval Measured (mcg/mL) Predicted AUC/Trough    750 mg iv q8h 14.8 453 / 13.7                 Date & time of next level: --    Dosing calculator used: BYTEGRID calculator    Significant Positive Cultures:    abdomen - ecoli, klebsiella, morganella, lactobacillus (final)   bcx - 2/ staph coag neg (pending)   pbcx - NG    Conditions for Dosing Consideration: Malnutrition    Labs:  Recent Labs     22  0509   CREA 0.42* 0.43* 0.48*   BUN 12 12 10     Recent Labs     22  01022  0509 22  0111   WBC 5.1 5.1 5.3 5.3     Temp (24hrs), Av.3 °F (36.8 °C), Min:98.1 °F (36.7 °C), Max:98.6 °F (37 °C)    Creatinine Clearance (mL/min):   CrCl (Ideal Body Weight): 141.9   If actual weight < IBW: CrCl (Actual Body Weight) 105.8    Impression/Plan:   Vancomycin levels therapeutic. Continue Vancomycin 750 mg IV q8h. Repeat level before 0600 dose tomorrow. Continue Cefepime  BMP daily  Antimicrobial stop date TBD     Pharmacy will follow daily and adjust medications as appropriate for renal function and/or serum levels.     Thank you,  Angelica Steiner, NÉSTORD

## 2022-05-02 NOTE — PROGRESS NOTES
Order reviewed to remove PICC line. Spoke with pt regarding removing PICC line, pt not in agreement. Didn't understand why, stated it couldn't be removed. MD Hank Kothari came to pts room to discuss it with him. Pt does not want it removed. Per MD will have to see what ID says. At this time pt refusing PICC line removal. Order DC'd.     8048: Bedside and Verbal shift change report given to Xochitl Oviedo RN (oncoming nurse) by Sean Duffy (offgoing nurse). Report given with SBAR, Kardex, Intake/Output, MAR and Recent Results.
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
## 2022-05-02 NOTE — PROGRESS NOTES
Comprehensive Nutrition Assessment    Type and Reason for Visit: Reassess    Nutrition Recommendations/Plan:   1. Continue full liquids as tolerated  2. Add ensure clear TID  3. Resume TPN when medically feasible      Nutrition Assessment:   Chart reviewed; medically noted for bacteremia. Patient has been receiving home TPN cyclic regimen in the hospital. Noted plans to remove PICC line. Receiving a full liquid diet at this time. Patient unavailable at time of attempted visit. Will add ensure clear which he has been receptive to in the past per prior RD notes. Resume TPN when new line is able to be placed. Malnutrition Assessment:  Malnutrition Status:  Severe malnutrition (04/27/22 4485)    Context:  Chronic illness     Findings of the 6 clinical characteristics of malnutrition:   Energy Intake:  75% or less est energy requirements for 1 month or longer  Weight Loss:  Unable to assess     Body Fat Loss:  Severe body fat loss, Triceps,Orbital   Muscle Mass Loss:  Severe muscle mass loss, Clavicles (pectoralis &deltoids),Scapula (trapezius)  Fluid Accumulation:  Unable to assess,     Strength:  Not performed     Nutrition Related Findings:    BM 5/2  Labs and medications reviewed    Wound Type: Surgical incision    Current Nutrition Intake & Therapies:        ADULT DIET Full Liquid  DIET ONE TIME MESSAGE  DIET ONE TIME MESSAGE  TPN ADULT - CENTRAL    Anthropometric Measures:  Height: 5' 10\" (177.8 cm)  Ideal Body Weight (IBW): 166 lbs (75 kg)     Current Body Wt:  54.4 kg (119 lb 14.9 oz), 72.2 % IBW. Current BMI (kg/m2): 17.2                          BMI Category: Underweight (BMI less than 18.5)    Estimated Daily Nutrient Needs:  Energy Requirements Based On: Formula  Weight Used for Energy Requirements: Current  Energy (kcal/day): 1766 kcal (BMR 1421 x 1. 2AF)  Weight Used for Protein Requirements: Current  Protein (g/day): 72-83g (1.3-1.5 g/kg bw)  Method Used for Fluid Requirements: 1 ml/kcal  Fluid (ml/day): 1750 mL    Nutrition Diagnosis:   · Altered GI function related to altered GI structure as evidenced by nutrition support-parenteral nutrition    Nutrition Interventions:   Food and/or Nutrient Delivery: Continue current diet,Continue parenteral nutrition  Nutrition Education/Counseling: No recommendations at this time  Coordination of Nutrition Care: Continue to monitor while inpatient       Goals:  Previous Goal Met: Goal(s) achieved  Goals:  Tolerate nutrition support at goal rate,by next RD assessment       Nutrition Monitoring and Evaluation:   Behavioral-Environmental Outcomes: None identified  Food/Nutrient Intake Outcomes: Food and nutrient intake,Parenteral nutrition intake/tolerance  Physical Signs/Symptoms Outcomes: Biochemical data,Weight,GI status    Discharge Planning:    Parenteral nutrition,Continue current diet    David Brumfield RD  Contact: ext [phone]

## 2022-05-03 LAB — VANCOMYCIN SERPL-MCNC: 15.3 UG/ML

## 2022-05-03 PROCEDURE — 65270000029 HC RM PRIVATE

## 2022-05-03 PROCEDURE — 74011000258 HC RX REV CODE- 258: Performed by: HOSPITALIST

## 2022-05-03 PROCEDURE — 74011250636 HC RX REV CODE- 250/636: Performed by: HOSPITALIST

## 2022-05-03 PROCEDURE — 74011000250 HC RX REV CODE- 250: Performed by: INTERNAL MEDICINE

## 2022-05-03 PROCEDURE — 36415 COLL VENOUS BLD VENIPUNCTURE: CPT

## 2022-05-03 PROCEDURE — 74011250636 HC RX REV CODE- 250/636: Performed by: INTERNAL MEDICINE

## 2022-05-03 PROCEDURE — 02PYX3Z REMOVAL OF INFUSION DEVICE FROM GREAT VESSEL, EXTERNAL APPROACH: ICD-10-PCS | Performed by: INTERNAL MEDICINE

## 2022-05-03 PROCEDURE — 80202 ASSAY OF VANCOMYCIN: CPT

## 2022-05-03 RX ADMIN — CEFEPIME HYDROCHLORIDE 1 G: 1 INJECTION, POWDER, FOR SOLUTION INTRAMUSCULAR; INTRAVENOUS at 17:09

## 2022-05-03 RX ADMIN — VANCOMYCIN HYDROCHLORIDE 750 MG: 750 INJECTION, POWDER, LYOPHILIZED, FOR SOLUTION INTRAVENOUS at 07:24

## 2022-05-03 RX ADMIN — Medication: at 06:29

## 2022-05-03 RX ADMIN — CEFEPIME HYDROCHLORIDE 1 G: 1 INJECTION, POWDER, FOR SOLUTION INTRAMUSCULAR; INTRAVENOUS at 01:59

## 2022-05-03 RX ADMIN — VANCOMYCIN HYDROCHLORIDE 750 MG: 750 INJECTION, POWDER, LYOPHILIZED, FOR SOLUTION INTRAVENOUS at 17:18

## 2022-05-03 RX ADMIN — CEFEPIME HYDROCHLORIDE 1 G: 1 INJECTION, POWDER, FOR SOLUTION INTRAMUSCULAR; INTRAVENOUS at 11:18

## 2022-05-03 NOTE — PROGRESS NOTES
0800  Change of shift report received from [facility]. 7544  Upon chart review TPN was initiated at 75 mL/hour and was never titrated per order so patient will not get complete dose of TPN. Night shift changed to 62.2 mL at 0430 this morning so only got 861 mL of this dose. Patient does not want his PICC to be removed until he has a full dose of TPN since he missed 3 days and patient states this is how he is getting his nutrition. is the plan to remove the current PICC line and get another access for TPN. patient states he is not eating enough to go off TPN. Provider and pharmacy notified of TPN concern at this time. Pending call at this time. 9252  Pharmacist at bedside to review TPN order. Rate increased to 200 mL per hour to run for 8 hours then to taper to 125 mL/hour then 62 mL per hour then off. Per Dr. Heydi Lombardi will discuss removing PICC line today. 18  Dr. Heydi Lombardi states PICC line needs to be removed today. Patient will get [employer] either today or tomorrow for long term TPN but needs peripheral IV for antibiotics until gets [employer]. Order to hold TPN after PICC removed until patient gets the Green A central line placed. Patient updated on plan of care. 1348  Peripheral line placed. 49 Dm Sellers removed PICC line at this time while patient lying flat and pressure held 20 minutes then occlusive dressing placed to site. Patient laid flat for 20 more minutes then [address] elevated at [facility]  End of Shift Note    Bedside shift change report given to Jillian (oncoming nurse) by Cj Mueller RN (offgoing nurse). Report included the following information SBAR, Kardex, ED Summary, OR Summary, Procedure Summary, Intake/Output, MAR, Accordion, Recent Results, Med Rec Status and Cardiac Rhythm NSR    Shift worked:  4364-7081     Shift summary and any significant changes:     PICC line removed today. Peripheral line placed for IV antibiotic therapy.   To get Green A central line placed tomorrow for long term TPN. Concerns for physician to address:  3616     [facility] phone for oncoming shift:   [phone]       Activity:  Activity Level: Bed Rest  Number times ambulated in hallways past shift: 0  Number of times OOB to chair past shift: 0    Cardiac:   Cardiac Monitoring: Yes      Cardiac Rhythm: Sinus Rhythm    Access:   Current line(s): PIV     Genitourinary:   Urinary status: voiding    Respiratory:   O2 Device: None (Room air)  Chronic home O2 use?: NO  Incentive spirometer at bedside: NO     GI:  Last Bowel Movement Date: 05/02/22  Current diet:  ADULT DIET Full Liquid  ADULT ORAL NUTRITION SUPPLEMENT Breakfast, Lunch, Dinner; Clear Liquid  TPN ADULT - CENTRAL  Passing flatus: YES  Tolerating current diet: NO; only eating bites for taste per patient report. On TPN until PICC removed and then TPN held until [facility] is placed tomorrow for long-term TPN. Pain Management:   Patient states pain is manageable on current regimen: YES    Skin:  Russell Score: 18  Interventions: increase time out of bed    Patient Safety:  Fall Score:  Total Score: 3  Interventions: pt to call before getting OOB  High Fall Risk: Yes    Length of Stay:  Expected LOS: 1d 19h  Actual LOS: 5      Lincoln Huddleston, RN

## 2022-05-03 NOTE — PROGRESS NOTES
Problem: Pressure Injury - Risk of  Goal: *Prevention of pressure injury  Description: Document Russell Scale and appropriate interventions in the flowsheet. Outcome: Progressing Towards Goal  Note: Pressure Injury Interventions:  Sensory Interventions: Assess changes in LOC,Minimize linen layers    Moisture Interventions: Absorbent underpads,Minimize layers    Activity Interventions: Increase time out of bed,Pressure redistribution bed/mattress(bed type)    Mobility Interventions: Pressure redistribution bed/mattress (bed type)    Nutrition Interventions: Document food/fluid/supplement intake                     Problem: Patient Education: Go to Patient Education Activity  Goal: Patient/Family Education  Outcome: Progressing Towards Goal     Problem: Falls - Risk of  Goal: *Absence of Falls  Description: Document Dora Fall Risk and appropriate interventions in the flowsheet.   Outcome: Progressing Towards Goal  Note: Fall Risk Interventions:  Mobility Interventions: Bed/chair exit alarm,Communicate number of staff needed for ambulation/transfer,Patient to call before getting OOB         Medication Interventions: Evaluate medications/consider consulting pharmacy,Bed/chair exit alarm,Patient to call before getting OOB,Teach patient to arise slowly    Elimination Interventions: Call light in reach,Bed/chair exit alarm    History of Falls Interventions: Evaluate medications/consider consulting pharmacy,Bed/chair exit alarm         Problem: Patient Education: Go to Patient Education Activity  Goal: Patient/Family Education  Outcome: Progressing Towards Goal
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
## 2022-05-03 NOTE — PROGRESS NOTES
Transition of Care Plan:     RUR: 22% high risk  Disposition: Home with [address] and TPN (Marzena Freitas [address])  Follow up appointments: PCP, Specialists as indicated  DME needed: None anticipated. Pt has a rollator and bedside commode  Transportation at Discharge: Pt's mother vs Medicaid transport   [place] or means to access home:  Pt has access      IM Medicare Letter: N/A - Medicaid Coverage only  Is patient a BCPI-A Bundle:    N/A              If yes, was Bundle Letter given?:    Is patient a  and connected with the [facility]? N/A                If yes, was [employer] transfer form completed and [facility] notified? Caregiver Contact: Pt's mother, Kelton Fernandez) [phone]  Discharge Caregiver contacted prior to discharge? To be contacted. Care Conference needed?:   Not indicated at present       CM reviewed pt's chart. CM will continue to follow for discharge planning while patient is admitted on current unit. Please contact this CM with questions or issues related to discharge.      OLY Rosa  Care Manager [facility]  [phone]

## 2022-05-04 ENCOUNTER — APPOINTMENT (OUTPATIENT)
Dept: INTERVENTIONAL RADIOLOGY/VASCULAR | Age: 42
DRG: 721 | End: 2022-05-04
Attending: INTERNAL MEDICINE
Payer: MEDICAID

## 2022-05-04 LAB
ANION GAP SERPL CALC-SCNC: 4 MMOL/L (ref 5–15)
BUN SERPL-MCNC: 13 MG/DL (ref 6–20)
BUN/CREAT SERPL: 21 (ref 12–20)
CALCIUM SERPL-MCNC: 9 MG/DL (ref 8.5–10.1)
CHLORIDE SERPL-SCNC: 106 MMOL/L (ref 97–108)
CO2 SERPL-SCNC: 30 MMOL/L (ref 21–32)
CREAT SERPL-MCNC: 0.62 MG/DL (ref 0.7–1.3)
GLUCOSE SERPL-MCNC: 84 MG/DL (ref 65–100)
MAGNESIUM SERPL-MCNC: 1.7 MG/DL (ref 1.6–2.4)
PHOSPHATE SERPL-MCNC: 4.1 MG/DL (ref 2.6–4.7)
POTASSIUM SERPL-SCNC: 3.7 MMOL/L (ref 3.5–5.1)
SODIUM SERPL-SCNC: 140 MMOL/L (ref 136–145)

## 2022-05-04 PROCEDURE — 99233 SBSQ HOSP IP/OBS HIGH 50: CPT | Performed by: INTERNAL MEDICINE

## 2022-05-04 PROCEDURE — 74011250636 HC RX REV CODE- 250/636: Performed by: INTERNAL MEDICINE

## 2022-05-04 PROCEDURE — 74011000250 HC RX REV CODE- 250: Performed by: HOSPITALIST

## 2022-05-04 PROCEDURE — 83735 ASSAY OF MAGNESIUM: CPT

## 2022-05-04 PROCEDURE — 74011000250 HC RX REV CODE- 250: Performed by: STUDENT IN AN ORGANIZED HEALTH CARE EDUCATION/TRAINING PROGRAM

## 2022-05-04 PROCEDURE — 74011250636 HC RX REV CODE- 250/636: Performed by: STUDENT IN AN ORGANIZED HEALTH CARE EDUCATION/TRAINING PROGRAM

## 2022-05-04 PROCEDURE — 74011000250 HC RX REV CODE- 250: Performed by: INTERNAL MEDICINE

## 2022-05-04 PROCEDURE — 2709999900 HC NON-CHARGEABLE SUPPLY

## 2022-05-04 PROCEDURE — 74011000258 HC RX REV CODE- 258: Performed by: HOSPITALIST

## 2022-05-04 PROCEDURE — 80048 BASIC METABOLIC PNL TOTAL CA: CPT

## 2022-05-04 PROCEDURE — 65270000029 HC RM PRIVATE

## 2022-05-04 PROCEDURE — 74011250636 HC RX REV CODE- 250/636: Performed by: HOSPITALIST

## 2022-05-04 PROCEDURE — 87040 BLOOD CULTURE FOR BACTERIA: CPT

## 2022-05-04 PROCEDURE — 0JH63XZ INSERTION OF TUNNELED VASCULAR ACCESS DEVICE INTO CHEST SUBCUTANEOUS TISSUE AND FASCIA, PERCUTANEOUS APPROACH: ICD-10-PCS | Performed by: STUDENT IN AN ORGANIZED HEALTH CARE EDUCATION/TRAINING PROGRAM

## 2022-05-04 PROCEDURE — 02HV33Z INSERTION OF INFUSION DEVICE INTO SUPERIOR VENA CAVA, PERCUTANEOUS APPROACH: ICD-10-PCS | Performed by: STUDENT IN AN ORGANIZED HEALTH CARE EDUCATION/TRAINING PROGRAM

## 2022-05-04 PROCEDURE — 77030010507 HC ADH SKN DERMBND J&J -B

## 2022-05-04 PROCEDURE — 77030002996 HC SUT SLK J&J -A

## 2022-05-04 PROCEDURE — 36415 COLL VENOUS BLD VENIPUNCTURE: CPT

## 2022-05-04 PROCEDURE — C1751 CATH, INF, PER/CENT/MIDLINE: HCPCS

## 2022-05-04 PROCEDURE — 84100 ASSAY OF PHOSPHORUS: CPT

## 2022-05-04 PROCEDURE — 74011000258 HC RX REV CODE- 258: Performed by: INTERNAL MEDICINE

## 2022-05-04 PROCEDURE — 77001 FLUOROGUIDE FOR VEIN DEVICE: CPT

## 2022-05-04 RX ORDER — MORPHINE SULFATE 2 MG/ML
1 INJECTION, SOLUTION INTRAMUSCULAR; INTRAVENOUS ONCE
Status: COMPLETED | OUTPATIENT
Start: 2022-05-04 | End: 2022-05-04

## 2022-05-04 RX ORDER — HEPARIN SODIUM 200 [USP'U]/100ML
400 INJECTION, SOLUTION INTRAVENOUS ONCE
Status: COMPLETED | OUTPATIENT
Start: 2022-05-04 | End: 2022-05-04

## 2022-05-04 RX ORDER — FENTANYL CITRATE 50 UG/ML
100 INJECTION, SOLUTION INTRAMUSCULAR; INTRAVENOUS
Status: DISCONTINUED | OUTPATIENT
Start: 2022-05-04 | End: 2022-05-04

## 2022-05-04 RX ORDER — LIDOCAINE HYDROCHLORIDE 20 MG/ML
18 INJECTION, SOLUTION INFILTRATION; PERINEURAL ONCE
Status: COMPLETED | OUTPATIENT
Start: 2022-05-04 | End: 2022-05-04

## 2022-05-04 RX ORDER — SODIUM CHLORIDE 9 MG/ML
25 INJECTION, SOLUTION INTRAVENOUS CONTINUOUS
Status: DISCONTINUED | OUTPATIENT
Start: 2022-05-04 | End: 2022-05-04

## 2022-05-04 RX ORDER — MIDAZOLAM HYDROCHLORIDE 1 MG/ML
5 INJECTION, SOLUTION INTRAMUSCULAR; INTRAVENOUS
Status: DISCONTINUED | OUTPATIENT
Start: 2022-05-04 | End: 2022-05-04

## 2022-05-04 RX ADMIN — CEFEPIME HYDROCHLORIDE 1 G: 1 INJECTION, POWDER, FOR SOLUTION INTRAMUSCULAR; INTRAVENOUS at 18:04

## 2022-05-04 RX ADMIN — SODIUM ACETATE: 164 INJECTION, SOLUTION, CONCENTRATE INTRAVENOUS at 18:05

## 2022-05-04 RX ADMIN — HEPARIN SODIUM IN SODIUM CHLORIDE 800 UNITS: 200 INJECTION INTRAVENOUS at 09:00

## 2022-05-04 RX ADMIN — I.V. FAT EMULSION 500 ML: 20 EMULSION INTRAVENOUS at 18:04

## 2022-05-04 RX ADMIN — FENTANYL CITRATE 25 MCG: 50 INJECTION, SOLUTION INTRAMUSCULAR; INTRAVENOUS at 08:45

## 2022-05-04 RX ADMIN — VANCOMYCIN HYDROCHLORIDE 750 MG: 750 INJECTION, POWDER, LYOPHILIZED, FOR SOLUTION INTRAVENOUS at 16:05

## 2022-05-04 RX ADMIN — VANCOMYCIN HYDROCHLORIDE 750 MG: 750 INJECTION, POWDER, LYOPHILIZED, FOR SOLUTION INTRAVENOUS at 10:03

## 2022-05-04 RX ADMIN — CEFEPIME HYDROCHLORIDE 1 G: 1 INJECTION, POWDER, FOR SOLUTION INTRAMUSCULAR; INTRAVENOUS at 10:04

## 2022-05-04 RX ADMIN — CEFEPIME HYDROCHLORIDE 1 G: 1 INJECTION, POWDER, FOR SOLUTION INTRAMUSCULAR; INTRAVENOUS at 01:45

## 2022-05-04 RX ADMIN — LIDOCAINE HYDROCHLORIDE 200 MG: 20 INJECTION, SOLUTION INFILTRATION; PERINEURAL at 08:50

## 2022-05-04 RX ADMIN — SODIUM CHLORIDE 25 ML/HR: 9 INJECTION, SOLUTION INTRAVENOUS at 08:48

## 2022-05-04 RX ADMIN — VANCOMYCIN HYDROCHLORIDE 750 MG: 750 INJECTION, POWDER, LYOPHILIZED, FOR SOLUTION INTRAVENOUS at 00:02

## 2022-05-04 RX ADMIN — MORPHINE SULFATE 1 MG: 2 INJECTION, SOLUTION INTRAMUSCULAR; INTRAVENOUS at 14:15

## 2022-05-04 NOTE — PROGRESS NOTES
Care plan reviewed. Problem: Pressure Injury - Risk of  Goal: *Prevention of pressure injury  Description: Document Russell Scale and appropriate interventions in the flowsheet. Outcome: Progressing Towards Goal  Note: Pressure Injury Interventions:  Sensory Interventions: Assess changes in LOC    Moisture Interventions: Absorbent underpads    Activity Interventions: Increase time out of bed    Mobility Interventions: HOB 30 degrees or less,Pressure redistribution bed/mattress (bed type)    Nutrition Interventions: Document food/fluid/supplement intake    Friction and Shear Interventions: HOB 30 degrees or less                Problem: Patient Education: Go to Patient Education Activity  Goal: Patient/Family Education  Outcome: Progressing Towards Goal     Problem: Falls - Risk of  Goal: *Absence of Falls  Description: Document Dora Fall Risk and appropriate interventions in the flowsheet.   Outcome: Progressing Towards Goal  Note: Fall Risk Interventions:  Mobility Interventions: Patient to call before getting OOB,Utilize walker, cane, or other assistive device         Medication Interventions: Patient to call before getting OOB,Teach patient to arise slowly    Elimination Interventions: Call light in reach,Patient to call for help with toileting needs,Toileting schedule/hourly rounds    History of Falls Interventions: Evaluate medications/consider consulting pharmacy         Problem: Patient Education: Go to Patient Education Activity  Goal: Patient/Family Education  Outcome: Progressing Towards Goal
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
## 2022-05-04 NOTE — PROGRESS NOTES
Name of Procedure: Image guided Telly catheter placement     Sedation medications given: only fentanyl given due to patients BP being low. No versed was given. Fentanyl: 25 mcg    Procedure Start: 08:45 a.m. Procedure End: 09:00 a.m. Vital Signs: Stable , BP on lower side    Fluids Removed: none    Samples sent to lab: none    Any complications related to procedure: none    Postprocedure Care Needed/order sets have been placed in [facility]      Patient A&OX4, on RA, and is in NAD at this time post procedure. Patient's Telly catheter placement has been confirmed and can be used. TRANSFER - OUT REPORT:    Verbal report given to Wily RN (name) on [facility] Long  being transferred to [id number] (unit) for routine progression of care       Report consisted of patients Situation, Background, Assessment and   Recommendations(SBAR). Information from the following report(s) Procedure Summary was reviewed with the receiving nurse. Lines:   Venous Access Device Telly Catheter 05/04/22 Upper chest (subclavicular area, right (Active)       Peripheral IV 05/03/22 Left;Posterior Hand (Active)   Site Assessment Clean, dry, & intact 05/03/22 1536   Phlebitis Assessment 0 05/03/22 1536   Infiltration Assessment 0 05/03/22 1536   Dressing Status Clean, dry, & intact 05/03/22 1536   Dressing Type Transparent 05/03/22 1536   Hub Color/Line Status Pink;Flushed;Patent 05/03/22 1536   Action Taken Open ports on tubing capped 05/03/22 1536   Alcohol Cap Used Yes 05/03/22 1536        Opportunity for questions and clarification was provided.
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
## 2022-05-04 NOTE — PROGRESS NOTES
End of Shift Note    Bedside shift change report given to SAMUEL Velazquez (oncoming nurse) by Pennie Holguin RN (offgoing nurse). Report included the following information SBAR, Kardex, Intake/Output, MAR, Recent Results     Shift worked: 7p-7a     Shift summary and any significant changes:     Patient is alert and oriented X4. Plan of care reviewed with patient.  POC included;  IV Fluid  IV Antibiotics  Drainage care  Lab monitoring   Concerns for physician to address:       Zone phone for oncoming shift:   [phone]

## 2022-05-04 NOTE — PROGRESS NOTES
Interventional Radiology Procedure Note        5/4/2022    Provider: Abram Jain PA-C  Supervising Physician: Dallas Powell MD      Pre-Procedure Diagnosis: parenteral nutrition  Post-Procedure Diagnosis: paretneral nutrition    Informed consent obtained    Procedure(s): Telly catheter placement     Sedation: 25mcg fentanyl IV    Specimens removed: none    Complications: none    Recomendations: none    Discharge Disposition: fair      Full dictated report to follow    Abram Jain PA-C  Interventional Radiology

## 2022-05-05 VITALS
TEMPERATURE: 98.3 F | HEIGHT: 70 IN | HEART RATE: 91 BPM | DIASTOLIC BLOOD PRESSURE: 54 MMHG | OXYGEN SATURATION: 100 % | SYSTOLIC BLOOD PRESSURE: 100 MMHG | RESPIRATION RATE: 17 BRPM | WEIGHT: 136.69 LBS | BODY MASS INDEX: 19.57 KG/M2

## 2022-05-05 LAB
ANION GAP SERPL CALC-SCNC: 2 MMOL/L (ref 5–15)
BUN SERPL-MCNC: 14 MG/DL (ref 6–20)
BUN/CREAT SERPL: 25 (ref 12–20)
CALCIUM SERPL-MCNC: 8.6 MG/DL (ref 8.5–10.1)
CHLORIDE SERPL-SCNC: 108 MMOL/L (ref 97–108)
CO2 SERPL-SCNC: 29 MMOL/L (ref 21–32)
CREAT SERPL-MCNC: 0.56 MG/DL (ref 0.7–1.3)
GLUCOSE SERPL-MCNC: 102 MG/DL (ref 65–100)
MAGNESIUM SERPL-MCNC: 1.7 MG/DL (ref 1.6–2.4)
PHOSPHATE SERPL-MCNC: 4.7 MG/DL (ref 2.6–4.7)
POTASSIUM SERPL-SCNC: 4.2 MMOL/L (ref 3.5–5.1)
SODIUM SERPL-SCNC: 139 MMOL/L (ref 136–145)
VANCOMYCIN SERPL-MCNC: 14.2 UG/ML

## 2022-05-05 PROCEDURE — 74011250636 HC RX REV CODE- 250/636: Performed by: INTERNAL MEDICINE

## 2022-05-05 PROCEDURE — 84100 ASSAY OF PHOSPHORUS: CPT

## 2022-05-05 PROCEDURE — 74011000258 HC RX REV CODE- 258: Performed by: HOSPITALIST

## 2022-05-05 PROCEDURE — 2709999900 HC NON-CHARGEABLE SUPPLY

## 2022-05-05 PROCEDURE — 74011250637 HC RX REV CODE- 250/637: Performed by: INTERNAL MEDICINE

## 2022-05-05 PROCEDURE — 99232 SBSQ HOSP IP/OBS MODERATE 35: CPT | Performed by: INTERNAL MEDICINE

## 2022-05-05 PROCEDURE — 36415 COLL VENOUS BLD VENIPUNCTURE: CPT

## 2022-05-05 PROCEDURE — 80202 ASSAY OF VANCOMYCIN: CPT

## 2022-05-05 PROCEDURE — 83735 ASSAY OF MAGNESIUM: CPT

## 2022-05-05 PROCEDURE — 80048 BASIC METABOLIC PNL TOTAL CA: CPT

## 2022-05-05 PROCEDURE — A6154 WOUND POUCH EACH: HCPCS

## 2022-05-05 PROCEDURE — 77030040162

## 2022-05-05 PROCEDURE — 74011250636 HC RX REV CODE- 250/636: Performed by: HOSPITALIST

## 2022-05-05 RX ORDER — LEVOFLOXACIN 750 MG/1
750 TABLET ORAL DAILY
Qty: 3 TABLET | Refills: 0 | Status: SHIPPED | OUTPATIENT
Start: 2022-05-05 | End: 2022-05-05

## 2022-05-05 RX ADMIN — ENOXAPARIN SODIUM 40 MG: 100 INJECTION SUBCUTANEOUS at 10:34

## 2022-05-05 RX ADMIN — Medication 1 CAPSULE: at 10:34

## 2022-05-05 RX ADMIN — VANCOMYCIN HYDROCHLORIDE 750 MG: 750 INJECTION, POWDER, LYOPHILIZED, FOR SOLUTION INTRAVENOUS at 00:23

## 2022-05-05 RX ADMIN — VANCOMYCIN HYDROCHLORIDE 750 MG: 750 INJECTION, POWDER, LYOPHILIZED, FOR SOLUTION INTRAVENOUS at 10:34

## 2022-05-05 RX ADMIN — CEFEPIME HYDROCHLORIDE 1 G: 1 INJECTION, POWDER, FOR SOLUTION INTRAMUSCULAR; INTRAVENOUS at 01:49

## 2022-05-05 RX ADMIN — CEFEPIME HYDROCHLORIDE 1 G: 1 INJECTION, POWDER, FOR SOLUTION INTRAMUSCULAR; INTRAVENOUS at 10:38

## 2022-05-05 NOTE — PROGRESS NOTES
Transition of Care Plan:     RUR: 20% high risk  Disposition: Home with [address] and TPN and IV ABX ([facility])  Follow up appointments: PCP, Specialists as indicated  DME needed: None anticipated. Pt has a rollator and bedside commode  Transportation at Discharge: Pt's mother vs Medicaid transport   [place] or means to access home:  Pt has access      IM Medicare Letter: N/A - Medicaid Coverage only  Is patient a BCPI-A Bundle:    N/A              If yes, was Bundle Letter given?:    Is patient a [place] and connected with the [facility]? N/A                If yes, was [employer] transfer form completed and VA notified? Caregiver Contact: Pt's mother, Lala Mckeon) [phone]  Discharge Caregiver contacted prior to discharge? To be contacted. Care Conference needed?:   Not indicated at present       4:26 PM  CM confirmed with Marzen[facility] 1237 that they will deliver IV ABX and TPN to pt's home today. No further needs identified at this time. Patient is ready to d/c on a CM standpoint. RN aware. 11:02 AM  CM acknowledged d/c orders. Pt will d/c home with [facility]), IV ABX & TPN through [address] [place] [address] Street sent updated clinicals to [facility] and infusion [employer]. CM met with pt at the bedside and he is in agreement with d/c plan. Pt asked for a BSC to be ordered for him however pt is up ad lavern and able to walk to the restroom himself. Pt's insurance will not cover it, pt voiced understanding. Pt's mother will transport him home at d/c. Care Management Interventions  PCP Verified by CM: No (Pt reports he sees a provider at [facility] but cannot recall information)  Palliative Care Criteria Met (RRAT>21 & CHF Dx)?: No  Mode of Transport at Discharge:  Other (see comment) (Mother vs Medicaid transport)  Transition of Care Consult (CM Consult): Discharge Planning  Discharge Durable Medical Equipment: No (Pt has a rollator and bedside commode)  Physical Therapy Consult: No  Occupational Therapy Consult: No  Speech Therapy Consult: No  Support Systems: Parent(s),Other Family Member(s)  Confirm Follow Up Transport: Family (Mother vs Medicaid transportation)  Discharge Location  Patient Expects to be Discharged to[de-identified] Home with home health    OLY Cruz  Care Manager ED [facility]  [phone]

## 2022-05-05 NOTE — TELEPHONE ENCOUNTER
HOME CARE DELIVERED CALLING TO CHECK THE STATUS OF  CERTIFICATE OF MEDICAL NECESSITIES FOR OSTOMY  SUPPLIES  PW WAS FAX TO OFFICE ON 4/21/22 . PLEASE CALL.  [phone]

## 2022-05-05 NOTE — PROGRESS NOTES
Pharmacy Antimicrobial Kinetic Dosing    Indication for Antimicrobials: bacteremia     Current Regimen of Each Antimicrobial:  Vancomycin 750 mg IV q8h (Start Date ; Day 10  Cefepime 1 gm IV q8h (Start date ; day 9)    Previous Antimicrobial Therapy:    Goal Level: AUC: 400-600 mg/hr/Liter/day    Date Dose & Interval Measured (mcg/mL) Predicted AUC/Trough    750 mg iv q8h 14.8 453 / 13.7   5/3 750 mg iv q8h 15.3 436   5/5 750 mg iv q8h 14.2 449     Date & time of next level: --    Dosing calculator used: [employer]endargodRSulfagenix calculator    Significant Positive Cultures:    abdomen - ecoli, klebsiella, morganella, lactobacillus (final)   bcx - 2/ S. Epi (Oxacillin resistant)    pbcx - NG    Conditions for Dosing Consideration: Malnutrition    Labs:  Recent Labs     22  0347 22  0549   CREA 0.56* 0.62*   BUN 14 13     No results for input(s): WBC, BANDS in the last 72 hours. Temp (24hrs), Av.1 °F (36.7 °C), Min:98 °F (36.7 °C), Max:98.2 °F (36.8 °C)    Creatinine Clearance (mL/min):   CrCl (Ideal Body Weight): 141.9   If actual weight < IBW: CrCl (Actual Body Weight) 120.6    Impression/Plan:   Vancomycin level = 14.2 Estimated AUC of 449. Continue vancomycin 750 mg IV Q8H  Continue Cefepime  BMP daily  Antimicrobial stop date TBD     Pharmacy will follow daily and adjust medications as appropriate for renal function and/or serum levels.     Thank you,  Claude Roberts, PHARMD

## 2022-05-05 NOTE — PROGRESS NOTES
Hospital follow-up PCP transitional care appointment has been scheduled with Dr. Dierdre Soulier on 5/12/22 at [id number]. This is a previously scheduled appt. Pending patient discharge.   Brooks Marie, Care Management Assistant

## 2022-05-05 NOTE — WOUND CARE
Wound care consult for the discharge planning. Patient going home today and he is happy today. Discharge supplies given to him and he will continue the home care for TPN and fistula care as needed. Patient does his own wound care most of the time and he continues to gain weight slowly with very little absorption from actually food. He knows his plan of care and follows it fairly well with his mother's help at home. Pt. Did his own fistula bag today along with the skin prep.    Vivi Rowe RN, BSN, [employer]
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
## 2022-05-09 LAB
BACTERIA SPEC CULT: NORMAL
SERVICE CMNT-IMP: NORMAL

## 2022-06-12 ENCOUNTER — APPOINTMENT (OUTPATIENT)
Dept: GENERAL RADIOLOGY | Age: 42
DRG: 206 | End: 2022-06-12
Attending: EMERGENCY MEDICINE
Payer: MEDICAID

## 2022-06-12 ENCOUNTER — APPOINTMENT (OUTPATIENT)
Dept: CT IMAGING | Age: 42
DRG: 206 | End: 2022-06-12
Attending: EMERGENCY MEDICINE
Payer: MEDICAID

## 2022-06-12 ENCOUNTER — HOSPITAL ENCOUNTER (INPATIENT)
Age: 42
LOS: 24 days | Discharge: HOME HEALTH | DRG: 206 | End: 2022-07-06
Attending: EMERGENCY MEDICINE | Admitting: INTERNAL MEDICINE
Payer: MEDICAID

## 2022-06-12 DIAGNOSIS — R65.21 SEPTIC SHOCK (HCC): Primary | ICD-10-CM

## 2022-06-12 DIAGNOSIS — A41.9 SEPTIC SHOCK (HCC): Primary | ICD-10-CM

## 2022-06-12 LAB
ALBUMIN SERPL-MCNC: 2.5 G/DL (ref 3.5–5)
ALBUMIN/GLOB SERPL: 0.5 {RATIO} (ref 1.1–2.2)
ALP SERPL-CCNC: 130 U/L (ref 45–117)
ALT SERPL-CCNC: 41 U/L (ref 12–78)
ANION GAP SERPL CALC-SCNC: 13 MMOL/L (ref 5–15)
APPEARANCE UR: ABNORMAL
AST SERPL-CCNC: 37 U/L (ref 15–37)
BACTERIA URNS QL MICRO: ABNORMAL /HPF
BASE DEFICIT BLD-SCNC: 3.1 MMOL/L
BASOPHILS # BLD: 0 K/UL (ref 0–0.1)
BASOPHILS NFR BLD: 0 % (ref 0–1)
BILIRUB SERPL-MCNC: 1.3 MG/DL (ref 0.2–1)
BILIRUB UR QL: NEGATIVE
BUN SERPL-MCNC: 18 MG/DL (ref 6–20)
BUN/CREAT SERPL: 15 (ref 12–20)
CA-I BLD-MCNC: 1.09 MMOL/L (ref 1.12–1.32)
CALCIUM SERPL-MCNC: 8.4 MG/DL (ref 8.5–10.1)
CHLORIDE BLD-SCNC: 98 MMOL/L (ref 100–108)
CHLORIDE SERPL-SCNC: 98 MMOL/L (ref 97–108)
CO2 BLD-SCNC: 21 MMOL/L (ref 19–24)
CO2 SERPL-SCNC: 24 MMOL/L (ref 21–32)
COLOR UR: ABNORMAL
COVID-19 RAPID TEST, COVR: NOT DETECTED
CREAT SERPL-MCNC: 1.23 MG/DL (ref 0.7–1.3)
CREAT UR-MCNC: 0.9 MG/DL (ref 0.6–1.3)
DIFFERENTIAL METHOD BLD: ABNORMAL
EOSINOPHIL # BLD: 0 K/UL (ref 0–0.4)
EOSINOPHIL NFR BLD: 0 % (ref 0–7)
EPITH CASTS URNS QL MICRO: ABNORMAL /LPF
ERYTHROCYTE [DISTWIDTH] IN BLOOD BY AUTOMATED COUNT: 17.2 % (ref 11.5–14.5)
GLOBULIN SER CALC-MCNC: 4.9 G/DL (ref 2–4)
GLUCOSE BLD STRIP.AUTO-MCNC: 109 MG/DL (ref 74–106)
GLUCOSE BLD STRIP.AUTO-MCNC: 110 MG/DL (ref 65–117)
GLUCOSE SERPL-MCNC: 112 MG/DL (ref 65–100)
GLUCOSE UR STRIP.AUTO-MCNC: NEGATIVE MG/DL
HCO3 BLDA-SCNC: 21 MMOL/L
HCT VFR BLD AUTO: 26.2 % (ref 36.6–50.3)
HGB BLD-MCNC: 8.5 G/DL (ref 12.1–17)
HGB UR QL STRIP: ABNORMAL
HYALINE CASTS URNS QL MICRO: ABNORMAL /LPF (ref 0–5)
IMM GRANULOCYTES # BLD AUTO: 0.2 K/UL (ref 0–0.04)
IMM GRANULOCYTES NFR BLD AUTO: 1 % (ref 0–0.5)
KETONES UR QL STRIP.AUTO: NEGATIVE MG/DL
LACTATE BLD-SCNC: 2.77 MMOL/L (ref 0.4–2)
LACTATE BLD-SCNC: 7.39 MMOL/L (ref 0.4–2)
LACTATE SERPL-SCNC: 1.6 MMOL/L (ref 0.4–2)
LEUKOCYTE ESTERASE UR QL STRIP.AUTO: ABNORMAL
LIPASE SERPL-CCNC: 60 U/L (ref 73–393)
LYMPHOCYTES # BLD: 0.6 K/UL (ref 0.8–3.5)
LYMPHOCYTES NFR BLD: 4 % (ref 12–49)
MCH RBC QN AUTO: 27.7 PG (ref 26–34)
MCHC RBC AUTO-ENTMCNC: 32.4 G/DL (ref 30–36.5)
MCV RBC AUTO: 85.3 FL (ref 80–99)
MONOCYTES # BLD: 0.3 K/UL (ref 0–1)
MONOCYTES NFR BLD: 2 % (ref 5–13)
MUCOUS THREADS URNS QL MICRO: ABNORMAL /LPF
NEUTS SEG # BLD: 14.5 K/UL (ref 1.8–8)
NEUTS SEG NFR BLD: 93 % (ref 32–75)
NITRITE UR QL STRIP.AUTO: NEGATIVE
NRBC # BLD: 0 K/UL (ref 0–0.01)
NRBC BLD-RTO: 0 PER 100 WBC
PCO2 BLDV: 32.1 MMHG (ref 41–51)
PH BLDV: 7.42 [PH] (ref 7.32–7.42)
PH UR STRIP: 5.5 [PH] (ref 5–8)
PLATELET # BLD AUTO: 231 K/UL (ref 150–400)
PLATELET COMMENTS,PCOM: ABNORMAL
PMV BLD AUTO: 12.4 FL (ref 8.9–12.9)
PO2 BLDV: 21 MMHG (ref 25–40)
POTASSIUM BLD-SCNC: 3.7 MMOL/L (ref 3.5–5.5)
POTASSIUM SERPL-SCNC: 3.7 MMOL/L (ref 3.5–5.1)
PROCALCITONIN SERPL-MCNC: 12.43 NG/ML
PROT SERPL-MCNC: 7.4 G/DL (ref 6.4–8.2)
PROT UR STRIP-MCNC: 30 MG/DL
RBC # BLD AUTO: 3.07 M/UL (ref 4.1–5.7)
RBC #/AREA URNS HPF: ABNORMAL /HPF (ref 0–5)
RBC MORPH BLD: ABNORMAL
SERVICE CMNT-IMP: ABNORMAL
SERVICE CMNT-IMP: NORMAL
SODIUM BLD-SCNC: 137 MMOL/L (ref 136–145)
SODIUM SERPL-SCNC: 135 MMOL/L (ref 136–145)
SOURCE, COVRS: NORMAL
SP GR UR REFRACTOMETRY: 1.02
SPECIMEN SITE: ABNORMAL
UA: UC IF INDICATED,UAUC: ABNORMAL
UROBILINOGEN UR QL STRIP.AUTO: 0.2 EU/DL (ref 0.2–1)
WBC # BLD AUTO: 15.6 K/UL (ref 4.1–11.1)
WBC URNS QL MICRO: ABNORMAL /HPF (ref 0–4)

## 2022-06-12 PROCEDURE — 74011000258 HC RX REV CODE- 258: Performed by: INTERNAL MEDICINE

## 2022-06-12 PROCEDURE — 87150 DNA/RNA AMPLIFIED PROBE: CPT

## 2022-06-12 PROCEDURE — 74011250636 HC RX REV CODE- 250/636: Performed by: EMERGENCY MEDICINE

## 2022-06-12 PROCEDURE — 83690 ASSAY OF LIPASE: CPT

## 2022-06-12 PROCEDURE — P9045 ALBUMIN (HUMAN), 5%, 250 ML: HCPCS | Performed by: NURSE PRACTITIONER

## 2022-06-12 PROCEDURE — 93005 ELECTROCARDIOGRAM TRACING: CPT

## 2022-06-12 PROCEDURE — 81001 URINALYSIS AUTO W/SCOPE: CPT

## 2022-06-12 PROCEDURE — 2709999900 HC NON-CHARGEABLE SUPPLY

## 2022-06-12 PROCEDURE — 87186 SC STD MICRODIL/AGAR DIL: CPT

## 2022-06-12 PROCEDURE — 74011000636 HC RX REV CODE- 636: Performed by: EMERGENCY MEDICINE

## 2022-06-12 PROCEDURE — 87635 SARS-COV-2 COVID-19 AMP PRB: CPT

## 2022-06-12 PROCEDURE — 65270000046 HC RM TELEMETRY

## 2022-06-12 PROCEDURE — 36415 COLL VENOUS BLD VENIPUNCTURE: CPT

## 2022-06-12 PROCEDURE — 85025 COMPLETE CBC W/AUTO DIFF WBC: CPT

## 2022-06-12 PROCEDURE — 87077 CULTURE AEROBIC IDENTIFY: CPT

## 2022-06-12 PROCEDURE — 82962 GLUCOSE BLOOD TEST: CPT

## 2022-06-12 PROCEDURE — 74011000250 HC RX REV CODE- 250: Performed by: INTERNAL MEDICINE

## 2022-06-12 PROCEDURE — 96374 THER/PROPH/DIAG INJ IV PUSH: CPT

## 2022-06-12 PROCEDURE — 82947 ASSAY GLUCOSE BLOOD QUANT: CPT

## 2022-06-12 PROCEDURE — 74011000250 HC RX REV CODE- 250: Performed by: EMERGENCY MEDICINE

## 2022-06-12 PROCEDURE — 83605 ASSAY OF LACTIC ACID: CPT

## 2022-06-12 PROCEDURE — 96375 TX/PRO/DX INJ NEW DRUG ADDON: CPT

## 2022-06-12 PROCEDURE — 84145 PROCALCITONIN (PCT): CPT

## 2022-06-12 PROCEDURE — 80053 COMPREHEN METABOLIC PANEL: CPT

## 2022-06-12 PROCEDURE — 74177 CT ABD & PELVIS W/CONTRAST: CPT

## 2022-06-12 PROCEDURE — 74011250636 HC RX REV CODE- 250/636: Performed by: NURSE PRACTITIONER

## 2022-06-12 PROCEDURE — 74011250636 HC RX REV CODE- 250/636: Performed by: INTERNAL MEDICINE

## 2022-06-12 PROCEDURE — 74011250637 HC RX REV CODE- 250/637: Performed by: INTERNAL MEDICINE

## 2022-06-12 PROCEDURE — 71045 X-RAY EXAM CHEST 1 VIEW: CPT

## 2022-06-12 PROCEDURE — 99285 EMERGENCY DEPT VISIT HI MDM: CPT

## 2022-06-12 PROCEDURE — 87086 URINE CULTURE/COLONY COUNT: CPT

## 2022-06-12 PROCEDURE — 87040 BLOOD CULTURE FOR BACTERIA: CPT

## 2022-06-12 RX ORDER — IBUPROFEN 400 MG/1
800 TABLET ORAL ONCE
Status: DISCONTINUED | OUTPATIENT
Start: 2022-06-12 | End: 2022-06-12

## 2022-06-12 RX ORDER — ONDANSETRON 2 MG/ML
4 INJECTION INTRAMUSCULAR; INTRAVENOUS
Status: DISCONTINUED | OUTPATIENT
Start: 2022-06-12 | End: 2022-07-06 | Stop reason: HOSPADM

## 2022-06-12 RX ORDER — MAGNESIUM SULFATE 100 %
4 CRYSTALS MISCELLANEOUS AS NEEDED
Status: DISCONTINUED | OUTPATIENT
Start: 2022-06-12 | End: 2022-07-06 | Stop reason: HOSPADM

## 2022-06-12 RX ORDER — SODIUM CHLORIDE 0.9 % (FLUSH) 0.9 %
5-40 SYRINGE (ML) INJECTION AS NEEDED
Status: DISCONTINUED | OUTPATIENT
Start: 2022-06-12 | End: 2022-07-06 | Stop reason: HOSPADM

## 2022-06-12 RX ORDER — KETOROLAC TROMETHAMINE 30 MG/ML
15 INJECTION, SOLUTION INTRAMUSCULAR; INTRAVENOUS
Status: COMPLETED | OUTPATIENT
Start: 2022-06-12 | End: 2022-06-12

## 2022-06-12 RX ORDER — VANCOMYCIN HYDROCHLORIDE
1250
Status: COMPLETED | OUTPATIENT
Start: 2022-06-12 | End: 2022-06-12

## 2022-06-12 RX ORDER — ACETAMINOPHEN 650 MG/1
650 SUPPOSITORY RECTAL
Status: DISCONTINUED | OUTPATIENT
Start: 2022-06-12 | End: 2022-07-06 | Stop reason: HOSPADM

## 2022-06-12 RX ORDER — ACETAMINOPHEN 500 MG
1000 TABLET ORAL ONCE
Status: DISPENSED | OUTPATIENT
Start: 2022-06-12 | End: 2022-06-12

## 2022-06-12 RX ORDER — SODIUM CHLORIDE 0.9 % (FLUSH) 0.9 %
5-10 SYRINGE (ML) INJECTION AS NEEDED
Status: DISCONTINUED | OUTPATIENT
Start: 2022-06-12 | End: 2022-06-13

## 2022-06-12 RX ORDER — DEXTROSE MONOHYDRATE 100 MG/ML
0-250 INJECTION, SOLUTION INTRAVENOUS AS NEEDED
Status: DISCONTINUED | OUTPATIENT
Start: 2022-06-12 | End: 2022-07-06 | Stop reason: HOSPADM

## 2022-06-12 RX ORDER — ACETAMINOPHEN 325 MG/1
650 TABLET ORAL
Status: DISCONTINUED | OUTPATIENT
Start: 2022-06-12 | End: 2022-07-06 | Stop reason: HOSPADM

## 2022-06-12 RX ORDER — ALBUMIN HUMAN 50 G/1000ML
25 SOLUTION INTRAVENOUS ONCE
Status: COMPLETED | OUTPATIENT
Start: 2022-06-12 | End: 2022-06-12

## 2022-06-12 RX ORDER — SODIUM CHLORIDE 0.9 % (FLUSH) 0.9 %
5-40 SYRINGE (ML) INJECTION EVERY 8 HOURS
Status: DISCONTINUED | OUTPATIENT
Start: 2022-06-12 | End: 2022-07-06 | Stop reason: HOSPADM

## 2022-06-12 RX ORDER — FENTANYL CITRATE 50 UG/ML
25 INJECTION, SOLUTION INTRAMUSCULAR; INTRAVENOUS ONCE
Status: COMPLETED | OUTPATIENT
Start: 2022-06-12 | End: 2022-06-12

## 2022-06-12 RX ORDER — LEVOFLOXACIN 5 MG/ML
750 INJECTION, SOLUTION INTRAVENOUS EVERY 24 HOURS
Status: DISCONTINUED | OUTPATIENT
Start: 2022-06-12 | End: 2022-06-15

## 2022-06-12 RX ADMIN — SODIUM CHLORIDE 1000 ML: 9 INJECTION, SOLUTION INTRAVENOUS at 10:15

## 2022-06-12 RX ADMIN — SODIUM CHLORIDE 1000 ML: 9 INJECTION, SOLUTION INTRAVENOUS at 21:38

## 2022-06-12 RX ADMIN — SODIUM CHLORIDE 1000 ML: 9 INJECTION, SOLUTION INTRAVENOUS at 16:09

## 2022-06-12 RX ADMIN — ACETAMINOPHEN 650 MG: 650 SUPPOSITORY RECTAL at 23:13

## 2022-06-12 RX ADMIN — SODIUM CHLORIDE 1000 ML: 9 INJECTION, SOLUTION INTRAVENOUS at 12:23

## 2022-06-12 RX ADMIN — VANCOMYCIN HYDROCHLORIDE 750 MG: 750 INJECTION, POWDER, LYOPHILIZED, FOR SOLUTION INTRAVENOUS at 22:43

## 2022-06-12 RX ADMIN — FENTANYL CITRATE 25 MCG: 50 INJECTION, SOLUTION INTRAMUSCULAR; INTRAVENOUS at 19:16

## 2022-06-12 RX ADMIN — SODIUM CHLORIDE 770 ML: 9 INJECTION, SOLUTION INTRAVENOUS at 10:17

## 2022-06-12 RX ADMIN — PIPERACILLIN AND TAZOBACTAM 4.5 G: 4; .5 INJECTION, POWDER, LYOPHILIZED, FOR SOLUTION INTRAVENOUS at 17:18

## 2022-06-12 RX ADMIN — SODIUM CHLORIDE, PRESERVATIVE FREE 10 ML: 5 INJECTION INTRAVENOUS at 23:20

## 2022-06-12 RX ADMIN — PIPERACILLIN AND TAZOBACTAM 3.38 G: 3; .375 INJECTION, POWDER, LYOPHILIZED, FOR SOLUTION INTRAVENOUS at 22:38

## 2022-06-12 RX ADMIN — IOPAMIDOL 100 ML: 755 INJECTION, SOLUTION INTRAVENOUS at 10:19

## 2022-06-12 RX ADMIN — KETOROLAC TROMETHAMINE 15 MG: 30 INJECTION, SOLUTION INTRAMUSCULAR at 12:10

## 2022-06-12 RX ADMIN — LEVOFLOXACIN 750 MG: 5 INJECTION, SOLUTION INTRAVENOUS at 16:09

## 2022-06-12 RX ADMIN — ALBUMIN (HUMAN) 25 G: 12.5 INJECTION, SOLUTION INTRAVENOUS at 23:04

## 2022-06-12 RX ADMIN — VANCOMYCIN HYDROCHLORIDE 1250 MG: 10 INJECTION, POWDER, LYOPHILIZED, FOR SOLUTION INTRAVENOUS at 11:02

## 2022-06-12 RX ADMIN — CEFEPIME HYDROCHLORIDE 2 G: 2 INJECTION, POWDER, FOR SOLUTION INTRAVENOUS at 10:15

## 2022-06-12 NOTE — Clinical Note
Status[de-identified] INPATIENT [101]   Type of Bed: Telemetry [19]   Cardiac Monitoring Required?: Yes   Inpatient Hospitalization Certified Necessary for the Following Reasons: 9.  Other (further clarification in H&P documentation)   Admitting Diagnosis: Sepsis [facility]) [[id number]]   Admitting Physician: Brittney Shah   Attending Physician: Janell Alas   Estimated Length of Stay: 3-4 Midnights   Discharge Plan[de-identified] Home with Office Follow-up

## 2022-06-12 NOTE — PROGRESS NOTES
Pharmacy Antimicrobial Kinetic Dosing    Indication for Antimicrobials: bacteremia     Current Regimen of Each Antimicrobial:  Vancomycin pharmacy to dose (Start Date ; Day # 1)  Levofloxacin 750mg daily (start; , day 1)  Zosyn 4.5g,then 3.375g q 8h (start: , day 1)    Previous Antimicrobial Therapy:  Cefepime 2g IV x 1-     Goal Level: AUC: 400-600 mg/hr/Liter/day    Date Dose & Interval Measured (mcg/mL) Predicted AUC/MARIE                       Date & time of next level:   Dosing calculator used: [employer] calculator  Significant Positive Cultures:   : blood; prelim  : urine: prelim    Conditions for Dosing Consideration: None    Labs:  Recent Labs     22  1152 22  1002   CREA  --  1.23   BUN  --  18   PCT 12.43  --      Recent Labs     22  1002   WBC 15.6*     Temp (24hrs), Av.2 °F (38.4 °C), Min:99.2 °F (37.3 °C), Max:103.1 °F (39.5 °C)    Creatinine Clearance (mL/min):   CrCl (Ideal Body Weight): 80.8   If actual weight < IBW: CrCl (Actual Body Weight) 65.3    Impression/Plan:   Vancomycin 1250mg IV load, then 750mg q 12h for auc 512  Zosyn and levofloxacin as above  Antimicrobial stop date to be determined     Pharmacy will follow daily and adjust medications as appropriate for renal function and/or serum levels.     Thank you,  Krishna Hines, PHARMD    Vancomycin Dosing Document    Documents located on pharmacy Teams site: Clinical Practice -> Antimicrobial Stewardship -> Antibiotics_Vancomycin     Aminoglycoside Dosing Document    Documents located on pharmacy Teams site: Clinical Practice -> Antimicrobial Stewardship -> Antibiotics_Aminoglycosides
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
## 2022-06-12 NOTE — PROGRESS NOTES
Received notification from bedside RN about patient with regards to: 8/10 abdominal pain, tachycardia. Requesting medication for relief. RN express about patient's NPO status which he is on TPN at home  VS: BP 92/62, HR 90, RR 15, O2 sat 100% on RA    Intervention given: Fentanyl 25 mcg IV x 1 dose, BG checks q 6 and hypoglycemia protocol ordered    2235: hypotension, recent IV bolus  VS: BP 82/47, , RR 14, O2 sat 100% on RA    - 5% Albumin 25 g IV x 1 dose    0228: Notified of persistently soft BP s/p Albumin   VS: BP 91/52, HR 93, RR 22, O2 sat 100% on RA    - start D5NS at 100 ml/hour    0415: H/H 5.7/17.6, confirmed by repeat draw  VS: BP 91/52, HR 93, RR 22, O2 sat 100% on RA    - Transfuse 1 unit PRBC, H/H 1 hour s/p transfusion ordered    I have discussed with the patient the rationale for blood component transfusion; its benefits in treating or preventing fatigue, organ damage, or death; and its risk which includes mild transfusion reactions, rare risk of blood borne infection, or more serious but rare reactions. I have discussed the alternatives to transfusion, including the risk and consequences of not receiving transfusion. The patient had an opportunity to ask questions and had agreed to proceed with transfusion of blood components.

## 2022-06-12 NOTE — PROGRESS NOTES
Bedside shift change report given to Stephanie Foster RN (oncoming nurse) by Sanya Red RN (offgoing nurse). Report included the following information SBAR, Kardex, ED Summary, Intake/Output, MAR and Cardiac Rhythm sinus. Messaged  : Asked MD for GI consult Patient has what appears to be a G tube and J tube. He states he needs to drain them. We don't have drains for peg tubes. Also asked if PICC line is ok to use and informed MD patient states he uses TPN at home but has no orders. MD states PICC line is ok to use and will look for TPN orders. Messaged Dr. Debra Jeffrey if she is aware of GI history and The CT states\" Gastrostomy tube in the stomach and jejunostomy tube in the right lower quadrant\" but he said that he needs a bag for them to drain. MD asked this RN to reach out to surgical floor for drain. she will place GI consult. Messaged MD: patient's endurance catheter infiltrated and I was unable to remove it. Another nurse was also unsuccessful at removing it. Let MD know patient has transdermal patch and asked if it is ok to leave on. MD states to leave patch on. - no response regarding endurance catheter. Patient's HR 150s, patient shaking and states he is in 8/10 pain. Messaged NP for orders. NP ordered fentanyl. Patient's ostomy leaking changed ostomy bag    Messaged NP: hung second fluid bolus running at 250/hr at 2138. Last BP 86/54 map(61). Do you want to increase the rate of the fluids? NP ordered albumin    Messaged NP to update: patient just finished saline bolus and albumin bolus BP still soft 91/54 map 63, asked if she wants to order continuous fluids and rapid response nurse recommends redrawing lactic. NP states lactic not needed because patient's last lactic was 1.6. Lab called, patient's hemoglobin 5.7, messaged NP worried sample may be diluted. NP ordered redraw. Patient's hemoglobin 5.7. Message NP. Type and screen ordered and sent.     Bedside shift change report given to Stacey Yusuf RN (oncoming nurse) by Caren Lombardi RN (offgoing nurse). Report included the following information SBAR, Kardex, ED Summary, Intake/Output, MAR and Cardiac Rhythm sinus/sinus tach.

## 2022-06-13 LAB
ALBUMIN SERPL-MCNC: 2.1 G/DL (ref 3.5–5)
ALBUMIN/GLOB SERPL: 0.6 {RATIO} (ref 1.1–2.2)
ALP SERPL-CCNC: 78 U/L (ref 45–117)
ALT SERPL-CCNC: 29 U/L (ref 12–78)
ANION GAP SERPL CALC-SCNC: 7 MMOL/L (ref 5–15)
APPEARANCE UR: CLEAR
AST SERPL-CCNC: 31 U/L (ref 15–37)
BACTERIA URNS QL MICRO: NEGATIVE /HPF
BASOPHILS # BLD: 0 K/UL (ref 0–0.1)
BASOPHILS NFR BLD: 0 % (ref 0–1)
BILIRUB SERPL-MCNC: 0.9 MG/DL (ref 0.2–1)
BILIRUB UR QL: NEGATIVE
BUN SERPL-MCNC: 14 MG/DL (ref 6–20)
BUN/CREAT SERPL: 17 (ref 12–20)
CALCIUM SERPL-MCNC: 7.6 MG/DL (ref 8.5–10.1)
CHLORIDE SERPL-SCNC: 109 MMOL/L (ref 97–108)
CO2 SERPL-SCNC: 23 MMOL/L (ref 21–32)
COLOR UR: ABNORMAL
CREAT SERPL-MCNC: 0.83 MG/DL (ref 0.7–1.3)
DIFFERENTIAL METHOD BLD: ABNORMAL
EOSINOPHIL # BLD: 0 K/UL (ref 0–0.4)
EOSINOPHIL NFR BLD: 0 % (ref 0–7)
EPITH CASTS URNS QL MICRO: ABNORMAL /LPF
ERYTHROCYTE [DISTWIDTH] IN BLOOD BY AUTOMATED COUNT: 17.2 % (ref 11.5–14.5)
GLOBULIN SER CALC-MCNC: 3.4 G/DL (ref 2–4)
GLUCOSE BLD STRIP.AUTO-MCNC: 106 MG/DL (ref 65–117)
GLUCOSE BLD STRIP.AUTO-MCNC: 109 MG/DL (ref 65–117)
GLUCOSE SERPL-MCNC: 91 MG/DL (ref 65–100)
GLUCOSE UR STRIP.AUTO-MCNC: NEGATIVE MG/DL
HCT VFR BLD AUTO: 17.5 % (ref 36.6–50.3)
HCT VFR BLD AUTO: 17.6 % (ref 36.6–50.3)
HCT VFR BLD AUTO: 23.9 % (ref 36.6–50.3)
HGB BLD-MCNC: 5.7 G/DL (ref 12.1–17)
HGB BLD-MCNC: 5.7 G/DL (ref 12.1–17)
HGB BLD-MCNC: 7.9 G/DL (ref 12.1–17)
HGB UR QL STRIP: ABNORMAL
HISTORY CHECKED?,CKHIST: NORMAL
HYALINE CASTS URNS QL MICRO: ABNORMAL /LPF (ref 0–2)
IMM GRANULOCYTES # BLD AUTO: 0.1 K/UL (ref 0–0.04)
IMM GRANULOCYTES NFR BLD AUTO: 1 % (ref 0–0.5)
KETONES UR QL STRIP.AUTO: NEGATIVE MG/DL
LEUKOCYTE ESTERASE UR QL STRIP.AUTO: ABNORMAL
LYMPHOCYTES # BLD: 0.9 K/UL (ref 0.8–3.5)
LYMPHOCYTES NFR BLD: 9 % (ref 12–49)
MAGNESIUM SERPL-MCNC: 1.8 MG/DL (ref 1.6–2.4)
MCH RBC QN AUTO: 27.7 PG (ref 26–34)
MCHC RBC AUTO-ENTMCNC: 32.6 G/DL (ref 30–36.5)
MCV RBC AUTO: 85 FL (ref 80–99)
MONOCYTES # BLD: 0.5 K/UL (ref 0–1)
MONOCYTES NFR BLD: 5 % (ref 5–13)
NEUTS SEG # BLD: 8.9 K/UL (ref 1.8–8)
NEUTS SEG NFR BLD: 85 % (ref 32–75)
NITRITE UR QL STRIP.AUTO: NEGATIVE
NRBC # BLD: 0 K/UL (ref 0–0.01)
NRBC BLD-RTO: 0 PER 100 WBC
PH UR STRIP: 6.5 [PH] (ref 5–8)
PHOSPHATE SERPL-MCNC: 2.4 MG/DL (ref 2.6–4.7)
PLATELET # BLD AUTO: 150 K/UL (ref 150–400)
PMV BLD AUTO: 11.6 FL (ref 8.9–12.9)
POTASSIUM SERPL-SCNC: 3.7 MMOL/L (ref 3.5–5.1)
PROT SERPL-MCNC: 5.5 G/DL (ref 6.4–8.2)
PROT UR STRIP-MCNC: NEGATIVE MG/DL
RBC # BLD AUTO: 2.06 M/UL (ref 4.1–5.7)
RBC #/AREA URNS HPF: ABNORMAL /HPF (ref 0–5)
RBC MORPH BLD: ABNORMAL
SERVICE CMNT-IMP: NORMAL
SERVICE CMNT-IMP: NORMAL
SODIUM SERPL-SCNC: 139 MMOL/L (ref 136–145)
SP GR UR REFRACTOMETRY: 1.01
UA: UC IF INDICATED,UAUC: ABNORMAL
UROBILINOGEN UR QL STRIP.AUTO: 0.2 EU/DL (ref 0.2–1)
WBC # BLD AUTO: 10.4 K/UL (ref 4.1–11.1)
WBC URNS QL MICRO: ABNORMAL /HPF (ref 0–4)

## 2022-06-13 PROCEDURE — 87040 BLOOD CULTURE FOR BACTERIA: CPT

## 2022-06-13 PROCEDURE — 83735 ASSAY OF MAGNESIUM: CPT

## 2022-06-13 PROCEDURE — 74011000258 HC RX REV CODE- 258: Performed by: NURSE PRACTITIONER

## 2022-06-13 PROCEDURE — 81001 URINALYSIS AUTO W/SCOPE: CPT

## 2022-06-13 PROCEDURE — P9016 RBC LEUKOCYTES REDUCED: HCPCS

## 2022-06-13 PROCEDURE — 85025 COMPLETE CBC W/AUTO DIFF WBC: CPT

## 2022-06-13 PROCEDURE — 74011250636 HC RX REV CODE- 250/636: Performed by: INTERNAL MEDICINE

## 2022-06-13 PROCEDURE — A6154 WOUND POUCH EACH: HCPCS

## 2022-06-13 PROCEDURE — 74011250636 HC RX REV CODE- 250/636: Performed by: STUDENT IN AN ORGANIZED HEALTH CARE EDUCATION/TRAINING PROGRAM

## 2022-06-13 PROCEDURE — 80053 COMPREHEN METABOLIC PANEL: CPT

## 2022-06-13 PROCEDURE — 74011000258 HC RX REV CODE- 258: Performed by: STUDENT IN AN ORGANIZED HEALTH CARE EDUCATION/TRAINING PROGRAM

## 2022-06-13 PROCEDURE — 99222 1ST HOSP IP/OBS MODERATE 55: CPT | Performed by: STUDENT IN AN ORGANIZED HEALTH CARE EDUCATION/TRAINING PROGRAM

## 2022-06-13 PROCEDURE — 74011000258 HC RX REV CODE- 258: Performed by: INTERNAL MEDICINE

## 2022-06-13 PROCEDURE — 65270000046 HC RM TELEMETRY

## 2022-06-13 PROCEDURE — 82962 GLUCOSE BLOOD TEST: CPT

## 2022-06-13 PROCEDURE — 84100 ASSAY OF PHOSPHORUS: CPT

## 2022-06-13 PROCEDURE — 77030040162

## 2022-06-13 PROCEDURE — 2709999900 HC NON-CHARGEABLE SUPPLY

## 2022-06-13 PROCEDURE — 86900 BLOOD TYPING SEROLOGIC ABO: CPT

## 2022-06-13 PROCEDURE — 74011250637 HC RX REV CODE- 250/637: Performed by: INTERNAL MEDICINE

## 2022-06-13 PROCEDURE — 87077 CULTURE AEROBIC IDENTIFY: CPT

## 2022-06-13 PROCEDURE — 36430 TRANSFUSION BLD/BLD COMPNT: CPT

## 2022-06-13 PROCEDURE — 36415 COLL VENOUS BLD VENIPUNCTURE: CPT

## 2022-06-13 PROCEDURE — 77030018795 HC PASTE OST COLO -B

## 2022-06-13 PROCEDURE — 74011000250 HC RX REV CODE- 250: Performed by: STUDENT IN AN ORGANIZED HEALTH CARE EDUCATION/TRAINING PROGRAM

## 2022-06-13 PROCEDURE — 86923 COMPATIBILITY TEST ELECTRIC: CPT

## 2022-06-13 PROCEDURE — 74011000250 HC RX REV CODE- 250: Performed by: INTERNAL MEDICINE

## 2022-06-13 PROCEDURE — 85018 HEMOGLOBIN: CPT

## 2022-06-13 RX ORDER — VANCOMYCIN HYDROCHLORIDE
1250
Status: DISCONTINUED | OUTPATIENT
Start: 2022-06-13 | End: 2022-06-14

## 2022-06-13 RX ORDER — SODIUM CHLORIDE 9 MG/ML
250 INJECTION, SOLUTION INTRAVENOUS AS NEEDED
Status: DISCONTINUED | OUTPATIENT
Start: 2022-06-13 | End: 2022-06-26

## 2022-06-13 RX ORDER — DEXTROSE MONOHYDRATE AND SODIUM CHLORIDE 5; .9 G/100ML; G/100ML
100 INJECTION, SOLUTION INTRAVENOUS CONTINUOUS
Status: DISCONTINUED | OUTPATIENT
Start: 2022-06-13 | End: 2022-06-14

## 2022-06-13 RX ORDER — METRONIDAZOLE 500 MG/100ML
500 INJECTION, SOLUTION INTRAVENOUS EVERY 12 HOURS
Status: DISCONTINUED | OUTPATIENT
Start: 2022-06-13 | End: 2022-06-15

## 2022-06-13 RX ADMIN — PIPERACILLIN AND TAZOBACTAM 3.38 G: 3; .375 INJECTION, POWDER, LYOPHILIZED, FOR SOLUTION INTRAVENOUS at 06:20

## 2022-06-13 RX ADMIN — DEXTROSE AND SODIUM CHLORIDE 100 ML/HR: 5; 900 INJECTION, SOLUTION INTRAVENOUS at 16:23

## 2022-06-13 RX ADMIN — VANCOMYCIN HYDROCHLORIDE 1250 MG: 10 INJECTION, POWDER, LYOPHILIZED, FOR SOLUTION INTRAVENOUS at 12:09

## 2022-06-13 RX ADMIN — ACETAMINOPHEN 650 MG: 650 SUPPOSITORY RECTAL at 19:32

## 2022-06-13 RX ADMIN — METRONIDAZOLE 500 MG: 500 INJECTION, SOLUTION INTRAVENOUS at 13:19

## 2022-06-13 RX ADMIN — SODIUM CHLORIDE, PRESERVATIVE FREE 10 ML: 5 INJECTION INTRAVENOUS at 13:21

## 2022-06-13 RX ADMIN — DEXTROSE AND SODIUM CHLORIDE 100 ML/HR: 5; 900 INJECTION, SOLUTION INTRAVENOUS at 02:55

## 2022-06-13 RX ADMIN — SODIUM CHLORIDE, PRESERVATIVE FREE 10 ML: 5 INJECTION INTRAVENOUS at 21:36

## 2022-06-13 RX ADMIN — LEVOFLOXACIN 750 MG: 5 INJECTION, SOLUTION INTRAVENOUS at 15:29

## 2022-06-13 RX ADMIN — CEFEPIME HYDROCHLORIDE 2 G: 2 INJECTION, POWDER, FOR SOLUTION INTRAVENOUS at 21:28

## 2022-06-13 RX ADMIN — SODIUM CHLORIDE, PRESERVATIVE FREE 10 ML: 5 INJECTION INTRAVENOUS at 06:20

## 2022-06-13 RX ADMIN — CEFEPIME HYDROCHLORIDE 2 G: 2 INJECTION, POWDER, FOR SOLUTION INTRAVENOUS at 13:19

## 2022-06-13 NOTE — PROGRESS NOTES
Bedside shift change report given to Morro Gorman RN (oncoming nurse) by Ayush Ordaz RN (offgoing nurse). Report included the following information SBAR, Kardex, ED Summary, Intake/Output, MAR and Cardiac Rhythm sinus tach. End of Shift Note    Bedside shift change report given to SAMUEL Saenz   (oncoming nurse) by Mariela Stokes RN (offgoing nurse). Report included the following information SBAR, Kardex, ED Summary, Intake/Output, MAR and Cardiac Rhythm sinus/ sinus tach    Shift worked:  7p-7a     Shift summary and any significant changes:          Concerns for physician to address:  GI consult. Nutrition- patient is on TPN at home and clear diet but has NPO orders. Zone phone for oncoming shift:          Activity:  Activity Level: Bed Rest  Number times ambulated in hallways past shift: 0  Number of times OOB to chair past shift: 0    Cardiac:   Cardiac Monitoring: Yes      Cardiac Rhythm: Sinus Tachy    Access:   Current line(s): Telly     Genitourinary:   Urinary status: voiding    Respiratory:   O2 Device: None (Room air)  Chronic home O2 use?: YES  Incentive spirometer at bedside: NO       GI:  Last Bowel Movement Date: 06/13/22  Current diet:  DIET NPO  Passing flatus: YES  Tolerating current diet: NO       Pain Management:   Patient states pain is manageable on current regimen: YES    Skin:  Russell Score: 16  Interventions: limit briefs    Patient Safety:  Fall Score:  Total Score: 1  Interventions: bed/chair alarm       Length of Stay:  Expected LOS: - - -  Actual LOS: [address]
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
## 2022-06-13 NOTE — PROGRESS NOTES
Spiritual Care Assessment/Progress Note  [facility]      NAME: Gil Arzate      MRN: [id number]  AGE: 43 y.o. SEX: male  Church Affiliation: No Zoroastrian   Language: English     6/13/2022     Total Time (in minutes): 11     Spiritual Assessment begun in [facility] PROGRESSIVE CARE through conversation with:         [x]Patient        [] Family    [] Friend(s)        Reason for Consult: Palliative Care, Initial/Spiritual Assessment     Spiritual beliefs: (Please include comment if needed)     [] Identifies with a alessandro tradition:         [] Supported by a alessandro community:            [] Claims no spiritual orientation:           [] Seeking spiritual identity:                [] Adheres to an individual form of spirituality:           [x] Not able to assess:  Did not indicate                         Identified resources for coping:      [] Prayer                               [] Music                  [] Guided Imagery     [] Family/friends                 [] Pet visits     [] Devotional reading                         [x] Unknown     [] Other:                                      Interventions offered during this visit: (See comments for more details)    Patient Interventions: Initial/Spiritual assessment, patient floor           Plan of Care:     [x] Support spiritual and/or cultural needs    [] Support AMD and/or advance care planning process      [] Support grieving process   [] Coordinate Rites and/or Rituals    [] Coordination with community clergy   [] No spiritual needs identified at this time   [] Detailed Plan of Care below (See Comments)  [] Make referral to Music Therapy  [] Make referral to Pet Therapy     [] Make referral to Addiction services  [] Make referral to [facility]  [] Make referral to Spiritual Care Partner  [] No future visits requested        [x] Contact Spiritual Care for further referrals     Comments: reviewed chart prior to visit on PCU for spiritual assessment. No family/friends present. Patient was on telephone when  attempted visit. Advised him of ongoing availability of pastoral support.    available upon referral by staff or by patient/family request.       DARRELL Stein, [facility], Staff   [facility] Paging Service  [phone]
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
## 2022-06-13 NOTE — PROGRESS NOTES
Comprehensive Nutrition Assessment    Type and Reason for Visit: Reassess    Nutrition Recommendations/Plan:   1. TPN - recommend AA5% D20% @ 75 mL/hr + 250 mL 20% lipids 3x/week (provides 1798 kcal, 90g protein, 360g CHO)     Nutrition Assessment:    Patient medically noted for sepsis. PMH frozen abdomen, ECF, and chronic TPN. TPN recommendations provided above; will provide 30 kcal/kg and 1.5 g protein/kg bw. Will continue to follow; monitor lytes, BG, and weights. Nutrition Related Findings:   H/H 5.7/17.6, K+ 3.7, Phos 2.4, Mg 1.8, BG -658-112  D5% IVF     Current Nutrition Intake & Therapies:  Average Meal Intake: NPO     DIET NPO    Anthropometric Measures:  Height: 5' 10\" (177.8 cm)  Ideal Body Weight (IBW): 166 lbs (75 kg)     Current Body Wt:  59 kg (130 lb 1.1 oz), 78.4 % IBW. Current BMI (kg/m2): 18.7                          BMI Category: Normal weight (BMI 18.5-24. 9)    Estimated Daily Nutrient Needs:  Energy Requirements Based On: Formula  Weight Used for Energy Requirements: Current  Energy (kcal/day): 4093-2595 kcal (BMR 1496 x 1.2-1.3 g/kg bw)  Weight Used for Protein Requirements: Current  Protein (g/day): 77-89g (1.3-1.5 g/kg bw)  Method Used for Fluid Requirements: 1 ml/kcal  Fluid (ml/day): 1800 mL    Nutrition Diagnosis:   · Altered GI function related to altered GI structure as evidenced by NPO or clear liquid status due to medical condition,nutrition support-parenteral nutrition    Nutrition Interventions:   Food and/or Nutrient Delivery: Start parenteral nutrition  Nutrition Education/Counseling: No recommendations at this time  Coordination of Nutrition Care: Continue to monitor while inpatient       Goals:     Goals: Initiate nutrition support,by next RD assessment       Nutrition Monitoring and Evaluation:   Behavioral-Environmental Outcomes: None identified  Food/Nutrient Intake Outcomes: Parenteral nutrition intake/tolerance  Physical Signs/Symptoms Outcomes: Biochemical data,GI status,Fluid status or edema,Hemodynamic status,Weight    Discharge Planning:    Parenteral nutrition    Pamela Neves RD  Contact: ext [phone]

## 2022-06-13 NOTE — PROGRESS NOTES
06/13/22 1900   Vitals   Temp (!) 102.7 °F (39.3 °C)   Temp Source Oral   Pulse (Heart Rate) (!) 115   Resp Rate 23   O2 Sat (%) 99 %   Level of Consciousness Alert (0)   BP (!) 95/52   MAP (Monitor) (!) 62   MAP (Calculated) 66   MEWS Score 7   Oxygen Therapy   Pulse via Oximetry 114 beats per minute       Patient septic, tachycardic and febrile will give tylenol.
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
## 2022-06-13 NOTE — ACP (ADVANCE CARE PLANNING)
Palliative Medicine  [place]: [phone]  [facility]: [phone]      Patient has always noted that he would want his mother, Pal Mcgrath    # [phone] to be his mPOA but he does not have an AMD.    Patient seen by this writer today, somewhat depressed about his life situation, \"holding pattern\" of being bed-bound, unable to do much, just does not feel good right now, feels weak and feverish/ cold, notes he no longer seems to have the \"enthusiasm\" to feel motivated to work towards medical recovery. Not the best time to approach him about possibly completing an AMD as he does not feel good. His mother, Pal Mcgrath is noted as mPOA. It would be beneficial for patient to complete an AMD as he also has adult children (21 yo , 12yo). Will see if he wants to complete this document later in the week. Patient is Full Code. Patient is not ready to stop all nutrition (TPN) and focus on comfort. Although his life is very challenging and limiting, he wants to continue trying to do what he can to live.

## 2022-06-13 NOTE — PROGRESS NOTES
ID:    Patient seen and examined. Polymicrobial bacteremia. Telly catheter (placed 5/4/22 for TPN) suspected to be source. Will need removal.   After removal, repeat blood cultures. Await speciation, susceptibilities. Will continue vanc. Stop zosyn switch to cefepime flagyl.          Muna Marroquin MD  Infectious Diseases

## 2022-06-13 NOTE — CONSULTS
Palliative Medicine  [phone]    Receive consult and reviewed the chart  Calvin Sy is known to our team from our visits with him during his hospitalization in January    He has a good handle on his extensive medical issues, but the alternative to what we are doing now- is hospice support. Here is an excerpt from my note on 1/28    1. \"He was not super forthcoming about his thoughts, but he did tell me he is \"not giving up\", and he knows that if transplant is an option, he needs to be stronger to survive such a surgery  2. He told me he plans to go home with his mom and do home PT/OT, but is not interested in working with therapy here. 3. He has a lot of family support, with his mom, his siblings, and his extensive network of cousins, aunts and uncles  3. I will have our  follow up with him on Monday for continued support if he is still here  5.  He is not in a position to talk about \"what if you don't get the transplant\" as he is singularly focused on this goal right now, which is completely appropriate given the alternative is likely hospice\"    He appears quite sick this admission, so will follow along to see how this plays out, but I think its too early to talk about shifting his goals today    Our  will follow up with him and his mom for support in the meantime    Sooc Duenas NP

## 2022-06-13 NOTE — PROGRESS NOTES
Problem: Pressure Injury - Risk of  Goal: *Prevention of pressure injury  Description: Document Russell Scale and appropriate interventions in the flowsheet. Outcome: Progressing Towards Goal  Note: Pressure Injury Interventions:  Sensory Interventions: Assess changes in LOC,Avoid rigorous massage over bony prominences,Discuss PT/OT consult with provider,Maintain/enhance activity level,Monitor skin under medical devices,Suspension boots,Turn and reposition approx. every two hours (pillows and wedges if needed),Use 30-degree side-lying position    Moisture Interventions: Absorbent underpads,Assess need for specialty bed,Contain wound drainage,Limit adult briefs,Maintain skin hydration (lotion/cream),Minimize layers,Offer toileting Q_hr    Activity Interventions: Assess need for specialty bed,Increase time out of bed    Mobility Interventions: Assess need for specialty bed,Float heels,Turn and reposition approx.  every two hours(pillow and wedges),Suspension boots,PT/OT evaluation,Pressure redistribution bed/mattress (bed type),HOB 30 degrees or less    Nutrition Interventions: Document food/fluid/supplement intake,Discuss nutritional consult with provider                     Problem: Patient Education: Go to Patient Education Activity  Goal: Patient/Family Education  Outcome: Progressing Towards Goal
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
## 2022-06-14 ENCOUNTER — APPOINTMENT (OUTPATIENT)
Dept: INTERVENTIONAL RADIOLOGY/VASCULAR | Age: 42
DRG: 206 | End: 2022-06-14
Attending: INTERNAL MEDICINE
Payer: MEDICAID

## 2022-06-14 ENCOUNTER — APPOINTMENT (OUTPATIENT)
Dept: GENERAL RADIOLOGY | Age: 42
DRG: 206 | End: 2022-06-14
Attending: PHYSICIAN ASSISTANT
Payer: MEDICAID

## 2022-06-14 ENCOUNTER — APPOINTMENT (OUTPATIENT)
Dept: NON INVASIVE DIAGNOSTICS | Age: 42
DRG: 206 | End: 2022-06-14
Attending: INTERNAL MEDICINE
Payer: MEDICAID

## 2022-06-14 LAB
ABO + RH BLD: NORMAL
ANION GAP SERPL CALC-SCNC: 5 MMOL/L (ref 5–15)
BLD PROD TYP BPU: NORMAL
BLOOD GROUP ANTIBODIES SERPL: NORMAL
BPU ID: NORMAL
BUN SERPL-MCNC: 6 MG/DL (ref 6–20)
BUN/CREAT SERPL: 9 (ref 12–20)
CALCIUM SERPL-MCNC: 8.2 MG/DL (ref 8.5–10.1)
CHLORIDE SERPL-SCNC: 107 MMOL/L (ref 97–108)
CO2 SERPL-SCNC: 26 MMOL/L (ref 21–32)
CREAT SERPL-MCNC: 0.7 MG/DL (ref 0.7–1.3)
CROSSMATCH RESULT,%XM: NORMAL
DATE LAST DOSE: NORMAL
ECHO AV AREA PEAK VELOCITY: 1.9 CM2
ECHO AV AREA VTI: 1.8 CM2
ECHO AV AREA/BSA PEAK VELOCITY: 1 CM2/M2
ECHO AV AREA/BSA VTI: 1 CM2/M2
ECHO AV MEAN GRADIENT: 4 MMHG
ECHO AV MEAN VELOCITY: 0.9 M/S
ECHO AV PEAK GRADIENT: 7 MMHG
ECHO AV PEAK VELOCITY: 1.3 M/S
ECHO AV VELOCITY RATIO: 0.77
ECHO AV VTI: 19.6 CM
ECHO EST RA PRESSURE: 10 MMHG
ECHO LA DIAMETER INDEX: 1.71 CM/M2
ECHO LA DIAMETER: 3.1 CM
ECHO LA VOL 4C: 23 ML (ref 18–58)
ECHO LA VOLUME INDEX A4C: 13 ML/M2 (ref 16–34)
ECHO LV E' LATERAL VELOCITY: 21 CM/S
ECHO LV E' SEPTAL VELOCITY: 13 CM/S
ECHO LV FRACTIONAL SHORTENING: 24 % (ref 28–44)
ECHO LV INTERNAL DIMENSION DIASTOLE INDEX: 2.76 CM/M2
ECHO LV INTERNAL DIMENSION DIASTOLIC: 5 CM (ref 4.2–5.9)
ECHO LV INTERNAL DIMENSION SYSTOLIC INDEX: 2.1 CM/M2
ECHO LV INTERNAL DIMENSION SYSTOLIC: 3.8 CM
ECHO LV IVSD: 0.9 CM (ref 0.6–1)
ECHO LV MASS 2D: 146.8 G (ref 88–224)
ECHO LV MASS INDEX 2D: 81.1 G/M2 (ref 49–115)
ECHO LV POSTERIOR WALL DIASTOLIC: 0.8 CM (ref 0.6–1)
ECHO LV RELATIVE WALL THICKNESS RATIO: 0.32
ECHO LVOT AREA: 2.5 CM2
ECHO LVOT AV VTI INDEX: 0.74
ECHO LVOT DIAM: 1.8 CM
ECHO LVOT MEAN GRADIENT: 2 MMHG
ECHO LVOT PEAK GRADIENT: 4 MMHG
ECHO LVOT PEAK VELOCITY: 1 M/S
ECHO LVOT STROKE VOLUME INDEX: 20.4 ML/M2
ECHO LVOT SV: 36.9 ML
ECHO LVOT VTI: 14.5 CM
ECHO MV A VELOCITY: 0.56 M/S
ECHO MV AREA VTI: 1.9 CM2
ECHO MV E DECELERATION TIME (DT): 123 MS
ECHO MV E VELOCITY: 1.01 M/S
ECHO MV E/A RATIO: 1.8
ECHO MV E/E' LATERAL: 4.81
ECHO MV E/E' RATIO (AVERAGED): 6.29
ECHO MV E/E' SEPTAL: 7.77
ECHO MV LVOT VTI INDEX: 1.31
ECHO MV MAX VELOCITY: 0.8 M/S
ECHO MV MEAN GRADIENT: 1 MMHG
ECHO MV MEAN VELOCITY: 0.6 M/S
ECHO MV PEAK GRADIENT: 3 MMHG
ECHO MV VTI: 19 CM
ECHO PV MAX VELOCITY: 1.2 M/S
ECHO PV PEAK GRADIENT: 5 MMHG
ECHO RIGHT VENTRICULAR SYSTOLIC PRESSURE (RVSP): 27 MMHG
ECHO RV FREE WALL PEAK S': 13 CM/S
ECHO RV TAPSE: 2.1 CM (ref 1.7–?)
ECHO TV REGURGITANT MAX VELOCITY: 2.08 M/S
ECHO TV REGURGITANT PEAK GRADIENT: 17 MMHG
ERYTHROCYTE [DISTWIDTH] IN BLOOD BY AUTOMATED COUNT: 16.6 % (ref 11.5–14.5)
GLUCOSE BLD STRIP.AUTO-MCNC: 91 MG/DL (ref 65–117)
GLUCOSE BLD STRIP.AUTO-MCNC: 95 MG/DL (ref 65–117)
GLUCOSE SERPL-MCNC: 110 MG/DL (ref 65–100)
HCT VFR BLD AUTO: 22.3 % (ref 36.6–50.3)
HGB BLD-MCNC: 7.5 G/DL (ref 12.1–17)
MCH RBC QN AUTO: 28.2 PG (ref 26–34)
MCHC RBC AUTO-ENTMCNC: 33.6 G/DL (ref 30–36.5)
MCV RBC AUTO: 83.8 FL (ref 80–99)
NRBC # BLD: 0 K/UL (ref 0–0.01)
NRBC BLD-RTO: 0 PER 100 WBC
PLATELET # BLD AUTO: 169 K/UL (ref 150–400)
PMV BLD AUTO: 11.8 FL (ref 8.9–12.9)
POTASSIUM SERPL-SCNC: 3.2 MMOL/L (ref 3.5–5.1)
RBC # BLD AUTO: 2.66 M/UL (ref 4.1–5.7)
REPORTED DOSE,DOSE: NORMAL UNITS
REPORTED DOSE/TIME,TMG: NORMAL
SERVICE CMNT-IMP: NORMAL
SERVICE CMNT-IMP: NORMAL
SODIUM SERPL-SCNC: 138 MMOL/L (ref 136–145)
SPECIMEN EXP DATE BLD: NORMAL
STATUS OF UNIT,%ST: NORMAL
UNIT DIVISION, %UDIV: 0
VANCOMYCIN TROUGH SERPL-MCNC: 5.7 UG/ML (ref 5–10)
WBC # BLD AUTO: 8.9 K/UL (ref 4.1–11.1)

## 2022-06-14 PROCEDURE — 36415 COLL VENOUS BLD VENIPUNCTURE: CPT

## 2022-06-14 PROCEDURE — 80202 ASSAY OF VANCOMYCIN: CPT

## 2022-06-14 PROCEDURE — 74011250636 HC RX REV CODE- 250/636: Performed by: INTERNAL MEDICINE

## 2022-06-14 PROCEDURE — 85027 COMPLETE CBC AUTOMATED: CPT

## 2022-06-14 PROCEDURE — 65270000046 HC RM TELEMETRY

## 2022-06-14 PROCEDURE — 74011000250 HC RX REV CODE- 250: Performed by: INTERNAL MEDICINE

## 2022-06-14 PROCEDURE — 74011000258 HC RX REV CODE- 258: Performed by: NURSE PRACTITIONER

## 2022-06-14 PROCEDURE — 82962 GLUCOSE BLOOD TEST: CPT

## 2022-06-14 PROCEDURE — 77030012918 HC BG WND FIST BMS -A

## 2022-06-14 PROCEDURE — 36589 REMOVAL TUNNELED CV CATH: CPT

## 2022-06-14 PROCEDURE — 76937 US GUIDE VASCULAR ACCESS: CPT

## 2022-06-14 PROCEDURE — 74011250636 HC RX REV CODE- 250/636: Performed by: STUDENT IN AN ORGANIZED HEALTH CARE EDUCATION/TRAINING PROGRAM

## 2022-06-14 PROCEDURE — 80048 BASIC METABOLIC PNL TOTAL CA: CPT

## 2022-06-14 PROCEDURE — 99233 SBSQ HOSP IP/OBS HIGH 50: CPT | Performed by: STUDENT IN AN ORGANIZED HEALTH CARE EDUCATION/TRAINING PROGRAM

## 2022-06-14 PROCEDURE — 74011000258 HC RX REV CODE- 258: Performed by: STUDENT IN AN ORGANIZED HEALTH CARE EDUCATION/TRAINING PROGRAM

## 2022-06-14 PROCEDURE — 93306 TTE W/DOPPLER COMPLETE: CPT

## 2022-06-14 PROCEDURE — 74011250637 HC RX REV CODE- 250/637: Performed by: INTERNAL MEDICINE

## 2022-06-14 PROCEDURE — 71045 X-RAY EXAM CHEST 1 VIEW: CPT

## 2022-06-14 RX ORDER — POTASSIUM CHLORIDE 7.45 MG/ML
10 INJECTION INTRAVENOUS
Status: COMPLETED | OUTPATIENT
Start: 2022-06-14 | End: 2022-06-14

## 2022-06-14 RX ORDER — DEXTROSE, SODIUM CHLORIDE, AND POTASSIUM CHLORIDE 5; .9; .15 G/100ML; G/100ML; G/100ML
100 INJECTION INTRAVENOUS CONTINUOUS
Status: DISPENSED | OUTPATIENT
Start: 2022-06-14 | End: 2022-06-17

## 2022-06-14 RX ORDER — POTASSIUM CHLORIDE 750 MG/1
40 TABLET, FILM COATED, EXTENDED RELEASE ORAL EVERY 4 HOURS
Status: DISCONTINUED | OUTPATIENT
Start: 2022-06-14 | End: 2022-06-14

## 2022-06-14 RX ADMIN — POTASSIUM CHLORIDE 10 MEQ: 7.46 INJECTION, SOLUTION INTRAVENOUS at 13:39

## 2022-06-14 RX ADMIN — CEFEPIME HYDROCHLORIDE 2 G: 2 INJECTION, POWDER, FOR SOLUTION INTRAVENOUS at 16:44

## 2022-06-14 RX ADMIN — VANCOMYCIN HYDROCHLORIDE 1000 MG: 1 INJECTION, POWDER, LYOPHILIZED, FOR SOLUTION INTRAVENOUS at 17:44

## 2022-06-14 RX ADMIN — ACETAMINOPHEN 650 MG: 650 SUPPOSITORY RECTAL at 20:31

## 2022-06-14 RX ADMIN — VANCOMYCIN HYDROCHLORIDE 1250 MG: 10 INJECTION, POWDER, LYOPHILIZED, FOR SOLUTION INTRAVENOUS at 04:31

## 2022-06-14 RX ADMIN — POTASSIUM CHLORIDE 10 MEQ: 7.46 INJECTION, SOLUTION INTRAVENOUS at 14:54

## 2022-06-14 RX ADMIN — POTASSIUM CHLORIDE 10 MEQ: 7.46 INJECTION, SOLUTION INTRAVENOUS at 12:49

## 2022-06-14 RX ADMIN — POTASSIUM CHLORIDE, DEXTROSE MONOHYDRATE AND SODIUM CHLORIDE 100 ML/HR: 150; 5; 900 INJECTION, SOLUTION INTRAVENOUS at 18:12

## 2022-06-14 RX ADMIN — DEXTROSE AND SODIUM CHLORIDE 100 ML/HR: 5; 900 INJECTION, SOLUTION INTRAVENOUS at 03:11

## 2022-06-14 RX ADMIN — METRONIDAZOLE 500 MG: 500 INJECTION, SOLUTION INTRAVENOUS at 02:07

## 2022-06-14 RX ADMIN — SODIUM CHLORIDE, PRESERVATIVE FREE 10 ML: 5 INJECTION INTRAVENOUS at 05:26

## 2022-06-14 RX ADMIN — VANCOMYCIN HYDROCHLORIDE 1000 MG: 1 INJECTION, POWDER, LYOPHILIZED, FOR SOLUTION INTRAVENOUS at 11:16

## 2022-06-14 RX ADMIN — LEVOFLOXACIN 750 MG: 5 INJECTION, SOLUTION INTRAVENOUS at 22:01

## 2022-06-14 RX ADMIN — SODIUM CHLORIDE, PRESERVATIVE FREE 10 ML: 5 INJECTION INTRAVENOUS at 22:04

## 2022-06-14 RX ADMIN — METRONIDAZOLE 500 MG: 500 INJECTION, SOLUTION INTRAVENOUS at 16:45

## 2022-06-14 RX ADMIN — CEFEPIME HYDROCHLORIDE 2 G: 2 INJECTION, POWDER, FOR SOLUTION INTRAVENOUS at 05:23

## 2022-06-14 RX ADMIN — POTASSIUM CHLORIDE 10 MEQ: 7.46 INJECTION, SOLUTION INTRAVENOUS at 16:03

## 2022-06-14 RX ADMIN — SODIUM CHLORIDE, PRESERVATIVE FREE 10 ML: 5 INJECTION INTRAVENOUS at 14:54
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
## 2022-06-14 NOTE — PROGRESS NOTES
Transition of Care Plan:    RUR: 22%  Disposition: Home with [address] [facility] [address] [place]  Follow up appointments:PCP  DME needed: None  Transportation at Discharge: Pt's mother will transport at d/c.   [address] or means to access home: Pt has access       IM Medicare Letter: Pt has Medicaid  Is patient a BCPI-A Bundle:  N/A         If yes, was Bundle Letter given?:  N/A  Is patient a [place] and connected with the [place]? No               If yes, was [employer] transfer form completed and [facility] notified? Caregiver Contact: Vern Mir- Mother [phone]  Discharge Caregiver contacted prior to discharge? CM will notify caregiver at d/c. Care Conference needed?:     No      Reason for Admission:   Sepsis                 RUR Score:   22 High risk for readmission        PCP: First and Last name:  Ramiro Mcgovern MD   Name of Practice:   Are you a current patient: Yes/No: Yes   Approximate date of last visit: Unknown   Can you do a virtual visit with your PCP: No             Resources/supports as identified by patient/family:   Pt has support from his mother                 Top Challenges facing patient (as identified by patient/family and CM): Finances/Medication cost?      No issues with finances/medication cost. Pt has [place] Medicaid               Transportation? Pt does not drive. Support system or lack thereof? Pt has a good support system to include mother                      Living arrangements? Pt resides with his mother in an one level home with 6 ALEXEY. Self-care/ADLs/Cognition? Pt is independent with ADL's and IADL's. Pt was alert and oriented. Current Advanced Directive/Advance Care Plan:  Full Code; No ACP on file.  Pt did not want to address AMD    Advance Care Planning   Healthcare Decision Maker:       Primary Decision Maker (Active): Brooksmanju Tod - Mother - [phone]    Click here to complete [address] including selection of the Healthcare Decision Maker Relationship (ie \"Primary\")  Today we documented Decision Maker(s) consistent with Legal Next of Kin hierarchy. Payor Source Payor: Kun Hernandez / Plan: [facility] MEDICAID / Product Type: Managed Care Medicaid /                             Plan for utilizing home health:    Pt will be utilizing home health at d/c.     CM met with pt at bedside to discuss d/c plan. CM verified pt's demographics, insurance and PCP. Pt is a 42 y/o AA male admitted to [facility] on 6/12/2022 for Sepsis. Pt's PCP is Dr. Manpreet Galaviz. Pt uses [facility] [address] on [place] for Rx. Pt has a rollator, BSC and wheelchair. [facility]. No SNF or IPR in the past. Pt is FULL code status. No ACP on file. Pt's mother will transport at d/c.     CM inquired with pt about home health. Pt stated that he needs to resume home health with [facility] after discharge. CM informed pt that CM will send referral to [facility] and [place] [address] via [employer]. Care Management Interventions  PCP Verified by CM: Yes (Pt's PCP is Dr. Gay Hicks)  Palliative Care Criteria Met (RRAT>21 & CHF Dx)?: No  Mode of Transport at Discharge: Other (see comment) (Pt's mother will transport at d/c. )  Transition of Care Consult (CM Consult): Discharge Planning (Home with Home Health )  Discharge Durable Medical Equipment: No  Physical Therapy Consult: No  Occupational Therapy Consult: No  Speech Therapy Consult: No  Support Systems: Parent(s) (Pt resides with his mother in an one level home with 6 ALEXEY)  Confirm Follow Up Transport: Family (Pt does not drive.)  [facility] Provided?: No  Discharge Location  Patient Expects to be Discharged to[de-identified]  (Home with [address])      CM will continue to follow patient for discharge planning needs and arrange for services as deemed necessary.     Jv Jacobson [address]  [phone]

## 2022-06-14 NOTE — WOUND CARE
Wound / Ostomy Nurse consult:  Chart reviewed and patient assessed. Pt. Is here at [facility] for dx of sepsis and is on Chronic TPN due to his major GI issues. His gut is un-usable. Pt. Is well known to this wound care nurse from past care provided for his fistula care. Yesterday I checked on him and brought Fistula supplies to his room with all supplies needed to change the fistula pouch / bag. He has just placed a new one prior to my visit and it was intact. Pt. Stated that the skin looked pretty good. Assessment: the old pouch was removed and the skin cleansed. The skin is pink, intact except for the immediate skin around the fistula. There are contours around the fistula that need to be prepped with skin prep prior to applying a new pouch. Treatment: After cleansing the skin with wound cleanser and wiping it dry, the new pouch was applied using ostomy ring in the crevices and another ostomy ring around the barrier cut out hole. Pt. Held it in place for 2 minutes to warm up the pouch. The two G-tube buttons were cleansed and the skin prepped under them and new one layer gauze placed under them at the patient's request.   Plan: Wound care nurse will check on patient daily Monday - Friday for any needs identified while he is here. He will need a pack of fistula pouches prior to discharge home. Nursing will need to the fistula care on Weekends as needed and assist patient is keeping the bag empties as often as possible. The bags last a lot longer if they are not constantly challenged. Each fistula bag should get 48 - 72 hours of wear time.    Ezra Rooney RN, BSN, [employer]

## 2022-06-14 NOTE — PROGRESS NOTES
Bedside shift change report given to Noris Chance RN (oncoming nurse) by Dmitri Armendariz RN (offgoing nurse). Report included the following information SBAR, Kardex, ED Summary, Intake/Output, MAR and Cardiac Rhythm sinus. Bedside shift change report given to Fabi Tracy RN (oncoming nurse) by Noris Chance RN (offgoing nurse). Report included the following information SBAR, Kardex, ED Summary, Intake/Output, MAR and Cardiac Rhythm sinus.

## 2022-06-14 NOTE — PROGRESS NOTES
Endurance Catheter insertion note     Inserted 20 G, 6 cm long  Endurance Extended Dwell Peripheral Catheter into left forearm using ultrasound guidance and sterile technique. Positive blood return. Patient tolerated procedure well. Denies questions or concerns at this time. The Endurance catheter is an extended dwell peripheral catheter and may remain in place 29 days. Blood samples can be obtained from this catheter. To obtain labs, a tourniquet may be used, flush with 10ml NS, waste 3ml, draw labs, flush with 20ml NS. Dressings needs to be changed with central line dressing kit using bio patch and stat lock every 7 days by nurse. Primary nurse, Ti, RN notified. Lorena Diego RN .   Vascular Access Team. PICC Nurse

## 2022-06-14 NOTE — PROGRESS NOTES
Infectious Disease Progress Note         Interval:  Spiking fever    Subjective:   Appears more comfortable today. Reports some pain in eyes, but no issues with vision. Uses glasses which are not available at this time. No other issues. Objective:    Vitals:   Reviewed in chart. Physical Exam:  Gen: No apparent distress  HEENT:  Normocephalic, atraumatic, no scleral icterus, no signs of hypopyon in eyes  ? CV: Hemodynamically stable  ? Lungs: On room air  ? Abdomen: soft, non distended,  tender, G-tube and J-tube  ? Genitourinary:  no brock  ? Extremities: no edema  ? Neuro: Alert, oriented to time, place and situation, moves all extremities to commands, verbal   ? Skin: no rash  ? Psych: good affect, good eye contact, non tearful   ?  Lines: Telly catheter right chest removed 6/14/22          Labs:  Recent Results (from the past 24 hour(s))   GLUCOSE, POC    Collection Time: 06/13/22 11:52 PM   Result Value Ref Range    Glucose (POC) 109 65 - 117 mg/dL    Performed by David Taylor PCT    CBC W/O DIFF    Collection Time: 06/14/22  2:11 AM   Result Value Ref Range    WBC 8.9 4.1 - 11.1 K/uL    RBC 2.66 (L) 4.10 - 5.70 M/uL    HGB 7.5 (L) 12.1 - 17.0 g/dL    HCT 22.3 (L) 36.6 - 50.3 %    MCV 83.8 80.0 - 99.0 FL    MCH 28.2 26.0 - 34.0 PG    MCHC 33.6 30.0 - 36.5 g/dL    RDW 16.6 (H) 11.5 - 14.5 %    PLATELET 536 317 - 655 K/uL    MPV 11.8 8.9 - 12.9 FL    NRBC 0.0 0  WBC    ABSOLUTE NRBC 0.00 0.00 - 0.85 K/uL   METABOLIC PANEL, BASIC    Collection Time: 06/14/22  2:11 AM   Result Value Ref Range    Sodium 138 136 - 145 mmol/L    Potassium 3.2 (L) 3.5 - 5.1 mmol/L    Chloride 107 97 - 108 mmol/L    CO2 26 21 - 32 mmol/L    Anion gap 5 5 - 15 mmol/L    Glucose 110 (H) 65 - 100 mg/dL    BUN 6 6 - 20 MG/DL    Creatinine 0.70 0.70 - 1.30 MG/DL    BUN/Creatinine ratio 9 (L) 12 - 20      GFR est AA >60 >60 ml/min/1.73m2    GFR est non-AA >60 >60 ml/min/1.73m2    Calcium 8.2 (L) 8.5 - 10.1 MG/DL Gladys Ragland    Collection Time: 06/14/22  4:22 AM   Result Value Ref Range    Vancomycin,trough 5.7 5.0 - 10.0 ug/mL    Reported dose date NOT PROVIDED      Reported dose time: NOT PROVIDED      Reported dose: NOT PROVIDED UNITS   GLUCOSE, POC    Collection Time: 06/14/22  6:52 AM   Result Value Ref Range    Glucose (POC) 95 65 - 117 mg/dL    Performed by Ralph Adams (PCT)    ECHO ADULT COMPLETE    Collection Time: 06/14/22  3:11 PM   Result Value Ref Range    IVSd 0.9 0.6 - 1.0 cm    LVIDd 5.0 4.2 - 5.9 cm    LVIDs 3.8 cm    LVOT Diameter 1.8 cm    LVPWd 0.8 0.6 - 1.0 cm    LVOT Peak Gradient 4 mmHg    LVOT Mean Gradient 2 mmHg    LVOT SV 36.9 ml    LVOT Peak Velocity 1.0 m/s    LVOT VTI 14.5 cm    RVSP 27 mmHg    RV Free Wall Peak S' 13 cm/s    LA Diameter 3.1 cm    LA Volume 4C 23 18 - 58 mL    Est. RA Pressure 10 mmHg    AV Area by Peak Velocity 1.9 cm2    AV Area by VTI 1.8 cm2    AV Peak Gradient 7 mmHg    AV Mean Gradient 4 mmHg    AV Peak Velocity 1.3 m/s    AV Mean Velocity 0.9 m/s    AV VTI 19.6 cm    MV A Velocity 0.56 m/s    MV E Wave Deceleration Time 123.0 ms    MV E Velocity 1.01 m/s    LV E' Lateral Velocity 21 cm/s    LV E' Septal Velocity 13 cm/s    MV Area by VTI 1.9 cm2    MV Peak Gradient 3 mmHg    MV Mean Gradient 1 mmHg    MV Max Velocity 0.8 m/s    MV Mean Velocity 0.6 m/s    MV VTI 19.0 cm    PV Peak Gradient 5 mmHg    PV Max Velocity 1.2 m/s    TAPSE 2.1 1.7 cm    TR Peak Gradient 17 mmHg    TR Max Velocity 2.08 m/s    Fractional Shortening 2D 24 28 - 44 %    LVIDd Index 2.76 cm/m2    LVIDs Index 2.10 cm/m2    LV RWT Ratio 0.32     LV Mass 2D 146.8 88 - 224 g    LV Mass 2D Index 81.1 49 - 115 g/m2    MV E/A 1.80     E/E' Ratio (Averaged) 6.29     E/E' Lateral 4.81     E/E' Septal 7.77     LVOT Stroke Volume Index 20.4 mL/m2    LVOT Area 2.5 cm2    LA Volume Index 4C 13 (A) 16 - 34 mL/m2    LA Size Index 1.71 cm/m2    AV Velocity Ratio 0.77     LVOT:AV VTI Index 0.74     TRIPP/BSA VTI 1.0 cm2/m2    TRIPP/BSA Peak Velocity 1.0 cm2/m2    MV:LVOT VTI Index 1.31                Assessment/Recommendations:  Polymicrobial bacteremia GPC and GNR. Probable Enterococcus species  Telly catheter out 6/14/22. Please send repeat blood cultures today (after port removal) - ordered. Await speciation, susceptibilities. Will continue vanc, cefepime, flagyl for now. TTE pending report. Patient reporting some pain in eyes, no signs of hypopyon bilaterally. Continue to monitor. ID will follow. Thank you for the opportunity to participate in the care of this patient. Please contact with questions or concerns.       Dio Tenorio MD  Infectious Diseases

## 2022-06-14 NOTE — PROGRESS NOTES
End of Shift Note    Bedside shift change report given to Mariluz Arrington (oncoming nurse) by Kody Lopez RN (offgoing nurse). Report included the following information SBAR    Shift worked:  7am-7pm     Shift summary and any significant changes:     no significant change noted from the privies  shift abd dressing chaparro by wcn      Concerns for physician to address:  none      Zone phone for oncoming shift:          Activity:  Activity Level: Bed Rest  Number times ambulated in hallways past shift: 0  Number of times OOB to chair past shift: 0    Cardiac:   Cardiac Monitoring: Yes      Cardiac Rhythm: Sinus Rhythm    Access:   Current line(s): PIV     Genitourinary:   Urinary status: voiding    Respiratory:   O2 Device: None (Room air)  Chronic home O2 use?: NO  Incentive spirometer at bedside: NO       GI:  Last Bowel Movement Date: 06/13/22  Current diet:  DIET NPO Ice Chips, Other (Specify); hard candy  Passing flatus: YES  Tolerating current diet: YES       Pain Management:   Patient states pain is manageable on current regimen: YES    Skin:  Russell Score: 15  Interventions:     Patient Safety:  Fall Score:  Total Score: 0  Interventions: bed/chair alarm       Length of Stay:  Expected LOS: 4d 19h  Actual LOS: 2      Ti D SAMUEL Nicholson

## 2022-06-15 LAB
ALBUMIN SERPL-MCNC: 1.9 G/DL (ref 3.5–5)
ALBUMIN/GLOB SERPL: 0.5 {RATIO} (ref 1.1–2.2)
ALP SERPL-CCNC: 143 U/L (ref 45–117)
ALT SERPL-CCNC: 29 U/L (ref 12–78)
ANION GAP SERPL CALC-SCNC: 3 MMOL/L (ref 5–15)
AST SERPL-CCNC: 43 U/L (ref 15–37)
ATRIAL RATE: 151 BPM
BACTERIA SPEC CULT: ABNORMAL
BASOPHILS # BLD: 0 K/UL (ref 0–0.1)
BASOPHILS NFR BLD: 0 % (ref 0–1)
BILIRUB SERPL-MCNC: 1.3 MG/DL (ref 0.2–1)
BUN SERPL-MCNC: 4 MG/DL (ref 6–20)
BUN/CREAT SERPL: 6 (ref 12–20)
CALCIUM SERPL-MCNC: 8.1 MG/DL (ref 8.5–10.1)
CALCULATED P AXIS, ECG09: 68 DEGREES
CALCULATED R AXIS, ECG10: 61 DEGREES
CALCULATED T AXIS, ECG11: 62 DEGREES
CC UR VC: ABNORMAL
CHLORIDE SERPL-SCNC: 111 MMOL/L (ref 97–108)
CO2 SERPL-SCNC: 25 MMOL/L (ref 21–32)
CREAT SERPL-MCNC: 0.7 MG/DL (ref 0.7–1.3)
DIAGNOSIS, 93000: NORMAL
DIFFERENTIAL METHOD BLD: ABNORMAL
EOSINOPHIL # BLD: 0.2 K/UL (ref 0–0.4)
EOSINOPHIL NFR BLD: 2 % (ref 0–7)
ERYTHROCYTE [DISTWIDTH] IN BLOOD BY AUTOMATED COUNT: 17.5 % (ref 11.5–14.5)
GLOBULIN SER CALC-MCNC: 4.2 G/DL (ref 2–4)
GLUCOSE BLD STRIP.AUTO-MCNC: 102 MG/DL (ref 65–117)
GLUCOSE BLD STRIP.AUTO-MCNC: 103 MG/DL (ref 65–117)
GLUCOSE BLD STRIP.AUTO-MCNC: 84 MG/DL (ref 65–117)
GLUCOSE BLD STRIP.AUTO-MCNC: 94 MG/DL (ref 65–117)
GLUCOSE BLD STRIP.AUTO-MCNC: 96 MG/DL (ref 65–117)
GLUCOSE SERPL-MCNC: 93 MG/DL (ref 65–100)
HCT VFR BLD AUTO: 24.1 % (ref 36.6–50.3)
HGB BLD-MCNC: 8 G/DL (ref 12.1–17)
IMM GRANULOCYTES # BLD AUTO: 0.1 K/UL (ref 0–0.04)
IMM GRANULOCYTES NFR BLD AUTO: 1 % (ref 0–0.5)
LYMPHOCYTES # BLD: 1.1 K/UL (ref 0.8–3.5)
LYMPHOCYTES NFR BLD: 16 % (ref 12–49)
MCH RBC QN AUTO: 28.1 PG (ref 26–34)
MCHC RBC AUTO-ENTMCNC: 33.2 G/DL (ref 30–36.5)
MCV RBC AUTO: 84.6 FL (ref 80–99)
MONOCYTES # BLD: 0.5 K/UL (ref 0–1)
MONOCYTES NFR BLD: 6 % (ref 5–13)
NEUTS SEG # BLD: 5.5 K/UL (ref 1.8–8)
NEUTS SEG NFR BLD: 75 % (ref 32–75)
NRBC # BLD: 0 K/UL (ref 0–0.01)
NRBC BLD-RTO: 0 PER 100 WBC
P-R INTERVAL, ECG05: 144 MS
PLATELET # BLD AUTO: 159 K/UL (ref 150–400)
PMV BLD AUTO: 11.2 FL (ref 8.9–12.9)
POTASSIUM SERPL-SCNC: 3.7 MMOL/L (ref 3.5–5.1)
PROT SERPL-MCNC: 6.1 G/DL (ref 6.4–8.2)
Q-T INTERVAL, ECG07: 276 MS
QRS DURATION, ECG06: 56 MS
QTC CALCULATION (BEZET), ECG08: 437 MS
RBC # BLD AUTO: 2.85 M/UL (ref 4.1–5.7)
SERVICE CMNT-IMP: ABNORMAL
SERVICE CMNT-IMP: NORMAL
SODIUM SERPL-SCNC: 139 MMOL/L (ref 136–145)
VENTRICULAR RATE, ECG03: 151 BPM
WBC # BLD AUTO: 7.4 K/UL (ref 4.1–11.1)

## 2022-06-15 PROCEDURE — 74011000258 HC RX REV CODE- 258: Performed by: STUDENT IN AN ORGANIZED HEALTH CARE EDUCATION/TRAINING PROGRAM

## 2022-06-15 PROCEDURE — 74011000250 HC RX REV CODE- 250: Performed by: INTERNAL MEDICINE

## 2022-06-15 PROCEDURE — 65270000046 HC RM TELEMETRY

## 2022-06-15 PROCEDURE — 74011250636 HC RX REV CODE- 250/636: Performed by: INTERNAL MEDICINE

## 2022-06-15 PROCEDURE — 82962 GLUCOSE BLOOD TEST: CPT

## 2022-06-15 PROCEDURE — 74011250636 HC RX REV CODE- 250/636: Performed by: STUDENT IN AN ORGANIZED HEALTH CARE EDUCATION/TRAINING PROGRAM

## 2022-06-15 PROCEDURE — 99233 SBSQ HOSP IP/OBS HIGH 50: CPT | Performed by: STUDENT IN AN ORGANIZED HEALTH CARE EDUCATION/TRAINING PROGRAM

## 2022-06-15 PROCEDURE — 36415 COLL VENOUS BLD VENIPUNCTURE: CPT

## 2022-06-15 PROCEDURE — 87040 BLOOD CULTURE FOR BACTERIA: CPT

## 2022-06-15 PROCEDURE — 85025 COMPLETE CBC W/AUTO DIFF WBC: CPT

## 2022-06-15 PROCEDURE — 80053 COMPREHEN METABOLIC PANEL: CPT

## 2022-06-15 RX ADMIN — CEFEPIME HYDROCHLORIDE 2 G: 2 INJECTION, POWDER, FOR SOLUTION INTRAVENOUS at 09:51

## 2022-06-15 RX ADMIN — SODIUM CHLORIDE, PRESERVATIVE FREE 10 ML: 5 INJECTION INTRAVENOUS at 05:28

## 2022-06-15 RX ADMIN — AMPICILLIN SODIUM 2 G: 2 INJECTION, POWDER, FOR SOLUTION INTRAVENOUS at 22:01

## 2022-06-15 RX ADMIN — SODIUM CHLORIDE, PRESERVATIVE FREE 10 ML: 5 INJECTION INTRAVENOUS at 15:05

## 2022-06-15 RX ADMIN — CEFEPIME HYDROCHLORIDE 2 G: 2 INJECTION, POWDER, FOR SOLUTION INTRAVENOUS at 15:05

## 2022-06-15 RX ADMIN — VANCOMYCIN HYDROCHLORIDE 1000 MG: 1 INJECTION, POWDER, LYOPHILIZED, FOR SOLUTION INTRAVENOUS at 09:50

## 2022-06-15 RX ADMIN — POTASSIUM CHLORIDE, DEXTROSE MONOHYDRATE AND SODIUM CHLORIDE 100 ML/HR: 150; 5; 900 INJECTION, SOLUTION INTRAVENOUS at 15:05

## 2022-06-15 RX ADMIN — VANCOMYCIN HYDROCHLORIDE 1000 MG: 1 INJECTION, POWDER, LYOPHILIZED, FOR SOLUTION INTRAVENOUS at 03:50

## 2022-06-15 RX ADMIN — METRONIDAZOLE 500 MG: 500 INJECTION, SOLUTION INTRAVENOUS at 15:05

## 2022-06-15 RX ADMIN — SODIUM CHLORIDE, PRESERVATIVE FREE 10 ML: 5 INJECTION INTRAVENOUS at 21:15

## 2022-06-15 RX ADMIN — CEFEPIME HYDROCHLORIDE 2 G: 2 INJECTION, POWDER, FOR SOLUTION INTRAVENOUS at 01:02

## 2022-06-15 RX ADMIN — METRONIDAZOLE 500 MG: 500 INJECTION, SOLUTION INTRAVENOUS at 05:28

## 2022-06-15 NOTE — PROGRESS NOTES
Infectious Disease Progress Note         Interval:  Spiking fever    Subjective:   Appears more comfortable today. Reports some pain in eyes, but no issues with vision. Uses glasses which are not available at this time. No other issues. Objective:    Vitals:   Reviewed in chart. Physical Exam:  Gen: No apparent distress  HEENT:  Normocephalic, atraumatic, no scleral icterus, no signs of hypopyon in eyes  ? CV: Hemodynamically stable  ? Lungs: On room air  ? Abdomen: soft, non distended,  tender, G-tube and J-tube  ? Genitourinary:  no brock  ? Extremities: no edema  ? Neuro: Alert, oriented to time, place and situation, moves all extremities to commands, verbal   ? Skin: no rash  ? Psych: good affect, good eye contact, non tearful   ?  Lines: Telly catheter right chest removed 6/14/22          Labs:  Recent Results (from the past 24 hour(s))   GLUCOSE, POC    Collection Time: 06/14/22  6:00 PM   Result Value Ref Range    Glucose (POC) 91 65 - 117 mg/dL    Performed by Socrates Burgos \"Deborah\"  PCT    GLUCOSE, POC    Collection Time: 06/15/22 12:11 AM   Result Value Ref Range    Glucose (POC) 102 65 - 117 mg/dL    Performed by Luabisai Nhung PCT    GLUCOSE, POC    Collection Time: 06/15/22  5:55 AM   Result Value Ref Range    Glucose (POC) 96 65 - 117 mg/dL    Performed by [employer] [place] PCT    CBC WITH AUTOMATED DIFF    Collection Time: 06/15/22  6:10 AM   Result Value Ref Range    WBC 7.4 4.1 - 11.1 K/uL    RBC 2.85 (L) 4.10 - 5.70 M/uL    HGB 8.0 (L) 12.1 - 17.0 g/dL    HCT 24.1 (L) 36.6 - 50.3 %    MCV 84.6 80.0 - 99.0 FL    MCH 28.1 26.0 - 34.0 PG    MCHC 33.2 30.0 - 36.5 g/dL    RDW 17.5 (H) 11.5 - 14.5 %    PLATELET 671 395 - 161 K/uL    MPV 11.2 8.9 - 12.9 FL    NRBC 0.0 0  WBC    ABSOLUTE NRBC 0.00 0.00 - 0.01 K/uL    NEUTROPHILS 75 32 - 75 %    LYMPHOCYTES 16 12 - 49 %    MONOCYTES 6 5 - 13 %    EOSINOPHILS 2 0 - 7 %    BASOPHILS 0 0 - 1 %    IMMATURE GRANULOCYTES 1 (H) 0.0 - 0.5 % ABS. NEUTROPHILS 5.5 1.8 - 8.0 K/UL    ABS. LYMPHOCYTES 1.1 0.8 - 3.5 K/UL    ABS. MONOCYTES 0.5 0.0 - 1.0 K/UL    ABS. EOSINOPHILS 0.2 0.0 - 0.4 K/UL    ABS. BASOPHILS 0.0 0.0 - 0.1 K/UL    ABS. IMM. GRANS. 0.1 (H) 0.00 - 0.04 K/UL    DF AUTOMATED     METABOLIC PANEL, COMPREHENSIVE    Collection Time: 06/15/22  6:10 AM   Result Value Ref Range    Sodium 139 136 - 145 mmol/L    Potassium 3.7 3.5 - 5.1 mmol/L    Chloride 111 (H) 97 - 108 mmol/L    CO2 25 21 - 32 mmol/L    Anion gap 3 (L) 5 - 15 mmol/L    Glucose 93 65 - 100 mg/dL    BUN 4 (L) 6 - 20 MG/DL    Creatinine 0.70 0.70 - 1.30 MG/DL    BUN/Creatinine ratio 6 (L) 12 - 20      GFR est AA >60 >60 ml/min/1.73m2    GFR est non-AA >60 >60 ml/min/1.73m2    Calcium 8.1 (L) 8.5 - 10.1 MG/DL    Bilirubin, total 1.3 (H) 0.2 - 1.0 MG/DL    ALT (SGPT) 29 12 - 78 U/L    AST (SGOT) 43 (H) 15 - 37 U/L    Alk. phosphatase 143 (H) 45 - 117 U/L    Protein, total 6.1 (L) 6.4 - 8.2 g/dL    Albumin 1.9 (L) 3.5 - 5.0 g/dL    Globulin 4.2 (H) 2.0 - 4.0 g/dL    A-G Ratio 0.5 (L) 1.1 - 2.2     GLUCOSE, POC    Collection Time: 06/15/22 11:26 AM   Result Value Ref Range    Glucose (POC) 84 65 - 117 mg/dL    Performed by Alison Larry PCT                Assessment/Recommendations:  Polymicrobial bacteremia E.coli and Enterococcus faecalis  Telly catheter out 6/14/22. Repeat blood cultures today (after port removal) - ordered. Stop vanc cefe flagyl and start ampicillin   TTE no vegetations  Telly catheter can be replaced if blood cultures from 6/15/22 remain negative for 72 hrs. Final abx plan pending above. ID will follow. Thank you for the opportunity to participate in the care of this patient. Please contact with questions or concerns.       Livia De Paz MD  Infectious Diseases

## 2022-06-15 NOTE — CONSULTS
Palliative Medicine  [phone]    I am completing this consult  Patients goals remain the same- he is working towards gaining weigh (making progress in this endeavor) so that he can be evaluated for an intestinal transplant. This hospitalization likely will set him back some, but the alternative is hospice support which he is no where ready for    Our  was following for support, but she is going off service for a while so we are going to sign off.  He has good support through a community counselor    Fe Flores NP

## 2022-06-15 NOTE — PROGRESS NOTES
Physician Progress Note      PATIENT:               Chel Jones  CSN #:                  [id number]  :                       1980  ADMIT DATE:       2022 9:23 AM  DISCH DATE:  RESPONDING  PROVIDER #:        Heather Martinez MD          QUERY TEXT:    Patient admitted with sepsis. Documentation reflects septic shock (ED). If possible, please document in the progress notes and discharge summary if septic shock was: The medical record reflects the following:  Risk Factors: sepsis, pna, GPC and GNR bacteremia likely from home catheter  Clinical Indicators: ed--now meets criteria for: Septic Shock, bp 88/50, 94/55  Treatment: NS 2.7L, vancomycin, cefepime and Flagyl. ID consulted. Thanks,  Fercho Wagoner RN/CDI   Options provided:  -- septic shock  confirmed after study  -- septic shock  treated and resolved  -- septic shock ruled out after study  -- Other - I will add my own diagnosis  -- Disagree - Not applicable / Not valid  -- Disagree - Clinically unable to determine / Unknown  -- Refer to Clinical Documentation Reviewer    PROVIDER RESPONSE TEXT:    septic shock ruled out after study.     Query created by: Carlos Edmonds on 2022 9:03 AM      Electronically signed by:  Heather Martinez MD 6/15/2022 7:23 PM
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
## 2022-06-16 LAB
GLUCOSE BLD STRIP.AUTO-MCNC: 105 MG/DL (ref 65–117)
GLUCOSE BLD STRIP.AUTO-MCNC: 112 MG/DL (ref 65–117)
GLUCOSE BLD STRIP.AUTO-MCNC: 97 MG/DL (ref 65–117)
GLUCOSE BLD STRIP.AUTO-MCNC: 98 MG/DL (ref 65–117)
SERVICE CMNT-IMP: NORMAL

## 2022-06-16 PROCEDURE — 74011000250 HC RX REV CODE- 250: Performed by: INTERNAL MEDICINE

## 2022-06-16 PROCEDURE — 65270000046 HC RM TELEMETRY

## 2022-06-16 PROCEDURE — 74011250636 HC RX REV CODE- 250/636: Performed by: STUDENT IN AN ORGANIZED HEALTH CARE EDUCATION/TRAINING PROGRAM

## 2022-06-16 PROCEDURE — 82962 GLUCOSE BLOOD TEST: CPT

## 2022-06-16 PROCEDURE — 74011250636 HC RX REV CODE- 250/636: Performed by: INTERNAL MEDICINE

## 2022-06-16 PROCEDURE — 74011000258 HC RX REV CODE- 258: Performed by: STUDENT IN AN ORGANIZED HEALTH CARE EDUCATION/TRAINING PROGRAM

## 2022-06-16 PROCEDURE — 99233 SBSQ HOSP IP/OBS HIGH 50: CPT | Performed by: STUDENT IN AN ORGANIZED HEALTH CARE EDUCATION/TRAINING PROGRAM

## 2022-06-16 RX ADMIN — AMPICILLIN SODIUM 2 G: 2 INJECTION, POWDER, FOR SOLUTION INTRAVENOUS at 15:26

## 2022-06-16 RX ADMIN — SODIUM CHLORIDE, PRESERVATIVE FREE 10 ML: 5 INJECTION INTRAVENOUS at 06:30

## 2022-06-16 RX ADMIN — POTASSIUM CHLORIDE, DEXTROSE MONOHYDRATE AND SODIUM CHLORIDE 100 ML/HR: 150; 5; 900 INJECTION, SOLUTION INTRAVENOUS at 23:10

## 2022-06-16 RX ADMIN — AMPICILLIN SODIUM 2 G: 2 INJECTION, POWDER, FOR SOLUTION INTRAVENOUS at 22:32

## 2022-06-16 RX ADMIN — AMPICILLIN SODIUM 2 G: 2 INJECTION, POWDER, FOR SOLUTION INTRAVENOUS at 02:28

## 2022-06-16 RX ADMIN — SODIUM CHLORIDE, PRESERVATIVE FREE 10 ML: 5 INJECTION INTRAVENOUS at 22:32

## 2022-06-16 RX ADMIN — SODIUM CHLORIDE, PRESERVATIVE FREE 10 ML: 5 INJECTION INTRAVENOUS at 15:26

## 2022-06-16 RX ADMIN — POTASSIUM CHLORIDE, DEXTROSE MONOHYDRATE AND SODIUM CHLORIDE 100 ML/HR: 150; 5; 900 INJECTION, SOLUTION INTRAVENOUS at 02:26

## 2022-06-16 RX ADMIN — AMPICILLIN SODIUM 2 G: 2 INJECTION, POWDER, FOR SOLUTION INTRAVENOUS at 11:26

## 2022-06-16 RX ADMIN — AMPICILLIN SODIUM 2 G: 2 INJECTION, POWDER, FOR SOLUTION INTRAVENOUS at 06:43

## 2022-06-16 NOTE — WOUND CARE
Wound care follow up for the fistula:  Chart reviewed and patient assessed. Patient's fistula bag is still in place / intact from Monday. There is a template for the fistula bag and patient can show any nurse how to do it if needed. Patient emptying his own fistula bag. 500 ml of brown fluid was emptied and recording the I&O record. Plan: New fistula pouch change tomorrow morning by wound care nurse unless it is needed before then.    Nazanin Michael RN, BSN, [employer]

## 2022-06-16 NOTE — PROGRESS NOTES
Shift report received from [address]     2000: Shift assessment complete, see flow sheet    0000: Reassessment complete     0400: Reassessment complete     0730: Uneventful night, bedside report given to [employer]

## 2022-06-16 NOTE — PROGRESS NOTES
Comprehensive Nutrition Assessment    Type and Reason for Visit: Reassess    Nutrition Recommendations/Plan:   1. Once PICC placed, recommend AA5% D20% @ 75 mL/hr + 500 mL 20% lipids 3x/week (provides 2012 kcal, 90g protein, 360 g CHO)     Nutrition Assessment:    Chart reviewed; medically noted for bacteremia. Patient continues without access for TPN. Possible PICC placement tomorrow per discussion on PCU rounds. TPN recommendations provided above; will provide 31 kcal/kg and 1.4g protein/kg bw. Nutrition Related Findings:    Labs reviewed  D5% IVF  BM 6/15    Current Nutrition Intake & Therapies:  Average Meal Intake: NPO     ADULT DIET Clear Liquid    Anthropometric Measures:  Height: 5' 10\" (177.8 cm)  Ideal Body Weight (IBW): 166 lbs (75 kg)     Current Body Wt:  64.7 kg (142 lb 10.2 oz), 78.4 % IBW. Bed scale  Current BMI (kg/m2): 20.5                          BMI Category: Normal weight (BMI 18.5-24. 9)    Estimated Daily Nutrient Needs:  Energy Requirements Based On: Formula  Weight Used for Energy Requirements: Current  Energy (kcal/day): 2532-1960 kcal (BMR 1556 x 1.2AF+250kcal)  Weight Used for Protein Requirements: Current  Protein (g/day): 78-91g (1.2-1.4 g/kg bw)  Method Used for Fluid Requirements: 1 ml/kcal  Fluid (ml/day): 1850 mL    Nutrition Diagnosis:   · Altered GI function related to altered GI structure as evidenced by NPO or clear liquid status due to medical condition    Nutrition Interventions:   Food and/or Nutrient Delivery: Start parenteral nutrition  Nutrition Education/Counseling: No recommendations at this time  Coordination of Nutrition Care: Continue to monitor while inpatient       Goals:  Previous Goal Met: Progressing toward goal(s)  Goals: Initiate nutrition support,by next RD assessment       Nutrition Monitoring and Evaluation:   Behavioral-Environmental Outcomes: None identified  Food/Nutrient Intake Outcomes: Parenteral nutrition intake/tolerance  Physical Signs/Symptoms Outcomes: Biochemical data,Hemodynamic status,Fluid status or edema,Weight    Discharge Planning:    Parenteral nutrition    Stephon Phan  Contact: [phone]
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
## 2022-06-16 NOTE — PROGRESS NOTES
Infectious Disease Progress Note         Interval:  NAEO    Subjective:   Patient feels better overall. Objective:    Vitals:   Reviewed in chart. Physical Exam:  Gen: No apparent distress  HEENT:  Normocephalic, atraumatic, no scleral icterus, no signs of hypopyon in eyes  ? CV: Hemodynamically stable  ? Lungs: On room air  ? Abdomen: soft, non distended,  tender, G-tube and J-tube  ? Genitourinary:  no brock  ? Extremities: no edema  ? Neuro: Alert, oriented to time, place and situation, moves all extremities to commands, verbal   ? Skin: no rash  ? Psych: good affect, good eye contact, non tearful   ? Lines: Telly catheter right chest removed 6/14/22          Labs:  Recent Results (from the past 24 hour(s))   CULTURE, BLOOD    Collection Time: 06/15/22 12:21 PM    Specimen: Blood   Result Value Ref Range    Special Requests: NO SPECIAL REQUESTS      Culture result: NO GROWTH AFTER 18 HOURS     CULTURE, BLOOD    Collection Time: 06/15/22 12:21 PM    Specimen: Blood   Result Value Ref Range    Special Requests: NO SPECIAL REQUESTS      Culture result: NO GROWTH AFTER 18 HOURS     GLUCOSE, POC    Collection Time: 06/15/22  5:42 PM   Result Value Ref Range    Glucose (POC) 94 65 - 117 mg/dL    Performed by Koa.la Perish PCT    GLUCOSE, POC    Collection Time: 06/15/22 11:36 PM   Result Value Ref Range    Glucose (POC) 103 65 - 117 mg/dL    Performed by James Chavez PCT    GLUCOSE, POC    Collection Time: 06/16/22  5:40 AM   Result Value Ref Range    Glucose (POC) 105 65 - 117 mg/dL    Performed by Dinora Merino PCT    GLUCOSE, POC    Collection Time: 06/16/22 11:39 AM   Result Value Ref Range    Glucose (POC) 98 65 - 117 mg/dL    Performed by Koa.la Perish PCT                Assessment/Recommendations:  Polymicrobial bacteremia E.coli and Enterococcus faecalis  Telly catheter out 6/14/22. Repeat blood cultures today (after port removal) - ordered. Continue ampicillin for now.    Will discuss TPN and abx timings with pharmacy and CM via telly catheter at home. At this time final abx choice is pending to be decided. TTE no vegetations  Telly catheter can be replaced this weekend if blood cultures from 6/15/22 remain negative             ID will follow. Thank you for the opportunity to participate in the care of this patient. Please contact with questions or concerns.       Gladis Peres MD  Infectious Diseases

## 2022-06-17 LAB
GLUCOSE BLD STRIP.AUTO-MCNC: 102 MG/DL (ref 65–117)
GLUCOSE BLD STRIP.AUTO-MCNC: 114 MG/DL (ref 65–117)
GLUCOSE BLD STRIP.AUTO-MCNC: 126 MG/DL (ref 65–117)
SERVICE CMNT-IMP: ABNORMAL
SERVICE CMNT-IMP: NORMAL
SERVICE CMNT-IMP: NORMAL

## 2022-06-17 PROCEDURE — 74011000250 HC RX REV CODE- 250: Performed by: INTERNAL MEDICINE

## 2022-06-17 PROCEDURE — 99233 SBSQ HOSP IP/OBS HIGH 50: CPT | Performed by: STUDENT IN AN ORGANIZED HEALTH CARE EDUCATION/TRAINING PROGRAM

## 2022-06-17 PROCEDURE — 74011250636 HC RX REV CODE- 250/636: Performed by: STUDENT IN AN ORGANIZED HEALTH CARE EDUCATION/TRAINING PROGRAM

## 2022-06-17 PROCEDURE — 77010033678 HC OXYGEN DAILY

## 2022-06-17 PROCEDURE — 74011250636 HC RX REV CODE- 250/636: Performed by: INTERNAL MEDICINE

## 2022-06-17 PROCEDURE — 82962 GLUCOSE BLOOD TEST: CPT

## 2022-06-17 PROCEDURE — 74011000258 HC RX REV CODE- 258: Performed by: STUDENT IN AN ORGANIZED HEALTH CARE EDUCATION/TRAINING PROGRAM

## 2022-06-17 PROCEDURE — 65270000046 HC RM TELEMETRY

## 2022-06-17 RX ORDER — INSULIN LISPRO 100 [IU]/ML
INJECTION, SOLUTION INTRAVENOUS; SUBCUTANEOUS EVERY 6 HOURS
Status: DISCONTINUED | OUTPATIENT
Start: 2022-06-17 | End: 2022-07-06 | Stop reason: HOSPADM

## 2022-06-17 RX ORDER — MAGNESIUM SULFATE 100 %
4 CRYSTALS MISCELLANEOUS AS NEEDED
Status: DISCONTINUED | OUTPATIENT
Start: 2022-06-17 | End: 2022-06-17 | Stop reason: SDUPTHER

## 2022-06-17 RX ORDER — DEXTROSE MONOHYDRATE 100 MG/ML
50 INJECTION, SOLUTION INTRAVENOUS CONTINUOUS
Status: DISPENSED | OUTPATIENT
Start: 2022-06-17 | End: 2022-06-17

## 2022-06-17 RX ADMIN — AMPICILLIN SODIUM 2 G: 2 INJECTION, POWDER, FOR SOLUTION INTRAVENOUS at 06:59

## 2022-06-17 RX ADMIN — AMPICILLIN SODIUM 2 G: 2 INJECTION, POWDER, FOR SOLUTION INTRAVENOUS at 10:37

## 2022-06-17 RX ADMIN — AMPICILLIN SODIUM 2 G: 2 INJECTION, POWDER, FOR SOLUTION INTRAVENOUS at 03:05

## 2022-06-17 RX ADMIN — SODIUM CHLORIDE, PRESERVATIVE FREE 10 ML: 5 INJECTION INTRAVENOUS at 22:01

## 2022-06-17 RX ADMIN — SODIUM CHLORIDE, PRESERVATIVE FREE 10 ML: 5 INJECTION INTRAVENOUS at 15:27

## 2022-06-17 RX ADMIN — SODIUM CHLORIDE, PRESERVATIVE FREE 10 ML: 5 INJECTION INTRAVENOUS at 07:07

## 2022-06-17 RX ADMIN — AMPICILLIN SODIUM 2 G: 2 INJECTION, POWDER, FOR SOLUTION INTRAVENOUS at 15:27

## 2022-06-17 RX ADMIN — AMPICILLIN SODIUM 2 G: 2 INJECTION, POWDER, FOR SOLUTION INTRAVENOUS at 18:50

## 2022-06-17 RX ADMIN — AMPICILLIN SODIUM 2 G: 2 INJECTION, POWDER, FOR SOLUTION INTRAVENOUS at 22:01

## 2022-06-17 RX ADMIN — SODIUM CHLORIDE 200 MG: 9 INJECTION, SOLUTION INTRAVENOUS at 12:19

## 2022-06-17 RX ADMIN — ASCORBIC ACID, VITAMIN A PALMITATE, CHOLECALCIFEROL, THIAMINE HYDROCHLORIDE, RIBOFLAVIN-5 PHOSPHATE SODIUM, PYRIDOXINE HYDROCHLORIDE, NIACINAMIDE, DEXPANTHENOL, ALPHA-TOCOPHEROL ACETATE, VITAMIN K1, FOLIC ACID, BIOTIN, CYANOCOBALAMIN: 200; 3300; 200; 6; 3.6; 6; 40; 15; 10; 150; 600; 60; 5 INJECTION, SOLUTION INTRAVENOUS at 18:50

## 2022-06-17 RX ADMIN — POTASSIUM CHLORIDE, DEXTROSE MONOHYDRATE AND SODIUM CHLORIDE 100 ML/HR: 150; 5; 900 INJECTION, SOLUTION INTRAVENOUS at 10:37

## 2022-06-17 NOTE — PROGRESS NOTES
VAT: acknowledged PICC request, Went to see patient, Mr. Palma Filter refused PICC placement, patient said \" preferred to have a line in my neck\". [facility] primary Nurse aware and was at the bedside. Advised to call Vascular Access Team if the patient change his mind.

## 2022-06-17 NOTE — PROGRESS NOTES
OK to place double-lumen PICC today if no other IV line can be established.          Gilbert Perrin MD  Infectious Diseases

## 2022-06-17 NOTE — WOUND CARE
Wound Care / Fistula Care follow up for this patient: Chart reviewed. The fistula bag was last replaced on Monday June 13th and today it started leaking. Pt. Typically does not change it until it starts leaking (instead of proactively changing it at day 4). The bag has been leaking for a few hours requiring towels to contain the drainage / intestinal contents:   IF the bag starts to leak, give the suction [place] to the patient to control the drainage until the new bag can be placed on the patient by the nurse caring for him. Assessment of the fistula site AFTER being completely cleansed: Mildly denuded skin around the fistula requiring skin prep and [place] skin protectant. Procedure / Treatment today: the skin was cleansed, then prepped and then the paste / ostomy ring is to be broken and a small piece placed in the lower and upper \"irving\" to make it more even with the skin. Then put the ring together and stretch it slightly around the \"stoma\" site. Place the fistula pouch down on the skin slightly folded to make contact with the skin in the irving as well as the surrounding skin. Pt. [place] how to help the nurse do this. Get him to hold the bag in place like he is supposed to. Plan: the next bag is at the bedside table and only has to cut out using the template. Pt. Can assist with this but cannot do it by himself correctly.   Joanne Mcgrath RN, BSN, [employer]

## 2022-06-17 NOTE — PROGRESS NOTES
Transition of Care Plan:     RUR: 22%  Dis[facility]: [facility] [address] [facility] [address] [place]  Follow up appointments:PCP  DME needed: None  Transportation at Discharge: Pt's mother will transport at d/c.   [address] or means to access home: Pt has access       IM Medicare Letter: Pt has Medicaid  Is patient a BCPI-A Bundle:  N/A                    If yes, was Bundle Letter given?:  N/A  Is patient a [place] and connected with the [address]? No               If yes, was [employer] transfer form completed and [facility] notified? Caregiver Contact: Briseida Viera- Mother [phone]  Discharge Caregiver contacted prior to discharge? CM will notify caregiver at d/c. Care Conference needed?:     No     1:30pm-Chart review: Pt is not medically stable for d/c. ID following. Waiting speciation of the yeast in blood    CM will continue to follow patient for discharge planning needs and arrange for services as deemed necessary.     Jv Edgar [address]  [phone]
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
## 2022-06-17 NOTE — PROGRESS NOTES
ID:    Yeast growing in peripheral blood cultures from 6/13/22 (prior to rupinder catheter removal - which was done on 6/14/22). Patient being on TPN is a risk factor for fungemia. Recommendations:  - Start anidulafungin  - await speciation of the yeast in blood. - OK to place a midline at this point for increasing IV access for medications while in the hospital.  - Blood cultures from 6/15, obtained after the rupinder removal on 6/14 are still negative. If these remain negative by 6/20/22, OK to place a central line (see below). - Patient will need to be on ampicllin 12gm q24h via continuous infusion for the E.coli and Enterococcal bacteremia, and Anidulafungin for the fungemia. If ampicillin is not able to be arranged via continuous infusion, then we might have to do ceftriaxone and daptomycin. Duration is atleast 2 weeks from negative blood cultures, but potentially could be increased. I have discussed with pharmacy today - if a double lumen catheter placed at discharge time, then TPN and antimicrobials could be feasible to be given. PICC or Rupinder catheter will need to be decided. With multiple infusions via central lines, I doubt that the patient will be able to carry out all the above tasks at home. - Patient was also c/o some eye issues for 1 day a few days ago which then resolved. Continue to monitor eye symptoms clsely. If persists then he might need urgent Ophthalmology evaluation for the fungemia (if Candidemia). Discussed with Dr. Jc Vaughan and Dr. Cecily Marinelli.      ID will follow        Gisele Meadows MD  Infectious Diseases

## 2022-06-17 NOTE — PROGRESS NOTES
Problem: Pressure Injury - Risk of  Goal: *Prevention of pressure injury  Description: Document Russell Scale and appropriate interventions in the flowsheet. Outcome: Progressing Towards Goal  Note: Pressure Injury Interventions:  Sensory Interventions: Assess need for specialty bed,Check visual cues for pain,Float heels,Keep linens dry and wrinkle-free,Maintain/enhance activity level,Minimize linen layers,Monitor skin under medical devices,Turn and reposition approx. every two hours (pillows and wedges if needed)    Moisture Interventions: Absorbent underpads,Apply protective barrier, creams and emollients,Check for incontinence Q2 hours and as needed,Maintain skin hydration (lotion/cream),Minimize layers    Activity Interventions: Increase time out of bed,PT/OT evaluation    Mobility Interventions: Assess need for specialty bed,Float heels,HOB 30 degrees or less,PT/OT evaluation,Turn and reposition approx. every two hours(pillow and wedges)    Nutrition Interventions: Document food/fluid/supplement intake,Offer support with meals,snacks and hydration    Friction and Shear Interventions: Apply protective barrier, creams and emollients,HOB 30 degrees or less,Lift sheet,Lift team/patient mobility team,Minimize layers                Problem: Falls - Risk of  Goal: *Absence of Falls  Description: Document Dora Fall Risk and appropriate interventions in the flowsheet.   Outcome: Progressing Towards Goal  Note: Fall Risk Interventions:  Mobility Interventions: Assess mobility with egress test,Bed/chair exit alarm,Communicate number of staff needed for ambulation/transfer,Patient to call before getting OOB,PT Consult for mobility concerns         Medication Interventions: Assess postural VS orthostatic hypotension,Bed/chair exit alarm,Evaluate medications/consider consulting pharmacy,Patient to call before getting OOB,Teach patient to arise slowly    Elimination Interventions: Bed/chair exit alarm,Call light in reach,Patient to call for help with toileting needs,Stay With Me (per policy),Toileting schedule/hourly rounds

## 2022-06-17 NOTE — PROGRESS NOTES
1925: Bedside and Verbal shift change report given to Michelle Hernandez RN (oncoming nurse) by Drew Mathis RN (offgoing nurse). Report included the following information SBAR, Kardex, Intake/Output, MAR, Accordion, Recent Results, Med Rec Status and Cardiac Rhythm NSR.     1943: VS collected at this time. 2006: Assessment completed at this time. See flowsheet for details. 2232: Scheduled meds given at this time. See flowsheet for details. 2329: BG 97.    0000 06/17/2022: Reassessment completed at this time See flowsheet for details. 9945; Scheduled meds given at this time. 0400; Reassessment completed at this time. See flowsheet for details. 0546: .    0656: Scheduled meds given at this time. 0295: End of Shift Note    Bedside shift change report given to SAMUEL Love (oncoming nurse) by Michelle Hernandez (offgoing nurse). Report included the following information SBAR, Kardex, Intake/Output, MAR, Accordion, Recent Results, Med Rec Status and Cardiac Rhythm NSR    Shift worked:  3318-3534     Shift summary and any significant changes:     Acute changes documented above. Uneventful night. Concerns for physician to address:  Acute changes documented above. Uneventful night. Zone phone for oncoming shift:   1150       Activity:  Activity Level:  Up with Assistance  Number times ambulated in hallways past shift: 0  Number of times OOB to chair past shift: 0    Cardiac:   Cardiac Monitoring: Yes      Cardiac Rhythm: Sinus Rhythm    Access:   Current line(s): PIV     Genitourinary:   Urinary status: voiding    Respiratory:   O2 Device: None (Room air)  Chronic home O2 use?: NO  Incentive spirometer at bedside: NO       GI:  Last Bowel Movement Date: 06/15/22  Current diet:  ADULT DIET Clear Liquid  Passing flatus: YES  Tolerating current diet: YES       Pain Management:   Patient states pain is manageable on current regimen: YES    Skin:  Russell Score: 17  Interventions: float heels, increase time out of bed, PT/OT consult and nutritional support     Patient Safety:  Fall Score: Total Score: 2  Interventions: bed/chair alarm, gripper socks, pt to call before getting OOB and stay with me (per policy)       Length of Stay:  Expected LOS: 4d 19h  Actual LOS: 166[address] GORDY Negrete                                    .

## 2022-06-18 LAB
GLUCOSE BLD STRIP.AUTO-MCNC: 100 MG/DL (ref 65–117)
GLUCOSE BLD STRIP.AUTO-MCNC: 109 MG/DL (ref 65–117)
GLUCOSE BLD STRIP.AUTO-MCNC: 109 MG/DL (ref 65–117)
GLUCOSE BLD STRIP.AUTO-MCNC: 112 MG/DL (ref 65–117)
GLUCOSE BLD STRIP.AUTO-MCNC: 96 MG/DL (ref 65–117)
SERVICE CMNT-IMP: NORMAL

## 2022-06-18 PROCEDURE — 74011250636 HC RX REV CODE- 250/636: Performed by: STUDENT IN AN ORGANIZED HEALTH CARE EDUCATION/TRAINING PROGRAM

## 2022-06-18 PROCEDURE — 74011000258 HC RX REV CODE- 258: Performed by: STUDENT IN AN ORGANIZED HEALTH CARE EDUCATION/TRAINING PROGRAM

## 2022-06-18 PROCEDURE — 65270000046 HC RM TELEMETRY

## 2022-06-18 PROCEDURE — 74011250636 HC RX REV CODE- 250/636: Performed by: INTERNAL MEDICINE

## 2022-06-18 PROCEDURE — 74011000250 HC RX REV CODE- 250: Performed by: INTERNAL MEDICINE

## 2022-06-18 PROCEDURE — 74011250636 HC RX REV CODE- 250/636: Performed by: NURSE PRACTITIONER

## 2022-06-18 RX ADMIN — SODIUM CHLORIDE, PRESERVATIVE FREE 10 ML: 5 INJECTION INTRAVENOUS at 21:42

## 2022-06-18 RX ADMIN — AMPICILLIN SODIUM 2 G: 2 INJECTION, POWDER, FOR SOLUTION INTRAVENOUS at 02:07

## 2022-06-18 RX ADMIN — SODIUM CHLORIDE 100 MG: 9 INJECTION, SOLUTION INTRAVENOUS at 13:22

## 2022-06-18 RX ADMIN — AMPICILLIN SODIUM 2 G: 2 INJECTION, POWDER, FOR SOLUTION INTRAVENOUS at 21:42

## 2022-06-18 RX ADMIN — AMPICILLIN SODIUM 2 G: 2 INJECTION, POWDER, FOR SOLUTION INTRAVENOUS at 14:42

## 2022-06-18 RX ADMIN — AMPICILLIN SODIUM 2 G: 2 INJECTION, POWDER, FOR SOLUTION INTRAVENOUS at 10:16

## 2022-06-18 RX ADMIN — SODIUM CHLORIDE 250 ML: 9 INJECTION, SOLUTION INTRAVENOUS at 03:29

## 2022-06-18 RX ADMIN — SODIUM CHLORIDE 250 ML: 9 INJECTION, SOLUTION INTRAVENOUS at 23:25

## 2022-06-18 RX ADMIN — SODIUM CHLORIDE 1000 ML: 9 INJECTION, SOLUTION INTRAVENOUS at 05:12

## 2022-06-18 RX ADMIN — SODIUM CHLORIDE, PRESERVATIVE FREE 10 ML: 5 INJECTION INTRAVENOUS at 14:43

## 2022-06-18 RX ADMIN — AMPICILLIN SODIUM 2 G: 2 INJECTION, POWDER, FOR SOLUTION INTRAVENOUS at 17:30

## 2022-06-18 RX ADMIN — AMPICILLIN SODIUM 2 G: 2 INJECTION, POWDER, FOR SOLUTION INTRAVENOUS at 05:42

## 2022-06-18 RX ADMIN — ASCORBIC ACID, VITAMIN A PALMITATE, CHOLECALCIFEROL, THIAMINE HYDROCHLORIDE, RIBOFLAVIN-5 PHOSPHATE SODIUM, PYRIDOXINE HYDROCHLORIDE, NIACINAMIDE, DEXPANTHENOL, ALPHA-TOCOPHEROL ACETATE, VITAMIN K1, FOLIC ACID, BIOTIN, CYANOCOBALAMIN: 200; 3300; 200; 6; 3.6; 6; 40; 15; 10; 150; 600; 60; 5 INJECTION, SOLUTION INTRAVENOUS at 18:02
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
## 2022-06-18 NOTE — PROGRESS NOTES
End of Shift Note    Bedside shift change report given to Catherine (oncoming nurse) by Orquidea Miguel RN (offgoing nurse). Report included the following information SBAR, Kardex and MAR    Shift worked:  7a-7p     Shift summary and any significant changes:          Concerns for physician to address:       Zone phone for oncoming shift:          Activity:  Activity Level: Bed Rest  Number times ambulated in hallways past shift: 0  Number of times OOB to chair past shift: 0    Cardiac:   Cardiac Monitoring: Yes      Cardiac Rhythm: Sinus Rhythm    Access:   Current line(s): PIV     Genitourinary:   Urinary status: voiding    Respiratory:   O2 Device: None (Room air)  Chronic home O2 use?: NO  Incentive spirometer at bedside: NO       GI:  Last Bowel Movement Date: 06/18/22  Current diet:  ADULT DIET Clear Liquid  TPN ADULT - PERIPHERAL AA 4.25% D10% W/ CA + ELECTROLYTES  Passing flatus: NO  Tolerating current diet: YES       Pain Management:   Patient states pain is manageable on current regimen: N/A    Skin:  Russell Score: 19  Interventions: PT/OT consult    Patient Safety:  Fall Score:  Total Score: 2  Interventions: pt to call before getting OOB       Length of Stay:  Expected LOS: 4d 19h  Actual LOS: [address] Hollie Cleaning, RN

## 2022-06-19 LAB
ANION GAP SERPL CALC-SCNC: 5 MMOL/L (ref 5–15)
BACTERIA SPEC CULT: ABNORMAL
BUN SERPL-MCNC: 6 MG/DL (ref 6–20)
BUN/CREAT SERPL: 10 (ref 12–20)
CALCIUM SERPL-MCNC: 8.2 MG/DL (ref 8.5–10.1)
CHLORIDE SERPL-SCNC: 109 MMOL/L (ref 97–108)
CO2 SERPL-SCNC: 24 MMOL/L (ref 21–32)
CREAT SERPL-MCNC: 0.62 MG/DL (ref 0.7–1.3)
GLUCOSE BLD STRIP.AUTO-MCNC: 105 MG/DL (ref 65–117)
GLUCOSE BLD STRIP.AUTO-MCNC: 111 MG/DL (ref 65–117)
GLUCOSE BLD STRIP.AUTO-MCNC: 118 MG/DL (ref 65–117)
GLUCOSE SERPL-MCNC: 105 MG/DL (ref 65–100)
MAGNESIUM SERPL-MCNC: 1.8 MG/DL (ref 1.6–2.4)
PHOSPHATE SERPL-MCNC: 3.1 MG/DL (ref 2.6–4.7)
POTASSIUM SERPL-SCNC: 3.1 MMOL/L (ref 3.5–5.1)
SERVICE CMNT-IMP: ABNORMAL
SERVICE CMNT-IMP: ABNORMAL
SERVICE CMNT-IMP: NORMAL
SERVICE CMNT-IMP: NORMAL
SODIUM SERPL-SCNC: 138 MMOL/L (ref 136–145)

## 2022-06-19 PROCEDURE — 74011250636 HC RX REV CODE- 250/636: Performed by: INTERNAL MEDICINE

## 2022-06-19 PROCEDURE — 36415 COLL VENOUS BLD VENIPUNCTURE: CPT

## 2022-06-19 PROCEDURE — 74011000250 HC RX REV CODE- 250: Performed by: INTERNAL MEDICINE

## 2022-06-19 PROCEDURE — 82962 GLUCOSE BLOOD TEST: CPT

## 2022-06-19 PROCEDURE — 65270000046 HC RM TELEMETRY

## 2022-06-19 PROCEDURE — 74011250637 HC RX REV CODE- 250/637: Performed by: INTERNAL MEDICINE

## 2022-06-19 PROCEDURE — 83735 ASSAY OF MAGNESIUM: CPT

## 2022-06-19 PROCEDURE — 84100 ASSAY OF PHOSPHORUS: CPT

## 2022-06-19 PROCEDURE — 80048 BASIC METABOLIC PNL TOTAL CA: CPT

## 2022-06-19 PROCEDURE — 74011000258 HC RX REV CODE- 258: Performed by: STUDENT IN AN ORGANIZED HEALTH CARE EDUCATION/TRAINING PROGRAM

## 2022-06-19 PROCEDURE — 74011250636 HC RX REV CODE- 250/636: Performed by: STUDENT IN AN ORGANIZED HEALTH CARE EDUCATION/TRAINING PROGRAM

## 2022-06-19 RX ORDER — KETOROLAC TROMETHAMINE 30 MG/ML
15 INJECTION, SOLUTION INTRAMUSCULAR; INTRAVENOUS
Status: COMPLETED | OUTPATIENT
Start: 2022-06-19 | End: 2022-06-20

## 2022-06-19 RX ADMIN — AMPICILLIN SODIUM 2 G: 2 INJECTION, POWDER, FOR SOLUTION INTRAVENOUS at 21:55

## 2022-06-19 RX ADMIN — KETOROLAC TROMETHAMINE 15 MG: 30 INJECTION, SOLUTION INTRAMUSCULAR at 11:07

## 2022-06-19 RX ADMIN — SODIUM CHLORIDE, PRESERVATIVE FREE 10 ML: 5 INJECTION INTRAVENOUS at 05:08

## 2022-06-19 RX ADMIN — NITROGLYCERIN 1 INCH: 20 OINTMENT TOPICAL at 10:32

## 2022-06-19 RX ADMIN — ASCORBIC ACID, VITAMIN A PALMITATE, CHOLECALCIFEROL, THIAMINE HYDROCHLORIDE, RIBOFLAVIN-5 PHOSPHATE SODIUM, PYRIDOXINE HYDROCHLORIDE, NIACINAMIDE, DEXPANTHENOL, ALPHA-TOCOPHEROL ACETATE, VITAMIN K1, FOLIC ACID, BIOTIN, CYANOCOBALAMIN: 200; 3300; 200; 6; 3.6; 6; 40; 15; 10; 150; 600; 60; 5 INJECTION, SOLUTION INTRAVENOUS at 20:07

## 2022-06-19 RX ADMIN — AMPICILLIN SODIUM 2 G: 2 INJECTION, POWDER, FOR SOLUTION INTRAVENOUS at 01:39

## 2022-06-19 RX ADMIN — AMPICILLIN SODIUM 2 G: 2 INJECTION, POWDER, FOR SOLUTION INTRAVENOUS at 05:09

## 2022-06-19 RX ADMIN — AMPICILLIN SODIUM 2 G: 2 INJECTION, POWDER, FOR SOLUTION INTRAVENOUS at 19:17

## 2022-06-19 RX ADMIN — SODIUM CHLORIDE 100 MG: 9 INJECTION, SOLUTION INTRAVENOUS at 11:07

## 2022-06-19 RX ADMIN — AMPICILLIN SODIUM 2 G: 2 INJECTION, POWDER, FOR SOLUTION INTRAVENOUS at 13:17

## 2022-06-19 RX ADMIN — SODIUM CHLORIDE, PRESERVATIVE FREE 10 ML: 5 INJECTION INTRAVENOUS at 14:00

## 2022-06-19 RX ADMIN — SODIUM CHLORIDE, PRESERVATIVE FREE 10 ML: 5 INJECTION INTRAVENOUS at 21:55

## 2022-06-19 RX ADMIN — AMPICILLIN SODIUM 2 G: 2 INJECTION, POWDER, FOR SOLUTION INTRAVENOUS at 09:48

## 2022-06-19 NOTE — PROGRESS NOTES
Per Dr Sindy Zavala d/c intermittent fluid bolus order, no maintaence fluids required. Pt baseline SBP is 90, no need to treat his hypotension as it is chronic.

## 2022-06-19 NOTE — PROGRESS NOTES
Left forearm endurance cath infiltrated with PPN infusing. Pharmacy notified, will order antidote if needed. Extremity elevated, redness circled,  and warm compress applied as per protocol.         Received order for topical nitro cream to be applied
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
## 2022-06-20 ENCOUNTER — APPOINTMENT (OUTPATIENT)
Dept: INTERVENTIONAL RADIOLOGY/VASCULAR | Age: 42
DRG: 206 | End: 2022-06-20
Attending: INTERNAL MEDICINE
Payer: MEDICAID

## 2022-06-20 LAB
ALBUMIN SERPL-MCNC: 2.3 G/DL (ref 3.5–5)
ALBUMIN/GLOB SERPL: 0.5 {RATIO} (ref 1.1–2.2)
ALP SERPL-CCNC: 134 U/L (ref 45–117)
ALT SERPL-CCNC: 21 U/L (ref 12–78)
ANION GAP SERPL CALC-SCNC: 4 MMOL/L (ref 5–15)
AST SERPL-CCNC: 16 U/L (ref 15–37)
BILIRUB SERPL-MCNC: 0.7 MG/DL (ref 0.2–1)
BUN SERPL-MCNC: 7 MG/DL (ref 6–20)
BUN/CREAT SERPL: 11 (ref 12–20)
CALCIUM SERPL-MCNC: 8.4 MG/DL (ref 8.5–10.1)
CHLORIDE SERPL-SCNC: 107 MMOL/L (ref 97–108)
CO2 SERPL-SCNC: 25 MMOL/L (ref 21–32)
CREAT SERPL-MCNC: 0.62 MG/DL (ref 0.7–1.3)
ERYTHROCYTE [DISTWIDTH] IN BLOOD BY AUTOMATED COUNT: 19.3 % (ref 11.5–14.5)
GLOBULIN SER CALC-MCNC: 4.6 G/DL (ref 2–4)
GLUCOSE BLD STRIP.AUTO-MCNC: 100 MG/DL (ref 65–117)
GLUCOSE BLD STRIP.AUTO-MCNC: 131 MG/DL (ref 65–117)
GLUCOSE BLD STRIP.AUTO-MCNC: 82 MG/DL (ref 65–117)
GLUCOSE BLD STRIP.AUTO-MCNC: 97 MG/DL (ref 65–117)
GLUCOSE BLD STRIP.AUTO-MCNC: 98 MG/DL (ref 65–117)
GLUCOSE SERPL-MCNC: 96 MG/DL (ref 65–100)
HCT VFR BLD AUTO: 27.9 % (ref 36.6–50.3)
HGB BLD-MCNC: 8.8 G/DL (ref 12.1–17)
MAGNESIUM SERPL-MCNC: 1.8 MG/DL (ref 1.6–2.4)
MCH RBC QN AUTO: 28.1 PG (ref 26–34)
MCHC RBC AUTO-ENTMCNC: 31.5 G/DL (ref 30–36.5)
MCV RBC AUTO: 89.1 FL (ref 80–99)
NRBC # BLD: 0 K/UL (ref 0–0.01)
NRBC BLD-RTO: 0 PER 100 WBC
PHOSPHATE SERPL-MCNC: 3.8 MG/DL (ref 2.6–4.7)
PLATELET # BLD AUTO: 470 K/UL (ref 150–400)
PMV BLD AUTO: 9.4 FL (ref 8.9–12.9)
POTASSIUM SERPL-SCNC: 3.7 MMOL/L (ref 3.5–5.1)
PROT SERPL-MCNC: 6.9 G/DL (ref 6.4–8.2)
RBC # BLD AUTO: 3.13 M/UL (ref 4.1–5.7)
SERVICE CMNT-IMP: ABNORMAL
SERVICE CMNT-IMP: NORMAL
SODIUM SERPL-SCNC: 136 MMOL/L (ref 136–145)
WBC # BLD AUTO: 8.5 K/UL (ref 4.1–11.1)

## 2022-06-20 PROCEDURE — 74011250636 HC RX REV CODE- 250/636: Performed by: RADIOLOGY

## 2022-06-20 PROCEDURE — 85027 COMPLETE CBC AUTOMATED: CPT

## 2022-06-20 PROCEDURE — 77030031139 HC SUT VCRL2 J&J -A

## 2022-06-20 PROCEDURE — 77030040162

## 2022-06-20 PROCEDURE — 83735 ASSAY OF MAGNESIUM: CPT

## 2022-06-20 PROCEDURE — 2709999900 HC NON-CHARGEABLE SUPPLY

## 2022-06-20 PROCEDURE — A6154 WOUND POUCH EACH: HCPCS

## 2022-06-20 PROCEDURE — 82962 GLUCOSE BLOOD TEST: CPT

## 2022-06-20 PROCEDURE — 74011000250 HC RX REV CODE- 250: Performed by: INTERNAL MEDICINE

## 2022-06-20 PROCEDURE — C1751 CATH, INF, PER/CENT/MIDLINE: HCPCS

## 2022-06-20 PROCEDURE — 65270000046 HC RM TELEMETRY

## 2022-06-20 PROCEDURE — C1769 GUIDE WIRE: HCPCS

## 2022-06-20 PROCEDURE — 80053 COMPREHEN METABOLIC PANEL: CPT

## 2022-06-20 PROCEDURE — 36415 COLL VENOUS BLD VENIPUNCTURE: CPT

## 2022-06-20 PROCEDURE — 74011250636 HC RX REV CODE- 250/636: Performed by: STUDENT IN AN ORGANIZED HEALTH CARE EDUCATION/TRAINING PROGRAM

## 2022-06-20 PROCEDURE — C1892 INTRO/SHEATH,FIXED,PEEL-AWAY: HCPCS

## 2022-06-20 PROCEDURE — 74011250636 HC RX REV CODE- 250/636: Performed by: INTERNAL MEDICINE

## 2022-06-20 PROCEDURE — 74011000258 HC RX REV CODE- 258: Performed by: STUDENT IN AN ORGANIZED HEALTH CARE EDUCATION/TRAINING PROGRAM

## 2022-06-20 PROCEDURE — 74011000250 HC RX REV CODE- 250: Performed by: RADIOLOGY

## 2022-06-20 PROCEDURE — 02HV33Z INSERTION OF INFUSION DEVICE INTO SUPERIOR VENA CAVA, PERCUTANEOUS APPROACH: ICD-10-PCS | Performed by: RADIOLOGY

## 2022-06-20 PROCEDURE — 0JH63XZ INSERTION OF TUNNELED VASCULAR ACCESS DEVICE INTO CHEST SUBCUTANEOUS TISSUE AND FASCIA, PERCUTANEOUS APPROACH: ICD-10-PCS | Performed by: RADIOLOGY

## 2022-06-20 PROCEDURE — 99233 SBSQ HOSP IP/OBS HIGH 50: CPT | Performed by: STUDENT IN AN ORGANIZED HEALTH CARE EDUCATION/TRAINING PROGRAM

## 2022-06-20 PROCEDURE — 77001 FLUOROGUIDE FOR VEIN DEVICE: CPT

## 2022-06-20 PROCEDURE — 84100 ASSAY OF PHOSPHORUS: CPT

## 2022-06-20 RX ORDER — MIDAZOLAM HYDROCHLORIDE 1 MG/ML
1-5 INJECTION, SOLUTION INTRAMUSCULAR; INTRAVENOUS
Status: DISCONTINUED | OUTPATIENT
Start: 2022-06-20 | End: 2022-06-20

## 2022-06-20 RX ORDER — SODIUM CHLORIDE 9 MG/ML
25 INJECTION, SOLUTION INTRAVENOUS CONTINUOUS
Status: DISCONTINUED | OUTPATIENT
Start: 2022-06-20 | End: 2022-06-20

## 2022-06-20 RX ORDER — HEPARIN SODIUM 200 [USP'U]/100ML
400 INJECTION, SOLUTION INTRAVENOUS ONCE
Status: COMPLETED | OUTPATIENT
Start: 2022-06-20 | End: 2022-06-20

## 2022-06-20 RX ORDER — FLUCONAZOLE 2 MG/ML
400 INJECTION, SOLUTION INTRAVENOUS DAILY
Status: DISCONTINUED | OUTPATIENT
Start: 2022-06-21 | End: 2022-06-20

## 2022-06-20 RX ORDER — FENTANYL CITRATE 50 UG/ML
100 INJECTION, SOLUTION INTRAMUSCULAR; INTRAVENOUS
Status: DISCONTINUED | OUTPATIENT
Start: 2022-06-20 | End: 2022-06-20

## 2022-06-20 RX ORDER — LIDOCAINE HYDROCHLORIDE 20 MG/ML
10 INJECTION, SOLUTION INFILTRATION; PERINEURAL ONCE
Status: COMPLETED | OUTPATIENT
Start: 2022-06-20 | End: 2022-06-20

## 2022-06-20 RX ORDER — HEPARIN 100 UNIT/ML
300 SYRINGE INTRAVENOUS ONCE
Status: COMPLETED | OUTPATIENT
Start: 2022-06-20 | End: 2022-06-20

## 2022-06-20 RX ADMIN — LIDOCAINE HYDROCHLORIDE 4 ML: 20 INJECTION, SOLUTION INFILTRATION; PERINEURAL at 14:30

## 2022-06-20 RX ADMIN — AMPICILLIN SODIUM 2 G: 2 INJECTION, POWDER, FOR SOLUTION INTRAVENOUS at 15:27

## 2022-06-20 RX ADMIN — KETOROLAC TROMETHAMINE 15 MG: 30 INJECTION, SOLUTION INTRAMUSCULAR at 10:33

## 2022-06-20 RX ADMIN — FENTANYL CITRATE 25 MCG: 50 INJECTION, SOLUTION INTRAMUSCULAR; INTRAVENOUS at 14:20

## 2022-06-20 RX ADMIN — KETOROLAC TROMETHAMINE 15 MG: 30 INJECTION, SOLUTION INTRAMUSCULAR at 21:10

## 2022-06-20 RX ADMIN — SODIUM CHLORIDE, PRESERVATIVE FREE 10 ML: 5 INJECTION INTRAVENOUS at 21:11

## 2022-06-20 RX ADMIN — SODIUM CHLORIDE 25 ML/HR: 9 INJECTION, SOLUTION INTRAVENOUS at 14:11

## 2022-06-20 RX ADMIN — FENTANYL CITRATE 25 MCG: 50 INJECTION, SOLUTION INTRAMUSCULAR; INTRAVENOUS at 14:15

## 2022-06-20 RX ADMIN — MIDAZOLAM HYDROCHLORIDE 2 MG: 1 INJECTION, SOLUTION INTRAMUSCULAR; INTRAVENOUS at 14:15

## 2022-06-20 RX ADMIN — SODIUM CHLORIDE, PRESERVATIVE FREE 4 UNITS: 5 INJECTION INTRAVENOUS at 14:30

## 2022-06-20 RX ADMIN — AMPICILLIN SODIUM 2 G: 2 INJECTION, POWDER, FOR SOLUTION INTRAVENOUS at 21:10

## 2022-06-20 RX ADMIN — AMPICILLIN SODIUM 2 G: 2 INJECTION, POWDER, FOR SOLUTION INTRAVENOUS at 06:15

## 2022-06-20 RX ADMIN — AMPICILLIN SODIUM 2 G: 2 INJECTION, POWDER, FOR SOLUTION INTRAVENOUS at 10:07

## 2022-06-20 RX ADMIN — ASCORBIC ACID, VITAMIN A PALMITATE, CHOLECALCIFEROL, THIAMINE HYDROCHLORIDE, RIBOFLAVIN-5 PHOSPHATE SODIUM, PYRIDOXINE HYDROCHLORIDE, NIACINAMIDE, DEXPANTHENOL, ALPHA-TOCOPHEROL ACETATE, VITAMIN K1, FOLIC ACID, BIOTIN, CYANOCOBALAMIN: 200; 3300; 200; 6; 3.6; 6; 40; 15; 10; 150; 600; 60; 5 INJECTION, SOLUTION INTRAVENOUS at 17:57

## 2022-06-20 RX ADMIN — AMPICILLIN SODIUM 2 G: 2 INJECTION, POWDER, FOR SOLUTION INTRAVENOUS at 17:57

## 2022-06-20 RX ADMIN — HEPARIN SODIUM IN SODIUM CHLORIDE 20 ML: 200 INJECTION INTRAVENOUS at 14:30

## 2022-06-20 RX ADMIN — AMPICILLIN SODIUM 2 G: 2 INJECTION, POWDER, FOR SOLUTION INTRAVENOUS at 02:21

## 2022-06-20 RX ADMIN — SODIUM CHLORIDE, PRESERVATIVE FREE 10 ML: 5 INJECTION INTRAVENOUS at 15:27

## 2022-06-20 RX ADMIN — SODIUM CHLORIDE 100 MG: 9 INJECTION, SOLUTION INTRAVENOUS at 11:28

## 2022-06-20 RX ADMIN — SODIUM CHLORIDE, PRESERVATIVE FREE 10 ML: 5 INJECTION INTRAVENOUS at 06:15

## 2022-06-20 RX ADMIN — MIDAZOLAM HYDROCHLORIDE 1 MG: 1 INJECTION, SOLUTION INTRAMUSCULAR; INTRAVENOUS at 14:29

## 2022-06-20 NOTE — PROGRESS NOTES
End of Shift Note    Bedside shift change report given to [employer] (oncoming nurse) by Juhi Mckeon RN (offgoing nurse). Report included the following information SBAR, Kardex and MAR    Shift worked:  7a-7p     Shift summary and any significant changes:          Concerns for physician to address:       Zone phone for oncoming shift:          Activity:  Activity Level: Bed Rest  Number times ambulated in hallways past shift: 0  Number of times OOB to chair past shift: 0    Cardiac:   Cardiac Monitoring: Yes      Cardiac Rhythm: Sinus Rhythm    Access:   Current line(s): PIV     Genitourinary:   Urinary status: voiding    Respiratory:   O2 Device: None (Room air)  Chronic home O2 use?: NO  Incentive spirometer at bedside: NO       GI:  Last Bowel Movement Date: 06/18/22  Current diet:  ADULT DIET Clear Liquid  TPN ADULT - PERIPHERAL AA 4.25% D10% W/ CA + ELECTROLYTES  Passing flatus: NO  Tolerating current diet: YES       Pain Management:   Patient states pain is manageable on current regimen: N/A    Skin:  Russell Score: 17  Interventions: increase time out of bed    Patient Safety:  Fall Score:  Total Score: 1  Interventions: pt to call before getting OOB       Length of Stay:  Expected LOS: 4d 19h  Actual LOS: [address], RN
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
## 2022-06-20 NOTE — PROGRESS NOTES
End of Shift Note    Bedside shift change report given to Trinidad (oncoming nurse) by David Bell RN (offgoing nurse). Report included the following information SBAR, Kardex and MAR    Shift worked:  7a-7p     Shift summary and any significant changes:     TELLY placed     Concerns for physician to address:    Zone phone for oncoming shift:          Activity:  Activity Level: Up with Assistance  Number times ambulated in hallways past shift: 0  Number of times OOB to chair past shift: 0    Cardiac:   Cardiac Monitoring: Yes      Cardiac Rhythm: Sinus Rhythm    Access:   Current line(s): RENITA and Telly     Genitourinary:   Urinary status: voiding    Respiratory:   O2 Device: None (Room air)  Chronic home O2 use?: NO  Incentive spirometer at bedside: NO       GI:  Last Bowel Movement Date: 06/20/22  Current diet:  ADULT DIET Clear Liquid  TPN ADULT - PERIPHERAL AA 4.25% D10% W/ CA + ELECTROLYTES  Passing flatus: YES  Tolerating current diet: YES       Pain Management:   Patient states pain is manageable on current regimen: YES    Skin:  Russell Score: 18  Interventions: PT/OT consult    Patient Safety:  Fall Score:  Total Score: 2  Interventions: bed/chair alarm, gripper socks and pt to call before getting OOB       Length of Stay:  Expected LOS: 4d 19h  Actual LOS: 2800 Mio Jerez RN

## 2022-06-20 NOTE — PROGRESS NOTES
Infectious Disease Progress Note         Interval:  NAEO    Subjective:   Fees well. Camilo any eye symptoms. Objective:    Vitals:   Reviewed in chart. Physical Exam:  Gen: No apparent distress  HEENT:  Normocephalic, atraumatic, no scleral icterus, no signs of hypopyon in eyes  ? CV: Hemodynamically stable  ? Lungs: On room air  ? Abdomen: soft, non distended,  tender, G-tube and J-tube  ? Genitourinary:  no brock  ? Extremities: no edema  ? Neuro: Alert, oriented to time, place and situation, moves all extremities to commands, verbal   ? Skin: no rash  ? Psych: good affect, good eye contact, non tearful   ?  Lines: Telly catheter right chest removed 6/14/22           Labs:  Recent Results (from the past 24 hour(s))   GLUCOSE, POC    Collection Time: 06/19/22 11:06 AM   Result Value Ref Range    Glucose (POC) 111 65 - 117 mg/dL    Performed by [employer] PCT    MAGNESIUM    Collection Time: 06/19/22  2:15 PM   Result Value Ref Range    Magnesium 1.8 1.6 - 2.4 mg/dL   PHOSPHORUS    Collection Time: 06/19/22  2:15 PM   Result Value Ref Range    Phosphorus 3.1 2.6 - 4.7 MG/DL   METABOLIC PANEL, BASIC    Collection Time: 06/19/22  2:15 PM   Result Value Ref Range    Sodium 138 136 - 145 mmol/L    Potassium 3.1 (L) 3.5 - 5.1 mmol/L    Chloride 109 (H) 97 - 108 mmol/L    CO2 24 21 - 32 mmol/L    Anion gap 5 5 - 15 mmol/L    Glucose 105 (H) 65 - 100 mg/dL    BUN 6 6 - 20 MG/DL    Creatinine 0.62 (L) 0.70 - 1.30 MG/DL    BUN/Creatinine ratio 10 (L) 12 - 20      GFR est AA >60 >60 ml/min/1.73m2    GFR est non-AA >60 >60 ml/min/1.73m2    Calcium 8.2 (L) 8.5 - 10.1 MG/DL   GLUCOSE, POC    Collection Time: 06/19/22  5:53 PM   Result Value Ref Range    Glucose (POC) 105 65 - 117 mg/dL    Performed by [employer] PCT    GLUCOSE, POC    Collection Time: 06/20/22 12:22 AM   Result Value Ref Range    Glucose (POC) 97 65 - 117 mg/dL    Performed by Tierney Barbosa (RUBENS RN)    MAGNESIUM    Collection Time: 06/20/22  2:22 AM   Result Value Ref Range    Magnesium 1.8 1.6 - 2.4 mg/dL   PHOSPHORUS    Collection Time: 06/20/22  2:22 AM   Result Value Ref Range    Phosphorus 3.8 2.6 - 4.7 MG/DL   CBC W/O DIFF    Collection Time: 06/20/22  2:22 AM   Result Value Ref Range    WBC 8.5 4.1 - 11.1 K/uL    RBC 3.13 (L) 4.10 - 5.70 M/uL    HGB 8.8 (L) 12.1 - 17.0 g/dL    HCT 27.9 (L) 36.6 - 50.3 %    MCV 89.1 80.0 - 99.0 FL    MCH 28.1 26.0 - 34.0 PG    MCHC 31.5 30.0 - 36.5 g/dL    RDW 19.3 (H) 11.5 - 14.5 %    PLATELET 865 (H) 109 - 400 K/uL    MPV 9.4 8.9 - 12.9 FL    NRBC 0.0 0  WBC    ABSOLUTE NRBC 0.00 0.00 - 2.65 K/uL   METABOLIC PANEL, COMPREHENSIVE    Collection Time: 06/20/22  2:22 AM   Result Value Ref Range    Sodium 136 136 - 145 mmol/L    Potassium 3.7 3.5 - 5.1 mmol/L    Chloride 107 97 - 108 mmol/L    CO2 25 21 - 32 mmol/L    Anion gap 4 (L) 5 - 15 mmol/L    Glucose 96 65 - 100 mg/dL    BUN 7 6 - 20 MG/DL    Creatinine 0.62 (L) 0.70 - 1.30 MG/DL    BUN/Creatinine ratio 11 (L) 12 - 20      GFR est AA >60 >60 ml/min/1.73m2    GFR est non-AA >60 >60 ml/min/1.73m2    Calcium 8.4 (L) 8.5 - 10.1 MG/DL    Bilirubin, total 0.7 0.2 - 1.0 MG/DL    ALT (SGPT) 21 12 - 78 U/L    AST (SGOT) 16 15 - 37 U/L    Alk. phosphatase 134 (H) 45 - 117 U/L    Protein, total 6.9 6.4 - 8.2 g/dL    Albumin 2.3 (L) 3.5 - 5.0 g/dL    Globulin 4.6 (H) 2.0 - 4.0 g/dL    A-G Ratio 0.5 (L) 1.1 - 2.2     GLUCOSE, POC    Collection Time: 06/20/22  6:12 AM   Result Value Ref Range    Glucose (POC) 100 65 - 117 mg/dL    Performed by Sendy Kim (RUBENS RN)              Assessment/Recommendations:  Polymicrobial bacteremia E.coli and Enterococcus faecalis  Also Candidemia with Candida albicans. TPN risk factor. Source of each likely the rupinder catheter, removed 6/14/22. After removal, blood cultures negative from 6/15/22.      Would prefer ampicllin 12gm q24h via continuous infusion for the E.coli and Enterococcal bacteremia, but if the ampicillin is NOT able to be arranged via CONTINUOUS infusion, then we will have to switch to daptomycin, ceftriaxone once daily. At this time I have placed orders for case management to check if this is feasible by the home health pharmacy. Anidulafungin to continue. Unable to switch to fluconazole due to incompatibility of fluconazole with ampicillin continuous infusion. Plan for double-lumen rupinder to be replaced today. Patient is TPN-dependent via rupinder. Duration 2 weeks for all antimicrobials, 6/15/22 to 6/30/22. Final abx orders to follow pending Case management checks as above. Thank you for the opportunity to participate in the care of this patient. Please contact with questions or concerns.       John Navarro MD  Infectious Diseases

## 2022-06-20 NOTE — PROGRESS NOTES
Comprehensive Nutrition Assessment    Type and Reason for Visit: Reassess    Nutrition Recommendations/Plan:   1. Continue PPN as ordered  2. Once Telly catheter placed, recommend AA5% D20% @ 63 mL/hr          -If lytes/BG stable, advance to goal rate AA5% D20% @ 75 mL/hr + 500 mL 20% lipids 3x/week the following day (provides 2012 kcals, 90g protein, 360g CHO)      Nutrition Assessment:  Chart reviewed; patient discussed during PCU rounds. Possible placement of Telly catheter today. Currently receiving PPN. TPN recommendations provided above once access obtained. Lytes and BG normal.         Nutrition Related Findings:    K+ 3.7, -95-, Phos 3.8  BM 6/18  humalog    Current Nutrition Intake & Therapies:  ADULT DIET Clear Liquid  TPN ADULT - PERIPHERAL AA 4.25% D10% W/ CA + ELECTROLYTES  Current Parenteral Nutrition Orders:  · Type and Formula: AA 4.25%, D10%   · Lipids: None  · Duration: Continuous  · Rate/Volume: 63 mL/hr  · Current PN Order Provides: 771 kcal, 64g protein, 151g CHO  · Goal PN Orders Provides: 2012 kcal, 90g protein, 360 g CHO    Anthropometric Measures:  Height: 5' 10\" (177.8 cm)  Ideal Body Weight (IBW): 166 lbs (75 kg)     Current Body Wt:  59.8 kg (131 lb 13.4 oz), 79.4 % IBW. Bed scale  Current BMI (kg/m2): 18.9                          BMI Category: Normal weight (BMI 18.5-24. 9)    Estimated Daily Nutrient Needs:  Energy Requirements Based On: Formula  Weight Used for Energy Requirements: Current  Energy (kcal/day): 9989-7350 kcal (BMR 1506 x 1.2AF +250kcal)  Weight Used for Protein Requirements: Current  Protein (g/day): 72-90g (1.2-1.5 g/kg bw)  Method Used for Fluid Requirements: 1 ml/kcal  Fluid (ml/day): 1800 mL    Nutrition Diagnosis:   · Altered GI function related to altered GI structure as evidenced by NPO or clear liquid status due to medical condition,nutrition support-parenteral nutrition    Nutrition Interventions:   Food and/or Nutrient Delivery: Modify parenteral nutrition,Continue current diet  Nutrition Education/Counseling: No recommendations at this time  Coordination of Nutrition Care: Continue to monitor while inpatient       Goals:  Previous Goal Met: Progressing toward goal(s)  Goals:  (TPN restarted and at goal rate next 2-4 days)       Nutrition Monitoring and Evaluation:   Behavioral-Environmental Outcomes: None identified  Food/Nutrient Intake Outcomes: Parenteral nutrition intake/tolerance  Physical Signs/Symptoms Outcomes: Biochemical data,Fluid status or edema,Hemodynamic status,Weight    Discharge Planning:    Parenteral nutrition (resume home TPN regimen)    Ezra Rooney RD  Contact: ext [phone]

## 2022-06-20 NOTE — ROUTINE PROCESS
Dr. Paresh Hargrove into radiology recovery to speak with pt. Procedure explained along with risks and benefits; consent obtained.

## 2022-06-20 NOTE — ROUTINE PROCESS
Procedure: Tunneled venous access catheter placement    Sedation total time: 20 minutes    Medications given during procedure: Versed 3 mg and Fentanyl 50 mcg    Patient Status post procedure: Drowsy, but responds to voice. No complaint of pain, transported via stretcher by transporter Aurelia Massed back to room 2268. Report called to patients nurse Nikolas Elam. Procedure site: Telly catheter to right upper chest intact with clean dry dressing in place.

## 2022-06-20 NOTE — WOUND CARE
Wound care follow up for the Fistula care \"check in\". Patient was changed on Friday and he had to change it again on Sunday night. Nursing assisted him and the current fistula bag is intact. Plan: Wound care to check on patient Wednesday. Another fistula bag was left at the bedside.    Elizabet Mendosa RN, BSN, [facility]

## 2022-06-21 LAB
ALBUMIN SERPL-MCNC: 2 G/DL (ref 3.5–5)
ALBUMIN/GLOB SERPL: 0.4 {RATIO} (ref 1.1–2.2)
ALP SERPL-CCNC: 119 U/L (ref 45–117)
ALT SERPL-CCNC: 19 U/L (ref 12–78)
ANION GAP SERPL CALC-SCNC: 6 MMOL/L (ref 5–15)
AST SERPL-CCNC: 19 U/L (ref 15–37)
BACTERIA SPEC CULT: NORMAL
BACTERIA SPEC CULT: NORMAL
BILIRUB SERPL-MCNC: 0.5 MG/DL (ref 0.2–1)
BUN SERPL-MCNC: 9 MG/DL (ref 6–20)
BUN/CREAT SERPL: 18 (ref 12–20)
CALCIUM SERPL-MCNC: 8.1 MG/DL (ref 8.5–10.1)
CHLORIDE SERPL-SCNC: 108 MMOL/L (ref 97–108)
CO2 SERPL-SCNC: 25 MMOL/L (ref 21–32)
CREAT SERPL-MCNC: 0.5 MG/DL (ref 0.7–1.3)
ERYTHROCYTE [DISTWIDTH] IN BLOOD BY AUTOMATED COUNT: 19.1 % (ref 11.5–14.5)
GLOBULIN SER CALC-MCNC: 4.9 G/DL (ref 2–4)
GLUCOSE BLD STRIP.AUTO-MCNC: 103 MG/DL (ref 65–117)
GLUCOSE BLD STRIP.AUTO-MCNC: 113 MG/DL (ref 65–117)
GLUCOSE BLD STRIP.AUTO-MCNC: 120 MG/DL (ref 65–117)
GLUCOSE BLD STRIP.AUTO-MCNC: 92 MG/DL (ref 65–117)
GLUCOSE SERPL-MCNC: 76 MG/DL (ref 65–100)
HCT VFR BLD AUTO: 24 % (ref 36.6–50.3)
HGB BLD-MCNC: 7.7 G/DL (ref 12.1–17)
MAGNESIUM SERPL-MCNC: 1.9 MG/DL (ref 1.6–2.4)
MCH RBC QN AUTO: 28.2 PG (ref 26–34)
MCHC RBC AUTO-ENTMCNC: 32.1 G/DL (ref 30–36.5)
MCV RBC AUTO: 87.9 FL (ref 80–99)
NRBC # BLD: 0 K/UL (ref 0–0.01)
NRBC BLD-RTO: 0 PER 100 WBC
PHOSPHATE SERPL-MCNC: 3.9 MG/DL (ref 2.6–4.7)
PLATELET # BLD AUTO: 465 K/UL (ref 150–400)
PMV BLD AUTO: 9.7 FL (ref 8.9–12.9)
POTASSIUM SERPL-SCNC: 3.6 MMOL/L (ref 3.5–5.1)
PROT SERPL-MCNC: 6.9 G/DL (ref 6.4–8.2)
RBC # BLD AUTO: 2.73 M/UL (ref 4.1–5.7)
SERVICE CMNT-IMP: ABNORMAL
SERVICE CMNT-IMP: NORMAL
SODIUM SERPL-SCNC: 139 MMOL/L (ref 136–145)
WBC # BLD AUTO: 7 K/UL (ref 4.1–11.1)

## 2022-06-21 PROCEDURE — 80053 COMPREHEN METABOLIC PANEL: CPT

## 2022-06-21 PROCEDURE — 82962 GLUCOSE BLOOD TEST: CPT

## 2022-06-21 PROCEDURE — 85027 COMPLETE CBC AUTOMATED: CPT

## 2022-06-21 PROCEDURE — 74011000250 HC RX REV CODE- 250: Performed by: INTERNAL MEDICINE

## 2022-06-21 PROCEDURE — 74011250636 HC RX REV CODE- 250/636: Performed by: STUDENT IN AN ORGANIZED HEALTH CARE EDUCATION/TRAINING PROGRAM

## 2022-06-21 PROCEDURE — 36415 COLL VENOUS BLD VENIPUNCTURE: CPT

## 2022-06-21 PROCEDURE — 99358 PROLONG SERVICE W/O CONTACT: CPT | Performed by: STUDENT IN AN ORGANIZED HEALTH CARE EDUCATION/TRAINING PROGRAM

## 2022-06-21 PROCEDURE — 83735 ASSAY OF MAGNESIUM: CPT

## 2022-06-21 PROCEDURE — 84100 ASSAY OF PHOSPHORUS: CPT

## 2022-06-21 PROCEDURE — 74011000258 HC RX REV CODE- 258: Performed by: STUDENT IN AN ORGANIZED HEALTH CARE EDUCATION/TRAINING PROGRAM

## 2022-06-21 PROCEDURE — 65270000046 HC RM TELEMETRY

## 2022-06-21 RX ADMIN — AMPICILLIN SODIUM 2 G: 2 INJECTION, POWDER, FOR SOLUTION INTRAVENOUS at 16:18

## 2022-06-21 RX ADMIN — AMPICILLIN SODIUM 2 G: 2 INJECTION, POWDER, FOR SOLUTION INTRAVENOUS at 21:30

## 2022-06-21 RX ADMIN — SODIUM CHLORIDE 100 MG: 9 INJECTION, SOLUTION INTRAVENOUS at 14:04

## 2022-06-21 RX ADMIN — ASCORBIC ACID, VITAMIN A PALMITATE, CHOLECALCIFEROL, THIAMINE HYDROCHLORIDE, RIBOFLAVIN-5 PHOSPHATE SODIUM, PYRIDOXINE HYDROCHLORIDE, NIACINAMIDE, DEXPANTHENOL, ALPHA-TOCOPHEROL ACETATE, VITAMIN K1, FOLIC ACID, BIOTIN, CYANOCOBALAMIN: 200; 3300; 200; 6; 3.6; 6; 40; 15; 10; 150; 600; 60; 5 INJECTION, SOLUTION INTRAVENOUS at 17:37

## 2022-06-21 RX ADMIN — AMPICILLIN SODIUM 2 G: 2 INJECTION, POWDER, FOR SOLUTION INTRAVENOUS at 01:41

## 2022-06-21 RX ADMIN — AMPICILLIN SODIUM 2 G: 2 INJECTION, POWDER, FOR SOLUTION INTRAVENOUS at 05:12

## 2022-06-21 RX ADMIN — AMPICILLIN SODIUM 2 G: 2 INJECTION, POWDER, FOR SOLUTION INTRAVENOUS at 09:22

## 2022-06-21 RX ADMIN — SODIUM CHLORIDE, PRESERVATIVE FREE 10 ML: 5 INJECTION INTRAVENOUS at 14:02

## 2022-06-21 RX ADMIN — SODIUM CHLORIDE, PRESERVATIVE FREE 10 ML: 5 INJECTION INTRAVENOUS at 21:30

## 2022-06-21 RX ADMIN — SODIUM CHLORIDE, PRESERVATIVE FREE 10 ML: 5 INJECTION INTRAVENOUS at 05:12

## 2022-06-21 NOTE — PROGRESS NOTES
Infectious Disease Progress Note         Interval:  NAEO            Labs:  Recent Results (from the past 24 hour(s))   GLUCOSE, POC    Collection Time: 06/20/22 11:25 AM   Result Value Ref Range    Glucose (POC) 82 65 - 117 mg/dL    Performed by Genevie Rocker PCT    GLUCOSE, POC    Collection Time: 06/20/22  6:28 PM   Result Value Ref Range    Glucose (POC) 131 (H) 65 - 117 mg/dL    Performed by Genevie Rocker PCT    GLUCOSE, POC    Collection Time: 06/20/22 11:21 PM   Result Value Ref Range    Glucose (POC) 98 65 - 117 mg/dL    Performed by Chary Beam (PCT)    MAGNESIUM    Collection Time: 06/21/22  5:13 AM   Result Value Ref Range    Magnesium 1.9 1.6 - 2.4 mg/dL   PHOSPHORUS    Collection Time: 06/21/22  5:13 AM   Result Value Ref Range    Phosphorus 3.9 2.6 - 4.7 MG/DL   CBC W/O DIFF    Collection Time: 06/21/22  5:13 AM   Result Value Ref Range    WBC 7.0 4.1 - 11.1 K/uL    RBC 2.73 (L) 4.10 - 5.70 M/uL    HGB 7.7 (L) 12.1 - 17.0 g/dL    HCT 24.0 (L) 36.6 - 50.3 %    MCV 87.9 80.0 - 99.0 FL    MCH 28.2 26.0 - 34.0 PG    MCHC 32.1 30.0 - 36.5 g/dL    RDW 19.1 (H) 11.5 - 14.5 %    PLATELET 732 (H) 348 - 400 K/uL    MPV 9.7 8.9 - 12.9 FL    NRBC 0.0 0  WBC    ABSOLUTE NRBC 0.00 0.00 - 6.52 K/uL   METABOLIC PANEL, COMPREHENSIVE    Collection Time: 06/21/22  5:13 AM   Result Value Ref Range    Sodium 139 136 - 145 mmol/L    Potassium 3.6 3.5 - 5.1 mmol/L    Chloride 108 97 - 108 mmol/L    CO2 25 21 - 32 mmol/L    Anion gap 6 5 - 15 mmol/L    Glucose 76 65 - 100 mg/dL    BUN 9 6 - 20 MG/DL    Creatinine 0.50 (L) 0.70 - 1.30 MG/DL    BUN/Creatinine ratio 18 12 - 20      GFR est AA >60 >60 ml/min/1.73m2    GFR est non-AA >60 >60 ml/min/1.73m2    Calcium 8.1 (L) 8.5 - 10.1 MG/DL    Bilirubin, total 0.5 0.2 - 1.0 MG/DL    ALT (SGPT) 19 12 - 78 U/L    AST (SGOT) 19 15 - 37 U/L    Alk.  phosphatase 119 (H) 45 - 117 U/L    Protein, total 6.9 6.4 - 8.2 g/dL    Albumin 2.0 (L) 3.5 - 5.0 g/dL    Globulin 4.9 (H) 2.0 - 4.0 g/dL    A-G Ratio 0.4 (L) 1.1 - 2.2     GLUCOSE, POC    Collection Time: 06/21/22  5:17 AM   Result Value Ref Range    Glucose (POC) 92 65 - 117 mg/dL    Performed by Juventino Ball (PCT)              Assessment/Recommendations:  Polymicrobial bacteremia E.coli and Enterococcus faecalis  Also Candidemia with Candida albicans. TPN risk factor. Source of each likely the rupinder catheter, removed 6/14/22. After removal, blood cultures negative from 6/15/22. Final abx orders:  - ampicllin 12gm q24h via continuous infusion   - anidulafungin 100mg daily to continue. - Duration 2 weeks for all antimicrobials, 6/15/22 to 6/30/22. - Please note that TPN orders are NOT part of ID-care. - No ID follow up needed after discharge. - I also personally spoke to eRre Lott from [place] [id number] today regarding the above orders. · Please have labs done on weekly basis for CBC with diff, CMP  and have results sent to me by faxing to  [phone]. · Call with critical labs at [phone]. · Please ensure that patient has Rupinder care arranged per protocol   · Smoking cessation encouraged if patient is current smoker to aid in infection and wound healing      Orders placed for C. ID will sign off, please recall as needed. Thank you for the opportunity to participate in the care of this patient. Please contact with questions or concerns.       Khari Raygoza MD  Infectious Diseases

## 2022-06-21 NOTE — PROGRESS NOTES
Shift report received from [facility]     Uneventful night     Shift report given to [employer]

## 2022-06-21 NOTE — PROGRESS NOTES
End of Shift Note    Bedside shift change report given to Nellie Ortega (oncoming nurse) by Robert Medel RN (offgoing nurse). Report included the following information SBAR    Shift worked:  11a - 7p     Shift summary and any significant changes:     pt had uneventful day. All of scheduled meds given. TPN renewed at 6pm. Pt has been emptying the ostomy bag for fistula. Ostomy bag for fistula intact, not leaking. Pt has transfer order to [facility], still waiting for a room. Possible discharge tomorrow. Concerns for physician to address:  none     Zone phone for oncoming shift:          Activity:  Activity Level: Up with Assistance  Number times ambulated in hallways past shift: 0  Number of times OOB to chair past shift: 0    Cardiac:   Cardiac Monitoring: Yes      Cardiac Rhythm: Sinus Rhythm    Access:   Current line(s): Telly     Genitourinary:   Urinary status: voiding    Respiratory:   O2 Device: None (Room air)  Chronic home O2 use?: NO  Incentive spirometer at bedside: NO       GI:  Last Bowel Movement Date: 06/21/22  Current diet:  ADULT DIET Clear Liquid  TPN ADULT - PERIPHERAL AA 4.25% D10% W/ CA + ELECTROLYTES  Passing flatus: YES  Tolerating current diet: YES       Pain Management:   Patient states pain is manageable on current regimen: YES    Skin:  Russell Score: 19  Interventions: increase time out of bed    Patient Safety:  Fall Score:  Total Score: 2  Interventions: gripper socks, pt to call before getting OOB and stay with me (per policy)       Length of Stay:  Expected LOS: 4d 19h  Actual LOS: [address]irre RN

## 2022-06-21 NOTE — PROGRESS NOTES
Transition of Care Plan:     RUR: 22%  Dis[facility]: [facility]  Follow up appointments:PCP  DME needed: None  Transportation at Discharge: Pt's mother will transport at d/c.   [facility] or means to access home: Pt has access       IM Medicare Letter: Pt has Medicaid  Is patient a BCPI-A Bundle:  N/A                    If yes, was Bundle Letter given?:  N/A  Is patient a  and connected with the  [address]  If yes, was [employer] transfer form completed and [facility] notified? Caregiver Contact: Samantha Ovalle- Mother [phone]  Discharge Caregiver contacted prior to discharge?  CM will notify caregiver at d/c.   Care Conference needed? :     No    2:45pm- Christel called with [employer] via phone to inform pt that they have not been paid for the TPN from insurance TPN in which it needs an insurance auth so they are waiting to see if they are able to get it paid for and reimbursed for previous services. Mar Nath stated that they will be able to provide pt with IV antibiotics but pt will needs three lines for TPN and the two antibiotics. 11:06am- [facility] message  back in [employer] and stated that pt will need a new TPN authorization, the medication for ampicillin should be fully covered however it can take up to 24 hours for the auth. 10:56am-  sent a message to Marzena Russell [id number] inquiring about the cost of medication. 10:51am- Dr. Juan Antonio Carlos called CM via phone to find out the cost of IV antibiotic. CM will continue to follow patient for discharge planning needs and arrange for services as deemed necessary.     Jv Gardner [address] [place] [address]  [phone]
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
## 2022-06-22 LAB
ALBUMIN SERPL-MCNC: 2.3 G/DL (ref 3.5–5)
ALBUMIN/GLOB SERPL: 0.5 {RATIO} (ref 1.1–2.2)
ALP SERPL-CCNC: 130 U/L (ref 45–117)
ALT SERPL-CCNC: 22 U/L (ref 12–78)
ANION GAP SERPL CALC-SCNC: 3 MMOL/L (ref 5–15)
AST SERPL-CCNC: 21 U/L (ref 15–37)
BILIRUB SERPL-MCNC: 0.4 MG/DL (ref 0.2–1)
BUN SERPL-MCNC: 8 MG/DL (ref 6–20)
BUN/CREAT SERPL: 13 (ref 12–20)
CALCIUM SERPL-MCNC: 8.4 MG/DL (ref 8.5–10.1)
CHLORIDE SERPL-SCNC: 107 MMOL/L (ref 97–108)
CO2 SERPL-SCNC: 27 MMOL/L (ref 21–32)
CREAT SERPL-MCNC: 0.62 MG/DL (ref 0.7–1.3)
ERYTHROCYTE [DISTWIDTH] IN BLOOD BY AUTOMATED COUNT: 18.9 % (ref 11.5–14.5)
GLOBULIN SER CALC-MCNC: 4.5 G/DL (ref 2–4)
GLUCOSE BLD STRIP.AUTO-MCNC: 102 MG/DL (ref 65–117)
GLUCOSE BLD STRIP.AUTO-MCNC: 127 MG/DL (ref 65–117)
GLUCOSE BLD STRIP.AUTO-MCNC: 92 MG/DL (ref 65–117)
GLUCOSE SERPL-MCNC: 88 MG/DL (ref 65–100)
HCT VFR BLD AUTO: 25.7 % (ref 36.6–50.3)
HGB BLD-MCNC: 8.2 G/DL (ref 12.1–17)
MAGNESIUM SERPL-MCNC: 2 MG/DL (ref 1.6–2.4)
MCH RBC QN AUTO: 28.2 PG (ref 26–34)
MCHC RBC AUTO-ENTMCNC: 31.9 G/DL (ref 30–36.5)
MCV RBC AUTO: 88.3 FL (ref 80–99)
NRBC # BLD: 0 K/UL (ref 0–0.01)
NRBC BLD-RTO: 0 PER 100 WBC
PHOSPHATE SERPL-MCNC: 3.9 MG/DL (ref 2.6–4.7)
PLATELET # BLD AUTO: 494 K/UL (ref 150–400)
PMV BLD AUTO: 8.8 FL (ref 8.9–12.9)
POTASSIUM SERPL-SCNC: 4 MMOL/L (ref 3.5–5.1)
PROT SERPL-MCNC: 6.8 G/DL (ref 6.4–8.2)
RBC # BLD AUTO: 2.91 M/UL (ref 4.1–5.7)
SERVICE CMNT-IMP: ABNORMAL
SERVICE CMNT-IMP: NORMAL
SERVICE CMNT-IMP: NORMAL
SODIUM SERPL-SCNC: 137 MMOL/L (ref 136–145)
WBC # BLD AUTO: 8.5 K/UL (ref 4.1–11.1)

## 2022-06-22 PROCEDURE — 85027 COMPLETE CBC AUTOMATED: CPT

## 2022-06-22 PROCEDURE — 83735 ASSAY OF MAGNESIUM: CPT

## 2022-06-22 PROCEDURE — 80053 COMPREHEN METABOLIC PANEL: CPT

## 2022-06-22 PROCEDURE — 74011000250 HC RX REV CODE- 250: Performed by: INTERNAL MEDICINE

## 2022-06-22 PROCEDURE — 74011000258 HC RX REV CODE- 258: Performed by: STUDENT IN AN ORGANIZED HEALTH CARE EDUCATION/TRAINING PROGRAM

## 2022-06-22 PROCEDURE — 36415 COLL VENOUS BLD VENIPUNCTURE: CPT

## 2022-06-22 PROCEDURE — 82962 GLUCOSE BLOOD TEST: CPT

## 2022-06-22 PROCEDURE — 65270000046 HC RM TELEMETRY

## 2022-06-22 PROCEDURE — 74011250636 HC RX REV CODE- 250/636: Performed by: STUDENT IN AN ORGANIZED HEALTH CARE EDUCATION/TRAINING PROGRAM

## 2022-06-22 PROCEDURE — 84100 ASSAY OF PHOSPHORUS: CPT

## 2022-06-22 RX ADMIN — AMPICILLIN SODIUM 2 G: 2 INJECTION, POWDER, FOR SOLUTION INTRAVENOUS at 05:01

## 2022-06-22 RX ADMIN — AMPICILLIN SODIUM 2 G: 2 INJECTION, POWDER, FOR SOLUTION INTRAVENOUS at 01:01

## 2022-06-22 RX ADMIN — SODIUM CHLORIDE 100 MG: 9 INJECTION, SOLUTION INTRAVENOUS at 10:07

## 2022-06-22 RX ADMIN — AMPICILLIN SODIUM 2 G: 2 INJECTION, POWDER, FOR SOLUTION INTRAVENOUS at 21:13

## 2022-06-22 RX ADMIN — AMPICILLIN SODIUM 2 G: 2 INJECTION, POWDER, FOR SOLUTION INTRAVENOUS at 09:26

## 2022-06-22 RX ADMIN — AMPICILLIN SODIUM 2 G: 2 INJECTION, POWDER, FOR SOLUTION INTRAVENOUS at 14:00

## 2022-06-22 RX ADMIN — SODIUM CHLORIDE, PRESERVATIVE FREE 10 ML: 5 INJECTION INTRAVENOUS at 14:00

## 2022-06-22 RX ADMIN — SODIUM CHLORIDE, PRESERVATIVE FREE 10 ML: 5 INJECTION INTRAVENOUS at 05:02

## 2022-06-22 RX ADMIN — ASCORBIC ACID, VITAMIN A PALMITATE, CHOLECALCIFEROL, THIAMINE HYDROCHLORIDE, RIBOFLAVIN-5 PHOSPHATE SODIUM, PYRIDOXINE HYDROCHLORIDE, NIACINAMIDE, DEXPANTHENOL, ALPHA-TOCOPHEROL ACETATE, VITAMIN K1, FOLIC ACID, BIOTIN, CYANOCOBALAMIN: 200; 3300; 200; 6; 3.6; 6; 40; 15; 10; 150; 600; 60; 5 INJECTION, SOLUTION INTRAVENOUS at 18:13

## 2022-06-22 RX ADMIN — AMPICILLIN SODIUM 2 G: 2 INJECTION, POWDER, FOR SOLUTION INTRAVENOUS at 17:26

## 2022-06-22 RX ADMIN — SODIUM CHLORIDE, PRESERVATIVE FREE 10 ML: 5 INJECTION INTRAVENOUS at 21:13

## 2022-06-22 NOTE — ROUTINE PROCESS
End of Shift Note    Bedside shift change report given to  (oncoming nurse) by Sada Hitchcock RN (offgoing nurse). Report included the following information SBAR, Kardex, MAR, Accordion, Recent Results, Cardiac Rhythm NSR, Alarm Parameters  and Quality Measures    Shift worked:  06/22     Shift summary and any significant changes:     No new changes. Waiting for discharge plans. Concerns for physician to address:  No new changes. Zone phone for oncoming shift:          Activity:  Activity Level: Up with Assistance  Number times ambulated in hallways past shift: 0  Number of times OOB to chair past shift: 0    Cardiac:   Cardiac Monitoring: Yes      Cardiac Rhythm: Sinus Rhythm    Access:   Current line(s): PIV and Telly     Genitourinary:   Urinary status: voiding    Respiratory:   O2 Device: None (Room air)  Chronic home O2 use?: NO  Incentive spirometer at bedside: NO       GI:  Last Bowel Movement Date: 06/21/22  Current diet:  ADULT DIET Clear Liquid  TPN ADULT - PERIPHERAL AA 4.25% D10% W/ CA + ELECTROLYTES  TPN ADULT - CENTRAL AA 5% D20% W/ CA + ELECTROLYTES  Passing flatus: YES  Tolerating current diet: YES       Pain Management:   Patient states pain is manageable on current regimen: YES    Skin:  Russell Score: 20  Interventions: increase time out of bed, limit briefs and nutritional support     Patient Safety:  Fall Score:  Total Score: 2  Interventions: bed/chair alarm, gripper socks and pt to call before getting OOB       Length of Stay:  Expected LOS: 4d 19h  Actual LOS: 300 Janae Rubio RN

## 2022-06-23 LAB
GLUCOSE BLD STRIP.AUTO-MCNC: 106 MG/DL (ref 65–117)
GLUCOSE BLD STRIP.AUTO-MCNC: 117 MG/DL (ref 65–117)
GLUCOSE BLD STRIP.AUTO-MCNC: 118 MG/DL (ref 65–117)
GLUCOSE BLD STRIP.AUTO-MCNC: 119 MG/DL (ref 65–117)
GLUCOSE BLD STRIP.AUTO-MCNC: 137 MG/DL (ref 65–117)
SERVICE CMNT-IMP: ABNORMAL
SERVICE CMNT-IMP: NORMAL
SERVICE CMNT-IMP: NORMAL

## 2022-06-23 PROCEDURE — 65270000046 HC RM TELEMETRY

## 2022-06-23 PROCEDURE — A6154 WOUND POUCH EACH: HCPCS

## 2022-06-23 PROCEDURE — 74011000258 HC RX REV CODE- 258: Performed by: INTERNAL MEDICINE

## 2022-06-23 PROCEDURE — 74011000258 HC RX REV CODE- 258: Performed by: STUDENT IN AN ORGANIZED HEALTH CARE EDUCATION/TRAINING PROGRAM

## 2022-06-23 PROCEDURE — 74011000250 HC RX REV CODE- 250: Performed by: INTERNAL MEDICINE

## 2022-06-23 PROCEDURE — 74011250636 HC RX REV CODE- 250/636: Performed by: STUDENT IN AN ORGANIZED HEALTH CARE EDUCATION/TRAINING PROGRAM

## 2022-06-23 PROCEDURE — 74011250636 HC RX REV CODE- 250/636: Performed by: INTERNAL MEDICINE

## 2022-06-23 PROCEDURE — 82962 GLUCOSE BLOOD TEST: CPT

## 2022-06-23 RX ADMIN — SODIUM CHLORIDE 100 MG: 9 INJECTION, SOLUTION INTRAVENOUS at 11:07

## 2022-06-23 RX ADMIN — AMPICILLIN SODIUM 2 G: 2 INJECTION, POWDER, FOR SOLUTION INTRAVENOUS at 09:51

## 2022-06-23 RX ADMIN — SODIUM CHLORIDE, PRESERVATIVE FREE 10 ML: 5 INJECTION INTRAVENOUS at 05:06

## 2022-06-23 RX ADMIN — SODIUM CHLORIDE, PRESERVATIVE FREE 10 ML: 5 INJECTION INTRAVENOUS at 16:32

## 2022-06-23 RX ADMIN — AMPICILLIN SODIUM 2 G: 2 INJECTION, POWDER, FOR SOLUTION INTRAVENOUS at 05:06

## 2022-06-23 RX ADMIN — AMPICILLIN SODIUM 2 G: 2 INJECTION, POWDER, FOR SOLUTION INTRAVENOUS at 01:07

## 2022-06-23 RX ADMIN — AMPICILLIN SODIUM 2 G: 2 INJECTION, POWDER, FOR SOLUTION INTRAVENOUS at 23:27

## 2022-06-23 RX ADMIN — SODIUM CHLORIDE, PRESERVATIVE FREE 10 ML: 5 INJECTION INTRAVENOUS at 23:30

## 2022-06-23 RX ADMIN — AMPICILLIN SODIUM 2 G: 2 INJECTION, POWDER, FOR SOLUTION INTRAVENOUS at 16:32

## 2022-06-23 RX ADMIN — AMPICILLIN SODIUM 2 G: 2 INJECTION, POWDER, FOR SOLUTION INTRAVENOUS at 20:16

## 2022-06-23 RX ADMIN — MAGNESIUM SULFATE HEPTAHYDRATE: 500 INJECTION, SOLUTION INTRAMUSCULAR; INTRAVENOUS at 18:59

## 2022-06-23 NOTE — PROGRESS NOTES
Comprehensive Nutrition Assessment    Type and Reason for Visit: Reassess    Nutrition Recommendations/Plan:   1. Continue AA5% D20% @ 75 mL/hr  2. Add 500 mL 20% lipids 3x/week tomorrow     Nutrition Assessment:    Chart reviewed; patient has transitioned to TPN and currently infusing at goal rate. Discharge planning underway. Will start lipids tomorrow if patient is not discharged home. AA5% D20% @ 75 mL/hr + 500 mL 20% lipids 3x/week will provide 2012 kcal, 90g protein, 360g CHO. Can transition to cyclic regimen at home - AA5% D20% @ 75 mL/hr x 1 hour, increase to 150 mL/hr x 11 hours, and decrease to 75 mL/hr for the final hour. Continue 500 mL 20% lipids 3x/week. Nutrition Related Findings:   [phone]   6/21  Humalog    Current Nutrition Intake & Therapies:  ADULT DIET Clear Liquid  TPN ADULT - CENTRAL AA 5% D20% W/ CA + ELECTROLYTES  TPN ADULT - CENTRAL  Current Parenteral Nutrition Orders:  · Type and Formula: AA5%, D20%   · Lipids: None  · Duration: Continuous  · Rate/Volume: 75 mL/hr  · Current PN Order Provides: 1584 kcal, 90g protein, 360g CHO  · Goal PN Orders Provides: 2012 kcal, 90g protein, 360 g CHO    Anthropometric Measures:  Height: 5' 10\" (177.8 cm)  Ideal Body Weight (IBW): 166 lbs (75 kg)     Current Body Wt:  64.7 kg (142 lb 10.2 oz), 79.4 % IBW. Bed scale  Current BMI (kg/m2): 20.5                          BMI Category: Normal weight (BMI 18.5-24. 9)    Estimated Daily Nutrient Needs:  Energy Requirements Based On: Formula  Weight Used for Energy Requirements: Current  Energy (kcal/day): 4325-8382 kcal (BMR 1546 x 1.2AF +250kcal)  Weight Used for Protein Requirements: Current  Protein (g/day): 88-96g (1.3-1.5 g/kg bw)  Method Used for Fluid Requirements: 1 ml/kcal  Fluid (ml/day): 1850 mL    Nutrition Diagnosis:   · Altered GI function related to altered GI structure as evidenced by NPO or clear liquid status due to medical condition,nutrition support-parenteral nutrition    Nutrition Interventions:   Food and/or Nutrient Delivery: Continue parenteral nutrition  Nutrition Education/Counseling: No recommendations at this time  Coordination of Nutrition Care: Continue to monitor while inpatient       Goals:  Previous Goal Met: Goal(s) achieved  Goals:  (TPN at goal rate and lytes WNL next 2-4 days)       Nutrition Monitoring and Evaluation:   Behavioral-Environmental Outcomes: None identified  Food/Nutrient Intake Outcomes: Parenteral nutrition intake/tolerance  Physical Signs/Symptoms Outcomes: Biochemical data,Weight,Hemodynamic status,Fluid status or edema    Discharge Planning:    Parenteral nutrition    Kiersten Das RD  Contact: ext [phone]

## 2022-06-23 NOTE — PROGRESS NOTES
Transition of Care Plan:     RUR: 23%  Dis[facility]: [facility]  Follow up appointments:PCP  DME needed: None  Transportation at Discharge: Pt's mother will transport at d/c.   [place] or means to access home: Pt has access       IM Medicare Letter: Pt has Medicaid  Is patient a BCPI-A Bundle:  N/A                    If yes, was Bundle Letter given?:  N/A  [facility]  If yes, was [place] transfer form completed and [facility] notified? Caregiver Contact: Samantha Rabago- Mother [phone]  Discharge Caregiver contacted prior to discharge?  CM will notify caregiver at d/c.   Care Conference needed? :     No    2:47pm- Christel with [employer] [facility] [employer] called CM via phone to find out the status of insurance auth. Anthony Ramachandran stated that insurance [employer]rique will take 3- days from 6/22/2022 so she may not get auth until Monday. Anthony Ramachandran stated that they have not received payment for the insurance in which the insurance owes them about 15k. CM will continue to follow patient for discharge planning needs and arrange for services as deemed necessary.     Jv Laughlin [address]  [phone]
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
## 2022-06-24 LAB
GLUCOSE BLD STRIP.AUTO-MCNC: 106 MG/DL (ref 65–117)
GLUCOSE BLD STRIP.AUTO-MCNC: 109 MG/DL (ref 65–117)
GLUCOSE BLD STRIP.AUTO-MCNC: 122 MG/DL (ref 65–117)
SERVICE CMNT-IMP: ABNORMAL
SERVICE CMNT-IMP: NORMAL
SERVICE CMNT-IMP: NORMAL

## 2022-06-24 PROCEDURE — 74011250637 HC RX REV CODE- 250/637: Performed by: INTERNAL MEDICINE

## 2022-06-24 PROCEDURE — 74011250636 HC RX REV CODE- 250/636: Performed by: INTERNAL MEDICINE

## 2022-06-24 PROCEDURE — 74011000250 HC RX REV CODE- 250: Performed by: INTERNAL MEDICINE

## 2022-06-24 PROCEDURE — 65270000046 HC RM TELEMETRY

## 2022-06-24 PROCEDURE — 74011000258 HC RX REV CODE- 258: Performed by: STUDENT IN AN ORGANIZED HEALTH CARE EDUCATION/TRAINING PROGRAM

## 2022-06-24 PROCEDURE — 74011250636 HC RX REV CODE- 250/636: Performed by: STUDENT IN AN ORGANIZED HEALTH CARE EDUCATION/TRAINING PROGRAM

## 2022-06-24 PROCEDURE — 82962 GLUCOSE BLOOD TEST: CPT

## 2022-06-24 PROCEDURE — 74011000258 HC RX REV CODE- 258: Performed by: INTERNAL MEDICINE

## 2022-06-24 RX ORDER — MICONAZOLE NITRATE 20 MG/G
CREAM TOPICAL 2 TIMES DAILY
Status: DISCONTINUED | OUTPATIENT
Start: 2022-06-24 | End: 2022-07-06 | Stop reason: HOSPADM

## 2022-06-24 RX ADMIN — AMPICILLIN SODIUM 2 G: 2 INJECTION, POWDER, FOR SOLUTION INTRAVENOUS at 12:40

## 2022-06-24 RX ADMIN — MAGNESIUM SULFATE HEPTAHYDRATE: 500 INJECTION, SOLUTION INTRAMUSCULAR; INTRAVENOUS at 17:52

## 2022-06-24 RX ADMIN — SODIUM CHLORIDE, PRESERVATIVE FREE 10 ML: 5 INJECTION INTRAVENOUS at 05:59

## 2022-06-24 RX ADMIN — AMPICILLIN SODIUM 2 G: 2 INJECTION, POWDER, FOR SOLUTION INTRAVENOUS at 04:47

## 2022-06-24 RX ADMIN — AMPICILLIN SODIUM 2 G: 2 INJECTION, POWDER, FOR SOLUTION INTRAVENOUS at 20:34

## 2022-06-24 RX ADMIN — SODIUM CHLORIDE, PRESERVATIVE FREE 10 ML: 5 INJECTION INTRAVENOUS at 16:47

## 2022-06-24 RX ADMIN — I.V. FAT EMULSION 500 ML: 20 EMULSION INTRAVENOUS at 21:59

## 2022-06-24 RX ADMIN — SODIUM CHLORIDE, PRESERVATIVE FREE 10 ML: 5 INJECTION INTRAVENOUS at 22:12

## 2022-06-24 RX ADMIN — MICONAZOLE NITRATE: 20 CREAM TOPICAL at 20:35

## 2022-06-24 RX ADMIN — AMPICILLIN SODIUM 2 G: 2 INJECTION, POWDER, FOR SOLUTION INTRAVENOUS at 16:47

## 2022-06-24 RX ADMIN — MICONAZOLE NITRATE: 20 CREAM TOPICAL at 16:51

## 2022-06-24 RX ADMIN — SODIUM CHLORIDE 100 MG: 9 INJECTION, SOLUTION INTRAVENOUS at 10:42

## 2022-06-24 RX ADMIN — AMPICILLIN SODIUM 2 G: 2 INJECTION, POWDER, FOR SOLUTION INTRAVENOUS at 08:48

## 2022-06-24 NOTE — PROGRESS NOTES
End of Shift Note    Bedside shift change report given to Jacki Amanda (oncoming nurse) by Jonny Hernandez RN (offgoing nurse). Report included the following information SBAR    Shift worked:  3p - 7p     Shift summary and any significant changes:     pt transferred from PCU, waiting on insurance auth to be discharged home with [facility]     Concerns for physician to address:  none     Zone phone for oncoming shift:          Activity:  Activity Level: Up with Assistance  Number times ambulated in hallways past shift: 0  Number of times OOB to chair past shift: 0    Cardiac:   Cardiac Monitoring: Yes      Cardiac Rhythm: Sinus Rhythm    Access:   Current line(s): Telly     Genitourinary:   Urinary status: voiding    Respiratory:   O2 Device: None (Room air)  Chronic home O2 use?: NO  Incentive spirometer at bedside: N/A       GI:  Last Bowel Movement Date: 06/24/22  Current diet:  ADULT DIET Clear Liquid  TPN ADULT - CENTRAL  Passing flatus: YES  Tolerating current diet: YES       Pain Management:   Patient states pain is manageable on current regimen: N/A    Skin:  Russell Score: 19  Interventions: increase time out of bed    Patient Safety:  Fall Score:  Total Score: 2  Interventions: pt to call before getting OOB and stay with me (per policy)  High Fall Risk: Yes    Length of Stay:  Expected LOS: 4d 19h  Actual LOS: [address] [facility] Diamante Shah RN

## 2022-06-24 NOTE — PROGRESS NOTES
End of Shift Note    Bedside shift change report given to  [address] (oncoming nurse) by Cristiana Villagran RN (offgoing nurse). Report included the following information SBAR, Kardex and MAR    Shift worked:  [id number]     Shift summary and any significant changes:     no significant changes   Concerns for physician to address:  no additional concerns at this time     Zone phone for oncoming shift:          Activity:  Activity Level: Other (comment)  Number times ambulated in hallways past shift: 0  Number of times OOB to chair past shift: 0    Cardiac:   Cardiac Monitoring: Yes      Cardiac Rhythm: Sinus Rhythm    Access:   Current line(s): central line     Genitourinary:   Urinary status: voiding    Respiratory:   O2 Device: None (Room air)  Chronic home O2 use?: NO  Incentive spirometer at bedside: NO       GI:  Last Bowel Movement Date: 06/21/22  Current diet:  ADULT DIET Clear Liquid  TPN ADULT - CENTRAL  Passing flatus: YES  Tolerating current diet: YES       Pain Management:   Patient states pain is manageable on current regimen: YES    Skin:  Russell Score: 18  Interventions: speciality bed, float heels, PT/OT consult and limit briefs    Patient Safety:  Fall Score:  Total Score: 2  Interventions: bed/chair alarm and gripper socks       Length of Stay:  Expected LOS: 4d 19h  Actual LOS: 12      Cristiana Villagran RN
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
## 2022-06-25 LAB
ANION GAP SERPL CALC-SCNC: 6 MMOL/L (ref 5–15)
BASOPHILS # BLD: 0.1 K/UL (ref 0–0.1)
BASOPHILS NFR BLD: 1 % (ref 0–1)
BUN SERPL-MCNC: 10 MG/DL (ref 6–20)
BUN/CREAT SERPL: 15 (ref 12–20)
CALCIUM SERPL-MCNC: 8.4 MG/DL (ref 8.5–10.1)
CHLORIDE SERPL-SCNC: 107 MMOL/L (ref 97–108)
CO2 SERPL-SCNC: 24 MMOL/L (ref 21–32)
CREAT SERPL-MCNC: 0.66 MG/DL (ref 0.7–1.3)
DIFFERENTIAL METHOD BLD: ABNORMAL
EOSINOPHIL # BLD: 0.3 K/UL (ref 0–0.4)
EOSINOPHIL NFR BLD: 3 % (ref 0–7)
ERYTHROCYTE [DISTWIDTH] IN BLOOD BY AUTOMATED COUNT: 18.6 % (ref 11.5–14.5)
GLUCOSE BLD STRIP.AUTO-MCNC: 101 MG/DL (ref 65–117)
GLUCOSE BLD STRIP.AUTO-MCNC: 113 MG/DL (ref 65–117)
GLUCOSE BLD STRIP.AUTO-MCNC: 126 MG/DL (ref 65–117)
GLUCOSE BLD STRIP.AUTO-MCNC: 126 MG/DL (ref 65–117)
GLUCOSE SERPL-MCNC: 96 MG/DL (ref 65–100)
HCT VFR BLD AUTO: 26.8 % (ref 36.6–50.3)
HGB BLD-MCNC: 8.4 G/DL (ref 12.1–17)
IMM GRANULOCYTES # BLD AUTO: 0 K/UL (ref 0–0.04)
IMM GRANULOCYTES NFR BLD AUTO: 0 % (ref 0–0.5)
LYMPHOCYTES # BLD: 1.8 K/UL (ref 0.8–3.5)
LYMPHOCYTES NFR BLD: 20 % (ref 12–49)
MAGNESIUM SERPL-MCNC: 1.8 MG/DL (ref 1.6–2.4)
MCH RBC QN AUTO: 28.3 PG (ref 26–34)
MCHC RBC AUTO-ENTMCNC: 31.3 G/DL (ref 30–36.5)
MCV RBC AUTO: 90.2 FL (ref 80–99)
MONOCYTES # BLD: 0.5 K/UL (ref 0–1)
MONOCYTES NFR BLD: 6 % (ref 5–13)
NEUTS SEG # BLD: 6.2 K/UL (ref 1.8–8)
NEUTS SEG NFR BLD: 70 % (ref 32–75)
NRBC # BLD: 0 K/UL (ref 0–0.01)
NRBC BLD-RTO: 0 PER 100 WBC
PHOSPHATE SERPL-MCNC: 3.8 MG/DL (ref 2.6–4.7)
PLATELET # BLD AUTO: 526 K/UL (ref 150–400)
PMV BLD AUTO: 9.1 FL (ref 8.9–12.9)
POTASSIUM SERPL-SCNC: 3.7 MMOL/L (ref 3.5–5.1)
RBC # BLD AUTO: 2.97 M/UL (ref 4.1–5.7)
SERVICE CMNT-IMP: ABNORMAL
SERVICE CMNT-IMP: ABNORMAL
SERVICE CMNT-IMP: NORMAL
SERVICE CMNT-IMP: NORMAL
SODIUM SERPL-SCNC: 137 MMOL/L (ref 136–145)
WBC # BLD AUTO: 8.7 K/UL (ref 4.1–11.1)

## 2022-06-25 PROCEDURE — 74011000250 HC RX REV CODE- 250: Performed by: INTERNAL MEDICINE

## 2022-06-25 PROCEDURE — 36415 COLL VENOUS BLD VENIPUNCTURE: CPT

## 2022-06-25 PROCEDURE — 83735 ASSAY OF MAGNESIUM: CPT

## 2022-06-25 PROCEDURE — 74011250636 HC RX REV CODE- 250/636: Performed by: STUDENT IN AN ORGANIZED HEALTH CARE EDUCATION/TRAINING PROGRAM

## 2022-06-25 PROCEDURE — 84100 ASSAY OF PHOSPHORUS: CPT

## 2022-06-25 PROCEDURE — 85025 COMPLETE CBC W/AUTO DIFF WBC: CPT

## 2022-06-25 PROCEDURE — 74011000258 HC RX REV CODE- 258: Performed by: STUDENT IN AN ORGANIZED HEALTH CARE EDUCATION/TRAINING PROGRAM

## 2022-06-25 PROCEDURE — 74011250636 HC RX REV CODE- 250/636: Performed by: INTERNAL MEDICINE

## 2022-06-25 PROCEDURE — 74011000258 HC RX REV CODE- 258: Performed by: INTERNAL MEDICINE

## 2022-06-25 PROCEDURE — 82962 GLUCOSE BLOOD TEST: CPT

## 2022-06-25 PROCEDURE — 65270000046 HC RM TELEMETRY

## 2022-06-25 PROCEDURE — 80048 BASIC METABOLIC PNL TOTAL CA: CPT

## 2022-06-25 RX ADMIN — SODIUM CHLORIDE 100 MG: 9 INJECTION, SOLUTION INTRAVENOUS at 11:42

## 2022-06-25 RX ADMIN — CALCIUM GLUCONATE: 98 INJECTION, SOLUTION INTRAVENOUS at 18:25

## 2022-06-25 RX ADMIN — AMPICILLIN SODIUM 2 G: 2 INJECTION, POWDER, FOR SOLUTION INTRAVENOUS at 00:20

## 2022-06-25 RX ADMIN — SODIUM CHLORIDE, PRESERVATIVE FREE 10 ML: 5 INJECTION INTRAVENOUS at 06:13

## 2022-06-25 RX ADMIN — MICONAZOLE NITRATE: 20 CREAM TOPICAL at 21:00

## 2022-06-25 RX ADMIN — AMPICILLIN SODIUM 2 G: 2 INJECTION, POWDER, FOR SOLUTION INTRAVENOUS at 03:23

## 2022-06-25 RX ADMIN — AMPICILLIN SODIUM 2 G: 2 INJECTION, POWDER, FOR SOLUTION INTRAVENOUS at 21:24

## 2022-06-25 RX ADMIN — AMPICILLIN SODIUM 2 G: 2 INJECTION, POWDER, FOR SOLUTION INTRAVENOUS at 10:51

## 2022-06-25 RX ADMIN — SODIUM CHLORIDE, PRESERVATIVE FREE 10 ML: 5 INJECTION INTRAVENOUS at 21:27

## 2022-06-25 RX ADMIN — AMPICILLIN SODIUM 2 G: 2 INJECTION, POWDER, FOR SOLUTION INTRAVENOUS at 15:21

## 2022-06-25 NOTE — PROGRESS NOTES
Problem: Pressure Injury - Risk of  Goal: *Prevention of pressure injury  Description: Document Russell Scale and appropriate interventions in the flowsheet. Outcome: Progressing Towards Goal  Note: Pressure Injury Interventions:  Sensory Interventions: Assess need for specialty bed,Chair cushion,Discuss PT/OT consult with provider,Float heels,Keep linens dry and wrinkle-free,Maintain/enhance activity level,Monitor skin under medical devices,Pressure redistribution bed/mattress (bed type),Turn and reposition approx. every two hours (pillows and wedges if needed),Use 30-degree side-lying position    Moisture Interventions: Absorbent underpads,Apply protective barrier, creams and emollients,Check for incontinence Q2 hours and as needed,Maintain skin hydration (lotion/cream),Minimize layers,Moisture barrier,Offer toileting Q_hr    Activity Interventions: Increase time out of bed    Mobility Interventions: Assess need for specialty bed    Nutrition Interventions: Document food/fluid/supplement intake,Discuss nutritional consult with provider    Friction and Shear Interventions: Apply protective barrier, creams and emollients                Problem: Patient Education: Go to Patient Education Activity  Goal: Patient/Family Education  Outcome: Progressing Towards Goal     Problem: Falls - Risk of  Goal: *Absence of Falls  Description: Document Earma Rachelle Fall Risk and appropriate interventions in the flowsheet.   Outcome: Progressing Towards Goal  Note: Fall Risk Interventions:  Mobility Interventions: Bed/chair exit alarm         Medication Interventions: Patient to call before getting OOB    Elimination Interventions: Call light in reach              Problem: Patient Education: Go to Patient Education Activity  Goal: Patient/Family Education  Outcome: Progressing Towards Goal     Problem: Patient Education: Go to Patient Education Activity  Goal: Patient/Family Education  Outcome: Progressing Towards Goal     Problem: Sepsis: Day of Diagnosis  Goal: Off Pathway (Use only if patient is Off Pathway)  Outcome: Progressing Towards Goal  Goal: *Fluid resuscitation  Outcome: Progressing Towards Goal  Goal: *Pneumococcal immunization (if eligible)  Outcome: Progressing Towards Goal  Goal: *Influenza immunization (if eligible)  Outcome: Progressing Towards Goal  Goal: Activity/Safety  Outcome: Progressing Towards Goal  Goal: Consults, if ordered  Outcome: Progressing Towards Goal  Goal: Diagnostic Test/Procedures  Outcome: Progressing Towards Goal  Goal: Nutrition/Diet  Outcome: Progressing Towards Goal  Goal: Discharge Planning  Outcome: Progressing Towards Goal  Goal: Medications  Outcome: Progressing Towards Goal  Goal: Respiratory  Outcome: Progressing Towards Goal  Goal: Treatments/Interventions/Procedures  Outcome: Progressing Towards Goal  Goal: Psychosocial  Outcome: Progressing Towards Goal     Problem: Sepsis: Day 2  Goal: Off Pathway (Use only if patient is Off Pathway)  Outcome: Progressing Towards Goal  Goal: *Central Venous Pressure maintained at 8-12 mm Hg  Outcome: Progressing Towards Goal  Goal: *Hemodynamically stable  Outcome: Progressing Towards Goal  Goal: *Tolerating diet  Outcome: Progressing Towards Goal  Goal: Activity/Safety  Outcome: Progressing Towards Goal  Goal: Consults, if ordered  Outcome: Progressing Towards Goal  Goal: Diagnostic Test/Procedures  Outcome: Progressing Towards Goal  Goal: Nutrition/Diet  Outcome: Progressing Towards Goal  Goal: Discharge Planning  Outcome: Progressing Towards Goal  Goal: Medications  Outcome: Progressing Towards Goal  Goal: Respiratory  Outcome: Progressing Towards Goal  Goal: Treatments/Interventions/Procedures  Outcome: Progressing Towards Goal  Goal: Psychosocial  Outcome: Progressing Towards Goal     Problem: Sepsis: Day 3  Goal: Off Pathway (Use only if patient is Off Pathway)  Outcome: Progressing Towards Goal  Goal: *Central Venous Pressure maintained at 8-12 mm Hg  Outcome: Progressing Towards Goal  Goal: *Oxygen saturation within defined limits  Outcome: Progressing Towards Goal  Goal: *Vital sign stability  Outcome: Progressing Towards Goal  Goal: *Tolerating diet  Outcome: Progressing Towards Goal  Goal: *Demonstrates progressive activity  Outcome: Progressing Towards Goal  Goal: Activity/Safety  Outcome: Progressing Towards Goal  Goal: Consults, if ordered  Outcome: Progressing Towards Goal  Goal: Diagnostic Test/Procedures  Outcome: Progressing Towards Goal  Goal: Nutrition/Diet  Outcome: Progressing Towards Goal  Goal: Discharge Planning  Outcome: Progressing Towards Goal  Goal: Medications  Outcome: Progressing Towards Goal  Goal: Respiratory  Outcome: Progressing Towards Goal  Goal: Treatments/Interventions/Procedures  Outcome: Progressing Towards Goal  Goal: Psychosocial  Outcome: Progressing Towards Goal     Problem: Sepsis: Day 4  Goal: Off Pathway (Use only if patient is Off Pathway)  Outcome: Progressing Towards Goal  Goal: Activity/Safety  Outcome: Progressing Towards Goal  Goal: Consults, if ordered  Outcome: Progressing Towards Goal  Goal: Diagnostic Test/Procedures  Outcome: Progressing Towards Goal  Goal: Nutrition/Diet  Outcome: Progressing Towards Goal  Goal: Discharge Planning  Outcome: Progressing Towards Goal  Goal: Medications  Outcome: Progressing Towards Goal  Goal: Respiratory  Outcome: Progressing Towards Goal  Goal: Treatments/Interventions/Procedures  Outcome: Progressing Towards Goal  Goal: Psychosocial  Outcome: Progressing Towards Goal  Goal: *Oxygen saturation within defined limits  Outcome: Progressing Towards Goal  Goal: *Hemodynamically stable  Outcome: Progressing Towards Goal  Goal: *Vital signs within defined limits  Outcome: Progressing Towards Goal  Goal: *Tolerating diet  Outcome: Progressing Towards Goal  Goal: *Demonstrates progressive activity  Outcome: Progressing Towards Goal  Goal: *Fluid volume maintenance  Outcome: Progressing Towards Goal
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
## 2022-06-25 NOTE — PROGRESS NOTES
Problem: Pressure Injury - Risk of  Goal: *Prevention of pressure injury  Description: Document Russell Scale and appropriate interventions in the flowsheet.   Outcome: Progressing Towards Goal  Note: Pressure Injury Interventions:  Sensory Interventions: Keep linens dry and wrinkle-free    Moisture Interventions: Absorbent underpads,Apply protective barrier, creams and emollients,Check for incontinence Q2 hours and as needed,Maintain skin hydration (lotion/cream),Minimize layers,Moisture barrier,Offer toileting Q_hr    Activity Interventions: Increase time out of bed,Pressure redistribution bed/mattress(bed type)    Mobility Interventions: Assess need for specialty bed,HOB 30 degrees or less,Pressure redistribution bed/mattress (bed type)    Nutrition Interventions: Document food/fluid/supplement intake,Offer support with meals,snacks and hydration    Friction and Shear Interventions: Apply protective barrier, creams and emollients,Minimize layers
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
## 2022-06-26 LAB
GLUCOSE BLD STRIP.AUTO-MCNC: 100 MG/DL (ref 65–117)
GLUCOSE BLD STRIP.AUTO-MCNC: 107 MG/DL (ref 65–117)
GLUCOSE BLD STRIP.AUTO-MCNC: 116 MG/DL (ref 65–117)
GLUCOSE BLD STRIP.AUTO-MCNC: 171 MG/DL (ref 65–117)
SERVICE CMNT-IMP: ABNORMAL
SERVICE CMNT-IMP: NORMAL

## 2022-06-26 PROCEDURE — 65270000046 HC RM TELEMETRY

## 2022-06-26 PROCEDURE — 82962 GLUCOSE BLOOD TEST: CPT

## 2022-06-26 PROCEDURE — 74011000258 HC RX REV CODE- 258: Performed by: STUDENT IN AN ORGANIZED HEALTH CARE EDUCATION/TRAINING PROGRAM

## 2022-06-26 PROCEDURE — 74011000250 HC RX REV CODE- 250: Performed by: INTERNAL MEDICINE

## 2022-06-26 PROCEDURE — 74011250636 HC RX REV CODE- 250/636: Performed by: STUDENT IN AN ORGANIZED HEALTH CARE EDUCATION/TRAINING PROGRAM

## 2022-06-26 RX ADMIN — AMPICILLIN SODIUM 2 G: 2 INJECTION, POWDER, FOR SOLUTION INTRAVENOUS at 04:11

## 2022-06-26 RX ADMIN — AMPICILLIN SODIUM 2 G: 2 INJECTION, POWDER, FOR SOLUTION INTRAVENOUS at 00:47

## 2022-06-26 RX ADMIN — AMPICILLIN SODIUM 2 G: 2 INJECTION, POWDER, FOR SOLUTION INTRAVENOUS at 16:48

## 2022-06-26 RX ADMIN — SODIUM CHLORIDE 100 MG: 9 INJECTION, SOLUTION INTRAVENOUS at 10:00

## 2022-06-26 RX ADMIN — AMPICILLIN SODIUM 2 G: 2 INJECTION, POWDER, FOR SOLUTION INTRAVENOUS at 12:27

## 2022-06-26 RX ADMIN — MICONAZOLE NITRATE: 20 CREAM TOPICAL at 16:53

## 2022-06-26 RX ADMIN — AMPICILLIN SODIUM 2 G: 2 INJECTION, POWDER, FOR SOLUTION INTRAVENOUS at 07:21

## 2022-06-26 RX ADMIN — AMPICILLIN SODIUM 2 G: 2 INJECTION, POWDER, FOR SOLUTION INTRAVENOUS at 21:10

## 2022-06-26 RX ADMIN — SODIUM CHLORIDE, PRESERVATIVE FREE 10 ML: 5 INJECTION INTRAVENOUS at 05:21

## 2022-06-26 RX ADMIN — SODIUM CHLORIDE, PRESERVATIVE FREE 10 ML: 5 INJECTION INTRAVENOUS at 21:11

## 2022-06-26 RX ADMIN — SODIUM CHLORIDE, PRESERVATIVE FREE 10 ML: 5 INJECTION INTRAVENOUS at 16:53

## 2022-06-26 RX ADMIN — MICONAZOLE NITRATE: 20 CREAM TOPICAL at 21:00

## 2022-06-26 NOTE — PROGRESS NOTES
Problem: Pressure Injury - Risk of  Goal: *Prevention of pressure injury  Description: Document Russell Scale and appropriate interventions in the flowsheet. Outcome: Progressing Towards Goal  Note: Pressure Injury Interventions:  Sensory Interventions: Assess changes in LOC,Assess need for specialty bed    Moisture Interventions: Absorbent underpads,Apply protective barrier, creams and emollients    Activity Interventions: Chair cushion,Increase time out of bed,PT/OT evaluation    Mobility Interventions: Float heels,HOB 30 degrees or less,PT/OT evaluation    Nutrition Interventions: Document food/fluid/supplement intake    Friction and Shear Interventions: Apply protective barrier, creams and emollients,Minimize layers                Problem: Patient Education: Go to Patient Education Activity  Goal: Patient/Family Education  Outcome: Progressing Towards Goal     Problem: Falls - Risk of  Goal: *Absence of Falls  Description: Document Dora Fall Risk and appropriate interventions in the flowsheet.   Outcome: Progressing Towards Goal  Note: Fall Risk Interventions:  Mobility Interventions: Bed/chair exit alarm         Medication Interventions: Bed/chair exit alarm    Elimination Interventions: Bed/chair exit alarm,Call light in reach              Problem: Sepsis: Day 3  Goal: Off Pathway (Use only if patient is Off Pathway)  Outcome: Progressing Towards Goal  Goal: *Central Venous Pressure maintained at 8-12 mm Hg  Outcome: Progressing Towards Goal  Goal: *Oxygen saturation within defined limits  Outcome: Progressing Towards Goal  Goal: *Vital sign stability  Outcome: Progressing Towards Goal  Goal: *Tolerating diet  Outcome: Progressing Towards Goal  Goal: *Demonstrates progressive activity  Outcome: Progressing Towards Goal  Goal: Activity/Safety  Outcome: Progressing Towards Goal  Goal: Consults, if ordered  Outcome: Progressing Towards Goal  Goal: Diagnostic Test/Procedures  Outcome: Progressing Towards Goal  Goal: Nutrition/Diet  Outcome: Progressing Towards Goal  Goal: Discharge Planning  Outcome: Progressing Towards Goal  Goal: Medications  Outcome: Progressing Towards Goal  Goal: Respiratory  Outcome: Progressing Towards Goal  Goal: Treatments/Interventions/Procedures  Outcome: Progressing Towards Goal  Goal: Psychosocial  Outcome: Progressing Towards Goal     Problem: Sepsis: Day 6  Goal: Off Pathway (Use only if patient is Off Pathway)  Outcome: Progressing Towards Goal  Goal: *Oxygen saturation within defined limits  Outcome: Progressing Towards Goal  Goal: *Vital signs within defined limits  Outcome: Progressing Towards Goal  Goal: *Tolerating diet  Outcome: Progressing Towards Goal  Goal: *Demonstrates progressive activity  Outcome: Progressing Towards Goal  Goal: Influenza immunization  Outcome: Progressing Towards Goal  Goal: *Pneumococcal immunization  Outcome: Progressing Towards Goal  Goal: Activity/Safety  Outcome: Progressing Towards Goal  Goal: Diagnostic Test/Procedures  Outcome: Progressing Towards Goal  Goal: Nutrition/Diet  Outcome: Progressing Towards Goal  Goal: Discharge Planning  Outcome: Progressing Towards Goal  Goal: Medications  Outcome: Progressing Towards Goal  Goal: Respiratory  Outcome: Progressing Towards Goal  Goal: Treatments/Interventions/Procedures  Outcome: Progressing Towards Goal  Goal: Psychosocial  Outcome: Progressing Towards Goal     Problem: Sepsis: Discharge Outcomes  Goal: *Vital signs within defined limits  Outcome: Progressing Towards Goal  Goal: *Tolerating diet  Outcome: Progressing Towards Goal  Goal: *Verbalizes understanding and describes prescribed diet  Outcome: Progressing Towards Goal  Goal: *Demonstrates progressive activity  Outcome: Progressing Towards Goal  Goal: *Describes follow-up/return visits to physicians  Outcome: Progressing Towards Goal  Goal: *Verbalizes name, dosage, time, side effects, and number of days to continue medications  Outcome: Progressing Towards Goal  Goal: *Influenza immunization (Oct-Mar only)  Outcome: Progressing Towards Goal  Goal: *Pneumococcal immunization  Outcome: Progressing Towards Goal  Goal: *Lungs clear or at baseline  Outcome: Progressing Towards Goal  Goal: *Oxygen saturation returns to baseline or 90% or better on room air  Outcome: Progressing Towards Goal  Goal: *Glycemic control  Outcome: Progressing Towards Goal  Goal: *Absence of deep venous thrombosis signs and symptoms(Stroke Metric)  Outcome: Progressing Towards Goal  Goal: *Describes available resources and support systems  Outcome: Progressing Towards Goal  Goal: *Optimal pain control at patient's stated goal  Outcome: Progressing Towards Goal
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
## 2022-06-26 NOTE — PROGRESS NOTES
Problem: Pressure Injury - Risk of  Goal: *Prevention of pressure injury  Description: Document Russell Scale and appropriate interventions in the flowsheet. Outcome: Progressing Towards Goal  Note: Pressure Injury Interventions:  Sensory Interventions: Keep linens dry and wrinkle-free    Moisture Interventions: Absorbent underpads,Apply protective barrier, creams and emollients    Activity Interventions: Chair cushion,Pressure redistribution bed/mattress(bed type)    Mobility Interventions: Float heels,Pressure redistribution bed/mattress (bed type)    Nutrition Interventions: Document food/fluid/supplement intake    Friction and Shear Interventions: Apply protective barrier, creams and emollients,Minimize layers                Problem: Falls - Risk of  Goal: *Absence of Falls  Description: Document Dora Fall Risk and appropriate interventions in the flowsheet.   Outcome: Progressing Towards Goal  Note: Fall Risk Interventions:  Mobility Interventions: Bed/chair exit alarm,Patient to call before getting OOB         Medication Interventions: Bed/chair exit alarm,Patient to call before getting OOB,Teach patient to arise slowly    Elimination Interventions: Bed/chair exit alarm,Call light in reach,Patient to call for help with toileting needs,Stay With Me (per policy),Urinal in reach              Problem: Sepsis: Discharge Outcomes  Goal: *Vital signs within defined limits  Outcome: Progressing Towards Goal  Goal: *Tolerating diet  Outcome: Not Progressing Towards Goal  Goal: *Verbalizes understanding and describes prescribed diet  Outcome: Progressing Towards Goal  Goal: *Demonstrates progressive activity  Outcome: Progressing Towards Goal  Goal: *Verbalizes name, dosage, time, side effects, and number of days to continue medications  Outcome: Progressing Towards Goal  Goal: *Lungs clear or at baseline  Outcome: Progressing Towards Goal  Goal: *Oxygen saturation returns to baseline or 90% or better on room air  Outcome: Progressing Towards Goal  Goal: *Glycemic control  Outcome: Progressing Towards Goal  Goal: *Absence of deep venous thrombosis signs and symptoms(Stroke Metric)  Outcome: Progressing Towards Goal  Goal: *Describes available resources and support systems  Outcome: Progressing Towards Goal  Goal: *Optimal pain control at patient's stated goal  Outcome: Progressing Towards Goal
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
## 2022-06-27 LAB
ANION GAP SERPL CALC-SCNC: 4 MMOL/L (ref 5–15)
BUN SERPL-MCNC: 11 MG/DL (ref 6–20)
BUN/CREAT SERPL: 17 (ref 12–20)
CALCIUM SERPL-MCNC: 8.7 MG/DL (ref 8.5–10.1)
CHLORIDE SERPL-SCNC: 107 MMOL/L (ref 97–108)
CO2 SERPL-SCNC: 28 MMOL/L (ref 21–32)
CREAT SERPL-MCNC: 0.66 MG/DL (ref 0.7–1.3)
GLUCOSE BLD STRIP.AUTO-MCNC: 103 MG/DL (ref 65–117)
GLUCOSE BLD STRIP.AUTO-MCNC: 107 MG/DL (ref 65–117)
GLUCOSE BLD STRIP.AUTO-MCNC: 109 MG/DL (ref 65–117)
GLUCOSE SERPL-MCNC: 107 MG/DL (ref 65–100)
MAGNESIUM SERPL-MCNC: 1.8 MG/DL (ref 1.6–2.4)
PHOSPHATE SERPL-MCNC: 3.9 MG/DL (ref 2.6–4.7)
POTASSIUM SERPL-SCNC: 3.5 MMOL/L (ref 3.5–5.1)
SERVICE CMNT-IMP: NORMAL
SODIUM SERPL-SCNC: 139 MMOL/L (ref 136–145)

## 2022-06-27 PROCEDURE — 74011250636 HC RX REV CODE- 250/636: Performed by: STUDENT IN AN ORGANIZED HEALTH CARE EDUCATION/TRAINING PROGRAM

## 2022-06-27 PROCEDURE — 36415 COLL VENOUS BLD VENIPUNCTURE: CPT

## 2022-06-27 PROCEDURE — 74011250637 HC RX REV CODE- 250/637: Performed by: INTERNAL MEDICINE

## 2022-06-27 PROCEDURE — 74011000250 HC RX REV CODE- 250: Performed by: INTERNAL MEDICINE

## 2022-06-27 PROCEDURE — 84100 ASSAY OF PHOSPHORUS: CPT

## 2022-06-27 PROCEDURE — 82962 GLUCOSE BLOOD TEST: CPT

## 2022-06-27 PROCEDURE — 74011000258 HC RX REV CODE- 258: Performed by: STUDENT IN AN ORGANIZED HEALTH CARE EDUCATION/TRAINING PROGRAM

## 2022-06-27 PROCEDURE — 74011000258 HC RX REV CODE- 258: Performed by: INTERNAL MEDICINE

## 2022-06-27 PROCEDURE — 65270000046 HC RM TELEMETRY

## 2022-06-27 PROCEDURE — 74011250636 HC RX REV CODE- 250/636: Performed by: NURSE PRACTITIONER

## 2022-06-27 PROCEDURE — 74011250636 HC RX REV CODE- 250/636: Performed by: INTERNAL MEDICINE

## 2022-06-27 PROCEDURE — 83735 ASSAY OF MAGNESIUM: CPT

## 2022-06-27 PROCEDURE — 80048 BASIC METABOLIC PNL TOTAL CA: CPT

## 2022-06-27 RX ORDER — KETOROLAC TROMETHAMINE 30 MG/ML
30 INJECTION, SOLUTION INTRAMUSCULAR; INTRAVENOUS
Status: DISCONTINUED | OUTPATIENT
Start: 2022-06-27 | End: 2022-06-27

## 2022-06-27 RX ORDER — KETOROLAC TROMETHAMINE 30 MG/ML
15 INJECTION, SOLUTION INTRAMUSCULAR; INTRAVENOUS
Status: COMPLETED | OUTPATIENT
Start: 2022-06-27 | End: 2022-06-27

## 2022-06-27 RX ADMIN — AMPICILLIN SODIUM 2 G: 2 INJECTION, POWDER, FOR SOLUTION INTRAVENOUS at 12:50

## 2022-06-27 RX ADMIN — MICONAZOLE NITRATE: 20 CREAM TOPICAL at 11:33

## 2022-06-27 RX ADMIN — AMPICILLIN SODIUM 2 G: 2 INJECTION, POWDER, FOR SOLUTION INTRAVENOUS at 21:50

## 2022-06-27 RX ADMIN — SODIUM CHLORIDE, PRESERVATIVE FREE 10 ML: 5 INJECTION INTRAVENOUS at 05:36

## 2022-06-27 RX ADMIN — SODIUM CHLORIDE, PRESERVATIVE FREE 10 ML: 5 INJECTION INTRAVENOUS at 08:54

## 2022-06-27 RX ADMIN — KETOROLAC TROMETHAMINE 15 MG: 30 INJECTION, SOLUTION INTRAMUSCULAR at 02:06

## 2022-06-27 RX ADMIN — AMPICILLIN SODIUM 2 G: 2 INJECTION, POWDER, FOR SOLUTION INTRAVENOUS at 03:58

## 2022-06-27 RX ADMIN — AMPICILLIN SODIUM 2 G: 2 INJECTION, POWDER, FOR SOLUTION INTRAVENOUS at 08:54

## 2022-06-27 RX ADMIN — I.V. FAT EMULSION 500 ML: 20 EMULSION INTRAVENOUS at 21:50

## 2022-06-27 RX ADMIN — AMPICILLIN SODIUM 2 G: 2 INJECTION, POWDER, FOR SOLUTION INTRAVENOUS at 15:43

## 2022-06-27 RX ADMIN — MICONAZOLE NITRATE: 20 CREAM TOPICAL at 21:52

## 2022-06-27 RX ADMIN — SODIUM CHLORIDE, PRESERVATIVE FREE 10 ML: 5 INJECTION INTRAVENOUS at 15:43

## 2022-06-27 RX ADMIN — AMPICILLIN SODIUM 2 G: 2 INJECTION, POWDER, FOR SOLUTION INTRAVENOUS at 01:01

## 2022-06-27 RX ADMIN — KETOROLAC TROMETHAMINE 15 MG: 30 INJECTION, SOLUTION INTRAMUSCULAR; INTRAVENOUS at 22:03

## 2022-06-27 RX ADMIN — SODIUM CHLORIDE 100 MG: 9 INJECTION, SOLUTION INTRAVENOUS at 11:28

## 2022-06-27 RX ADMIN — SODIUM CHLORIDE, PRESERVATIVE FREE 10 ML: 5 INJECTION INTRAVENOUS at 21:50

## 2022-06-27 RX ADMIN — POTASSIUM CHLORIDE: 2 INJECTION, SOLUTION, CONCENTRATE INTRAVENOUS at 18:27

## 2022-06-27 NOTE — PROGRESS NOTES
Problem: Pressure Injury - Risk of  Goal: *Prevention of pressure injury  Description: Document Russell Scale and appropriate interventions in the flowsheet. Outcome: Progressing Towards Goal  Note: Pressure Injury Interventions:  Sensory Interventions: Assess changes in LOC,Assess need for specialty bed,Check visual cues for pain,Keep linens dry and wrinkle-free,Maintain/enhance activity level,Minimize linen layers,Monitor skin under medical devices    Moisture Interventions: Absorbent underpads,Apply protective barrier, creams and emollients    Activity Interventions: Assess need for specialty bed,Chair cushion,Increase time out of bed    Mobility Interventions: Assess need for specialty bed,Chair cushion,HOB 30 degrees or less    Nutrition Interventions: Document food/fluid/supplement intake,Offer support with meals,snacks and hydration    Friction and Shear Interventions: Apply protective barrier, creams and emollients                Problem: Patient Education: Go to Patient Education Activity  Goal: Patient/Family Education  Outcome: Progressing Towards Goal     Problem: Falls - Risk of  Goal: *Absence of Falls  Description: Document Uriel Ugalde Fall Risk and appropriate interventions in the flowsheet.   Outcome: Progressing Towards Goal  Note: Fall Risk Interventions:  Mobility Interventions: Bed/chair exit alarm         Medication Interventions: Bed/chair exit alarm    Elimination Interventions: Bed/chair exit alarm              Problem: Patient Education: Go to Patient Education Activity  Goal: Patient/Family Education  Outcome: Progressing Towards Goal     Problem: Patient Education: Go to Patient Education Activity  Goal: Patient/Family Education  Outcome: Progressing Towards Goal     Problem: Sepsis: Day of Diagnosis  Goal: Off Pathway (Use only if patient is Off Pathway)  Outcome: Progressing Towards Goal  Goal: *Fluid resuscitation  Outcome: Progressing Towards Goal  Goal: *Pneumococcal immunization (if eligible)  Outcome: Progressing Towards Goal  Goal: *Influenza immunization (if eligible)  Outcome: Progressing Towards Goal  Goal: Activity/Safety  Outcome: Progressing Towards Goal  Goal: Consults, if ordered  Outcome: Progressing Towards Goal  Goal: Diagnostic Test/Procedures  Outcome: Progressing Towards Goal  Goal: Nutrition/Diet  Outcome: Progressing Towards Goal  Goal: Discharge Planning  Outcome: Progressing Towards Goal  Goal: Medications  Outcome: Progressing Towards Goal  Goal: Respiratory  Outcome: Progressing Towards Goal  Goal: Treatments/Interventions/Procedures  Outcome: Progressing Towards Goal  Goal: Psychosocial  Outcome: Progressing Towards Goal     Problem: Sepsis: Day 2  Goal: Off Pathway (Use only if patient is Off Pathway)  Outcome: Progressing Towards Goal  Goal: *Central Venous Pressure maintained at 8-12 mm Hg  Outcome: Progressing Towards Goal  Goal: *Hemodynamically stable  Outcome: Progressing Towards Goal  Goal: *Tolerating diet  Outcome: Progressing Towards Goal  Goal: Activity/Safety  Outcome: Progressing Towards Goal  Goal: Consults, if ordered  Outcome: Progressing Towards Goal  Goal: Diagnostic Test/Procedures  Outcome: Progressing Towards Goal  Goal: Nutrition/Diet  Outcome: Progressing Towards Goal  Goal: Discharge Planning  Outcome: Progressing Towards Goal  Goal: Medications  Outcome: Progressing Towards Goal  Goal: Respiratory  Outcome: Progressing Towards Goal  Goal: Treatments/Interventions/Procedures  Outcome: Progressing Towards Goal  Goal: Psychosocial  Outcome: Progressing Towards Goal     Problem: Sepsis: Day 3  Goal: Off Pathway (Use only if patient is Off Pathway)  Outcome: Progressing Towards Goal  Goal: *Central Venous Pressure maintained at 8-12 mm Hg  Outcome: Progressing Towards Goal  Goal: *Oxygen saturation within defined limits  Outcome: Progressing Towards Goal  Goal: *Vital sign stability  Outcome: Progressing Towards Goal  Goal: *Tolerating diet  Outcome: Progressing Towards Goal  Goal: *Demonstrates progressive activity  Outcome: Progressing Towards Goal  Goal: Activity/Safety  Outcome: Progressing Towards Goal  Goal: Consults, if ordered  Outcome: Progressing Towards Goal  Goal: Diagnostic Test/Procedures  Outcome: Progressing Towards Goal  Goal: Nutrition/Diet  Outcome: Progressing Towards Goal  Goal: Discharge Planning  Outcome: Progressing Towards Goal  Goal: Medications  Outcome: Progressing Towards Goal  Goal: Respiratory  Outcome: Progressing Towards Goal  Goal: Treatments/Interventions/Procedures  Outcome: Progressing Towards Goal  Goal: Psychosocial  Outcome: Progressing Towards Goal     Problem: Sepsis: Day 4  Goal: Off Pathway (Use only if patient is Off Pathway)  Outcome: Progressing Towards Goal  Goal: Activity/Safety  Outcome: Progressing Towards Goal  Goal: Consults, if ordered  Outcome: Progressing Towards Goal  Goal: Diagnostic Test/Procedures  Outcome: Progressing Towards Goal  Goal: Nutrition/Diet  Outcome: Progressing Towards Goal  Goal: Discharge Planning  Outcome: Progressing Towards Goal  Goal: Medications  Outcome: Progressing Towards Goal  Goal: Respiratory  Outcome: Progressing Towards Goal  Goal: Treatments/Interventions/Procedures  Outcome: Progressing Towards Goal  Goal: Psychosocial  Outcome: Progressing Towards Goal  Goal: *Oxygen saturation within defined limits  Outcome: Progressing Towards Goal  Goal: *Hemodynamically stable  Outcome: Progressing Towards Goal  Goal: *Vital signs within defined limits  Outcome: Progressing Towards Goal  Goal: *Tolerating diet  Outcome: Progressing Towards Goal  Goal: *Demonstrates progressive activity  Outcome: Progressing Towards Goal  Goal: *Fluid volume maintenance  Outcome: Progressing Towards Goal     Problem: Sepsis: Day 5  Goal: Off Pathway (Use only if patient is Off Pathway)  Outcome: Progressing Towards Goal  Goal: *Oxygen saturation within defined limits  Outcome: Progressing Towards Goal  Goal: *Vital signs within defined limits  Outcome: Progressing Towards Goal  Goal: *Tolerating diet  Outcome: Progressing Towards Goal  Goal: *Demonstrates progressive activity  Outcome: Progressing Towards Goal  Goal: *Discharge plan identified  Outcome: Progressing Towards Goal  Goal: Activity/Safety  Outcome: Progressing Towards Goal  Goal: Consults, if ordered  Outcome: Progressing Towards Goal  Goal: Diagnostic Test/Procedures  Outcome: Progressing Towards Goal  Goal: Nutrition/Diet  Outcome: Progressing Towards Goal  Goal: Discharge Planning  Outcome: Progressing Towards Goal  Goal: Medications  Outcome: Progressing Towards Goal  Goal: Respiratory  Outcome: Progressing Towards Goal  Goal: Treatments/Interventions/Procedures  Outcome: Progressing Towards Goal  Goal: Psychosocial  Outcome: Progressing Towards Goal     Problem: Sepsis: Day 6  Goal: Off Pathway (Use only if patient is Off Pathway)  Outcome: Progressing Towards Goal  Goal: *Oxygen saturation within defined limits  Outcome: Progressing Towards Goal  Goal: *Vital signs within defined limits  Outcome: Progressing Towards Goal  Goal: *Tolerating diet  Outcome: Progressing Towards Goal  Goal: *Demonstrates progressive activity  Outcome: Progressing Towards Goal  Goal: Influenza immunization  Outcome: Progressing Towards Goal  Goal: *Pneumococcal immunization  Outcome: Progressing Towards Goal  Goal: Activity/Safety  Outcome: Progressing Towards Goal  Goal: Diagnostic Test/Procedures  Outcome: Progressing Towards Goal  Goal: Nutrition/Diet  Outcome: Progressing Towards Goal  Goal: Discharge Planning  Outcome: Progressing Towards Goal  Goal: Medications  Outcome: Progressing Towards Goal  Goal: Respiratory  Outcome: Progressing Towards Goal  Goal: Treatments/Interventions/Procedures  Outcome: Progressing Towards Goal  Goal: Psychosocial  Outcome: Progressing Towards Goal     Problem: Sepsis: Discharge Outcomes  Goal: *Vital signs within defined limits  Outcome: Progressing Towards Goal  Goal: *Tolerating diet  Outcome: Progressing Towards Goal  Goal: *Verbalizes understanding and describes prescribed diet  Outcome: Progressing Towards Goal  Goal: *Demonstrates progressive activity  Outcome: Progressing Towards Goal  Goal: *Describes follow-up/return visits to physicians  Outcome: Progressing Towards Goal  Goal: *Verbalizes name, dosage, time, side effects, and number of days to continue medications  Outcome: Progressing Towards Goal  Goal: *Influenza immunization (Oct-Mar only)  Outcome: Progressing Towards Goal  Goal: *Pneumococcal immunization  Outcome: Progressing Towards Goal  Goal: *Lungs clear or at baseline  Outcome: Progressing Towards Goal  Goal: *Oxygen saturation returns to baseline or 90% or better on room air  Outcome: Progressing Towards Goal  Goal: *Glycemic control  Outcome: Progressing Towards Goal  Goal: *Absence of deep venous thrombosis signs and symptoms(Stroke Metric)  Outcome: Progressing Towards Goal  Goal: *Describes available resources and support systems  Outcome: Progressing Towards Goal  Goal: *Optimal pain control at patient's stated goal  Outcome: Progressing Towards Goal
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
## 2022-06-27 NOTE — PROGRESS NOTES
Comprehensive Nutrition Assessment    Type and Reason for Visit: Reassess    Nutrition Recommendations/Plan:   1. Continue current TPN regimen     Nutrition Assessment:  Chart reviewed; TPN continues at goal rate and receiving lipids 3x/week. TPN adequate to meet 100% of estimated kcal/protein needs. Patient has been stable for discharge; awaiting insurance authorization for TPN. Nutrition Related Findings:     [phone]   6/25  Humalog    Current Nutrition Intake & Therapies:  ADULT DIET Clear Liquid  TPN ADULT - CENTRAL  TPN ADULT - CENTRAL  Current Parenteral Nutrition Orders:  · Type and Formula: AA5% D20%   · Lipids: 500ml,Three times weekly  · Duration: Continuous  · Rate/Volume: 75 ml/hr  · Current PN Order Provides: 2012 kcal, 90g protein, 360g CHO  · Goal PN Orders Provides: 2012 kcal, 90g protein, 360 g CHO    Anthropometric Measures:  Height: 5' 10\" (177.8 cm)  Ideal Body Weight (IBW): 166 lbs (75 kg)     Current Body Wt:  65.2 kg (143 lb 11.8 oz), 79.4 % IBW. Bed scale  Current BMI (kg/m2): 20.6                          BMI Category: Normal weight (BMI 18.5-24. 9)    Estimated Daily Nutrient Needs:  Energy Requirements Based On: Formula  Weight Used for Energy Requirements: Current  Energy (kcal/day): 2677-3281 kcal (BMR 1556 x 1.2AF +250kcal)  Weight Used for Protein Requirements: Current  Protein (g/day): 85-104g (1.3-1.6 g/kg bw)  Method Used for Fluid Requirements: 1 ml/kcal  Fluid (ml/day): 1850 mL    Nutrition Diagnosis:   · Altered GI function related to altered GI structure as evidenced by nutrition support-parenteral nutrition    Nutrition Interventions:   Food and/or Nutrient Delivery: Continue parenteral nutrition  Nutrition Education/Counseling: No recommendations at this time  Coordination of Nutrition Care: Continue to monitor while inpatient       Goals:  Previous Goal Met: Goal(s) achieved  Goals:  (TPN at goal rate and lytes WNL next 2-4 days)       Nutrition Monitoring and Evaluation:   Behavioral-Environmental Outcomes: None identified  Food/Nutrient Intake Outcomes: Parenteral nutrition intake/tolerance  Physical Signs/Symptoms Outcomes: Biochemical data,Weight    Discharge Planning:    Parenteral nutrition    Sabine Devlin RD  Contact: ext [phone]

## 2022-06-27 NOTE — PROGRESS NOTES
Received notification from bedside RN about patient with regards to: requesting IV pain medication for elbow pain as he cannot tolerate anything PO  VS: /77, HR 96, RR 17, o2 sat 100%     Intervention given: Toradol 15 mg IV x 1 dose ordered

## 2022-06-27 NOTE — PROGRESS NOTES
Problem: Pressure Injury - Risk of  Goal: *Prevention of pressure injury  Description: Document Russell Scale and appropriate interventions in the flowsheet. Outcome: Progressing Towards Goal  Note: Pressure Injury Interventions:  Sensory Interventions: Float heels,Keep linens dry and wrinkle-free    Moisture Interventions: Absorbent underpads,Apply protective barrier, creams and emollients    Activity Interventions: Increase time out of bed,PT/OT evaluation    Mobility Interventions: HOB 30 degrees or less,PT/OT evaluation    Nutrition Interventions: Document food/fluid/supplement intake    Friction and Shear Interventions: Apply protective barrier, creams and emollients                Problem: Patient Education: Go to Patient Education Activity  Goal: Patient/Family Education  Outcome: Progressing Towards Goal     Problem: Falls - Risk of  Goal: *Absence of Falls  Description: Document Dora Fall Risk and appropriate interventions in the flowsheet. Outcome: Progressing Towards Goal  Note: Fall Risk Interventions:  Mobility Interventions: Bed/chair exit alarm         Medication Interventions: Bed/chair exit alarm    Elimination Interventions: Bed/chair exit alarm,Call light in reach              Problem: Patient Education: Go to Patient Education Activity  Goal: Patient/Family Education  Outcome: Progressing Towards Goal     Problem: Falls - Risk of  Goal: *Absence of Falls  Description: Document Dora Fall Risk and appropriate interventions in the flowsheet.   Outcome: Progressing Towards Goal  Note: Fall Risk Interventions:  Mobility Interventions: Bed/chair exit alarm         Medication Interventions: Bed/chair exit alarm    Elimination Interventions: Bed/chair exit alarm,Call light in reach              Problem: Sepsis: Day of Diagnosis  Goal: Off Pathway (Use only if patient is Off Pathway)  Outcome: Progressing Towards Goal  Goal: *Fluid resuscitation  Outcome: Progressing Towards Goal  Goal: *Pneumococcal immunization (if eligible)  Outcome: Progressing Towards Goal  Goal: *Influenza immunization (if eligible)  Outcome: Progressing Towards Goal  Goal: Activity/Safety  Outcome: Progressing Towards Goal  Goal: Consults, if ordered  Outcome: Progressing Towards Goal  Goal: Diagnostic Test/Procedures  Outcome: Progressing Towards Goal  Goal: Nutrition/Diet  Outcome: Progressing Towards Goal  Goal: Discharge Planning  Outcome: Progressing Towards Goal  Goal: Medications  Outcome: Progressing Towards Goal  Goal: Respiratory  Outcome: Progressing Towards Goal  Goal: Treatments/Interventions/Procedures  Outcome: Progressing Towards Goal  Goal: Psychosocial  Outcome: Progressing Towards Goal     Problem: Sepsis: Discharge Outcomes  Goal: *Vital signs within defined limits  Outcome: Progressing Towards Goal  Goal: *Tolerating diet  Outcome: Progressing Towards Goal  Goal: *Verbalizes understanding and describes prescribed diet  Outcome: Progressing Towards Goal  Goal: *Demonstrates progressive activity  Outcome: Progressing Towards Goal  Goal: *Describes follow-up/return visits to physicians  Outcome: Progressing Towards Goal  Goal: *Verbalizes name, dosage, time, side effects, and number of days to continue medications  Outcome: Progressing Towards Goal  Goal: *Influenza immunization (Oct-Mar only)  Outcome: Progressing Towards Goal  Goal: *Pneumococcal immunization  Outcome: Progressing Towards Goal  Goal: *Lungs clear or at baseline  Outcome: Progressing Towards Goal  Goal: *Oxygen saturation returns to baseline or 90% or better on room air  Outcome: Progressing Towards Goal  Goal: *Glycemic control  Outcome: Progressing Towards Goal  Goal: *Absence of deep venous thrombosis signs and symptoms(Stroke Metric)  Outcome: Progressing Towards Goal  Goal: *Describes available resources and support systems  Outcome: Progressing Towards Goal  Goal: *Optimal pain control at patient's stated goal  Outcome: Progressing Towards Goal

## 2022-06-27 NOTE — PROGRESS NOTES
Bedside shift change report given to Anamaria Barriga RN (oncoming nurse) by Johana Lawrence RN (offgoing nurse). Report included the following information SBAR, Kardex, Intake/Output, MAR, Recent Results, Med Rec Status and Cardiac Rhythm sinus rhythm.

## 2022-06-28 LAB
GLUCOSE BLD STRIP.AUTO-MCNC: 102 MG/DL (ref 65–117)
GLUCOSE BLD STRIP.AUTO-MCNC: 104 MG/DL (ref 65–117)
GLUCOSE BLD STRIP.AUTO-MCNC: 114 MG/DL (ref 65–117)
GLUCOSE BLD STRIP.AUTO-MCNC: 118 MG/DL (ref 65–117)
GLUCOSE BLD STRIP.AUTO-MCNC: 89 MG/DL (ref 65–117)
SERVICE CMNT-IMP: ABNORMAL
SERVICE CMNT-IMP: NORMAL

## 2022-06-28 PROCEDURE — 82962 GLUCOSE BLOOD TEST: CPT

## 2022-06-28 PROCEDURE — 65270000046 HC RM TELEMETRY

## 2022-06-28 PROCEDURE — 74011000258 HC RX REV CODE- 258: Performed by: INTERNAL MEDICINE

## 2022-06-28 PROCEDURE — 74011000250 HC RX REV CODE- 250: Performed by: INTERNAL MEDICINE

## 2022-06-28 PROCEDURE — 74011000258 HC RX REV CODE- 258: Performed by: STUDENT IN AN ORGANIZED HEALTH CARE EDUCATION/TRAINING PROGRAM

## 2022-06-28 PROCEDURE — 74011250636 HC RX REV CODE- 250/636: Performed by: STUDENT IN AN ORGANIZED HEALTH CARE EDUCATION/TRAINING PROGRAM

## 2022-06-28 PROCEDURE — 74011250636 HC RX REV CODE- 250/636: Performed by: INTERNAL MEDICINE

## 2022-06-28 RX ORDER — AMOXICILLIN 500 MG/1
500 CAPSULE ORAL 3 TIMES DAILY
Qty: 3 CAPSULE | Refills: 0 | Status: SHIPPED | OUTPATIENT
Start: 2022-06-28 | End: 2022-06-29

## 2022-06-28 RX ORDER — FLUCONAZOLE 200 MG/1
400 TABLET ORAL DAILY
Qty: 2 TABLET | Refills: 0 | Status: SHIPPED | OUTPATIENT
Start: 2022-06-30 | End: 2022-06-29

## 2022-06-28 RX ADMIN — AMPICILLIN SODIUM 2 G: 2 INJECTION, POWDER, FOR SOLUTION INTRAVENOUS at 20:16

## 2022-06-28 RX ADMIN — AMPICILLIN SODIUM 2 G: 2 INJECTION, POWDER, FOR SOLUTION INTRAVENOUS at 01:56

## 2022-06-28 RX ADMIN — AMPICILLIN SODIUM 2 G: 2 INJECTION, POWDER, FOR SOLUTION INTRAVENOUS at 14:07

## 2022-06-28 RX ADMIN — SODIUM CHLORIDE 100 MG: 9 INJECTION, SOLUTION INTRAVENOUS at 14:07

## 2022-06-28 RX ADMIN — SODIUM CHLORIDE, PRESERVATIVE FREE 10 ML: 5 INJECTION INTRAVENOUS at 14:08

## 2022-06-28 RX ADMIN — SODIUM CHLORIDE, PRESERVATIVE FREE 10 ML: 5 INJECTION INTRAVENOUS at 21:07

## 2022-06-28 RX ADMIN — MICONAZOLE NITRATE: 20 CREAM TOPICAL at 20:24

## 2022-06-28 RX ADMIN — AMPICILLIN SODIUM 2 G: 2 INJECTION, POWDER, FOR SOLUTION INTRAVENOUS at 08:17

## 2022-06-28 RX ADMIN — AMPICILLIN SODIUM 2 G: 2 INJECTION, POWDER, FOR SOLUTION INTRAVENOUS at 17:00

## 2022-06-28 RX ADMIN — SODIUM CHLORIDE, PRESERVATIVE FREE 10 ML: 5 INJECTION INTRAVENOUS at 05:33

## 2022-06-28 RX ADMIN — AMPICILLIN SODIUM 2 G: 2 INJECTION, POWDER, FOR SOLUTION INTRAVENOUS at 04:50

## 2022-06-28 RX ADMIN — CALCIUM GLUCONATE: 98 INJECTION, SOLUTION INTRAVENOUS at 18:11
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
## 2022-06-28 NOTE — PROGRESS NOTES
Bedside shift change report given to Alessio Bailey RN (oncoming nurse) by Tobias Contreras RN (offgoing nurse). Report included the following information SBAR, Kardex, Intake/Output, MAR, Recent Results, Med Rec      End of Shift Note    Bedside shift change report given to Nette Stubbs RN (oncoming nurse) by Lizzeth Kirkland RN (offgoing nurse). Report included the following information SBAR, Kardex, Recent Results and Med Rec Status    Shift worked:  dayshift     Shift summary and any significant changes:     none     Concerns for physician to address:  discharge     Zone phone for oncoming shift:   [phone]       Activity:  Activity Level: Up with Assistance  Number times ambulated in hallways past shift: 0  Number of times OOB to chair past shift: 1    Cardiac:   Cardiac Monitoring: No      Cardiac Rhythm: Sinus Rhythm    Access:   Current line(s): Telly     Genitourinary:   Urinary status: voiding    Respiratory:   O2 Device: None (Room air)  Chronic home O2 use?: NO  Incentive spirometer at bedside: NO       GI:  Last Bowel Movement Date: 06/26/22  Current diet:  ADULT DIET Clear Liquid  TPN ADULT - CENTRAL  Passing flatus: YES  Tolerating current diet: YES       Pain Management:   Patient states pain is manageable on current regimen: YES    Skin:  Russell Score: 18  Interventions: increase time out of bed and PT/OT consult    Patient Safety:  Fall Score:  Total Score: 2  Interventions: bed/chair alarm, assistive device (walker, cane, etc), gripper socks and pt to call before getting OOB  High Fall Risk: Yes    Length of Stay:  Expected LOS: 4d 19h  Actual LOS: [address], RN

## 2022-06-28 NOTE — ADT AUTH CERT NOTES
6/21/22 ID PN by Norma Wen       Review Status Review Entered   In Primary 6/27/2022 15:34      Criteria Review   6/21/22 ID PN   Assessment/Recommendations:  Polymicrobial bacteremia E.coli and Enterococcus faecalis  Also Candidemia with Candida albicans. TPN risk factor. Source of each likely the rupinder catheter, removed 6/14/22. After removal, blood cultures negative from 6/15/22.      Final abx orders:  - ampicllin 12gm q24h via continuous infusion   - anidulafungin 100mg daily to continue.   - Duration 2 weeks for all antimicrobials, 6/15/22 to 6/30/22.   - Please note that TPN orders are NOT part of ID-care. - No ID follow up needed after discharge.    - I also personally spoke to Rere Lott from [place] [address] today regarding the above orders.            Sepsis and Other Febrile Illness, without Focal Infection - Care Day 16 (6/27/2022) by Norma Wen       Review Status Review Entered   Completed 6/27/2022 15:31      Criteria Review      Care Day: 16 Care Date: 6/27/2022 Level of Care: Telemetry    Guideline Day 2    Level Of Care    (X) ICU or floor    6/27/2022 15:30:49 EDT by Katey Villalpando [facility]   dc barrier see 6/26 430pm md note medically stable awaiting insurance approval for home tpn and abx  ID following    Clinical Status    (X) * Hemodynamic stability    6/27/2022 15:30:49 EDT by Norma Light      T 98.8 p 86 r 18 bp 101/64 sats 100% ra   6/27 na 139, k 3.5  glucose 107, creat 0.66 mag 1.8 ( 6/25 wbc 8.7 hgb 8.4)    (X) * Hypoxemia absent    6/27/2022 15:30:49 EDT by Norma Light      100% ra    (X) * Tachypnea absent    Activity    (X) Activity as tolerated    6/27/2022 15:30:49 EDT by [employer]      SCDS    Routes    (X) Possible IV fluids    6/27/2022 15:30:49 EDT by Norma Light      TPN 75 hr    (X) Parenteral or oral medications    6/27/2022 15:30:49 EDT by Norma Light      iv ampicillin 2 g q 4 hr   iv eraxis 100mg qd  iv toradol 15mg x 1  2 am today   tylenol 650mg q6hr po prn    ( ) Diet as tolerated    6/27/2022 15:30:49 EDT by Lauren Haider      clear liq diet    Interventions    (X) WBC    6/27/2022 15:30:49 EDT by Lauren Haider      6/27 na 139, k 3.5  glucose 107, create 0.66 mag 1.8 ( 6/25 wbc 8.7 hgb 8.4)    Medications    (X) Possible antimicrobial treatment    6/27/2022 15:30:49 EDT by Lauren Haider      iv ampicillin 2 g q 4 hr   iv eraxis 100mg qd    * Milestone   Additional Notes   6/27/22 review date ( 319 pm pending pn will add when available )    ( 6/20 transfer from stepdown to tele , remains in tele bed )     T 98.8 p 86 r 18 bp 101/64 sats 100% ra    6/27 na 139, k 3.5  glucose 107, create 0.66 mag 1.8 ( 6/25 wbc 8.7 hgb 8.4)       6/27 145am NP pn Received notification from bedside RN about patient with regards to: requesting IV pain medication for elbow pain as he cannot tolerate anything PO   VS: /77, HR 96, RR 17, o2 sat 100%     Intervention given: Toradol 15 mg IV x 1 dose ordered)      6/27 nutrition note Chart reviewed; TPN continues at goal rate and receiving lipids 3x/week. TPN adequate to meet 100% of estimated kcal/protein needs. Patient has been stable for discharge; awaiting insurance authorization for TPN.            ( 6/26 430 pm  medical pn    Barriers: Medically stable, awaiting insurance approval of home TPN and antibiotics               Discharge once this is set up, CM working on   [employer] Insurance / Plan:    Septic shock POA WBC 15.6, temp 103.1, , RR 23, lactate 7.39   C albicans candidemia POA   Polymicrobial bacteremia with  E. coli and E faecalis POA   Suspected indwelling CVC infection POA   ?  Pneumonia POA   Presented with septic shock with elevated lactate)

## 2022-06-28 NOTE — PROGRESS NOTES
Transition of Care Plan:     RUR: 22% - \"high risk\"  Disposition: Home with [facility] (RN), home infusion services for TPN to be provided by [facility], & f/u apts  Follow up appointments: PCP & specialist as indicated   DME needed: No DME needs identified for d/c  Transportation at Discharge: Pt's mother will provide transportation at d/c   [place] or means to access home: Pt has access to his home       [place] Medicare Letter: N/A - Medicaid coverage  Is patient a BCPI-A Bundle: N/A        Is patient a [place] and connected with the [address] [place] [address] Road Contact: Pt's mother Paulina Mohanipe: [phone])  Discharge Caregiver contacted prior to discharge? To be contacted prior to d/c  Care Conference needed?: N/A    Update - 4:00 PM: TPN order & nutrition progress note from 6/27/22 faxed to [facility] per request. Nahun Calvillo orders & wound care instructions uploaded via [employer] to [facility] for reference; BEKA informed agency of JORGE for 6/29/22. Update - 3:23 PM: CM received call from [address] [place] [address] Dr David Terrell) reporting pt's insurance auth for TPN was approved, however, [place] for IV Abx remains pending at this time. Samantha Raza inquired if IV Abx date is still projected for 6/30/22; CM confirmed. Samantha Raza reported [facility] can start serves for TPN 6/29/22. Samantha Raza requested for most recent TPN order to be faxed to agency to ensure they have the correct formula (f: [phone]); CM to complete request.    BEKA consulted with attending MD regarding update; MD reported he will switch pt from IV Abx to P. O for the last two days of treatment. MD agreeable to pt d/c 6/29/22. CM contacted [facility] liaison to report update & JORGE for 6/29/22. Jose to contact CM back to confirm whether or not bedside education session will need to take place prior to d/c tomorrow. Update - 12:37 PM: Updates on status of auth remain pending at this time.  CM sent [employer] message to attending MD to report status of disposition. Initial note: Chart reviewed. Per previous CM note from 6/23/22, pt is waiting for insurance auth for TPN through [facility] [address] contacted [facility] liaison Helyn Mcardle: [phone]) to f/u on the status of insurance auth. Otto Farooq informed CM auth for TPN was initially denied; agency has since initiated a new claim for services to be covered. Otto Farooq informed CM new claim/auth remains pending at this time. Otto Farooq inquired if pt was medically stable for d/c; CM confirmed. Otto Farooq inquired if pt was on any new IV Abx; CM to confirm with medical team. Otto Farooq to contact CM with updates once available.      Shelbie Dang, MSW  Care Manager, [address] [place] [address]

## 2022-06-28 NOTE — PROGRESS NOTES
Received notification from bedside RN about patient with regards to: 8/10 right arm pain, requesting medication for relief.  Toradol worked well last night  VS: /69, HR 86, RR 18, O2 sat 100% on RA    Intervention given: Toradol 15 mg IV x 1 dose ordered

## 2022-06-29 ENCOUNTER — APPOINTMENT (OUTPATIENT)
Dept: VASCULAR SURGERY | Age: 42
DRG: 206 | End: 2022-06-29
Attending: INTERNAL MEDICINE
Payer: MEDICAID

## 2022-06-29 LAB
ANION GAP SERPL CALC-SCNC: 4 MMOL/L (ref 5–15)
BASOPHILS # BLD: 0.1 K/UL (ref 0–0.1)
BASOPHILS NFR BLD: 1 % (ref 0–1)
BUN SERPL-MCNC: 10 MG/DL (ref 6–20)
BUN/CREAT SERPL: 14 (ref 12–20)
CALCIUM SERPL-MCNC: 9.1 MG/DL (ref 8.5–10.1)
CHLORIDE SERPL-SCNC: 106 MMOL/L (ref 97–108)
CO2 SERPL-SCNC: 28 MMOL/L (ref 21–32)
CREAT SERPL-MCNC: 0.71 MG/DL (ref 0.7–1.3)
DIFFERENTIAL METHOD BLD: ABNORMAL
EOSINOPHIL # BLD: 0.3 K/UL (ref 0–0.4)
EOSINOPHIL NFR BLD: 4 % (ref 0–7)
ERYTHROCYTE [DISTWIDTH] IN BLOOD BY AUTOMATED COUNT: 18.2 % (ref 11.5–14.5)
GLUCOSE BLD STRIP.AUTO-MCNC: 102 MG/DL (ref 65–117)
GLUCOSE BLD STRIP.AUTO-MCNC: 103 MG/DL (ref 65–117)
GLUCOSE BLD STRIP.AUTO-MCNC: 127 MG/DL (ref 65–117)
GLUCOSE BLD STRIP.AUTO-MCNC: 97 MG/DL (ref 65–117)
GLUCOSE SERPL-MCNC: 219 MG/DL (ref 65–100)
HCT VFR BLD AUTO: 27.7 % (ref 36.6–50.3)
HGB BLD-MCNC: 8.8 G/DL (ref 12.1–17)
IMM GRANULOCYTES # BLD AUTO: 0 K/UL (ref 0–0.04)
IMM GRANULOCYTES NFR BLD AUTO: 0 % (ref 0–0.5)
LYMPHOCYTES # BLD: 1.6 K/UL (ref 0.8–3.5)
LYMPHOCYTES NFR BLD: 21 % (ref 12–49)
MAGNESIUM SERPL-MCNC: 1.9 MG/DL (ref 1.6–2.4)
MCH RBC QN AUTO: 28.3 PG (ref 26–34)
MCHC RBC AUTO-ENTMCNC: 31.8 G/DL (ref 30–36.5)
MCV RBC AUTO: 89.1 FL (ref 80–99)
MONOCYTES # BLD: 0.6 K/UL (ref 0–1)
MONOCYTES NFR BLD: 9 % (ref 5–13)
NEUTS SEG # BLD: 4.7 K/UL (ref 1.8–8)
NEUTS SEG NFR BLD: 65 % (ref 32–75)
NRBC # BLD: 0 K/UL (ref 0–0.01)
NRBC BLD-RTO: 0 PER 100 WBC
PHOSPHATE SERPL-MCNC: 4.8 MG/DL (ref 2.6–4.7)
PLATELET # BLD AUTO: 433 K/UL (ref 150–400)
PMV BLD AUTO: 9.1 FL (ref 8.9–12.9)
POTASSIUM SERPL-SCNC: 4.2 MMOL/L (ref 3.5–5.1)
RBC # BLD AUTO: 3.11 M/UL (ref 4.1–5.7)
SERVICE CMNT-IMP: ABNORMAL
SERVICE CMNT-IMP: NORMAL
SODIUM SERPL-SCNC: 138 MMOL/L (ref 136–145)
WBC # BLD AUTO: 7.3 K/UL (ref 4.1–11.1)

## 2022-06-29 PROCEDURE — 74011000250 HC RX REV CODE- 250: Performed by: INTERNAL MEDICINE

## 2022-06-29 PROCEDURE — 80048 BASIC METABOLIC PNL TOTAL CA: CPT

## 2022-06-29 PROCEDURE — 83735 ASSAY OF MAGNESIUM: CPT

## 2022-06-29 PROCEDURE — 82962 GLUCOSE BLOOD TEST: CPT

## 2022-06-29 PROCEDURE — 85025 COMPLETE CBC W/AUTO DIFF WBC: CPT

## 2022-06-29 PROCEDURE — 65270000046 HC RM TELEMETRY

## 2022-06-29 PROCEDURE — 84100 ASSAY OF PHOSPHORUS: CPT

## 2022-06-29 PROCEDURE — 36415 COLL VENOUS BLD VENIPUNCTURE: CPT

## 2022-06-29 PROCEDURE — 74011250636 HC RX REV CODE- 250/636: Performed by: INTERNAL MEDICINE

## 2022-06-29 PROCEDURE — 74011000258 HC RX REV CODE- 258: Performed by: INTERNAL MEDICINE

## 2022-06-29 RX ORDER — DEXTROSE MONOHYDRATE 100 MG/ML
75 INJECTION, SOLUTION INTRAVENOUS CONTINUOUS
Status: DISPENSED | OUTPATIENT
Start: 2022-06-29 | End: 2022-06-30

## 2022-06-29 RX ADMIN — AMPICILLIN SODIUM 2 G: 2 INJECTION, POWDER, FOR SOLUTION INTRAVENOUS at 13:21

## 2022-06-29 RX ADMIN — MICONAZOLE NITRATE: 20 CREAM TOPICAL at 10:35

## 2022-06-29 RX ADMIN — DEXTROSE 75 ML/HR: 10 SOLUTION INTRAVENOUS at 20:36

## 2022-06-29 RX ADMIN — SODIUM CHLORIDE 100 MG: 9 INJECTION, SOLUTION INTRAVENOUS at 14:30

## 2022-06-29 RX ADMIN — SODIUM CHLORIDE, PRESERVATIVE FREE 10 ML: 5 INJECTION INTRAVENOUS at 13:00

## 2022-06-29 RX ADMIN — AMPICILLIN SODIUM 2 G: 2 INJECTION, POWDER, FOR SOLUTION INTRAVENOUS at 10:34

## 2022-06-29 RX ADMIN — MICONAZOLE NITRATE: 20 CREAM TOPICAL at 21:00

## 2022-06-29 RX ADMIN — AMPICILLIN SODIUM 2 G: 2 INJECTION, POWDER, FOR SOLUTION INTRAVENOUS at 22:05

## 2022-06-29 RX ADMIN — AMPICILLIN SODIUM 2 G: 2 INJECTION, POWDER, FOR SOLUTION INTRAVENOUS at 04:28

## 2022-06-29 RX ADMIN — AMPICILLIN SODIUM 2 G: 2 INJECTION, POWDER, FOR SOLUTION INTRAVENOUS at 17:43

## 2022-06-29 RX ADMIN — SODIUM CHLORIDE, PRESERVATIVE FREE 10 ML: 5 INJECTION INTRAVENOUS at 05:49

## 2022-06-29 RX ADMIN — SODIUM CHLORIDE, PRESERVATIVE FREE 10 ML: 5 INJECTION INTRAVENOUS at 22:05

## 2022-06-29 RX ADMIN — AMPICILLIN SODIUM 2 G: 2 INJECTION, POWDER, FOR SOLUTION INTRAVENOUS at 00:59

## 2022-06-29 NOTE — PROGRESS NOTES
Bedside and Verbal shift change report given to Carin [facility]  (oncoming nurse) by Simon Ellsworth  (offgoing nurse). Report included the following information SBAR, Kardex, ED Summary, Procedure Summary, Intake/Output, MAR, Recent Results and Cardiac Rhythm . Maricruz Smith
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
## 2022-06-29 NOTE — PROGRESS NOTES
Transition of Care Plan:     RUR: 23% - \"high risk\"  Disposition: Home with [address] [place] [address] (RN), [place] [address] for TPN, & f/u apts (upon completion of IV Abx/Anti-fungal treatment - 6/30/22)  Follow up appointments: PCP & specialist as indicated   DME needed: No DME needs identified for d/c  Transportation at Discharge: Pt's mother will provide transportation at d/c   [place] or means to access home: Pt has access to his home        Medicare Letter: N/A - Medicaid coverage  Is patient a BCPI-A Bundle: N/A        Is patient a [place] and connected with the [address] Contact: Pt's mother Marek Maris: [phone])  Discharge Caregiver contacted prior to discharge? To be contacted prior to d/c  Care Conference needed?: N/A    Update - 8:32 AM: CM received call from MD, Dr. Gege Aranda reporting that upon meeting with pt yesterday afternoon, he's not able to switch order for IV Abx to [facility] for d/c. Per Dr. Gege Aranda, pt's last dose of IV Abx is today; last dose of anti-fungal scheduled for tomorrow morning (6/30/22). Dr. Gege Aranda canceling d/c until tomorrow morning (6/30/22) in effort for IV Abx & anti-fungal treatment to be completed. Update to be reported out to [facility]. Initial note: [facility] acknowledged d/c. CM [place] [address] ([phone]) to f/u on status of TPN. [facility] spoke with staff member Latoya Swenson) who reported the agency is working to mix formula & transition pt's order from nocturnal to continuous. Ben Nicole reported TPN formula should be completed by 11:00 AM & staff from [place] [address] is anticipating arriving to [facility] between 1:00 PM - 1:30 PM to provide bedside education session prior to d/c. Ben Nicole requested for MD to provide documentation that the IV Abx order has been discontinued; [facility] to [employer] MD to relay request. Zuelma Pratt order/wound care instructions uploaded via [employer] to [facility] for reference; agency aware of d/c today.  Shira Clarissa Vital's information reflected in AVS for reference. CM will meet with pt at bedside to provide overview of the d/c plan & confirm method of transport for d/c. CM will continue to follow & remain accessible for d/c planning.     Paul Marte MSW  Care Manager, [facility]

## 2022-06-29 NOTE — PROGRESS NOTES
Hospital follow-up PCP transitional care appointment has been scheduled with Dr. Janae Crisostomo on 7/7/22 at [phone]. Pending patient discharge.   Amol Ayers, Care Management Assistant

## 2022-06-29 NOTE — PROGRESS NOTES
End of Shift Note    Bedside shift change report given to Susie Baez (oncoming nurse) by Jc Thompson (offgoing nurse). Report included the following information SBAR, Kardex and MAR    Shift worked:  7am to 7pm     Shift summary and any significant changes:     Patient tolerated care fairly throughout shift. Hourly rounding completed. Medications given and education provided regarding all meds. Patient turned Q2 hours. Wound care to complete a bag change on the fistula per patient request. Line CHG completed on central line. Pharmacy to asses why TPN was not reordered at the end of the shift.           Jc Thompson

## 2022-06-30 ENCOUNTER — APPOINTMENT (OUTPATIENT)
Dept: ULTRASOUND IMAGING | Age: 42
DRG: 206 | End: 2022-06-30
Attending: INTERNAL MEDICINE
Payer: MEDICAID

## 2022-06-30 ENCOUNTER — APPOINTMENT (OUTPATIENT)
Dept: VASCULAR SURGERY | Age: 42
DRG: 206 | End: 2022-06-30
Attending: INTERNAL MEDICINE
Payer: MEDICAID

## 2022-06-30 LAB
GLUCOSE BLD STRIP.AUTO-MCNC: 101 MG/DL (ref 65–117)
GLUCOSE BLD STRIP.AUTO-MCNC: 132 MG/DL (ref 65–117)
GLUCOSE BLD STRIP.AUTO-MCNC: 88 MG/DL (ref 65–117)
GLUCOSE BLD STRIP.AUTO-MCNC: 93 MG/DL (ref 65–117)
SERVICE CMNT-IMP: ABNORMAL
SERVICE CMNT-IMP: NORMAL

## 2022-06-30 PROCEDURE — 82962 GLUCOSE BLOOD TEST: CPT

## 2022-06-30 PROCEDURE — 74011000250 HC RX REV CODE- 250: Performed by: INTERNAL MEDICINE

## 2022-06-30 PROCEDURE — 65270000046 HC RM TELEMETRY

## 2022-06-30 PROCEDURE — 93971 EXTREMITY STUDY: CPT

## 2022-06-30 RX ORDER — ENOXAPARIN SODIUM 100 MG/ML
60 INJECTION SUBCUTANEOUS EVERY 12 HOURS
Status: DISCONTINUED | OUTPATIENT
Start: 2022-06-30 | End: 2022-07-06 | Stop reason: HOSPADM

## 2022-06-30 RX ADMIN — SODIUM CHLORIDE, PRESERVATIVE FREE 10 ML: 5 INJECTION INTRAVENOUS at 13:54

## 2022-06-30 RX ADMIN — MICONAZOLE NITRATE: 20 CREAM TOPICAL at 21:00

## 2022-06-30 RX ADMIN — SODIUM CHLORIDE, PRESERVATIVE FREE 10 ML: 5 INJECTION INTRAVENOUS at 05:37

## 2022-06-30 RX ADMIN — MICONAZOLE NITRATE: 20 CREAM TOPICAL at 08:54

## 2022-06-30 RX ADMIN — DEXTROSE 75 ML/HR: 10 SOLUTION INTRAVENOUS at 13:01

## 2022-06-30 RX ADMIN — LEUCINE, PHENYLALANINE, LYSINE, METHIONINE, ISOLEUCINE, VALINE, HISTIDINE, THREONINE, TRYPTOPHAN, ALANINE, GLYCINE, ARGININE, PROLINE, SERINE, TYROSINE, SODIUM ACETATE, DIBASIC POTASSIUM PHOSPHATE, MAGNESIUM CHLORIDE, SODIUM CHLORIDE, CALCIUM CHLORIDE, DEXTROSE
365; 280; 290; 200; 300; 290; 240; 210; 90; 1035; 515; 575; 340; 250; 20; 340; 261; 51; 59; 33; 20 INJECTION INTRAVENOUS at 19:20

## 2022-06-30 NOTE — PROGRESS NOTES
Problem: Pressure Injury - Risk of  Goal: *Prevention of pressure injury  Description: Document Russell Scale and appropriate interventions in the flowsheet. Outcome: Progressing Towards Goal  Note: Pressure Injury Interventions:  Sensory Interventions: Assess changes in LOC,Float heels,Keep linens dry and wrinkle-free,Maintain/enhance activity level,Minimize linen layers    Moisture Interventions: Absorbent underpads,Limit adult briefs,Maintain skin hydration (lotion/cream),Minimize layers    Activity Interventions: Assess need for specialty bed,Increase time out of bed,Pressure redistribution bed/mattress(bed type),PT/OT evaluation    Mobility Interventions: Assess need for specialty bed,HOB 30 degrees or less,Pressure redistribution bed/mattress (bed type),PT/OT evaluation    Nutrition Interventions: Document food/fluid/supplement intake    Friction and Shear Interventions: Apply protective barrier, creams and emollients,Lift sheet,Lift team/patient mobility team,Minimize layers                Problem: Falls - Risk of  Goal: *Absence of Falls  Description: Document Dora Fall Risk and appropriate interventions in the flowsheet.   Outcome: Progressing Towards Goal  Note: Fall Risk Interventions:  Mobility Interventions: Assess mobility with egress test,Bed/chair exit alarm,Communicate number of staff needed for ambulation/transfer,Patient to call before getting OOB         Medication Interventions: Assess postural VS orthostatic hypotension,Bed/chair exit alarm,Patient to call before getting OOB,Teach patient to arise slowly    Elimination Interventions: Bed/chair exit alarm,Call light in reach,Stay With Me (per policy),Toilet paper/wipes in reach,Toileting schedule/hourly rounds              Problem: Patient Education: Go to Patient Education Activity  Goal: Patient/Family Education  Outcome: Progressing Towards Goal     Problem: Sepsis: Discharge Outcomes  Goal: *Tolerating diet  Outcome: Progressing Towards Goal     Problem: Sepsis: Discharge Outcomes  Goal: *Glycemic control  Outcome: Progressing Towards Goal     Problem: Sepsis: Discharge Outcomes  Goal: *Optimal pain control at patient's stated goal  Outcome: Progressing Towards Goal

## 2022-06-30 NOTE — DISCHARGE INSTRUCTIONS
Patient Education        Urinary Tract Infections (UTI) in Men: Care Instructions  Overview     A urinary tract infection, or UTI, is a term for an infection anywhere between the kidneys and the urethra. (The urethra is the tube that carries urine from the bladder to outside the body.) Most UTIs are bladder infections. They often cause pain or burning when you urinate. UTIs are caused by bacteria. This means they can be cured with antibiotics. Be sure to complete your treatment so that the infection does not get worse. Follow-up care is a key part of your treatment and safety. Be sure to make and go to all appointments, and call your doctor if you are having problems. It's also a good idea to know your test results and keep a list of the medicines you take. How can you care for yourself at home? · Take your antibiotics as prescribed. Do not stop taking them just because you feel better. You need to take the full course of antibiotics. · Take your medicines exactly as prescribed. Your doctor may have prescribed a medicine, such as phenazopyridine (Pyridium), to help relieve pain when you urinate. This turns your urine orange. You may stop taking it when your symptoms get better. But be sure to take all of your antibiotics, which treat the infection. · Drink extra water for the next day or two. This will help make the urine less concentrated and help wash out the bacteria causing the infection. (If you have kidney, heart, or liver disease and have to limit your fluids, talk with your doctor before you increase your fluid intake.)  · Avoid drinks that are carbonated or have caffeine. They can irritate the bladder. · Urinate often. Try to empty your bladder each time. · To relieve pain, take a hot bath or lay a heating pad (set on low) over your lower belly or genital area. Never go to sleep with a heating pad in place. To help prevent UTIs  · Drink plenty of fluids.  If you have kidney, heart, or liver disease and have to limit fluids, talk with your doctor before you increase the amount of fluids you drink. · Urinate when you have the urge. Do not hold your urine for a long time. Urinate before you go to sleep. · Keep your penis clean. Catheter care  If you have a drainage tube (catheter) in place, the following steps will help you care for it. · Always wash your hands before and after touching your catheter. · Check the area around the urethra for inflammation or signs of infection. Signs of infection include irritated, swollen, red, or tender skin, or pus around the catheter. · Clean the area around the catheter with soap and water two times a day. Dry with a clean towel afterward. · Do not apply powder or lotion to the skin around the catheter. To empty the urine collection bag   · Wash your hands with soap and water. · Without touching the drain spout, remove the spout from its sleeve at the bottom of the collection bag. Open the valve on the spout. · Let the urine flow out of the bag and into the toilet or a container. Do not let the tubing or drain spout touch anything. · After you empty the bag, clean the end of the drain spout with tissue and water. Close the valve and put the drain spout back into its sleeve at the bottom of the collection bag. · Wash your hands with soap and water. When should you call for help? Call your doctor now or seek immediate medical care if:    · Symptoms such as a fever, chills, nausea, or vomiting get worse or happen for the first time.     · You have new pain in your back just below your rib cage. This is called flank pain.     · There is new blood or pus in your urine.     · You are not able to take or keep down your antibiotics. Watch closely for changes in your health, and be sure to contact your doctor if:    · You are not getting better after taking an antibiotic for 2 days.     · Your symptoms go away but then come back. Where can you learn more?   Go to http://www.gray.com/  Enter D061 in the search box to learn more about \"Urinary Tract Infections (UTI) in Men: Care Instructions. \"  Current as of: October 18, 2021               Content Version: 13.2  © 5214-6960 [employer], [employer]. Care instructions adapted under license by [employer] (which disclaims liability or warranty for this information). If you have questions about a medical condition or this instruction, always ask your healthcare professional. [place] [address] any warranty or liability for your use of this information.

## 2022-06-30 NOTE — DISCHARGE SUMMARY
Hospitalist Discharge Summary     Patient ID:  Lavinia Brooke  [id number]  43 y.o.  1980  6/12/2022    PCP on record: Unknown, Provider, PA    Admit date: 6/12/2022  Discharge date and time: 6/30/2022    DISCHARGE DIAGNOSIS:  Septic shock POA WBC 15.6, temp 103.1, , RR 23, lactate 7.39  C albicans candidemia   Polymicrobial bacteremia with  E. coli and E faecalis POA  Indwelling CVC infection POA    CONSULTATIONS:  IP CONSULT TO PALLIATIVE CARE - PROVIDER  IP CONSULT TO INFECTIOUS DISEASES  IP CONSULT TO INFECTIOUS DISEASES  IP CONSULT TO INTERVENTIONAL RADIOLOGY  IP CONSULT TO GENERAL SURGERY    Excerpted HPI from H&P of Lopez Chambers DO:  Scottie Nicole is a 43 y.o.  male who presents with above. Pt states he was doing fine after discharge, but the past 3 days progressively tired, weak, and lack of appetite. Pt denies fever/chills. Pt denies shortness of breath, chest pain.    ______________________________________________________________________  DISCHARGE SUMMARY/HOSPITAL COURSE:  During his hospital stay patient's acute issues were managed as follows    UTI  - UA 20-50 WBC, 0-5 RBC, 1+ bacteria  Urine culture grew proteus, Likely source indwelling Telly catheter, Telly Catheter removed at admit  - New Telly catheter placed 6/20 with double-lumen    - admit Blood culture positive for Enterococcus species E. coli  - Blood cultures from 6/13 growing Candida albicans  - Repeat cultures from 6/15 negative  - Septic shock resolved    - Continue antifungal and antibiotics as per ID               status post, Ampicllin, anidulafungin               Duration 2 weeks for all antimicrobials    - discharge delayed with insurance issues, finally cleared up 6/28     Chronic TPN for failure to thrive/GI dysfunction.   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 S/P Prior G-tube and J-tube placements  - surgery discharge summary mentioned that that he is allowed to have ice chips and hard candy  - Last surgery Jan 2022 after presenting with pain and intraperitoneal air              OR with frozen abdomen, severe adhesions                          Not able to separate bowels  - Current Abd/pelvis ct scan shows no acute abdominal issues  -Resumed buprenorphine  - Currently does not use J-tube, can it be removed(some deterioration)              Reports uses G-tube for venting  - Tube placed by gen surgery    Patient complaing of right arm pain on day of discharge, ordered RUE USG to rule out clot. Patient refusing USG. for full details see H&P, daily progress notes, labs, consult notes. _______________________________________________________________________  Patient seen and examined by me on discharge day. Pertinent Findings:  Gen:    Not in distress, appears stated age  Chest: Clear lungs, no wheeze  CVS:   Regular rhythm. No edema  Abd:  Soft, not distended, not tender  Neuro:  Alert, normal speech  _______________________________________________________________________  DISCHARGE MEDICATIONS:   Current Discharge Medication List      CONTINUE these medications which have NOT CHANGED    Details   L.acid,para-B. bifidum-S.therm (RISAQUAD) 8 billion cell cap cap Take 1 Capsule by mouth daily. Qty: 30 Capsule, Refills: 0         STOP taking these medications       vancomycin 750 mg, vial-mate 1 Device IVPB Comments:   Reason for Stopping:         buprenorphine (BUTRANS) 5 mcg/hour patch Comments:   Reason for Stopping:                 Patient Follow Up Instructions:    Activity: Activity as tolerated  Diet: Cardiac Diet  Wound Care: Keep wound clean and dry    Follow-up with PCP in 3 days    Follow-up Information     Follow up With Specialties Details [facility]  This is your home health agency. Contact the office directly if you don't hear from them within 24-48 hrs following d/c [address], [address]   This is the provider of your TPN. Contact the office directly with any questions or concerns  [facility], [address] [facility] [address]  [phone]    PCP - Dr. Antonio De La Cruz on 7/7/2022 at 10:45am for your PCP hospital follow up.   [address] [address] [address] [place]    [facility]  Call [facility]  [phone]    Unknown, Provider, PA Internal Medicine Physician In 3 days  Patient not available to ask          ________________________________________________________________    Risk of deterioration: Low    Condition at Discharge:  Stable  __________________________________________________________________    Disposition  Home with family and home health services    ____________________________________________________________________    Code Status: Full Code  ___________________________________________________________________      Total time in minutes spent coordinating this discharge (includes going over instructions, follow-up, prescriptions, and preparing report for sign off to her PCP) :  >30 minutes    Signed:  Violeta Ruvalcaba MD

## 2022-06-30 NOTE — PROGRESS NOTES
0700: Bedside and Verbal shift change report given to [facility] (oncoming nurse) by Ivonne Muñoz (offgoing nurse). Report included the following information SBAR, Kardex, Intake/Output, MAR and Recent Results. 1254: Dr. Gianna Harrison notified of preliminary results for Duplex. Telephone order received to discontinue discharge order. 1400: patient requesting to speak to physician. MD notified. 1758: Patient refusing Lovenox. End of Shift Note    Bedside shift change report given to SMAUEL Ornelas (oncoming nurse) by Radha Martin RN (offgoing nurse). Report included the following information SBAR, Kardex, Intake/Output, MAR and Recent Results    Shift worked:  days     Shift summary and any significant changes:     Major woodward Duplex of the R arm today. Possible D/C tomorrow. TPN to be restarted this evening and to D/C IV dextrose      Concerns for physician to address:        Zone phone for oncoming shift:           Activity:  Activity Level: Up with Assistance  Number times ambulated in hallways past shift: 1  Number of times OOB to chair past shift: 0    Cardiac:   Cardiac Monitoring: Yes      Cardiac Rhythm: Sinus Rhythm    Access:   Current line(s): PIV     Genitourinary:   Urinary status: voiding    Respiratory:   O2 Device: None (Room air)  Chronic home O2 use?: NO  Incentive spirometer at bedside: NO       GI:  Last Bowel Movement Date: 06/30/22  Current diet:  ADULT DIET Clear Liquid  TPN ADULT - CENTRAL AA 5% D20% W/ CA + ELECTROLYTES  Passing flatus: YES  Tolerating current diet: YES       Pain Management:   Patient states pain is manageable on current regimen: YES    Skin:  Russell Score: 15  Interventions: increase time out of bed and nutritional support     Patient Safety:  Fall Score:  Total Score: 3  Interventions: gripper socks, pt to call before getting OOB and stay with me (per policy)  High Fall Risk: Yes    Length of Stay:  Expected LOS: 4d 19h  Actual LOS: 90 [facility], RN

## 2022-06-30 NOTE — PROGRESS NOTES
Problem: Pressure Injury - Risk of  Goal: *Prevention of pressure injury  Description: Document Russell Scale and appropriate interventions in the flowsheet.   6/30/2022 0931 by Dank Lyle RN  Outcome: Resolved/Met  Note: Pressure Injury Interventions:  Sensory Interventions: Assess changes in LOC,Float heels,Keep linens dry and wrinkle-free,Maintain/enhance activity level,Minimize linen layers    Moisture Interventions: Absorbent underpads,Limit adult briefs,Maintain skin hydration (lotion/cream),Minimize layers    Activity Interventions: Assess need for specialty bed,Increase time out of bed,Pressure redistribution bed/mattress(bed type),PT/OT evaluation    Mobility Interventions: Assess need for specialty bed,HOB 30 degrees or less,Pressure redistribution bed/mattress (bed type),PT/OT evaluation    Nutrition Interventions: Document food/fluid/supplement intake    Friction and Shear Interventions: Apply protective barrier, creams and emollients,Lift sheet,Lift team/patient mobility team,Minimize layers             6/30/2022 0855 by Dank Lyle RN  Outcome: Progressing Towards Goal  Note: Pressure Injury Interventions:  Sensory Interventions: Assess changes in LOC,Float heels,Keep linens dry and wrinkle-free,Maintain/enhance activity level,Minimize linen layers    Moisture Interventions: Absorbent underpads,Limit adult briefs,Maintain skin hydration (lotion/cream),Minimize layers    Activity Interventions: Assess need for specialty bed,Increase time out of bed,Pressure redistribution bed/mattress(bed type),PT/OT evaluation    Mobility Interventions: Assess need for specialty bed,HOB 30 degrees or less,Pressure redistribution bed/mattress (bed type),PT/OT evaluation    Nutrition Interventions: Document food/fluid/supplement intake    Friction and Shear Interventions: Apply protective barrier, creams and emollients,Lift sheet,Lift team/patient mobility team,Minimize layers                Problem: Patient Education: Go to Patient Education Activity  Goal: Patient/Family Education  6/30/2022 0931 by Adam Mtz RN  Outcome: Resolved/Met  6/30/2022 0855 by Adam Mtz RN  Outcome: Progressing Towards Goal     Problem: Falls - Risk of  Goal: *Absence of Falls  Description: Document Yarelis Christianson Fall Risk and appropriate interventions in the flowsheet.   6/30/2022 0931 by Adam Mtz RN  Outcome: Resolved/Met  Note: Fall Risk Interventions:  Mobility Interventions: Assess mobility with egress test,Bed/chair exit alarm,Communicate number of staff needed for ambulation/transfer,Patient to call before getting OOB         Medication Interventions: Assess postural VS orthostatic hypotension,Bed/chair exit alarm,Patient to call before getting OOB,Teach patient to arise slowly    Elimination Interventions: Bed/chair exit alarm,Call light in reach,Stay With Me (per policy),Toilet paper/wipes in reach,Toileting schedule/hourly rounds           6/30/2022 0855 by Adam Mtz RN  Outcome: Progressing Towards Goal  Note: Fall Risk Interventions:  Mobility Interventions: Assess mobility with egress test,Bed/chair exit alarm,Communicate number of staff needed for ambulation/transfer,Patient to call before getting OOB         Medication Interventions: Assess postural VS orthostatic hypotension,Bed/chair exit alarm,Patient to call before getting OOB,Teach patient to arise slowly    Elimination Interventions: Bed/chair exit alarm,Call light in reach,Stay With Me (per policy),Toilet paper/wipes in reach,Toileting schedule/hourly rounds              Problem: Patient Education: Go to Patient Education Activity  Goal: Patient/Family Education  6/30/2022 0931 by Adam Mtz RN  Outcome: Resolved/Met  6/30/2022 0855 by Adam Mtz RN  Outcome: Progressing Towards Goal     Problem: Patient Education: Go to Patient Education Activity  Goal: Patient/Family Education  6/30/2022 0931 by Adam Mtz RN  Outcome: Resolved/Met  6/30/2022 0855 by Marsha León RN  Outcome: Progressing Towards Goal     Problem: Sepsis: Day of Diagnosis  Goal: Off Pathway (Use only if patient is Off Pathway)  6/30/2022 0931 by Marsha León RN  Outcome: Resolved/Met  6/30/2022 0855 by Marsha León RN  Outcome: Progressing Towards Goal  Goal: *Fluid resuscitation  6/30/2022 0931 by Marsha León, RN  Outcome: Resolved/Met  6/30/2022 0855 by Marsha León, RN  Outcome: Progressing Towards Goal  Goal: *Pneumococcal immunization (if eligible)  6/30/2022 0931 by Marsha León, RN  Outcome: Resolved/Met  6/30/2022 0855 by Marsha León, RN  Outcome: Progressing Towards Goal  Goal: *Influenza immunization (if eligible)  6/30/2022 0931 by Marsha León, RN  Outcome: Resolved/Met  6/30/2022 0855 by Marsha León, RN  Outcome: Progressing Towards Goal  Goal: Activity/Safety  6/30/2022 0931 by Marsha León, RN  Outcome: Resolved/Met  6/30/2022 0855 by Marsha León RN  Outcome: Progressing Towards Goal  Goal: Consults, if ordered  6/30/2022 0931 by Marsha León, RN  Outcome: Resolved/Met  6/30/2022 0855 by Marsha León, RN  Outcome: Progressing Towards Goal  Goal: Diagnostic Test/Procedures  6/30/2022 0931 by Marsha León, RN  Outcome: Resolved/Met  6/30/2022 0855 by Marsha León, RN  Outcome: Progressing Towards Goal  Goal: Nutrition/Diet  6/30/2022 0931 by Marsha León, RN  Outcome: Resolved/Met  6/30/2022 0855 by Marsha León, RN  Outcome: Progressing Towards Goal  Goal: Discharge Planning  6/30/2022 [place] by Marsha León, RN  Outcome: Resolved/Met  6/30/2022 0855 by Marsha León, RN  Outcome: Progressing Towards Goal  Goal: Medications  6/30/2022 0931 by Marsha León, RN  Outcome: Resolved/Met  6/30/2022 0855 by Marsha León, RN  Outcome: Progressing Towards Goal  Goal: Respiratory  6/30/2022 0931 by Marsha León RN  Outcome: Resolved/Met  6/30/2022 0855 by Marsha León RN  Outcome: Progressing Towards Goal  Goal: Treatments/Interventions/Procedures  6/30/2022 0931 by Tona Grant RN  Outcome: Resolved/Met  6/30/2022 0855 by Tona Grant RN  Outcome: Progressing Towards Goal  Goal: Psychosocial  6/30/2022 0931 by Tona Grant, RN  Outcome: Resolved/Met  6/30/2022 0855 by Tona Grant RN  Outcome: Progressing Towards Goal     Problem: Sepsis: Day 2  Goal: Off Pathway (Use only if patient is Off Pathway)  6/30/2022 0931 by Tona Grant, SAMUEL  Outcome: Resolved/Met  6/30/2022 0855 by Tona Grant RN  Outcome: Progressing Towards Goal  Goal: *Central Venous Pressure maintained at 8-12 mm Hg  6/30/2022 0931 by Tona Grant, SAMUEL  Outcome: Resolved/Met  6/30/2022 0855 by Tona Grant RN  Outcome: Progressing Towards Goal  Goal: *Hemodynamically stable  6/30/2022 0931 by Tona Grant RN  Outcome: Resolved/Met  6/30/2022 0855 by Tona Grant RN  Outcome: Progressing Towards Goal  Goal: *Tolerating diet  6/30/2022 0931 by Tona Grant RN  Outcome: Resolved/Met  6/30/2022 0855 by Tona Grant RN  Outcome: Progressing Towards Goal  Goal: Activity/Safety  6/30/2022 0931 by Tona Grant RN  Outcome: Resolved/Met  6/30/2022 0855 by Tona Grant RN  Outcome: Progressing Towards Goal  Goal: Consults, if ordered  6/30/2022 0931 by Tona Grant, RN  Outcome: Resolved/Met  6/30/2022 0855 by Tona Grant RN  Outcome: Progressing Towards Goal  Goal: Diagnostic Test/Procedures  6/30/2022 0931 by Tona Grant RN  Outcome: Resolved/Met  6/30/2022 0855 by Tona Grant RN  Outcome: Progressing Towards Goal  Goal: Nutrition/Diet  6/30/2022 0931 by Tona Grant RN  Outcome: Resolved/Met  6/30/2022 0855 by Tona Grant RN  Outcome: Progressing Towards Goal  Goal: Discharge Planning  6/30/2022 [place] by Tona Grant RN  Outcome: Resolved/Met  6/30/2022 0855 by Tona Grant RN  Outcome: Progressing Towards Goal  Goal: Medications  6/30/2022 0931 by Tona Grant RN  Outcome: Resolved/Met  6/30/2022 0855 by Marsha León RN  Outcome: Progressing Towards Goal  Goal: Respiratory  6/30/2022 0931 by Marsha León, RN  Outcome: Resolved/Met  6/30/2022 0855 by Marsha León, RN  Outcome: Progressing Towards Goal  Goal: Treatments/Interventions/Procedures  6/30/2022 0931 by Marsha León, RN  Outcome: Resolved/Met  6/30/2022 0855 by Marsha León, RN  Outcome: Progressing Towards Goal  Goal: Psychosocial  6/30/2022 0931 by Marsha León, RN  Outcome: Resolved/Met  6/30/2022 0855 by Marsha León, RN  Outcome: Progressing Towards Goal     Problem: Sepsis: Day 3  Goal: Off Pathway (Use only if patient is Off Pathway)  6/30/2022 0931 by Marsha León, RN  Outcome: Resolved/Met  6/30/2022 0855 by Marsha León, RN  Outcome: Progressing Towards Goal  Goal: *Central Venous Pressure maintained at 8-12 mm Hg  6/30/2022 0931 by Marsha León, RN  Outcome: Resolved/Met  6/30/2022 0855 by Marsha León RN  Outcome: Progressing Towards Goal  Goal: *Oxygen saturation within defined limits  6/30/2022 0931 by Marsha León, RN  Outcome: Resolved/Met  6/30/2022 0855 by Marsha León RN  Outcome: Progressing Towards Goal  Goal: *Vital sign stability  6/30/2022 0931 by Marsha León, RN  Outcome: Resolved/Met  6/30/2022 0855 by Marsha León, RN  Outcome: Progressing Towards Goal  Goal: *Tolerating diet  6/30/2022 0931 by Marsha León, RN  Outcome: Resolved/Met  6/30/2022 0855 by Marsha León, RN  Outcome: Progressing Towards Goal  Goal: *Demonstrates progressive activity  6/30/2022 0931 by Marsha León, RN  Outcome: Resolved/Met  6/30/2022 0855 by Marsha León, RN  Outcome: Progressing Towards Goal  Goal: Activity/Safety  6/30/2022 0931 by Marsha León, RN  Outcome: Resolved/Met  6/30/2022 0855 by Marsha León, RN  Outcome: Progressing Towards Goal  Goal: Consults, if ordered  6/30/2022 0931 by Marsha León, RN  Outcome: Resolved/Met  6/30/2022 0855 by Marsha León, RN  Outcome: Progressing Towards Goal  Goal: Diagnostic Test/Procedures  6/30/2022 0931 by Dedrick Wheeler, RN  Outcome: Resolved/Met  6/30/2022 0855 by Dedrick Wheeler, RN  Outcome: Progressing Towards Goal  Goal: Nutrition/Diet  6/30/2022 0931 by Dedrick Wheeler, RN  Outcome: Resolved/Met  6/30/2022 0855 by Dedrick Wheeler, RN  Outcome: Progressing Towards Goal  Goal: Discharge Planning  6/30/2022 [place] by Dedrick Wheeler, RN  Outcome: Resolved/Met  6/30/2022 0855 by Dedrick Wheeler, RN  Outcome: Progressing Towards Goal  Goal: Medications  6/30/2022 0931 by Dedrick Wheeler, RN  Outcome: Resolved/Met  6/30/2022 0855 by Dedrick Wheeler, RN  Outcome: Progressing Towards Goal  Goal: Respiratory  6/30/2022 0931 by Dedrick Wheeler, RN  Outcome: Resolved/Met  6/30/2022 0855 by Dedrick Wheeler, RN  Outcome: Progressing Towards Goal  Goal: Treatments/Interventions/Procedures  6/30/2022 0931 by Dedrick Wheeler, RN  Outcome: Resolved/Met  6/30/2022 0855 by Dedrick Wheeler, RN  Outcome: Progressing Towards Goal  Goal: Psychosocial  6/30/2022 0931 by Dedrick Wheeler, RN  Outcome: Resolved/Met  6/30/2022 0855 by Dedrick Wheeler, RN  Outcome: Progressing Towards Goal     Problem: Sepsis: Day 4  Goal: Off Pathway (Use only if patient is Off Pathway)  6/30/2022 0931 by Dedrick Wheeler, RN  Outcome: Resolved/Met  6/30/2022 0855 by Dedrick Wheeler, RN  Outcome: Progressing Towards Goal  Goal: Activity/Safety  6/30/2022 0931 by Dedrick Wheeler, RN  Outcome: Resolved/Met  6/30/2022 0855 by Dedrick Wheeler, RN  Outcome: Progressing Towards Goal  Goal: Consults, if ordered  6/30/2022 0931 by Dedrick Wheeler, RN  Outcome: Resolved/Met  6/30/2022 0855 by Dedrick Wheeler, RN  Outcome: Progressing Towards Goal  Goal: Diagnostic Test/Procedures  6/30/2022 0931 by Dedrick Wheeler RN  Outcome: Resolved/Met  6/30/2022 0855 by Dedrick Wheeler RN  Outcome: Progressing Towards Goal  Goal: Nutrition/Diet  6/30/2022 0931 by Dedrick Wheeler RN  Outcome: Resolved/Met  6/30/2022 0855 by Mendel Haggis, RN  Outcome: Progressing Towards Goal  Goal: Discharge Planning  6/30/2022 0931 by Mendel Haggis, RN  Outcome: Resolved/Met  6/30/2022 0855 by Mendel Haggis, RN  Outcome: Progressing Towards Goal  Goal: Medications  6/30/2022 0931 by Mendel Haggis, RN  Outcome: Resolved/Met  6/30/2022 0855 by Mendel Haggis, RN  Outcome: Progressing Towards Goal  Goal: Respiratory  6/30/2022 0931 by Mendel Haggis, RN  Outcome: Resolved/Met  6/30/2022 0855 by Mendel Haggis, RN  Outcome: Progressing Towards Goal  Goal: Treatments/Interventions/Procedures  6/30/2022 0931 by Mendel Haggis, RN  Outcome: Resolved/Met  6/30/2022 0855 by Mendel Haggis, RN  Outcome: Progressing Towards Goal  Goal: Psychosocial  6/30/2022 0931 by Mendel Haggis, RN  Outcome: Resolved/Met  6/30/2022 0855 by Mendel Haggis, RN  Outcome: Progressing Towards Goal  Goal: *Oxygen saturation within defined limits  6/30/2022 0931 by Mendel Haggis, RN  Outcome: Resolved/Met  6/30/2022 0855 by Mendel Haggis, RN  Outcome: Progressing Towards Goal  Goal: *Hemodynamically stable  6/30/2022 0931 by Mendel Haggis, RN  Outcome: Resolved/Met  6/30/2022 0855 by Mendel Haggis, RN  Outcome: Progressing Towards Goal  Goal: *Vital signs within defined limits  6/30/2022 0931 by Mendel Haggis, RN  Outcome: Resolved/Met  6/30/2022 0855 by Mendel Haggis, RN  Outcome: Progressing Towards Goal  Goal: *Tolerating diet  6/30/2022 0931 by Mendel Haggis, RN  Outcome: Resolved/Met  6/30/2022 0855 by Mendel Haggis, RN  Outcome: Progressing Towards Goal  Goal: *Demonstrates progressive activity  6/30/2022 0931 by Mendel Haggis, RN  Outcome: Resolved/Met  6/30/2022 0855 by Mendel Haggis, RN  Outcome: Progressing Towards Goal  Goal: *Fluid volume maintenance  6/30/2022 0931 by Mendel Haggis, RN  Outcome: Resolved/Met  6/30/2022 0855 by Mendel Haggis, RN  Outcome: Progressing Towards Goal     Problem: Sepsis: Day 5  Goal: Off Pathway (Use only if patient is Off Pathway)  6/30/2022 0931 by Brigid Reyes RN  Outcome: Resolved/Met  6/30/2022 0855 by Brigid Reyes RN  Outcome: Progressing Towards Goal  Goal: *Oxygen saturation within defined limits  6/30/2022 0931 by Brigid Reyes, RN  Outcome: Resolved/Met  6/30/2022 0855 by Brigid Reyes RN  Outcome: Progressing Towards Goal  Goal: *Vital signs within defined limits  6/30/2022 0931 by Brigid Reyes, RN  Outcome: Resolved/Met  6/30/2022 0855 by Brigid Reyes RN  Outcome: Progressing Towards Goal  Goal: *Tolerating diet  6/30/2022 0931 by Brigid Reyes RN  Outcome: Resolved/Met  6/30/2022 0855 by Brigid Reyes RN  Outcome: Progressing Towards Goal  Goal: *Demonstrates progressive activity  6/30/2022 0931 by Brigid Reyes, RN  Outcome: Resolved/Met  6/30/2022 0855 by Brigid Reyes RN  Outcome: Progressing Towards Goal  Goal: *Discharge plan identified  6/30/2022 0931 by Brigid Reyes RN  Outcome: Resolved/Met  6/30/2022 0855 by Brigid Reyes RN  Outcome: Progressing Towards Goal  Goal: Activity/Safety  6/30/2022 0931 by Brigid Reyes RN  Outcome: Resolved/Met  6/30/2022 0855 by Brigid Reyes RN  Outcome: Progressing Towards Goal  Goal: Consults, if ordered  6/30/2022 0931 by Brigid Reyes, RN  Outcome: Resolved/Met  6/30/2022 0855 by Brigid Reyes RN  Outcome: Progressing Towards Goal  Goal: Diagnostic Test/Procedures  6/30/2022 0931 by Brigid Reyes, RN  Outcome: Resolved/Met  6/30/2022 0855 by Brigid Reyes RN  Outcome: Progressing Towards Goal  Goal: Nutrition/Diet  6/30/2022 0931 by Brigid Reyes, RN  Outcome: Resolved/Met  6/30/2022 0855 by Brigid Amy, RN  Outcome: Progressing Towards Goal  Goal: Discharge Planning  6/30/2022 [place] by Brigid Reyes RN  Outcome: Resolved/Met  6/30/2022 0855 by Brigid Reyes RN  Outcome: Progressing Towards Goal  Goal: Medications  6/30/2022 0931 by Brigid Reyes, SAMUEL  Outcome: Resolved/Met  6/30/2022 0855 by Cornelius Alejo Azul Mayes RN  Outcome: Progressing Towards Goal  Goal: Respiratory  6/30/2022 0931 by Valorie Cintron, RN  Outcome: Resolved/Met  6/30/2022 0855 by Valorie Cintron RN  Outcome: Progressing Towards Goal  Goal: Treatments/Interventions/Procedures  6/30/2022 0931 by Valorie Cintron, RN  Outcome: Resolved/Met  6/30/2022 0855 by Valorie Cintron RN  Outcome: Progressing Towards Goal  Goal: Psychosocial  6/30/2022 0931 by Valorie Cintron, RN  Outcome: Resolved/Met  6/30/2022 0855 by Valorie Cintron RN  Outcome: Progressing Towards Goal     Problem: Sepsis: Day 6  Goal: Off Pathway (Use only if patient is Off Pathway)  6/30/2022 0931 by Valorie Cintron, RN  Outcome: Resolved/Met  6/30/2022 0855 by Valorie Cintron RN  Outcome: Progressing Towards Goal  Goal: *Oxygen saturation within defined limits  6/30/2022 0931 by Valorie Cintron, RN  Outcome: Resolved/Met  6/30/2022 0855 by Valorie Cintron RN  Outcome: Progressing Towards Goal  Goal: *Vital signs within defined limits  6/30/2022 0931 by Valorie Cintron, RN  Outcome: Resolved/Met  6/30/2022 0855 by Valorie Cintron RN  Outcome: Progressing Towards Goal  Goal: *Tolerating diet  6/30/2022 0931 by Valorie Cintron, RN  Outcome: Resolved/Met  6/30/2022 0855 by Valorie Cintron RN  Outcome: Progressing Towards Goal  Goal: *Demonstrates progressive activity  6/30/2022 0931 by Valoire Cintron, RN  Outcome: Resolved/Met  6/30/2022 0855 by Valorie Cintron RN  Outcome: Progressing Towards Goal  Goal: Influenza immunization  6/30/2022 0931 by Valorie Cintron, RN  Outcome: Resolved/Met  6/30/2022 0855 by Valorie Cintron RN  Outcome: Progressing Towards Goal  Goal: *Pneumococcal immunization  6/30/2022 0931 by Valorie Cintron, RN  Outcome: Resolved/Met  6/30/2022 0855 by Valorie Cintron RN  Outcome: Progressing Towards Goal  Goal: Activity/Safety  6/30/2022 0931 by Valorie Cintron, RN  Outcome: Resolved/Met  6/30/2022 0855 by Valorie Cintron, RN  Outcome: Progressing Towards Goal  Goal: Diagnostic Test/Procedures  6/30/2022 0931 by Marsha León, RN  Outcome: Resolved/Met  6/30/2022 0855 by Marsha León, RN  Outcome: Progressing Towards Goal  Goal: Nutrition/Diet  6/30/2022 0931 by Marsha León, RN  Outcome: Resolved/Met  6/30/2022 0855 by Marsha León, RN  Outcome: Progressing Towards Goal  Goal: Discharge Planning  6/30/2022 0931 by Marsha León, RN  Outcome: Resolved/Met  6/30/2022 0855 by Marsha León, RN  Outcome: Progressing Towards Goal  Goal: Medications  6/30/2022 0931 by Marsha León, RN  Outcome: Resolved/Met  6/30/2022 0855 by Marsha León, RN  Outcome: Progressing Towards Goal  Goal: Respiratory  6/30/2022 0931 by Marsha León, RN  Outcome: Resolved/Met  6/30/2022 0855 by Marsha León, RN  Outcome: Progressing Towards Goal  Goal: Treatments/Interventions/Procedures  6/30/2022 0931 by Marsha León, RN  Outcome: Resolved/Met  6/30/2022 0855 by Marsha León, RN  Outcome: Progressing Towards Goal  Goal: Psychosocial  6/30/2022 0931 by Marsha León, RN  Outcome: Resolved/Met  6/30/2022 0855 by Marsha León RN  Outcome: Progressing Towards Goal     Problem: Sepsis: Discharge Outcomes  Goal: *Vital signs within defined limits  6/30/2022 0931 by Marsha León, RN  Outcome: Resolved/Met  6/30/2022 0855 by Marsha León, RN  Outcome: Progressing Towards Goal  Goal: *Tolerating diet  6/30/2022 0931 by Marsha León, RN  Outcome: Resolved/Met  6/30/2022 0855 by Marsha León, RN  Outcome: Progressing Towards Goal  Goal: *Verbalizes understanding and describes prescribed diet  6/30/2022 0931 by Marsha León, RN  Outcome: Resolved/Met  6/30/2022 0855 by Marsha León, RN  Outcome: Progressing Towards Goal  Goal: *Demonstrates progressive activity  6/30/2022 0931 by Marsha León, RN  Outcome: Resolved/Met  6/30/2022 0855 by Marsha León, RN  Outcome: Progressing Towards Goal  Goal: *Describes follow-up/return visits to physicians  6/30/2022 0931 by Marsha León, RN  Outcome: Resolved/Met  6/30/2022 0855 by Oretses Davies, RN  Outcome: Progressing Towards Goal  Goal: Johnny Dura name, dosage, time, side effects, and number of days to continue medications  6/30/2022 0931 by Orestes Davies, RN  Outcome: Resolved/Met  6/30/2022 0855 by Orestes Davies, RN  Outcome: Progressing Towards Goal  Goal: *Influenza immunization (Oct-Mar only)  6/30/2022 0931 by Orestes Davies, RN  Outcome: Resolved/Met  6/30/2022 0855 by Orestes Davies, RN  Outcome: Progressing Towards Goal  Goal: *Pneumococcal immunization  6/30/2022 0931 by Orestes Davies, RN  Outcome: Resolved/Met  6/30/2022 0855 by Orestes Davies, RN  Outcome: Progressing Towards Goal  Goal: *Lungs clear or at baseline  6/30/2022 0931 by Orestes Davies, RN  Outcome: Resolved/Met  6/30/2022 0855 by Orestes Davies, RN  Outcome: Progressing Towards Goal  Goal: *Oxygen saturation returns to baseline or 90% or better on room air  6/30/2022 0931 by Orestes Davies, RN  Outcome: Resolved/Met  6/30/2022 0855 by Orestes Davies, RN  Outcome: Progressing Towards Goal  Goal: *Glycemic control  6/30/2022 0931 by Orestes Davies, RN  Outcome: Resolved/Met  6/30/2022 0855 by Orestes Davies, RN  Outcome: Progressing Towards Goal  Goal: *Absence of deep venous thrombosis signs and symptoms(Stroke Metric)  6/30/2022 0931 by Orestes Davies, RN  Outcome: Resolved/Met  6/30/2022 0855 by Orestes Davies, RN  Outcome: Progressing Towards Goal  Goal: *Describes available resources and support systems  6/30/2022 0931 by Orestes Davies, RN  Outcome: Resolved/Met  6/30/2022 0855 by Orestes Davies, RN  Outcome: Progressing Towards Goal  Goal: *Optimal pain control at patient's stated goal  6/30/2022 0931 by Orestes Davies, RN  Outcome: Resolved/Met  6/30/2022 0855 by Orestes Davies, RN  Outcome: Progressing Towards Goal
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
## 2022-06-30 NOTE — PROGRESS NOTES
Problem: Pressure Injury - Risk of  Goal: *Prevention of pressure injury  Description: Document Russell Scale and appropriate interventions in the flowsheet. Outcome: Progressing Towards Goal  Note: Pressure Injury Interventions:  Sensory Interventions: Assess changes in LOC    Moisture Interventions: Absorbent underpads,Apply protective barrier, creams and emollients    Activity Interventions: Chair cushion,Increase time out of bed    Mobility Interventions: HOB 30 degrees or less    Nutrition Interventions: Offer support with meals,snacks and hydration    Friction and Shear Interventions: Feet elevated on foot rest                Problem: Patient Education: Go to Patient Education Activity  Goal: Patient/Family Education  Outcome: Progressing Towards Goal     Problem: Falls - Risk of  Goal: *Absence of Falls  Description: Document Dora Fall Risk and appropriate interventions in the flowsheet. Outcome: Progressing Towards Goal  Note: Fall Risk Interventions:  Mobility Interventions: Bed/chair exit alarm         Medication Interventions: Bed/chair exit alarm    Elimination Interventions: Bed/chair exit alarm,Call light in reach              Problem: Patient Education: Go to Patient Education Activity  Goal: Patient/Family Education  Outcome: Progressing Towards Goal     Problem: Falls - Risk of  Goal: *Absence of Falls  Description: Document Dora Fall Risk and appropriate interventions in the flowsheet.   Outcome: Progressing Towards Goal  Note: Fall Risk Interventions:  Mobility Interventions: Bed/chair exit alarm         Medication Interventions: Bed/chair exit alarm    Elimination Interventions: Bed/chair exit alarm,Call light in reach              Problem: Patient Education: Go to Patient Education Activity  Goal: Patient/Family Education  Outcome: Progressing Towards Goal     Problem: Sepsis: Day of Diagnosis  Goal: Off Pathway (Use only if patient is Off Pathway)  Outcome: Progressing Towards Goal  Goal: *Fluid resuscitation  Outcome: Progressing Towards Goal  Goal: *Pneumococcal immunization (if eligible)  Outcome: Progressing Towards Goal  Goal: *Influenza immunization (if eligible)  Outcome: Progressing Towards Goal  Goal: Activity/Safety  Outcome: Progressing Towards Goal  Goal: Consults, if ordered  Outcome: Progressing Towards Goal  Goal: Diagnostic Test/Procedures  Outcome: Progressing Towards Goal  Goal: Nutrition/Diet  Outcome: Progressing Towards Goal  Goal: Discharge Planning  Outcome: Progressing Towards Goal  Goal: Medications  Outcome: Progressing Towards Goal  Goal: Respiratory  Outcome: Progressing Towards Goal  Goal: Treatments/Interventions/Procedures  Outcome: Progressing Towards Goal  Goal: Psychosocial  Outcome: Progressing Towards Goal     Problem: Sepsis: Day 2  Goal: Off Pathway (Use only if patient is Off Pathway)  Outcome: Progressing Towards Goal  Goal: *Central Venous Pressure maintained at 8-12 mm Hg  Outcome: Progressing Towards Goal  Goal: *Hemodynamically stable  Outcome: Progressing Towards Goal  Goal: *Tolerating diet  Outcome: Progressing Towards Goal  Goal: Activity/Safety  Outcome: Progressing Towards Goal  Goal: Consults, if ordered  Outcome: Progressing Towards Goal  Goal: Diagnostic Test/Procedures  Outcome: Progressing Towards Goal  Goal: Nutrition/Diet  Outcome: Progressing Towards Goal  Goal: Discharge Planning  Outcome: Progressing Towards Goal  Goal: Medications  Outcome: Progressing Towards Goal  Goal: Respiratory  Outcome: Progressing Towards Goal  Goal: Treatments/Interventions/Procedures  Outcome: Progressing Towards Goal  Goal: Psychosocial  Outcome: Progressing Towards Goal     Problem: Sepsis: Day 3  Goal: Off Pathway (Use only if patient is Off Pathway)  Outcome: Progressing Towards Goal  Goal: *Central Venous Pressure maintained at 8-12 mm Hg  Outcome: Progressing Towards Goal  Goal: *Oxygen saturation within defined limits  Outcome: Progressing Towards Goal  Goal: *Vital sign stability  Outcome: Progressing Towards Goal  Goal: *Tolerating diet  Outcome: Progressing Towards Goal  Goal: *Demonstrates progressive activity  Outcome: Progressing Towards Goal  Goal: Activity/Safety  Outcome: Progressing Towards Goal  Goal: Consults, if ordered  Outcome: Progressing Towards Goal  Goal: Diagnostic Test/Procedures  Outcome: Progressing Towards Goal  Goal: Nutrition/Diet  Outcome: Progressing Towards Goal  Goal: Discharge Planning  Outcome: Progressing Towards Goal  Goal: Medications  Outcome: Progressing Towards Goal  Goal: Respiratory  Outcome: Progressing Towards Goal  Goal: Treatments/Interventions/Procedures  Outcome: Progressing Towards Goal  Goal: Psychosocial  Outcome: Progressing Towards Goal     Problem: Sepsis: Day 5  Goal: Off Pathway (Use only if patient is Off Pathway)  Outcome: Progressing Towards Goal  Goal: *Oxygen saturation within defined limits  Outcome: Progressing Towards Goal  Goal: *Vital signs within defined limits  Outcome: Progressing Towards Goal  Goal: *Tolerating diet  Outcome: Progressing Towards Goal  Goal: *Demonstrates progressive activity  Outcome: Progressing Towards Goal  Goal: *Discharge plan identified  Outcome: Progressing Towards Goal  Goal: Activity/Safety  Outcome: Progressing Towards Goal  Goal: Consults, if ordered  Outcome: Progressing Towards Goal  Goal: Diagnostic Test/Procedures  Outcome: Progressing Towards Goal  Goal: Nutrition/Diet  Outcome: Progressing Towards Goal  Goal: Discharge Planning  Outcome: Progressing Towards Goal  Goal: Medications  Outcome: Progressing Towards Goal  Goal: Respiratory  Outcome: Progressing Towards Goal  Goal: Treatments/Interventions/Procedures  Outcome: Progressing Towards Goal  Goal: Psychosocial  Outcome: Progressing Towards Goal     Problem: Sepsis: Day 6  Goal: Off Pathway (Use only if patient is Off Pathway)  Outcome: Progressing Towards Goal  Goal: *Oxygen saturation within defined limits  Outcome: Progressing Towards Goal  Goal: *Vital signs within defined limits  Outcome: Progressing Towards Goal  Goal: *Tolerating diet  Outcome: Progressing Towards Goal  Goal: *Demonstrates progressive activity  Outcome: Progressing Towards Goal  Goal: Influenza immunization  Outcome: Progressing Towards Goal  Goal: *Pneumococcal immunization  Outcome: Progressing Towards Goal  Goal: Activity/Safety  Outcome: Progressing Towards Goal  Goal: Diagnostic Test/Procedures  Outcome: Progressing Towards Goal  Goal: Nutrition/Diet  Outcome: Progressing Towards Goal  Goal: Discharge Planning  Outcome: Progressing Towards Goal  Goal: Medications  Outcome: Progressing Towards Goal  Goal: Respiratory  Outcome: Progressing Towards Goal  Goal: Treatments/Interventions/Procedures  Outcome: Progressing Towards Goal  Goal: Psychosocial  Outcome: Progressing Towards Goal     Problem: Sepsis: Discharge Outcomes  Goal: *Vital signs within defined limits  Outcome: Progressing Towards Goal  Goal: *Tolerating diet  Outcome: Progressing Towards Goal  Goal: *Verbalizes understanding and describes prescribed diet  Outcome: Progressing Towards Goal  Goal: *Demonstrates progressive activity  Outcome: Progressing Towards Goal  Goal: *Describes follow-up/return visits to physicians  Outcome: Progressing Towards Goal  Goal: *Verbalizes name, dosage, time, side effects, and number of days to continue medications  Outcome: Progressing Towards Goal  Goal: *Influenza immunization (Oct-Mar only)  Outcome: Progressing Towards Goal  Goal: *Pneumococcal immunization  Outcome: Progressing Towards Goal  Goal: *Lungs clear or at baseline  Outcome: Progressing Towards Goal  Goal: *Oxygen saturation returns to baseline or 90% or better on room air  Outcome: Progressing Towards Goal  Goal: *Glycemic control  Outcome: Progressing Towards Goal  Goal: *Absence of deep venous thrombosis signs and symptoms(Stroke Metric)  Outcome: Progressing Towards Goal  Goal: *Describes available resources and support systems  Outcome: Progressing Towards Goal  Goal: *Optimal pain control at patient's stated goal  Outcome: Progressing Towards Goal

## 2022-06-30 NOTE — PROGRESS NOTES
Transition of Care Plan:     RUR: 23% - \"high risk\"  Disposition: Home [address]), [address] for TPN, & f/u apts   Follow up appointments: PCP & specialist as indicated   DME needed: No DME needs identified for d/c  Transportation at Discharge: Pt's mother will provide transportation at d/c; CM will f/u with pt to confirm mother's anticipated arrival time   Keys or means to access home: Pt has access to his home       IM Medicare Letter: N/A - Medicaid coverage  Is patient a BCPI-A Bundle: N/A        Is patient a  and connected with the [facility] [address] Contact: Pt's mother Joseline Elena [phone])  Discharge Caregiver contacted prior to discharge? Per request  Care Conference needed?: N/A    Update - 3:40 PM: MD reported pt is not medically stable for d/c today; CM will update [facility] Harjinder Pham. CM will continue to follow & remain accessible for d/c planning. Update - 9:46 AM: CM met with pt at bedside, reviewed plan for d/c, and confirmed he's agreeable; no additional questions/concerns identified. CM provided private duty care resources at bedside. Pt reported he will contact his mother to request transport once he's able to confirm time-frame anti-fungal treatment will be completed; pt reported he's waiting for RN to confirm. Initial note: CM acknowledged d/c. CM contacted Marzena Coon ([phone]) to f/u on status of TPN. BEKA spoke with staff member Leory Hood who reported TPN formula has been made & is expected to be delivered to pt's home around 12:00 PM today. Mario Estrada reported the office has already contacted the pt & informed him of information detailed above. Mario Estrada reported there's no need for a bedside education session to take place prior to d/c.    CM informed [facility] of d/c today; agency to resume services. Both [facility]'s refected in AVS for reference.  PCP f/u apt secured for 7/7/22 at 10:45 AM; apt information reflected in AVS. [facility]'s information also reflected in AVS for reference. CM met with pt at bedside 6/29/22 & informed of of updates associated with disposition, as well as JORGE for today. Pt confirmed his mother Marsha Manzano) will provide transportation at d/c. Pt requested for CM to provide resources for private duty care; CM compiled several resources including: [employer], [facility] [place], [employer], [facility], and [employer]. CM will meet with pt at bedside to review plan for d/c, provide private duty care resources detailed above, and confirm mother's anticipated arrival time. CM will continue to follow & remain accessible for d/c planning. Care Management Interventions  PCP Verified by CM: Yes  Palliative Care Criteria Met (RRAT>21 & CHF Dx)?: Yes (High RUR)  Palliative Consult Recommended?: No  Reason Palliative Care Not Recommended?: Palliative Care not utilized by provider  Mode of Transport at Discharge:  Other (see comment) (Pt's mother will provide transportation at d/c)  Transition of Care Consult (CM Consult): Home Health,Other (home infusion for TPN)  [facility] - INPATIENT: No  Reason Outside [place]: Patient already serviced by other home care/hospice agency  Discharge [address]: No  Physical Therapy Consult: No  Occupational Therapy Consult: No  Speech Therapy Consult: No  Support Systems: Parent(s)  Confirm Follow Up Transport: Family  [place] Resource Information Provided?: No  Discharge Location  Patient Expects to be Discharged to[de-identified] Home with home health ([address] [facility] Ronan (RN), [address] for TPN, & f/u apts)    Sherrell Luna, [address] [facility], [address] [facility]

## 2022-07-01 LAB
GLUCOSE BLD STRIP.AUTO-MCNC: 108 MG/DL (ref 65–117)
GLUCOSE BLD STRIP.AUTO-MCNC: 112 MG/DL (ref 65–117)
GLUCOSE BLD STRIP.AUTO-MCNC: 121 MG/DL (ref 65–117)
SERVICE CMNT-IMP: ABNORMAL
SERVICE CMNT-IMP: NORMAL
SERVICE CMNT-IMP: NORMAL

## 2022-07-01 PROCEDURE — 74011000250 HC RX REV CODE- 250: Performed by: INTERNAL MEDICINE

## 2022-07-01 PROCEDURE — 74011250636 HC RX REV CODE- 250/636: Performed by: INTERNAL MEDICINE

## 2022-07-01 PROCEDURE — 65270000046 HC RM TELEMETRY

## 2022-07-01 PROCEDURE — 74011000258 HC RX REV CODE- 258: Performed by: INTERNAL MEDICINE

## 2022-07-01 PROCEDURE — 82962 GLUCOSE BLOOD TEST: CPT

## 2022-07-01 RX ORDER — LIDOCAINE 4 G/100G
1 PATCH TOPICAL EVERY 24 HOURS
Status: DISCONTINUED | OUTPATIENT
Start: 2022-07-01 | End: 2022-07-06 | Stop reason: HOSPADM

## 2022-07-01 RX ORDER — SODIUM CHLORIDE 9 MG/ML
70 INJECTION, SOLUTION INTRAVENOUS CONTINUOUS
Status: DISCONTINUED | OUTPATIENT
Start: 2022-07-01 | End: 2022-07-01

## 2022-07-01 RX ADMIN — SODIUM CHLORIDE, PRESERVATIVE FREE 10 ML: 5 INJECTION INTRAVENOUS at 23:23

## 2022-07-01 RX ADMIN — ENOXAPARIN SODIUM 60 MG: 100 INJECTION SUBCUTANEOUS at 23:37

## 2022-07-01 RX ADMIN — MICONAZOLE NITRATE: 20 CREAM TOPICAL at 23:25

## 2022-07-01 RX ADMIN — MAGNESIUM SULFATE HEPTAHYDRATE: 500 INJECTION, SOLUTION INTRAMUSCULAR; INTRAVENOUS at 18:08

## 2022-07-01 RX ADMIN — I.V. FAT EMULSION 500 ML: 20 EMULSION INTRAVENOUS at 23:17

## 2022-07-01 RX ADMIN — SODIUM CHLORIDE 70 ML/HR: 9 INJECTION, SOLUTION INTRAVENOUS at 12:03

## 2022-07-01 RX ADMIN — SODIUM CHLORIDE, PRESERVATIVE FREE 10 ML: 5 INJECTION INTRAVENOUS at 05:30

## 2022-07-01 RX ADMIN — SODIUM CHLORIDE, PRESERVATIVE FREE 10 ML: 5 INJECTION INTRAVENOUS at 00:51

## 2022-07-01 RX ADMIN — SODIUM CHLORIDE, PRESERVATIVE FREE 10 ML: 5 INJECTION INTRAVENOUS at 14:00
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
## 2022-07-01 NOTE — PROGRESS NOTES
Problem: Infection - Risk of, Urinary Catheter-Associated Urinary Tract Infection  Goal: *Absence of infection signs and symptoms  Outcome: Progressing Towards Goal     Problem: Deep Venous Thrombosis - Risk of  Goal: *Absence of deep venous thrombosis signs and symptoms(Stroke Metric)  Outcome: Progressing Towards Goal  Goal: *Absence of impaired coagulation signs and symptoms  Outcome: Progressing Towards Goal  Goal: *Knowledge of prescribed medications  Outcome: Progressing Towards Goal  Goal: *Absence of bleeding  Outcome: Progressing Towards Goal     Problem: Patient Education: Go to Patient Education Activity  Goal: Patient/Family Education  Outcome: Progressing Towards Goal     Problem: Patient Education: Go to Patient Education Activity  Goal: Patient/Family Education  Outcome: Progressing Towards Goal     Problem: Dysphagia (Adult)  Goal: *Speech Goal: (INSERT TEXT)  Outcome: Progressing Towards Goal     Problem: Patient Education: Go to Patient Education Activity  Goal: Patient/Family Education  Outcome: Progressing Towards Goal     Problem: Dysphagia (Adult)  Goal: *Speech Goal: (INSERT TEXT)  Outcome: Progressing Towards Goal

## 2022-07-01 NOTE — PROGRESS NOTES
[facility] at [address], [place], [address]  W: [phone] F: [phone]    Consult received by Edilberto Hanson Dr, patient has been followed recently by [facility]. Please consult for continuity of care.
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
## 2022-07-01 NOTE — PROGRESS NOTES
Comprehensive Nutrition Assessment    Type and Reason for Visit: Reassess    Nutrition Recommendations/Plan:   1. Continue TPN D20/5%AA @ 75 ml/hr as ordered + 500 ml 20% lipids which provides daily approx. 2012 kcals/90 g protein/360 g CHO. 2. Please consider advancing diet to full liquids from clear liquids as pt typically tolerates full liquids on prior admission; pt requesting diet advancement for greater options. 3. Please document % meals and supplements consumed in flowsheet I/O's under intake. Nutrition Assessment:     7/1: Chart reviewed; med noted for MRSA bacteremia and PICC line infection on admission. Pt receives chronic TPN in the home environment per short gut syndrome. Pt sitting up in bed at time of visit, munching on jose crackers without difficulty. Pt currently on a clear liquid diet but reports he would like diet advanced to the next level (I.e. full liquids) as this is what pt usually tolerates on prior admissions. Current TPN continues to meet est nutritional needs. Pt is hopeful to be discharged home soon. Last Weight Metric  Weight Loss Metrics 6/29/2022 5/4/2022 4/26/2022 4/23/2022 4/10/2022 2/17/2022 2/8/2022   Today's Wt 131 lb 3.2 oz 136 lb 11 oz - 120 lb 9.5 oz 125 lb 3.5 oz - 100 lb   BMI 18.83 kg/m2 - 19.61 kg/m2 17.3 kg/m2 17.97 kg/m2 14.77 kg/m2 14.77 kg/m2     Patient Vitals for the past 168 hrs:   % Diet Eaten   06/28/22 1822 26 - 50%     Nutrition Related Findings:      Wound Type: None    Current Nutrition Intake & Therapies:  Average Meal Intake: NPO     ADULT DIET Clear Liquid  TPN ADULT - CENTRAL AA 5% D20% W/ CA + ELECTROLYTES    Anthropometric Measures:  Height: 5' 10\" (177.8 cm)  Ideal Body Weight (IBW): 166 lbs (75 kg)     Current Body Wt:  65.2 kg (143 lb 11.8 oz), 79.4 % IBW. Bed scale  Current BMI (kg/m2): 20.6                          BMI Category: Normal weight (BMI 18.5-24. 9)    Estimated Daily Nutrient Needs:  Energy Requirements Based On: Formula  Weight Used for Energy Requirements: Current  Energy (kcal/day): 0528-6218 kcal (BMR 1556 x 1.2AF +250kcal)  Weight Used for Protein Requirements: Current  Protein (g/day): 85-104g (1.3-1.6 g/kg bw)  Method Used for Fluid Requirements: 1 ml/kcal  Fluid (ml/day): 1850 mL    Nutrition Diagnosis:   · Altered GI function related to altered GI structure as evidenced by nutrition support-parenteral nutrition      Nutrition Interventions:   Food and/or Nutrient Delivery: Continue parenteral nutrition  Nutrition Education/Counseling: No recommendations at this time  Coordination of Nutrition Care: Continue to monitor while inpatient       Goals:  Previous Goal Met: Goal(s) achieved  Goals:  (TPN at goal rate and lytes WNL next 2-4 days)       Nutrition Monitoring and Evaluation:   Behavioral-Environmental Outcomes: None identified  Food/Nutrient Intake Outcomes: Parenteral nutrition intake/tolerance  Physical Signs/Symptoms Outcomes: Biochemical data,Weight    Discharge Planning:    Parenteral nutrition    Juan Miguel Marinelli RD  Contact:

## 2022-07-02 ENCOUNTER — APPOINTMENT (OUTPATIENT)
Dept: GENERAL RADIOLOGY | Age: 42
DRG: 206 | End: 2022-07-02
Attending: INTERNAL MEDICINE
Payer: MEDICAID

## 2022-07-02 LAB
ANION GAP SERPL CALC-SCNC: 6 MMOL/L (ref 5–15)
BUN SERPL-MCNC: 13 MG/DL (ref 6–20)
BUN/CREAT SERPL: 18 (ref 12–20)
CALCIUM SERPL-MCNC: 9 MG/DL (ref 8.5–10.1)
CHLORIDE SERPL-SCNC: 103 MMOL/L (ref 97–108)
CO2 SERPL-SCNC: 27 MMOL/L (ref 21–32)
CREAT SERPL-MCNC: 0.73 MG/DL (ref 0.7–1.3)
GLUCOSE BLD STRIP.AUTO-MCNC: 101 MG/DL (ref 65–117)
GLUCOSE BLD STRIP.AUTO-MCNC: 105 MG/DL (ref 65–117)
GLUCOSE BLD STRIP.AUTO-MCNC: 112 MG/DL (ref 65–117)
GLUCOSE BLD STRIP.AUTO-MCNC: 122 MG/DL (ref 65–117)
GLUCOSE BLD STRIP.AUTO-MCNC: 95 MG/DL (ref 65–117)
GLUCOSE SERPL-MCNC: 105 MG/DL (ref 65–100)
POTASSIUM SERPL-SCNC: 3.8 MMOL/L (ref 3.5–5.1)
SERVICE CMNT-IMP: ABNORMAL
SERVICE CMNT-IMP: NORMAL
SODIUM SERPL-SCNC: 136 MMOL/L (ref 136–145)

## 2022-07-02 PROCEDURE — 74011250636 HC RX REV CODE- 250/636: Performed by: INTERNAL MEDICINE

## 2022-07-02 PROCEDURE — 80048 BASIC METABOLIC PNL TOTAL CA: CPT

## 2022-07-02 PROCEDURE — 94760 N-INVAS EAR/PLS OXIMETRY 1: CPT

## 2022-07-02 PROCEDURE — 74011000258 HC RX REV CODE- 258: Performed by: INTERNAL MEDICINE

## 2022-07-02 PROCEDURE — 73080 X-RAY EXAM OF ELBOW: CPT

## 2022-07-02 PROCEDURE — 82962 GLUCOSE BLOOD TEST: CPT

## 2022-07-02 PROCEDURE — 74011250637 HC RX REV CODE- 250/637: Performed by: INTERNAL MEDICINE

## 2022-07-02 PROCEDURE — 87205 SMEAR GRAM STAIN: CPT

## 2022-07-02 PROCEDURE — 74011000250 HC RX REV CODE- 250: Performed by: INTERNAL MEDICINE

## 2022-07-02 PROCEDURE — 36415 COLL VENOUS BLD VENIPUNCTURE: CPT

## 2022-07-02 PROCEDURE — 82525 ASSAY OF COPPER: CPT

## 2022-07-02 PROCEDURE — 65270000046 HC RM TELEMETRY

## 2022-07-02 RX ORDER — VANCOMYCIN/0.9 % SOD CHLORIDE 1.5G/250ML
1500 PLASTIC BAG, INJECTION (ML) INTRAVENOUS ONCE
Status: COMPLETED | OUTPATIENT
Start: 2022-07-02 | End: 2022-07-02

## 2022-07-02 RX ADMIN — SODIUM CHLORIDE, PRESERVATIVE FREE 10 ML: 5 INJECTION INTRAVENOUS at 23:01

## 2022-07-02 RX ADMIN — ENOXAPARIN SODIUM 60 MG: 100 INJECTION SUBCUTANEOUS at 13:22

## 2022-07-02 RX ADMIN — ENOXAPARIN SODIUM 60 MG: 100 INJECTION SUBCUTANEOUS at 23:00

## 2022-07-02 RX ADMIN — SODIUM CHLORIDE, PRESERVATIVE FREE 10 ML: 5 INJECTION INTRAVENOUS at 14:00

## 2022-07-02 RX ADMIN — MAGNESIUM SULFATE HEPTAHYDRATE: 500 INJECTION, SOLUTION INTRAMUSCULAR; INTRAVENOUS at 19:31

## 2022-07-02 RX ADMIN — SODIUM CHLORIDE, PRESERVATIVE FREE 10 ML: 5 INJECTION INTRAVENOUS at 06:45

## 2022-07-02 RX ADMIN — MICONAZOLE NITRATE: 20 CREAM TOPICAL at 23:04

## 2022-07-02 RX ADMIN — VANCOMYCIN HYDROCHLORIDE 1500 MG: 10 INJECTION, POWDER, LYOPHILIZED, FOR SOLUTION INTRAVENOUS at 14:16

## 2022-07-02 NOTE — PROGRESS NOTES
Problem: Deep Venous Thrombosis - Risk of  Goal: *Knowledge of prescribed medications  Outcome: Not Progressing Towards Goal     Problem: Patient Education: Go to Patient Education Activity  Goal: Patient/Family Education  Outcome: Not Progressing Towards Goal     Pt refusing lovenox even after educating on benefits of med and risl\ks of not taking it
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
## 2022-07-02 NOTE — PROGRESS NOTES
Pharmacy Antimicrobial Kinetic Dosing    Indication for Antimicrobials: Abscess on chin    Current Regimen of Each Antimicrobial:  Vancomycin - Pharmacy to Dose (Start Date ; Day # 1)    Previous Antimicrobial Therapy:      Goal Level: AUC: 400-600 mg/hr/Liter/day    Date Dose & Interval Measured (mcg/mL) Predicted AUC/MARIE                       Date & time of next level:     Dosing calculator used: [email or web address] calculator    Significant Positive Cultures:   None for abscess yet    Conditions for Dosing Consideration: None    Labs:  Recent Labs     22  0417   CREA 0.73   BUN 13     No results for input(s): WBC, BANDS in the last 72 hours. Temp (24hrs), Av.4 °F (36.9 °C), Min:97.8 °F (36.6 °C), Max:99.2 °F (37.3 °C)        Creatinine Clearance (mL/min):   CrCl (Ideal Body Weight): 136.1   If actual weight < IBW: CrCl (Actual Body Weight) 111.0    Impression/Plan:   Vancomycin dosed for AUC of 467 for abscess  Antimicrobial stop date TBD     Pharmacy will follow daily and adjust medications as appropriate for renal function and/or serum levels.     Thank you,  Glenn Garcia, PHARMD    Vancomycin Dosing Document    Documents located on pharmacy Teams site: Clinical Practice -> Antimicrobial Stewardship -> Antibiotics_Vancomycin     Aminoglycoside Dosing Document    Documents located on pharmacy Teams site: Clinical Practice -> Antimicrobial Stewardship -> Antibiotics_Aminoglycosides

## 2022-07-02 NOTE — PROGRESS NOTES
Problem: Deep Venous Thrombosis - Risk of  Goal: *Absence of deep venous thrombosis signs and symptoms(Stroke Metric)  Outcome: Progressing Towards Goal  Goal: *Knowledge of prescribed medications  Outcome: Not Progressing Towards Goal     Problem: Patient Education: Go to Patient Education Activity  Goal: Patient/Family Education  Outcome: Not Progressing Towards Goal

## 2022-07-02 NOTE — PROGRESS NOTES
Pt has a draining abcess on the left side of chin. States it has been there \"for awhile\" Copius amounts of yellow green pus present dressing applied was saturated. Replaced with ABD pad. Will have day shift ask to have attending exam and consult general surgery.     Wound Care consult ordered per protocol
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
## 2022-07-02 NOTE — PROGRESS NOTES
Consulted received for 'patient wants his J-tube removed and wants someone to look at his G-tube.'  Appears this consult was called a few days ago as well to one of my partners but, I don't see any documentation. I have several emergent surgeries today. Will see patient as soon as I can. Will likely be tomorrow at this rate.
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
## 2022-07-03 LAB
ANION GAP SERPL CALC-SCNC: 6 MMOL/L (ref 5–15)
BUN SERPL-MCNC: 11 MG/DL (ref 6–20)
BUN/CREAT SERPL: 16 (ref 12–20)
CALCIUM SERPL-MCNC: 8.9 MG/DL (ref 8.5–10.1)
CHLORIDE SERPL-SCNC: 106 MMOL/L (ref 97–108)
CO2 SERPL-SCNC: 24 MMOL/L (ref 21–32)
CREAT SERPL-MCNC: 0.67 MG/DL (ref 0.7–1.3)
GLUCOSE BLD STRIP.AUTO-MCNC: 103 MG/DL (ref 65–117)
GLUCOSE BLD STRIP.AUTO-MCNC: 112 MG/DL (ref 65–117)
GLUCOSE BLD STRIP.AUTO-MCNC: 97 MG/DL (ref 65–117)
GLUCOSE SERPL-MCNC: 108 MG/DL (ref 65–100)
POTASSIUM SERPL-SCNC: 3.6 MMOL/L (ref 3.5–5.1)
SERVICE CMNT-IMP: NORMAL
SODIUM SERPL-SCNC: 136 MMOL/L (ref 136–145)

## 2022-07-03 PROCEDURE — 80048 BASIC METABOLIC PNL TOTAL CA: CPT

## 2022-07-03 PROCEDURE — 99221 1ST HOSP IP/OBS SF/LOW 40: CPT | Performed by: SURGERY

## 2022-07-03 PROCEDURE — 74011250636 HC RX REV CODE- 250/636: Performed by: INTERNAL MEDICINE

## 2022-07-03 PROCEDURE — 65270000046 HC RM TELEMETRY

## 2022-07-03 PROCEDURE — 74011000250 HC RX REV CODE- 250: Performed by: INTERNAL MEDICINE

## 2022-07-03 PROCEDURE — 36415 COLL VENOUS BLD VENIPUNCTURE: CPT

## 2022-07-03 PROCEDURE — 82962 GLUCOSE BLOOD TEST: CPT

## 2022-07-03 PROCEDURE — 74011000258 HC RX REV CODE- 258: Performed by: INTERNAL MEDICINE

## 2022-07-03 RX ADMIN — SODIUM CHLORIDE, PRESERVATIVE FREE 10 ML: 5 INJECTION INTRAVENOUS at 21:48

## 2022-07-03 RX ADMIN — MICONAZOLE NITRATE: 20 CREAM TOPICAL at 08:54

## 2022-07-03 RX ADMIN — SODIUM CHLORIDE 500 ML: 9 INJECTION, SOLUTION INTRAVENOUS at 21:47

## 2022-07-03 RX ADMIN — MICONAZOLE NITRATE: 20 CREAM TOPICAL at 22:04

## 2022-07-03 RX ADMIN — POTASSIUM PHOSPHATE, MONOBASIC POTASSIUM PHOSPHATE, DIBASIC: 224; 236 INJECTION, SOLUTION, CONCENTRATE INTRAVENOUS at 17:39

## 2022-07-03 RX ADMIN — SODIUM CHLORIDE, PRESERVATIVE FREE 10 ML: 5 INJECTION INTRAVENOUS at 05:02

## 2022-07-03 RX ADMIN — SODIUM CHLORIDE, PRESERVATIVE FREE 10 ML: 5 INJECTION INTRAVENOUS at 14:40
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
## 2022-07-03 NOTE — ROUTINE PROCESS
Bedside shift change report given to [facility] (oncoming nurse) by Sosa MACIAS RN (offgoing nurse). Report included the following information SBAR, Kardex and MAR.

## 2022-07-03 NOTE — PROGRESS NOTES
Problem: Falls - Risk of  Goal: *Absence of Falls  Description: Document Barrett Reason Fall Risk and appropriate interventions in the flowsheet.   Outcome: Progressing Towards Goal  Note: Fall Risk Interventions:  Mobility Interventions: Utilize walker, cane, or other assistive device,Patient to call before getting OOB         Medication Interventions: Patient to call before getting OOB,Teach patient to arise slowly    Elimination Interventions: Elevated toilet seat,Call light in reach              Problem: Patient Education: Go to Patient Education Activity  Goal: Patient/Family Education  Outcome: Progressing Towards Goal
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
## 2022-07-04 LAB
BACTERIA SPEC CULT: NORMAL
GLUCOSE BLD STRIP.AUTO-MCNC: 105 MG/DL (ref 65–117)
GLUCOSE BLD STRIP.AUTO-MCNC: 107 MG/DL (ref 65–117)
GLUCOSE BLD STRIP.AUTO-MCNC: 113 MG/DL (ref 65–117)
GLUCOSE BLD STRIP.AUTO-MCNC: 114 MG/DL (ref 65–117)
GRAM STN SPEC: NORMAL
GRAM STN SPEC: NORMAL
SERVICE CMNT-IMP: NORMAL

## 2022-07-04 PROCEDURE — 74011000250 HC RX REV CODE- 250: Performed by: INTERNAL MEDICINE

## 2022-07-04 PROCEDURE — 74011250636 HC RX REV CODE- 250/636: Performed by: INTERNAL MEDICINE

## 2022-07-04 PROCEDURE — 65270000046 HC RM TELEMETRY

## 2022-07-04 PROCEDURE — 74011000258 HC RX REV CODE- 258: Performed by: INTERNAL MEDICINE

## 2022-07-04 PROCEDURE — 82962 GLUCOSE BLOOD TEST: CPT

## 2022-07-04 PROCEDURE — 99231 SBSQ HOSP IP/OBS SF/LOW 25: CPT | Performed by: SURGERY

## 2022-07-04 RX ADMIN — MICONAZOLE NITRATE: 20 CREAM TOPICAL at 10:06

## 2022-07-04 RX ADMIN — I.V. FAT EMULSION 500 ML: 20 EMULSION INTRAVENOUS at 20:13

## 2022-07-04 RX ADMIN — POTASSIUM CHLORIDE: 2 INJECTION, SOLUTION, CONCENTRATE INTRAVENOUS at 17:42

## 2022-07-04 RX ADMIN — ENOXAPARIN SODIUM 60 MG: 100 INJECTION SUBCUTANEOUS at 22:53

## 2022-07-04 RX ADMIN — ENOXAPARIN SODIUM 60 MG: 100 INJECTION SUBCUTANEOUS at 12:52

## 2022-07-04 RX ADMIN — SODIUM CHLORIDE, PRESERVATIVE FREE 10 ML: 5 INJECTION INTRAVENOUS at 05:14

## 2022-07-04 RX ADMIN — SODIUM CHLORIDE, PRESERVATIVE FREE 20 ML: 5 INJECTION INTRAVENOUS at 17:48

## 2022-07-04 RX ADMIN — MICONAZOLE NITRATE: 20 CREAM TOPICAL at 20:21

## 2022-07-04 RX ADMIN — SODIUM CHLORIDE, PRESERVATIVE FREE 10 ML: 5 INJECTION INTRAVENOUS at 22:22

## 2022-07-04 NOTE — PROGRESS NOTES
SURGERY PROGRESS NOTE      Admit Date: 2022      Subjective:     Patient has been having moderate output from the J-tube since it was cut yesterday. Denies pain and nausea. Objective:     Visit Vitals  /70   Pulse 83   Temp 99.4 °F (37.4 °C)   Resp 18   Ht 5' 10\" (1.778 m)   Wt 59.5 kg (131 lb 3.2 oz)   SpO2 99%   BMI 18.83 kg/m²        Temp (24hrs), Av.7 °F (37.1 °C), Min:98.1 °F (36.7 °C), Max:99.4 °F (37.4 °C)      No intake/output data recorded.  1901 -  0700  In: -   Out: 1825 [Urine:325; Drains:1500]    Physical Exam:    General:  alert, cooperative, no distress, appears stated age   Abdomen: soft, bowel sounds active, non-tender,  Fistula with moderate output     ANDREE bulb placed on J-tube       Assessment/plan: Active Problems:    Sepsis ([address] (2021)    J-tube was clearly clogged. Question wether J-tube balloon was causing some obstruction. Will leave to bulb overnight and track output. If output trens down with just remove J-tube. If ir is high will recommend J-tube exchange vs ostomy bag over J-tube site because it will likely continue to have moderate output.
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
## 2022-07-05 LAB
ANION GAP SERPL CALC-SCNC: 3 MMOL/L (ref 5–15)
BUN SERPL-MCNC: 9 MG/DL (ref 6–20)
BUN/CREAT SERPL: 16 (ref 12–20)
CALCIUM SERPL-MCNC: 9 MG/DL (ref 8.5–10.1)
CHLORIDE SERPL-SCNC: 107 MMOL/L (ref 97–108)
CO2 SERPL-SCNC: 26 MMOL/L (ref 21–32)
CREAT SERPL-MCNC: 0.58 MG/DL (ref 0.7–1.3)
ERYTHROCYTE [DISTWIDTH] IN BLOOD BY AUTOMATED COUNT: 17.7 % (ref 11.5–14.5)
GLUCOSE BLD STRIP.AUTO-MCNC: 100 MG/DL (ref 65–117)
GLUCOSE BLD STRIP.AUTO-MCNC: 101 MG/DL (ref 65–117)
GLUCOSE BLD STRIP.AUTO-MCNC: 104 MG/DL (ref 65–117)
GLUCOSE BLD STRIP.AUTO-MCNC: 89 MG/DL (ref 65–117)
GLUCOSE BLD STRIP.AUTO-MCNC: 96 MG/DL (ref 65–117)
GLUCOSE SERPL-MCNC: 87 MG/DL (ref 65–100)
HCT VFR BLD AUTO: 28.8 % (ref 36.6–50.3)
HGB BLD-MCNC: 9.3 G/DL (ref 12.1–17)
MCH RBC QN AUTO: 28.7 PG (ref 26–34)
MCHC RBC AUTO-ENTMCNC: 32.3 G/DL (ref 30–36.5)
MCV RBC AUTO: 88.9 FL (ref 80–99)
NRBC # BLD: 0 K/UL (ref 0–0.01)
NRBC BLD-RTO: 0 PER 100 WBC
PLATELET # BLD AUTO: 340 K/UL (ref 150–400)
PMV BLD AUTO: 9.9 FL (ref 8.9–12.9)
POTASSIUM SERPL-SCNC: 3.5 MMOL/L (ref 3.5–5.1)
RBC # BLD AUTO: 3.24 M/UL (ref 4.1–5.7)
SERVICE CMNT-IMP: NORMAL
SODIUM SERPL-SCNC: 136 MMOL/L (ref 136–145)
WBC # BLD AUTO: 7.4 K/UL (ref 4.1–11.1)

## 2022-07-05 PROCEDURE — 74011000250 HC RX REV CODE- 250: Performed by: INTERNAL MEDICINE

## 2022-07-05 PROCEDURE — 74011250636 HC RX REV CODE- 250/636: Performed by: INTERNAL MEDICINE

## 2022-07-05 PROCEDURE — 65270000046 HC RM TELEMETRY

## 2022-07-05 PROCEDURE — 80048 BASIC METABOLIC PNL TOTAL CA: CPT

## 2022-07-05 PROCEDURE — 74011000258 HC RX REV CODE- 258: Performed by: INTERNAL MEDICINE

## 2022-07-05 PROCEDURE — 36415 COLL VENOUS BLD VENIPUNCTURE: CPT

## 2022-07-05 PROCEDURE — 85027 COMPLETE CBC AUTOMATED: CPT

## 2022-07-05 PROCEDURE — 82962 GLUCOSE BLOOD TEST: CPT

## 2022-07-05 RX ADMIN — ENOXAPARIN SODIUM 60 MG: 100 INJECTION SUBCUTANEOUS at 12:22

## 2022-07-05 RX ADMIN — SODIUM CHLORIDE, PRESERVATIVE FREE 10 ML: 5 INJECTION INTRAVENOUS at 22:14

## 2022-07-05 RX ADMIN — SODIUM CHLORIDE, PRESERVATIVE FREE 10 ML: 5 INJECTION INTRAVENOUS at 06:03

## 2022-07-05 RX ADMIN — VANCOMYCIN HYDROCHLORIDE 1000 MG: 1 INJECTION, POWDER, LYOPHILIZED, FOR SOLUTION INTRAVENOUS at 03:34

## 2022-07-05 RX ADMIN — MICONAZOLE NITRATE: 20 CREAM TOPICAL at 22:14

## 2022-07-05 RX ADMIN — POTASSIUM CHLORIDE: 2 INJECTION, SOLUTION, CONCENTRATE INTRAVENOUS at 18:55

## 2022-07-05 RX ADMIN — MICONAZOLE NITRATE: 20 CREAM TOPICAL at 12:22

## 2022-07-05 RX ADMIN — SODIUM CHLORIDE, PRESERVATIVE FREE 10 ML: 5 INJECTION INTRAVENOUS at 20:08

## 2022-07-05 NOTE — ADT AUTH CERT NOTES
Sepsis and Other Febrile Illness, without Focal Infection - Care Day 23 (7/4/2022) by Willy Moran RN       Review Status Review Entered   Completed 7/5/2022 15:17      Criteria Review      Care Day: 23 Care Date: 7/4/2022 Level of Care: Telemetry    Guideline Day 4    Clinical Status    (X) * Hemodynamic stability    7/5/2022 15:15:14 EDT by Barbara Dennis      VS; T 98.5, P 77, R 18, /75, SPO2 98% RA    (X) * Afebrile or temperature acceptable for next level of care    7/5/2022 15:15:14 EDT by Barbara Dennis      t 98.5    (X) * Hypoxemia absent    7/5/2022 15:15:14 EDT by Barbara Dennis      room air    (X) * Tachypnea absent    7/5/2022 15:14:30 EDT by Barbara Dennis      resp 18    ( ) * Cultures negative or infection identified and under adequate treatment    ( ) * Mental status at baseline    ( ) * Metabolic derangement (eg, dehydration, acidosis) absent    ( ) * End organ dysfunction (eg, myocardial ischemia, renal failure) absent    ( ) * Discharge plans and education understood    Activity    ( ) * Ambulatory or acceptable for next level of care    Routes    ( ) * Oral hydration    ( ) * Oral medications or regimen acceptable for next level of care    7/5/2022 15:16:39 EDT by Barbara Dennis      TPN ADULT - CENTRAL  Rate: 75 mL/hr Freq: CONTINUOUS Route: IV    7/5/2022 15:16:21 EDT by Barbara Dennis      fat emulsion 20% (LIPOSYN, INTRAlipid) infusion 500 mL  Dose: 500 mL  Freq: 3 TIMES A WEEK (MON,WED & FRI) Route: IV    7/5/2022 15:16:08 EDT by Barbara Dennis      enoxaparin (LOVENOX) injection 60 mg  Dose: 60 mg  Freq: EVERY 12 HOURS Route: SC    (X) * Oral diet or acceptable for next level of care    7/5/2022 15:14:30 EDT by Barbara Dennis      ADULT DIET Full Liquid    Interventions    (X) * Isolation not indicated, or is performable at next level of care    Medications    ( ) * Antimicrobial treatment not necessary or treatment at next level of care arranged    * Milestone   Additional Notes   7/4/22 INTERNAL MEDICINE:   Assessment / Plan: Thrombus in radial vein USG 6/30   Started on treatment doses of lovenox   appreciatie recs from heme, needs treatment for 3 months    xray RUE with no acute abnormality       Abscess on chin   7/2 wound cultures NGTD   Will dc vanc       Advancing Diet   Seen by GI ok to start full liquid, soup and grits   Appreciate assistance       J tube G tube malfunction? Seen by surgery, G tube bumper adjusted    Surgery on board, considering j-tube exchange vs ostomy bag       Septic shock POA WBC 15.6, temp 103.1, , RR 23, lactate 7.39   C albicans candidemia    Polymicrobial bacteremia with  E. coli and E faecalis POA   Indwelling CVC infection POA   ? Pneumonia POA   Presented with septic shock with elevated lactate   IVF   pCXR IMPRESSION   No acute cardiopulmonary abnormality. Admit CT abdomen/pelvis IMPRESSION   1.  Bibasilar nodular groundglass opacities of the lung bases suggestive of   infection. 2.  No evidence of intra-abdominal infection. 3.  Gastrostomy tube in the stomach and jejunostomy tube in the right lower   quadrant. 4.  Midline abdominal wall defect with overlying dressing.   - UA 20-50 WBC, 0-5 RBC, 1+ bacteria         Urine culture grew proteus   - Likely source indwelling Telly catheter   - Telly Catheter removed at admit   - admit Blood culture positive for Enterococcus species E. coli   - Blood cultures from 6/13 growing Candida albicans   - Repeat cultures from 6/15 negative   - Septic shock resolved   - Continue antifungal and antibiotics as per ID                Ampicllin 12gm q24h via continuous infusion, today is day 14, will stop tomorrow               anidulafungin 100mg daily, currently day 13, last date is 6/30/2022               Duration 2 weeks for all antimicrobials   - New Telly catheter placed 6/20 with double-lumen   - discharge delayed with insurance issues, finally cleared up 6/28               Needs IV antibiotics, ?  Can we set up final dose of anidulafungin at home               Cannot take PO well, Po fluconazole to complete the antifungal not the best option       Chronic TPN for failure to thrive/GI dysfunction. 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 S/P Prior G-tube and J-tube placements   -?  liquid diet in addition to TPN   - surgery discharge summary mentioned that that he is allowed to have ice chips and hard candy   - Last surgery Jan 2022 after presenting with pain and intraperitoneal air               OR with frozen abdomen, severe adhesions                           Not able to separate bowels   - Current Abd/pelvis ct scan shows no acute abdominal issues   - Continue TPN   -Resumed buprenorphine   - Currently does not use J-tube, can it be removed(some deterioration)               Reports uses G-tube for venting   - Tube placed by gen surgery   - Case managing resolving insurance issues with TPN       Gastroparesis POA       Mild right hydronephrosis with urinary retention       MRSA bacteremia and PICC line infection April 2022   MRSE bacteremia likely secondary to [facility]   S/p removal 5/3, with Telly placed 5/4   Completed antibiotics       Code Status: full   Surrogate Decision Maker:  Edenilson Carmona Mother - [phone]       DVT Prophylaxis: scd   GI Prophylaxis: not indicated       Baseline: lives with mom, doesn't shop, dependent on her.       Body mass index is 20.46 kg/m².          SURGERY:   Assessment/plan:       Active Problems:     Sepsis ([address] (11/25/2021)       J-tube was clearly clogged.  Question wether J-tube balloon was causing some obstruction.  Will leave to bulb overnight and track output.  If output trens down with just remove J-tube.  If ir is high will recommend J-tube exchange vs ostomy bag over J-tube site because it will likely continue to have moderate output.             Visit Vitals   /70   Pulse 83   Temp 99.4 °F (37.4 °C)   Resp 18   Ht 5' 10\" (1.778 m)   Wt 59.5 kg (131 lb 3.2 oz)   SpO2 99%   BMI 18.83 kg/m²           Temp (24hrs), Av.7 °F (37.1 °C), Min:98.1 °F (36.7 °C), Max:99.4 °F (37.4 °C)           Physical Exam:     General: alert, cooperative, no distress, appears stated age   Abdomen: soft, bowel sounds active, non-tender,  Fistula with moderate output        ANDREE bulb placed on J-tube               22 IM Progress Note by Willy Moran RN       Review Status Review Entered   In Primary 2022 15:10      Criteria Review   22     INTERNAL MEDICINE:  Assessment / Plan:     Septic shock POA WBC 15.6, temp 103.1, , RR 23, lactate 7.39  C albicans candidemia POA  Polymicrobial bacteremia with  E. coli and E faecalis POA  Suspected indwelling CVC infection POA  ? Pneumonia POA  Presented with septic shock with elevated lactate  IVF  pCXR IMPRESSION  No acute cardiopulmonary abnormality. Admit CT abdomen/pelvis IMPRESSION  1.  Bibasilar nodular groundglass opacities of the lung bases suggestive of  infection. 2.  No evidence of intra-abdominal infection. 3.  Gastrostomy tube in the stomach and jejunostomy tube in the right lower  quadrant.   4.  Midline abdominal wall defect with overlying dressing.  - UA 20-50 WBC, 0-5 RBC, 1+ bacteria        Urine culture grew proteus  - Likely source indwelling Telly catheter  - Telly Catheter removed at admit  - admit Blood culture positive for Enterococcus species E. coli  - Blood cultures from  growing Candida albicans  - Repeat cultures from 6/15 negative  - Septic shock resolved  - Continue antifungal and antibiotics as per ID = finish this coming tuesday              Ampicllin 12gm q24h via continuous infusion               anidulafungin 100mg daily to continue.               Duration 2 weeks for all antimicrobials, 6/15/22 to 22.   - New Telly catheter placed 6/20 with double-lumen  - Ready for discharge once insurance issues set up              Needs insurance approval for home TPN and antibiotics     Chronic TPN for failure to thrive/GI dysfunction. 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    S/P Prior G-tube and J-tube placements  -?  liquid diet in addition to TPN  - surgery discharge summary mentioned that that he is allowed to have ice chips and hard candy  - Last surgery Jan 2022 after presenting with pain and intraperitoneal air              OR with frozen abdomen, severe adhesions                          Not able to separate bowels  - Current Abd/pelvis ct scan shows no acute abdominal issues  - Continue TPN  -Resumed buprenorphine     Gastroparesis POA     Mild right hydronephrosis with urinary retention     MRSA bacteremia and PICC line infection April 2022  MRSE bacteremia likely secondary to [facility]  S/p removal 5/3, with Telly placed 5/4  Completed antibiotics     Code Status: full  Surrogate Decision Maker:  [place] Hills[place] barrosFelicitastodd Nunes Mother - [phone]     DVT Prophylaxis: scd  GI Prophylaxis: not indicated     Baseline: lives with mom, doesn't shop, dependent on her.     Body mass index is 20.46 kg/m².
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
## 2022-07-05 NOTE — PROGRESS NOTES
Comprehensive Nutrition Assessment    Type and Reason for Visit: Reassess    Nutrition Recommendations/Plan:   1. Continue TPN D20/5%AA @ 75 ml/hr as ordered + 500 ml 20% lipids which provides daily approx. 2012 kcals/90 g protein/360 g CHO. 2. Continue full liquids for pleasure feedings per surgical team and/or attending. 3. Please document % meals and supplements consumed in flowsheet I/O's under intake. Nutrition Assessment:     7/5: Chart reviewed; RD visited with pt at bedside, sitting up in bed eating cheetos. Pt currently on a full liquid diet but primary source of nutrition from TPN D20/5%AA @ 75 ml/hr x 24 hr + 500 ml 20% lipids 3X/wk. Pt reports drainage from J-tube site and waiting on wound care supplies. Surgery on board noting possible J-tube exchange vs ostomy bag over J-tube site. Hx of Copper deficiency; awaiting new lab results to determine if needs to be replaced via IV.    7/1: Chart reviewed; med noted for MRSA bacteremia and PICC line infection on admission. Pt receives chronic TPN in the home environment per short gut syndrome. Pt sitting up in bed at time of visit, munching on jose crackers without difficulty. Pt currently on a clear liquid diet but reports he would like diet advanced to the next level (I.e. full liquids) as this is what pt usually tolerates on prior admissions. Current TPN continues to meet est nutritional needs. Pt is hopeful to be discharged home soon. Nutrition Related Findings:    BM: 7/4; Meds: reviewed; Labs: reviewed Wound Type: None    Current Nutrition Intake & Therapies:  Average Meal Intake: NPO     ADULT DIET Full Liquid  TPN ADULT - CENTRAL    Anthropometric Measures:  Height: 5' 10\" (177.8 cm)  Ideal Body Weight (IBW): 166 lbs (75 kg)     Current Body Wt:  65.2 kg (143 lb 11.8 oz), 79.4 % IBW. Bed scale  Current BMI (kg/m2): 20.6                          BMI Category: Normal weight (BMI 18.5-24. 9)    Estimated Daily Nutrient Needs:  Energy Requirements Based On: Formula  Weight Used for Energy Requirements: Current  Energy (kcal/day): 2463-0323 kcal (BMR 1556 x 1.2AF +250kcal)  Weight Used for Protein Requirements: Current  Protein (g/day): 85-104g (1.3-1.6 g/kg bw)  Method Used for Fluid Requirements: 1 ml/kcal  Fluid (ml/day): 1850 mL    Nutrition Diagnosis:   · Altered GI function related to altered GI structure as evidenced by nutrition support-parenteral nutrition      Nutrition Interventions:   Food and/or Nutrient Delivery: Continue parenteral nutrition  Nutrition Education/Counseling: No recommendations at this time  Coordination of Nutrition Care: Continue to monitor while inpatient       Goals:  Previous Goal Met: Goal(s) achieved  Goals:  (TPN at goal rate and lytes WNL next 2-4 days)       Nutrition Monitoring and Evaluation:   Behavioral-Environmental Outcomes: None identified  Food/Nutrient Intake Outcomes: Parenteral nutrition intake/tolerance  Physical Signs/Symptoms Outcomes: Biochemical data,Weight    Discharge Planning:    Parenteral nutrition    Kelli Beckett RD  Contact: .
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
## 2022-07-06 VITALS
SYSTOLIC BLOOD PRESSURE: 109 MMHG | HEART RATE: 107 BPM | BODY MASS INDEX: 18.78 KG/M2 | HEIGHT: 70 IN | WEIGHT: 131.2 LBS | DIASTOLIC BLOOD PRESSURE: 72 MMHG | TEMPERATURE: 97.8 F | RESPIRATION RATE: 16 BRPM | OXYGEN SATURATION: 100 %

## 2022-07-06 LAB
ANION GAP SERPL CALC-SCNC: 5 MMOL/L (ref 5–15)
BUN SERPL-MCNC: 9 MG/DL (ref 6–20)
BUN/CREAT SERPL: 11 (ref 12–20)
CALCIUM SERPL-MCNC: 9.6 MG/DL (ref 8.5–10.1)
CHLORIDE SERPL-SCNC: 104 MMOL/L (ref 97–108)
CO2 SERPL-SCNC: 25 MMOL/L (ref 21–32)
COPPER SERPL-MCNC: 135 UG/DL (ref 69–132)
CREAT SERPL-MCNC: 0.8 MG/DL (ref 0.7–1.3)
GLUCOSE BLD STRIP.AUTO-MCNC: 104 MG/DL (ref 65–117)
GLUCOSE SERPL-MCNC: 270 MG/DL (ref 65–100)
MAGNESIUM SERPL-MCNC: 1.9 MG/DL (ref 1.6–2.4)
PHOSPHATE SERPL-MCNC: 4.5 MG/DL (ref 2.6–4.7)
POTASSIUM SERPL-SCNC: 4.1 MMOL/L (ref 3.5–5.1)
SERVICE CMNT-IMP: NORMAL
SODIUM SERPL-SCNC: 134 MMOL/L (ref 136–145)

## 2022-07-06 PROCEDURE — 82962 GLUCOSE BLOOD TEST: CPT

## 2022-07-06 PROCEDURE — 84100 ASSAY OF PHOSPHORUS: CPT

## 2022-07-06 PROCEDURE — 36415 COLL VENOUS BLD VENIPUNCTURE: CPT

## 2022-07-06 PROCEDURE — 74011250636 HC RX REV CODE- 250/636: Performed by: INTERNAL MEDICINE

## 2022-07-06 PROCEDURE — 74011000250 HC RX REV CODE- 250: Performed by: INTERNAL MEDICINE

## 2022-07-06 PROCEDURE — 80048 BASIC METABOLIC PNL TOTAL CA: CPT

## 2022-07-06 PROCEDURE — 83735 ASSAY OF MAGNESIUM: CPT

## 2022-07-06 RX ORDER — ENOXAPARIN SODIUM 100 MG/ML
60 INJECTION SUBCUTANEOUS EVERY 12 HOURS
Qty: 60 EACH | Refills: 0 | Status: SHIPPED | OUTPATIENT
Start: 2022-07-06 | End: 2022-08-11

## 2022-07-06 RX ORDER — LIDOCAINE 4 G/100G
1 PATCH TOPICAL EVERY 24 HOURS
Qty: 30 PATCH | Refills: 0 | Status: SHIPPED | OUTPATIENT
Start: 2022-07-06 | End: 2022-09-20 | Stop reason: ALTCHOICE

## 2022-07-06 RX ADMIN — ENOXAPARIN SODIUM 60 MG: 100 INJECTION SUBCUTANEOUS at 11:40

## 2022-07-06 RX ADMIN — SODIUM CHLORIDE, PRESERVATIVE FREE 10 ML: 5 INJECTION INTRAVENOUS at 05:14

## 2022-07-06 RX ADMIN — MICONAZOLE NITRATE: 20 CREAM TOPICAL at 09:10

## 2022-07-06 NOTE — WOUND CARE
Wound Care consult received but patient no longer needs assistance with the fistula or the Tube that \"fell out\". There is a dressing over the hole from the tube that came out on the right abdominal wall. The fistula is being managed by the patient but he sometimes needs help and supplies while he is here. He can work with any nurse to show them how to help him with his fistula. Plan:Two fistula bags left in the room with other supplies.    Curt Aase, RN, BSN, [facility]
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
## 2022-07-06 NOTE — PROGRESS NOTES
No further CM needs identified. Transition of Care Plan:     RUR: 23% - \"high risk\"  Disposition: Home [address] for TPN, resumed family support, & f/u apts   Follow up appointments: PCP & specialist as indicated   DME needed: No DME needs identified for d/c  Transportation at Discharge: Pt's mother will provide transportation at d/c  [place] or means to access home: Pt has access to his home       IM Medicare Letter: N/A - Medicaid coverage  Is patient a BCPI-A Bundle: N/A        Is patient a  and connected with the [address] [place] [address] Road Contact: Pt's mother Roxanne Jean [phone])  Discharge Caregiver contacted prior to discharge? Per request  Care Conference needed?: N/A    Update - 1:08 PM: RN informed CM pt's mother Radha Salazar) will provide transport at d/c. CM uploaded clinical updates including surgery progress note, nutrition progress note, and wound care instructions via [employer] to [address] [place] [address] for reference. Pt agreeable to the d/c plan; no additional questions/concerns identified. CM will remain accessible for consult if additional needs arise prior to d/c. Initial note: CM acknowledged d/c. Chart reviewed for updates. Pt will d/c home with [facility] for TPN, & f/u apts. CM informed both [facility] of pt's d/c today; both agencies information reflected in AVS for reference. PCP f/u apt secured for 7/7/22 at 10:45 AM; apt information reflected in AVS. [facility]'s information also reflected in AVS for reference. CM will meet with pt at bedside to provide final overview of the d/c plan & confirm method of transport home. Care Management Interventions  PCP Verified by CM:  Yes  Palliative Care Criteria Met (RRAT>21 & CHF Dx)?: Yes  Palliative Consult Recommended?: Yes (Palliaive care consulted to discuss goals of care)  Reason Palliative Care Not Recommended?: Palliative Care not utilized by provider  Mode of Transport at Discharge:  Other (see comment) (Pt's mother will provide transport at d/c)  Transition of Care Consult (CM Consult): Home Health,Other (home infusion services for TPN)  [address]: No  Reason Outside [place]: Patient already serviced by other home care/hospice agency  Discharge [address]: No  Physical Therapy Consult: No  Occupational Therapy Consult: No  Speech Therapy Consult: No  Support Systems: Parent(s)  Confirm Follow Up Transport: Family   Resource Information Provided?: No  Discharge Location  Patient Expects to be Discharged to[de-identified] Home with home health ([address] [place] [address] (RN), Marzena Russell [facility] for TPN, family support, & f/u apts)    Tahira Herring, [address] [place] [address] [place], [facility]  [phone]

## 2022-07-06 NOTE — PROGRESS NOTES
SURGERY PROGRESS NOTE      Admit Date: 2022      Subjective:     Patient states the J-Tube slowly migrated out yesterday. He placed a dressing over the site and has had no drainage from it since. Objective:     Visit Vitals  /72   Pulse (!) 107   Temp 97.8 °F (36.6 °C)   Resp 16   Ht 5' 10\" (1.778 m)   Wt 59.5 kg (131 lb 3.2 oz)   SpO2 100%   BMI 18.83 kg/m²        Temp (24hrs), Av.9 °F (36.6 °C), Min:97.2 °F (36.2 °C), Max:98.3 °F (36.8 °C)      701 -  1900  In: 1022.5 [I.V.:1022.5]  Out: -    190 -  0700  In: 1698.8 [I.V.:1698.8]  Out: 1450 [Urine:750; Drains:700]    Physical Exam:    General:  alert, cooperative, no distress, appears stated age   Abdomen: soft, bowel sounds active, non-tender,  Fistula with moderate output     J- tube site healing well without drainage. Assessment/plan: Active Problems:    Sepsis ([address] (2021)    Continue topical wound care to J-Tube site until healed, then open to air. May use G-tube for venting if needed but patient has not needed this since this admission. Continue fistula management as per wound care. Surgery to sign off. Please reconsult as needed.      Arnold Monae, NP

## 2022-07-06 NOTE — PROGRESS NOTES
Dc paperwork reviewed with patient. Aware of fu appointments, medications, side effects. Lovenox education given.
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
## 2022-07-15 ENCOUNTER — HOSPITAL ENCOUNTER (INPATIENT)
Age: 42
LOS: 27 days | Discharge: HOME HEALTH | DRG: 313 | End: 2022-08-11
Attending: EMERGENCY MEDICINE | Admitting: INTERNAL MEDICINE
Payer: MEDICAID

## 2022-07-15 ENCOUNTER — APPOINTMENT (OUTPATIENT)
Dept: GENERAL RADIOLOGY | Age: 42
DRG: 313 | End: 2022-07-15
Attending: PHYSICIAN ASSISTANT
Payer: MEDICAID

## 2022-07-15 ENCOUNTER — ANESTHESIA (OUTPATIENT)
Dept: SURGERY | Age: 42
DRG: 313 | End: 2022-07-15
Payer: MEDICAID

## 2022-07-15 ENCOUNTER — ANESTHESIA EVENT (OUTPATIENT)
Dept: SURGERY | Age: 42
DRG: 313 | End: 2022-07-15
Payer: MEDICAID

## 2022-07-15 ENCOUNTER — APPOINTMENT (OUTPATIENT)
Dept: ULTRASOUND IMAGING | Age: 42
DRG: 313 | End: 2022-07-15
Attending: PHYSICIAN ASSISTANT
Payer: MEDICAID

## 2022-07-15 DIAGNOSIS — T82.7XXD: ICD-10-CM

## 2022-07-15 DIAGNOSIS — B37.9 CANDIDA PARAPSILOSIS INFECTION: ICD-10-CM

## 2022-07-15 DIAGNOSIS — A41.9: ICD-10-CM

## 2022-07-15 DIAGNOSIS — M00.9 PYOGENIC ARTHRITIS OF RIGHT KNEE JOINT, DUE TO UNSPECIFIED ORGANISM (HCC): Primary | ICD-10-CM

## 2022-07-15 DIAGNOSIS — E44.0 MODERATE PROTEIN-CALORIE MALNUTRITION (HCC): ICD-10-CM

## 2022-07-15 DIAGNOSIS — B49 FUNGEMIA: ICD-10-CM

## 2022-07-15 LAB
ALBUMIN SERPL-MCNC: 2.5 G/DL (ref 3.5–5)
ALBUMIN/GLOB SERPL: 0.5 {RATIO} (ref 1.1–2.2)
ALP SERPL-CCNC: 122 U/L (ref 45–117)
ALT SERPL-CCNC: 27 U/L (ref 12–78)
ANION GAP SERPL CALC-SCNC: 6 MMOL/L (ref 5–15)
APPEARANCE SNV: ABNORMAL
AST SERPL-CCNC: 24 U/L (ref 15–37)
BASE DEFICIT BLD-SCNC: 3.9 MMOL/L
BASOPHILS # BLD: 0 K/UL (ref 0–0.1)
BASOPHILS NFR BLD: 0 % (ref 0–1)
BILIRUB SERPL-MCNC: 0.3 MG/DL (ref 0.2–1)
BODY FLD TYPE: NORMAL
BUN SERPL-MCNC: 13 MG/DL (ref 6–20)
BUN/CREAT SERPL: 21 (ref 12–20)
CA-I BLD-MCNC: 1.35 MMOL/L (ref 1.12–1.32)
CALCIUM SERPL-MCNC: 9.1 MG/DL (ref 8.5–10.1)
CHLORIDE BLD-SCNC: 110 MMOL/L (ref 100–108)
CHLORIDE SERPL-SCNC: 110 MMOL/L (ref 97–108)
CO2 BLD-SCNC: 23 MMOL/L (ref 19–24)
CO2 SERPL-SCNC: 23 MMOL/L (ref 21–32)
COLOR SNV: ABNORMAL
CREAT SERPL-MCNC: 0.62 MG/DL (ref 0.7–1.3)
CREAT UR-MCNC: 0.7 MG/DL (ref 0.6–1.3)
CRP SERPL-MCNC: 1.8 MG/DL (ref 0–0.6)
CRYSTALS FLD MICRO: NORMAL
DIFFERENTIAL METHOD BLD: ABNORMAL
EOSINOPHIL # BLD: 0.2 K/UL (ref 0–0.4)
EOSINOPHIL NFR BLD: 4 % (ref 0–7)
ERYTHROCYTE [DISTWIDTH] IN BLOOD BY AUTOMATED COUNT: 18.3 % (ref 11.5–14.5)
ERYTHROCYTE [SEDIMENTATION RATE] IN BLOOD: 63 MM/HR (ref 0–15)
GLOBULIN SER CALC-MCNC: 5.1 G/DL (ref 2–4)
GLUCOSE BLD STRIP.AUTO-MCNC: 77 MG/DL (ref 74–106)
GLUCOSE SERPL-MCNC: 79 MG/DL (ref 65–100)
HCO3 BLDA-SCNC: 22 MMOL/L
HCT VFR BLD AUTO: 30.2 % (ref 36.6–50.3)
HGB BLD-MCNC: 9.6 G/DL (ref 12.1–17)
IMM GRANULOCYTES # BLD AUTO: 0 K/UL (ref 0–0.04)
IMM GRANULOCYTES NFR BLD AUTO: 0 % (ref 0–0.5)
LACTATE BLD-SCNC: 0.53 MMOL/L (ref 0.4–2)
LYMPHOCYTES # BLD: 1.4 K/UL (ref 0.8–3.5)
LYMPHOCYTES NFR BLD: 25 % (ref 12–49)
LYMPHOCYTES NFR SNV MANUAL: 4 % (ref 0–74)
MCH RBC QN AUTO: 27.9 PG (ref 26–34)
MCHC RBC AUTO-ENTMCNC: 31.8 G/DL (ref 30–36.5)
MCV RBC AUTO: 87.8 FL (ref 80–99)
MONOCYTES # BLD: 0.4 K/UL (ref 0–1)
MONOCYTES NFR BLD: 8 % (ref 5–13)
MONOCYTES NFR SNV MANUAL: 9 % (ref 0–69)
NEUTROPHILS NFR SNV MANUAL: 87 % (ref 0–24)
NEUTS SEG # BLD: 3.6 K/UL (ref 1.8–8)
NEUTS SEG NFR BLD: 63 % (ref 32–75)
NRBC # BLD: 0 K/UL (ref 0–0.01)
NRBC BLD-RTO: 0 PER 100 WBC
PCO2 BLDV: 44.1 MMHG (ref 41–51)
PH BLDV: 7.31 [PH] (ref 7.32–7.42)
PLATELET # BLD AUTO: 313 K/UL (ref 150–400)
PMV BLD AUTO: 10.3 FL (ref 8.9–12.9)
PO2 BLDV: 18 MMHG (ref 25–40)
POTASSIUM BLD-SCNC: 4.1 MMOL/L (ref 3.5–5.5)
POTASSIUM SERPL-SCNC: 4 MMOL/L (ref 3.5–5.1)
PROT SERPL-MCNC: 7.6 G/DL (ref 6.4–8.2)
RBC # BLD AUTO: 3.44 M/UL (ref 4.1–5.7)
RBC # SNV: >100 /CU MM
SODIUM BLD-SCNC: 141 MMOL/L (ref 136–145)
SODIUM SERPL-SCNC: 139 MMOL/L (ref 136–145)
SPECIMEN SITE: ABNORMAL
SPECIMEN SOURCE FLD: ABNORMAL
WBC # BLD AUTO: 5.7 K/UL (ref 4.1–11.1)
WBC # SNV: ABNORMAL /CU MM (ref 0–150)

## 2022-07-15 PROCEDURE — 87205 SMEAR GRAM STAIN: CPT

## 2022-07-15 PROCEDURE — 74011000258 HC RX REV CODE- 258: Performed by: NURSE ANESTHETIST, CERTIFIED REGISTERED

## 2022-07-15 PROCEDURE — 77030002916 HC SUT ETHLN J&J -A: Performed by: ORTHOPAEDIC SURGERY

## 2022-07-15 PROCEDURE — 96375 TX/PRO/DX INJ NEW DRUG ADDON: CPT

## 2022-07-15 PROCEDURE — 77030008462 HC STPLR SKN PROX J&J -A: Performed by: ORTHOPAEDIC SURGERY

## 2022-07-15 PROCEDURE — 74011000250 HC RX REV CODE- 250: Performed by: NURSE ANESTHETIST, CERTIFIED REGISTERED

## 2022-07-15 PROCEDURE — 77030012406 HC DRN WND PENRS BARD -A: Performed by: ORTHOPAEDIC SURGERY

## 2022-07-15 PROCEDURE — 85652 RBC SED RATE AUTOMATED: CPT

## 2022-07-15 PROCEDURE — 74011000258 HC RX REV CODE- 258: Performed by: EMERGENCY MEDICINE

## 2022-07-15 PROCEDURE — 76060000032 HC ANESTHESIA 0.5 TO 1 HR: Performed by: ORTHOPAEDIC SURGERY

## 2022-07-15 PROCEDURE — 2709999900 HC NON-CHARGEABLE SUPPLY: Performed by: ORTHOPAEDIC SURGERY

## 2022-07-15 PROCEDURE — 89060 EXAM SYNOVIAL FLUID CRYSTALS: CPT

## 2022-07-15 PROCEDURE — 99285 EMERGENCY DEPT VISIT HI MDM: CPT

## 2022-07-15 PROCEDURE — 77030002933 HC SUT MCRYL J&J -A: Performed by: ORTHOPAEDIC SURGERY

## 2022-07-15 PROCEDURE — 74011250636 HC RX REV CODE- 250/636: Performed by: NURSE ANESTHETIST, CERTIFIED REGISTERED

## 2022-07-15 PROCEDURE — 74011250636 HC RX REV CODE- 250/636

## 2022-07-15 PROCEDURE — 87040 BLOOD CULTURE FOR BACTERIA: CPT

## 2022-07-15 PROCEDURE — 77030008684 HC TU ET CUF COVD -B: Performed by: NURSE ANESTHETIST, CERTIFIED REGISTERED

## 2022-07-15 PROCEDURE — 96374 THER/PROPH/DIAG INJ IV PUSH: CPT

## 2022-07-15 PROCEDURE — 74011000250 HC RX REV CODE- 250: Performed by: INTERNAL MEDICINE

## 2022-07-15 PROCEDURE — 74011250636 HC RX REV CODE- 250/636: Performed by: INTERNAL MEDICINE

## 2022-07-15 PROCEDURE — 85025 COMPLETE CBC W/AUTO DIFF WBC: CPT

## 2022-07-15 PROCEDURE — 74011250636 HC RX REV CODE- 250/636: Performed by: EMERGENCY MEDICINE

## 2022-07-15 PROCEDURE — 36415 COLL VENOUS BLD VENIPUNCTURE: CPT

## 2022-07-15 PROCEDURE — 74011250636 HC RX REV CODE- 250/636: Performed by: STUDENT IN AN ORGANIZED HEALTH CARE EDUCATION/TRAINING PROGRAM

## 2022-07-15 PROCEDURE — 74011000258 HC RX REV CODE- 258: Performed by: INTERNAL MEDICINE

## 2022-07-15 PROCEDURE — 0SBC0ZZ EXCISION OF RIGHT KNEE JOINT, OPEN APPROACH: ICD-10-PCS | Performed by: ORTHOPAEDIC SURGERY

## 2022-07-15 PROCEDURE — 77030018673: Performed by: ORTHOPAEDIC SURGERY

## 2022-07-15 PROCEDURE — 77030026438 HC STYL ET INTUB CARD -A: Performed by: NURSE ANESTHETIST, CERTIFIED REGISTERED

## 2022-07-15 PROCEDURE — 93971 EXTREMITY STUDY: CPT

## 2022-07-15 PROCEDURE — 75810000054 HC ARTHOCENTISIS JOINT

## 2022-07-15 PROCEDURE — 80053 COMPREHEN METABOLIC PANEL: CPT

## 2022-07-15 PROCEDURE — 76010000138 HC OR TIME 0.5 TO 1 HR: Performed by: ORTHOPAEDIC SURGERY

## 2022-07-15 PROCEDURE — 3E1U48Z IRRIGATION OF JOINTS USING IRRIGATING SUBSTANCE, PERCUTANEOUS ENDOSCOPIC APPROACH: ICD-10-PCS | Performed by: ORTHOPAEDIC SURGERY

## 2022-07-15 PROCEDURE — 87075 CULTR BACTERIA EXCEPT BLOOD: CPT

## 2022-07-15 PROCEDURE — 82947 ASSAY GLUCOSE BLOOD QUANT: CPT

## 2022-07-15 PROCEDURE — 65270000046 HC RM TELEMETRY

## 2022-07-15 PROCEDURE — 86140 C-REACTIVE PROTEIN: CPT

## 2022-07-15 PROCEDURE — 87102 FUNGUS ISOLATION CULTURE: CPT

## 2022-07-15 PROCEDURE — 89050 BODY FLUID CELL COUNT: CPT

## 2022-07-15 PROCEDURE — 76210000006 HC OR PH I REC 0.5 TO 1 HR: Performed by: ORTHOPAEDIC SURGERY

## 2022-07-15 PROCEDURE — 73562 X-RAY EXAM OF KNEE 3: CPT

## 2022-07-15 RX ORDER — ONDANSETRON 2 MG/ML
4 INJECTION INTRAMUSCULAR; INTRAVENOUS
Status: DISCONTINUED | OUTPATIENT
Start: 2022-07-15 | End: 2022-08-11 | Stop reason: HOSPADM

## 2022-07-15 RX ORDER — MORPHINE SULFATE 2 MG/ML
4 INJECTION, SOLUTION INTRAMUSCULAR; INTRAVENOUS
Status: COMPLETED | OUTPATIENT
Start: 2022-07-15 | End: 2022-07-15

## 2022-07-15 RX ORDER — DIPHENHYDRAMINE HYDROCHLORIDE 50 MG/ML
12.5 INJECTION, SOLUTION INTRAMUSCULAR; INTRAVENOUS
Status: COMPLETED | OUTPATIENT
Start: 2022-07-15 | End: 2022-07-15

## 2022-07-15 RX ORDER — ACETAMINOPHEN 650 MG/1
650 SUPPOSITORY RECTAL
Status: DISCONTINUED | OUTPATIENT
Start: 2022-07-15 | End: 2022-08-11 | Stop reason: HOSPADM

## 2022-07-15 RX ORDER — PROPOFOL 10 MG/ML
INJECTION, EMULSION INTRAVENOUS AS NEEDED
Status: DISCONTINUED | OUTPATIENT
Start: 2022-07-15 | End: 2022-07-15 | Stop reason: HOSPADM

## 2022-07-15 RX ORDER — POLYETHYLENE GLYCOL 3350 17 G/17G
17 POWDER, FOR SOLUTION ORAL DAILY PRN
Status: DISCONTINUED | OUTPATIENT
Start: 2022-07-15 | End: 2022-08-11 | Stop reason: HOSPADM

## 2022-07-15 RX ORDER — VANCOMYCIN HYDROCHLORIDE
1250 EVERY 12 HOURS
Status: DISCONTINUED | OUTPATIENT
Start: 2022-07-16 | End: 2022-07-19

## 2022-07-15 RX ORDER — SODIUM CHLORIDE 0.9 % (FLUSH) 0.9 %
5-40 SYRINGE (ML) INJECTION AS NEEDED
Status: DISCONTINUED | OUTPATIENT
Start: 2022-07-15 | End: 2022-08-11 | Stop reason: HOSPADM

## 2022-07-15 RX ORDER — FENTANYL CITRATE 50 UG/ML
INJECTION, SOLUTION INTRAMUSCULAR; INTRAVENOUS AS NEEDED
Status: DISCONTINUED | OUTPATIENT
Start: 2022-07-15 | End: 2022-07-15 | Stop reason: HOSPADM

## 2022-07-15 RX ORDER — SODIUM CHLORIDE 9 MG/ML
100 INJECTION, SOLUTION INTRAVENOUS CONTINUOUS
Status: DISCONTINUED | OUTPATIENT
Start: 2022-07-15 | End: 2022-08-11 | Stop reason: HOSPADM

## 2022-07-15 RX ORDER — VANCOMYCIN/0.9 % SOD CHLORIDE 1.5G/250ML
1500 PLASTIC BAG, INJECTION (ML) INTRAVENOUS
Status: COMPLETED | OUTPATIENT
Start: 2022-07-15 | End: 2022-07-15

## 2022-07-15 RX ORDER — SUCCINYLCHOLINE CHLORIDE 20 MG/ML
INJECTION INTRAMUSCULAR; INTRAVENOUS AS NEEDED
Status: DISCONTINUED | OUTPATIENT
Start: 2022-07-15 | End: 2022-07-15 | Stop reason: HOSPADM

## 2022-07-15 RX ORDER — LIDOCAINE HYDROCHLORIDE 20 MG/ML
INJECTION, SOLUTION EPIDURAL; INFILTRATION; INTRACAUDAL; PERINEURAL AS NEEDED
Status: DISCONTINUED | OUTPATIENT
Start: 2022-07-15 | End: 2022-07-15 | Stop reason: HOSPADM

## 2022-07-15 RX ORDER — SODIUM CHLORIDE 0.9 % (FLUSH) 0.9 %
5-40 SYRINGE (ML) INJECTION EVERY 8 HOURS
Status: DISCONTINUED | OUTPATIENT
Start: 2022-07-15 | End: 2022-07-20

## 2022-07-15 RX ORDER — MIDAZOLAM HYDROCHLORIDE 1 MG/ML
INJECTION, SOLUTION INTRAMUSCULAR; INTRAVENOUS AS NEEDED
Status: DISCONTINUED | OUTPATIENT
Start: 2022-07-15 | End: 2022-07-15 | Stop reason: HOSPADM

## 2022-07-15 RX ORDER — ONDANSETRON 4 MG/1
4 TABLET, ORALLY DISINTEGRATING ORAL
Status: DISCONTINUED | OUTPATIENT
Start: 2022-07-15 | End: 2022-08-11 | Stop reason: HOSPADM

## 2022-07-15 RX ORDER — FENTANYL CITRATE 50 UG/ML
100 INJECTION, SOLUTION INTRAMUSCULAR; INTRAVENOUS
Status: CANCELLED | OUTPATIENT
Start: 2022-07-15 | End: 2022-07-15

## 2022-07-15 RX ORDER — FENTANYL CITRATE 50 UG/ML
INJECTION, SOLUTION INTRAMUSCULAR; INTRAVENOUS
Status: COMPLETED
Start: 2022-07-15 | End: 2022-07-15

## 2022-07-15 RX ORDER — ENOXAPARIN SODIUM 100 MG/ML
1 INJECTION SUBCUTANEOUS EVERY 12 HOURS
Status: DISCONTINUED | OUTPATIENT
Start: 2022-07-15 | End: 2022-08-11 | Stop reason: HOSPADM

## 2022-07-15 RX ORDER — ACETAMINOPHEN 325 MG/1
650 TABLET ORAL
Status: DISCONTINUED | OUTPATIENT
Start: 2022-07-15 | End: 2022-08-11 | Stop reason: HOSPADM

## 2022-07-15 RX ORDER — MORPHINE SULFATE 2 MG/ML
1 INJECTION, SOLUTION INTRAMUSCULAR; INTRAVENOUS
Status: DISCONTINUED | OUTPATIENT
Start: 2022-07-15 | End: 2022-07-16

## 2022-07-15 RX ORDER — ONDANSETRON 2 MG/ML
4 INJECTION INTRAMUSCULAR; INTRAVENOUS
Status: COMPLETED | OUTPATIENT
Start: 2022-07-15 | End: 2022-07-15

## 2022-07-15 RX ORDER — HYDROMORPHONE HYDROCHLORIDE 2 MG/ML
INJECTION, SOLUTION INTRAMUSCULAR; INTRAVENOUS; SUBCUTANEOUS AS NEEDED
Status: DISCONTINUED | OUTPATIENT
Start: 2022-07-15 | End: 2022-07-15 | Stop reason: HOSPADM

## 2022-07-15 RX ORDER — FENTANYL CITRATE 50 UG/ML
100 INJECTION, SOLUTION INTRAMUSCULAR; INTRAVENOUS ONCE
Status: COMPLETED | OUTPATIENT
Start: 2022-07-15 | End: 2022-07-15

## 2022-07-15 RX ORDER — DIPHENHYDRAMINE HYDROCHLORIDE 50 MG/ML
INJECTION, SOLUTION INTRAMUSCULAR; INTRAVENOUS
Status: COMPLETED
Start: 2022-07-15 | End: 2022-07-15

## 2022-07-15 RX ORDER — OXYCODONE HYDROCHLORIDE 5 MG/1
5 TABLET ORAL
Status: DISCONTINUED | OUTPATIENT
Start: 2022-07-15 | End: 2022-07-16

## 2022-07-15 RX ADMIN — MORPHINE SULFATE 4 MG: 2 INJECTION, SOLUTION INTRAMUSCULAR; INTRAVENOUS at 09:28

## 2022-07-15 RX ADMIN — SUCCINYLCHOLINE CHLORIDE 160 MG: 20 INJECTION, SOLUTION INTRAMUSCULAR; INTRAVENOUS at 13:40

## 2022-07-15 RX ADMIN — DIPHENHYDRAMINE HYDROCHLORIDE 12.5 MG: 50 INJECTION, SOLUTION INTRAMUSCULAR; INTRAVENOUS at 14:40

## 2022-07-15 RX ADMIN — SODIUM CHLORIDE 200 MG: 9 INJECTION, SOLUTION INTRAVENOUS at 15:18

## 2022-07-15 RX ADMIN — ONDANSETRON 4 MG: 2 INJECTION INTRAMUSCULAR; INTRAVENOUS at 09:28

## 2022-07-15 RX ADMIN — FAMOTIDINE: 10 INJECTION, SOLUTION INTRAVENOUS at 19:01

## 2022-07-15 RX ADMIN — PROPOFOL 150 MG: 10 INJECTION, EMULSION INTRAVENOUS at 13:40

## 2022-07-15 RX ADMIN — PIPERACILLIN AND TAZOBACTAM 3.38 G: 3; .375 INJECTION, POWDER, LYOPHILIZED, FOR SOLUTION INTRAVENOUS at 20:35

## 2022-07-15 RX ADMIN — Medication 1500 MG: at 13:54

## 2022-07-15 RX ADMIN — SODIUM CHLORIDE 1000 ML: 9 INJECTION, SOLUTION INTRAVENOUS at 09:24

## 2022-07-15 RX ADMIN — SODIUM CHLORIDE, PRESERVATIVE FREE 10 ML: 5 INJECTION INTRAVENOUS at 21:52

## 2022-07-15 RX ADMIN — FENTANYL CITRATE 100 MCG: 50 INJECTION, SOLUTION INTRAMUSCULAR; INTRAVENOUS at 13:40

## 2022-07-15 RX ADMIN — DIPHENHYDRAMINE HYDROCHLORIDE 12.5 MG: 50 INJECTION, SOLUTION INTRAMUSCULAR; INTRAVENOUS at 14:45

## 2022-07-15 RX ADMIN — MORPHINE SULFATE 1 MG: 2 INJECTION, SOLUTION INTRAMUSCULAR; INTRAVENOUS at 15:56

## 2022-07-15 RX ADMIN — SODIUM CHLORIDE 100 ML/HR: 9 INJECTION, SOLUTION INTRAVENOUS at 15:17

## 2022-07-15 RX ADMIN — ENOXAPARIN SODIUM 60 MG: 100 INJECTION SUBCUTANEOUS at 15:56

## 2022-07-15 RX ADMIN — MIDAZOLAM HYDROCHLORIDE 2 MG: 1 INJECTION, SOLUTION INTRAMUSCULAR; INTRAVENOUS at 13:37

## 2022-07-15 RX ADMIN — DEXMEDETOMIDINE HYDROCHLORIDE 10 MCG: 100 INJECTION, SOLUTION, CONCENTRATE INTRAVENOUS at 13:59

## 2022-07-15 RX ADMIN — LIDOCAINE HYDROCHLORIDE 100 MG: 20 INJECTION, SOLUTION EPIDURAL; INFILTRATION; INTRACAUDAL; PERINEURAL at 13:40

## 2022-07-15 RX ADMIN — FENTANYL CITRATE 100 MCG: 50 INJECTION, SOLUTION INTRAMUSCULAR; INTRAVENOUS at 10:24

## 2022-07-15 RX ADMIN — SODIUM CHLORIDE 3.38 G: 900 INJECTION INTRAVENOUS at 14:04

## 2022-07-15 RX ADMIN — FAMOTIDINE: 10 INJECTION, SOLUTION INTRAVENOUS at 17:33

## 2022-07-15 RX ADMIN — SODIUM CHLORIDE, PRESERVATIVE FREE 10 ML: 5 INJECTION INTRAVENOUS at 15:17

## 2022-07-15 RX ADMIN — HYDROMORPHONE HYDROCHLORIDE 1 MG: 2 INJECTION, SOLUTION INTRAMUSCULAR; INTRAVENOUS; SUBCUTANEOUS at 13:53

## 2022-07-15 NOTE — PROGRESS NOTES
Comprehensive Nutrition Assessment    Type and Reason for Visit: Initial    Nutrition Recommendations/Plan:   1. AA5% D20% @ 75 mL/hr x 1 hour, increase rate to 150 mL/hr x 11 hours, decrease rate to 75 mL/hr for final hour   2. Add 500 mL 20% lipids 3x/week if patient still admitted on Monday 7/18 (TPN + lipids will provide 2012 kcal, 90g protein, 360g CHO). Nutrition Assessment:    Chart reviewed; medically note for septic arthritis right knee. 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 Cyclic TPN recommendations provided above. Patient consumes a full liquid diet for pleasure but will eat solids at times. Weight stable since last admission. Nutrition Related Findings:    Labs and medications reviewed         Current Nutrition Intake & Therapies:        TPN ADULT - CENTRAL  ADULT DIET Full Liquid    Anthropometric Measures:  Height: 5' 9\" (175.3 cm)  Ideal Body Weight (IBW): 160 lbs (73 kg)     Current Body Wt:  63.9 kg (140 lb 14 oz), 88 % IBW. Current BMI (kg/m2): 20.8                          BMI Category: Normal weight (BMI 18.5-24. 9)    Estimated Daily Nutrient Needs:  Energy Requirements Based On: Formula  Weight Used for Energy Requirements: Current  Energy (kcal/day): 7066-8551 kcal (BMR 1530 x 1.2AF +250kcal)  Weight Used for Protein Requirements: Current  Protein (g/day): 83g-96g (1.3-1.5 g/kg bw)  Method Used for Fluid Requirements: 1 ml/kcal  Fluid (ml/day): 1850 mL    Nutrition Diagnosis:   · Altered GI function related to altered GI structure as evidenced by nutrition support-parenteral nutrition    Nutrition Interventions:   Food and/or Nutrient Delivery: Continue parenteral nutrition  Nutrition Education/Counseling: No recommendations at this time  Coordination of Nutrition Care: Continue to monitor while inpatient       Goals:     Goals:  Tolerate nutrition support at goal rate,by next RD assessment       Nutrition Monitoring and Evaluation:   Behavioral-Environmental Outcomes: None identified  Food/Nutrient Intake Outcomes: Parenteral nutrition intake/tolerance  Physical Signs/Symptoms Outcomes: Biochemical data,Weight    Discharge Planning:    Parenteral nutrition    Sandra Garvin RD  Contact: ext [phone]
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
## 2022-07-15 NOTE — PERIOP NOTES
Handoff Report from Operating Room to PACU    Report received from Glenis Curtis CRNA and Kirstie Ortega RN regarding Cain Lewis. Surgeon(s):  Alexy Dickey DO  And Procedure(s) (LRB):  INCISION AND DRAINAGE RIGHT KNEE (Right)  confirmed   with drains and dressings discussed. Anesthesia type, drugs, patient history, complications, estimated blood loss, vital signs, intake and output, and last pain medication, lines and temperature were reviewed. TRANSFER - OUT REPORT:    Verbal report given to Marysol(name) on Cain Lewis  being transferred to [facility](unit) for routine post - op       Report consisted of patients Situation, Background, Assessment and   Recommendations(SBAR). Information from the following report(s) SBAR, OR Summary, Intake/Output, MAR and Cardiac Rhythm NSR was reviewed with the receiving nurse. Opportunity for questions and clarification was provided.       Patient transported with:   Tech    Pt's mom given phone update and informed of room assignment with patient's permission

## 2022-07-15 NOTE — ANESTHESIA POSTPROCEDURE EVALUATION
Procedure(s):  INCISION AND DRAINAGE RIGHT KNEE. general    Anesthesia Post Evaluation        Patient location during evaluation: PACU  Note status: Adequate. Level of consciousness: responsive to verbal stimuli and sleepy but conscious  Pain management: satisfactory to patient  Airway patency: patent  Anesthetic complications: no  Cardiovascular status: acceptable  Respiratory status: acceptable  Hydration status: acceptable  Comments: +Post-Anesthesia Evaluation and Assessment    Patient: Mortimer Saha MRN: [id number]  SSN: [id number]   YOB: 1980  Age: 43 y.o. Sex: male      Cardiovascular Function/Vital Signs    /78 (BP 1 Location: Left upper arm, BP Patient Position: At rest)   Pulse 91   Temp 36.4 °C (97.5 °F)   Resp 19   Ht 5' 9\" (1.753 m)   Wt 63.9 kg (140 lb 14 oz)   SpO2 93%   BMI 20.80 kg/m²     Patient is status post Procedure(s):  INCISION AND DRAINAGE RIGHT KNEE. Nausea/Vomiting: Controlled. Postoperative hydration reviewed and adequate. Pain:  Pain Scale 1: Numeric (0 - 10) (07/15/22 1445)  Pain Intensity 1: 0 (07/15/22 1445)   Managed. Neurological Status:   Neuro (WDL): Exceptions to WDL (07/15/22 1425)   At baseline. Mental Status and Level of Consciousness: Arousable. Pulmonary Status:   O2 Device: None (Room air) (07/15/22 1445)   Adequate oxygenation and airway patent. Complications related to anesthesia: None    Post-anesthesia assessment completed. No concerns. Signed By: Germán Villatoro MD    7/15/2022  Post anesthesia nausea and vomiting:  controlled      INITIAL Post-op Vital signs:   Vitals Value Taken Time   /78 07/15/22 1445   Temp 36.4 °C (97.5 °F) 07/15/22 1429   Pulse 84 07/15/22 1501   Resp 18 07/15/22 1501   SpO2 99 % 07/15/22 1501   Vitals shown include unvalidated device data.

## 2022-07-15 NOTE — ED NOTES
Pulse oximetry not reading; pt speaking in full sentences, normal skin color for ethnicity, no signs of respiratory distres

## 2022-07-15 NOTE — PROGRESS NOTES
End of Shift Note    Bedside shift change report given to [address] (oncoming nurse) by Adam Carrington RN (offgoing nurse). Report included the following information SBAR, Kardex, Intake/Output, MAR, Recent Results and Cardiac Rhythm nsr    Shift worked:  day     Shift summary and any significant changes:     VS Stable, prn morphine given for complaints of pain, voiding clear yellow urine without difficulties via urinal, full liquid diet ordered postoperatively per order given by MD.      Concerns for physician to address:       Zone phone for oncoming shift:          Activity:  Activity Level: Up with Assistance  Number times ambulated in hallways past shift: 0  Number of times OOB to chair past shift: 0    Cardiac:   Cardiac Monitoring: Yes      Cardiac Rhythm: Sinus Rhythm    Access:   Current line(s): PIV     Genitourinary:   Urinary status: voiding    Respiratory:   O2 Device: None (Room air)  Chronic home O2 use?: NO  Incentive spirometer at bedside: YES       GI:  Last Bowel Movement Date: 07/15/22  Current diet:  TPN ADULT - CENTRAL  ADULT DIET Full Liquid  Passing flatus: YES  Tolerating current diet: YES       Pain Management:   Patient states pain is manageable on current regimen: YES    Skin:  Russell Score: 21  Interventions: float heels, increase time out of bed, PT/OT consult and limit briefs    Patient Safety:  Fall Score:  Total Score: 3  Interventions: assistive device (walker, cane, etc), gripper socks, pt to call before getting OOB and stay with me (per policy)  High Fall Risk: Yes    Length of Stay:  Expected LOS: - - -  Actual LOS: 0      Teresa Loving RN

## 2022-07-15 NOTE — PROGRESS NOTES
Dr. Cammy Khan notified via perfect serve pt states he normally does a full liquid diet at home, current orders are only for NPO.  Order given for full liquid diet
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
## 2022-07-15 NOTE — PROGRESS NOTES
Problem: Falls - Risk of  Goal: *Absence of Falls  Description: Document Lisandro Rodriguez Fall Risk and appropriate interventions in the flowsheet.   Outcome: Progressing Towards Goal  Note: Fall Risk Interventions:  Mobility Interventions: Assess mobility with egress test,Communicate number of staff needed for ambulation/transfer,OT consult for ADLs,PT Consult for mobility concerns,PT Consult for assist device competence,Utilize walker, cane, or other assistive device,Utilize gait belt for transfers/ambulation,Patient to call before getting OOB,Strengthening exercises (ROM-active/passive)         Medication Interventions: Assess postural VS orthostatic hypotension,Evaluate medications/consider consulting pharmacy,Patient to call before getting OOB,Utilize gait belt for transfers/ambulation,Teach patient to arise slowly    Elimination Interventions: Call light in reach,Elevated toilet seat,Patient to call for help with toileting needs,Toilet paper/wipes in reach,Toileting schedule/hourly rounds,Stay With Me (per policy)              Problem: Patient Education: Go to Patient Education Activity  Goal: Patient/Family Education  Outcome: Progressing Towards Goal

## 2022-07-15 NOTE — PROGRESS NOTES
Pharmacy Antimicrobial Kinetic Dosing    Indication for Antimicrobials: skin and soft tissue     Current Regimen of Each Antimicrobial:  Vancomycin pharmacy to dose (Start Date 7/15; Day # )  Zosyn q 8h (start; 7/15, day 1)  Eraxis 200mg load, then 100mg daily (start: 7/15, day 1)      Previous Antimicrobial Therapy:    Goal Level: AUC: 400-600 mg/hr/Liter/day    Date Dose & Interval Measured (mcg/mL) Predicted AUC/MARIE                       Date & time of next level:  --not entered at this time    Dosing calculator used: [employer] calculator    Significant Positive Cultures:   7/15: blood  7/15: wound  7/15: fungus    Conditions for Dosing Consideration: None    Labs:  Recent Labs     07/15/22  0923   CREA 0.62*   BUN 13     Recent Labs     07/15/22  0923   WBC 5.7     Temp (24hrs), Av.8 °F (36.6 °C), Min:97.5 °F (36.4 °C), Max:98 °F (36.7 °C)      Creatinine Clearance (mL/min):   CrCl (Ideal Body Weight): 137.5   If actual weight < IBW: CrCl (Actual Body Weight) 124.3    Impression/Plan:   Vancomycin 1500mg load, then 1250mg q 12h for auc 490 and trough ~13mcg/mL  Continue zosyn  Continue eraxis  Antimicrobial stop date 5 days for vancomycin, to be determined for eraxis and zosyn     Pharmacy will follow daily and adjust medications as appropriate for renal function and/or serum levels.     Thank you,  Krishna Gross PHARMD    Vancomycin Dosing Document    Documents located on pharmacy Teams site: Clinical Practice -> Antimicrobial Stewardship -> Antibiotics_Vancomycin     Aminoglycoside Dosing Document    Documents located on pharmacy Teams site: Clinical Practice -> Antimicrobial Stewardship -> Antibiotics_Aminoglycosides
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
## 2022-07-15 NOTE — ED NOTES
Patient is being transferred to OR. Report given to Ubaldo Lagunas RN on Dena Fuchs for ordered procedure. Report consisted of the following information SBAR, ED Summary, Intake/Output, Med Rec Status and Cardiac Rhythm NSR. Patient transferred to receiving unit by: OR staff (RN or tech name). Outstanding consults needed: No     Next labs due: Yes    The following personal items will be sent with the patient during transfer to the floor: All valuables:    Cardiac monitoring ordered: Yes    The following CURRENT information was reported to the receiving RN:    Code status: Prior at time of transfer    Last set of vital signs:  Vital Signs  Level of Consciousness: Alert (0) (07/15/22 0803)  Temp: 97.6 °F (36.4 °C) (07/15/22 0803)  Temp Source: Oral (07/15/22 0803)  Pulse (Heart Rate): 89 (07/15/22 1030)  Cardiac Rhythm: Sinus Tachy (07/15/22 0825)  Resp Rate: 16 (07/15/22 1030)  BP: 108/88 (07/15/22 1030)  MAP (Monitor): 96 (07/15/22 1030)  MAP (Calculated): 95 (07/15/22 1030)  BP 1 Location: Left upper arm (07/15/22 0803)  BP 1 Method: Automatic (07/15/22 0803)  MEWS Score: 4 (07/15/22 0803)         Oxygen Therapy  O2 Sat (%): 100 % (07/15/22 1030)  Pulse via Oximetry: 91 beats per minute (07/15/22 1030)  O2 Device: None (Room air) (07/15/22 0803)      Last pain assessment:  Pain 1  Pain Scale 1: Numeric (0 - 10)  Pain Intensity 1: 10  Pain Location 1: Knee  Pain Orientation 1: Right      Wounds: No     Urinary catheter: voiding  Is there a brock order: No     LDAs:      Venous Access Device Telly tunneled venous access catheter LOT# [id number] 06/20/22 Upper chest (subclavicular area, right (Active)     Peripheral IV 65/27/84 Left Basilic (Active)         Opportunity for questions and clarification was provided.     Griselda Ripa, RN

## 2022-07-16 LAB
ANION GAP SERPL CALC-SCNC: 4 MMOL/L (ref 5–15)
BASOPHILS # BLD: 0 K/UL (ref 0–0.1)
BASOPHILS NFR BLD: 0 % (ref 0–1)
BUN SERPL-MCNC: 9 MG/DL (ref 6–20)
BUN/CREAT SERPL: 15 (ref 12–20)
CALCIUM SERPL-MCNC: 8.2 MG/DL (ref 8.5–10.1)
CHLORIDE SERPL-SCNC: 108 MMOL/L (ref 97–108)
CO2 SERPL-SCNC: 25 MMOL/L (ref 21–32)
CREAT SERPL-MCNC: 0.61 MG/DL (ref 0.7–1.3)
DIFFERENTIAL METHOD BLD: ABNORMAL
EOSINOPHIL # BLD: 0.2 K/UL (ref 0–0.4)
EOSINOPHIL NFR BLD: 4 % (ref 0–7)
ERYTHROCYTE [DISTWIDTH] IN BLOOD BY AUTOMATED COUNT: 18.3 % (ref 11.5–14.5)
GLUCOSE SERPL-MCNC: 111 MG/DL (ref 65–100)
HCT VFR BLD AUTO: 28.2 % (ref 36.6–50.3)
HGB BLD-MCNC: 8.9 G/DL (ref 12.1–17)
IMM GRANULOCYTES # BLD AUTO: 0 K/UL (ref 0–0.04)
IMM GRANULOCYTES NFR BLD AUTO: 0 % (ref 0–0.5)
LYMPHOCYTES # BLD: 1.2 K/UL (ref 0.8–3.5)
LYMPHOCYTES NFR BLD: 22 % (ref 12–49)
MCH RBC QN AUTO: 28.1 PG (ref 26–34)
MCHC RBC AUTO-ENTMCNC: 31.6 G/DL (ref 30–36.5)
MCV RBC AUTO: 89 FL (ref 80–99)
MONOCYTES # BLD: 0.5 K/UL (ref 0–1)
MONOCYTES NFR BLD: 10 % (ref 5–13)
NEUTS SEG # BLD: 3.5 K/UL (ref 1.8–8)
NEUTS SEG NFR BLD: 64 % (ref 32–75)
NRBC # BLD: 0 K/UL (ref 0–0.01)
NRBC BLD-RTO: 0 PER 100 WBC
PLATELET # BLD AUTO: 331 K/UL (ref 150–400)
PMV BLD AUTO: 10.6 FL (ref 8.9–12.9)
POTASSIUM SERPL-SCNC: 3.6 MMOL/L (ref 3.5–5.1)
RBC # BLD AUTO: 3.17 M/UL (ref 4.1–5.7)
SODIUM SERPL-SCNC: 137 MMOL/L (ref 136–145)
WBC # BLD AUTO: 5.4 K/UL (ref 4.1–11.1)

## 2022-07-16 PROCEDURE — 80048 BASIC METABOLIC PNL TOTAL CA: CPT

## 2022-07-16 PROCEDURE — 74011000250 HC RX REV CODE- 250: Performed by: HOSPITALIST

## 2022-07-16 PROCEDURE — 85025 COMPLETE CBC W/AUTO DIFF WBC: CPT

## 2022-07-16 PROCEDURE — 97530 THERAPEUTIC ACTIVITIES: CPT

## 2022-07-16 PROCEDURE — 74011250636 HC RX REV CODE- 250/636: Performed by: INTERNAL MEDICINE

## 2022-07-16 PROCEDURE — 74011250636 HC RX REV CODE- 250/636: Performed by: HOSPITALIST

## 2022-07-16 PROCEDURE — 27310 EXPLORATION OF KNEE JOINT: CPT | Performed by: ORTHOPAEDIC SURGERY

## 2022-07-16 PROCEDURE — 36415 COLL VENOUS BLD VENIPUNCTURE: CPT

## 2022-07-16 PROCEDURE — 74011000258 HC RX REV CODE- 258: Performed by: HOSPITALIST

## 2022-07-16 PROCEDURE — 74011000258 HC RX REV CODE- 258: Performed by: INTERNAL MEDICINE

## 2022-07-16 PROCEDURE — 97116 GAIT TRAINING THERAPY: CPT

## 2022-07-16 PROCEDURE — 93005 ELECTROCARDIOGRAM TRACING: CPT

## 2022-07-16 PROCEDURE — 65270000046 HC RM TELEMETRY

## 2022-07-16 PROCEDURE — 77030040361 HC SLV COMPR DVT MDII -B

## 2022-07-16 PROCEDURE — 74011000250 HC RX REV CODE- 250: Performed by: INTERNAL MEDICINE

## 2022-07-16 PROCEDURE — 74011000258 HC RX REV CODE- 258: Performed by: EMERGENCY MEDICINE

## 2022-07-16 PROCEDURE — 97161 PT EVAL LOW COMPLEX 20 MIN: CPT

## 2022-07-16 PROCEDURE — 74011250636 HC RX REV CODE- 250/636: Performed by: EMERGENCY MEDICINE

## 2022-07-16 PROCEDURE — 74011250637 HC RX REV CODE- 250/637: Performed by: HOSPITALIST

## 2022-07-16 RX ORDER — MORPHINE SULFATE 2 MG/ML
2 INJECTION, SOLUTION INTRAMUSCULAR; INTRAVENOUS
Status: DISCONTINUED | OUTPATIENT
Start: 2022-07-16 | End: 2022-07-24

## 2022-07-16 RX ORDER — HEPARIN 100 UNIT/ML
300 SYRINGE INTRAVENOUS AS NEEDED
Status: DISCONTINUED | OUTPATIENT
Start: 2022-07-16 | End: 2022-08-11 | Stop reason: HOSPADM

## 2022-07-16 RX ORDER — FENTANYL 25 UG/1
1 PATCH TRANSDERMAL
Status: DISCONTINUED | OUTPATIENT
Start: 2022-07-16 | End: 2022-08-11 | Stop reason: HOSPADM

## 2022-07-16 RX ORDER — LABETALOL HYDROCHLORIDE 5 MG/ML
10 INJECTION, SOLUTION INTRAVENOUS
Status: DISCONTINUED | OUTPATIENT
Start: 2022-07-16 | End: 2022-08-11 | Stop reason: HOSPADM

## 2022-07-16 RX ORDER — NALOXONE HYDROCHLORIDE 0.4 MG/ML
1 INJECTION, SOLUTION INTRAMUSCULAR; INTRAVENOUS; SUBCUTANEOUS DAILY PRN
Status: DISCONTINUED | OUTPATIENT
Start: 2022-07-16 | End: 2022-08-11 | Stop reason: HOSPADM

## 2022-07-16 RX ADMIN — SODIUM CHLORIDE 100 ML/HR: 9 INJECTION, SOLUTION INTRAVENOUS at 19:23

## 2022-07-16 RX ADMIN — SODIUM CHLORIDE, PRESERVATIVE FREE 10 ML: 5 INJECTION INTRAVENOUS at 22:00

## 2022-07-16 RX ADMIN — PIPERACILLIN AND TAZOBACTAM 3.38 G: 3; .375 INJECTION, POWDER, LYOPHILIZED, FOR SOLUTION INTRAVENOUS at 03:33

## 2022-07-16 RX ADMIN — ENOXAPARIN SODIUM 60 MG: 100 INJECTION SUBCUTANEOUS at 17:15

## 2022-07-16 RX ADMIN — VANCOMYCIN HYDROCHLORIDE 1250 MG: 10 INJECTION, POWDER, LYOPHILIZED, FOR SOLUTION INTRAVENOUS at 15:07

## 2022-07-16 RX ADMIN — ENOXAPARIN SODIUM 60 MG: 100 INJECTION SUBCUTANEOUS at 06:20

## 2022-07-16 RX ADMIN — SODIUM CHLORIDE, PRESERVATIVE FREE 10 ML: 5 INJECTION INTRAVENOUS at 13:02

## 2022-07-16 RX ADMIN — SODIUM CHLORIDE 100 MG: 9 INJECTION, SOLUTION INTRAVENOUS at 13:01

## 2022-07-16 RX ADMIN — FAMOTIDINE: 10 INJECTION, SOLUTION INTRAVENOUS at 17:11

## 2022-07-16 RX ADMIN — MORPHINE SULFATE 1 MG: 2 INJECTION, SOLUTION INTRAMUSCULAR; INTRAVENOUS at 09:03

## 2022-07-16 RX ADMIN — VANCOMYCIN HYDROCHLORIDE 1250 MG: 10 INJECTION, POWDER, LYOPHILIZED, FOR SOLUTION INTRAVENOUS at 06:20

## 2022-07-16 RX ADMIN — MORPHINE SULFATE 1 MG: 2 INJECTION, SOLUTION INTRAMUSCULAR; INTRAVENOUS at 02:42

## 2022-07-16 RX ADMIN — Medication 300 UNITS: at 20:28

## 2022-07-16 RX ADMIN — SODIUM CHLORIDE, PRESERVATIVE FREE 10 ML: 5 INJECTION INTRAVENOUS at 06:20

## 2022-07-16 RX ADMIN — PIPERACILLIN AND TAZOBACTAM 3.38 G: 3; .375 INJECTION, POWDER, LYOPHILIZED, FOR SOLUTION INTRAVENOUS at 12:32

## 2022-07-16 RX ADMIN — PIPERACILLIN AND TAZOBACTAM 3.38 G: 3; .375 INJECTION, POWDER, LYOPHILIZED, FOR SOLUTION INTRAVENOUS at 20:28

## 2022-07-16 RX ADMIN — MORPHINE SULFATE 2 MG: 2 INJECTION, SOLUTION INTRAMUSCULAR; INTRAVENOUS at 13:01

## 2022-07-16 RX ADMIN — MORPHINE SULFATE 2 MG: 2 INJECTION, SOLUTION INTRAMUSCULAR; INTRAVENOUS at 17:15

## 2022-07-16 NOTE — PROGRESS NOTES
Hospitalist Progress Note    NAME: Harpreet Archer   :  1980   MRN:  [id number]     Subjective:   Daily Progress Note: 2022 1:30 PM      Chief complaint: Knee infection  Patient seen and examined, chart was reviewed. Patient has been having severe pain with tachycardia and asking for IV morphine dose to be increased. Spoke with nursing staff, he is unable to tolerate p.o. meds.     Current Facility-Administered Medications   Medication Dose Route Frequency    TPN ADULT - CENTRAL   IntraVENous TITRATE    morphine injection 2 mg  2 mg IntraVENous Q4H PRN    heparin (porcine) pf 300 Units  300 Units InterCATHeter PRN    anidulafungin (ERAXIS) 100 mg in 0.9% sodium chloride 130 mL IVPB  100 mg IntraVENous Q24H    L.acidophilus-paracasei-S.thermophil-bifidobacter (RISAQUAD) 8 billion cell capsule  1 Capsule Oral DAILY    enoxaparin (LOVENOX) injection 60 mg  1 mg/kg SubCUTAneous Q12H    piperacillin-tazobactam (ZOSYN) 3.375 g in 0.9% sodium chloride (MBP/ADV) 100 mL MBP  3.375 g IntraVENous Q8H    sodium chloride (NS) flush 5-40 mL  5-40 mL IntraVENous Q8H    sodium chloride (NS) flush 5-40 mL  5-40 mL IntraVENous PRN    acetaminophen (TYLENOL) tablet 650 mg  650 mg Oral Q6H PRN    Or    acetaminophen (TYLENOL) suppository 650 mg  650 mg Rectal Q6H PRN    polyethylene glycol (MIRALAX) packet 17 g  17 g Oral DAILY PRN    ondansetron (ZOFRAN ODT) tablet 4 mg  4 mg Oral Q8H PRN    Or    ondansetron (ZOFRAN) injection 4 mg  4 mg IntraVENous Q6H PRN    0.9% sodium chloride infusion  100 mL/hr IntraVENous CONTINUOUS    oxyCODONE IR (ROXICODONE) tablet 5 mg  5 mg Oral Q4H PRN    vancomycin (VANCOCIN) 1250 mg in  ml infusion  1,250 mg IntraVENous Q12H          Objective:     Visit Vitals  /74   Pulse 92   Temp 98.3 °F (36.8 °C)   Resp 18   Ht 5' 9\" (1.753 m)   Wt 63.9 kg (140 lb 14 oz)   SpO2 98%   BMI 20.80 kg/m²    O2 Flow Rate (L/min): 2 l/min O2 Device: None (Room air)    Temp (24hrs), Av.5 °F (36.9 °C), Min:97.5 °F (36.4 °C), Max:100.1 °F (37.8 °C)        PHYSICAL EXAM:  gen thin built and nourished  Neck supple  CVS tachycardic  Respiratory symmetric expansion  Abdomen soft,  G tube with Colostomy  Ext no edema  Neuro alert, normal speech  Psych anxious easily        Data Review    Recent Results (from the past 24 hour(s))   CULTURE, WOUND [place] Bull STAIN    Collection Time: 07/15/22  2:15 PM    Specimen: Knee ; Wound   Result Value Ref Range    Special Requests: NO SPECIAL REQUESTS      GRAM STAIN OCCASIONAL WBCS SEEN      GRAM STAIN NO ORGANISMS SEEN      Culture result: PENDING    METABOLIC PANEL, BASIC    Collection Time: 22  6:56 AM   Result Value Ref Range    Sodium 137 136 - 145 mmol/L    Potassium 3.6 3.5 - 5.1 mmol/L    Chloride 108 97 - 108 mmol/L    CO2 25 21 - 32 mmol/L    Anion gap 4 (L) 5 - 15 mmol/L    Glucose 111 (H) 65 - 100 mg/dL    BUN 9 6 - 20 MG/DL    Creatinine 0.61 (L) 0.70 - 1.30 MG/DL    BUN/Creatinine ratio 15 12 - 20      GFR est AA >60 >60 ml/min/1.73m2    GFR est non-AA >60 >60 ml/min/1.73m2    Calcium 8.2 (L) 8.5 - 10.1 MG/DL   CBC WITH AUTOMATED DIFF    Collection Time: 22  6:56 AM   Result Value Ref Range    WBC 5.4 4.1 - 11.1 K/uL    RBC 3.17 (L) 4.10 - 5.70 M/uL    HGB 8.9 (L) 12.1 - 17.0 g/dL    HCT 28.2 (L) 36.6 - 50.3 %    MCV 89.0 80.0 - 99.0 FL    MCH 28.1 26.0 - 34.0 PG    MCHC 31.6 30.0 - 36.5 g/dL    RDW 18.3 (H) 11.5 - 14.5 %    PLATELET 978 943 - 691 K/uL    MPV 10.6 8.9 - 12.9 FL    NRBC 0.0 0  WBC    ABSOLUTE NRBC 0.00 0.00 - 0.01 K/uL    NEUTROPHILS 64 32 - 75 %    LYMPHOCYTES 22 12 - 49 %    MONOCYTES 10 5 - 13 %    EOSINOPHILS 4 0 - 7 %    BASOPHILS 0 0 - 1 %    IMMATURE GRANULOCYTES 0 0.0 - 0.5 %    ABS. NEUTROPHILS 3.5 1.8 - 8.0 K/UL    ABS. LYMPHOCYTES 1.2 0.8 - 3.5 K/UL    ABS. MONOCYTES 0.5 0.0 - 1.0 K/UL    ABS. EOSINOPHILS 0.2 0.0 - 0.4 K/UL    ABS. BASOPHILS 0.0 0.0 - 0.1 K/UL    ABS. IMM. GRANS. 0.0 0.00 - 0.04 K/UL    DF AUTOMATED     EKG, 12 LEAD, SUBSEQUENT    Collection Time: 07/16/22  9:49 AM   Result Value Ref Range    Ventricular Rate 111 BPM    Atrial Rate 111 BPM    P-R Interval 124 ms    QRS Duration 68 ms    Q-T Interval 344 ms    QTC Calculation (Bezet) 467 ms    Calculated P Axis 60 degrees    Calculated R Axis 53 degrees    Calculated T Axis 59 degrees    Diagnosis       Sinus tachycardia  When compared with ECG of 12-JUN-2022 09:32,  No significant change was found       No results found for this visit on 07/15/22. All Micro Results     Procedure Component Value Units Date/Time    CULTURE, BODY FLUID Madhavi Doyle STAIN [[id number]] Collected: 07/15/22 1055    Order Status: Completed Specimen:  Body Fluid from Knee Fluid Updated: 07/16/22 1106     Special Requests: NO SPECIAL REQUESTS        GRAM STAIN 1+ WBCS SEEN         NO ORGANISMS SEEN        Culture result:       NO GROWTH THUS FAR Culture performed on Fluid swab specimen          CULTURE, BLOOD, PAIRED [[id number]] Collected: 07/15/22 0923    Order Status: Completed Specimen: Blood Updated: 07/16/22 0741     Special Requests: NO SPECIAL REQUESTS        Culture result: NO GROWTH AFTER 19 HOURS       CULTURE, Gabe Heritage STAIN [[id number]] Collected: 07/15/22 1415    Order Status: Completed Specimen: Wound from Knee  Updated: 07/16/22 0024     Special Requests: NO SPECIAL REQUESTS        GRAM STAIN OCCASIONAL WBCS SEEN         NO ORGANISMS SEEN        Culture result: PENDING    CULTURE, ANAEROBIC [[id number]] Collected: 07/15/22 1415    Order Status: Completed Specimen: Knee  Updated: 07/15/22 2243    CULTURE, FUNGUS [[id number]] Collected: 07/15/22 1415    Order Status: Completed Specimen: Knee Fluid Updated: 07/15/22 2243    CULTURE, BLOOD FOR FUNGUS [[id number]]     Order Status: Sent Specimen: Blood              Assessment/Plan:     Suspected R septic knee  Duplex neg for DVT  XR R knee   Moderately large knee joint effusion  F/u with joint aspirate was done in the ER  S/p I&D 7/15   f/u with intra Op cultures  Empiric iv abx and antifungal  Will likely need ID evaluation pending cultures  Appreciate Ortho input     Hx of candidemia, E.coli and E. Faecalis bacteremia, discharged on 7/6/22, completed abx and antifungal inpt.     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    S/P Prior G-tube and J-tube placements  Protein calorie malnutition on chronic TPN  Resume TPN-managed at [address] [place] [address]  Was on chronic fentanyl patch at home will resume while here     Code Status:   DVT Prophylaxis:  Sq lovenox  NOK    Dispo: TBD    Signed By: Arpit Aquino MD     July 16, 2022

## 2022-07-16 NOTE — PROGRESS NOTES
Problem: Falls - Risk of  Goal: *Absence of Falls  Description: Document May Led Fall Risk and appropriate interventions in the flowsheet.   Outcome: Progressing Towards Goal  Note: Fall Risk Interventions:  Mobility Interventions: Assess mobility with egress test,Communicate number of staff needed for ambulation/transfer,OT consult for ADLs,Patient to call before getting OOB,PT Consult for mobility concerns,PT Consult for assist device competence,Strengthening exercises (ROM-active/passive),Utilize walker, cane, or other assistive device,Utilize gait belt for transfers/ambulation         Medication Interventions: Assess postural VS orthostatic hypotension,Evaluate medications/consider consulting pharmacy,Teach patient to arise slowly,Utilize gait belt for transfers/ambulation,Patient to call before getting OOB    Elimination Interventions: Call light in reach,Stay With Me (per policy),Toilet paper/wipes in reach,Toileting schedule/hourly rounds,Patient to call for help with toileting needs,Elevated toilet seat              Problem: Patient Education: Go to Patient Education Activity  Goal: Patient/Family Education  Outcome: Progressing Towards Goal     Problem: Patient Education: Go to Patient Education Activity  Goal: Patient/Family Education  Outcome: Progressing Towards Goal
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
## 2022-07-16 NOTE — OP NOTES
Date of Procedure: 7/15/22  Preoperative Diagnosis: septic right kne  Postoperative Diagnosis: Same  Procedure Performed: Arthrotomy, irrigation and excisional debridement, right knee  Surgeon: Maury Stubbs DO  Assistant: None  Anesthesia: General  Estimated Blood Loss: 10 cc  Specimens: Aerobic and anaerobic cultures x3 each  Drains: Medium Hemovac drain  Complications: None  Implants: None      Operative Indications: This is a 43 y.o. male who presented with the signs and symptoms as well as objective findings consistent with septic right knee. We did discuss the risks of surgery which include but are not limited to infection, nerve or blood vessel damage, failure of fixation, failure of any possible implant, need for reoperation, postoperative pain and swelling, intra-or postoperative fracture, postoperative mechanical failure, need for reoperation, implant failure, death, disability, organ dysfunction, wound healing issues, DVT, PE, and the need for further procedures. The patient did freely state their understanding and satisfaction with our discussion. Appropriate medical clearances have been obtained. Description of Procedure:  After the correct site and side were identified by myself in the holding area, the patient was transported to the surgical suite, where, after induction of general anesthesia, the patient was positioned in the supine position. The right leg was then prepped and draped in the usual sterile orthopedic fashion. After appropriate timeout, and confirmation that he was on antibiotics, anterior lateral incision was made, just lateral to the patella superiorly, sharp dissection was carried down to fascia. Strict hemostasis was maintained with use electrocautery. I then performed an incision into the iliotibial band, arthrotomy was then performed sharply, cloudy appearing synovial fluid was extruded, cultures were taken.   Once arthrotomy was expanded, rongeur was utilized to the level of bone to debride any nonviable appearing tissues. I then introduced a pulsatile lavage, I then utilized Betadine irrigation of 6 L with pulsatile lavage in through the joint itself. Once this was completely performed, I placed a medium Hemovac drain into the joint itself exiting superior laterally. I then closed my arthrotomy with 0 PDS suture. Care was taken to avoid any suturing of the drain. I then closed skin with 2-0 Monocryl, staples. Sterile dressing was applied. Patient was then awoken and taken to PACU in stable condition without complication. Postoperative plan: Patient be weightbearing as tolerated. Drain will be pulled in 36 hours. Cultures will be followed, infectious disease has been consulted.   No plan for return to the operating room, follow-up with me in 2 weeks for staple removal.

## 2022-07-16 NOTE — PROGRESS NOTES
End of Shift Note    Bedside shift change report given to [address] (oncoming nurse) by Helena Branch RN (offgoing nurse). Report included the following information SBAR, Kardex, Intake/Output, MAR, Recent Results and Cardiac Rhythm nsr    Shift worked:  day     Shift summary and any significant changes:     VS Stable, prn morphine given for complaints of pain, voiding clear yellow urine without difficulties via urinal. Fentanyl patch applied to L chest. EKG preformed due to tachycardia 110s-120s this am.       Concerns for physician to address:       Zone phone for oncoming shift:          Activity:  Activity Level: Up with Assistance  Number times ambulated in hallways past shift: 0  Number of times OOB to chair past shift: 1    Cardiac:   Cardiac Monitoring: Yes      Cardiac Rhythm: Sinus Rhythm,Sinus Tachy    Access:   Current line(s): PIV     Genitourinary:   Urinary status: voiding    Respiratory:   O2 Device: None (Room air)  Chronic home O2 use?: NO  Incentive spirometer at bedside: YES       GI:  Last Bowel Movement Date: 07/15/22  Current diet:  ADULT DIET Full Liquid  TPN ADULT - CENTRAL  Passing flatus: YES  Tolerating current diet: YES       Pain Management:   Patient states pain is manageable on current regimen: YES    Skin:  Russell Score: 20  Interventions: float heels, increase time out of bed, PT/OT consult and limit briefs    Patient Safety:  Fall Score:  Total Score: 2  Interventions: assistive device (walker, cane, etc), gripper socks, pt to call before getting OOB and stay with me (per policy)  High Fall Risk: Yes    Length of Stay:  Expected LOS: - - -  Actual LOS: 1      Teresa Angulo RN TRANSFER - OUT REPORT:    Verbal report given to Asad Quevedo, manager(name) on Anabela Brewer  being transferred to (unit) for routine progression of care       Report consisted of patients Situation, Background, Assessment and   Recommendations(SBAR). Information from the following report(s) SBAR, Kardex and MAR was reviewed with the receiving nurse. Lines:   Peripheral IV 11/07/17 Right Hand (Active)   Site Assessment Clean, dry, & intact 11/9/2017  2:47 PM   Phlebitis Assessment 0 11/9/2017  2:47 PM   Infiltration Assessment 0 11/9/2017  2:47 PM   Dressing Status Clean, dry, & intact 11/9/2017  2:47 PM   Dressing Type Transparent 11/9/2017  2:47 PM   Hub Color/Line Status Capped 11/9/2017  2:47 PM   Action Taken Open ports on tubing capped 11/8/2017 10:05 AM   Alcohol Cap Used Yes 11/9/2017  2:47 PM       Peripheral IV 11/07/17 Left Hand (Active)   Site Assessment Clean, dry, & intact 11/9/2017  2:47 PM   Phlebitis Assessment 0 11/9/2017  2:47 PM   Infiltration Assessment 0 11/9/2017  2:47 PM   Dressing Status Clean, dry, & intact 11/9/2017  2:47 PM   Dressing Type Transparent 11/9/2017  2:47 PM   Hub Color/Line Status Infusing 11/9/2017  2:47 PM   Action Taken Open ports on tubing capped 11/8/2017 10:05 AM   Alcohol Cap Used Yes 11/9/2017  2:47 PM        Opportunity for questions and clarification was provided.       Patient transported with:   Registered Nurse

## 2022-07-16 NOTE — PROGRESS NOTES
Po I and D knee. Pan about the same.   Wbc 5.4    Patient Vitals for the past 24 hrs:   Temp Pulse Resp BP SpO2   07/16/22 1123 98.3 °F (36.8 °C) 92 18 119/74 98 %   07/16/22 0735 98.9 °F (37.2 °C) 95 18 111/75 100 %   07/16/22 0310 99 °F (37.2 °C) (!) 105 18 117/80 98 %   07/15/22 2327 100.1 °F (37.8 °C) 100 18 122/74 99 %   07/15/22 2027 98.5 °F (36.9 °C) 80 18 (!) 115/93 100 %   07/15/22 1815 98.6 °F (37 °C) 89 18 (!) 125/100 100 %   07/15/22 1715 -- 94 18 124/88 100 %   07/15/22 1615 -- 87 18 (!) 135/95 100 %   07/15/22 1515 97.5 °F (36.4 °C) 83 18 110/79 99 %   07/15/22 1500 97.9 °F (36.6 °C) 81 16 110/62 98 %   07/15/22 1445 -- 91 19 113/78 93 %   07/15/22 1440 -- 84 16 113/66 99 %   07/15/22 1435 -- -- -- -- 98 %   07/15/22 1430 -- 96 12 (!) 111/40 99 %   07/15/22 1429 97.5 °F (36.4 °C) 90 12 110/75 97 %   07/15/22 1425 -- 97 22 102/80 99 %   07/15/22 1250 98 °F (36.7 °C) 84 15 105/76 100 %   07/15/22 1200 97.9 °F (36.6 °C) 90 17 103/72 99 %     Dressing clean and dry  Drain in place  Drain  ML last 12 hours    Dc drain at midnight tonight per DR Chelsi Lopez  wbat  cx neg so far

## 2022-07-16 NOTE — PROGRESS NOTES
Dr. Mj Mesa notified about complaints of pain today as well as night shift unable to obtain blood return from [facility].  Order given for morphine 2 mg IVP every 4 hours as needed as well as heparin flushes 300 units prn

## 2022-07-16 NOTE — PROGRESS NOTES
Dr. Ly [place] notified via perfect serve pt's heart rate per telemetry monitoring is sustaining 110's-125's since 10am today. Leads andjusted and placing new stickers placed, but pt's heart rate is still sustaining. no complaints right now of shortness of breathe or chest pain. Order given for 12 lead EKG.

## 2022-07-17 LAB
ATRIAL RATE: 111 BPM
CALCULATED P AXIS, ECG09: 60 DEGREES
CALCULATED R AXIS, ECG10: 53 DEGREES
CALCULATED T AXIS, ECG11: 59 DEGREES
DIAGNOSIS, 93000: NORMAL
P-R INTERVAL, ECG05: 124 MS
Q-T INTERVAL, ECG07: 344 MS
QRS DURATION, ECG06: 68 MS
QTC CALCULATION (BEZET), ECG08: 467 MS
VANCOMYCIN SERPL-MCNC: 10.2 UG/ML
VENTRICULAR RATE, ECG03: 111 BPM

## 2022-07-17 PROCEDURE — 74011000250 HC RX REV CODE- 250: Performed by: INTERNAL MEDICINE

## 2022-07-17 PROCEDURE — 74011250636 HC RX REV CODE- 250/636: Performed by: HOSPITALIST

## 2022-07-17 PROCEDURE — 74011250636 HC RX REV CODE- 250/636: Performed by: INTERNAL MEDICINE

## 2022-07-17 PROCEDURE — 74011000258 HC RX REV CODE- 258: Performed by: HOSPITALIST

## 2022-07-17 PROCEDURE — 74011250636 HC RX REV CODE- 250/636: Performed by: EMERGENCY MEDICINE

## 2022-07-17 PROCEDURE — 80202 ASSAY OF VANCOMYCIN: CPT

## 2022-07-17 PROCEDURE — 74011000258 HC RX REV CODE- 258: Performed by: EMERGENCY MEDICINE

## 2022-07-17 PROCEDURE — 74011000250 HC RX REV CODE- 250: Performed by: HOSPITALIST

## 2022-07-17 PROCEDURE — 36415 COLL VENOUS BLD VENIPUNCTURE: CPT

## 2022-07-17 PROCEDURE — 65270000046 HC RM TELEMETRY

## 2022-07-17 PROCEDURE — 74011000258 HC RX REV CODE- 258: Performed by: INTERNAL MEDICINE

## 2022-07-17 RX ADMIN — MORPHINE SULFATE 2 MG: 2 INJECTION, SOLUTION INTRAMUSCULAR; INTRAVENOUS at 13:08

## 2022-07-17 RX ADMIN — VANCOMYCIN HYDROCHLORIDE 1250 MG: 10 INJECTION, POWDER, LYOPHILIZED, FOR SOLUTION INTRAVENOUS at 17:10

## 2022-07-17 RX ADMIN — PIPERACILLIN AND TAZOBACTAM 3.38 G: 3; .375 INJECTION, POWDER, LYOPHILIZED, FOR SOLUTION INTRAVENOUS at 12:00

## 2022-07-17 RX ADMIN — MORPHINE SULFATE 2 MG: 2 INJECTION, SOLUTION INTRAMUSCULAR; INTRAVENOUS at 17:10

## 2022-07-17 RX ADMIN — SODIUM CHLORIDE, PRESERVATIVE FREE 10 ML: 5 INJECTION INTRAVENOUS at 06:27

## 2022-07-17 RX ADMIN — MORPHINE SULFATE 2 MG: 2 INJECTION, SOLUTION INTRAMUSCULAR; INTRAVENOUS at 21:12

## 2022-07-17 RX ADMIN — FAMOTIDINE: 10 INJECTION, SOLUTION INTRAVENOUS at 17:11

## 2022-07-17 RX ADMIN — ENOXAPARIN SODIUM 60 MG: 100 INJECTION SUBCUTANEOUS at 17:10

## 2022-07-17 RX ADMIN — VANCOMYCIN HYDROCHLORIDE 1250 MG: 10 INJECTION, POWDER, LYOPHILIZED, FOR SOLUTION INTRAVENOUS at 04:12

## 2022-07-17 RX ADMIN — MORPHINE SULFATE 2 MG: 2 INJECTION, SOLUTION INTRAMUSCULAR; INTRAVENOUS at 01:24

## 2022-07-17 RX ADMIN — MORPHINE SULFATE 2 MG: 2 INJECTION, SOLUTION INTRAMUSCULAR; INTRAVENOUS at 08:29

## 2022-07-17 RX ADMIN — SODIUM CHLORIDE 100 ML/HR: 9 INJECTION, SOLUTION INTRAVENOUS at 17:10

## 2022-07-17 RX ADMIN — PIPERACILLIN AND TAZOBACTAM 3.38 G: 3; .375 INJECTION, POWDER, LYOPHILIZED, FOR SOLUTION INTRAVENOUS at 04:13

## 2022-07-17 RX ADMIN — ENOXAPARIN SODIUM 60 MG: 100 INJECTION SUBCUTANEOUS at 06:27

## 2022-07-17 RX ADMIN — SODIUM CHLORIDE, PRESERVATIVE FREE 10 ML: 5 INJECTION INTRAVENOUS at 11:59

## 2022-07-17 RX ADMIN — SODIUM CHLORIDE 100 MG: 9 INJECTION, SOLUTION INTRAVENOUS at 13:20

## 2022-07-17 RX ADMIN — PIPERACILLIN AND TAZOBACTAM 3.38 G: 3; .375 INJECTION, POWDER, LYOPHILIZED, FOR SOLUTION INTRAVENOUS at 20:40

## 2022-07-17 NOTE — PROGRESS NOTES
End of Shift Note    Bedside shift change report given to Orquidea BAKER (oncoming nurse) by Jose Guillaume RN (offgoing nurse). Report included the following information SBAR, Kardex, Intake/Output, MAR, Recent Results and Cardiac Rhythm nsr    Shift worked:  day     Shift summary and any significant changes:     VS Stable, prn morphine given for complaints of pain, voiding clear yellow urine without difficulties via urinal. Pt up with 1 assist and walker to chair      Concerns for physician to address:       Zone phone for oncoming shift:          Activity:  Activity Level: Up with Assistance  Number times ambulated in hallways past shift: 0  Number of times OOB to chair past shift: 1    Cardiac:   Cardiac Monitoring: Yes      Cardiac Rhythm: Sinus Rhythm    Access:   Current line(s): PIV     Genitourinary:   Urinary status: voiding    Respiratory:   O2 Device: None (Room air)  Chronic home O2 use?: NO  Incentive spirometer at bedside: YES       GI:  Last Bowel Movement Date: 07/15/22  Current diet:  ADULT DIET Full Liquid  TPN ADULT - CENTRAL  Passing flatus: YES  Tolerating current diet: YES       Pain Management:   Patient states pain is manageable on current regimen: YES    Skin:  Russell Score: 20  Interventions: float heels, increase time out of bed, PT/OT consult and limit briefs    Patient Safety:  Fall Score:  Total Score: 2  Interventions: assistive device (walker, cane, etc), gripper socks, pt to call before getting OOB and stay with me (per policy)  High Fall Risk: Yes    Length of Stay:  Expected LOS: - - -  Actual LOS: 2      Teresa Mejia RN

## 2022-07-17 NOTE — PROGRESS NOTES
S: No complaints, no acute events. O:   Visit Vitals  /68   Pulse 80   Temp 99 °F (37.2 °C)   Resp 16   Ht 5' 9\" (1.753 m)   Wt 140 lb 14 oz (63.9 kg)   SpO2 100%   BMI 20.80 kg/m²       Dressing C/D/I  Sensation intact L1-S1  TA/GCS/EHL: 5/5  Capillary refill less than 2 seconds.         A: right knee I+D    P:  PT,weight bearing as tolerated  No restrictions on knee motion  Dressing change PRN  Antibiotic regimen per ID    DVT prophylaxis  D/C planning    Follow up with me in 2 weeks

## 2022-07-17 NOTE — PROGRESS NOTES
Problem: Falls - Risk of  Goal: *Absence of Falls  Description: Document Wisam Crouch Fall Risk and appropriate interventions in the flowsheet.   Outcome: Progressing Towards Goal  Note: Fall Risk Interventions:  Mobility Interventions: Assess mobility with egress test,Communicate number of staff needed for ambulation/transfer,OT consult for ADLs,Patient to call before getting OOB,PT Consult for mobility concerns,PT Consult for assist device competence,Utilize walker, cane, or other assistive device,Utilize gait belt for transfers/ambulation,Strengthening exercises (ROM-active/passive)         Medication Interventions: Assess postural VS orthostatic hypotension,Evaluate medications/consider consulting pharmacy,Patient to call before getting OOB,Teach patient to arise slowly,Utilize gait belt for transfers/ambulation    Elimination Interventions: Elevated toilet seat,Call light in reach,Toileting schedule/hourly rounds,Toilet paper/wipes in reach,Stay With Me (per policy),Patient to call for help with toileting needs,Urinal in reach              Problem: Patient Education: Go to Patient Education Activity  Goal: Patient/Family Education  Outcome: Progressing Towards Goal     Problem: Patient Education: Go to Patient Education Activity  Goal: Patient/Family Education  Outcome: Progressing Towards Goal

## 2022-07-17 NOTE — PROGRESS NOTES
Hospitalist Progress Note    NAME: Yunior Amezcua   :  1980   MRN:  [id number]     Subjective:   Daily Progress Note: 2022 1:30 PM      Chief complaint: Knee infection  Patient seen and examined, chart was reviewed. Patient says pain is controlled. No other acute issues reported to me. Cultures pending.     Current Facility-Administered Medications   Medication Dose Route Frequency    morphine injection 2 mg  2 mg IntraVENous Q4H PRN    heparin (porcine) pf 300 Units  300 Units InterCATHeter PRN    fentaNYL (DURAGESIC) 25 mcg/hr patch 1 Patch  1 Patch TransDERmal Q72H    naloxone (NARCAN) injection 1 mg  1 mg IntraVENous DAILY PRN    labetaloL (NORMODYNE;TRANDATE) injection 10 mg  10 mg IntraVENous Q4H PRN    anidulafungin (ERAXIS) 100 mg in 0.9% sodium chloride 130 mL IVPB  100 mg IntraVENous Q24H    L.acidophilus-paracasei-S.thermophil-bifidobacter (RISAQUAD) 8 billion cell capsule  1 Capsule Oral DAILY    enoxaparin (LOVENOX) injection 60 mg  1 mg/kg SubCUTAneous Q12H    piperacillin-tazobactam (ZOSYN) 3.375 g in 0.9% sodium chloride (MBP/ADV) 100 mL MBP  3.375 g IntraVENous Q8H    sodium chloride (NS) flush 5-40 mL  5-40 mL IntraVENous Q8H    sodium chloride (NS) flush 5-40 mL  5-40 mL IntraVENous PRN    acetaminophen (TYLENOL) tablet 650 mg  650 mg Oral Q6H PRN    Or    acetaminophen (TYLENOL) suppository 650 mg  650 mg Rectal Q6H PRN    polyethylene glycol (MIRALAX) packet 17 g  17 g Oral DAILY PRN    ondansetron (ZOFRAN ODT) tablet 4 mg  4 mg Oral Q8H PRN    Or    ondansetron (ZOFRAN) injection 4 mg  4 mg IntraVENous Q6H PRN    0.9% sodium chloride infusion  100 mL/hr IntraVENous CONTINUOUS    vancomycin (VANCOCIN) 1250 mg in  ml infusion  1,250 mg IntraVENous Q12H          Objective:     Visit Vitals  /71   Pulse (!) 101   Temp 98 °F (36.7 °C)   Resp 16   Ht 5' 9\" (1.753 m)   Wt 63.9 kg (140 lb 14 oz)   SpO2 100%   BMI 20.80 kg/m²    O2 Flow Rate (L/min): 2 l/min O2 Device: None (Room air)    Temp (24hrs), Av.6 °F (37 °C), Min:98 °F (36.7 °C), Max:99.5 °F (37.5 °C)        PHYSICAL EXAM:  gen thin built and nourished  Neck supple  CVS tachycardic  Respiratory symmetric expansion  Abdomen soft,  G tube with Colostomy  Ext no edema  Neuro alert, normal speech  Psych anxious easily        Data Review    Recent Results (from the past 24 hour(s))   VANCOMYCIN, RANDOM    Collection Time: 22  4:10 AM   Result Value Ref Range    Vancomycin, random 10.2 UG/ML     No results found for this visit on 07/15/22. All Micro Results     Procedure Component Value Units Date/Time    CULTURE, BLOOD, PAIRED [[id number]] Collected: 07/15/22 0923    Order Status: Completed Specimen: Blood Updated: 22 1110     Special Requests: NO SPECIAL REQUESTS        Culture result: NO GROWTH 2 DAYS       CULTURE, ANAEROBIC [[id number]] Collected: 07/15/22 1415    Order Status: Completed Specimen: Knee  Updated: 22 1626     Special Requests: NO SPECIAL REQUESTS        Culture result: NO GROWTH THUS FAR       CULTURE, Bianca Langforder STAIN [[id number]] Collected: 07/15/22 141    Order Status: Completed Specimen: Wound from Knee  Updated: 22 162     Special Requests: NO SPECIAL REQUESTS        GRAM STAIN OCCASIONAL WBCS SEEN         NO ORGANISMS SEEN        Culture result: NO GROWTH THUS FAR       CULTURE, BODY FLUID W Michelle Riedel [[id number]] Collected: 07/15/22 1055    Order Status: Completed Specimen:  Body Fluid from Knee Fluid Updated: 22 1106     Special Requests: NO SPECIAL REQUESTS        GRAM STAIN 1+ WBCS SEEN         NO ORGANISMS SEEN        Culture result:       NO GROWTH THUS FAR Culture performed on Fluid swab specimen          CULTURE, FUNGUS [[id number]] Collected: 07/15/22 1415    Order Status: Completed Specimen: Knee Fluid Updated: 07/15/22 2243    CULTURE, BLOOD FOR FUNGUS [[id number]]     Order Status: Sent Specimen: Blood              Assessment/Plan: Suspected R septic knee  Duplex neg for DVT  XR R knee   Moderately large knee joint effusion  F/u with joint aspirate was done in the ER  S/p I&D 7/15   f/u with intra Op cultures pending  Empiric iv abx and antifungal  Will likely need ID evaluation pending cultures  Appreciate Ortho input     Hx of candidemia, E.coli and E. Faecalis bacteremia, discharged on 7/6/22, completed abx and antifungal inpt.     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    S/P Prior G-tube and J-tube placements  Protein calorie malnutition on chronic TPN  Resume TPN-managed at [facility]  Was on chronic fentanyl patch at home cont  while here     Code Status:   DVT Prophylaxis:  Sq lovenox  NOK    Dispo: TBD    Signed By: Oliver Warren MD     July 17, 2022

## 2022-07-18 LAB
ANION GAP SERPL CALC-SCNC: 4 MMOL/L (ref 5–15)
BUN SERPL-MCNC: 8 MG/DL (ref 6–20)
BUN/CREAT SERPL: 12 (ref 12–20)
CALCIUM SERPL-MCNC: 8.9 MG/DL (ref 8.5–10.1)
CHLORIDE SERPL-SCNC: 103 MMOL/L (ref 97–108)
CO2 SERPL-SCNC: 30 MMOL/L (ref 21–32)
CREAT SERPL-MCNC: 0.66 MG/DL (ref 0.7–1.3)
GLUCOSE SERPL-MCNC: 91 MG/DL (ref 65–100)
MAGNESIUM SERPL-MCNC: 2 MG/DL (ref 1.6–2.4)
PHOSPHATE SERPL-MCNC: 3.2 MG/DL (ref 2.6–4.7)
POTASSIUM SERPL-SCNC: 4.3 MMOL/L (ref 3.5–5.1)
SODIUM SERPL-SCNC: 137 MMOL/L (ref 136–145)

## 2022-07-18 PROCEDURE — 74011000258 HC RX REV CODE- 258: Performed by: INTERNAL MEDICINE

## 2022-07-18 PROCEDURE — 83735 ASSAY OF MAGNESIUM: CPT

## 2022-07-18 PROCEDURE — 84100 ASSAY OF PHOSPHORUS: CPT

## 2022-07-18 PROCEDURE — 36415 COLL VENOUS BLD VENIPUNCTURE: CPT

## 2022-07-18 PROCEDURE — 74011000258 HC RX REV CODE- 258: Performed by: HOSPITALIST

## 2022-07-18 PROCEDURE — 74011250636 HC RX REV CODE- 250/636: Performed by: INTERNAL MEDICINE

## 2022-07-18 PROCEDURE — 65270000029 HC RM PRIVATE

## 2022-07-18 PROCEDURE — 74011000258 HC RX REV CODE- 258: Performed by: EMERGENCY MEDICINE

## 2022-07-18 PROCEDURE — 74011250636 HC RX REV CODE- 250/636: Performed by: EMERGENCY MEDICINE

## 2022-07-18 PROCEDURE — 74011250636 HC RX REV CODE- 250/636: Performed by: HOSPITALIST

## 2022-07-18 PROCEDURE — 74011000250 HC RX REV CODE- 250: Performed by: HOSPITALIST

## 2022-07-18 PROCEDURE — 80048 BASIC METABOLIC PNL TOTAL CA: CPT

## 2022-07-18 PROCEDURE — 74011000250 HC RX REV CODE- 250: Performed by: INTERNAL MEDICINE

## 2022-07-18 RX ADMIN — MORPHINE SULFATE 2 MG: 2 INJECTION, SOLUTION INTRAMUSCULAR; INTRAVENOUS at 10:46

## 2022-07-18 RX ADMIN — SODIUM CHLORIDE, PRESERVATIVE FREE 10 ML: 5 INJECTION INTRAVENOUS at 23:13

## 2022-07-18 RX ADMIN — MORPHINE SULFATE 2 MG: 2 INJECTION, SOLUTION INTRAMUSCULAR; INTRAVENOUS at 15:15

## 2022-07-18 RX ADMIN — MORPHINE SULFATE 2 MG: 2 INJECTION, SOLUTION INTRAMUSCULAR; INTRAVENOUS at 01:27

## 2022-07-18 RX ADMIN — PIPERACILLIN AND TAZOBACTAM 3.38 G: 3; .375 INJECTION, POWDER, LYOPHILIZED, FOR SOLUTION INTRAVENOUS at 19:46

## 2022-07-18 RX ADMIN — FAMOTIDINE: 10 INJECTION, SOLUTION INTRAVENOUS at 18:53

## 2022-07-18 RX ADMIN — SODIUM CHLORIDE, PRESERVATIVE FREE 10 ML: 5 INJECTION INTRAVENOUS at 01:30

## 2022-07-18 RX ADMIN — VANCOMYCIN HYDROCHLORIDE 1250 MG: 10 INJECTION, POWDER, LYOPHILIZED, FOR SOLUTION INTRAVENOUS at 15:58

## 2022-07-18 RX ADMIN — WATER 2 MG: 1 INJECTION INTRAMUSCULAR; INTRAVENOUS; SUBCUTANEOUS at 11:55

## 2022-07-18 RX ADMIN — VANCOMYCIN HYDROCHLORIDE 1250 MG: 10 INJECTION, POWDER, LYOPHILIZED, FOR SOLUTION INTRAVENOUS at 04:02

## 2022-07-18 RX ADMIN — PIPERACILLIN AND TAZOBACTAM 3.38 G: 3; .375 INJECTION, POWDER, LYOPHILIZED, FOR SOLUTION INTRAVENOUS at 14:22

## 2022-07-18 RX ADMIN — SODIUM CHLORIDE 100 ML/HR: 9 INJECTION, SOLUTION INTRAVENOUS at 15:43

## 2022-07-18 RX ADMIN — SODIUM CHLORIDE, PRESERVATIVE FREE 10 ML: 5 INJECTION INTRAVENOUS at 14:27

## 2022-07-18 RX ADMIN — SODIUM CHLORIDE, PRESERVATIVE FREE 10 ML: 5 INJECTION INTRAVENOUS at 05:46

## 2022-07-18 RX ADMIN — ENOXAPARIN SODIUM 60 MG: 100 INJECTION SUBCUTANEOUS at 18:41

## 2022-07-18 RX ADMIN — MORPHINE SULFATE 2 MG: 2 INJECTION, SOLUTION INTRAMUSCULAR; INTRAVENOUS at 19:46

## 2022-07-18 RX ADMIN — SODIUM CHLORIDE 100 MG: 9 INJECTION, SOLUTION INTRAVENOUS at 15:15

## 2022-07-18 RX ADMIN — PIPERACILLIN AND TAZOBACTAM 3.38 G: 3; .375 INJECTION, POWDER, LYOPHILIZED, FOR SOLUTION INTRAVENOUS at 04:03

## 2022-07-18 RX ADMIN — MORPHINE SULFATE 2 MG: 2 INJECTION, SOLUTION INTRAMUSCULAR; INTRAVENOUS at 05:45

## 2022-07-18 RX ADMIN — ENOXAPARIN SODIUM 60 MG: 100 INJECTION SUBCUTANEOUS at 05:46

## 2022-07-18 NOTE — PROGRESS NOTES
Hospitalist Progress Note    NAME: Liz Ardon   :  1980   MRN:  [id number]     Subjective:   Daily Progress Note: 2022 1:30 PM      Chief complaint: Knee infection  Patient seen and examined, chart was reviewed. Patient without complaints, no acute issues reported to me by staff at this time. Current Facility-Administered Medications   Medication Dose Route Frequency    alteplase (CATHFLO) 2 mg in sterile water (preservative free) 2 mL injection  2 mg InterCATHeter ONCE    morphine injection 2 mg  2 mg IntraVENous Q4H PRN    heparin (porcine) pf 300 Units  300 Units InterCATHeter PRN    fentaNYL (DURAGESIC) 25 mcg/hr patch 1 Patch  1 Patch TransDERmal Q72H    naloxone (NARCAN) injection 1 mg  1 mg IntraVENous DAILY PRN    labetaloL (NORMODYNE;TRANDATE) injection 10 mg  10 mg IntraVENous Q4H PRN    anidulafungin (ERAXIS) 100 mg in 0.9% sodium chloride 130 mL IVPB  100 mg IntraVENous Q24H    L.acidophilus-paracasei-S.thermophil-bifidobacter (RISAQUAD) 8 billion cell capsule  1 Capsule Oral DAILY    enoxaparin (LOVENOX) injection 60 mg  1 mg/kg SubCUTAneous Q12H    piperacillin-tazobactam (ZOSYN) 3.375 g in 0.9% sodium chloride (MBP/ADV) 100 mL MBP  3.375 g IntraVENous Q8H    sodium chloride (NS) flush 5-40 mL  5-40 mL IntraVENous Q8H    sodium chloride (NS) flush 5-40 mL  5-40 mL IntraVENous PRN    acetaminophen (TYLENOL) tablet 650 mg  650 mg Oral Q6H PRN    Or    acetaminophen (TYLENOL) suppository 650 mg  650 mg Rectal Q6H PRN    polyethylene glycol (MIRALAX) packet 17 g  17 g Oral DAILY PRN    ondansetron (ZOFRAN ODT) tablet 4 mg  4 mg Oral Q8H PRN    Or    ondansetron (ZOFRAN) injection 4 mg  4 mg IntraVENous Q6H PRN    0.9% sodium chloride infusion  100 mL/hr IntraVENous CONTINUOUS    vancomycin (VANCOCIN) 1250 mg in  ml infusion  1,250 mg IntraVENous Q12H          Objective:     Visit Vitals  /76 (BP 1 Location: Right arm, BP Patient Position:  At rest)   Pulse 91   Temp 98.3 °F (36.8 °C)   Resp 18   Ht 5' 9\" (1.753 m)   Wt 63.9 kg (140 lb 14 oz)   SpO2 99%   BMI 20.80 kg/m²    O2 Flow Rate (L/min): 2 l/min O2 Device: None (Room air)    Temp (24hrs), Av.4 °F (36.9 °C), Min:98 °F (36.7 °C), Max:99.1 °F (37.3 °C)        PHYSICAL EXAM:  gen thin built and nourished  Neck supple  CVS tachycardic  Respiratory symmetric expansion  Abdomen soft,  G tube with Colostomy  Ext no edema  Neuro alert, normal speech  Psych anxious easily        Data Review    No results found for this or any previous visit (from the past 24 hour(s)). No results found for this visit on 07/15/22. All Micro Results     Procedure Component Value Units Date/Time    CULTURE, FUNGUS [[id number]] Collected: 07/15/22 1415    Order Status: Completed Specimen: Knee Fluid Updated: 22 0826     Special Requests: NO SPECIAL REQUESTS        Culture result: NO FUNGUS ISOLATED 3 DAYS       CULTURE, BLOOD, PAIRED [[id number]] Collected: 07/15/22 0923    Order Status: Completed Specimen: Blood Updated: 22 0735     Special Requests: NO SPECIAL REQUESTS        Culture result: NO GROWTH 3 DAYS       CULTURE, ANAEROBIC [[id number]] Collected: 07/15/22 1415    Order Status: Completed Specimen: Knee  Updated: 22 1626     Special Requests: NO SPECIAL REQUESTS        Culture result: NO GROWTH THUS FAR       CULTURE, Doralexe Richard STAIN [[id number]] Collected: 07/15/22 1415    Order Status: Completed Specimen: Wound from Knee  Updated: 22 162     Special Requests: NO SPECIAL REQUESTS        GRAM STAIN OCCASIONAL WBCS SEEN         NO ORGANISMS SEEN        Culture result: NO GROWTH THUS FAR       CULTURE, BODY FLUID MILI Murillo [[id number]] Collected: 07/15/22 1055    Order Status: Completed Specimen:  Body Fluid from Knee Fluid Updated: 22 1106     Special Requests: NO SPECIAL REQUESTS        GRAM STAIN 1+ WBCS SEEN         NO ORGANISMS SEEN        Culture result:       NO GROWTH THUS FAR Culture performed on Fluid swab specimen          CULTURE, BLOOD FOR FUNGUS [[id number]] Collected: 07/15/22 1215    Order Status: Canceled Specimen: Blood              Assessment/Plan:     Suspected R septic knee  Duplex neg for DVT  XR R knee   Moderately large knee joint effusion  F/u with joint aspirate  pending   S/p I&D 7/15   f/u with intra Op cultures pending  Empiric iv abx and antifungal  Likely need ID evaluation for further course and treatment  Appreciate Ortho input     Hx of candidemia, E.coli and E. Faecalis bacteremia, discharged on 7/6/22, completed abx and antifungal inpt.     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    S/P Prior G-tube and J-tube placements  Protein calorie malnutition on chronic TPN  Resume TPN-managed at [address] [place] [address]  Was on chronic fentanyl patch at home cont  while here  F/u surgery as OP for chr bad wound care     Code Status:   DVT Prophylaxis:  Sq lovenox  NOK    Dispo: TBD    Signed By: Hemanth Velasquez MD     July 18, 2022

## 2022-07-18 NOTE — PROGRESS NOTES
Problem: Falls - Risk of  Goal: *Absence of Falls  Description: Document Clydene Bone Fall Risk and appropriate interventions in the flowsheet.   Outcome: Progressing Towards Goal  Note: Fall Risk Interventions:  Mobility Interventions: Assess mobility with egress test,Communicate number of staff needed for ambulation/transfer,OT consult for ADLs,Patient to call before getting OOB,PT Consult for mobility concerns,PT Consult for assist device competence,Utilize gait belt for transfers/ambulation         Medication Interventions: Assess postural VS orthostatic hypotension,Bed/chair exit alarm,Evaluate medications/consider consulting pharmacy,Patient to call before getting OOB,Teach patient to arise slowly    Elimination Interventions: Bed/chair exit alarm,Call light in reach,Patient to call for help with toileting needs              Problem: Patient Education: Go to Patient Education Activity  Goal: Patient/Family Education  Outcome: Progressing Towards Goal

## 2022-07-18 NOTE — PROGRESS NOTES
ORTHO - Progress Note  Post Op day: 3 Days [facility]     [id number]  male    43 y.o.    1980    Admit date:7/15/2022  Date of Surgery:7/15/2022   Procedures:Procedure(s):  RIGHT KNEE ARTHROTOMY INCISION AND DRAINAGE  Surgeon:Surgeon(s) and Role:     * Paul Mcdowell, DO - Primary        SUBJECTIVE:     Jamil Potts is a 43 y.o. male resting in the bed. Patient has complaints of appropriate post-op pain, tolerating pain medications with morphine injections. \"Im a little sore\"    Denies F/C, nausea, vomiting, dizziness, lightheadedness, chest pain, or shortness of breath. OBJECTIVE:       Physical Exam:  General: alert, cooperative, no distress. Gastrointestinal:  non-distended . Cardiovascular: equal pulses in the lower extremities,  Brisk cap refill in all distal extremities   Genitourinary: Voiding independently   Respiratory: No respiratory distress   Neurological:Neurovascular exam within normal limits. Senstion intact: LE bilat. Motor: + DF/PF/EHL. Musculoskeletal: Isaías's sign negative in bilateral lower extremities. Calves soft, supple, non-tender upon palpation or with passive stretch. PROM f right knee with minimal pain. No sign of DVT on exam.  Dressing/Wound: Ace bandage and dressing right knee. Clean, dry and intact. No significant erythema or swelling.     Vital Signs:       Patient Vitals for the past 8 hrs:   BP Temp Pulse Resp SpO2   22 0813 127/76 98.3 °F (36.8 °C) 91 18 99 %   22 0411 125/86 99.1 °F (37.3 °C) 99 20 100 %                                          Temp (24hrs), Av.4 °F (36.9 °C), Min:98 °F (36.7 °C), Max:99.1 °F (37.3 °C)      Labs:        Recent Labs     22  0656   HCT 28.2*   HGB 8.9*     PT/OT:        Gait  Base of Support: Widened  Speed/Kay: Slow  Step Length: Right shortened  Swing Pattern: Right asymmetrical  Stance: Right decreased  Gait Abnormalities: Decreased step clearance,Antalgic  Distance (ft): 8 Feet (ft)  Assistive Device: Walker, rolling     ASSESSMENT / PLAN:   Active Problems:    Arthritis, septic, knee ([facility] [address] (7/15/2022)         -  Continue PT/OT WBAT  - dressing change PRN  -  DVT prophylaxis- SCD w/ lovenox 60mg   - Continue IV abx per medical service/ID  -  DC planning - Pending    Signed By: Kyle Severs, PA

## 2022-07-19 LAB
ALBUMIN SERPL-MCNC: 2.2 G/DL (ref 3.5–5)
ANION GAP SERPL CALC-SCNC: 3 MMOL/L (ref 5–15)
BACTERIA SPEC CULT: NORMAL
BASOPHILS # BLD: 0 K/UL (ref 0–0.1)
BASOPHILS NFR BLD: 0 % (ref 0–1)
BUN SERPL-MCNC: 8 MG/DL (ref 6–20)
BUN/CREAT SERPL: 14 (ref 12–20)
CALCIUM SERPL-MCNC: 8.7 MG/DL (ref 8.5–10.1)
CHLORIDE SERPL-SCNC: 106 MMOL/L (ref 97–108)
CO2 SERPL-SCNC: 30 MMOL/L (ref 21–32)
CREAT SERPL-MCNC: 0.57 MG/DL (ref 0.7–1.3)
DATE LAST DOSE: NORMAL
DIFFERENTIAL METHOD BLD: ABNORMAL
EOSINOPHIL # BLD: 0.6 K/UL (ref 0–0.4)
EOSINOPHIL NFR BLD: 11 % (ref 0–7)
ERYTHROCYTE [DISTWIDTH] IN BLOOD BY AUTOMATED COUNT: 18 % (ref 11.5–14.5)
GLUCOSE SERPL-MCNC: 96 MG/DL (ref 65–100)
GRAM STN SPEC: NORMAL
HCT VFR BLD AUTO: 26.9 % (ref 36.6–50.3)
HGB BLD-MCNC: 8.7 G/DL (ref 12.1–17)
IMM GRANULOCYTES # BLD AUTO: 0 K/UL (ref 0–0.04)
IMM GRANULOCYTES NFR BLD AUTO: 0 % (ref 0–0.5)
LYMPHOCYTES # BLD: 1.4 K/UL (ref 0.8–3.5)
LYMPHOCYTES NFR BLD: 26 % (ref 12–49)
MAGNESIUM SERPL-MCNC: 2 MG/DL (ref 1.6–2.4)
MCH RBC QN AUTO: 28.2 PG (ref 26–34)
MCHC RBC AUTO-ENTMCNC: 32.3 G/DL (ref 30–36.5)
MCV RBC AUTO: 87.1 FL (ref 80–99)
MONOCYTES # BLD: 0.6 K/UL (ref 0–1)
MONOCYTES NFR BLD: 11 % (ref 5–13)
NEUTS SEG # BLD: 2.8 K/UL (ref 1.8–8)
NEUTS SEG NFR BLD: 52 % (ref 32–75)
NRBC # BLD: 0 K/UL (ref 0–0.01)
NRBC BLD-RTO: 0 PER 100 WBC
PHOSPHATE SERPL-MCNC: 4.3 MG/DL (ref 2.6–4.7)
PLATELET # BLD AUTO: 391 K/UL (ref 150–400)
PMV BLD AUTO: 10 FL (ref 8.9–12.9)
POTASSIUM SERPL-SCNC: 4.2 MMOL/L (ref 3.5–5.1)
RBC # BLD AUTO: 3.09 M/UL (ref 4.1–5.7)
REPORTED DOSE,DOSE: NORMAL UNITS
REPORTED DOSE/TIME,TMG: 1600
SERVICE CMNT-IMP: NORMAL
SODIUM SERPL-SCNC: 139 MMOL/L (ref 136–145)
VANCOMYCIN TROUGH SERPL-MCNC: 8.1 UG/ML (ref 5–10)
WBC # BLD AUTO: 5.5 K/UL (ref 4.1–11.1)

## 2022-07-19 PROCEDURE — 80069 RENAL FUNCTION PANEL: CPT

## 2022-07-19 PROCEDURE — 74011250636 HC RX REV CODE- 250/636: Performed by: HOSPITALIST

## 2022-07-19 PROCEDURE — 74011250636 HC RX REV CODE- 250/636: Performed by: EMERGENCY MEDICINE

## 2022-07-19 PROCEDURE — 74011000258 HC RX REV CODE- 258: Performed by: INTERNAL MEDICINE

## 2022-07-19 PROCEDURE — 97116 GAIT TRAINING THERAPY: CPT

## 2022-07-19 PROCEDURE — 83735 ASSAY OF MAGNESIUM: CPT

## 2022-07-19 PROCEDURE — 65270000029 HC RM PRIVATE

## 2022-07-19 PROCEDURE — 74011250636 HC RX REV CODE- 250/636: Performed by: INTERNAL MEDICINE

## 2022-07-19 PROCEDURE — 74011000250 HC RX REV CODE- 250: Performed by: INTERNAL MEDICINE

## 2022-07-19 PROCEDURE — 97530 THERAPEUTIC ACTIVITIES: CPT

## 2022-07-19 PROCEDURE — 85025 COMPLETE CBC W/AUTO DIFF WBC: CPT

## 2022-07-19 PROCEDURE — 74011000258 HC RX REV CODE- 258: Performed by: HOSPITALIST

## 2022-07-19 PROCEDURE — 74011000258 HC RX REV CODE- 258: Performed by: EMERGENCY MEDICINE

## 2022-07-19 PROCEDURE — 74011000250 HC RX REV CODE- 250: Performed by: HOSPITALIST

## 2022-07-19 PROCEDURE — 80202 ASSAY OF VANCOMYCIN: CPT

## 2022-07-19 PROCEDURE — 36415 COLL VENOUS BLD VENIPUNCTURE: CPT

## 2022-07-19 PROCEDURE — 74011250637 HC RX REV CODE- 250/637: Performed by: HOSPITALIST

## 2022-07-19 RX ADMIN — SODIUM CHLORIDE, PRESERVATIVE FREE 10 ML: 5 INJECTION INTRAVENOUS at 05:12

## 2022-07-19 RX ADMIN — FAMOTIDINE: 10 INJECTION, SOLUTION INTRAVENOUS at 17:58

## 2022-07-19 RX ADMIN — SODIUM CHLORIDE, PRESERVATIVE FREE 10 ML: 5 INJECTION INTRAVENOUS at 13:02

## 2022-07-19 RX ADMIN — SODIUM CHLORIDE, PRESERVATIVE FREE 10 ML: 5 INJECTION INTRAVENOUS at 21:30

## 2022-07-19 RX ADMIN — MORPHINE SULFATE 2 MG: 2 INJECTION, SOLUTION INTRAMUSCULAR; INTRAVENOUS at 16:52

## 2022-07-19 RX ADMIN — ENOXAPARIN SODIUM 60 MG: 100 INJECTION SUBCUTANEOUS at 06:43

## 2022-07-19 RX ADMIN — VANCOMYCIN HYDROCHLORIDE 1000 MG: 1 INJECTION, POWDER, LYOPHILIZED, FOR SOLUTION INTRAVENOUS at 21:33

## 2022-07-19 RX ADMIN — MORPHINE SULFATE 2 MG: 2 INJECTION, SOLUTION INTRAMUSCULAR; INTRAVENOUS at 21:30

## 2022-07-19 RX ADMIN — FAMOTIDINE: 10 INJECTION, SOLUTION INTRAVENOUS at 09:24

## 2022-07-19 RX ADMIN — SODIUM CHLORIDE 100 MG: 9 INJECTION, SOLUTION INTRAVENOUS at 15:15

## 2022-07-19 RX ADMIN — MORPHINE SULFATE 2 MG: 2 INJECTION, SOLUTION INTRAMUSCULAR; INTRAVENOUS at 05:00

## 2022-07-19 RX ADMIN — VANCOMYCIN HYDROCHLORIDE 1250 MG: 10 INJECTION, POWDER, LYOPHILIZED, FOR SOLUTION INTRAVENOUS at 05:11

## 2022-07-19 RX ADMIN — PIPERACILLIN AND TAZOBACTAM 3.38 G: 3; .375 INJECTION, POWDER, LYOPHILIZED, FOR SOLUTION INTRAVENOUS at 03:57

## 2022-07-19 RX ADMIN — PIPERACILLIN AND TAZOBACTAM 3.38 G: 3; .375 INJECTION, POWDER, LYOPHILIZED, FOR SOLUTION INTRAVENOUS at 21:29

## 2022-07-19 RX ADMIN — MORPHINE SULFATE 2 MG: 2 INJECTION, SOLUTION INTRAMUSCULAR; INTRAVENOUS at 09:21

## 2022-07-19 RX ADMIN — MORPHINE SULFATE 2 MG: 2 INJECTION, SOLUTION INTRAMUSCULAR; INTRAVENOUS at 00:53

## 2022-07-19 RX ADMIN — PIPERACILLIN AND TAZOBACTAM 3.38 G: 3; .375 INJECTION, POWDER, LYOPHILIZED, FOR SOLUTION INTRAVENOUS at 13:00

## 2022-07-19 RX ADMIN — ENOXAPARIN SODIUM 60 MG: 100 INJECTION SUBCUTANEOUS at 18:01

## 2022-07-19 RX ADMIN — VANCOMYCIN HYDROCHLORIDE 1000 MG: 1 INJECTION, POWDER, LYOPHILIZED, FOR SOLUTION INTRAVENOUS at 13:34

## 2022-07-19 NOTE — PROGRESS NOTES
Transition of Care Plan:     RUR: 26%  Disposition: Home with Home Health ([facility] ) [address] [place]  Follow up appointments:PCP  DME needed: None  Transportation at Discharge: Pt's mother will transport at d/c.   [address] or means to access home: Pt has access       IM Medicare Letter: Pt has Medicaid  Is patient a BCPI-A Bundle:  N/A                    If yes, was Bundle Letter given?:  N/A  Is patient a  and connected with the [facility]? No               If yes, was [place] transfer form completed and [facility] notified? Caregiver Contact: Rahel Maurer- Mother [phone]  Discharge Caregiver contacted prior to discharge? CM will notify caregiver at d/c. Care Conference needed?:     No    Reason for Readmission:    Arthritis Septic Knee           RUR Score/Risk Level:  26 High        PCP: First and Last name:  Alex Olivo MD   Name of Practice:    Are you a current patient: Yes/No: Yes   Approximate date of last visit: 7/7/2022   Can you participate in a virtual visit with your PCP:     Is a Care Conference indicated:  IDR will discuss      Did you attend your follow up appointment (s): If not, why not: Yes         Resources/supports as identified by patient/family:   Pt has support from Mother and member is connected with [facility] via [address] facing patient (as identified by patient/family and CM): Finances/Medication cost?      No issues with finances/medication cost. Pt has [place] Medicaid               Transportation? Pt does not drive. Support system or lack thereof? Pt has a good support system to include mother                      Living arrangements? Pt resides with his mother in an one level home with 6 [address]. Self-care/ADLs/Cognition? Pt is independent with ADL's and IADL's. Pt was alert and oriented.         Current Advanced Directive/Advance Care Plan:  Full code           Plan for utilizing home health:   Open with [facility]              Transition of Care Plan:    Based on readmission, the patient's previous Plan of Care Home with [facility]    has been evaluated and/or modified. The current Transition of Care Plan is:  Home with [address] Management Interventions  PCP Verified by CM:  Yes  Mode of Transport at Discharge: Self  Transition of Care Consult (CM Consult): [address] (Pt is open with [facility] via [facility] ,need resumption order upon d/c.)  Discharge Durable Medical Equipment:  (Independent)  Physical Therapy Consult: Yes  Occupational Therapy Consult: Yes  Support Systems: Parent(s)  Confirm Follow Up Transport: Family  Discharge Location  Patient Expects to be Discharged to[de-identified] Home with home health    Readmission Assessment  Number of days since last admission?: 8-30 days  Previous disposition: Home with Home Health  Who is being interviewed?: Patient  What was the patient's/caregiver's perception as to why they think they needed to return back to the hospital?: Did not realize care needs would be so extensive  Did you visit your Primary Care Physician after you left the hospital, before you returned this time?: Yes  Did you see a specialist, such as Cardiac, Pulmonary, Orthopedic Physician, etc. after you left the hospital?: No  Who advised the patient to return to the hospital?: Self-referral  In our efforts to provide the best possible care to you and others like you, can you think of anything that we could have done to help you after you left the hospital the first time, so that you might not have needed to return so soon?: Arrange for more help when leaving the hospital,Additional Community resources available for illness support    Shan Alexander (ACP) Conversation      Date of Conversation: 7/15/2022  Conducted with: Patient with Decision Making Capacity    Healthcare Decision Maker:     Primary Decision Maker (Active): Gabriela Escobar - Mother - [phone]  Click here to complete [address] including selection of the Healthcare Decision Maker Relationship (ie \"Primary\")          Content/Action Overview:   DECLINED ACP conversation - will revisit periodically   Reviewed DNR/DNI and patient elects Full Code (Attempt Resuscitation)  Length of Voluntary ACP Conversation in minutes:  16 minutes    Eduin Estrada spoke to pt and competed readmission assessment. Pt said he completed his PCP appointment and PCP office connect pt with [address] [place] [address].  Need resumption order upon d/c.    Harpreet Duffy MSW     Ext [phone]

## 2022-07-19 NOTE — PROGRESS NOTES
Pharmacy Antimicrobial Kinetic Dosing    Indication for Antimicrobials: skin and soft tissue, OM (Rt-knee I&D)     Current Regimen of Each Antimicrobial:  Vancomycin pharmacy to dose (Start Date 7/15; Day # 5)  Zosyn q 8h (start; 7/15, day 5)  Eraxis 200mg load, then 100mg daily (start: 7/15, day 5)      Previous Antimicrobial Therapy:    Goal Level: AUC: 400-600 mg/hr/Liter/day    Date Dose & Interval Measured (mcg/mL) Predicted AUC/MARIE   22 1250 mg IV Q12H 10.2 454   22 1250 mg IV Q12H 8.1 384           Date & time of next level: vanc tr  0330    Dosing calculator used: Tuloko calculator    Significant Positive Cultures:   7/15: blood - NG  7/15: Knee fluid - NG  7/15 Knee wound - NG  7/15 Knee Anaerobic  - NG  7/15: fungus - Pending    Conditions for Dosing Consideration: None    Labs:  Recent Labs     22  0342 22  0949   CREA 0.57* 0.66*   BUN 8 8     Recent Labs     22  0342   WBC 5.5     Temp (24hrs), Av.3 °F (36.8 °C), Min:98 °F (36.7 °C), Max:99 °F (37.2 °C)      Creatinine Clearance (mL/min):   CrCl (Ideal Body Weight): 137.5   If actual weight < IBW: CrCl (Actual Body Weight) 124.3    Impression/Plan:   Increase vancomycin to 1000 mg IV Q8H for estimated AUC of 460  Continue zosyn  Continue eraxis  Antimicrobial stop date 5 days for vancomycin? to be determined for eraxis and zosyn. May need ID/Dr Razo      Pharmacy will follow daily and adjust medications as appropriate for renal function and/or serum levels.     Thank you,  Betzy Rodriguez, PHARMD    Vancomycin Dosing Document    Documents located on pharmacy Teams site: Clinical Practice -> Antimicrobial Stewardship -> Antibiotics_Vancomycin     Aminoglycoside Dosing Document    Documents located on pharmacy Teams site: Clinical Practice -> Antimicrobial Stewardship -> Antibiotics_Aminoglycosides

## 2022-07-19 NOTE — PROGRESS NOTES
Spiritual Care Partner Volunteer visited patient at [address] in [facility] MED TELE on 7/19/2022   Documented by: Brook Jones.    Paging Service: [phone])

## 2022-07-19 NOTE — PROGRESS NOTES
Problem: Falls - Risk of  Goal: *Absence of Falls  Description: Document Wisam Crouch Fall Risk and appropriate interventions in the flowsheet.   Outcome: Progressing Towards Goal  Note: Fall Risk Interventions:  Mobility Interventions: Bed/chair exit alarm,OT consult for ADLs,PT Consult for mobility concerns,Utilize walker, cane, or other assistive device         Medication Interventions: Patient to call before getting OOB,Teach patient to arise slowly    Elimination Interventions: Call light in reach,Bed/chair exit alarm,Urinal in reach

## 2022-07-19 NOTE — PROGRESS NOTES
Problem: Mobility Impaired (Adult and Pediatric)  Goal: *Therapy Goal (Edit Goal, Insert Text)  Description: FUNCTIONAL STATUS PRIOR TO ADMISSION: Patient was independent and active without use of DME. Lived with mother who assisted him some but she works during the day. HOME SUPPORT PRIOR TO ADMISSION: The patient lived with mother but did not require assist.     Physical Therapy Goals  Initiated 7/16/2022  1. Patient will move from supine to sit and sit to supine  in bed with modified independence within 7 day(s). 2.  Patient will transfer from bed to chair and chair to bed with modified independence using the least restrictive device within 7 day(s). 3.  Patient will perform sit to stand with independence within 7 day(s). 4.  Patient will ambulate with supervision/set-up for 50 feet WBATwith the least restrictive device within 7 day(s). 5.  Patient will ascend/descend 3 stairs with 1 handrail(s) with minimal assistance/contact guard assist within 7 day(s). 7/19/2022 1554 by Mónica Moore, PT  Outcome: Progressing Towards Goal     PHYSICAL THERAPY TREATMENT  Patient: Xavi Ramos (61 y.o. male)  Date: 7/19/2022  Diagnosis: Arthritis, septic, knee (HCC) [M00.9] <principal problem not specified>  Procedure(s) (LRB):  RIGHT KNEE ARTHROTOMY INCISION AND DRAINAGE (Right) 4 Days Post-Op  Precautions:  R LE WBAT  Chart, physical therapy assessment, plan of care and goals were reviewed. ASSESSMENT  Patient continues with skilled PT services and is progressing towards goals. Continues to be limited by R knee pain and ROM deficits. Able to work on R knee AAROM sitting edge of bed and progress gait distance this date with use of rolling walker. Patient would benefit from ambulating with nursing staff with rolling walker in the room and further progression of mobility with PT.       Current Level of Function Impacting Discharge (mobility/balance): stand-by-assist with RW during gait    Other factors to consider for discharge: WBAT R LE         PLAN :  Patient continues to benefit from skilled intervention to address the above impairments. Continue treatment per established plan of care. to address goals. Recommendation for discharge: (in order for the patient to meet his/her long term goals)  Physical therapy at least 2 days/week in the home AND ensure assist and/or supervision for safety with functional mobility as needed    This discharge recommendation:  Has not yet been discussed the attending provider and/or case management    IF patient discharges home will need the following DME: patient owns DME required for discharge (reports he has walker)       SUBJECTIVE:   Patient stated it hurts.     OBJECTIVE DATA SUMMARY:   Critical Behavior:  Neurologic State: Alert  Orientation Level: Oriented X4  Cognition: Follows commands     Functional Mobility Training:  Bed Mobility:  Supine to Sit: Stand-by assistance  Sit to Supine: Stand-by assistance  Scooting: Stand-by assistance        Transfers:  Sit to Stand: Contact guard assistance; Adaptive equipment  Stand to Sit: Contact guard assistance; Adaptive equipment       Balance:  Sitting: Intact  Standing: Impaired  Standing - Static: Good;Constant support  Standing - Dynamic : Fair;Constant support  Ambulation/Gait Training:  Distance (ft): 25 Feet (ft)  Assistive Device: Walker, rolling  Ambulation - Level of Assistance: Stand-by assistance        Gait Abnormalities: Antalgic; Step to gait; Decreased step clearance  Right Side Weight Bearing: As tolerated        Stance: Right decreased  Speed/Kay: Slow     Swing Pattern: Right asymmetrical    Therapeutic Exercises:   R knee AAROM EOB and standing marches with rolling walker    Pain Ratin/10 R knee pain in bed, 7/10 with gait    Activity Tolerance:   Fair  Reports arms fatigued after gait    After treatment patient left in no apparent distress:   Supine in bed, Call bell within reach, and Side rails x 3    COMMUNICATION/COLLABORATION:   The patients plan of care was discussed with: Registered nurse.      Educated patient on importance of R knee ROM and not putting anything under R knee and placement of pillow under calf instead to promote R knee extension - patient endorses understanding    Doris Nick, PT   Time Calculation: 31 mins

## 2022-07-19 NOTE — PROGRESS NOTES
Hospitalist Progress Note    NAME: Lavinia Brooke   :  1980   MRN:  [id number]     Subjective:   Daily Progress Note: 2022 1:30 PM      Chief complaint: Knee infection  Patient seen and examined, chart was reviewed. Doing well no acute issues per staff. Sari TPN, Pain controlled.     Current Facility-Administered Medications   Medication Dose Route Frequency    vancomycin (VANCOCIN) 1,000 mg in 0.9% sodium chloride 250 mL (VIAL-MATE)  1,000 mg IntraVENous Q8H    TPN ADULT - CENTRAL   IntraVENous TITRATE    morphine injection 2 mg  2 mg IntraVENous Q4H PRN    heparin (porcine) pf 300 Units  300 Units InterCATHeter PRN    fentaNYL (DURAGESIC) 25 mcg/hr patch 1 Patch  1 Patch TransDERmal Q72H    naloxone (NARCAN) injection 1 mg  1 mg IntraVENous DAILY PRN    labetaloL (NORMODYNE;TRANDATE) injection 10 mg  10 mg IntraVENous Q4H PRN    anidulafungin (ERAXIS) 100 mg in 0.9% sodium chloride 130 mL IVPB  100 mg IntraVENous Q24H    L.acidophilus-paracasei-S.thermophil-bifidobacter (RISAQUAD) 8 billion cell capsule  1 Capsule Oral DAILY    enoxaparin (LOVENOX) injection 60 mg  1 mg/kg SubCUTAneous Q12H    piperacillin-tazobactam (ZOSYN) 3.375 g in 0.9% sodium chloride (MBP/ADV) 100 mL MBP  3.375 g IntraVENous Q8H    sodium chloride (NS) flush 5-40 mL  5-40 mL IntraVENous Q8H    sodium chloride (NS) flush 5-40 mL  5-40 mL IntraVENous PRN    acetaminophen (TYLENOL) tablet 650 mg  650 mg Oral Q6H PRN    Or    acetaminophen (TYLENOL) suppository 650 mg  650 mg Rectal Q6H PRN    polyethylene glycol (MIRALAX) packet 17 g  17 g Oral DAILY PRN    ondansetron (ZOFRAN ODT) tablet 4 mg  4 mg Oral Q8H PRN    Or    ondansetron (ZOFRAN) injection 4 mg  4 mg IntraVENous Q6H PRN    0.9% sodium chloride infusion  100 mL/hr IntraVENous CONTINUOUS          Objective:     Visit Vitals  /72   Pulse 82   Temp 98 °F (36.7 °C)   Resp 16   Ht 5' 9\" (1.753 m)   Wt 63.9 kg (140 lb 14 oz)   SpO2 97%   BMI 20.80 kg/m²    O2 Flow Rate (L/min): 2 l/min O2 Device: None (Room air)    Temp (24hrs), Av.3 °F (36.8 °C), Min:98 °F (36.7 °C), Max:99 °F (37.2 °C)        PHYSICAL EXAM:  gen thin built and nourished  Neck supple  CVS RRR  Respiratory symmetric expansion  Abdomen soft,  G tube with Colostomy  Ext no edema  Neuro alert, normal speech  Psych normal affect        Data Review    Recent Results (from the past 24 hour(s))   METABOLIC PANEL, BASIC    Collection Time: 22  9:49 AM   Result Value Ref Range    Sodium 137 136 - 145 mmol/L    Potassium 4.3 3.5 - 5.1 mmol/L    Chloride 103 97 - 108 mmol/L    CO2 30 21 - 32 mmol/L    Anion gap 4 (L) 5 - 15 mmol/L    Glucose 91 65 - 100 mg/dL    BUN 8 6 - 20 MG/DL    Creatinine 0.66 (L) 0.70 - 1.30 MG/DL    BUN/Creatinine ratio 12       GFR est AA >60 >60 ml/min/1.73m2    GFR est non-AA >60 >60 ml/min/1.73m2    Calcium 8.9 8.5 - 10.1 MG/DL   MAGNESIUM    Collection Time: 22  9:49 AM   Result Value Ref Range    Magnesium 2.0 1.6 - 2.4 mg/dL   PHOSPHORUS    Collection Time: 22  9:49 AM   Result Value Ref Range    Phosphorus 3.2 2.6 - 4.7 MG/DL   RENAL FUNCTION PANEL    Collection Time: 22  3:42 AM   Result Value Ref Range    Sodium 139 136 - 145 mmol/L    Potassium 4.2 3.5 - 5.1 mmol/L    Chloride 106 97 - 108 mmol/L    CO2 30 21 - 32 mmol/L    Anion gap 3 (L) 5 - 15 mmol/L    Glucose 96 65 - 100 mg/dL    BUN 8 6 - 20 MG/DL    Creatinine 0.57 (L) 0.70 - 1.30 MG/DL    BUN/Creatinine ratio 14  - 20      GFR est AA >60 >60 ml/min/1.73m2    GFR est non-AA >60 >60 ml/min/1.73m2    Calcium 8.7 8.5 - 10.1 MG/DL    Phosphorus 4.3 2.6 - 4.7 MG/DL    Albumin 2.2 (L) 3.5 - 5.0 g/dL   MAGNESIUM    Collection Time: 22  3:42 AM   Result Value Ref Range    Magnesium 2.0 1.6 - 2.4 mg/dL   CBC WITH AUTOMATED DIFF    Collection Time: 22  3:42 AM   Result Value Ref Range    WBC 5.5 4.1 - 11.1 K/uL    RBC 3.09 (L) 4.10 - 5.70 M/uL    HGB 8.7 (L) 12.1 - 17.0 g/dL    HCT 26.9 (L) 36.6 - 50.3 %    MCV 87.1 80.0 - 99.0 FL    MCH 28.2 26.0 - 34.0 PG    MCHC 32.3 30.0 - 36.5 g/dL    RDW 18.0 (H) 11.5 - 14.5 %    PLATELET 928 770 - 039 K/uL    MPV 10.0 8.9 - 12.9 FL    NRBC 0.0 0  WBC    ABSOLUTE NRBC 0.00 0.00 - 0.01 K/uL    NEUTROPHILS 52 32 - 75 %    LYMPHOCYTES 26 12 - 49 %    MONOCYTES 11 5 - 13 %    EOSINOPHILS 11 (H) 0 - 7 %    BASOPHILS 0 0 - 1 %    IMMATURE GRANULOCYTES 0 0.0 - 0.5 %    ABS. NEUTROPHILS 2.8 1.8 - 8.0 K/UL    ABS. LYMPHOCYTES 1.4 0.8 - 3.5 K/UL    ABS. MONOCYTES 0.6 0.0 - 1.0 K/UL    ABS. EOSINOPHILS 0.6 (H) 0.0 - 0.4 K/UL    ABS. BASOPHILS 0.0 0.0 - 0.1 K/UL    ABS. IMM. GRANS. 0.0 0.00 - 0.04 K/UL    DF AUTOMATED     VANCOMYCIN, TROUGH    Collection Time: 07/19/22  3:42 AM   Result Value Ref Range    Vancomycin,trough 8.1 5.0 - 10.0 ug/mL    Reported dose date [id number]      Reported dose time: 1600      Reported dose: 1250 MG UNITS     No results found for this visit on 07/15/22.   All Micro Results     Procedure Component Value Units Date/Time    CULTURE, BLOOD, PAIRED [[id number]] Collected: 07/15/22 0923    Order Status: Completed Specimen: Blood Updated: 07/19/22 0813     Special Requests: NO SPECIAL REQUESTS        Culture result: NO GROWTH 4 DAYS       CULTURE, FUNGUS [[id number]] Collected: 07/15/22 1415    Order Status: Completed Specimen: Knee Fluid Updated: 07/18/22 0826     Special Requests: NO SPECIAL REQUESTS        Culture result: NO FUNGUS ISOLATED 3 DAYS       CULTURE, ANAEROBIC [[id number]] Collected: 07/15/22 1415    Order Status: Completed Specimen: Knee  Updated: 07/16/22 1626     Special Requests: NO SPECIAL REQUESTS        Culture result: NO GROWTH THUS FAR       CULTURE, Stacey Bigger STAIN [[id number]] Collected: 07/15/22 1415    Order Status: Completed Specimen: Wound from Knee  Updated: 07/16/22 8957     Special Requests: NO SPECIAL REQUESTS        GRAM STAIN OCCASIONAL WBCS SEEN         NO ORGANISMS SEEN Culture result: NO GROWTH THUS FAR       CULTURE, BODY FLUID W Bala Segovia [[id number]] Collected: 07/15/22 1055    Order Status: Completed Specimen: Body Fluid from Knee Fluid Updated: 07/16/22 1106     Special Requests: NO SPECIAL REQUESTS        GRAM STAIN 1+ WBCS SEEN         NO ORGANISMS SEEN        Culture result:       NO GROWTH THUS FAR Culture performed on Fluid swab specimen          CULTURE, BLOOD FOR FUNGUS [[id number]] Collected: 07/15/22 1215    Order Status: Canceled Specimen: Blood              Assessment/Plan:     Suspected R septic knee  Duplex neg for DVT  XR R knee   Moderately large knee joint effusion  F/u with joint aspirate  NGTD   S/p I&D 7/15   f/u with intra Op cultures NGTD  Empiric iv abx and antifungal  ID evaluation for further course and treatment  Appreciate Ortho input     Hx of candidemia, E.coli and E. Faecalis bacteremia, discharged on 7/6/22, completed abx and antifungal inpt.     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    S/P Prior G-tube and J-tube placements  Protein calorie malnutition on chronic TPN  Resume [facility]PN-managed at [facility]  Was on chronic fentanyl patch at home cont  while here  F/u surgery as OP for chr abd wound care     Code Status:   DVT Prophylaxis:  Sq lovenox  NOK    Dispo: TBD    Signed By: Ester Tucker MD     July 19, 2022

## 2022-07-19 NOTE — PROGRESS NOTES
Spiritual Care Partner Volunteer visited patient at [facility] in [facility] MED TELE on 7/19/2022   Documented by: Ninoska Blunt.    Paging Service: [phone]

## 2022-07-20 LAB
ANION GAP SERPL CALC-SCNC: 5 MMOL/L (ref 5–15)
BACTERIA SPEC CULT: NORMAL
BUN SERPL-MCNC: 8 MG/DL (ref 6–20)
BUN/CREAT SERPL: 15 (ref 12–20)
CALCIUM SERPL-MCNC: 9.1 MG/DL (ref 8.5–10.1)
CHLORIDE SERPL-SCNC: 103 MMOL/L (ref 97–108)
CO2 SERPL-SCNC: 29 MMOL/L (ref 21–32)
CREAT SERPL-MCNC: 0.54 MG/DL (ref 0.7–1.3)
GLUCOSE SERPL-MCNC: 99 MG/DL (ref 65–100)
MAGNESIUM SERPL-MCNC: 2 MG/DL (ref 1.6–2.4)
PHOSPHATE SERPL-MCNC: 4.1 MG/DL (ref 2.6–4.7)
POTASSIUM SERPL-SCNC: 4 MMOL/L (ref 3.5–5.1)
SERVICE CMNT-IMP: NORMAL
SODIUM SERPL-SCNC: 137 MMOL/L (ref 136–145)

## 2022-07-20 PROCEDURE — 36415 COLL VENOUS BLD VENIPUNCTURE: CPT

## 2022-07-20 PROCEDURE — 74011000258 HC RX REV CODE- 258: Performed by: STUDENT IN AN ORGANIZED HEALTH CARE EDUCATION/TRAINING PROGRAM

## 2022-07-20 PROCEDURE — 74011000250 HC RX REV CODE- 250: Performed by: INTERNAL MEDICINE

## 2022-07-20 PROCEDURE — 97116 GAIT TRAINING THERAPY: CPT

## 2022-07-20 PROCEDURE — 65270000029 HC RM PRIVATE

## 2022-07-20 PROCEDURE — 83735 ASSAY OF MAGNESIUM: CPT

## 2022-07-20 PROCEDURE — 74011000258 HC RX REV CODE- 258: Performed by: HOSPITALIST

## 2022-07-20 PROCEDURE — 74011000258 HC RX REV CODE- 258: Performed by: EMERGENCY MEDICINE

## 2022-07-20 PROCEDURE — 74011000258 HC RX REV CODE- 258: Performed by: INTERNAL MEDICINE

## 2022-07-20 PROCEDURE — 74011250636 HC RX REV CODE- 250/636: Performed by: STUDENT IN AN ORGANIZED HEALTH CARE EDUCATION/TRAINING PROGRAM

## 2022-07-20 PROCEDURE — 74011250636 HC RX REV CODE- 250/636: Performed by: EMERGENCY MEDICINE

## 2022-07-20 PROCEDURE — 74011000250 HC RX REV CODE- 250: Performed by: HOSPITALIST

## 2022-07-20 PROCEDURE — 99221 1ST HOSP IP/OBS SF/LOW 40: CPT | Performed by: STUDENT IN AN ORGANIZED HEALTH CARE EDUCATION/TRAINING PROGRAM

## 2022-07-20 PROCEDURE — 84100 ASSAY OF PHOSPHORUS: CPT

## 2022-07-20 PROCEDURE — 80048 BASIC METABOLIC PNL TOTAL CA: CPT

## 2022-07-20 PROCEDURE — 74011250636 HC RX REV CODE- 250/636: Performed by: INTERNAL MEDICINE

## 2022-07-20 PROCEDURE — 74011250636 HC RX REV CODE- 250/636: Performed by: HOSPITALIST

## 2022-07-20 RX ADMIN — Medication 300 UNITS: at 10:53

## 2022-07-20 RX ADMIN — FAMOTIDINE: 10 INJECTION, SOLUTION INTRAVENOUS at 18:57

## 2022-07-20 RX ADMIN — PIPERACILLIN AND TAZOBACTAM 3.38 G: 3; .375 INJECTION, POWDER, LYOPHILIZED, FOR SOLUTION INTRAVENOUS at 05:16

## 2022-07-20 RX ADMIN — MORPHINE SULFATE 2 MG: 2 INJECTION, SOLUTION INTRAMUSCULAR; INTRAVENOUS at 18:53

## 2022-07-20 RX ADMIN — SODIUM CHLORIDE 100 MG: 9 INJECTION, SOLUTION INTRAVENOUS at 15:29

## 2022-07-20 RX ADMIN — VANCOMYCIN HYDROCHLORIDE 1000 MG: 1 INJECTION, POWDER, LYOPHILIZED, FOR SOLUTION INTRAVENOUS at 05:16

## 2022-07-20 RX ADMIN — VANCOMYCIN HYDROCHLORIDE 1000 MG: 1 INJECTION, POWDER, LYOPHILIZED, FOR SOLUTION INTRAVENOUS at 14:18

## 2022-07-20 RX ADMIN — MORPHINE SULFATE 2 MG: 2 INJECTION, SOLUTION INTRAMUSCULAR; INTRAVENOUS at 09:41

## 2022-07-20 RX ADMIN — AMPICILLIN SODIUM 2 G: 2 INJECTION, POWDER, FOR SOLUTION INTRAVENOUS at 22:58

## 2022-07-20 RX ADMIN — SODIUM CHLORIDE, PRESERVATIVE FREE 10 ML: 5 INJECTION INTRAVENOUS at 05:17

## 2022-07-20 RX ADMIN — SODIUM CHLORIDE 100 ML/HR: 9 INJECTION, SOLUTION INTRAVENOUS at 09:41

## 2022-07-20 RX ADMIN — VANCOMYCIN HYDROCHLORIDE 1000 MG: 1 INJECTION, POWDER, LYOPHILIZED, FOR SOLUTION INTRAVENOUS at 21:34

## 2022-07-20 RX ADMIN — ENOXAPARIN SODIUM 60 MG: 100 INJECTION SUBCUTANEOUS at 05:16

## 2022-07-20 RX ADMIN — PIPERACILLIN AND TAZOBACTAM 3.38 G: 3; .375 INJECTION, POWDER, LYOPHILIZED, FOR SOLUTION INTRAVENOUS at 13:18

## 2022-07-20 RX ADMIN — MORPHINE SULFATE 2 MG: 2 INJECTION, SOLUTION INTRAMUSCULAR; INTRAVENOUS at 04:06

## 2022-07-20 RX ADMIN — SODIUM CHLORIDE, PRESERVATIVE FREE 10 ML: 5 INJECTION INTRAVENOUS at 14:24

## 2022-07-20 RX ADMIN — MORPHINE SULFATE 2 MG: 2 INJECTION, SOLUTION INTRAMUSCULAR; INTRAVENOUS at 13:18

## 2022-07-20 RX ADMIN — PIPERACILLIN AND TAZOBACTAM 3.38 G: 3; .375 INJECTION, POWDER, LYOPHILIZED, FOR SOLUTION INTRAVENOUS at 20:51

## 2022-07-20 RX ADMIN — ENOXAPARIN SODIUM 60 MG: 100 INJECTION SUBCUTANEOUS at 18:53

## 2022-07-20 NOTE — PROGRESS NOTES
ORTHO - Progress Note  Post Op day: 4 Days Post-Op    Marie Lam     [id number]  male    43 y.o.    1980    Admit date:7/15/2022  Date of Surgery:7/15/2022   Procedures:Procedure(s):RIGHT KNEE ARTHROTOMY INCISION AND DRAINAGE  Surgeon:Surgeon(s) and Role:   * Celina Nunes, DO - Primary        SUBJECTIVE:     Marie Lam is a 43 y.o. male resting in the bed. Patient states he knee \"is feeling better, but still sore\". States he was ableto work with PT this afternoon. Denies F/C, nausea, vomiting, dizziness, lightheadedness, chest pain, or shortness of breath. OBJECTIVE:       Physical Exam:  General: alert, cooperative, no distress. Gastrointestinal:  non-distended . Cardiovascular: equal pulses in the lower extremities,  Brisk cap refill in all distal extremities   Respiratory: No respiratory distress   Neurological:Neurovascular exam within normal limits. Senstion intact: LE bilat. Motor: + DF/PF/EHL. Musculoskeletal: Isaías's sign negative in bilateral lower extremities. Calves soft, supple, non-tender upon palpation or with passive stretch. Dressing/Wound:  Clean, dry and intact. No significant erythema, + effusion.     Vital Signs:       Patient Vitals for the past 8 hrs:   BP Temp Pulse Resp SpO2   22 2057 117/76 99 °F (37.2 °C) 97 18 98 %   22 1530 121/84 97.6 °F (36.4 °C) 97 18 99 %                                          Temp (24hrs), Av.2 °F (36.8 °C), Min:97.6 °F (36.4 °C), Max:99 °F (37.2 °C)    Date 22 0700 - 22 0659   Shift 3428-5669 8437-3650 2917-5170 24 Hour Total   INTAKE   Shift Total(mL/kg)       OUTPUT   Urine(mL/kg/hr) 800(1.6) 450  1250   Drains 500   500   Shift Total(mL/kg) 1300(20.3) 450(7)  1750(27.4)   Weight (kg) 63.9 63.9 63.9 63.9     Labs:        Recent Labs     22  0342   HCT 26.9*   HGB 8.7*     PT/OT:        Gait  Base of Support: Widened  Speed/Kay: Slow  Step Length: Right shortened  Swing Pattern: Right asymmetrical  Stance: Right decreased  Gait Abnormalities: Antalgic,Step to gait,Decreased step clearance  Ambulation - Level of Assistance: Stand-by assistance  Distance (ft): 25 Feet (ft)  Assistive Device: Walker, rolling     ASSESSMENT / PLAN:   Active Problems:    Arthritis, septic, knee ([address] (7/15/2022)       -  Continue PT/OT WBAT  - dressing change PRN  -  DVT prophylaxis- SCD w/ lovenox 60mg   - Continue IV abx per medical service/ID  -  NO growth from cultures- x4days  -  DC planning - Pending    Signed By: Jerrica Clemente PA-C

## 2022-07-20 NOTE — PROGRESS NOTES
ORTHO - Progress Note  Post Op day: 5 Days Post-Op    Harpreet Archer     [id number]  male    43 y.o.    1980    Admit date:7/15/2022  Date of Surgery:7/15/2022   Procedures:Procedure(s):RIGHT KNEE ARTHROTOMY INCISION AND DRAINAGE  Surgeon:Surgeon(s) and Role:   * Unknown Nettle, DO - Primary        SUBJECTIVE:     Harpreet Archer is a 43 y.o. male resting in the bed. Patient states he is feeling a little better. Denies F/C, nausea, vomiting, dizziness, lightheadedness, chest pain, or shortness of breath. OBJECTIVE:       Physical Exam:  General: alert, cooperative, no distress. Gastrointestinal:  non-distended . Cardiovascular: equal pulses in the lower extremities,  Brisk cap refill in all distal extremities   Respiratory: No respiratory distress   Neurological:Neurovascular exam within normal limits. Senstion intact: LE bilat. Motor: + DF/PF/EHL. Musculoskeletal: Isaías's sign negative in bilateral lower extremities. Calves soft, supple, non-tender upon palpation or with passive stretch. Dressing/Wound:  Clean, dry and intact. No significant erythema, + effusion.     Vital Signs:       Patient Vitals for the past 8 hrs:   BP Temp Pulse Resp SpO2 Height   22 1309 -- -- -- -- -- 5' 9\" (1.753 m)   22 0844 115/79 97.8 °F (36.6 °C) (!) 108 16 94 % --                                          Temp (24hrs), Av.1 °F (36.7 °C), Min:97.6 °F (36.4 °C), Max:99 °F (37.2 °C)    Date 22 07 - 22 0659   Shift 6197-0644 0914-6608 1796-1241 24 Hour Total   INTAKE   Shift Total(mL/kg)       OUTPUT   Urine(mL/kg/hr) 1500(2.9)   1500   Shift Total(mL/kg) 1500(23.5)   1500(23.5)   Weight (kg) 63.9 63.9 63.9 63.9     Labs:        Recent Labs     22  0342   HCT 26.9*   HGB 8.7*     PT/OT:        Gait  Base of Support: Widened  Speed/Kay: Pace decreased (<100 feet/min)  Step Length: Right shortened  Swing Pattern: Right asymmetrical  Stance: Right decreased  Gait Abnormalities: Antalgic, Decreased step clearance  Ambulation - Level of Assistance: Stand-by assistance  Distance (ft): 50 Feet (ft) (25ft x 2 ; seated rest between trials)  Assistive Device: Gait belt, Walker, rolling  Interventions: Verbal cues, Tactile cues, Safety awareness training  Interventions: Verbal cues, Tactile cues, Safety awareness training  ASSESSMENT / PLAN:   Active Problems:    Arthritis, septic, knee (HCC) (7/15/2022)     -  Continue PT/OT WBAT  - dressing change PRN  -  DVT prophylaxis- SCD w/ lovenox 60mg   - Continue IV abx per medical service/ID  -  NO growth from cultures- x4days  -  DC planning - Pending; will loosely follow given overall clinical improvement     Signed By: AGNIESZKA Weaver

## 2022-07-20 NOTE — PROGRESS NOTES
This RN inquired if pt can have his Telemetry order discontinued as it has . Per Dung MQ, pt okay to have ordered discontinued. This RN discontinued this order per verbal order.

## 2022-07-20 NOTE — PROGRESS NOTES
Problem: Mobility Impaired (Adult and Pediatric)  Goal: *Therapy Goal (Edit Goal, Insert Text)  Description: FUNCTIONAL STATUS PRIOR TO ADMISSION: Patient was independent and active without use of DME. Lived with mother who assisted him some but she works during the day. HOME SUPPORT PRIOR TO ADMISSION: The patient lived with mother but did not require assist.     Physical Therapy Goals  Initiated 7/16/2022  1. Patient will move from supine to sit and sit to supine  in bed with modified independence within 7 day(s). 2.  Patient will transfer from bed to chair and chair to bed with modified independence using the least restrictive device within 7 day(s). 3.  Patient will perform sit to stand with independence within 7 day(s). 4.  Patient will ambulate with supervision/set-up for 50 feet WBATwith the least restrictive device within 7 day(s). 5.  Patient will ascend/descend 3 stairs with 1 handrail(s) with minimal assistance/contact guard assist within 7 day(s). Outcome: Progressing Towards Goal     PHYSICAL THERAPY TREATMENT  Patient: Flori Cintron (26 y.o. male)  Date: 7/20/2022  Diagnosis: Arthritis, septic, knee (HCC) [M00.9] <principal problem not specified>  Procedure(s) (LRB):  RIGHT KNEE ARTHROTOMY INCISION AND DRAINAGE (Right) 5 Days Post-Op  Precautions:  Fall  Chart, physical therapy assessment, plan of care and goals were reviewed. ASSESSMENT  Nurse clears pt for mobility. Patient continues with skilled PT services and is progressing towards goals. He is able to advance gait distance and activity tolerance this date with seated rest break between bouts of gait. R knee exercises reviewed, and pt encouraged to sit at EOB throughout day to complete ROM as well as amb. With nursing in between PT tx sessions. Continue to follow. Current Level of Function Impacting Discharge (mobility/balance): bed mob.  S ; transfers SBA ; gait SBA with RW    Other factors to consider for discharge: pt receives TPN         PLAN :  Patient continues to benefit from skilled intervention to address the above impairments. Continue treatment per established plan of care. to address goals. Recommendation for discharge: (in order for the patient to meet his/her long term goals)  Physical therapy at least 2 days/week in the home     This discharge recommendation:  Has been made in collaboration with the attending provider and/or case management    IF patient discharges home will need the following DME: rolling walker       SUBJECTIVE:   Patient stated I don't want to sit up in the chair because they leave me there too long.     OBJECTIVE DATA SUMMARY:   Critical Behavior:  Neurologic State: Alert  Orientation Level: Oriented X4  Cognition: Follows commands     Functional Mobility Training:  Bed Mobility:     Supine to Sit: Supervision  Sit to Supine: Supervision  Scooting: Supervision        Transfers:  Sit to Stand: Stand-by assistance  Stand to Sit: Stand-by assistance                             Balance:  Sitting: Intact  Standing: Intact; With support  Standing - Static: Constant support;Good (RW)  Standing - Dynamic : Constant support;Good (RW)  Ambulation/Gait Training:  Distance (ft): 50 Feet (ft) (25ft x 2 ; seated rest between trials)  Assistive Device: Gait belt;Walker, rolling  Ambulation - Level of Assistance: Stand-by assistance        Gait Abnormalities: Antalgic;Decreased step clearance  Right Side Weight Bearing: As tolerated        Stance: Right decreased  Speed/Kay: Pace decreased (<100 feet/min)           Interventions: Verbal cues; Tactile cues; Safety awareness training         Therapeutic Exercises:   Pt performed LAQ's (minimal ROM) at EOB, x 5 reps. ; standing march/knee flex. X 5 reps.  With RW for support    Pain Rating:  C/o pain rated 5/10, R knee with gait     Activity Tolerance:   Fair and improving    After treatment patient left in no apparent distress:   Supine in bed, Call bell within reach, and cold pack applied    COMMUNICATION/COLLABORATION:   The patients plan of care was discussed with: Registered nurse and Rehabilitation technician.      Rigo Dexter PT, DPT   Time Calculation: 24 mins

## 2022-07-20 NOTE — PROGRESS NOTES
This RN gave report to [employer] (oncoming RN). All questions answered. TPN reviewed with [employer]. Pt to increase to 156cc/hr x 14hr soon. Pt had no significant events this shift. At handoff, pt was observed in bed, awake, playing on his phone.

## 2022-07-20 NOTE — PROGRESS NOTES
This RN Yuniel Pérez MD regarding \"L. acidophilus-paracasei-S.thermophil-bifidobacter\", which pt stated he cannot take by mouth. Per Dung, okay to hold. MD made aware that this RN placed this medication ON HOLD in the [facility].
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
## 2022-07-20 NOTE — PROGRESS NOTES
Comprehensive Nutrition Assessment    Type and Reason for Visit: Reassess    Nutrition Recommendations/Plan:   Add 500 mL 20% lipids 3x/week (TPN + lipids will provide 2012 kcal, 90g protein, 360g CHO)     Nutrition Assessment:   Chart reviewed; patient continues on cyclic TPN regimen. Lipids not added yet. Lytes/BG WNL. Continues on a full liquid diet. Nutrition Related Findings:    Labs WNL  BM 7/19  Incision          Current Nutrition Intake & Therapies:  ADULT DIET Full Liquid + TPN     Anthropometric Measures:  Height: 5' 9\" (175.3 cm)  Ideal Body Weight (IBW): 160 lbs (73 kg)     Current Body Wt:  63.9 kg (140 lb 14 oz), 88 % IBW. Current BMI (kg/m2): 20.8                          BMI Category: Normal weight (BMI 18.5-24. 9)    Estimated Daily Nutrient Needs:  Energy Requirements Based On: Formula  Weight Used for Energy Requirements: Current  Energy (kcal/day): 5532-5741 kcal (BMR 1530 x 1.2AF +250kcal)  Weight Used for Protein Requirements: Current  Protein (g/day): 83g-96g (1.3-1.5 g/kg bw)  Method Used for Fluid Requirements: 1 ml/kcal  Fluid (ml/day): 1850 mL    Nutrition Diagnosis:   Altered GI function related to altered GI structure as evidenced by nutrition support-parenteral nutrition    Nutrition Interventions:   Food and/or Nutrient Delivery: Continue parenteral nutrition  Nutrition Education/Counseling: No recommendations at this time  Coordination of Nutrition Care: Continue to monitor while inpatient       Goals:  Previous Goal Met: Goal(s) achieved  Goals:  Tolerate nutrition support at goal rate, by next RD assessment       Nutrition Monitoring and Evaluation:   Behavioral-Environmental Outcomes: None identified  Food/Nutrient Intake Outcomes: Parenteral nutrition intake/tolerance  Physical Signs/Symptoms Outcomes: Biochemical data, Weight    Discharge Planning:    Parenteral nutrition    Alice Maurer RD  Contact: ext [phone]

## 2022-07-21 ENCOUNTER — APPOINTMENT (OUTPATIENT)
Dept: VASCULAR SURGERY | Age: 42
DRG: 313 | End: 2022-07-21
Attending: STUDENT IN AN ORGANIZED HEALTH CARE EDUCATION/TRAINING PROGRAM
Payer: MEDICAID

## 2022-07-21 LAB
APPEARANCE FLD: ABNORMAL
BASOPHILS # BLD: 0 K/UL (ref 0–0.1)
BASOPHILS NFR BLD: 0 % (ref 0–1)
BODY FLD TYPE: NORMAL
COLOR FLD: YELLOW
CRP SERPL-MCNC: 5.47 MG/DL (ref 0–0.6)
CRYSTALS FLD MICRO: NORMAL
DIFFERENTIAL METHOD BLD: ABNORMAL
EOSINOPHIL # BLD: 0.5 K/UL (ref 0–0.4)
EOSINOPHIL NFR BLD: 7 % (ref 0–7)
ERYTHROCYTE [DISTWIDTH] IN BLOOD BY AUTOMATED COUNT: 17.9 % (ref 11.5–14.5)
HCT VFR BLD AUTO: 27.6 % (ref 36.6–50.3)
HGB BLD-MCNC: 8.7 G/DL (ref 12.1–17)
IMM GRANULOCYTES # BLD AUTO: 0 K/UL (ref 0–0.04)
IMM GRANULOCYTES NFR BLD AUTO: 0 % (ref 0–0.5)
LYMPHOCYTES # BLD: 1.3 K/UL (ref 0.8–3.5)
LYMPHOCYTES NFR BLD: 21 % (ref 12–49)
LYMPHOCYTES NFR FLD: 6 %
MCH RBC QN AUTO: 27.7 PG (ref 26–34)
MCHC RBC AUTO-ENTMCNC: 31.5 G/DL (ref 30–36.5)
MCV RBC AUTO: 87.9 FL (ref 80–99)
MONOCYTES # BLD: 0.6 K/UL (ref 0–1)
MONOCYTES NFR BLD: 9 % (ref 5–13)
MONOS+MACROS NFR FLD: 3 %
NEUTROPHILS NFR FLD: 91 %
NEUTS SEG # BLD: 4 K/UL (ref 1.8–8)
NEUTS SEG NFR BLD: 63 % (ref 32–75)
NRBC # BLD: 0 K/UL (ref 0–0.01)
NRBC BLD-RTO: 0 PER 100 WBC
NUC CELL # FLD: ABNORMAL /CU MM
PLATELET # BLD AUTO: 450 K/UL (ref 150–400)
PMV BLD AUTO: 10.3 FL (ref 8.9–12.9)
RBC # BLD AUTO: 3.14 M/UL (ref 4.1–5.7)
RBC # FLD: >100 /CU MM
SPECIMEN SOURCE FLD: ABNORMAL
WBC # BLD AUTO: 6.5 K/UL (ref 4.1–11.1)

## 2022-07-21 PROCEDURE — 74011000250 HC RX REV CODE- 250: Performed by: ORTHOPAEDIC SURGERY

## 2022-07-21 PROCEDURE — 74011250636 HC RX REV CODE- 250/636: Performed by: INTERNAL MEDICINE

## 2022-07-21 PROCEDURE — 74011250636 HC RX REV CODE- 250/636: Performed by: STUDENT IN AN ORGANIZED HEALTH CARE EDUCATION/TRAINING PROGRAM

## 2022-07-21 PROCEDURE — 74011000258 HC RX REV CODE- 258: Performed by: EMERGENCY MEDICINE

## 2022-07-21 PROCEDURE — 74011000258 HC RX REV CODE- 258: Performed by: INTERNAL MEDICINE

## 2022-07-21 PROCEDURE — 74011000258 HC RX REV CODE- 258: Performed by: STUDENT IN AN ORGANIZED HEALTH CARE EDUCATION/TRAINING PROGRAM

## 2022-07-21 PROCEDURE — 65270000029 HC RM PRIVATE

## 2022-07-21 PROCEDURE — 0S9C30Z DRAINAGE OF RIGHT KNEE JOINT WITH DRAINAGE DEVICE, PERCUTANEOUS APPROACH: ICD-10-PCS | Performed by: ORTHOPAEDIC SURGERY

## 2022-07-21 PROCEDURE — 93971 EXTREMITY STUDY: CPT

## 2022-07-21 PROCEDURE — 74011000250 HC RX REV CODE- 250: Performed by: INTERNAL MEDICINE

## 2022-07-21 PROCEDURE — 86140 C-REACTIVE PROTEIN: CPT

## 2022-07-21 PROCEDURE — 74011250636 HC RX REV CODE- 250/636: Performed by: HOSPITALIST

## 2022-07-21 PROCEDURE — 36415 COLL VENOUS BLD VENIPUNCTURE: CPT

## 2022-07-21 PROCEDURE — 74011250636 HC RX REV CODE- 250/636: Performed by: EMERGENCY MEDICINE

## 2022-07-21 PROCEDURE — 89060 EXAM SYNOVIAL FLUID CRYSTALS: CPT

## 2022-07-21 PROCEDURE — 87205 SMEAR GRAM STAIN: CPT

## 2022-07-21 PROCEDURE — 85025 COMPLETE CBC W/AUTO DIFF WBC: CPT

## 2022-07-21 PROCEDURE — 99233 SBSQ HOSP IP/OBS HIGH 50: CPT | Performed by: STUDENT IN AN ORGANIZED HEALTH CARE EDUCATION/TRAINING PROGRAM

## 2022-07-21 PROCEDURE — 89050 BODY FLUID CELL COUNT: CPT

## 2022-07-21 RX ORDER — LIDOCAINE HYDROCHLORIDE 10 MG/ML
5 INJECTION INFILTRATION; PERINEURAL ONCE
Status: DISCONTINUED | OUTPATIENT
Start: 2022-07-21 | End: 2022-07-21 | Stop reason: SDUPTHER

## 2022-07-21 RX ORDER — LIDOCAINE HYDROCHLORIDE 10 MG/ML
5 INJECTION, SOLUTION EPIDURAL; INFILTRATION; INTRACAUDAL; PERINEURAL ONCE
Status: COMPLETED | OUTPATIENT
Start: 2022-07-21 | End: 2022-07-21

## 2022-07-21 RX ADMIN — AMPICILLIN SODIUM 2 G: 2 INJECTION, POWDER, FOR SOLUTION INTRAVENOUS at 10:20

## 2022-07-21 RX ADMIN — POTASSIUM CHLORIDE: 2 INJECTION, SOLUTION, CONCENTRATE INTRAVENOUS at 18:30

## 2022-07-21 RX ADMIN — AMPICILLIN SODIUM 2 G: 2 INJECTION, POWDER, FOR SOLUTION INTRAVENOUS at 22:49

## 2022-07-21 RX ADMIN — SODIUM CHLORIDE, PRESERVATIVE FREE 10 ML: 5 INJECTION INTRAVENOUS at 16:04

## 2022-07-21 RX ADMIN — MORPHINE SULFATE 2 MG: 2 INJECTION, SOLUTION INTRAMUSCULAR; INTRAVENOUS at 15:17

## 2022-07-21 RX ADMIN — LIDOCAINE HYDROCHLORIDE 5 ML: 10 INJECTION, SOLUTION EPIDURAL; INFILTRATION; INTRACAUDAL; PERINEURAL at 16:18

## 2022-07-21 RX ADMIN — MORPHINE SULFATE 2 MG: 2 INJECTION, SOLUTION INTRAMUSCULAR; INTRAVENOUS at 10:20

## 2022-07-21 RX ADMIN — MORPHINE SULFATE 2 MG: 2 INJECTION, SOLUTION INTRAMUSCULAR; INTRAVENOUS at 19:50

## 2022-07-21 RX ADMIN — AMPICILLIN SODIUM 2 G: 2 INJECTION, POWDER, FOR SOLUTION INTRAVENOUS at 04:14

## 2022-07-21 RX ADMIN — AMPICILLIN SODIUM 2 G: 2 INJECTION, POWDER, FOR SOLUTION INTRAVENOUS at 15:17

## 2022-07-21 RX ADMIN — SODIUM CHLORIDE 100 MG: 9 INJECTION, SOLUTION INTRAVENOUS at 16:00

## 2022-07-21 RX ADMIN — ENOXAPARIN SODIUM 60 MG: 100 INJECTION SUBCUTANEOUS at 05:36

## 2022-07-21 NOTE — PROGRESS NOTES
ORTHO - Progress Note  Post Op day: 6 Days Post-Op    Anupama Guan     [id number]  male    43 y.o.    1980    Admit date:7/15/2022  Date of Surgery:7/15/2022   Procedures:Procedure(s):RIGHT KNEE ARTHROTOMY INCISION AND DRAINAGE  Surgeon:Surgeon(s) and Role:   * Taylor Swenson, DO - Primary        SUBJECTIVE:     Anupama Guan is a 43 y.o. male resting in the bed. Patient states he is feeling a little better. \" My knee is just so swollen\"  Denies F/C, nausea, vomiting, dizziness, lightheadedness, chest pain, or shortness of breath. OBJECTIVE:       Physical Exam:  General: alert, cooperative, no distress. Gastrointestinal:  non-distended . Cardiovascular: equal pulses in the lower extremities,  Brisk cap refill in all distal extremities   Respiratory: No respiratory distress   Neurological:Neurovascular exam within normal limits. Senstion intact: LE bilat. Motor: + DF/PF/EHL. Musculoskeletal: Isaías's sign negative in bilateral lower extremities. Calves soft, supple, non-tender upon palpation or with passive stretch. Dressing/Wound:  Clean, dry and intact. No significant erythema, + large effusion.     Vital Signs:       Patient Vitals for the past 8 hrs:   BP Temp Pulse Resp SpO2   22 1542 (!) 132/94 98.4 °F (36.9 °C) (!) 105 16 100 %                                          Temp (24hrs), Av.4 °F (36.9 °C), Min:98.2 °F (36.8 °C), Max:98.5 °F (36.9 °C)    Date 22 0700 - 22 0659   Shift 4287-4849 9374-0591 1801-8277 24 Hour Total   INTAKE   Shift Total(mL/kg)       OUTPUT   Urine(mL/kg/hr) 750(1.5)   750   Shift Total(mL/kg) 750(11.7)   750(11.7)   Weight (kg) 63.9 63.9 63.9 63.9     Labs:        Recent Labs     22  0430   HCT 27.6*   HGB 8.7*     PT/OT:        Gait  Base of Support: Widened  Speed/Kay: Pace decreased (<100 feet/min)  Step Length: Right shortened  Swing Pattern: Right asymmetrical  Stance: Right decreased  Gait Abnormalities: Antalgic, Decreased step clearance  Ambulation - Level of Assistance: Stand-by assistance  Distance (ft): 50 Feet (ft) (25ft x 2 ; seated rest between trials)  Assistive Device: Gait belt, Walker, rolling  Interventions: Verbal cues, Tactile cues, Safety awareness training  Interventions: Verbal cues, Tactile cues, Safety awareness training  ASSESSMENT / PLAN:   Active Problems:    Arthritis, septic, knee (HCC) (7/15/2022)     -  Continue PT/OT WBAT  - dressing change PRN  - Repeat aspiration at bedside today (see procedure note)   - Aspirate appearance concerning for infection; Will plan for I&D of right knee tomorrow at 2:30 pm by Dr Roselia Andrade sent for cultures.   -  DVT prophylaxis- SCD w/ lovenox 60mg; hold for now for OR tomorrow.  - NPO after midnight  - Continue IV abx per medical service/ID  -  NO growth from cultures    Signed By: AGNIESZKA Chatterjee

## 2022-07-21 NOTE — WOUND CARE
Wound care consult for the fistula care:  Chart reviewed and patient assessed. Patient is well known to the wound care team at this hospital from past care provided. Assessment: No fistula output during the fistula bag change. The wound is red with granulation. The periwound skin is pink epithelial new skin but irregular defects in the scar tissue that make the surface uneven. Treatment: A new fistula bag was applied today after cleansing the skin and wound and skin prep with [place] skin protectant. Ostomy rings were used to even up the skin irregularities and then the fistula bag applied. Held in place by the patient. Plan: Two extra fistula bags were left in the room. Pt. Knows how to apply the bags with some assistance from nursing that he can direct.    Sammy Tinajero, RN, BSN, [facility], [place]
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
## 2022-07-21 NOTE — PROGRESS NOTES
Infectious Disease Progress Note         Interval:  NAEO    Subjective:   Patient is reporting no improvement in the right knee pain. Still hurting to move. Objective:    Vitals:   Reviewed in chart. Physical Exam:  GEN: NAD  HEENT: Normocephalic, atraumatic, PERRL, no scleral icterus  CV: Hemodynamically stable  Lungs: Breathing comfortably  Abdomen: soft, non distended, non tender  Genitourinary:  no brock  Extremities: no edema  Neuro: Alert, oriented to time, place and situation, moves all extremities to commands, verbal  Skin: Right knee examined today. Was swollen and could palpate effusion. Tender to palpation. The knee was not hot or erythematous. Psych: good affect, good eye contact, non tearful  Lines: Peripheral IV lines and right chest tunneled catheter        Labs:  Recent Results (from the past 24 hour(s))   CBC WITH AUTOMATED DIFF    Collection Time: 07/21/22  4:30 AM   Result Value Ref Range    WBC 6.5 4.1 - 11.1 K/uL    RBC 3.14 (L) 4.10 - 5.70 M/uL    HGB 8.7 (L) 12.1 - 17.0 g/dL    HCT 27.6 (L) 36.6 - 50.3 %    MCV 87.9 80.0 - 99.0 FL    MCH 27.7 26.0 - 34.0 PG    MCHC 31.5 30.0 - 36.5 g/dL    RDW 17.9 (H) 11.5 - 14.5 %    PLATELET 600 (H) 417 - 400 K/uL    MPV 10.3 8.9 - 12.9 FL    NRBC 0.0 0  WBC    ABSOLUTE NRBC 0.00 0.00 - 0.01 K/uL    NEUTROPHILS 63 32 - 75 %    LYMPHOCYTES 21 12 - 49 %    MONOCYTES 9 5 - 13 %    EOSINOPHILS 7 0 - 7 %    BASOPHILS 0 0 - 1 %    IMMATURE GRANULOCYTES 0 0.0 - 0.5 %    ABS. NEUTROPHILS 4.0 1.8 - 8.0 K/UL    ABS. LYMPHOCYTES 1.3 0.8 - 3.5 K/UL    ABS. MONOCYTES 0.6 0.0 - 1.0 K/UL    ABS. EOSINOPHILS 0.5 (H) 0.0 - 0.4 K/UL    ABS. BASOPHILS 0.0 0.0 - 0.1 K/UL    ABS. IMM.  GRANS. 0.0 0.00 - 0.04 K/UL    DF AUTOMATED     C REACTIVE PROTEIN, QT    Collection Time: 07/21/22  4:30 AM   Result Value Ref Range    C-Reactive protein 5.47 (H) 0.00 - 0.60 mg/dL   CELL COUNT, BODY FLUID    Collection Time: 07/21/22  4:44 PM   Result Value Ref Range    BODY FLUID TYPE RIGHT KNEE      FLUID COLOR YELLOW      FLUID APPEARANCE TURBID      FLUID RBC CT. >100 (H) 0 /cu mm    FLUID NUCLEATED CELLS 38,085 /cu mm   CRYSTALS, SYNOVIAL FLUID    Collection Time: 07/21/22  4:44 PM   Result Value Ref Range    FLUID TYPE(7) RIGHT KNEE      Crystals, body fluid FEW INTRACELLULAER            Assessment:  59-year-old male with:     -Acute right knee septic arthritis status post incision and drainage on 7/15/2022. Operative gram stain and cultures negative so far.     -E. coli and Enterococcus faecalis bacteremia (6/20/2022) and Candida albicans fungemia (6/13/2022) due to infected Telly catheter. Catheter was removed at that time. Once blood cultures are cleared, new catheter was placed on 6/20/2022. Requirement of catheter is for TPN per surgery. Finished ampicillin and anidulafungin per ID recommendations for 2 weeks on 6/30/2022. -Age indeterminate right brachial DVT found 6/30/2022 on Lovenox. 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 History of multiple abdominal surgeries, with PEG and J-tube. Recommendations:  -Right knee was concerning for ongoing effusion and tenderness on exam today. I requested the orthopedics team for reevaluation. Repeat aspiration has been done today on the bedside, and plan for operative incision and drainage on 7/22/2022.  -Continue ampicillin for now  -We will follow operative cultures from the next I&D of the knee. -Antibiotic duration, choice is pending clinical course. ID will follow. Thank you for the opportunity to participate in the care of this patient. Please contact with questions or concerns.       Dago Castillo MD  Infectious Diseases

## 2022-07-21 NOTE — PROGRESS NOTES
Care Management:    Transition of Care Plan:     RUR: 26%  Disposition: Home with Home Health ([facility]) ) TPN- Marzena Freitas Yvonne [address]37. Order to resume TPN sent via [employer] to [address] [place] [address] needs a resume order for SN and message left for Dr Hernando Brennan for this. [facility] also needs order for IV antibiotic and care manager has called and left message for Dr Kandice Dakin for this. Follow up appointments:PCP  DME needed: None  Transportation at Discharge: Pt's mother will transport at d/c.   [employer] or means to access home: Pt has access       IM Medicare Letter: Pt has Medicaid  Is patient a BCPI-A Bundle:  N/A                      Caregiver Contact: Gaye De Jesus- Mother [phone]  Discharge Caregiver contacted prior to discharge? CM will notify caregiver at d/c. Care Conference needed?:     No     Reason for Readmission:    Arthritis Septic Knee  . Patient anticipated to discharge home in next few days . CM working on discharge needs with [facility] and [facility].     CM spoke with patient at bedside and he is in agreement with discharging home when medically stable .     Hardeep Ballard RN ACM [id number]

## 2022-07-21 NOTE — PROGRESS NOTES
Physical Therapy:  Chart reviewed. Pt currently leaving the floor with transport for vascular testing. Will defer and continue to follow.

## 2022-07-21 NOTE — PROGRESS NOTES
Spiritual Care Assessment/Progress Note  [facility]      NAME: Qasim Hunter      MRN: [id number]  AGE: 43 y.o.  SEX: male  Episcopalian Affiliation: No Oriental orthodox   Language: English     7/21/2022     Total Time (in minutes): 37     Spiritual Assessment begun in [facility] 2 MED TELE through conversation with:         [x]Patient        [] Family    [] Friend(s)        Reason for Consult: Initial/Spiritual assessment, patient floor     Spiritual beliefs: (Please include comment if needed)     [x] Identifies with a alessandro tradition:   Religion       [x] Supported by a alessandro community:            [] Claims no spiritual orientation:           [] Seeking spiritual identity:                [] Adheres to an individual form of spirituality:           [] Not able to assess:                           Identified resources for coping:      [x] Prayer                               [] Music                  [] Guided Imagery     [x] Family/friends                 [] Pet visits     [] Devotional reading                         [] Unknown     [] Other:                                                Interventions offered during this visit: (See comments for more details)    Patient Interventions: Affirmation of emotions/emotional suffering, Initial/Spiritual assessment, patient floor, Coping skills reviewed/reinforced, Life review/legacy, Normalization of emotional/spiritual concerns, Prayer (actual), Integration of medical assessment with existing values and beliefs, Affirmation of alessandro, Catharsis/review of pertinent events in supportive environment, Iconic (affirming the presence of God/Higher Power), Episcopalian beliefs/image of God discussed           Plan of Care:     [x] Support spiritual and/or cultural needs    [] Support AMD and/or advance care planning process      [] Support grieving process   [] Coordinate Rites and/or Rituals    [] Coordination with community clergy   [] No spiritual needs identified at this time   [] Detailed Plan of Care below (See Comments)  [] Make referral to Music Therapy  [] Make referral to Pet Therapy     [] Make referral to Addiction services  [] Make referral to [facility]  [] Make referral to Spiritual Care Partner  [] No future visits requested        [x] Contact Spiritual Care for further referrals     Comments:   visited Mr. Obrien in room 5981 for initial spiritual assessment. Patient was in bed and was very welcoming of visit. He has been seen by this  in the past and so he picked up conversation from previous visit.  listened actively with empathy and affirmation as patient shared his frustration about his long hospitalization. He became emotional and tearful and shared that he was getting tired with his medical condition, as well as some new issues, which keep bringing him back on admission. Coping resources explored, patient share that family and alessandro are very helpful. He shared about how supportive his mom and his children have been. When  inquired how he may support, he stated that a little prayer would help. Focus on the present encouraged, requested prayer offered, also words of encouragement. Patient seem uplifted and stated he will keep fighting. He expressed appreciation for the visit and prayer. Please contact spiritual care for any further referral.   Visited by: Itz Paulino.    Paging Service: [phone])

## 2022-07-21 NOTE — PROCEDURES
After written consent was obtained, the patient's superior lateral right knee was prepped with ChloraPrep. The appropriate injection site was identified and using a 25-gauge needle and a 5 mL syringe 5 cc of 1% lidocaine was administered into the injection site. Adequate time was allowed for the patient to fill the effects of the lidocaine, the skin was then reprepped with ChloraPrep which was allowed to dry. After this, an 18-gauge needle was used to aspirate approximately 70 cc of murky brown appearing fluid and some blood from the patient's knee. The needle was removed and a 4 x 4 was placed at the aspiration site to prevent further bleeding. Once bleeding was controlled a sterile bandage was applied. The specimen and the syringe was transferred to culture tubes and hand-delivered to the lab by myself. Patient tolerated the procedure well.     Ricardo Holter, PA

## 2022-07-22 ENCOUNTER — ANESTHESIA (OUTPATIENT)
Dept: SURGERY | Age: 42
DRG: 313 | End: 2022-07-22
Payer: MEDICAID

## 2022-07-22 ENCOUNTER — ANESTHESIA EVENT (OUTPATIENT)
Dept: SURGERY | Age: 42
DRG: 313 | End: 2022-07-22
Payer: MEDICAID

## 2022-07-22 LAB
ANION GAP SERPL CALC-SCNC: 4 MMOL/L (ref 5–15)
BUN SERPL-MCNC: 10 MG/DL (ref 6–20)
BUN/CREAT SERPL: 17 (ref 12–20)
CALCIUM SERPL-MCNC: 9.2 MG/DL (ref 8.5–10.1)
CHLORIDE SERPL-SCNC: 102 MMOL/L (ref 97–108)
CO2 SERPL-SCNC: 27 MMOL/L (ref 21–32)
CREAT SERPL-MCNC: 0.59 MG/DL (ref 0.7–1.3)
GLUCOSE SERPL-MCNC: 117 MG/DL (ref 65–100)
MAGNESIUM SERPL-MCNC: 2.1 MG/DL (ref 1.6–2.4)
PHOSPHATE SERPL-MCNC: 3.9 MG/DL (ref 2.6–4.7)
POTASSIUM SERPL-SCNC: 4.3 MMOL/L (ref 3.5–5.1)
SODIUM SERPL-SCNC: 133 MMOL/L (ref 136–145)

## 2022-07-22 PROCEDURE — 77030002933 HC SUT MCRYL J&J -A: Performed by: ORTHOPAEDIC SURGERY

## 2022-07-22 PROCEDURE — 77030000032 HC CUF TRNQT ZIMM -B: Performed by: ORTHOPAEDIC SURGERY

## 2022-07-22 PROCEDURE — 77030018673: Performed by: ORTHOPAEDIC SURGERY

## 2022-07-22 PROCEDURE — 77030013079 HC BLNKT BAIR HGGR 3M -A: Performed by: ANESTHESIOLOGY

## 2022-07-22 PROCEDURE — 74011000258 HC RX REV CODE- 258: Performed by: INTERNAL MEDICINE

## 2022-07-22 PROCEDURE — 77030012406 HC DRN WND PENRS BARD -A: Performed by: ORTHOPAEDIC SURGERY

## 2022-07-22 PROCEDURE — 2709999900 HC NON-CHARGEABLE SUPPLY: Performed by: ORTHOPAEDIC SURGERY

## 2022-07-22 PROCEDURE — 74011250637 HC RX REV CODE- 250/637: Performed by: HOSPITALIST

## 2022-07-22 PROCEDURE — 74011000272 HC RX REV CODE- 272: Performed by: ORTHOPAEDIC SURGERY

## 2022-07-22 PROCEDURE — 0SBC0ZZ EXCISION OF RIGHT KNEE JOINT, OPEN APPROACH: ICD-10-PCS | Performed by: ORTHOPAEDIC SURGERY

## 2022-07-22 PROCEDURE — 74011000250 HC RX REV CODE- 250: Performed by: ANESTHESIOLOGY

## 2022-07-22 PROCEDURE — 80048 BASIC METABOLIC PNL TOTAL CA: CPT

## 2022-07-22 PROCEDURE — 74011000250 HC RX REV CODE- 250: Performed by: NURSE ANESTHETIST, CERTIFIED REGISTERED

## 2022-07-22 PROCEDURE — 74011250636 HC RX REV CODE- 250/636: Performed by: INTERNAL MEDICINE

## 2022-07-22 PROCEDURE — 77030002916 HC SUT ETHLN J&J -A: Performed by: ORTHOPAEDIC SURGERY

## 2022-07-22 PROCEDURE — C1713 ANCHOR/SCREW BN/BN,TIS/BN: HCPCS | Performed by: ORTHOPAEDIC SURGERY

## 2022-07-22 PROCEDURE — 74011250636 HC RX REV CODE- 250/636: Performed by: ANESTHESIOLOGY

## 2022-07-22 PROCEDURE — 83735 ASSAY OF MAGNESIUM: CPT

## 2022-07-22 PROCEDURE — 77030008462 HC STPLR SKN PROX J&J -A: Performed by: ORTHOPAEDIC SURGERY

## 2022-07-22 PROCEDURE — 74011000258 HC RX REV CODE- 258: Performed by: EMERGENCY MEDICINE

## 2022-07-22 PROCEDURE — 77030010785: Performed by: ORTHOPAEDIC SURGERY

## 2022-07-22 PROCEDURE — 74011000250 HC RX REV CODE- 250: Performed by: ORTHOPAEDIC SURGERY

## 2022-07-22 PROCEDURE — 36415 COLL VENOUS BLD VENIPUNCTURE: CPT

## 2022-07-22 PROCEDURE — 77030031139 HC SUT VCRL2 J&J -A: Performed by: ORTHOPAEDIC SURGERY

## 2022-07-22 PROCEDURE — 74011250636 HC RX REV CODE- 250/636: Performed by: ORTHOPAEDIC SURGERY

## 2022-07-22 PROCEDURE — 76210000006 HC OR PH I REC 0.5 TO 1 HR: Performed by: ORTHOPAEDIC SURGERY

## 2022-07-22 PROCEDURE — 74011250636 HC RX REV CODE- 250/636: Performed by: EMERGENCY MEDICINE

## 2022-07-22 PROCEDURE — 74011000250 HC RX REV CODE- 250: Performed by: INTERNAL MEDICINE

## 2022-07-22 PROCEDURE — 77030038692 HC WND DEB SYS IRMX -B: Performed by: ORTHOPAEDIC SURGERY

## 2022-07-22 PROCEDURE — 84100 ASSAY OF PHOSPHORUS: CPT

## 2022-07-22 PROCEDURE — 74011000258 HC RX REV CODE- 258: Performed by: STUDENT IN AN ORGANIZED HEALTH CARE EDUCATION/TRAINING PROGRAM

## 2022-07-22 PROCEDURE — 77030002966 HC SUT PDS J&J -A: Performed by: ORTHOPAEDIC SURGERY

## 2022-07-22 PROCEDURE — 77030033067 HC SUT PDO STRATFX SPIR J&J -B: Performed by: ORTHOPAEDIC SURGERY

## 2022-07-22 PROCEDURE — 74011250636 HC RX REV CODE- 250/636: Performed by: NURSE ANESTHETIST, CERTIFIED REGISTERED

## 2022-07-22 PROCEDURE — 77030008684 HC TU ET CUF COVD -B: Performed by: ANESTHESIOLOGY

## 2022-07-22 PROCEDURE — 3E1U48Z IRRIGATION OF JOINTS USING IRRIGATING SUBSTANCE, PERCUTANEOUS ENDOSCOPIC APPROACH: ICD-10-PCS | Performed by: ORTHOPAEDIC SURGERY

## 2022-07-22 PROCEDURE — 74011250636 HC RX REV CODE- 250/636: Performed by: HOSPITALIST

## 2022-07-22 PROCEDURE — 77030026438 HC STYL ET INTUB CARD -A: Performed by: ANESTHESIOLOGY

## 2022-07-22 PROCEDURE — 74011250636 HC RX REV CODE- 250/636: Performed by: STUDENT IN AN ORGANIZED HEALTH CARE EDUCATION/TRAINING PROGRAM

## 2022-07-22 PROCEDURE — 74011250637 HC RX REV CODE- 250/637: Performed by: ORTHOPAEDIC SURGERY

## 2022-07-22 PROCEDURE — 76060000032 HC ANESTHESIA 0.5 TO 1 HR: Performed by: ORTHOPAEDIC SURGERY

## 2022-07-22 PROCEDURE — 65270000029 HC RM PRIVATE

## 2022-07-22 PROCEDURE — 76010000138 HC OR TIME 0.5 TO 1 HR: Performed by: ORTHOPAEDIC SURGERY

## 2022-07-22 DEVICE — 3.5MM X 14MM LOCKING HEXALOBE SCREW
Type: IMPLANTABLE DEVICE | Status: FUNCTIONAL
Brand: ACUMED

## 2022-07-22 RX ORDER — SODIUM CHLORIDE, SODIUM LACTATE, POTASSIUM CHLORIDE, CALCIUM CHLORIDE 600; 310; 30; 20 MG/100ML; MG/100ML; MG/100ML; MG/100ML
25 INJECTION, SOLUTION INTRAVENOUS CONTINUOUS
Status: DISCONTINUED | OUTPATIENT
Start: 2022-07-22 | End: 2022-07-22 | Stop reason: HOSPADM

## 2022-07-22 RX ORDER — MORPHINE SULFATE 2 MG/ML
2 INJECTION, SOLUTION INTRAMUSCULAR; INTRAVENOUS
Status: DISCONTINUED | OUTPATIENT
Start: 2022-07-22 | End: 2022-07-22 | Stop reason: HOSPADM

## 2022-07-22 RX ORDER — VANCOMYCIN HYDROCHLORIDE 1 G/20ML
INJECTION, POWDER, LYOPHILIZED, FOR SOLUTION INTRAVENOUS AS NEEDED
Status: DISCONTINUED | OUTPATIENT
Start: 2022-07-22 | End: 2022-07-22 | Stop reason: HOSPADM

## 2022-07-22 RX ORDER — HYDROMORPHONE HYDROCHLORIDE 1 MG/ML
.2-.5 INJECTION, SOLUTION INTRAMUSCULAR; INTRAVENOUS; SUBCUTANEOUS
Status: DISCONTINUED | OUTPATIENT
Start: 2022-07-22 | End: 2022-07-22 | Stop reason: HOSPADM

## 2022-07-22 RX ORDER — SODIUM CHLORIDE 0.9 % (FLUSH) 0.9 %
5-40 SYRINGE (ML) INJECTION AS NEEDED
Status: DISCONTINUED | OUTPATIENT
Start: 2022-07-22 | End: 2022-07-22 | Stop reason: HOSPADM

## 2022-07-22 RX ORDER — SODIUM CHLORIDE 0.9 % (FLUSH) 0.9 %
5-40 SYRINGE (ML) INJECTION EVERY 8 HOURS
Status: DISCONTINUED | OUTPATIENT
Start: 2022-07-22 | End: 2022-07-22 | Stop reason: HOSPADM

## 2022-07-22 RX ORDER — LIDOCAINE HYDROCHLORIDE 20 MG/ML
INJECTION, SOLUTION EPIDURAL; INFILTRATION; INTRACAUDAL; PERINEURAL AS NEEDED
Status: DISCONTINUED | OUTPATIENT
Start: 2022-07-22 | End: 2022-07-22 | Stop reason: HOSPADM

## 2022-07-22 RX ORDER — FENTANYL CITRATE 50 UG/ML
25 INJECTION, SOLUTION INTRAMUSCULAR; INTRAVENOUS
Status: DISCONTINUED | OUTPATIENT
Start: 2022-07-22 | End: 2022-07-22 | Stop reason: HOSPADM

## 2022-07-22 RX ORDER — HYDROMORPHONE HYDROCHLORIDE 2 MG/ML
INJECTION, SOLUTION INTRAMUSCULAR; INTRAVENOUS; SUBCUTANEOUS AS NEEDED
Status: DISCONTINUED | OUTPATIENT
Start: 2022-07-22 | End: 2022-07-22 | Stop reason: HOSPADM

## 2022-07-22 RX ORDER — ONDANSETRON 2 MG/ML
4 INJECTION INTRAMUSCULAR; INTRAVENOUS AS NEEDED
Status: DISCONTINUED | OUTPATIENT
Start: 2022-07-22 | End: 2022-07-22 | Stop reason: HOSPADM

## 2022-07-22 RX ORDER — PROPOFOL 10 MG/ML
INJECTION, EMULSION INTRAVENOUS AS NEEDED
Status: DISCONTINUED | OUTPATIENT
Start: 2022-07-22 | End: 2022-07-22 | Stop reason: HOSPADM

## 2022-07-22 RX ORDER — ESMOLOL HYDROCHLORIDE 10 MG/ML
INJECTION INTRAVENOUS AS NEEDED
Status: DISCONTINUED | OUTPATIENT
Start: 2022-07-22 | End: 2022-07-22 | Stop reason: HOSPADM

## 2022-07-22 RX ORDER — LIDOCAINE HYDROCHLORIDE 10 MG/ML
0.1 INJECTION, SOLUTION EPIDURAL; INFILTRATION; INTRACAUDAL; PERINEURAL AS NEEDED
Status: DISCONTINUED | OUTPATIENT
Start: 2022-07-22 | End: 2022-07-22 | Stop reason: HOSPADM

## 2022-07-22 RX ORDER — SUCCINYLCHOLINE CHLORIDE 20 MG/ML
INJECTION INTRAMUSCULAR; INTRAVENOUS AS NEEDED
Status: DISCONTINUED | OUTPATIENT
Start: 2022-07-22 | End: 2022-07-22 | Stop reason: HOSPADM

## 2022-07-22 RX ORDER — DIPHENHYDRAMINE HYDROCHLORIDE 50 MG/ML
12.5 INJECTION, SOLUTION INTRAMUSCULAR; INTRAVENOUS AS NEEDED
Status: DISCONTINUED | OUTPATIENT
Start: 2022-07-22 | End: 2022-07-22 | Stop reason: HOSPADM

## 2022-07-22 RX ORDER — FENTANYL CITRATE 50 UG/ML
INJECTION, SOLUTION INTRAMUSCULAR; INTRAVENOUS AS NEEDED
Status: DISCONTINUED | OUTPATIENT
Start: 2022-07-22 | End: 2022-07-22 | Stop reason: HOSPADM

## 2022-07-22 RX ADMIN — PROPOFOL 50 MG: 10 INJECTION, EMULSION INTRAVENOUS at 15:39

## 2022-07-22 RX ADMIN — SODIUM CHLORIDE 100 MG: 9 INJECTION, SOLUTION INTRAVENOUS at 14:41

## 2022-07-22 RX ADMIN — SODIUM CHLORIDE, POTASSIUM CHLORIDE, SODIUM LACTATE AND CALCIUM CHLORIDE 25 ML/HR: 600; 310; 30; 20 INJECTION, SOLUTION INTRAVENOUS at 14:40

## 2022-07-22 RX ADMIN — AMPICILLIN SODIUM 2 G: 2 INJECTION, POWDER, FOR SOLUTION INTRAVENOUS at 16:54

## 2022-07-22 RX ADMIN — FENTANYL CITRATE 50 MCG: 50 INJECTION, SOLUTION INTRAMUSCULAR; INTRAVENOUS at 15:39

## 2022-07-22 RX ADMIN — LIDOCAINE HYDROCHLORIDE 40 MG: 20 INJECTION, SOLUTION EPIDURAL; INFILTRATION; INTRACAUDAL; PERINEURAL at 15:24

## 2022-07-22 RX ADMIN — MORPHINE SULFATE 2 MG: 2 INJECTION, SOLUTION INTRAMUSCULAR; INTRAVENOUS at 10:46

## 2022-07-22 RX ADMIN — AMPICILLIN SODIUM 2 G: 2 INJECTION, POWDER, FOR SOLUTION INTRAVENOUS at 03:51

## 2022-07-22 RX ADMIN — SUCCINYLCHOLINE CHLORIDE 180 MG: 20 INJECTION, SOLUTION INTRAMUSCULAR; INTRAVENOUS at 15:24

## 2022-07-22 RX ADMIN — FAMOTIDINE: 10 INJECTION, SOLUTION INTRAVENOUS at 18:08

## 2022-07-22 RX ADMIN — ESMOLOL HYDROCHLORIDE 20 MG: 10 INJECTION, SOLUTION INTRAVENOUS at 15:42

## 2022-07-22 RX ADMIN — FENTANYL CITRATE 25 MCG: 50 INJECTION, SOLUTION INTRAMUSCULAR; INTRAVENOUS at 16:15

## 2022-07-22 RX ADMIN — FENTANYL CITRATE 50 MCG: 50 INJECTION, SOLUTION INTRAMUSCULAR; INTRAVENOUS at 15:31

## 2022-07-22 RX ADMIN — FENTANYL CITRATE 25 MCG: 50 INJECTION, SOLUTION INTRAMUSCULAR; INTRAVENOUS at 16:20

## 2022-07-22 RX ADMIN — SODIUM CHLORIDE 100 ML/HR: 9 INJECTION, SOLUTION INTRAVENOUS at 10:46

## 2022-07-22 RX ADMIN — PROPOFOL 150 MG: 10 INJECTION, EMULSION INTRAVENOUS at 15:24

## 2022-07-22 RX ADMIN — FENTANYL CITRATE 25 MCG: 50 INJECTION, SOLUTION INTRAMUSCULAR; INTRAVENOUS at 16:24

## 2022-07-22 RX ADMIN — MORPHINE SULFATE 2 MG: 2 INJECTION, SOLUTION INTRAMUSCULAR; INTRAVENOUS at 22:18

## 2022-07-22 RX ADMIN — Medication 3 AMPULE: at 15:00

## 2022-07-22 RX ADMIN — HYDROMORPHONE HYDROCHLORIDE 1 MG: 2 INJECTION, SOLUTION INTRAMUSCULAR; INTRAVENOUS; SUBCUTANEOUS at 16:05

## 2022-07-22 RX ADMIN — MORPHINE SULFATE 2 MG: 2 INJECTION, SOLUTION INTRAMUSCULAR; INTRAVENOUS at 02:26

## 2022-07-22 RX ADMIN — AMPICILLIN SODIUM 2 G: 2 INJECTION, POWDER, FOR SOLUTION INTRAVENOUS at 09:08

## 2022-07-22 RX ADMIN — AMPICILLIN SODIUM 2 G: 2 INJECTION, POWDER, FOR SOLUTION INTRAVENOUS at 21:45

## 2022-07-22 RX ADMIN — FENTANYL CITRATE 25 MCG: 50 INJECTION, SOLUTION INTRAMUSCULAR; INTRAVENOUS at 16:28

## 2022-07-22 RX ADMIN — MORPHINE SULFATE 2 MG: 2 INJECTION, SOLUTION INTRAMUSCULAR; INTRAVENOUS at 06:46

## 2022-07-22 RX ADMIN — SODIUM CHLORIDE, PRESERVATIVE FREE 10 ML: 5 INJECTION INTRAVENOUS at 15:00

## 2022-07-22 RX ADMIN — MORPHINE SULFATE 2 MG: 2 INJECTION, SOLUTION INTRAMUSCULAR; INTRAVENOUS at 18:11

## 2022-07-22 NOTE — PROGRESS NOTES
Pain managed  Npo for repeat I and D later today. Knee dressing intact.   Cx from yesterday no growth so far

## 2022-07-22 NOTE — ANESTHESIA POSTPROCEDURE EVALUATION
Procedure(s):  RIGHT KNEE INCISION AND DRAINAGE. general    Anesthesia Post Evaluation      Multimodal analgesia: multimodal analgesia used between 6 hours prior to anesthesia start to PACU discharge  Patient location during evaluation: bedside  Patient participation: complete - patient participated  Level of consciousness: awake  Pain management: adequate  Airway patency: patent  Anesthetic complications: no  Cardiovascular status: acceptable  Respiratory status: acceptable  Hydration status: acceptable  Post anesthesia nausea and vomiting:  controlled  Final Post Anesthesia Temperature Assessment:  Normothermia (36.0-37.5 degrees C)      INITIAL Post-op Vital signs:   Vitals Value Taken Time   /95 07/22/22 1634   Temp 36.9 °C (98.4 °F) 07/22/22 1614   Pulse 106 07/22/22 1636   Resp 16 07/22/22 1636   SpO2 97 % 07/22/22 1636   Vitals shown include unvalidated device data.

## 2022-07-22 NOTE — ROUTINE PROCESS
Sbar in note pt to holding area identifies self and procedure for today. Has been npo except for meds. Fentanyl patch to right shoulder area. Pt has double lumen rupinder catheter to right upper chest  peg tube capped to abdomen and colostomy  with pink stoma   small amount brown drainage in bag. Nani Parnell antibiotic regieme continued here in the holding area. Pt voided   220cc yellow urine in urinal  .   Awaitiang surgery. Mepilex will be sent into or to be applied.

## 2022-07-22 NOTE — PERIOP NOTES
Mortimer Saha  1980  [id number]    Situation:  Verbal report given from: DEBBIE Mcleod RN and Koirn Love CRNA  Procedure: Procedure(s):  RIGHT KNEE INCISION AND DRAINAGE    Background:    Preoperative diagnosis: RIGHT SEPTIC KNEE    Postoperative diagnosis: * No post-op diagnosis entered *    :  Dr. Seth Hines    Assistant(s): Circ-1: Anh Mendoza  Scrub Tech-1: Dean Wu    Specimens: * No specimens in log *    Assessment:  Intra-procedure medications         Anesthesia gave intra-procedure sedation and medications, see anesthesia flow sheet     Intravenous fluids: LR@ KVO     Vital signs stable
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
## 2022-07-22 NOTE — PERIOP NOTES
Patient received to PACU, VSS. Dressing to right knee intact, drain intact. Patient rates pain 10/10. CRNA at bedside administering pain med. Per OR nurse, antibiotic dose for surgery was never received from pharmacy. Med not available in pacu at this time. Charge nurse notified - will inform floor nurse receiving patient. 1617: Patient continues to rate pain 10/10. Fentanyl 100mcg IV given in divided doses. 1628: Patient now rates pain 7/10. Patient drifts off to sleep during conversation. Dressing intact to knee. 1640: Patient rates pain 5/10. VSS. Report called by Winnie 146 at this time. Patient transported to inpatient room.

## 2022-07-22 NOTE — PERIOP NOTES
TRANSFER - IN REPORT:    Verbal report received from Shekhar Pizarro RN on Esequiel Stafford  being received from 2139 for ordered procedure      Report consisted of patients Situation, Background, Assessment and   Recommendations(SBAR). Information from the following report(s) SBAR, Kardex, Intake/Output, MAR, and Recent Results was reviewed with the receiving nurse. Opportunity for questions and clarification was provided. Assessment completed upon patients arrival to unit and care assumed.

## 2022-07-22 NOTE — PROGRESS NOTES
Attempted to see pt for PT reassessment; however, pt is scheduled for I&D today at 2:30pm.  Will hold PT for today and reassess at a later date when pt is medically stable.

## 2022-07-22 NOTE — PROGRESS NOTES
Transition of Care Plan:     RUR: 26%  Disposition: Home with Home Health ([address] [place] [address] ) [facility] for TPN and ABX therapy. Order to resume TPN sent via [employer] to [facility]. They do not mix TPN over the weekend. Need a resume order for SN . [facility] also needs order for IV antibiotic . Follow up appointments:PCP  DME needed: None  Transportation at Discharge: Pt's mother will transport at d/c.   [employer] or means to access home: Pt has access       IM Medicare Letter: Pt has Medicaid  Is patient a BCPI-A Bundle:  N/A                      Caregiver Contact: Pal Mcgrath- Mother [phone]  Discharge Caregiver contacted prior to discharge? CM will notify caregiver at d/c. Care Conference needed?:     No     Reason for Readmission:    Arthritis Septic Knee . Knee more painful and swollen this am. Aspiration done and cultures pending. Ortho re consulted. ID following. [facility]  spoke with Dr Melisa Estrada and patient is not expected to discharge home until first part of next week. CM spoke with patient at bedside and he is in agreement with discharging home when medically stable .      Maria Victoria Leone RN ACM [phone]

## 2022-07-23 PROCEDURE — 65270000029 HC RM PRIVATE

## 2022-07-23 PROCEDURE — 74011250636 HC RX REV CODE- 250/636: Performed by: STUDENT IN AN ORGANIZED HEALTH CARE EDUCATION/TRAINING PROGRAM

## 2022-07-23 PROCEDURE — 74011000250 HC RX REV CODE- 250: Performed by: INTERNAL MEDICINE

## 2022-07-23 PROCEDURE — 74011250636 HC RX REV CODE- 250/636: Performed by: INTERNAL MEDICINE

## 2022-07-23 PROCEDURE — 74011250636 HC RX REV CODE- 250/636: Performed by: EMERGENCY MEDICINE

## 2022-07-23 PROCEDURE — 74011250636 HC RX REV CODE- 250/636: Performed by: HOSPITALIST

## 2022-07-23 PROCEDURE — 74011000258 HC RX REV CODE- 258: Performed by: STUDENT IN AN ORGANIZED HEALTH CARE EDUCATION/TRAINING PROGRAM

## 2022-07-23 PROCEDURE — 74011000258 HC RX REV CODE- 258: Performed by: EMERGENCY MEDICINE

## 2022-07-23 PROCEDURE — 74011000258 HC RX REV CODE- 258: Performed by: INTERNAL MEDICINE

## 2022-07-23 RX ORDER — MORPHINE SULFATE 2 MG/ML
4 INJECTION, SOLUTION INTRAMUSCULAR; INTRAVENOUS ONCE
Status: COMPLETED | OUTPATIENT
Start: 2022-07-23 | End: 2022-07-23

## 2022-07-23 RX ADMIN — AMPICILLIN SODIUM 2 G: 2 INJECTION, POWDER, FOR SOLUTION INTRAVENOUS at 21:05

## 2022-07-23 RX ADMIN — SODIUM CHLORIDE 100 ML/HR: 9 INJECTION, SOLUTION INTRAVENOUS at 00:50

## 2022-07-23 RX ADMIN — AMPICILLIN SODIUM 2 G: 2 INJECTION, POWDER, FOR SOLUTION INTRAVENOUS at 16:10

## 2022-07-23 RX ADMIN — AMPICILLIN SODIUM 2 G: 2 INJECTION, POWDER, FOR SOLUTION INTRAVENOUS at 03:51

## 2022-07-23 RX ADMIN — MORPHINE SULFATE 4 MG: 2 INJECTION, SOLUTION INTRAMUSCULAR; INTRAVENOUS at 12:04

## 2022-07-23 RX ADMIN — SODIUM CHLORIDE 100 MG: 9 INJECTION, SOLUTION INTRAVENOUS at 13:36

## 2022-07-23 RX ADMIN — MORPHINE SULFATE 2 MG: 2 INJECTION, SOLUTION INTRAMUSCULAR; INTRAVENOUS at 09:05

## 2022-07-23 RX ADMIN — MORPHINE SULFATE 2 MG: 2 INJECTION, SOLUTION INTRAMUSCULAR; INTRAVENOUS at 16:10

## 2022-07-23 RX ADMIN — MORPHINE SULFATE 2 MG: 2 INJECTION, SOLUTION INTRAMUSCULAR; INTRAVENOUS at 21:05

## 2022-07-23 RX ADMIN — MORPHINE SULFATE 2 MG: 2 INJECTION, SOLUTION INTRAMUSCULAR; INTRAVENOUS at 04:07

## 2022-07-23 RX ADMIN — AMPICILLIN SODIUM 2 G: 2 INJECTION, POWDER, FOR SOLUTION INTRAVENOUS at 09:05

## 2022-07-23 RX ADMIN — SODIUM ACETATE: 164 INJECTION, SOLUTION, CONCENTRATE INTRAVENOUS at 17:28

## 2022-07-23 RX ADMIN — SODIUM CHLORIDE, PRESERVATIVE FREE 10 ML: 5 INJECTION INTRAVENOUS at 06:00

## 2022-07-23 RX ADMIN — SODIUM CHLORIDE, PRESERVATIVE FREE 10 ML: 5 INJECTION INTRAVENOUS at 13:38

## 2022-07-23 RX ADMIN — SODIUM CHLORIDE 100 ML/HR: 9 INJECTION, SOLUTION INTRAVENOUS at 21:52

## 2022-07-23 RX ADMIN — SODIUM CHLORIDE 100 ML/HR: 9 INJECTION, SOLUTION INTRAVENOUS at 12:07

## 2022-07-23 NOTE — PROGRESS NOTES
End of Shift Note    Bedside shift change report given to Kassie Eagle (oncoming nurse) by Yesenia Mixon RN (offgoing nurse).   Report included the following information SBAR, Kardex, Intake/Output, MAR, Accordion, Recent Results, and Med Rec Status    Shift worked:  7PM-7AM     Shift summary and any significant changes:    Pt medicated for pain X2  ( see MAR)     Concerns for physician to address:       Zone phone for oncoming shift:            Yesenia Mixon RN
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
## 2022-07-24 LAB
BACTERIA SPEC CULT: NORMAL
GRAM STN SPEC: NORMAL
GRAM STN SPEC: NORMAL
SERVICE CMNT-IMP: NORMAL

## 2022-07-24 PROCEDURE — 74011250636 HC RX REV CODE- 250/636: Performed by: ORTHOPAEDIC SURGERY

## 2022-07-24 PROCEDURE — 74011250636 HC RX REV CODE- 250/636: Performed by: STUDENT IN AN ORGANIZED HEALTH CARE EDUCATION/TRAINING PROGRAM

## 2022-07-24 PROCEDURE — 27310 EXPLORATION OF KNEE JOINT: CPT | Performed by: ORTHOPAEDIC SURGERY

## 2022-07-24 PROCEDURE — 74011250636 HC RX REV CODE- 250/636: Performed by: INTERNAL MEDICINE

## 2022-07-24 PROCEDURE — 65270000029 HC RM PRIVATE

## 2022-07-24 PROCEDURE — 74011000250 HC RX REV CODE- 250: Performed by: INTERNAL MEDICINE

## 2022-07-24 PROCEDURE — 74011000258 HC RX REV CODE- 258: Performed by: EMERGENCY MEDICINE

## 2022-07-24 PROCEDURE — 74011250636 HC RX REV CODE- 250/636: Performed by: EMERGENCY MEDICINE

## 2022-07-24 PROCEDURE — 74011000258 HC RX REV CODE- 258: Performed by: INTERNAL MEDICINE

## 2022-07-24 PROCEDURE — 74011000258 HC RX REV CODE- 258: Performed by: STUDENT IN AN ORGANIZED HEALTH CARE EDUCATION/TRAINING PROGRAM

## 2022-07-24 PROCEDURE — 74011250636 HC RX REV CODE- 250/636: Performed by: HOSPITALIST

## 2022-07-24 RX ORDER — MORPHINE SULFATE 4 MG/ML
2 INJECTION, SOLUTION INTRAMUSCULAR; INTRAVENOUS
Status: DISCONTINUED | OUTPATIENT
Start: 2022-07-24 | End: 2022-07-30

## 2022-07-24 RX ADMIN — MORPHINE SULFATE 2 MG: 4 INJECTION INTRAVENOUS at 22:31

## 2022-07-24 RX ADMIN — AMPICILLIN SODIUM 2 G: 2 INJECTION, POWDER, FOR SOLUTION INTRAVENOUS at 23:32

## 2022-07-24 RX ADMIN — AMPICILLIN SODIUM 2 G: 2 INJECTION, POWDER, FOR SOLUTION INTRAVENOUS at 09:06

## 2022-07-24 RX ADMIN — SODIUM ACETATE: 164 INJECTION, SOLUTION, CONCENTRATE INTRAVENOUS at 18:05

## 2022-07-24 RX ADMIN — MORPHINE SULFATE 2 MG: 2 INJECTION, SOLUTION INTRAMUSCULAR; INTRAVENOUS at 13:39

## 2022-07-24 RX ADMIN — SODIUM CHLORIDE 100 ML/HR: 9 INJECTION, SOLUTION INTRAVENOUS at 18:09

## 2022-07-24 RX ADMIN — AMPICILLIN SODIUM 2 G: 2 INJECTION, POWDER, FOR SOLUTION INTRAVENOUS at 03:42

## 2022-07-24 RX ADMIN — ENOXAPARIN SODIUM 60 MG: 100 INJECTION SUBCUTANEOUS at 18:13

## 2022-07-24 RX ADMIN — MORPHINE SULFATE 2 MG: 2 INJECTION, SOLUTION INTRAMUSCULAR; INTRAVENOUS at 09:06

## 2022-07-24 RX ADMIN — SODIUM CHLORIDE 100 ML/HR: 9 INJECTION, SOLUTION INTRAVENOUS at 05:49

## 2022-07-24 RX ADMIN — AMPICILLIN SODIUM 2 G: 2 INJECTION, POWDER, FOR SOLUTION INTRAVENOUS at 18:15

## 2022-07-24 RX ADMIN — MORPHINE SULFATE 2 MG: 2 INJECTION, SOLUTION INTRAMUSCULAR; INTRAVENOUS at 03:52

## 2022-07-24 RX ADMIN — MORPHINE SULFATE 2 MG: 2 INJECTION, SOLUTION INTRAMUSCULAR; INTRAVENOUS at 18:15

## 2022-07-24 RX ADMIN — SODIUM CHLORIDE 100 MG: 9 INJECTION, SOLUTION INTRAVENOUS at 13:39

## 2022-07-24 NOTE — PROGRESS NOTES
ORTHO - Progress Note  Post Op day: 2 Days Post-Op    Arnel Childress     [id number]  male    43 y.o.    1980    Admit date:7/15/2022  Date of Surgery:2022   Procedures:Procedure(s):RIGHT KNEE INCISION AND DRAINAGE  Surgeon:Surgeon(s) and Role:   * Paulino Spangler, DO - Primary        SUBJECTIVE:     Arnel Childress is a 43 y.o. male resting in the bed. Patient has complaints of appropriate post-op pain, ---utilizing IV morphine. Denies F/C, nausea, vomiting, dizziness, lightheadedness, chest pain, or shortness of breath. OBJECTIVE:       Physical Exam:  General: alert, cooperative, no distress. Gastrointestinal:  non-distended . Cardiovascular: equal pulses in the lower extremities,  Brisk cap refill in all distal extremities     Respiratory: No respiratory distress   Neurological:Neurovascular exam within normal limits. Senstion intact: LE bilat. Motor: + DF/PF/EHL. Musculoskeletal: min effusion. Isaías's sign negative in bilateral lower extremities. Calves soft, supple, non-tender upon palpation or with passive stretch. Dressing/Wound:  Drain intact . CDI    Vital Signs:       Patient Vitals for the past 8 hrs:   BP Temp Pulse Resp SpO2   22 1620 124/79 98.7 °F (37.1 °C) (!) 102 16 99 %                                          Temp (24hrs), Av.5 °F (36.9 °C), Min:98.1 °F (36.7 °C), Max:98.7 °F (37.1 °C)    Date 22 0700 - 22 0659   Shift 5084-9180 1349-3215 8249-7223 24 Hour Total   INTAKE   Shift Total(mL/kg)       OUTPUT   Urine(mL/kg/hr) 2000(3.9)   2000   Drains 150   150   Shift Total(mL/kg) 4124(48.1)   2150(33.6)   Weight (kg) 63.9 63.9 63.9 63.9     Labs:      No results for input(s): HCT, HGB, INR, HCTEXT, HGBEXT, INREXT in the last 72 hours.   PT/OT:        Gait  Base of Support: Widened  Speed/Kay: Pace decreased (<100 feet/min)  Step Length: Right shortened  Swing Pattern: Right asymmetrical  Stance: Right decreased  Gait Abnormalities: Antalgic, Decreased step clearance  Ambulation - Level of Assistance: Stand-by assistance  Distance (ft): 50 Feet (ft) (25ft x 2 ; seated rest between trials)  Assistive Device: Gait belt, Walker, rolling  Interventions: Verbal cues, Tactile cues, Safety awareness training  Interventions: Verbal cues, Tactile cues, Safety awareness training  ASSESSMENT / PLAN:   Active Problems:    Arthritis, septic, knee ([facility] [address] (7/15/2022)       -  Pt seen yesterday-  no documentation.  -  Pt concerned that the drain was pulled to early after the last washout.  -  No growth from knee cultures  -  ABX per ID  -  Continue PT/OT WBAT  -  DVT prophylaxis- SCD w/  OK to restart lovenox  -  DC planning - TBD    Signed By: Ilene Rashid PA-C
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
## 2022-07-24 NOTE — PROGRESS NOTES
Problem: Falls - Risk of  Goal: *Absence of Falls  Description: Document Darren Perkins Fall Risk and appropriate interventions in the flowsheet. Outcome: Progressing Towards Goal  Note: Fall Risk Interventions:  Mobility Interventions: Communicate number of staff needed for ambulation/transfer         Medication Interventions: Patient to call before getting OOB, Teach patient to arise slowly    Elimination Interventions: Call light in reach, Patient to call for help with toileting needs    History of Falls Interventions: Door open when patient unattended         Problem: Patient Education: Go to Patient Education Activity  Goal: Patient/Family Education  Outcome: Progressing Towards Goal     Problem: Patient Education: Go to Patient Education Activity  Goal: Patient/Family Education  Outcome: Progressing Towards Goal     Problem: Pressure Injury - Risk of  Goal: *Prevention of pressure injury  Description: Document Russell Scale and appropriate interventions in the flowsheet.   Outcome: Progressing Towards Goal  Note: Pressure Injury Interventions:  Sensory Interventions: Assess changes in LOC    Moisture Interventions: Minimize layers    Activity Interventions: Increase time out of bed, PT/OT evaluation    Mobility Interventions: HOB 30 degrees or less, PT/OT evaluation    Nutrition Interventions: Document food/fluid/supplement intake                     Problem: Patient Education: Go to Patient Education Activity  Goal: Patient/Family Education  Outcome: Progressing Towards Goal

## 2022-07-24 NOTE — PROGRESS NOTES
End of Shift Note    Bedside shift change report given to [facility] F[facility] (oncoming nurse) by Pasha Rodriguez RN (offgoing nurse).   Report included the following information SBAR, Kardex, Intake/Output, MAR, Accordion, Recent Results, and Med Rec Status    Shift worked:  7pm-7am     Shift summary and any significant changes:     Pt received  morphine X2 for  Rt knee pain     Concerns for physician to address:       Zone phone for oncoming shift:              Pasha Rodriguez RN

## 2022-07-24 NOTE — OP NOTES
Date of Procedure: 7/22/24  Preoperative Diagnosis: Septic right knee  Postoperative Diagnosis: Same  Procedure Performed: Arthrotomy with excisional debridement and irrigation, right knee  Surgeon: Mellisa Vallejo DO  Assistant: None  Anesthesia: General  Estimated Blood Loss: 5 cc  Specimens: Cultures, aerobic and anaerobic  Drains: Medium Hemovac  Complications: None  Implants: None      Operative Indications: This is a 43 y.o. male who had persistent signs and symptoms of septic arthrosis, large effusion and positive cultures on repeat aspiration, indicating need for further irrigation and excisional debridement. We did discuss the risks of surgery which include but are not limited to infection, nerve or blood vessel damage, failure of fixation, failure of any possible implant, need for reoperation, postoperative pain and swelling, intra-or postoperative fracture, postoperative mechanical failure, need for reoperation, implant failure, death, disability, organ dysfunction, wound healing issues, DVT, PE, and the need for further procedures. The patient did freely state their understanding and satisfaction with our discussion. Appropriate medical clearances have been obtained. Description of Procedure:  After the correct site and side were identified by myself in the holding area, the patient was transported to the surgical suite, where, after induction of general anesthesia, the patient was positioned in the supine position. The right knee was then prepped and draped in the usual sterile orthopedic fashion. After appropriate timeout, and confirmation of 2 g of Ancef had been infused prior to any incision, previous incision was utilized, sharp dissection was carried down to fascia. Fascia was incised. Arthrotomy was performed with use of scalpel. Extrusion of moderately inflammatory appearing joint fluid returned. I took cultures.   I then inserted a pulsatile lavage into the joint itself after digital manipulation and blunt dissection. 3 L of normal saline mixed with Betadine were infused throughout the joint. I then utilized a rongeur to the level of bone for synovectomy. Once this was performed, I utilized another 3 L of normal saline mixed with Betadine for lavage. I then placed a medium Hemovac drain exiting laterally. Arthrotomy was closed with use of 0 PDS. 2-0 Monocryl and staples were applied. Sterile dressing was applied. Patient was awoken and taken to PACU in stable condition without complication. Postoperative plan: Patient will be weightbearing as tolerated. Infectious diseases been consulted. He will continue his drain until there is no drainage. Should he have return of his symptoms, plan will be to have him proceed with antibiotic spacer placement. Should he succeed with this particular washout, follow-up in 2 weeks for staple removal with no x-rays.

## 2022-07-25 LAB
ANION GAP SERPL CALC-SCNC: 4 MMOL/L (ref 5–15)
BUN SERPL-MCNC: 12 MG/DL (ref 6–20)
BUN/CREAT SERPL: 20 (ref 12–20)
CALCIUM SERPL-MCNC: 9.2 MG/DL (ref 8.5–10.1)
CHLORIDE SERPL-SCNC: 104 MMOL/L (ref 97–108)
CO2 SERPL-SCNC: 29 MMOL/L (ref 21–32)
CREAT SERPL-MCNC: 0.61 MG/DL (ref 0.7–1.3)
GLUCOSE SERPL-MCNC: 110 MG/DL (ref 65–100)
MAGNESIUM SERPL-MCNC: 2.1 MG/DL (ref 1.6–2.4)
PHOSPHATE SERPL-MCNC: 4.4 MG/DL (ref 2.6–4.7)
POTASSIUM SERPL-SCNC: 4.1 MMOL/L (ref 3.5–5.1)
SODIUM SERPL-SCNC: 137 MMOL/L (ref 136–145)

## 2022-07-25 PROCEDURE — 65270000029 HC RM PRIVATE

## 2022-07-25 PROCEDURE — 84100 ASSAY OF PHOSPHORUS: CPT

## 2022-07-25 PROCEDURE — 74011250637 HC RX REV CODE- 250/637: Performed by: HOSPITALIST

## 2022-07-25 PROCEDURE — 74011250636 HC RX REV CODE- 250/636: Performed by: STUDENT IN AN ORGANIZED HEALTH CARE EDUCATION/TRAINING PROGRAM

## 2022-07-25 PROCEDURE — 80048 BASIC METABOLIC PNL TOTAL CA: CPT

## 2022-07-25 PROCEDURE — 74011250636 HC RX REV CODE- 250/636: Performed by: INTERNAL MEDICINE

## 2022-07-25 PROCEDURE — 74011000250 HC RX REV CODE- 250: Performed by: INTERNAL MEDICINE

## 2022-07-25 PROCEDURE — 83735 ASSAY OF MAGNESIUM: CPT

## 2022-07-25 PROCEDURE — 74011000258 HC RX REV CODE- 258: Performed by: INTERNAL MEDICINE

## 2022-07-25 PROCEDURE — 74011000258 HC RX REV CODE- 258: Performed by: STUDENT IN AN ORGANIZED HEALTH CARE EDUCATION/TRAINING PROGRAM

## 2022-07-25 PROCEDURE — 74011250636 HC RX REV CODE- 250/636: Performed by: ORTHOPAEDIC SURGERY

## 2022-07-25 RX ADMIN — MORPHINE SULFATE 2 MG: 4 INJECTION INTRAVENOUS at 06:30

## 2022-07-25 RX ADMIN — AMPICILLIN SODIUM 2 G: 2 INJECTION, POWDER, FOR SOLUTION INTRAVENOUS at 05:01

## 2022-07-25 RX ADMIN — AMPICILLIN SODIUM 2 G: 2 INJECTION, POWDER, FOR SOLUTION INTRAVENOUS at 22:28

## 2022-07-25 RX ADMIN — MORPHINE SULFATE 2 MG: 4 INJECTION INTRAVENOUS at 23:29

## 2022-07-25 RX ADMIN — SODIUM CHLORIDE 100 ML/HR: 9 INJECTION, SOLUTION INTRAVENOUS at 06:32

## 2022-07-25 RX ADMIN — MORPHINE SULFATE 2 MG: 4 INJECTION INTRAVENOUS at 19:15

## 2022-07-25 RX ADMIN — MORPHINE SULFATE 2 MG: 4 INJECTION INTRAVENOUS at 02:35

## 2022-07-25 RX ADMIN — ENOXAPARIN SODIUM 60 MG: 100 INJECTION SUBCUTANEOUS at 06:29

## 2022-07-25 RX ADMIN — ENOXAPARIN SODIUM 60 MG: 100 INJECTION SUBCUTANEOUS at 18:14

## 2022-07-25 RX ADMIN — AMPICILLIN SODIUM 2 G: 2 INJECTION, POWDER, FOR SOLUTION INTRAVENOUS at 10:16

## 2022-07-25 RX ADMIN — MORPHINE SULFATE 2 MG: 4 INJECTION INTRAVENOUS at 12:20

## 2022-07-25 RX ADMIN — AMPICILLIN SODIUM 2 G: 2 INJECTION, POWDER, FOR SOLUTION INTRAVENOUS at 16:16

## 2022-07-25 RX ADMIN — SODIUM CHLORIDE 100 ML/HR: 9 INJECTION, SOLUTION INTRAVENOUS at 19:35

## 2022-07-25 RX ADMIN — FAMOTIDINE: 10 INJECTION, SOLUTION INTRAVENOUS at 18:14

## 2022-07-25 NOTE — PROGRESS NOTES
ID:      S/p repeat right knee washout 7/24/22. Per operative report cultures have been sent however these are not showing up in the micro section yet; will await updates in the EMR. Bedside aspiration from 7/21/22 no growth on cultures. Continue ampicillin. Stop anidulafungin. Abx plan pending clinical course.        Sandy Leavitt MD  Infectious Diseases

## 2022-07-25 NOTE — PROGRESS NOTES
Po repeat knee I and D. Appropriate PO pain.   Bloody drainage in drain  op 155 ML  Afeb    Patient Vitals for the past 12 hrs:   Temp Pulse BP SpO2   07/25/22 0831 98 °F (36.7 °C) 95 117/80 100 %     Dressing clean and dry    Few crystals from 7/21    WBAT  Keep drain until no more OP  Follow up 2 weeks for staple removal

## 2022-07-25 NOTE — PROGRESS NOTES
Comprehensive Nutrition Assessment    Type and Reason for Visit: Reassess    Nutrition Recommendations/Plan:   Add 500 mL 20% lipids 3x/week     Nutrition Assessment:    Chart reviewed; patient continues on TPN and a full liquid diet. Lytes WNL and BG under control. Discussed adding lipids with pharmacy. Nutrition Related Findings:    BM 7/23  Labs and medications reviewed     Current Nutrition Intake & Therapies:        ADULT DIET Full Liquid  DIET ONE TIME MESSAGE  TPN ADULT - CENTRAL    Anthropometric Measures:  Height: 5' 9\" (175.3 cm)  Ideal Body Weight (IBW): 160 lbs (73 kg)     Current Body Wt:  63.9 kg (140 lb 14 oz), 88 % IBW. Current BMI (kg/m2): 20.8                          BMI Category: Normal weight (BMI 18.5-24. 9)    Estimated Daily Nutrient Needs:  Energy Requirements Based On: Formula  Weight Used for Energy Requirements: Current  Energy (kcal/day): 2948-6656 kcal (BMR 1530 x 1.2AF +250kcal)  Weight Used for Protein Requirements: Current  Protein (g/day): 83g-96g (1.3-1.5 g/kg bw)  Method Used for Fluid Requirements: 1 ml/kcal  Fluid (ml/day): 1850 mL    Nutrition Diagnosis:   Altered GI function related to altered GI structure as evidenced by nutrition support-parenteral nutrition    Nutrition Interventions:   Food and/or Nutrient Delivery: Continue parenteral nutrition  Nutrition Education/Counseling: No recommendations at this time  Coordination of Nutrition Care: Continue to monitor while inpatient       Goals:  Previous Goal Met: Goal(s) achieved  Goals:  Tolerate nutrition support at goal rate, by next RD assessment       Nutrition Monitoring and Evaluation:   Behavioral-Environmental Outcomes: None identified  Food/Nutrient Intake Outcomes: Parenteral nutrition intake/tolerance  Physical Signs/Symptoms Outcomes: Biochemical data, Weight    Discharge Planning:    Parenteral nutrition    Beverly Jaffe RD  Contact: ext [phone]

## 2022-07-25 NOTE — PROGRESS NOTES
Bedside shift change report given to [address] Michelle Rubio (oncoming nurse) by Alina Alvarado RN (offgoing nurse). Report included the following information SBAR, Kardex, Intake/Output, and MAR.
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
## 2022-07-26 PROCEDURE — 74011000250 HC RX REV CODE- 250: Performed by: INTERNAL MEDICINE

## 2022-07-26 PROCEDURE — 74011250636 HC RX REV CODE- 250/636: Performed by: ORTHOPAEDIC SURGERY

## 2022-07-26 PROCEDURE — 74011250636 HC RX REV CODE- 250/636: Performed by: STUDENT IN AN ORGANIZED HEALTH CARE EDUCATION/TRAINING PROGRAM

## 2022-07-26 PROCEDURE — 74011250636 HC RX REV CODE- 250/636: Performed by: INTERNAL MEDICINE

## 2022-07-26 PROCEDURE — 65270000029 HC RM PRIVATE

## 2022-07-26 PROCEDURE — 36593 DECLOT VASCULAR DEVICE: CPT

## 2022-07-26 PROCEDURE — 74011000258 HC RX REV CODE- 258: Performed by: INTERNAL MEDICINE

## 2022-07-26 PROCEDURE — 99233 SBSQ HOSP IP/OBS HIGH 50: CPT | Performed by: STUDENT IN AN ORGANIZED HEALTH CARE EDUCATION/TRAINING PROGRAM

## 2022-07-26 PROCEDURE — 74011000258 HC RX REV CODE- 258: Performed by: STUDENT IN AN ORGANIZED HEALTH CARE EDUCATION/TRAINING PROGRAM

## 2022-07-26 PROCEDURE — 97116 GAIT TRAINING THERAPY: CPT | Performed by: PHYSICAL THERAPIST

## 2022-07-26 RX ADMIN — MORPHINE SULFATE 2 MG: 4 INJECTION INTRAVENOUS at 22:32

## 2022-07-26 RX ADMIN — SODIUM CHLORIDE 100 ML/HR: 9 INJECTION, SOLUTION INTRAVENOUS at 06:47

## 2022-07-26 RX ADMIN — AMPICILLIN SODIUM 2 G: 2 INJECTION, POWDER, FOR SOLUTION INTRAVENOUS at 10:14

## 2022-07-26 RX ADMIN — MORPHINE SULFATE 2 MG: 4 INJECTION INTRAVENOUS at 16:41

## 2022-07-26 RX ADMIN — SODIUM ACETATE: 164 INJECTION, SOLUTION, CONCENTRATE INTRAVENOUS at 18:27

## 2022-07-26 RX ADMIN — ENOXAPARIN SODIUM 60 MG: 100 INJECTION SUBCUTANEOUS at 06:42

## 2022-07-26 RX ADMIN — MORPHINE SULFATE 2 MG: 4 INJECTION INTRAVENOUS at 04:21

## 2022-07-26 RX ADMIN — AMPICILLIN SODIUM 2 G: 2 INJECTION, POWDER, FOR SOLUTION INTRAVENOUS at 22:33

## 2022-07-26 RX ADMIN — MORPHINE SULFATE 2 MG: 4 INJECTION INTRAVENOUS at 09:12

## 2022-07-26 RX ADMIN — AMPICILLIN SODIUM 2 G: 2 INJECTION, POWDER, FOR SOLUTION INTRAVENOUS at 04:21

## 2022-07-26 RX ADMIN — AMPICILLIN SODIUM 2 G: 2 INJECTION, POWDER, FOR SOLUTION INTRAVENOUS at 16:41
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
## 2022-07-26 NOTE — PROGRESS NOTES
ORTHO - Progress Note  Post Op day: 4 Days Post-Op    Brian Krueger     [id number]  male    43 y.o.    1980    Admit date:7/15/2022  Date of Surgery:2022   Procedures:Procedure(s):RIGHT KNEE INCISION AND DRAINAGE(repeat, first knee I&D 7/15)  Surgeon:Surgeon(s) and Role:   * Christiano Persaud, DO - Primary        SUBJECTIVE:     Brian Krueger is a 43 y.o. male resting in the bed. Patient states the knee is sore after working with PT earlier today       OBJECTIVE:       Physical Exam:  General: alert, cooperative, no distress. Gastrointestinal:  non-distended . Cardiovascular: equal pulses in the lower extremities,  Brisk cap refill in all distal extremities     Respiratory: No respiratory distress  Neurological:Neurovascular exam within normal limits. Senstion intact: LE bilat. Motor: + DF/PF/EHL. Musculoskeletal: min effusion. supple passive ROM, flex to 80. Isaías's sign negative in bilateral lower extremities. Calves soft, supple, non-tender upon palpation or with passive stretch. Dressing/Wound:  Drain intact--- very little output. CDI    Vital Signs:       Patient Vitals for the past 8 hrs:   BP Temp Pulse Resp SpO2   22 0844 133/85 99.2 °F (37.3 °C) (!) 106 18 100 %                                          Temp (24hrs), Av.3 °F (37.4 °C), Min:98.9 °F (37.2 °C), Max:99.7 °F (37.6 °C)      Labs:      No results for input(s): HCT, HGB, INR, HCTEXT, HGBEXT, INREXT in the last 72 hours.   PT/OT:        Gait  Base of Support: Widened  Speed/Kay: Pace decreased (<100 feet/min)  Step Length: Left shortened, Right shortened  Swing Pattern: Right asymmetrical  Stance: Right decreased  Gait Abnormalities: Antalgic, Decreased step clearance  Ambulation - Level of Assistance: Contact guard assistance  Distance (ft): 25 Feet (ft)  Assistive Device: Gait belt, Walker, rolling  Interventions: Verbal cues  Interventions: Verbal cues  ASSESSMENT / PLAN:   Active Problems:    Arthritis, septic, knee ([address] (7/15/2022)       -  No growth from knee cultures  -  Keep drain for now  -  + crystals(pseudogout) from 7/21  -  ABX per ID  -  Continue PT/OT WBAT  -  DVT prophylaxis- SCD w/  Lovenox restarted  -  DC planning - TBD    Signed By: Ara Spangler PA-C

## 2022-07-26 NOTE — PROGRESS NOTES
Bedside shift change report given to [facility] Ramior (oncoming nurse) by Dasia Calvillo RN (offgoing nurse). Report included the following information SBAR, Kardex, and MAR.
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
## 2022-07-26 NOTE — PROGRESS NOTES
Problem: Falls - Risk of  Goal: *Absence of Falls  Description: Document Roberta Minium Fall Risk and appropriate interventions in the flowsheet. Outcome: Progressing Towards Goal  Note: Fall Risk Interventions:  Mobility Interventions: Bed/chair exit alarm, Communicate number of staff needed for ambulation/transfer, Utilize walker, cane, or other assistive device         Medication Interventions: Patient to call before getting OOB, Teach patient to arise slowly    Elimination Interventions: Call light in reach    History of Falls Interventions: Bed/chair exit alarm         Problem: Patient Education: Go to Patient Education Activity  Goal: Patient/Family Education  Outcome: Progressing Towards Goal     Problem: Pressure Injury - Risk of  Goal: *Prevention of pressure injury  Description: Document Russell Scale and appropriate interventions in the flowsheet.   Outcome: Progressing Towards Goal  Note: Pressure Injury Interventions:  Sensory Interventions: Assess changes in LOC, Assess need for specialty bed, Keep linens dry and wrinkle-free    Moisture Interventions: Minimize layers    Activity Interventions: Increase time out of bed    Mobility Interventions: Float heels, Assess need for specialty bed    Nutrition Interventions: Document food/fluid/supplement intake, Offer support with meals,snacks and hydration

## 2022-07-27 LAB
ANION GAP SERPL CALC-SCNC: 5 MMOL/L (ref 5–15)
BUN SERPL-MCNC: 16 MG/DL (ref 6–20)
BUN/CREAT SERPL: 20 (ref 12–20)
CALCIUM SERPL-MCNC: 9 MG/DL (ref 8.5–10.1)
CHLORIDE SERPL-SCNC: 98 MMOL/L (ref 97–108)
CO2 SERPL-SCNC: 27 MMOL/L (ref 21–32)
CREAT SERPL-MCNC: 0.8 MG/DL (ref 0.7–1.3)
GLUCOSE SERPL-MCNC: 129 MG/DL (ref 65–100)
MAGNESIUM SERPL-MCNC: 2 MG/DL (ref 1.6–2.4)
PHOSPHATE SERPL-MCNC: 3.4 MG/DL (ref 2.6–4.7)
POTASSIUM SERPL-SCNC: 4.3 MMOL/L (ref 3.5–5.1)
SODIUM SERPL-SCNC: 130 MMOL/L (ref 136–145)

## 2022-07-27 PROCEDURE — 74011250636 HC RX REV CODE- 250/636: Performed by: STUDENT IN AN ORGANIZED HEALTH CARE EDUCATION/TRAINING PROGRAM

## 2022-07-27 PROCEDURE — 84100 ASSAY OF PHOSPHORUS: CPT

## 2022-07-27 PROCEDURE — 74011000258 HC RX REV CODE- 258: Performed by: HOSPITALIST

## 2022-07-27 PROCEDURE — 83735 ASSAY OF MAGNESIUM: CPT

## 2022-07-27 PROCEDURE — 74011250636 HC RX REV CODE- 250/636: Performed by: ORTHOPAEDIC SURGERY

## 2022-07-27 PROCEDURE — 97116 GAIT TRAINING THERAPY: CPT

## 2022-07-27 PROCEDURE — 65270000029 HC RM PRIVATE

## 2022-07-27 PROCEDURE — 74011000258 HC RX REV CODE- 258: Performed by: STUDENT IN AN ORGANIZED HEALTH CARE EDUCATION/TRAINING PROGRAM

## 2022-07-27 PROCEDURE — 74011250636 HC RX REV CODE- 250/636: Performed by: HOSPITALIST

## 2022-07-27 PROCEDURE — 74011250636 HC RX REV CODE- 250/636: Performed by: INTERNAL MEDICINE

## 2022-07-27 PROCEDURE — 74011250637 HC RX REV CODE- 250/637: Performed by: INTERNAL MEDICINE

## 2022-07-27 PROCEDURE — 80048 BASIC METABOLIC PNL TOTAL CA: CPT

## 2022-07-27 PROCEDURE — 74011000250 HC RX REV CODE- 250: Performed by: HOSPITALIST

## 2022-07-27 PROCEDURE — 36415 COLL VENOUS BLD VENIPUNCTURE: CPT

## 2022-07-27 RX ADMIN — AMPICILLIN SODIUM 2 G: 2 INJECTION, POWDER, FOR SOLUTION INTRAVENOUS at 17:38

## 2022-07-27 RX ADMIN — MORPHINE SULFATE 2 MG: 4 INJECTION INTRAVENOUS at 11:14

## 2022-07-27 RX ADMIN — AMPICILLIN SODIUM 2 G: 2 INJECTION, POWDER, FOR SOLUTION INTRAVENOUS at 22:08

## 2022-07-27 RX ADMIN — SODIUM ACETATE: 164 INJECTION, SOLUTION, CONCENTRATE INTRAVENOUS at 17:51

## 2022-07-27 RX ADMIN — ACETAMINOPHEN 650 MG: 650 SUPPOSITORY RECTAL at 05:25

## 2022-07-27 RX ADMIN — ACETAMINOPHEN 650 MG: 650 SUPPOSITORY RECTAL at 17:45

## 2022-07-27 RX ADMIN — AMPICILLIN SODIUM 2 G: 2 INJECTION, POWDER, FOR SOLUTION INTRAVENOUS at 04:21

## 2022-07-27 RX ADMIN — ENOXAPARIN SODIUM 60 MG: 100 INJECTION SUBCUTANEOUS at 05:24

## 2022-07-27 RX ADMIN — ENOXAPARIN SODIUM 60 MG: 100 INJECTION SUBCUTANEOUS at 17:39

## 2022-07-27 RX ADMIN — MORPHINE SULFATE 2 MG: 4 INJECTION INTRAVENOUS at 22:08

## 2022-07-27 RX ADMIN — MORPHINE SULFATE 2 MG: 4 INJECTION INTRAVENOUS at 17:45

## 2022-07-27 RX ADMIN — AMPICILLIN SODIUM 2 G: 2 INJECTION, POWDER, FOR SOLUTION INTRAVENOUS at 09:30

## 2022-07-27 RX ADMIN — SODIUM CHLORIDE 100 ML/HR: 9 INJECTION, SOLUTION INTRAVENOUS at 22:20

## 2022-07-27 RX ADMIN — MORPHINE SULFATE 2 MG: 4 INJECTION INTRAVENOUS at 02:22

## 2022-07-27 RX ADMIN — MORPHINE SULFATE 2 MG: 4 INJECTION INTRAVENOUS at 06:29

## 2022-07-27 NOTE — PROGRESS NOTES
Problem: Mobility Impaired (Adult and Pediatric)  Goal: *Therapy Goal (Edit Goal, Insert Text)  Description: FUNCTIONAL STATUS PRIOR TO ADMISSION: Patient was independent and active without use of DME. Lived with mother who assisted him some but she works during the day. HOME SUPPORT PRIOR TO ADMISSION: The patient lived with mother but did not require assist.     Physical Therapy Goals  Initiated 7/16/2022  1. Patient will move from supine to sit and sit to supine  in bed with modified independence within 7 day(s). 2.  Patient will transfer from bed to chair and chair to bed with modified independence using the least restrictive device within 7 day(s). 3.  Patient will perform sit to stand with independence within 7 day(s). 4.  Patient will ambulate with supervision/set-up for 50 feet WBATwith the least restrictive device within 7 day(s). 5.  Patient will ascend/descend 3 stairs with 1 handrail(s) with minimal assistance/contact guard assist within 7 day(s). Physical Therapy Goals  Revised 7/26/2022  1. Patient will move from supine to sit and sit to supine  in bed with independence within 7 day(s). 2.  Patient will transfer from bed to chair and chair to bed with supervision/set-up using the least restrictive device within 7 day(s). 3.  Patient will perform sit to stand with independence within 7 day(s). 4.  Patient will ambulate with supervision/set-up for 100 feet with the least restrictive device within 7 day(s). 5.  Patient will ascend/descend 3 stairs with 1 handrail(s) with minimal assistance/contact guard assist within 7 day(s).         Outcome: Progressing Towards Goal   PHYSICAL THERAPY TREATMENT  Patient: Lc Nunn (9 y.o. male)  Date: 7/27/2022  Diagnosis: Arthritis, septic, knee (HCC) [M00.9] <principal problem not specified>  Procedure(s) (LRB):  RIGHT KNEE INCISION AND DRAINAGE (Right) 5 Days Post-Op  Precautions:    Chart, physical therapy assessment, plan of care and goals were reviewed. ASSESSMENT  Patient continues with skilled PT services and is progressing towards goals. Pt received in supine and needing encouragement however ultimately agreeable to participate with therapy services. Pt reports constant pain in the right knee while at rest, pt did not quantify. Pt overall moving fairly to transition from supine to sitting. Upon standing pt drain tubing dislodged and leaked onto the ground. Nursing made aware. Pt ambulated 30ft CGA w/RW noted with decreased stance time on RLE, and shortened bilateral step length. Pt deferring further mobility due to complaints of increased pain and left in supine in bed. Pt adamantly has been refusing all efforts with sitting upright with therapy services despite education and benefits of increased OOB mobility. Pt overall continues to make steady progress in the acute setting with therapy and would continue to benefit from skilled PT services. Continue to recommend HHPT upon discharge. Current Level of Function Impacting Discharge (mobility/balance): CGA w/transfers, CGA w/RW for gait    Other factors to consider for discharge: pain management, pt reports has brothers to support at home         PLAN :  Patient continues to benefit from skilled intervention to address the above impairments. Continue treatment per established plan of care. to address goals. Recommendation for discharge: (in order for the patient to meet his/her long term goals)  Physical therapy at least 2 days/week in the home AND ensure assist and/or supervision for safety with mobility & ADL's    This discharge recommendation:  Has been made in collaboration with the attending provider and/or case management    IF patient discharges home will need the following DME: rolling walker       SUBJECTIVE:   Patient stated I have a fever and i'm cold right now.     OBJECTIVE DATA SUMMARY:   Critical Behavior:  Neurologic State: Alert  Orientation Level: Oriented X4  Cognition: Follows commands, Appropriate for age attention/concentration, Appropriate safety awareness, Appropriate decision making     Functional Mobility Training:  Bed Mobility:  Rolling: Modified independent  Supine to Sit: Modified independent; Additional time  Sit to Supine: Contact guard assistance;Minimum assistance (Jesus for RLE)  Scooting: Supervision    Transfers:  Sit to Stand: Contact guard assistance  Stand to Sit: Contact guard assistance    Balance:  Sitting: Intact  Standing: Impaired  Standing - Static: Good;Constant support  Standing - Dynamic : Fair;Good;Constant support  Ambulation/Gait Training:  Distance (ft): 30 Feet (ft)  Assistive Device: Gait belt;Walker, rolling  Ambulation - Level of Assistance: Contact guard assistance    Gait Abnormalities: Antalgic;Decreased step clearance    Base of Support: Widened  Stance: Right decreased  Speed/Kay: Pace decreased (<100 feet/min)  Step Length: Left shortened;Right shortened    Pain Rating:  Pt did not quantify however reports constant pain and rest and visually wincing in pain throughout ambulation    Activity Tolerance:   Fair    After treatment patient left in no apparent distress:   Supine in bed, Call bell within reach, and Side rails x 3    COMMUNICATION/COLLABORATION:   The patients plan of care was discussed with: Registered nurse.      Ariadna Krause PTA   Time Calculation: 23 mins
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
## 2022-07-27 NOTE — PROGRESS NOTES
Transition of Care Plan:     RUR: 23% (high)  Disposition: Home with Home Health ([address] [place] [address] ) [facility] the [facility] for TPN and ABX therapy. Order to resume TPN sent via [employer] to Marzena Freitas Yvonne Count includes the [facility]. They do not mix TPN over the weekend. Need a resume order for SN . [facility] also needs order for IV antibiotic . Follow up appointments:PCP  DME needed: None  Transportation at Discharge: Pt's mother will transport at d/c.   [address] or means to access home: Pt has access       IM Medicare Letter: Pt has Medicaid  Is patient a BCPI-A Bundle:  N/A        Caregiver Contact: Phill Sunshine- Mother [phone]  Discharge Caregiver contacted prior to discharge? CM will notify caregiver at d/c. Care Conference needed?:     No    Patient's JORGE set for 7/28/2022, pending ortho clearance and will potentially need IV abx.       Cristi Shelton, AMBARN, RN    Care Management  [phone]

## 2022-07-27 NOTE — PROGRESS NOTES
Infectious Disease Progress Note         Interval:  NAEO    Subjective:   Patient is reporting improvement in right knee - able to bear some weight on it now. Objective:    Vitals:   Reviewed in chart. Physical Exam:  GEN: NAD  HEENT: Normocephalic, atraumatic, PERRL, no scleral icterus  CV: Hemodynamically stable  Lungs: Breathing comfortably  Abdomen: soft, non distended, non tender  Genitourinary:  no brock  Extremities: no edema  Neuro: Alert, oriented to time, place and situation, moves all extremities to commands, verbal  Skin: Right knee in dressing and drains in with blood and pus  Psych: good affect, good eye contact, non tearful  Lines: Peripheral IV lines and right chest tunneled catheter        Labs:  Recent Results (from the past 24 hour(s))   METABOLIC PANEL, BASIC    Collection Time: 07/27/22  1:54 AM   Result Value Ref Range    Sodium 130 (L) 136 - 145 mmol/L    Potassium 4.3 3.5 - 5.1 mmol/L    Chloride 98 97 - 108 mmol/L    CO2 27 21 - 32 mmol/L    Anion gap 5 5 - 15 mmol/L    Glucose 129 (H) 65 - 100 mg/dL    BUN 16 6 - 20 MG/DL    Creatinine 0.80 0.70 - 1.30 MG/DL    BUN/Creatinine ratio 20 12 - 20      GFR est AA >60 >60 ml/min/1.73m2    GFR est non-AA >60 >60 ml/min/1.73m2    Calcium 9.0 8.5 - 10.1 MG/DL   MAGNESIUM    Collection Time: 07/27/22  1:54 AM   Result Value Ref Range    Magnesium 2.0 1.6 - 2.4 mg/dL   PHOSPHORUS    Collection Time: 07/27/22  1:54 AM   Result Value Ref Range    Phosphorus 3.4 2.6 - 4.7 MG/DL           Assessment:  71-year-old male with:     -Acute right knee septic arthritis status post incision and drainage on 7/15/2022. Operative gram stain and cultures negative so far.     -E. coli and Enterococcus faecalis bacteremia (6/20/2022) and Candida albicans fungemia (6/13/2022) due to infected Telly catheter. Catheter was removed at that time. Once blood cultures are cleared, new catheter was placed on 6/20/2022.   Requirement of catheter is for TPN per surgery. Finished ampicillin and anidulafungin per ID recommendations for 2 weeks on 6/30/2022. -Age indeterminate right brachial DVT found 6/30/2022 on Lovenox. 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 History of multiple abdominal surgeries, with PEG and J-tube. Recommendations:  - Patient reporting some improvement in knee pain and said able to move more and bear some weight on it now. - Will plan to examine knee with Orthopedics once able to take surgical dressing down. - S/p repeat right knee washout 7/24/22. Per operative report cultures have been sent however these are not showing up in the micro section.  -Bedside aspiration from 7/21/22 no growth on cultures. - Continue ampicillin. Abx plan pending clinical course. ID will follow. Thank you for the opportunity to participate in the care of this patient. Please contact with questions or concerns.       Latrell Buchanan MD  Infectious Diseases

## 2022-07-27 NOTE — PROGRESS NOTES
End of Shift Note    Bedside shift change report given to Rafaela Bartholomew (oncoming nurse) by John Awan RN (offgoing nurse). Report included the following information Kardex, Procedure Summary, Intake/Output, MAR, Accordion, Recent Results, and Alarm Parameters     Shift worked: night     Shift summary and any significant changes:    Pt received his pain meds & antibiotic,Stayed awake waiting for the pain meds. This morning had a fever of 103 and rectal Tylenol given as well as informing the md. The ostomies are draining minimally and accordion had no output. Concerns for physician to address:      Zone phone for oncoming shift:         Activity:  Activity Level: Bed Rest  Number times ambulated in hallways past shift: 0  Number of times OOB to chair past shift: 0    Cardiac:   Cardiac Monitoring: No      Cardiac Rhythm: Sinus Rhythm, Sinus Tachy    Access:   Current line(s): central line     Genitourinary:   Urinary status: brock    Respiratory:   O2 Device: None (Room air)  Chronic home O2 use?: NO  Incentive spirometer at bedside: NO       GI:  Last Bowel Movement Date:  (with colostomy)  Current diet:  ADULT DIET Full Liquid  DIET ONE TIME MESSAGE  TPN ADULT - CENTRAL  Passing flatus: YES  Tolerating current diet: YES       Pain Management:   Patient states pain is manageable on current regimen: YES    Skin:  Russell Score: 14  Interventions: float heels and PT/OT consult    Patient Safety:  Fall Score:  Total Score: 1  Interventions: bed/chair alarm  High Fall Risk: Yes    Length of Stay:  Expected LOS: 5d 4h  Actual LOS: 12      John Awan RN

## 2022-07-28 LAB
ALBUMIN SERPL-MCNC: 2.3 G/DL (ref 3.5–5)
ALBUMIN/GLOB SERPL: 0.5 {RATIO} (ref 1.1–2.2)
ALP SERPL-CCNC: 152 U/L (ref 45–117)
ALT SERPL-CCNC: 34 U/L (ref 12–78)
ANION GAP SERPL CALC-SCNC: 7 MMOL/L (ref 5–15)
AST SERPL-CCNC: 34 U/L (ref 15–37)
BASOPHILS # BLD: 0.1 K/UL (ref 0–0.1)
BASOPHILS NFR BLD: 1 % (ref 0–1)
BILIRUB SERPL-MCNC: 0.8 MG/DL (ref 0.2–1)
BUN SERPL-MCNC: 16 MG/DL (ref 6–20)
BUN/CREAT SERPL: 17 (ref 12–20)
CALCIUM SERPL-MCNC: 8.8 MG/DL (ref 8.5–10.1)
CHLORIDE SERPL-SCNC: 95 MMOL/L (ref 97–108)
CO2 SERPL-SCNC: 26 MMOL/L (ref 21–32)
CREAT SERPL-MCNC: 0.93 MG/DL (ref 0.7–1.3)
DIFFERENTIAL METHOD BLD: ABNORMAL
EOSINOPHIL # BLD: 0 K/UL (ref 0–0.4)
EOSINOPHIL NFR BLD: 0 % (ref 0–7)
ERYTHROCYTE [DISTWIDTH] IN BLOOD BY AUTOMATED COUNT: 17.1 % (ref 11.5–14.5)
GLOBULIN SER CALC-MCNC: 5.1 G/DL (ref 2–4)
GLUCOSE BLD STRIP.AUTO-MCNC: 111 MG/DL (ref 65–117)
GLUCOSE BLD STRIP.AUTO-MCNC: 112 MG/DL (ref 65–117)
GLUCOSE BLD STRIP.AUTO-MCNC: 164 MG/DL (ref 65–117)
GLUCOSE BLD STRIP.AUTO-MCNC: 92 MG/DL (ref 65–117)
GLUCOSE SERPL-MCNC: 476 MG/DL (ref 65–100)
HCT VFR BLD AUTO: 29 % (ref 36.6–50.3)
HGB BLD-MCNC: 9.1 G/DL (ref 12.1–17)
IMM GRANULOCYTES # BLD AUTO: 0 K/UL (ref 0–0.04)
IMM GRANULOCYTES NFR BLD AUTO: 0 % (ref 0–0.5)
LYMPHOCYTES # BLD: 1.4 K/UL (ref 0.8–3.5)
LYMPHOCYTES NFR BLD: 17 % (ref 12–49)
MCH RBC QN AUTO: 26.8 PG (ref 26–34)
MCHC RBC AUTO-ENTMCNC: 31.4 G/DL (ref 30–36.5)
MCV RBC AUTO: 85.3 FL (ref 80–99)
MONOCYTES # BLD: 0.6 K/UL (ref 0–1)
MONOCYTES NFR BLD: 7 % (ref 5–13)
NEUTS SEG # BLD: 5.8 K/UL (ref 1.8–8)
NEUTS SEG NFR BLD: 75 % (ref 32–75)
NRBC # BLD: 0 K/UL (ref 0–0.01)
NRBC BLD-RTO: 0 PER 100 WBC
PLATELET # BLD AUTO: 482 K/UL (ref 150–400)
PMV BLD AUTO: 10.2 FL (ref 8.9–12.9)
POTASSIUM SERPL-SCNC: 5 MMOL/L (ref 3.5–5.1)
PROT SERPL-MCNC: 7.4 G/DL (ref 6.4–8.2)
RBC # BLD AUTO: 3.4 M/UL (ref 4.1–5.7)
SERVICE CMNT-IMP: ABNORMAL
SERVICE CMNT-IMP: NORMAL
SODIUM SERPL-SCNC: 128 MMOL/L (ref 136–145)
WBC # BLD AUTO: 7.9 K/UL (ref 4.1–11.1)

## 2022-07-28 PROCEDURE — 74011250636 HC RX REV CODE- 250/636: Performed by: STUDENT IN AN ORGANIZED HEALTH CARE EDUCATION/TRAINING PROGRAM

## 2022-07-28 PROCEDURE — 74011000258 HC RX REV CODE- 258: Performed by: HOSPITALIST

## 2022-07-28 PROCEDURE — 36415 COLL VENOUS BLD VENIPUNCTURE: CPT

## 2022-07-28 PROCEDURE — 74011250636 HC RX REV CODE- 250/636: Performed by: INTERNAL MEDICINE

## 2022-07-28 PROCEDURE — 99233 SBSQ HOSP IP/OBS HIGH 50: CPT | Performed by: STUDENT IN AN ORGANIZED HEALTH CARE EDUCATION/TRAINING PROGRAM

## 2022-07-28 PROCEDURE — 74011250636 HC RX REV CODE- 250/636: Performed by: HOSPITALIST

## 2022-07-28 PROCEDURE — 74011250637 HC RX REV CODE- 250/637: Performed by: INTERNAL MEDICINE

## 2022-07-28 PROCEDURE — 80053 COMPREHEN METABOLIC PANEL: CPT

## 2022-07-28 PROCEDURE — 74011250637 HC RX REV CODE- 250/637: Performed by: HOSPITALIST

## 2022-07-28 PROCEDURE — 74011000258 HC RX REV CODE- 258: Performed by: STUDENT IN AN ORGANIZED HEALTH CARE EDUCATION/TRAINING PROGRAM

## 2022-07-28 PROCEDURE — 85025 COMPLETE CBC W/AUTO DIFF WBC: CPT

## 2022-07-28 PROCEDURE — 74011000250 HC RX REV CODE- 250: Performed by: HOSPITALIST

## 2022-07-28 PROCEDURE — 74011250636 HC RX REV CODE- 250/636: Performed by: ORTHOPAEDIC SURGERY

## 2022-07-28 PROCEDURE — 65270000029 HC RM PRIVATE

## 2022-07-28 PROCEDURE — 82962 GLUCOSE BLOOD TEST: CPT

## 2022-07-28 RX ORDER — MAGNESIUM SULFATE 100 %
4 CRYSTALS MISCELLANEOUS AS NEEDED
Status: DISCONTINUED | OUTPATIENT
Start: 2022-07-28 | End: 2022-08-11 | Stop reason: HOSPADM

## 2022-07-28 RX ORDER — INSULIN LISPRO 100 [IU]/ML
INJECTION, SOLUTION INTRAVENOUS; SUBCUTANEOUS EVERY 6 HOURS
Status: DISCONTINUED | OUTPATIENT
Start: 2022-07-28 | End: 2022-08-11 | Stop reason: HOSPADM

## 2022-07-28 RX ORDER — KETOROLAC TROMETHAMINE 30 MG/ML
15 INJECTION, SOLUTION INTRAMUSCULAR; INTRAVENOUS
Status: COMPLETED | OUTPATIENT
Start: 2022-07-28 | End: 2022-07-28

## 2022-07-28 RX ORDER — INSULIN LISPRO 100 [IU]/ML
INJECTION, SOLUTION INTRAVENOUS; SUBCUTANEOUS EVERY 6 HOURS
Status: DISCONTINUED | OUTPATIENT
Start: 2022-07-28 | End: 2022-07-28

## 2022-07-28 RX ORDER — DEXTROSE MONOHYDRATE 100 MG/ML
0-250 INJECTION, SOLUTION INTRAVENOUS AS NEEDED
Status: DISCONTINUED | OUTPATIENT
Start: 2022-07-28 | End: 2022-08-11 | Stop reason: HOSPADM

## 2022-07-28 RX ORDER — HYDROMORPHONE HYDROCHLORIDE 1 MG/ML
1 INJECTION, SOLUTION INTRAMUSCULAR; INTRAVENOUS; SUBCUTANEOUS ONCE
Status: COMPLETED | OUTPATIENT
Start: 2022-07-29 | End: 2022-07-29

## 2022-07-28 RX ADMIN — MORPHINE SULFATE 2 MG: 4 INJECTION INTRAVENOUS at 03:45

## 2022-07-28 RX ADMIN — MORPHINE SULFATE 2 MG: 4 INJECTION INTRAVENOUS at 14:17

## 2022-07-28 RX ADMIN — PIPERACILLIN AND TAZOBACTAM 4.5 G: 4; .5 INJECTION, POWDER, LYOPHILIZED, FOR SOLUTION INTRAVENOUS at 16:44

## 2022-07-28 RX ADMIN — ACETAMINOPHEN 650 MG: 650 SUPPOSITORY RECTAL at 04:03

## 2022-07-28 RX ADMIN — MORPHINE SULFATE 2 MG: 4 INJECTION INTRAVENOUS at 23:16

## 2022-07-28 RX ADMIN — KETOROLAC TROMETHAMINE 15 MG: 30 INJECTION, SOLUTION INTRAMUSCULAR at 05:37

## 2022-07-28 RX ADMIN — DAPTOMYCIN 500 MG: 500 INJECTION, POWDER, LYOPHILIZED, FOR SOLUTION INTRAVENOUS at 17:40

## 2022-07-28 RX ADMIN — AMPICILLIN SODIUM 2 G: 2 INJECTION, POWDER, FOR SOLUTION INTRAVENOUS at 09:48

## 2022-07-28 RX ADMIN — ENOXAPARIN SODIUM 60 MG: 100 INJECTION SUBCUTANEOUS at 05:15

## 2022-07-28 RX ADMIN — FAMOTIDINE: 10 INJECTION, SOLUTION INTRAVENOUS at 18:15

## 2022-07-28 RX ADMIN — AMPICILLIN SODIUM 2 G: 2 INJECTION, POWDER, FOR SOLUTION INTRAVENOUS at 15:47

## 2022-07-28 RX ADMIN — SODIUM ACETATE: 164 INJECTION, SOLUTION, CONCENTRATE INTRAVENOUS at 08:23

## 2022-07-28 RX ADMIN — SODIUM CHLORIDE, POTASSIUM CHLORIDE, SODIUM LACTATE AND CALCIUM CHLORIDE 1000 ML: 600; 310; 30; 20 INJECTION, SOLUTION INTRAVENOUS at 05:15

## 2022-07-28 RX ADMIN — ENOXAPARIN SODIUM 60 MG: 100 INJECTION SUBCUTANEOUS at 17:42

## 2022-07-28 RX ADMIN — SODIUM CHLORIDE 100 ML/HR: 9 INJECTION, SOLUTION INTRAVENOUS at 17:40

## 2022-07-28 RX ADMIN — PIPERACILLIN AND TAZOBACTAM 3.38 G: 3; .375 INJECTION, POWDER, LYOPHILIZED, FOR SOLUTION INTRAVENOUS at 23:16

## 2022-07-28 RX ADMIN — AMPICILLIN SODIUM 2 G: 2 INJECTION, POWDER, FOR SOLUTION INTRAVENOUS at 03:45

## 2022-07-28 RX ADMIN — MORPHINE SULFATE 2 MG: 4 INJECTION INTRAVENOUS at 18:18

## 2022-07-28 RX ADMIN — MORPHINE SULFATE 2 MG: 4 INJECTION INTRAVENOUS at 07:12
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
## 2022-07-28 NOTE — PROGRESS NOTES
Problem: Falls - Risk of  Goal: *Absence of Falls  Description: Document Leafy Magana Fall Risk and appropriate interventions in the flowsheet. Outcome: Progressing Towards Goal  Note: Fall Risk Interventions:  Mobility Interventions: OT consult for ADLs, PT Consult for assist device competence, Strengthening exercises (ROM-active/passive), Utilize walker, cane, or other assistive device, PT Consult for mobility concerns, Patient to call before getting OOB         Medication Interventions: Bed/chair exit alarm, Patient to call before getting OOB, Teach patient to arise slowly    Elimination Interventions: Bed/chair exit alarm, Call light in reach, Patient to call for help with toileting needs, Toilet paper/wipes in reach, Toileting schedule/hourly rounds, Urinal in reach    History of Falls Interventions: Bed/chair exit alarm, Door open when patient unattended, Room close to nurse's station         Problem: Patient Education: Go to Patient Education Activity  Goal: Patient/Family Education  Outcome: Progressing Towards Goal     Problem: Patient Education: Go to Patient Education Activity  Goal: Patient/Family Education  Outcome: Progressing Towards Goal     Problem: Pressure Injury - Risk of  Goal: *Prevention of pressure injury  Description: Document Russell Scale and appropriate interventions in the flowsheet.   Outcome: Progressing Towards Goal  Note: Pressure Injury Interventions:  Sensory Interventions: Assess changes in LOC, Maintain/enhance activity level, Minimize linen layers    Moisture Interventions: Absorbent underpads, Minimize layers    Activity Interventions: Increase time out of bed, Pressure redistribution bed/mattress(bed type), PT/OT evaluation    Mobility Interventions: HOB 30 degrees or less, Pressure redistribution bed/mattress (bed type), PT/OT evaluation    Nutrition Interventions: Document food/fluid/supplement intake, Offer support with meals,snacks and hydration    Friction and Shear Interventions: Apply protective barrier, creams and emollients, HOB 30 degrees or less, Sit at 90-degree angle, Minimize layers                Problem: Patient Education: Go to Patient Education Activity  Goal: Patient/Family Education  Outcome: Progressing Towards Goal     Problem: General Wound Care  Goal: *Non-infected wound: Improvement of existing wound, absence of infection, and maintenance of skin integrity  Outcome: Progressing Towards Goal  Goal: *Infected Wound: Prevention of further infection and promotion of healing  Description: Infection control procedures (eg: clean dressings, clean gloves, hand washing, precautions to isolate wound from contamination, sterile instruments used for wound debridement) should be implemented.   Outcome: Progressing Towards Goal  Goal: Interventions  Outcome: Progressing Towards Goal     Problem: Patient Education: Go to Patient Education Activity  Goal: Patient/Family Education  Outcome: Progressing Towards Goal

## 2022-07-28 NOTE — PROGRESS NOTES
Pharmacy Note     Zosyn 3375mg IV q8h ordered for treatment of Bone and Joint Infx. Per [facility] Renal / Extended Infusion B Lactam Policy, Zosyn will be changed to 4500 mg IV x 1 over 30 min, followed by Zosyn 3375 mg IV q8h over 4 hours. Estimated Creatinine Clearance: Estimated Creatinine Clearance: 93.5 mL/min (based on SCr of 0.93 mg/dL). Dialysis Status, SAE, CKD:      BMI:  Body mass index is 20.8 kg/m². Rationale for Adjustment:  Extended infusion policy. Pharmacy will continue to monitor and adjust dose as necessary. Please call with any questions.     Thank you,  Lana Holm

## 2022-07-28 NOTE — PROGRESS NOTES
Post op I and D knee  Pain improving  Working with pt  Wbc stable    Patient Vitals for the past 24 hrs:   Temp Pulse Resp BP SpO2   07/28/22 0802 98.2 °F (36.8 °C) (!) 105 16 105/70 100 %   07/28/22 0348 100.1 °F (37.8 °C) (!) 124 20 118/74 99 %   07/27/22 2048 99.7 °F (37.6 °C) (!) 124 18 99/65 100 %   07/27/22 1726 (!) 102.6 °F (39.2 °C) (!) 137 -- 110/74 98 %     Drain out  Incision clean and dry  Knee with some swelling    Oob with pt  Final antibiotics per ID  Daily dressing change with dry dressing

## 2022-07-28 NOTE — PROGRESS NOTES
07/28/22 1511   Vitals   Temp 99.9 °F (37.7 °C)   Temp Source Oral   Pulse (Heart Rate) (!) 115   Heart Rate Source Monitor   Resp Rate 18   O2 Sat (%) 98 %   Level of Consciousness Alert (0)   /67   MAP (Calculated) 83   MEWS Score 3     MEWS 3 due to HR and low grade temperature. Dr. Joy Gonzalez aware.

## 2022-07-28 NOTE — PROGRESS NOTES
End of Shift Note    Bedside shift change report given to SAMUEL Saldana (oncoming nurse) by Bharat Recinos RN (offgoing nurse).   Report included the following information SBAR, Kardex, Intake/Output, MAR, and Recent Results    Shift worked:  7pm-7am     Shift summary and any significant changes:     Pain control, TPN, colostomy care     Concerns for physician to address:  Tachycardia, fever     Zone phone for oncoming shift:   [phone]

## 2022-07-28 NOTE — PROGRESS NOTES
Infectious Disease Progress Note         Interval:  Patient spiked to high fevers on 7/27/2022. Subjective:   Seen in follow-up this afternoon. Reports feeling overall okay. Reports of the right knee pain is hurting again. He reports that the pain is more when the leg is straight and the right knee pain improves on bending the knee. However he is unable to bend the knee completely and after a certain point it starts becoming very painful. He is still having pain to bear weight on it. Objective:    Vitals:   Reviewed in chart. Physical Exam:  GEN: NAD  HEENT: Normocephalic, atraumatic, PERRL, no scleral icterus  CV: Hemodynamically stable  Lungs: Breathing comfortably  Abdomen: soft, non distended, non tender  Genitourinary:  no brock  Extremities: no edema  Neuro: Alert, oriented to time, place and situation, moves all extremities to commands, verbal  Skin: Right knee drains are out. Incision appears clean and dry. There is swelling on the knee which is mild to moderately painful. There is restricted range of motion at the right knee. Psych: good affect, good eye contact, non tearful  Lines: Peripheral IV lines and right chest tunneled catheter        Labs:  Recent Results (from the past 24 hour(s))   METABOLIC PANEL, COMPREHENSIVE    Collection Time: 07/28/22  3:52 AM   Result Value Ref Range    Sodium 128 (L) 136 - 145 mmol/L    Potassium 5.0 3.5 - 5.1 mmol/L    Chloride 95 (L) 97 - 108 mmol/L    CO2 26 21 - 32 mmol/L    Anion gap 7 5 - 15 mmol/L    Glucose 476 (H) 65 - 100 mg/dL    BUN 16 6 - 20 MG/DL    Creatinine 0.93 0.70 - 1.30 MG/DL    BUN/Creatinine ratio 17 12 - 20      GFR est AA >60 >60 ml/min/1.73m2    GFR est non-AA >60 >60 ml/min/1.73m2    Calcium 8.8 8.5 - 10.1 MG/DL    Bilirubin, total 0.8 0.2 - 1.0 MG/DL    ALT (SGPT) 34 12 - 78 U/L    AST (SGOT) 34 15 - 37 U/L    Alk.  phosphatase 152 (H) 45 - 117 U/L    Protein, total 7.4 6.4 - 8.2 g/dL    Albumin 2.3 (L) 3.5 - 5.0 g/dL    Globulin 5.1 (H) 2.0 - 4.0 g/dL    A-G Ratio 0.5 (L) 1.1 - 2.2     CBC WITH AUTOMATED DIFF    Collection Time: 07/28/22  3:52 AM   Result Value Ref Range    WBC 7.9 4.1 - 11.1 K/uL    RBC 3.40 (L) 4.10 - 5.70 M/uL    HGB 9.1 (L) 12.1 - 17.0 g/dL    HCT 29.0 (L) 36.6 - 50.3 %    MCV 85.3 80.0 - 99.0 FL    MCH 26.8 26.0 - 34.0 PG    MCHC 31.4 30.0 - 36.5 g/dL    RDW 17.1 (H) 11.5 - 14.5 %    PLATELET 694 (H) 031 - 400 K/uL    MPV 10.2 8.9 - 12.9 FL    NRBC 0.0 0  WBC    ABSOLUTE NRBC 0.00 0.00 - 0.01 K/uL    NEUTROPHILS 75 32 - 75 %    LYMPHOCYTES 17 12 - 49 %    MONOCYTES 7 5 - 13 %    EOSINOPHILS 0 0 - 7 %    BASOPHILS 1 0 - 1 %    IMMATURE GRANULOCYTES 0 0.0 - 0.5 %    ABS. NEUTROPHILS 5.8 1.8 - 8.0 K/UL    ABS. LYMPHOCYTES 1.4 0.8 - 3.5 K/UL    ABS. MONOCYTES 0.6 0.0 - 1.0 K/UL    ABS. EOSINOPHILS 0.0 0.0 - 0.4 K/UL    ABS. BASOPHILS 0.1 0.0 - 0.1 K/UL    ABS. IMM. GRANS. 0.0 0.00 - 0.04 K/UL    DF AUTOMATED     GLUCOSE, POC    Collection Time: 07/28/22  7:16 AM   Result Value Ref Range    Glucose (POC) 164 (H) 65 - 117 mg/dL    Performed by Mike Alberts (RUBENS RN)    GLUCOSE, POC    Collection Time: 07/28/22 11:54 AM   Result Value Ref Range    Glucose (POC) 92 65 - 117 mg/dL    Performed by Katie Angelo (PCT)            Assessment:  68-year-old male with:     -Acute right knee septic arthritis status post incision and drainage on 7/15/2022. Operative gram stain and cultures negative so far.     -E. coli and Enterococcus faecalis bacteremia (6/20/2022) and Candida albicans fungemia (6/13/2022) due to infected Telly catheter. Catheter was removed at that time. Once blood cultures are cleared, new catheter was placed on 6/20/2022. Requirement of catheter is for TPN per surgery. Finished ampicillin and anidulafungin per ID recommendations for 2 weeks on 6/30/2022. -Age indeterminate right brachial DVT found 6/30/2022 on Lovenox.      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 History of multiple abdominal surgeries, with PEG and J-tube. Recommendations:  -Fever spikes on 7/27/2022.  -Though the right knee appears better since last week, it is still swollen and the range of motion is restricted after a certain point. Possible fluctuance felt at the knee. -Prior operative washout cultures from the knee have remained negative. -Given the high fevers on 7/27/2022 and the ongoing swelling at the knee, will escalate antibiotics to daptomycin and Zosyn today. Check CK with a.m. labs. The knee needs to be continue to monitor closely and might require further washout for source control.  -Please send blood cultures with any fever greater than 100 point 4F. Abx plan pending clinical course. ID will follow. Thank you for the opportunity to participate in the care of this patient. Please contact with questions or concerns.       Dio Tenorio MD  Infectious Diseases

## 2022-07-29 LAB
ANION GAP SERPL CALC-SCNC: 6 MMOL/L (ref 5–15)
BUN SERPL-MCNC: 10 MG/DL (ref 6–20)
BUN/CREAT SERPL: 13 (ref 12–20)
CALCIUM SERPL-MCNC: 8.5 MG/DL (ref 8.5–10.1)
CHLORIDE SERPL-SCNC: 103 MMOL/L (ref 97–108)
CK SERPL-CCNC: 44 U/L (ref 39–308)
CO2 SERPL-SCNC: 27 MMOL/L (ref 21–32)
CREAT SERPL-MCNC: 0.76 MG/DL (ref 0.7–1.3)
GLUCOSE BLD STRIP.AUTO-MCNC: 118 MG/DL (ref 65–117)
GLUCOSE BLD STRIP.AUTO-MCNC: 142 MG/DL (ref 65–117)
GLUCOSE BLD STRIP.AUTO-MCNC: 79 MG/DL (ref 65–117)
GLUCOSE SERPL-MCNC: 113 MG/DL (ref 65–100)
MAGNESIUM SERPL-MCNC: 1.9 MG/DL (ref 1.6–2.4)
PHOSPHATE SERPL-MCNC: 3.7 MG/DL (ref 2.6–4.7)
POTASSIUM SERPL-SCNC: 4 MMOL/L (ref 3.5–5.1)
SERVICE CMNT-IMP: ABNORMAL
SERVICE CMNT-IMP: ABNORMAL
SERVICE CMNT-IMP: NORMAL
SODIUM SERPL-SCNC: 136 MMOL/L (ref 136–145)

## 2022-07-29 PROCEDURE — 74011250636 HC RX REV CODE- 250/636: Performed by: STUDENT IN AN ORGANIZED HEALTH CARE EDUCATION/TRAINING PROGRAM

## 2022-07-29 PROCEDURE — 74011250636 HC RX REV CODE- 250/636: Performed by: HOSPITALIST

## 2022-07-29 PROCEDURE — 87186 SC STD MICRODIL/AGAR DIL: CPT

## 2022-07-29 PROCEDURE — 87106 FUNGI IDENTIFICATION YEAST: CPT

## 2022-07-29 PROCEDURE — 74011000258 HC RX REV CODE- 258: Performed by: HOSPITALIST

## 2022-07-29 PROCEDURE — 84100 ASSAY OF PHOSPHORUS: CPT

## 2022-07-29 PROCEDURE — 80048 BASIC METABOLIC PNL TOTAL CA: CPT

## 2022-07-29 PROCEDURE — 74011000258 HC RX REV CODE- 258: Performed by: STUDENT IN AN ORGANIZED HEALTH CARE EDUCATION/TRAINING PROGRAM

## 2022-07-29 PROCEDURE — 83735 ASSAY OF MAGNESIUM: CPT

## 2022-07-29 PROCEDURE — 82962 GLUCOSE BLOOD TEST: CPT

## 2022-07-29 PROCEDURE — 74011250636 HC RX REV CODE- 250/636: Performed by: ORTHOPAEDIC SURGERY

## 2022-07-29 PROCEDURE — 97116 GAIT TRAINING THERAPY: CPT

## 2022-07-29 PROCEDURE — 65270000029 HC RM PRIVATE

## 2022-07-29 PROCEDURE — 74011250636 HC RX REV CODE- 250/636: Performed by: INTERNAL MEDICINE

## 2022-07-29 PROCEDURE — 87040 BLOOD CULTURE FOR BACTERIA: CPT

## 2022-07-29 PROCEDURE — 87150 DNA/RNA AMPLIFIED PROBE: CPT

## 2022-07-29 PROCEDURE — 82550 ASSAY OF CK (CPK): CPT

## 2022-07-29 PROCEDURE — 36415 COLL VENOUS BLD VENIPUNCTURE: CPT

## 2022-07-29 PROCEDURE — 74011000250 HC RX REV CODE- 250: Performed by: HOSPITALIST

## 2022-07-29 RX ADMIN — PIPERACILLIN AND TAZOBACTAM 3.38 G: 3; .375 INJECTION, POWDER, LYOPHILIZED, FOR SOLUTION INTRAVENOUS at 13:52

## 2022-07-29 RX ADMIN — MORPHINE SULFATE 2 MG: 4 INJECTION INTRAVENOUS at 18:22

## 2022-07-29 RX ADMIN — ENOXAPARIN SODIUM 60 MG: 100 INJECTION SUBCUTANEOUS at 05:55

## 2022-07-29 RX ADMIN — MORPHINE SULFATE 2 MG: 4 INJECTION INTRAVENOUS at 06:54

## 2022-07-29 RX ADMIN — MORPHINE SULFATE 2 MG: 4 INJECTION INTRAVENOUS at 22:40

## 2022-07-29 RX ADMIN — HYDROMORPHONE HYDROCHLORIDE 1 MG: 1 INJECTION, SOLUTION INTRAMUSCULAR; INTRAVENOUS; SUBCUTANEOUS at 00:52

## 2022-07-29 RX ADMIN — PIPERACILLIN AND TAZOBACTAM 3.38 G: 3; .375 INJECTION, POWDER, LYOPHILIZED, FOR SOLUTION INTRAVENOUS at 08:39

## 2022-07-29 RX ADMIN — PIPERACILLIN AND TAZOBACTAM 3.38 G: 3; .375 INJECTION, POWDER, LYOPHILIZED, FOR SOLUTION INTRAVENOUS at 22:41

## 2022-07-29 RX ADMIN — MORPHINE SULFATE 2 MG: 4 INJECTION INTRAVENOUS at 11:35

## 2022-07-29 RX ADMIN — ENOXAPARIN SODIUM 60 MG: 100 INJECTION SUBCUTANEOUS at 18:32

## 2022-07-29 RX ADMIN — DAPTOMYCIN: 500 INJECTION, POWDER, LYOPHILIZED, FOR SOLUTION INTRAVENOUS at 18:32

## 2022-07-29 RX ADMIN — I.V. FAT EMULSION 500 ML: 20 EMULSION INTRAVENOUS at 19:18

## 2022-07-29 RX ADMIN — SODIUM ACETATE: 164 INJECTION, SOLUTION, CONCENTRATE INTRAVENOUS at 19:15

## 2022-07-29 NOTE — PROGRESS NOTES
Problem: Mobility Impaired (Adult and Pediatric)  Goal: *Therapy Goal (Edit Goal, Insert Text)  Description: FUNCTIONAL STATUS PRIOR TO ADMISSION: Patient was independent and active without use of DME. Lived with mother who assisted him some but she works during the day. HOME SUPPORT PRIOR TO ADMISSION: The patient lived with mother but did not require assist.     Physical Therapy Goals  Initiated 7/16/2022  1. Patient will move from supine to sit and sit to supine  in bed with modified independence within 7 day(s). 2.  Patient will transfer from bed to chair and chair to bed with modified independence using the least restrictive device within 7 day(s). 3.  Patient will perform sit to stand with independence within 7 day(s). 4.  Patient will ambulate with supervision/set-up for 50 feet WBATwith the least restrictive device within 7 day(s). 5.  Patient will ascend/descend 3 stairs with 1 handrail(s) with minimal assistance/contact guard assist within 7 day(s). Physical Therapy Goals  Revised 7/26/2022  1. Patient will move from supine to sit and sit to supine  in bed with independence within 7 day(s). 2.  Patient will transfer from bed to chair and chair to bed with supervision/set-up using the least restrictive device within 7 day(s). 3.  Patient will perform sit to stand with independence within 7 day(s). 4.  Patient will ambulate with supervision/set-up for 100 feet with the least restrictive device within 7 day(s). 5.  Patient will ascend/descend 3 stairs with 1 handrail(s) with minimal assistance/contact guard assist within 7 day(s).         Outcome: Progressing Towards Goal   PHYSICAL THERAPY TREATMENT  Patient: Nataliya Rodríguez (12 y.o. male)  Date: 7/29/2022  Diagnosis: Arthritis, septic, knee (HCC) [M00.9] <principal problem not specified>  Procedure(s) (LRB):  RIGHT KNEE INCISION AND DRAINAGE (Right) 7 Days Post-Op  Precautions:    Chart, physical therapy assessment, plan of care and goals were reviewed. ASSESSMENT  Patient continues with skilled PT services and continues to slowly progressing towards goals however limited in activity tolerance secondary to complaints of pain in the right knee. Pt received in supine and agreeable to participate with therapy services. Pt continues to transition well from supine to sitting CGA with no verbal cues needed. Pt transfers CGA however pulls to stand from RW despite cues for proper hand placements, needing RW stabilized from this writer for safety. Pt ambulated 30ft CGA w/RW with step to gait, decreased stance on RLE, and needing extra time secondary to complaints of pain throughout ambulation. Pt deferring further mobility due to pain, and continues to refuse to sit upright despite educational efforts from therapy about benefits of increased OOB mobility. Pt receptive however to mobility efforts over the weekend and ambulating to improve overall strength/endurance while in the acute setting. Pt left in supine in no discomfort and all needs met. Current Level of Function Impacting Discharge (mobility/balance): CGA w/transfers, CGA w/RW for gait    Other factors to consider for discharge: pain management         PLAN :  Patient continues to benefit from skilled intervention to address the above impairments. Continue treatment per established plan of care. to address goals. Recommendation for discharge: (in order for the patient to meet his/her long term goals)  Physical therapy at least 2 days/week in the home AND ensure assist and/or supervision for safety with mobility & ADL's ; reports brothers can assist    This discharge recommendation:  Has been made in collaboration with the attending provider and/or case management    IF patient discharges home will need the following DME: rolling walker       SUBJECTIVE:   Patient stated it does feel better after moving around a bit.  referring to right knee pain    OBJECTIVE DATA SUMMARY:   Critical Behavior:  Neurologic State: Alert  Orientation Level: Oriented X4  Cognition: Follows commands     Functional Mobility Training:  Bed Mobility:  Rolling: Modified independent  Supine to Sit: Modified independent  Sit to Supine: Contact guard assistance;Minimum assistance (Jesus RLE management)  Scooting: Supervision     Transfers:  Sit to Stand: Contact guard assistance (pulls to stand)  Stand to Sit: Contact guard assistance    Balance:  Sitting: Intact  Standing: Impaired  Standing - Static: Good;Constant support  Standing - Dynamic : Fair;Good;Constant support  Ambulation/Gait Training:  Distance (ft): 30 Feet (ft)  Assistive Device: Gait belt;Walker, rolling  Ambulation - Level of Assistance: Contact guard assistance     Gait Abnormalities: Antalgic;Decreased step clearance     Base of Support: Widened  Stance: Right decreased  Speed/Kay: Pace decreased (<100 feet/min)  Step Length: Left shortened;Right shortened    Pain Rating:  Pt did not quantify however reports there is always constant pain in right knee    Activity Tolerance:   Fair    After treatment patient left in no apparent distress:   Supine in bed and Call bell within reach    COMMUNICATION/COLLABORATION:   The patients plan of care was discussed with: Registered nurse.      Roxana Nicolas PTA   Time Calculation: 12 mins

## 2022-07-29 NOTE — PROGRESS NOTES
ORTHO - Progress Note  Post Op day: 7 Days Post-Op    Xavi Ramos     [id number]  male    43 y.o.    1980    Admit date:7/15/2022  Date of Surgery:2022   Procedures:Procedure(s):  RIGHT KNEE INCISION AND DRAINAGE  Surgeon:Surgeon(s) and Role:     * Jaylon Francois, DO - Primary        SUBJECTIVE:     Xavi Ramos is a 43 y.o. male resting in the bed. Patient has complaints of appropriate post-op pain. States he is still having a hard time moving his knee but overall it is improved some. Denies F/C, nausea, vomiting, dizziness, lightheadedness, chest pain, or shortness of breath. OBJECTIVE:       Physical Exam:  General: alert, cooperative, no distress. Gastrointestinal:  non-distended . Cardiovascular: equal pulses in the lower extremities,  Brisk cap refill in all distal extremities   Genitourinary: Voiding independently   Respiratory: No respiratory distress   Neurological:Neurovascular exam within normal limits. Senstion intact: LE bilat. Motor: + DF/PF/EHL. Musculoskeletal: Isaías's sign negative in bilateral lower extremities. Calves soft, supple, non-tender upon palpation or with passive stretch. No sign of DVT. Limited ROM of the right knee to 20 degrees from full flexion. Mild effusion on exam.  Dressing/Wound:  Clean, dry and intact. No significant erythema or swelling.     Vital Signs:       Patient Vitals for the past 8 hrs:   BP Temp Pulse Resp SpO2 Height   22 1535 -- -- -- -- -- 5' 9\" (1.753 m)   22 0837 113/70 99.9 °F (37.7 °C) (!) 120 20 96 % --                                          Temp (24hrs), Av °F (37.8 °C), Min:99.9 °F (37.7 °C), Max:100.1 °F (37.8 °C)    Date 22 07 - 22 0659   Shift 7793-8213 3843-4147 5592-3558 24 Hour Total   INTAKE   Shift Total(mL/kg)       OUTPUT   Urine(mL/kg/hr) 900(1.8)   900   Shift Total(mL/kg) 900(14.1)   900(14.1)   Weight (kg) 63.9 63.9 63.9 63.9     Labs:        Recent Labs     22  0352 HCT 29.0*   HGB 9.1*     PT/OT:        Gait  Base of Support: Widened  Speed/Kay: Pace decreased (<100 feet/min)  Step Length: Left shortened, Right shortened  Swing Pattern: Right asymmetrical  Stance: Right decreased  Gait Abnormalities: Antalgic, Decreased step clearance  Ambulation - Level of Assistance: Contact guard assistance  Distance (ft): 30 Feet (ft)  Assistive Device: Gait belt, Walker, rolling  Interventions: Verbal cues  Interventions: Verbal cues  ASSESSMENT / PLAN:   Active Problems:    Arthritis, septic, knee (HCC) (7/15/2022)         - Continue IV abx per Infectious disease.  - Will continue to monitor patients condition; Patient states he feels better but still with limited ROM of the right knee and mild effusion.  -  DVT prophylaxis- Lovenox 60 mg daily.     Signed By: AGNIESZKA Cary
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
## 2022-07-29 NOTE — PROGRESS NOTES
Comprehensive Nutrition Assessment    Type and Reason for Visit: Reassess    Nutrition Recommendations/Plan:   Add 500 mL 20% lipids 3x/week     Nutrition Assessment:    Chart reviewed; patient continues on TPN + full liquid diet. Lipids have not been added as previously discussed with pharmacy. PerfectServed Dr. Mayuri Pederson to ask for lipids to be added 3x/week to prevent essential fatty acid deficiency. Nutrition Related Findings:    Labs and medications reviewed  BM 7/28    Current Nutrition Intake & Therapies:        ADULT DIET Full Liquid  DIET ONE TIME MESSAGE  TPN ADULT - CENTRAL    Anthropometric Measures:  Height: 5' 9\" (175.3 cm)  Ideal Body Weight (IBW): 160 lbs (73 kg)     Current Body Wt:  63.9 kg (140 lb 14 oz), 88 % IBW. Current BMI (kg/m2): 20.8                          BMI Category: Normal weight (BMI 18.5-24. 9)    Estimated Daily Nutrient Needs:  Energy Requirements Based On: Formula  Weight Used for Energy Requirements: Current  Energy (kcal/day): 7501-7478 kcal (BMR 1530 x 1.2AF +250kcal)  Weight Used for Protein Requirements: Current  Protein (g/day): 83g-96g (1.3-1.5 g/kg bw)  Method Used for Fluid Requirements: 1 ml/kcal  Fluid (ml/day): 1850 mL    Nutrition Diagnosis:   Altered GI function related to altered GI structure as evidenced by nutrition support-parenteral nutrition    Nutrition Interventions:   Food and/or Nutrient Delivery: Continue parenteral nutrition  Nutrition Education/Counseling: No recommendations at this time  Coordination of Nutrition Care: Continue to monitor while inpatient       Goals:  Previous Goal Met: Goal(s) achieved  Goals:  Tolerate nutrition support at goal rate, by next RD assessment       Nutrition Monitoring and Evaluation:   Behavioral-Environmental Outcomes: None identified  Food/Nutrient Intake Outcomes: Parenteral nutrition intake/tolerance  Physical Signs/Symptoms Outcomes: Biochemical data, Weight    Discharge Planning:    Parenteral nutrition    Doloris Laming Kanwal Marie, [address]  Contact: ext [phone]

## 2022-07-29 NOTE — PROGRESS NOTES
ID:      Case d/w Dr. Meaghan Pelaez today. Continue to monitor the right knee - if not improving or worsening in next few days, another evaluation might be needed. Continue broad-spectrum with daptomycin and zosyn for now. Please send blood cultures, CXR with any fever >100.4F, CXR. Due to his other co-morbidities, patient is also at risk for other infections. Also has a rupinder cathter and hence blood cultures must be obtained with any fevers.          Muna Marroquin MD  Infectious Diseases

## 2022-07-30 LAB
GLUCOSE BLD STRIP.AUTO-MCNC: 135 MG/DL (ref 65–117)
GLUCOSE BLD STRIP.AUTO-MCNC: 141 MG/DL (ref 65–117)
SERVICE CMNT-IMP: ABNORMAL
SERVICE CMNT-IMP: ABNORMAL

## 2022-07-30 PROCEDURE — 74011250636 HC RX REV CODE- 250/636: Performed by: STUDENT IN AN ORGANIZED HEALTH CARE EDUCATION/TRAINING PROGRAM

## 2022-07-30 PROCEDURE — 74011250636 HC RX REV CODE- 250/636: Performed by: HOSPITALIST

## 2022-07-30 PROCEDURE — 65270000029 HC RM PRIVATE

## 2022-07-30 PROCEDURE — 74011000258 HC RX REV CODE- 258: Performed by: HOSPITALIST

## 2022-07-30 PROCEDURE — 82962 GLUCOSE BLOOD TEST: CPT

## 2022-07-30 PROCEDURE — 74011250636 HC RX REV CODE- 250/636: Performed by: INTERNAL MEDICINE

## 2022-07-30 PROCEDURE — 74011000258 HC RX REV CODE- 258: Performed by: STUDENT IN AN ORGANIZED HEALTH CARE EDUCATION/TRAINING PROGRAM

## 2022-07-30 PROCEDURE — 74011250636 HC RX REV CODE- 250/636: Performed by: ORTHOPAEDIC SURGERY

## 2022-07-30 PROCEDURE — 74011000250 HC RX REV CODE- 250: Performed by: HOSPITALIST

## 2022-07-30 RX ORDER — HYDROMORPHONE HYDROCHLORIDE 1 MG/ML
0.5 INJECTION, SOLUTION INTRAMUSCULAR; INTRAVENOUS; SUBCUTANEOUS
Status: DISPENSED | OUTPATIENT
Start: 2022-07-30 | End: 2022-08-03

## 2022-07-30 RX ADMIN — HYDROMORPHONE HYDROCHLORIDE 0.5 MG: 1 INJECTION, SOLUTION INTRAMUSCULAR; INTRAVENOUS; SUBCUTANEOUS at 23:51

## 2022-07-30 RX ADMIN — SODIUM CHLORIDE 100 ML/HR: 9 INJECTION, SOLUTION INTRAVENOUS at 19:11

## 2022-07-30 RX ADMIN — HYDROMORPHONE HYDROCHLORIDE 0.5 MG: 1 INJECTION, SOLUTION INTRAMUSCULAR; INTRAVENOUS; SUBCUTANEOUS at 16:56

## 2022-07-30 RX ADMIN — HYDROMORPHONE HYDROCHLORIDE 0.5 MG: 1 INJECTION, SOLUTION INTRAMUSCULAR; INTRAVENOUS; SUBCUTANEOUS at 12:47

## 2022-07-30 RX ADMIN — SODIUM CHLORIDE 100 ML/HR: 9 INJECTION, SOLUTION INTRAVENOUS at 04:40

## 2022-07-30 RX ADMIN — PIPERACILLIN AND TAZOBACTAM 3.38 G: 3; .375 INJECTION, POWDER, LYOPHILIZED, FOR SOLUTION INTRAVENOUS at 09:47

## 2022-07-30 RX ADMIN — MORPHINE SULFATE 2 MG: 4 INJECTION INTRAVENOUS at 09:47

## 2022-07-30 RX ADMIN — ENOXAPARIN SODIUM 60 MG: 100 INJECTION SUBCUTANEOUS at 05:57

## 2022-07-30 RX ADMIN — DAPTOMYCIN 500 MG: 500 INJECTION, POWDER, LYOPHILIZED, FOR SOLUTION INTRAVENOUS at 17:12

## 2022-07-30 RX ADMIN — PIPERACILLIN AND TAZOBACTAM 3.38 G: 3; .375 INJECTION, POWDER, LYOPHILIZED, FOR SOLUTION INTRAVENOUS at 23:44

## 2022-07-30 RX ADMIN — ENOXAPARIN SODIUM 60 MG: 100 INJECTION SUBCUTANEOUS at 16:57

## 2022-07-30 RX ADMIN — MORPHINE SULFATE 2 MG: 4 INJECTION INTRAVENOUS at 03:33

## 2022-07-30 RX ADMIN — SODIUM ACETATE: 164 INJECTION, SOLUTION, CONCENTRATE INTRAVENOUS at 19:09

## 2022-07-30 RX ADMIN — PIPERACILLIN AND TAZOBACTAM 3.38 G: 3; .375 INJECTION, POWDER, LYOPHILIZED, FOR SOLUTION INTRAVENOUS at 16:58

## 2022-07-30 NOTE — PROGRESS NOTES
ORTHO - Progress Note  Post Op day: 8 Days Post-Op    Flori Cintron     [id number]  male    43 y.o.    1980    Admit date:7/15/2022  Date of Surgery:2022   Procedures:Procedure(s):  RIGHT KNEE INCISION AND DRAINAGE  Surgeon:Surgeon(s) and Role:     * Kevin Cano, DO - Primary        SUBJECTIVE:     Flori Cintron is a 43 y.o. male resting in the bed. Patient has complaints of appropriate post-op pain. States he is still having a hard time moving his knee but overall it is improved some. Denies F/C, nausea, vomiting, dizziness, lightheadedness, chest pain, or shortness of breath. OBJECTIVE:       Physical Exam:  General: alert, cooperative, no distress. Gastrointestinal:  non-distended . Cardiovascular: equal pulses in the lower extremities,  Brisk cap refill in all distal extremities   Genitourinary: Voiding independently   Respiratory: No respiratory distress   Neurological:Neurovascular exam within normal limits. Senstion intact: LE bilat. Motor: + DF/PF/EHL. Musculoskeletal: Isaías's sign negative in bilateral lower extremities. Calves soft, supple, non-tender upon palpation or with passive stretch. No sign of DVT. Limited ROM of the right knee to 20 degrees from full flexion. Moderate effusion on exam.  Dressing/Wound:  Clean, dry and intact. No significant erythema or swelling. Vital Signs:       No data found.                                          Temp (24hrs), Av.8 °F (37.7 °C), Min:99.5 °F (37.5 °C), Max:100.1 °F (37.8 °C)        Labs:        Recent Labs     22  0352   HCT 29.0*   HGB 9.1*     PT/OT:        Gait  Base of Support: Widened  Speed/Kay: Pace decreased (<100 feet/min)  Step Length: Left shortened, Right shortened  Swing Pattern: Right asymmetrical  Stance: Right decreased  Gait Abnormalities: Antalgic, Decreased step clearance  Ambulation - Level of Assistance: Contact guard assistance  Distance (ft): 30 Feet (ft)  Assistive Device: Gait belt, yaz Menjivar  Interventions: Verbal cues  Interventions: Verbal cues  ASSESSMENT / PLAN:   Active Problems:    Arthritis, septic, knee ([facility] [address] (7/15/2022)       - Continue IV abx per Infectious disease.  - No growth on cultures. - Will continue to monitor patients condition; Patient states he feels better but still with limited ROM of the right knee and effusion.  - Will attempt aspiration tomorrow for symptomatic relief for the patient; repeat washout unlikely to benefit the patient at this point.  -  DVT prophylaxis- Lovenox 60 mg daily.     Signed By: AGNIESZKA Donaldson
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
## 2022-07-31 LAB
ACC. NO. FROM MICRO ORDER, ACCP: NORMAL
ACINETOBACTER CALCOACETICUS-BAUMANII COMPLEX, ACBCX: NOT DETECTED
APPEARANCE FLD: ABNORMAL
BACTEROIDES FRAGILIS, BFRA: NOT DETECTED
BIOFIRE COMMENT, BCIDPF: NORMAL
C GLABRATA DNA VAG QL NAA+PROBE: NOT DETECTED
CANDIDA ALBICANS: NOT DETECTED
CANDIDA AURIS, CAAU: NOT DETECTED
CANDIDA KRUSEI, CKRP: NOT DETECTED
CANDIDA PARAPSILOSIS, CPAUP: NOT DETECTED
CANDIDA TROPICALIS, CTROP: NOT DETECTED
COLOR FLD: YELLOW
COMMENT, HOLDF: NORMAL
CRYPTO NEOFORMANS/GATTII, CRYNEG: NOT DETECTED
ENTEROBACTER CLOACAE COMPLEX, ECCP: NOT DETECTED
ENTEROBACTERALES SP. , ENBLS: NOT DETECTED
ENTEROCOCCUS FAECALIS, ENFA: NOT DETECTED
ENTEROCOCCUS FAECIUM, ENFAM: NOT DETECTED
ESCHERICHIA COLI: NOT DETECTED
GLUCOSE BLD STRIP.AUTO-MCNC: 110 MG/DL (ref 65–117)
GLUCOSE BLD STRIP.AUTO-MCNC: 117 MG/DL (ref 65–117)
GLUCOSE BLD STRIP.AUTO-MCNC: 129 MG/DL (ref 65–117)
HAEMOPHILUS INFLUENZAE, HMI: NOT DETECTED
KLEBSIELLA AEROGENES, KLAE: NOT DETECTED
KLEBSIELLA OXYTOCA: NOT DETECTED
KLEBSIELLA PNEUMONIAE GROUP, KPPG: NOT DETECTED
LISTERIA MONOCYTOGENES, LMONP: NOT DETECTED
LYMPHOCYTES NFR FLD: 13 %
MONOS+MACROS NFR FLD: 3 %
NEISSERIA MENINGITIDIS, NMNI: NOT DETECTED
NEUTROPHILS NFR FLD: 84 %
NUC CELL # FLD: ABNORMAL /CU MM
PROTEUS, PRP: NOT DETECTED
PSEUDOMONAS AERUGINOSA: NOT DETECTED
RBC # FLD: >100 /CU MM
RESISTANT GENE SPACE, REGENE: NORMAL
SALMONELLA, SALMO: NOT DETECTED
SAMPLES BEING HELD,HOLD: NORMAL
SERRATIA MARCESCENS: NOT DETECTED
SERVICE CMNT-IMP: ABNORMAL
SERVICE CMNT-IMP: NORMAL
SERVICE CMNT-IMP: NORMAL
SPECIMEN SOURCE FLD: ABNORMAL
STAPH EPIDERMIDIS, STEP: NOT DETECTED
STAPH LUGDUNENSIS, STALUG: NOT DETECTED
STAPHYLOCOCCUS AUREUS: NOT DETECTED
STAPHYLOCOCCUS, STAPP: NOT DETECTED
STENO MALTOPHILIA, STMA: NOT DETECTED
STREPTOCOCCUS , STPSP: NOT DETECTED
STREPTOCOCCUS AGALACTIAE (GROUP B): NOT DETECTED
STREPTOCOCCUS PNEUMONIAE , SPNP: NOT DETECTED
STREPTOCOCCUS PYOGENES (GROUP A), SPYOP: NOT DETECTED

## 2022-07-31 PROCEDURE — 74011000250 HC RX REV CODE- 250: Performed by: ORTHOPAEDIC SURGERY

## 2022-07-31 PROCEDURE — 87150 DNA/RNA AMPLIFIED PROBE: CPT

## 2022-07-31 PROCEDURE — 87077 CULTURE AEROBIC IDENTIFY: CPT

## 2022-07-31 PROCEDURE — 36415 COLL VENOUS BLD VENIPUNCTURE: CPT

## 2022-07-31 PROCEDURE — 74011000258 HC RX REV CODE- 258: Performed by: STUDENT IN AN ORGANIZED HEALTH CARE EDUCATION/TRAINING PROGRAM

## 2022-07-31 PROCEDURE — 74011250636 HC RX REV CODE- 250/636: Performed by: STUDENT IN AN ORGANIZED HEALTH CARE EDUCATION/TRAINING PROGRAM

## 2022-07-31 PROCEDURE — 89050 BODY FLUID CELL COUNT: CPT

## 2022-07-31 PROCEDURE — 74011250636 HC RX REV CODE- 250/636: Performed by: HOSPITALIST

## 2022-07-31 PROCEDURE — 65270000029 HC RM PRIVATE

## 2022-07-31 PROCEDURE — 74011250637 HC RX REV CODE- 250/637: Performed by: HOSPITALIST

## 2022-07-31 PROCEDURE — 87040 BLOOD CULTURE FOR BACTERIA: CPT

## 2022-07-31 PROCEDURE — 82962 GLUCOSE BLOOD TEST: CPT

## 2022-07-31 PROCEDURE — 74011000258 HC RX REV CODE- 258: Performed by: HOSPITALIST

## 2022-07-31 PROCEDURE — 74011000250 HC RX REV CODE- 250: Performed by: INTERNAL MEDICINE

## 2022-07-31 PROCEDURE — 74011000250 HC RX REV CODE- 250: Performed by: HOSPITALIST

## 2022-07-31 PROCEDURE — 74011250636 HC RX REV CODE- 250/636: Performed by: INTERNAL MEDICINE

## 2022-07-31 PROCEDURE — 0S9C30Z DRAINAGE OF RIGHT KNEE JOINT WITH DRAINAGE DEVICE, PERCUTANEOUS APPROACH: ICD-10-PCS | Performed by: ORTHOPAEDIC SURGERY

## 2022-07-31 PROCEDURE — 87205 SMEAR GRAM STAIN: CPT

## 2022-07-31 PROCEDURE — 74011250637 HC RX REV CODE- 250/637: Performed by: INTERNAL MEDICINE

## 2022-07-31 RX ORDER — LIDOCAINE HYDROCHLORIDE 10 MG/ML
5 INJECTION INFILTRATION; PERINEURAL ONCE
Status: COMPLETED | OUTPATIENT
Start: 2022-07-31 | End: 2022-07-31

## 2022-07-31 RX ADMIN — HYDROMORPHONE HYDROCHLORIDE 0.5 MG: 1 INJECTION, SOLUTION INTRAMUSCULAR; INTRAVENOUS; SUBCUTANEOUS at 09:08

## 2022-07-31 RX ADMIN — ENOXAPARIN SODIUM 60 MG: 100 INJECTION SUBCUTANEOUS at 18:01

## 2022-07-31 RX ADMIN — SODIUM CHLORIDE, PRESERVATIVE FREE 10 ML: 5 INJECTION INTRAVENOUS at 05:32

## 2022-07-31 RX ADMIN — PIPERACILLIN AND TAZOBACTAM 3.38 G: 3; .375 INJECTION, POWDER, LYOPHILIZED, FOR SOLUTION INTRAVENOUS at 09:03

## 2022-07-31 RX ADMIN — SODIUM CHLORIDE 100 ML/HR: 9 INJECTION, SOLUTION INTRAVENOUS at 05:38

## 2022-07-31 RX ADMIN — SODIUM CHLORIDE 200 MG: 9 INJECTION, SOLUTION INTRAVENOUS at 09:02

## 2022-07-31 RX ADMIN — DAPTOMYCIN 500 MG: 500 INJECTION, POWDER, LYOPHILIZED, FOR SOLUTION INTRAVENOUS at 18:01

## 2022-07-31 RX ADMIN — ACETAMINOPHEN 650 MG: 650 SUPPOSITORY RECTAL at 23:59

## 2022-07-31 RX ADMIN — HYDROMORPHONE HYDROCHLORIDE 0.5 MG: 1 INJECTION, SOLUTION INTRAMUSCULAR; INTRAVENOUS; SUBCUTANEOUS at 13:42

## 2022-07-31 RX ADMIN — FAMOTIDINE: 10 INJECTION, SOLUTION INTRAVENOUS at 19:20

## 2022-07-31 RX ADMIN — HYDROMORPHONE HYDROCHLORIDE 0.5 MG: 1 INJECTION, SOLUTION INTRAMUSCULAR; INTRAVENOUS; SUBCUTANEOUS at 17:29

## 2022-07-31 RX ADMIN — LIDOCAINE HYDROCHLORIDE 5 ML: 10 INJECTION, SOLUTION INFILTRATION; PERINEURAL at 14:35

## 2022-07-31 RX ADMIN — ENOXAPARIN SODIUM 60 MG: 100 INJECTION SUBCUTANEOUS at 05:32

## 2022-07-31 RX ADMIN — HYDROMORPHONE HYDROCHLORIDE 0.5 MG: 1 INJECTION, SOLUTION INTRAMUSCULAR; INTRAVENOUS; SUBCUTANEOUS at 05:32

## 2022-07-31 RX ADMIN — CEFTRIAXONE SODIUM 2 G: 2 INJECTION, POWDER, FOR SOLUTION INTRAMUSCULAR; INTRAVENOUS at 13:35

## 2022-07-31 RX ADMIN — HYDROMORPHONE HYDROCHLORIDE 0.5 MG: 1 INJECTION, SOLUTION INTRAMUSCULAR; INTRAVENOUS; SUBCUTANEOUS at 21:41

## 2022-07-31 NOTE — PROCEDURES
After verbal consent was obtained, patient's right knee was evaluated and the appropriate aspiration site was identified at the superior lateral position. The area was cleaned thoroughly with ChloraPrep and allowed to dry. After this was performed, using a 5 mL syringe with a 25-gauge needle, 5 cc of 1% lidocaine were administered at the injection site. After this was performed, the needle was withdrawn and the site was repacked with another ChloraPrep and allowed to dry again. Using 18-gauge needle on a 50 cc syringe, the right knee was aspirated from the superior lateral position in a sterile fashion. 79 cc of yellow, normal-appearing joint fluid was aspirated (some blood was noted in the last 10 cc of aspiration). The needle was withdrawn. A sterile bandage was applied to the aspiration site. The patient's knee will be redressed by the patient's nurse. This aspirate was transferred for 2 sterile culture tubes, sealed, labeled, and transported to the lab. Patient tolerated the procedure well with no complications.

## 2022-07-31 NOTE — PROGRESS NOTES
ID:    Budding yeast in blood cultures from port 7/29/22. Started on anidulafungin by primary team.   Likely Candidemia with recent Candida albicans in blood 6/13/22. High risk for candidemia given patient is on TPN and has port. Recommendations:  Port catheter will need removal given Fungemia. Will require >6 weeks of antifungals given recurrent Candidemia, septic arthritis. Please check  TTE. Send repeat blood cultures today. Also Send blood cultures after port removal.  Will require line holiday until blood cultures are clear. Continue anidulafungin, continue daptomycin. Stop zosyn and start ceftriaxone. Close monitoring of right knee; Ortho following.           Georgia Alfaro MD  Infectious Diseases

## 2022-07-31 NOTE — PROGRESS NOTES
ORTHO - Progress Note  Post Op day: 9 Days Post-Op    Anupama Guan     [id number]  male    43 y.o.    1980    Admit date:7/15/2022  Date of Surgery:2022   Procedures:Procedure(s):  RIGHT KNEE INCISION AND DRAINAGE  Surgeon:Surgeon(s) and Role:     * Taylor Swenson, DO - Primary        SUBJECTIVE:     Anuapma Guan is a 43 y.o. male resting in the bed. Patient has complaints of appropriate post-op pain. States he is still having a hard time moving his knee but overall it is improved some. Denies F/C, nausea, vomiting, dizziness, lightheadedness, chest pain, or shortness of breath. OBJECTIVE:       Physical Exam:  General: alert, cooperative, no distress. Gastrointestinal:  non-distended . Cardiovascular: equal pulses in the lower extremities,  Brisk cap refill in all distal extremities   Genitourinary: Voiding independently   Respiratory: No respiratory distress   Neurological:Neurovascular exam within normal limits. Senstion intact: LE bilat. Motor: + DF/PF/EHL. Musculoskeletal: Isaías's sign negative in bilateral lower extremities. Calves soft, supple, non-tender upon palpation or with passive stretch. No sign of DVT. Limited ROM of the right knee to 20 degrees from full flexion. Moderate effusion on exam.  Dressing/Wound:  Clean, dry and intact. No significant erythema or swelling. Vital Signs:       Patient Vitals for the past 8 hrs:   BP Temp Pulse Resp SpO2   22 1507 120/86 (!) 101.9 °F (38.8 °C) (!) 115 17 100 %                                            Temp (24hrs), Av.6 °F (38.1 °C), Min:99.4 °F (37.4 °C), Max:101.9 °F (38.8 °C)    Date 22 0700 - 22 0659   Shift 0597-3823 8825-8838 0618-7888 24 Hour Total   INTAKE   P.O. 50   50   I. V.(mL/kg/hr) 500(1)   500   Shift Total(mL/kg) 550(8.6)   550(8.6)   OUTPUT   Urine(mL/kg/hr) 1500(2.9)   1500   Shift Total(mL/kg) 1500(23.5)   1500(23.5)   Weight (kg) 63.9 63.9 63.9 63.9       Labs:        No results for input(s): HCT, HGB, INR, HCTEXT, HGBEXT, INREXT, HCTEXT, HGBEXT, INREXT in the last 72 hours. PT/OT:        Gait  Base of Support: Widened  Speed/Kay: Pace decreased (<100 feet/min)  Step Length: Left shortened, Right shortened  Swing Pattern: Right asymmetrical  Stance: Right decreased  Gait Abnormalities: Antalgic, Decreased step clearance  Ambulation - Level of Assistance: Contact guard assistance  Distance (ft): 30 Feet (ft)  Assistive Device: Gait belt, Walker, rolling  Interventions: Verbal cues  Interventions: Verbal cues  ASSESSMENT / PLAN:   Active Problems:    Arthritis, septic, knee ([facility] [address] (7/15/2022)       - Continue IV abx per Infectious disease.  - No growth on cultures. - Will continue to monitor patients condition; Patient states he feels better but still with limited ROM of the right knee and effusion. -Aspiration performed at bedside today. 79 cc of yellow, normal-appearing joint fluid aspirated. See procedure note for more details. These were sent for cultures. No plans for repeat visit to the OR for the patient's right knee at this time. -  DVT prophylaxis- Lovenox 60 mg daily.     Signed By: AGNIESZKA Daniels
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
## 2022-08-01 ENCOUNTER — APPOINTMENT (OUTPATIENT)
Dept: NON INVASIVE DIAGNOSTICS | Age: 42
DRG: 313 | End: 2022-08-01
Attending: HOSPITALIST
Payer: MEDICAID

## 2022-08-01 LAB
ANION GAP SERPL CALC-SCNC: 9 MMOL/L (ref 5–15)
BUN SERPL-MCNC: 10 MG/DL (ref 6–20)
BUN/CREAT SERPL: 14 (ref 12–20)
CALCIUM SERPL-MCNC: 8.9 MG/DL (ref 8.5–10.1)
CHLORIDE SERPL-SCNC: 102 MMOL/L (ref 97–108)
CO2 SERPL-SCNC: 25 MMOL/L (ref 21–32)
CREAT SERPL-MCNC: 0.7 MG/DL (ref 0.7–1.3)
ECHO AV AREA PEAK VELOCITY: 2.2 CM2
ECHO AV AREA VTI: 2.2 CM2
ECHO AV AREA/BSA PEAK VELOCITY: 1.2 CM2/M2
ECHO AV AREA/BSA VTI: 1.2 CM2/M2
ECHO AV MEAN GRADIENT: 3 MMHG
ECHO AV MEAN VELOCITY: 0.8 M/S
ECHO AV PEAK GRADIENT: 6 MMHG
ECHO AV PEAK VELOCITY: 1.2 M/S
ECHO AV VELOCITY RATIO: 0.92
ECHO AV VTI: 15.6 CM
ECHO LA DIAMETER INDEX: 1.52 CM/M2
ECHO LA DIAMETER: 2.7 CM
ECHO LV E' LATERAL VELOCITY: 17 CM/S
ECHO LV E' SEPTAL VELOCITY: 11 CM/S
ECHO LV FRACTIONAL SHORTENING: 37 % (ref 28–44)
ECHO LV INTERNAL DIMENSION DIASTOLE INDEX: 2.3 CM/M2
ECHO LV INTERNAL DIMENSION DIASTOLIC: 4.1 CM (ref 4.2–5.9)
ECHO LV INTERNAL DIMENSION SYSTOLIC INDEX: 1.46 CM/M2
ECHO LV INTERNAL DIMENSION SYSTOLIC: 2.6 CM
ECHO LV IVSD: 0.8 CM (ref 0.6–1)
ECHO LV MASS 2D: 105.6 G (ref 88–224)
ECHO LV MASS INDEX 2D: 59.3 G/M2 (ref 49–115)
ECHO LV POSTERIOR WALL DIASTOLIC: 0.9 CM (ref 0.6–1)
ECHO LV RELATIVE WALL THICKNESS RATIO: 0.44
ECHO LVOT AREA: 2.5 CM2
ECHO LVOT AV VTI INDEX: 0.9
ECHO LVOT DIAM: 1.8 CM
ECHO LVOT MEAN GRADIENT: 3 MMHG
ECHO LVOT PEAK GRADIENT: 5 MMHG
ECHO LVOT PEAK VELOCITY: 1.1 M/S
ECHO LVOT STROKE VOLUME INDEX: 20.1 ML/M2
ECHO LVOT SV: 35.9 ML
ECHO LVOT VTI: 14.1 CM
ECHO MV A VELOCITY: 0.74 M/S
ECHO MV AREA VTI: 3.1 CM2
ECHO MV E DECELERATION TIME (DT): 68.7 MS
ECHO MV E VELOCITY: 0.59 M/S
ECHO MV E/A RATIO: 0.8
ECHO MV E/E' LATERAL: 3.47
ECHO MV E/E' RATIO (AVERAGED): 4.42
ECHO MV E/E' SEPTAL: 5.36
ECHO MV LVOT VTI INDEX: 0.83
ECHO MV MAX VELOCITY: 0.9 M/S
ECHO MV MEAN GRADIENT: 2 MMHG
ECHO MV MEAN VELOCITY: 0.7 M/S
ECHO MV PEAK GRADIENT: 3 MMHG
ECHO MV VTI: 11.7 CM
GLUCOSE BLD STRIP.AUTO-MCNC: 102 MG/DL (ref 65–117)
GLUCOSE BLD STRIP.AUTO-MCNC: 122 MG/DL (ref 65–117)
GLUCOSE BLD STRIP.AUTO-MCNC: 150 MG/DL (ref 65–117)
GLUCOSE SERPL-MCNC: 99 MG/DL (ref 65–100)
MAGNESIUM SERPL-MCNC: 2.1 MG/DL (ref 1.6–2.4)
PHOSPHATE SERPL-MCNC: 3.4 MG/DL (ref 2.6–4.7)
POTASSIUM SERPL-SCNC: 4.4 MMOL/L (ref 3.5–5.1)
SERVICE CMNT-IMP: ABNORMAL
SERVICE CMNT-IMP: ABNORMAL
SERVICE CMNT-IMP: NORMAL
SODIUM SERPL-SCNC: 136 MMOL/L (ref 136–145)

## 2022-08-01 PROCEDURE — 36415 COLL VENOUS BLD VENIPUNCTURE: CPT

## 2022-08-01 PROCEDURE — 87040 BLOOD CULTURE FOR BACTERIA: CPT

## 2022-08-01 PROCEDURE — 74011000250 HC RX REV CODE- 250: Performed by: INTERNAL MEDICINE

## 2022-08-01 PROCEDURE — 80048 BASIC METABOLIC PNL TOTAL CA: CPT

## 2022-08-01 PROCEDURE — 74011250636 HC RX REV CODE- 250/636: Performed by: HOSPITALIST

## 2022-08-01 PROCEDURE — 87106 FUNGI IDENTIFICATION YEAST: CPT

## 2022-08-01 PROCEDURE — 99233 SBSQ HOSP IP/OBS HIGH 50: CPT | Performed by: STUDENT IN AN ORGANIZED HEALTH CARE EDUCATION/TRAINING PROGRAM

## 2022-08-01 PROCEDURE — 93306 TTE W/DOPPLER COMPLETE: CPT

## 2022-08-01 PROCEDURE — 82962 GLUCOSE BLOOD TEST: CPT

## 2022-08-01 PROCEDURE — 84100 ASSAY OF PHOSPHORUS: CPT

## 2022-08-01 PROCEDURE — 74011000250 HC RX REV CODE- 250: Performed by: HOSPITALIST

## 2022-08-01 PROCEDURE — 74011000258 HC RX REV CODE- 258: Performed by: HOSPITALIST

## 2022-08-01 PROCEDURE — 83735 ASSAY OF MAGNESIUM: CPT

## 2022-08-01 PROCEDURE — 74011250636 HC RX REV CODE- 250/636: Performed by: INTERNAL MEDICINE

## 2022-08-01 PROCEDURE — 65270000029 HC RM PRIVATE

## 2022-08-01 RX ADMIN — SODIUM CHLORIDE 100 MG: 9 INJECTION, SOLUTION INTRAVENOUS at 09:43

## 2022-08-01 RX ADMIN — SODIUM CHLORIDE, PRESERVATIVE FREE 10 ML: 5 INJECTION INTRAVENOUS at 05:48

## 2022-08-01 RX ADMIN — SODIUM CHLORIDE 100 ML/HR: 9 INJECTION, SOLUTION INTRAVENOUS at 06:00

## 2022-08-01 RX ADMIN — MAGNESIUM SULFATE HEPTAHYDRATE: 500 INJECTION, SOLUTION INTRAMUSCULAR; INTRAVENOUS at 18:42

## 2022-08-01 RX ADMIN — HYDROMORPHONE HYDROCHLORIDE 0.5 MG: 1 INJECTION, SOLUTION INTRAMUSCULAR; INTRAVENOUS; SUBCUTANEOUS at 14:14

## 2022-08-01 RX ADMIN — HYDROMORPHONE HYDROCHLORIDE 0.5 MG: 1 INJECTION, SOLUTION INTRAMUSCULAR; INTRAVENOUS; SUBCUTANEOUS at 09:53

## 2022-08-01 RX ADMIN — ENOXAPARIN SODIUM 60 MG: 100 INJECTION SUBCUTANEOUS at 18:42

## 2022-08-01 RX ADMIN — HYDROMORPHONE HYDROCHLORIDE 0.5 MG: 1 INJECTION, SOLUTION INTRAMUSCULAR; INTRAVENOUS; SUBCUTANEOUS at 22:46

## 2022-08-01 RX ADMIN — I.V. FAT EMULSION 500 ML: 20 EMULSION INTRAVENOUS at 21:07

## 2022-08-01 RX ADMIN — HYDROMORPHONE HYDROCHLORIDE 0.5 MG: 1 INJECTION, SOLUTION INTRAMUSCULAR; INTRAVENOUS; SUBCUTANEOUS at 18:54

## 2022-08-01 NOTE — PROGRESS NOTES
Ortho:    Right knee aspirated yesterday 7/31  Cell count 72K  GS with 2 WBCs, No organisms     Follow cultures  WBAT  Mobilize with PT  ICE 20min 4x/day    ABX per ID      Seth Bonilla PA-C

## 2022-08-01 NOTE — PROGRESS NOTES
VAT: Acknowledged PICC request, pt has Telly catheter at the moment, has positive blood culture done 7/31/22. Planning to remove Telly Catheter with PICC line order. Per Dr. Aria Riley patient need central line holiday, no PICC or IJ placement until patient will have negative blood culture. Advised to call VAT if needed PIV line.
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
## 2022-08-01 NOTE — PROGRESS NOTES
Infectious Disease Progress Note             Subjective:   Patient in IR for catheter removal.             Objective:    Vitals:   Reviewed in chart. Physical Exam:  Patient in IR for catheter removal.            Labs:  Recent Results (from the past 24 hour(s))   GLUCOSE, POC    Collection Time: 07/31/22 12:16 PM   Result Value Ref Range    Glucose (POC) 110 65 - 117 mg/dL    Performed by Jax Limon PCT    CELL COUNT, BODY FLUID    Collection Time: 07/31/22  2:22 PM   Result Value Ref Range    BODY FLUID TYPE RIGHT KNEE      FLUID COLOR YELLOW      FLUID APPEARANCE TURBID      FLUID RBC CT. >100 (H) 0 /cu mm    FLUID NUCLEATED CELLS 72,740 /cu mm    FLD NEUTROPHILS 84 (A) NRRE %    FLD LYMPHS 13 (A) NRRE %    FLD MONO/MACROPHAGES 3 (A) NRRE %   CULTURE, BODY FLUID W GRAM STAIN    Collection Time: 07/31/22  2:22 PM    Specimen: Knee Fluid; Body Fluid   Result Value Ref Range    Special Requests: NO SPECIAL REQUESTS      GRAM STAIN 2+ WBCS SEEN      GRAM STAIN NO ORGANISMS SEEN      Culture result: PENDING    SAMPLES BEING HELD    Collection Time: 07/31/22  2:22 PM   Result Value Ref Range    SAMPLES BEING HELD FLUID RD TOP     COMMENT        Add-on orders for these samples will be processed based on acceptable specimen integrity and analyte stability, which may vary by analyte. GLUCOSE, POC    Collection Time: 08/01/22  5:59 AM   Result Value Ref Range    Glucose (POC) 122 (H) 65 - 117 mg/dL    Performed by Jose Prieto (RUBENS RN)              Assessment:  44-year-old male with:     -Acute right knee septic arthritis status post incision and drainage on 7/15/2022 and 7/24/22. Operative gram stain and cultures negative. - Budding yeast in blood cultures from port 7/29/22.     -E. coli and Enterococcus faecalis bacteremia (6/20/2022) and Candida albicans fungemia (6/13/2022) due to infected Telly catheter. Catheter was removed at that time.   Once blood cultures are cleared, new catheter was placed on 6/20/2022. Requirement of catheter is for TPN per surgery. Finished ampicillin and anidulafungin per ID recommendations for 2 weeks on 6/30/2022. -Age indeterminate right brachial DVT found 6/30/2022 on Lovenox. 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 History of multiple abdominal surgeries, with PEG and J-tube. Recommendations:  - S/p bedside repeat aspiration of right knee by Ortho on 7/31/22; 72K nucleated cells seen! Cultures pending. Will likely need repeat washout    - In the context of fungemia, the septic arthritis is likely fungal in etiology. Of note patient did have Candida albicans fungemia in June 2022 due to the [facility] catheter as the risk factor.    -Follow-up speciation and susceptibilities of the yeast in the blood.    -Catheter to be removed today.    -Please send repeat blood cultures after the catheter removal today. These have been ordered.    -Patient will need central line holiday until repeat blood cultures obtained after the catheter removal today are negative for at least 4 to 5 days.    -Discussed with pharmacy regarding TPN administration; might be able to do via peripheral IV lines for a few days. -TTE is pending. Might need HIRA as well depending on clinical course    - Continue anidulafungin for now      -Stop daptomycin and ceftriaxone today. ID will follow. Thank you for the opportunity to participate in the care of this patient. Please contact with questions or concerns.       Khari Raygoza MD  Infectious Diseases

## 2022-08-01 NOTE — PROGRESS NOTES
Nutrition Note    PharmD consulted re: transition to PPN. Chart reviewed. Pt with fungal infection so must be transitioned off of TPN via central line. Lipids contraindicated in presence of fungemia. Recommend transition to 2500mL PPN formula (D10, 4.25% AA) daily; provides 1275kcals/106gPro/250gDextrose. Per discussion with PharmD pt prefers cyclic parenteral nutrition regimen, plan is to try to keep this with new PPN formula. PPN will meet 65% kcal needs and 100% protein needs. Ideally rest of caloric needs could be made up with daily but given contraindication due to fungemia would hold for now. Per chart review pt has had successful wt gain and improvement in his nutrition status over the past several days so would recommend hypocaloric PPN for now, while meeting 100% protein needs and reassess over the next few days. PharmD to work on [facility] while staying within 900mOsm limit. Will continue to monitor pt's case closely.      Electronically signed by Denise Veliz RD, [address]  on 8/1/2022 at 3:53 PM    Contact: ext [phone]

## 2022-08-01 NOTE — PROGRESS NOTES
Transition of Care Plan:     RUR: 24% (high)  Disposition: Home with Home Health ([address] [place] [address] ) [facility] for TPN and ABX therapy. Order to resume TPN [facility] via [employer] [facility] [facility] They do not mix TPN over the weekend. Need a resume order for SN . [facility] also needs order for IV antibiotic . Follow up appointments:PCP  DME needed: None  Transportation at Discharge: Pt's mother will transport at d/c.   Trak or means to access home: Pt has access       IM Medicare Letter: Pt has Medicaid  Is patient a BCPI-A Bundle:  N/A        Caregiver Contact: Gabbie Prow- Mother [phone]  Discharge Caregiver contacted prior to discharge? CM will notify caregiver at d/c. Care Conference needed?:     No    [facility] updated that patient's discharge date was moved to Friday, 8/5/2022. CM will continue to follow.       Colonel Sullivan, AMBARN, RN    Care Management  [phone]

## 2022-08-01 NOTE — PROGRESS NOTES
Physical Therapy note     Pt chart reviewed prior to therapy services. Pt scheduled for catheter removal, will defer and continue to follow back as able.      Giovannidionicio Armenta, PTA

## 2022-08-02 LAB
GLUCOSE BLD STRIP.AUTO-MCNC: 116 MG/DL (ref 65–117)
SERVICE CMNT-IMP: NORMAL

## 2022-08-02 PROCEDURE — 97530 THERAPEUTIC ACTIVITIES: CPT | Performed by: PHYSICAL THERAPIST

## 2022-08-02 PROCEDURE — 74011000258 HC RX REV CODE- 258: Performed by: HOSPITALIST

## 2022-08-02 PROCEDURE — 99233 SBSQ HOSP IP/OBS HIGH 50: CPT | Performed by: STUDENT IN AN ORGANIZED HEALTH CARE EDUCATION/TRAINING PROGRAM

## 2022-08-02 PROCEDURE — 74011000250 HC RX REV CODE- 250: Performed by: HOSPITALIST

## 2022-08-02 PROCEDURE — 74011250636 HC RX REV CODE- 250/636: Performed by: HOSPITALIST

## 2022-08-02 PROCEDURE — 82962 GLUCOSE BLOOD TEST: CPT

## 2022-08-02 PROCEDURE — 74011250636 HC RX REV CODE- 250/636: Performed by: INTERNAL MEDICINE

## 2022-08-02 PROCEDURE — 65270000029 HC RM PRIVATE

## 2022-08-02 RX ORDER — FLUCONAZOLE 2 MG/ML
400 INJECTION, SOLUTION INTRAVENOUS EVERY 24 HOURS
Status: DISCONTINUED | OUTPATIENT
Start: 2022-08-03 | End: 2022-08-11 | Stop reason: HOSPADM

## 2022-08-02 RX ADMIN — HYDROMORPHONE HYDROCHLORIDE 0.5 MG: 1 INJECTION, SOLUTION INTRAMUSCULAR; INTRAVENOUS; SUBCUTANEOUS at 22:17

## 2022-08-02 RX ADMIN — HYDROMORPHONE HYDROCHLORIDE 0.5 MG: 1 INJECTION, SOLUTION INTRAMUSCULAR; INTRAVENOUS; SUBCUTANEOUS at 14:19

## 2022-08-02 RX ADMIN — HYDROMORPHONE HYDROCHLORIDE 0.5 MG: 1 INJECTION, SOLUTION INTRAMUSCULAR; INTRAVENOUS; SUBCUTANEOUS at 09:22

## 2022-08-02 RX ADMIN — ENOXAPARIN SODIUM 60 MG: 100 INJECTION SUBCUTANEOUS at 09:33

## 2022-08-02 RX ADMIN — SODIUM CHLORIDE 100 MG: 9 INJECTION, SOLUTION INTRAVENOUS at 09:32

## 2022-08-02 RX ADMIN — CALCIUM GLUCONATE: 98 INJECTION, SOLUTION INTRAVENOUS at 18:59
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
## 2022-08-02 NOTE — PROGRESS NOTES
Infectious Disease Progress Note             Subjective:   Patient seen today. Reports persistent pain in right knee. Objective:    Vitals:   Reviewed in chart.     Physical Exam:  GEN: NAD  HEENT: Normocephalic, atraumatic, PERRL, no scleral icterus  CV: Hemodynamically stable  Lungs: Breathing comfortably  Abdomen: soft, non distended, non tender  Genitourinary:  no brock  Extremities: no edema  Neuro: Alert, oriented to time, place and situation, moves all extremities to commands, verbal  Skin: Right knee drains in dressing  Psych: good affect, good eye contact, non tearful  Lines: Peripheral IV lines and right chest tunneled catheter           Labs:  Recent Results (from the past 24 hour(s))   GLUCOSE, POC    Collection Time: 08/01/22 11:09 AM   Result Value Ref Range    Glucose (POC) 150 (H) 65 - 117 mg/dL    Performed by Visante, BASIC    Collection Time: 08/01/22  1:15 PM   Result Value Ref Range    Sodium 136 136 - 145 mmol/L    Potassium 4.4 3.5 - 5.1 mmol/L    Chloride 102 97 - 108 mmol/L    CO2 25 21 - 32 mmol/L    Anion gap 9 5 - 15 mmol/L    Glucose 99 65 - 100 mg/dL    BUN 10 6 - 20 MG/DL    Creatinine 0.70 0.70 - 1.30 MG/DL    BUN/Creatinine ratio 14 12 - 20      GFR est AA >60 >60 ml/min/1.73m2    GFR est non-AA >60 >60 ml/min/1.73m2    Calcium 8.9 8.5 - 10.1 MG/DL   MAGNESIUM    Collection Time: 08/01/22  1:15 PM   Result Value Ref Range    Magnesium 2.1 1.6 - 2.4 mg/dL   PHOSPHORUS    Collection Time: 08/01/22  1:15 PM   Result Value Ref Range    Phosphorus 3.4 2.6 - 4.7 MG/DL   CULTURE, BLOOD    Collection Time: 08/01/22  1:50 PM    Specimen: Blood   Result Value Ref Range    Special Requests: NO SPECIAL REQUESTS      Culture result: NO GROWTH AFTER 16 HOURS     CULTURE, BLOOD    Collection Time: 08/01/22  1:50 PM    Specimen: Blood   Result Value Ref Range    Special Requests: NO SPECIAL REQUESTS      Culture result: NO GROWTH AFTER 16 HOURS GLUCOSE, POC    Collection Time: 08/01/22  4:42 PM   Result Value Ref Range    Glucose (POC) 102 65 - 117 mg/dL    Performed by Timbo Sandhu PCT    ECHO ADULT COMPLETE    Collection Time: 08/01/22  4:45 PM   Result Value Ref Range    IVSd 0.8 0.6 - 1.0 cm    LVIDd 4.1 (A) 4.2 - 5.9 cm    LVIDs 2.6 cm    LVOT Diameter 1.8 cm    LVPWd 0.9 0.6 - 1.0 cm    LVOT Peak Gradient 5 mmHg    LVOT Mean Gradient 3 mmHg    LVOT SV 35.9 ml    LVOT Peak Velocity 1.1 m/s    LVOT VTI 14.1 cm    LA Diameter 2.7 cm    AV Area by Peak Velocity 2.2 cm2    AV Area by VTI 2.2 cm2    AV Peak Gradient 6 mmHg    AV Mean Gradient 3 mmHg    AV Peak Velocity 1.2 m/s    AV Mean Velocity 0.8 m/s    AV VTI 15.6 cm    MV A Velocity 0.74 m/s    MV E Wave Deceleration Time 68.7 ms    MV E Velocity 0.59 m/s    LV E' Lateral Velocity 17 cm/s    LV E' Septal Velocity 11 cm/s    MV Area by VTI 3.1 cm2    MV Peak Gradient 3 mmHg    MV Mean Gradient 2 mmHg    MV Max Velocity 0.9 m/s    MV Mean Velocity 0.7 m/s    MV VTI 11.7 cm    Fractional Shortening 2D 37 28 - 44 %    LVIDd Index 2.30 cm/m2    LVIDs Index 1.46 cm/m2    LV RWT Ratio 0.44     LV Mass 2D 105.6 88 - 224 g    LV Mass 2D Index 59.3 49 - 115 g/m2    MV E/A 0.80     E/E' Ratio (Averaged) 4.42     E/E' Lateral 3.47     E/E' Septal 5.36     LVOT Stroke Volume Index 20.1 mL/m2    LVOT Area 2.5 cm2    LA Size Index 1.52 cm/m2    AV Velocity Ratio 0.92     LVOT:AV VTI Index 0.90     TRIPP/BSA VTI 1.2 cm2/m2    TRIPP/BSA Peak Velocity 1.2 cm2/m2    MV:LVOT VTI Index 0.83              Assessment:  70-year-old male with:     -Acute right knee septic arthritis status post incision and drainage on 7/15/2022 and 7/24/22. Operative gram stain and cultures negative. 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     -E. coli and Enterococcus faecalis bacteremia (6/20/2022) and Candida albicans fungemia (6/13/2022) due to infected Telly catheter. Catheter was removed at that time.   Once blood cultures are cleared, new catheter was placed on 6/20/2022. Requirement of catheter is for TPN per surgery. Finished ampicillin and anidulafungin per ID recommendations for 2 weeks on 6/30/2022. -Age indeterminate right brachial DVT found 6/30/2022 on Lovenox. 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 History of multiple abdominal surgeries, with PEG and J-tube. Recommendations:  - D/w Dr. Lyn Zuluaga this morning regarding need for repeat washout as last aspiration had >72K nucleated cells. - S/p bedside repeat aspiration of right knee by Ortho on 7/31/22; 72K nucleated cells seen! Cultures pending. Will likely need repeat washout    - In the context of fungemia, the septic arthritis is likely fungal in etiology. Of note patient did have Candida albicans fungemia in June 2022 due to the [facility] catheter as the risk factor.    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 - Stopped anidulafungin and start fluconazole. -Patient will need central line holiday until repeat blood cultures obtained after the catheter removal today are negative for at least 4 to 5 days.    -Discussed with pharmacy regarding TPN administration; might be able to do via peripheral IV lines for a few days. -TTE is pending. Might need HIRA as well depending on clinical course        -Stop daptomycin and ceftriaxone today. ID will follow. Thank you for the opportunity to participate in the care of this patient. Please contact with questions or concerns.       Dio Tenorio MD  Infectious Diseases
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
## 2022-08-02 NOTE — PROGRESS NOTES
FYI Pt lost IV access for PPN. RN has left a vmail with picc team for assistance with new access.  THANKS

## 2022-08-02 NOTE — WOUND CARE
Wound Care consult for fistula management:  Pt. Has been here for a little over 2 weeks. He is well known to this wound care nurse for the same fistula which has greatly slowed down with the drainage. Patient usually only needs the supplies and he knows how to change the fistula bag. Today the supplies (one box of fistula pouches, 4 marathon packets and 4 ostomy rings). Given to the patient. Patient has just placed his last fistula bag  today and he just needed supplies. Will help as needed.    Nazanin Michael, RN, BSN , [facility]

## 2022-08-03 ENCOUNTER — ANESTHESIA (OUTPATIENT)
Dept: SURGERY | Age: 42
DRG: 313 | End: 2022-08-03
Payer: MEDICAID

## 2022-08-03 ENCOUNTER — ANESTHESIA EVENT (OUTPATIENT)
Dept: SURGERY | Age: 42
DRG: 313 | End: 2022-08-03
Payer: MEDICAID

## 2022-08-03 LAB
ACC. NO. FROM MICRO ORDER, ACCP: NORMAL
ACINETOBACTER CALCOACETICUS-BAUMANII COMPLEX, ACBCX: NOT DETECTED
ALBUMIN SERPL-MCNC: 2.5 G/DL (ref 3.5–5)
ANION GAP SERPL CALC-SCNC: 4 MMOL/L (ref 5–15)
BACTEROIDES FRAGILIS, BFRA: NOT DETECTED
BASOPHILS # BLD: 0 K/UL (ref 0–0.1)
BASOPHILS NFR BLD: 0 % (ref 0–1)
BIOFIRE COMMENT, BCIDPF: NORMAL
BUN SERPL-MCNC: 15 MG/DL (ref 6–20)
BUN/CREAT SERPL: 22 (ref 12–20)
C GLABRATA DNA VAG QL NAA+PROBE: NOT DETECTED
CALCIUM SERPL-MCNC: 9.7 MG/DL (ref 8.5–10.1)
CANDIDA ALBICANS: NOT DETECTED
CANDIDA AURIS, CAAU: NOT DETECTED
CANDIDA KRUSEI, CKRP: NOT DETECTED
CANDIDA PARAPSILOSIS, CPAUP: NOT DETECTED
CANDIDA TROPICALIS, CTROP: NOT DETECTED
CHLORIDE SERPL-SCNC: 101 MMOL/L (ref 97–108)
CO2 SERPL-SCNC: 29 MMOL/L (ref 21–32)
CREAT SERPL-MCNC: 0.68 MG/DL (ref 0.7–1.3)
CRYPTO NEOFORMANS/GATTII, CRYNEG: NOT DETECTED
DIFFERENTIAL METHOD BLD: ABNORMAL
ENTEROBACTER CLOACAE COMPLEX, ECCP: NOT DETECTED
ENTEROBACTERALES SP. , ENBLS: NOT DETECTED
ENTEROCOCCUS FAECALIS, ENFA: NOT DETECTED
ENTEROCOCCUS FAECIUM, ENFAM: NOT DETECTED
EOSINOPHIL # BLD: 0.1 K/UL (ref 0–0.4)
EOSINOPHIL NFR BLD: 1 % (ref 0–7)
ERYTHROCYTE [DISTWIDTH] IN BLOOD BY AUTOMATED COUNT: 17.2 % (ref 11.5–14.5)
ESCHERICHIA COLI: NOT DETECTED
GLUCOSE BLD STRIP.AUTO-MCNC: 105 MG/DL (ref 65–117)
GLUCOSE BLD STRIP.AUTO-MCNC: 111 MG/DL (ref 65–117)
GLUCOSE BLD STRIP.AUTO-MCNC: 115 MG/DL (ref 65–117)
GLUCOSE BLD STRIP.AUTO-MCNC: 131 MG/DL (ref 65–117)
GLUCOSE BLD STRIP.AUTO-MCNC: 94 MG/DL (ref 65–117)
GLUCOSE SERPL-MCNC: 100 MG/DL (ref 65–100)
HAEMOPHILUS INFLUENZAE, HMI: NOT DETECTED
HCT VFR BLD AUTO: 29.3 % (ref 36.6–50.3)
HGB BLD-MCNC: 9.2 G/DL (ref 12.1–17)
IMM GRANULOCYTES # BLD AUTO: 0 K/UL (ref 0–0.04)
IMM GRANULOCYTES NFR BLD AUTO: 0 % (ref 0–0.5)
KLEBSIELLA AEROGENES, KLAE: NOT DETECTED
KLEBSIELLA OXYTOCA: NOT DETECTED
KLEBSIELLA PNEUMONIAE GROUP, KPPG: NOT DETECTED
LISTERIA MONOCYTOGENES, LMONP: NOT DETECTED
LYMPHOCYTES # BLD: 1.7 K/UL (ref 0.8–3.5)
LYMPHOCYTES NFR BLD: 28 % (ref 12–49)
MAGNESIUM SERPL-MCNC: 2.2 MG/DL (ref 1.6–2.4)
MCH RBC QN AUTO: 26.7 PG (ref 26–34)
MCHC RBC AUTO-ENTMCNC: 31.4 G/DL (ref 30–36.5)
MCV RBC AUTO: 85.2 FL (ref 80–99)
MONOCYTES # BLD: 0.8 K/UL (ref 0–1)
MONOCYTES NFR BLD: 12 % (ref 5–13)
NEISSERIA MENINGITIDIS, NMNI: NOT DETECTED
NEUTS SEG # BLD: 3.6 K/UL (ref 1.8–8)
NEUTS SEG NFR BLD: 59 % (ref 32–75)
NRBC # BLD: 0 K/UL (ref 0–0.01)
NRBC BLD-RTO: 0 PER 100 WBC
PHOSPHATE SERPL-MCNC: 4.7 MG/DL (ref 2.6–4.7)
PLATELET # BLD AUTO: 584 K/UL (ref 150–400)
PMV BLD AUTO: 9.8 FL (ref 8.9–12.9)
POTASSIUM SERPL-SCNC: 4.6 MMOL/L (ref 3.5–5.1)
PROTEUS, PRP: NOT DETECTED
PSEUDOMONAS AERUGINOSA: NOT DETECTED
RBC # BLD AUTO: 3.44 M/UL (ref 4.1–5.7)
RESISTANT GENE SPACE, REGENE: NORMAL
SALMONELLA, SALMO: NOT DETECTED
SERRATIA MARCESCENS: NOT DETECTED
SERVICE CMNT-IMP: ABNORMAL
SERVICE CMNT-IMP: NORMAL
SODIUM SERPL-SCNC: 134 MMOL/L (ref 136–145)
STAPH EPIDERMIDIS, STEP: NOT DETECTED
STAPH LUGDUNENSIS, STALUG: NOT DETECTED
STAPHYLOCOCCUS AUREUS: NOT DETECTED
STAPHYLOCOCCUS, STAPP: NOT DETECTED
STENO MALTOPHILIA, STMA: NOT DETECTED
STREPTOCOCCUS , STPSP: NOT DETECTED
STREPTOCOCCUS AGALACTIAE (GROUP B): NOT DETECTED
STREPTOCOCCUS PNEUMONIAE , SPNP: NOT DETECTED
STREPTOCOCCUS PYOGENES (GROUP A), SPYOP: NOT DETECTED
WBC # BLD AUTO: 6.2 K/UL (ref 4.1–11.1)

## 2022-08-03 PROCEDURE — 74011250637 HC RX REV CODE- 250/637: Performed by: HOSPITALIST

## 2022-08-03 PROCEDURE — 36415 COLL VENOUS BLD VENIPUNCTURE: CPT

## 2022-08-03 PROCEDURE — 65270000029 HC RM PRIVATE

## 2022-08-03 PROCEDURE — 74011250636 HC RX REV CODE- 250/636: Performed by: ORTHOPAEDIC SURGERY

## 2022-08-03 PROCEDURE — 0SBC0ZZ EXCISION OF RIGHT KNEE JOINT, OPEN APPROACH: ICD-10-PCS | Performed by: ORTHOPAEDIC SURGERY

## 2022-08-03 PROCEDURE — 74011250636 HC RX REV CODE- 250/636: Performed by: STUDENT IN AN ORGANIZED HEALTH CARE EDUCATION/TRAINING PROGRAM

## 2022-08-03 PROCEDURE — 74011250636 HC RX REV CODE- 250/636: Performed by: NURSE ANESTHETIST, CERTIFIED REGISTERED

## 2022-08-03 PROCEDURE — 82962 GLUCOSE BLOOD TEST: CPT

## 2022-08-03 PROCEDURE — 76210000016 HC OR PH I REC 1 TO 1.5 HR: Performed by: ORTHOPAEDIC SURGERY

## 2022-08-03 PROCEDURE — 74011000258 HC RX REV CODE- 258: Performed by: STUDENT IN AN ORGANIZED HEALTH CARE EDUCATION/TRAINING PROGRAM

## 2022-08-03 PROCEDURE — 80069 RENAL FUNCTION PANEL: CPT

## 2022-08-03 PROCEDURE — 2709999900 HC NON-CHARGEABLE SUPPLY: Performed by: ORTHOPAEDIC SURGERY

## 2022-08-03 PROCEDURE — 76010000138 HC OR TIME 0.5 TO 1 HR: Performed by: ORTHOPAEDIC SURGERY

## 2022-08-03 PROCEDURE — 74011250636 HC RX REV CODE- 250/636: Performed by: NURSE PRACTITIONER

## 2022-08-03 PROCEDURE — 74011250636 HC RX REV CODE- 250/636: Performed by: ANESTHESIOLOGY

## 2022-08-03 PROCEDURE — 74011250636 HC RX REV CODE- 250/636: Performed by: HOSPITALIST

## 2022-08-03 PROCEDURE — 74011000250 HC RX REV CODE- 250: Performed by: NURSE ANESTHETIST, CERTIFIED REGISTERED

## 2022-08-03 PROCEDURE — 77030012406 HC DRN WND PENRS BARD -A: Performed by: ORTHOPAEDIC SURGERY

## 2022-08-03 PROCEDURE — 77030018673: Performed by: ORTHOPAEDIC SURGERY

## 2022-08-03 PROCEDURE — 74011000250 HC RX REV CODE- 250: Performed by: HOSPITALIST

## 2022-08-03 PROCEDURE — 77030008462 HC STPLR SKN PROX J&J -A: Performed by: ORTHOPAEDIC SURGERY

## 2022-08-03 PROCEDURE — 74011000250 HC RX REV CODE- 250: Performed by: ANESTHESIOLOGY

## 2022-08-03 PROCEDURE — 94760 N-INVAS EAR/PLS OXIMETRY 1: CPT

## 2022-08-03 PROCEDURE — 27310 EXPLORATION OF KNEE JOINT: CPT | Performed by: ORTHOPAEDIC SURGERY

## 2022-08-03 PROCEDURE — 74011250636 HC RX REV CODE- 250/636

## 2022-08-03 PROCEDURE — 87205 SMEAR GRAM STAIN: CPT

## 2022-08-03 PROCEDURE — 87116 MYCOBACTERIA CULTURE: CPT

## 2022-08-03 PROCEDURE — 77030038692 HC WND DEB SYS IRMX -B: Performed by: ORTHOPAEDIC SURGERY

## 2022-08-03 PROCEDURE — 76060000032 HC ANESTHESIA 0.5 TO 1 HR: Performed by: ORTHOPAEDIC SURGERY

## 2022-08-03 PROCEDURE — 87102 FUNGUS ISOLATION CULTURE: CPT

## 2022-08-03 PROCEDURE — 74011000250 HC RX REV CODE- 250: Performed by: STUDENT IN AN ORGANIZED HEALTH CARE EDUCATION/TRAINING PROGRAM

## 2022-08-03 PROCEDURE — 74011250636 HC RX REV CODE- 250/636: Performed by: INTERNAL MEDICINE

## 2022-08-03 PROCEDURE — 85025 COMPLETE CBC W/AUTO DIFF WBC: CPT

## 2022-08-03 PROCEDURE — 77030002933 HC SUT MCRYL J&J -A: Performed by: ORTHOPAEDIC SURGERY

## 2022-08-03 PROCEDURE — 83735 ASSAY OF MAGNESIUM: CPT

## 2022-08-03 PROCEDURE — 87106 FUNGI IDENTIFICATION YEAST: CPT

## 2022-08-03 PROCEDURE — 87075 CULTR BACTERIA EXCEPT BLOOD: CPT

## 2022-08-03 RX ORDER — FENTANYL CITRATE 50 UG/ML
25 INJECTION, SOLUTION INTRAMUSCULAR; INTRAVENOUS
Status: DISCONTINUED | OUTPATIENT
Start: 2022-08-03 | End: 2022-08-03 | Stop reason: HOSPADM

## 2022-08-03 RX ORDER — SODIUM CHLORIDE, SODIUM LACTATE, POTASSIUM CHLORIDE, CALCIUM CHLORIDE 600; 310; 30; 20 MG/100ML; MG/100ML; MG/100ML; MG/100ML
25 INJECTION, SOLUTION INTRAVENOUS CONTINUOUS
Status: DISCONTINUED | OUTPATIENT
Start: 2022-08-03 | End: 2022-08-03 | Stop reason: HOSPADM

## 2022-08-03 RX ORDER — MORPHINE SULFATE 2 MG/ML
2 INJECTION, SOLUTION INTRAMUSCULAR; INTRAVENOUS
Status: DISCONTINUED | OUTPATIENT
Start: 2022-08-03 | End: 2022-08-03 | Stop reason: HOSPADM

## 2022-08-03 RX ORDER — HYDROMORPHONE HYDROCHLORIDE 1 MG/ML
.2-.5 INJECTION, SOLUTION INTRAMUSCULAR; INTRAVENOUS; SUBCUTANEOUS
Status: DISCONTINUED | OUTPATIENT
Start: 2022-08-03 | End: 2022-08-03 | Stop reason: HOSPADM

## 2022-08-03 RX ORDER — SODIUM CHLORIDE, SODIUM LACTATE, POTASSIUM CHLORIDE, CALCIUM CHLORIDE 600; 310; 30; 20 MG/100ML; MG/100ML; MG/100ML; MG/100ML
INJECTION, SOLUTION INTRAVENOUS
Status: DISCONTINUED | OUTPATIENT
Start: 2022-08-03 | End: 2022-08-03 | Stop reason: HOSPADM

## 2022-08-03 RX ORDER — DEXAMETHASONE SODIUM PHOSPHATE 4 MG/ML
INJECTION, SOLUTION INTRA-ARTICULAR; INTRALESIONAL; INTRAMUSCULAR; INTRAVENOUS; SOFT TISSUE AS NEEDED
Status: DISCONTINUED | OUTPATIENT
Start: 2022-08-03 | End: 2022-08-03 | Stop reason: HOSPADM

## 2022-08-03 RX ORDER — HYDROMORPHONE HYDROCHLORIDE 1 MG/ML
0.5 INJECTION, SOLUTION INTRAMUSCULAR; INTRAVENOUS; SUBCUTANEOUS ONCE
Status: COMPLETED | OUTPATIENT
Start: 2022-08-03 | End: 2022-08-03

## 2022-08-03 RX ORDER — SODIUM CHLORIDE 0.9 % (FLUSH) 0.9 %
5-40 SYRINGE (ML) INJECTION AS NEEDED
Status: DISCONTINUED | OUTPATIENT
Start: 2022-08-03 | End: 2022-08-03 | Stop reason: HOSPADM

## 2022-08-03 RX ORDER — ONDANSETRON 2 MG/ML
INJECTION INTRAMUSCULAR; INTRAVENOUS AS NEEDED
Status: DISCONTINUED | OUTPATIENT
Start: 2022-08-03 | End: 2022-08-03 | Stop reason: HOSPADM

## 2022-08-03 RX ORDER — LORAZEPAM 2 MG/ML
INJECTION INTRAMUSCULAR
Status: COMPLETED
Start: 2022-08-03 | End: 2022-08-03

## 2022-08-03 RX ORDER — FENTANYL CITRATE 50 UG/ML
INJECTION, SOLUTION INTRAMUSCULAR; INTRAVENOUS AS NEEDED
Status: DISCONTINUED | OUTPATIENT
Start: 2022-08-03 | End: 2022-08-03 | Stop reason: HOSPADM

## 2022-08-03 RX ORDER — ROPIVACAINE HYDROCHLORIDE 5 MG/ML
INJECTION, SOLUTION EPIDURAL; INFILTRATION; PERINEURAL AS NEEDED
Status: DISCONTINUED | OUTPATIENT
Start: 2022-08-03 | End: 2022-08-03 | Stop reason: HOSPADM

## 2022-08-03 RX ORDER — LIDOCAINE HYDROCHLORIDE 10 MG/ML
0.1 INJECTION, SOLUTION EPIDURAL; INFILTRATION; INTRACAUDAL; PERINEURAL AS NEEDED
Status: DISCONTINUED | OUTPATIENT
Start: 2022-08-03 | End: 2022-08-03 | Stop reason: HOSPADM

## 2022-08-03 RX ORDER — DIPHENHYDRAMINE HYDROCHLORIDE 50 MG/ML
12.5 INJECTION, SOLUTION INTRAMUSCULAR; INTRAVENOUS AS NEEDED
Status: DISCONTINUED | OUTPATIENT
Start: 2022-08-03 | End: 2022-08-03 | Stop reason: HOSPADM

## 2022-08-03 RX ORDER — MIDAZOLAM HYDROCHLORIDE 1 MG/ML
INJECTION, SOLUTION INTRAMUSCULAR; INTRAVENOUS AS NEEDED
Status: DISCONTINUED | OUTPATIENT
Start: 2022-08-03 | End: 2022-08-03 | Stop reason: HOSPADM

## 2022-08-03 RX ORDER — LORAZEPAM 2 MG/ML
1 INJECTION INTRAMUSCULAR ONCE
Status: COMPLETED | OUTPATIENT
Start: 2022-08-03 | End: 2022-08-03

## 2022-08-03 RX ORDER — PROPOFOL 10 MG/ML
INJECTION, EMULSION INTRAVENOUS AS NEEDED
Status: DISCONTINUED | OUTPATIENT
Start: 2022-08-03 | End: 2022-08-03 | Stop reason: HOSPADM

## 2022-08-03 RX ORDER — CEFAZOLIN SODIUM 1 G/3ML
INJECTION, POWDER, FOR SOLUTION INTRAMUSCULAR; INTRAVENOUS AS NEEDED
Status: DISCONTINUED | OUTPATIENT
Start: 2022-08-03 | End: 2022-08-03 | Stop reason: HOSPADM

## 2022-08-03 RX ORDER — ONDANSETRON 2 MG/ML
4 INJECTION INTRAMUSCULAR; INTRAVENOUS AS NEEDED
Status: DISCONTINUED | OUTPATIENT
Start: 2022-08-03 | End: 2022-08-03 | Stop reason: HOSPADM

## 2022-08-03 RX ORDER — HYDROMORPHONE HYDROCHLORIDE 2 MG/ML
INJECTION, SOLUTION INTRAMUSCULAR; INTRAVENOUS; SUBCUTANEOUS AS NEEDED
Status: DISCONTINUED | OUTPATIENT
Start: 2022-08-03 | End: 2022-08-03 | Stop reason: HOSPADM

## 2022-08-03 RX ORDER — SODIUM CHLORIDE 0.9 % (FLUSH) 0.9 %
5-40 SYRINGE (ML) INJECTION EVERY 8 HOURS
Status: DISCONTINUED | OUTPATIENT
Start: 2022-08-03 | End: 2022-08-03 | Stop reason: HOSPADM

## 2022-08-03 RX ORDER — DEXMEDETOMIDINE HYDROCHLORIDE 100 UG/ML
INJECTION, SOLUTION INTRAVENOUS AS NEEDED
Status: DISCONTINUED | OUTPATIENT
Start: 2022-08-03 | End: 2022-08-03 | Stop reason: HOSPADM

## 2022-08-03 RX ADMIN — SODIUM CHLORIDE, POTASSIUM CHLORIDE, SODIUM LACTATE AND CALCIUM CHLORIDE: 600; 310; 30; 20 INJECTION, SOLUTION INTRAVENOUS at 11:22

## 2022-08-03 RX ADMIN — ONDANSETRON HYDROCHLORIDE 4 MG: 2 INJECTION, SOLUTION INTRAMUSCULAR; INTRAVENOUS at 12:01

## 2022-08-03 RX ADMIN — MEPERIDINE HYDROCHLORIDE 12.5 MG: 25 INJECTION, SOLUTION INTRAMUSCULAR; INTRAVENOUS; SUBCUTANEOUS at 12:45

## 2022-08-03 RX ADMIN — FENTANYL CITRATE 25 MCG: 50 INJECTION, SOLUTION INTRAMUSCULAR; INTRAVENOUS at 12:00

## 2022-08-03 RX ADMIN — HYDROMORPHONE HYDROCHLORIDE 0.5 MG: 1 INJECTION, SOLUTION INTRAMUSCULAR; INTRAVENOUS; SUBCUTANEOUS at 05:17

## 2022-08-03 RX ADMIN — HYDROMORPHONE HYDROCHLORIDE 0.5 MG: 1 INJECTION, SOLUTION INTRAMUSCULAR; INTRAVENOUS; SUBCUTANEOUS at 21:07

## 2022-08-03 RX ADMIN — FLUCONAZOLE 400 MG: 400 INJECTION, SOLUTION INTRAVENOUS at 14:32

## 2022-08-03 RX ADMIN — HYDROMORPHONE HYDROCHLORIDE 1 MG: 2 INJECTION, SOLUTION INTRAMUSCULAR; INTRAVENOUS; SUBCUTANEOUS at 12:36

## 2022-08-03 RX ADMIN — SODIUM CHLORIDE 100 ML/HR: 9 INJECTION, SOLUTION INTRAVENOUS at 13:25

## 2022-08-03 RX ADMIN — I.V. FAT EMULSION 500 ML: 20 EMULSION INTRAVENOUS at 21:08

## 2022-08-03 RX ADMIN — FENTANYL CITRATE 25 MCG: 50 INJECTION, SOLUTION INTRAMUSCULAR; INTRAVENOUS at 12:50

## 2022-08-03 RX ADMIN — DEXMEDETOMIDINE HYDROCHLORIDE 10 MCG: 100 INJECTION, SOLUTION, CONCENTRATE INTRAVENOUS at 11:49

## 2022-08-03 RX ADMIN — PROPOFOL 50 MCG/KG/MIN: 10 INJECTION, EMULSION INTRAVENOUS at 11:55

## 2022-08-03 RX ADMIN — LORAZEPAM 1 MG: 2 INJECTION INTRAMUSCULAR; INTRAVENOUS at 12:45

## 2022-08-03 RX ADMIN — FENTANYL CITRATE 25 MCG: 50 INJECTION, SOLUTION INTRAMUSCULAR; INTRAVENOUS at 11:49

## 2022-08-03 RX ADMIN — MIDAZOLAM HYDROCHLORIDE 2 MG: 1 INJECTION, SOLUTION INTRAMUSCULAR; INTRAVENOUS at 11:49

## 2022-08-03 RX ADMIN — LORAZEPAM 1 MG: 2 INJECTION INTRAMUSCULAR at 12:45

## 2022-08-03 RX ADMIN — FENTANYL CITRATE 25 MCG: 50 INJECTION, SOLUTION INTRAMUSCULAR; INTRAVENOUS at 12:45

## 2022-08-03 RX ADMIN — MEPERIDINE HYDROCHLORIDE 12.5 MG: 25 INJECTION, SOLUTION INTRAMUSCULAR; INTRAVENOUS; SUBCUTANEOUS at 12:50

## 2022-08-03 RX ADMIN — PROPOFOL 50 MG: 10 INJECTION, EMULSION INTRAVENOUS at 11:54

## 2022-08-03 RX ADMIN — SODIUM CHLORIDE, PRESERVATIVE FREE 10 ML: 5 INJECTION INTRAVENOUS at 13:14

## 2022-08-03 RX ADMIN — HYDROMORPHONE HYDROCHLORIDE 1 MG: 2 INJECTION, SOLUTION INTRAMUSCULAR; INTRAVENOUS; SUBCUTANEOUS at 12:29

## 2022-08-03 RX ADMIN — CEFAZOLIN 2 G: 1 INJECTION, POWDER, FOR SOLUTION INTRAMUSCULAR; INTRAVENOUS; PARENTERAL at 12:03

## 2022-08-03 RX ADMIN — FENTANYL CITRATE 25 MCG: 50 INJECTION, SOLUTION INTRAMUSCULAR; INTRAVENOUS at 13:00

## 2022-08-03 RX ADMIN — FENTANYL CITRATE 25 MCG: 50 INJECTION, SOLUTION INTRAMUSCULAR; INTRAVENOUS at 12:17

## 2022-08-03 RX ADMIN — DEXMEDETOMIDINE HYDROCHLORIDE 6 MCG: 100 INJECTION, SOLUTION, CONCENTRATE INTRAVENOUS at 12:00

## 2022-08-03 RX ADMIN — FENTANYL CITRATE 25 MCG: 50 INJECTION, SOLUTION INTRAMUSCULAR; INTRAVENOUS at 12:10

## 2022-08-03 RX ADMIN — DEXAMETHASONE SODIUM PHOSPHATE 4 MG: 4 INJECTION, SOLUTION INTRAMUSCULAR; INTRAVENOUS at 12:01

## 2022-08-03 RX ADMIN — FENTANYL CITRATE 25 MCG: 50 INJECTION, SOLUTION INTRAMUSCULAR; INTRAVENOUS at 12:55

## 2022-08-03 NOTE — PERIOP NOTES
TRANSFER - IN REPORT:    Verbal report received from Marc Puga on Rosetta Dayhoff  being received from 2139 for ordered procedure      Report consisted of patients Situation, Background, Assessment and   Recommendations(SBAR). Information from the following report(s) SBAR, ED Summary, Procedure Summary, Intake/Output, and MAR was reviewed with the receiving nurse. Opportunity for questions and clarification was provided. Assessment completed upon patients arrival to unit and care assumed.

## 2022-08-03 NOTE — PROGRESS NOTES
Ortho:    Plan for right knee washout, incision and drainage tomorrow, Wed 8/3  Hold Lovenox  NPO after midnight  Verify consent for procedure    Plan per Dr Gilmar Jose PA-C

## 2022-08-03 NOTE — PERIOP NOTES
Irrisept Wound Debridement and Cleansing System  Ref: Maurizio Stamp: [id number] LOT: [id number] Expiration Date: 05/31/2024

## 2022-08-03 NOTE — PROGRESS NOTES
Bedside shift change report given to Jamil dsouza (oncoming nurse) by Poonam Harvey RN (offgoing nurse). Report included the following information SBAR, Kardex, Intake/Output, MAR, and Recent Results    Consent signed and witnessed.

## 2022-08-03 NOTE — PROGRESS NOTES
Transition of Care Plan:     RUR: 25% (high)  Disposition: Home with Home Health ([address] [place] [address] ) [facility] for TPN and ABX therapy. Order to resume TPN sent via [employer] to [facility]. They do not mix TPN over the weekend. Need a resume order for SN . [facility] also needs order for IV antibiotic . Follow up appointments:PCP  DME needed: None  Transportation at Discharge: Pt's mother will transport at d/c.   Hettie Moles or means to access home: Pt has access       IM Medicare Letter: Pt has Medicaid  Is patient a BCPI-A Bundle:  N/A        Caregiver Contact: Hansa Zambrano- Mother [phone]  Discharge Caregiver contacted prior to discharge? CM will notify caregiver at d/c. Care Conference needed?:     No    Patient went down for I&D today, CM will continue to follow.       AMBAR BurdenN, RN    Care Management  [phone]

## 2022-08-03 NOTE — PERIOP NOTES
Irrisept 450mL irrigant placed on back table for irrigation. Ref [id number], lot [id number], exp 2024-05-31.
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
## 2022-08-03 NOTE — PROGRESS NOTES
Comprehensive Nutrition Assessment    Type and Reason for Visit: Reassess    Nutrition Recommendations/Plan:   Continue PPN for now      Nutrition Assessment:    Chart reviewed; patient on a line holiday. Transitioned to PPN for now. Will transition back to TPN when able. Nutrition Related Findings:    Na 134, K+ 4.6, Phos 4.7, --100  BM 7/31  Humalog  Surgical incision     Current Nutrition Intake & Therapies:        DIET ONE TIME MESSAGE  ADULT DIET Regular  TPN ADULT - PERIPHERAL (AA4.25% D10%, 2250 mL - provides 1148 kcal, 96g protein, 225g CHO)    Anthropometric Measures:  Height: 5' 9\" (175.3 cm)  Ideal Body Weight (IBW): 160 lbs (73 kg)     Current Body Wt:  63.5 kg (139 lb 15.9 oz), 87.5 % IBW. Current BMI (kg/m2): 20.7                          BMI Category: Normal weight (BMI 18.5-24. 9)    Estimated Daily Nutrient Needs:  Energy Requirements Based On: Formula  Weight Used for Energy Requirements: Current  Energy (kcal/day): 5953-1975 kcal (BMR 1530 x 1.2AF +250kcal)  Weight Used for Protein Requirements: Current  Protein (g/day): 83g-96g (1.3-1.5 g/kg bw)  Method Used for Fluid Requirements: 1 ml/kcal  Fluid (ml/day): 1850 mL    Nutrition Diagnosis:   Altered GI function related to altered GI structure as evidenced by nutrition support-parenteral nutrition    Nutrition Interventions:   Food and/or Nutrient Delivery: Continue parenteral nutrition  Nutrition Education/Counseling: No recommendations at this time  Coordination of Nutrition Care: Continue to monitor while inpatient       Goals:  Previous Goal Met: Goal(s) achieved  Goals:  Tolerate nutrition support at goal rate, by next RD assessment       Nutrition Monitoring and Evaluation:   Behavioral-Environmental Outcomes: None identified  Food/Nutrient Intake Outcomes: Parenteral nutrition intake/tolerance  Physical Signs/Symptoms Outcomes: Biochemical data, Weight    Discharge Planning:    Parenteral nutrition    Jennifer Parker RD  Contact: ext [phone]

## 2022-08-03 NOTE — PERIOP NOTES
Handoff Report from Operating Room to PACU    Report received from GILBERTO Wu CRNA and Cheryle Kim RN regarding Mortimer Saha. Surgeon(s):  Anirudh Velásquez DO  And Procedure(s) (LRB):  INCISION AND DRAINAGE RIGHT KNEE (Right)  confirmed   with drains and dressings discussed. Anesthesia type, drugs, patient history, complications, estimated blood loss, vital signs, intake and output, and last pain medication, lines, and temperature were reviewed.

## 2022-08-03 NOTE — PROGRESS NOTES
Problem: Falls - Risk of  Goal: *Absence of Falls  Description: Document Remy Whalen Fall Risk and appropriate interventions in the flowsheet. Outcome: Progressing Towards Goal  Note: Fall Risk Interventions:  Mobility Interventions: Bed/chair exit alarm, Patient to call before getting OOB, PT Consult for mobility concerns, Utilize walker, cane, or other assistive device, Utilize gait belt for transfers/ambulation         Medication Interventions: Bed/chair exit alarm, Patient to call before getting OOB, Teach patient to arise slowly, Utilize gait belt for transfers/ambulation    Elimination Interventions: Call light in reach, Patient to call for help with toileting needs    History of Falls Interventions: Bed/chair exit alarm, Utilize gait belt for transfer/ambulation         Problem: Pressure Injury - Risk of  Goal: *Prevention of pressure injury  Description: Document Russell Scale and appropriate interventions in the flowsheet. Outcome: Progressing Towards Goal  Note: Pressure Injury Interventions:  Sensory Interventions: Assess changes in LOC, Check visual cues for pain, Keep linens dry and wrinkle-free, Maintain/enhance activity level, Minimize linen layers, Turn and reposition approx. every two hours (pillows and wedges if needed)    Moisture Interventions: Absorbent underpads, Apply protective barrier, creams and emollients, Minimize layers, Maintain skin hydration (lotion/cream)    Activity Interventions: Increase time out of bed, PT/OT evaluation    Mobility Interventions: HOB 30 degrees or less, PT/OT evaluation, Turn and reposition approx.  every two hours(pillow and wedges)    Nutrition Interventions: Document food/fluid/supplement intake    Friction and Shear Interventions: HOB 30 degrees or less, Minimize layers

## 2022-08-03 NOTE — PROGRESS NOTES
Physical Therapy  Patient off the floor for right knee washout, incision and drainage. Will defer and follow up tmrw for continued mobility progression.   Dara Garner, PT, DPT

## 2022-08-03 NOTE — OP NOTES
Date of procedure: 8/3/22  Preoperative Diagnosis: Septic right knee  Postoperative Diagnosis: Same  Procedure Performed: Arthrotomy with excisional debridement and irrigation, right knee  Surgeon: Richard Ruby DO  Assistant: None  Anesthesia: General  Estimated Blood Loss: 5 cc  Specimens: Cultures, aerobic and anaerobic, fungal  Drains: Medium Hemovac  Complications: None  Implants: None        Operative Indications: This is a 43 y.o. male who had persistent signs and symptoms of septic arthrosis, large effusion and positive cultures on repeat aspiration, indicating need for further irrigation and excisional debridement. We did discuss the risks of surgery which include but are not limited to infection, nerve or blood vessel damage, failure of fixation, failure of any possible implant, need for reoperation, postoperative pain and swelling, intra-or postoperative fracture, postoperative mechanical failure, need for reoperation, implant failure, death, disability, organ dysfunction, wound healing issues, DVT, PE, and the need for further procedures. The patient did freely state their understanding and satisfaction with our discussion. Appropriate medical clearances have been obtained. Description of Procedure:  After the correct site and side were identified by myself in the holding area, the patient was transported to the surgical suite, where, after induction of general anesthesia, the patient was positioned in the supine position. The right knee was then prepped and draped in the usual sterile orthopedic fashion. After appropriate timeout, and confirmation of 2 g of Ancef had been infused prior to any incision, previous incision was utilized, sharp dissection was carried down to fascia. Fascia was incised. Arthrotomy was performed with use of scalpel. Extrusion of moderately inflammatory appearing joint fluid returned. I took cultures.   I then inserted a pulsatile lavage into the joint itself after digital manipulation and blunt dissection. 3 L of normal saline mixed with Betadine were infused throughout the joint. I then utilized a rongeur to the level of bone for synovectomy. Once this was performed, I utilized another 3 L of normal saline mixed with Betadine for lavage. I then placed a medium Hemovac drain exiting laterally. Arthrotomy and skin were closed with use of  2-0 Monocryl and staples were applied. Sterile dressing was applied. Patient was awoken and taken to PACU in stable condition without complication. Postoperative plan: Patient will be weightbearing as tolerated. Infectious diseases been consulted. He will continue his drain until there is no drainage. Should he have return of his symptoms, plan will be to have him proceed with antibiotic spacer placement. Should he succeed with this particular washout, follow-up in 2 weeks for staple removal with no x-rays.

## 2022-08-03 NOTE — PERIOP NOTES
TRANSFER - OUT REPORT:    Verbal report given to Neena BAKER(name) on Xavi Ramos  being transferred to 213(unit) for routine progression of care       Report consisted of patients Situation, Background, Assessment and   Recommendations(SBAR). Information from the following report(s) OR Summary, Intake/Output, and MAR was reviewed with the receiving nurse. Opportunity for questions and clarification was provided.       Patient transported with:   Monitor  Registered Nurse  Tech

## 2022-08-03 NOTE — PERIOP NOTES
Pt. Constantly rating pain 10/10 despite multiple medications given ( OR- 2 mg Versed, 100 mcg Fentanyl, 2 mg Dilaudid, 16 mcg Precedex. PACU- 100 mcg Fentanyl, 25 mg Demerol, 1 mg Ativan). Vital Signs Stable, No obvious signs of distress and patient resting comfortably. Dr. Shipley Chick notified and I will use the FLACC pain scale for the remainder of PACU stay.

## 2022-08-04 LAB
ANIDULAFUNGIN ISLT MIC: NORMAL
ANIDULAFUNGIN ISLT MIC: NORMAL
BACTERIA SPEC CULT: NORMAL
GLUCOSE BLD STRIP.AUTO-MCNC: 108 MG/DL (ref 65–117)
GLUCOSE BLD STRIP.AUTO-MCNC: 120 MG/DL (ref 65–117)
GLUCOSE BLD STRIP.AUTO-MCNC: 129 MG/DL (ref 65–117)
GLUCOSE BLD STRIP.AUTO-MCNC: 82 MG/DL (ref 65–117)
GLUCOSE BLD STRIP.AUTO-MCNC: 97 MG/DL (ref 65–117)
GRAM STN SPEC: NORMAL
GRAM STN SPEC: NORMAL
SERVICE CMNT-IMP: ABNORMAL
SERVICE CMNT-IMP: ABNORMAL
SERVICE CMNT-IMP: NORMAL
SPECIMEN SOURCE: NORMAL
SPECIMEN SOURCE: NORMAL

## 2022-08-04 PROCEDURE — 87040 BLOOD CULTURE FOR BACTERIA: CPT

## 2022-08-04 PROCEDURE — 99233 SBSQ HOSP IP/OBS HIGH 50: CPT | Performed by: STUDENT IN AN ORGANIZED HEALTH CARE EDUCATION/TRAINING PROGRAM

## 2022-08-04 PROCEDURE — 82962 GLUCOSE BLOOD TEST: CPT

## 2022-08-04 PROCEDURE — 36415 COLL VENOUS BLD VENIPUNCTURE: CPT

## 2022-08-04 PROCEDURE — 74011250636 HC RX REV CODE- 250/636: Performed by: STUDENT IN AN ORGANIZED HEALTH CARE EDUCATION/TRAINING PROGRAM

## 2022-08-04 PROCEDURE — 74011000250 HC RX REV CODE- 250: Performed by: STUDENT IN AN ORGANIZED HEALTH CARE EDUCATION/TRAINING PROGRAM

## 2022-08-04 PROCEDURE — 74011250636 HC RX REV CODE- 250/636: Performed by: NURSE PRACTITIONER

## 2022-08-04 PROCEDURE — 74011000258 HC RX REV CODE- 258: Performed by: STUDENT IN AN ORGANIZED HEALTH CARE EDUCATION/TRAINING PROGRAM

## 2022-08-04 PROCEDURE — 65270000029 HC RM PRIVATE

## 2022-08-04 PROCEDURE — 74011250636 HC RX REV CODE- 250/636: Performed by: INTERNAL MEDICINE

## 2022-08-04 RX ORDER — HYDROMORPHONE HYDROCHLORIDE 1 MG/ML
0.5 INJECTION, SOLUTION INTRAMUSCULAR; INTRAVENOUS; SUBCUTANEOUS
Status: DISCONTINUED | OUTPATIENT
Start: 2022-08-04 | End: 2022-08-11 | Stop reason: HOSPADM

## 2022-08-04 RX ADMIN — FLUCONAZOLE 400 MG: 400 INJECTION, SOLUTION INTRAVENOUS at 13:01

## 2022-08-04 RX ADMIN — HYDROMORPHONE HYDROCHLORIDE 0.5 MG: 1 INJECTION, SOLUTION INTRAMUSCULAR; INTRAVENOUS; SUBCUTANEOUS at 22:22

## 2022-08-04 RX ADMIN — CALCIUM GLUCONATE: 98 INJECTION, SOLUTION INTRAVENOUS at 17:17

## 2022-08-04 RX ADMIN — HYDROMORPHONE HYDROCHLORIDE 0.5 MG: 1 INJECTION, SOLUTION INTRAMUSCULAR; INTRAVENOUS; SUBCUTANEOUS at 11:45

## 2022-08-04 RX ADMIN — SODIUM CHLORIDE 100 ML/HR: 9 INJECTION, SOLUTION INTRAVENOUS at 17:15

## 2022-08-04 RX ADMIN — HYDROMORPHONE HYDROCHLORIDE 0.5 MG: 1 INJECTION, SOLUTION INTRAMUSCULAR; INTRAVENOUS; SUBCUTANEOUS at 17:15

## 2022-08-04 NOTE — PROGRESS NOTES
reviewed the patient's chart prior to the visit.  coordinated services with his nurse Yvonne BAKER and JANE MERCHANT. Mr. Sarah Morales was in his bed talking on the phone to his mother. He shared his possible desire to go into a rehabilitation center to improve his health. He shared his living and medical challenges. He shared his love for family and [de-identified] year old son. He thanked the [address] [place] [address] for the visit.  helped him to examine is options and guided him through a life review.  wiped his tears with tissue.  provided a presence, prayer, encouragement and empathetic listening.  advised him of the availability of pastoral care services 24 hours a day as requested. Rev. ALEA Littlejohn.  Froilan Dempsey Paging Service [phone])

## 2022-08-04 NOTE — PROGRESS NOTES
Bedside shift change report given to SAMUEL Mercado (oncoming nurse) by Sushila Ace LPN (offgoing nurse). Report included the following information SBAR, Kardex, Procedure Summary, Intake/Output, MAR, Recent Results, and Med Rec Status.

## 2022-08-04 NOTE — PROGRESS NOTES
Physical Therapy note    Pt chart reviewed and attempted to see for therapy services. Pt has made declined therapy services numerous times, often due to complaints of increased pain. Despite education on importance of increased OOB mobility pt declining pt services. HEP printed out and given to pt, with pt voicing understanding with visual demonstration, unsure of pt compliance with program due to self-limiting behavior. Will defer and continue to follow.     Burgess Kaye, PTA

## 2022-08-04 NOTE — PROGRESS NOTES
Bedside shift change report given to [address] LPN (oncoming nurse) by Mireya Roper RN (offgoing nurse). Report included the following information SBAR, Kardex, Procedure Summary, Intake/Output, MAR, Recent Results, and Med Rec Status. TPN discontinued, Pending central line placement pending ID.

## 2022-08-04 NOTE — PROGRESS NOTES
Received notification from bedside RN about patient with regards to: pain to right knee s/p I&D requesting medication for relief  VS: /71, HR 93, RR 18, O2 sat 100% on RA    Intervention given: Dilaudid 0.5 mg IV x 1 dose ordered    0149: Requesting pain medication, previous Dilaudid PRN   VS: BP 99/66, HR 79, RR 18, O2 sat 100% on RA    - Dilaudid 0.5 mg IV q 6 PRN ordered (previously was q 4 IV PRN)

## 2022-08-04 NOTE — PROGRESS NOTES
MD PerfectServed about discontinued TPN. Per MD, \"Sorry just did it reflexive when I saw it was peripheral but then saw ID approved. I messaged ID to resume. \" Awaiting orders passed to night shift RN.
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
## 2022-08-04 NOTE — PROGRESS NOTES
ORTHO - Progress Note  Post Op day: 1 Day Post-Op    Dena Fuchs     [id number]  male    43 y.o.    1980    Admit date:7/15/2022  Date of Surgery:8/3/2022   Procedures:Procedure(s):  INCISION AND DRAINAGE RIGHT KNEE  Surgeon:Surgeon(s) and Role:     * Remedios Luz, DO - Primary        SUBJECTIVE:     Dena Fuchs is a 43 y.o. male resting in the bed  Patient has complaints of appropriate post-op pain. \"My knee feels stiff, but doesn't hurt too bad\"  Denies F/C, nausea, vomiting, dizziness, lightheadedness, chest pain, or shortness of breath. OBJECTIVE:       Physical Exam:  General: alert, cooperative, no distress. Gastrointestinal:  non-distended . Cardiovascular: equal pulses in the  lower extremities,  Brisk cap refill in all distal extremities   Genitourinary: Voiding independently   Respiratory: No respiratory distress   Neurological:Neurovascular exam within normal limits. Senstion intact: LE bilat. Motor: + DF/PF/EHL. Musculoskeletal: Isaías's sign negative in bilateral lower extremities. Calves soft, supple, non-tender upon palpation or with passive stretch. No sign of DVT  Dressing/Wound:  Drain in place right knee; bloody/serous drainage noted in cannister. Clean, dry and intact. No significant erythema or swelling.     Vital Signs:       Patient Vitals for the past 8 hrs:   BP Temp Pulse Resp SpO2   22 0934 99/69 97.9 °F (36.6 °C) 92 18 100 %   22 0344 100/65 98 °F (36.7 °C) 85 18 100 %                                          Temp (24hrs), Av °F (36.7 °C), Min:97.4 °F (36.3 °C), Max:98.9 °F (37.2 °C)      Labs:        Recent Labs     22  0318   HCT 29.3*   HGB 9.2*     PT/OT:        Gait  Base of Support: Widened  Speed/Kay: Pace decreased (<100 feet/min)  Step Length: Left shortened, Right shortened  Swing Pattern: Right asymmetrical  Stance: Right decreased  Gait Abnormalities: Antalgic, Decreased step clearance  Ambulation - Level of Assistance: Contact guard assistance  Distance (ft): 30 Feet (ft)  Assistive Device: Gait belt, Walker, rolling  Interventions: Verbal cues  Interventions: Verbal cues  ASSESSMENT / PLAN:   Active Problems:    Arthritis, septic, knee ([address] (7/15/2022)         -  Continue PT/OT WBAT  - Continue drain until no drainage noted. - No positive cultures from knee; Budding yeast on blood cultures. -  DVT prophylaxis- SCD w/ Lovenox 60 mg currently held  -  If patient has return of infectious symptoms to [facility] knee; he will need antibiotic spacer placement.  If no worsening, Follow up in 2 weeks for staple removal.    Signed By: AGNIESZKA Mendosa

## 2022-08-04 NOTE — PROGRESS NOTES
Infectious Disease Progress Note             Subjective:   Patient is reporting feeling OK. Objective:    Vitals:   Reviewed in chart. Physical Exam:  GEN: NAD  HEENT: Normocephalic, atraumatic, PERRL, no scleral icterus  CV: Hemodynamically stable  Lungs: Breathing comfortably  Abdomen: soft, non distended, non tender  Genitourinary:  no brock  Extremities: no edema  Neuro: Alert, oriented to time, place and situation, moves all extremities to commands, verbal  Skin: Right knee drains in dressing  Psych: good affect, good eye contact, non tearful  Lines: Peripheral IV lines and right chest tunneled catheter           Labs:  Recent Results (from the past 24 hour(s))   GLUCOSE, POC    Collection Time: 08/03/22 11:12 AM   Result Value Ref Range    Glucose (POC) 94 65 - 117 mg/dL    Performed by Emilee Larios    Collection Time: 08/03/22 12:07 PM    Specimen: Knee ; Wound   Result Value Ref Range    Special Requests: NO SPECIAL REQUESTS      GRAM STAIN OCCASIONAL WBCS SEEN      GRAM STAIN NO ORGANISMS SEEN      Culture result: PENDING    GLUCOSE, POC    Collection Time: 08/03/22  1:12 PM   Result Value Ref Range    Glucose (POC) 111 65 - 117 mg/dL    Performed by Nel Kerr" Ailin    GLUCOSE, POC    Collection Time: 08/03/22  6:41 PM   Result Value Ref Range    Glucose (POC) 131 (H) 65 - 117 mg/dL    Performed by Jessica Noriega PCT    GLUCOSE, POC    Collection Time: 08/04/22 12:58 AM   Result Value Ref Range    Glucose (POC) 129 (H) 65 - 117 mg/dL    Performed by Orlando Haji PCT    GLUCOSE, POC    Collection Time: 08/04/22  6:16 AM   Result Value Ref Range    Glucose (POC) 120 (H) 65 - 117 mg/dL    Performed by Shanna Jo PCT              Assessment:  55-year-old male with:     -Acute right knee septic arthritis status post incision and drainage on 7/15/2022 and 7/24/22. Operative gram stain and cultures negative.     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     -E. coli and Enterococcus faecalis bacteremia (6/20/2022) and Candida albicans fungemia (6/13/2022) due to infected Telly catheter. Catheter was removed at that time. Once blood cultures are cleared, new catheter was placed on 6/20/2022. Requirement of catheter is for TPN per surgery. Finished ampicillin and anidulafungin per ID recommendations for 2 weeks on 6/30/2022. -Age indeterminate right brachial DVT found 6/30/2022 on Lovenox. 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 History of multiple abdominal surgeries, with PEG and J-tube. Recommendations:  - Blood cultures from 8/1/22 also positive for budding yeast 3/4 bottles (these were collected only within an hour from the removal of the Telly catheter and hence are likely positive in the setting of the Telly catheter still being in.). Repeat blood cultures sent on 8/4/2022.    -Continue central line holiday until blood cultures from 8/4/2022 remain negative for at least 5 days.    -Patient is status post third operative washout of the right knee on 8/3/2022. All cultures pending at this time. - In the context of the candidemia, the septic arthritis is likely fungal in etiology. Of note patient did have Candida albicans fungemia in June 2022 due to the [facility] catheter as the risk factor.    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    -Continue fluconazole 400mg IV daily; we stopped anidulafungin given that Candida parapsilosis tends to have higher MARIE's for echinocandins.    -I discussed the case with surgery NP on 8/2/2022; unfortunately patient will be TPN dependent for now and hence the need for a central line is to continue for the TPN once the fungemia has cleared. .  At some point the surgery team has discussed bowel transplant with the patient but patient is to look and research this himself. -TTE on 8/1/2022 showed no vegetations. .  If the candidemia continues on the blood cultures on 8/4/2022 as well, please obtain HIRA.    -Patient needs ophthalmology evaluation given candidemia. - Obtain ECG for QTC monitoring on 8/5/2022. -Weekly CBC with differential and CMP are needed while on the fluconazole. -Duration of antifungal is expected to be around 6 to 8 weeks pending clinical course. ID will follow. Dr. Giuseppe Owusu to resume care from 8/5/22. Thank you for the opportunity to participate in the care of this patient. Please contact with questions or concerns.       Joey Anthony MD  Infectious Diseases

## 2022-08-05 LAB
ANION GAP SERPL CALC-SCNC: 4 MMOL/L (ref 5–15)
BACTERIA SPEC CULT: ABNORMAL
BUN SERPL-MCNC: 12 MG/DL (ref 6–20)
BUN/CREAT SERPL: 18 (ref 12–20)
CALCIUM SERPL-MCNC: 9 MG/DL (ref 8.5–10.1)
CHLORIDE SERPL-SCNC: 108 MMOL/L (ref 97–108)
CO2 SERPL-SCNC: 27 MMOL/L (ref 21–32)
CREAT SERPL-MCNC: 0.65 MG/DL (ref 0.7–1.3)
GLUCOSE BLD STRIP.AUTO-MCNC: 110 MG/DL (ref 65–117)
GLUCOSE BLD STRIP.AUTO-MCNC: 111 MG/DL (ref 65–117)
GLUCOSE BLD STRIP.AUTO-MCNC: 85 MG/DL (ref 65–117)
GLUCOSE BLD STRIP.AUTO-MCNC: 87 MG/DL (ref 65–117)
GLUCOSE BLD STRIP.AUTO-MCNC: 94 MG/DL (ref 65–117)
GLUCOSE SERPL-MCNC: 106 MG/DL (ref 65–100)
MAGNESIUM SERPL-MCNC: 2.3 MG/DL (ref 1.6–2.4)
PHOSPHATE SERPL-MCNC: 3.3 MG/DL (ref 2.6–4.7)
POTASSIUM SERPL-SCNC: 4.4 MMOL/L (ref 3.5–5.1)
SERVICE CMNT-IMP: ABNORMAL
SERVICE CMNT-IMP: NORMAL
SODIUM SERPL-SCNC: 139 MMOL/L (ref 136–145)

## 2022-08-05 PROCEDURE — 74011000250 HC RX REV CODE- 250: Performed by: INTERNAL MEDICINE

## 2022-08-05 PROCEDURE — 36415 COLL VENOUS BLD VENIPUNCTURE: CPT

## 2022-08-05 PROCEDURE — 97116 GAIT TRAINING THERAPY: CPT

## 2022-08-05 PROCEDURE — 74011250636 HC RX REV CODE- 250/636: Performed by: STUDENT IN AN ORGANIZED HEALTH CARE EDUCATION/TRAINING PROGRAM

## 2022-08-05 PROCEDURE — 65270000029 HC RM PRIVATE

## 2022-08-05 PROCEDURE — 84100 ASSAY OF PHOSPHORUS: CPT

## 2022-08-05 PROCEDURE — 74011250636 HC RX REV CODE- 250/636: Performed by: INTERNAL MEDICINE

## 2022-08-05 PROCEDURE — 74011000250 HC RX REV CODE- 250: Performed by: STUDENT IN AN ORGANIZED HEALTH CARE EDUCATION/TRAINING PROGRAM

## 2022-08-05 PROCEDURE — 74011000250 HC RX REV CODE- 250: Performed by: HOSPITALIST

## 2022-08-05 PROCEDURE — 82962 GLUCOSE BLOOD TEST: CPT

## 2022-08-05 PROCEDURE — 74011000258 HC RX REV CODE- 258: Performed by: STUDENT IN AN ORGANIZED HEALTH CARE EDUCATION/TRAINING PROGRAM

## 2022-08-05 PROCEDURE — 80048 BASIC METABOLIC PNL TOTAL CA: CPT

## 2022-08-05 PROCEDURE — 74011250636 HC RX REV CODE- 250/636: Performed by: NURSE PRACTITIONER

## 2022-08-05 PROCEDURE — 83735 ASSAY OF MAGNESIUM: CPT

## 2022-08-05 RX ADMIN — CALCIUM GLUCONATE: 98 INJECTION, SOLUTION INTRAVENOUS at 19:35

## 2022-08-05 RX ADMIN — SODIUM CHLORIDE, PRESERVATIVE FREE 10 ML: 5 INJECTION INTRAVENOUS at 15:10

## 2022-08-05 RX ADMIN — I.V. FAT EMULSION 500 ML: 20 EMULSION INTRAVENOUS at 20:54

## 2022-08-05 RX ADMIN — HYDROMORPHONE HYDROCHLORIDE 0.5 MG: 1 INJECTION, SOLUTION INTRAMUSCULAR; INTRAVENOUS; SUBCUTANEOUS at 21:02

## 2022-08-05 RX ADMIN — HYDROMORPHONE HYDROCHLORIDE 0.5 MG: 1 INJECTION, SOLUTION INTRAMUSCULAR; INTRAVENOUS; SUBCUTANEOUS at 04:33

## 2022-08-05 RX ADMIN — FLUCONAZOLE 400 MG: 400 INJECTION, SOLUTION INTRAVENOUS at 15:04

## 2022-08-05 RX ADMIN — HYDROMORPHONE HYDROCHLORIDE 0.5 MG: 1 INJECTION, SOLUTION INTRAMUSCULAR; INTRAVENOUS; SUBCUTANEOUS at 15:04

## 2022-08-05 RX ADMIN — CALCIUM GLUCONATE: 98 INJECTION, SOLUTION INTRAVENOUS at 18:44

## 2022-08-05 RX ADMIN — ENOXAPARIN SODIUM 60 MG: 100 INJECTION SUBCUTANEOUS at 21:04

## 2022-08-05 RX ADMIN — SODIUM CHLORIDE, PRESERVATIVE FREE 10 ML: 5 INJECTION INTRAVENOUS at 21:02
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
## 2022-08-05 NOTE — PROGRESS NOTES
08/05/22 1231   Hemovac Anterior;Right Knee   Placement Date/Time: 08/03/22 1217   Size: 10 FR  Orientation: Anterior;Right  Drain Location: Knee   Dressing Status Clean, dry, & intact   Drainage Description Sanguinous   Status Charged   Output (ml) 50 ml

## 2022-08-05 NOTE — PROGRESS NOTES
ORTHO - Progress Note  Post Op day: 2 Day Post-Op    Xavi Ramos     [id number]  male    43 y.o.    1980    Admit date:7/15/2022  Date of Surgery:8/3/2022   Procedures:Procedure(s):  INCISION AND DRAINAGE RIGHT KNEE  Surgeon:Surgeon(s) and Role:     * Jaylon Francois, DO - Primary        SUBJECTIVE:     Xavi Ramos is a 43 y.o. male resting in the bed  Patient has complaints of appropriate post-op pain. \"I feel fine today. \"  Denies F/C, nausea, vomiting, dizziness, lightheadedness, chest pain, or shortness of breath. OBJECTIVE:       Physical Exam:  General: alert, cooperative, no distress. Gastrointestinal:  non-distended . Cardiovascular: equal pulses in the  lower extremities,  Brisk cap refill in all distal extremities   Genitourinary: Voiding independently   Respiratory: No respiratory distress   Neurological:Neurovascular exam within normal limits. Senstion intact: LE bilat. Motor: + DF/PF/EHL. Musculoskeletal: Isaías's sign negative in bilateral lower extremities. Calves soft, supple, non-tender upon palpation or with passive stretch. No sign of DVT  Dressing/Wound:  Drain in place right knee; bloody/serous drainage noted in cannister again today. Clean, dry and intact. No significant erythema or swelling.     Vital Signs:       Patient Vitals for the past 8 hrs:   BP Temp Pulse Resp SpO2   22 0829 105/72 97.9 °F (36.6 °C) (!) 112 19 90 %                                          Temp (24hrs), Av.6 °F (36.4 °C), Min:97.4 °F (36.3 °C), Max:97.9 °F (36.6 °C)    Date 22 0700 - 22 0659   Shift 5485-1153 5713-1426 2520-0439 24 Hour Total   INTAKE   Shift Total(mL/kg)       OUTPUT   Drains 50   50   Shift Total(mL/kg) 50(0.8)   50(0.8)   Weight (kg) 63.5 63.5 63.5 63.5     Labs:        Recent Labs     22  0318   HCT 29.3*   HGB 9.2*     PT/OT:        Gait  Base of Support: Widened  Speed/Kay: Pace decreased (<100 feet/min)  Step Length: Left shortened, Right shortened  Swing Pattern: Right asymmetrical  Stance: Right decreased  Gait Abnormalities: Antalgic, Decreased step clearance, Step to gait  Ambulation - Level of Assistance: Stand-by assistance  Distance (ft): 40 Feet (ft)  Assistive Device: Gait belt, Walker, rolling  Interventions: Verbal cues  Interventions: Verbal cues  ASSESSMENT / PLAN:   Active Problems:    Arthritis, septic, knee ([facility] [address] (7/15/2022)       -  Continue PT/OT WBAT  - Continue drain until no drainage noted. 50 ml noted today  - No positive cultures from knee; Budding yeast on blood cultures. -  DVT prophylaxis- SCD w/ Lovenox 60 mg currently held  -  If patient has return of infectious symptoms to his right knee; he will need antibiotic spacer placement.  If no worsening, Follow up in 2 weeks for staple removal.    Signed By: AGNIESZKA Headley

## 2022-08-05 NOTE — PROGRESS NOTES
Problem: Mobility Impaired (Adult and Pediatric)  Goal: *Therapy Goal (Edit Goal, Insert Text)  Description: FUNCTIONAL STATUS PRIOR TO ADMISSION: Patient was independent and active without use of DME. Lived with mother who assisted him some but she works during the day. HOME SUPPORT PRIOR TO ADMISSION: The patient lived with mother but did not require assist.     Physical Therapy Goals  Initiated 7/16/2022  1. Patient will move from supine to sit and sit to supine  in bed with modified independence within 7 day(s). 2.  Patient will transfer from bed to chair and chair to bed with modified independence using the least restrictive device within 7 day(s). 3.  Patient will perform sit to stand with independence within 7 day(s). 4.  Patient will ambulate with supervision/set-up for 50 feet WBATwith the least restrictive device within 7 day(s). 5.  Patient will ascend/descend 3 stairs with 1 handrail(s) with minimal assistance/contact guard assist within 7 day(s). Physical Therapy Goals  Revised 7/26/2022. Goals reviewed 8/2/22 and continue to be appropriate. Continue x 7 days  1. Patient will move from supine to sit and sit to supine  in bed with independence within 7 day(s). 2.  Patient will transfer from bed to chair and chair to bed with supervision/set-up using the least restrictive device within 7 day(s). 3.  Patient will perform sit to stand with independence within 7 day(s). 4.  Patient will ambulate with supervision/set-up for 100 feet with the least restrictive device within 7 day(s). 5.  Patient will ascend/descend 3 stairs with 1 handrail(s) with minimal assistance/contact guard assist within 7 day(s).         Outcome: Progressing Towards Goal   PHYSICAL THERAPY TREATMENT  Patient: Brian Krueger (86 y.o. male)  Date: 8/5/2022  Diagnosis: Arthritis, septic, knee ([facility] [M00.9] <principal problem not specified>  Procedure(s) (LRB):  INCISION AND DRAINAGE RIGHT KNEE (Right) 2 Days Post-Op  Precautions:    Chart, physical therapy assessment, plan of care and goals were reviewed. ASSESSMENT  Patient continues with skilled PT services and is very slowly progressing towards goals. Pt needing max encouragement from nursing however ultimately agreeable to therapy services. Pt is inconsistent with participation while in the acute setting, making a plethora of excuses to not participate however able to move well overall with therapy SBA. Pt transitions to EOB independently and able to ambulate around the room 40ft SBA w/RW and deferring further mobility due to pain. Noted decreased stance on RLE this session and step to gait throughout. Pt initially reports feeling stiff/sore and pain in his right knee prior to ambulation however decreased pain post ambulation. Pt continues to adamantly decline sitting upright despite all attempts of education on benefits/importance of increased OOB mobility, and returned back to supine. Pt remains appropriate for HHPT upon discharge. Current Level of Function Impacting Discharge (mobility/balance): mod-I w/bed mobility, SBA w/transfers, SBA w/RW for gait    Other factors to consider for discharge: independent PLOF, reports has brothers for support at home         PLAN :  Patient continues to benefit from skilled intervention to address the above impairments. Continue treatment per established plan of care. to address goals. Recommendation for discharge: (in order for the patient to meet his/her long term goals)  Physical therapy at least 2 days/week in the home AND ensure assist and/or supervision for safety with mobility & ADL's    This discharge recommendation:  Has been made in collaboration with the attending provider and/or case management    IF patient discharges home will need the following DME: rolling walker       SUBJECTIVE:   Patient stated it actually feels better after a little bit of walking.     OBJECTIVE DATA SUMMARY:   Critical Behavior:  Neurologic State: Alert  Orientation Level: Oriented X4  Cognition: Follows commands     Functional Mobility Training:  Bed Mobility:  Rolling: Modified independent  Supine to Sit: Modified independent  Sit to Supine: Modified independent; Additional time  Scooting: Modified independent    Transfers:  Sit to Stand: Stand-by assistance  Stand to Sit: Stand-by assistance    Balance:  Sitting: Intact  Standing: Impaired  Standing - Static: Good;Constant support  Standing - Dynamic : Fair;Constant support  Ambulation/Gait Training:  Distance (ft): 40 Feet (ft)  Assistive Device: Gait belt;Walker, rolling  Ambulation - Level of Assistance: Stand-by assistance    Gait Abnormalities: Antalgic;Decreased step clearance; Step to gait    Base of Support: Widened  Stance: Right decreased  Speed/Kay: Pace decreased (<100 feet/min)  Step Length: Left shortened;Right shortened    Pain Rating:  Pt did not quantify however reports R knee pain at rest, decreased pain after mobility    Activity Tolerance:   Fair    After treatment patient left in no apparent distress:   Supine in bed, Call bell within reach, and Side rails x 3    COMMUNICATION/COLLABORATION:   The patients plan of care was discussed with: Registered nurse.      Saint Hedge, PTA   Time Calculation: 23 mins

## 2022-08-05 NOTE — PROGRESS NOTES
Bedside shift change report given to Logan Ngo (oncoming nurse) by Lauryn Benito RN (offgoing nurse). Report included the following information SBAR, Kardex, Intake/Output, MAR, Recent Results, and Med Rec Status.

## 2022-08-06 LAB
BACTERIA SPEC CULT: ABNORMAL
GLUCOSE BLD STRIP.AUTO-MCNC: 116 MG/DL (ref 65–117)
GLUCOSE BLD STRIP.AUTO-MCNC: 122 MG/DL (ref 65–117)
GLUCOSE BLD STRIP.AUTO-MCNC: 137 MG/DL (ref 65–117)
SERVICE CMNT-IMP: ABNORMAL
SERVICE CMNT-IMP: NORMAL

## 2022-08-06 PROCEDURE — 74011250637 HC RX REV CODE- 250/637: Performed by: HOSPITALIST

## 2022-08-06 PROCEDURE — 74011000250 HC RX REV CODE- 250: Performed by: INTERNAL MEDICINE

## 2022-08-06 PROCEDURE — 74011250636 HC RX REV CODE- 250/636: Performed by: STUDENT IN AN ORGANIZED HEALTH CARE EDUCATION/TRAINING PROGRAM

## 2022-08-06 PROCEDURE — 82962 GLUCOSE BLOOD TEST: CPT

## 2022-08-06 PROCEDURE — 65270000029 HC RM PRIVATE

## 2022-08-06 PROCEDURE — 74011250636 HC RX REV CODE- 250/636: Performed by: INTERNAL MEDICINE

## 2022-08-06 PROCEDURE — 74011000250 HC RX REV CODE- 250: Performed by: STUDENT IN AN ORGANIZED HEALTH CARE EDUCATION/TRAINING PROGRAM

## 2022-08-06 PROCEDURE — 74011250636 HC RX REV CODE- 250/636: Performed by: NURSE PRACTITIONER

## 2022-08-06 PROCEDURE — 74011000258 HC RX REV CODE- 258: Performed by: STUDENT IN AN ORGANIZED HEALTH CARE EDUCATION/TRAINING PROGRAM

## 2022-08-06 RX ADMIN — HYDROMORPHONE HYDROCHLORIDE 0.5 MG: 1 INJECTION, SOLUTION INTRAMUSCULAR; INTRAVENOUS; SUBCUTANEOUS at 17:51

## 2022-08-06 RX ADMIN — FLUCONAZOLE 400 MG: 400 INJECTION, SOLUTION INTRAVENOUS at 17:44

## 2022-08-06 RX ADMIN — ENOXAPARIN SODIUM 60 MG: 100 INJECTION SUBCUTANEOUS at 21:55

## 2022-08-06 RX ADMIN — SODIUM CHLORIDE, PRESERVATIVE FREE 10 ML: 5 INJECTION INTRAVENOUS at 21:55

## 2022-08-06 RX ADMIN — ENOXAPARIN SODIUM 60 MG: 100 INJECTION SUBCUTANEOUS at 11:09

## 2022-08-06 RX ADMIN — MAGNESIUM SULFATE HEPTAHYDRATE: 500 INJECTION, SOLUTION INTRAMUSCULAR; INTRAVENOUS at 19:48

## 2022-08-06 RX ADMIN — HYDROMORPHONE HYDROCHLORIDE 0.5 MG: 1 INJECTION, SOLUTION INTRAMUSCULAR; INTRAVENOUS; SUBCUTANEOUS at 11:09

## 2022-08-06 RX ADMIN — HYDROMORPHONE HYDROCHLORIDE 0.5 MG: 1 INJECTION, SOLUTION INTRAMUSCULAR; INTRAVENOUS; SUBCUTANEOUS at 03:40
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
## 2022-08-07 LAB
BACTERIA SPEC CULT: ABNORMAL
BACTERIA SPEC CULT: ABNORMAL
BACTERIA SPEC CULT: NORMAL
BACTERIA SPEC CULT: NORMAL
GLUCOSE BLD STRIP.AUTO-MCNC: 104 MG/DL (ref 65–117)
GLUCOSE BLD STRIP.AUTO-MCNC: 125 MG/DL (ref 65–117)
GLUCOSE BLD STRIP.AUTO-MCNC: 97 MG/DL (ref 65–117)
GRAM STN SPEC: NORMAL
GRAM STN SPEC: NORMAL
SERVICE CMNT-IMP: ABNORMAL
SERVICE CMNT-IMP: ABNORMAL
SERVICE CMNT-IMP: NORMAL

## 2022-08-07 PROCEDURE — 74011250636 HC RX REV CODE- 250/636: Performed by: INTERNAL MEDICINE

## 2022-08-07 PROCEDURE — 74011250636 HC RX REV CODE- 250/636: Performed by: NURSE PRACTITIONER

## 2022-08-07 PROCEDURE — 65270000029 HC RM PRIVATE

## 2022-08-07 PROCEDURE — 74011000250 HC RX REV CODE- 250: Performed by: STUDENT IN AN ORGANIZED HEALTH CARE EDUCATION/TRAINING PROGRAM

## 2022-08-07 PROCEDURE — 74011000250 HC RX REV CODE- 250: Performed by: INTERNAL MEDICINE

## 2022-08-07 PROCEDURE — 74011250636 HC RX REV CODE- 250/636: Performed by: STUDENT IN AN ORGANIZED HEALTH CARE EDUCATION/TRAINING PROGRAM

## 2022-08-07 PROCEDURE — 74011000258 HC RX REV CODE- 258: Performed by: STUDENT IN AN ORGANIZED HEALTH CARE EDUCATION/TRAINING PROGRAM

## 2022-08-07 PROCEDURE — 82962 GLUCOSE BLOOD TEST: CPT

## 2022-08-07 RX ADMIN — HYDROMORPHONE HYDROCHLORIDE 0.5 MG: 1 INJECTION, SOLUTION INTRAMUSCULAR; INTRAVENOUS; SUBCUTANEOUS at 01:22

## 2022-08-07 RX ADMIN — MAGNESIUM SULFATE HEPTAHYDRATE: 500 INJECTION, SOLUTION INTRAMUSCULAR; INTRAVENOUS at 20:11

## 2022-08-07 RX ADMIN — SODIUM CHLORIDE, PRESERVATIVE FREE 10 ML: 5 INJECTION INTRAVENOUS at 05:10

## 2022-08-07 RX ADMIN — ENOXAPARIN SODIUM 60 MG: 100 INJECTION SUBCUTANEOUS at 13:00

## 2022-08-07 RX ADMIN — FLUCONAZOLE 400 MG: 400 INJECTION, SOLUTION INTRAVENOUS at 13:06

## 2022-08-07 RX ADMIN — MAGNESIUM SULFATE HEPTAHYDRATE: 500 INJECTION, SOLUTION INTRAMUSCULAR; INTRAVENOUS at 18:49

## 2022-08-07 RX ADMIN — HYDROMORPHONE HYDROCHLORIDE 0.5 MG: 1 INJECTION, SOLUTION INTRAMUSCULAR; INTRAVENOUS; SUBCUTANEOUS at 20:10

## 2022-08-07 RX ADMIN — HYDROMORPHONE HYDROCHLORIDE 0.5 MG: 1 INJECTION, SOLUTION INTRAMUSCULAR; INTRAVENOUS; SUBCUTANEOUS at 13:00

## 2022-08-07 RX ADMIN — ENOXAPARIN SODIUM 60 MG: 100 INJECTION SUBCUTANEOUS at 22:03

## 2022-08-07 NOTE — PROGRESS NOTES
Ortho consult coverage. Pt reports no new problems, still feels like knee is improving, less soreness, more flexibility. Drainage still active, pt reports that poured out from hemovac today is from ~24 hrs and by I&O record is roughly 300cc. He anticipates long term line placement tomorrow, and likely drain removal, understands in dwelling line risks given his prior experiences and reaccumulation of fluid in knee for which he may want to be gentle in activities while still trying to get relocated to home with drain out. He is smiling and eager to make that move today.
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
## 2022-08-08 LAB
AMPHOTERICIN B ISLT MIC: NORMAL UG/ML
ANIDULAFUNGIN ISLT MIC: NORMAL
ANION GAP SERPL CALC-SCNC: 5 MMOL/L (ref 5–15)
BUN SERPL-MCNC: 13 MG/DL (ref 6–20)
BUN/CREAT SERPL: 22 (ref 12–20)
CALCIUM SERPL-MCNC: 9.2 MG/DL (ref 8.5–10.1)
CHLORIDE SERPL-SCNC: 108 MMOL/L (ref 97–108)
CO2 SERPL-SCNC: 25 MMOL/L (ref 21–32)
CREAT SERPL-MCNC: 0.58 MG/DL (ref 0.7–1.3)
FUNGUS ISLT: NORMAL
GLUCOSE BLD STRIP.AUTO-MCNC: 92 MG/DL (ref 65–117)
GLUCOSE SERPL-MCNC: 127 MG/DL (ref 65–100)
ITRACONAZOLE: NORMAL
MAGNESIUM SERPL-MCNC: 2.1 MG/DL (ref 1.6–2.4)
PHOSPHATE SERPL-MCNC: 3.8 MG/DL (ref 2.6–4.7)
POTASSIUM SERPL-SCNC: 4 MMOL/L (ref 3.5–5.1)
SERVICE CMNT-IMP: NORMAL
SODIUM SERPL-SCNC: 138 MMOL/L (ref 136–145)
SOURCE, RSRC79: NORMAL
SOURCE, RSRC81: NORMAL
SPECIMEN SOURCE: NORMAL
SPECIMEN SOURCE: NORMAL
VORICONAZOLE ISLT MIC: NORMAL UG/ML

## 2022-08-08 PROCEDURE — 82962 GLUCOSE BLOOD TEST: CPT

## 2022-08-08 PROCEDURE — 84100 ASSAY OF PHOSPHORUS: CPT

## 2022-08-08 PROCEDURE — 74011250636 HC RX REV CODE- 250/636: Performed by: STUDENT IN AN ORGANIZED HEALTH CARE EDUCATION/TRAINING PROGRAM

## 2022-08-08 PROCEDURE — 74011250636 HC RX REV CODE- 250/636: Performed by: INTERNAL MEDICINE

## 2022-08-08 PROCEDURE — 80048 BASIC METABOLIC PNL TOTAL CA: CPT

## 2022-08-08 PROCEDURE — 97116 GAIT TRAINING THERAPY: CPT

## 2022-08-08 PROCEDURE — 83735 ASSAY OF MAGNESIUM: CPT

## 2022-08-08 PROCEDURE — 97165 OT EVAL LOW COMPLEX 30 MIN: CPT

## 2022-08-08 PROCEDURE — 74011000258 HC RX REV CODE- 258: Performed by: STUDENT IN AN ORGANIZED HEALTH CARE EDUCATION/TRAINING PROGRAM

## 2022-08-08 PROCEDURE — 74011000250 HC RX REV CODE- 250: Performed by: HOSPITALIST

## 2022-08-08 PROCEDURE — 97535 SELF CARE MNGMENT TRAINING: CPT

## 2022-08-08 PROCEDURE — 74011000250 HC RX REV CODE- 250: Performed by: STUDENT IN AN ORGANIZED HEALTH CARE EDUCATION/TRAINING PROGRAM

## 2022-08-08 PROCEDURE — 65270000029 HC RM PRIVATE

## 2022-08-08 PROCEDURE — 74011250636 HC RX REV CODE- 250/636: Performed by: NURSE PRACTITIONER

## 2022-08-08 RX ADMIN — HYDROMORPHONE HYDROCHLORIDE 0.5 MG: 1 INJECTION, SOLUTION INTRAMUSCULAR; INTRAVENOUS; SUBCUTANEOUS at 09:28

## 2022-08-08 RX ADMIN — HYDROMORPHONE HYDROCHLORIDE 0.5 MG: 1 INJECTION, SOLUTION INTRAMUSCULAR; INTRAVENOUS; SUBCUTANEOUS at 15:46

## 2022-08-08 RX ADMIN — ENOXAPARIN SODIUM 60 MG: 100 INJECTION SUBCUTANEOUS at 21:40

## 2022-08-08 RX ADMIN — HYDROMORPHONE HYDROCHLORIDE 0.5 MG: 1 INJECTION, SOLUTION INTRAMUSCULAR; INTRAVENOUS; SUBCUTANEOUS at 21:46

## 2022-08-08 RX ADMIN — POTASSIUM CHLORIDE: 2 INJECTION, SOLUTION, CONCENTRATE INTRAVENOUS at 20:01

## 2022-08-08 RX ADMIN — HYDROMORPHONE HYDROCHLORIDE 0.5 MG: 1 INJECTION, SOLUTION INTRAMUSCULAR; INTRAVENOUS; SUBCUTANEOUS at 02:04

## 2022-08-08 RX ADMIN — FLUCONAZOLE 400 MG: 400 INJECTION, SOLUTION INTRAVENOUS at 16:21

## 2022-08-08 RX ADMIN — ENOXAPARIN SODIUM 60 MG: 100 INJECTION SUBCUTANEOUS at 09:28

## 2022-08-08 NOTE — PROGRESS NOTES
Transition of Care Plan:     RUR: 23% (high)  Disposition: Home with Home Health ([address] [place] [address] ) [facility] for TPN and ABX therapy. Order to resume TPN sent via [employer] to Marzena [facility]. They do not mix TPN over the weekend. Need a resume order for SN . [facility] also needs order for IV antibiotic . Follow up appointments:PCP  DME needed: None  Transportation at Discharge: Pt's mother will transport at d/c.   [employer] or means to access home: Pt has access       IM Medicare Letter: Pt has Medicaid  Is patient a BCPI-A Bundle:  N/A        Caregiver Contact: Toby Wright- Mother [phone]  Discharge Caregiver contacted prior to discharge? CM will notify caregiver at d/c. Care Conference needed?:     No    Patient is needing home PT/OT, previously used [facility]. Patient will need central line replaced prior to discharge home. Marzena Russell [facility] updated regarding JORGE for tomorrow 8/9/2022.       Yuli Rodriguez, AMBARN, RN    Care Management  [phone]

## 2022-08-08 NOTE — PROGRESS NOTES
Problem: Mobility Impaired (Adult and Pediatric)  Goal: *Therapy Goal (Edit Goal, Insert Text)  Description: FUNCTIONAL STATUS PRIOR TO ADMISSION: Patient was independent and active without use of DME. Lived with mother who assisted him some but she works during the day. HOME SUPPORT PRIOR TO ADMISSION: The patient lived with mother but did not require assist.     Physical Therapy Goals  Initiated 7/16/2022  1. Patient will move from supine to sit and sit to supine  in bed with modified independence within 7 day(s). 2.  Patient will transfer from bed to chair and chair to bed with modified independence using the least restrictive device within 7 day(s). 3.  Patient will perform sit to stand with independence within 7 day(s). 4.  Patient will ambulate with supervision/set-up for 50 feet WBATwith the least restrictive device within 7 day(s). 5.  Patient will ascend/descend 3 stairs with 1 handrail(s) with minimal assistance/contact guard assist within 7 day(s). Physical Therapy Goals  Revised 7/26/2022. Goals reviewed 8/2/22 and continue to be appropriate. Continue x 7 days  1. Patient will move from supine to sit and sit to supine  in bed with independence within 7 day(s). 2.  Patient will transfer from bed to chair and chair to bed with supervision/set-up using the least restrictive device within 7 day(s). 3.  Patient will perform sit to stand with independence within 7 day(s). 4.  Patient will ambulate with supervision/set-up for 100 feet with the least restrictive device within 7 day(s). 5.  Patient will ascend/descend 3 stairs with 1 handrail(s) with minimal assistance/contact guard assist within 7 day(s).         Outcome: Progressing Towards Goal   PHYSICAL THERAPY TREATMENT  Patient: Harpreet Archer (32 y.o. male)  Date: 8/8/2022  Diagnosis: Arthritis, septic, knee ([facility] [M00.9] <principal problem not specified>  Procedure(s) (LRB):  INCISION AND DRAINAGE RIGHT KNEE (Right) 5 Days Post-Op  Precautions:    Chart, physical therapy assessment, plan of care and goals were reviewed. ASSESSMENT  Patient continues with skilled PT services and is progressing towards goals. Pt reports no pain while at rest this session, received in supine and agreeable to participate with therapy services. Pt overall continues to present with self-limiting behavior limiting activity tolerance however able to ambulate 60ft CGA with RW. Pt had one instance of loss of balance as pt impulsively lifted RW during gait \"to test how the right leg was,\" needing Jesus assistance to correct. Pt continues to adamantly defer sitting upright despite education on benefits and returned back to supine. Pt remains appropriate for HHPT upon discharge. Current Level of Function Impacting Discharge (mobility/balance): mod-I w/bed mobility, SBA w/transfers, CGA w/RW for gait    Other factors to consider for discharge: has support at home         PLAN :  Patient continues to benefit from skilled intervention to address the above impairments. Continue treatment per established plan of care. to address goals. Recommendation for discharge: (in order for the patient to meet his/her long term goals)  Physical therapy at least 2 days/week in the home     This discharge recommendation:  Has been made in collaboration with the attending provider and/or case management    IF patient discharges home will need the following DME: rolling walker       SUBJECTIVE:   Patient stated i'm just doing what I have to, to go home.     OBJECTIVE DATA SUMMARY:   Critical Behavior:  Neurologic State: Alert  Orientation Level: Oriented X4  Cognition: Follows commands     Functional Mobility Training:  Bed Mobility:  Rolling: Modified independent  Supine to Sit: Modified independent  Sit to Supine: Modified independent  Scooting: Modified independent    Transfers:  Sit to Stand: Stand-by assistance  Stand to Sit: Stand-by assistance    Balance:  Sitting: Intact  Standing: Impaired  Standing - Static: Good;Constant support  Standing - Dynamic : Fair;Good;Constant support  Ambulation/Gait Training:  Distance (ft): 60 Feet (ft)  Assistive Device: Gait belt;Walker, rolling  Ambulation - Level of Assistance: Contact guard assistance    Gait Abnormalities: Antalgic;Decreased step clearance    Base of Support: Widened  Stance: Right decreased  Speed/Kay: Pace decreased (<100 feet/min)  Step Length: Left shortened;Right shortened    Pain Rating:  Pt did not quantify this session, typically reports increased pain with WB'ing    Activity Tolerance:   Fair    After treatment patient left in no apparent distress:   Supine in bed, Call bell within reach, and Side rails x 3    COMMUNICATION/COLLABORATION:   The patients plan of care was discussed with: Registered nurse.      Clarisa Corona PTA   Time Calculation: 12 mins

## 2022-08-08 NOTE — PROGRESS NOTES
Bedside and Verbal shift change report given to [address] [place] [address] (oncoming nurse) by Tana Velez (offgoing nurse). Report included the following information no Pt complaints, no overnight events, SBAR, Kardex, Intake/Output, and Recent Results.
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
## 2022-08-08 NOTE — PROGRESS NOTES
Comprehensive Nutrition Assessment    Type and Reason for Visit: Reassess    Nutrition Recommendations/Plan:   Resume TPN once line able to be placed - recommend AA5% D20% @ 75 mL/hr x 1 hour, increase to 150 mL/hr x 11 hours, decrease to 75 mL/hr x 1 hour + 500 mL 20% lipids 3x/week (provides 2012 kcals, 90g protein, 360g CHO)     Nutrition Assessment:    Chart reviewed; patient continues on a line holiday. PPN ordered in the interim. Okay for central line once blood cultures from 8/4 are negative x 5 days per ID. Lytes and BG WNL. No new weights. Nutrition Related Findings:    K 4.0, Phos 3.8, -864-  BM 8/8  Humalog  Incision     Current Nutrition Intake & Therapies:        DIET ONE TIME MESSAGE  ADULT DIET Regular  TPN ADULT - PERIPHERAL - AA 4.25% D10% @ 75 mL/hr x 1 hour, 150 ml/hr x 14 hours, 75 mL/hr x 1 hour    Anthropometric Measures:  Height: 5' 9\" (175.3 cm)  Ideal Body Weight (IBW): 160 lbs (73 kg)     Current Body Wt:  63.5 kg (139 lb 15.9 oz), 87.5 % IBW. Current BMI (kg/m2): 20.7                          BMI Category: Normal weight (BMI 18.5-24. 9)    Estimated Daily Nutrient Needs:  Energy Requirements Based On: Formula  Weight Used for Energy Requirements: Current  Energy (kcal/day): 1983 kcals (BMR 1525 x 1. 3AF)  Weight Used for Protein Requirements: Current  Protein (g/day): 83-95g (1.3-1.5 g/kg bw)  Method Used for Fluid Requirements: 1 ml/kcal  Fluid (ml/day): 2000 mL    Nutrition Diagnosis:   Altered GI function related to altered GI structure as evidenced by nutrition support-parenteral nutrition    Nutrition Interventions:   Food and/or Nutrient Delivery: Continue parenteral nutrition  Nutrition Education/Counseling: No recommendations at this time  Coordination of Nutrition Care: Continue to monitor while inpatient       Goals:  Previous Goal Met: Progressing toward goal(s)  Goals:  Tolerate nutrition support at goal rate, by next RD assessment       Nutrition Monitoring and Evaluation:   Behavioral-Environmental Outcomes: None identified  Food/Nutrient Intake Outcomes: Parenteral nutrition intake/tolerance  Physical Signs/Symptoms Outcomes: Biochemical data, Weight    Discharge Planning:    Parenteral nutrition    Beverly Jaffe RD  Contact: ext [phone]

## 2022-08-08 NOTE — PROGRESS NOTES
ORTHO - Progress Note  Post Op day: 5 Days Post-Op    Chanda Ernandez     [id number]  male    43 y.o.    1980    Admit date:7/15/2022  Date of Surgery:8/3/2022   Procedures:Procedure(s):  INCISION AND DRAINAGE RIGHT KNEE  Surgeon:Surgeon(s) and Role:     * Stefan Gomez, DO - Primary        SUBJECTIVE:     Chanda Ernandez is a 43 y.o. male resting in the bed. Patient states he feels much better than he has been. \"It even felt okay trying to walk on it\"   Denies F/C, nausea, vomiting, dizziness, lightheadedness, chest pain, or shortness of breath. OBJECTIVE:       Physical Exam:  General: alert, cooperative, no distress. Gastrointestinal:  non-distended . Cardiovascular: equal pulses in the lower extremities,  Brisk cap refill in all distal extremities   Genitourinary: Voiding independently   Respiratory: No respiratory distress   Neurological:Neurovascular exam within normal limits. Senstion intact: LE bilat. Motor: + DF/PF/EHL. Musculoskeletal: Isaías's sign negative in bilateral lower extremities. Calves soft, supple, non-tender upon palpation or with passive stretch. AROM of right knee significantly improving. No obvious effusion on exam.  Dressing/Wound:  Clean, dry and intact. Drain intact Right knee. No significant erythema or swelling. Vital Signs:       Patient Vitals for the past 8 hrs:   BP Temp Pulse Resp SpO2   22 0823 101/68 98.5 °F (36.9 °C) 93 18 100 %                                          Temp (24hrs), Av.3 °F (36.8 °C), Min:98.1 °F (36.7 °C), Max:98.5 °F (36.9 °C)      Labs:      No results for input(s): HCT, HGB, INR, HCTEXT, HGBEXT, INREXT in the last 72 hours.   PT/OT:        Gait  Base of Support: Widened  Speed/Kay: Pace decreased (<100 feet/min)  Step Length: Left shortened, Right shortened  Swing Pattern: Right asymmetrical  Stance: Right decreased  Gait Abnormalities: Antalgic, Decreased step clearance  Ambulation - Level of Assistance: Contact guard assistance  Distance (ft): 60 Feet (ft)  Assistive Device: Gait belt, Walker, rolling  Interventions: Verbal cues  Interventions: Verbal cues  ASSESSMENT / PLAN:   Active Problems:    Arthritis, septic, knee ([address] (7/15/2022)         -  Continue PT/OT WBAT  - Drain only putting of 10 cc from right knee; discontinued today with no concerns. Sterile 4x4s applied to area for any bleeding. ACE bandage reapplied to knee.   -  DVT prophylaxis- SCD w/ Lovenox  -  DC planning - Pending    Signed By: AGNIESZKA aLm
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
## 2022-08-09 LAB
ANION GAP SERPL CALC-SCNC: 6 MMOL/L (ref 5–15)
BACTERIA SPEC CULT: ABNORMAL
BACTERIA SPEC CULT: ABNORMAL
BUN SERPL-MCNC: 14 MG/DL (ref 6–20)
BUN/CREAT SERPL: 23 (ref 12–20)
CALCIUM SERPL-MCNC: 9.4 MG/DL (ref 8.5–10.1)
CHLORIDE SERPL-SCNC: 107 MMOL/L (ref 97–108)
CO2 SERPL-SCNC: 26 MMOL/L (ref 21–32)
CREAT SERPL-MCNC: 0.61 MG/DL (ref 0.7–1.3)
GLUCOSE BLD STRIP.AUTO-MCNC: 104 MG/DL (ref 65–117)
GLUCOSE BLD STRIP.AUTO-MCNC: 108 MG/DL (ref 65–117)
GLUCOSE BLD STRIP.AUTO-MCNC: 130 MG/DL (ref 65–117)
GLUCOSE BLD STRIP.AUTO-MCNC: 93 MG/DL (ref 65–117)
GLUCOSE SERPL-MCNC: 119 MG/DL (ref 65–100)
MAGNESIUM SERPL-MCNC: 2.2 MG/DL (ref 1.6–2.4)
POTASSIUM SERPL-SCNC: 4.3 MMOL/L (ref 3.5–5.1)
SERVICE CMNT-IMP: ABNORMAL
SERVICE CMNT-IMP: ABNORMAL
SERVICE CMNT-IMP: NORMAL
SODIUM SERPL-SCNC: 139 MMOL/L (ref 136–145)

## 2022-08-09 PROCEDURE — 82962 GLUCOSE BLOOD TEST: CPT

## 2022-08-09 PROCEDURE — 74011250637 HC RX REV CODE- 250/637: Performed by: HOSPITALIST

## 2022-08-09 PROCEDURE — 74011250636 HC RX REV CODE- 250/636: Performed by: STUDENT IN AN ORGANIZED HEALTH CARE EDUCATION/TRAINING PROGRAM

## 2022-08-09 PROCEDURE — 65270000029 HC RM PRIVATE

## 2022-08-09 PROCEDURE — 83735 ASSAY OF MAGNESIUM: CPT

## 2022-08-09 PROCEDURE — 36415 COLL VENOUS BLD VENIPUNCTURE: CPT

## 2022-08-09 PROCEDURE — 80048 BASIC METABOLIC PNL TOTAL CA: CPT

## 2022-08-09 PROCEDURE — 74011000258 HC RX REV CODE- 258: Performed by: STUDENT IN AN ORGANIZED HEALTH CARE EDUCATION/TRAINING PROGRAM

## 2022-08-09 PROCEDURE — 99233 SBSQ HOSP IP/OBS HIGH 50: CPT | Performed by: INTERNAL MEDICINE

## 2022-08-09 PROCEDURE — 74011250636 HC RX REV CODE- 250/636: Performed by: NURSE PRACTITIONER

## 2022-08-09 PROCEDURE — 74011250636 HC RX REV CODE- 250/636: Performed by: INTERNAL MEDICINE

## 2022-08-09 PROCEDURE — 74011000250 HC RX REV CODE- 250: Performed by: STUDENT IN AN ORGANIZED HEALTH CARE EDUCATION/TRAINING PROGRAM

## 2022-08-09 RX ADMIN — POTASSIUM CHLORIDE: 2 INJECTION, SOLUTION, CONCENTRATE INTRAVENOUS at 20:05

## 2022-08-09 RX ADMIN — FLUCONAZOLE 400 MG: 400 INJECTION, SOLUTION INTRAVENOUS at 14:23

## 2022-08-09 RX ADMIN — HYDROMORPHONE HYDROCHLORIDE 0.5 MG: 1 INJECTION, SOLUTION INTRAMUSCULAR; INTRAVENOUS; SUBCUTANEOUS at 06:09

## 2022-08-09 RX ADMIN — HYDROMORPHONE HYDROCHLORIDE 0.5 MG: 1 INJECTION, SOLUTION INTRAMUSCULAR; INTRAVENOUS; SUBCUTANEOUS at 14:23

## 2022-08-09 RX ADMIN — ENOXAPARIN SODIUM 60 MG: 100 INJECTION SUBCUTANEOUS at 20:29

## 2022-08-09 RX ADMIN — HYDROMORPHONE HYDROCHLORIDE 0.5 MG: 1 INJECTION, SOLUTION INTRAMUSCULAR; INTRAVENOUS; SUBCUTANEOUS at 20:29

## 2022-08-09 RX ADMIN — ENOXAPARIN SODIUM 60 MG: 100 INJECTION SUBCUTANEOUS at 10:08

## 2022-08-09 NOTE — PROGRESS NOTES
Went to administer Pt's lipids, Pt stated that he did not want his lipids run with his TPN because his peripheral IV would blow and he was tired of getting stuck repeatedly. Pt stated that he wanted half his TPN run, then his lipids run, then the other half of his TPN completed. RN explained the need for lipids, its compatibility with TPN, and the need for nutritional coverage, but patient again refused. RN notified pharmacy, pharmacy confirmed that lipids could be run through a separate PIV ONLY for lipids if it was not run with TPN but TPN could not be stopped and restarted again. MD notified, will notify AM RN.

## 2022-08-09 NOTE — PROGRESS NOTES
ORTHO - Progress Note  Post Op day: 6 Days Post-Op    Joanne Omalley     [id number]  male    43 y.o.    1980    Admit date:7/15/2022  Date of Surgery:8/3/2022   Procedures:Procedure(s):  INCISION AND DRAINAGE RIGHT KNEE  Surgeon:Surgeon(s) and Role:     * Kirstin Fournier, DO - Primary        SUBJECTIVE:     Joanne Omalley is a 43 y.o. male resting in the bed. Patient states he feels much better than he has been. \"I just want to be able to go home\"  Denies F/C, nausea, vomiting, dizziness, lightheadedness, chest pain, or shortness of breath. OBJECTIVE:       Physical Exam:  General: alert, cooperative, no distress. Gastrointestinal:  non-distended . Cardiovascular: equal pulses in the lower extremities,  Brisk cap refill in all distal extremities   Genitourinary: Voiding independently   Respiratory: No respiratory distress   Neurological:Neurovascular exam within normal limits. Senstion intact: LE bilat. Motor: + DF/PF/EHL. Musculoskeletal: Isaías's sign negative in bilateral lower extremities. Calves soft, supple, non-tender upon palpation or with passive stretch. AROM of right knee significantly improving. No obvious effusion on exam.  Dressing/Wound:  Clean, dry and intact. Drain intact Right knee. No significant erythema or swelling. Vital Signs:       Patient Vitals for the past 8 hrs:   BP Temp Pulse Resp SpO2   22 0826 115/74 97.8 °F (36.6 °C) (!) 111 18 100 %                                          Temp (24hrs), Av.1 °F (36.7 °C), Min:97.8 °F (36.6 °C), Max:98.2 °F (36.8 °C)      Labs:      No results for input(s): HCT, HGB, INR, HCTEXT, HGBEXT, INREXT, HCTEXT, HGBEXT, INREXT in the last 72 hours.   PT/OT:        Gait  Base of Support: Widened  Speed/Kay: Pace decreased (<100 feet/min)  Step Length: Left shortened, Right shortened  Swing Pattern: Right asymmetrical  Stance: Right decreased  Gait Abnormalities: Antalgic, Decreased step clearance  Ambulation - Level of Assistance: Contact guard assistance  Distance (ft): 60 Feet (ft)  Assistive Device: Gait belt, Walker, rolling  Interventions: Verbal cues  Interventions: Verbal cues  ASSESSMENT / PLAN:   Active Problems:    Arthritis, septic, knee ([address] (7/15/2022)       -  Continue PT/OT WBAT  -Fungal cultures from right knee showing rare Candida albicans.  -No plans for further surgical intervention at this time. Patient is doing much better clinically. Further surgeries at this point would likely cause joint/cartilage damage if attempted.   -  DVT prophylaxis- SCD w/ Lovenox  -  DC planning - Pending    Signed By: AGNIESZKA Avalos
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
## 2022-08-10 ENCOUNTER — APPOINTMENT (OUTPATIENT)
Dept: INTERVENTIONAL RADIOLOGY/VASCULAR | Age: 42
DRG: 313 | End: 2022-08-10
Attending: INTERNAL MEDICINE
Payer: MEDICAID

## 2022-08-10 LAB
BACTERIA SPEC CULT: NORMAL
BACTERIA SPEC CULT: NORMAL
PHOSPHATE SERPL-MCNC: 4 MG/DL (ref 2.6–4.7)
SERVICE CMNT-IMP: NORMAL
SERVICE CMNT-IMP: NORMAL

## 2022-08-10 PROCEDURE — 77030010507 HC ADH SKN DERMBND J&J -B

## 2022-08-10 PROCEDURE — 0JH63XZ INSERTION OF TUNNELED VASCULAR ACCESS DEVICE INTO CHEST SUBCUTANEOUS TISSUE AND FASCIA, PERCUTANEOUS APPROACH: ICD-10-PCS | Performed by: RADIOLOGY

## 2022-08-10 PROCEDURE — 02HV33Z INSERTION OF INFUSION DEVICE INTO SUPERIOR VENA CAVA, PERCUTANEOUS APPROACH: ICD-10-PCS | Performed by: RADIOLOGY

## 2022-08-10 PROCEDURE — 74011000250 HC RX REV CODE- 250: Performed by: RADIOLOGY

## 2022-08-10 PROCEDURE — 74011000250 HC RX REV CODE- 250: Performed by: INTERNAL MEDICINE

## 2022-08-10 PROCEDURE — 74011250636 HC RX REV CODE- 250/636: Performed by: INTERNAL MEDICINE

## 2022-08-10 PROCEDURE — 84100 ASSAY OF PHOSPHORUS: CPT

## 2022-08-10 PROCEDURE — 74011000250 HC RX REV CODE- 250: Performed by: HOSPITALIST

## 2022-08-10 PROCEDURE — 77030002996 HC SUT SLK J&J -A

## 2022-08-10 PROCEDURE — 74011250636 HC RX REV CODE- 250/636: Performed by: NURSE PRACTITIONER

## 2022-08-10 PROCEDURE — 74011000258 HC RX REV CODE- 258: Performed by: INTERNAL MEDICINE

## 2022-08-10 PROCEDURE — 74011250636 HC RX REV CODE- 250/636: Performed by: RADIOLOGY

## 2022-08-10 PROCEDURE — C1769 GUIDE WIRE: HCPCS

## 2022-08-10 PROCEDURE — 74011250636 HC RX REV CODE- 250/636: Performed by: STUDENT IN AN ORGANIZED HEALTH CARE EDUCATION/TRAINING PROGRAM

## 2022-08-10 PROCEDURE — 36415 COLL VENOUS BLD VENIPUNCTURE: CPT

## 2022-08-10 PROCEDURE — 2709999900 HC NON-CHARGEABLE SUPPLY

## 2022-08-10 PROCEDURE — 74011250637 HC RX REV CODE- 250/637: Performed by: INTERNAL MEDICINE

## 2022-08-10 PROCEDURE — 65270000029 HC RM PRIVATE

## 2022-08-10 PROCEDURE — 77001 FLUOROGUIDE FOR VEIN DEVICE: CPT

## 2022-08-10 PROCEDURE — C1751 CATH, INF, PER/CENT/MIDLINE: HCPCS

## 2022-08-10 PROCEDURE — C1892 INTRO/SHEATH,FIXED,PEEL-AWAY: HCPCS

## 2022-08-10 RX ORDER — HEPARIN SODIUM 200 [USP'U]/100ML
400 INJECTION, SOLUTION INTRAVENOUS ONCE
Status: COMPLETED | OUTPATIENT
Start: 2022-08-10 | End: 2022-08-10

## 2022-08-10 RX ORDER — OXYCODONE AND ACETAMINOPHEN 5; 325 MG/1; MG/1
1 TABLET ORAL ONCE
Status: COMPLETED | OUTPATIENT
Start: 2022-08-10 | End: 2022-08-10

## 2022-08-10 RX ORDER — HEPARIN 100 UNIT/ML
300 SYRINGE INTRAVENOUS ONCE
Status: COMPLETED | OUTPATIENT
Start: 2022-08-10 | End: 2022-08-10

## 2022-08-10 RX ORDER — FENTANYL CITRATE 50 UG/ML
100 INJECTION, SOLUTION INTRAMUSCULAR; INTRAVENOUS
Status: DISCONTINUED | OUTPATIENT
Start: 2022-08-10 | End: 2022-08-11 | Stop reason: HOSPADM

## 2022-08-10 RX ORDER — LIDOCAINE HYDROCHLORIDE 20 MG/ML
10 INJECTION, SOLUTION INFILTRATION; PERINEURAL ONCE
Status: COMPLETED | OUTPATIENT
Start: 2022-08-10 | End: 2022-08-10

## 2022-08-10 RX ORDER — MIDAZOLAM HYDROCHLORIDE 1 MG/ML
1-5 INJECTION, SOLUTION INTRAMUSCULAR; INTRAVENOUS
Status: DISCONTINUED | OUTPATIENT
Start: 2022-08-10 | End: 2022-08-11 | Stop reason: HOSPADM

## 2022-08-10 RX ADMIN — MIDAZOLAM HYDROCHLORIDE 2 MG: 1 INJECTION, SOLUTION INTRAMUSCULAR; INTRAVENOUS at 13:08

## 2022-08-10 RX ADMIN — FLUCONAZOLE 400 MG: 400 INJECTION, SOLUTION INTRAVENOUS at 14:01

## 2022-08-10 RX ADMIN — HEPARIN SODIUM IN SODIUM CHLORIDE 20 ML: 200 INJECTION INTRAVENOUS at 13:21

## 2022-08-10 RX ADMIN — HYDROMORPHONE HYDROCHLORIDE 0.5 MG: 1 INJECTION, SOLUTION INTRAMUSCULAR; INTRAVENOUS; SUBCUTANEOUS at 14:00

## 2022-08-10 RX ADMIN — FENTANYL CITRATE 50 MCG: 50 INJECTION, SOLUTION INTRAMUSCULAR; INTRAVENOUS at 13:11

## 2022-08-10 RX ADMIN — FENTANYL CITRATE 50 MCG: 50 INJECTION, SOLUTION INTRAMUSCULAR; INTRAVENOUS at 12:55

## 2022-08-10 RX ADMIN — MAGNESIUM SULFATE HEPTAHYDRATE: 500 INJECTION, SOLUTION INTRAMUSCULAR; INTRAVENOUS at 18:00

## 2022-08-10 RX ADMIN — LIDOCAINE HYDROCHLORIDE 6 ML: 20 INJECTION, SOLUTION INFILTRATION; PERINEURAL at 13:21

## 2022-08-10 RX ADMIN — SODIUM CHLORIDE, PRESERVATIVE FREE 300 UNITS: 5 INJECTION INTRAVENOUS at 13:21

## 2022-08-10 RX ADMIN — HYDROMORPHONE HYDROCHLORIDE 0.5 MG: 1 INJECTION, SOLUTION INTRAMUSCULAR; INTRAVENOUS; SUBCUTANEOUS at 02:30

## 2022-08-10 RX ADMIN — WATER 2 G: 1 INJECTION INTRAMUSCULAR; INTRAVENOUS; SUBCUTANEOUS at 12:43

## 2022-08-10 RX ADMIN — OXYCODONE AND ACETAMINOPHEN 1 TABLET: 5; 325 TABLET ORAL at 16:33

## 2022-08-10 RX ADMIN — ENOXAPARIN SODIUM 60 MG: 100 INJECTION SUBCUTANEOUS at 21:39

## 2022-08-10 RX ADMIN — I.V. FAT EMULSION 250 ML: 20 EMULSION INTRAVENOUS at 21:32

## 2022-08-10 NOTE — PROGRESS NOTES
Occupational Therapy    Chart reviewed. OT session attempted. Pt is off the floor for procedure. OT to follow up as schedule permits.      Jose G Steiner, OT

## 2022-08-10 NOTE — PROGRESS NOTES
Transition of Care Plan:     RUR: 23% (high)  Disposition: [facility]. Resumption TPN and IV Abx. orders sent on [employer] to Marzena Freitas [place] [phone] for tomorrow's discharge. [facility] notified via [employer] and voicemail message regarding any needs from CM to provide care to patient for pending discharge tomorrow. Follow up appointments: PCP  DME needed: None  Transportation at Discharge: Pt's mother will transport at d/c.   [facility] or means to access home: Pt has access       IM Medicare Letter: Pt has Medicaid  Is patient a BCPI-A Bundle:  N/A        Caregiver Contact: Loni Ordonez- Mother [phone]  Discharge Caregiver contacted prior to discharge? CM will notify caregiver at d/c. Care Conference needed?:     No     [address] notified regarding patient's pending discharge for tomorrow.  TPN and IV abx information faxed to [facility], [facility], RN     Care Management  [phone]

## 2022-08-10 NOTE — PROGRESS NOTES
TRANSFER - OUT REPORT:    Verbal report given to Cristiano Saravia RN(name) on Marie Lam  being transferred to [facility](unit) for routine progression of care       Report consisted of patients Situation, Background, Assessment and   Recommendations(SBAR). Information from the following report(s) Procedure Summary was reviewed with the receiving nurse. Lines:   PICC Double Lumen 08/10/22 Left (Active)       Peripheral IV 08/10/22 Left;Posterior Wrist (Active)        Opportunity for questions and clarification was provided.

## 2022-08-10 NOTE — PROGRESS NOTES
Post multiple I and D right knee  Pain and swelling improving    Patient Vitals for the past 24 hrs:   Temp Pulse Resp BP SpO2   08/10/22 0850 98.3 °F (36.8 °C) (!) 106 18 101/64 96 %   08/09/22 2029 -- -- 18 -- --   08/09/22 1637 98.1 °F (36.7 °C) (!) 107 18 124/74 92 %     Dressing clean and dry  Swelling improved    Of to dc when medically ready.

## 2022-08-10 NOTE — PROGRESS NOTES
Pt required a rate change - TPN (from 150ml back down to 75mL/h). PIV flushes well, no Pt c/o discomfort or pain, no redness, site is slightly swollen. AM RN informed PM RN that Pt had another PIV placed d/t swelling. Pt also states that he tolerates the 75mL in the PIV better than the 150mL.  MD notified

## 2022-08-10 NOTE — PROGRESS NOTES
This nurse spoke with IR team and they will get to patient ASAP to place access for treatment and discharge.
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
## 2022-08-11 ENCOUNTER — TELEPHONE (OUTPATIENT)
Dept: FAMILY MEDICINE CLINIC | Age: 42
End: 2022-08-11

## 2022-08-11 VITALS
OXYGEN SATURATION: 98 % | TEMPERATURE: 98.1 F | DIASTOLIC BLOOD PRESSURE: 78 MMHG | RESPIRATION RATE: 18 BRPM | HEIGHT: 69 IN | HEART RATE: 98 BPM | BODY MASS INDEX: 20.73 KG/M2 | WEIGHT: 140 LBS | SYSTOLIC BLOOD PRESSURE: 112 MMHG

## 2022-08-11 LAB
ANION GAP SERPL CALC-SCNC: 7 MMOL/L (ref 5–15)
BASOPHILS # BLD: 0 K/UL (ref 0–0.1)
BASOPHILS NFR BLD: 0 % (ref 0–1)
BUN SERPL-MCNC: 13 MG/DL (ref 6–20)
BUN/CREAT SERPL: 22 (ref 12–20)
CALCIUM SERPL-MCNC: 9.1 MG/DL (ref 8.5–10.1)
CHLORIDE SERPL-SCNC: 106 MMOL/L (ref 97–108)
CO2 SERPL-SCNC: 26 MMOL/L (ref 21–32)
CREAT SERPL-MCNC: 0.59 MG/DL (ref 0.7–1.3)
DIFFERENTIAL METHOD BLD: ABNORMAL
EOSINOPHIL # BLD: 0.3 K/UL (ref 0–0.4)
EOSINOPHIL NFR BLD: 4 % (ref 0–7)
ERYTHROCYTE [DISTWIDTH] IN BLOOD BY AUTOMATED COUNT: 17 % (ref 11.5–14.5)
GLUCOSE BLD STRIP.AUTO-MCNC: 149 MG/DL (ref 65–117)
GLUCOSE SERPL-MCNC: 84 MG/DL (ref 65–100)
HCT VFR BLD AUTO: 28.1 % (ref 36.6–50.3)
HGB BLD-MCNC: 8.7 G/DL (ref 12.1–17)
IMM GRANULOCYTES # BLD AUTO: 0 K/UL (ref 0–0.04)
IMM GRANULOCYTES NFR BLD AUTO: 0 % (ref 0–0.5)
LYMPHOCYTES # BLD: 1.6 K/UL (ref 0.8–3.5)
LYMPHOCYTES NFR BLD: 23 % (ref 12–49)
MAGNESIUM SERPL-MCNC: 1.9 MG/DL (ref 1.6–2.4)
MCH RBC QN AUTO: 26.6 PG (ref 26–34)
MCHC RBC AUTO-ENTMCNC: 31 G/DL (ref 30–36.5)
MCV RBC AUTO: 85.9 FL (ref 80–99)
MONOCYTES # BLD: 0.6 K/UL (ref 0–1)
MONOCYTES NFR BLD: 9 % (ref 5–13)
NEUTS SEG # BLD: 4.4 K/UL (ref 1.8–8)
NEUTS SEG NFR BLD: 64 % (ref 32–75)
NRBC # BLD: 0 K/UL (ref 0–0.01)
NRBC BLD-RTO: 0 PER 100 WBC
PLATELET # BLD AUTO: 623 K/UL (ref 150–400)
PMV BLD AUTO: 9.4 FL (ref 8.9–12.9)
POTASSIUM SERPL-SCNC: 4.2 MMOL/L (ref 3.5–5.1)
RBC # BLD AUTO: 3.27 M/UL (ref 4.1–5.7)
RBC MORPH BLD: ABNORMAL
SERVICE CMNT-IMP: ABNORMAL
SODIUM SERPL-SCNC: 139 MMOL/L (ref 136–145)
WBC # BLD AUTO: 6.9 K/UL (ref 4.1–11.1)

## 2022-08-11 PROCEDURE — 83735 ASSAY OF MAGNESIUM: CPT

## 2022-08-11 PROCEDURE — 85025 COMPLETE CBC W/AUTO DIFF WBC: CPT

## 2022-08-11 PROCEDURE — 82962 GLUCOSE BLOOD TEST: CPT

## 2022-08-11 PROCEDURE — 74011250636 HC RX REV CODE- 250/636: Performed by: STUDENT IN AN ORGANIZED HEALTH CARE EDUCATION/TRAINING PROGRAM

## 2022-08-11 PROCEDURE — 36415 COLL VENOUS BLD VENIPUNCTURE: CPT

## 2022-08-11 PROCEDURE — 80048 BASIC METABOLIC PNL TOTAL CA: CPT

## 2022-08-11 PROCEDURE — 74011250636 HC RX REV CODE- 250/636: Performed by: NURSE PRACTITIONER

## 2022-08-11 PROCEDURE — 74011000250 HC RX REV CODE- 250: Performed by: HOSPITALIST

## 2022-08-11 RX ORDER — FLUCONAZOLE 2 MG/ML
400 INJECTION, SOLUTION INTRAVENOUS EVERY 24 HOURS
Qty: 6000 ML | Refills: 1 | Status: SHIPPED
Start: 2022-08-12 | End: 2022-09-29

## 2022-08-11 RX ORDER — OXYCODONE AND ACETAMINOPHEN 5; 325 MG/1; MG/1
1 TABLET ORAL
Qty: 20 TABLET | Refills: 0 | Status: SHIPPED | OUTPATIENT
Start: 2022-08-11 | End: 2022-08-16

## 2022-08-11 RX ADMIN — FLUCONAZOLE 400 MG: 400 INJECTION, SOLUTION INTRAVENOUS at 09:53

## 2022-08-11 RX ADMIN — HYDROMORPHONE HYDROCHLORIDE 0.5 MG: 1 INJECTION, SOLUTION INTRAMUSCULAR; INTRAVENOUS; SUBCUTANEOUS at 03:31

## 2022-08-11 RX ADMIN — HYDROMORPHONE HYDROCHLORIDE 0.5 MG: 1 INJECTION, SOLUTION INTRAMUSCULAR; INTRAVENOUS; SUBCUTANEOUS at 11:07

## 2022-08-11 RX ADMIN — I.V. FAT EMULSION 250 ML: 20 EMULSION INTRAVENOUS at 03:22
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
## 2022-08-11 NOTE — PROGRESS NOTES
Transition of Care Plan: Home with [address] PT/OT/line care, [facility]. RUR: 23% (high)  Disposition: Home with Home Health ([facility] PT/OT/line care), [facility] and ABX therapy. Resumption TPN and IV Abx. orders sent on Allscripts to [place] [address] for tomorrow's discharge. [facility] notified via Allscripts with [facility] orders for PT/OT/wound care at home. Follow up appointments: PCP/specialist information listed on AVS.  DME needed: Patient refusing RW as recommended by therapy as he states that he has one available to him at home. He wants rollator instead, referral sent. Transportation at Discharge: Pt's mother will transport at d/c.   [address] or means to access home: Pt has access       IM Medicare Letter: Pt has Medicaid  Is patient a BCPI-A Bundle:  N/A        Caregiver Contact: Guillermo Sylvia- Mother [phone]  Discharge Caregiver contacted prior to discharge? CM will notify caregiver at d/c. Care Conference needed?:     No     [address] provided with TPN/IV abx/PT/OT/line care orders. TPN order form signed by attending and pharmacy and sent back to [place] [address] on Allscripts as requested. Patient agreeable to discharge plan and says mother will pick him up today once he is done with IV infusions. Patient has no further questions for CM. SMART tool left with RN.         AMBAR AdameN, RN     Care Management  [phone]

## 2022-08-11 NOTE — PROGRESS NOTES
Bedside shift change report given to Cristiano Saravia LPN (oncoming nurse) by Cristiano Saravia RN (offgoing nurse). Report included the following information SBAR, Kardex, Intake/Output, and MAR.

## 2022-08-11 NOTE — TELEPHONE ENCOUNTER
Patient is being discharged today    He told nurse at [facility] he has an appointment 8-20-22 at 1:30pm with you    Do you want to see patient that day    We will need to let patient know     Patient's phone  [phone]

## 2022-08-11 NOTE — PROGRESS NOTES
Attempted to confirm ID appt for patient. ID staff stated the patient does not have an appt on the schedule. ID staff is sending Dr. Cyril Hwang a msg to find an appt. ID staff will call patient directly. Samantha Murguia Care Management Assistant    Attempted to schedule hospital follow up PCP appointment. Sent a message to PCP office to find patient an appointment. PCP staff will call patient directly once an appt is available. CMA advised PCP staff that the patient needs an urgent appt. Preferably to be seen by 8/15/22.  [address]ey [address]

## 2022-08-11 NOTE — TELEPHONE ENCOUNTER
Patient has appointment as follows  -ID follow-up -9/20 at 1:30 PM  Please add patient to schedule-in person or virtual.

## 2022-08-15 LAB
BACTERIA SPEC CULT: NORMAL
SERVICE CMNT-IMP: NORMAL

## 2022-08-17 LAB
BACTERIA SPEC CULT: ABNORMAL
SERVICE CMNT-IMP: ABNORMAL

## 2022-08-23 ENCOUNTER — OFFICE VISIT (OUTPATIENT)
Dept: ORTHOPEDIC SURGERY | Age: 42
End: 2022-08-23
Payer: MEDICAID

## 2022-08-23 VITALS
TEMPERATURE: 98.4 F | HEIGHT: 69 IN | WEIGHT: 141 LBS | BODY MASS INDEX: 20.88 KG/M2 | OXYGEN SATURATION: 98 % | SYSTOLIC BLOOD PRESSURE: 106 MMHG | DIASTOLIC BLOOD PRESSURE: 76 MMHG | HEART RATE: 100 BPM

## 2022-08-23 DIAGNOSIS — Z47.89 ORTHOPEDIC AFTERCARE: Primary | ICD-10-CM

## 2022-08-23 PROCEDURE — 99024 POSTOP FOLLOW-UP VISIT: CPT | Performed by: ORTHOPAEDIC SURGERY

## 2022-08-23 NOTE — PROGRESS NOTES
is here for a follow up visit from a right knee arthrotomy. Pain has been appropriate since surgery, no major medical complications since surgery. Current Outpatient Medications on File Prior to Visit   Medication Sig Dispense Refill    fat emulsion 20% (LIPOSYN, INTRAlipid) 20 % infusion 500 mL by IntraVENous route every Monday, Wednesday, Friday for 30 days. 6000 mL 0    fluconazole in 0.9% NaCl (DIFLUCAN) 400 mg/200 mL IVPB 200 mL by IntraVENous route every twenty-four (24) hours for 48 days. 6000 mL 1    lidocaine 4 % patch 1 Patch by TransDERmal route every twenty-four (24) hours. 30 Patch 0    L.acid,para-B. bifidum-S.therm (RISAQUAD) 8 billion cell cap cap Take 1 Capsule by mouth daily. 30 Capsule 0     No current facility-administered medications on file prior to visit. ROS:  General: denies agitation, major chest pain, unexpected weakness  Patient states no issues  Skin: healing wound is without issue or drainage   Strength: appropriate weakness of involved extremity is resolving since surgery      Physical Examination:    Blood pressure 106/76, pulse 100, temperature 98.4 °F (36.9 °C), temperature source Tympanic, height 5' 9\" (1.753 m), weight 141 lb (64 kg), SpO2 98 %.     Dressing: none  Skin: clean, dry, intact  Sensation intact to light touch at level of wound and distally  Strength is 5/5 knee extension, no effusion  Range of motion is 0-120  Distal swelling is noted, but appropriate for postoperative course  Distal capillary refill less than 2 seconds      Imaging:    Postoperative imaging: none      Assessment: Status post I+D right knee with fungal infection    Plan:  Patient will not need physical therapy  Wound care was reviewed  Weightbearing will be full through the leg  Deep Venous Thrombosis Prophylaxis    Follow up will be at 6 weeks from now,  no xrays on follow up

## 2022-08-23 NOTE — PROGRESS NOTES
Identified pt with two pt identifiers (name and ). Reviewed chart in preparation for visit and have obtained necessary documentation. Alok Yin is a 43 y.o. male  Chief Complaint   Patient presents with    Surgical Follow-up     RT Knee I&D     Visit Vitals  /76 (BP 1 Location: Right arm, BP Patient Position: Sitting, BP Cuff Size: Adult)   Pulse 100   Temp 98.4 °F (36.9 °C) (Tympanic)   Ht 5' 9\" (1.753 m)   Wt 141 lb (64 kg)   SpO2 98%   BMI 20.82 kg/m²     1. Have you been to the ER, urgent care clinic since your last visit? Hospitalized since your last visit? No    2. Have you seen or consulted any other health care providers outside of the [address] [place] [address] since your last visit? Include any pap smears or colon screening.  No

## 2022-08-25 NOTE — PROGRESS NOTES
Physician Progress Note      PATIENT:               Lakisha Gayle  CSN #:                  [id number]  :                       1980  ADMIT DATE:       7/15/2022 8:11 AM  DISCH DATE:        2022 12:35 PM  RESPONDING  PROVIDER #:        Ubaldo Higgins MD          QUERY TEXT:    Dr Leatha Ventura  Pt admitted with septic knee and has chronic malnutrition documented. Noted in the Problem list is severe Malnutrition from 22. Please further specify type of malnutrition with documentation in the medical record. The medical record reflects the following:  Risk Factors: presents with septic knee, persistent fevers  Clinical Indicators: extensive GI history of surgeries, on chronic TPN, registered dietitian notes stable weight,  Treatment: RD managing TPN during stay. ASPEN Criteria:  https://aspenjournals. onlinelibrary. crawley. com/doi/full/10.1177/5508692257791986  Options provided:  -- Mild Malnutrition  -- Moderate Malnutrition  -- Severe Malnutrition  -- Mild Protein calorie malnutrition  -- Moderate Protein calorie malnutrition  -- Severe Protein calorie malnutrition  -- Other - I will add my own diagnosis  -- Disagree - Not applicable / Not valid  -- Disagree - Clinically unable to determine / Unknown  -- Refer to Clinical Documentation Reviewer    PROVIDER RESPONSE TEXT:    This patient has mild protein calorie malnutrition. Query created by:  Briana Rey on 2022 12:17 PM      Electronically signed by:  Ubaldo Higgins MD 2022 7:10 AM

## 2022-09-12 ENCOUNTER — TELEPHONE (OUTPATIENT)
Dept: SURGERY | Age: 42
End: 2022-09-12

## 2022-09-12 NOTE — TELEPHONE ENCOUNTER
Patient called and is wanting to know next steps, patient stated he still has G tube and is wanting to know if tube needs to come out  and if so when?     Please call    [phone] Psychiatric Psychiatric Psychiatric Psychiatric Psychiatric Psychiatric Psychiatric

## 2022-09-15 ENCOUNTER — OFFICE VISIT (OUTPATIENT)
Dept: SURGERY | Age: 42
End: 2022-09-15
Payer: MEDICAID

## 2022-09-15 VITALS
HEIGHT: 69 IN | BODY MASS INDEX: 22.07 KG/M2 | WEIGHT: 149 LBS | RESPIRATION RATE: 20 BRPM | OXYGEN SATURATION: 93 % | DIASTOLIC BLOOD PRESSURE: 78 MMHG | SYSTOLIC BLOOD PRESSURE: 118 MMHG | HEART RATE: 94 BPM | TEMPERATURE: 97.3 F

## 2022-09-15 DIAGNOSIS — Z09 POSTOPERATIVE EXAMINATION: Primary | ICD-10-CM

## 2022-09-15 PROCEDURE — 99024 POSTOP FOLLOW-UP VISIT: CPT | Performed by: SURGERY

## 2022-09-15 NOTE — PROGRESS NOTES
Identified pt with two pt identifiers(name and ). Reviewed record in preparation for visit and have obtained necessary documentation. All patient medications has been reviewed. Chief Complaint   Patient presents with    Follow-up     Discuss G tube        Health Maintenance Due   Topic    Hepatitis C Screening     DTaP/Tdap/Td series (1 - Tdap)    Flu Vaccine (1)       Vitals:    09/15/22 1320   BP: 118/78   Pulse: 94   Resp: 20   Temp: 97.3 °F (36.3 °C)   SpO2: 93%   Weight: 67.6 kg (149 lb)   Height: 5' 9\" (1.753 m)   PainSc:   0 - No pain       4. Have you been to the ER, urgent care clinic since your last visit? Hospitalized since your last visit? No    5. Have you seen or consulted any other health care providers outside of the [address] [place] [address] since your last visit? Include any pap smears or colon screening. No      Patient is accompanied by self I have received verbal consent from Natalie Pfeiffer to discuss any/all medical information while they are present in the room.
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
## 2022-09-20 ENCOUNTER — VIRTUAL VISIT (OUTPATIENT)
Dept: INFECTIOUS DISEASES | Age: 42
End: 2022-09-20
Payer: MEDICAID

## 2022-09-20 DIAGNOSIS — B95.2 ENTEROCOCCAL BACTEREMIA: ICD-10-CM

## 2022-09-20 DIAGNOSIS — B37.9 CANDIDA PARAPSILOSIS INFECTION: Primary | ICD-10-CM

## 2022-09-20 DIAGNOSIS — M00.9 PYOGENIC ARTHRITIS OF RIGHT KNEE JOINT, DUE TO UNSPECIFIED ORGANISM (HCC): ICD-10-CM

## 2022-09-20 DIAGNOSIS — R78.81 E COLI BACTEREMIA: ICD-10-CM

## 2022-09-20 DIAGNOSIS — T85.79XD LINE SEPSIS, SUBSEQUENT ENCOUNTER (HCC): ICD-10-CM

## 2022-09-20 DIAGNOSIS — A49.9 POLYMICROBIAL BACTERIAL INFECTION: ICD-10-CM

## 2022-09-20 DIAGNOSIS — B96.20 E COLI BACTEREMIA: ICD-10-CM

## 2022-09-20 DIAGNOSIS — R78.81 ENTEROCOCCAL BACTEREMIA: ICD-10-CM

## 2022-09-20 DIAGNOSIS — B49 FUNGEMIA: ICD-10-CM

## 2022-09-20 DIAGNOSIS — B37.9 CANDIDA ALBICANS INFECTION: ICD-10-CM

## 2022-09-20 DIAGNOSIS — A41.9 LINE SEPSIS, SUBSEQUENT ENCOUNTER (HCC): ICD-10-CM

## 2022-09-20 PROCEDURE — 99443 PR PHYS/QHP TELEPHONE EVALUATION 21-30 MIN: CPT | Performed by: INTERNAL MEDICINE

## 2022-09-20 NOTE — PROGRESS NOTES
Chief Complaint   Patient presents with    Follow-up     Right leg and knee infection     1. \"Have you been to the ER, urgent care clinic since your last visit? Hospitalized since your last visit? \" No    2. \"Have you seen or consulted any other health care providers outside of the [address] [place] [address] since your last visit? \" No     3. For patients aged 39-70: Has the patient had a colonoscopy / FIT/ Cologuard? NO      If the patient is female:    4. For patients aged 41-77: Has the patient had a mammogram within the past 2 years? N/A      5. For patients aged 21-65: Has the patient had a pap smear? N/A    Pt has no concerns at this time. Please call pt at [phone] for his VV.
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
## 2022-09-20 NOTE — PATIENT INSTRUCTIONS
[email or web address] Activation    Thank you for requesting access to [email or web address]. Please follow the instructions below to securely access and download your online medical record. [email or web address] allows you to send messages to your doctor, view your test results, renew your prescriptions, schedule appointments, and more. How Do I Sign Up? In your internet browser, go to [email or web address]. Click on the First Time User? Click Here link in the Sign In box. You will see the New Member Sign Up page. Enter your [email or web address] Access Code exactly as it appears below. You will not need to use this code after youve completed the sign-up process. If you do not sign up before the expiration date, you must request a new code. [email or web address] Access Code: [email or web address]  Expires: 10/28/2022 12:45 PM (This is the date your [email or web address] access code will )    Enter the last four digits of your Social Security Number (xxxx) and Date of Birth (mm/dd/yyyy) as indicated and click Submit. You will be taken to the next sign-up page. Create a [email or web address] ID. This will be your [email or web address] login ID and cannot be changed, so think of one that is secure and easy to remember. Create a [email or web address] password. You can change your password at any time. Enter your Password Reset Question and Answer. This can be used at a later time if you forget your password. Enter your e-mail address. You will receive e-mail notification when new information is available in [address] Click Sign Up. You can now view and download portions of your medical record. Click the [place] link to download a portable copy of your medical information. Additional Information    If you have questions, please visit the Frequently Asked Questions section of the [email or web address] website at [email or web address] Remember, [email or web address] is NOT to be used for urgent needs. For medical emergencies, dial 911.

## 2022-09-23 LAB
ACID FAST STN SPEC: NEGATIVE
MYCOBACTERIUM SPEC QL CULT: NEGATIVE
SPECIMEN PREPARATION: NORMAL
SPECIMEN SOURCE: NORMAL

## 2022-09-27 ENCOUNTER — OFFICE VISIT (OUTPATIENT)
Dept: ORTHOPEDIC SURGERY | Age: 42
End: 2022-09-27
Payer: MEDICAID

## 2022-09-27 ENCOUNTER — TELEPHONE (OUTPATIENT)
Dept: FAMILY MEDICINE CLINIC | Age: 42
End: 2022-09-27

## 2022-09-27 VITALS
HEART RATE: 95 BPM | SYSTOLIC BLOOD PRESSURE: 105 MMHG | TEMPERATURE: 98.2 F | OXYGEN SATURATION: 100 % | DIASTOLIC BLOOD PRESSURE: 75 MMHG | HEIGHT: 69 IN | BODY MASS INDEX: 22.81 KG/M2 | WEIGHT: 154 LBS

## 2022-09-27 DIAGNOSIS — Z47.89 ORTHOPEDIC AFTERCARE: Primary | ICD-10-CM

## 2022-09-27 PROCEDURE — 99024 POSTOP FOLLOW-UP VISIT: CPT | Performed by: ORTHOPAEDIC SURGERY

## 2022-09-27 NOTE — TELEPHONE ENCOUNTER
Pls call Jr martinez/[facility]     Needs end of therapy order    Best number to reach her is [phone]

## 2022-09-27 NOTE — TELEPHONE ENCOUNTER
Arjun Norris at [facility] confirmed pt end of therapy was 09/29/2022. She stated she would note in pt file.

## 2022-09-27 NOTE — PROGRESS NOTES
is here for a follow up visit from a right knee I+D. Pain has been appropriate since surgery, no major medical complications since surgery. Current Outpatient Medications on File Prior to Visit   Medication Sig Dispense Refill    fluconazole in 0.9% NaCl (DIFLUCAN) 400 mg/200 mL IVPB 200 mL by IntraVENous route every twenty-four (24) hours for 48 days. 6000 mL 1     No current facility-administered medications on file prior to visit. ROS:  General: denies agitation, major chest pain, unexpected weakness  Patient states no issues  Skin: healing wound is without issue or drainage   Strength: appropriate weakness of involved extremity is resolving since surgery      Physical Examination:    Blood pressure 105/75, pulse 95, temperature 98.2 °F (36.8 °C), temperature source Tympanic, height 5' 9\" (1.753 m), weight 154 lb (69.9 kg), SpO2 100 %. Dressing: none  Skin: clean, dry, intact  Sensation intact to light touch at level of wound and distally  Strength is 5/5 knee extension  Range of motion is full  Distal swelling is noted, but appropriate for postoperative course  Distal capillary refill less than 2 seconds      Imaging:    Postoperative imaging: none      Assessment: Status post I+D right knee    Plan:  Patient will not need physical therapy  Wound care was reviewed  Weightbearing will be full through the leg  Deep Venous Thrombosis Prophylaxis: none  He will start his drug holiday coming up, if he has any increase in his pain, he is to call me immediately. Overall, he is done very well and is very positive about this. He will monitor his symptoms, follow-up with me will be as needed.

## 2022-09-27 NOTE — PROGRESS NOTES
Identified pt with two pt identifiers (name and ). Reviewed chart in preparation for visit and have obtained necessary documentation. Gertrude Lewis is a 43 y.o. male  Chief Complaint   Patient presents with    Surgical Follow-up     RT Knee     Visit Vitals  /75 (BP 1 Location: Right arm, BP Patient Position: Sitting, BP Cuff Size: Adult)   Pulse 95   Temp 98.2 °F (36.8 °C) (Tympanic)   Ht 5' 9\" (1.753 m)   Wt 154 lb (69.9 kg)   SpO2 100%   BMI 22.74 kg/m²     1. Have you been to the ER, urgent care clinic since your last visit? Hospitalized since your last visit? No    2. Have you seen or consulted any other health care providers outside of the [address] [place] [address] since your last visit? Include any pap smears or colon screening.  No

## 2022-11-08 ENCOUNTER — TELEPHONE (OUTPATIENT)
Dept: SURGERY | Age: 42
End: 2022-11-08

## 2022-11-08 NOTE — TELEPHONE ENCOUNTER
Kylah Triplett from home health called and is going re certify pt weekly labs and central line dressing changed    Call back num [phone]

## 2022-11-09 NOTE — TELEPHONE ENCOUNTER
Spoke with Mario from [facility] once paperwork is received will have provider sign.

## 2023-09-01 NOTE — PROGRESS NOTES
09/01/23 0618   Ventilator Settings   FiO2  50 %   Vt (Set, mL) 450 mL   Resp Rate (Set) (S)  20 bmp   PEEP/CPAP (cmH2O) 5   Pressure Support (cm H2O) 0 cm H2O   Vent Patient Data (Readings)   Vt (Measured) 23 mL   Peak Inspiratory Pressure (cmH2O) 36 cmH2O   Rate Measured 28 br/min   Minute Volume (L/min) 5.98 Liters   Mean Airway Pressure (cmH2O) 9.6 cmH20   Plateau Pressure (cm H2O) 0 cm H2O   Driving Pressure -5   I:E Ratio 1:10.0   Vent Alarm Settings   High Pressure (cmH2O) 45 cmH2O   Low Minute Volume (lpm) 3 L/min   RR High (bpm) 40 br/min End of Shift Note    Bedside shift change report given to   (oncoming nurse) by Miguel Ángel Alcocer LPN (offgoing nurse). Report included the following information SBAR, Kardex, and MAR    Shift worked:  7a-7p     Shift summary and any significant changes:    Patient medicated for pain PRN. Uneventful shift. Concerns for physician to address: none     Zone phone for oncoming shift:  [phone]       Activity:  Activity Level: Up with Assistance  Number times ambulated in hallways past shift: 0  Number of times OOB to chair past shift: 0    Cardiac:   Cardiac Monitoring: No      Cardiac Rhythm: Sinus Rhythm, Sinus Tachy    Access:   Current line(s): Telly     Genitourinary:   Urinary status: voiding    Respiratory:   O2 Device: None (Room air)  Chronic home O2 use?: NO  Incentive spirometer at bedside: NO       GI:  Last Bowel Movement Date: 07/23/22  Current diet:  ADULT DIET Full Liquid  DIET ONE TIME MESSAGE  TPN ADULT - CENTRAL  Passing flatus: YES  Tolerating current diet: YES       Pain Management:   Patient states pain is manageable on current regimen: YES    Skin:  Russell Score: 19  Interventions: increase time out of bed and nutritional support     Patient Safety:  Fall Score:  Total Score: 3  Interventions: gripper socks  High Fall Risk: Yes    Length of Stay:  Expected LOS: 5d 4h  Actual LOS: Miya Blake LPN

## 2023-12-26 NOTE — ED NOTES
Ambulatory to exam room c/o gradually increasing R knee swelling and tenderness over past week. Reports he was dxed with a blood clot in his R elbow last month. No

## 2024-01-08 NOTE — PROGRESS NOTES
Transition of Care Plan:     RUR:  20% high risk  Disposition:  LTC  Follow up appointments: To be scheduled if patient returning home with PCP and surgeon  DME needed:  TPN  Transportation at Discharge: [facility] vs family if safe to go by car  [place] or means to access home: mother has access to the home       Medicare Letter:  Pt is a Medicaid patient, no IMM letter required. Is patient a BCPI-A Bundle:  Not identified as bundle patient                    If yes, was Bundle Letter given?:    Is patient a [place] and connected with the [place]? Not identified as a   If yes, was [employer] transfer form completed and [facility] notified? Caregiver Contact: Mother, Ca Meeks, ([phone])  Discharge Caregiver contacted prior to discharge? Mother to be informed of discharge plans as they develop    CM met with pt at bedside to discuss LTC vs HH. Pt stated he realizes his needs cannot be managed at home. Pt interested in possibly being transferred to [facility]. CM spoke with  who is going to get complex CM involved.       Crow Nunn MSW  Care Management, [facility]    No

## 2024-02-10 NOTE — PROGRESS NOTES
ED Provider Note  [facility]      History     Chief Complaint   Patient presents with    Nausea, Vomiting, & Diarrhea     HPI  Andrew Collier is a 60 year old male who presents to the ED today for nausea, vomiting and diarrhea for the last day. He denies any CP/SOB or abdominal pain. He also denies any F/C/dysuria/hematuria.    Past Medical History  Past Medical History:   Diagnosis Date    Acute appendicitis with localized peritonitis 1/31/2018    AIDS (H)     Allergic rhinitis due to other allergen     DNS    Anal dysplasia     Chronic abdominal pain     CNS toxoplasmosis (H)     COVID-19 1/11/2022    Diabetes type 2, controlled (H)     GERD (gastroesophageal reflux disease)     Herpes zoster 9/23/2016    History of seizure     History of substance abuse (H)     HIV (human immunodeficiency virus infection) (H)     HLD (hyperlipidemia)     Lung nodules     Periungual wart     Pneumonia 1/6/2019    PTSD (post-traumatic stress disorder)     Sleep apnea     doesn't use CPAP     Past Surgical History:   Procedure Laterality Date    ANOSCOPY N/A 9/9/2020    Procedure: Exam Under Anesthesia, ANOSCOPY, fulgeration of rectal fissures with Rectal Biopsies;  Surgeon: Thanh Lundberg MD;  Location: [facility] OR    COLONOSCOPY Left 1/22/2016    Procedure: COMBINED COLONOSCOPY, SINGLE OR MULTIPLE BIOPSY/POLYPECTOMY BY BIOPSY;  Surgeon: Clark Saini MD;  Location: [facility] GI    ESOPHAGOSCOPY, GASTROSCOPY, DUODENOSCOPY (EGD), COMBINED N/A 6/6/2022    Procedure: ESOPHAGOGASTRODUODENOSCOPY, WITH BIOPSY;  Surgeon: Kecia Benítez MD;  Location: [facility] GI    HC EXPLORE UNDESC TESTIS,INGUIN/SCROTAL      LAPAROSCOPIC APPENDECTOMY N/A 1/31/2018    Procedure: LAPAROSCOPIC APPENDECTOMY;  LAPAROSCOPIC APPENDECTOMY;  Surgeon: Dawn Holt MD;  Location: [facility] OR    LAPAROSCOPY DIAGNOSTIC (GENERAL) N/A 7/26/2016    Procedure: LAPAROSCOPY DIAGNOSTIC (GENERAL);  Surgeon: Susannah Arriaga MD;   End of Shift Note     Bedside shift change report given to Laly Buckner RN (oncoming nurse) by Myrtle Carrington LPN (offgoing nurse). Report included the following information SBAR, Kardex, Intake/Output and MAR     Shift worked:  7am-7pm      Shift summary and any significant changes:      Pt stayed bed during shift. No complaints of pain during shift. Pt tolerating current diet of TPN and NPO with ice chips. Otherwise, pt had uneventful shift.       Concerns for physician to address:        Zone phone for oncoming shift:            Activity:  Activity Level: Bed Rest  Number times ambulated in hallways past shift: 0  Number of times OOB to chair past shift: 0     Cardiac:   Cardiac Monitoring: No      Cardiac Rhythm: Sinus Tachy     Access:   Current line(s): PICC      Genitourinary:   Urinary status: brock     Respiratory:   O2 Device: None (Room air)  Chronic home O2 use?: NO  Incentive spirometer at bedside: YES  GI:  Last Bowel Movement Date:  (PTA)  Current diet:  DIET NPO Ice Chips  TPN ADULT - CENTRAL  Passing flatus: YES  Tolerating current diet: YES     Pain Management:   Patient states pain is manageable on current regimen: YES     Skin:  Russell Score: 15  Interventions: float heels and increase time out of bed    Patient Safety:  Fall Score: Total Score: 2  Interventions: gripper socks and pt to call before getting OOB  High Fall Risk:  Yes     Length of Stay:  Expected LOS: 9d 14h  Actual LOS: 4 "Location: [facility] OR    LAPAROSCOPY DIAGNOSTIC (GENERAL) N/A 4/16/2018    Procedure: LAPAROSCOPY DIAGNOSTIC (GENERAL);  Diagnostic laparoscopy and lysis of adhesions;  Surgeon: Prince Dowling MD;  [facility]: [facility]    OPTICAL TRACKING SYSTEM CRANIOTOMY, EXCISE TUMOR, COMBINED Left 4/10/2015    Procedure: COMBINED OPTICAL TRACKING SYSTEM CRANIOTOMY, EXCISE TUMOR;  Surgeon: Mirlande Colmenares MD;  Location: [facility]    REPAIR GAMEKEEPER'S THUMB Right 12/2/2016    Procedure: REPAIR LIGAMENT ULNAR COLLATERAL THUMB (GAMEKEEPER'S);  Surgeon: Evin Zamorano MD;  [facility]    ZZC NONSPECIFIC PROCEDURE      right forearm fracture     alum & mag hydroxide-simethicone (MAALOX) 200-200-20 MG/5ML SUSP suspension  bictegravir-emtricitabine-tenofovir (BIKTARVY) -25 MG per tablet  blood glucose (NO BRAND SPECIFIED) lancets standard  blood glucose (NO BRAND SPECIFIED) test strip  blood glucose monitoring (NO BRAND SPECIFIED) meter device kit  Continuous Blood Gluc Sensor (DEXCOM G6 SENSOR) MISC  Continuous Blood Gluc Transmit (DEXCOM G6 TRANSMITTER) MISC  famotidine (PEPCID) 40 MG tablet  glipiZIDE (GLUCOTROL XL) 5 MG 24 hr tablet  insulin glargine (LANTUS PEN) 100 UNIT/ML pen  insulin pen needle (32G X 4 MM) 32G X 4 MM miscellaneous  polyethylene glycol (MIRALAX) 17 GM/Dose powder  senna-docusate (SENOKOT-S/PERICOLACE) 8.6-50 MG tablet      Allergies   Allergen Reactions    Fentanyl Blisters     Per pt, after taking this medication had blisters develope    Tylenol [Acetaminophen] Itching    Dulaglutide Rash    Insulin Lispro Rash     Patient reported    Penicillin V Other (See Comments) and Rash     Diffuse maculopapular rash + feels \"high\", per pt.      Family History  Family History   Problem Relation Age of Onset    Diabetes Brother     Diabetes Father     Alzheimer Disease Father     Unknown/Adopted Mother     Diabetes Paternal Grandfather     Cancer No family hx of         no skin cancer    Skin Cancer No " "family hx of         no famiy hx of skin cancer    Glaucoma No family hx of     Macular Degeneration No family hx of      Social History   Social History     Tobacco Use    Smoking status: Some Days     Packs/day: 0.25     Years: 40.00     Additional pack years: 0.00     Total pack years: 10.00     Types: Cigarettes    Smokeless tobacco: Former   Substance Use Topics    Alcohol use: No     Alcohol/week: 0.0 standard drinks of alcohol     Comment: Last etoh in 2007    Drug use: Not Currently     Types: Marijuana, Methamphetamines      Past medical history, past surgical history, medications, allergies, family history, and social history were reviewed with the patient. No additional pertinent items.      A medically appropriate review of systems was performed with pertinent positives and negatives noted in the HPI, and all other systems negative.    Physical Exam   BP: 126/82  Pulse: 97  Temp: (!) 96.7  F (35.9  C)  Resp: 18  Height: 165.1 cm (5' 5\")  SpO2: 98 %  Physical Exam  Vitals and nursing note reviewed.   Constitutional:       General: He is not in acute distress.     Appearance: He is not toxic-appearing.   HENT:      Head: Atraumatic.      Nose: Nose normal.      Mouth/Throat:      Mouth: Mucous membranes are moist.      Pharynx: Oropharynx is clear.   Eyes:      Extraocular Movements: Extraocular movements intact.      Conjunctiva/sclera: Conjunctivae normal.   Cardiovascular:      Rate and Rhythm: Normal rate and regular rhythm.      Heart sounds: Normal heart sounds.   Pulmonary:      Effort: Pulmonary effort is normal.      Breath sounds: Normal breath sounds.   Abdominal:      General: Abdomen is flat. Bowel sounds are normal.      Palpations: Abdomen is soft.      Tenderness: There is no abdominal tenderness. There is no guarding or rebound.   Musculoskeletal:         General: Normal range of motion.      Cervical back: Normal range of motion and neck supple.      Right lower leg: No edema.      Left " lower leg: No edema.   Lymphadenopathy:      Cervical: No cervical adenopathy.   Skin:     General: Skin is warm and dry.   Neurological:      General: No focal deficit present.      Mental Status: He is alert and oriented to person, place, and time.      Motor: No weakness.   Psychiatric:         Mood and Affect: Mood normal.         Behavior: Behavior normal.       ED Course, Procedures, & Data      Procedures             Results for orders placed or performed during the hospital encounter of 02/05/24   Comprehensive metabolic panel     Status: Abnormal   Result Value Ref Range    Sodium 128 (L) 135 - 145 mmol/L    Potassium 4.2 3.4 - 5.3 mmol/L    Carbon Dioxide (CO2) 20 (L) 22 - 29 mmol/L    Anion Gap 14 7 - 15 mmol/L    Urea Nitrogen 19.5 8.0 - 23.0 mg/dL    Creatinine 0.66 (L) 0.67 - 1.17 mg/dL    GFR Estimate >90 >60 mL/min/1.73m2    Calcium 9.4 8.8 - 10.2 mg/dL    Chloride 94 (L) 98 - 107 mmol/L    Glucose 324 (H) 70 - 99 mg/dL    Alkaline Phosphatase 110 40 - 150 U/L    AST      ALT 20 0 - 70 U/L    Protein Total 7.5 6.4 - 8.3 g/dL    Albumin 4.3 3.5 - 5.2 g/dL    Bilirubin Total 1.4 (H) <=1.2 mg/dL   UA with Microscopic reflex to Culture     Status: Abnormal    Specimen: Urine, Midstream   Result Value Ref Range    Color Urine Light Yellow Colorless, Straw, Light Yellow, Yellow    Appearance Urine Clear Clear    Glucose Urine >=1000 (A) Negative mg/dL    Bilirubin Urine Negative Negative    Ketones Urine Negative Negative mg/dL    Specific Gravity Urine 1.028 1.003 - 1.035    Blood Urine Negative Negative    pH Urine 5.5 5.0 - 7.0    Protein Albumin Urine Negative Negative mg/dL    Urobilinogen Urine Normal Normal, 2.0 mg/dL    Nitrite Urine Negative Negative    Leukocyte Esterase Urine Negative Negative    Mucus Urine Present (A) None Seen /LPF    RBC Urine <1 <=2 /HPF    WBC Urine <1 <=5 /HPF    Transitional Epithelials Urine <1 <=1 /HPF    Narrative    Urine Culture not indicated   Magnesium     Status:  Normal   Result Value Ref Range    Magnesium 1.9 1.7 - 2.3 mg/dL   CBC with platelets and differential     Status: None   Result Value Ref Range    WBC Count 7.5 4.0 - 11.0 10e3/uL    RBC Count 5.15 4.40 - 5.90 10e6/uL    Hemoglobin 14.7 13.3 - 17.7 g/dL    Hematocrit 43.7 40.0 - 53.0 %    MCV 85 78 - 100 fL    MCH 28.5 26.5 - 33.0 pg    MCHC 33.6 31.5 - 36.5 g/dL    RDW 13.0 10.0 - 15.0 %    Platelet Count 385 150 - 450 10e3/uL    % Neutrophils 56 %    % Lymphocytes 31 %    % Monocytes 9 %    % Eosinophils 2 %    % Basophils 1 %    % Immature Granulocytes 1 %    NRBCs per 100 WBC 0 <1 /100    Absolute Neutrophils 4.3 1.6 - 8.3 10e3/uL    Absolute Lymphocytes 2.3 0.8 - 5.3 10e3/uL    Absolute Monocytes 0.6 0.0 - 1.3 10e3/uL    Absolute Eosinophils 0.2 0.0 - 0.7 10e3/uL    Absolute Basophils 0.1 0.0 - 0.2 10e3/uL    Absolute Immature Granulocytes 0.1 <=0.4 10e3/uL    Absolute NRBCs 0.0 10e3/uL   Glucose by meter     Status: Abnormal   Result Value Ref Range    GLUCOSE BY METER POCT 341 (H) 70 - 99 mg/dL   CBC with platelets differential     Status: None    Narrative    The following orders were created for panel order CBC with platelets differential.  Procedure                               Abnormality         Status                     ---------                               -----------         ------                     CBC with platelets and d...[[id number]]                      Final result                 Please view results for these tests on the individual orders.     Medications   sodium chloride 0.9% BOLUS 1,000 mL (1,000 mLs Intravenous $New Bag 2/5/24 9795)     Labs Ordered and Resulted from Time of ED Arrival to Time of ED Departure   COMPREHENSIVE METABOLIC PANEL - Abnormal       Result Value    Sodium 128 (*)     Potassium 4.2      Carbon Dioxide (CO2) 20 (*)     Anion Gap 14      Urea Nitrogen 19.5      Creatinine 0.66 (*)     GFR Estimate >90      Calcium 9.4      Chloride 94 (*)     Glucose 324 (*)      Alkaline Phosphatase 110      AST        ALT 20      Protein Total 7.5      Albumin 4.3      Bilirubin Total 1.4 (*)    GLUCOSE BY METER - Abnormal    GLUCOSE BY METER POCT 341 (*)    MAGNESIUM - Normal    Magnesium 1.9     CBC WITH PLATELETS AND DIFFERENTIAL    WBC Count 7.5      RBC Count 5.15      Hemoglobin 14.7      Hematocrit 43.7      MCV 85      MCH 28.5      MCHC 33.6      RDW 13.0      Platelet Count 385      % Neutrophils 56      % Lymphocytes 31      % Monocytes 9      % Eosinophils 2      % Basophils 1      % Immature Granulocytes 1      NRBCs per 100 WBC 0      Absolute Neutrophils 4.3      Absolute Lymphocytes 2.3      Absolute Monocytes 0.6      Absolute Eosinophils 0.2      Absolute Basophils 0.1      Absolute Immature Granulocytes 0.1      Absolute NRBCs 0.0       No orders to display          Critical care was not performed.     Medical Decision Making  The patient's presentation was of moderate complexity (an acute illness with systemic symptoms).    The patient's evaluation involved:  ordering and/or review of 3+ test(s) in this encounter (see separate area of note for details)    The patient's management necessitated only low risk treatment.    Assessment & Plan    59 yo male here with N/V/D. IV fluids and labs ordered.    Labs show Na 128, glucose 324, CO2 20, urine with glucose >1000 but no ketones. Pt decided to leave AMA before labs returned but did get some IV fluids. He stated he would return if he worsens. He left before we could get him his paperwork.    I have reviewed the nursing notes. I have reviewed the findings, diagnosis, plan and need for follow up with the patient.    Discharge Medication List as of 2/5/2024  3:34 PM          Final diagnoses:   Nausea and vomiting, unspecified vomiting type       Hernando Nixon MD  [facility] EMERGENCY DEPARTMENT  2/5/2024     Hernando Nixon MD  02/09/24 214

## 2024-02-22 ENCOUNTER — HOSPITAL ENCOUNTER (EMERGENCY)
Facility: HOSPITAL | Age: 44
Discharge: LEFT BEFORE DISCHARGE | End: 2024-02-22
Payer: MEDICAID

## 2024-02-22 VITALS
TEMPERATURE: 97.7 F | WEIGHT: 179.9 LBS | OXYGEN SATURATION: 98 % | HEART RATE: 98 BPM | BODY MASS INDEX: 26.64 KG/M2 | SYSTOLIC BLOOD PRESSURE: 117 MMHG | DIASTOLIC BLOOD PRESSURE: 96 MMHG | RESPIRATION RATE: 18 BRPM | HEIGHT: 69 IN

## 2024-02-22 PROCEDURE — 93005 ELECTROCARDIOGRAM TRACING: CPT | Performed by: EMERGENCY MEDICINE

## 2024-02-22 ASSESSMENT — PAIN SCALES - GENERAL: PAINLEVEL_OUTOF10: 0

## 2024-02-23 LAB
EKG ATRIAL RATE: 91 BPM
EKG DIAGNOSIS: NORMAL
EKG P AXIS: 78 DEGREES
EKG P-R INTERVAL: 154 MS
EKG Q-T INTERVAL: 344 MS
EKG QRS DURATION: 72 MS
EKG QTC CALCULATION (BAZETT): 423 MS
EKG R AXIS: 69 DEGREES
EKG T AXIS: 64 DEGREES
EKG VENTRICULAR RATE: 91 BPM

## 2024-04-10 NOTE — PROGRESS NOTES
Physical Therapy:  Chart reviewed. Pt currently in IR for central line placement per nurse. Will defer and continue to follow. She has chronic pseudoobstruction and chronic constipation, although her constipation symptoms are currently doing well symptomatically with daily MiraLAX + prune juice.  She has significant abdominal distention, but this is related to her chronic pseudoobstruction so more aggressive management of this would not be helpful.  She can continue her current bowel regimen.

## 2025-02-13 ENCOUNTER — APPOINTMENT (OUTPATIENT)
Facility: HOSPITAL | Age: 45
DRG: 446 | End: 2025-02-13
Payer: MEDICARE

## 2025-02-13 ENCOUNTER — HOSPITAL ENCOUNTER (INPATIENT)
Facility: HOSPITAL | Age: 45
LOS: 5 days | Discharge: HOME HEALTH | DRG: 446 | End: 2025-02-18
Attending: EMERGENCY MEDICINE | Admitting: STUDENT IN AN ORGANIZED HEALTH CARE EDUCATION/TRAINING PROGRAM
Payer: MEDICARE

## 2025-02-13 DIAGNOSIS — K81.0 ACUTE CHOLECYSTITIS: Primary | ICD-10-CM

## 2025-02-13 LAB
ALBUMIN SERPL-MCNC: 3.8 G/DL (ref 3.5–5)
ALBUMIN/GLOB SERPL: 1.1 (ref 1.1–2.2)
ALP SERPL-CCNC: 77 U/L (ref 45–117)
ALT SERPL-CCNC: 58 U/L (ref 12–78)
ANION GAP SERPL CALC-SCNC: 6 MMOL/L (ref 2–12)
APPEARANCE UR: CLEAR
AST SERPL-CCNC: 31 U/L (ref 15–37)
BACTERIA URNS QL MICRO: NEGATIVE /HPF
BASOPHILS # BLD: 0.03 K/UL (ref 0–0.1)
BASOPHILS NFR BLD: 0.3 % (ref 0–1)
BILIRUB SERPL-MCNC: 1.1 MG/DL (ref 0.2–1)
BILIRUB UR QL: NEGATIVE
BUN SERPL-MCNC: 15 MG/DL (ref 6–20)
BUN/CREAT SERPL: 16 (ref 12–20)
CALCIUM SERPL-MCNC: 9.5 MG/DL (ref 8.5–10.1)
CHLORIDE SERPL-SCNC: 102 MMOL/L (ref 97–108)
CO2 SERPL-SCNC: 26 MMOL/L (ref 21–32)
COLOR UR: ABNORMAL
CREAT SERPL-MCNC: 0.94 MG/DL (ref 0.7–1.3)
DIFFERENTIAL METHOD BLD: ABNORMAL
EOSINOPHIL # BLD: 0 K/UL (ref 0–0.4)
EOSINOPHIL NFR BLD: 0 % (ref 0–7)
EPITH CASTS URNS QL MICRO: ABNORMAL /LPF
ERYTHROCYTE [DISTWIDTH] IN BLOOD BY AUTOMATED COUNT: 13 % (ref 11.5–14.5)
GLOBULIN SER CALC-MCNC: 3.6 G/DL (ref 2–4)
GLUCOSE SERPL-MCNC: 97 MG/DL (ref 65–100)
GLUCOSE UR STRIP.AUTO-MCNC: NEGATIVE MG/DL
HCT VFR BLD AUTO: 40.2 % (ref 36.6–50.3)
HGB BLD-MCNC: 14 G/DL (ref 12.1–17)
HGB UR QL STRIP: NEGATIVE
HYALINE CASTS URNS QL MICRO: ABNORMAL /LPF (ref 0–2)
IMM GRANULOCYTES # BLD AUTO: 0.04 K/UL (ref 0–0.04)
IMM GRANULOCYTES NFR BLD AUTO: 0.3 % (ref 0–0.5)
KETONES UR QL STRIP.AUTO: NEGATIVE MG/DL
LEUKOCYTE ESTERASE UR QL STRIP.AUTO: ABNORMAL
LIPASE SERPL-CCNC: 16 U/L (ref 13–75)
LYMPHOCYTES # BLD: 1.72 K/UL (ref 0.8–3.5)
LYMPHOCYTES NFR BLD: 14.4 % (ref 12–49)
MCH RBC QN AUTO: 31.8 PG (ref 26–34)
MCHC RBC AUTO-ENTMCNC: 34.8 G/DL (ref 30–36.5)
MCV RBC AUTO: 91.4 FL (ref 80–99)
MONOCYTES # BLD: 1.02 K/UL (ref 0–1)
MONOCYTES NFR BLD: 8.5 % (ref 5–13)
NEUTS SEG # BLD: 9.13 K/UL (ref 1.8–8)
NEUTS SEG NFR BLD: 76.5 % (ref 32–75)
NITRITE UR QL STRIP.AUTO: NEGATIVE
NRBC # BLD: 0 K/UL (ref 0–0.01)
NRBC BLD-RTO: 0 PER 100 WBC
PH UR STRIP: 6.5 (ref 5–8)
PLATELET # BLD AUTO: 146 K/UL (ref 150–400)
PMV BLD AUTO: 11.9 FL (ref 8.9–12.9)
POTASSIUM SERPL-SCNC: 4 MMOL/L (ref 3.5–5.1)
PROT SERPL-MCNC: 7.4 G/DL (ref 6.4–8.2)
PROT UR STRIP-MCNC: NEGATIVE MG/DL
RBC # BLD AUTO: 4.4 M/UL (ref 4.1–5.7)
RBC #/AREA URNS HPF: ABNORMAL /HPF (ref 0–5)
SODIUM SERPL-SCNC: 134 MMOL/L (ref 136–145)
SP GR UR REFRACTOMETRY: 1.01
URINE CULTURE IF INDICATED: ABNORMAL
UROBILINOGEN UR QL STRIP.AUTO: 0.2 EU/DL (ref 0.2–1)
WBC # BLD AUTO: 11.9 K/UL (ref 4.1–11.1)
WBC URNS QL MICRO: ABNORMAL /HPF (ref 0–4)

## 2025-02-13 PROCEDURE — 6360000004 HC RX CONTRAST MEDICATION: Performed by: EMERGENCY MEDICINE

## 2025-02-13 PROCEDURE — 99285 EMERGENCY DEPT VISIT HI MDM: CPT

## 2025-02-13 PROCEDURE — 6360000002 HC RX W HCPCS: Performed by: STUDENT IN AN ORGANIZED HEALTH CARE EDUCATION/TRAINING PROGRAM

## 2025-02-13 PROCEDURE — 96365 THER/PROPH/DIAG IV INF INIT: CPT

## 2025-02-13 PROCEDURE — 81001 URINALYSIS AUTO W/SCOPE: CPT

## 2025-02-13 PROCEDURE — 96375 TX/PRO/DX INJ NEW DRUG ADDON: CPT

## 2025-02-13 PROCEDURE — 94762 N-INVAS EAR/PLS OXIMTRY CONT: CPT

## 2025-02-13 PROCEDURE — 99222 1ST HOSP IP/OBS MODERATE 55: CPT | Performed by: SURGERY

## 2025-02-13 PROCEDURE — 6360000002 HC RX W HCPCS: Performed by: EMERGENCY MEDICINE

## 2025-02-13 PROCEDURE — 76705 ECHO EXAM OF ABDOMEN: CPT

## 2025-02-13 PROCEDURE — 83690 ASSAY OF LIPASE: CPT

## 2025-02-13 PROCEDURE — 85025 COMPLETE CBC W/AUTO DIFF WBC: CPT

## 2025-02-13 PROCEDURE — 74177 CT ABD & PELVIS W/CONTRAST: CPT

## 2025-02-13 PROCEDURE — 1100000003 HC PRIVATE W/ TELEMETRY

## 2025-02-13 PROCEDURE — 2580000003 HC RX 258: Performed by: STUDENT IN AN ORGANIZED HEALTH CARE EDUCATION/TRAINING PROGRAM

## 2025-02-13 PROCEDURE — 36415 COLL VENOUS BLD VENIPUNCTURE: CPT

## 2025-02-13 PROCEDURE — 71045 X-RAY EXAM CHEST 1 VIEW: CPT

## 2025-02-13 PROCEDURE — 80053 COMPREHEN METABOLIC PANEL: CPT

## 2025-02-13 RX ORDER — ONDANSETRON 4 MG/1
4 TABLET, ORALLY DISINTEGRATING ORAL EVERY 8 HOURS PRN
Status: DISCONTINUED | OUTPATIENT
Start: 2025-02-13 | End: 2025-02-18 | Stop reason: HOSPADM

## 2025-02-13 RX ORDER — ACETAMINOPHEN 650 MG/1
650 SUPPOSITORY RECTAL EVERY 6 HOURS PRN
Status: DISCONTINUED | OUTPATIENT
Start: 2025-02-13 | End: 2025-02-18 | Stop reason: HOSPADM

## 2025-02-13 RX ORDER — ONDANSETRON 2 MG/ML
4 INJECTION INTRAMUSCULAR; INTRAVENOUS EVERY 6 HOURS PRN
Status: DISCONTINUED | OUTPATIENT
Start: 2025-02-13 | End: 2025-02-18 | Stop reason: HOSPADM

## 2025-02-13 RX ORDER — SODIUM CHLORIDE 0.9 % (FLUSH) 0.9 %
5-40 SYRINGE (ML) INJECTION PRN
Status: DISCONTINUED | OUTPATIENT
Start: 2025-02-13 | End: 2025-02-18 | Stop reason: HOSPADM

## 2025-02-13 RX ORDER — SODIUM CHLORIDE 0.9 % (FLUSH) 0.9 %
5-40 SYRINGE (ML) INJECTION EVERY 12 HOURS SCHEDULED
Status: DISCONTINUED | OUTPATIENT
Start: 2025-02-13 | End: 2025-02-18 | Stop reason: HOSPADM

## 2025-02-13 RX ORDER — POTASSIUM CHLORIDE 1500 MG/1
40 TABLET, EXTENDED RELEASE ORAL PRN
Status: DISCONTINUED | OUTPATIENT
Start: 2025-02-13 | End: 2025-02-16

## 2025-02-13 RX ORDER — MORPHINE SULFATE 4 MG/ML
4 INJECTION, SOLUTION INTRAMUSCULAR; INTRAVENOUS
Status: COMPLETED | OUTPATIENT
Start: 2025-02-13 | End: 2025-02-13

## 2025-02-13 RX ORDER — MORPHINE SULFATE 4 MG/ML
4 INJECTION, SOLUTION INTRAMUSCULAR; INTRAVENOUS EVERY 4 HOURS PRN
Status: DISCONTINUED | OUTPATIENT
Start: 2025-02-13 | End: 2025-02-14

## 2025-02-13 RX ORDER — SODIUM CHLORIDE, SODIUM LACTATE, POTASSIUM CHLORIDE, CALCIUM CHLORIDE 600; 310; 30; 20 MG/100ML; MG/100ML; MG/100ML; MG/100ML
INJECTION, SOLUTION INTRAVENOUS CONTINUOUS
Status: ACTIVE | OUTPATIENT
Start: 2025-02-13 | End: 2025-02-14

## 2025-02-13 RX ORDER — MAGNESIUM SULFATE IN WATER 40 MG/ML
2000 INJECTION, SOLUTION INTRAVENOUS PRN
Status: DISCONTINUED | OUTPATIENT
Start: 2025-02-13 | End: 2025-02-18 | Stop reason: HOSPADM

## 2025-02-13 RX ORDER — SODIUM CHLORIDE 9 MG/ML
INJECTION, SOLUTION INTRAVENOUS PRN
Status: DISCONTINUED | OUTPATIENT
Start: 2025-02-13 | End: 2025-02-18 | Stop reason: HOSPADM

## 2025-02-13 RX ORDER — ONDANSETRON 2 MG/ML
4 INJECTION INTRAMUSCULAR; INTRAVENOUS ONCE
Status: COMPLETED | OUTPATIENT
Start: 2025-02-13 | End: 2025-02-13

## 2025-02-13 RX ORDER — POLYETHYLENE GLYCOL 3350 17 G/17G
17 POWDER, FOR SOLUTION ORAL DAILY PRN
Status: DISCONTINUED | OUTPATIENT
Start: 2025-02-13 | End: 2025-02-18 | Stop reason: HOSPADM

## 2025-02-13 RX ORDER — MORPHINE SULFATE 2 MG/ML
2 INJECTION, SOLUTION INTRAMUSCULAR; INTRAVENOUS EVERY 4 HOURS PRN
Status: DISCONTINUED | OUTPATIENT
Start: 2025-02-13 | End: 2025-02-14

## 2025-02-13 RX ORDER — ACETAMINOPHEN 325 MG/1
650 TABLET ORAL EVERY 6 HOURS PRN
Status: DISCONTINUED | OUTPATIENT
Start: 2025-02-13 | End: 2025-02-18 | Stop reason: HOSPADM

## 2025-02-13 RX ORDER — IOPAMIDOL 755 MG/ML
100 INJECTION, SOLUTION INTRAVASCULAR
Status: COMPLETED | OUTPATIENT
Start: 2025-02-13 | End: 2025-02-13

## 2025-02-13 RX ORDER — POTASSIUM CHLORIDE 7.45 MG/ML
10 INJECTION INTRAVENOUS PRN
Status: DISCONTINUED | OUTPATIENT
Start: 2025-02-13 | End: 2025-02-16

## 2025-02-13 RX ADMIN — PIPERACILLIN AND TAZOBACTAM 4500 MG: 4; .5 INJECTION, POWDER, LYOPHILIZED, FOR SOLUTION INTRAVENOUS at 22:42

## 2025-02-13 RX ADMIN — ONDANSETRON 4 MG: 2 INJECTION, SOLUTION INTRAMUSCULAR; INTRAVENOUS at 20:12

## 2025-02-13 RX ADMIN — MORPHINE SULFATE 4 MG: 4 INJECTION, SOLUTION INTRAMUSCULAR; INTRAVENOUS at 20:11

## 2025-02-13 RX ADMIN — IOPAMIDOL 100 ML: 755 INJECTION, SOLUTION INTRAVENOUS at 21:15

## 2025-02-13 ASSESSMENT — LIFESTYLE VARIABLES
HOW MANY STANDARD DRINKS CONTAINING ALCOHOL DO YOU HAVE ON A TYPICAL DAY: PATIENT DOES NOT DRINK
HOW OFTEN DO YOU HAVE A DRINK CONTAINING ALCOHOL: NEVER

## 2025-02-13 ASSESSMENT — PAIN DESCRIPTION - DESCRIPTORS
DESCRIPTORS: BURNING
DESCRIPTORS: CRAMPING

## 2025-02-13 ASSESSMENT — PAIN DESCRIPTION - LOCATION
LOCATION: ABDOMEN
LOCATION: ABDOMEN

## 2025-02-13 ASSESSMENT — PAIN DESCRIPTION - ORIENTATION: ORIENTATION: RIGHT;LEFT;UPPER

## 2025-02-13 ASSESSMENT — PAIN SCALES - GENERAL
PAINLEVEL_OUTOF10: 7
PAINLEVEL_OUTOF10: 10

## 2025-02-13 ASSESSMENT — PAIN - FUNCTIONAL ASSESSMENT: PAIN_FUNCTIONAL_ASSESSMENT: 0-10
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
## 2025-02-14 ENCOUNTER — APPOINTMENT (OUTPATIENT)
Facility: HOSPITAL | Age: 45
DRG: 446 | End: 2025-02-14
Attending: INTERNAL MEDICINE
Payer: MEDICARE

## 2025-02-14 PROBLEM — Z78.9 ON TOTAL PARENTERAL NUTRITION: Status: ACTIVE | Noted: 2022-04-23

## 2025-02-14 PROBLEM — K63.89 PNEUMATOSIS INTESTINALIS: Status: ACTIVE | Noted: 2025-02-14

## 2025-02-14 PROBLEM — Z86.718 HISTORY OF DVT IN ADULTHOOD: Status: ACTIVE | Noted: 2025-02-14

## 2025-02-14 PROBLEM — K94.10: Status: ACTIVE | Noted: 2025-02-14

## 2025-02-14 LAB
COMMENT:: NORMAL
SPECIMEN HOLD: NORMAL

## 2025-02-14 PROCEDURE — 6360000002 HC RX W HCPCS: Performed by: STUDENT IN AN ORGANIZED HEALTH CARE EDUCATION/TRAINING PROGRAM

## 2025-02-14 PROCEDURE — 6360000004 HC RX CONTRAST MEDICATION: Performed by: STUDENT IN AN ORGANIZED HEALTH CARE EDUCATION/TRAINING PROGRAM

## 2025-02-14 PROCEDURE — 2580000003 HC RX 258: Performed by: STUDENT IN AN ORGANIZED HEALTH CARE EDUCATION/TRAINING PROGRAM

## 2025-02-14 PROCEDURE — 99223 1ST HOSP IP/OBS HIGH 75: CPT | Performed by: INTERNAL MEDICINE

## 2025-02-14 PROCEDURE — 87181 SC STD AGAR DILUTION PER AGT: CPT

## 2025-02-14 PROCEDURE — 1100000003 HC PRIVATE W/ TELEMETRY

## 2025-02-14 PROCEDURE — 87070 CULTURE OTHR SPECIMN AEROBIC: CPT

## 2025-02-14 PROCEDURE — C1894 INTRO/SHEATH, NON-LASER: HCPCS

## 2025-02-14 PROCEDURE — 87186 SC STD MICRODIL/AGAR DIL: CPT

## 2025-02-14 PROCEDURE — 87077 CULTURE AEROBIC IDENTIFY: CPT

## 2025-02-14 PROCEDURE — 6370000000 HC RX 637 (ALT 250 FOR IP): Performed by: NURSE PRACTITIONER

## 2025-02-14 PROCEDURE — 2500000003 HC RX 250 WO HCPCS: Performed by: STUDENT IN AN ORGANIZED HEALTH CARE EDUCATION/TRAINING PROGRAM

## 2025-02-14 PROCEDURE — 87205 SMEAR GRAM STAIN: CPT

## 2025-02-14 PROCEDURE — 0F9430Z DRAINAGE OF GALLBLADDER WITH DRAINAGE DEVICE, PERCUTANEOUS APPROACH: ICD-10-PCS | Performed by: STUDENT IN AN ORGANIZED HEALTH CARE EDUCATION/TRAINING PROGRAM

## 2025-02-14 PROCEDURE — 87040 BLOOD CULTURE FOR BACTERIA: CPT

## 2025-02-14 PROCEDURE — 87075 CULTR BACTERIA EXCEPT BLOOD: CPT

## 2025-02-14 PROCEDURE — 6370000000 HC RX 637 (ALT 250 FOR IP): Performed by: STUDENT IN AN ORGANIZED HEALTH CARE EDUCATION/TRAINING PROGRAM

## 2025-02-14 PROCEDURE — 99231 SBSQ HOSP IP/OBS SF/LOW 25: CPT | Performed by: NURSE PRACTITIONER

## 2025-02-14 PROCEDURE — 36415 COLL VENOUS BLD VENIPUNCTURE: CPT

## 2025-02-14 PROCEDURE — 6360000002 HC RX W HCPCS: Performed by: NURSE PRACTITIONER

## 2025-02-14 PROCEDURE — BF121ZZ FLUOROSCOPY OF GALLBLADDER USING LOW OSMOLAR CONTRAST: ICD-10-PCS | Performed by: STUDENT IN AN ORGANIZED HEALTH CARE EDUCATION/TRAINING PROGRAM

## 2025-02-14 RX ORDER — KETOROLAC TROMETHAMINE 30 MG/ML
15 INJECTION, SOLUTION INTRAMUSCULAR; INTRAVENOUS EVERY 6 HOURS
Status: DISCONTINUED | OUTPATIENT
Start: 2025-02-14 | End: 2025-02-16

## 2025-02-14 RX ORDER — FENTANYL CITRATE 50 UG/ML
50 INJECTION, SOLUTION INTRAMUSCULAR; INTRAVENOUS ONCE
Status: COMPLETED | OUTPATIENT
Start: 2025-02-14 | End: 2025-02-14

## 2025-02-14 RX ORDER — FENTANYL CITRATE 50 UG/ML
INJECTION, SOLUTION INTRAMUSCULAR; INTRAVENOUS PRN
Status: COMPLETED | OUTPATIENT
Start: 2025-02-14 | End: 2025-02-14

## 2025-02-14 RX ORDER — IOPAMIDOL 755 MG/ML
100 INJECTION, SOLUTION INTRAVASCULAR ONCE
Status: COMPLETED | OUTPATIENT
Start: 2025-02-14 | End: 2025-02-14

## 2025-02-14 RX ORDER — LIDOCAINE 4 G/G
1 PATCH TOPICAL DAILY
Status: DISCONTINUED | OUTPATIENT
Start: 2025-02-14 | End: 2025-02-18 | Stop reason: HOSPADM

## 2025-02-14 RX ORDER — LIDOCAINE HYDROCHLORIDE 20 MG/ML
20 INJECTION, SOLUTION INFILTRATION; PERINEURAL ONCE
Status: COMPLETED | OUTPATIENT
Start: 2025-02-14 | End: 2025-02-14

## 2025-02-14 RX ORDER — OXYCODONE HCL 5 MG/5 ML
5 SOLUTION, ORAL ORAL EVERY 4 HOURS PRN
Status: DISCONTINUED | OUTPATIENT
Start: 2025-02-14 | End: 2025-02-18 | Stop reason: HOSPADM

## 2025-02-14 RX ORDER — MIDAZOLAM HYDROCHLORIDE 2 MG/2ML
INJECTION, SOLUTION INTRAMUSCULAR; INTRAVENOUS PRN
Status: COMPLETED | OUTPATIENT
Start: 2025-02-14 | End: 2025-02-14

## 2025-02-14 RX ORDER — HYDROMORPHONE HYDROCHLORIDE 1 MG/ML
1 INJECTION, SOLUTION INTRAMUSCULAR; INTRAVENOUS; SUBCUTANEOUS
Status: DISCONTINUED | OUTPATIENT
Start: 2025-02-14 | End: 2025-02-18 | Stop reason: HOSPADM

## 2025-02-14 RX ORDER — HEPARIN SODIUM 200 [USP'U]/100ML
200 INJECTION, SOLUTION INTRAVENOUS ONCE
Status: DISCONTINUED | OUTPATIENT
Start: 2025-02-14 | End: 2025-02-17

## 2025-02-14 RX ADMIN — FENTANYL CITRATE 50 MCG: 50 INJECTION INTRAMUSCULAR; INTRAVENOUS at 10:29

## 2025-02-14 RX ADMIN — FENTANYL CITRATE 50 MCG: 50 INJECTION, SOLUTION INTRAMUSCULAR; INTRAVENOUS at 11:15

## 2025-02-14 RX ADMIN — PIPERACILLIN AND TAZOBACTAM 3375 MG: 3; .375 INJECTION, POWDER, LYOPHILIZED, FOR SOLUTION INTRAVENOUS at 06:11

## 2025-02-14 RX ADMIN — IOPAMIDOL 10 ML: 755 INJECTION, SOLUTION INTRAVENOUS at 11:30

## 2025-02-14 RX ADMIN — MORPHINE SULFATE 2 MG: 2 INJECTION, SOLUTION INTRAMUSCULAR; INTRAVENOUS at 01:48

## 2025-02-14 RX ADMIN — FAMOTIDINE 20 MG: 10 INJECTION, SOLUTION INTRAVENOUS at 00:25

## 2025-02-14 RX ADMIN — SODIUM CHLORIDE, POTASSIUM CHLORIDE, SODIUM LACTATE AND CALCIUM CHLORIDE: 600; 310; 30; 20 INJECTION, SOLUTION INTRAVENOUS at 01:58

## 2025-02-14 RX ADMIN — KETOROLAC TROMETHAMINE 15 MG: 30 INJECTION, SOLUTION INTRAMUSCULAR at 13:04

## 2025-02-14 RX ADMIN — HYDROMORPHONE HYDROCHLORIDE 0.5 MG: 1 INJECTION, SOLUTION INTRAMUSCULAR; INTRAVENOUS; SUBCUTANEOUS at 15:15

## 2025-02-14 RX ADMIN — MIDAZOLAM HYDROCHLORIDE 2 MG: 1 INJECTION, SOLUTION INTRAMUSCULAR; INTRAVENOUS at 11:15

## 2025-02-14 RX ADMIN — HEPARIN SODIUM 100 UNITS: 200 INJECTION, SOLUTION INTRAVENOUS at 11:30

## 2025-02-14 RX ADMIN — PIPERACILLIN AND TAZOBACTAM 3375 MG: 3; .375 INJECTION, POWDER, LYOPHILIZED, FOR SOLUTION INTRAVENOUS at 22:32

## 2025-02-14 RX ADMIN — KETOROLAC TROMETHAMINE 15 MG: 30 INJECTION, SOLUTION INTRAMUSCULAR at 20:40

## 2025-02-14 RX ADMIN — FAMOTIDINE 20 MG: 10 INJECTION, SOLUTION INTRAVENOUS at 20:38

## 2025-02-14 RX ADMIN — MORPHINE SULFATE 4 MG: 4 INJECTION, SOLUTION INTRAMUSCULAR; INTRAVENOUS at 12:54

## 2025-02-14 RX ADMIN — ACETAMINOPHEN 650 MG: 325 TABLET ORAL at 20:46

## 2025-02-14 RX ADMIN — LIDOCAINE HYDROCHLORIDE 10 ML: 20 INJECTION, SOLUTION INFILTRATION; PERINEURAL at 11:29

## 2025-02-14 RX ADMIN — PIPERACILLIN AND TAZOBACTAM 3375 MG: 3; .375 INJECTION, POWDER, LYOPHILIZED, FOR SOLUTION INTRAVENOUS at 13:11

## 2025-02-14 RX ADMIN — SODIUM CHLORIDE, POTASSIUM CHLORIDE, SODIUM LACTATE AND CALCIUM CHLORIDE: 600; 310; 30; 20 INJECTION, SOLUTION INTRAVENOUS at 20:46

## 2025-02-14 RX ADMIN — SODIUM CHLORIDE, PRESERVATIVE FREE 10 ML: 5 INJECTION INTRAVENOUS at 20:53

## 2025-02-14 ASSESSMENT — PAIN DESCRIPTION - ORIENTATION
ORIENTATION: RIGHT;LOWER
ORIENTATION: MID;UPPER
ORIENTATION: RIGHT;LOWER
ORIENTATION: RIGHT;LOWER

## 2025-02-14 ASSESSMENT — PAIN - FUNCTIONAL ASSESSMENT
PAIN_FUNCTIONAL_ASSESSMENT: NONE - DENIES PAIN
PAIN_FUNCTIONAL_ASSESSMENT: PREVENTS OR INTERFERES SOME ACTIVE ACTIVITIES AND ADLS
PAIN_FUNCTIONAL_ASSESSMENT: NONE - DENIES PAIN
PAIN_FUNCTIONAL_ASSESSMENT: NONE - DENIES PAIN
PAIN_FUNCTIONAL_ASSESSMENT: ADULT NONVERBAL PAIN SCALE (NPVS)
PAIN_FUNCTIONAL_ASSESSMENT: ADULT NONVERBAL PAIN SCALE (NPVS)
PAIN_FUNCTIONAL_ASSESSMENT: ACTIVITIES ARE NOT PREVENTED
PAIN_FUNCTIONAL_ASSESSMENT: NONE - DENIES PAIN
PAIN_FUNCTIONAL_ASSESSMENT: ADULT NONVERBAL PAIN SCALE (NPVS)
PAIN_FUNCTIONAL_ASSESSMENT: ACTIVITIES ARE NOT PREVENTED
PAIN_FUNCTIONAL_ASSESSMENT: ADULT NONVERBAL PAIN SCALE (NPVS)
PAIN_FUNCTIONAL_ASSESSMENT: 0-10
PAIN_FUNCTIONAL_ASSESSMENT: ADULT NONVERBAL PAIN SCALE (NPVS)

## 2025-02-14 ASSESSMENT — PAIN DESCRIPTION - DESCRIPTORS
DESCRIPTORS: ACHING
DESCRIPTORS: CRAMPING
DESCRIPTORS: ACHING

## 2025-02-14 ASSESSMENT — PAIN DESCRIPTION - LOCATION
LOCATION: ABDOMEN

## 2025-02-14 ASSESSMENT — PAIN SCALES - GENERAL
PAINLEVEL_OUTOF10: 10
PAINLEVEL_OUTOF10: 4
PAINLEVEL_OUTOF10: 8
PAINLEVEL_OUTOF10: 8
PAINLEVEL_OUTOF10: 1

## 2025-02-14 NOTE — DISCHARGE INSTRUCTIONS
[facility]  Department of Interventional Radiology  [address] [place], [place] [id number] [phone]    BILIARY DRAIN DISCHARGE EDUCATION     Radiologist: Dr. Cox    Date: 2/14/2025    Information:    A cholecystostomy tube provides a route of bile drainage for obstructed bile ducts. This provides treatment of jaundice and relief of associated skin itchiness. The catheter is   secured to the skin by an adhesive device or sutures to prevent accidental removal of the catheter. A dressing made of gauze is placed over the fixation device and is taped or secured with a thin sheet of adhesive material. The catheter is usually also taped to your skin just outside the dressing. The end of the catheter may be connected to a drainage bag to collect your bile.      What should I expect after the Biliary Drain?    There may be some soreness around the catheter site. To relieve pain, take Tylenol (acetaminophen) or the pain medicine your doctor prescribed. Avoid ibuprofen (Advil,   Motrin) and aspirin as they may cause you to bleed.   You may return to work after 24 hours, unless your primary doctor instructs you otherwise.    You do not have any diet restrictions because of this procedure but should continue any that were given to you by any other doctors.    Continue Coumadin/warfarin and all previously prescribed medications with the exception of Pradaxa, Xarelto, Eliquis or Savaysa which can be resumed after 24 hours.     Bathing & Wound Care:    Try to prevent tugging on the catheter    If connected to a bag, the bag should be emptied as follows:   1. Turn bag upside down,   2. Remove bottom cap from bag   3. Pour contents into toilet,   4. Replace cap on bag, and   5. Re-secure bag onto leg with straps.   Keep dressing as dry as possible. When bathing, do not sit in water deep enough to immerse catheter fixation device/dressing. Showering is permitted if the dressing is kept as
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
## 2025-02-14 NOTE — ED NOTES
Report given to ELBERT Ramirez. Nurse was informed of reason for arrival, vitals, labs, medications, orders, procedures, results, anything left pending and current plan of action. Questions were asked and received prior to departure from the patient.
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
## 2025-02-14 NOTE — CARE COORDINATION
Care Management Initial Assessment       RUR: 4%  Readmission? No  1st IM letter given? No  1st  letter given: No    CM completed assessment w/pt at bedside.  Has had HH in the past, but unable to recall name of provider.  No DME use.  Patient is independent w/ADL to include driving.   Patient lives with his brother & 20yr old daughter.  Patient is dependent on TPN.  TPN provided by [employer].  Patient uses [facility] on [address] in [place].  Unsure of d/c transportation at this time.        02/14/25 1353   Service Assessment   Patient Orientation Alert and Oriented   Cognition Alert   History Provided By Patient   Primary Caregiver Self   Support Systems Children;Family Members  (daughter, brother)   PCP Verified by CM Yes  (José Luis Hamilton MD)   Last Visit to PCP Within last year   Prior Functional Level Independent in ADLs/IADLs   Current Functional Level Independent in ADLs/IADLs   Can patient return to prior living arrangement Yes   Family able to assist with home care needs: Yes   Would you like for me to discuss the discharge plan with any other family members/significant others, and if so, who? No   Financial Resources Medicare   Community Resources None   Social/Functional History   Lives With Daughter;Family  (brother)   Type of Home House  (one story home w/10 [address])   Home Equipment None   Active  Yes   Mode of Transportation Car     Radha Rothman  Ext [phone]

## 2025-02-14 NOTE — PROCEDURES
Brief Postoperative Note    Obey Arana  YOB: 1980  [id number]    Pre-operative Diagnosis: Acute calculous cholecystitis     Post-operative Diagnosis: Same    Procedure: Percutaneous cholecystostomy placement.    Anesthesia: Local and Moderate Sedation    Surgeons/Assistants: Araceli Cox MD    Estimated Blood Loss: Minimal     Complications: None    Specimens: Bile culture     Findings:   Percutaneous cholecystogram demonstrated a solitary obstructing gallstone within the neck of the gallbladder.  Placement of a 10 Vietnamese percutaneous cholecystostomy.    Plan:  Follow bile cultures.  Flush drain with 10 mL sterile saline once per shift. At discharge okay to decrease flushing to once daily.  Follow up with me in 6 weeks for cholecystogram. At that time will discuss drain removal. However, if the large stone remains intermittently obstructing the patient may need percutaneous biliary endoscopy, laser stone lithotripsy, and stone extraction.     Electronically signed by Araceli Cox MD on 2/14/2025 at 11:35 AM
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
## 2025-02-15 LAB
ANION GAP SERPL CALC-SCNC: 7 MMOL/L (ref 2–12)
BUN SERPL-MCNC: 19 MG/DL (ref 6–20)
BUN/CREAT SERPL: 17 (ref 12–20)
CALCIUM SERPL-MCNC: 8.6 MG/DL (ref 8.5–10.1)
CHLORIDE SERPL-SCNC: 106 MMOL/L (ref 97–108)
CO2 SERPL-SCNC: 26 MMOL/L (ref 21–32)
CREAT SERPL-MCNC: 1.15 MG/DL (ref 0.7–1.3)
GLUCOSE SERPL-MCNC: 77 MG/DL (ref 65–100)
MAGNESIUM SERPL-MCNC: 2 MG/DL (ref 1.6–2.4)
PHOSPHATE SERPL-MCNC: 2.7 MG/DL (ref 2.6–4.7)
POTASSIUM SERPL-SCNC: 3.7 MMOL/L (ref 3.5–5.1)
SODIUM SERPL-SCNC: 139 MMOL/L (ref 136–145)
TRIGL SERPL-MCNC: 50 MG/DL

## 2025-02-15 PROCEDURE — 84478 ASSAY OF TRIGLYCERIDES: CPT

## 2025-02-15 PROCEDURE — 36415 COLL VENOUS BLD VENIPUNCTURE: CPT

## 2025-02-15 PROCEDURE — 84100 ASSAY OF PHOSPHORUS: CPT

## 2025-02-15 PROCEDURE — 2500000003 HC RX 250 WO HCPCS: Performed by: STUDENT IN AN ORGANIZED HEALTH CARE EDUCATION/TRAINING PROGRAM

## 2025-02-15 PROCEDURE — 2580000003 HC RX 258: Performed by: NURSE PRACTITIONER

## 2025-02-15 PROCEDURE — 1100000003 HC PRIVATE W/ TELEMETRY

## 2025-02-15 PROCEDURE — 6370000000 HC RX 637 (ALT 250 FOR IP): Performed by: NURSE PRACTITIONER

## 2025-02-15 PROCEDURE — 6360000002 HC RX W HCPCS: Performed by: STUDENT IN AN ORGANIZED HEALTH CARE EDUCATION/TRAINING PROGRAM

## 2025-02-15 PROCEDURE — 2580000003 HC RX 258: Performed by: STUDENT IN AN ORGANIZED HEALTH CARE EDUCATION/TRAINING PROGRAM

## 2025-02-15 PROCEDURE — 6360000002 HC RX W HCPCS: Performed by: NURSE PRACTITIONER

## 2025-02-15 PROCEDURE — 2500000003 HC RX 250 WO HCPCS: Performed by: NURSE PRACTITIONER

## 2025-02-15 PROCEDURE — 87040 BLOOD CULTURE FOR BACTERIA: CPT

## 2025-02-15 PROCEDURE — 2580000003 HC RX 258: Performed by: INTERNAL MEDICINE

## 2025-02-15 PROCEDURE — 80048 BASIC METABOLIC PNL TOTAL CA: CPT

## 2025-02-15 PROCEDURE — 83735 ASSAY OF MAGNESIUM: CPT

## 2025-02-15 RX ORDER — SODIUM CHLORIDE, SODIUM LACTATE, POTASSIUM CHLORIDE, CALCIUM CHLORIDE 600; 310; 30; 20 MG/100ML; MG/100ML; MG/100ML; MG/100ML
INJECTION, SOLUTION INTRAVENOUS CONTINUOUS
Status: DISCONTINUED | OUTPATIENT
Start: 2025-02-15 | End: 2025-02-17

## 2025-02-15 RX ORDER — 0.9 % SODIUM CHLORIDE 0.9 %
500 INTRAVENOUS SOLUTION INTRAVENOUS ONCE
Status: COMPLETED | OUTPATIENT
Start: 2025-02-15 | End: 2025-02-15

## 2025-02-15 RX ORDER — SODIUM CHLORIDE 9 MG/ML
INJECTION, SOLUTION INTRAVENOUS CONTINUOUS
Status: DISCONTINUED | OUTPATIENT
Start: 2025-02-15 | End: 2025-02-15

## 2025-02-15 RX ADMIN — SODIUM CHLORIDE, POTASSIUM CHLORIDE, SODIUM LACTATE AND CALCIUM CHLORIDE: 600; 310; 30; 20 INJECTION, SOLUTION INTRAVENOUS at 17:05

## 2025-02-15 RX ADMIN — SODIUM CHLORIDE, POTASSIUM CHLORIDE, SODIUM LACTATE AND CALCIUM CHLORIDE: 600; 310; 30; 20 INJECTION, SOLUTION INTRAVENOUS at 09:38

## 2025-02-15 RX ADMIN — HYDROMORPHONE HYDROCHLORIDE 0.5 MG: 1 INJECTION, SOLUTION INTRAMUSCULAR; INTRAVENOUS; SUBCUTANEOUS at 10:43

## 2025-02-15 RX ADMIN — FAMOTIDINE 20 MG: 10 INJECTION, SOLUTION INTRAVENOUS at 09:35

## 2025-02-15 RX ADMIN — CALCIUM GLUCONATE: 98 INJECTION, SOLUTION INTRAVENOUS at 17:39

## 2025-02-15 RX ADMIN — SODIUM CHLORIDE, PRESERVATIVE FREE 10 ML: 5 INJECTION INTRAVENOUS at 09:36

## 2025-02-15 RX ADMIN — PIPERACILLIN AND TAZOBACTAM 3375 MG: 3; .375 INJECTION, POWDER, LYOPHILIZED, FOR SOLUTION INTRAVENOUS at 05:49

## 2025-02-15 RX ADMIN — SMOFLIPID 250 ML: 6; 6; 5; 3 INJECTION, EMULSION INTRAVENOUS at 17:42

## 2025-02-15 RX ADMIN — KETOROLAC TROMETHAMINE 15 MG: 30 INJECTION, SOLUTION INTRAMUSCULAR at 14:25

## 2025-02-15 RX ADMIN — SODIUM CHLORIDE 500 ML: 9 INJECTION, SOLUTION INTRAVENOUS at 02:36

## 2025-02-15 RX ADMIN — KETOROLAC TROMETHAMINE 15 MG: 30 INJECTION, SOLUTION INTRAMUSCULAR at 01:50

## 2025-02-15 RX ADMIN — SODIUM CHLORIDE, PRESERVATIVE FREE 10 ML: 5 INJECTION INTRAVENOUS at 22:54

## 2025-02-15 RX ADMIN — KETOROLAC TROMETHAMINE 15 MG: 30 INJECTION, SOLUTION INTRAMUSCULAR at 18:50

## 2025-02-15 RX ADMIN — PIPERACILLIN AND TAZOBACTAM 3375 MG: 3; .375 INJECTION, POWDER, LYOPHILIZED, FOR SOLUTION INTRAVENOUS at 14:26

## 2025-02-15 ASSESSMENT — PAIN DESCRIPTION - ORIENTATION
ORIENTATION: RIGHT;LOWER

## 2025-02-15 ASSESSMENT — PAIN SCALES - GENERAL
PAINLEVEL_OUTOF10: 6
PAINLEVEL_OUTOF10: 7
PAINLEVEL_OUTOF10: 8
PAINLEVEL_OUTOF10: 8

## 2025-02-15 ASSESSMENT — PAIN DESCRIPTION - DESCRIPTORS
DESCRIPTORS: ACHING

## 2025-02-15 ASSESSMENT — PAIN DESCRIPTION - LOCATION
LOCATION: ABDOMEN

## 2025-02-15 ASSESSMENT — PAIN - FUNCTIONAL ASSESSMENT
PAIN_FUNCTIONAL_ASSESSMENT: ACTIVITIES ARE NOT PREVENTED

## 2025-02-16 PROBLEM — A49.8: Status: ACTIVE | Noted: 2025-02-16

## 2025-02-16 PROBLEM — A49.9 GRAM-NEGATIVE BACTERIAL INFECTION: Status: ACTIVE | Noted: 2025-02-16

## 2025-02-16 PROBLEM — A49.1 ENTEROCOCCAL INFECTION: Status: ACTIVE | Noted: 2025-02-16

## 2025-02-16 LAB
ANION GAP SERPL CALC-SCNC: 6 MMOL/L (ref 2–12)
BACTERIA SPEC CULT: ABNORMAL
BASOPHILS # BLD: 0.02 K/UL (ref 0–0.1)
BASOPHILS NFR BLD: 0.3 % (ref 0–1)
BUN SERPL-MCNC: 19 MG/DL (ref 6–20)
BUN/CREAT SERPL: 19 (ref 12–20)
CALCIUM SERPL-MCNC: 9.3 MG/DL (ref 8.5–10.1)
CHLORIDE SERPL-SCNC: 108 MMOL/L (ref 97–108)
CO2 SERPL-SCNC: 26 MMOL/L (ref 21–32)
CREAT SERPL-MCNC: 1.02 MG/DL (ref 0.7–1.3)
DIFFERENTIAL METHOD BLD: ABNORMAL
EOSINOPHIL # BLD: 0.05 K/UL (ref 0–0.4)
EOSINOPHIL NFR BLD: 0.7 % (ref 0–7)
ERYTHROCYTE [DISTWIDTH] IN BLOOD BY AUTOMATED COUNT: 13.4 % (ref 11.5–14.5)
GLUCOSE SERPL-MCNC: 98 MG/DL (ref 65–100)
HCT VFR BLD AUTO: 33.4 % (ref 36.6–50.3)
HGB BLD-MCNC: 11.5 G/DL (ref 12.1–17)
IMM GRANULOCYTES # BLD AUTO: 0.03 K/UL (ref 0–0.04)
IMM GRANULOCYTES NFR BLD AUTO: 0.4 % (ref 0–0.5)
LYMPHOCYTES # BLD: 1.22 K/UL (ref 0.8–3.5)
LYMPHOCYTES NFR BLD: 17.5 % (ref 12–49)
MAGNESIUM SERPL-MCNC: 2.3 MG/DL (ref 1.6–2.4)
MCH RBC QN AUTO: 31.8 PG (ref 26–34)
MCHC RBC AUTO-ENTMCNC: 34.4 G/DL (ref 30–36.5)
MCV RBC AUTO: 92.3 FL (ref 80–99)
MONOCYTES # BLD: 0.76 K/UL (ref 0–1)
MONOCYTES NFR BLD: 10.9 % (ref 5–13)
NEUTS SEG # BLD: 4.91 K/UL (ref 1.8–8)
NEUTS SEG NFR BLD: 70.2 % (ref 32–75)
NRBC # BLD: 0 K/UL (ref 0–0.01)
NRBC BLD-RTO: 0 PER 100 WBC
PHOSPHATE SERPL-MCNC: 2.3 MG/DL (ref 2.6–4.7)
PLATELET # BLD AUTO: 120 K/UL (ref 150–400)
PMV BLD AUTO: 11.9 FL (ref 8.9–12.9)
POTASSIUM SERPL-SCNC: 3.5 MMOL/L (ref 3.5–5.1)
RBC # BLD AUTO: 3.62 M/UL (ref 4.1–5.7)
SERVICE CMNT-IMP: ABNORMAL
SODIUM SERPL-SCNC: 140 MMOL/L (ref 136–145)
WBC # BLD AUTO: 7 K/UL (ref 4.1–11.1)

## 2025-02-16 PROCEDURE — 6360000002 HC RX W HCPCS: Performed by: STUDENT IN AN ORGANIZED HEALTH CARE EDUCATION/TRAINING PROGRAM

## 2025-02-16 PROCEDURE — 2500000003 HC RX 250 WO HCPCS: Performed by: STUDENT IN AN ORGANIZED HEALTH CARE EDUCATION/TRAINING PROGRAM

## 2025-02-16 PROCEDURE — 6370000000 HC RX 637 (ALT 250 FOR IP): Performed by: INTERNAL MEDICINE

## 2025-02-16 PROCEDURE — 2580000003 HC RX 258: Performed by: INTERNAL MEDICINE

## 2025-02-16 PROCEDURE — 99233 SBSQ HOSP IP/OBS HIGH 50: CPT | Performed by: INTERNAL MEDICINE

## 2025-02-16 PROCEDURE — 85025 COMPLETE CBC W/AUTO DIFF WBC: CPT

## 2025-02-16 PROCEDURE — 6370000000 HC RX 637 (ALT 250 FOR IP): Performed by: NURSE PRACTITIONER

## 2025-02-16 PROCEDURE — 80048 BASIC METABOLIC PNL TOTAL CA: CPT

## 2025-02-16 PROCEDURE — 6360000002 HC RX W HCPCS: Performed by: NURSE PRACTITIONER

## 2025-02-16 PROCEDURE — 1100000003 HC PRIVATE W/ TELEMETRY

## 2025-02-16 PROCEDURE — 36415 COLL VENOUS BLD VENIPUNCTURE: CPT

## 2025-02-16 PROCEDURE — 84100 ASSAY OF PHOSPHORUS: CPT

## 2025-02-16 PROCEDURE — 2580000003 HC RX 258: Performed by: STUDENT IN AN ORGANIZED HEALTH CARE EDUCATION/TRAINING PROGRAM

## 2025-02-16 PROCEDURE — 83735 ASSAY OF MAGNESIUM: CPT

## 2025-02-16 RX ADMIN — HYDROMORPHONE HYDROCHLORIDE 0.5 MG: 1 INJECTION, SOLUTION INTRAMUSCULAR; INTRAVENOUS; SUBCUTANEOUS at 22:01

## 2025-02-16 RX ADMIN — PIPERACILLIN AND TAZOBACTAM 3375 MG: 3; .375 INJECTION, POWDER, LYOPHILIZED, FOR SOLUTION INTRAVENOUS at 00:05

## 2025-02-16 RX ADMIN — KETOROLAC TROMETHAMINE 15 MG: 30 INJECTION, SOLUTION INTRAMUSCULAR at 02:15

## 2025-02-16 RX ADMIN — SODIUM CHLORIDE, POTASSIUM CHLORIDE, SODIUM LACTATE AND CALCIUM CHLORIDE: 600; 310; 30; 20 INJECTION, SOLUTION INTRAVENOUS at 06:19

## 2025-02-16 RX ADMIN — APIXABAN 5 MG: 5 TABLET, FILM COATED ORAL at 22:02

## 2025-02-16 RX ADMIN — SODIUM CHLORIDE, PRESERVATIVE FREE 10 ML: 5 INJECTION INTRAVENOUS at 11:32

## 2025-02-16 RX ADMIN — PIPERACILLIN AND TAZOBACTAM 3375 MG: 3; .375 INJECTION, POWDER, LYOPHILIZED, FOR SOLUTION INTRAVENOUS at 15:05

## 2025-02-16 RX ADMIN — PIPERACILLIN AND TAZOBACTAM 3375 MG: 3; .375 INJECTION, POWDER, LYOPHILIZED, FOR SOLUTION INTRAVENOUS at 22:03

## 2025-02-16 RX ADMIN — HYDROMORPHONE HYDROCHLORIDE 0.5 MG: 1 INJECTION, SOLUTION INTRAMUSCULAR; INTRAVENOUS; SUBCUTANEOUS at 11:31

## 2025-02-16 RX ADMIN — KETOROLAC TROMETHAMINE 15 MG: 30 INJECTION, SOLUTION INTRAMUSCULAR at 06:16

## 2025-02-16 RX ADMIN — PIPERACILLIN AND TAZOBACTAM 3375 MG: 3; .375 INJECTION, POWDER, LYOPHILIZED, FOR SOLUTION INTRAVENOUS at 06:18

## 2025-02-16 ASSESSMENT — PAIN DESCRIPTION - DESCRIPTORS
DESCRIPTORS: ACHING;DISCOMFORT;SQUEEZING;THROBBING
DESCRIPTORS: DISCOMFORT

## 2025-02-16 ASSESSMENT — PAIN SCALES - GENERAL
PAINLEVEL_OUTOF10: 7
PAINLEVEL_OUTOF10: 8
PAINLEVEL_OUTOF10: 6

## 2025-02-16 ASSESSMENT — PAIN DESCRIPTION - LOCATION
LOCATION: ABDOMEN

## 2025-02-16 ASSESSMENT — PAIN - FUNCTIONAL ASSESSMENT
PAIN_FUNCTIONAL_ASSESSMENT: PREVENTS OR INTERFERES SOME ACTIVE ACTIVITIES AND ADLS
PAIN_FUNCTIONAL_ASSESSMENT: PREVENTS OR INTERFERES SOME ACTIVE ACTIVITIES AND ADLS

## 2025-02-17 LAB
ANION GAP SERPL CALC-SCNC: 3 MMOL/L (ref 2–12)
BASOPHILS # BLD: 0.02 K/UL (ref 0–0.1)
BASOPHILS NFR BLD: 0.4 % (ref 0–1)
BUN SERPL-MCNC: 12 MG/DL (ref 6–20)
BUN/CREAT SERPL: 12 (ref 12–20)
CALCIUM SERPL-MCNC: 9 MG/DL (ref 8.5–10.1)
CHLORIDE SERPL-SCNC: 108 MMOL/L (ref 97–108)
CO2 SERPL-SCNC: 29 MMOL/L (ref 21–32)
CREAT SERPL-MCNC: 0.99 MG/DL (ref 0.7–1.3)
DIFFERENTIAL METHOD BLD: ABNORMAL
EOSINOPHIL # BLD: 0.15 K/UL (ref 0–0.4)
EOSINOPHIL NFR BLD: 2.9 % (ref 0–7)
ERYTHROCYTE [DISTWIDTH] IN BLOOD BY AUTOMATED COUNT: 14 % (ref 11.5–14.5)
GLUCOSE SERPL-MCNC: 81 MG/DL (ref 65–100)
HCT VFR BLD AUTO: 34.1 % (ref 36.6–50.3)
HGB BLD-MCNC: 11.5 G/DL (ref 12.1–17)
IMM GRANULOCYTES # BLD AUTO: 0.02 K/UL (ref 0–0.04)
IMM GRANULOCYTES NFR BLD AUTO: 0.4 % (ref 0–0.5)
LYMPHOCYTES # BLD: 1.85 K/UL (ref 0.8–3.5)
LYMPHOCYTES NFR BLD: 35.6 % (ref 12–49)
MAGNESIUM SERPL-MCNC: 1.9 MG/DL (ref 1.6–2.4)
MCH RBC QN AUTO: 31.8 PG (ref 26–34)
MCHC RBC AUTO-ENTMCNC: 33.7 G/DL (ref 30–36.5)
MCV RBC AUTO: 94.2 FL (ref 80–99)
MONOCYTES # BLD: 0.47 K/UL (ref 0–1)
MONOCYTES NFR BLD: 9 % (ref 5–13)
NEUTS SEG # BLD: 2.69 K/UL (ref 1.8–8)
NEUTS SEG NFR BLD: 51.7 % (ref 32–75)
NRBC # BLD: 0 K/UL (ref 0–0.01)
NRBC BLD-RTO: 0 PER 100 WBC
PHOSPHATE SERPL-MCNC: 3.6 MG/DL (ref 2.6–4.7)
PLATELET # BLD AUTO: 151 K/UL (ref 150–400)
PMV BLD AUTO: 11.1 FL (ref 8.9–12.9)
POTASSIUM SERPL-SCNC: 3.9 MMOL/L (ref 3.5–5.1)
RBC # BLD AUTO: 3.62 M/UL (ref 4.1–5.7)
SODIUM SERPL-SCNC: 140 MMOL/L (ref 136–145)
WBC # BLD AUTO: 5.2 K/UL (ref 4.1–11.1)

## 2025-02-17 PROCEDURE — 6360000002 HC RX W HCPCS: Performed by: STUDENT IN AN ORGANIZED HEALTH CARE EDUCATION/TRAINING PROGRAM

## 2025-02-17 PROCEDURE — 6370000000 HC RX 637 (ALT 250 FOR IP): Performed by: INTERNAL MEDICINE

## 2025-02-17 PROCEDURE — 2580000003 HC RX 258: Performed by: INTERNAL MEDICINE

## 2025-02-17 PROCEDURE — 36415 COLL VENOUS BLD VENIPUNCTURE: CPT

## 2025-02-17 PROCEDURE — 1100000003 HC PRIVATE W/ TELEMETRY

## 2025-02-17 PROCEDURE — 80048 BASIC METABOLIC PNL TOTAL CA: CPT

## 2025-02-17 PROCEDURE — 84100 ASSAY OF PHOSPHORUS: CPT

## 2025-02-17 PROCEDURE — 6360000002 HC RX W HCPCS: Performed by: NURSE PRACTITIONER

## 2025-02-17 PROCEDURE — 2500000003 HC RX 250 WO HCPCS: Performed by: STUDENT IN AN ORGANIZED HEALTH CARE EDUCATION/TRAINING PROGRAM

## 2025-02-17 PROCEDURE — 83735 ASSAY OF MAGNESIUM: CPT

## 2025-02-17 PROCEDURE — 6360000002 HC RX W HCPCS: Performed by: INTERNAL MEDICINE

## 2025-02-17 PROCEDURE — 85025 COMPLETE CBC W/AUTO DIFF WBC: CPT

## 2025-02-17 PROCEDURE — 6370000000 HC RX 637 (ALT 250 FOR IP): Performed by: NURSE PRACTITIONER

## 2025-02-17 PROCEDURE — 2580000003 HC RX 258: Performed by: STUDENT IN AN ORGANIZED HEALTH CARE EDUCATION/TRAINING PROGRAM

## 2025-02-17 RX ADMIN — SODIUM CHLORIDE, PRESERVATIVE FREE 10 ML: 5 INJECTION INTRAVENOUS at 21:49

## 2025-02-17 RX ADMIN — HYDROMORPHONE HYDROCHLORIDE 0.5 MG: 1 INJECTION, SOLUTION INTRAMUSCULAR; INTRAVENOUS; SUBCUTANEOUS at 14:35

## 2025-02-17 RX ADMIN — ONDANSETRON 4 MG: 2 INJECTION, SOLUTION INTRAMUSCULAR; INTRAVENOUS at 10:50

## 2025-02-17 RX ADMIN — HYDROMORPHONE HYDROCHLORIDE 0.5 MG: 1 INJECTION, SOLUTION INTRAMUSCULAR; INTRAVENOUS; SUBCUTANEOUS at 05:41

## 2025-02-17 RX ADMIN — APIXABAN 5 MG: 5 TABLET, FILM COATED ORAL at 09:33

## 2025-02-17 RX ADMIN — HYDROMORPHONE HYDROCHLORIDE 0.5 MG: 1 INJECTION, SOLUTION INTRAMUSCULAR; INTRAVENOUS; SUBCUTANEOUS at 21:42

## 2025-02-17 RX ADMIN — HYDROMORPHONE HYDROCHLORIDE 0.5 MG: 1 INJECTION, SOLUTION INTRAMUSCULAR; INTRAVENOUS; SUBCUTANEOUS at 09:34

## 2025-02-17 RX ADMIN — APIXABAN 5 MG: 5 TABLET, FILM COATED ORAL at 21:42

## 2025-02-17 RX ADMIN — PIPERACILLIN AND TAZOBACTAM 3375 MG: 3; .375 INJECTION, POWDER, LYOPHILIZED, FOR SOLUTION INTRAVENOUS at 21:48

## 2025-02-17 RX ADMIN — PIPERACILLIN AND TAZOBACTAM 3375 MG: 3; .375 INJECTION, POWDER, LYOPHILIZED, FOR SOLUTION INTRAVENOUS at 14:32

## 2025-02-17 RX ADMIN — PIPERACILLIN AND TAZOBACTAM 3375 MG: 3; .375 INJECTION, POWDER, LYOPHILIZED, FOR SOLUTION INTRAVENOUS at 05:45

## 2025-02-17 ASSESSMENT — PAIN DESCRIPTION - ORIENTATION
ORIENTATION: RIGHT;LEFT;UPPER
ORIENTATION: RIGHT;LEFT;UPPER

## 2025-02-17 ASSESSMENT — PAIN - FUNCTIONAL ASSESSMENT
PAIN_FUNCTIONAL_ASSESSMENT: PREVENTS OR INTERFERES SOME ACTIVE ACTIVITIES AND ADLS
PAIN_FUNCTIONAL_ASSESSMENT: ACTIVITIES ARE NOT PREVENTED
PAIN_FUNCTIONAL_ASSESSMENT: PREVENTS OR INTERFERES SOME ACTIVE ACTIVITIES AND ADLS

## 2025-02-17 ASSESSMENT — PAIN SCALES - GENERAL
PAINLEVEL_OUTOF10: 3
PAINLEVEL_OUTOF10: 5
PAINLEVEL_OUTOF10: 7
PAINLEVEL_OUTOF10: 6
PAINLEVEL_OUTOF10: 8
PAINLEVEL_OUTOF10: 7

## 2025-02-17 ASSESSMENT — PAIN DESCRIPTION - LOCATION
LOCATION: ABDOMEN
LOCATION: ABDOMEN
LOCATION: ABDOMEN;BACK
LOCATION: ABDOMEN

## 2025-02-17 ASSESSMENT — PAIN DESCRIPTION - DESCRIPTORS
DESCRIPTORS: ACHING;THROBBING;SORE;PRESSURE
DESCRIPTORS: ACHING;THROBBING;SORE
DESCRIPTORS: ACHING;DISCOMFORT;PRESSURE;SORE;THROBBING
DESCRIPTORS: ACHING

## 2025-02-17 ASSESSMENT — PAIN SCALES - WONG BAKER: WONGBAKER_NUMERICALRESPONSE: HURTS A LITTLE BIT

## 2025-02-18 VITALS
HEART RATE: 62 BPM | HEIGHT: 70 IN | DIASTOLIC BLOOD PRESSURE: 78 MMHG | TEMPERATURE: 97.7 F | BODY MASS INDEX: 24.88 KG/M2 | WEIGHT: 173.8 LBS | RESPIRATION RATE: 18 BRPM | OXYGEN SATURATION: 99 % | SYSTOLIC BLOOD PRESSURE: 100 MMHG

## 2025-02-18 PROBLEM — A49.8: Status: ACTIVE | Noted: 2025-02-16

## 2025-02-18 LAB
ANION GAP SERPL CALC-SCNC: 4 MMOL/L (ref 2–12)
BACTERIA SPEC CULT: ABNORMAL
BASOPHILS # BLD: 0.03 K/UL (ref 0–0.1)
BASOPHILS NFR BLD: 0.9 % (ref 0–1)
BUN SERPL-MCNC: 11 MG/DL (ref 6–20)
BUN/CREAT SERPL: 11 (ref 12–20)
CALCIUM SERPL-MCNC: 9 MG/DL (ref 8.5–10.1)
CHLORIDE SERPL-SCNC: 107 MMOL/L (ref 97–108)
CO2 SERPL-SCNC: 28 MMOL/L (ref 21–32)
CREAT SERPL-MCNC: 0.97 MG/DL (ref 0.7–1.3)
DIFFERENTIAL METHOD BLD: ABNORMAL
EOSINOPHIL # BLD: 0.21 K/UL (ref 0–0.4)
EOSINOPHIL NFR BLD: 6 % (ref 0–7)
ERYTHROCYTE [DISTWIDTH] IN BLOOD BY AUTOMATED COUNT: 13.5 % (ref 11.5–14.5)
GLUCOSE SERPL-MCNC: 78 MG/DL (ref 65–100)
GRAM STN SPEC: ABNORMAL
GRAM STN SPEC: ABNORMAL
HCT VFR BLD AUTO: 34.6 % (ref 36.6–50.3)
HGB BLD-MCNC: 11.7 G/DL (ref 12.1–17)
IMM GRANULOCYTES # BLD AUTO: 0 K/UL (ref 0–0.04)
IMM GRANULOCYTES NFR BLD AUTO: 0 % (ref 0–0.5)
LYMPHOCYTES # BLD: 1.37 K/UL (ref 0.8–3.5)
LYMPHOCYTES NFR BLD: 39.3 % (ref 12–49)
MAGNESIUM SERPL-MCNC: 2 MG/DL (ref 1.6–2.4)
MCH RBC QN AUTO: 31.6 PG (ref 26–34)
MCHC RBC AUTO-ENTMCNC: 33.8 G/DL (ref 30–36.5)
MCV RBC AUTO: 93.5 FL (ref 80–99)
MONOCYTES # BLD: 0.34 K/UL (ref 0–1)
MONOCYTES NFR BLD: 9.7 % (ref 5–13)
NEUTS SEG # BLD: 1.54 K/UL (ref 1.8–8)
NEUTS SEG NFR BLD: 44.1 % (ref 32–75)
NRBC # BLD: 0 K/UL (ref 0–0.01)
NRBC BLD-RTO: 0 PER 100 WBC
PHOSPHATE SERPL-MCNC: 3.9 MG/DL (ref 2.6–4.7)
PLATELET # BLD AUTO: 169 K/UL (ref 150–400)
PMV BLD AUTO: 10.9 FL (ref 8.9–12.9)
POTASSIUM SERPL-SCNC: 3.8 MMOL/L (ref 3.5–5.1)
RBC # BLD AUTO: 3.7 M/UL (ref 4.1–5.7)
SERVICE CMNT-IMP: ABNORMAL
SODIUM SERPL-SCNC: 139 MMOL/L (ref 136–145)
WBC # BLD AUTO: 3.5 K/UL (ref 4.1–11.1)

## 2025-02-18 PROCEDURE — 2580000003 HC RX 258: Performed by: INTERNAL MEDICINE

## 2025-02-18 PROCEDURE — 84100 ASSAY OF PHOSPHORUS: CPT

## 2025-02-18 PROCEDURE — 83735 ASSAY OF MAGNESIUM: CPT

## 2025-02-18 PROCEDURE — 2500000003 HC RX 250 WO HCPCS: Performed by: STUDENT IN AN ORGANIZED HEALTH CARE EDUCATION/TRAINING PROGRAM

## 2025-02-18 PROCEDURE — 99233 SBSQ HOSP IP/OBS HIGH 50: CPT | Performed by: INTERNAL MEDICINE

## 2025-02-18 PROCEDURE — 6360000002 HC RX W HCPCS: Performed by: INTERNAL MEDICINE

## 2025-02-18 PROCEDURE — 85025 COMPLETE CBC W/AUTO DIFF WBC: CPT

## 2025-02-18 PROCEDURE — 6370000000 HC RX 637 (ALT 250 FOR IP): Performed by: NURSE PRACTITIONER

## 2025-02-18 PROCEDURE — 6370000000 HC RX 637 (ALT 250 FOR IP): Performed by: INTERNAL MEDICINE

## 2025-02-18 PROCEDURE — 6360000002 HC RX W HCPCS: Performed by: NURSE PRACTITIONER

## 2025-02-18 PROCEDURE — 80048 BASIC METABOLIC PNL TOTAL CA: CPT

## 2025-02-18 PROCEDURE — 36415 COLL VENOUS BLD VENIPUNCTURE: CPT

## 2025-02-18 RX ORDER — ACETAMINOPHEN 325 MG/1
650 TABLET ORAL EVERY 6 HOURS PRN
Qty: 120 TABLET | Refills: 3 | Status: SHIPPED | OUTPATIENT
Start: 2025-02-18

## 2025-02-18 RX ADMIN — PIPERACILLIN AND TAZOBACTAM 3375 MG: 3; .375 INJECTION, POWDER, LYOPHILIZED, FOR SOLUTION INTRAVENOUS at 05:48

## 2025-02-18 RX ADMIN — HYDROMORPHONE HYDROCHLORIDE 0.5 MG: 1 INJECTION, SOLUTION INTRAMUSCULAR; INTRAVENOUS; SUBCUTANEOUS at 05:55

## 2025-02-18 RX ADMIN — PIPERACILLIN AND TAZOBACTAM 3375 MG: 3; .375 INJECTION, POWDER, LYOPHILIZED, FOR SOLUTION INTRAVENOUS at 13:49

## 2025-02-18 RX ADMIN — SODIUM CHLORIDE, PRESERVATIVE FREE 10 ML: 5 INJECTION INTRAVENOUS at 10:04

## 2025-02-18 RX ADMIN — APIXABAN 5 MG: 5 TABLET, FILM COATED ORAL at 10:04

## 2025-02-18 ASSESSMENT — PAIN SCALES - GENERAL
PAINLEVEL_OUTOF10: 4
PAINLEVEL_OUTOF10: 6

## 2025-02-18 ASSESSMENT — PAIN DESCRIPTION - DESCRIPTORS: DESCRIPTORS: ACHING

## 2025-02-18 ASSESSMENT — PAIN DESCRIPTION - LOCATION: LOCATION: ABDOMEN

## 2025-02-18 NOTE — DISCHARGE SUMMARY
V2.0  Discharge Summary    Name:  Obey Arana /Age/Sex: 1980 (44 y.o. male)   Admit Date: 2025  Discharge Date: 25    MRN & CSN:  [id number] & [id number] Encounter Date and Time 25 3:58 PM EST    Attending:  Yanique Johnson MD Discharging Provider: Yanique Johnson MD       Hospital Course:     Brief HPI: \"Obey Arana is a 44 y.o.  male with PMHx as listed below presenting to the emergency department with complaints of 2 days of abdominal pain associate with nausea vomiting (nonbloody nonbilious) he reports began after eating spicy food.  Found to have acute cholecystitis on CT confirmed right upper quadrant ultrasound.  General surgery consulted who recommend percutaneous cholecystostomy with medical management as patient has frozen shoulder and any further operative intervention would only cause harm.     In the ED, patient afebrile and hemodynamically stable saturating upper 90s on room air.  CT abdomen pelvis demonstrated distended gallbladder with minimal pericholecystic edema and cholelithiasis consistent with acute cholecystitis as demonstrated on right upper quadrant ultrasound (\"sonographic evidence of acute cholecystitis.  There were stones and sludge in the gallbladder.  No biliary dilatation.\".  CXR demonstrates central venous catheter extending to the mid SVC without pneumothorax or other acute process noted.  Labs demonstrate: WBC 11.9, hemoglobin 14.0, platelets 146, lipase 16, CMP grossly unremarkable, urinalysis grossly unremarkable.     Abdominal surgical history per Dr. Castro H&P:     \"...extensive prior surgical history dating back to [facility] to abdomen in . 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 He Underwent x-lap, enterolysis and enterostomy tube placement. 2 weeks later he underwent a second laparotomy with enterolysis, gastrostomy tube and jejunostomy tube placement. A month later he presented with

## 2025-02-18 NOTE — CARE COORDINATION
Transition of Care Plan:    RUR: 4%  Prior Level of Functioning:   Disposition: Home w/IV ABX provided by [employer]  NAA:   If SNF or IPR: Date FOC offered:   Date FOC received:   Accepting facility:   Date authorization started with reference number:   Date authorization received and expires:   Follow up appointments: on AVS  DME needed: n/a  Transportation at discharge: [facility]@6pm - pt must be at  entrance by 6pm  IM/IMM Medicare/ letter given: 2nd IM given  Is patient a  and connected with [facility]?    If yes, was [place] transfer form completed and [facility] notified?   Caregiver Contact:   Discharge Caregiver contacted prior to discharge?   Care Conference needed?   Barriers to discharge:     [employer] will provide IV ABX as well as dressing changes & labs as needed.  Patient is d/c home today.  No other needs or concerns identified.    3:01  Patient receiving IV ABX.  CM set up [facility] for 6pm.  Patient stated he has a key to enter the home & does not have trouble ambulating.  CM requested  call RN when on the way.    Radha Rothman  Ext [phone]    Radha Rothman  Ext [phone]

## 2025-02-18 NOTE — PLAN OF CARE
Problem: Discharge Planning  Goal: Discharge to home or other facility with appropriate resources  2/17/2025 2308 by KIMBERLY Pacheco, RN  Outcome: Progressing  2/17/2025 1035 by Kristopher Stoner LPN  Outcome: Progressing     Problem: Pain  Goal: Verbalizes/displays adequate comfort level or baseline comfort level  2/17/2025 2308 by KIMBERLY Pacheco RN  Outcome: Progressing  2/17/2025 1035 by Kirstopher Stoner LPN  Outcome: Progressing     Problem: ABCDS Injury Assessment  Goal: Absence of physical injury  2/17/2025 1035 by Kristopher Stoner LPN  Outcome: Progressing     Problem: Safety - Adult  Goal: Free from fall injury  Outcome: Progressing

## 2025-02-18 NOTE — PROGRESS NOTES
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 Max:100.7 °F (38.2 °C)      Pt resting in bed NAD   SCDs for mechanical DVT proph while in bed   Abd flat and firm due to prior scar tissue.     Body mass index is 23.91 kg/m².     Reference: BMI greater than 30 is classified as obesity and greater than 40 is classified as morbid obesity.       Renita Cotton, APRN - NP   MSN, APRN, FNP-C, CWOCN-AP, RNAS-C    25     
TPN consult  [facility] dosing.  Next tpn will be due tomorrow.  Pt does not get tpn today.      JOSEFINA GONZALEZ RPH    
      Signed: Yanique Johnson MD          
INDICATION: Upper abdominal pain COMPARISON: [2022]. CONTRAST: [100] ml Isovue 370 TECHNIQUE: Multislice helical CT was performed of the abdomen and pelvis  following uneventful rapid bolus intravenous contrast administration.  Oral contrast was not administered. Contiguous 5 mm axial images were reconstructed and lung and soft tissue windows were generated. Coronal and sagittal reformations were generated.  CT dose reduction was achieved through use of a standardized protocol tailored for this examination and automatic exposure control for dose modulation.   FINDINGS: LUNG BASES:No mass lesion or effusion. LIVER/GALLBLADDER: Distended gallbladder with cholelithiasis and mild pericholecystic edema. Cholelithiasis. There is no intrahepatic duct dilatation. There is no hepatic parenchymal mass. Hepatic enhancement pattern is within normal limits. Portal vein is patent. SPLEEN/PANCREAS: No evidence of splenomegaly.  There is no pancreatic duct dilatation. ADRENALS/KIDNEYS: Left renal cyst. Right renal cyst. There is no hydronephrosis. There is no renal mass. There is no perinephric mass. STOMACH: [No dilatation or wall thickening. COLON AND SMALL BOWEL: Fecal stasis is moderate to severe.. There is no free intraperitoneal air. There is no evidence of incarceration or obstruction. No mesenteric adenopathy. PERITONEUM: No ascites or lymphadenopathy.] APPENDIX: Unremarkable.] BLADDER/REPRODUCTIVE ORGANS: No mass or calculus. RETROPERITONEUM: The abdominal aorta is stable in size. No retroperitoneal adenopathy. OSSEOUS STRUCTURES: No destructive bone lesion or acute fracture.     Distended gallbladder with minimal pericholecystic edema and cholelithiasis. Imaging findings consistent with acute cholecystitis as on ultrasound performed earlier. Right and left renal cysts. Incidental and/or nonemergent findings are as described above.  Electronically signed by TACO EDWARDS    US ABDOMEN LIMITED Specify organ? 
obtained history  Performing a medically appropriate exam and/or evaluation  Counseling and educating a patient/family/caregiver as noted above  Placing relevant orders  Referring and communicating with other professionals (not separately reported)  Independently interpreting results (not separately reported) and communicating results to the patient/family/caregiver  Care coordination (not separately reported) as noted above  Documenting clinical information in the electronic health records (e.g. problem list, visit note) on the day of the encounter       Sandra Ocampo MD FACP

## 2025-02-19 LAB
BACTERIA SPEC CULT: NORMAL
SERVICE CMNT-IMP: NORMAL

## 2025-02-20 LAB
BACTERIA SPEC CULT: NORMAL
SERVICE CMNT-IMP: NORMAL

## 2025-02-28 ENCOUNTER — HOSPITAL ENCOUNTER (EMERGENCY)
Facility: HOSPITAL | Age: 45
Discharge: HOME | End: 2025-02-28
Payer: MEDICARE

## 2025-02-28 VITALS
HEART RATE: 69 BPM | DIASTOLIC BLOOD PRESSURE: 87 MMHG | OXYGEN SATURATION: 100 % | WEIGHT: 160.72 LBS | BODY MASS INDEX: 23.06 KG/M2 | SYSTOLIC BLOOD PRESSURE: 118 MMHG | RESPIRATION RATE: 18 BRPM | TEMPERATURE: 98.2 F

## 2025-02-28 DIAGNOSIS — R10.9 ACUTE ABDOMINAL PAIN: Primary | ICD-10-CM

## 2025-02-28 PROCEDURE — 99282 EMERGENCY DEPT VISIT SF MDM: CPT

## 2025-02-28 ASSESSMENT — PAIN - FUNCTIONAL ASSESSMENT: PAIN_FUNCTIONAL_ASSESSMENT: NONE - DENIES PAIN

## 2025-03-01 NOTE — ED PROVIDER NOTES
denies any nausea or vomiting.  Again, at the time my interview, he has no abdominal pain.       Disposition Considerations (Tests not done, Shared Decision Making, Pt Expectation of Test or Tx.):      At the time of the exam, patient is symptom-free.  The tube is draining dark green fluid into the bag.  His abdomen is soft.  Patient refuses/declines blood testing.  We did discuss CT imaging however the tube is draining in the appears to be in place and not dislodged and, through shared decision-making, additional testing is deferred.  Nursing does help to change the dressing.  Patient tells me he has an appointment Monday.     FINAL IMPRESSION   No diagnosis found.      DISPOSITION/PLAN   DISPOSITION          Discharge Note: The patient is stable for discharge home. The signs, symptoms, diagnosis, and discharge instructions have been discussed, understanding conveyed, and agreed upon. The patient is to follow up as recommended or return to ER should their symptoms worsen.      PATIENT REFERRED TO:  No follow-up provider specified.     DISCHARGE MEDICATIONS:      Medication List          ASK your doctor about these medications       acetaminophen 325 MG tablet  Commonly known as: TYLENOL  Take 2 tablets by mouth every 6 hours as needed for Pain      Eliquis 5 MG Tabs tablet  Generic drug: apixaban      sterile water SOLN with Travasol 10 % SOLN 98.28 g, dextrose 70 % SOLN 20 %      vitamin D 25 MCG (1000 UT) Caps                   DISCONTINUED MEDICATIONS:  Discharge Medications   Current Discharge Medication List             I have seen and evaluated the patient autonomously. My supervision physician was on site and available for consultation if needed.      I am the Primary Clinician of Record.   INGA Arriaza (electronically signed)     (Please note that parts of this dictation were completed with voice recognition software. Quite often unanticipated grammatical, syntax, homophones, and other interpretive errors

## 2025-03-25 ENCOUNTER — HOSPITAL ENCOUNTER (OUTPATIENT)
Facility: HOSPITAL | Age: 45
Setting detail: OBSERVATION
Discharge: HOME | End: 2025-03-28
Attending: EMERGENCY MEDICINE | Admitting: STUDENT IN AN ORGANIZED HEALTH CARE EDUCATION/TRAINING PROGRAM
Payer: MEDICARE

## 2025-03-25 ENCOUNTER — APPOINTMENT (OUTPATIENT)
Facility: HOSPITAL | Age: 45
End: 2025-03-25
Payer: MEDICARE

## 2025-03-25 DIAGNOSIS — K56.41 FECAL IMPACTION (HCC): Primary | ICD-10-CM

## 2025-03-25 DIAGNOSIS — R10.84 GENERALIZED ABDOMINAL PAIN: ICD-10-CM

## 2025-03-25 DIAGNOSIS — Z98.890 HISTORY OF INSERTION OF CHOLECYSTOSTOMY TUBE: ICD-10-CM

## 2025-03-25 DIAGNOSIS — K52.89 STERCORAL COLITIS: ICD-10-CM

## 2025-03-25 LAB
ALBUMIN SERPL-MCNC: 3.4 G/DL (ref 3.5–5)
ALBUMIN/GLOB SERPL: 0.9 (ref 1.1–2.2)
ALP SERPL-CCNC: 79 U/L (ref 45–117)
ALT SERPL-CCNC: 30 U/L (ref 12–78)
ANION GAP SERPL CALC-SCNC: 5 MMOL/L (ref 2–12)
AST SERPL-CCNC: 16 U/L (ref 15–37)
BASOPHILS # BLD: 0.04 K/UL (ref 0–0.1)
BASOPHILS NFR BLD: 0.6 % (ref 0–1)
BILIRUB SERPL-MCNC: 0.5 MG/DL (ref 0.2–1)
BUN SERPL-MCNC: 17 MG/DL (ref 6–20)
BUN/CREAT SERPL: 18 (ref 12–20)
CALCIUM SERPL-MCNC: 8.9 MG/DL (ref 8.5–10.1)
CHLORIDE SERPL-SCNC: 106 MMOL/L (ref 97–108)
CO2 SERPL-SCNC: 28 MMOL/L (ref 21–32)
CREAT SERPL-MCNC: 0.92 MG/DL (ref 0.7–1.3)
DIFFERENTIAL METHOD BLD: ABNORMAL
EOSINOPHIL # BLD: 0.12 K/UL (ref 0–0.4)
EOSINOPHIL NFR BLD: 1.9 % (ref 0–7)
ERYTHROCYTE [DISTWIDTH] IN BLOOD BY AUTOMATED COUNT: 13.4 % (ref 11.5–14.5)
GLOBULIN SER CALC-MCNC: 3.9 G/DL (ref 2–4)
GLUCOSE SERPL-MCNC: 89 MG/DL (ref 65–100)
HCT VFR BLD AUTO: 38.6 % (ref 36.6–50.3)
HGB BLD-MCNC: 13 G/DL (ref 12.1–17)
IMM GRANULOCYTES # BLD AUTO: 0.01 K/UL (ref 0–0.04)
IMM GRANULOCYTES NFR BLD AUTO: 0.2 % (ref 0–0.5)
LYMPHOCYTES # BLD: 2.49 K/UL (ref 0.8–3.5)
LYMPHOCYTES NFR BLD: 40.2 % (ref 12–49)
MCH RBC QN AUTO: 31.1 PG (ref 26–34)
MCHC RBC AUTO-ENTMCNC: 33.7 G/DL (ref 30–36.5)
MCV RBC AUTO: 92.3 FL (ref 80–99)
MONOCYTES # BLD: 0.45 K/UL (ref 0–1)
MONOCYTES NFR BLD: 7.3 % (ref 5–13)
NEUTS SEG # BLD: 3.08 K/UL (ref 1.8–8)
NEUTS SEG NFR BLD: 49.8 % (ref 32–75)
NRBC # BLD: 0 K/UL (ref 0–0.01)
NRBC BLD-RTO: 0 PER 100 WBC
PLATELET # BLD AUTO: 116 K/UL (ref 150–400)
PMV BLD AUTO: 11.8 FL (ref 8.9–12.9)
POTASSIUM SERPL-SCNC: 3.9 MMOL/L (ref 3.5–5.1)
PROT SERPL-MCNC: 7.3 G/DL (ref 6.4–8.2)
RBC # BLD AUTO: 4.18 M/UL (ref 4.1–5.7)
SODIUM SERPL-SCNC: 139 MMOL/L (ref 136–145)
WBC # BLD AUTO: 6.2 K/UL (ref 4.1–11.1)

## 2025-03-25 PROCEDURE — 99285 EMERGENCY DEPT VISIT HI MDM: CPT

## 2025-03-25 PROCEDURE — 85025 COMPLETE CBC W/AUTO DIFF WBC: CPT

## 2025-03-25 PROCEDURE — 6360000002 HC RX W HCPCS: Performed by: EMERGENCY MEDICINE

## 2025-03-25 PROCEDURE — 74177 CT ABD & PELVIS W/CONTRAST: CPT

## 2025-03-25 PROCEDURE — 36415 COLL VENOUS BLD VENIPUNCTURE: CPT

## 2025-03-25 PROCEDURE — 96375 TX/PRO/DX INJ NEW DRUG ADDON: CPT

## 2025-03-25 PROCEDURE — 96374 THER/PROPH/DIAG INJ IV PUSH: CPT

## 2025-03-25 PROCEDURE — 80053 COMPREHEN METABOLIC PANEL: CPT

## 2025-03-25 PROCEDURE — 6360000004 HC RX CONTRAST MEDICATION: Performed by: EMERGENCY MEDICINE

## 2025-03-25 RX ORDER — IOPAMIDOL 755 MG/ML
100 INJECTION, SOLUTION INTRAVASCULAR
Status: COMPLETED | OUTPATIENT
Start: 2025-03-25 | End: 2025-03-25

## 2025-03-25 RX ORDER — MORPHINE SULFATE 4 MG/ML
4 INJECTION, SOLUTION INTRAMUSCULAR; INTRAVENOUS
Refills: 0 | Status: COMPLETED | OUTPATIENT
Start: 2025-03-25 | End: 2025-03-25

## 2025-03-25 RX ORDER — HEPARIN 100 UNIT/ML
100 SYRINGE INTRAVENOUS PRN
Status: DISCONTINUED | OUTPATIENT
Start: 2025-03-25 | End: 2025-03-28 | Stop reason: HOSPADM

## 2025-03-25 RX ADMIN — IOPAMIDOL 100 ML: 755 INJECTION, SOLUTION INTRAVENOUS at 21:32

## 2025-03-25 RX ADMIN — MORPHINE SULFATE 4 MG: 4 INJECTION, SOLUTION INTRAMUSCULAR; INTRAVENOUS at 20:24

## 2025-03-25 ASSESSMENT — PAIN SCALES - GENERAL
PAINLEVEL_OUTOF10: 10
PAINLEVEL_OUTOF10: 0
PAINLEVEL_OUTOF10: 10

## 2025-03-25 ASSESSMENT — PAIN DESCRIPTION - LOCATION: LOCATION: ABDOMEN

## 2025-03-25 NOTE — ED NOTES
Pt has dual lumen subclavian CVC for TPN and blood draws. Had blood drawn from blood designated port yday and last TPN finished today at 1600. Requesting blood only drawn from CVC.     Spoke with eugenia DECKER, confirmed okay to use CVC for lab draws.

## 2025-03-26 ENCOUNTER — HOSPITAL ENCOUNTER (OUTPATIENT)
Facility: HOSPITAL | Age: 45
Discharge: HOME | End: 2025-03-29
Payer: MEDICARE

## 2025-03-26 PROBLEM — K52.89 STERCORAL COLITIS: Status: ACTIVE | Noted: 2025-03-26

## 2025-03-26 LAB
ALBUMIN SERPL-MCNC: 3.3 G/DL (ref 3.5–5)
ALBUMIN/GLOB SERPL: 0.9 (ref 1.1–2.2)
ALP SERPL-CCNC: 75 U/L (ref 45–117)
ALT SERPL-CCNC: 26 U/L (ref 12–78)
ANION GAP SERPL CALC-SCNC: 3 MMOL/L (ref 2–12)
AST SERPL-CCNC: 15 U/L (ref 15–37)
BILIRUB SERPL-MCNC: 0.8 MG/DL (ref 0.2–1)
BUN SERPL-MCNC: 15 MG/DL (ref 6–20)
BUN/CREAT SERPL: 15 (ref 12–20)
CALCIUM SERPL-MCNC: 8.8 MG/DL (ref 8.5–10.1)
CHLORIDE SERPL-SCNC: 105 MMOL/L (ref 97–108)
CO2 SERPL-SCNC: 30 MMOL/L (ref 21–32)
CREAT SERPL-MCNC: 0.99 MG/DL (ref 0.7–1.3)
GLOBULIN SER CALC-MCNC: 3.8 G/DL (ref 2–4)
GLUCOSE BLD STRIP.AUTO-MCNC: 91 MG/DL (ref 65–117)
GLUCOSE BLD STRIP.AUTO-MCNC: 96 MG/DL (ref 65–117)
GLUCOSE SERPL-MCNC: 80 MG/DL (ref 65–100)
POTASSIUM SERPL-SCNC: 3.7 MMOL/L (ref 3.5–5.1)
PROT SERPL-MCNC: 7.1 G/DL (ref 6.4–8.2)
SERVICE CMNT-IMP: NORMAL
SERVICE CMNT-IMP: NORMAL
SODIUM SERPL-SCNC: 138 MMOL/L (ref 136–145)

## 2025-03-26 PROCEDURE — 6370000000 HC RX 637 (ALT 250 FOR IP): Performed by: STUDENT IN AN ORGANIZED HEALTH CARE EDUCATION/TRAINING PROGRAM

## 2025-03-26 PROCEDURE — 82962 GLUCOSE BLOOD TEST: CPT

## 2025-03-26 PROCEDURE — 96376 TX/PRO/DX INJ SAME DRUG ADON: CPT

## 2025-03-26 PROCEDURE — 47531 INJECTION FOR CHOLANGIOGRAM: CPT

## 2025-03-26 PROCEDURE — 6360000002 HC RX W HCPCS: Performed by: STUDENT IN AN ORGANIZED HEALTH CARE EDUCATION/TRAINING PROGRAM

## 2025-03-26 PROCEDURE — 80053 COMPREHEN METABOLIC PANEL: CPT

## 2025-03-26 PROCEDURE — 2500000003 HC RX 250 WO HCPCS: Performed by: STUDENT IN AN ORGANIZED HEALTH CARE EDUCATION/TRAINING PROGRAM

## 2025-03-26 PROCEDURE — 6360000004 HC RX CONTRAST MEDICATION: Performed by: STUDENT IN AN ORGANIZED HEALTH CARE EDUCATION/TRAINING PROGRAM

## 2025-03-26 PROCEDURE — G0378 HOSPITAL OBSERVATION PER HR: HCPCS

## 2025-03-26 PROCEDURE — 2580000003 HC RX 258: Performed by: STUDENT IN AN ORGANIZED HEALTH CARE EDUCATION/TRAINING PROGRAM

## 2025-03-26 PROCEDURE — 96375 TX/PRO/DX INJ NEW DRUG ADDON: CPT

## 2025-03-26 PROCEDURE — 6370000000 HC RX 637 (ALT 250 FOR IP): Performed by: EMERGENCY MEDICINE

## 2025-03-26 PROCEDURE — 87040 BLOOD CULTURE FOR BACTERIA: CPT

## 2025-03-26 PROCEDURE — 36415 COLL VENOUS BLD VENIPUNCTURE: CPT

## 2025-03-26 RX ORDER — SODIUM CHLORIDE 0.9 % (FLUSH) 0.9 %
5-40 SYRINGE (ML) INJECTION EVERY 12 HOURS SCHEDULED
Status: DISCONTINUED | OUTPATIENT
Start: 2025-03-26 | End: 2025-03-28 | Stop reason: HOSPADM

## 2025-03-26 RX ORDER — SENNA AND DOCUSATE SODIUM 50; 8.6 MG/1; MG/1
1 TABLET, FILM COATED ORAL 2 TIMES DAILY
Status: DISCONTINUED | OUTPATIENT
Start: 2025-03-26 | End: 2025-03-28 | Stop reason: HOSPADM

## 2025-03-26 RX ORDER — SODIUM CHLORIDE 9 MG/ML
INJECTION, SOLUTION INTRAVENOUS ONCE
Status: DISCONTINUED | OUTPATIENT
Start: 2025-03-26 | End: 2025-03-26

## 2025-03-26 RX ORDER — KETOROLAC TROMETHAMINE 30 MG/ML
15 INJECTION, SOLUTION INTRAMUSCULAR; INTRAVENOUS EVERY 6 HOURS PRN
Status: DISPENSED | OUTPATIENT
Start: 2025-03-26 | End: 2025-03-28

## 2025-03-26 RX ORDER — ACETAMINOPHEN 325 MG/1
650 TABLET ORAL EVERY 4 HOURS PRN
Status: DISCONTINUED | OUTPATIENT
Start: 2025-03-26 | End: 2025-03-28 | Stop reason: SDUPTHER

## 2025-03-26 RX ORDER — BISACODYL 10 MG
10 SUPPOSITORY, RECTAL RECTAL DAILY PRN
Status: DISCONTINUED | OUTPATIENT
Start: 2025-03-26 | End: 2025-03-28 | Stop reason: HOSPADM

## 2025-03-26 RX ORDER — SODIUM CHLORIDE 0.9 % (FLUSH) 0.9 %
5-40 SYRINGE (ML) INJECTION PRN
Status: DISCONTINUED | OUTPATIENT
Start: 2025-03-26 | End: 2025-03-28 | Stop reason: HOSPADM

## 2025-03-26 RX ORDER — LIDOCAINE HYDROCHLORIDE 20 MG/ML
20 INJECTION, SOLUTION INFILTRATION; PERINEURAL ONCE
Status: DISCONTINUED | OUTPATIENT
Start: 2025-03-26 | End: 2025-03-30 | Stop reason: HOSPADM

## 2025-03-26 RX ORDER — POTASSIUM CHLORIDE 1500 MG/1
40 TABLET, EXTENDED RELEASE ORAL PRN
Status: DISCONTINUED | OUTPATIENT
Start: 2025-03-26 | End: 2025-03-28 | Stop reason: HOSPADM

## 2025-03-26 RX ORDER — ONDANSETRON 4 MG/1
4 TABLET, ORALLY DISINTEGRATING ORAL EVERY 8 HOURS PRN
Status: DISCONTINUED | OUTPATIENT
Start: 2025-03-26 | End: 2025-03-28 | Stop reason: HOSPADM

## 2025-03-26 RX ORDER — MAGNESIUM SULFATE IN WATER 40 MG/ML
2000 INJECTION, SOLUTION INTRAVENOUS PRN
Status: DISCONTINUED | OUTPATIENT
Start: 2025-03-26 | End: 2025-03-28 | Stop reason: HOSPADM

## 2025-03-26 RX ORDER — ACETAMINOPHEN 325 MG/1
650 TABLET ORAL EVERY 6 HOURS PRN
Status: DISCONTINUED | OUTPATIENT
Start: 2025-03-26 | End: 2025-03-28 | Stop reason: HOSPADM

## 2025-03-26 RX ORDER — ONDANSETRON 2 MG/ML
4 INJECTION INTRAMUSCULAR; INTRAVENOUS EVERY 4 HOURS PRN
Status: DISCONTINUED | OUTPATIENT
Start: 2025-03-26 | End: 2025-03-26

## 2025-03-26 RX ORDER — POLYETHYLENE GLYCOL 3350 17 G/17G
17 POWDER, FOR SOLUTION ORAL DAILY
Status: DISCONTINUED | OUTPATIENT
Start: 2025-03-26 | End: 2025-03-28 | Stop reason: HOSPADM

## 2025-03-26 RX ORDER — LACTULOSE 10 G/15ML
30 SOLUTION ORAL
Status: COMPLETED | OUTPATIENT
Start: 2025-03-26 | End: 2025-03-26

## 2025-03-26 RX ORDER — ACETAMINOPHEN 650 MG/1
650 SUPPOSITORY RECTAL EVERY 6 HOURS PRN
Status: DISCONTINUED | OUTPATIENT
Start: 2025-03-26 | End: 2025-03-28 | Stop reason: HOSPADM

## 2025-03-26 RX ORDER — HEPARIN SODIUM 200 [USP'U]/100ML
200 INJECTION, SOLUTION INTRAVENOUS ONCE
Status: DISCONTINUED | OUTPATIENT
Start: 2025-03-26 | End: 2025-03-30 | Stop reason: HOSPADM

## 2025-03-26 RX ORDER — POTASSIUM CHLORIDE 7.45 MG/ML
10 INJECTION INTRAVENOUS PRN
Status: DISCONTINUED | OUTPATIENT
Start: 2025-03-26 | End: 2025-03-28 | Stop reason: HOSPADM

## 2025-03-26 RX ORDER — SODIUM CHLORIDE 9 MG/ML
INJECTION, SOLUTION INTRAVENOUS PRN
Status: DISCONTINUED | OUTPATIENT
Start: 2025-03-26 | End: 2025-03-28 | Stop reason: HOSPADM

## 2025-03-26 RX ORDER — DEXTROSE MONOHYDRATE AND SODIUM CHLORIDE 5; .45 G/100ML; G/100ML
INJECTION, SOLUTION INTRAVENOUS CONTINUOUS
Status: ACTIVE | OUTPATIENT
Start: 2025-03-26 | End: 2025-03-26

## 2025-03-26 RX ORDER — IOPAMIDOL 755 MG/ML
50 INJECTION, SOLUTION INTRAVASCULAR ONCE
Status: COMPLETED | OUTPATIENT
Start: 2025-03-26 | End: 2025-03-26

## 2025-03-26 RX ORDER — ONDANSETRON 2 MG/ML
4 INJECTION INTRAMUSCULAR; INTRAVENOUS EVERY 6 HOURS PRN
Status: DISCONTINUED | OUTPATIENT
Start: 2025-03-26 | End: 2025-03-28 | Stop reason: HOSPADM

## 2025-03-26 RX ADMIN — LACTULOSE 30 G: 10 SOLUTION ORAL at 04:20

## 2025-03-26 RX ADMIN — KETOROLAC TROMETHAMINE 15 MG: 30 INJECTION, SOLUTION INTRAMUSCULAR at 21:06

## 2025-03-26 RX ADMIN — KETOROLAC TROMETHAMINE 15 MG: 30 INJECTION, SOLUTION INTRAMUSCULAR at 10:20

## 2025-03-26 RX ADMIN — KETOROLAC TROMETHAMINE 15 MG: 30 INJECTION, SOLUTION INTRAMUSCULAR at 04:26

## 2025-03-26 RX ADMIN — SENNOSIDES AND DOCUSATE SODIUM 1 TABLET: 50; 8.6 TABLET ORAL at 21:07

## 2025-03-26 RX ADMIN — DEXTROSE AND SODIUM CHLORIDE: 5; .45 INJECTION, SOLUTION INTRAVENOUS at 04:18

## 2025-03-26 RX ADMIN — SODIUM CHLORIDE, PRESERVATIVE FREE 10 ML: 5 INJECTION INTRAVENOUS at 10:21

## 2025-03-26 RX ADMIN — DEXTROSE AND SODIUM CHLORIDE: 5; .45 INJECTION, SOLUTION INTRAVENOUS at 05:17

## 2025-03-26 RX ADMIN — APIXABAN 5 MG: 5 TABLET, FILM COATED ORAL at 21:07

## 2025-03-26 RX ADMIN — SODIUM CHLORIDE, PRESERVATIVE FREE 10 ML: 5 INJECTION INTRAVENOUS at 21:07

## 2025-03-26 RX ADMIN — IOPAMIDOL 20 ML: 755 INJECTION, SOLUTION INTRAVENOUS at 09:28

## 2025-03-26 ASSESSMENT — PAIN DESCRIPTION - ORIENTATION: ORIENTATION: MID;UPPER

## 2025-03-26 ASSESSMENT — PAIN DESCRIPTION - DESCRIPTORS
DESCRIPTORS: ACHING;SORE;TIGHTNESS
DESCRIPTORS: SHARP;ACHING

## 2025-03-26 ASSESSMENT — PAIN SCALES - GENERAL
PAINLEVEL_OUTOF10: 6
PAINLEVEL_OUTOF10: 8
PAINLEVEL_OUTOF10: 8
PAINLEVEL_OUTOF10: 4
PAINLEVEL_OUTOF10: 7

## 2025-03-26 ASSESSMENT — PAIN - FUNCTIONAL ASSESSMENT
PAIN_FUNCTIONAL_ASSESSMENT: ACTIVITIES ARE NOT PREVENTED
PAIN_FUNCTIONAL_ASSESSMENT: ACTIVITIES ARE NOT PREVENTED

## 2025-03-26 ASSESSMENT — PAIN DESCRIPTION - LOCATION
LOCATION: ABDOMEN

## 2025-03-26 ASSESSMENT — PAIN DESCRIPTION - PAIN TYPE: TYPE: SURGICAL PAIN

## 2025-03-26 NOTE — ED NOTES
Perfect serve messaged Dr. Johnson to confirm telemetry order and glucose checks for pt. Per MD, no telemetry monitoring needed. Nya DECKER ordered glucose checks, Alex DECKER was unaware of glucose checks ordered. Waiting on reply from MD if wants order dc'd

## 2025-03-26 NOTE — PROCEDURES
Brief Postoperative Note    Obey Arana  YOB: 1980  [id number]    Pre-operative Diagnosis: Prior cholecystitis     Post-operative Diagnosis: Same    Procedure: Cholecystogram     Anesthesia: None    Surgeons/Assistants: Araceli Cox MD    Estimated Blood Loss: None    Complications: None    Specimens: Was Not Obtained    Findings:   Cholecystogram demonstrated a decompressed gallbladder with rapid drainage of contrast though the cystic and common ducts into the small bowel. The drain was capped.     Plan:  Will perform capping trial for 1 week.  If the patient does not develop symptoms of biliary cholic or cholecystitis in the interim, the drain can be removed in 1 week.   If he ever develops pain, fevers, chills, or other signs of biliary obstruction, he has been instructed to place a bag back to the drain.      Electronically signed by Araceli Cox MD on 3/26/2025 at 9:10 AM

## 2025-03-26 NOTE — PROGRESS NOTES
0855- Patient present in IR on stretcher. Patient is AxOx4. Patient aware that he is in IR for cholostomy evaluation.     0903- Patient on Angio table. Dr. Cox to bedside to discuss plan of care for cholostomy based on upcoming images.    0906- Imaging complete. Cholostomy tube capped. Cholostomy to be re-evaluated in 1-2 weeks. Appointment made in Angio for April 4th 0900. Patient placed back in transport.
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
## 2025-03-26 NOTE — ED NOTES
TRANSFER - OUT REPORT:    Verbal report given to Faiza on Obey NABIL Arana  being transferred to Surg tele for routine progression of patient care       Report consisted of patient's Situation, Background, Assessment and   Recommendations(SBAR).     Information from the following report(s) Nurse Handoff Report, Index, ED Encounter Summary, ED SBAR, MAR, Recent Results, Cardiac Rhythm  , and Event Log was reviewed with the receiving nurse.    Pierre Fall Assessment:    Presents to emergency department  because of falls (Syncope, seizure, or loss of consciousness): No  Age > 70: No  Altered Mental Status, Intoxication with alcohol or substance confusion (Disorientation, impaired judgment, poor safety awaremess, or inability to follow instructions): No  Impaired Mobility: Ambulates or transfers with assistive devices or assistance; Unable to ambulate or transer.: No  Nursing Judgement: No          Lines:   CVC  Left Subclavian (Active)        Opportunity for questions and clarification was provided.      Patient transported with:  Tech

## 2025-03-26 NOTE — PROGRESS NOTES
Nutrition Note    Noted consult. Discussed with pharmacy, will attempt to obtain home regimen from [employer] tomorrow. Pt known to RD/pharmacy teams from prior admission - hx of cyclic TPN. Will plan to see patient tomorrow for comprehensive assessment.    Electronically signed by Megan Bacon RD on 3/26/25 at 3:42 PM EDT    Contact: [phone]
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
## 2025-03-26 NOTE — PROGRESS NOTES
End of Shift Note    Bedside shift change report given to ELBERT Kendall (oncoming nurse) by Sanjay Lyle RN (offgoing nurse).  Report included the following information SBAR, Intake/Output, MAR, and Recent Results    Shift worked:  7a-730p     Shift summary and any significant changes:     Pain managed with Toradol - see MAR. Pt ambulating independently in bathroom and hallways. Diet advanced to FLD - tolerating but still no bowel function. Otherwise, uneventful.     Concerns for physician to address:       Zone phone for oncoming shift:          Activity:     Number times ambulated in hallways past shift: 5  Number of times OOB to chair past shift: 2    Cardiac:   Cardiac Monitoring: No           Access:   Current line(s): Implanted port    Genitourinary:   Urinary status: Patient is voiding without difficulty.    Respiratory:      Chronic home O2 use?: NO  Incentive spirometer at bedside: NO       GI:     Current diet:  ADULT DIET; Full Liquid  Passing flatus: Yes  Tolerating current diet: Yes       Pain Management:   Patient states pain is manageable on current regimen: Yes    Skin:     Interventions:     Patient Safety:  Fall Score: PA Fall Risk Score: 2.33    Interventions:           Length of Stay:  Expected LOS: 2  Actual LOS: 0      Sanjay Lyle RN

## 2025-03-27 LAB
ALBUMIN SERPL-MCNC: 3.1 G/DL (ref 3.5–5)
ALBUMIN/GLOB SERPL: 0.9 (ref 1.1–2.2)
ALP SERPL-CCNC: 78 U/L (ref 45–117)
ALT SERPL-CCNC: 36 U/L (ref 12–78)
ANION GAP SERPL CALC-SCNC: 3 MMOL/L (ref 2–12)
AST SERPL-CCNC: 22 U/L (ref 15–37)
BASOPHILS # BLD: 0.03 K/UL (ref 0–0.1)
BASOPHILS NFR BLD: 0.9 % (ref 0–1)
BILIRUB SERPL-MCNC: 0.9 MG/DL (ref 0.2–1)
BUN SERPL-MCNC: 13 MG/DL (ref 6–20)
BUN/CREAT SERPL: 13 (ref 12–20)
CALCIUM SERPL-MCNC: 8.6 MG/DL (ref 8.5–10.1)
CHLORIDE SERPL-SCNC: 107 MMOL/L (ref 97–108)
CO2 SERPL-SCNC: 29 MMOL/L (ref 21–32)
CREAT SERPL-MCNC: 0.98 MG/DL (ref 0.7–1.3)
DIFFERENTIAL METHOD BLD: ABNORMAL
EOSINOPHIL # BLD: 0.15 K/UL (ref 0–0.4)
EOSINOPHIL NFR BLD: 4.4 % (ref 0–7)
ERYTHROCYTE [DISTWIDTH] IN BLOOD BY AUTOMATED COUNT: 13.6 % (ref 11.5–14.5)
GLOBULIN SER CALC-MCNC: 3.5 G/DL (ref 2–4)
GLUCOSE BLD STRIP.AUTO-MCNC: 110 MG/DL (ref 65–117)
GLUCOSE SERPL-MCNC: 79 MG/DL (ref 65–100)
HCT VFR BLD AUTO: 39.2 % (ref 36.6–50.3)
HGB BLD-MCNC: 13.2 G/DL (ref 12.1–17)
IMM GRANULOCYTES # BLD AUTO: 0.01 K/UL (ref 0–0.04)
IMM GRANULOCYTES NFR BLD AUTO: 0.3 % (ref 0–0.5)
LYMPHOCYTES # BLD: 1.61 K/UL (ref 0.8–3.5)
LYMPHOCYTES NFR BLD: 46.9 % (ref 12–49)
MCH RBC QN AUTO: 31.4 PG (ref 26–34)
MCHC RBC AUTO-ENTMCNC: 33.7 G/DL (ref 30–36.5)
MCV RBC AUTO: 93.1 FL (ref 80–99)
MONOCYTES # BLD: 0.32 K/UL (ref 0–1)
MONOCYTES NFR BLD: 9.3 % (ref 5–13)
NEUTS SEG # BLD: 1.31 K/UL (ref 1.8–8)
NEUTS SEG NFR BLD: 38.2 % (ref 32–75)
NRBC # BLD: 0 K/UL (ref 0–0.01)
NRBC BLD-RTO: 0 PER 100 WBC
PLATELET # BLD AUTO: 132 K/UL (ref 150–400)
PMV BLD AUTO: 11.5 FL (ref 8.9–12.9)
POTASSIUM SERPL-SCNC: 3.8 MMOL/L (ref 3.5–5.1)
PROT SERPL-MCNC: 6.6 G/DL (ref 6.4–8.2)
RBC # BLD AUTO: 4.21 M/UL (ref 4.1–5.7)
SERVICE CMNT-IMP: NORMAL
SODIUM SERPL-SCNC: 139 MMOL/L (ref 136–145)
WBC # BLD AUTO: 3.4 K/UL (ref 4.1–11.1)

## 2025-03-27 PROCEDURE — 2500000003 HC RX 250 WO HCPCS: Performed by: STUDENT IN AN ORGANIZED HEALTH CARE EDUCATION/TRAINING PROGRAM

## 2025-03-27 PROCEDURE — 96376 TX/PRO/DX INJ SAME DRUG ADON: CPT

## 2025-03-27 PROCEDURE — 6360000002 HC RX W HCPCS: Performed by: STUDENT IN AN ORGANIZED HEALTH CARE EDUCATION/TRAINING PROGRAM

## 2025-03-27 PROCEDURE — 2500000003 HC RX 250 WO HCPCS: Performed by: INTERNAL MEDICINE

## 2025-03-27 PROCEDURE — 2580000003 HC RX 258: Performed by: INTERNAL MEDICINE

## 2025-03-27 PROCEDURE — 96368 THER/DIAG CONCURRENT INF: CPT

## 2025-03-27 PROCEDURE — 96365 THER/PROPH/DIAG IV INF INIT: CPT

## 2025-03-27 PROCEDURE — 80053 COMPREHEN METABOLIC PANEL: CPT

## 2025-03-27 PROCEDURE — 96366 THER/PROPH/DIAG IV INF ADDON: CPT

## 2025-03-27 PROCEDURE — 36415 COLL VENOUS BLD VENIPUNCTURE: CPT

## 2025-03-27 PROCEDURE — 82962 GLUCOSE BLOOD TEST: CPT

## 2025-03-27 PROCEDURE — 6360000002 HC RX W HCPCS: Performed by: INTERNAL MEDICINE

## 2025-03-27 PROCEDURE — G0378 HOSPITAL OBSERVATION PER HR: HCPCS

## 2025-03-27 PROCEDURE — 6370000000 HC RX 637 (ALT 250 FOR IP): Performed by: STUDENT IN AN ORGANIZED HEALTH CARE EDUCATION/TRAINING PROGRAM

## 2025-03-27 PROCEDURE — 85025 COMPLETE CBC W/AUTO DIFF WBC: CPT

## 2025-03-27 RX ORDER — SODIUM PHOSPHATE, DIBASIC AND SODIUM PHOSPHATE, MONOBASIC 7; 19 G/230ML; G/230ML
1 ENEMA RECTAL DAILY PRN
Status: DISCONTINUED | OUTPATIENT
Start: 2025-03-27 | End: 2025-03-28 | Stop reason: HOSPADM

## 2025-03-27 RX ADMIN — CALCIUM GLUCONATE: 98 INJECTION, SOLUTION INTRAVENOUS at 17:59

## 2025-03-27 RX ADMIN — SODIUM CHLORIDE, PRESERVATIVE FREE 10 ML: 5 INJECTION INTRAVENOUS at 20:24

## 2025-03-27 RX ADMIN — KETOROLAC TROMETHAMINE 15 MG: 30 INJECTION, SOLUTION INTRAMUSCULAR at 08:31

## 2025-03-27 RX ADMIN — SENNOSIDES AND DOCUSATE SODIUM 1 TABLET: 50; 8.6 TABLET ORAL at 20:21

## 2025-03-27 RX ADMIN — APIXABAN 5 MG: 5 TABLET, FILM COATED ORAL at 08:25

## 2025-03-27 RX ADMIN — POLYETHYLENE GLYCOL 3350 17 G: 17 POWDER, FOR SOLUTION ORAL at 08:25

## 2025-03-27 RX ADMIN — SENNOSIDES AND DOCUSATE SODIUM 1 TABLET: 50; 8.6 TABLET ORAL at 08:25

## 2025-03-27 RX ADMIN — KETOROLAC TROMETHAMINE 15 MG: 30 INJECTION, SOLUTION INTRAMUSCULAR at 20:21

## 2025-03-27 RX ADMIN — SODIUM CHLORIDE, PRESERVATIVE FREE 10 ML: 5 INJECTION INTRAVENOUS at 08:25

## 2025-03-27 RX ADMIN — APIXABAN 5 MG: 5 TABLET, FILM COATED ORAL at 20:21

## 2025-03-27 RX ADMIN — I.V. FAT EMULSION 250 ML: 20 EMULSION INTRAVENOUS at 18:00

## 2025-03-27 ASSESSMENT — PAIN DESCRIPTION - ORIENTATION
ORIENTATION: UPPER
ORIENTATION: ANTERIOR

## 2025-03-27 ASSESSMENT — PAIN DESCRIPTION - LOCATION
LOCATION: CHEST;ABDOMEN
LOCATION: ABDOMEN;CHEST

## 2025-03-27 ASSESSMENT — PAIN SCALES - GENERAL
PAINLEVEL_OUTOF10: 4
PAINLEVEL_OUTOF10: 8
PAINLEVEL_OUTOF10: 8
PAINLEVEL_OUTOF10: 0

## 2025-03-27 ASSESSMENT — PAIN - FUNCTIONAL ASSESSMENT: PAIN_FUNCTIONAL_ASSESSMENT: ACTIVITIES ARE NOT PREVENTED

## 2025-03-27 ASSESSMENT — PAIN DESCRIPTION - DESCRIPTORS
DESCRIPTORS: ACHING
DESCRIPTORS: ACHING

## 2025-03-27 ASSESSMENT — PAIN DESCRIPTION - ONSET: ONSET: GRADUAL

## 2025-03-27 ASSESSMENT — PAIN DESCRIPTION - PAIN TYPE: TYPE: SURGICAL PAIN

## 2025-03-27 ASSESSMENT — PAIN DESCRIPTION - FREQUENCY: FREQUENCY: INTERMITTENT

## 2025-03-27 NOTE — CARE COORDINATION
Care Management Initial Assessment       RUR: n/a, observation  Readmission? No  1st IM letter given? No  1st  letter given: No    Chart reviewed. Patient is not going home today.    CM met with patient to discuss discharge planning. States he was independent prior to admission. He requests assist with setting up discharge transportation. He states his mother is his main support, Stormy Arana [phone]. He states he will set up his own medical appointments.     CM will follow up.      03/27/25 3663   Service Assessment   Patient Orientation Alert and Oriented   Cognition Alert   History Provided By Patient   Primary Caregiver Self   Support Systems Parent  (mother, Stormy Arana)   Patient's Healthcare Decision Maker is: Patient Declined (Legal Next of Kin Remains as Decision Maker)   PCP Verified by CM No  (states he has an [facility] doctor, but cannot remember the name. States he will set up his own medical appt.)   Prior Functional Level Independent in ADLs/IADLs   Current Functional Level Independent in ADLs/IADLs   Can patient return to prior living arrangement Yes   Ability to make needs known: Good   Family able to assist with home care needs: Other (comment)  (was not clear about any family assist)   Would you like for me to discuss the discharge plan with any other family members/significant others, and if so, who? No   Financial Resources Medicare;Medicaid  (has dual plan)   Community Resources None   Social/Functional History   Lives With Other (Comment)  (will not specify who he lives with)   Type of Home House   Home Equipment None   Active  Yes  (aparently can drive, but uses medicaid for transport)   Discharge Planning   Type of Residence House   Living Arrangements Other (Comment)  (not clear who he lives with)   Current Services Prior To Admission None   DME Ordered? No   Type of Home Care Services None   Patient expects to be discharged to: Rob Faria, RN  Care
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
## 2025-03-27 NOTE — PROGRESS NOTES
Pharmacy TPN Management Note    TPN indication: Colitis  - on Home TPN every other night ( Tues/Thur/Sat here)    TPN type: Central     Macronutrients:  Protein: 110 gm  Dextrose: 320 gm  Lipids: SMOF  50 Gm (3 times per week- patient states every other day)    Rate: 2500ml over 12 hours cyclic   (1 hr taper up and 1 hr taper down)    Labs:  Recent Labs     Units 25  0441 25  0421 25  1941   NA mmol/L 139 138 139   K mmol/L 3.8 3.7 3.9   CL mmol/L 107 105 106   CO2 mmol/L 29 30 28   BUN MG/DL 13 15 17     Recent Labs     Units 25  0441 25  0421   AST U/L 22 15   ALT U/L 36 26     Recent Labs     Units 25  0441   WBC K/uL 3.4*   HGB g/dL 13.2   HCT % 39.2       Electrolytes (dosed per DAY):  CYCLIC TPNS are expressed in amount per DAY  Na: 90 meq/DAY; K: 45 meq/DAY; Phos:8 mmol/DayL; M meq/DAY; Ca 33 meq/DAY    Other additives in TPN: multivitamins , trace elements, and famotidine  + Selenium 40 mcg + Zinc 7mg    Impression/Plan:   Formula supplied by Franco Melo at  [employer]  [phone]  TPN macronutrient/rate changes: none - continue home formula  TPN electrolyte changes: none  TPN insulin changes: n/a           Pharmacy will continue to monitor enteral nutrition plan, electrolytes, renal function, and dietician recommendations and adjust parenteral nutrition as needed.    Thank you,  JERMAINE BAKER [facility]

## 2025-03-27 NOTE — PROGRESS NOTES
End of Shift Note    Bedside shift change report given to ELBERT Dillon (oncoming nurse) by Sanjay Lyle RN (offgoing nurse).  Report included the following information SBAR, Intake/Output, MAR, and Recent Results    Shift worked:  7a-730p     Shift summary and any significant changes:     Pain managed with Toradol - see MAR. Pt ambulating independently in bathroom and hallways. Diet advanced to regular - tolerating. BM x 1 - MD notified. TPN and lipids infusing.      Concerns for physician to address:       Zone phone for oncoming shift:          Activity:     Number times ambulated in hallways past shift: 5  Number of times OOB to chair past shift: 4    Cardiac:   Cardiac Monitoring: No           Access:   Current line(s): Implanted port    Genitourinary:   Urinary status: Patient is voiding without difficulty.    Respiratory:      Chronic home O2 use?: NO  Incentive spirometer at bedside: NO       GI:     Current diet:  Adult TPN 2-in-1 - Central Line (Cyclic Custom) without Lipids  ADULT ORAL NUTRITION SUPPLEMENT; Breakfast, Lunch, Dinner; Standard High Calorie/High Protein Oral Supplement  ADULT DIET; Regular  Passing flatus: Yes  Tolerating current diet: Yes       Pain Management:   Patient states pain is manageable on current regimen: Yes    Skin:     Interventions:     Patient Safety:  Fall Score: PA Fall Risk Score: 3.65    Interventions:           Length of Stay:  Expected LOS: 4  Actual LOS: 0      Sanjay Lyle RN
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
## 2025-03-28 VITALS
SYSTOLIC BLOOD PRESSURE: 115 MMHG | RESPIRATION RATE: 18 BRPM | DIASTOLIC BLOOD PRESSURE: 73 MMHG | HEIGHT: 70 IN | OXYGEN SATURATION: 100 % | TEMPERATURE: 97.9 F | BODY MASS INDEX: 22.76 KG/M2 | WEIGHT: 159 LBS | HEART RATE: 62 BPM

## 2025-03-28 LAB
ALBUMIN SERPL-MCNC: 3 G/DL (ref 3.5–5)
ALBUMIN/GLOB SERPL: 0.9 (ref 1.1–2.2)
ALP SERPL-CCNC: 76 U/L (ref 45–117)
ALT SERPL-CCNC: 30 U/L (ref 12–78)
ANION GAP SERPL CALC-SCNC: 2 MMOL/L (ref 2–12)
AST SERPL-CCNC: 17 U/L (ref 15–37)
BILIRUB SERPL-MCNC: 0.4 MG/DL (ref 0.2–1)
BUN SERPL-MCNC: 13 MG/DL (ref 6–20)
BUN/CREAT SERPL: 12 (ref 12–20)
CALCIUM SERPL-MCNC: 8.9 MG/DL (ref 8.5–10.1)
CHLORIDE SERPL-SCNC: 108 MMOL/L (ref 97–108)
CO2 SERPL-SCNC: 29 MMOL/L (ref 21–32)
CREAT SERPL-MCNC: 1.1 MG/DL (ref 0.7–1.3)
GLOBULIN SER CALC-MCNC: 3.4 G/DL (ref 2–4)
GLUCOSE BLD STRIP.AUTO-MCNC: 113 MG/DL (ref 65–117)
GLUCOSE BLD STRIP.AUTO-MCNC: 96 MG/DL (ref 65–117)
GLUCOSE SERPL-MCNC: 109 MG/DL (ref 65–100)
POTASSIUM SERPL-SCNC: 4.1 MMOL/L (ref 3.5–5.1)
PROT SERPL-MCNC: 6.4 G/DL (ref 6.4–8.2)
SERVICE CMNT-IMP: NORMAL
SERVICE CMNT-IMP: NORMAL
SODIUM SERPL-SCNC: 139 MMOL/L (ref 136–145)

## 2025-03-28 PROCEDURE — 82962 GLUCOSE BLOOD TEST: CPT

## 2025-03-28 PROCEDURE — 96366 THER/PROPH/DIAG IV INF ADDON: CPT

## 2025-03-28 PROCEDURE — 6360000002 HC RX W HCPCS: Performed by: STUDENT IN AN ORGANIZED HEALTH CARE EDUCATION/TRAINING PROGRAM

## 2025-03-28 PROCEDURE — 6370000000 HC RX 637 (ALT 250 FOR IP): Performed by: STUDENT IN AN ORGANIZED HEALTH CARE EDUCATION/TRAINING PROGRAM

## 2025-03-28 PROCEDURE — 80053 COMPREHEN METABOLIC PANEL: CPT

## 2025-03-28 PROCEDURE — 2500000003 HC RX 250 WO HCPCS: Performed by: STUDENT IN AN ORGANIZED HEALTH CARE EDUCATION/TRAINING PROGRAM

## 2025-03-28 PROCEDURE — 6370000000 HC RX 637 (ALT 250 FOR IP): Performed by: EMERGENCY MEDICINE

## 2025-03-28 PROCEDURE — G0378 HOSPITAL OBSERVATION PER HR: HCPCS

## 2025-03-28 PROCEDURE — 36415 COLL VENOUS BLD VENIPUNCTURE: CPT

## 2025-03-28 RX ORDER — SODIUM PHOSPHATE, DIBASIC AND SODIUM PHOSPHATE, MONOBASIC 7; 19 G/230ML; G/230ML
1 ENEMA RECTAL
Qty: 230 ML | Refills: 5 | Status: SHIPPED | OUTPATIENT
Start: 2025-03-28

## 2025-03-28 RX ORDER — SODIUM PHOSPHATE, DIBASIC AND SODIUM PHOSPHATE, MONOBASIC 7; 19 G/230ML; G/230ML
1 ENEMA RECTAL DAILY PRN
Refills: 1 | Status: SHIPPED | COMMUNITY
Start: 2025-03-28 | End: 2025-03-28

## 2025-03-28 RX ORDER — SODIUM PHOSPHATE, DIBASIC AND SODIUM PHOSPHATE, MONOBASIC 7; 19 G/230ML; G/230ML
1 ENEMA RECTAL
Qty: 230 ML | Refills: 3 | Status: SHIPPED | COMMUNITY
Start: 2025-03-28 | End: 2025-03-28

## 2025-03-28 RX ORDER — BISACODYL 10 MG
10 SUPPOSITORY, RECTAL RECTAL DAILY PRN
Qty: 10 SUPPOSITORY | Refills: 1 | Status: SHIPPED | OUTPATIENT
Start: 2025-03-28 | End: 2025-04-27

## 2025-03-28 RX ADMIN — SODIUM CHLORIDE, PRESERVATIVE FREE 10 ML: 5 INJECTION INTRAVENOUS at 09:32

## 2025-03-28 RX ADMIN — SENNOSIDES AND DOCUSATE SODIUM 1 TABLET: 50; 8.6 TABLET ORAL at 09:28

## 2025-03-28 RX ADMIN — APIXABAN 5 MG: 5 TABLET, FILM COATED ORAL at 09:28

## 2025-03-28 RX ADMIN — KETOROLAC TROMETHAMINE 15 MG: 30 INJECTION, SOLUTION INTRAMUSCULAR at 01:18

## 2025-03-28 RX ADMIN — POLYETHYLENE GLYCOL 3350 17 G: 17 POWDER, FOR SOLUTION ORAL at 09:28

## 2025-03-28 RX ADMIN — ACETAMINOPHEN 650 MG: 325 TABLET ORAL at 09:27

## 2025-03-28 ASSESSMENT — PAIN SCALES - GENERAL
PAINLEVEL_OUTOF10: 6
PAINLEVEL_OUTOF10: 7
PAINLEVEL_OUTOF10: 4
PAINLEVEL_OUTOF10: 7

## 2025-03-28 ASSESSMENT — PAIN DESCRIPTION - LOCATION
LOCATION: ABDOMEN;CHEST
LOCATION: ABDOMEN

## 2025-03-28 ASSESSMENT — PAIN DESCRIPTION - ORIENTATION
ORIENTATION: MID;UPPER;ANTERIOR
ORIENTATION: MID;UPPER

## 2025-03-28 ASSESSMENT — PAIN DESCRIPTION - DESCRIPTORS: DESCRIPTORS: ACHING

## 2025-03-28 ASSESSMENT — PAIN - FUNCTIONAL ASSESSMENT: PAIN_FUNCTIONAL_ASSESSMENT: ACTIVITIES ARE NOT PREVENTED

## 2025-03-28 ASSESSMENT — PAIN DESCRIPTION - PAIN TYPE: TYPE: ACUTE PAIN

## 2025-03-28 NOTE — DISCHARGE SUMMARY
into the small bowel. Drain was capped.  Will perform capping trial, [facility]   Drain can be removed in one week if patient does not develop symptoms of biliary colic or cholecystis in the interim   Per IR, RN provided drain care instructions on day of discharge and included these in discharge instructions      History of frozen abdomen with TPN dependence  Schedule close outpatient gastroenterology follow up  Tolerating regular adult diet on day of discharge  Nutrition/dietary following     History of DVT on Eliquis   Continue PTA eliquis          Treatment team Treatment Team:   Alfonso Reaves MD Okafor, Chukwuemeka J, MD Holliday, Susan, RN Owens, Neena LOPEZ, ETELVINA Faria, José Luis Gallego MD Reynolds, Angelica, RN Newman, Shakolie   Consultations  IP CONSULT TO HOSPITALIST  IP CONSULT TO DIETITIAN  IP CONSULT TO PHARMACY   Procedures/Surgeries  * No surgery found *     Condition at the time of discharge  Improved and stable       Query  None noted today        Disposition Home with family, no needs   Diet regular diet and TPN   Care Plan discussed with Patient/Family and Nurse   Follow up Follow-up Information       Follow up With Specialties Details Why Contact Info    Anna Baldwin MD  Schedule an appointment as soon as possible for a visit Gastroenterologist - make an appointment for further management and evaluation of chronic conditions [address] [place] [id number] [phone]               Other Pertinent data:   TODAY'S CLINICAL FINDINGS:     Vitals:    03/28/25 0745   BP: 115/73   Pulse: 62   Resp: 18   Temp: 97.9 °F (36.6 °C)   SpO2: 100%     Wt Readings from Last 10 Encounters:   03/25/25 72.1 kg (159 lb)   02/28/25 72.9 kg (160 lb 11.5 oz)   02/17/25 78.8 kg (173 lb 12.8 oz)   02/22/24 81.6 kg (179 lb 14.3 oz)   09/27/22 69.9 kg (154 lb)   09/15/22 67.6 kg (149 lb)   08/23/22 64 kg (141 lb)      Exam:   Pt is alert and oriented x4; in no  was 35 minutes while caring for this patient and greater than 50% of that time was spent with patient (and/or family) coordinating patient's clinical issues.     Neena Montgomery PA-C   3/28/2025

## 2025-03-28 NOTE — DISCHARGE INSTRUCTIONS
[facility]  Department of Interventional Radiology  [address] [place] [address] [phone]    BILIARY DRAIN DISCHARGE EDUCATION     Radiologist:  [facility]    Information:    A cholecystostomy tube provides a route of bile drainage for obstructed bile ducts. This provides treatment of jaundice and relief of associated skin itchiness. The catheter is   secured to the skin by an adhesive device or sutures to prevent accidental removal of the catheter. A dressing made of gauze is placed over the fixation device and is taped or secured with a thin sheet of adhesive material. The catheter is usually also taped to your skin just outside the dressing. The end of the catheter may be connected to a drainage bag to collect your bile.      What should I expect after the Biliary Drain?    There may be some soreness around the catheter site. To relieve pain, take Tylenol (acetaminophen) or the pain medicine your doctor prescribed. Avoid ibuprofen (Advil,   Motrin) and aspirin as they may cause you to bleed.   You may return to work after 24 hours, unless your primary doctor instructs you otherwise.    You do not have any diet restrictions because of this procedure but should continue any that were given to you by any other doctors.    Continue Coumadin/warfarin and all previously prescribed medications with the exception of Pradaxa, Xarelto, Eliquis or Savaysa which can be resumed after 24 hours.     Bathing & Wound Care:    Try to prevent tugging on the catheter    If connected to a bag, the bag should be emptied as follows:   1. Turn bag upside down,   2. Remove bottom cap from bag   3. Pour contents into toilet,   4. Replace cap on bag, and   5. Re-secure bag onto leg with straps.   Keep dressing as dry as possible. When bathing, do not sit in water deep enough to immerse catheter fixation device/dressing. Showering is permitted if the dressing is kept as   dry

## 2025-03-28 NOTE — PLAN OF CARE
Problem: Discharge Planning  Goal: Discharge to home or other facility with appropriate resources  Outcome: Progressing     Problem: ABCDS Injury Assessment  Goal: Absence of physical injury  Outcome: Progressing     Problem: Safety - Adult  Goal: Free from fall injury  Outcome: Progressing     Problem: Pain  Goal: Verbalizes/displays adequate comfort level or baseline comfort level  Outcome: Progressing     Problem: Neurosensory - Adult  Goal: Achieves maximal functionality and self care  Outcome: Progressing     Problem: Skin/Tissue Integrity - Adult  Goal: Incisions, wounds, or drain sites healing without S/S of infection  Outcome: Progressing     Problem: Gastrointestinal - Adult  Goal: Minimal or absence of nausea and vomiting  Outcome: Progressing  Goal: Maintains or returns to baseline bowel function  Outcome: Progressing  Goal: Maintains adequate nutritional intake  Outcome: Progressing     Problem: Infection - Adult  Goal: Absence of infection at discharge  Outcome: Progressing     Problem: Metabolic/Fluid and Electrolytes - Adult  Goal: Electrolytes maintained within normal limits  Outcome: Progressing  Goal: Hemodynamic stability and optimal renal function maintained  Outcome: Progressing  Goal: Glucose maintained within prescribed range  Outcome: Progressing     Problem: Nutrition Deficit:  Goal: Optimize nutritional status  Outcome: Progressing

## 2025-03-28 NOTE — CARE COORDINATION
Chart reviewed. Discharge order seen. Patient discussed in rounds and will discharge today.    CM spoke with patient and he asked CM to set up Medicaid transport  home. CM called transport number [phone]. Trip number is [phone]. Transport will call patient directly, when they are on their way.    No other needs for CM identified. Okay to dc from CM standpoint.     03/28/25 1141   Services At/After Discharge   Transition of Care Consult (CM Consult) Discharge Planning   Services At/After Discharge Transport    Resource Information Provided? No   Mode of Transport at Discharge Other (see comment)  (Medicaid car)   Hospital Transport Time of Discharge   (time varies with Medicaid transport. Has 3 hour window from when call made.)   Confirm Follow Up Transport Other (see comment)  (Medicaid car)   Condition of Participation: Discharge Planning   The Plan for Transition of Care is related to the following treatment goals: Patient to return home via Medicaid transport. Will care for himself and make own appointments. Denies any discharge needs.   The Patient and/or Patient Representative was provided with a Choice of Provider?   (na)     Jory Faria RN  Care Manager  [phone]

## 2025-03-28 NOTE — PROGRESS NOTES
DISCHARGE NOTE FROM SURGICAL-TELEMETRY NURSE    Patient determined to be stable for discharge by attending provider. I have reviewed the discharge instructions and follow-up appointments with the Patient. They verbalized understanding and all questions were answered to their satisfaction. No complaints or further questions were expressed.      Medications sent to pharmacy Appropriate educational materials and medication side effect teaching were provided.      N/A were removed prior to discharge.     PICC line care provided to patient by double lumen CVC- Left subclavian.    All personal items collected during admission were returned to the patient prior to discharge.    Post-op patient: Dannielle Wray RN

## 2025-03-30 LAB
BACTERIA SPEC CULT: NORMAL
BACTERIA SPEC CULT: NORMAL
SERVICE CMNT-IMP: NORMAL
SERVICE CMNT-IMP: NORMAL

## 2025-04-04 ENCOUNTER — HOSPITAL ENCOUNTER (OUTPATIENT)
Facility: HOSPITAL | Age: 45
End: 2025-04-04
Payer: MEDICARE

## 2025-04-04 VITALS
OXYGEN SATURATION: 96 % | SYSTOLIC BLOOD PRESSURE: 110 MMHG | RESPIRATION RATE: 14 BRPM | HEART RATE: 58 BPM | DIASTOLIC BLOOD PRESSURE: 76 MMHG | TEMPERATURE: 97.7 F

## 2025-04-04 PROCEDURE — 6360000004 HC RX CONTRAST MEDICATION: Performed by: STUDENT IN AN ORGANIZED HEALTH CARE EDUCATION/TRAINING PROGRAM

## 2025-04-04 PROCEDURE — 47531 INJECTION FOR CHOLANGIOGRAM: CPT

## 2025-04-04 RX ORDER — IOPAMIDOL 755 MG/ML
50 INJECTION, SOLUTION INTRAVASCULAR ONCE
Status: COMPLETED | OUTPATIENT
Start: 2025-04-04 | End: 2025-04-04

## 2025-04-04 RX ADMIN — IOPAMIDOL 8 ML: 755 INJECTION, SOLUTION INTRAVENOUS at 09:41

## 2025-04-04 ASSESSMENT — ENCOUNTER SYMPTOMS
SHORTNESS OF BREATH: 0
CONSTIPATION: 1
VOMITING: 0
ABDOMINAL PAIN: 1
BACK PAIN: 0
BLOOD IN STOOL: 0
DIARRHEA: 0
COLOR CHANGE: 0
NAUSEA: 1
ABDOMINAL DISTENTION: 0
COUGH: 0

## 2025-04-04 ASSESSMENT — PAIN - FUNCTIONAL ASSESSMENT: PAIN_FUNCTIONAL_ASSESSMENT: NONE - DENIES PAIN

## 2025-04-04 NOTE — DISCHARGE INSTRUCTIONS
[facility]  Department of Interventional Radiology  [address] [place] [address]  [phone]    BILIARY DRAIN REMOVAL DISCHARGE EDUCATION     Radiologist: [facility]    Date: 4/4/2025    Information:    A cholecystostomy tube provides a route of bile drainage for obstructed bile ducts. This provides treatment of jaundice and relief of associated skin itchiness. The catheter is   secured to the skin by an adhesive device or sutures to prevent accidental removal of the catheter. A dressing made of gauze is placed over the fixation device and is taped or secured with a thin sheet of adhesive material. The catheter is usually also taped to your skin just outside the dressing. The end of the catheter may be connected to a drainage bag to collect your bile.      What should I expect after the Biliary Drain Removal?    There may be some soreness around the site. To relieve pain, take Tylenol (acetaminophen) or the pain medicine your doctor prescribed. Avoid ibuprofen (Advil,   Motrin) and aspirin as they may cause you to bleed.   You may return to work after 24 hours, unless your primary doctor instructs you otherwise.    You do not have any diet restrictions because of this procedure but should continue any that were given to you by any other doctors.    Continue Coumadin/warfarin and all previously prescribed medications with the exception of Pradaxa, Xarelto, Eliquis or Savaysa which can be resumed after 24 hours.     Bathing & Wound Care:    Keep dressing as dry as possible. Keep a dressing over site until drainage stops which should be 1 to 3 days    Follow-up visit information:   Follow up with Interventional Radiology will be scheduled, if you do not have this appointment contact [facility] at [phone].    Occasionally, a situation will require prompt attention and an emergency room visit is necessary:    Increasing pain not relieved by
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
## 2025-04-04 NOTE — PROCEDURES
Brief Postoperative Note    Obey Arana  YOB: 1980  [id number]    Pre-operative Diagnosis: Prior cholecystitis     Post-operative Diagnosis: Same    Procedure: Cholecystogram and cholecystostomy removal.     Anesthesia: None    Surgeons/Assistants: Araceli Cox MD    Estimated Blood Loss: None    Complications: None    Specimens: Was Not Obtained    Findings:   Cholecystogram demonstrated a decompressed gallbladder with rapid drainage of contrast though the cystic and common ducts into the small bowel. The drain was removed.     Electronically signed by Araceli Cox MD on 4/4/2025 at 9:55 AM

## 2025-06-30 ENCOUNTER — HOSPITAL ENCOUNTER (EMERGENCY)
Facility: HOSPITAL | Age: 45
Discharge: HOME | End: 2025-06-30
Attending: STUDENT IN AN ORGANIZED HEALTH CARE EDUCATION/TRAINING PROGRAM
Payer: MEDICARE

## 2025-06-30 VITALS
OXYGEN SATURATION: 96 % | DIASTOLIC BLOOD PRESSURE: 67 MMHG | TEMPERATURE: 99 F | HEART RATE: 94 BPM | SYSTOLIC BLOOD PRESSURE: 105 MMHG | RESPIRATION RATE: 18 BRPM

## 2025-06-30 DIAGNOSIS — R68.83 CHILLS (WITHOUT FEVER): Primary | ICD-10-CM

## 2025-06-30 LAB
ALBUMIN SERPL-MCNC: 3.5 G/DL (ref 3.5–5)
ALBUMIN/GLOB SERPL: 0.8 (ref 1.1–2.2)
ALP SERPL-CCNC: 126 U/L (ref 45–117)
ALT SERPL-CCNC: 45 U/L (ref 12–78)
ANION GAP SERPL CALC-SCNC: 5 MMOL/L (ref 2–12)
APPEARANCE UR: CLEAR
AST SERPL-CCNC: 29 U/L (ref 15–37)
BACTERIA URNS QL MICRO: NEGATIVE /HPF
BASOPHILS # BLD: 0.05 K/UL (ref 0–0.1)
BASOPHILS NFR BLD: 0.5 % (ref 0–1)
BILIRUB SERPL-MCNC: 1.4 MG/DL (ref 0.2–1)
BILIRUB UR QL: NEGATIVE
BUN SERPL-MCNC: 17 MG/DL (ref 6–20)
BUN/CREAT SERPL: 15 (ref 12–20)
CALCIUM SERPL-MCNC: 8.9 MG/DL (ref 8.5–10.1)
CHLORIDE SERPL-SCNC: 107 MMOL/L (ref 97–108)
CO2 SERPL-SCNC: 26 MMOL/L (ref 21–32)
COLOR UR: ABNORMAL
CREAT SERPL-MCNC: 1.17 MG/DL (ref 0.7–1.3)
DIFFERENTIAL METHOD BLD: ABNORMAL
EOSINOPHIL # BLD: 0.04 K/UL (ref 0–0.4)
EOSINOPHIL NFR BLD: 0.4 % (ref 0–7)
EPITH CASTS URNS QL MICRO: ABNORMAL /LPF
ERYTHROCYTE [DISTWIDTH] IN BLOOD BY AUTOMATED COUNT: 14.7 % (ref 11.5–14.5)
FLUAV RNA SPEC QL NAA+PROBE: NOT DETECTED
FLUBV RNA SPEC QL NAA+PROBE: NOT DETECTED
GLOBULIN SER CALC-MCNC: 4.2 G/DL (ref 2–4)
GLUCOSE SERPL-MCNC: 101 MG/DL (ref 65–100)
GLUCOSE UR STRIP.AUTO-MCNC: NEGATIVE MG/DL
HCT VFR BLD AUTO: 40.8 % (ref 36.6–50.3)
HGB BLD-MCNC: 13.4 G/DL (ref 12.1–17)
HGB UR QL STRIP: NEGATIVE
HYALINE CASTS URNS QL MICRO: ABNORMAL /LPF (ref 0–2)
IMM GRANULOCYTES # BLD AUTO: 0.03 K/UL (ref 0–0.04)
IMM GRANULOCYTES NFR BLD AUTO: 0.3 % (ref 0–0.5)
KETONES UR QL STRIP.AUTO: ABNORMAL MG/DL
LEUKOCYTE ESTERASE UR QL STRIP.AUTO: NEGATIVE
LIPASE SERPL-CCNC: 15 U/L (ref 13–75)
LYMPHOCYTES # BLD: 0.67 K/UL (ref 0.8–3.5)
LYMPHOCYTES NFR BLD: 6.5 % (ref 12–49)
MAGNESIUM SERPL-MCNC: 1.8 MG/DL (ref 1.6–2.4)
MCH RBC QN AUTO: 30.5 PG (ref 26–34)
MCHC RBC AUTO-ENTMCNC: 32.8 G/DL (ref 30–36.5)
MCV RBC AUTO: 92.9 FL (ref 80–99)
MONOCYTES # BLD: 0.16 K/UL (ref 0–1)
MONOCYTES NFR BLD: 1.6 % (ref 5–13)
NEUTS SEG # BLD: 9.35 K/UL (ref 1.8–8)
NEUTS SEG NFR BLD: 90.7 % (ref 32–75)
NITRITE UR QL STRIP.AUTO: NEGATIVE
NRBC # BLD: 0 K/UL (ref 0–0.01)
NRBC BLD-RTO: 0 PER 100 WBC
PH UR STRIP: 5.5 (ref 5–8)
PLATELET # BLD AUTO: 129 K/UL (ref 150–400)
PMV BLD AUTO: 11.5 FL (ref 8.9–12.9)
POTASSIUM SERPL-SCNC: 3.7 MMOL/L (ref 3.5–5.1)
PROT SERPL-MCNC: 7.7 G/DL (ref 6.4–8.2)
PROT UR STRIP-MCNC: 30 MG/DL
RBC # BLD AUTO: 4.39 M/UL (ref 4.1–5.7)
RBC #/AREA URNS HPF: ABNORMAL /HPF (ref 0–5)
RBC MORPH BLD: ABNORMAL
SARS-COV-2 RNA RESP QL NAA+PROBE: NOT DETECTED
SODIUM SERPL-SCNC: 138 MMOL/L (ref 136–145)
SOURCE: NORMAL
SP GR UR REFRACTOMETRY: 1.02
URINE CULTURE IF INDICATED: ABNORMAL
UROBILINOGEN UR QL STRIP.AUTO: 0.2 EU/DL (ref 0.2–1)
WBC # BLD AUTO: 10.3 K/UL (ref 4.1–11.1)
WBC URNS QL MICRO: ABNORMAL /HPF (ref 0–4)

## 2025-06-30 PROCEDURE — 81001 URINALYSIS AUTO W/SCOPE: CPT

## 2025-06-30 PROCEDURE — 83690 ASSAY OF LIPASE: CPT

## 2025-06-30 PROCEDURE — 87636 SARSCOV2 & INF A&B AMP PRB: CPT

## 2025-06-30 PROCEDURE — 36415 COLL VENOUS BLD VENIPUNCTURE: CPT

## 2025-06-30 PROCEDURE — 99283 EMERGENCY DEPT VISIT LOW MDM: CPT

## 2025-06-30 PROCEDURE — 80053 COMPREHEN METABOLIC PANEL: CPT

## 2025-06-30 PROCEDURE — 85025 COMPLETE CBC W/AUTO DIFF WBC: CPT

## 2025-06-30 PROCEDURE — 83735 ASSAY OF MAGNESIUM: CPT

## 2025-06-30 PROCEDURE — 87040 BLOOD CULTURE FOR BACTERIA: CPT

## 2025-06-30 ASSESSMENT — PAIN - FUNCTIONAL ASSESSMENT: PAIN_FUNCTIONAL_ASSESSMENT: 0-10

## 2025-06-30 ASSESSMENT — PAIN SCALES - GENERAL: PAINLEVEL_OUTOF10: 0

## 2025-06-30 NOTE — ED NOTES
Pt reports concern for infection d/t getting chills while complete TPN through port last night. Pt reports hx of + blood cultures.
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
## 2025-07-15 ENCOUNTER — APPOINTMENT (OUTPATIENT)
Facility: HOSPITAL | Age: 45
DRG: 314 | End: 2025-07-15
Payer: MEDICARE

## 2025-07-15 ENCOUNTER — HOSPITAL ENCOUNTER (INPATIENT)
Facility: HOSPITAL | Age: 45
LOS: 8 days | Discharge: HOME | DRG: 314 | End: 2025-07-23
Attending: EMERGENCY MEDICINE | Admitting: STUDENT IN AN ORGANIZED HEALTH CARE EDUCATION/TRAINING PROGRAM
Payer: MEDICARE

## 2025-07-15 DIAGNOSIS — R78.81 BACTEREMIA: ICD-10-CM

## 2025-07-15 DIAGNOSIS — T80.211D CATHETER-RELATED BLOODSTREAM INFECTION, SUBSEQUENT ENCOUNTER: ICD-10-CM

## 2025-07-15 DIAGNOSIS — R50.9 FEVER, UNSPECIFIED FEVER CAUSE: Primary | ICD-10-CM

## 2025-07-15 DIAGNOSIS — B95.7 COAGULASE NEGATIVE STAPHYLOCOCCUS BACTEREMIA: ICD-10-CM

## 2025-07-15 DIAGNOSIS — R78.81 COAGULASE NEGATIVE STAPHYLOCOCCUS BACTEREMIA: ICD-10-CM

## 2025-07-15 DIAGNOSIS — R78.81 GRAM-POSITIVE COCCI BACTEREMIA: ICD-10-CM

## 2025-07-15 LAB
ALBUMIN SERPL-MCNC: 3.2 G/DL (ref 3.5–5)
ALBUMIN/GLOB SERPL: 0.8 (ref 1.1–2.2)
ALP SERPL-CCNC: 109 U/L (ref 45–117)
ALT SERPL-CCNC: 45 U/L (ref 12–78)
ANION GAP SERPL CALC-SCNC: 5 MMOL/L (ref 2–12)
APPEARANCE UR: CLEAR
AST SERPL-CCNC: 43 U/L (ref 15–37)
BACTERIA URNS QL MICRO: NEGATIVE /HPF
BASOPHILS # BLD: 0.04 K/UL (ref 0–0.1)
BASOPHILS NFR BLD: 0.5 % (ref 0–1)
BILIRUB SERPL-MCNC: 1.2 MG/DL (ref 0.2–1)
BILIRUB UR QL: NEGATIVE
BUN SERPL-MCNC: 16 MG/DL (ref 6–20)
BUN/CREAT SERPL: 14 (ref 12–20)
CALCIUM SERPL-MCNC: 8.7 MG/DL (ref 8.5–10.1)
CHLORIDE SERPL-SCNC: 104 MMOL/L (ref 97–108)
CO2 SERPL-SCNC: 25 MMOL/L (ref 21–32)
COLOR UR: NORMAL
CREAT SERPL-MCNC: 1.13 MG/DL (ref 0.7–1.3)
DIFFERENTIAL METHOD BLD: ABNORMAL
EOSINOPHIL # BLD: 0.02 K/UL (ref 0–0.4)
EOSINOPHIL NFR BLD: 0.2 % (ref 0–7)
EPITH CASTS URNS QL MICRO: NORMAL /LPF
ERYTHROCYTE [DISTWIDTH] IN BLOOD BY AUTOMATED COUNT: 15.6 % (ref 11.5–14.5)
FLUAV RNA SPEC QL NAA+PROBE: NOT DETECTED
FLUBV RNA SPEC QL NAA+PROBE: NOT DETECTED
GLOBULIN SER CALC-MCNC: 4 G/DL (ref 2–4)
GLUCOSE SERPL-MCNC: 90 MG/DL (ref 65–100)
GLUCOSE UR STRIP.AUTO-MCNC: NEGATIVE MG/DL
HCT VFR BLD AUTO: 34.7 % (ref 36.6–50.3)
HGB BLD-MCNC: 11.8 G/DL (ref 12.1–17)
HGB UR QL STRIP: NEGATIVE
HYALINE CASTS URNS QL MICRO: NORMAL /LPF (ref 0–2)
IMM GRANULOCYTES # BLD AUTO: 0.03 K/UL (ref 0–0.04)
IMM GRANULOCYTES NFR BLD AUTO: 0.4 % (ref 0–0.5)
KETONES UR QL STRIP.AUTO: NEGATIVE MG/DL
LACTATE BLD-SCNC: 1.06 MMOL/L (ref 0.4–2)
LEUKOCYTE ESTERASE UR QL STRIP.AUTO: NEGATIVE
LYMPHOCYTES # BLD: 1.15 K/UL (ref 0.8–3.5)
LYMPHOCYTES NFR BLD: 14.2 % (ref 12–49)
MAGNESIUM SERPL-MCNC: 1.8 MG/DL (ref 1.6–2.4)
MCH RBC QN AUTO: 30.7 PG (ref 26–34)
MCHC RBC AUTO-ENTMCNC: 34 G/DL (ref 30–36.5)
MCV RBC AUTO: 90.4 FL (ref 80–99)
MONOCYTES # BLD: 0.64 K/UL (ref 0–1)
MONOCYTES NFR BLD: 7.9 % (ref 5–13)
NEUTS SEG # BLD: 6.24 K/UL (ref 1.8–8)
NEUTS SEG NFR BLD: 76.8 % (ref 32–75)
NITRITE UR QL STRIP.AUTO: NEGATIVE
NRBC # BLD: 0 K/UL (ref 0–0.01)
NRBC BLD-RTO: 0 PER 100 WBC
PH UR STRIP: 6 (ref 5–8)
PLATELET # BLD AUTO: 284 K/UL (ref 150–400)
PMV BLD AUTO: 11.6 FL (ref 8.9–12.9)
POTASSIUM SERPL-SCNC: 4.6 MMOL/L (ref 3.5–5.1)
PROCALCITONIN SERPL-MCNC: 4.13 NG/ML
PROT SERPL-MCNC: 7.2 G/DL (ref 6.4–8.2)
PROT UR STRIP-MCNC: NEGATIVE MG/DL
RBC # BLD AUTO: 3.84 M/UL (ref 4.1–5.7)
RBC #/AREA URNS HPF: NORMAL /HPF (ref 0–5)
SARS-COV-2 RNA RESP QL NAA+PROBE: NOT DETECTED
SODIUM SERPL-SCNC: 134 MMOL/L (ref 136–145)
SOURCE: NORMAL
SP GR UR REFRACTOMETRY: 1.02
TROPONIN I SERPL HS-MCNC: 8 NG/L (ref 0–76)
URINE CULTURE IF INDICATED: NORMAL
UROBILINOGEN UR QL STRIP.AUTO: 0.2 EU/DL (ref 0.2–1)
WBC # BLD AUTO: 8.1 K/UL (ref 4.1–11.1)
WBC URNS QL MICRO: NORMAL /HPF (ref 0–4)

## 2025-07-15 PROCEDURE — 87186 SC STD MICRODIL/AGAR DIL: CPT

## 2025-07-15 PROCEDURE — 81001 URINALYSIS AUTO W/SCOPE: CPT

## 2025-07-15 PROCEDURE — 2580000003 HC RX 258: Performed by: EMERGENCY MEDICINE

## 2025-07-15 PROCEDURE — 87154 CUL TYP ID BLD PTHGN 6+ TRGT: CPT

## 2025-07-15 PROCEDURE — 85025 COMPLETE CBC W/AUTO DIFF WBC: CPT

## 2025-07-15 PROCEDURE — 6360000004 HC RX CONTRAST MEDICATION: Performed by: EMERGENCY MEDICINE

## 2025-07-15 PROCEDURE — 36415 COLL VENOUS BLD VENIPUNCTURE: CPT

## 2025-07-15 PROCEDURE — 84145 PROCALCITONIN (PCT): CPT

## 2025-07-15 PROCEDURE — 96360 HYDRATION IV INFUSION INIT: CPT

## 2025-07-15 PROCEDURE — 93005 ELECTROCARDIOGRAM TRACING: CPT | Performed by: EMERGENCY MEDICINE

## 2025-07-15 PROCEDURE — 2580000003 HC RX 258: Performed by: STUDENT IN AN ORGANIZED HEALTH CARE EDUCATION/TRAINING PROGRAM

## 2025-07-15 PROCEDURE — 6360000002 HC RX W HCPCS: Performed by: STUDENT IN AN ORGANIZED HEALTH CARE EDUCATION/TRAINING PROGRAM

## 2025-07-15 PROCEDURE — 87040 BLOOD CULTURE FOR BACTERIA: CPT

## 2025-07-15 PROCEDURE — 83735 ASSAY OF MAGNESIUM: CPT

## 2025-07-15 PROCEDURE — 83605 ASSAY OF LACTIC ACID: CPT

## 2025-07-15 PROCEDURE — 1100000000 HC RM PRIVATE

## 2025-07-15 PROCEDURE — 71045 X-RAY EXAM CHEST 1 VIEW: CPT

## 2025-07-15 PROCEDURE — 84484 ASSAY OF TROPONIN QUANT: CPT

## 2025-07-15 PROCEDURE — 87077 CULTURE AEROBIC IDENTIFY: CPT

## 2025-07-15 PROCEDURE — 96361 HYDRATE IV INFUSION ADD-ON: CPT

## 2025-07-15 PROCEDURE — 6370000000 HC RX 637 (ALT 250 FOR IP): Performed by: EMERGENCY MEDICINE

## 2025-07-15 PROCEDURE — 80053 COMPREHEN METABOLIC PANEL: CPT

## 2025-07-15 PROCEDURE — 87636 SARSCOV2 & INF A&B AMP PRB: CPT

## 2025-07-15 PROCEDURE — 99285 EMERGENCY DEPT VISIT HI MDM: CPT

## 2025-07-15 PROCEDURE — 74177 CT ABD & PELVIS W/CONTRAST: CPT

## 2025-07-15 RX ORDER — VITAMIN B COMPLEX
1000 TABLET ORAL DAILY
Status: DISCONTINUED | OUTPATIENT
Start: 2025-07-16 | End: 2025-07-23 | Stop reason: HOSPADM

## 2025-07-15 RX ORDER — ACETAMINOPHEN 650 MG/1
650 SUPPOSITORY RECTAL EVERY 6 HOURS PRN
Status: DISCONTINUED | OUTPATIENT
Start: 2025-07-15 | End: 2025-07-23 | Stop reason: HOSPADM

## 2025-07-15 RX ORDER — POLYETHYLENE GLYCOL 3350 17 G/17G
17 POWDER, FOR SOLUTION ORAL DAILY PRN
Status: DISCONTINUED | OUTPATIENT
Start: 2025-07-15 | End: 2025-07-15

## 2025-07-15 RX ORDER — ONDANSETRON 4 MG/1
4 TABLET, ORALLY DISINTEGRATING ORAL EVERY 8 HOURS PRN
Status: DISCONTINUED | OUTPATIENT
Start: 2025-07-15 | End: 2025-07-23 | Stop reason: HOSPADM

## 2025-07-15 RX ORDER — MAGNESIUM SULFATE IN WATER 40 MG/ML
2000 INJECTION, SOLUTION INTRAVENOUS PRN
Status: DISCONTINUED | OUTPATIENT
Start: 2025-07-15 | End: 2025-07-23 | Stop reason: HOSPADM

## 2025-07-15 RX ORDER — ONDANSETRON 2 MG/ML
4 INJECTION INTRAMUSCULAR; INTRAVENOUS EVERY 6 HOURS PRN
Status: DISCONTINUED | OUTPATIENT
Start: 2025-07-15 | End: 2025-07-23 | Stop reason: HOSPADM

## 2025-07-15 RX ORDER — SODIUM CHLORIDE 0.9 % (FLUSH) 0.9 %
5-40 SYRINGE (ML) INJECTION EVERY 12 HOURS SCHEDULED
Status: DISCONTINUED | OUTPATIENT
Start: 2025-07-15 | End: 2025-07-23 | Stop reason: HOSPADM

## 2025-07-15 RX ORDER — 0.9 % SODIUM CHLORIDE 0.9 %
30 INTRAVENOUS SOLUTION INTRAVENOUS ONCE
Status: COMPLETED | OUTPATIENT
Start: 2025-07-15 | End: 2025-07-15

## 2025-07-15 RX ORDER — SENNA AND DOCUSATE SODIUM 50; 8.6 MG/1; MG/1
2 TABLET, FILM COATED ORAL DAILY PRN
Status: DISCONTINUED | OUTPATIENT
Start: 2025-07-15 | End: 2025-07-23 | Stop reason: HOSPADM

## 2025-07-15 RX ORDER — GAUZE BANDAGE 2" X 2"
100 BANDAGE TOPICAL DAILY
Status: DISCONTINUED | OUTPATIENT
Start: 2025-07-16 | End: 2025-07-23 | Stop reason: HOSPADM

## 2025-07-15 RX ORDER — SODIUM CHLORIDE 0.9 % (FLUSH) 0.9 %
5-40 SYRINGE (ML) INJECTION PRN
Status: DISCONTINUED | OUTPATIENT
Start: 2025-07-15 | End: 2025-07-23 | Stop reason: HOSPADM

## 2025-07-15 RX ORDER — ACETAMINOPHEN 325 MG/1
650 TABLET ORAL EVERY 6 HOURS PRN
Status: DISCONTINUED | OUTPATIENT
Start: 2025-07-15 | End: 2025-07-23 | Stop reason: HOSPADM

## 2025-07-15 RX ORDER — ACETAMINOPHEN 500 MG
1000 TABLET ORAL
Status: COMPLETED | OUTPATIENT
Start: 2025-07-15 | End: 2025-07-15

## 2025-07-15 RX ORDER — VANCOMYCIN 1.25 G/250ML
25 INJECTION, SOLUTION INTRAVENOUS ONCE
Status: COMPLETED | OUTPATIENT
Start: 2025-07-15 | End: 2025-07-16

## 2025-07-15 RX ORDER — SODIUM CHLORIDE 9 MG/ML
INJECTION, SOLUTION INTRAVENOUS PRN
Status: DISCONTINUED | OUTPATIENT
Start: 2025-07-15 | End: 2025-07-23 | Stop reason: HOSPADM

## 2025-07-15 RX ORDER — VANCOMYCIN 1.5 G/300ML
1500 INJECTION, SOLUTION INTRAVENOUS EVERY 12 HOURS
Status: DISCONTINUED | OUTPATIENT
Start: 2025-07-16 | End: 2025-07-16

## 2025-07-15 RX ORDER — M-VIT,TX,IRON,MINS/CALC/FOLIC 27MG-0.4MG
1 TABLET ORAL DAILY
Status: DISCONTINUED | OUTPATIENT
Start: 2025-07-16 | End: 2025-07-23 | Stop reason: HOSPADM

## 2025-07-15 RX ORDER — POTASSIUM CHLORIDE 7.45 MG/ML
10 INJECTION INTRAVENOUS PRN
Status: DISCONTINUED | OUTPATIENT
Start: 2025-07-15 | End: 2025-07-23 | Stop reason: HOSPADM

## 2025-07-15 RX ORDER — IOPAMIDOL 755 MG/ML
100 INJECTION, SOLUTION INTRAVASCULAR
Status: COMPLETED | OUTPATIENT
Start: 2025-07-15 | End: 2025-07-15

## 2025-07-15 RX ORDER — POTASSIUM CHLORIDE 1500 MG/1
40 TABLET, EXTENDED RELEASE ORAL PRN
Status: DISCONTINUED | OUTPATIENT
Start: 2025-07-15 | End: 2025-07-23 | Stop reason: HOSPADM

## 2025-07-15 RX ORDER — FOLIC ACID 1 MG/1
1 TABLET ORAL DAILY
Status: DISCONTINUED | OUTPATIENT
Start: 2025-07-16 | End: 2025-07-23 | Stop reason: HOSPADM

## 2025-07-15 RX ORDER — POLYETHYLENE GLYCOL 3350 17 G/17G
17 POWDER, FOR SOLUTION ORAL DAILY
Status: DISCONTINUED | OUTPATIENT
Start: 2025-07-16 | End: 2025-07-23 | Stop reason: HOSPADM

## 2025-07-15 RX ADMIN — IOPAMIDOL 100 ML: 755 INJECTION, SOLUTION INTRAVENOUS at 20:53

## 2025-07-15 RX ADMIN — ACETAMINOPHEN 1000 MG: 500 TABLET ORAL at 19:27

## 2025-07-15 RX ADMIN — SODIUM CHLORIDE 2086 ML: 0.9 INJECTION, SOLUTION INTRAVENOUS at 19:27

## 2025-07-15 RX ADMIN — PIPERACILLIN AND TAZOBACTAM 4500 MG: 4; .5 INJECTION, POWDER, LYOPHILIZED, FOR SOLUTION INTRAVENOUS at 21:59

## 2025-07-15 ASSESSMENT — PAIN DESCRIPTION - LOCATION
LOCATION: HEAD
LOCATION: HEAD

## 2025-07-15 ASSESSMENT — PAIN DESCRIPTION - DESCRIPTORS: DESCRIPTORS: THROBBING

## 2025-07-15 ASSESSMENT — PAIN SCALES - GENERAL
PAINLEVEL_OUTOF10: 5
PAINLEVEL_OUTOF10: 2

## 2025-07-15 ASSESSMENT — PAIN DESCRIPTION - FREQUENCY: FREQUENCY: INTERMITTENT

## 2025-07-15 ASSESSMENT — PAIN DESCRIPTION - ORIENTATION: ORIENTATION: ANTERIOR

## 2025-07-15 ASSESSMENT — PAIN - FUNCTIONAL ASSESSMENT
PAIN_FUNCTIONAL_ASSESSMENT: 0-10
PAIN_FUNCTIONAL_ASSESSMENT: PREVENTS OR INTERFERES SOME ACTIVE ACTIVITIES AND ADLS

## 2025-07-15 ASSESSMENT — PAIN DESCRIPTION - ONSET: ONSET: ON-GOING

## 2025-07-15 ASSESSMENT — PAIN DESCRIPTION - PAIN TYPE: TYPE: ACUTE PAIN
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
## 2025-07-16 LAB
ANION GAP SERPL CALC-SCNC: 5 MMOL/L (ref 2–12)
B PERT DNA SPEC QL NAA+PROBE: NOT DETECTED
BASOPHILS # BLD: 0.04 K/UL (ref 0–0.1)
BASOPHILS NFR BLD: 0.5 % (ref 0–1)
BORDETELLA PARAPERTUSSIS BY PCR: NOT DETECTED
BUN SERPL-MCNC: 14 MG/DL (ref 6–20)
BUN/CREAT SERPL: 14 (ref 12–20)
C PNEUM DNA SPEC QL NAA+PROBE: NOT DETECTED
CALCIUM SERPL-MCNC: 8.4 MG/DL (ref 8.5–10.1)
CHLORIDE SERPL-SCNC: 110 MMOL/L (ref 97–108)
CO2 SERPL-SCNC: 23 MMOL/L (ref 21–32)
CREAT SERPL-MCNC: 0.97 MG/DL (ref 0.7–1.3)
DIFFERENTIAL METHOD BLD: ABNORMAL
EOSINOPHIL # BLD: 0.06 K/UL (ref 0–0.4)
EOSINOPHIL NFR BLD: 0.7 % (ref 0–7)
ERYTHROCYTE [DISTWIDTH] IN BLOOD BY AUTOMATED COUNT: 15.3 % (ref 11.5–14.5)
FLUAV SUBTYP SPEC NAA+PROBE: NOT DETECTED
FLUBV RNA SPEC QL NAA+PROBE: NOT DETECTED
GLUCOSE SERPL-MCNC: 86 MG/DL (ref 65–100)
HADV DNA SPEC QL NAA+PROBE: NOT DETECTED
HCOV 229E RNA SPEC QL NAA+PROBE: NOT DETECTED
HCOV HKU1 RNA SPEC QL NAA+PROBE: NOT DETECTED
HCOV NL63 RNA SPEC QL NAA+PROBE: NOT DETECTED
HCOV OC43 RNA SPEC QL NAA+PROBE: NOT DETECTED
HCT VFR BLD AUTO: 34.8 % (ref 36.6–50.3)
HGB BLD-MCNC: 11.5 G/DL (ref 12.1–17)
HMPV RNA SPEC QL NAA+PROBE: NOT DETECTED
HPIV1 RNA SPEC QL NAA+PROBE: NOT DETECTED
HPIV2 RNA SPEC QL NAA+PROBE: NOT DETECTED
HPIV3 RNA SPEC QL NAA+PROBE: NOT DETECTED
HPIV4 RNA SPEC QL NAA+PROBE: NOT DETECTED
IMM GRANULOCYTES # BLD AUTO: 0.05 K/UL (ref 0–0.04)
IMM GRANULOCYTES NFR BLD AUTO: 0.6 % (ref 0–0.5)
LYMPHOCYTES # BLD: 2.14 K/UL (ref 0.8–3.5)
LYMPHOCYTES NFR BLD: 26.6 % (ref 12–49)
M PNEUMO DNA SPEC QL NAA+PROBE: NOT DETECTED
MCH RBC QN AUTO: 30.5 PG (ref 26–34)
MCHC RBC AUTO-ENTMCNC: 33 G/DL (ref 30–36.5)
MCV RBC AUTO: 92.3 FL (ref 80–99)
MONOCYTES # BLD: 0.8 K/UL (ref 0–1)
MONOCYTES NFR BLD: 9.9 % (ref 5–13)
NEUTS SEG # BLD: 4.97 K/UL (ref 1.8–8)
NEUTS SEG NFR BLD: 61.7 % (ref 32–75)
NRBC # BLD: 0 K/UL (ref 0–0.01)
NRBC BLD-RTO: 0 PER 100 WBC
PLATELET # BLD AUTO: 249 K/UL (ref 150–400)
PMV BLD AUTO: 11.3 FL (ref 8.9–12.9)
POTASSIUM SERPL-SCNC: 3.7 MMOL/L (ref 3.5–5.1)
RBC # BLD AUTO: 3.77 M/UL (ref 4.1–5.7)
RSV RNA SPEC QL NAA+PROBE: NOT DETECTED
RV+EV RNA SPEC QL NAA+PROBE: NOT DETECTED
SARS-COV-2 RNA RESP QL NAA+PROBE: NOT DETECTED
SODIUM SERPL-SCNC: 138 MMOL/L (ref 136–145)
WBC # BLD AUTO: 8.1 K/UL (ref 4.1–11.1)

## 2025-07-16 PROCEDURE — 1100000003 HC PRIVATE W/ TELEMETRY

## 2025-07-16 PROCEDURE — 80048 BASIC METABOLIC PNL TOTAL CA: CPT

## 2025-07-16 PROCEDURE — 36415 COLL VENOUS BLD VENIPUNCTURE: CPT

## 2025-07-16 PROCEDURE — 87040 BLOOD CULTURE FOR BACTERIA: CPT

## 2025-07-16 PROCEDURE — 87186 SC STD MICRODIL/AGAR DIL: CPT

## 2025-07-16 PROCEDURE — 6360000002 HC RX W HCPCS: Performed by: STUDENT IN AN ORGANIZED HEALTH CARE EDUCATION/TRAINING PROGRAM

## 2025-07-16 PROCEDURE — 85025 COMPLETE CBC W/AUTO DIFF WBC: CPT

## 2025-07-16 PROCEDURE — 2580000003 HC RX 258: Performed by: STUDENT IN AN ORGANIZED HEALTH CARE EDUCATION/TRAINING PROGRAM

## 2025-07-16 PROCEDURE — 0202U NFCT DS 22 TRGT SARS-COV-2: CPT

## 2025-07-16 PROCEDURE — 2500000003 HC RX 250 WO HCPCS: Performed by: STUDENT IN AN ORGANIZED HEALTH CARE EDUCATION/TRAINING PROGRAM

## 2025-07-16 PROCEDURE — 6370000000 HC RX 637 (ALT 250 FOR IP): Performed by: STUDENT IN AN ORGANIZED HEALTH CARE EDUCATION/TRAINING PROGRAM

## 2025-07-16 PROCEDURE — 87077 CULTURE AEROBIC IDENTIFY: CPT

## 2025-07-16 RX ORDER — VANCOMYCIN 1 G/200ML
1000 INJECTION, SOLUTION INTRAVENOUS EVERY 12 HOURS
Status: DISCONTINUED | OUTPATIENT
Start: 2025-07-16 | End: 2025-07-17

## 2025-07-16 RX ADMIN — PIPERACILLIN AND TAZOBACTAM 3375 MG: 3; .375 INJECTION, POWDER, LYOPHILIZED, FOR SOLUTION INTRAVENOUS at 11:51

## 2025-07-16 RX ADMIN — SODIUM CHLORIDE, PRESERVATIVE FREE 10 ML: 5 INJECTION INTRAVENOUS at 20:11

## 2025-07-16 RX ADMIN — SODIUM CHLORIDE, PRESERVATIVE FREE 10 ML: 5 INJECTION INTRAVENOUS at 10:20

## 2025-07-16 RX ADMIN — VANCOMYCIN 1750 MG: 1.25 INJECTION, SOLUTION INTRAVENOUS at 01:25

## 2025-07-16 RX ADMIN — Medication 1000 UNITS: at 10:20

## 2025-07-16 RX ADMIN — POLYETHYLENE GLYCOL 3350 17 G: 17 POWDER, FOR SOLUTION ORAL at 10:20

## 2025-07-16 RX ADMIN — VANCOMYCIN 1000 MG: 1 INJECTION, SOLUTION INTRAVENOUS at 10:25

## 2025-07-16 RX ADMIN — APIXABAN 5 MG: 5 TABLET, FILM COATED ORAL at 20:35

## 2025-07-16 RX ADMIN — APIXABAN 5 MG: 5 TABLET, FILM COATED ORAL at 10:19

## 2025-07-16 RX ADMIN — PIPERACILLIN AND TAZOBACTAM 3375 MG: 3; .375 INJECTION, POWDER, LYOPHILIZED, FOR SOLUTION INTRAVENOUS at 20:11

## 2025-07-16 RX ADMIN — SODIUM CHLORIDE: 0.9 INJECTION, SOLUTION INTRAVENOUS at 01:23

## 2025-07-16 RX ADMIN — Medication 1 TABLET: at 10:19

## 2025-07-16 RX ADMIN — PIPERACILLIN AND TAZOBACTAM 3375 MG: 3; .375 INJECTION, POWDER, LYOPHILIZED, FOR SOLUTION INTRAVENOUS at 04:23

## 2025-07-16 RX ADMIN — APIXABAN 5 MG: 5 TABLET, FILM COATED ORAL at 01:26

## 2025-07-16 RX ADMIN — Medication 100 MG: at 10:20

## 2025-07-16 RX ADMIN — FOLIC ACID 1 MG: 1 TABLET ORAL at 10:19

## 2025-07-16 ASSESSMENT — PAIN SCALES - GENERAL
PAINLEVEL_OUTOF10: 0

## 2025-07-17 ENCOUNTER — APPOINTMENT (OUTPATIENT)
Facility: HOSPITAL | Age: 45
DRG: 314 | End: 2025-07-17
Payer: MEDICARE

## 2025-07-17 LAB
ACB COMPLEX DNA BLD POS QL NAA+NON-PROBE: NOT DETECTED
ACCESSION NUMBER, LLC1M: ABNORMAL
ALBUMIN SERPL-MCNC: 2.9 G/DL (ref 3.5–5)
ALBUMIN/GLOB SERPL: 0.7 (ref 1.1–2.2)
ALP SERPL-CCNC: 94 U/L (ref 45–117)
ALT SERPL-CCNC: 34 U/L (ref 12–78)
ANION GAP SERPL CALC-SCNC: 5 MMOL/L (ref 2–12)
AST SERPL-CCNC: 17 U/L (ref 15–37)
B FRAGILIS DNA BLD POS QL NAA+NON-PROBE: NOT DETECTED
BASOPHILS # BLD: 0.02 K/UL (ref 0–0.1)
BASOPHILS NFR BLD: 0.3 % (ref 0–1)
BILIRUB SERPL-MCNC: 1.2 MG/DL (ref 0.2–1)
BIOFIRE TEST COMMENT: ABNORMAL
BUN SERPL-MCNC: 10 MG/DL (ref 6–20)
BUN/CREAT SERPL: 11 (ref 12–20)
C ALBICANS DNA BLD POS QL NAA+NON-PROBE: NOT DETECTED
C AURIS DNA BLD POS QL NAA+NON-PROBE: NOT DETECTED
C GATTII+NEOFOR DNA BLD POS QL NAA+N-PRB: NOT DETECTED
C GLABRATA DNA BLD POS QL NAA+NON-PROBE: NOT DETECTED
C KRUSEI DNA BLD POS QL NAA+NON-PROBE: NOT DETECTED
C PARAP DNA BLD POS QL NAA+NON-PROBE: NOT DETECTED
C TROPICLS DNA BLD POS QL NAA+NON-PROBE: NOT DETECTED
CALCIUM SERPL-MCNC: 8.8 MG/DL (ref 8.5–10.1)
CHLORIDE SERPL-SCNC: 109 MMOL/L (ref 97–108)
CO2 SERPL-SCNC: 24 MMOL/L (ref 21–32)
CREAT SERPL-MCNC: 0.88 MG/DL (ref 0.7–1.3)
DIFFERENTIAL METHOD BLD: ABNORMAL
E CLOAC COMP DNA BLD POS NAA+NON-PROBE: NOT DETECTED
E COLI DNA BLD POS QL NAA+NON-PROBE: NOT DETECTED
E FAECALIS DNA BLD POS QL NAA+NON-PROBE: NOT DETECTED
E FAECIUM DNA BLD POS QL NAA+NON-PROBE: NOT DETECTED
EKG ATRIAL RATE: 108 BPM
EKG DIAGNOSIS: NORMAL
EKG P AXIS: 95 DEGREES
EKG P-R INTERVAL: 136 MS
EKG Q-T INTERVAL: 318 MS
EKG QRS DURATION: 66 MS
EKG QTC CALCULATION (BAZETT): 426 MS
EKG R AXIS: 87 DEGREES
EKG T AXIS: 84 DEGREES
EKG VENTRICULAR RATE: 108 BPM
ENTEROBACTERALES DNA BLD POS NAA+N-PRB: NOT DETECTED
EOSINOPHIL # BLD: 0.1 K/UL (ref 0–0.4)
EOSINOPHIL NFR BLD: 1.6 % (ref 0–7)
ERYTHROCYTE [DISTWIDTH] IN BLOOD BY AUTOMATED COUNT: 15.2 % (ref 11.5–14.5)
GLOBULIN SER CALC-MCNC: 4.3 G/DL (ref 2–4)
GLUCOSE SERPL-MCNC: 83 MG/DL (ref 65–100)
GP B STREP DNA BLD POS QL NAA+NON-PROBE: NOT DETECTED
HAEM INFLU DNA BLD POS QL NAA+NON-PROBE: NOT DETECTED
HCT VFR BLD AUTO: 36.6 % (ref 36.6–50.3)
HGB BLD-MCNC: 12.2 G/DL (ref 12.1–17)
IMM GRANULOCYTES # BLD AUTO: 0.04 K/UL (ref 0–0.04)
IMM GRANULOCYTES NFR BLD AUTO: 0.6 % (ref 0–0.5)
K OXYTOCA DNA BLD POS QL NAA+NON-PROBE: NOT DETECTED
KLEBSIELLA SP DNA BLD POS QL NAA+NON-PRB: NOT DETECTED
KLEBSIELLA SP DNA BLD POS QL NAA+NON-PRB: NOT DETECTED
L MONOCYTOG DNA BLD POS QL NAA+NON-PROBE: NOT DETECTED
LYMPHOCYTES # BLD: 1.82 K/UL (ref 0.8–3.5)
LYMPHOCYTES NFR BLD: 29.3 % (ref 12–49)
MCH RBC QN AUTO: 30.5 PG (ref 26–34)
MCHC RBC AUTO-ENTMCNC: 33.3 G/DL (ref 30–36.5)
MCV RBC AUTO: 91.5 FL (ref 80–99)
MECA+MECC ISLT/SPM QL: DETECTED
MONOCYTES # BLD: 0.53 K/UL (ref 0–1)
MONOCYTES NFR BLD: 8.5 % (ref 5–13)
N MEN DNA BLD POS QL NAA+NON-PROBE: NOT DETECTED
NEUTS SEG # BLD: 3.71 K/UL (ref 1.8–8)
NEUTS SEG NFR BLD: 59.7 % (ref 32–75)
NRBC # BLD: 0 K/UL (ref 0–0.01)
NRBC BLD-RTO: 0 PER 100 WBC
P AERUGINOSA DNA BLD POS NAA+NON-PROBE: NOT DETECTED
PLATELET # BLD AUTO: 295 K/UL (ref 150–400)
PMV BLD AUTO: 11.3 FL (ref 8.9–12.9)
POTASSIUM SERPL-SCNC: 4.1 MMOL/L (ref 3.5–5.1)
PROT SERPL-MCNC: 7.2 G/DL (ref 6.4–8.2)
PROTEUS SP DNA BLD POS QL NAA+NON-PROBE: NOT DETECTED
RBC # BLD AUTO: 4 M/UL (ref 4.1–5.7)
RESISTANT GENE TARGETS: ABNORMAL
S AUREUS DNA BLD POS QL NAA+NON-PROBE: NOT DETECTED
S AUREUS+CONS DNA BLD POS NAA+NON-PROBE: DETECTED
S EPIDERMIDIS DNA BLD POS QL NAA+NON-PRB: DETECTED
S LUGDUNENSIS DNA BLD POS QL NAA+NON-PRB: NOT DETECTED
S MALTOPHILIA DNA BLD POS QL NAA+NON-PRB: NOT DETECTED
S MARCESCENS DNA BLD POS NAA+NON-PROBE: NOT DETECTED
S PNEUM DNA BLD POS QL NAA+NON-PROBE: NOT DETECTED
S PYO DNA BLD POS QL NAA+NON-PROBE: NOT DETECTED
SALMONELLA DNA BLD POS QL NAA+NON-PROBE: NOT DETECTED
SODIUM SERPL-SCNC: 138 MMOL/L (ref 136–145)
STREPTOCOCCUS DNA BLD POS NAA+NON-PROBE: NOT DETECTED
WBC # BLD AUTO: 6.2 K/UL (ref 4.1–11.1)

## 2025-07-17 PROCEDURE — 87077 CULTURE AEROBIC IDENTIFY: CPT

## 2025-07-17 PROCEDURE — 87205 SMEAR GRAM STAIN: CPT

## 2025-07-17 PROCEDURE — 36589 REMOVAL TUNNELED CV CATH: CPT

## 2025-07-17 PROCEDURE — 80053 COMPREHEN METABOLIC PANEL: CPT

## 2025-07-17 PROCEDURE — 87070 CULTURE OTHR SPECIMN AEROBIC: CPT

## 2025-07-17 PROCEDURE — 0JPTXXZ REMOVAL OF TUNNELED VASCULAR ACCESS DEVICE FROM TRUNK SUBCUTANEOUS TISSUE AND FASCIA, EXTERNAL APPROACH: ICD-10-PCS | Performed by: PHYSICIAN ASSISTANT

## 2025-07-17 PROCEDURE — 99223 1ST HOSP IP/OBS HIGH 75: CPT | Performed by: INTERNAL MEDICINE

## 2025-07-17 PROCEDURE — 85025 COMPLETE CBC W/AUTO DIFF WBC: CPT

## 2025-07-17 PROCEDURE — 2580000003 HC RX 258: Performed by: STUDENT IN AN ORGANIZED HEALTH CARE EDUCATION/TRAINING PROGRAM

## 2025-07-17 PROCEDURE — 6360000002 HC RX W HCPCS: Performed by: STUDENT IN AN ORGANIZED HEALTH CARE EDUCATION/TRAINING PROGRAM

## 2025-07-17 PROCEDURE — 6360000002 HC RX W HCPCS: Performed by: RADIOLOGY

## 2025-07-17 PROCEDURE — 1100000003 HC PRIVATE W/ TELEMETRY

## 2025-07-17 PROCEDURE — 05PYX3Z REMOVAL OF INFUSION DEVICE FROM UPPER VEIN, EXTERNAL APPROACH: ICD-10-PCS | Performed by: PHYSICIAN ASSISTANT

## 2025-07-17 PROCEDURE — 6370000000 HC RX 637 (ALT 250 FOR IP): Performed by: STUDENT IN AN ORGANIZED HEALTH CARE EDUCATION/TRAINING PROGRAM

## 2025-07-17 PROCEDURE — 87186 SC STD MICRODIL/AGAR DIL: CPT

## 2025-07-17 PROCEDURE — 2500000003 HC RX 250 WO HCPCS: Performed by: STUDENT IN AN ORGANIZED HEALTH CARE EDUCATION/TRAINING PROGRAM

## 2025-07-17 PROCEDURE — 6360000002 HC RX W HCPCS: Performed by: PHYSICIAN ASSISTANT

## 2025-07-17 PROCEDURE — 36415 COLL VENOUS BLD VENIPUNCTURE: CPT

## 2025-07-17 RX ORDER — LIDOCAINE HYDROCHLORIDE 20 MG/ML
INJECTION, SOLUTION INFILTRATION; PERINEURAL PRN
Status: COMPLETED | OUTPATIENT
Start: 2025-07-17 | End: 2025-07-17

## 2025-07-17 RX ORDER — VANCOMYCIN 1 G/200ML
1000 INJECTION, SOLUTION INTRAVENOUS EVERY 12 HOURS
Status: DISCONTINUED | OUTPATIENT
Start: 2025-07-17 | End: 2025-07-17

## 2025-07-17 RX ADMIN — Medication 1000 UNITS: at 09:00

## 2025-07-17 RX ADMIN — ACETAMINOPHEN 650 MG: 325 TABLET ORAL at 12:35

## 2025-07-17 RX ADMIN — LIDOCAINE HYDROCHLORIDE 10 ML: 20 INJECTION, SOLUTION INFILTRATION; PERINEURAL at 14:11

## 2025-07-17 RX ADMIN — APIXABAN 5 MG: 5 TABLET, FILM COATED ORAL at 20:44

## 2025-07-17 RX ADMIN — PIPERACILLIN AND TAZOBACTAM 3375 MG: 3; .375 INJECTION, POWDER, LYOPHILIZED, FOR SOLUTION INTRAVENOUS at 12:31

## 2025-07-17 RX ADMIN — POLYETHYLENE GLYCOL 3350 17 G: 17 POWDER, FOR SOLUTION ORAL at 09:01

## 2025-07-17 RX ADMIN — Medication 1 TABLET: at 09:00

## 2025-07-17 RX ADMIN — FOLIC ACID 1 MG: 1 TABLET ORAL at 09:01

## 2025-07-17 RX ADMIN — APIXABAN 5 MG: 5 TABLET, FILM COATED ORAL at 09:00

## 2025-07-17 RX ADMIN — VANCOMYCIN 1000 MG: 1 INJECTION, SOLUTION INTRAVENOUS at 16:40

## 2025-07-17 RX ADMIN — SODIUM CHLORIDE, PRESERVATIVE FREE 10 ML: 5 INJECTION INTRAVENOUS at 09:01

## 2025-07-17 RX ADMIN — PIPERACILLIN AND TAZOBACTAM 3375 MG: 3; .375 INJECTION, POWDER, LYOPHILIZED, FOR SOLUTION INTRAVENOUS at 04:15

## 2025-07-17 RX ADMIN — VANCOMYCIN 1000 MG: 1 INJECTION, SOLUTION INTRAVENOUS at 00:16

## 2025-07-17 RX ADMIN — Medication 100 MG: at 09:00

## 2025-07-17 RX ADMIN — PIPERACILLIN AND TAZOBACTAM 3375 MG: 3; .375 INJECTION, POWDER, LYOPHILIZED, FOR SOLUTION INTRAVENOUS at 20:10

## 2025-07-17 RX ADMIN — SODIUM CHLORIDE, PRESERVATIVE FREE 10 ML: 5 INJECTION INTRAVENOUS at 20:11

## 2025-07-17 ASSESSMENT — PAIN SCALES - GENERAL
PAINLEVEL_OUTOF10: 0
PAINLEVEL_OUTOF10: 6

## 2025-07-17 NOTE — CONSULTS
Infectious Disease Consult    Date of Consultation:  July 17, 2025  Reason for Consultation: GNR bacteremia  Referring Physician: Kathya XIONG  Date of Admission:7/15/2025      Impression    Gram-negative sepsis  Fever Tmax 102.4    Catheter related bloodstream infection  Gram-negative mansi, CoNS bacteremia  Blood culture 7/15+ for GNR 1/4,   CoNS 1/4-LFA/ RFA  (MRSE on the BCID panel)  ?  CoNS  is a likely contaminant  Repeat cultures(line ) 7/16+ for GNR 4/4  S/p removal of line today    Lingular infiltrate on CT  CXR for nodular opacity LLL lung field  Patient denies cough, SOB  For repeat CXR    Short gut syndrome  Frozen abdomen  Protein calorie malnutrition  TPN dependent  For increase in p.o. intake    Pulmonary embolism  On Eliquis      Admission 2/2025  Treated for acute cholecystitis  S/pCholecystostomy tube placement  Fluid culture +for  heavy K.oxytoca, Kluuvera intermedia, few E.faecium   Patient DC on Zosyn IV x 2 weeks end date 2/28/2025  Subsequent cholecystectomy at [facility].    Plan    Continue Zosyn iv  DC Vancomycin  Await final ID/ ABEBA on GNR  Repeat BC  CXR  Adequate fluids, daily probiotic  ID service to follow.    Abx  Zosyn-7/15  Vancomycin 7/15    Temp  7/15 -102.4    Extensive review of chart notes, labs, imaging, cultures done  Additionally review of done: Recent reports-Labs, cultures, imaging  D/w -hospitalist, RN  Obey Arana s seen for GNR bacteremia  Obey Arana is a 45 y.o. male with a PMHX of anemia, CAD, short gut syndrome, malnourishment requiring TPN, and pulmonary emboli , s/p admission in February 2025 and treated for acute cholecystitis , s/p cholecystostomy tube placement , subsequent cholecystectomy at [facility].  Patient is known to ID service.  Patient presented to the emergency department with recurrent chills and fever. He reported experiencing chills for approximately one week, which have been severe.  The patient described episodes where he felt extremely

## 2025-07-17 NOTE — CARE COORDINATION
Care Management Initial Assessment       RUR: 9% Low Risk  Readmission? No  1st IM letter given? Yes   1st  letter given: No        Initial Assessment: Chart reviewed. CM met with pt at the bedside to introduce self and role. Verified contact information and demographics. Pt resides with family in a one level home with 13 [address]. [place] [place] is [facility] on [address] [facility] [address] and [address]. Pt has no hx needing HH/IPR/SNF services. Pt states he is independent with ADL's and is an active . ?Pts has no transportation and will need round trip for discharge.?He?states there are no concerns for her to return home. CM will continue to follow.    Plan A: Home with follow up appts.  Plan B: Home with HH  Full assessment below:           07/17/25 1602   Service Assessment   Patient Orientation Alert and Oriented;Person;Place;Situation;Self   Cognition Alert   History Provided By Patient   Primary Caregiver Self   Support Systems Family Members   Patient's Healthcare Decision Maker is: Legal Next of Kin   PCP Verified by CM Yes   Last Visit to PCP Within last 3 months   Prior Functional Level Independent in ADLs/IADLs   Current Functional Level Independent in ADLs/IADLs   Can patient return to prior living arrangement Yes   Ability to make needs known: Good   Family able to assist with home care needs: Yes   Would you like for me to discuss the discharge plan with any other family members/significant others, and if so, who? No   Financial Resources Medicare   Social/Functional History   Lives With Family   Type of Home House   Home Layout One level   Home Equipment None   Active  Yes   Discharge Planning   Type of Residence House   Current Services Prior To Admission None   Patient expects to be discharged to: House         Advance Care Planning     General Advance Care Planning (ACP) Conversation    Date of Conversation: 7/17/2025  Conducted with: Patient with Decision Making  Breath Sounds equal & clear to percussion & auscultation, no accessory muscle use

## 2025-07-17 NOTE — PLAN OF CARE
Problem: Discharge Planning  Goal: Discharge to home or other facility with appropriate resources  Outcome: Progressing  Flowsheets (Taken 7/16/2025 1329 by Simran Simms, RN)  Discharge to home or other facility with appropriate resources: Identify barriers to discharge with patient and caregiver     Problem: Pain  Goal: Verbalizes/displays adequate comfort level or baseline comfort level  Outcome: Progressing     Problem: Safety - Adult  Goal: Free from fall injury  Outcome: Progressing     Problem: ABCDS Injury Assessment  Goal: Absence of physical injury  Outcome: Progressing

## 2025-07-18 ENCOUNTER — APPOINTMENT (OUTPATIENT)
Facility: HOSPITAL | Age: 45
DRG: 314 | End: 2025-07-18
Payer: MEDICARE

## 2025-07-18 PROBLEM — R78.81 GRAM-POSITIVE COCCI BACTEREMIA: Status: ACTIVE | Noted: 2025-07-18

## 2025-07-18 PROBLEM — Z90.49 S/P CHOLECYSTECTOMY: Status: ACTIVE | Noted: 2025-07-18

## 2025-07-18 PROBLEM — R78.81 GRAM-NEGATIVE BACTEREMIA: Status: ACTIVE | Noted: 2025-07-18

## 2025-07-18 PROBLEM — A49.8 METHICILLIN RESISTANT STAPHYLOCOCCUS EPIDERMIDIS INFECTION: Status: ACTIVE | Noted: 2025-07-18

## 2025-07-18 PROBLEM — R91.8 LUNG INFILTRATE ON CT: Status: ACTIVE | Noted: 2025-07-18

## 2025-07-18 PROBLEM — T80.211A CATHETER-RELATED BLOODSTREAM INFECTION: Status: ACTIVE | Noted: 2025-07-18

## 2025-07-18 PROBLEM — Z16.29 METHICILLIN RESISTANT STAPHYLOCOCCUS EPIDERMIDIS INFECTION: Status: ACTIVE | Noted: 2025-07-18

## 2025-07-18 PROBLEM — I26.99 PULMONARY EMBOLISM (HCC): Status: ACTIVE | Noted: 2025-07-18

## 2025-07-18 PROBLEM — A49.8 INFECTION CAUSED BY ENTEROBACTER CLOACAE: Status: ACTIVE | Noted: 2025-07-18

## 2025-07-18 PROBLEM — A41.50 GRAM NEGATIVE SEPSIS (HCC): Status: ACTIVE | Noted: 2025-07-18

## 2025-07-18 PROBLEM — K90.829 SHORT BOWEL SYNDROME: Status: ACTIVE | Noted: 2025-07-18

## 2025-07-18 LAB
ALBUMIN SERPL-MCNC: 3 G/DL (ref 3.5–5)
ALBUMIN/GLOB SERPL: 0.6 (ref 1.1–2.2)
ALP SERPL-CCNC: 90 U/L (ref 45–117)
ALT SERPL-CCNC: 34 U/L (ref 12–78)
ANION GAP SERPL CALC-SCNC: 6 MMOL/L (ref 2–12)
AST SERPL-CCNC: 19 U/L (ref 15–37)
BACTERIA SPEC CULT: ABNORMAL
BACTERIA SPEC CULT: ABNORMAL
BASOPHILS # BLD: 0.1 K/UL (ref 0–0.1)
BASOPHILS NFR BLD: 2 % (ref 0–1)
BILIRUB SERPL-MCNC: 0.7 MG/DL (ref 0.2–1)
BUN SERPL-MCNC: 12 MG/DL (ref 6–20)
BUN/CREAT SERPL: 13 (ref 12–20)
CALCIUM SERPL-MCNC: 9.2 MG/DL (ref 8.5–10.1)
CHLORIDE SERPL-SCNC: 107 MMOL/L (ref 97–108)
CO2 SERPL-SCNC: 26 MMOL/L (ref 21–32)
CREAT SERPL-MCNC: 0.95 MG/DL (ref 0.7–1.3)
DIFFERENTIAL METHOD BLD: ABNORMAL
EOSINOPHIL # BLD: 0.1 K/UL (ref 0–0.4)
EOSINOPHIL NFR BLD: 2 % (ref 0–7)
ERYTHROCYTE [DISTWIDTH] IN BLOOD BY AUTOMATED COUNT: 14.8 % (ref 11.5–14.5)
GLOBULIN SER CALC-MCNC: 4.8 G/DL (ref 2–4)
GLUCOSE SERPL-MCNC: 80 MG/DL (ref 65–100)
HCT VFR BLD AUTO: 38.5 % (ref 36.6–50.3)
HGB BLD-MCNC: 12.7 G/DL (ref 12.1–17)
IMM GRANULOCYTES # BLD AUTO: 0 K/UL (ref 0–0.04)
IMM GRANULOCYTES NFR BLD AUTO: 0 % (ref 0–0.5)
LYMPHOCYTES # BLD: 1.92 K/UL (ref 0.8–3.5)
LYMPHOCYTES NFR BLD: 37 % (ref 12–49)
MCH RBC QN AUTO: 30.4 PG (ref 26–34)
MCHC RBC AUTO-ENTMCNC: 33 G/DL (ref 30–36.5)
MCV RBC AUTO: 92.1 FL (ref 80–99)
MONOCYTES # BLD: 0.52 K/UL (ref 0–1)
MONOCYTES NFR BLD: 10 % (ref 5–13)
MYELOCYTES NFR BLD MANUAL: 1 %
NEUTS SEG # BLD: 2.5 K/UL (ref 1.8–8)
NEUTS SEG NFR BLD: 48 % (ref 32–75)
NRBC # BLD: 0 K/UL (ref 0–0.01)
NRBC BLD-RTO: 0 PER 100 WBC
PLATELET # BLD AUTO: 336 K/UL (ref 150–400)
PMV BLD AUTO: 10.8 FL (ref 8.9–12.9)
POTASSIUM SERPL-SCNC: 3.9 MMOL/L (ref 3.5–5.1)
PROT SERPL-MCNC: 7.8 G/DL (ref 6.4–8.2)
RBC # BLD AUTO: 4.18 M/UL (ref 4.1–5.7)
RBC MORPH BLD: ABNORMAL
SERVICE CMNT-IMP: ABNORMAL
SODIUM SERPL-SCNC: 139 MMOL/L (ref 136–145)
VANCOMYCIN SERPL-MCNC: 3.7 UG/ML
WBC # BLD AUTO: 5.2 K/UL (ref 4.1–11.1)

## 2025-07-18 PROCEDURE — 85025 COMPLETE CBC W/AUTO DIFF WBC: CPT

## 2025-07-18 PROCEDURE — 80053 COMPREHEN METABOLIC PANEL: CPT

## 2025-07-18 PROCEDURE — 71045 X-RAY EXAM CHEST 1 VIEW: CPT

## 2025-07-18 PROCEDURE — 99233 SBSQ HOSP IP/OBS HIGH 50: CPT | Performed by: INTERNAL MEDICINE

## 2025-07-18 PROCEDURE — 6360000002 HC RX W HCPCS: Performed by: PHYSICIAN ASSISTANT

## 2025-07-18 PROCEDURE — 2580000003 HC RX 258: Performed by: INTERNAL MEDICINE

## 2025-07-18 PROCEDURE — 6360000002 HC RX W HCPCS: Performed by: STUDENT IN AN ORGANIZED HEALTH CARE EDUCATION/TRAINING PROGRAM

## 2025-07-18 PROCEDURE — 2580000003 HC RX 258: Performed by: STUDENT IN AN ORGANIZED HEALTH CARE EDUCATION/TRAINING PROGRAM

## 2025-07-18 PROCEDURE — 36415 COLL VENOUS BLD VENIPUNCTURE: CPT

## 2025-07-18 PROCEDURE — 2500000003 HC RX 250 WO HCPCS: Performed by: STUDENT IN AN ORGANIZED HEALTH CARE EDUCATION/TRAINING PROGRAM

## 2025-07-18 PROCEDURE — 1100000003 HC PRIVATE W/ TELEMETRY

## 2025-07-18 PROCEDURE — 80202 ASSAY OF VANCOMYCIN: CPT

## 2025-07-18 PROCEDURE — 6370000000 HC RX 637 (ALT 250 FOR IP): Performed by: STUDENT IN AN ORGANIZED HEALTH CARE EDUCATION/TRAINING PROGRAM

## 2025-07-18 PROCEDURE — 87040 BLOOD CULTURE FOR BACTERIA: CPT

## 2025-07-18 PROCEDURE — 2500000003 HC RX 250 WO HCPCS: Performed by: PHYSICIAN ASSISTANT

## 2025-07-18 PROCEDURE — 2580000003 HC RX 258: Performed by: PHYSICIAN ASSISTANT

## 2025-07-18 PROCEDURE — 6360000002 HC RX W HCPCS: Performed by: INTERNAL MEDICINE

## 2025-07-18 RX ADMIN — CEFEPIME 2000 MG: 2 INJECTION, POWDER, FOR SOLUTION INTRAVENOUS at 23:00

## 2025-07-18 RX ADMIN — Medication 1000 UNITS: at 08:23

## 2025-07-18 RX ADMIN — PIPERACILLIN AND TAZOBACTAM 3375 MG: 3; .375 INJECTION, POWDER, LYOPHILIZED, FOR SOLUTION INTRAVENOUS at 04:08

## 2025-07-18 RX ADMIN — PIPERACILLIN AND TAZOBACTAM 3375 MG: 3; .375 INJECTION, POWDER, LYOPHILIZED, FOR SOLUTION INTRAVENOUS at 12:45

## 2025-07-18 RX ADMIN — FOLIC ACID 1 MG: 1 TABLET ORAL at 08:23

## 2025-07-18 RX ADMIN — APIXABAN 5 MG: 5 TABLET, FILM COATED ORAL at 21:43

## 2025-07-18 RX ADMIN — APIXABAN 5 MG: 5 TABLET, FILM COATED ORAL at 08:23

## 2025-07-18 RX ADMIN — Medication 100 MG: at 08:23

## 2025-07-18 RX ADMIN — SODIUM CHLORIDE, PRESERVATIVE FREE 10 ML: 5 INJECTION INTRAVENOUS at 08:26

## 2025-07-18 RX ADMIN — SODIUM CHLORIDE, PRESERVATIVE FREE 10 ML: 5 INJECTION INTRAVENOUS at 21:43

## 2025-07-18 RX ADMIN — SODIUM CHLORIDE: 0.9 INJECTION, SOLUTION INTRAVENOUS at 23:31

## 2025-07-18 RX ADMIN — VANCOMYCIN HYDROCHLORIDE 1250 MG: 1.25 INJECTION, POWDER, LYOPHILIZED, FOR SOLUTION INTRAVENOUS at 21:44

## 2025-07-18 RX ADMIN — WATER 2000 MG: 1 INJECTION INTRAMUSCULAR; INTRAVENOUS; SUBCUTANEOUS at 14:50

## 2025-07-18 RX ADMIN — Medication 1 TABLET: at 08:23

## 2025-07-18 RX ADMIN — POLYETHYLENE GLYCOL 3350 17 G: 17 POWDER, FOR SOLUTION ORAL at 08:23

## 2025-07-18 ASSESSMENT — PAIN SCALES - GENERAL: PAINLEVEL_OUTOF10: 0

## 2025-07-18 NOTE — PLAN OF CARE
Problem: Discharge Planning  Goal: Discharge to home or other facility with appropriate resources  Outcome: Progressing     Problem: Pain  Goal: Verbalizes/displays adequate comfort level or baseline comfort level  7/17/2025 2104 by Lien Dee  Outcome: Progressing  7/17/2025 0940 by Vanesa Yun LPN  Outcome: Progressing     Problem: Safety - Adult  Goal: Free from fall injury  7/17/2025 2104 by Lien Dee  Outcome: Progressing  7/17/2025 0940 by Vanesa Yun LPN  Outcome: Progressing     Problem: ABCDS Injury Assessment  Goal: Absence of physical injury  Outcome: Progressing     Problem: Nutrition Deficit:  Goal: Optimize nutritional status  Outcome: Progressing

## 2025-07-19 LAB
ALBUMIN SERPL-MCNC: 3 G/DL (ref 3.5–5)
ALBUMIN/GLOB SERPL: 0.7 (ref 1.1–2.2)
ALP SERPL-CCNC: 85 U/L (ref 45–117)
ALT SERPL-CCNC: 31 U/L (ref 12–78)
ANION GAP SERPL CALC-SCNC: 7 MMOL/L (ref 2–12)
AST SERPL-CCNC: 23 U/L (ref 15–37)
BACTERIA SPEC CULT: ABNORMAL
BASOPHILS # BLD: 0.04 K/UL (ref 0–0.1)
BASOPHILS NFR BLD: 0.9 % (ref 0–1)
BILIRUB SERPL-MCNC: 0.6 MG/DL (ref 0.2–1)
BUN SERPL-MCNC: 13 MG/DL (ref 6–20)
BUN/CREAT SERPL: 14 (ref 12–20)
CALCIUM SERPL-MCNC: 9.1 MG/DL (ref 8.5–10.1)
CHLORIDE SERPL-SCNC: 106 MMOL/L (ref 97–108)
CO2 SERPL-SCNC: 25 MMOL/L (ref 21–32)
CREAT SERPL-MCNC: 0.93 MG/DL (ref 0.7–1.3)
DIFFERENTIAL METHOD BLD: ABNORMAL
EOSINOPHIL # BLD: 0.1 K/UL (ref 0–0.4)
EOSINOPHIL NFR BLD: 2.1 % (ref 0–7)
ERYTHROCYTE [DISTWIDTH] IN BLOOD BY AUTOMATED COUNT: 15 % (ref 11.5–14.5)
GLOBULIN SER CALC-MCNC: 4.6 G/DL (ref 2–4)
GLUCOSE SERPL-MCNC: 80 MG/DL (ref 65–100)
HCT VFR BLD AUTO: 36 % (ref 36.6–50.3)
HGB BLD-MCNC: 11.7 G/DL (ref 12.1–17)
IMM GRANULOCYTES # BLD AUTO: 0.03 K/UL (ref 0–0.04)
IMM GRANULOCYTES NFR BLD AUTO: 0.6 % (ref 0–0.5)
LYMPHOCYTES # BLD: 1.76 K/UL (ref 0.8–3.5)
LYMPHOCYTES NFR BLD: 37.8 % (ref 12–49)
MCH RBC QN AUTO: 30.2 PG (ref 26–34)
MCHC RBC AUTO-ENTMCNC: 32.5 G/DL (ref 30–36.5)
MCV RBC AUTO: 92.8 FL (ref 80–99)
MONOCYTES # BLD: 0.46 K/UL (ref 0–1)
MONOCYTES NFR BLD: 9.9 % (ref 5–13)
NEUTS SEG # BLD: 2.27 K/UL (ref 1.8–8)
NEUTS SEG NFR BLD: 48.7 % (ref 32–75)
NRBC # BLD: 0 K/UL (ref 0–0.01)
NRBC BLD-RTO: 0 PER 100 WBC
PLATELET # BLD AUTO: 333 K/UL (ref 150–400)
PMV BLD AUTO: 10.4 FL (ref 8.9–12.9)
POTASSIUM SERPL-SCNC: 4 MMOL/L (ref 3.5–5.1)
PROT SERPL-MCNC: 7.6 G/DL (ref 6.4–8.2)
RBC # BLD AUTO: 3.88 M/UL (ref 4.1–5.7)
SERVICE CMNT-IMP: ABNORMAL
SERVICE CMNT-IMP: ABNORMAL
SODIUM SERPL-SCNC: 138 MMOL/L (ref 136–145)
WBC # BLD AUTO: 4.7 K/UL (ref 4.1–11.1)

## 2025-07-19 PROCEDURE — 85025 COMPLETE CBC W/AUTO DIFF WBC: CPT

## 2025-07-19 PROCEDURE — 6360000002 HC RX W HCPCS: Performed by: PHYSICIAN ASSISTANT

## 2025-07-19 PROCEDURE — 2580000003 HC RX 258: Performed by: INTERNAL MEDICINE

## 2025-07-19 PROCEDURE — 6360000002 HC RX W HCPCS: Performed by: INTERNAL MEDICINE

## 2025-07-19 PROCEDURE — 36415 COLL VENOUS BLD VENIPUNCTURE: CPT

## 2025-07-19 PROCEDURE — 1100000003 HC PRIVATE W/ TELEMETRY

## 2025-07-19 PROCEDURE — 2500000003 HC RX 250 WO HCPCS: Performed by: STUDENT IN AN ORGANIZED HEALTH CARE EDUCATION/TRAINING PROGRAM

## 2025-07-19 PROCEDURE — 6370000000 HC RX 637 (ALT 250 FOR IP): Performed by: STUDENT IN AN ORGANIZED HEALTH CARE EDUCATION/TRAINING PROGRAM

## 2025-07-19 PROCEDURE — 80053 COMPREHEN METABOLIC PANEL: CPT

## 2025-07-19 PROCEDURE — 2580000003 HC RX 258: Performed by: PHYSICIAN ASSISTANT

## 2025-07-19 RX ADMIN — SODIUM CHLORIDE, PRESERVATIVE FREE 10 ML: 5 INJECTION INTRAVENOUS at 22:39

## 2025-07-19 RX ADMIN — Medication 1 TABLET: at 09:24

## 2025-07-19 RX ADMIN — APIXABAN 5 MG: 5 TABLET, FILM COATED ORAL at 09:24

## 2025-07-19 RX ADMIN — VANCOMYCIN HYDROCHLORIDE 1250 MG: 1.25 INJECTION, POWDER, LYOPHILIZED, FOR SOLUTION INTRAVENOUS at 09:23

## 2025-07-19 RX ADMIN — Medication 100 MG: at 09:24

## 2025-07-19 RX ADMIN — VANCOMYCIN HYDROCHLORIDE 1250 MG: 1.25 INJECTION, POWDER, LYOPHILIZED, FOR SOLUTION INTRAVENOUS at 22:34

## 2025-07-19 RX ADMIN — CEFEPIME 2000 MG: 2 INJECTION, POWDER, FOR SOLUTION INTRAVENOUS at 13:27

## 2025-07-19 RX ADMIN — APIXABAN 5 MG: 5 TABLET, FILM COATED ORAL at 22:29

## 2025-07-19 RX ADMIN — POLYETHYLENE GLYCOL 3350 17 G: 17 POWDER, FOR SOLUTION ORAL at 09:24

## 2025-07-19 RX ADMIN — Medication 1000 UNITS: at 09:24

## 2025-07-19 RX ADMIN — CEFEPIME 2000 MG: 2 INJECTION, POWDER, FOR SOLUTION INTRAVENOUS at 06:56

## 2025-07-19 RX ADMIN — SODIUM CHLORIDE, PRESERVATIVE FREE 10 ML: 5 INJECTION INTRAVENOUS at 09:24

## 2025-07-19 RX ADMIN — FOLIC ACID 1 MG: 1 TABLET ORAL at 09:24

## 2025-07-19 ASSESSMENT — PAIN SCALES - GENERAL: PAINLEVEL_OUTOF10: 0

## 2025-07-19 NOTE — PLAN OF CARE
Problem: Discharge Planning  Goal: Discharge to home or other facility with appropriate resources  Outcome: Progressing     Problem: Pain  Goal: Verbalizes/displays adequate comfort level or baseline comfort level  Outcome: Progressing     Problem: Safety - Adult  Goal: Free from fall injury  7/18/2025 2221 by Anisa Diamond RN  Outcome: Progressing  7/18/2025 1334 by Vanesa Yun LPN  Outcome: Progressing     Problem: ABCDS Injury Assessment  Goal: Absence of physical injury  Outcome: Progressing     Problem: Nutrition Deficit:  Goal: Optimize nutritional status  Outcome: Progressing

## 2025-07-19 NOTE — PLAN OF CARE
Problem: Discharge Planning  Goal: Discharge to home or other facility with appropriate resources  7/19/2025 1149 by Armando Olea RN  Outcome: Progressing  7/18/2025 2221 by Anisa Diamond RN  Outcome: Progressing     Problem: Pain  Goal: Verbalizes/displays adequate comfort level or baseline comfort level  7/19/2025 1149 by Armando Olea RN  Outcome: Progressing  7/18/2025 2221 by Anisa Diamond RN  Outcome: Progressing     Problem: Safety - Adult  Goal: Free from fall injury  7/19/2025 1149 by Armando Olea RN  Outcome: Progressing  7/18/2025 2221 by Anisa Diamond RN  Outcome: Progressing     Problem: ABCDS Injury Assessment  Goal: Absence of physical injury  7/19/2025 1149 by Armando Olea RN  Outcome: Progressing  7/18/2025 2221 by Anisa Diamond RN  Outcome: Progressing     Problem: Nutrition Deficit:  Goal: Optimize nutritional status  7/19/2025 1149 by Armando Olea RN  Outcome: Progressing  7/18/2025 2221 by Anisa Diamond RN  Outcome: Progressing

## 2025-07-20 PROBLEM — R78.81 E COLI BACTEREMIA: Status: ACTIVE | Noted: 2025-07-20

## 2025-07-20 PROBLEM — R78.81 COAGULASE NEGATIVE STAPHYLOCOCCUS BACTEREMIA: Status: ACTIVE | Noted: 2025-07-20

## 2025-07-20 PROBLEM — B95.7 COAGULASE NEGATIVE STAPHYLOCOCCUS BACTEREMIA: Status: ACTIVE | Noted: 2025-07-20

## 2025-07-20 PROBLEM — B96.20 E COLI BACTEREMIA: Status: ACTIVE | Noted: 2025-07-20

## 2025-07-20 LAB
ALBUMIN SERPL-MCNC: 2.9 G/DL (ref 3.5–5)
ALBUMIN/GLOB SERPL: 0.6 (ref 1.1–2.2)
ALP SERPL-CCNC: 88 U/L (ref 45–117)
ALT SERPL-CCNC: 32 U/L (ref 12–78)
ANION GAP SERPL CALC-SCNC: 3 MMOL/L (ref 2–12)
AST SERPL-CCNC: 24 U/L (ref 15–37)
BASOPHILS # BLD: 0.05 K/UL (ref 0–0.1)
BASOPHILS NFR BLD: 1 % (ref 0–1)
BILIRUB SERPL-MCNC: 0.6 MG/DL (ref 0.2–1)
BUN SERPL-MCNC: 16 MG/DL (ref 6–20)
BUN/CREAT SERPL: 18 (ref 12–20)
CALCIUM SERPL-MCNC: 9 MG/DL (ref 8.5–10.1)
CHLORIDE SERPL-SCNC: 106 MMOL/L (ref 97–108)
CO2 SERPL-SCNC: 28 MMOL/L (ref 21–32)
CREAT SERPL-MCNC: 0.89 MG/DL (ref 0.7–1.3)
DIFFERENTIAL METHOD BLD: ABNORMAL
EOSINOPHIL # BLD: 0.12 K/UL (ref 0–0.4)
EOSINOPHIL NFR BLD: 2.5 % (ref 0–7)
ERYTHROCYTE [DISTWIDTH] IN BLOOD BY AUTOMATED COUNT: 14.5 % (ref 11.5–14.5)
GLOBULIN SER CALC-MCNC: 4.6 G/DL (ref 2–4)
GLUCOSE SERPL-MCNC: 83 MG/DL (ref 65–100)
HCT VFR BLD AUTO: 36.1 % (ref 36.6–50.3)
HGB BLD-MCNC: 11.9 G/DL (ref 12.1–17)
IMM GRANULOCYTES # BLD AUTO: 0.01 K/UL (ref 0–0.04)
IMM GRANULOCYTES NFR BLD AUTO: 0.2 % (ref 0–0.5)
LYMPHOCYTES # BLD: 1.82 K/UL (ref 0.8–3.5)
LYMPHOCYTES NFR BLD: 37.4 % (ref 12–49)
MCH RBC QN AUTO: 30.5 PG (ref 26–34)
MCHC RBC AUTO-ENTMCNC: 33 G/DL (ref 30–36.5)
MCV RBC AUTO: 92.6 FL (ref 80–99)
MONOCYTES # BLD: 0.37 K/UL (ref 0–1)
MONOCYTES NFR BLD: 7.6 % (ref 5–13)
NEUTS SEG # BLD: 2.5 K/UL (ref 1.8–8)
NEUTS SEG NFR BLD: 51.3 % (ref 32–75)
NRBC # BLD: 0 K/UL (ref 0–0.01)
NRBC BLD-RTO: 0 PER 100 WBC
PLATELET # BLD AUTO: 334 K/UL (ref 150–400)
PMV BLD AUTO: 10.7 FL (ref 8.9–12.9)
POTASSIUM SERPL-SCNC: 4 MMOL/L (ref 3.5–5.1)
PROT SERPL-MCNC: 7.5 G/DL (ref 6.4–8.2)
RBC # BLD AUTO: 3.9 M/UL (ref 4.1–5.7)
SODIUM SERPL-SCNC: 137 MMOL/L (ref 136–145)
VANCOMYCIN SERPL-MCNC: 19.1 UG/ML
WBC # BLD AUTO: 4.9 K/UL (ref 4.1–11.1)

## 2025-07-20 PROCEDURE — 2580000003 HC RX 258: Performed by: PHYSICIAN ASSISTANT

## 2025-07-20 PROCEDURE — 6370000000 HC RX 637 (ALT 250 FOR IP): Performed by: STUDENT IN AN ORGANIZED HEALTH CARE EDUCATION/TRAINING PROGRAM

## 2025-07-20 PROCEDURE — 36415 COLL VENOUS BLD VENIPUNCTURE: CPT

## 2025-07-20 PROCEDURE — 2580000003 HC RX 258: Performed by: INTERNAL MEDICINE

## 2025-07-20 PROCEDURE — 6360000002 HC RX W HCPCS: Performed by: PHYSICIAN ASSISTANT

## 2025-07-20 PROCEDURE — 80202 ASSAY OF VANCOMYCIN: CPT

## 2025-07-20 PROCEDURE — 2500000003 HC RX 250 WO HCPCS: Performed by: STUDENT IN AN ORGANIZED HEALTH CARE EDUCATION/TRAINING PROGRAM

## 2025-07-20 PROCEDURE — 85025 COMPLETE CBC W/AUTO DIFF WBC: CPT

## 2025-07-20 PROCEDURE — 80053 COMPREHEN METABOLIC PANEL: CPT

## 2025-07-20 PROCEDURE — 1100000003 HC PRIVATE W/ TELEMETRY

## 2025-07-20 PROCEDURE — 99233 SBSQ HOSP IP/OBS HIGH 50: CPT | Performed by: INTERNAL MEDICINE

## 2025-07-20 PROCEDURE — 6360000002 HC RX W HCPCS: Performed by: INTERNAL MEDICINE

## 2025-07-20 RX ORDER — LEVOFLOXACIN 750 MG/1
750 TABLET, FILM COATED ORAL DAILY
Qty: 7 TABLET | Refills: 0 | Status: SHIPPED
Start: 2025-07-20 | End: 2025-07-23

## 2025-07-20 RX ORDER — M-VIT,TX,IRON,MINS/CALC/FOLIC 27MG-0.4MG
1 TABLET ORAL DAILY
Qty: 90 TABLET | Refills: 0 | Status: SHIPPED | OUTPATIENT
Start: 2025-07-21

## 2025-07-20 RX ADMIN — FOLIC ACID 1 MG: 1 TABLET ORAL at 09:14

## 2025-07-20 RX ADMIN — VANCOMYCIN HYDROCHLORIDE 1250 MG: 1.25 INJECTION, POWDER, LYOPHILIZED, FOR SOLUTION INTRAVENOUS at 22:53

## 2025-07-20 RX ADMIN — APIXABAN 5 MG: 5 TABLET, FILM COATED ORAL at 22:50

## 2025-07-20 RX ADMIN — CEFEPIME 2000 MG: 2 INJECTION, POWDER, FOR SOLUTION INTRAVENOUS at 06:02

## 2025-07-20 RX ADMIN — CEFEPIME 2000 MG: 2 INJECTION, POWDER, FOR SOLUTION INTRAVENOUS at 00:12

## 2025-07-20 RX ADMIN — VANCOMYCIN HYDROCHLORIDE 1250 MG: 1.25 INJECTION, POWDER, LYOPHILIZED, FOR SOLUTION INTRAVENOUS at 09:15

## 2025-07-20 RX ADMIN — Medication 1000 UNITS: at 09:14

## 2025-07-20 RX ADMIN — SODIUM CHLORIDE, PRESERVATIVE FREE 10 ML: 5 INJECTION INTRAVENOUS at 09:14

## 2025-07-20 RX ADMIN — SODIUM CHLORIDE, PRESERVATIVE FREE 10 ML: 5 INJECTION INTRAVENOUS at 23:15

## 2025-07-20 RX ADMIN — POLYETHYLENE GLYCOL 3350 17 G: 17 POWDER, FOR SOLUTION ORAL at 09:14

## 2025-07-20 RX ADMIN — Medication 100 MG: at 09:14

## 2025-07-20 RX ADMIN — CEFEPIME 2000 MG: 2 INJECTION, POWDER, FOR SOLUTION INTRAVENOUS at 16:35

## 2025-07-20 RX ADMIN — Medication 1 TABLET: at 09:14

## 2025-07-20 RX ADMIN — APIXABAN 5 MG: 5 TABLET, FILM COATED ORAL at 09:14

## 2025-07-20 ASSESSMENT — PAIN SCALES - GENERAL: PAINLEVEL_OUTOF10: 0

## 2025-07-20 NOTE — PLAN OF CARE
Problem: Discharge Planning  Goal: Discharge to home or other facility with appropriate resources  7/20/2025 1249 by Armando Olea RN  Outcome: Progressing  7/19/2025 2337 by Layne Ray RN  Outcome: Progressing     Problem: Pain  Goal: Verbalizes/displays adequate comfort level or baseline comfort level  7/20/2025 1249 by Armando Olea RN  Outcome: Progressing  7/19/2025 2337 by Layne Ray RN  Outcome: Progressing     Problem: Safety - Adult  Goal: Free from fall injury  7/20/2025 1249 by Armando Olea RN  Outcome: Progressing  7/19/2025 2337 by Layne Ray RN  Outcome: Progressing     Problem: ABCDS Injury Assessment  Goal: Absence of physical injury  7/20/2025 1249 by Armando Olea RN  Outcome: Progressing  7/19/2025 2337 by Layne Ray RN  Outcome: Progressing     Problem: Nutrition Deficit:  Goal: Optimize nutritional status  7/20/2025 1249 by Armando Olea RN  Outcome: Progressing  7/19/2025 2337 by Layne Ray RN  Outcome: Progressing

## 2025-07-20 NOTE — CARE COORDINATION
CM received consult to [facility] an 1 time dose dalbavancin @ an [facility]. CM attempted to call, no answer due to office being closed. CM to arrange tomorrow. PA has been notified.    Dk Hunter,BSW,  [phone]

## 2025-07-20 NOTE — DISCHARGE INSTRUCTIONS
Discharge instructions      Demographics    Patient Name  Obey Arana   Date of Birth 1980   Medical Record Number  [id number]    Age  45 y.o.   PCP Denny    Admit date:  7/15/25   Discharge dispostion Home w/Family.   Discharging clinician Dina Whatley PA-C    Discharge date/Time 7/20/25 3:25 PM      PRESENTING SYMPTOMS: You presented to the hospital for fever    DIAGNOSTIC TESTING/FINDINGS:  You were found to have a small area of possible pneumonia on CT scan, however the more likely cause of your fevers is blood stream infection associated with your central line for TPN. This IV line was removed and you were treated with IV antibiotics. Recheck of your blood cultures showed no further bacteria. You were evaluated by the infectious disease specialist who recommends home with oral levofloxacin, and IV infusions of dalbavancin to complete treatment of blood stream infection. Case management will set up Dalbavancin infusions. We also discussed NOT replacing your IV line for TPN. You will focus on eating nutrient dense foods, along with drinking Ensure at each meal. You should follow up with Dr. Baldwin to monitor your nutrition/hydration.    DISCHARGE DIAGNOSIS: bacteremia      MEDICATIONS: Please see above list of medications      It is important that you take the medication exactly as they are prescribed.   Keep your medication in the bottles provided by the pharmacist and keep a list of the medication names, dosages, and times to be taken in your wallet.   Do not take other medications without consulting your doctor.      Recommended diet: regular diet    Activity restrictions: activity as tolerated    Wound care: None    Indwelling devices:  None    Supplemental Oxygen: None    Required Lab work: NA    Code status: Full    Outpatient physician follow up: Please see above list of post discharge follow up instructions       If you experience any of the following symptoms   Fever, chills,

## 2025-07-20 NOTE — PLAN OF CARE
Problem: Discharge Planning  Goal: Discharge to home or other facility with appropriate resources  7/19/2025 2337 by Layne Ray RN  Outcome: Progressing  7/19/2025 1149 by Armando Olae RN  Outcome: Progressing     Problem: Pain  Goal: Verbalizes/displays adequate comfort level or baseline comfort level  7/19/2025 2337 by Layne Ray RN  Outcome: Progressing  7/19/2025 1149 by Armando Olae RN  Outcome: Progressing     Problem: Safety - Adult  Goal: Free from fall injury  7/19/2025 2337 by Layne Ray RN  Outcome: Progressing  7/19/2025 1149 by Armando Olea RN  Outcome: Progressing     Problem: ABCDS Injury Assessment  Goal: Absence of physical injury  7/19/2025 2337 by Layne Ray RN  Outcome: Progressing  7/19/2025 1149 by Armando Olea RN  Outcome: Progressing     Problem: Nutrition Deficit:  Goal: Optimize nutritional status  7/19/2025 2337 by Layne Ray RN  Outcome: Progressing  7/19/2025 1149 by Armando Olea RN  Outcome: Progressing

## 2025-07-21 ENCOUNTER — APPOINTMENT (OUTPATIENT)
Facility: HOSPITAL | Age: 45
DRG: 314 | End: 2025-07-21
Payer: MEDICARE

## 2025-07-21 LAB
ECHO AO ROOT DIAM: 3.3 CM
ECHO AO ROOT INDEX: 1.77 CM/M2
ECHO AV AREA PEAK VELOCITY: 2.6 CM2
ECHO AV AREA PEAK VELOCITY: 3.2 CM2
ECHO AV AREA VTI: 2.9 CM2
ECHO AV AREA/BSA VTI: 1.6 CM2/M2
ECHO AV MEAN GRADIENT: 2 MMHG
ECHO AV MEAN VELOCITY: 0.7 M/S
ECHO AV PEAK GRADIENT: 4 MMHG
ECHO AV PEAK GRADIENT: 5 MMHG
ECHO AV PEAK VELOCITY: 0.9 M/S
ECHO AV PEAK VELOCITY: 1.2 M/S
ECHO AV VTI: 20.2 CM
ECHO BSA: 1.85 M2
ECHO LA DIAMETER INDEX: 1.99 CM/M2
ECHO LA DIAMETER: 3.7 CM
ECHO LA TO AORTIC ROOT RATIO: 1.12
ECHO LV E' LATERAL VELOCITY: 18.66 CM/S
ECHO LV E' SEPTAL VELOCITY: 14.64 CM/S
ECHO LV EF PHYSICIAN: 60 %
ECHO LV FRACTIONAL SHORTENING: 52 % (ref 28–44)
ECHO LV INTERNAL DIMENSION DIASTOLE INDEX: 2.37 CM/M2
ECHO LV INTERNAL DIMENSION DIASTOLIC: 4.4 CM (ref 4.2–5.9)
ECHO LV INTERNAL DIMENSION SYSTOLIC INDEX: 1.13 CM/M2
ECHO LV INTERNAL DIMENSION SYSTOLIC: 2.1 CM
ECHO LV IVSD: 0.9 CM (ref 0.6–1)
ECHO LV MASS 2D: 128 G (ref 88–224)
ECHO LV MASS INDEX 2D: 68.8 G/M2 (ref 49–115)
ECHO LV POSTERIOR WALL DIASTOLIC: 0.9 CM (ref 0.6–1)
ECHO LV RELATIVE WALL THICKNESS RATIO: 0.41
ECHO LVOT AREA: 2.8 CM2
ECHO LVOT AV VTI INDEX: 1.04
ECHO LVOT DIAM: 1.9 CM
ECHO LVOT MEAN GRADIENT: 2 MMHG
ECHO LVOT PEAK GRADIENT: 5 MMHG
ECHO LVOT PEAK VELOCITY: 1.1 M/S
ECHO LVOT STROKE VOLUME INDEX: 32.1 ML/M2
ECHO LVOT SV: 59.8 ML
ECHO LVOT VTI: 21.1 CM
ECHO MV A VELOCITY: 0.64 M/S
ECHO MV AREA VTI: 1.9 CM2
ECHO MV E DECELERATION TIME (DT): 182.9 MS
ECHO MV E VELOCITY: 0.77 M/S
ECHO MV E/A RATIO: 1.2
ECHO MV E/E' LATERAL: 4.13
ECHO MV E/E' RATIO (AVERAGED): 4.69
ECHO MV E/E' SEPTAL: 5.26
ECHO MV LVOT VTI INDEX: 1.49
ECHO MV MAX VELOCITY: 1.2 M/S
ECHO MV MEAN GRADIENT: 2 MMHG
ECHO MV MEAN VELOCITY: 0.6 M/S
ECHO MV PEAK GRADIENT: 5 MMHG
ECHO MV REGURGITANT PEAK VELOCITY: 0.1 M/S
ECHO MV REGURGITANT PEAK VELOCITY: 4.4 M/S
ECHO MV REGURGITANT VTIA: 147 CM
ECHO MV VTI: 31.5 CM
ECHO PV MAX VELOCITY: 1.2 M/S
ECHO PV PEAK GRADIENT: 6 MMHG
ECHO RV FREE WALL PEAK S': 12.4 CM/S
ECHO RV INTERNAL DIMENSION: 3.6 CM
ECHO RV TAPSE: 2.3 CM (ref 1.7–?)
ECHO TV REGURGITANT MAX VELOCITY: 1.99 M/S
ECHO TV REGURGITANT PEAK GRADIENT: 16 MMHG

## 2025-07-21 PROCEDURE — 2580000003 HC RX 258: Performed by: INTERNAL MEDICINE

## 2025-07-21 PROCEDURE — 99233 SBSQ HOSP IP/OBS HIGH 50: CPT | Performed by: INTERNAL MEDICINE

## 2025-07-21 PROCEDURE — 6360000002 HC RX W HCPCS: Performed by: INTERNAL MEDICINE

## 2025-07-21 PROCEDURE — 6360000002 HC RX W HCPCS: Performed by: PHYSICIAN ASSISTANT

## 2025-07-21 PROCEDURE — 2580000003 HC RX 258: Performed by: PHYSICIAN ASSISTANT

## 2025-07-21 PROCEDURE — 2500000003 HC RX 250 WO HCPCS: Performed by: STUDENT IN AN ORGANIZED HEALTH CARE EDUCATION/TRAINING PROGRAM

## 2025-07-21 PROCEDURE — 6370000000 HC RX 637 (ALT 250 FOR IP): Performed by: STUDENT IN AN ORGANIZED HEALTH CARE EDUCATION/TRAINING PROGRAM

## 2025-07-21 PROCEDURE — 1100000003 HC PRIVATE W/ TELEMETRY

## 2025-07-21 PROCEDURE — 93306 TTE W/DOPPLER COMPLETE: CPT

## 2025-07-21 RX ADMIN — ACETAMINOPHEN 650 MG: 325 TABLET ORAL at 11:22

## 2025-07-21 RX ADMIN — APIXABAN 5 MG: 5 TABLET, FILM COATED ORAL at 10:48

## 2025-07-21 RX ADMIN — POLYETHYLENE GLYCOL 3350 17 G: 17 POWDER, FOR SOLUTION ORAL at 10:48

## 2025-07-21 RX ADMIN — Medication 100 MG: at 10:48

## 2025-07-21 RX ADMIN — SODIUM CHLORIDE, PRESERVATIVE FREE 10 ML: 5 INJECTION INTRAVENOUS at 20:49

## 2025-07-21 RX ADMIN — CEFEPIME 2000 MG: 2 INJECTION, POWDER, FOR SOLUTION INTRAVENOUS at 00:29

## 2025-07-21 RX ADMIN — Medication 1000 UNITS: at 10:48

## 2025-07-21 RX ADMIN — FOLIC ACID 1 MG: 1 TABLET ORAL at 10:48

## 2025-07-21 RX ADMIN — APIXABAN 5 MG: 5 TABLET, FILM COATED ORAL at 20:49

## 2025-07-21 RX ADMIN — VANCOMYCIN HYDROCHLORIDE 1250 MG: 1.25 INJECTION, POWDER, LYOPHILIZED, FOR SOLUTION INTRAVENOUS at 20:46

## 2025-07-21 RX ADMIN — CEFEPIME 2000 MG: 2 INJECTION, POWDER, FOR SOLUTION INTRAVENOUS at 23:15

## 2025-07-21 RX ADMIN — Medication 1 TABLET: at 10:48

## 2025-07-21 RX ADMIN — SODIUM CHLORIDE, PRESERVATIVE FREE 10 ML: 5 INJECTION INTRAVENOUS at 10:49

## 2025-07-21 RX ADMIN — CEFEPIME 2000 MG: 2 INJECTION, POWDER, FOR SOLUTION INTRAVENOUS at 14:09

## 2025-07-21 RX ADMIN — VANCOMYCIN HYDROCHLORIDE 1250 MG: 1.25 INJECTION, POWDER, LYOPHILIZED, FOR SOLUTION INTRAVENOUS at 10:47

## 2025-07-21 RX ADMIN — CEFEPIME 2000 MG: 2 INJECTION, POWDER, FOR SOLUTION INTRAVENOUS at 06:20

## 2025-07-21 ASSESSMENT — PAIN SCALES - GENERAL: PAINLEVEL_OUTOF10: 4

## 2025-07-21 ASSESSMENT — PAIN DESCRIPTION - LOCATION: LOCATION: ARM

## 2025-07-21 NOTE — PLAN OF CARE
Problem: Discharge Planning  Goal: Discharge to home or other facility with appropriate resources  7/20/2025 2318 by Layne Ray RN  Outcome: Progressing  7/20/2025 1249 by Armando Olea RN  Outcome: Progressing     Problem: Pain  Goal: Verbalizes/displays adequate comfort level or baseline comfort level  7/20/2025 2318 by Layne Ray RN  Outcome: Progressing  7/20/2025 1249 by Armando Olea RN  Outcome: Progressing     Problem: Safety - Adult  Goal: Free from fall injury  7/20/2025 2318 by Layne Ray RN  Outcome: Progressing  7/20/2025 1249 by Armando Olea RN  Outcome: Progressing     Problem: ABCDS Injury Assessment  Goal: Absence of physical injury  7/20/2025 2318 by Layne Ray RN  Outcome: Progressing  7/20/2025 1249 by Armando Olea RN  Outcome: Progressing     Problem: Nutrition Deficit:  Goal: Optimize nutritional status  7/20/2025 2318 by Layne Ray RN  Outcome: Progressing  7/20/2025 1249 by Armando Olea RN  Outcome: Progressing

## 2025-07-21 NOTE — CARE COORDINATION
Transition of Care Plan:    RUR: 8% Low Risk  Prior Level of Functioning: Independent with ADL's  Disposition: Home  NAA: 7/22  Follow up appointments: PCP follow up  DME needed: N/A  Transportation at discharge: Roundtrip  IM/IMM Medicare/ letter given: 2 IM to be given  Is patient a  and connected with VA? N/A  Caregiver Contact:   Stormy Arana (Parent)  [phone] (Mobile)  Discharge Caregiver contacted prior to discharge? Yes by pt  Care Conference needed? N/A  Barriers to discharge:  ECHO read and OPIC set up    Initial Note: Chart Reviewed: Pt pending ECHO read and [facility] set up for d/c. CM spoke with pt over the phone to discuss [facility] options. ID physician may not be able to follow pt @[facility]. CM discussed [facility] office and pt is agreeable with referral being sent to [facility]. Referral sent, pending approval. CM will continue to follow.      YVONNE Clayton,MIKALA  [phone]

## 2025-07-22 LAB
ANION GAP SERPL CALC-SCNC: 3 MMOL/L (ref 2–12)
BACTERIA SPEC CULT: ABNORMAL
BUN SERPL-MCNC: 20 MG/DL (ref 6–20)
BUN/CREAT SERPL: 20 (ref 12–20)
CALCIUM SERPL-MCNC: 9.2 MG/DL (ref 8.5–10.1)
CHLORIDE SERPL-SCNC: 105 MMOL/L (ref 97–108)
CO2 SERPL-SCNC: 28 MMOL/L (ref 21–32)
CREAT SERPL-MCNC: 1 MG/DL (ref 0.7–1.3)
GLUCOSE SERPL-MCNC: 86 MG/DL (ref 65–100)
POTASSIUM SERPL-SCNC: 4 MMOL/L (ref 3.5–5.1)
SERVICE CMNT-IMP: ABNORMAL
SERVICE CMNT-IMP: ABNORMAL
SODIUM SERPL-SCNC: 136 MMOL/L (ref 136–145)

## 2025-07-22 PROCEDURE — 80048 BASIC METABOLIC PNL TOTAL CA: CPT

## 2025-07-22 PROCEDURE — 2580000003 HC RX 258: Performed by: PHYSICIAN ASSISTANT

## 2025-07-22 PROCEDURE — 2500000003 HC RX 250 WO HCPCS: Performed by: STUDENT IN AN ORGANIZED HEALTH CARE EDUCATION/TRAINING PROGRAM

## 2025-07-22 PROCEDURE — 2580000003 HC RX 258: Performed by: INTERNAL MEDICINE

## 2025-07-22 PROCEDURE — 6360000002 HC RX W HCPCS: Performed by: PHYSICIAN ASSISTANT

## 2025-07-22 PROCEDURE — 6370000000 HC RX 637 (ALT 250 FOR IP): Performed by: STUDENT IN AN ORGANIZED HEALTH CARE EDUCATION/TRAINING PROGRAM

## 2025-07-22 PROCEDURE — 1100000003 HC PRIVATE W/ TELEMETRY

## 2025-07-22 PROCEDURE — 6360000002 HC RX W HCPCS: Performed by: INTERNAL MEDICINE

## 2025-07-22 PROCEDURE — 36415 COLL VENOUS BLD VENIPUNCTURE: CPT

## 2025-07-22 RX ADMIN — VANCOMYCIN HYDROCHLORIDE 1250 MG: 1.25 INJECTION, POWDER, LYOPHILIZED, FOR SOLUTION INTRAVENOUS at 20:21

## 2025-07-22 RX ADMIN — POLYETHYLENE GLYCOL 3350 17 G: 17 POWDER, FOR SOLUTION ORAL at 09:40

## 2025-07-22 RX ADMIN — VANCOMYCIN HYDROCHLORIDE 1250 MG: 1.25 INJECTION, POWDER, LYOPHILIZED, FOR SOLUTION INTRAVENOUS at 09:37

## 2025-07-22 RX ADMIN — CEFEPIME 2000 MG: 2 INJECTION, POWDER, FOR SOLUTION INTRAVENOUS at 14:05

## 2025-07-22 RX ADMIN — SODIUM CHLORIDE, PRESERVATIVE FREE 10 ML: 5 INJECTION INTRAVENOUS at 09:42

## 2025-07-22 RX ADMIN — CEFEPIME 2000 MG: 2 INJECTION, POWDER, FOR SOLUTION INTRAVENOUS at 06:08

## 2025-07-22 RX ADMIN — Medication 100 MG: at 09:41

## 2025-07-22 RX ADMIN — SODIUM CHLORIDE, PRESERVATIVE FREE 10 ML: 5 INJECTION INTRAVENOUS at 20:19

## 2025-07-22 RX ADMIN — CEFEPIME 2000 MG: 2 INJECTION, POWDER, FOR SOLUTION INTRAVENOUS at 21:33

## 2025-07-22 RX ADMIN — Medication 1 TABLET: at 09:41

## 2025-07-22 RX ADMIN — APIXABAN 5 MG: 5 TABLET, FILM COATED ORAL at 20:19

## 2025-07-22 RX ADMIN — FOLIC ACID 1 MG: 1 TABLET ORAL at 09:42

## 2025-07-22 RX ADMIN — Medication 1000 UNITS: at 09:41

## 2025-07-22 RX ADMIN — APIXABAN 5 MG: 5 TABLET, FILM COATED ORAL at 09:41

## 2025-07-22 NOTE — PLAN OF CARE
Problem: Discharge Planning  Goal: Discharge to home or other facility with appropriate resources  7/21/2025 2352 by Bella Valle RN  Outcome: Progressing  7/21/2025 2022 by Giovanny Beckman RN  Outcome: Progressing     Problem: Pain  Goal: Verbalizes/displays adequate comfort level or baseline comfort level  7/21/2025 2352 by Bella Valle RN  Outcome: Progressing  7/21/2025 2022 by Giovanny Beckman RN  Outcome: Progressing     Problem: Safety - Adult  Goal: Free from fall injury  7/21/2025 2352 by Bella Valle RN  Outcome: Progressing  7/21/2025 2022 by Giovanny Beckman RN  Outcome: Progressing     Problem: ABCDS Injury Assessment  Goal: Absence of physical injury  7/21/2025 2352 by Bella Valle RN  Outcome: Progressing  7/21/2025 2022 by Giovanny Beckman, RN  Outcome: Progressing     Problem: Nutrition Deficit:  Goal: Optimize nutritional status  7/21/2025 2352 by Bella Valle RN  Outcome: Progressing  7/21/2025 2022 by Giovanny Beckman RN  Outcome: Progressing

## 2025-07-22 NOTE — CARE COORDINATION
Transition of Care Plan:    RUR: 8% Low Risk  Prior Level of Functioning: Independent with ADL's  Disposition: Home  NAA: 7/22  Follow up appointments: PCP follow up  DME needed: N/A  Transportation at discharge: Roundtrip  IM/IMM Medicare/ letter given: 2 IM given  Is patient a  and connected with VA? N/A  Caregiver Contact:   Stormy Arana (Parent)  [phone] (Mobile)  Discharge Caregiver contacted prior to discharge? Yes by pt  Care Conference needed? N/A  Barriers to discharge:  [facility] Set up    Updated Note: Pt will need insurance auth for dalbavancin IV. Per Ardha @[facility] insurance auth may come back today however, there is possibility it could be tomorrow.     Initial Note: Chart Reviewed: Pt pending [facility] set up for d/c. CM spoke with [facility] and they stated they can not accept referrals from any outside physician, referrals must be sent from an [facility] physician. [facility] has accepted and is currently running benefits for dalbavancin IV. CM to continue to follow.     YVONNE Clayton,CM  [phone]

## 2025-07-23 VITALS
DIASTOLIC BLOOD PRESSURE: 88 MMHG | BODY MASS INDEX: 21.9 KG/M2 | WEIGHT: 153 LBS | SYSTOLIC BLOOD PRESSURE: 144 MMHG | OXYGEN SATURATION: 100 % | TEMPERATURE: 97.9 F | RESPIRATION RATE: 16 BRPM | HEART RATE: 69 BPM | HEIGHT: 70 IN

## 2025-07-23 LAB
ANION GAP SERPL CALC-SCNC: 6 MMOL/L (ref 2–12)
BACTERIA SPEC CULT: NORMAL
BACTERIA SPEC CULT: NORMAL
BUN SERPL-MCNC: 18 MG/DL (ref 6–20)
BUN/CREAT SERPL: 20 (ref 12–20)
CALCIUM SERPL-MCNC: 9 MG/DL (ref 8.5–10.1)
CHLORIDE SERPL-SCNC: 106 MMOL/L (ref 97–108)
CO2 SERPL-SCNC: 26 MMOL/L (ref 21–32)
CREAT SERPL-MCNC: 0.91 MG/DL (ref 0.7–1.3)
ERYTHROCYTE [DISTWIDTH] IN BLOOD BY AUTOMATED COUNT: 14.7 % (ref 11.5–14.5)
GLUCOSE SERPL-MCNC: 83 MG/DL (ref 65–100)
HCT VFR BLD AUTO: 38.2 % (ref 36.6–50.3)
HGB BLD-MCNC: 12.3 G/DL (ref 12.1–17)
MCH RBC QN AUTO: 30.1 PG (ref 26–34)
MCHC RBC AUTO-ENTMCNC: 32.2 G/DL (ref 30–36.5)
MCV RBC AUTO: 93.4 FL (ref 80–99)
NRBC # BLD: 0 K/UL (ref 0–0.01)
NRBC BLD-RTO: 0 PER 100 WBC
PLATELET # BLD AUTO: 323 K/UL (ref 150–400)
PMV BLD AUTO: 10.3 FL (ref 8.9–12.9)
POTASSIUM SERPL-SCNC: 4 MMOL/L (ref 3.5–5.1)
RBC # BLD AUTO: 4.09 M/UL (ref 4.1–5.7)
SERVICE CMNT-IMP: NORMAL
SERVICE CMNT-IMP: NORMAL
SODIUM SERPL-SCNC: 138 MMOL/L (ref 136–145)
VANCOMYCIN SERPL-MCNC: 18.6 UG/ML
WBC # BLD AUTO: 4.4 K/UL (ref 4.1–11.1)

## 2025-07-23 PROCEDURE — 2580000003 HC RX 258: Performed by: INTERNAL MEDICINE

## 2025-07-23 PROCEDURE — 6360000002 HC RX W HCPCS: Performed by: PHYSICIAN ASSISTANT

## 2025-07-23 PROCEDURE — 85027 COMPLETE CBC AUTOMATED: CPT

## 2025-07-23 PROCEDURE — 6360000002 HC RX W HCPCS: Performed by: INTERNAL MEDICINE

## 2025-07-23 PROCEDURE — 80048 BASIC METABOLIC PNL TOTAL CA: CPT

## 2025-07-23 PROCEDURE — 2580000003 HC RX 258: Performed by: PHYSICIAN ASSISTANT

## 2025-07-23 PROCEDURE — 80202 ASSAY OF VANCOMYCIN: CPT

## 2025-07-23 PROCEDURE — 36415 COLL VENOUS BLD VENIPUNCTURE: CPT

## 2025-07-23 PROCEDURE — 2500000003 HC RX 250 WO HCPCS: Performed by: STUDENT IN AN ORGANIZED HEALTH CARE EDUCATION/TRAINING PROGRAM

## 2025-07-23 PROCEDURE — 6370000000 HC RX 637 (ALT 250 FOR IP): Performed by: STUDENT IN AN ORGANIZED HEALTH CARE EDUCATION/TRAINING PROGRAM

## 2025-07-23 RX ORDER — LEVOFLOXACIN 500 MG/1
500 TABLET, FILM COATED ORAL DAILY
Qty: 7 TABLET | Refills: 0 | Status: SHIPPED | OUTPATIENT
Start: 2025-07-23 | End: 2025-07-30

## 2025-07-23 RX ORDER — LEVOFLOXACIN 750 MG/1
750 TABLET, FILM COATED ORAL DAILY
Qty: 7 TABLET | Refills: 0 | Status: SHIPPED | OUTPATIENT
Start: 2025-07-23 | End: 2025-07-23 | Stop reason: HOSPADM

## 2025-07-23 RX ADMIN — APIXABAN 5 MG: 5 TABLET, FILM COATED ORAL at 08:23

## 2025-07-23 RX ADMIN — POLYETHYLENE GLYCOL 3350 17 G: 17 POWDER, FOR SOLUTION ORAL at 08:23

## 2025-07-23 RX ADMIN — SODIUM CHLORIDE, PRESERVATIVE FREE 10 ML: 5 INJECTION INTRAVENOUS at 08:23

## 2025-07-23 RX ADMIN — CEFEPIME 2000 MG: 2 INJECTION, POWDER, FOR SOLUTION INTRAVENOUS at 05:10

## 2025-07-23 RX ADMIN — FOLIC ACID 1 MG: 1 TABLET ORAL at 08:23

## 2025-07-23 RX ADMIN — VANCOMYCIN HYDROCHLORIDE 1250 MG: 1.25 INJECTION, POWDER, LYOPHILIZED, FOR SOLUTION INTRAVENOUS at 08:30

## 2025-07-23 RX ADMIN — Medication 100 MG: at 08:23

## 2025-07-23 RX ADMIN — Medication 1000 UNITS: at 08:23

## 2025-07-23 RX ADMIN — Medication 1 TABLET: at 08:23

## 2025-07-23 NOTE — PLAN OF CARE
Problem: Discharge Planning  Goal: Discharge to home or other facility with appropriate resources  7/22/2025 2223 by Bella Valle RN  Outcome: Progressing  7/22/2025 1944 by Giovanny Beckman RN  Outcome: Progressing     Problem: Pain  Goal: Verbalizes/displays adequate comfort level or baseline comfort level  7/22/2025 2223 by Bella Valle RN  Outcome: Progressing  7/22/2025 1944 by Giovanny Beckman RN  Outcome: Progressing     Problem: Safety - Adult  Goal: Free from fall injury  7/22/2025 2223 by Bella Valle RN  Outcome: Progressing  7/22/2025 1944 by Giovanny Beckman RN  Outcome: Progressing     Problem: ABCDS Injury Assessment  Goal: Absence of physical injury  7/22/2025 2223 by Bella Valle RN  Outcome: Progressing  7/22/2025 1944 by Giovanny Beckman, RN  Outcome: Progressing     Problem: Nutrition Deficit:  Goal: Optimize nutritional status  7/22/2025 2223 by Bella Valle RN  Outcome: Progressing  7/22/2025 1944 by Giovanny Beckman RN  Outcome: Progressing
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
## 2025-07-23 NOTE — PROGRESS NOTES
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  Report included the following information SBAR, Intake/Output, MAR, and Recent Results    Shift worked:  NIGHT      Shift summary and any significant changes:    -Pt had a calm night  -No complaints made overnight  -Labs drawn  - PENDING DISCHARGE TODAY    Concerns for physician to address:        Zone phone for oncoming shift:           Activity:  Level of Assistance: Independent  Number times ambulated in hallways past shift: 0  Number of times OOB to chair past shift: 2    Cardiac:   Cardiac Monitoring: No      Cardiac Rhythm: Sinus rhythm    Access:  Current line(s): PIV     Genitourinary:   Urinary Status: Voiding, Bathroom privileges    Respiratory:   O2 Device: None (Room air)  Chronic home O2 use?: NO  Incentive spirometer at bedside: NO    GI:  Last BM (including prior to admit): 07/21/25  Current diet:  ADULT DIET; Regular  ADULT ORAL NUTRITION SUPPLEMENT; Breakfast, Lunch, Dinner; Standard High Calorie/High Protein Oral Supplement  DIET ONE TIME MESSAGE;  Passing flatus: YES    Pain Management:   Patient states pain is manageable on current regimen: YES    Skin:  Alton Scale Score: 22  Interventions: Wound Offloading (Prevention Methods): Pillows, Repositioning    Patient Safety:  Fall Risk: Nursing Judgement-Fall Risk High(Add Comments): No  Fall Risk Interventions  Nursing Judgement-Fall Risk High(Add Comments): No  Toilet Every 2 Hours-In Advance of Need: No (Comment) (independent)  Hourly Visual Checks: Awake, In bed  Fall Visual Posted: Socks  Room Door Open: Deferred to promote rest  Alarm On: Other (Comment) (independent)  Patient Moved Closer to Nursing Station: No    Active Consults:   IP CONSULT TO PHARMACY  IP CONSULT TO HOSPITALIST  IP CONSULT TO INFECTIOUS DISEASES  IP CONSULT TO INTERVENTIONAL RADIOLOGY  IP CONSULT TO PHARMACY  IP CONSULT TO CASE MANAGEMENT  IP 
End of Shift Note    Bedside shift change report given to Lynn LOZA (oncoming nurse) by Layne Ray RN (offgoing nurse).  Report included the following information SBAR and MAR    Shift worked: 7p-7a     Shift summary and any significant changes:     Pt had calm night. Pt meds given per MAR. Pt care is still ongoing.      Concerns for physician to address:       Zone phone for oncoming shift:          Activity:  Level of Assistance: Independent  Number times ambulated in hallways past shift: 1  Number of times OOB to chair past shift: 0    Cardiac:   Cardiac Monitoring: No      Cardiac Rhythm: Sinus rhythm    Access:  Current line(s): PIV     Genitourinary:   Urinary Status: Voiding, Bathroom privileges    Respiratory:   O2 Device: None (Room air)  Chronic home O2 use?: NO  Incentive spirometer at bedside: NO    GI:  Last BM (including prior to admit): 07/17/25  Current diet:  ADULT DIET; Regular  ADULT ORAL NUTRITION SUPPLEMENT; Breakfast, Lunch, Dinner; Standard High Calorie/High Protein Oral Supplement  DIET ONE TIME MESSAGE;  Passing flatus: YES    Pain Management:   Patient states pain is manageable on current regimen: YES    Skin:  Alton Scale Score: 23  Interventions: Wound Offloading (Prevention Methods): Turning, Repositioning    Patient Safety:  Fall Risk: Nursing Judgement-Fall Risk High(Add Comments): No  Fall Risk Interventions  Nursing Judgement-Fall Risk High(Add Comments): No  Toilet Every 2 Hours-In Advance of Need: No (Comment)  Hourly Visual Checks: In bed, Awake  Fall Visual Posted: Socks  Room Door Open: Deferred to promote rest  Alarm On: Other (Comment)  Patient Moved Closer to Nursing Station: No    Active Consults:   IP CONSULT TO PHARMACY  IP CONSULT TO HOSPITALIST  IP CONSULT TO INFECTIOUS DISEASES  IP CONSULT TO INTERVENTIONAL RADIOLOGY  IP CONSULT TO PHARMACY    Length of Stay:  Expected LOS: 5  Actual LOS: 5    Layne Ray, ELBERT                            
End of Shift Note    Bedside shift change report given to PRAKASH Morales RN (oncoming nurse) by JESSICA LEONARD RN (offgoing nurse).  Report included the following information SBAR, MAR, Recent Results, and Cardiac Rhythm sinus  rhythm    Shift worked:  Night     Shift summary and any significant changes:     Patient settled in well after admission. Pt temperature has subsided. Medication was given as per MAR. No pain was verbalized.     Concerns for physician to address:  -     Zone phone for oncoming shift:   -       Activity:     Number times ambulated in hallways past shift: 0  Number of times OOB to chair past shift: 2    Cardiac:   Cardiac Monitoring: Yes      Cardiac Rhythm: Sinus rhythm    Access:  Current line(s): PIV  and CVC     Genitourinary:        Respiratory:   O2 Device: None (Room air)  Chronic home O2 use?: NO  Incentive spirometer at bedside: NO    GI:  Last BM (including prior to admit): 07/15/25  Current diet:  ADULT DIET; Regular  Passing flatus: YES    Pain Management:   Patient states pain is manageable on current regimen: YES    Skin:  Alton Scale Score: 20  Interventions: Wound Offloading (Prevention Methods): Elevate heels, Repositioning, Turning    Patient Safety:  Fall Risk:    Fall Risk Interventions  Toilet Every 2 Hours-In Advance of Need: Yes  Hourly Visual Checks: Awake, In bed  Fall Visual Posted: Socks, Armband  Room Door Open: Deferred for droplet isolation  Alarm On: Bed  Patient Moved Closer to Nursing Station: No    Active Consults:   IP CONSULT TO PHARMACY  IP CONSULT TO HOSPITALIST    Length of Stay:  Expected LOS: 3  Actual LOS: 1    JESSICA LEONARD RN                            
End of Shift Note    Bedside shift change report given to Phill BOSWELL (oncoming nurse) by Layne Ray, ELBERT (offgoing nurse).  Report included the following information SBAR, MAR, and Cardiac Rhythm      Shift worked:  7p-7a     Shift summary and any significant changes:     Pt had calm night. Pt meds given per MAR. Pt care is still ongoing.      Concerns for physician to address:       Zone phone for oncoming shift:          Activity:  Level of Assistance: Independent  Number times ambulated in hallways past shift: 1  Number of times OOB to chair past shift: 0    Cardiac:   Cardiac Monitoring: No      Cardiac Rhythm: Sinus rhythm    Access:  Current line(s): PIV     Genitourinary:   Urinary Status: Voiding, Bathroom privileges    Respiratory:   O2 Device: None (Room air)  Chronic home O2 use?: NO  Incentive spirometer at bedside: NO    GI:  Last BM (including prior to admit): 07/17/25  Current diet:  ADULT DIET; Regular  ADULT ORAL NUTRITION SUPPLEMENT; Breakfast, Lunch, Dinner; Standard High Calorie/High Protein Oral Supplement  DIET ONE TIME MESSAGE;  Passing flatus: YES    Pain Management:   Patient states pain is manageable on current regimen: N/A    Skin:  Alton Scale Score: 23  Interventions: Wound Offloading (Prevention Methods): Turning, Repositioning    Patient Safety:  Fall Risk: Nursing Judgement-Fall Risk High(Add Comments): No  Fall Risk Interventions  Nursing Judgement-Fall Risk High(Add Comments): No  Toilet Every 2 Hours-In Advance of Need: No (Comment)  Hourly Visual Checks: In bed, Awake  Fall Visual Posted: Socks  Room Door Open: Deferred to promote rest  Alarm On: Other (Comment)  Patient Moved Closer to Nursing Station: No    Active Consults:   IP CONSULT TO PHARMACY  IP CONSULT TO HOSPITALIST  IP CONSULT TO INFECTIOUS DISEASES  IP CONSULT TO INTERVENTIONAL RADIOLOGY  IP CONSULT TO PHARMACY    Length of Stay:  Expected LOS: 5  Actual LOS: 5    Layne Ray, ELBERT                            
Hospital follow-up PCP transitional care appointment has been scheduled with Dr. José Luis Hamilton on 7/28/2025 at 0800. This is a previously scheduled appt. [facility] placed [facility] information AVS for patient resource. Pending patient discharge.    
Hospital follow-up PCP transitional care appointment has been scheduled with Dr. José Luis Hamilton on 7/28/25 at 0800 . This is the first available appt due to limited provider availability. PCP office does not offer alternate provider option for hospital follow up. Pending patient discharge. Briseyda Ely, Care Management Assistant   
ID  Antimicrobial orders for discharge  -Dalbavancin 1 g IV x 1 at hospitals 7/21  -Dalbavancin 500 mg IV x 1 at hospitals  on 7/28   --Weekly CBC, CMP-7/28  -Levaquin 500 mg p.o. daily end date 7/31  -Fax reports to [phone], call with critical labs  at [phone]  -Encourage adequate fluids, daily probiotic/yogurt  -If line malfunction occurs and home health cannot reposition  please send patient to ED immediately  -ID follow-up -no follow-up required, please call with questions, concerns  - If persistent side effects occur stop antibiotic and call-ID/PCP.    Possible adverse effects of long term antibiotics are inclusive of but not limited to following  BM suppression, neutropenia , cytopenias , aplastic anemia hemorrhage liver & renal dysfunction/ liver , renal failure  , GI dysfunction- N, V  Diarrhea,C.difficile disease, rash , allergy , anaphylaxis.toxic epidermal necrolysis  Neuro toxicity , seizure disorder  Side effects tend to be more pronounced in the elderly      
Infectious Disease Progress      Impression    Gram-negative sepsis  Fever Tmax 102.4  Resolved.    Catheter related bloodstream infection, polymicrobial  Enterobacter cloacae/E. Coli/ CoNS bacteremia  Blood culture 7/15+ for E.cloacae 1/4,   CoNS 2/4-LFA/ RFA  (MRSE on the BCID panel)  Repeat cultures(line ) 7/16+ for E. coli 4/4  S/p removal of line 7/17  Catheter tip culture + for E.cloacae, CoNS, E.coli      Lingular infiltrate on CT  CXR for nodular opacity LLL lung field  Repeat CXR also + for lingular infiltrate, decrease in size.  Patient denies cough, SOB.    Short gut syndrome  Frozen abdomen  Protein calorie malnutrition  TPN dependent  Patient tolerating regular diet    Pulmonary embolism  On Eliquis.    Admission 2/2025  Treated for acute cholecystitis  S/pCholecystostomy tube placement  Fluid culture +for  heavy K.oxytoca, Kluuvera intermedia, few E.faecium   Patient DC on Zosyn IV x 2 weeks end date 2/28/2025  Subsequent cholecystectomy at [facility].    Plan  Continue Vancomycin, Cefepime IV  Will treat CoNS given blood cultures, catheter tip also + for CoNS  Echocardiogram-pending   Adequate fluids, daily probiotic    Anticipate DC on Levaquin 500 mg p.o daily end  date 7/31, dalbavancin IV   To be administered at discharge /after DC at infusion center    May DC from ID standpoint, once echo is done and antibiotic is set up    Antimicrobial orders for discharge  -Dalbavancin 1 g IV x 1 at [facility] 7/21  -Dalbavancin 500 mg IV x 1 at [facility]  on 7/28   --Weekly CBC, CMP-7/28  -Levaquin 500 mg p.o. daily end date 7/31  -Fax reports to [phone], call with critical labs  at [phone]  -Encourage adequate fluids, daily probiotic/yogurt  -If line malfunction occurs and home health cannot reposition  please send patient to ED immediately  -ID follow-up -no follow-up required, please call with questions, concerns  - If persistent side effects occur stop antibiotic and call-ID/PCP.     Possible adverse effects of long term 
Patient discharged in uber, IV removed, AVS reviewed and all belongings with patient.  
Pharmacy Antimicrobial Kinetic Dosing    Indication for Antimicrobials: sepsis; possible CAP  Hx short gut syndrome receiving TPN 3x weekly via CVC    Current Regimen of Each Antimicrobial:  Vancomycin pharmacy to dose; Start Date ; Day # 1  Cefepime 2000 mg IV Q8H; Start Date ; Day # 1    Previous Antimicrobial Therapy:   Zosyn -    Goal Level: Vancomycin -600    Date Dose & Interval Measured (mcg/mL) Predicted AUC 24-48 Predicted AUC 24,ss     1000 mg q12h 3.7 274 290                   Significant Cultures:   7/15 paired blood: in process    tissue: probable staph   blood, paired: ngtd, prelim    Labs:  Recent Labs     Units 25  0603 25  0448 25  0412   CREATININE MG/DL 0.95 0.88 0.97   BUN MG/DL 12 10 14   WBC K/uL 5.2 6.2 8.1     Temp (24hrs), Av.7 °F (36.5 °C), Min:97.4 °F (36.3 °C), Max:97.9 °F (36.6 °C)      Conditions for Dosing Consideration: Malnutrition    Creatinine Clearance (mL/min): Estimated Creatinine Clearance: 96 mL/min (based on SCr of 0.95 mg/dL).       Impression/Plan:   Pharmacy reconsulted to dose vanc; last dose ~24 hours ago  Will order vancomycin 1250 mg IV Q12H for Predicted CYX49-24 = 487, Predicted AUC24,ss = 543  CBC & BMP ordered daily per protocol  Antimicrobial stop date pending for vancomycin     Pharmacy will follow daily and adjust medications as appropriate for renal function and/or serum levels.    Thank you,  Chandni Reagan, [facility]      
Pharmacy Antimicrobial Kinetic Dosing    Indication for Antimicrobials: sepsis; possible CAP  Hx short gut syndrome receiving TPN 3x weekly via CVC    Current Regimen of Each Antimicrobial:  Vancomycin pharmacy to dose; Start Date ; Day # 1  Zosyn 3.375 gm IV q8h; Start Date 7/15; Day # 2    Goal Level: Vancomycin -600    Date Dose & Interval Measured (mcg/mL) Predicted AUC 24-48 Predicted AUC 24,ss                          Significant Cultures:   7/15 paired blood: in process     Labs:  Recent Labs     Units 25  0412 07/15/25  1921   CREATININE MG/DL 0.97 1.13   BUN MG/DL 14 16   PROCAL ng/mL  --  4.13   WBC K/uL 8.1 8.1     Temp (24hrs), Av.5 °F (37.5 °C), Min:97.5 °F (36.4 °C), Max:102.4 °F (39.1 °C)    Conditions for Dosing Consideration: Malnutrition    Creatinine Clearance (mL/min): Estimated Creatinine Clearance: 94 mL/min (based on SCr of 0.97 mg/dL).     Impression/Plan:   Vancomycin 1750 mg loading dose given. Continue with vancomycin 1000 mg IV q12h for Predicted QKC34-46 = 423, Predicted AUC24,ss = 464  Will check a level tomorrow 1100  Antimicrobial stop date 7 days     Pharmacy will follow daily and adjust medications as appropriate for renal function and/or serum levels.    Thank you,  Tianna Reynolds [facility]    
Pharmacy Antimicrobial Kinetic Dosing    Indication for Antimicrobials: sepsis; possible CAP  Hx short gut syndrome receiving TPN 3x weekly via CVC    Current Regimen of Each Antimicrobial:  Vancomycin pharmacy to dose; Start Date ; Day # 2  Zosyn 3.375 gm IV q8h; Start Date 7/15; Day # 2    Previous Antimicrobial Therapy:     Goal Level: Vancomycin -600    Date Dose & Interval Measured (mcg/mL) Predicted AUC 24-48 Predicted AUC 24,ss     1000 mg q12h                      Significant Cultures:   7/15 paired blood: in process    - blood cx ordered  Labs:  Recent Labs     Units 25  0448 25  0412 07/15/25  1921   CREATININE MG/DL 0.88 0.97 1.13   BUN MG/DL 10 14 16   PROCAL ng/mL  --   --  4.13   WBC K/uL 6.2 8.1 8.1     Temp (24hrs), Av.9 °F (36.6 °C), Min:97.5 °F (36.4 °C), Max:98.4 °F (36.9 °C)      Conditions for Dosing Consideration: Malnutrition    Creatinine Clearance (mL/min): Estimated Creatinine Clearance: 104 mL/min (based on SCr of 0.88 mg/dL).       Impression/Plan:   Blood cx updated to coag neg staph species ; resuming vancomycin while cx pending  Vancomycin 1750 mg loading dose given. Continue with vancomycin 1000 mg IV q12h for Predicted [id number] = 423, Predicted AUC24,ss = 464  Vancomycin level  1400 prior to dose  Antimicrobial stop date pending for vancomycin     Pharmacy will follow daily and adjust medications as appropriate for renal function and/or serum levels.    Thank you,  JOSEFINA GONZALEZ [facility]      
Pharmacy Antimicrobial Kinetic Dosing    Indication for Antimicrobials: sepsis; possible CAP  Hx short gut syndrome receiving TPN 3x weekly via CVC    Current Regimen of Each Antimicrobial:  Vancomycin pharmacy to dose; Start Date ; Day # 3  Cefepime 2000 mg IV Q8H; Start Date ; Day # 3    Previous Antimicrobial Therapy:   Zosyn -    Goal Level: Vancomycin -600    Date Dose & Interval Measured (mcg/mL) Predicted AUC 24-48 Predicted AUC 24,ss     1000 mg q12h 3.7 274 290    1250 q 12  19.1 492 512            Significant Cultures:   7/15 paired blood: staph coag negative (PCR detected MRSA)  : Blood cx: E. Coli - final   tissue: E. Coli and enterobacter colacae, staph coag negative   blood, paired: ngtd, prelim    Labs:  Recent Labs     Units 25  0430 25  0441 25  0603   CREATININE MG/DL 0.89 0.93 0.95   BUN MG/DL 16 13 12   WBC K/uL 4.9 4.7 5.2     Temp (24hrs), Av.8 °F (36.6 °C), Min:97.7 °F (36.5 °C), Max:97.9 °F (36.6 °C)      Conditions for Dosing Consideration: Malnutrition    Creatinine Clearance (mL/min): Estimated Creatinine Clearance: 103 mL/min (based on SCr of 0.89 mg/dL).       Impression/Plan:   Continue with vancomycin 1250 mg IV Q12H   Predicted BVX99-66 = 492, Predicted AUC24,ss = 512  Continue with cefepime   CBC & BMP ordered daily per protocol  Antimicrobial stop date pending for vancomycin     Pharmacy will follow daily and adjust medications as appropriate for renal function and/or serum levels.    Thank you,  Bernabe Wei [facility]        
Pharmacy Antimicrobial Kinetic Dosing    Indication for Antimicrobials: sepsis; possible CAP  Hx short gut syndrome receiving TPN 3x weekly via CVC    Current Regimen of Each Antimicrobial:  Vancomycin pharmacy to dose; Start Date ; Day # 4  Cefepime 2000 mg IV Q8H; Start Date ; Day # 4    Previous Antimicrobial Therapy:   Zosyn -    Goal Level: Vancomycin -600    Date Dose & Interval Measured (mcg/mL) Predicted AUC 24-48 Predicted AUC 24,ss     1000 mg q12h 3.7 274 290    1250 q 12  19.1 492 512            Significant Cultures:   7/15 paired blood: enterobacter clocae 2/4 bottles, MRSE 1/ bottles   paired blood: E coli 4/ bottles    tissue: E coli, enterobacter colacae, CoNS, pending    paired blood: paired: ngtd, prelim    Labs:  Recent Labs     Units 25  0430 25  0441   CREATININE MG/DL 0.89 0.93   BUN MG/DL 16 13   WBC K/uL 4.9 4.7     Temp (24hrs), Av.8 °F (36.6 °C), Min:97.4 °F (36.3 °C), Max:98.2 °F (36.8 °C)    Conditions for Dosing Consideration: Malnutrition    Creatinine Clearance (mL/min): Estimated Creatinine Clearance: 103 mL/min (based on SCr of 0.89 mg/dL).     Impression/Plan:   Continue with vancomycin 1250 mg IV Q12H for Predicted AUC24,ss = 512  Continue with cefepime   ID on board - Plan for discharge on dalbavancin at to be administered at [facility] and levofloxacin PO   Antimicrobial stop date pending for vancomycin     Pharmacy will follow daily and adjust medications as appropriate for renal function and/or serum levels.    Thank you,  Tianna Reynolds [facility]      
Pharmacy Antimicrobial Kinetic Dosing    Indication for Antimicrobials: sepsis; possible CAP  Hx short gut syndrome receiving TPN 3x weekly via CVC    Current Regimen of Each Antimicrobial:  Vancomycin pharmacy to dose; Start Date ; Day # 8  Cefepime 2000 mg IV Q8H; Start Date ; Day # 8    Previous Antimicrobial Therapy:   Zosyn -    Goal Level: Vancomycin -600    Date Dose & Interval Measured (mcg/mL) Predicted AUC 24-48 Predicted AUC 24,ss     1000 mg q12h 3.7 274 290    1250 q 12  19.1 492 512    1250mg IV q12 18.6 541 540     Significant Cultures:   7/15 paired blood: enterobacter clocae 2/4 bottles, MRSE 1/ bottles   paired blood: E coli 4/4 bottles    tissue: E coli, enterobacter colacae, CoNS, pending    paired blood: paired: ngtd, prelim    Labs:  Recent Labs     Units 25  0513 25  0408   CREATININE MG/DL 0.91 1.00   BUN MG/DL 18 20   WBC K/uL 4.4  --      Temp (24hrs), Av.9 °F (36.6 °C), Min:97.9 °F (36.6 °C), Max:97.9 °F (36.6 °C)      Conditions for Dosing Consideration: Malnutrition    Creatinine Clearance (mL/min): Estimated Creatinine Clearance: 101 mL/min (based on SCr of 0.91 mg/dL).       Impression/Plan:   Continue with vancomycin 1250 mg IV Q12H for Predicted AUC24,ss = 540  Continue with cefepime   ID on board - Plan for discharge on dalbavancin at to be administered at [facility] and levofloxacin PO   Antimicrobial stop date pending for vancomycin     Pharmacy will follow daily and adjust medications as appropriate for renal function and/or serum levels.    Thank you,  Silvano Pompa [facility]              
Report was given to Sirman. Patient tolerated care well. Medications were given per the MAR. Caring rounds had been completed.  
Spiritual Care Partner Volunteer visited patient at [facility] in [facility] 3 MED TELE on 7/18/2025   Documented by: Chaplain Abdoulaye Leal M.Div., [facility].   Paging Service: [phone]  
While administering IVP abx- pt stated hit hurt and was feeling like pressure building up. Pt only received 12ml of 20 ml syringe of cefepime. NS called to restart line.    1502: Med was completely administered  
  ADULT DIET; Regular  ADULT ORAL NUTRITION SUPPLEMENT; Breakfast, Lunch, Dinner; Standard High Calorie/High Protein Oral Supplement  DIET ONE TIME MESSAGE;    Anthropometric Measures:  Height: 177.8 cm (5' 10\")  Ideal Body Weight (IBW): 166 lbs (75 kg)       Current Body Weight: 69.4 kg (153 lb),   IBW.    Current BMI (kg/m2): 22                             BMI Categories: Normal Weight (BMI 18.5-24.9)    Estimated Daily Nutrient Needs:  Energy Requirements Based On: Kcal/kg  Weight Used for Energy Requirements: Current  Energy (kcal/day): 2082 kcals (30 kcals/kg bw)  Weight Used for Protein Requirements: Current  Protein (g/day): 83-97g (1.2-1.4 g/kg bw)  Method Used for Fluid Requirements: 1 ml/kcal  Fluid (ml/day): 2100 mL    Nutrition Diagnosis:   Moderate malnutrition related to catabolic illness as evidenced by criteria as identified in malnutrition assessment    Nutrition Interventions:   Food and/or Nutrient Delivery: Continue Current Diet, Start Oral Nutrition Supplement  Nutrition Education/Counseling: No recommendation at this time  Coordination of Nutrition Care: Continue to monitor while inpatient       Goals:  Goals: PO intake 75% or greater, by next RD assessment          Nutrition Monitoring and Evaluation:   Behavioral-Environmental Outcomes: None Identified  Food/Nutrient Intake Outcomes: Food and Nutrient Intake, Supplement Intake  Physical Signs/Symptoms Outcomes: Biochemical Data, Weight, GI Status    Discharge Planning:    Continue current diet, Continue Oral Nutrition Supplement     Albertina Lynn RD  Contact: ext [phone]    
reflexed to culture, procalcitonin 4.13. Patient given vancomycin and zosyn as well as tylenol and 2L NS by ED provider.  \"      Assessment and plan:      Sepsis due to Gram-negative bacteremia from central line infection POA  Possible community-acquired pneumonia of lingula POA  Note: CTA head neck performed 7/7 - Left subclavian central venous catheter with superior vena cava surrounding fibrin sheath versus thrombus   Continue empiric zosyn + vanco  Follow peripheral and central line cultures- currently growing GNR as well as staph in 1/6 bottles  Of note, he was seen in ED on 6/30/35 for similar and blood cultures were collected and no growth.  IR consulted to remove Hoang and culture of tip.  ID consulted once cultures result. Appreciate input. Switch to cefepime     Short gut syndrome   Moderate protein calorie malnutrition secondary to above  receiving TPN 3 times weekly at home  Hold PTA TPN while evaluating for blood stream infection.  Supplement thiamine, folic acid, MVI, Vit D  Allow diet  He tells me this is his 3rd bloodstream infection since his central line was placed. He is interested in NOT replacing and just focusing on nutrient dense diet and seeing how he does. I spoke with Suzanne with his TPN nutrition clinic who communicated with Dr. Baldwin who said it is ok to leave it out, as long as he maintains his weight and hydration, and understands that if he needs to reinitiate TPN, it would require hospital admission likely for about 7 days.     Sebaceous cyst-face, perirectal  does not appear cellulitis  Covered with vancomycin as above  Agree with planned outpatient dermatology referral     Recent pulmonary embolism (7/7/25) on Eliquis  Seen at [facility] 7/7/25 for SOB and CTA revealed PE.  Continue PTA eliquis                          CODE STATUS   Full   Functional Status:  independent   Capacity:  Pt has decision making capacity at time of assessment   Prophylaxis   apixaban   Discharge Plan:  Home 
Combo [[id number]] Collected: 07/15/25 1921    Order Status: Completed Specimen: Nasopharyngeal Updated: 07/15/25 2028     Source Nasopharyngeal        SARS-CoV-2, PCR Not detected        Comment: Not Detected results do not preclude SARS-CoV-2 infection and should not be used as the sole basis for patient management decisions.Results must be combined with clinical observations, patient history, and epidemiological information.        Rapid Influenza A By PCR Not detected        Rapid Influenza B By PCR Not detected        Comment:    Testing was performed using [employer] SARS-CoV-2 and Influenza A/B nucleic acid assay.  This test is a multiplex Real-Time Reverse Transcriptase Polymerase Chain Reaction (RT-PCR) based in vitro diagnostic test intended for the qualitative detection of nucleic acids from SARS-CoV-2, Influenza A, and Influenza B in nasopharyngeal specimens.         COVID-19 & Influenza Combo [[id number]]     Order Status: Canceled Specimen: Nasopharyngeal Swab             Recent Labs     07/15/25  1921 07/16/25  0412 07/17/25  0448 07/18/25  0603   WBC 8.1 8.1 6.2 5.2   HGB 11.8* 11.5* 12.2 12.7   HCT 34.7* 34.8* 36.6 38.5    249 295 336     Recent Labs     07/15/25  1921 07/16/25  0412 07/17/25  0448 07/18/25  0603   * 138 138 139   K 4.6 3.7 4.1 3.9    110* 109* 107   CO2 25 23 24 26   BUN 16 14 10 12   MG 1.8  --   --   --    ALT 45  --  34 34      Lab Results   Component Value Date/Time    TSH 1.72 11/30/2021 11:19 AM         Other medical conditions are listed in the active hospital problem list section; these and other pertinent data were taken into consideration when the treatment plan was developed and customized to this patient's unique overall circumstances and needs.  We have reviewed relevant medical records within the constraints of this admission process.   High complexity decision making          Dina Whatley PA-C  7/18/2025        
combined with clinical observations, patient history, and epidemiological information.        Rapid Influenza A By PCR Not detected        Rapid Influenza B By PCR Not detected        Comment:    Testing was performed using [employer] SARS-CoV-2 and Influenza A/B nucleic acid assay.  This test is a multiplex Real-Time Reverse Transcriptase Polymerase Chain Reaction (RT-PCR) based in vitro diagnostic test intended for the qualitative detection of nucleic acids from SARS-CoV-2, Influenza A, and Influenza B in nasopharyngeal specimens.         COVID-19 & Influenza Combo [[id number]]     Order Status: Canceled Specimen: Nasopharyngeal Swab             Xray Result (most recent):  XR CHEST PORTABLE 07/18/2025    Narrative  EXAM:  XR CHEST PORTABLE    INDICATION: Please evaluate for lingular infiltrate as seen on CT chest    COMPARISON: Chest x-ray 7/15/2025, CT abdomen 7/15/2025    TECHNIQUE: portable chest AP view    FINDINGS: Heart size is stable. The pulmonary vasculature is within normal  limits.    Lung volumes are moderate with a small lingular opacity similar to that  described on 7/15/2025, but possibly smaller. The visualized bones and upper  abdomen are age-appropriate.    Impression  Focal lingular opacity persists, with a possible slight decrease in size. This  is difficult to confirm, however. Follow-up to clearing is recommended. There is  no new infiltrate or effusion.      Electronically signed by BULL CASTANEDA      XR CHEST PORTABLE  Result Date: 7/18/2025  EXAM:  XR CHEST PORTABLE INDICATION: Please evaluate for lingular infiltrate as seen on CT chest COMPARISON: Chest x-ray 7/15/2025, CT abdomen 7/15/2025 TECHNIQUE: portable chest AP view FINDINGS: Heart size is stable. The pulmonary vasculature is within normal limits. Lung volumes are moderate with a small lingular opacity similar to that described on 7/15/2025, but possibly smaller. The visualized bones and upper abdomen are age-appropriate. 
  Testing was performed using [employer] SARS-CoV-2 and Influenza A/B nucleic acid assay.  This test is a multiplex Real-Time Reverse Transcriptase Polymerase Chain Reaction (RT-PCR) based in vitro diagnostic test intended for the qualitative detection of nucleic acids from SARS-CoV-2, Influenza A, and Influenza B in nasopharyngeal specimens.         COVID-19 & Influenza Combo [[id number]]     Order Status: Canceled Specimen: Nasopharyngeal Swab             Recent Labs     07/20/25  0430   WBC 4.9   HGB 11.9*   HCT 36.1*        Recent Labs     07/20/25  0430 07/22/25  0408    136   K 4.0 4.0    105   CO2 28 28   BUN 16 20   ALT 32  --       Lab Results   Component Value Date/Time    TSH 1.72 11/30/2021 11:19 AM         Other medical conditions are listed in the active hospital problem list section; these and other pertinent data were taken into consideration when the treatment plan was developed and customized to this patient's unique overall circumstances and needs.  We have reviewed relevant medical records within the constraints of this admission process.   High complexity decision making       Myla Lange MD      
contrast  Result Date: 7/7/2025  PROCEDURE INFORMATION: Exam: CTA Neck With Contrast Exam date and time: 7/7/2025 7:10 PM Age: 45 years old Clinical indication: Other: Svc clots in past non compliant on eliquis rule out thrombus TECHNIQUE: Imaging protocol: Computed tomographic angiography of the neck with contrast. Exam focused on the cervical segments of the vasculature. 3D rendering (Not supervised by radiologist): MIP and/or 3D reconstructed images were created by the technologist. Radiation optimization: All CT scans at this facility use at least one of these dose optimization techniques: automated exposure control; mA and/or kV adjustment per patient size (includes targeted exams where dose is matched to clinical indication); or iterative reconstruction. Contrast material: OMNIPAQUE 350; Contrast volume: 100 ml; Contrast route: INTRAVENOUS (IV);   COMPARISON: CT SOFT TISSUE NECK W CONTRAST 2/22/2024 7:35 PM FINDINGS: Tubes, catheters and devices: Left subclavian central venous catheter with superior vena cava surrounding fibrin sheath versus thrombus, series 4, image 82 and series 8, image 60. Right common carotid artery: No stenosis. No dissection or occlusion. Right internal carotid artery: No stenosis of the extracranial segment, 0%. No dissection or occlusion. Right external carotid artery: No occlusion or stenosis of the origin.   Left common carotid artery: No stenosis. No dissection or occlusion. Left internal carotid artery: No stenosis of the extracranial segment, 0%. No dissection or occlusion. Left external carotid artery: No occlusion or stenosis of the origin.   Right vertebral artery: No stenosis. No dissection or occlusion. Left vertebral artery: No stenosis. No dissection or occlusion.   Veins: Dense contrast right subclavian vein and superior vena cava with beam hardening artifacts. Soft tissues: Unremarkable. No significant soft tissue swelling. Bones/joints: Loss of normal cervical lordosis,

## 2025-07-23 NOTE — CARE COORDINATION
Transition of Care Plan:     RUR: 8% Low Risk  Prior Level of Functioning: Independent with ADL's  Disposition: Home  NAA: 7/23  Follow up appointments: PCP follow up  DME needed: N/A  Transportation at discharge: Roundtrip  IM/IMM Medicare/ letter given: 2 IM given 7/22  Is patient a [place] and connected with [facility]? N/A  Caregiver Contact:   Stormy Arana (Parent)  [phone] (Mobile)  Discharge Caregiver contacted prior to discharge? Yes by pt  Care Conference needed? N/A  Barriers to discharge:  N/A    CM acknowledged d/c. Chart reviewed, IDR completed. Pt will d/c home with follow up apts; no post-acute therapy needs identified for d/c.  has arranged for pt to go to [facility] office for dalbavancin IV. Roundtrip will transport pt for d/c @11:00AM. Nursing has been notified.?2 IM letter given 7/22. CM will remain accessible if any needs arise prior to discharge.          07/23/25 1029   Services At/After Discharge   Transition of Care Consult (CM Consult) Infusion Center   Services At/After Discharge None   [place] Resource Information Provided? No   Mode of Transport at Discharge Other (see comment)  (Roundtrip)   Hospital Transport Time of Discharge 1100   Confirm Follow Up Transport Family   Condition of Participation: Discharge Planning   The Plan for Transition of Care is related to the following treatment goals: Pt will discharge home with follow up appts and [facility] arranged   The Patient and/or Patient Representative was provided with a Choice of Provider? Patient   The Patient and/Or Patient Representative agree with the Discharge Plan? Yes   Freedom of Choice list was provided with basic dialogue that supports the patient's individualized plan of care/goals, treatment preferences, and shares the quality data associated with the providers?  No  (N/A)         YVONNE Clayton,  [phone]

## 2025-07-24 ENCOUNTER — TELEPHONE (OUTPATIENT)
Age: 45
End: 2025-07-24

## 2025-07-24 NOTE — TELEPHONE ENCOUNTER
Patient called for an appointment. Notes state no follow-up required but discharge summary says follow-up in 2 weeks.

## (undated) DEVICE — 3M™ IOBAN™ 2 ANTIMICROBIAL INCISE DRAPE 6648EZ: Brand: IOBAN™ 2

## (undated) DEVICE — SUTURE VCRL SZ 3-0 L18IN ABSRB VLT L22MM SH-1 1/2 CIR J772D

## (undated) DEVICE — CATHETER DRNGE 30FR 4 WNG DISP FOR NEPHSTMY MALECOTS

## (undated) DEVICE — SUTURE PERMAHAND SZ 3-0 L30IN NONABSORBABLE BLK SILK BRAID A304H

## (undated) DEVICE — CLIP LIG M BLU TI HRT SHP WIRE HORZ 600 PER BX

## (undated) DEVICE — SWAB CULT LIQ STUART AGR AERB MOD IN BRK SGL RAYON TIP PLAS 220099] BECTON DICKINSON MICRO]

## (undated) DEVICE — SUTURE ETHLN SZ 4-0 L18IN NONABSORBABLE BLK L19MM PS-2 3/8 1667H

## (undated) DEVICE — SPONGE LAP 18X18IN STRL -- 5/PK

## (undated) DEVICE — SYR 10ML LUER LOK 1/5ML GRAD --

## (undated) DEVICE — AEGIS 1" DISK 4MM HOLE, PEEL OPEN: Brand: MEDLINE

## (undated) DEVICE — ELECTRODE,RADIOTRANSLUCENT,FOAM,5PK: Brand: MEDLINE

## (undated) DEVICE — INFECTION CONTROL KIT SYS

## (undated) DEVICE — GLOVE SURG SZ 85 L12IN FNGR THK79MIL GRN LTX FREE

## (undated) DEVICE — BANDAGE COMPR SELF ADH 5 YDX2 IN TAN NS PREMIERPRO LF

## (undated) DEVICE — 3M™ TEGADERM™ TRANSPARENT FILM DRESSING FRAME STYLE, 1626W, 4 IN X 4-3/4 IN (10 CM X 12 CM), 50/CT 4CT/CASE: Brand: 3M™ TEGADERM™

## (undated) DEVICE — GOWN,SIRUS,POLYRNF,SETINSLV,XL,20/CS: Brand: MEDLINE

## (undated) DEVICE — BRUSH SCRB 4% CHG RED DISP --

## (undated) DEVICE — Device: Brand: JELCO

## (undated) DEVICE — DRAPE FLD WRM W44XL66IN C6L FOR INTRATEMP SYS THERMABASIN

## (undated) DEVICE — SUTURE PDS II SZ 2-0 L27IN ABSRB VLT L36MM CT-1 1/2 CIR Z339H

## (undated) DEVICE — SUTURE ETHLN SZ 2-0 L30IN NONABSORBABLE BLK L36MM PSLX 3/8 1697H

## (undated) DEVICE — GLOVE SURG SZ 8 L12IN FNGR THK79MIL GRN LTX FREE

## (undated) DEVICE — SPONGE GZ W4XL4IN COT 12 PLY TYP VII WVN C FLD DSGN

## (undated) DEVICE — BLADE ELECTRODE: Brand: EDGE

## (undated) DEVICE — SUTURE PERMAHAND SZ 3-0 L18IN NONABSORBABLE BLK L26MM SH C013D

## (undated) DEVICE — YANKAUER,TAPERED BULBOUS TIP,W/O VENT: Brand: MEDLINE

## (undated) DEVICE — CULTURETTE SGL EVAC TUBE PALL -- 100/CA

## (undated) DEVICE — HAND I-LF: Brand: MEDLINE INDUSTRIES, INC.

## (undated) DEVICE — KIT,1200CC CANISTER,3/16"X6' TUBING: Brand: MEDLINE INDUSTRIES, INC.

## (undated) DEVICE — SOLUTION IV 1000ML 0.9% SOD CHL

## (undated) DEVICE — NEEDLE HYPO 18GA L1.5IN PNK S STL HUB POLYPR SHLD REG BVL

## (undated) DEVICE — CEMENT MIXING SYSTEM WITH FEMORAL BREAKWAY NOZZLE: Brand: REVOLUTION

## (undated) DEVICE — ZIMMER® STERILE DISPOSABLE TOURNIQUET CUFF WITH PROTECTIVE SLEEVE AND PLC, DUAL PORT, SINGLE BLADDER, 34 IN. (86 CM)

## (undated) DEVICE — SOLUTION SURG PREP 26 CC PURPREP

## (undated) DEVICE — SOLUTION IRRIG 1000ML 0.9% SOD CHL USP POUR PLAS BTL

## (undated) DEVICE — TRANSFER SET 3": Brand: MEDLINE INDUSTRIES, INC.

## (undated) DEVICE — MAJOR LAPAROTOMY-MRMC: Brand: MEDLINE INDUSTRIES, INC.

## (undated) DEVICE — NEONATAL-ADULT SPO2 SENSOR: Brand: NELLCOR

## (undated) DEVICE — TOWEL 4 PLY TISS 19X30 SUE WHT

## (undated) DEVICE — SUTURE PDS II SZ 1 L36IN ABSRB VLT L48MM CTX 1/2 CIR Z371T

## (undated) DEVICE — SYR 3ML LL TIP 1/10ML GRAD --

## (undated) DEVICE — ZIMMER® STERILE DISPOSABLE TOURNIQUET CUFF WITH PLC, DUAL PORT, SINGLE BLADDER, 18 IN. (46 CM)

## (undated) DEVICE — PAD,ABDOMINAL,5"X9",ST,LF,25/BX: Brand: MEDLINE INDUSTRIES, INC.

## (undated) DEVICE — Z DISCONTINUED PER MEDLINE LINE GAS SAMPLING O2/CO2 LNG AD 13 FT NSL W/ TBNG FILTERLINE

## (undated) DEVICE — SUTURE VCRL SZ 1 L36IN ABSRB UD L36MM CT-1 1/2 CIR J947H

## (undated) DEVICE — GARMENT,MEDLINE,DVT,INT,CALF,MED, GEN2: Brand: MEDLINE

## (undated) DEVICE — Device

## (undated) DEVICE — SUTURE VCRL SZ 2-0 L36IN ABSRB UD L36MM CT-1 1/2 CIR J945H

## (undated) DEVICE — TOTAL TRAY, 16FR 10ML SIL FOLEY, URN: Brand: MEDLINE

## (undated) DEVICE — FORCEPS BX L240CM JAW DIA2.8MM L CAP W/ NDL MIC MESH TOOTH

## (undated) DEVICE — PAD,ABDOMINAL,8"X10",ST,LF: Brand: MEDLINE

## (undated) DEVICE — CONTAINER SPEC 20 ML LID NEUT BUFF FORMALIN 10 % POLYPR STS

## (undated) DEVICE — YANKAUER,POOLE TIP,STERILE,50/CS: Brand: MEDLINE

## (undated) DEVICE — SUTURE STRATAFIX SYMMETRIC SZ 1 L18IN ABSRB VLT CT1 L36CM SXPP1A404

## (undated) DEVICE — HOOD: Brand: FLYTE

## (undated) DEVICE — WRAP SURG W1.31XL1.34M CARD FOR PT 165-172CM THERMOWRP

## (undated) DEVICE — BIPOLAR FORCEPS CORD,BANANA LEADS: Brand: VALLEYLAB

## (undated) DEVICE — GLOVE SURG SZ 75 CRM LTX FREE POLYISOPRENE POLYMER BEAD ANTI

## (undated) DEVICE — CATH IV AUTOGRD BC PNK 20GA 25 -- INSYTE

## (undated) DEVICE — GLOVE ORANGE PI 7 1/2   MSG9075

## (undated) DEVICE — SET ADMIN 16ML TBNG L100IN 2 Y INJ SITE IV PIGGY BK DISP

## (undated) DEVICE — GOWN,SIRUS,POLYRNF,RAGLAN,XL,ST,30/CS: Brand: MEDLINE

## (undated) DEVICE — BASIC SINGLE BASIN BTC-LF: Brand: MEDLINE INDUSTRIES, INC.

## (undated) DEVICE — PAD ABSRB W8XL10IN ABD HYDROPHOBIC NONWOVEN THCK LAYR CELOS

## (undated) DEVICE — SOLUTION IRRIG 3000ML 0.9% SOD CHL USP UROMATIC PLAS CONT

## (undated) DEVICE — LIGHT HANDLE: Brand: DEVON

## (undated) DEVICE — SUTURE PDS II SZ 0 L36IN ABSRB VLT L40MM CT 1/2 CIR Z358T

## (undated) DEVICE — FORCEPS BX L160CM DIA8MM GRSP DISECT CUP TIP NONLOCKING ROT

## (undated) DEVICE — BASIN EMSIS 16OZ GRAPHITE PLAS KID SHP MOLD GRAD FOR ORAL

## (undated) DEVICE — SYR LR LCK 1ML GRAD NSAF 30ML --

## (undated) DEVICE — INSULATED BLADE ELECTRODE: Brand: EDGE

## (undated) DEVICE — 3M™ IOBAN™ 2 ANTIMICROBIAL INCISE DRAPE 6650EZ: Brand: IOBAN™ 2

## (undated) DEVICE — SUTURE ETHLN SZ 2-0 L18IN NONABSORBABLE BLK L26MM FS 3/8 664G

## (undated) DEVICE — 450 ML BOTTLE OF 0.05% CHLORHEXIDINE GLUCONATE IN 99.95% STERILE WATER FOR IRRIGATION, USP AND APPLICATOR.: Brand: IRRISEPT ANTIMICROBIAL WOUND LAVAGE

## (undated) DEVICE — SUTURE PERMA-HAND 2-0 L30IN NONABSORBABLE BLK MH L36MM 1/2 K843H

## (undated) DEVICE — TUBE FEED 14FR BLLN 7-10ML L45CM JEJU SIL INFL INT SECUR

## (undated) DEVICE — BARONE JEJUNOSTOMY SET: Brand: BARONE

## (undated) DEVICE — PREP KIT PEEL PTCH POVIDONE IOD

## (undated) DEVICE — STERILE POLYISOPRENE POWDER-FREE SURGICAL GLOVES: Brand: PROTEXIS

## (undated) DEVICE — GLOVE ORTHO 7 1/2   MSG9475

## (undated) DEVICE — SURGICAL PROCEDURE PACK TISS 3X5 IN ABSORBABLE SEPRAFILM

## (undated) DEVICE — SUTURE STRATAFIX SPRL SZ 1 L14IN ABSRB VLT L48CM CTX 1/2 SXPD2B405

## (undated) DEVICE — BLOCK BITE ENDOSCP AD 21 MM W/ DIL BLU LF DISP

## (undated) DEVICE — SUTURE PERMAHAND SZ 2-0 L30IN NONABSORBABLE BLK SILK W/O A305H

## (undated) DEVICE — DRAIN SURG 19FR L025IN DIA63MM SIL CHN RND FULL FLUT CLS

## (undated) DEVICE — PLUG,CATHETER,DRAINAGE PROTECTOR,TUBE: Brand: MEDLINE

## (undated) DEVICE — GLOVE SURG SZ 85 L12IN FNGR ORTHO 126MIL CRM LTX FREE

## (undated) DEVICE — SPONGE DRAIN NONWOVEN 4X4IN -- 2/PK

## (undated) DEVICE — 3M™ MEDIPORE™ H SOFT CLOTH SURGICAL TAPE 2864, 4 INCH X 10 YARD (10CM X 9,14M), 12 ROLLS/CASE: Brand: 3M™ MEDIPORE™

## (undated) DEVICE — HANDPIECE SET WITH COAXIAL HIGH FLOW TIP AND SUCTION TUBE: Brand: INTERPULSE

## (undated) DEVICE — PADDING CST 4IN STERILE --

## (undated) DEVICE — CONNECTOR STRT W O BASE 1 ARM

## (undated) DEVICE — CURVED, LARGE JAW, OPEN SEALER/DIVIDER NANO-COATED: Brand: LIGASURE IMPACT

## (undated) DEVICE — BAG,DRAINAGE,4L,A/R TOWER,LL,SLIDE TAP: Brand: MEDLINE

## (undated) DEVICE — SPONGE: SPECIALTY PEANUT XR 100/CS: Brand: MEDICAL ACTION INDUSTRIES

## (undated) DEVICE — SUT SLK 2-0SH 30IN BLK --

## (undated) DEVICE — DRAINBAG,ANTI-REFLUX TOWER,L/F,2000ML,LL: Brand: MEDLINE

## (undated) DEVICE — PAD NON-ADHERENT 3X4 STRL LF --

## (undated) DEVICE — TOTAL JOINT-MRMC: Brand: MEDLINE INDUSTRIES, INC.

## (undated) DEVICE — SUTURE PERMAHAND SZ 3-0 L30IN NONABSORBABLE BLK L26MM SH C017D

## (undated) DEVICE — SEALER ENDOSCP NANO COAT OPN DIV CRV L JAW LIGASURE IMPACT

## (undated) DEVICE — (D)PREP SKN CHLRAPRP APPL 26ML -- CONVERT TO ITEM 371833

## (undated) DEVICE — CANISTER, RIGID, 3000CC: Brand: MEDLINE INDUSTRIES, INC.

## (undated) DEVICE — TOWEL,OR,DSP,ST,BLUE,STD,4/PK,20PK/CS: Brand: MEDLINE

## (undated) DEVICE — BNDG ELAS HK LOOP 6X5YD NS -- MATRIX

## (undated) DEVICE — INSULATED BLADE ELECTRODE;CAUTION: FOR MANUFACTURING, PROCESSING, OR REPACKING.: Brand: EDGE

## (undated) DEVICE — 1200 GUARD II KIT W/5MM TUBE W/O VAC TUBE: Brand: GUARDIAN

## (undated) DEVICE — BAG SPEC BIOHZRD 10 X 10 IN --

## (undated) DEVICE — SOLIDIFIER FLD 2OZ 1500CC N DISINF IN BTL DISP SAFESORB

## (undated) DEVICE — DRAIN KT WND 10FR RND 400ML --

## (undated) DEVICE — 3M™ MEDIPORE™ H SOFT CLOTH TAPE SHORT ROLL TAPE 6INCHES X 2 YARDS 16 ROLLS/CASE 2866S: Brand: 3M™ MEDIPORE™

## (undated) DEVICE — STRETCH BANDAGE ROLL: Brand: DERMACEA

## (undated) DEVICE — SUTURE MCRYL SZ 2-0 L36IN ABSRB UD L36MM CT-1 1/2 CIR Y945H

## (undated) DEVICE — DRESSING,GAUZE,XEROFORM,CURAD,5"X9",ST: Brand: CURAD

## (undated) DEVICE — SPONGE HEMOSTAT CELLULS 4X8IN -- SURGICEL

## (undated) DEVICE — GLOVE SURG SZ 8 CRM LTX FREE POLYISOPRENE POLYMER BEAD ANTI

## (undated) DEVICE — SUTURE ETHLN SZ 2-0 L18IN NONABSORBABLE BLK L19MM PS-2 PRIM 593H

## (undated) DEVICE — SYRINGE,TOOMEY,IRRIGATION,70CC,STERILE: Brand: MEDLINE

## (undated) DEVICE — OCCLUSIVE GAUZE STRIP,3% BISMUTH TRIBROMOPHENATE IN PETROLATUM BLEND: Brand: XEROFORM

## (undated) DEVICE — GOWN,SIRUS,NONRNF,XLN/2XL,18/CS: Brand: MEDLINE